# Patient Record
Sex: MALE | Race: ASIAN | NOT HISPANIC OR LATINO | Employment: FULL TIME | URBAN - METROPOLITAN AREA
[De-identification: names, ages, dates, MRNs, and addresses within clinical notes are randomized per-mention and may not be internally consistent; named-entity substitution may affect disease eponyms.]

---

## 2017-07-03 ENCOUNTER — OFFICE VISIT (OUTPATIENT)
Dept: FAMILY MEDICINE | Facility: CLINIC | Age: 57
End: 2017-07-03

## 2017-07-03 VITALS
BODY MASS INDEX: 28.54 KG/M2 | HEIGHT: 64 IN | TEMPERATURE: 98.4 F | OXYGEN SATURATION: 99 % | DIASTOLIC BLOOD PRESSURE: 91 MMHG | HEART RATE: 84 BPM | SYSTOLIC BLOOD PRESSURE: 151 MMHG | WEIGHT: 167.2 LBS

## 2017-07-03 DIAGNOSIS — K92.2 GI BLEED: ICD-10-CM

## 2017-07-03 DIAGNOSIS — R74.8 ELEVATED LIVER ENZYMES: ICD-10-CM

## 2017-07-03 DIAGNOSIS — Z53.9 ERRONEOUS ENCOUNTER--DISREGARD: Primary | ICD-10-CM

## 2017-07-03 DIAGNOSIS — Z09 HOSPITAL DISCHARGE FOLLOW-UP: Primary | ICD-10-CM

## 2017-07-03 DIAGNOSIS — D61.818 PANCYTOPENIA (H): ICD-10-CM

## 2017-07-03 DIAGNOSIS — B19.10 HEPATITIS B VIRUS INFECTION, UNSPECIFIED CHRONICITY: ICD-10-CM

## 2017-07-03 DIAGNOSIS — R03.0 ELEVATED BLOOD PRESSURE READING WITHOUT DIAGNOSIS OF HYPERTENSION: ICD-10-CM

## 2017-07-03 DIAGNOSIS — A04.8 H. PYLORI INFECTION: ICD-10-CM

## 2017-07-03 PROBLEM — K92.1 MELENA: Status: ACTIVE | Noted: 2017-06-23

## 2017-07-03 PROBLEM — S37.009A INJURY OF KIDNEY: Status: ACTIVE | Noted: 2017-06-24

## 2017-07-03 PROBLEM — D64.9 NORMOCYTIC ANEMIA: Status: ACTIVE | Noted: 2017-06-24

## 2017-07-03 LAB
ALBUMIN SERPL-MCNC: 3.2 G/DL (ref 3.2–4.5)
ALP SERPL-CCNC: 56 U/L (ref 40–150)
ALT SERPL-CCNC: 49 U/L (ref 0–45)
AST SERPL-CCNC: 50 U/L (ref 0–55)
BILIRUB SERPL-MCNC: 0.7 MG/DL (ref 0.2–1.3)
BUN SERPL-MCNC: 24 MG/DL (ref 7–30)
CALCIUM SERPL-MCNC: 8.8 MG/DL (ref 8.5–10.4)
CHLORIDE SERPLBLD-SCNC: 105 MMOL/L (ref 94–109)
CO2 SERPL-SCNC: 24 MMOL/L (ref 20–32)
CREAT SERPL-MCNC: 1.9 MG/DL (ref 0.8–1.5)
EGFR CALCULATED (BLACK REFERENCE): 47.2 ML/MIN
EGFR CALCULATED (NON BLACK REFERENCE): 39 ML/MIN
GLUCOSE SERPL-MCNC: 97 MG/DL (ref 60–109)
HCT VFR BLD AUTO: 27.9 % (ref 40–53)
HEMOGLOBIN: 8.6 G/DL (ref 13.3–17.7)
MCH RBC QN AUTO: 30.7 PG (ref 26.5–35)
MCHC RBC AUTO-ENTMCNC: 30.8 G/DL (ref 32–36)
MCV RBC AUTO: 99.6 FL (ref 78–100)
PLATELET # BLD AUTO: 84 K/UL (ref 150–450)
POTASSIUM SERPL-SCNC: 4.1 MMOL/L (ref 3.4–5.3)
PROT SERPL-MCNC: 6.4 G/DL (ref 6.6–8.8)
RBC # BLD AUTO: 2.8 M/UL (ref 4.4–5.9)
SODIUM SERPL-SCNC: 138 MMOL/L (ref 133–144)
WBC # BLD AUTO: 3.7 K/UL (ref 4–11)

## 2017-07-03 RX ORDER — CLARITHROMYCIN 500 MG
500 TABLET ORAL 2 TIMES DAILY
Qty: 28 TABLET | Refills: 0 | Status: SHIPPED | OUTPATIENT
Start: 2017-07-03 | End: 2017-07-17

## 2017-07-03 RX ORDER — AMOXICILLIN 500 MG/1
1000 TABLET, FILM COATED ORAL 2 TIMES DAILY
Qty: 56 TABLET | Refills: 0 | Status: ON HOLD | OUTPATIENT
Start: 2017-07-03 | End: 2019-08-01

## 2017-07-03 NOTE — MR AVS SNAPSHOT
After Visit Summary   7/3/2017    Elle Blanton    MRN: [id number]           Patient Information     Date Of Birth          1960        Visit Information        Provider Department      7/3/2017 2:40 PM Robyn Iraheta APRN CNP [facility]        Today's Diagnoses     Elevated blood pressure reading without diagnosis of hypertension    -  1    GI bleed        Hepatitis B virus infection, unspecified chronicity        Elevated liver enzymes        H. pylori infection          Care Instructions      H. Pylori Infection with Peptic Ulcer    A peptic ulcer is an open sore in the lining of the stomach. It may also form in the lining of the first part of the small intestine (duodenum). Symptoms of a peptic ulcer include stomach pain and upset. Nausea, vomiting, bloating, or bleeding may sometimes occur. In many cases, bacteria called H. pylori are thought to be involved in the development of a peptic ulcer.  Many people have H. pylori in their bodies. Most of the time, it causes no problems. In some people, though, the H. pylori infection causes irritation of the stomach lining. This may make the lining more likely to be damaged by normal stomach acids. H. pylori may also increase the amount of acid in the stomach. It is not clear why this infection leads to problems in some people and not in others.  Tests may be done to check for H. pylori infection. These include a blood test, a breath test, and a stool test. In some cases, a test called endoscopy may be done. During this test, a thin, lighted tube is put into the mouth and down the throat. The healthcare provider can look at the esophagus, stomach, and duodenum through this tube. During this test, a tiny sample of stomach lining (biopsy) may be taken and tested for H. pylori.  Home care    Medicines are used to treat H. pylori infection. Two or more medicines are usually taken together.     Take all prescribed medicines as directed. Take  all of the medicines until they are gone or you are told to stop. This is very important. If you do not finish the medicines, the infection may remain and may be harder to treat.    Ask your healthcare provider what side effects the medicines might cause. These can include stomach cramps, diarrhea, or constipation.    After the medicine is finished, you may have another test to see if H. pylori infection is still present.    Avoid alcohol during treatment.  Follow-up care  Follow up with your healthcare provider as directed. Be sure to return to be retested for H. pylori after treatment.  When to seek medical advice  Call your healthcare provider for any of the following:    Stomach pain that worsens or moves to the right lower part of the abdomen    Chest pain appears or worsens, or spreads to the back, neck, shoulder, or arm    Vomiting    Blood in stool or vomit    Feeling weak or dizzy  Date Last Reviewed: 6/24/2015 2000-2017 [employer]. [address] [place] [address]. All rights reserved. This information is not intended as a substitute for professional medical care. Always follow your healthcare professional's instructions.        Upper GI Bleeding (Stable)  Your upper gastrointestinal (GI) tract includes your esophagus, stomach, and upper small intestine. You have signs of bleeding from your upper GI tract. You may have vomited or coughed up blood or coffee-ground like material. Or you may have black or tarry stools. Very small amounts of GI bleeding may not be visible and can only be found by a test of the stool.  Causes of upper GI bleeding can include:    Tear in the lining of the esophagus    Enlarged veins in the esophagus    An ulcer in the stomach or top of the small intestine    Severe irritation of the stomach    Inflammation of the digestive tract  Note: A bloody nose or mouth or dental problems may cause blood to be swallowed and vomited up again. This is not true GI  bleeding. Iron supplements and medicines for diarrhea and upset stomach can cause black stools. This is not GI bleeding and is not a cause for concern.  Home care  Depending on the cause of your bleeding, care may include the following:    You may be given medicines to help protect your GI tract, treat your problem, and promote healing. Take these as directed.    Avoid NSAIDs, such as aspirin, ibuprofen, or naproxen. They can irritate the stomach and cause further bleeding. If you are taking these medicines for other medical reasons, talk to your healthcare provider before you stop them.      Avoid alcohol, caffeine, and tobacco, which can delay healing and worsen your problem.  Follow-up care  Follow up with your healthcare provider as advised. Further tests may need to be done to find the cause of your bleeding.  When to seek medical advice  Call your healthcare provider for any of the following:    Stomach pain appears or gets worse    Pain spreads to the neck, back, shoulder, or arm    Weakness or dizziness    Swelling of your abdomen    Red blood in your stool    Fever of 100.4F (38C) or higher, or as directed by your healthcare provider  Call 911  Get emergency medical care if any of these occur:    Trouble breathing or swallowing    Severe dizziness    Loss of consciousness    Vomiting blood or large amounts of blood in the stool  Date Last Reviewed: 6/24/2015 2000-2017 [employer]. [address] [place] [address], [place] [address]. All rights reserved. This information is not intended as a substitute for professional medical care. Always follow your healthcare professional's instructions.                Follow-ups after your visit        Who to contact     Please call your clinic at [phone] to:    Ask questions about your health    Make or cancel appointments    Discuss your medicines    Learn about your test results    Speak to your doctor   If you have compliments or concerns about an  "experience at your clinic, or if you wish to file a complaint, please contact [facility] Patient Relations at [phone] or email us at [email or web address]         Additional Information About Your Visit        Care EveryWhere ID     This is your Care EveryWhere ID. This could be used by other organizations to access your [facility] medical records  [id number]        Your Vitals Were     Pulse Temperature Height Pulse Oximetry BMI (Body Mass Index)       84 98.4  F (36.9  C) (Oral) 5' 4.25\" (163.2 cm) 99% 28.48 kg/m2        Blood Pressure from Last 3 Encounters:   07/03/17 (!) 151/91   10/20/16 (!) 160/105    Weight from Last 3 Encounters:   07/03/17 167 lb 3.2 oz (75.8 kg)   10/20/16 180 lb (81.6 kg)              We Performed the Following     CBC with Plt ([facility])     Comprehensive Metabolic Panel ([facility]) - results <1hr Protocol     Hepatitis B Core Ab ([facility])     Hepatitis B Surface Ab ([facility])     Hepatitis B Surface Ag ([facility])          Today's Medication Changes          These changes are accurate as of: 7/3/17  4:12 PM.  If you have any questions, ask your nurse or doctor.               Start taking these medicines.        Dose/Directions    amoxicillin 500 MG tablet   Commonly known as:  AMOXIL   Used for:  H. pylori infection   Started by:  Robyn Iraheta APRN CNP        Dose:  1000 mg   Take 2 tablets (1,000 mg) by mouth 2 times daily   Quantity:  56 tablet   Refills:  0       clarithromycin 500 MG tablet   Commonly known as:  BIAXIN   Used for:  H. pylori infection   Started by:  Robyn Iraheta APRN CNP        Dose:  500 mg   Take 1 tablet (500 mg) by mouth 2 times daily for 14 days   Quantity:  28 tablet   Refills:  0         These medicines have changed or have updated prescriptions.        Dose/Directions    * omeprazole 20 MG CR capsule   Commonly known as:  priLOSEC   This may have changed:  Another medication with the same name was " added. Make sure you understand how and when to take each.   Changed by:  Robyn Iraheta APRN CNP        Dose:  20 mg   Take 20 mg by mouth   Refills:  0       * omeprazole 20 MG CR capsule   Commonly known as:  priLOSEC   This may have changed:  You were already taking a medication with the same name, and this prescription was added. Make sure you understand how and when to take each.   Used for:  H. pylori infection   Changed by:  Robyn Iraheta APRN CNP        Dose:  20 mg   Take 1 capsule (20 mg) by mouth 2 times daily for 14 days   Quantity:  28 capsule   Refills:  0       * Notice:  This list has 2 medication(s) that are the same as other medications prescribed for you. Read the directions carefully, and ask your doctor or other care provider to review them with you.         Where to get your medicines      These medications were sent to [facility]/pharmacy #[id number] - [place], [place] - [address] [facility]  [address], [place] [address]    Hours:  24-hours Phone:  [phone]     amoxicillin 500 MG tablet    clarithromycin 500 MG tablet    omeprazole 20 MG CR capsule                Primary Care Provider Office Phone # Fax #    SUSY Marino [phone]       [facility] [address] [facility] [address]        Equal Access to Services     PRANAV SHEPPARD AH: Hadii ho ku hadasho Soomaali, waaxda luqadaha, qaybta kaalmada adeegyada, waxay marisolin hayjoni bermudez . So [facility] [phone].    ATENCIÓN: Si habla español, tiene a cheatham disposición servicios gratuitos de asistencia lingüística. Llame al [phone].    We comply with applicable federal civil rights laws and [place] laws. We do not discriminate on the basis of race, color, national origin, age, disability sex, sexual orientation or gender identity.            Thank you!     Thank you for choosing [facility]  for your care. Our goal is always to provide you with excellent  care. Hearing back from our patients is one way we can continue to improve our services. Please take a few minutes to complete the written survey that you may receive in the mail after your visit with us. Thank you!             Your Updated Medication List - Protect others around you: Learn how to safely use, store and throw away your medicines at www.disposemymeds.org.          This list is accurate as of: 7/3/17  4:12 PM.  Always use your most recent med list.                   Brand Name Dispense Instructions for use Diagnosis    amoxicillin 500 MG tablet    AMOXIL    56 tablet    Take 2 tablets (1,000 mg) by mouth 2 times daily    H. pylori infection       clarithromycin 500 MG tablet    BIAXIN    28 tablet    Take 1 tablet (500 mg) by mouth 2 times daily for 14 days    H. pylori infection       * omeprazole 20 MG CR capsule    priLOSEC     Take 20 mg by mouth        * omeprazole 20 MG CR capsule    priLOSEC    28 capsule    Take 1 capsule (20 mg) by mouth 2 times daily for 14 days    H. pylori infection       * Notice:  This list has 2 medication(s) that are the same as other medications prescribed for you. Read the directions carefully, and ask your doctor or other care provider to review them with you.

## 2017-07-03 NOTE — PROGRESS NOTES
Hospitalization Follow-up Visit         [facility]       Hospital Follow-up Visit:    Hospital:  [facility]   Date of Admission: 6/23/17  Date of Discharge: 6/24/17  Reason(s) for Admission: Anemia- hgb 9.0 with melana     Reduced to low of 7.3 during 48 hours at [facility].       Problems taking medications regularly:  None       Post Discharge Medication Reconciliation: discharge medications reconciled and changed, per note/orders (see AVS).       Problems adhering to non-medication therapy:  None       Medications reviewed by: by myself. Findings include omeprazole 20 mg po only.    Summary of hospitalization:  [facility] discharge summary reviewed  CareEverywhere information obtained and reviewed  Discharge summary unavailable  Diagnostic Tests/Treatments reviewed.    CT scan of abdomen showed gall bladder wall thickening  Follow up needed: Primary care- test/confirm Hep B + surface antigen   and treat H pylori', consider referral for gastroscopy    Other Healthcare Providers Involved in Patient s Care:         None  Update since discharge: stable, no more black stools since discharge                                     No more upper abdominal/epigastric pain since beginning omeprazole 20mg po daily  Plan of care communicated with patient   Working hard every day fixing up his property that he bought last month the day he went to the hospital    Discharge summary unavailable, however had tested + for Hepatitis B surface antigen in the hospital, and core antibody was negative.Recommend confiirmmtory testing be obtained post DC.         A Voice[employer]   was not used for this visit.             Review of Systems:   CONSTITUTIONAL: Feels well, no unusual fatigue  SKIN: no worrisome rashes, no worrisome moles, no worrisome lesions  EYES: hAS GLASSES AND DOESN'T WEAR THEM EXCEPT WHEN HER NEEDS TO LOOK FOR AN ADDRESS. nO RECENT EYE EXAM.  ENT: no ear problems, no mouth problems, no throat problems  RESP: no  "significant cough, no shortness of breath  CV: no chest pain, no palpitations, no new or worsening peripheral edema  GI: no nausea, no vomiting, no constipation, no diarrhea            Physical Exam:     Vitals:    07/03/17 1443 07/03/17 1456   BP: 155/89 (!) 151/91   Pulse: 84    Temp: 98.4  F (36.9  C)    TempSrc: Oral    SpO2: 99%    Weight: 167 lb 3.2 oz (75.8 kg)    Height: 5' 4.25\" (163.2 cm)      Body mass index is 28.48 kg/(m^2).    GENERAL: alert, fatigued appearing, pale, well hydrated, no distress  RESP: lungs clear to auscultation - no rales, no rhonchi, no wheezes  CV: regular rates and rhythm, normal S1 S2, no S3 or S4 and no murmur  ABDOMEN: soft, no tenderness, no  hepatosplenomegaly, no masses, normal bowel sounds  PSYCH: Alert and oriented times 3; speech- coherent , normal rate and volume; able to articulate logical thoughts, able to abstract reason, no tangential thoughts, no hallucinations or delusions, affect- normal         Results:     Results from last visit   Results for orders placed or performed in visit on 07/03/17   Hepatitis B Surface Ag ([facility])   Result Value Ref Range    Hepatitis B Surface Antigen Positive, Previous Confirm (A) Negative    Narrative    Test performed by:  [facility] [address] [place] [address]   Hepatitis B Surface Ab ([facility])   Result Value Ref Range    Hepatitis B Surface Antibody Negative Negative    Narrative    Test performed by:  [facility] [address] [place] [address]   Hepatitis B Core Ab ([facility])   Result Value Ref Range    Hepatitis B Core Antibody Positive (A) Negative    Narrative    Test performed by:  [facility] [address] [place] [address]   CBC with Plt ([facility])   Result Value Ref Range    WBC 3.7 (L) 4.0 - 11.0 K/uL    RBC 2.80 (L) 4.40 - 5.90 M/uL    Hemoglobin 8.6 (LL) 13.3 - 17.7 g/dL    Hematocrit 27.9 (L) 40.0 - 53.0 %    MCV 99.6 78.0 - 100.0 fL    MCH 30.7 26.5 " "- 35.0 pg    MCHC 30.8 (L) 32.0 - 36.0 g/dL    Platelets 84.0 (L) 150.0 - 450.0 K/uL   Comprehensive Metabolic Panel ([facility]) - results <1hr Protocol   Result Value Ref Range    Glucose 97.0 60.0 - 109.0 mg/dL    Urea Nitrogen 24.0 7.0 - 30.0 mg/dL    Creatinine 1.9 (H) 0.8 - 1.5 mg/dL    Sodium 138.0 133.0 - 144.0 mmol/L    Potassium 4.1 3.4 - 5.3 mmol/L    Chloride 105.0 94.0 - 109.0 mmol/L    Carbon Dioxide 24.0 20.0 - 32.0 mmol/L    Calcium 8.8 8.5 - 10.4 mg/dL    Protein Total 6.4 (L) 6.6 - 8.8 g/dL    Albumin 3.2 3.2 - 4.5 g/dL    Alkaline Phosphatase 56.0 40.0 - 150.0 U/L    ALT 49.0 (H) 0.0 - 45.0 U/L    AST 50.0 0.0 - 55.0 U/L    Bilirubin Total 0.7 0.2 - 1.3 mg/dL    eGFR Calculated (Non Black Reference) 39.0 (L) >60.0 mL/min    eGFR Calculated (Black Reference) 47.2 (L) >60.0 mL/min       Assessment and Plan     (Z09) Hospital discharge follow-up  (primary encounter diagnosis)  Comment: Only dischrge medication was omeprazole 20 mg po daily.   Patient is taking and his upper abdominal/epigastric pain has resolved, and melena has not recurred since he was hospitalized 6/23/17.  Plan: Will cautiously treat for + h pylori gastritis.  Urge referral for FU to consider gastroscopy and any therapy for newly dx Hepatitis B.Elle declined Gastrenterology referral at this time, opting to take the medication to treat h pylori, and then \"see how I'm doing.\"    (R03.0) Elevated blood pressure reading without diagnosis of hypertension  Comment: Does not have chest pain, SOB, headache, dizziness or palpitations   Associated with B/P elevation.  Plan: Discussed low hgb as possible cause of HTN along with recent GI bleeding. Stress on heart to continue to circulate adequate oxygen supplies to the   Organs/tissues. Needs to RTC to have this rechecked later this week.  FU if remains 140/90 or >,or RTC or ED at once if symptoms such as chest pain, SOB, or bleeding in the stool or from another source,occurs.    (K92.2) GI " bleed  Comment: Hgb stable for now. No nausea, vomiting or epigastric or other abdominal pain.No blackish or bloody stools.  Plan: CBC with Plt ([facility]), Comprehensive Metabolic         Panel ([facility]) - results <1hr Protocol,         CANCELED: GASTROENTEROLOGY ADULT REF CONSULT         ONLY        SELF MONITORING: Elle cautioned he must obtain a reassessment promptly with any more blackened or tarry stools or other s/s bleeding or increase in abdominal pain. Prompt evaluation in ED if dizziness, chest pain, sweatiness, SOB or racing heart occur.    (B19.10) Hepatitis B virus infection, unspecified chronicity  Comment: Confirmatory testing for Hep B surface antigen today and testing for core antibody/IGM, which was negative at [facility].  Will need referral for hepatitis consult, and information on the meaning of Hep B diagnosis, if the results are confirmed.  Plan: Hepatitis B Surface Ag ([facility]), Hepatitis         B Surface Ab ([facility]), Hepatitis B Core Ab         ([facility]), Comprehensive Metabolic Panel         ([facility]) - results <1hr Protocol    (R74.8) Elevated liver enzymes  Comment: Noted in hospital and recommended recheck as part of discharge FU in Primary Care. [facility] in [facility] clinic session [facility], will watch for results to notify.  Plan: Comprehensive Metabolic Panel ([facility]) -         results <1hr Protocol            (A04.8) H. pylori infection  Comment: May well be the source of Elle's GI ulcer and bleeding that resulted.  Plan: amoxicillin (AMOXIL) 500 MG tablet, omeprazole         (PRILOSEC) 20 MG CR capsule, clarithromycin         (BIAXIN) 500 MG tablet        Discussed + H pylori w/Dr.William Valiente as Faculty Preceptor and included consideration of renal insufficiency/Kidney injury documented with his hospitalization. As current EGFR=39, will proceed with triple therapy and watch  Patient for tolerance. Recommend he RTC for reassessment in 3 days to  assess  Creatinine/GFR, tolerance and adherence to therapy, blood pressure and any returning GI s/s such as epigastric pain, or melena.    (D61.818) Pancytopenia (H)  Comment: hgb stable/improved at 8.6  when compared to inpatient results of 7.  Plan:       E&M code to be billed if TCM cannot be: 69529    Type of decision making: High complexity (16785)    Options for treatment and follow-up care were reviewed with the patient  Elle Blanton   engaged in the decision making process and verbalized understanding of the options discussed and agreed with the final plan.    45 minutes was spent on this visit, >50% counseling and coordination of care re:h pylori infection and gastritis, GI bleed with pancytopenia, and possible hepatitis.Need for hospital/transfusion evaluation with any further bleeding or   Evaluation at once with any new symptoms. Elle Blanton agreed to the above plan and has no further questions at present..  RTC for reassessment in 3 days     SUSY Duckworth CNP      LATE ENTRY:  Hepatitis B Core Antibody returned +, also Surface antigen for Hep B  confiirmed +.Will refer patient to [facility] for consult re: Hepatitis and consider gastroscopy.   SANTOS Duckworth

## 2017-07-03 NOTE — PATIENT INSTRUCTIONS
H. Pylori Infection with Peptic Ulcer    A peptic ulcer is an open sore in the lining of the stomach. It may also form in the lining of the first part of the small intestine (duodenum). Symptoms of a peptic ulcer include stomach pain and upset. Nausea, vomiting, bloating, or bleeding may sometimes occur. In many cases, bacteria called H. pylori are thought to be involved in the development of a peptic ulcer.  Many people have H. pylori in their bodies. Most of the time, it causes no problems. In some people, though, the H. pylori infection causes irritation of the stomach lining. This may make the lining more likely to be damaged by normal stomach acids. H. pylori may also increase the amount of acid in the stomach. It is not clear why this infection leads to problems in some people and not in others.  Tests may be done to check for H. pylori infection. These include a blood test, a breath test, and a stool test. In some cases, a test called endoscopy may be done. During this test, a thin, lighted tube is put into the mouth and down the throat. The healthcare provider can look at the esophagus, stomach, and duodenum through this tube. During this test, a tiny sample of stomach lining (biopsy) may be taken and tested for H. pylori.  Home care    Medicines are used to treat H. pylori infection. Two or more medicines are usually taken together.     Take all prescribed medicines as directed. Take all of the medicines until they are gone or you are told to stop. This is very important. If you do not finish the medicines, the infection may remain and may be harder to treat.    Ask your healthcare provider what side effects the medicines might cause. These can include stomach cramps, diarrhea, or constipation.    After the medicine is finished, you may have another test to see if H. pylori infection is still present.    Avoid alcohol during treatment.  Follow-up care  Follow up with your healthcare provider as directed.  Be sure to return to be retested for H. pylori after treatment.  When to seek medical advice  Call your healthcare provider for any of the following:    Stomach pain that worsens or moves to the right lower part of the abdomen    Chest pain appears or worsens, or spreads to the back, neck, shoulder, or arm    Vomiting    Blood in stool or vomit    Feeling weak or dizzy  Date Last Reviewed: 6/24/2015 2000-2017 [employer]. [address] [place] [address], [place] [address]. All rights reserved. This information is not intended as a substitute for professional medical care. Always follow your healthcare professional's instructions.        Upper GI Bleeding (Stable)  Your upper gastrointestinal (GI) tract includes your esophagus, stomach, and upper small intestine. You have signs of bleeding from your upper GI tract. You may have vomited or coughed up blood or coffee-ground like material. Or you may have black or tarry stools. Very small amounts of GI bleeding may not be visible and can only be found by a test of the stool.  Causes of upper GI bleeding can include:    Tear in the lining of the esophagus    Enlarged veins in the esophagus    An ulcer in the stomach or top of the small intestine    Severe irritation of the stomach    Inflammation of the digestive tract  Note: A bloody nose or mouth or dental problems may cause blood to be swallowed and vomited up again. This is not true GI bleeding. Iron supplements and medicines for diarrhea and upset stomach can cause black stools. This is not GI bleeding and is not a cause for concern.  Home care  Depending on the cause of your bleeding, care may include the following:    You may be given medicines to help protect your GI tract, treat your problem, and promote healing. Take these as directed.    Avoid NSAIDs, such as aspirin, ibuprofen, or naproxen. They can irritate the stomach and cause further bleeding. If you are taking these medicines for other medical  reasons, talk to your healthcare provider before you stop them.      Avoid alcohol, caffeine, and tobacco, which can delay healing and worsen your problem.  Follow-up care  Follow up with your healthcare provider as advised. Further tests may need to be done to find the cause of your bleeding.  When to seek medical advice  Call your healthcare provider for any of the following:    Stomach pain appears or gets worse    Pain spreads to the neck, back, shoulder, or arm    Weakness or dizziness    Swelling of your abdomen    Red blood in your stool    Fever of 100.4F (38C) or higher, or as directed by your healthcare provider  Call 911  Get emergency medical care if any of these occur:    Trouble breathing or swallowing    Severe dizziness    Loss of consciousness    Vomiting blood or large amounts of blood in the stool  Date Last Reviewed: 6/24/2015 2000-2017 The "[employer]. [address] [place] [address], [place] [address]. All rights reserved. This information is not intended as a substitute for professional medical care. Always follow your healthcare professional's instructions.      Referral for Gastroenterology along with current OV notes and labs faxed to [facility] and pt will be contacted to schedule appointment.  LUI

## 2017-07-04 LAB
HBV SURFACE AB SER-ACNC: NEGATIVE M[IU]/ML
HBV SURFACE AG SERPL QL IA: ABNORMAL

## 2017-07-04 ASSESSMENT — PATIENT HEALTH QUESTIONNAIRE - PHQ9: SUM OF ALL RESPONSES TO PHQ QUESTIONS 1-9: 1

## 2017-07-05 PROBLEM — A04.8 H. PYLORI INFECTION: Status: ACTIVE | Noted: 2017-07-05

## 2017-07-05 LAB — HBV CORE AB SERPL QL IA: POSITIVE

## 2017-11-05 ENCOUNTER — ANESTHESIA - HEALTHEAST (OUTPATIENT)
Dept: SURGERY | Facility: HOSPITAL | Age: 57
End: 2017-11-05

## 2017-11-05 ENCOUNTER — COMMUNICATION - HEALTHEAST (OUTPATIENT)
Dept: SURGERY | Facility: CLINIC | Age: 57
End: 2017-11-05

## 2017-11-05 ENCOUNTER — SURGERY - HEALTHEAST (OUTPATIENT)
Dept: SURGERY | Facility: HOSPITAL | Age: 57
End: 2017-11-05

## 2017-11-17 ENCOUNTER — OFFICE VISIT - HEALTHEAST (OUTPATIENT)
Dept: SURGERY | Facility: CLINIC | Age: 57
End: 2017-11-17

## 2019-07-28 ENCOUNTER — HOSPITAL ENCOUNTER (INPATIENT)
Facility: CLINIC | Age: 59
LOS: 7 days | Discharge: HOME | DRG: 300 | End: 2019-08-04
Attending: SURGERY | Admitting: SURGERY
Payer: COMMERCIAL

## 2019-07-28 ENCOUNTER — TRANSFERRED RECORDS (OUTPATIENT)
Dept: HEALTH INFORMATION MANAGEMENT | Facility: CLINIC | Age: 59
End: 2019-07-28

## 2019-07-28 ENCOUNTER — APPOINTMENT (OUTPATIENT)
Dept: CARDIOLOGY | Facility: CLINIC | Age: 59
DRG: 300 | End: 2019-07-28
Attending: SURGERY
Payer: COMMERCIAL

## 2019-07-28 DIAGNOSIS — D61.818 OTHER PANCYTOPENIA (H): ICD-10-CM

## 2019-07-28 DIAGNOSIS — N17.9 ACUTE KIDNEY INJURY (H): ICD-10-CM

## 2019-07-28 DIAGNOSIS — I71.03 DISSECTION OF THORACOABDOMINAL AORTA (H): Primary | ICD-10-CM

## 2019-07-28 DIAGNOSIS — K74.60 CHRONIC HEPATITIS B WITH CIRRHOSIS (H): ICD-10-CM

## 2019-07-28 DIAGNOSIS — N18.30 CKD (CHRONIC KIDNEY DISEASE) STAGE 3, GFR 30-59 ML/MIN (H): ICD-10-CM

## 2019-07-28 DIAGNOSIS — B18.1 CHRONIC VIRAL HEPATITIS B WITHOUT DELTA AGENT AND WITHOUT COMA (H): ICD-10-CM

## 2019-07-28 DIAGNOSIS — B18.1 CHRONIC HEPATITIS B WITH CIRRHOSIS (H): ICD-10-CM

## 2019-07-28 DIAGNOSIS — K74.60 CIRRHOSIS OF LIVER WITHOUT ASCITES, UNSPECIFIED HEPATIC CIRRHOSIS TYPE (H): ICD-10-CM

## 2019-07-28 PROBLEM — I71.00 AORTIC DISSECTION (H): Status: ACTIVE | Noted: 2019-07-28

## 2019-07-28 LAB
ABO + RH BLD: NORMAL
ABO + RH BLD: NORMAL
ALBUMIN SERPL-MCNC: 2.9 G/DL (ref 3.4–5)
ALBUMIN SERPL-MCNC: 3 G/DL (ref 3.4–5)
ALP SERPL-CCNC: 101 U/L (ref 40–150)
ALP SERPL-CCNC: 108 U/L (ref 40–150)
ALT SERPL W P-5'-P-CCNC: 167 U/L (ref 0–70)
ALT SERPL W P-5'-P-CCNC: 170 U/L (ref 0–70)
ANGLE RATE OF CLOT STRENGTH: 45.8 DEGREES (ref 53–72)
ANION GAP SERPL CALCULATED.3IONS-SCNC: 9 MMOL/L (ref 3–14)
APTT PPP: 36 SEC (ref 22–37)
AST SERPL W P-5'-P-CCNC: 100 U/L (ref 0–45)
AST SERPL W P-5'-P-CCNC: 107 U/L (ref 0–45)
BASE DEFICIT BLDA-SCNC: 8.2 MMOL/L
BASE DEFICIT BLDA-SCNC: 8.7 MMOL/L
BASE DEFICIT BLDV-SCNC: 9.4 MMOL/L
BASOPHILS # BLD AUTO: 0 10E9/L (ref 0–0.2)
BASOPHILS NFR BLD AUTO: 0 %
BILIRUB DIRECT SERPL-MCNC: 0.2 MG/DL (ref 0–0.2)
BILIRUB SERPL-MCNC: 0.6 MG/DL (ref 0.2–1.3)
BILIRUB SERPL-MCNC: 0.6 MG/DL (ref 0.2–1.3)
BLD PROD TYP BPU: NORMAL
BLD PROD TYP BPU: NORMAL
BLD UNIT ID BPU: 0
BLOOD PRODUCT CODE: NORMAL
BPU ID: NORMAL
BUN SERPL-MCNC: 52 MG/DL (ref 7–30)
CA-I BLD-MCNC: 4.6 MG/DL (ref 4.4–5.2)
CALCIUM SERPL-MCNC: 7.6 MG/DL (ref 8.5–10.1)
CHLORIDE SERPL-SCNC: 112 MMOL/L (ref 94–109)
CI HYPOCOAGULATION INDEX: 5.1 RATIO (ref 0–3)
CO2 SERPL-SCNC: 18 MMOL/L (ref 20–32)
CREAT SERPL-MCNC: 3.46 MG/DL (ref 0.66–1.25)
DIFFERENTIAL METHOD BLD: ABNORMAL
EOSINOPHIL # BLD AUTO: 0 10E9/L (ref 0–0.7)
EOSINOPHIL NFR BLD AUTO: 0 %
ERYTHROCYTE [DISTWIDTH] IN BLOOD BY AUTOMATED COUNT: 15.6 % (ref 10–15)
ERYTHROCYTE [DISTWIDTH] IN BLOOD BY AUTOMATED COUNT: 15.7 % (ref 10–15)
ERYTHROCYTE [DISTWIDTH] IN BLOOD BY AUTOMATED COUNT: 15.8 % (ref 10–15)
ERYTHROCYTE [DISTWIDTH] IN BLOOD BY AUTOMATED COUNT: 15.8 % (ref 10–15)
ERYTHROCYTE [DISTWIDTH] IN BLOOD BY AUTOMATED COUNT: NORMAL % (ref 10–15)
FIBRINOGEN PPP-MCNC: 204 MG/DL (ref 200–420)
G ACTUAL CLOT STRENGTH: 3.3 KD/SC (ref 4.5–11)
GFR SERPL CREATININE-BSD FRML MDRD: 18 ML/MIN/{1.73_M2}
GLUCOSE BLDC GLUCOMTR-MCNC: 140 MG/DL (ref 70–99)
GLUCOSE BLDC GLUCOMTR-MCNC: 146 MG/DL (ref 70–99)
GLUCOSE SERPL-MCNC: 161 MG/DL (ref 70–99)
HCO3 BLD-SCNC: 17 MMOL/L (ref 21–28)
HCO3 BLD-SCNC: 18 MMOL/L (ref 21–28)
HCO3 BLDV-SCNC: 17 MMOL/L (ref 21–28)
HCT VFR BLD AUTO: 24.7 % (ref 40–53)
HCT VFR BLD AUTO: 25.8 % (ref 40–53)
HCT VFR BLD AUTO: 26.6 % (ref 40–53)
HCT VFR BLD AUTO: 27.5 % (ref 40–53)
HCT VFR BLD AUTO: NORMAL % (ref 40–53)
HGB BLD-MCNC: 7.9 G/DL (ref 13.3–17.7)
HGB BLD-MCNC: 8.1 G/DL (ref 13.3–17.7)
HGB BLD-MCNC: 8.6 G/DL (ref 13.3–17.7)
HGB BLD-MCNC: 8.8 G/DL (ref 13.3–17.7)
HGB BLD-MCNC: NORMAL G/DL (ref 13.3–17.7)
IMM GRANULOCYTES # BLD: 0 10E9/L (ref 0–0.4)
IMM GRANULOCYTES NFR BLD: 0 %
INR PPP: 1.17 (ref 0.86–1.14)
K TIME TO SPEC CLOT STRENGTH: 4.6 MINUTE (ref 1–3)
LACTATE BLD-SCNC: 0.8 MMOL/L (ref 0.7–2)
LACTATE BLD-SCNC: 1 MMOL/L (ref 0.7–2)
LACTATE BLD-SCNC: 1.3 MMOL/L (ref 0.7–2)
LACTATE BLD-SCNC: 1.8 MMOL/L (ref 0.7–2)
LY30 LYSIS AT 30 MINUTES: 0 % (ref 0–8)
LY60 LYSIS AT 60 MINUTES: 0 % (ref 0–15)
LYMPHOCYTES # BLD AUTO: 0.3 10E9/L (ref 0.8–5.3)
LYMPHOCYTES NFR BLD AUTO: 9 %
MA MAXIMUM CLOT STRENGTH: 40 MM (ref 50–70)
MAGNESIUM SERPL-MCNC: 2.1 MG/DL (ref 1.6–2.3)
MCH RBC QN AUTO: 30.3 PG (ref 26.5–33)
MCH RBC QN AUTO: 30.3 PG (ref 26.5–33)
MCH RBC QN AUTO: 30.7 PG (ref 26.5–33)
MCH RBC QN AUTO: 31.3 PG (ref 26.5–33)
MCH RBC QN AUTO: NORMAL PG (ref 26.5–33)
MCHC RBC AUTO-ENTMCNC: 31.4 G/DL (ref 31.5–36.5)
MCHC RBC AUTO-ENTMCNC: 32 G/DL (ref 31.5–36.5)
MCHC RBC AUTO-ENTMCNC: 32 G/DL (ref 31.5–36.5)
MCHC RBC AUTO-ENTMCNC: 32.3 G/DL (ref 31.5–36.5)
MCHC RBC AUTO-ENTMCNC: NORMAL G/DL (ref 31.5–36.5)
MCV RBC AUTO: 95 FL (ref 78–100)
MCV RBC AUTO: 95 FL (ref 78–100)
MCV RBC AUTO: 97 FL (ref 78–100)
MCV RBC AUTO: 98 FL (ref 78–100)
MCV RBC AUTO: NORMAL FL (ref 78–100)
MONOCYTES # BLD AUTO: 0.2 10E9/L (ref 0–1.3)
MONOCYTES NFR BLD AUTO: 5.4 %
MRSA DNA SPEC QL NAA+PROBE: NEGATIVE
NEUTROPHILS # BLD AUTO: 2.4 10E9/L (ref 1.6–8.3)
NEUTROPHILS NFR BLD AUTO: 85.6 %
NRBC # BLD AUTO: 0 10*3/UL
NRBC BLD AUTO-RTO: 0 /100
NUM BPU REQUESTED: 1
O2/TOTAL GAS SETTING VFR VENT: 21 %
OXYHGB MFR BLD: 97 % (ref 92–100)
OXYHGB MFR BLDV: 95 %
PCO2 BLD: 33 MM HG (ref 35–45)
PCO2 BLD: 40 MM HG (ref 35–45)
PCO2 BLDV: 37 MM HG (ref 40–50)
PH BLD: 7.27 PH (ref 7.35–7.45)
PH BLD: 7.31 PH (ref 7.35–7.45)
PH BLDV: 7.27 PH (ref 7.32–7.43)
PHOSPHATE SERPL-MCNC: 4.2 MG/DL (ref 2.5–4.5)
PLATELET # BLD AUTO: 28 10E9/L (ref 150–450)
PLATELET # BLD AUTO: 28 10E9/L (ref 150–450)
PLATELET # BLD AUTO: 35 10E9/L (ref 150–450)
PLATELET # BLD AUTO: 49 10E9/L (ref 150–450)
PLATELET # BLD AUTO: NORMAL 10E9/L (ref 150–450)
PO2 BLD: 101 MM HG (ref 80–105)
PO2 BLD: 84 MM HG (ref 80–105)
PO2 BLDV: 84 MM HG (ref 25–47)
POTASSIUM SERPL-SCNC: 4.6 MMOL/L (ref 3.4–5.3)
PROT SERPL-MCNC: 7.2 G/DL (ref 6.8–8.8)
PROT SERPL-MCNC: 7.3 G/DL (ref 6.8–8.8)
R TIME UNTIL CLOT FORMS: 6.1 MINUTE (ref 5–10)
RBC # BLD AUTO: 2.61 10E12/L (ref 4.4–5.9)
RBC # BLD AUTO: 2.64 10E12/L (ref 4.4–5.9)
RBC # BLD AUTO: 2.75 10E12/L (ref 4.4–5.9)
RBC # BLD AUTO: 2.9 10E12/L (ref 4.4–5.9)
RBC # BLD AUTO: NORMAL 10E12/L (ref 4.4–5.9)
RETICS # AUTO: 42.9 10E9/L (ref 25–95)
RETICS/RBC NFR AUTO: 1.6 % (ref 0.5–2)
SODIUM SERPL-SCNC: 139 MMOL/L (ref 133–144)
SPECIMEN EXP DATE BLD: NORMAL
SPECIMEN SOURCE: NORMAL
TRANSFUSION STATUS PATIENT QL: NORMAL
TRANSFUSION STATUS PATIENT QL: NORMAL
WBC # BLD AUTO: 2.8 10E9/L (ref 4–11)
WBC # BLD AUTO: 3 10E9/L (ref 4–11)
WBC # BLD AUTO: 3.3 10E9/L (ref 4–11)
WBC # BLD AUTO: 3.7 10E9/L (ref 4–11)
WBC # BLD AUTO: NORMAL 10E9/L (ref 4–11)

## 2019-07-28 PROCEDURE — 83605 ASSAY OF LACTIC ACID: CPT | Performed by: SURGERY

## 2019-07-28 PROCEDURE — 83605 ASSAY OF LACTIC ACID: CPT | Performed by: OBSTETRICS & GYNECOLOGY

## 2019-07-28 PROCEDURE — 93005 ELECTROCARDIOGRAM TRACING: CPT

## 2019-07-28 PROCEDURE — 25000128 H RX IP 250 OP 636: Performed by: STUDENT IN AN ORGANIZED HEALTH CARE EDUCATION/TRAINING PROGRAM

## 2019-07-28 PROCEDURE — 03HY32Z INSERTION OF MONITORING DEVICE INTO UPPER ARTERY, PERCUTANEOUS APPROACH: ICD-10-PCS | Performed by: ANESTHESIOLOGY

## 2019-07-28 PROCEDURE — 85396 CLOTTING ASSAY WHOLE BLOOD: CPT | Performed by: SURGERY

## 2019-07-28 PROCEDURE — C9113 INJ PANTOPRAZOLE SODIUM, VIA: HCPCS | Performed by: STUDENT IN AN ORGANIZED HEALTH CARE EDUCATION/TRAINING PROGRAM

## 2019-07-28 PROCEDURE — 93306 TTE W/DOPPLER COMPLETE: CPT | Mod: 26 | Performed by: INTERNAL MEDICINE

## 2019-07-28 PROCEDURE — 25800030 ZZH RX IP 258 OP 636: Performed by: OBSTETRICS & GYNECOLOGY

## 2019-07-28 PROCEDURE — 93306 TTE W/DOPPLER COMPLETE: CPT

## 2019-07-28 PROCEDURE — 85027 COMPLETE CBC AUTOMATED: CPT | Performed by: PHYSICIAN ASSISTANT

## 2019-07-28 PROCEDURE — 87641 MR-STAPH DNA AMP PROBE: CPT | Performed by: INTERNAL MEDICINE

## 2019-07-28 PROCEDURE — 85610 PROTHROMBIN TIME: CPT | Performed by: SURGERY

## 2019-07-28 PROCEDURE — 82805 BLOOD GASES W/O2 SATURATION: CPT | Performed by: SURGERY

## 2019-07-28 PROCEDURE — 80076 HEPATIC FUNCTION PANEL: CPT | Performed by: SURGERY

## 2019-07-28 PROCEDURE — 36620 INSERTION CATHETER ARTERY: CPT | Mod: GC | Performed by: ANESTHESIOLOGY

## 2019-07-28 PROCEDURE — 25000132 ZZH RX MED GY IP 250 OP 250 PS 637: Performed by: OBSTETRICS & GYNECOLOGY

## 2019-07-28 PROCEDURE — 86900 BLOOD TYPING SEROLOGIC ABO: CPT | Performed by: SURGERY

## 2019-07-28 PROCEDURE — 83605 ASSAY OF LACTIC ACID: CPT | Performed by: PHYSICIAN ASSISTANT

## 2019-07-28 PROCEDURE — 93010 ELECTROCARDIOGRAM REPORT: CPT | Performed by: INTERNAL MEDICINE

## 2019-07-28 PROCEDURE — 85025 COMPLETE CBC W/AUTO DIFF WBC: CPT | Performed by: SURGERY

## 2019-07-28 PROCEDURE — P9037 PLATE PHERES LEUKOREDU IRRAD: HCPCS | Performed by: OBSTETRICS & GYNECOLOGY

## 2019-07-28 PROCEDURE — 40000275 ZZH STATISTIC RCP TIME EA 10 MIN

## 2019-07-28 PROCEDURE — 85045 AUTOMATED RETICULOCYTE COUNT: CPT | Performed by: SURGERY

## 2019-07-28 PROCEDURE — 36415 COLL VENOUS BLD VENIPUNCTURE: CPT | Performed by: OBSTETRICS & GYNECOLOGY

## 2019-07-28 PROCEDURE — 25000132 ZZH RX MED GY IP 250 OP 250 PS 637: Performed by: PHYSICIAN ASSISTANT

## 2019-07-28 PROCEDURE — 40000611 ZZHCL STATISTIC MORPHOLOGY W/INTERP HEMEPATH TC 85060: Performed by: OBSTETRICS & GYNECOLOGY

## 2019-07-28 PROCEDURE — 85027 COMPLETE CBC AUTOMATED: CPT | Performed by: SURGERY

## 2019-07-28 PROCEDURE — 36415 COLL VENOUS BLD VENIPUNCTURE: CPT | Performed by: SURGERY

## 2019-07-28 PROCEDURE — 82803 BLOOD GASES ANY COMBINATION: CPT | Performed by: PHYSICIAN ASSISTANT

## 2019-07-28 PROCEDURE — 25000132 ZZH RX MED GY IP 250 OP 250 PS 637: Performed by: SURGERY

## 2019-07-28 PROCEDURE — 82805 BLOOD GASES W/O2 SATURATION: CPT | Performed by: STUDENT IN AN ORGANIZED HEALTH CARE EDUCATION/TRAINING PROGRAM

## 2019-07-28 PROCEDURE — 87640 STAPH A DNA AMP PROBE: CPT | Performed by: INTERNAL MEDICINE

## 2019-07-28 PROCEDURE — 80053 COMPREHEN METABOLIC PANEL: CPT | Performed by: SURGERY

## 2019-07-28 PROCEDURE — 40000014 ZZH STATISTIC ARTERIAL MONITORING DAILY

## 2019-07-28 PROCEDURE — 85396 CLOTTING ASSAY WHOLE BLOOD: CPT | Performed by: OBSTETRICS & GYNECOLOGY

## 2019-07-28 PROCEDURE — 85730 THROMBOPLASTIN TIME PARTIAL: CPT | Performed by: SURGERY

## 2019-07-28 PROCEDURE — 86901 BLOOD TYPING SEROLOGIC RH(D): CPT | Performed by: SURGERY

## 2019-07-28 PROCEDURE — 84100 ASSAY OF PHOSPHORUS: CPT | Performed by: SURGERY

## 2019-07-28 PROCEDURE — 25000125 ZZHC RX 250: Performed by: STUDENT IN AN ORGANIZED HEALTH CARE EDUCATION/TRAINING PROGRAM

## 2019-07-28 PROCEDURE — 20000004 ZZH R&B ICU UMMC

## 2019-07-28 PROCEDURE — 83735 ASSAY OF MAGNESIUM: CPT | Performed by: SURGERY

## 2019-07-28 PROCEDURE — 99232 SBSQ HOSP IP/OBS MODERATE 35: CPT | Mod: 25 | Performed by: ANESTHESIOLOGY

## 2019-07-28 PROCEDURE — 00000146 ZZHCL STATISTIC GLUCOSE BY METER IP

## 2019-07-28 PROCEDURE — 25800030 ZZH RX IP 258 OP 636: Performed by: STUDENT IN AN ORGANIZED HEALTH CARE EDUCATION/TRAINING PROGRAM

## 2019-07-28 PROCEDURE — 82330 ASSAY OF CALCIUM: CPT | Performed by: SURGERY

## 2019-07-28 PROCEDURE — 85384 FIBRINOGEN ACTIVITY: CPT | Performed by: SURGERY

## 2019-07-28 RX ORDER — ONDANSETRON 4 MG/1
4 TABLET, ORALLY DISINTEGRATING ORAL EVERY 6 HOURS PRN
Status: DISCONTINUED | OUTPATIENT
Start: 2019-07-28 | End: 2019-08-04 | Stop reason: HOSPADM

## 2019-07-28 RX ORDER — METOPROLOL TARTRATE 25 MG/1
25 TABLET, FILM COATED ORAL ONCE
Status: COMPLETED | OUTPATIENT
Start: 2019-07-28 | End: 2019-07-28

## 2019-07-28 RX ORDER — METOPROLOL TARTRATE 25 MG/1
25 TABLET, FILM COATED ORAL 2 TIMES DAILY
Status: DISCONTINUED | OUTPATIENT
Start: 2019-07-28 | End: 2019-07-28

## 2019-07-28 RX ORDER — SODIUM CHLORIDE, SODIUM LACTATE, POTASSIUM CHLORIDE, CALCIUM CHLORIDE 600; 310; 30; 20 MG/100ML; MG/100ML; MG/100ML; MG/100ML
INJECTION, SOLUTION INTRAVENOUS CONTINUOUS
Status: DISCONTINUED | OUTPATIENT
Start: 2019-07-28 | End: 2019-07-29

## 2019-07-28 RX ORDER — LABETALOL 20 MG/4 ML (5 MG/ML) INTRAVENOUS SYRINGE
20 ONCE
Status: COMPLETED | OUTPATIENT
Start: 2019-07-28 | End: 2019-07-28

## 2019-07-28 RX ORDER — OXYCODONE HYDROCHLORIDE 5 MG/1
5-10 TABLET ORAL
Status: DISCONTINUED | OUTPATIENT
Start: 2019-07-28 | End: 2019-07-29

## 2019-07-28 RX ORDER — AMOXICILLIN 250 MG
2 CAPSULE ORAL 2 TIMES DAILY
Status: DISCONTINUED | OUTPATIENT
Start: 2019-07-28 | End: 2019-08-04 | Stop reason: HOSPADM

## 2019-07-28 RX ORDER — ACETAMINOPHEN 325 MG/1
650 TABLET ORAL EVERY 4 HOURS PRN
Status: DISCONTINUED | OUTPATIENT
Start: 2019-07-28 | End: 2019-07-28

## 2019-07-28 RX ORDER — SODIUM CHLORIDE 9 MG/ML
INJECTION, SOLUTION INTRAVENOUS CONTINUOUS
Status: DISCONTINUED | OUTPATIENT
Start: 2019-07-28 | End: 2019-07-28

## 2019-07-28 RX ORDER — PANTOPRAZOLE SODIUM 40 MG/1
40 TABLET, DELAYED RELEASE ORAL
Status: DISCONTINUED | OUTPATIENT
Start: 2019-07-28 | End: 2019-08-04 | Stop reason: HOSPADM

## 2019-07-28 RX ORDER — ESMOLOL HYDROCHLORIDE 20 MG/ML
50-300 INJECTION, SOLUTION INTRAVENOUS CONTINUOUS
Status: DISCONTINUED | OUTPATIENT
Start: 2019-07-28 | End: 2019-08-01

## 2019-07-28 RX ORDER — PROCHLORPERAZINE MALEATE 5 MG
10 TABLET ORAL EVERY 6 HOURS PRN
Status: DISCONTINUED | OUTPATIENT
Start: 2019-07-28 | End: 2019-08-04 | Stop reason: HOSPADM

## 2019-07-28 RX ORDER — ONDANSETRON 2 MG/ML
4 INJECTION INTRAMUSCULAR; INTRAVENOUS EVERY 6 HOURS PRN
Status: DISCONTINUED | OUTPATIENT
Start: 2019-07-28 | End: 2019-08-04 | Stop reason: HOSPADM

## 2019-07-28 RX ORDER — METOPROLOL TARTRATE 25 MG/1
50 TABLET, FILM COATED ORAL EVERY 8 HOURS
Status: DISCONTINUED | OUTPATIENT
Start: 2019-07-28 | End: 2019-07-29

## 2019-07-28 RX ORDER — OXYCODONE AND ACETAMINOPHEN 5; 325 MG/1; MG/1
1-2 TABLET ORAL EVERY 4 HOURS PRN
Status: DISCONTINUED | OUTPATIENT
Start: 2019-07-28 | End: 2019-07-28

## 2019-07-28 RX ORDER — PROCHLORPERAZINE 25 MG
25 SUPPOSITORY, RECTAL RECTAL EVERY 12 HOURS PRN
Status: DISCONTINUED | OUTPATIENT
Start: 2019-07-28 | End: 2019-08-04 | Stop reason: HOSPADM

## 2019-07-28 RX ORDER — ACETAMINOPHEN 500 MG
500 TABLET ORAL EVERY 6 HOURS PRN
Status: DISCONTINUED | OUTPATIENT
Start: 2019-07-28 | End: 2019-08-01

## 2019-07-28 RX ORDER — NALOXONE HYDROCHLORIDE 0.4 MG/ML
.1-.4 INJECTION, SOLUTION INTRAMUSCULAR; INTRAVENOUS; SUBCUTANEOUS
Status: DISCONTINUED | OUTPATIENT
Start: 2019-07-28 | End: 2019-08-04 | Stop reason: HOSPADM

## 2019-07-28 RX ORDER — METOPROLOL TARTRATE 25 MG/1
50 TABLET, FILM COATED ORAL 2 TIMES DAILY
Status: DISCONTINUED | OUTPATIENT
Start: 2019-07-28 | End: 2019-07-28

## 2019-07-28 RX ORDER — AMLODIPINE BESYLATE 2.5 MG/1
5 TABLET ORAL DAILY
Status: DISCONTINUED | OUTPATIENT
Start: 2019-07-28 | End: 2019-07-29

## 2019-07-28 RX ADMIN — ESMOLOL HYDROCHLORIDE IN SODIUM CHLORIDE 200 MCG/KG/MIN: 20 INJECTION INTRAVENOUS at 07:45

## 2019-07-28 RX ADMIN — SODIUM CHLORIDE, POTASSIUM CHLORIDE, SODIUM LACTATE AND CALCIUM CHLORIDE: 600; 310; 30; 20 INJECTION, SOLUTION INTRAVENOUS at 22:48

## 2019-07-28 RX ADMIN — PROCHLORPERAZINE EDISYLATE 10 MG: 5 INJECTION INTRAMUSCULAR; INTRAVENOUS at 05:29

## 2019-07-28 RX ADMIN — SENNOSIDES AND DOCUSATE SODIUM 2 TABLET: 8.6; 5 TABLET ORAL at 19:46

## 2019-07-28 RX ADMIN — ESMOLOL HYDROCHLORIDE IN SODIUM CHLORIDE 250 MCG/KG/MIN: 20 INJECTION INTRAVENOUS at 20:40

## 2019-07-28 RX ADMIN — ESMOLOL HYDROCHLORIDE IN SODIUM CHLORIDE 200 MCG/KG/MIN: 20 INJECTION INTRAVENOUS at 22:48

## 2019-07-28 RX ADMIN — PANTOPRAZOLE SODIUM 40 MG: 40 TABLET, DELAYED RELEASE ORAL at 15:55

## 2019-07-28 RX ADMIN — AMLODIPINE BESYLATE 5 MG: 2.5 TABLET ORAL at 17:06

## 2019-07-28 RX ADMIN — METOPROLOL TARTRATE 25 MG: 25 TABLET ORAL at 09:19

## 2019-07-28 RX ADMIN — ESMOLOL HYDROCHLORIDE IN SODIUM CHLORIDE 150 MCG/KG/MIN: 20 INJECTION INTRAVENOUS at 17:11

## 2019-07-28 RX ADMIN — METOPROLOL TARTRATE 25 MG: 25 TABLET ORAL at 11:47

## 2019-07-28 RX ADMIN — ESMOLOL HYDROCHLORIDE IN SODIUM CHLORIDE 100 MCG/KG/MIN: 20 INJECTION INTRAVENOUS at 13:50

## 2019-07-28 RX ADMIN — LABETALOL 20 MG/4 ML (5 MG/ML) INTRAVENOUS SYRINGE 20 MG: at 05:57

## 2019-07-28 RX ADMIN — PANTOPRAZOLE SODIUM 40 MG: 40 INJECTION, POWDER, LYOPHILIZED, FOR SOLUTION INTRAVENOUS at 07:45

## 2019-07-28 RX ADMIN — LABETALOL 20 MG/4 ML (5 MG/ML) INTRAVENOUS SYRINGE 20 MG: at 05:29

## 2019-07-28 RX ADMIN — ESMOLOL HYDROCHLORIDE IN SODIUM CHLORIDE 300 MCG/KG/MIN: 20 INJECTION INTRAVENOUS at 19:20

## 2019-07-28 RX ADMIN — METOPROLOL TARTRATE 50 MG: 25 TABLET ORAL at 15:55

## 2019-07-28 RX ADMIN — SODIUM CHLORIDE: 9 INJECTION, SOLUTION INTRAVENOUS at 05:57

## 2019-07-28 RX ADMIN — SODIUM CHLORIDE, POTASSIUM CHLORIDE, SODIUM LACTATE AND CALCIUM CHLORIDE: 600; 310; 30; 20 INJECTION, SOLUTION INTRAVENOUS at 09:17

## 2019-07-28 RX ADMIN — ESMOLOL HYDROCHLORIDE IN SODIUM CHLORIDE 50 MCG/KG/MIN: 20 INJECTION INTRAVENOUS at 05:45

## 2019-07-28 RX ADMIN — ESMOLOL HYDROCHLORIDE IN SODIUM CHLORIDE 150 MCG/KG/MIN: 20 INJECTION INTRAVENOUS at 09:57

## 2019-07-28 RX ADMIN — SODIUM CHLORIDE 2.5 MG/HR: 9 INJECTION, SOLUTION INTRAVENOUS at 06:26

## 2019-07-28 ASSESSMENT — ACTIVITIES OF DAILY LIVING (ADL)
ADLS_ACUITY_SCORE: 12

## 2019-07-28 NOTE — PLAN OF CARE
4A - Pt arrived this AM with aortic dissection, plan to HOLD PT evaluation until further POC/interventions have been decided upon. Will re-assess for appropriateness of PT evaluation at that time.

## 2019-07-28 NOTE — PLAN OF CARE
Neuro: A&Ox4. Pupils equal/reactive, follows commands. Denies any pain.   Cardiac: L radial art line placed. Goal SBP <120, HR <60. Adjusted PO BP medications in an attempt to control BP and HR. Currently on esmolol @ 300 mcg/kg/min and cardene @ 5 mg/hr. Pulses palpable in all extremities. Denies CP/SOB. Platelets continue to be low, hematology consulted. 1 unit platelets transfused this evening.  Resp: RA. Lung sounds clear/diminished. Infrequent cough.   GI: Regular diet, tolerating well. Denies any nausea. Unable to have BM on bed pan, passing flatus.   : Beltre in place, adequate UO. Nephrology consulted and following.   Skin: No signs of skin breakdown. Independently repositioning in bed.   Musc: Strict bedrest. Moves all extremities spontaneously.   LADs: PIVx3. L radial art line.     Plan: Strict BP control. Bedrest. Notify team with any changes in pt condition.

## 2019-07-28 NOTE — CONSULTS
Nephrology Initial Consult  July 28, 2019      Elle Blanton MRN:[id number] YOB: 1960  Date of Admission:7/28/2019  Primary care provider: No primary care provider on file.  Requesting physician: Roxana Cosby MD    Today Recommendations:  - No indication for acute hemodialysis today.   - Daily evaluation for kidney function  - Avoid nephrotoxic agents, strict I/O and daily weight.  - To avoid hypotension episodes, may consider to keep SBP in range 110-120 mmHg.     ASSESSMENT AND RECOMMENDATIONS:   Mr. Elle Blanton is a 59 year old male with PMHx of CKD stage III, HTN, remote GI bleeding, GERD and thrombocytopenia who initially presented to [facility] with abdominal pain, nausea, and one episode of NBNB emesis who subsequently was found to have a type B aortic dissection. Nephrology consulted for NAHUM on CKD management.    # Non-Oliguric NAHUM on CKD stage III:  - Crea base line~ 1.6 mg/dl  - Crea peak ~4, Crea today: 3.4  - Good urine output~100 ml/hour  - CKD Etiology: Likely due to hypertension nephrosclerosis and possible renal vascular disease in setting of abdominal aortic aneurysm and possible renal perfusion impairment.   - NAHUM Etiology: Likely due to aortic dissection.   - No indication for acute hemodialysis today.   - Daily evaluation for kidney function  - Avoid nephrotoxic agents, strict I/O and daily weight.  - To avoid hypotension episodes, may consider to keep SBP in range 110-120 mmHg.     # Blood pressure, Volume status:  - Patient now on Esmolol gtt and Nitroprusside to keep SBP less than 120 mmHg per vascular surgery recommendation in setting of aortic dissection.  - To avoid hypotension episodes to prevent further injury to the kidney.  - Patient's volume is up, good urine output.    # Electrolytes, Acid-Base:  - Stable  - BiCarb: 18, low, likely due to acute renal failure.     # Anemia:  - Hgb slightly trending down, due to dissection, continue monitoring H&H q8hrs, transfusion  by surgery team.     # Type B Aortic Dissection:  - Now on medial management,   - Managed by vascular surgery team    Recommendations were communicated to primary team via Note    Seen and discussed with Dr. Louis Barbosa MD   Nephrology Fellow  [phone]      REASON FOR CONSULT: Non-oliguric NAHUM on CKD stage III.     HISTORY OF PRESENT ILLNESS:  Admitting provider and nursing notes reviewed   Mr. Elle Blanton is a 59 year old male with PMHx of CKD stage III, HTN, remote GI bleeding, GERD and thrombocytopenia who initially presented to [facility] with abdominal pain, nausea, and one episode of NBNB emesis who subsequently was found to have a type B aortic dissection. He was subsequently transferred to [facility] for further care. Patient was found to have uncomplicated aortic dissection and being managed medically mainly by control his blood pressure less than 120 mmHg and decrease his heart rate by BB, Esmolol and nitroprusside. Patient developed NAHUM in out hospital with Crea peak~ 4 mg/dl from baseline ~1.6 mg/dl, this morning his Crea~3.4 mg/dl with very good urine output in range of 100 ml/hour.  Patient was seen and examined at bedside, his wife and son were at bedside, patient was somnolence, so I discussed with his son and explained to him the current situation of elevated Crea level and the risk that we may need to do dialysis at some point if kidney function deteriorated.     PAST MEDICAL HISTORY:  Reviewed with his son on 07/28/2019   HTN, GERD, CKD stage III    Past Surgical History:   Procedure Laterality Date     INCISION AND DRAINAGE FINGER, COMBINED Left 10/20/2016    Procedure: COMBINED INCISION AND DRAINAGE FINGER;  Surgeon: Mireya Pizano MD;  Location: [facility] OR        MEDICATIONS:  PTA Meds  Prior to Admission medications    Medication Sig Last Dose Taking? Auth Provider   amoxicillin (AMOXIL) 500 MG tablet Take 2 tablets (1,000 mg) by mouth 2 times daily   Robyn Iraheta  M, APRN CNP   omeprazole (PRILOSEC) 20 MG CR capsule Take 20 mg by mouth   Reported, Patient      Current Meds    metoprolol tartrate  25 mg Oral BID     pantoprazole  40 mg Oral BID AC     senna-docusate  2 tablet Oral BID     Infusion Meds    esmolol 150 mcg/kg/min (07/28/19 0900)     lactated ringers 80 mL/hr at 07/28/19 0917     niCARdipine 40 mg in 200 mL 0.9% NaCl 2.5 mg/hr (07/28/19 0900)       ALLERGIES:    Allergies   Allergen Reactions     Nka [No Known Allergies]        REVIEW OF SYSTEMS:  A comprehensive of systems was negative except as noted above.    SOCIAL HISTORY:   Social History     Socioeconomic History     Marital status:      Spouse name: Not on file     Number of children: Not on file     Years of education: Not on file     Highest education level: Not on file   Occupational History     Not on file   Social Needs     Financial resource strain: Not on file     Food insecurity:     Worry: Not on file     Inability: Not on file     Transportation needs:     Medical: Not on file     Non-medical: Not on file   Tobacco Use     Smoking status: Never Smoker   Substance and Sexual Activity     Alcohol use: No     Drug use: No     Sexual activity: Not on file   Lifestyle     Physical activity:     Days per week: Not on file     Minutes per session: Not on file     Stress: Not on file   Relationships     Social connections:     Talks on phone: Not on file     Gets together: Not on file     Attends Sabianism service: Not on file     Active member of club or organization: Not on file     Attends meetings of clubs or organizations: Not on file     Relationship status: Not on file     Intimate partner violence:     Fear of current or ex partner: Not on file     Emotionally abused: Not on file     Physically abused: Not on file     Forced sexual activity: Not on file   Other Topics Concern     Not on file   Social History Narrative     Not on file     Reviewed with his son  Wife and son accompany Elle  "Blanton in hospital room    FAMILY MEDICAL HISTORY:   Family History   Problem Relation Age of Onset     Diabetes Father      Hypertension No family hx of      Asthma No family hx of      Cancer No family hx of      Coronary Artery Disease No family hx of      Reviewed with his son    PHYSICAL EXAM:   Temp  Av.9  F (35.5  C)  Min: 94.4  F (34.7  C)  Max: 97.6  F (36.4  C)      Pulse  Av.3  Min: 60  Max: 74 Resp  Av  Min: 12  Max: 14  SpO2  Av.6 %  Min: 96 %  Max: 100 %       /71   Pulse 62   Temp 95.7  F (35.4  C) (Tympanic)   Resp 14   Ht 1.651 m (5' 5\")   SpO2 99%   BMI 27.82 kg/m     Date 19 0700 - 19 0659   Shift 6034-7181 0505-7829 1570-8907 24 Hour Total   INTAKE   I.V. 402.75   402.75   Shift Total 402.75   402.75   OUTPUT   Urine 220   220   Shift Total 220   220   Weight (kg)          Admit       GENERAL APPEARANCE: Somnolence.   EYES: no scleral icterus, pupils equal  Endo: no goiter, no moon facies  Lymphatics: no cervical or supraclavicular LAD  Pulmonary: lungs clear to auscultation with equal breath sounds bilaterally, no clubbing  CV: regular rhythm, normal rate, no rub   - JVD not able to evaluate   - Edema +2  GI: soft, nontender, normal bowel sounds  MS: no evidence of inflammation in joints, no muscle tenderness  : masterson catheter in place.   SKIN: no rash, warm, dry, no cyanosis  NEURO: face symmetric,      LABS:     I personally reviewed his labs.     CMP  Recent Labs   Lab 19  0539      POTASSIUM 4.6   CHLORIDE 112*   CO2 18*   ANIONGAP 9   *   BUN 52*   CR 3.46*   GFRESTIMATED 18*   GFRESTBLACK 21*   TANO 7.6*   MAG 2.1   PHOS 4.2   PROTTOTAL 7.3   ALBUMIN 3.0*   BILITOTAL 0.6   ALKPHOS 108   *   *     CBC  Recent Labs   Lab 19  0539   HGB 8.8*   WBC 3.3*   RBC 2.90*   HCT 27.5*   MCV 95   MCH 30.3   MCHC 32.0   RDW 15.6*   PLT 28*     INR  Recent Labs   Lab 19  0539   INR 1.17*   PTT 36     ABG  Recent Labs "   Lab 07/28/19  0600 07/28/19  0541   PH 7.27*  --    PCO2 40  --    PO2 101  --    HCO3 18*  --    O2PER 21.0 21      URINE STUDIES  No lab results found.  No lab results found.  PTH  No lab results found.  IRON STUDIES  No lab results found.    IMAGING:  All imaging studies reviewed by me.     Willy Barbosa MD  Nephrology Fellow  Pager: [phone]

## 2019-07-28 NOTE — PLAN OF CARE
Admitted/transferred from: OSH at 0540 to SICU/ Vascular service. Full code   Reason for admission/transfer: Aortic dissection   Patient status upon admission/transfer: unstable- hypertensive   Interventions: Started nicardipine and esmolol gtt. Administered compazine. 40mg labetalol. Placed masterson  Plan: Control BP to maintain SBP <120 and HR <60  2 RN skin assessment: completed by Laura Hedrick   Result of skin assessment and interventions/actions: scabs, scars and bruises scattered.   Height, weight, drug calc weight: done  Patient belongings: sent with family   MDRO education (if applicable):

## 2019-07-28 NOTE — H&P
VASCULAR SURGERY CLINIC CONSULTATION    VASCULAR SURGEON: Roxana Cosby MD    LOCATION:  [facility]     Elle Blanton   Medical Record #:  [id number]  YOB: 1960  Age:  59 year old     Date of Service: 7/28/2019    PRIMARY CARE PROVIDER: No primary care provider on file.      Reason for visit: Acute type B aortic dissection    IMPRESSION: 59-year-old gentleman with acute type B aortic dissection that appears to be uncomplicated.  Pain and blood pressure much better controlled this morning after institution of both esmolol and nitroprusside drips.  Nitroprusside now weaned off esmolol coming down.  Currently pain-free.  Significant comorbidities of chronic renal failure of unclear etiology as well as thrombocytopenia of unclear etiology.  Remote history of GI bleed.    RECOMMENDATION: Ongoing blood pressure control with institution of oral agents today.  Significant risk of acute renal injury from contrast load on the background of chronic renal dysfunction, so close monitoring for evidence of need for hemodialysis.  In this case continuous hemofiltration would be the preferred approach in dissection.    No markers for complicated type B aortic dissection or for failure of medical management.  In addition with his chronic renal failure use of contrast during stent graft procedure may put him onto dialysis, and is on worked up thrombocytopenia may put him at risk from bleeding from surgery..  We will therefore do what we can to hold off on stent grafting.  Conversely if he becomes dialysis dependent blood pressure management with intermittent hemodialysis in the long run is likely going to be unsuccessful in this context progression and need for stent grafting at some point is a high likelihood outcome.    HPI:  Elle Blanton is a 59 year old male who was accepted in transfer from [facility] where he presented with acute onset severe chest and upper abdomen pain.  Underwent emergent CT  demonstrating a type B aortic dissection.  Started on IV blood pressure meds and transferred to the [place].  Patient has no prior episodes of this type of pain.  He tells us that he has intermittently had problems with blood pressure but has not been on any medications for it.  On evaluation now is pain-free for the last 2 hours on IV blood pressure medications.    Patient's past medical history is significant for melena 9/2015 which resolved and which from what we can tell did not get endoscopy.  He also had a laceration treated with primary repair on his arm.  During these hospitalizations he was recognized to be fairly severely thrombocytopenic but I cannot tell that any work-up was had and the patient denies that he was seen by specialist for this.  Also known to have chronic renal dysfunction and I cannot see evidence of work-up by nephrology.    Patient works for [employer].  No cardiac disease history.    Patient states that he has no family history of aortic pathology.        REVIEW OF SYSTEMS:    A 12 point ROS was reviewed and except for what is listed in the HPI above, all others are negative    PHH:  No past medical history on file.     Past Surgical History:   Procedure Laterality Date     INCISION AND DRAINAGE FINGER, COMBINED Left 10/20/2016    Procedure: COMBINED INCISION AND DRAINAGE FINGER;  Surgeon: Mireya Pizano MD;  Location: [facility] OR       ALLERGIES:    Allergies   Allergen Reactions     Nka [No Known Allergies]        MEDS:    Current Facility-Administered Medications:      esmolol 2000 mg in sodium chloride 0.9% 100 mL infusion,  mcg/kg/min (Dosing Weight), Intravenous, Continuous, Patricia Mendieta MD, Last Rate: 45.7 mL/hr at 07/28/19 0800, 200 mcg/kg/min at 07/28/19 0800     naloxone (NARCAN) injection 0.1-0.4 mg, 0.1-0.4 mg, Intravenous, Q2 Min PRN, Patricia Mendieta MD     niCARdipine 40 mg in 200 mL 0.9% NaCl (CARDENE) infusion, 2.5-15 mg/hr, Intravenous, Continuous,  "Patricia Mendieta MD, Stopped at 07/28/19 0815     ondansetron (ZOFRAN-ODT) ODT tab 4 mg, 4 mg, Oral, Q6H PRN **OR** ondansetron (ZOFRAN) injection 4 mg, 4 mg, Intravenous, Q6H PRN, Patricia Mendieta MD     oxyCODONE-acetaminophen (PERCOCET) 5-325 MG per tablet 1-2 tablet, 1-2 tablet, Oral, Q4H PRN, Patricia Mendieta MD     pantoprazole (PROTONIX) 40 mg IV push injection, 40 mg, Intravenous, BID, Patricia Mendieta MD, 40 mg at 07/28/19 0745     prochlorperazine (COMPAZINE) injection 10 mg, 10 mg, Intravenous, Q6H PRN, 10 mg at 07/28/19 0529 **OR** prochlorperazine (COMPAZINE) tablet 10 mg, 10 mg, Oral, Q6H PRN **OR** prochlorperazine (COMPAZINE) Suppository 25 mg, 25 mg, Rectal, Q12H PRN, Patricia Mendieta MD     sodium chloride 0.9% infusion, , Intravenous, Continuous, Jose Malik MD, Last Rate: 80 mL/hr at 07/28/19 0815    SOCIAL HABITS:   Social History    Substance and Sexual Activity      Alcohol use: No      History   Drug Use No      History   Smoking Status     Never Smoker   Smokeless Tobacco     Not on file        FAMILY HISTORY:    Family History   Problem Relation Age of Onset     Diabetes Father      Hypertension No family hx of      Asthma No family hx of      Cancer No family hx of      Coronary Artery Disease No family hx of        PE:  /70   Pulse 63   Temp 94.4  F (34.7  C) (Oral)   Resp 14   Ht 1.651 m (5' 5\")   SpO2 97%   BMI 27.82 kg/m    Wt Readings from Last 1 Encounters:   07/03/17 75.8 kg (167 lb 3.2 oz)     Body mass index is 27.82 kg/m .    EXAM:  GENERAL: This is a well-developed 59 year old male who appears his stated age  EYES: Grossly normal.  MOUTH: Buccal mucosa normal     GASTROINTESINAL (ABDOMEN): Soft, non-tender  MUSCULOSKELETAL: Grossly normal and both lower extremities are intact.  HEME/LYMPH: No lymphedema  NEUROLOGIC: Focally intact, Alert and oriented x 3.   PSYCH: appropriate affect  INTEGUMENT: No open lesions or ulcers    Pulse Exam:     DP: Left " +2   Right  +2     PT:   Left  0   Right   0          DIAGNOSTIC STUDIES:     Images:  No results found.    I personally reviewed the images and my interpretation is that his CT performed at [facility] clearly demonstrates a type B aortic dissection.  This appears to start immediately adjacent to the left subclavian artery.  There is very mild ectasia of the vessel beyond this.  The dissection extends into the lower abdominal aorta but not into the iliac arteries.  Visceral vessel origins do not appear compromised.  Markers for medical management failure are not present radiographically: The aorta is less than 4 cm maximum diameter and false lumen is not greater than 22 mm in maximum diameter..    LABS:      Sodium   Date Value Ref Range Status   07/28/2019 139 133 - 144 mmol/L Final   07/03/2017 138.0 133.0 - 144.0 mmol/L Final     Urea Nitrogen   Date Value Ref Range Status   07/28/2019 52 (H) 7 - 30 mg/dL Final   07/03/2017 24.0 7.0 - 30.0 mg/dL Final     Hemoglobin   Date Value Ref Range Status   07/28/2019 8.8 (L) 13.3 - 17.7 g/dL Final   07/03/2017 8.6 (LL) 13.3 - 17.7 g/dL Final     Platelet Count   Date Value Ref Range Status   07/28/2019 28 (LL) 150 - 450 10e9/L Final     Comment:     This result has been called to WILL LINARES RN by Khushi Schilling on 07 28 2019 at 0610, and has been read back.        INR   Date Value Ref Range Status   07/28/2019 1.17 (H) 0.86 - 1.14 Final         Roxana Cosby MD  VASCULAR SURGERY

## 2019-07-28 NOTE — H&P
SURGICAL ICU ADMISSION NOTE  07/28/2019      PRIMARY TEAM: Vascular surgery  PRIMARY PHYSICIAN: Dr. Cosby  REASON FOR CRITICAL CARE ADMISSION: Type B aortic dissection   ADMITTING PHYSICIAN: Dr. Davalos  Date of Service (when I saw the patient): 07/28/2019    ASSESSMENT:  Elle Blanton is a 59 year old male who initially presented to [facility] with abdominal pain, nausea, and one episode of nbnb emesis who subsequently was found to have a type B aortic dissection. He was subsequently transferred to [facility] for further care.    PLAN:    Neurological:  - Pain: percocet 5/325 Q4H prn    - Sedation plan: not requiring sedation    Pulmonary:   - O2 sat above 92%, currently on room air  - Supplemental oxygen to keep saturation above 92 %.    Cardiovascular:    #Type B aortic dissection  -Goal SBP less than 120, goal heart rate greater than 60  -Esmolol gtt, nicardipine gtt  -Metoprolol   -TEG  -Lactic acid q6h  -echo  -ekg    Gastroenterology/Nutrition:  - NPO  - Hx of melena - protonix IV BID      Fluids/Electrolytes:   - Total fluid rate of 125ml/hr  - LR    Renal:  # Acute kidney injury on CKD  -nephro consult  -Monitor creatinine    Endocrine:  ANASTASIA    ID:  ANASTASIA    Heme:     -hgb 8.8  -will monitor  -CBC Q6h    Musculoskeletal:  - Physical and occupational therapy consult     General Cares/Prophylaxis:    DVT Prophylaxis: SCDs  GI Prophylaxis: protonix    Lines/ tubes/ drains:  - PIV x 3, masterson    Disposition:  - Surgical ICU    Patient seen, findings and plan discussed with surgical ICU staff      Jose Malik  PGY-1 Anesthesiology  - - - - - - - - - - - - - - - - - - - - - - - - - - - - - - - - - - - - - - - - - - - - - -   HISTORY PRESENTING ILLNESS: Elle Blanton is a 59 year old male who initially presented to [facility] with abdominal pain, nausea, and one episode of nbnb emesis who subsequently was found to have a type B aortic dissection. He was subsequently transferred to [facility] for further  care.    REVIEW OF SYSTEMS: 10 point ROS neg other than the symptoms noted above in the HPI.    PAST MEDICAL HISTORY:   No past medical history on file.    SURGICAL HISTORY:   Past Surgical History:   Procedure Laterality Date     INCISION AND DRAINAGE FINGER, COMBINED Left 10/20/2016    Procedure: COMBINED INCISION AND DRAINAGE FINGER;  Surgeon: Mireya Pizano MD;  Location: [facility] OR       SOCIAL HISTORY:   Social History     Socioeconomic History     Marital status:      Spouse name: Not on file     Number of children: Not on file     Years of education: Not on file     Highest education level: Not on file   Occupational History     Not on file   Social Needs     Financial resource strain: Not on file     Food insecurity:     Worry: Not on file     Inability: Not on file     Transportation needs:     Medical: Not on file     Non-medical: Not on file   Tobacco Use     Smoking status: Never Smoker   Substance and Sexual Activity     Alcohol use: No     Drug use: No     Sexual activity: Not on file   Lifestyle     Physical activity:     Days per week: Not on file     Minutes per session: Not on file     Stress: Not on file   Relationships     Social connections:     Talks on phone: Not on file     Gets together: Not on file     Attends [employer] service: Not on file     Active member of club or organization: Not on file     Attends meetings of clubs or organizations: Not on file     Relationship status: Not on file     Intimate partner violence:     Fear of current or ex partner: Not on file     Emotionally abused: Not on file     Physically abused: Not on file     Forced sexual activity: Not on file   Other Topics Concern     Not on file   Social History Narrative     Not on file     FAMILY HISTORY: noncontributory    ALLERGIES:   Allergies   Allergen Reactions     Nka [No Known Allergies]        MEDICATIONS:    No current facility-administered medications on file prior to encounter.   Current Outpatient  Medications on File Prior to Encounter:  amoxicillin (AMOXIL) 500 MG tablet Take 2 tablets (1,000 mg) by mouth 2 times daily   omeprazole (PRILOSEC) 20 MG CR capsule Take 20 mg by mouth       PHYSICAL EXAMINATION:  Temp:  [97.6  F (36.4  C)] 97.6  F (36.4  C)  Pulse:  [60-74] 60  Heart Rate:  [57-73] 57  Resp:  [12] 12  BP: (161-182)/(100-113) 161/100  SpO2:  [98 %-100 %] 100 %  General: awake, alert, no acute distress  HEENT:atraumatic, normocephalic   Neuro: A&Ox3, NAD  Pulm/Resp: Clear breath sounds bilaterally without rhonchi, crackles or wheeze,  breathing non-labored  CV: RRR  Abdomen: Soft, non-distended, non-tender, no rebound tenderness or guarding, no masses  : masterson catheter in place, urine yellow and clear  MSK/Extremities: no peripheral edema, moving all extremities, peripheral pulses intact, extremities well perfused    LABS: Reviewed.   Arterial Blood Gases   Recent Labs   Lab 07/28/19  0600   PH 7.27*   PCO2 40   PO2 101   HCO3 18*     Complete Blood Count   Recent Labs   Lab 07/28/19  0539   WBC 3.3*   HGB 8.8*   PLT 28*     Basic Metabolic Panel  Recent Labs   Lab 07/28/19  0539      POTASSIUM 4.6   CHLORIDE 112*   CO2 18*   BUN 52*   CR 3.46*   *     Liver Function Tests  Recent Labs   Lab 07/28/19  0539   *   *   ALKPHOS 108   BILITOTAL 0.6   ALBUMIN 3.0*   INR 1.17*     Pancreatic Enzymes  No lab results found in last 7 days.  Coagulation Profile  Recent Labs   Lab 07/28/19  0539   INR 1.17*   PTT 36       IMAGING:  No results found for this or any previous visit (from the past 24 hour(s)).

## 2019-07-28 NOTE — PROCEDURES
"Insert arterial line  Date/Time: 7/28/2019 3:25 PM  Performed by: Mehreen Aguilar MD  Authorized by: Joaquin Lawson MD   Consent: Verbal consent obtained. Written consent obtained.  Risks and benefits: risks, benefits and alternatives were discussed  Consent given by: patient  Patient understanding: patient states understanding of the procedure being performed  Patient consent: the patient's understanding of the procedure matches consent given  Procedure consent: procedure consent matches procedure scheduled  Relevant documents: relevant documents present and verified  Test results: test results available and properly labeled  Site marked: the operative site was marked  Imaging studies: imaging studies available  Required items: required blood products, implants, devices, and special equipment available  Patient identity confirmed: verbally with patient  Time out: Immediately prior to procedure a \"time out\" was called to verify the correct patient, procedure, equipment, support staff and site/side marked as required.  Preparation: Patient was prepped and draped in the usual sterile fashion.  Indications: hemodynamic monitoring  Location: left radial  Anesthesia: local infiltration    Anesthesia:  Local Anesthetic: lidocaine 1% with epinephrine  Anesthetic total: 2 mL    Sedation:  Patient sedated: no    Chapito's test normal: yes  Needle gauge: 20  Seldinger technique: Seldinger technique used  Cutdown: cutdown required  Number of attempts: 1  Post-procedure: line sutured  Post-procedure CMS: unchanged and normal  Patient tolerance: Patient tolerated the procedure well with no immediate complications          "

## 2019-07-29 ENCOUNTER — OFFICE VISIT (OUTPATIENT)
Dept: INTERPRETER SERVICES | Facility: CLINIC | Age: 59
End: 2019-07-29
Payer: COMMERCIAL

## 2019-07-29 LAB
ALBUMIN UR-MCNC: 100 MG/DL
ANION GAP SERPL CALCULATED.3IONS-SCNC: 7 MMOL/L (ref 3–14)
ANION GAP SERPL CALCULATED.3IONS-SCNC: 8 MMOL/L (ref 3–14)
APPEARANCE UR: CLEAR
APTT PPP: 40 SEC (ref 22–37)
BILIRUB UR QL STRIP: NEGATIVE
BUN SERPL-MCNC: 53 MG/DL (ref 7–30)
BUN SERPL-MCNC: 54 MG/DL (ref 7–30)
CALCIUM SERPL-MCNC: 7.3 MG/DL (ref 8.5–10.1)
CALCIUM SERPL-MCNC: 7.3 MG/DL (ref 8.5–10.1)
CHLORIDE SERPL-SCNC: 113 MMOL/L (ref 94–109)
CHLORIDE SERPL-SCNC: 115 MMOL/L (ref 94–109)
CO2 SERPL-SCNC: 17 MMOL/L (ref 20–32)
CO2 SERPL-SCNC: 18 MMOL/L (ref 20–32)
COLOR UR AUTO: YELLOW
COPATH REPORT: NORMAL
CREAT SERPL-MCNC: 3.83 MG/DL (ref 0.66–1.25)
CREAT SERPL-MCNC: 3.92 MG/DL (ref 0.66–1.25)
ERYTHROCYTE [DISTWIDTH] IN BLOOD BY AUTOMATED COUNT: 15.5 % (ref 10–15)
ERYTHROCYTE [DISTWIDTH] IN BLOOD BY AUTOMATED COUNT: 15.9 % (ref 10–15)
ERYTHROCYTE [DISTWIDTH] IN BLOOD BY AUTOMATED COUNT: 15.9 % (ref 10–15)
FERRITIN SERPL-MCNC: 88 NG/ML (ref 26–388)
FIBRINOGEN PPP-MCNC: 161 MG/DL (ref 200–420)
FOLATE SERPL-MCNC: 15.7 NG/ML
GFR SERPL CREATININE-BSD FRML MDRD: 16 ML/MIN/{1.73_M2}
GFR SERPL CREATININE-BSD FRML MDRD: 16 ML/MIN/{1.73_M2}
GLUCOSE SERPL-MCNC: 102 MG/DL (ref 70–99)
GLUCOSE SERPL-MCNC: 134 MG/DL (ref 70–99)
GLUCOSE UR STRIP-MCNC: NEGATIVE MG/DL
HCT VFR BLD AUTO: 24.6 % (ref 40–53)
HCT VFR BLD AUTO: 25.8 % (ref 40–53)
HCT VFR BLD AUTO: 26.3 % (ref 40–53)
HCV AB SERPL QL IA: NONREACTIVE
HGB BLD-MCNC: 7.9 G/DL (ref 13.3–17.7)
HGB BLD-MCNC: 8.1 G/DL (ref 13.3–17.7)
HGB BLD-MCNC: 8.3 G/DL (ref 13.3–17.7)
HGB UR QL STRIP: ABNORMAL
HIV 1+2 AB+HIV1 P24 AG SERPL QL IA: NONREACTIVE
HYALINE CASTS #/AREA URNS LPF: 1 /LPF (ref 0–2)
INR PPP: 1.3 (ref 0.86–1.14)
INTERPRETATION ECG - MUSE: NORMAL
KETONES UR STRIP-MCNC: NEGATIVE MG/DL
LACTATE BLD-SCNC: 0.6 MMOL/L (ref 0.7–2)
LACTATE BLD-SCNC: 0.9 MMOL/L (ref 0.7–2)
LACTATE BLD-SCNC: 1.6 MMOL/L (ref 0.7–2)
LEUKOCYTE ESTERASE UR QL STRIP: ABNORMAL
MAGNESIUM SERPL-MCNC: 2.1 MG/DL (ref 1.6–2.3)
MCH RBC QN AUTO: 30.1 PG (ref 26.5–33)
MCH RBC QN AUTO: 30.5 PG (ref 26.5–33)
MCH RBC QN AUTO: 30.9 PG (ref 26.5–33)
MCHC RBC AUTO-ENTMCNC: 31.4 G/DL (ref 31.5–36.5)
MCHC RBC AUTO-ENTMCNC: 31.6 G/DL (ref 31.5–36.5)
MCHC RBC AUTO-ENTMCNC: 32.1 G/DL (ref 31.5–36.5)
MCV RBC AUTO: 96 FL (ref 78–100)
MCV RBC AUTO: 96 FL (ref 78–100)
MCV RBC AUTO: 97 FL (ref 78–100)
MUCOUS THREADS #/AREA URNS LPF: PRESENT /LPF
NITRATE UR QL: NEGATIVE
PH UR STRIP: 5.5 PH (ref 5–7)
PHOSPHATE SERPL-MCNC: 4.5 MG/DL (ref 2.5–4.5)
PLATELET # BLD AUTO: 48 10E9/L (ref 150–450)
PLATELET # BLD AUTO: 59 10E9/L (ref 150–450)
PLATELET # BLD AUTO: 60 10E9/L (ref 150–450)
POTASSIUM SERPL-SCNC: 5 MMOL/L (ref 3.4–5.3)
POTASSIUM SERPL-SCNC: 5.4 MMOL/L (ref 3.4–5.3)
RBC # BLD AUTO: 2.56 10E12/L (ref 4.4–5.9)
RBC # BLD AUTO: 2.69 10E12/L (ref 4.4–5.9)
RBC # BLD AUTO: 2.72 10E12/L (ref 4.4–5.9)
RBC #/AREA URNS AUTO: 5 /HPF (ref 0–2)
SODIUM SERPL-SCNC: 139 MMOL/L (ref 133–144)
SODIUM SERPL-SCNC: 140 MMOL/L (ref 133–144)
SOURCE: ABNORMAL
SP GR UR STRIP: 1.02 (ref 1–1.03)
UROBILINOGEN UR STRIP-MCNC: NORMAL MG/DL (ref 0–2)
VIT B12 SERPL-MCNC: 945 PG/ML (ref 193–986)
WBC # BLD AUTO: 3.8 10E9/L (ref 4–11)
WBC # BLD AUTO: 4.5 10E9/L (ref 4–11)
WBC # BLD AUTO: 5.2 10E9/L (ref 4–11)
WBC #/AREA URNS AUTO: 17 /HPF (ref 0–5)

## 2019-07-29 PROCEDURE — 85730 THROMBOPLASTIN TIME PARTIAL: CPT | Performed by: STUDENT IN AN ORGANIZED HEALTH CARE EDUCATION/TRAINING PROGRAM

## 2019-07-29 PROCEDURE — 40000047 ZZH STATISTIC CTO2 CONT OXYGEN TECH TIME EA 90 MIN

## 2019-07-29 PROCEDURE — 85027 COMPLETE CBC AUTOMATED: CPT | Performed by: STUDENT IN AN ORGANIZED HEALTH CARE EDUCATION/TRAINING PROGRAM

## 2019-07-29 PROCEDURE — 25000132 ZZH RX MED GY IP 250 OP 250 PS 637: Performed by: PHYSICIAN ASSISTANT

## 2019-07-29 PROCEDURE — 82746 ASSAY OF FOLIC ACID SERUM: CPT | Performed by: STUDENT IN AN ORGANIZED HEALTH CARE EDUCATION/TRAINING PROGRAM

## 2019-07-29 PROCEDURE — 80048 BASIC METABOLIC PNL TOTAL CA: CPT | Performed by: PHYSICIAN ASSISTANT

## 2019-07-29 PROCEDURE — 25000125 ZZHC RX 250: Performed by: STUDENT IN AN ORGANIZED HEALTH CARE EDUCATION/TRAINING PROGRAM

## 2019-07-29 PROCEDURE — 87389 HIV-1 AG W/HIV-1&-2 AB AG IA: CPT | Performed by: STUDENT IN AN ORGANIZED HEALTH CARE EDUCATION/TRAINING PROGRAM

## 2019-07-29 PROCEDURE — 93010 ELECTROCARDIOGRAM REPORT: CPT | Performed by: INTERNAL MEDICINE

## 2019-07-29 PROCEDURE — 86803 HEPATITIS C AB TEST: CPT | Performed by: STUDENT IN AN ORGANIZED HEALTH CARE EDUCATION/TRAINING PROGRAM

## 2019-07-29 PROCEDURE — 25000132 ZZH RX MED GY IP 250 OP 250 PS 637: Performed by: OBSTETRICS & GYNECOLOGY

## 2019-07-29 PROCEDURE — 80048 BASIC METABOLIC PNL TOTAL CA: CPT | Performed by: STUDENT IN AN ORGANIZED HEALTH CARE EDUCATION/TRAINING PROGRAM

## 2019-07-29 PROCEDURE — 93005 ELECTROCARDIOGRAM TRACING: CPT

## 2019-07-29 PROCEDURE — 82607 VITAMIN B-12: CPT | Performed by: STUDENT IN AN ORGANIZED HEALTH CARE EDUCATION/TRAINING PROGRAM

## 2019-07-29 PROCEDURE — 25000132 ZZH RX MED GY IP 250 OP 250 PS 637: Performed by: SURGERY

## 2019-07-29 PROCEDURE — T1013 SIGN LANG/ORAL INTERPRETER: HCPCS | Mod: U3

## 2019-07-29 PROCEDURE — 87517 HEPATITIS B DNA QUANT: CPT | Performed by: STUDENT IN AN ORGANIZED HEALTH CARE EDUCATION/TRAINING PROGRAM

## 2019-07-29 PROCEDURE — 85610 PROTHROMBIN TIME: CPT | Performed by: STUDENT IN AN ORGANIZED HEALTH CARE EDUCATION/TRAINING PROGRAM

## 2019-07-29 PROCEDURE — 84100 ASSAY OF PHOSPHORUS: CPT | Performed by: STUDENT IN AN ORGANIZED HEALTH CARE EDUCATION/TRAINING PROGRAM

## 2019-07-29 PROCEDURE — 25800030 ZZH RX IP 258 OP 636: Performed by: STUDENT IN AN ORGANIZED HEALTH CARE EDUCATION/TRAINING PROGRAM

## 2019-07-29 PROCEDURE — 81001 URINALYSIS AUTO W/SCOPE: CPT | Performed by: STUDENT IN AN ORGANIZED HEALTH CARE EDUCATION/TRAINING PROGRAM

## 2019-07-29 PROCEDURE — 25800030 ZZH RX IP 258 OP 636: Performed by: OBSTETRICS & GYNECOLOGY

## 2019-07-29 PROCEDURE — 82728 ASSAY OF FERRITIN: CPT | Performed by: STUDENT IN AN ORGANIZED HEALTH CARE EDUCATION/TRAINING PROGRAM

## 2019-07-29 PROCEDURE — 83735 ASSAY OF MAGNESIUM: CPT | Performed by: STUDENT IN AN ORGANIZED HEALTH CARE EDUCATION/TRAINING PROGRAM

## 2019-07-29 PROCEDURE — 20000004 ZZH R&B ICU UMMC

## 2019-07-29 PROCEDURE — 25000128 H RX IP 250 OP 636: Performed by: OBSTETRICS & GYNECOLOGY

## 2019-07-29 PROCEDURE — 85027 COMPLETE CBC AUTOMATED: CPT | Performed by: PHYSICIAN ASSISTANT

## 2019-07-29 PROCEDURE — 85384 FIBRINOGEN ACTIVITY: CPT | Performed by: STUDENT IN AN ORGANIZED HEALTH CARE EDUCATION/TRAINING PROGRAM

## 2019-07-29 PROCEDURE — 40000014 ZZH STATISTIC ARTERIAL MONITORING DAILY

## 2019-07-29 PROCEDURE — 99291 CRITICAL CARE FIRST HOUR: CPT | Mod: GC | Performed by: SURGERY

## 2019-07-29 PROCEDURE — 87086 URINE CULTURE/COLONY COUNT: CPT | Performed by: SURGERY

## 2019-07-29 PROCEDURE — 94799 UNLISTED PULMONARY SVC/PX: CPT

## 2019-07-29 PROCEDURE — 83605 ASSAY OF LACTIC ACID: CPT | Performed by: OBSTETRICS & GYNECOLOGY

## 2019-07-29 RX ORDER — AMLODIPINE BESYLATE 2.5 MG/1
5 TABLET ORAL ONCE
Status: COMPLETED | OUTPATIENT
Start: 2019-07-29 | End: 2019-07-29

## 2019-07-29 RX ORDER — AMLODIPINE BESYLATE 10 MG/1
10 TABLET ORAL DAILY
Status: DISCONTINUED | OUTPATIENT
Start: 2019-07-30 | End: 2019-08-02

## 2019-07-29 RX ORDER — METOPROLOL TARTRATE 25 MG/1
50 TABLET, FILM COATED ORAL ONCE
Status: COMPLETED | OUTPATIENT
Start: 2019-07-29 | End: 2019-07-29

## 2019-07-29 RX ORDER — METOPROLOL TARTRATE 100 MG
100 TABLET ORAL EVERY 8 HOURS
Status: DISCONTINUED | OUTPATIENT
Start: 2019-07-29 | End: 2019-07-29

## 2019-07-29 RX ORDER — CARVEDILOL 12.5 MG/1
37.5 TABLET ORAL 2 TIMES DAILY WITH MEALS
Status: DISCONTINUED | OUTPATIENT
Start: 2019-07-29 | End: 2019-07-30

## 2019-07-29 RX ADMIN — METOPROLOL TARTRATE 50 MG: 25 TABLET ORAL at 00:24

## 2019-07-29 RX ADMIN — METOPROLOL TARTRATE 50 MG: 25 TABLET ORAL at 07:49

## 2019-07-29 RX ADMIN — PANTOPRAZOLE SODIUM 40 MG: 40 TABLET, DELAYED RELEASE ORAL at 16:17

## 2019-07-29 RX ADMIN — ESMOLOL HYDROCHLORIDE IN SODIUM CHLORIDE 200 MCG/KG/MIN: 20 INJECTION INTRAVENOUS at 09:41

## 2019-07-29 RX ADMIN — ACETAMINOPHEN 500 MG: 325 TABLET, FILM COATED ORAL at 14:43

## 2019-07-29 RX ADMIN — ESMOLOL HYDROCHLORIDE IN SODIUM CHLORIDE 200 MCG/KG/MIN: 20 INJECTION INTRAVENOUS at 00:43

## 2019-07-29 RX ADMIN — SENNOSIDES AND DOCUSATE SODIUM 2 TABLET: 8.6; 5 TABLET ORAL at 20:02

## 2019-07-29 RX ADMIN — METOPROLOL TARTRATE 50 MG: 25 TABLET ORAL at 09:41

## 2019-07-29 RX ADMIN — AMLODIPINE BESYLATE 5 MG: 2.5 TABLET ORAL at 14:40

## 2019-07-29 RX ADMIN — PANTOPRAZOLE SODIUM 40 MG: 40 TABLET, DELAYED RELEASE ORAL at 07:48

## 2019-07-29 RX ADMIN — METOPROLOL TARTRATE 100 MG: 100 TABLET, FILM COATED ORAL at 16:17

## 2019-07-29 RX ADMIN — ESMOLOL HYDROCHLORIDE IN SODIUM CHLORIDE 200 MCG/KG/MIN: 20 INJECTION INTRAVENOUS at 05:12

## 2019-07-29 RX ADMIN — AMLODIPINE BESYLATE 5 MG: 2.5 TABLET ORAL at 07:48

## 2019-07-29 RX ADMIN — ESMOLOL HYDROCHLORIDE IN SODIUM CHLORIDE 200 MCG/KG/MIN: 20 INJECTION INTRAVENOUS at 11:55

## 2019-07-29 RX ADMIN — ESMOLOL HYDROCHLORIDE IN SODIUM CHLORIDE 200 MCG/KG/MIN: 20 INJECTION INTRAVENOUS at 18:01

## 2019-07-29 RX ADMIN — ESMOLOL HYDROCHLORIDE IN SODIUM CHLORIDE 200 MCG/KG/MIN: 20 INJECTION INTRAVENOUS at 03:00

## 2019-07-29 RX ADMIN — ESMOLOL HYDROCHLORIDE IN SODIUM CHLORIDE 200 MCG/KG/MIN: 20 INJECTION INTRAVENOUS at 06:44

## 2019-07-29 RX ADMIN — SODIUM CHLORIDE 5 MG/HR: 9 INJECTION, SOLUTION INTRAVENOUS at 19:28

## 2019-07-29 RX ADMIN — SENNOSIDES AND DOCUSATE SODIUM 2 TABLET: 8.6; 5 TABLET ORAL at 07:48

## 2019-07-29 RX ADMIN — ACETAMINOPHEN 500 MG: 325 TABLET, FILM COATED ORAL at 20:02

## 2019-07-29 RX ADMIN — CARVEDILOL 37.5 MG: 12.5 TABLET, FILM COATED ORAL at 20:01

## 2019-07-29 RX ADMIN — CALCIUM GLUCONATE 2 G: 98 INJECTION, SOLUTION INTRAVENOUS at 10:28

## 2019-07-29 RX ADMIN — ESMOLOL HYDROCHLORIDE IN SODIUM CHLORIDE 200 MCG/KG/MIN: 20 INJECTION INTRAVENOUS at 13:52

## 2019-07-29 RX ADMIN — SODIUM CHLORIDE 2.5 MG/HR: 9 INJECTION, SOLUTION INTRAVENOUS at 10:53

## 2019-07-29 RX ADMIN — ESMOLOL HYDROCHLORIDE IN SODIUM CHLORIDE 200 MCG/KG/MIN: 20 INJECTION INTRAVENOUS at 15:54

## 2019-07-29 ASSESSMENT — ACTIVITIES OF DAILY LIVING (ADL)
ADLS_ACUITY_SCORE: 12

## 2019-07-29 ASSESSMENT — PAIN DESCRIPTION - DESCRIPTORS: DESCRIPTORS: HEADACHE

## 2019-07-29 NOTE — CONSULTS
"HEMATOLOGY CONSULT -- Preliminary Note    Chart, labs, imaging reviewed.  Full consult to follow.    Reason for consult:  \"thrombocytopenia\"    60 yo male admitted with aortic dissection, currently being medically managed.  Limited PMH, apparently due to limited interaction with the healthcare system.    Initially presented to [facility] where CBC showed a white count of 5.5, hemoglobin 10.2, platelet count 51,000.  On admission here, his white count was 3.3, hemoglobin 8.8, platelet count 28,000.  MCV 95.    Review of previous medical records shows that in June 2017 he was admitted to an outside hospital with melena.  He apparently refused endoscopy.  During that admission his white blood cell count was 3 - 5.7, hemoglobin 7.3 - 9, platelet count 52,000 - 76,000.  He had one follow-up clinic visit in July 2017 where his white count was 3.7, hemoglobin 8.6, MCV 99, platelet count 84,000.  No other CBC data is available.    During his June 2017 admission he had serologic evidence of chronic hepatitis B virus infection.  Notes indicate plans to refer to GI for further evaluation although it is not clear that was ever done.    CT angiogram done at [facility] prior to transfer here showed (in addition to the aortic dissection) evidence of cirrhosis and portal hypertension.    Other lab finding of note include INR 1.17, PTT 36, fibrinogen 204, , .      ASSESSMENT / PLAN:  1.  Pancytopenia  2.  Cirrhosis, likely due to chronic hepatitis B    Based on the currently available information, patient likely has pancytopenia secondary to chronic hepatitis B virus infection with subsequent cirrhosis and portal hypertension.      We will send off additional labs to evaluate for other etiologies as well as to confirm his viral hepatitis status.    Although does not appear to be in DIC, would trend his INR, PTT, and fibrinogen.    We will review peripheral blood smear and follow-up with full consult once " additional labs are back.    In the meantime, transfuse as needed, noting that his platelet count may not increase as expected from transfusion due to the presence of portal hypertension and splenomegaly.    Please call with questions.      Nilson Smith MD  Associate Professor of Medicine  Division of Hematology, Oncology, and Transplantation  Director, [facility]

## 2019-07-29 NOTE — PROGRESS NOTES
Nephrology Progress Note  07/29/2019       Assessment & Recommendations:   Mr. Elle Blanton is a 59 year old male with PMHx of CKD stage III, HTN, remote GI bleeding, GERD and thrombocytopenia who initially presented to [facility] with abdominal pain, nausea, and one episode of emesis, who subsequently was found to have a type B aortic dissection. Nephrology consulted for NAHUM on CKD management.     Non-Oliguric NAHUM on CKD stage III:  His baseline Cr is 1.6-1.8, and has risen to 3.8 today. He is likely experiencing tubular injury from renal hypoperfusion, and will need close monitoring for volume and clearance needs. He is making 1.3L urine daily, and currenly he is abble to maintain his electrolytes in balance. I do not see a need for hemodialsyis today. Vascular surgeyr do not want to pursue CTA or grafting at this time.   - He is on cardene and esmolol gtt to keep BP stable.   - Continue to monitor his daily needs for volume removal.    Blood pressure/Volume status:  Patient on Esmolol gtt and Cardene, goal to keep SBP less than 120 mmHg per vascular surgery recommendation in setting of aortic dissection.  - No need for diuretics, he is breathing well, and has minimal LE edema. Please get daily weights.      Electrolytes, Acid-Base:  Na 139, K 5.0, Cl 113, HCO3 17, lactic acid 1.6. His renal dysfunction is likely resulting in his inability to alkalinize the urine and losing bicarb. No supplementation at this time, but he may merit from sodium bicarb tabs if HCO3 remains <22     Anemia:  Hgb stable (8.1). Transfusion parameters by his primary team.     Type B Aortic Dissection:  No grafting to interventions while inpatient. He will follow in the vascular surgery clinic after he is discharged.     Recommendations were communicated to primary team in person     Seen and discussed with Dr. Kishan Watkins  Nephrology Fellow  [phone]    Interval History :   Nursing and provider notes from last 24 hours  "reviewed.    Elle Blanton was seen and examined this morning. He had no overnight events. His urine output hs improved, and he had mild LE edema. He denies shortness of breath or chest pain.     Review of Systems:   I reviewed the following systems:  GI: no loss of appetite. no nausea or vomiting or diarrhea.   Neuro:  no confusion  Constitutional:  no fever or chills  CV: no dyspnea or edema.  no chest pain.    Physical Exam:   I/O last 3 completed shifts:  In: 3840.74 [P.O.:480; I.V.:3023.24]  Out: 1340 [Urine:1340]   /64   Pulse 77   Temp 98.2  F (36.8  C) (Oral)   Resp 16   Ht 1.651 m (5' 5\")   SpO2 98%   BMI 27.82 kg/m       GENERAL APPEARANCE: not in acute distress  EYES:  no scleral icterus, pupils equal  HENT: mouth without ulcers or lesions  PULM: lungs clear to auscultation bilaterally, equal air movement, no clubbing  CV: regular rhythm, normal rate, no rub     -JVD no     -edema mild +1   GI: soft, non tender, not distended, bowel sounds are normal  INTEGUMENT: no cyanosis, no rash  NEURO:  no asterixis   Access none    Labs:   All labs reviewed by me  Electrolytes/Renal -   Recent Labs   Lab Test 07/29/19  1017 07/29/19  0414 07/28/19  0539    140 139   POTASSIUM 5.0 5.4* 4.6   CHLORIDE 113* 115* 112*   CO2 17* 18* 18*   BUN 54* 53* 52*   CR 3.92* 3.83* 3.46*   * 102* 161*   TANO 7.3* 7.3* 7.6*   MAG  --  2.1 2.1   PHOS  --  4.5 4.2     CBC -   Recent Labs   Lab Test 07/29/19  1017 07/29/19  0414 07/28/19  2158   WBC 4.5 3.8* 3.7*   HGB 8.1* 7.9* 7.9*   PLT 59* 48* 49*     LFTs -   Recent Labs   Lab Test 07/28/19  0950 07/28/19  0539 07/03/17  1546   ALKPHOS 101 108 56.0   BILITOTAL 0.6 0.6 0.7   * 170* 49.0*   * 107* 50.0   PROTTOTAL 7.2 7.3 6.4*   ALBUMIN 2.9* 3.0* 3.2     Iron Panel -   Recent Labs   Lab Test 07/29/19  0414   LD 88     Imaging:  All imaging studies reviewed by me.     Current Medications:    amLODIPine  5 mg Oral Daily     metoprolol tartrate  100 " mg Oral Q8H     pantoprazole  40 mg Oral BID AC     senna-docusate  2 tablet Oral BID       esmolol 200 mcg/kg/min (07/29/19 1155)     niCARdipine 40 mg in 200 mL 0.9% NaCl 5 mg/hr (07/29/19 1155)     Liliana Watkins MD

## 2019-07-29 NOTE — PLAN OF CARE
OT 4AB: Cancel - OT orders received and acknowledged. Pt remains on bedrest due to type B aortic dissection and not medically appropriate to initaite OT evaluation.

## 2019-07-29 NOTE — PLAN OF CARE
4A - Pt remains on bedrest due to type B aortic dissection. Not medically appropriate for PT evaluation at this time.

## 2019-07-29 NOTE — PLAN OF CARE
D: 59 M with type B aortic dissection.     I/A: Alert and orientated. Frustrated about being in the hospital. Denied pain and nausea. SBP goal < 120 and HR goal < 60; achieving SBP goal but not HR goal. Titrating esmolol and nicardipine. Sats >92% on RA; denies SOB. Regular diet; fair appetite. Adequate UOP via masterson. Strict bedrest.     P: Hematology to see him today. Renal continues to follow. Maintain tight BP control. Contact team with concerns.

## 2019-07-29 NOTE — PROGRESS NOTES
VASCULAR SURGERY PROGRESS NOTE       SUBJECTIVE:  Patient found sitting up in bed, denies any abdominal, chest or back pain.  Offers no specific complaints     OBJECTIVE:  Vital signs:  Temp: 98.2  F (36.8  C) Temp src: Oral BP: 128/67 Pulse: 76 Heart Rate: 75 Resp: 15 SpO2: 98 % O2 Device: None (Room air)        Intake/Output Summary (Last 24 hours) at 7/29/2019 0912  Last data filed at 7/29/2019 0700  Gross per 24 hour   Intake 3585.86 ml   Output 1120 ml   Net 2465.86 ml       PHYSICAL EXAM:  NEURO/PSYCH: The patient is alert and oriented.  Appropriate.  Moves all extremities.    SKIN: warm and dry.  PULMONARY: unlabored breathing, not requiring supplemental oxygen     EXTREMITIES: palpable bilateral DP and PT pulses, palpable bilateral radial pulses no lower extremity edema        BMP  Recent Labs   Lab 07/29/19  0414 07/28/19  0539    139   POTASSIUM 5.4* 4.6   CHLORIDE 115* 112*   CO2 18* 18*   BUN 53* 52*   CR 3.83* 3.46*   * 161*   MAG 2.1 2.1   PHOS 4.5 4.2     CBC  Recent Labs   Lab 07/29/19  0414 07/28/19  2158 07/28/19  1549 07/28/19  1001   WBC 3.8* 3.7* 3.0* Canceled, Test credited   HGB 7.9* 7.9* 8.1* Canceled, Test credited   PLT 48* 49* 35* Canceled, Test credited     INR/PTT  Recent Labs   Lab 07/29/19  0414 07/28/19  0539   INR 1.30* 1.17*   PTT 40* 36     ABG  Recent Labs   Lab 07/28/19  1530 07/28/19  0600   PH 7.31* 7.27*   PCO2 33* 40   PO2 84 101   HCO3 17* 18*     LFT  Recent Labs   Lab 07/28/19  0950 07/28/19  0539   * 107*   * 170*   ALKPHOS 101 108   BILITOTAL 0.6 0.6   ALBUMIN 2.9* 3.0*     PancreasNo lab results found in last 7 days.        ASSESSMENT:  Elle Blanton is a 59 year old male who was accepted in transfer from [facility] on 7/28/2019 where he presented with acute onset severe chest and upper abdomen pain.  Underwent emergent CT demonstrating a acute type B aortic dissection.  Uncomplicated type B dissection arises off left subclavian artery to  left common iliac artery.  All visceral vessels arise off true lumen.       PLAN:  - wean esmolol and nitroprusside drips as tolerated  - adjust oral antihypertensive medications to reach SBP goal less than 120 and HR goal of 60  - strict I & O  - no urgent vascular surgery intervention warranted at this time  - patient will require follow up CT scan and office visit in one month- will be arranged at [facility]     VISHAL Christopher  Division of Vascular Surgery  [facility]    Pager [phone]  Office [phone]

## 2019-07-29 NOTE — PROGRESS NOTES
SURGICAL ICU PROGRESS NOTE  July 29, 2019      CHANGES and MAJOR THINGS TODAY:  Increase metoprolol 50 to 100  Increase Amlodipine 5 to 10  Discontinue fluids and prn pain meds  EKG 2/2 increased K+      ASSESSMENT: Elle Blanton is a 59 year old male who presents with type B aortic dissection from [facility] that is being managed non-operatively    PLAN:  Neuro/ pain/ sedation:  - Not requiring sedation and pain meds.    Pulmonary care:   - Keep O2 saturation above 92 %, currently on RA.    Cardiovascular:    # Type B aortic dissection  - Goal SBP less than 120, HR < 70  - Esmolol gtt, nicardipine gtt  - Metoprolol increased to 100mg, amlodipine  - Lactic acid q6h  - Monitor hemodynamic status.   - EKG normal sinus rhythm unchanged from previous EKG    GI care:   - Hx of melena  - Protonix 40mg PO BID      Fluids/ Electrolytes/ Nutrition:   - discontinue IVF   - regular diet      Renal/ Fluid Balance:    - Urine output is adequate so far.  - Will continue to monitor I/O and daily weights      Endocrine:    - No management indication    ID/ Antibiotics:  - No indication for antibiotics      Heme:     - Hemoglobin stable.   - Trend INR, PTT, fibrinogen   - CBC q6h  - Heme following, waiting on labs to confirm hepatitis status, will f/u with full consult once labs are back     Prophylaxis:    - Mechanical prophylaxis for DVT.   - No chemical DVT prophylaxis due to high risk of bleeding.    Lines/ tubes/ drains:  - 3 Peripheral IVs  - Arterial line (L radial)  - Beltre    Disposition:  - Surgical ICU.    Patient seen, findings and plan discussed during SICU rounds.    Juanjo Ritchie MD  Anesthesiology PGY-1  [phone]    ====================================    TODAY'S PROGRESS:   SUBJECTIVE:   Patient lying comfortably in bed and denies abdominal, chest, or back pain. No issues overnight.    OBJECTIVE:   1. VITAL SIGNS:   Temp:  [98  F (36.7  C)-98.7  F (37.1  C)] 98.2  F (36.8  C)  Pulse:  [73-79] 77  Heart Rate:   [69-79] 72  Resp:  [12-18] 16  BP: (115-129)/(64-78) 125/67  MAP:  [67 mmHg-95 mmHg] 75 mmHg  Arterial Line BP: ()/(51-70) 116/57  SpO2:  [94 %-100 %] 97 %  Resp: 16      2. INTAKE/ OUTPUT:   I/O last 3 completed shifts:  In: 3840.74 [P.O.:480; I.V.:3023.24]  Out: 1340 [Urine:1340]    3. PHYSICAL EXAMINATION:   General: Comfortable  Neuro: A&Ox3, NAD  Resp: Breathing non-labored  CV: RRR  Abdomen: Soft, Non-distended, Non-tender  Incisions:   Extremities: warm and well perfused    4. INVESTIGATIONS:   Arterial Blood Gases   Recent Labs   Lab 07/28/19  1530 07/28/19  0600   PH 7.31* 7.27*   PCO2 33* 40   PO2 84 101   HCO3 17* 18*     Complete Blood Count   Recent Labs   Lab 07/29/19  1017 07/29/19  0414 07/28/19  2158 07/28/19  1549   WBC 4.5 3.8* 3.7* 3.0*   HGB 8.1* 7.9* 7.9* 8.1*   PLT 59* 48* 49* 35*     Basic Metabolic Panel  Recent Labs   Lab 07/29/19  1017 07/29/19  0414 07/28/19  0539    140 139   POTASSIUM 5.0 5.4* 4.6   CHLORIDE 113* 115* 112*   CO2 17* 18* 18*   BUN 54* 53* 52*   CR 3.92* 3.83* 3.46*   * 102* 161*     Liver Function Tests  Recent Labs   Lab 07/29/19  0414 07/28/19  0950 07/28/19  0539   AST  --  100* 107*   ALT  --  167* 170*   ALKPHOS  --  101 108   BILITOTAL  --  0.6 0.6   ALBUMIN  --  2.9* 3.0*   INR 1.30*  --  1.17*     Pancreatic Enzymes  No lab results found in last 7 days.  Coagulation Profile  Recent Labs   Lab 07/29/19  0414 07/28/19  0539   INR 1.30* 1.17*   PTT 40* 36         5. RADIOLOGY:   No results found for this or any previous visit (from the past 24 hour(s)).    =========================================      @[facility]@

## 2019-07-30 ENCOUNTER — OFFICE VISIT (OUTPATIENT)
Dept: INTERPRETER SERVICES | Facility: CLINIC | Age: 59
End: 2019-07-30
Payer: COMMERCIAL

## 2019-07-30 LAB
ANION GAP SERPL CALCULATED.3IONS-SCNC: 9 MMOL/L (ref 3–14)
APTT PPP: 43 SEC (ref 22–37)
BACTERIA SPEC CULT: NO GROWTH
BUN SERPL-MCNC: 59 MG/DL (ref 7–30)
CALCIUM SERPL-MCNC: 7.7 MG/DL (ref 8.5–10.1)
CHLORIDE SERPL-SCNC: 112 MMOL/L (ref 94–109)
CO2 SERPL-SCNC: 18 MMOL/L (ref 20–32)
CREAT SERPL-MCNC: 4.1 MG/DL (ref 0.66–1.25)
ERYTHROCYTE [DISTWIDTH] IN BLOOD BY AUTOMATED COUNT: 15.5 % (ref 10–15)
ERYTHROCYTE [DISTWIDTH] IN BLOOD BY AUTOMATED COUNT: 15.5 % (ref 10–15)
FIBRINOGEN PPP-MCNC: 164 MG/DL (ref 200–420)
GFR SERPL CREATININE-BSD FRML MDRD: 15 ML/MIN/{1.73_M2}
GLUCOSE SERPL-MCNC: 102 MG/DL (ref 70–99)
HBV DNA SERPL NAA+PROBE-ACNC: ABNORMAL [IU]/ML
HBV DNA SERPL NAA+PROBE-LOG IU: 6.6 {LOG_IU}/ML
HCT VFR BLD AUTO: 24 % (ref 40–53)
HCT VFR BLD AUTO: 24.9 % (ref 40–53)
HGB BLD-MCNC: 7.8 G/DL (ref 13.3–17.7)
HGB BLD-MCNC: 8.1 G/DL (ref 13.3–17.7)
INR PPP: 1.29 (ref 0.86–1.14)
INTERPRETATION ECG - MUSE: NORMAL
MAGNESIUM SERPL-MCNC: 2.1 MG/DL (ref 1.6–2.3)
MCH RBC QN AUTO: 31.3 PG (ref 26.5–33)
MCH RBC QN AUTO: 31.5 PG (ref 26.5–33)
MCHC RBC AUTO-ENTMCNC: 32.5 G/DL (ref 31.5–36.5)
MCHC RBC AUTO-ENTMCNC: 32.5 G/DL (ref 31.5–36.5)
MCV RBC AUTO: 96 FL (ref 78–100)
MCV RBC AUTO: 97 FL (ref 78–100)
PHOSPHATE SERPL-MCNC: 4.7 MG/DL (ref 2.5–4.5)
PLATELET # BLD AUTO: 42 10E9/L (ref 150–450)
PLATELET # BLD AUTO: 47 10E9/L (ref 150–450)
POTASSIUM SERPL-SCNC: 4.9 MMOL/L (ref 3.4–5.3)
RBC # BLD AUTO: 2.48 10E12/L (ref 4.4–5.9)
RBC # BLD AUTO: 2.59 10E12/L (ref 4.4–5.9)
SODIUM SERPL-SCNC: 139 MMOL/L (ref 133–144)
SPECIMEN SOURCE: NORMAL
WBC # BLD AUTO: 3.1 10E9/L (ref 4–11)
WBC # BLD AUTO: 3.3 10E9/L (ref 4–11)

## 2019-07-30 PROCEDURE — 83735 ASSAY OF MAGNESIUM: CPT | Performed by: STUDENT IN AN ORGANIZED HEALTH CARE EDUCATION/TRAINING PROGRAM

## 2019-07-30 PROCEDURE — 85610 PROTHROMBIN TIME: CPT | Performed by: STUDENT IN AN ORGANIZED HEALTH CARE EDUCATION/TRAINING PROGRAM

## 2019-07-30 PROCEDURE — 85730 THROMBOPLASTIN TIME PARTIAL: CPT | Performed by: STUDENT IN AN ORGANIZED HEALTH CARE EDUCATION/TRAINING PROGRAM

## 2019-07-30 PROCEDURE — 25000132 ZZH RX MED GY IP 250 OP 250 PS 637: Performed by: OBSTETRICS & GYNECOLOGY

## 2019-07-30 PROCEDURE — 99356 ZZC PROLONGED SERV,INPATIENT,1ST HR: CPT | Performed by: INTERNAL MEDICINE

## 2019-07-30 PROCEDURE — 40000893 ZZH STATISTIC PT IP EVAL DEFER

## 2019-07-30 PROCEDURE — 85027 COMPLETE CBC AUTOMATED: CPT | Performed by: PHYSICIAN ASSISTANT

## 2019-07-30 PROCEDURE — 85027 COMPLETE CBC AUTOMATED: CPT | Performed by: STUDENT IN AN ORGANIZED HEALTH CARE EDUCATION/TRAINING PROGRAM

## 2019-07-30 PROCEDURE — 20000004 ZZH R&B ICU UMMC

## 2019-07-30 PROCEDURE — 84100 ASSAY OF PHOSPHORUS: CPT | Performed by: STUDENT IN AN ORGANIZED HEALTH CARE EDUCATION/TRAINING PROGRAM

## 2019-07-30 PROCEDURE — 25000128 H RX IP 250 OP 636: Performed by: NURSE PRACTITIONER

## 2019-07-30 PROCEDURE — 85384 FIBRINOGEN ACTIVITY: CPT | Performed by: STUDENT IN AN ORGANIZED HEALTH CARE EDUCATION/TRAINING PROGRAM

## 2019-07-30 PROCEDURE — 25000125 ZZHC RX 250: Performed by: STUDENT IN AN ORGANIZED HEALTH CARE EDUCATION/TRAINING PROGRAM

## 2019-07-30 PROCEDURE — 99291 CRITICAL CARE FIRST HOUR: CPT | Mod: GC | Performed by: SURGERY

## 2019-07-30 PROCEDURE — 25000132 ZZH RX MED GY IP 250 OP 250 PS 637: Performed by: NURSE PRACTITIONER

## 2019-07-30 PROCEDURE — 25800030 ZZH RX IP 258 OP 636: Performed by: STUDENT IN AN ORGANIZED HEALTH CARE EDUCATION/TRAINING PROGRAM

## 2019-07-30 PROCEDURE — 40000014 ZZH STATISTIC ARTERIAL MONITORING DAILY

## 2019-07-30 PROCEDURE — 80048 BASIC METABOLIC PNL TOTAL CA: CPT | Performed by: STUDENT IN AN ORGANIZED HEALTH CARE EDUCATION/TRAINING PROGRAM

## 2019-07-30 PROCEDURE — 40000275 ZZH STATISTIC RCP TIME EA 10 MIN

## 2019-07-30 PROCEDURE — T1013 SIGN LANG/ORAL INTERPRETER: HCPCS | Mod: U3

## 2019-07-30 PROCEDURE — 25000132 ZZH RX MED GY IP 250 OP 250 PS 637: Performed by: PHYSICIAN ASSISTANT

## 2019-07-30 PROCEDURE — 25000132 ZZH RX MED GY IP 250 OP 250 PS 637: Performed by: SURGERY

## 2019-07-30 PROCEDURE — 99223 1ST HOSP IP/OBS HIGH 75: CPT | Performed by: INTERNAL MEDICINE

## 2019-07-30 PROCEDURE — 40000556 ZZH STATISTIC PERIPHERAL IV START W US GUIDANCE

## 2019-07-30 RX ORDER — CARVEDILOL 12.5 MG/1
12.5 TABLET ORAL ONCE
Status: COMPLETED | OUTPATIENT
Start: 2019-07-30 | End: 2019-07-30

## 2019-07-30 RX ORDER — POLYETHYLENE GLYCOL 3350 17 G/17G
17 POWDER, FOR SOLUTION ORAL DAILY
Status: DISCONTINUED | OUTPATIENT
Start: 2019-07-30 | End: 2019-08-01

## 2019-07-30 RX ORDER — SODIUM CHLORIDE 9 MG/ML
INJECTION, SOLUTION INTRAVENOUS
Status: DISCONTINUED
Start: 2019-07-30 | End: 2019-07-31 | Stop reason: HOSPADM

## 2019-07-30 RX ORDER — LABETALOL 20 MG/4 ML (5 MG/ML) INTRAVENOUS SYRINGE
10-20 EVERY 6 HOURS PRN
Status: DISCONTINUED | OUTPATIENT
Start: 2019-07-30 | End: 2019-08-01

## 2019-07-30 RX ORDER — CLONIDINE HYDROCHLORIDE 0.1 MG/1
0.1 TABLET ORAL EVERY 8 HOURS
Status: DISCONTINUED | OUTPATIENT
Start: 2019-07-30 | End: 2019-07-31

## 2019-07-30 RX ORDER — CARVEDILOL 25 MG/1
50 TABLET ORAL 2 TIMES DAILY WITH MEALS
Status: DISCONTINUED | OUTPATIENT
Start: 2019-07-30 | End: 2019-08-04 | Stop reason: HOSPADM

## 2019-07-30 RX ADMIN — CARVEDILOL 37.5 MG: 12.5 TABLET, FILM COATED ORAL at 07:04

## 2019-07-30 RX ADMIN — SODIUM CHLORIDE 5 MG/HR: 9 INJECTION, SOLUTION INTRAVENOUS at 07:35

## 2019-07-30 RX ADMIN — CARVEDILOL 50 MG: 12.5 TABLET, FILM COATED ORAL at 18:18

## 2019-07-30 RX ADMIN — LABETALOL 20 MG/4 ML (5 MG/ML) INTRAVENOUS SYRINGE 20 MG: at 19:21

## 2019-07-30 RX ADMIN — LABETALOL 20 MG/4 ML (5 MG/ML) INTRAVENOUS SYRINGE 20 MG: at 11:24

## 2019-07-30 RX ADMIN — ESMOLOL HYDROCHLORIDE IN SODIUM CHLORIDE 200 MCG/KG/MIN: 20 INJECTION INTRAVENOUS at 22:02

## 2019-07-30 RX ADMIN — ESMOLOL HYDROCHLORIDE IN SODIUM CHLORIDE 150 MCG/KG/MIN: 20 INJECTION INTRAVENOUS at 12:01

## 2019-07-30 RX ADMIN — PANTOPRAZOLE SODIUM 40 MG: 40 TABLET, DELAYED RELEASE ORAL at 16:05

## 2019-07-30 RX ADMIN — PANTOPRAZOLE SODIUM 40 MG: 40 TABLET, DELAYED RELEASE ORAL at 07:53

## 2019-07-30 RX ADMIN — ACETAMINOPHEN 500 MG: 325 TABLET, FILM COATED ORAL at 17:25

## 2019-07-30 RX ADMIN — ESMOLOL HYDROCHLORIDE IN SODIUM CHLORIDE 50 MCG/KG/MIN: 20 INJECTION INTRAVENOUS at 14:23

## 2019-07-30 RX ADMIN — CLONIDINE HYDROCHLORIDE 0.1 MG: 0.1 TABLET ORAL at 23:41

## 2019-07-30 RX ADMIN — ESMOLOL HYDROCHLORIDE IN SODIUM CHLORIDE 150 MCG/KG/MIN: 20 INJECTION INTRAVENOUS at 17:25

## 2019-07-30 RX ADMIN — CARVEDILOL 12.5 MG: 12.5 TABLET, FILM COATED ORAL at 13:14

## 2019-07-30 RX ADMIN — CLONIDINE HYDROCHLORIDE 0.1 MG: 0.1 TABLET ORAL at 16:05

## 2019-07-30 RX ADMIN — AMLODIPINE BESYLATE 10 MG: 10 TABLET ORAL at 07:53

## 2019-07-30 RX ADMIN — POLYETHYLENE GLYCOL 3350 17 G: 17 POWDER, FOR SOLUTION ORAL at 11:24

## 2019-07-30 RX ADMIN — SENNOSIDES AND DOCUSATE SODIUM 2 TABLET: 8.6; 5 TABLET ORAL at 07:53

## 2019-07-30 RX ADMIN — ESMOLOL HYDROCHLORIDE IN SODIUM CHLORIDE 200 MCG/KG/MIN: 20 INJECTION INTRAVENOUS at 19:58

## 2019-07-30 ASSESSMENT — ACTIVITIES OF DAILY LIVING (ADL)
ADLS_ACUITY_SCORE: 12

## 2019-07-30 ASSESSMENT — MIFFLIN-ST. JEOR: SCORE: 1512.54

## 2019-07-30 NOTE — PROGRESS NOTES
"VASCULAR SURGERY PROGRESS NOTE       SUBJECTIVE:  Patient found laying in bed,  denies any abdominal, chest or back pain.  Offers no specific complaints.  Family at bedside      OBJECTIVE:  Vital signs:  BP (!) 141/67   Pulse 70   Temp 97.9  F (36.6  C) (Axillary)   Resp 22   Ht 1.651 m (5' 5\")   Wt (P) 77.1 kg (169 lb 14.4 oz)   SpO2 97%   BMI (P) 28.27 kg/m        Intake/Output Summary (Last 24 hours) at 7/30/2019 0924  Last data filed at 7/30/2019 0800  Gross per 24 hour   Intake 1564.33 ml   Output 1310 ml   Net 254.33 ml           PHYSICAL EXAM:  NEURO/PSYCH: The patient is alert and oriented.  Appropriate.  Moves all extremities.    SKIN: warm and dry.  PULMONARY: unlabored breathing, not requiring supplemental oxygen     EXTREMITIES: palpable bilateral DP and PT pulses, palpable bilateral radial pulses no lower extremity edema        BMP  Recent Labs   Lab 07/30/19  0411 07/29/19  1017 07/29/19  0414 07/28/19  0539    139 140 139   POTASSIUM 4.9 5.0 5.4* 4.6   CHLORIDE 112* 113* 115* 112*   CO2 18* 17* 18* 18*   BUN 59* 54* 53* 52*   CR 4.10* 3.92* 3.83* 3.46*   * 134* 102* 161*   MAG 2.1  --  2.1 2.1   PHOS 4.7*  --  4.5 4.2     CBC  Recent Labs   Lab 07/30/19  0411 07/30/19  0009 07/29/19  1620 07/29/19  1017   WBC 3.1* 3.3* 5.2 4.5   HGB 8.1* 7.8* 8.3* 8.1*   PLT 47* 42* 60* 59*     INR/PTT  Recent Labs   Lab 07/30/19  0411 07/29/19  0414 07/28/19  0539   INR 1.29* 1.30* 1.17*   PTT 43* 40* 36     ABG  Recent Labs   Lab 07/28/19  1530 07/28/19  0600   PH 7.31* 7.27*   PCO2 33* 40   PO2 84 101   HCO3 17* 18*     LFT  Recent Labs   Lab 07/28/19  0950 07/28/19  0539   * 107*   * 170*   ALKPHOS 101 108   BILITOTAL 0.6 0.6   ALBUMIN 2.9* 3.0*     PancreasNo lab results found in last 7 days.        ASSESSMENT:  Elle Blanton is a 59 year old male who was accepted in transfer from [facility] on 7/28/2019 where he presented with acute onset severe chest and upper abdomen " pain.  Underwent emergent CT demonstrating a acute type B aortic dissection.  Uncomplicated type B dissection arises off left subclavian artery to left common iliac artery.  All visceral vessels arise off true lumen.       PLAN:  - wean nicardipine drip as tolerated  - adjust oral antihypertensive medications to reach SBP goal less than 120 and HR goal of 60.  Currently on Norvasc 10 mg daily and Coreg 37.5 mg BID  - strict I & O  - okay for OOB with assist to chair  - no urgent vascular surgery intervention warranted at this time  - no plans to repeat CTA during this admission due to NAHUM, creatinine 4.10 today  - patient will require follow up CT scan and office visit in one month- will be arranged at [facility]         VISHAL Christopher  Division of Vascular Surgery  [facility]    Pager [phone]  Office [phone]

## 2019-07-30 NOTE — PROGRESS NOTES
SURGICAL ICU PROGRESS NOTE  July 30, 2019            ASSESSMENT: Elle Blanton is a 59 year old male who presents with type B aortic dissection from [facility] that is being managed non-operatively    CHANGES and MAJOR THINGS TODAY:  Increased coreg 50mg BID  Discontinued nicardipine   OOB to chair w/ assist per Vascular  GI consult for viral hepatitis      PLAN:  Neuro/ pain/ sedation:  - Not requiring sedation and pain meds.     Pulmonary care:   - Keep O2 saturation above 92 %, currently on RA.     Cardiovascular:    # Type B aortic dissection  - Goal SBP less than 120, HR < 70  - Esmolol gtt, nicardipine discontinued   - Coreg increased to 50mg BID, amlodipine 10mg   - plan to wean off esmolol, Labetolol prn      GI  #Hx of melena   - Protonix 40mg PO BID    # pancytopenia 2/2 chronic hep b   - Hep B surface ant and core Ab positive  - Hep B virus DNA pending  - GI consulted, will f/u for further management      Fluids/ Electrolytes/ Nutrition:   - regular diet  - no BM on this admission will add Miralax       Renal/ Fluid Balance:    #Non-oliguric NAHUM on CKD stage III  - Urine output is adequate so far.  - Will continue to monitor I/O and daily weights  - No need for dialysis at this time  - Nephrology following        Endocrine:    - No management indication     ID/ Antibiotics:  - No indication for antibiotics        Heme:     - Hemoglobin stable.   - Trend INR, PTT, fibrinogen, CBC daily  - Heme following, waiting on labs to confirm hepatitis status, will f/u with full consult once labs are back      Prophylaxis:    - Mechanical prophylaxis for DVT.   - No chemical DVT prophylaxis due to high risk of bleeding.     Lines/ tubes/ drains:  - 3 Peripheral IVs  - Arterial line (L radial)       Disposition:  - Surgical ICU.     Patient seen, findings and plan discussed during SICU rounds.     Juanjo Ritchie MD  Anesthesiology PGY-1  [phone]  ====================================    TODAY'S PROGRESS:   SUBJECTIVE:    Patient lying comfortably in bed and was able to sit up in chair and get up to go tp bathroom without any difficulties.    OBJECTIVE:   1. VITAL SIGNS:   Temp:  [97.5  F (36.4  C)-98.3  F (36.8  C)] 97.5  F (36.4  C)  Pulse:  [70-75] 70  Heart Rate:  [61-75] 69  Resp:  [14-22] 20  BP: ()/(57-75) 103/57  MAP:  [61 mmHg-255 mmHg] 81 mmHg  Arterial Line BP: ()/() 124/59  SpO2:  [92 %-99 %] 99 %  Resp: 20      2. INTAKE/ OUTPUT:   I/O last 3 completed shifts:  In: 1942.33 [P.O.:460; I.V.:1482.33]  Out: 1310 [Urine:1310]    3. PHYSICAL EXAMINATION:   General: Lying comfortably in bed, pain well managed  Neuro: A&Ox3, NAD  Resp: Breathing non-labored  CV: RRR  Abdomen: Soft, Non-distended, Non-tender  Incisions:   Extremities: warm and well perfused    4. INVESTIGATIONS:   Arterial Blood Gases   Recent Labs   Lab 07/28/19  1530 07/28/19  0600   PH 7.31* 7.27*   PCO2 33* 40   PO2 84 101   HCO3 17* 18*     Complete Blood Count   Recent Labs   Lab 07/30/19  0411 07/30/19  0009 07/29/19  1620 07/29/19  1017   WBC 3.1* 3.3* 5.2 4.5   HGB 8.1* 7.8* 8.3* 8.1*   PLT 47* 42* 60* 59*     Basic Metabolic Panel  Recent Labs   Lab 07/30/19  0411 07/29/19  1017 07/29/19  0414 07/28/19  0539    139 140 139   POTASSIUM 4.9 5.0 5.4* 4.6   CHLORIDE 112* 113* 115* 112*   CO2 18* 17* 18* 18*   BUN 59* 54* 53* 52*   CR 4.10* 3.92* 3.83* 3.46*   * 134* 102* 161*     Liver Function Tests  Recent Labs   Lab 07/30/19  0411 07/29/19  0414 07/28/19  0950 07/28/19  0539   AST  --   --  100* 107*   ALT  --   --  167* 170*   ALKPHOS  --   --  101 108   BILITOTAL  --   --  0.6 0.6   ALBUMIN  --   --  2.9* 3.0*   INR 1.29* 1.30*  --  1.17*     Pancreatic Enzymes  No lab results found in last 7 days.  Coagulation Profile  Recent Labs   Lab 07/30/19 0411 07/29/19 0414 07/28/19  0539   INR 1.29* 1.30* 1.17*   PTT 43* 40* 36         5. RADIOLOGY:   No results found for this or any previous visit (from the past 24  hour(s)).    =========================================      [facility] [place]

## 2019-07-30 NOTE — PLAN OF CARE
4A - Pt with -120s, current limitations for BP below 120, RN continuing to need to titrate at this time. Vascular requesting pt okay to get to chair with assist but remain in room at this time due to need for titration per discussion with RN. Per discussion with RN plan to HOLD PT evaluation and re-assess pending ability to further assess stairs and ambulation as pt has been up moving SBA with RN staff to get to chair. No immediate concerns per RN staff at this time with mobility.     Please update activity orders as appropriate.

## 2019-07-30 NOTE — PROGRESS NOTES
Nephrology Progress Note  07/30/2019       Assessment & Recommendations:   Mr. Elle Blanton is a 59 year old male with PMHx of CKD stage III, HTN, remote GI bleeding, GERD and thrombocytopenia who initially presented to [facility] with abdominal pain, nausea, and one episode of emesis, who subsequently was found to have a type B aortic dissection. Nephrology consulted for NAHUM on CKD management.     Non-Oliguric NAHUM on CKD stage III:  His baseline Cr is 1.6-1.8, and is 4.1mg/dL today. The etiology of renal injury is from renal hypoperfusion, and he does not need hemodialysis at this time. He is non-oliguric and has stable electrolytes.   - Continue to monitor daily    Blood pressure/Volume status:  Patient on Esmolol gtt (titrating down), goal to keep SBP less than 120 mmHg per vascular surgery recommendation in setting of aortic dissection. Coreg increased today.  - No need for diuretics, he is breathing well, and has minimal LE edema. Please conitnue daily weights.      Electrolytes, Acid-Base:  Na 139, K 4.9, Cl 112, HCO3 18, lactic acid 0.6. Please start sodium bicarb 650mg BID to keep HCO3 >22     Anemia:  Hgb stable (8.1). Transfusion parameters by his primary team.     Type B Aortic Dissection:  No grafting to interventions while inpatient. He will follow in the vascular surgery clinic after he is discharged.     Recommendations were communicated to primary team in person     Seen and discussed with Dr. Kishan Watkins  Nephrology Fellow  [phone]    Interval History :   Nursing and provider notes from last 24 hours reviewed.    Elle Blanton was seen and examined this morning. He had no overnight events. He denies chest pain and denies shortness of breath. His urine output is 1.3L daily, and his Beltre was removed today.     Review of Systems:   I reviewed the following systems:  GI: no loss of appetite. no nausea or vomiting or diarrhea.   Neuro:  no confusion  Constitutional:  no fever or  "chills  CV: no dyspnea or edema.  no chest pain.    Physical Exam:   I/O last 3 completed shifts:  In: 1993.27 [P.O.:880; I.V.:1113.27]  Out: 1070 [Urine:1070]   /61   Pulse 70   Temp 97.5  F (36.4  C) (Oral)   Resp 20   Ht 1.651 m (5' 5\")   Wt 77.1 kg (169 lb 14.4 oz)   SpO2 99%   BMI 28.27 kg/m       GENERAL APPEARANCE: not in acute distress  EYES:  no scleral icterus, pupils equal  HENT: mouth without ulcers or lesions  PULM: lungs clear to auscultation bilaterally, equal air movement, no clubbing  CV: regular rhythm, normal rate, no rub     -JVD no     -edema mild +1   GI: soft, non tender, not distended, bowel sounds are normal  INTEGUMENT: no cyanosis, no rash  NEURO:  no asterixis   Access none    Labs:   All labs reviewed by me  Electrolytes/Renal -   Recent Labs   Lab Test 07/30/19  0411 07/29/19  1017 07/29/19  0414 07/28/19  0539    139 140 139   POTASSIUM 4.9 5.0 5.4* 4.6   CHLORIDE 112* 113* 115* 112*   CO2 18* 17* 18* 18*   BUN 59* 54* 53* 52*   CR 4.10* 3.92* 3.83* 3.46*   * 134* 102* 161*   TANO 7.7* 7.3* 7.3* 7.6*   MAG 2.1  --  2.1 2.1   PHOS 4.7*  --  4.5 4.2     CBC -   Recent Labs   Lab Test 07/30/19  0411 07/30/19  0009 07/29/19  1620   WBC 3.1* 3.3* 5.2   HGB 8.1* 7.8* 8.3*   PLT 47* 42* 60*     LFTs -   Recent Labs   Lab Test 07/28/19  0950 07/28/19  0539 07/03/17  1546   ALKPHOS 101 108 56.0   BILITOTAL 0.6 0.6 0.7   * 170* 49.0*   * 107* 50.0   PROTTOTAL 7.2 7.3 6.4*   ALBUMIN 2.9* 3.0* 3.2     Iron Panel -   Recent Labs   Lab Test 07/29/19  0414   LD 88     Imaging:  All imaging studies reviewed by me.     Current Medications:    amLODIPine  10 mg Oral Daily     carvedilol  50 mg Oral BID w/meals     pantoprazole  40 mg Oral BID AC     polyethylene glycol  17 g Oral Daily     senna-docusate  2 tablet Oral BID       esmolol 50 mcg/kg/min (07/30/19 1423)     niCARdipine 40 mg in 200 mL 0.9% NaCl Stopped (07/30/19 1424)     Liliana Watkins MD  "

## 2019-07-30 NOTE — PLAN OF CARE
OT: holding OT at this time, pt on bedrest, pt will initiate early mobility. Will assess for ADL needs as indicated.

## 2019-07-30 NOTE — PLAN OF CARE
D/I:Neurologically intact. HR 60-70s, even with medication management, has not gotten lower then 65. SBP stable at 110s, sometimes will sustain 120s, but no further titration done. Coreg given with better management of SBP, esmolol off. Nicard weaned to at 2.5. On RA. Strict bedrest maintained. Creatine elevated and Platletts this AM, team aware, no intervention placed. UO marginal. No BM this shift. R art line. 2 PIVs    A: Stable, Wife at bedside  P: Continue to monitor pt closely. Notify MD of significant changes.

## 2019-07-31 LAB
ALBUMIN SERPL-MCNC: 2.5 G/DL (ref 3.4–5)
ALP SERPL-CCNC: 88 U/L (ref 40–150)
ALT SERPL W P-5'-P-CCNC: 93 U/L (ref 0–70)
ANGLE RATE OF CLOT STRENGTH: 37.4 DEGREES (ref 53–72)
ANION GAP SERPL CALCULATED.3IONS-SCNC: 8 MMOL/L (ref 3–14)
APTT PPP: 42 SEC (ref 22–37)
AST SERPL W P-5'-P-CCNC: 36 U/L (ref 0–45)
BILIRUB DIRECT SERPL-MCNC: 0.1 MG/DL (ref 0–0.2)
BILIRUB SERPL-MCNC: 0.4 MG/DL (ref 0.2–1.3)
BUN SERPL-MCNC: 59 MG/DL (ref 7–30)
CALCIUM SERPL-MCNC: 7.7 MG/DL (ref 8.5–10.1)
CHLORIDE SERPL-SCNC: 115 MMOL/L (ref 94–109)
CI HYPOCOAGULATION INDEX: 7.2 RATIO (ref 0–3)
CO2 SERPL-SCNC: 16 MMOL/L (ref 20–32)
CREAT SERPL-MCNC: 4.25 MG/DL (ref 0.66–1.25)
ERYTHROCYTE [DISTWIDTH] IN BLOOD BY AUTOMATED COUNT: 15.3 % (ref 10–15)
FIBRINOGEN PPP-MCNC: 128 MG/DL (ref 200–420)
G ACTUAL CLOT STRENGTH: 2.6 KD/SC (ref 4.5–11)
GFR SERPL CREATININE-BSD FRML MDRD: 14 ML/MIN/{1.73_M2}
GLUCOSE SERPL-MCNC: 95 MG/DL (ref 70–99)
HCT VFR BLD AUTO: 26 % (ref 40–53)
HGB BLD-MCNC: 8.2 G/DL (ref 13.3–17.7)
INR PPP: 1.33 (ref 0.86–1.14)
IRON SATN MFR SERPL: 12 % (ref 15–46)
IRON SERPL-MCNC: 30 UG/DL (ref 35–180)
K TIME TO SPEC CLOT STRENGTH: 6.6 MINUTE (ref 1–3)
LY30 LYSIS AT 30 MINUTES: 0 % (ref 0–8)
LY60 LYSIS AT 60 MINUTES: 0.7 % (ref 0–15)
MA MAXIMUM CLOT STRENGTH: 34.4 MM (ref 50–70)
MAGNESIUM SERPL-MCNC: 2 MG/DL (ref 1.6–2.3)
MCH RBC QN AUTO: 30.6 PG (ref 26.5–33)
MCHC RBC AUTO-ENTMCNC: 31.5 G/DL (ref 31.5–36.5)
MCV RBC AUTO: 97 FL (ref 78–100)
PHOSPHATE SERPL-MCNC: 4.5 MG/DL (ref 2.5–4.5)
PLATELET # BLD AUTO: 52 10E9/L (ref 150–450)
POTASSIUM SERPL-SCNC: 4.7 MMOL/L (ref 3.4–5.3)
PROT SERPL-MCNC: 6.4 G/DL (ref 6.8–8.8)
R TIME UNTIL CLOT FORMS: 6.1 MINUTE (ref 5–10)
RBC # BLD AUTO: 2.68 10E12/L (ref 4.4–5.9)
SODIUM SERPL-SCNC: 140 MMOL/L (ref 133–144)
TIBC SERPL-MCNC: 262 UG/DL (ref 240–430)
WBC # BLD AUTO: 2.9 10E9/L (ref 4–11)

## 2019-07-31 PROCEDURE — 25000132 ZZH RX MED GY IP 250 OP 250 PS 637: Performed by: STUDENT IN AN ORGANIZED HEALTH CARE EDUCATION/TRAINING PROGRAM

## 2019-07-31 PROCEDURE — 99291 CRITICAL CARE FIRST HOUR: CPT | Mod: GC | Performed by: SURGERY

## 2019-07-31 PROCEDURE — 85610 PROTHROMBIN TIME: CPT | Performed by: STUDENT IN AN ORGANIZED HEALTH CARE EDUCATION/TRAINING PROGRAM

## 2019-07-31 PROCEDURE — 85384 FIBRINOGEN ACTIVITY: CPT | Performed by: STUDENT IN AN ORGANIZED HEALTH CARE EDUCATION/TRAINING PROGRAM

## 2019-07-31 PROCEDURE — 86707 HEPATITIS BE ANTIBODY: CPT | Performed by: NURSE PRACTITIONER

## 2019-07-31 PROCEDURE — 25000132 ZZH RX MED GY IP 250 OP 250 PS 637: Performed by: NURSE PRACTITIONER

## 2019-07-31 PROCEDURE — 25800030 ZZH RX IP 258 OP 636: Performed by: STUDENT IN AN ORGANIZED HEALTH CARE EDUCATION/TRAINING PROGRAM

## 2019-07-31 PROCEDURE — 85027 COMPLETE CBC AUTOMATED: CPT | Performed by: STUDENT IN AN ORGANIZED HEALTH CARE EDUCATION/TRAINING PROGRAM

## 2019-07-31 PROCEDURE — 25000128 H RX IP 250 OP 636: Performed by: NURSE PRACTITIONER

## 2019-07-31 PROCEDURE — 80048 BASIC METABOLIC PNL TOTAL CA: CPT | Performed by: STUDENT IN AN ORGANIZED HEALTH CARE EDUCATION/TRAINING PROGRAM

## 2019-07-31 PROCEDURE — 84100 ASSAY OF PHOSPHORUS: CPT | Performed by: STUDENT IN AN ORGANIZED HEALTH CARE EDUCATION/TRAINING PROGRAM

## 2019-07-31 PROCEDURE — 87350 HEPATITIS BE AG IA: CPT | Performed by: NURSE PRACTITIONER

## 2019-07-31 PROCEDURE — 25000132 ZZH RX MED GY IP 250 OP 250 PS 637: Performed by: SURGERY

## 2019-07-31 PROCEDURE — 83735 ASSAY OF MAGNESIUM: CPT | Performed by: STUDENT IN AN ORGANIZED HEALTH CARE EDUCATION/TRAINING PROGRAM

## 2019-07-31 PROCEDURE — 20000004 ZZH R&B ICU UMMC

## 2019-07-31 PROCEDURE — 82668 ASSAY OF ERYTHROPOIETIN: CPT | Performed by: STUDENT IN AN ORGANIZED HEALTH CARE EDUCATION/TRAINING PROGRAM

## 2019-07-31 PROCEDURE — 25000132 ZZH RX MED GY IP 250 OP 250 PS 637: Performed by: OBSTETRICS & GYNECOLOGY

## 2019-07-31 PROCEDURE — 84156 ASSAY OF PROTEIN URINE: CPT | Performed by: STUDENT IN AN ORGANIZED HEALTH CARE EDUCATION/TRAINING PROGRAM

## 2019-07-31 PROCEDURE — 81050 URINALYSIS VOLUME MEASURE: CPT | Performed by: STUDENT IN AN ORGANIZED HEALTH CARE EDUCATION/TRAINING PROGRAM

## 2019-07-31 PROCEDURE — 40000141 ZZH STATISTIC PERIPHERAL IV START W/O US GUIDANCE

## 2019-07-31 PROCEDURE — 40000275 ZZH STATISTIC RCP TIME EA 10 MIN

## 2019-07-31 PROCEDURE — 80076 HEPATIC FUNCTION PANEL: CPT | Performed by: STUDENT IN AN ORGANIZED HEALTH CARE EDUCATION/TRAINING PROGRAM

## 2019-07-31 PROCEDURE — 25000125 ZZHC RX 250: Performed by: STUDENT IN AN ORGANIZED HEALTH CARE EDUCATION/TRAINING PROGRAM

## 2019-07-31 PROCEDURE — 83550 IRON BINDING TEST: CPT | Performed by: STUDENT IN AN ORGANIZED HEALTH CARE EDUCATION/TRAINING PROGRAM

## 2019-07-31 PROCEDURE — 85396 CLOTTING ASSAY WHOLE BLOOD: CPT | Performed by: OBSTETRICS & GYNECOLOGY

## 2019-07-31 PROCEDURE — 85730 THROMBOPLASTIN TIME PARTIAL: CPT | Performed by: STUDENT IN AN ORGANIZED HEALTH CARE EDUCATION/TRAINING PROGRAM

## 2019-07-31 PROCEDURE — 25000125 ZZHC RX 250

## 2019-07-31 PROCEDURE — 25000132 ZZH RX MED GY IP 250 OP 250 PS 637

## 2019-07-31 PROCEDURE — 40000014 ZZH STATISTIC ARTERIAL MONITORING DAILY

## 2019-07-31 PROCEDURE — 25000132 ZZH RX MED GY IP 250 OP 250 PS 637: Performed by: PHYSICIAN ASSISTANT

## 2019-07-31 PROCEDURE — 83540 ASSAY OF IRON: CPT | Performed by: STUDENT IN AN ORGANIZED HEALTH CARE EDUCATION/TRAINING PROGRAM

## 2019-07-31 RX ORDER — DILTIAZEM HYDROCHLORIDE 30 MG/1
15 TABLET, FILM COATED ORAL ONCE
Status: COMPLETED | OUTPATIENT
Start: 2019-07-31 | End: 2019-07-31

## 2019-07-31 RX ORDER — CLONIDINE HYDROCHLORIDE 0.2 MG/1
0.2 TABLET ORAL EVERY 8 HOURS
Status: DISCONTINUED | OUTPATIENT
Start: 2019-07-31 | End: 2019-08-02

## 2019-07-31 RX ORDER — SODIUM BICARBONATE 650 MG/1
1300 TABLET ORAL 3 TIMES DAILY
Status: DISCONTINUED | OUTPATIENT
Start: 2019-07-31 | End: 2019-08-01

## 2019-07-31 RX ORDER — DILTIAZEM HYDROCHLORIDE 30 MG/1
30 TABLET, FILM COATED ORAL EVERY 6 HOURS SCHEDULED
Status: DISCONTINUED | OUTPATIENT
Start: 2019-07-31 | End: 2019-07-31

## 2019-07-31 RX ORDER — DILTIAZEM HYDROCHLORIDE 5 MG/ML
10 INJECTION INTRAVENOUS ONCE
Status: COMPLETED | OUTPATIENT
Start: 2019-07-31 | End: 2019-07-31

## 2019-07-31 RX ORDER — DILTIAZEM HYDROCHLORIDE 30 MG/1
30 TABLET, FILM COATED ORAL ONCE
Status: DISCONTINUED | OUTPATIENT
Start: 2019-07-31 | End: 2019-07-31

## 2019-07-31 RX ORDER — MINOXIDIL 10 MG/1
10 TABLET ORAL DAILY
Status: DISCONTINUED | OUTPATIENT
Start: 2019-07-31 | End: 2019-08-01

## 2019-07-31 RX ORDER — DILTIAZEM HYDROCHLORIDE 30 MG/1
30 TABLET, FILM COATED ORAL ONCE
Status: COMPLETED | OUTPATIENT
Start: 2019-07-31 | End: 2019-07-31

## 2019-07-31 RX ORDER — DILTIAZEM HYDROCHLORIDE 5 MG/ML
10 INJECTION INTRAVENOUS ONCE
Status: DISCONTINUED | OUTPATIENT
Start: 2019-07-31 | End: 2019-07-31

## 2019-07-31 RX ORDER — MINOXIDIL 2.5 MG/1
5 TABLET ORAL DAILY
Status: DISCONTINUED | OUTPATIENT
Start: 2019-07-31 | End: 2019-07-31

## 2019-07-31 RX ADMIN — SODIUM BICARBONATE 650 MG TABLET 1300 MG: at 11:43

## 2019-07-31 RX ADMIN — PANTOPRAZOLE SODIUM 40 MG: 40 TABLET, DELAYED RELEASE ORAL at 08:12

## 2019-07-31 RX ADMIN — ESMOLOL HYDROCHLORIDE IN SODIUM CHLORIDE 300 MCG/KG/MIN: 20 INJECTION INTRAVENOUS at 04:22

## 2019-07-31 RX ADMIN — Medication 15 MG: at 15:57

## 2019-07-31 RX ADMIN — MINOXIDIL 10 MG: 10 TABLET ORAL at 12:01

## 2019-07-31 RX ADMIN — CARVEDILOL 50 MG: 12.5 TABLET, FILM COATED ORAL at 08:11

## 2019-07-31 RX ADMIN — SODIUM BICARBONATE 650 MG TABLET 1300 MG: at 14:33

## 2019-07-31 RX ADMIN — DILTIAZEM HYDROCHLORIDE 30 MG: 30 TABLET, FILM COATED ORAL at 16:23

## 2019-07-31 RX ADMIN — SODIUM CHLORIDE 2.5 MG/HR: 9 INJECTION, SOLUTION INTRAVENOUS at 02:17

## 2019-07-31 RX ADMIN — SODIUM BICARBONATE 650 MG TABLET 1300 MG: at 19:46

## 2019-07-31 RX ADMIN — SENNOSIDES AND DOCUSATE SODIUM 2 TABLET: 8.6; 5 TABLET ORAL at 19:46

## 2019-07-31 RX ADMIN — ESMOLOL HYDROCHLORIDE IN SODIUM CHLORIDE 300 MCG/KG/MIN: 20 INJECTION INTRAVENOUS at 03:04

## 2019-07-31 RX ADMIN — PANTOPRAZOLE SODIUM 40 MG: 40 TABLET, DELAYED RELEASE ORAL at 15:57

## 2019-07-31 RX ADMIN — ESMOLOL HYDROCHLORIDE IN SODIUM CHLORIDE 300 MCG/KG/MIN: 20 INJECTION INTRAVENOUS at 01:46

## 2019-07-31 RX ADMIN — POLYETHYLENE GLYCOL 3350 17 G: 17 POWDER, FOR SOLUTION ORAL at 08:12

## 2019-07-31 RX ADMIN — CARVEDILOL 50 MG: 12.5 TABLET, FILM COATED ORAL at 18:14

## 2019-07-31 RX ADMIN — ACETAMINOPHEN 500 MG: 325 TABLET, FILM COATED ORAL at 19:46

## 2019-07-31 RX ADMIN — ESMOLOL HYDROCHLORIDE IN SODIUM CHLORIDE 250 MCG/KG/MIN: 20 INJECTION INTRAVENOUS at 05:53

## 2019-07-31 RX ADMIN — ESMOLOL HYDROCHLORIDE IN SODIUM CHLORIDE 250.22 MCG/KG/MIN: 20 INJECTION INTRAVENOUS at 07:51

## 2019-07-31 RX ADMIN — ESMOLOL HYDROCHLORIDE IN SODIUM CHLORIDE 250 MCG/KG/MIN: 20 INJECTION INTRAVENOUS at 00:01

## 2019-07-31 RX ADMIN — CLONIDINE HYDROCHLORIDE 0.2 MG: 0.1 TABLET ORAL at 08:34

## 2019-07-31 RX ADMIN — LABETALOL 20 MG/4 ML (5 MG/ML) INTRAVENOUS SYRINGE 20 MG: at 00:53

## 2019-07-31 RX ADMIN — Medication 45 MG: at 21:57

## 2019-07-31 RX ADMIN — CLONIDINE HYDROCHLORIDE 0.2 MG: 0.1 TABLET ORAL at 15:57

## 2019-07-31 RX ADMIN — AMLODIPINE BESYLATE 10 MG: 10 TABLET ORAL at 08:12

## 2019-07-31 RX ADMIN — DILTIAZEM HYDROCHLORIDE 10 MG: 5 INJECTION INTRAVENOUS at 14:24

## 2019-07-31 ASSESSMENT — ACTIVITIES OF DAILY LIVING (ADL)
ADLS_ACUITY_SCORE: 12

## 2019-07-31 ASSESSMENT — MIFFLIN-ST. JEOR: SCORE: 1505.88

## 2019-07-31 ASSESSMENT — PAIN DESCRIPTION - DESCRIPTORS: DESCRIPTORS: HEADACHE

## 2019-07-31 NOTE — PROGRESS NOTES
SURGICAL ICU PROGRESS NOTE  July 31, 2019      ASSESSMENT: Elle Blanton is a 59 year old male who presents with type B aortic dissection from [facility] that is being managed non-operatively     CHANGES and MAJOR THINGS TODAY:  Added Minoxidil and Diltiazem   Weaning off esmolol  GI and Nephro consulted  SW consult to assist with employment concerns      PLAN:  Neuro/ pain/ sedation:  - Not requiring sedation and pain meds.     Pulmonary care:   - Keep O2 saturation above 92 %, currently on RA.     Cardiovascular:    # Type B aortic dissection  - Goal SBP less than 120, HR < 70  - Weaning off esmolol gtt   - Minoxidil 10mg PO daily  - Diltiazem 30mg q6hr  - Amlodipine 10mg daily  - Coreg 50mg PO x2 daily  - Clonidine 0.2mg q8hr       GI  #Hx of melena   - Protonix 40mg PO BID     # pancytopenia 2/2 chronic hep b   - Hep B surface ant and core Ab positive  - Hep B virus DNA pending  - see GI note for recommendations     Fluids/ Electrolytes/ Nutrition:   - regular diet  - had a BM on miralax       Renal/ Fluid Balance:    #Non-oliguric NAHUM on CKD stage III  - Urine output is adequate so far.  - Will continue to monitor I/O and daily weights  - No need for dialysis at this time, plan for possible HD in future and see nephro as OP  - Nephrology following        Endocrine:    - No management indication     ID/ Antibiotics:  - No indication for antibiotics        Heme:     - Hemoglobin stable. Fibrinogen decreasing, f/u TEG   - Trend INR, PTT, fibrinogen, CBC   - Heme following, waiting on labs to confirm hepatitis status, will f/u with full consult once labs are back      Prophylaxis:    - Mechanical prophylaxis for DVT.   - No chemical DVT prophylaxis due to high risk of bleeding.     Lines/ tubes/ drains:  - 3 Peripheral IVs  - Arterial line (L radial)        Disposition:  - Surgical ICU.     Patient seen, findings and plan discussed during SICU rounds.     Juanjo Ritchie,  MD  Anesthesiology PGY-1  [phone]====================================    TODAY'S PROGRESS:   SUBJECTIVE:   Patient comfortable, no issues ON.     OBJECTIVE:   1. VITAL SIGNS:   Temp:  [97.7  F (36.5  C)-98.9  F (37.2  C)] 98.9  F (37.2  C)  Heart Rate:  [59-73] 59  Resp:  [7-46] 11  BP: (120-129)/(55-59) 120/55  MAP:  [43 mmHg-94 mmHg] 77 mmHg  Arterial Line BP: ()/(40-71) 123/60  SpO2:  [97 %-100 %] 97 %  Resp: 11      2. INTAKE/ OUTPUT:   I/O last 3 completed shifts:  In: 1510.07 [I.V.:1510.07]  Out: 51 [Urine:50; Other:1]    3. PHYSICAL EXAMINATION:   General: Comfortable, no issues  Neuro: A&Ox3, NAD  Resp: Breathing non-labored  CV: RRR  Abdomen: Soft, Non-distended, Non-tender  Incisions:   Extremities: warm and well perfused    4. INVESTIGATIONS:   Arterial Blood Gases   Recent Labs   Lab 07/28/19  1530 07/28/19  0600   PH 7.31* 7.27*   PCO2 33* 40   PO2 84 101   HCO3 17* 18*     Complete Blood Count   Recent Labs   Lab 07/31/19  0339 07/30/19  0411 07/30/19  0009 07/29/19  1620   WBC 2.9* 3.1* 3.3* 5.2   HGB 8.2* 8.1* 7.8* 8.3*   PLT 52* 47* 42* 60*     Basic Metabolic Panel  Recent Labs   Lab 07/31/19  0339 07/30/19  0411 07/29/19  1017 07/29/19  0414    139 139 140   POTASSIUM 4.7 4.9 5.0 5.4*   CHLORIDE 115* 112* 113* 115*   CO2 16* 18* 17* 18*   BUN 59* 59* 54* 53*   CR 4.25* 4.10* 3.92* 3.83*   GLC 95 102* 134* 102*     Liver Function Tests  Recent Labs   Lab 07/31/19  0339 07/30/19  0411 07/29/19  0414 07/28/19  0950 07/28/19  0539   AST 36  --   --  100* 107*   ALT 93*  --   --  167* 170*   ALKPHOS 88  --   --  101 108   BILITOTAL 0.4  --   --  0.6 0.6   ALBUMIN 2.5*  --   --  2.9* 3.0*   INR 1.33* 1.29* 1.30*  --  1.17*     Pancreatic Enzymes  No lab results found in last 7 days.  Coagulation Profile  Recent Labs   Lab 07/31/19 0339 07/30/19  0411 07/29/19  0414 07/28/19  0539   INR 1.33* 1.29* 1.30* 1.17*   PTT 42* 43* 40* 36         5. RADIOLOGY:   No results found for this or any  previous visit (from the past 24 hour(s)).    =========================================      @[facility] [place]@

## 2019-07-31 NOTE — PLAN OF CARE
Pt unable to wean off IV esmolol today; required doses 50 - 200mcg/kg/min to keep SBP <120. Increased PO coreg, and added PO clonidine; continuing PO Norvasc. Tried labetalol IV push x2 without effect. HR remains low 60s SR. A-line waveforms appropriate. Room air; sats >99%; spot checking 02. BM x3 today. Beltre pulled and voided 50cc following; urine mixed with stool last couple times. Off bedrest per vascular and SICU; up to chair x2. Will continue to assess and notify MD of any changes in exam.     Problem: Hemodynamic Instability (Aortic Aneurysm/Dissection Repair)  Goal: Vital Signs Remain in Desired Range  Outcome: No Change

## 2019-07-31 NOTE — PLAN OF CARE
Neuro: Intact, denies numbness and tingling.   CV: HR between 50's-60's, Goal is HR below 60. SBP goal < 120. Esmolol titrated throughout shift, currently off. Started on new PO antihypertensives.   Pulm: On RA with sats in mid 90's. Lung sounds clear/diminished.   GI/: On regular diet with fair appetite, voiding spontaneously-started on 24 hour urine collection, container in bathroom. Had BM this morning.   Skin: Skin intact ex bruising throughout.   Activity: SBA, mainly line management.   Gtts: L radial art line, PIV x2 on RUE-1 PIV SL, other at TKO  Plan: Continue to monitor BP, possible transfer to floor tomorrow if BP stabilized. Uses call light appropriately.

## 2019-07-31 NOTE — PLAN OF CARE
OT/4AB: Evaluation orders received. Per multidisciplinary discussion with RN and PT, pt mobilizing with SBA, no cognitive deficits, anticipate need for 1 discipline only this admission. OT will complete orders and defer, PT to follow and initiate as appropriate.

## 2019-07-31 NOTE — PROGRESS NOTES
Nephrology Progress Note  07/31/2019       Assessment & Recommendations:   Mr. Elle Blanton is a 59 year old male with PMHx of CKD stage III, HTN, remote GI bleeding, GERD and thrombocytopenia who initially presented to [facility] with abdominal pain, nausea, and one episode of emesis, who subsequently was found to have a type B aortic dissection. Nephrology consulted for NAHUM on CKD management.     Non-Oliguric NAHUM on CKD stage III:  His baseline Cr is 1.6-1.8, and is 4.25mg/dL today. The etiology of renal injury is from renal hypoperfusion, and he does not need hemodialysis at this time. He is non-oliguric and has stable electrolytes. He has a history of hepatitis B ( not treated). This is not acting like MPGN, and proteinuria is not high on UA (although we never quantified it). We will send C3/C4, collect a 24hr protein,and Cryoglobulin.   - We have discussed the need for possible hemodialysis in the future, and he should be set up to see nephrology as an outpatient and have vein mapping done to identify ideal locations for HD access when that time comes.   - Continue to monitor daily    Blood pressure/Volume status:  Patient on Esmolol gtt (titrating down), goal to keep SBP less than 120 mmHg per vascular surgery recommendation in setting of aortic dissection. He had several repeated BP values <105 systolic, and I worry that we may be hypoperfusing his kidneys if he is so used to having BP's 150's. I would refrain from increasing his coreg any further, can think about minoxidil or clonidine patch.   - No need for diuretics, he is breathing well, and has minimal LE edema. Please conitnue daily weights.      Electrolytes, Acid-Base:  Na 140, K 4.7, Cl 115, HCO3 16, lactic acid not measure. Continue sodium bicarb 1300mg BID to keep HCO3 >22     Anemia:  Hgb stable (8.2). Transfusion parameters by his primary team.     Type B Aortic Dissection:  No plans for CTA due to NAHUM, and will likly tip him into NAHUM-D.  "     Recommendations were communicated to primary team in person     Seen and discussed with Dr. Kishan Watkins  Nephrology Fellow  [phone]    Interval History :   Nursing and provider notes from last 24 hours reviewed.    Elle Blanton was seen and examined this morning. He had no overnight events. He has been more mobile during the last 24 hours (less tubes and lines) and denies shortness of breath or chest pain with exertion.     Review of Systems:   I reviewed the following systems:  GI: no loss of appetite. no nausea or vomiting or diarrhea.   Neuro:  no confusion  Constitutional:  no fever or chills  CV: no dyspnea or edema.  no chest pain.    Physical Exam:   I/O last 3 completed shifts:  In: 1975.36 [P.O.:640; I.V.:1335.36]  Out: 286 [Urine:285; Other:1]   /55   Pulse 70   Temp 98.9  F (37.2  C) (Oral)   Resp (!) 7   Ht 1.651 m (5' 5\")   Wt 76.4 kg (168 lb 6.9 oz)   SpO2 97%   BMI 28.03 kg/m       GENERAL APPEARANCE: not in acute distress  EYES:  no scleral icterus, pupils equal  HENT: mouth without ulcers or lesions  PULM: lungs clear to auscultation bilaterally, equal air movement, no clubbing  CV: regular rhythm, normal rate, no rub     -JVD no     -edema mild +1   GI: soft, non tender, not distended, bowel sounds are normal  INTEGUMENT: no cyanosis, no rash  NEURO:  no asterixis   Access none    Labs:   All labs reviewed by me  Electrolytes/Renal -   Recent Labs   Lab Test 07/31/19  0339 07/30/19  0411 07/29/19  1017 07/29/19  0414    139 139 140   POTASSIUM 4.7 4.9 5.0 5.4*   CHLORIDE 115* 112* 113* 115*   CO2 16* 18* 17* 18*   BUN 59* 59* 54* 53*   CR 4.25* 4.10* 3.92* 3.83*   GLC 95 102* 134* 102*   TANO 7.7* 7.7* 7.3* 7.3*   MAG 2.0 2.1  --  2.1   PHOS 4.5 4.7*  --  4.5     CBC -   Recent Labs   Lab Test 07/31/19  0339 07/30/19  0411 07/30/19  0009   WBC 2.9* 3.1* 3.3*   HGB 8.2* 8.1* 7.8*   PLT 52* 47* 42*     LFTs -   Recent Labs   Lab Test 07/31/19  0339 07/28/19  0950 " 07/28/19  0539   ALKPHOS 88 101 108   BILITOTAL 0.4 0.6 0.6   ALT 93* 167* 170*   AST 36 100* 107*   PROTTOTAL 6.4* 7.2 7.3   ALBUMIN 2.5* 2.9* 3.0*     Iron Panel -   Recent Labs   Lab Test 07/31/19  0339 07/29/19  0414   IRON 30*  --    IRONSAT 12*  --    LD  --  88     Imaging:  All imaging studies reviewed by me.     Current Medications:    amLODIPine  10 mg Oral Daily     carvedilol  50 mg Oral BID w/meals     cloNIDine  0.2 mg Oral Q8H     minoxidil  10 mg Oral Daily     pantoprazole  40 mg Oral BID AC     polyethylene glycol  17 g Oral Daily     senna-docusate  2 tablet Oral BID     sodium bicarbonate  1,300 mg Oral or Feeding Tube TID       esmolol Stopped (07/31/19 1428)     niCARdipine 40 mg in 200 mL 0.9% NaCl Stopped (07/31/19 8091)     Liliana Watkins MD

## 2019-07-31 NOTE — PROGRESS NOTES
"VASCULAR SURGERY PROGRESS NOTE    Subjective:  Patient found sitting in chair this morning.  Continues to require esmolol for blood pressure control.  Denies any abdominal, chest, or back pain.  Patient expresses concern that he may lose his job while in hospital and dealing with new medical issues related to his cirrhosis and pancytopenia.     Objective:    Intake/Output Summary (Last 24 hours) at 7/31/2019 0954  Last data filed at 7/31/2019 0900  Gross per 24 hour   Intake 1664.42 ml   Output 101 ml   Net 1563.42 ml     Labs:  ROUTINE IP LABS (Last four results)  BMP  Recent Labs   Lab 07/31/19  0339 07/30/19  0411 07/29/19  1017 07/29/19  0414    139 139 140   POTASSIUM 4.7 4.9 5.0 5.4*   CHLORIDE 115* 112* 113* 115*   TANO 7.7* 7.7* 7.3* 7.3*   CO2 16* 18* 17* 18*   BUN 59* 59* 54* 53*   CR 4.25* 4.10* 3.92* 3.83*   GLC 95 102* 134* 102*     CBC  Recent Labs   Lab 07/31/19  0339 07/30/19 0411 07/30/19  0009 07/29/19  1620   WBC 2.9* 3.1* 3.3* 5.2   RBC 2.68* 2.59* 2.48* 2.72*   HGB 8.2* 8.1* 7.8* 8.3*   HCT 26.0* 24.9* 24.0* 26.3*   MCV 97 96 97 97   MCH 30.6 31.3 31.5 30.5   MCHC 31.5 32.5 32.5 31.6   RDW 15.3* 15.5* 15.5* 15.9*   PLT 52* 47* 42* 60*     INR  Recent Labs   Lab 07/31/19 0339 07/30/19 0411 07/29/19  0414 07/28/19  0539   INR 1.33* 1.29* 1.30* 1.17*     PHYSICAL EXAM:  /55   Pulse 70   Temp 98.9  F (37.2  C) (Oral)   Resp 22   Ht 1.651 m (5' 5\")   Wt 76.4 kg (168 lb 6.9 oz)   SpO2 98%   BMI 28.03 kg/m    General: The patient is alert and oriented. Appropriate. No acute distress  Psych: pleasant affect, answers questions appropriately  Skin: Color appropriate for race, warm, dry.  Respiratory: The patient does require supplemental oxygen. Breathing unlabored  GI:  Abdomen soft, nontender to light palpation.  Extremities: Bilateral DP/PT and radial pulses palpable,  no edema noted.          ASSESSMENT:  Elle Blanton is a 59 year old male who was accepted in transfer from [facility] on 7/28/2019 where he presented with acute onset severe chest and upper abdomen pain.  Underwent emergent CT demonstrating a acute type B aortic dissection.  Uncomplicated type B dissection arises off left subclavian artery to left common iliac artery.  All visceral vessels arise off true lumen.     He was seen by hematology yesterday for his pancytopenia and cirrhosis secondary to hepatitis B and recommendations for hepatology consult were made.  Patient also has worsening kidney function which is concerning for the need for dialysis.  We recommended no IV access be placed in left arm in the event that the patient will need dialysis access      PLAN:  -No lines or drains in LUE, please transition existing lines to RUE- discussed with RN.  -Continue to wean esmolol as able and transition to oral BP control with a SBP goal of 120 and HR of 60bpm.  Currently on Norvasc 10mg and Coreg 37.5mg BID.  -Strict I&O  -Nephrology consulted for NAHUM, awaiting recommendations. Cr 4.25 today.  -OK for OOB with assist to chair.    -Awaiting hepatology consult   -Recommend SW consult to assist with employment concerns.  -Patient can transfer to medicine service and vascular will continue to follow peripherally.  -No plans for CTA follow-up during this admission due to NAHUM.  -Patient will require follow-up CT scan and office visit in 1 month at [facility].      SUSY Morales, CNP  Division of Vascular Surgery   [facility]

## 2019-07-31 NOTE — CONSULTS
HEMATOLOGY -- Follow Up Consult Note    Chart and labs reviewed.  Patient seen and examined.  Discussed with SICU team.    Please see my previous note from 7/28/19 for details of his history and our initial assessment.      Patient acknowledges someone talking to him in the past about hepatitis B.  He seems quite vague on the details.  It appears that he was recommended for follow-up with hepatology, but this did not happen.    Labs remarkable for the following:  CBC remains stable overall with WBC 3.1, hemoglobin 8.1, platelet count 47,000.     Absent reticulocyte response.  INR 1.29, PTT 43, fibrinogen 164 -- these are also stable.  HCV and HIV are negative.  HBV DNA is still pending.  B12 and folate levels are normal.    Serum ferritin is only 88.  This may be elevated in part as an acute phase reactant.  Iron deficiency cannot be excluded.  Review of peripheral blood smear shows no concerning morphologic abnormalities.  Creatinine continues to trend up.  He has acute on chronic renal failure.      ASSESSMENT / PLAN:  1.  Pancytopenia -- likely multifactorial, see below.  2.  Cirrhosis -- most likely secondary to chronic untreated hepatitis B.     The primary cause of his pancytopenia is likely untreated hepatitis B with associated cirrhosis.  Hepatitis B DNA is still pending although I expect this will return positive.  Would strongly recommend inpatient consultation with GI / Hepatology to help increase the likelihood that he establishes appropriate care given that this recommendation was apparently made 2 years ago and follow-up was not done.    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  Would recommend sending an erythropoietin level.  Would also check a full iron panel.  We will order these for you.    At this point, the most important thing is establishing follow-up with Hepatology.  We will follow-up on the iron and  erythropoietin levels, but ongoing follow-up in hematology clinic is unlikely to be necessary.  Please call with questions.      Nilson Smith MD  Associate Professor of Medicine  Division of Hematology, Oncology, and Transplantation  Director, [facility]      Total time 120 minutes, including review of records, labs, imaging, discussion with SICU team, and face to face time with patient in counseling and coordination of care.

## 2019-07-31 NOTE — PLAN OF CARE
4A - Vascular continuing to request pt remains in room. Able to move with RN staff with SBA. Will continue to HOLD PT and re-assess medical status as appropriate when able to progress mobility out of room for further safety, mobility and discharge assessment. Per discussion with RN, no concerns about pt moving in room at this time.

## 2019-07-31 NOTE — PLAN OF CARE
D=Pt awake alert and orientedx4.Heart rate in mid 60s-70s.SBP in 115-120s Esmolol drip at 200mcg/kg/min, gradually increased to 300mcg/kg to keep SBP <120.Labetalol 20mg IV was given.MD was called at 0200 since SBP remained in 130s.Nicardipine drip was restarted at 2.5mg/hr.Heart rate remains in mid 60s.Pt OOB to bathroom w/ standby assist, had bowel movement and voided(Unmeasured)Pt sat in the recliner for 45 minutes then back to bed. Able to wean Esmolol drip to 250 mcg/kg/min,Nicardipine drip at 2.5mg/hr.Pt OOB to chair at 0615  P-Continue to closely monitor pt

## 2019-08-01 ENCOUNTER — OFFICE VISIT (OUTPATIENT)
Dept: INTERPRETER SERVICES | Facility: CLINIC | Age: 59
End: 2019-08-01
Payer: COMMERCIAL

## 2019-08-01 ENCOUNTER — COMMUNICATION - HEALTHEAST (OUTPATIENT)
Dept: VASCULAR SURGERY | Facility: CLINIC | Age: 59
End: 2019-08-01

## 2019-08-01 ENCOUNTER — AMBULATORY - HEALTHEAST (OUTPATIENT)
Dept: VASCULAR SURGERY | Facility: CLINIC | Age: 59
End: 2019-08-01

## 2019-08-01 DIAGNOSIS — I71.03 THORACOABDOMINAL AORTIC DISSECTION (H): ICD-10-CM

## 2019-08-01 PROBLEM — N17.9 ACUTE KIDNEY INJURY (H): Status: ACTIVE | Noted: 2017-06-24

## 2019-08-01 LAB
ANION GAP SERPL CALCULATED.3IONS-SCNC: 10 MMOL/L (ref 3–14)
APTT PPP: 44 SEC (ref 22–37)
BUN SERPL-MCNC: 55 MG/DL (ref 7–30)
C3 SERPL-MCNC: 67 MG/DL (ref 76–169)
C4 SERPL-MCNC: 18 MG/DL (ref 15–50)
CALCIUM SERPL-MCNC: 7.6 MG/DL (ref 8.5–10.1)
CHLORIDE SERPL-SCNC: 113 MMOL/L (ref 94–109)
CO2 SERPL-SCNC: 16 MMOL/L (ref 20–32)
COLLECT DURATION TIME UR: 24 H
CREAT 24H UR-MRATE: 1.21 G/(24.H) (ref 1–2)
CREAT SERPL-MCNC: 4.18 MG/DL (ref 0.66–1.25)
CREAT UR-MCNC: 122 MG/DL
GFR SERPL CREATININE-BSD FRML MDRD: 15 ML/MIN/{1.73_M2}
GLUCOSE SERPL-MCNC: 116 MG/DL (ref 70–99)
HBV E AB SERPL QL IA: POSITIVE
HBV E AG SERPL QL IA: NEGATIVE
INR PPP: 1.44 (ref 0.86–1.14)
LACTATE BLD-SCNC: 0.7 MMOL/L (ref 0.7–2)
MAGNESIUM SERPL-MCNC: 2.1 MG/DL (ref 1.6–2.3)
PHOSPHATE SERPL-MCNC: 5 MG/DL (ref 2.5–4.5)
POTASSIUM SERPL-SCNC: 4.4 MMOL/L (ref 3.4–5.3)
PROT 24H UR-MRATE: 0.72 G/(24.H) (ref 0.04–0.23)
PROT UR-MCNC: 0.72 G/L
PROT/CREAT 24H UR: 0.59 G/G CR (ref 0–0.2)
SODIUM SERPL-SCNC: 139 MMOL/L (ref 133–144)
SPECIMEN VOL UR: 992 ML

## 2019-08-01 PROCEDURE — 83605 ASSAY OF LACTIC ACID: CPT

## 2019-08-01 PROCEDURE — 99233 SBSQ HOSP IP/OBS HIGH 50: CPT | Performed by: INTERNAL MEDICINE

## 2019-08-01 PROCEDURE — 25000132 ZZH RX MED GY IP 250 OP 250 PS 637: Performed by: PHYSICIAN ASSISTANT

## 2019-08-01 PROCEDURE — 86160 COMPLEMENT ANTIGEN: CPT | Performed by: STUDENT IN AN ORGANIZED HEALTH CARE EDUCATION/TRAINING PROGRAM

## 2019-08-01 PROCEDURE — 25000132 ZZH RX MED GY IP 250 OP 250 PS 637: Performed by: SURGERY

## 2019-08-01 PROCEDURE — 12000001 ZZH R&B MED SURG/OB UMMC

## 2019-08-01 PROCEDURE — 25000132 ZZH RX MED GY IP 250 OP 250 PS 637: Performed by: INTERNAL MEDICINE

## 2019-08-01 PROCEDURE — 25000132 ZZH RX MED GY IP 250 OP 250 PS 637: Performed by: STUDENT IN AN ORGANIZED HEALTH CARE EDUCATION/TRAINING PROGRAM

## 2019-08-01 PROCEDURE — 40000893 ZZH STATISTIC PT IP EVAL DEFER

## 2019-08-01 PROCEDURE — 25000132 ZZH RX MED GY IP 250 OP 250 PS 637: Performed by: OBSTETRICS & GYNECOLOGY

## 2019-08-01 PROCEDURE — 84100 ASSAY OF PHOSPHORUS: CPT | Performed by: STUDENT IN AN ORGANIZED HEALTH CARE EDUCATION/TRAINING PROGRAM

## 2019-08-01 PROCEDURE — 85730 THROMBOPLASTIN TIME PARTIAL: CPT | Performed by: STUDENT IN AN ORGANIZED HEALTH CARE EDUCATION/TRAINING PROGRAM

## 2019-08-01 PROCEDURE — 25000132 ZZH RX MED GY IP 250 OP 250 PS 637

## 2019-08-01 PROCEDURE — T1013 SIGN LANG/ORAL INTERPRETER: HCPCS | Mod: U3

## 2019-08-01 PROCEDURE — 85610 PROTHROMBIN TIME: CPT | Performed by: STUDENT IN AN ORGANIZED HEALTH CARE EDUCATION/TRAINING PROGRAM

## 2019-08-01 PROCEDURE — 83735 ASSAY OF MAGNESIUM: CPT | Performed by: STUDENT IN AN ORGANIZED HEALTH CARE EDUCATION/TRAINING PROGRAM

## 2019-08-01 PROCEDURE — 80048 BASIC METABOLIC PNL TOTAL CA: CPT | Performed by: STUDENT IN AN ORGANIZED HEALTH CARE EDUCATION/TRAINING PROGRAM

## 2019-08-01 PROCEDURE — 40000014 ZZH STATISTIC ARTERIAL MONITORING DAILY

## 2019-08-01 PROCEDURE — 40000275 ZZH STATISTIC RCP TIME EA 10 MIN

## 2019-08-01 PROCEDURE — 82595 ASSAY OF CRYOGLOBULIN: CPT | Performed by: STUDENT IN AN ORGANIZED HEALTH CARE EDUCATION/TRAINING PROGRAM

## 2019-08-01 RX ORDER — POLYETHYLENE GLYCOL 3350 17 G/17G
17 POWDER, FOR SOLUTION ORAL 2 TIMES DAILY
Status: DISCONTINUED | OUTPATIENT
Start: 2019-08-01 | End: 2019-08-04 | Stop reason: HOSPADM

## 2019-08-01 RX ORDER — ACETAMINOPHEN 325 MG/1
650 TABLET ORAL EVERY 8 HOURS PRN
Status: DISCONTINUED | OUTPATIENT
Start: 2019-08-01 | End: 2019-08-04 | Stop reason: HOSPADM

## 2019-08-01 RX ORDER — POLYETHYLENE GLYCOL 3350 17 G/17G
17 POWDER, FOR SOLUTION ORAL ONCE
Status: COMPLETED | OUTPATIENT
Start: 2019-08-01 | End: 2019-08-01

## 2019-08-01 RX ORDER — ACETAMINOPHEN 325 MG/1
975 TABLET ORAL EVERY 8 HOURS PRN
Status: DISCONTINUED | OUTPATIENT
Start: 2019-08-01 | End: 2019-08-01

## 2019-08-01 RX ORDER — ACETAMINOPHEN 325 MG/1
325 TABLET ORAL EVERY 6 HOURS PRN
Status: DISCONTINUED | OUTPATIENT
Start: 2019-08-01 | End: 2019-08-01

## 2019-08-01 RX ORDER — DILTIAZEM HYDROCHLORIDE 90 MG/1
90 CAPSULE, EXTENDED RELEASE ORAL 2 TIMES DAILY
Status: DISCONTINUED | OUTPATIENT
Start: 2019-08-02 | End: 2019-08-02

## 2019-08-01 RX ORDER — SODIUM BICARBONATE 650 MG/1
1950 TABLET ORAL 3 TIMES DAILY
Status: DISCONTINUED | OUTPATIENT
Start: 2019-08-01 | End: 2019-08-04 | Stop reason: HOSPADM

## 2019-08-01 RX ORDER — ENTECAVIR 0.5 MG/1
0.5 TABLET, FILM COATED ORAL
Status: DISCONTINUED | OUTPATIENT
Start: 2019-08-01 | End: 2019-08-04 | Stop reason: HOSPADM

## 2019-08-01 RX ADMIN — ACETAMINOPHEN 500 MG: 325 TABLET, FILM COATED ORAL at 12:02

## 2019-08-01 RX ADMIN — CLONIDINE HYDROCHLORIDE 0.2 MG: 0.1 TABLET ORAL at 08:21

## 2019-08-01 RX ADMIN — SODIUM BICARBONATE 650 MG TABLET 1300 MG: at 08:21

## 2019-08-01 RX ADMIN — POLYETHYLENE GLYCOL 3350 17 G: 17 POWDER, FOR SOLUTION ORAL at 03:40

## 2019-08-01 RX ADMIN — CLONIDINE HYDROCHLORIDE 0.2 MG: 0.1 TABLET ORAL at 18:49

## 2019-08-01 RX ADMIN — Medication 45 MG: at 10:05

## 2019-08-01 RX ADMIN — Medication 45 MG: at 23:31

## 2019-08-01 RX ADMIN — SODIUM BICARBONATE 650 MG TABLET 1300 MG: at 13:49

## 2019-08-01 RX ADMIN — PANTOPRAZOLE SODIUM 40 MG: 40 TABLET, DELAYED RELEASE ORAL at 18:04

## 2019-08-01 RX ADMIN — POLYETHYLENE GLYCOL 3350 17 G: 17 POWDER, FOR SOLUTION ORAL at 21:40

## 2019-08-01 RX ADMIN — CLONIDINE HYDROCHLORIDE 0.2 MG: 0.1 TABLET ORAL at 00:27

## 2019-08-01 RX ADMIN — POLYETHYLENE GLYCOL 3350 17 G: 17 POWDER, FOR SOLUTION ORAL at 08:23

## 2019-08-01 RX ADMIN — MINOXIDIL 10 MG: 10 TABLET ORAL at 08:23

## 2019-08-01 RX ADMIN — AMLODIPINE BESYLATE 10 MG: 10 TABLET ORAL at 08:22

## 2019-08-01 RX ADMIN — ACETAMINOPHEN 650 MG: 325 TABLET, FILM COATED ORAL at 21:40

## 2019-08-01 RX ADMIN — SODIUM BICARBONATE 650 MG TABLET 1950 MG: at 21:40

## 2019-08-01 RX ADMIN — ENTECAVIR 0.5 MG: 0.5 TABLET ORAL at 18:05

## 2019-08-01 RX ADMIN — PANTOPRAZOLE SODIUM 40 MG: 40 TABLET, DELAYED RELEASE ORAL at 08:21

## 2019-08-01 RX ADMIN — SENNOSIDES AND DOCUSATE SODIUM 2 TABLET: 8.6; 5 TABLET ORAL at 08:21

## 2019-08-01 RX ADMIN — SENNOSIDES AND DOCUSATE SODIUM 2 TABLET: 8.6; 5 TABLET ORAL at 18:04

## 2019-08-01 RX ADMIN — CARVEDILOL 50 MG: 12.5 TABLET, FILM COATED ORAL at 08:21

## 2019-08-01 RX ADMIN — ACETAMINOPHEN 325 MG: 325 TABLET, FILM COATED ORAL at 18:07

## 2019-08-01 RX ADMIN — Medication 45 MG: at 03:40

## 2019-08-01 ASSESSMENT — ACTIVITIES OF DAILY LIVING (ADL)
ADLS_ACUITY_SCORE: 12

## 2019-08-01 NOTE — PROGRESS NOTES
VASCULAR SURGERY PROGRESS NOTE    SUBJECTIVE:  Patient found sitting up in chair, denies abdominal, chest and back pain.  Anxious for discharge.  Been off IV gtts since yesterday afternoon.      OBJECTIVE:  Vital signs:  Temp: 97.4  F (36.3  C) Temp src: Oral BP: 109/51   Heart Rate: 54 Resp: 16 SpO2: 100 % O2 Device: None (Room air)        Intake/Output Summary (Last 24 hours) at 8/1/2019 1005  Last data filed at 8/1/2019 0900  Gross per 24 hour   Intake 386.26 ml   Output 500 ml   Net -113.74 ml       Vitals:    07/30/19 0400 07/31/19 0600   Weight: 77.1 kg (169 lb 14.4 oz) 76.4 kg (168 lb 6.9 oz)       PHYSICAL EXAM:  NEURO/PSYCH: The patient is alert and oriented.  Appropriate.  Moves all extremities.    SKIN: warm and dry.  PULMONARY: unlabored breathing, not requiring supplemental oxygen   EXTREMITIES: palpable bilateral DP and PT pulses, no lower extremity edema     BMP  Recent Labs   Lab 08/01/19 0329 07/31/19  0339 07/30/19  0411 07/29/19  1017 07/29/19  0414    140 139 139 140   POTASSIUM 4.4 4.7 4.9 5.0 5.4*   CHLORIDE 113* 115* 112* 113* 115*   CO2 16* 16* 18* 17* 18*   BUN 55* 59* 59* 54* 53*   CR 4.18* 4.25* 4.10* 3.92* 3.83*   * 95 102* 134* 102*   MAG 2.1 2.0 2.1  --  2.1   PHOS 5.0* 4.5 4.7*  --  4.5     CBC  Recent Labs   Lab 07/31/19  0339 07/30/19  0411 07/30/19  0009 07/29/19  1620   WBC 2.9* 3.1* 3.3* 5.2   HGB 8.2* 8.1* 7.8* 8.3*   PLT 52* 47* 42* 60*     INR/PTT  Recent Labs   Lab 08/01/19  0329 07/31/19  0339 07/30/19  0411 07/29/19  0414   INR 1.44* 1.33* 1.29* 1.30*   PTT 44* 42* 43* 40*     ABG  Recent Labs   Lab 07/28/19  1530 07/28/19  0600   PH 7.31* 7.27*   PCO2 33* 40   PO2 84 101   HCO3 17* 18*     LFT  Recent Labs   Lab 07/31/19  0339 07/28/19  0950 07/28/19  0539   AST 36 100* 107*   ALT 93* 167* 170*   ALKPHOS 88 101 108   BILITOTAL 0.4 0.6 0.6   ALBUMIN 2.5* 2.9* 3.0*         ASSESSMENT:  Elle Blanton is a 59 year old male who was accepted in transfer from [facility] on 7/28/2019 where he presented with acute onset severe chest and upper abdomen pain.  Underwent emergent CT demonstrating a acute type B aortic dissection.  Uncomplicated type B dissection arises off left subclavian artery to left common iliac artery.  All visceral vessels arise off true lumen.           PLAN:  - okay from vascular surgery standpoint to transfer to the floor (7B preferred)   - remove a-line  -No lines or lab draws in LUE, in anticipation of need for hemodialysis access in the near future.  -Continue  BP control with a SBP goal of 120 and HR of 60bpm.   -Strict I&O  - Appreciate nephrology and hepatology consults  -OK to ambulate in halls   -Recommend  consult to assist with employment concerns.  -Patient can transfer to medicine service and vascular will continue to follow peripherally.  -No plans for CTA follow-up during this admission due to NAHUM.  -Patient will require follow-up CT scan and office visit in 1 month at [facility] with Dr. Cosby,  Follow up instructions put on discharge orders.        Robyn JAIN, CNS  Division of Vascular Surgery  [facility]    Pager [phone]  Office [phone]

## 2019-08-01 NOTE — PROGRESS NOTES
Pt transferred to 5B at 1445. Transferred with belongings. Art line removed at 1200. Working with PT while transferring units.

## 2019-08-01 NOTE — PROGRESS NOTES
SURGICAL ICU PROGRESS NOTE  August 1, 2019      ASSESSMENT: Elle Blanton is a 59 year old male who presents with type B aortic dissection from [facility] that is being managed non-operatively     CHANGES and MAJOR THINGS TODAY:  Off esmolol  Transferred to 5A        PLAN:  Neuro/ pain/ sedation:  - Not requiring sedation and pain meds.     Pulmonary care:   - Keep O2 saturation above 92 %, currently on RA.     Cardiovascular:    # Type B aortic dissection  - Goal SBP less than 120, HR < 70  - Off esmolol gtt   - Minoxidil 10mg PO daily  - Diltiazem 45mg q6hr  - Amlodipine 10mg daily  - Coreg 50mg PO x2 daily  - Clonidine 0.2mg q8hr      GI  #Hx of melena   - Protonix 40mg PO BID     # pancytopenia 2/2 chronic hep b   - Follow up pending serologies  - Entecavir 0.5mg PO q72   - GI following and plan to establish OP care in hepatology clinic with Dr. Hutchins  - OP EGD for variceal surveillance  - U/S Abdomen q6m for HCC surveillance     Fluids/ Electrolytes/ Nutrition:   - NTD     Renal/ Fluid Balance:    #Non-oliguric NAHUM on CKD stage III  - Urine output is adequate so far.  - Will continue to monitor I/O and daily weights  - No need for dialysis at this time, plan for possible HD in future and see nephro as OP to have vein mapping for HD access.       Endocrine:    - No management indication     ID/ Antibiotics:  - No indication for antibiotics        Heme:     - Hemoglobin stable. Fibrinogen decreasing, f/u TEG   - Trend INR, PTT, fibrinogen, CBC        Prophylaxis:    - Mechanical prophylaxis for DVT.   - No chemical DVT prophylaxis due to high risk of bleeding.     Lines/ tubes/ drains:  - 2 Peripheral IVs     Disposition:  - 5A     Patient seen, findings and plan discussed during SICU rounds.     Juanjo Ritchie MD  Anesthesiology PGY-1  [phone]  ====================================    TODAY'S PROGRESS:   SUBJECTIVE:   Patient sitting comfortably in bed. No issues overnight.    OBJECTIVE:   1. VITAL SIGNS:   Temp:   [96.4  F (35.8  C)-98.3  F (36.8  C)] 98.3  F (36.8  C)  Heart Rate:  [51-64] 51  Resp:  [6-26] 14  BP: ()/(43-58) 88/52  MAP:  [54 mmHg-80 mmHg] 54 mmHg  Arterial Line BP: (100-132)/(38-63) 100/38  SpO2:  [96 %-100 %] 100 %  Resp: 14      2. INTAKE/ OUTPUT:   I/O last 3 completed shifts:  In: 151.71 [I.V.:151.71]  Out: 500 [Urine:500]    3. PHYSICAL EXAMINATION:   General: Comfortable sitting up in bed  Neuro: A&Ox3, NAD  Resp: Breathing non-labored  CV: RRR  Abdomen: Soft, Non-distended, Non-tender  Incisions: nil  Extremities: warm and well perfused    4. INVESTIGATIONS:   Arterial Blood Gases   Recent Labs   Lab 07/28/19  1530 07/28/19  0600   PH 7.31* 7.27*   PCO2 33* 40   PO2 84 101   HCO3 17* 18*     Complete Blood Count   Recent Labs   Lab 07/31/19 0339 07/30/19  0411 07/30/19  0009 07/29/19  1620   WBC 2.9* 3.1* 3.3* 5.2   HGB 8.2* 8.1* 7.8* 8.3*   PLT 52* 47* 42* 60*     Basic Metabolic Panel  Recent Labs   Lab 08/01/19 0329 07/31/19  0339 07/30/19  0411 07/29/19  1017    140 139 139   POTASSIUM 4.4 4.7 4.9 5.0   CHLORIDE 113* 115* 112* 113*   CO2 16* 16* 18* 17*   BUN 55* 59* 59* 54*   CR 4.18* 4.25* 4.10* 3.92*   * 95 102* 134*     Liver Function Tests  Recent Labs   Lab 08/01/19  0329 07/31/19  0339 07/30/19  0411 07/29/19  0414 07/28/19  0950 07/28/19  0539   AST  --  36  --   --  100* 107*   ALT  --  93*  --   --  167* 170*   ALKPHOS  --  88  --   --  101 108   BILITOTAL  --  0.4  --   --  0.6 0.6   ALBUMIN  --  2.5*  --   --  2.9* 3.0*   INR 1.44* 1.33* 1.29* 1.30*  --  1.17*     Pancreatic Enzymes  No lab results found in last 7 days.  Coagulation Profile  Recent Labs   Lab 08/01/19  0329 07/31/19  0339 07/30/19  0411 07/29/19  0414   INR 1.44* 1.33* 1.29* 1.30*   PTT 44* 42* 43* 40*         5. RADIOLOGY:   No results found for this or any previous visit (from the past 24 hour(s)).    =========================================      @[facility]@

## 2019-08-01 NOTE — PLAN OF CARE
D/I: Patient on unit 4A Surgical/Neuro ICU     Neuro: Intact   CV: SB-NSR, 50s-60s, no ectopy; SBP<120 without intervention; CMS intact; +1-2 edema in distal extremities; good pulses.  Pulm: RA, sating high 90s; LS clear/dim.   GI: Regular diet, good appetite; c/o constipation, admin one-time dose funmilayo-lax in addition to current bowel regimen.   Endo: N/A   : Voiding spontaneously; Collecting urine for 24 hour protein and Cr clearance.  Skin: Ecchymotic; Devices.    Pain: c/o headache, admin tylenol x 1.    Access: R. PIV x 2, saline locked; L. Radial arterial line.   Drains: N/A  Gtt: SL  Labs/Replacement: Phos 5.0, INR 1.44, PTT 44  Social: Wife at bedside      A: Stable    P: Will continue to monitor Pt closely and notify MD of any significant changes.

## 2019-08-01 NOTE — PLAN OF CARE
Pt transferred from 4A. Hypotensive on RA. 80/40's. MD aware. Asymptomatic. Holding BP meds. Systolic BP needs to be below 120 and HR between 50-60. A&Ox4. SBA. Up to BR. No BM today. Voiding but no void since transferred to 5A. Regular diet, no appetite and pt reports feeling full despite not eating. Phos elevated @ 5.0. Creat: 4.18. SW consult placed. Plan to discharge once medically stable. Continue to monitor and follow the POC.

## 2019-08-01 NOTE — PLAN OF CARE
4A  Discharge Planner PT   Patient plan for discharge: home when able  Current status: Per discussion with RN pt SBA-IND with no concerns for mobility in ICU. Pt up moving around in room IND, carrying items and preparing to transfer to floor. Pt with no recent falls or LOB, does have stairs at home. Has been IND with no issues mobilizing around house. Frustrated by lack of ability to ambulate in hallways recently. During discussion pt ambulated from 4A to 5A with no LOB, unsteadiness or mobility concerns, completed head turns, turns and able to carry conversation without difficulty. BP assessed upon new 5A room with SBP 80s, HR 56, SpO2 >90%. Pt with in parameters SBP <120, HR <60 with ambulation. Appropriate to IND ambulate in hallways. Pt with no skilled inpatient PT needs, Will complete consult and defer evaluation, please reconsult as appropriate if patient has decline in functional mobility requiring further skilled inpatient PT needs.  Barriers to return to prior living situation: medical status  Recommendations for discharge: home   Rationale for recommendations: Pt IND with mobility, no LOB or balance concerns noted. Pt with no mobility concerns regarding discharge. Pt aware of importance of mobility due to family member with significant deconditioning due to recent hospital stay.       Entered by: Demetria Koehler 08/01/2019 3:16 PM     No  present due to not arrived by 2:45pm and transport present with pt requesting to ambulate to new room in english.

## 2019-08-01 NOTE — PROGRESS NOTES
Brief GI Note  08/01/19    Chart reviewed. Discussed recommendations from formal consult with SICU team.     GI will sign-off. We will arrange outpatient follow-up with Dr. Hutchins in hepatology clinic in ~4-6 weeks.    Plan of care discussed with Dr. Leventhal, hepatology attending.     Ruthie Rodriguez MD  Gastroenterology Fellow, PGY-6  [phone]

## 2019-08-01 NOTE — PROGRESS NOTES
Nephrology Progress Note  08/01/2019       Assessment & Recommendations:   Mr. Elle Blanton is a 59 year old male with PMHx of CKD stage III, HTN, remote GI bleeding, GERD and thrombocytopenia who initially presented to [facility] with abdominal pain, nausea, and one episode of emesis, who subsequently was found to have a type B aortic dissection. Nephrology consulted for NAHUM on CKD management.     Non-Oliguric NAHUM on CKD stage III:  His baseline Cr is 1.6-1.8, and is 4.18mg/dL today. The etiology of renal injury is from renal hypoperfusion, and he does not need hemodialysis at this time. He is non-oliguric and has stable electrolytes. He has a history of hepatitis B ( not treated). 24hr protein,and Cryoglobulin are pending. Isolated low C3, will wait for cryo levels and 24hr protein before making further recommendations (might be MPGN).  - We have discussed the need for possible hemodialysis in the future, and he should be set up to see nephrology as an outpatient and have vein mapping done to identify ideal locations for HD access when that time comes.   - Continue to monitor daily  - nephrology will sign off.    Blood pressure/Volume status:  Patient off Esmolol gtt. BP at goal with coreg 50 BID, diltiazem 45mg q6h, minoxidil 10mg daily, clonidine 0.2mg daily     Electrolytes, Acid-Base:  Na 139, K 4.4, Cl 113, HCO3 16, lactic acid not measure. Increase sodium bicarb to 1950mg TID to keep HCO3 >22     Anemia:  Hgb stable (8.2). Transfusion parameters by his primary team.     Type B Aortic Dissection:  No plans for CTA due to NAHUM, and will likly tip him into NAHUM-D.      Recommendations were communicated to primary team in person     Seen and discussed with Dr. Kishan Watkins  Nephrology Fellow  [phone]    Interval History :   Nursing and provider notes from last 24 hours reviewed.    Elle Blanton was seen and examined this morning. He had no overnight issues. BP is controlled, and he denies any  "shortness of breath or chest pain.     Review of Systems:   I reviewed the following systems:  GI: no loss of appetite. no nausea or vomiting or diarrhea.   Neuro:  no confusion  Constitutional:  no fever or chills  CV: no dyspnea or edema.  no chest pain.    Physical Exam:   I/O last 3 completed shifts:  In: 663.06 [I.V.:663.06]  Out: 300 [Urine:300]   /58 (BP Location: Right arm)   Pulse 70   Temp 98.3  F (36.8  C) (Oral)   Resp 14   Ht 1.651 m (5' 5\")   Wt 76.4 kg (168 lb 6.9 oz)   SpO2 100%   BMI 28.03 kg/m       GENERAL APPEARANCE: not in acute distress  EYES:  no scleral icterus, pupils equal  HENT: mouth without ulcers or lesions  PULM: lungs clear to auscultation bilaterally, equal air movement, no clubbing  CV: regular rhythm, normal rate, no rub     -JVD no     -edema mild +1   GI: soft, non tender, not distended, bowel sounds are normal  INTEGUMENT: no cyanosis, no rash  NEURO:  no asterixis   Access none    Labs:   All labs reviewed by me  Electrolytes/Renal -   Recent Labs   Lab Test 08/01/19  0329 07/31/19  0339 07/30/19  0411    140 139   POTASSIUM 4.4 4.7 4.9   CHLORIDE 113* 115* 112*   CO2 16* 16* 18*   BUN 55* 59* 59*   CR 4.18* 4.25* 4.10*   * 95 102*   TANO 7.6* 7.7* 7.7*   MAG 2.1 2.0 2.1   PHOS 5.0* 4.5 4.7*     CBC -   Recent Labs   Lab Test 07/31/19  0339 07/30/19  0411 07/30/19  0009   WBC 2.9* 3.1* 3.3*   HGB 8.2* 8.1* 7.8*   PLT 52* 47* 42*     LFTs -   Recent Labs   Lab Test 07/31/19  0339 07/28/19  0950 07/28/19  0539   ALKPHOS 88 101 108   BILITOTAL 0.4 0.6 0.6   ALT 93* 167* 170*   AST 36 100* 107*   PROTTOTAL 6.4* 7.2 7.3   ALBUMIN 2.5* 2.9* 3.0*     Iron Panel -   Recent Labs   Lab Test 07/31/19  0339 07/29/19  0414   IRON 30*  --    IRONSAT 12*  --    LD  --  88     Imaging:  All imaging studies reviewed by me.     Current Medications:    amLODIPine  10 mg Oral Daily     carvedilol  50 mg Oral BID w/meals     cloNIDine  0.2 mg Oral Q8H     diltiazem  45 mg " Oral Q6H     [START ON 8/2/2019] diltiazem ER  90 mg Oral BID     minoxidil  10 mg Oral Daily     pantoprazole  40 mg Oral BID AC     polyethylene glycol  17 g Oral BID     senna-docusate  2 tablet Oral BID     sodium bicarbonate  1,300 mg Oral or Feeding Tube TID       Liliana Watkins MD

## 2019-08-01 NOTE — PROGRESS NOTES
[facility], [place]    Internal Medicine Transfer Acceptance Note - Gold Service      Assessment & Plan   Elle Blanton is a 59 year old man with a history of HTN, CKD III, GERD, remote GIB, and HBV cirrhosis w/ pancytopenia who [facility] for management of type B aortic dissection. Now transferring to floor for continued monitoring of BP and renal function.     #Type B Aortic Dissection. Presented w/ severe acute onset chest and upper abdomen pain. Outside CTA w/ type B thoracoabdominal aortic dissection extending to the aortic bifurcation and into the proximal left common iliac artery and origin of the SMA. Has been started on multiple BP meds here.   - Vascular surgery following peripherally. Goal SBP < 120 and HR < 60.   - No lines or lab draws in LUE in anticipation of need for hemodialysis access in the near future.   - No plans for CTA f/u this admission d/t NAHUM. Will need CT and office visit in 1 month at [facility] w/ Dr. Cosby.     #HTN. Off esmolol and nicardipine gtts since yesterday at ~1600 and ~0800 respectively. Has been on amlodipine and carvedilol since admit w/ clonidine day #3, diltiazem and minoxidil day #2. BPs at goal. Was not on any BP meds at home.   - Continue amlodipine and carvedilol at max doses along w/ clonidine 0.2 mg TID, minoxidil 10 mg daily, and will switch diltiazem from 45 mg Q6hr to SR formulation 90 mg BID tomorrow if continuing to do well.   - Will need close primary care f/u for BP titration.     #Non Oliguric NAHUM on CKD III. Baseline Cr 1.6-1.8 --> peak of 4.25 on 7/31 --> 4.18 today. Etiology of NAHUM was renal hypoperfusion. Considered MPGN but thought less likely. Some risk that we are continuing to hypoperfuse his kidneys w/ several SBPs < 105 if he is used to having SBPs 150s. Lytes stable. Phos 5.0.   - Nephrology following.   - Strict I&Os.  - Continue PO sodium bicarb (CO2 16).   - C3/C4 and  cryoglobulin are still pending.  - 24 hour urine collection (proteinuria) will be completed at 2 pm today.   - They have discussed the need for possible hemodialysis in the future, and he should be set up to see nephrology as an outpatient and have vein mapping done to identify ideal locations for HD access when that time comes.     #Compensated HBV Cirrhosis. Had + serology in 2017 for hep B but did not have further eval or treatment. Here w/ pancytopenia and cirrhosis w/ splenomegaly noted on CT which prompted hepatology consult. Has e/o synthetic dysfunction on labs. MELD Na 23. HBV DNA quant here has returned +.  - Per hepatology should start entecavir 0.5 mg PO Q72hrs. Will reassess at discharge; need to ensure that he is going to follow up.    - Establish care in outpatient hepatology clinic w/ Dr. Hutchins in 4-6 wks who can arrange OP EGD for variceal surveillance.     #Acute on Chronic Pancytopenia. WBC 2.9, hgb 8.2, plt 52K. Seen by hematology this admit and thought to be multifactorial - primarily untreated HBV w/ associated cirrhosis. 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 Serum ferritin is only 88 which may be elevated in part as an acute phase reactant; iron deficiency cannot be excluded. Review of peripheral blood smear shows no concerning morphologic abnormalities.  - EPO level pending.     #Constipation. Without e/o obstruction.   - Continue Senokot-S and Miralax regimen. Escalate as needed.     Consulting Services: Nephrology, Vascular Surgery, GI, Hematology     Diet: Regular  Fluids: PO  DVT Prophylaxis: mechanical/ambulate  Code Status: Full Code    Disposition Plan   Expected discharge: Tomorrow; recommended to prior living arrangement once renal function improved. Referrals for hepatology, nephrology, vascular surgery placed in discharge orders. Will work with care coordination to arrange  "primary care follow up.     Mackenzie Trivedi PA-C  Hospitalist Service  [facility]  Pager: [phone]    Interval History   Mr. Blanton is feeling well. He is anxious to be discharged but understands rationale for overnight monitoring. Discussed need for close follow up at length regardless of if he is feeling well. Has had 2 BMs since admit but feels full with decreased appetite like he is still constipated. No abdominal pain or N/V. Passing flatus.     A 4 point ROS was performed (including CV, Resp, GI, ) and negative unless otherwise noted in HPI.     Physical Exam   /51   Pulse 70   Temp 97.4  F (36.3  C) (Oral)   Resp 16   Ht 1.651 m (5' 5\")   Wt 76.4 kg (168 lb 6.9 oz)   SpO2 100%   BMI 28.03 kg/m       GENERAL: Alert and oriented x 3. Lying in bed, appears comfortable. Pleasant and conversant.   HEENT: Anicteric sclera. Mucous membranes moist.   CV: RRR. S1, S2. No murmurs appreciated.   RESPIRATORY: Effort normal on room air. Lungs CTAB with no wheezing, rales, rhonchi.   GI: Abdomen mildly distended but soft and non tender.   NEUROLOGICAL: No focal deficits. Moves all extremities.    EXTREMITIES: No peripheral edema. Intact bilateral pedal pulses.   SKIN: No jaundice. No rashes.     Lines/Tubes/Drains  PIV  Art line - order placed to remove     Data reviewed today: I reviewed all medications, new labs and imaging results over the last 24 hours.                      "

## 2019-08-02 ENCOUNTER — OFFICE VISIT (OUTPATIENT)
Dept: INTERPRETER SERVICES | Facility: CLINIC | Age: 59
End: 2019-08-02
Payer: COMMERCIAL

## 2019-08-02 PROBLEM — N18.30 CKD (CHRONIC KIDNEY DISEASE) STAGE 3, GFR 30-59 ML/MIN (H): Status: ACTIVE | Noted: 2019-08-02

## 2019-08-02 LAB
ANION GAP SERPL CALCULATED.3IONS-SCNC: 6 MMOL/L (ref 3–14)
ANION GAP SERPL CALCULATED.3IONS-SCNC: 9 MMOL/L (ref 3–14)
BASOPHILS # BLD AUTO: 0 10E9/L (ref 0–0.2)
BASOPHILS NFR BLD AUTO: 0 %
BUN SERPL-MCNC: 55 MG/DL (ref 7–30)
BUN SERPL-MCNC: 56 MG/DL (ref 7–30)
CALCIUM SERPL-MCNC: 7.7 MG/DL (ref 8.5–10.1)
CALCIUM SERPL-MCNC: 7.7 MG/DL (ref 8.5–10.1)
CHLORIDE SERPL-SCNC: 112 MMOL/L (ref 94–109)
CHLORIDE SERPL-SCNC: 113 MMOL/L (ref 94–109)
CO2 SERPL-SCNC: 18 MMOL/L (ref 20–32)
CO2 SERPL-SCNC: 21 MMOL/L (ref 20–32)
CREAT SERPL-MCNC: 4.45 MG/DL (ref 0.66–1.25)
CREAT SERPL-MCNC: 4.49 MG/DL (ref 0.66–1.25)
DIFFERENTIAL METHOD BLD: ABNORMAL
EOSINOPHIL # BLD AUTO: 0 10E9/L (ref 0–0.7)
EOSINOPHIL NFR BLD AUTO: 1.1 %
EPO SERPL-ACNC: 9 MU/ML (ref 4–27)
ERYTHROCYTE [DISTWIDTH] IN BLOOD BY AUTOMATED COUNT: 15.2 % (ref 10–15)
GFR SERPL CREATININE-BSD FRML MDRD: 13 ML/MIN/{1.73_M2}
GFR SERPL CREATININE-BSD FRML MDRD: 13 ML/MIN/{1.73_M2}
GLUCOSE SERPL-MCNC: 116 MG/DL (ref 70–99)
GLUCOSE SERPL-MCNC: 99 MG/DL (ref 70–99)
HCT VFR BLD AUTO: 24.8 % (ref 40–53)
HGB BLD-MCNC: 7.9 G/DL (ref 13.3–17.7)
IMM GRANULOCYTES # BLD: 0 10E9/L (ref 0–0.4)
IMM GRANULOCYTES NFR BLD: 0.6 %
LACTATE BLD-SCNC: 0.8 MMOL/L (ref 0.7–2)
LYMPHOCYTES # BLD AUTO: 0.4 10E9/L (ref 0.8–5.3)
LYMPHOCYTES NFR BLD AUTO: 21.8 %
MCH RBC QN AUTO: 30 PG (ref 26.5–33)
MCHC RBC AUTO-ENTMCNC: 31.9 G/DL (ref 31.5–36.5)
MCV RBC AUTO: 94 FL (ref 78–100)
MONOCYTES # BLD AUTO: 0.1 10E9/L (ref 0–1.3)
MONOCYTES NFR BLD AUTO: 8 %
NEUTROPHILS # BLD AUTO: 1.2 10E9/L (ref 1.6–8.3)
NEUTROPHILS NFR BLD AUTO: 68.5 %
NRBC # BLD AUTO: 0 10*3/UL
NRBC BLD AUTO-RTO: 0 /100
PLATELET # BLD AUTO: 53 10E9/L (ref 150–450)
POTASSIUM SERPL-SCNC: 4.2 MMOL/L (ref 3.4–5.3)
POTASSIUM SERPL-SCNC: 4.4 MMOL/L (ref 3.4–5.3)
RBC # BLD AUTO: 2.63 10E12/L (ref 4.4–5.9)
SODIUM SERPL-SCNC: 139 MMOL/L (ref 133–144)
SODIUM SERPL-SCNC: 140 MMOL/L (ref 133–144)
WBC # BLD AUTO: 1.7 10E9/L (ref 4–11)

## 2019-08-02 PROCEDURE — 25000132 ZZH RX MED GY IP 250 OP 250 PS 637: Performed by: PHYSICIAN ASSISTANT

## 2019-08-02 PROCEDURE — 25000132 ZZH RX MED GY IP 250 OP 250 PS 637

## 2019-08-02 PROCEDURE — 36415 COLL VENOUS BLD VENIPUNCTURE: CPT | Performed by: INTERNAL MEDICINE

## 2019-08-02 PROCEDURE — 80048 BASIC METABOLIC PNL TOTAL CA: CPT | Performed by: INTERNAL MEDICINE

## 2019-08-02 PROCEDURE — 25000132 ZZH RX MED GY IP 250 OP 250 PS 637: Performed by: OBSTETRICS & GYNECOLOGY

## 2019-08-02 PROCEDURE — 83605 ASSAY OF LACTIC ACID: CPT

## 2019-08-02 PROCEDURE — 12000001 ZZH R&B MED SURG/OB UMMC

## 2019-08-02 PROCEDURE — T1013 SIGN LANG/ORAL INTERPRETER: HCPCS | Mod: U3

## 2019-08-02 PROCEDURE — 80048 BASIC METABOLIC PNL TOTAL CA: CPT | Performed by: PHYSICIAN ASSISTANT

## 2019-08-02 PROCEDURE — 25800030 ZZH RX IP 258 OP 636: Performed by: INTERNAL MEDICINE

## 2019-08-02 PROCEDURE — 99233 SBSQ HOSP IP/OBS HIGH 50: CPT | Performed by: INTERNAL MEDICINE

## 2019-08-02 PROCEDURE — 85025 COMPLETE CBC W/AUTO DIFF WBC: CPT | Performed by: PHYSICIAN ASSISTANT

## 2019-08-02 PROCEDURE — 25000132 ZZH RX MED GY IP 250 OP 250 PS 637: Performed by: STUDENT IN AN ORGANIZED HEALTH CARE EDUCATION/TRAINING PROGRAM

## 2019-08-02 PROCEDURE — 83605 ASSAY OF LACTIC ACID: CPT | Performed by: GENERAL ACUTE CARE HOSPITAL

## 2019-08-02 RX ORDER — AMLODIPINE BESYLATE 5 MG/1
5 TABLET ORAL DAILY
Status: DISCONTINUED | OUTPATIENT
Start: 2019-08-03 | End: 2019-08-04

## 2019-08-02 RX ORDER — METOPROLOL TARTRATE 1 MG/ML
5 INJECTION, SOLUTION INTRAVENOUS EVERY 6 HOURS PRN
Status: DISCONTINUED | OUTPATIENT
Start: 2019-08-02 | End: 2019-08-02

## 2019-08-02 RX ORDER — CLONIDINE HYDROCHLORIDE 0.1 MG/1
0.1 TABLET ORAL 3 TIMES DAILY PRN
Status: DISCONTINUED | OUTPATIENT
Start: 2019-08-02 | End: 2019-08-04 | Stop reason: HOSPADM

## 2019-08-02 RX ORDER — DILTIAZEM HYDROCHLORIDE 60 MG/1
60 CAPSULE, EXTENDED RELEASE ORAL 2 TIMES DAILY
Status: DISCONTINUED | OUTPATIENT
Start: 2019-08-02 | End: 2019-08-02

## 2019-08-02 RX ORDER — HYDRALAZINE HYDROCHLORIDE 25 MG/1
25 TABLET, FILM COATED ORAL 4 TIMES DAILY PRN
Status: DISCONTINUED | OUTPATIENT
Start: 2019-08-02 | End: 2019-08-02

## 2019-08-02 RX ORDER — CLONIDINE HYDROCHLORIDE 0.1 MG/1
0.1 TABLET ORAL 3 TIMES DAILY
Status: DISCONTINUED | OUTPATIENT
Start: 2019-08-02 | End: 2019-08-04 | Stop reason: HOSPADM

## 2019-08-02 RX ORDER — BISACODYL 10 MG
10 SUPPOSITORY, RECTAL RECTAL DAILY PRN
Status: DISCONTINUED | OUTPATIENT
Start: 2019-08-02 | End: 2019-08-04 | Stop reason: HOSPADM

## 2019-08-02 RX ORDER — SODIUM CHLORIDE 9 MG/ML
INJECTION, SOLUTION INTRAVENOUS CONTINUOUS
Status: DISCONTINUED | OUTPATIENT
Start: 2019-08-02 | End: 2019-08-04 | Stop reason: HOSPADM

## 2019-08-02 RX ADMIN — SODIUM BICARBONATE 650 MG TABLET 1950 MG: at 20:06

## 2019-08-02 RX ADMIN — SODIUM BICARBONATE 650 MG TABLET 1950 MG: at 08:22

## 2019-08-02 RX ADMIN — DILTIAZEM HYDROCHLORIDE 90 MG: 90 CAPSULE, EXTENDED RELEASE ORAL at 06:44

## 2019-08-02 RX ADMIN — CLONIDINE HYDROCHLORIDE 0.2 MG: 0.1 TABLET ORAL at 06:41

## 2019-08-02 RX ADMIN — PANTOPRAZOLE SODIUM 40 MG: 40 TABLET, DELAYED RELEASE ORAL at 15:53

## 2019-08-02 RX ADMIN — SODIUM CHLORIDE: 9 INJECTION, SOLUTION INTRAVENOUS at 18:07

## 2019-08-02 RX ADMIN — SENNOSIDES AND DOCUSATE SODIUM 2 TABLET: 8.6; 5 TABLET ORAL at 20:01

## 2019-08-02 RX ADMIN — SODIUM CHLORIDE: 9 INJECTION, SOLUTION INTRAVENOUS at 08:26

## 2019-08-02 RX ADMIN — PANTOPRAZOLE SODIUM 40 MG: 40 TABLET, DELAYED RELEASE ORAL at 08:23

## 2019-08-02 RX ADMIN — POLYETHYLENE GLYCOL 3350 17 G: 17 POWDER, FOR SOLUTION ORAL at 20:01

## 2019-08-02 RX ADMIN — AMLODIPINE BESYLATE 10 MG: 10 TABLET ORAL at 06:40

## 2019-08-02 RX ADMIN — SENNOSIDES AND DOCUSATE SODIUM 2 TABLET: 8.6; 5 TABLET ORAL at 08:24

## 2019-08-02 RX ADMIN — ACETAMINOPHEN 650 MG: 325 TABLET, FILM COATED ORAL at 15:53

## 2019-08-02 RX ADMIN — CARVEDILOL 50 MG: 12.5 TABLET, FILM COATED ORAL at 06:41

## 2019-08-02 RX ADMIN — SODIUM BICARBONATE 650 MG TABLET 1950 MG: at 14:12

## 2019-08-02 RX ADMIN — CLONIDINE HYDROCHLORIDE 0.2 MG: 0.1 TABLET ORAL at 01:09

## 2019-08-02 RX ADMIN — CARVEDILOL 50 MG: 12.5 TABLET, FILM COATED ORAL at 18:10

## 2019-08-02 RX ADMIN — POLYETHYLENE GLYCOL 3350 17 G: 17 POWDER, FOR SOLUTION ORAL at 08:32

## 2019-08-02 ASSESSMENT — ACTIVITIES OF DAILY LIVING (ADL)
ADLS_ACUITY_SCORE: 12

## 2019-08-02 ASSESSMENT — PAIN DESCRIPTION - DESCRIPTORS: DESCRIPTORS: HEADACHE

## 2019-08-02 ASSESSMENT — MIFFLIN-ST. JEOR: SCORE: 1527.96

## 2019-08-02 NOTE — PLAN OF CARE
Pt admitted for aortic dissection. A&Ox4. VSS on RA excpet BP out of goal range of less than 120 this AM, giving antihypertensives early per MD orders. HR maintaining between 50's-60's. Tele showing 1st degree heart block, MD paged and aware. C/o HA pain tylenol given w/ relief. No c/o nausea. No c/o SOB. Some dizzines w/ activity. Tolerating diet well w/ good appetite. PIV's SL. Voiding WDL. No BM this shift, pt very concerned about constipation requesting stronger stool softener. Received Senna and Miralax yesterday. Cr trending up at 4.45 this AM. Able to make needs known. Up SBA in room. Plan to discharge once stable and medication plan in place. Will continue with POC.

## 2019-08-02 NOTE — PLAN OF CARE
"Pt reported having 1 \"normal\" BM this afternoon. No further intervention needed at this time. Pt will continue to work on ambulating around the unit. BP at 1400 was 100/60. Clonidine was held for now. Pt did not order food but reported having \"some rice\" from home. Continue to monitor.  "

## 2019-08-02 NOTE — PROGRESS NOTES
Admission/Transfer from:  2 RN skin assessment completed by: Nathaly Butler RN and Charlie Ferreira RN    Pt heels are dry bilaterally. Bruises noted on legs. Pressure dressing on left arm. No other sign issues.

## 2019-08-02 NOTE — PLAN OF CARE
1530 to 1930:Pt. is alert and oriented x 4 ,c/o mild headache tylenol given which had partial effect ,LS clear,BS+,denies numbness and tingling in his extremities,+2 edema noted,pt. is up independently.VSS.

## 2019-08-02 NOTE — CONSULTS
Social Work Services Progress Note    Hospital Day: 6  Date of Initial Social Work Evaluation:  -   Collaborated with:  Chart review, pt, financial counseling    Data:  Pt is a 59 year old man with acute type B aortic dissection that is uncomplicated. Pt lives at home with his wife. Pt is currently employed.    Intervention:  SW was consulted for financial/insurance questions. SW met with pt to discuss questions. Pt wants to know how much his hospital bill will be. Pt says he had a hospitalization at [facility] earlier this year and he had to pay $15,000. Pt is concerned about another high medial bill. Pt also inquired about a program to help with his medical costs.    Pt says he is not on 'welfare' still works and receives an income. Pt does not believe he would be eligible for MA.    FRANCISCO consulted Massiel with financial counseling. Massiel provided [employer] contact to provide to pt. Massiel stated that the [facility] has already submitted bills to [employer]. Massiel stated that pt could call them and inquire about costs that have accrued along with his coverage. Massiel also discussed the Patient Dinora program.    FRANCISCO spoke with pt to share information gathered. Pt is comfortable calling [employer], number was provided to pt. Pt is also interested in Dinora program.    SW made referral to Sara with financial counseling to discuss the program.    Pt did not have any additional questions at this time.     Assessment:  Pt was kind, cooperative with SW visit. Pt made appropriate eye contact. Pt is able to call his insurance on his own.    Plan:    Anticipated Disposition:  Home, no needs identified    Barriers to d/c plan:  Medical changes    Follow Up:  Pt to call [employer] to inquire about medical bill. Pt to meet with financial counseling for eligibility of the Dinora Program.    Lona Gipson FRANCISCO  [facility]   Unit [address]  Pager: [phone]

## 2019-08-02 NOTE — PLAN OF CARE
Pt has been alert and oriented X4, Hmong speaking via  but pt himself speaks English well. Pt c/o constipation. LBM 07/31. Oral bowl regimen given this am. No result just yet. Pt has been ambulating around unit. Poor appetite, only had half banana for breakfast. Pt has been denying any pain. BP 77/48 just now. Pt denied any symptoms of hypotension. Medicine team was notified of BP values. Last BP was up to 97/57. Awaiting med change from medicine team.

## 2019-08-02 NOTE — PROGRESS NOTES
[facility], [facility] Progress Note - Hospitalist Service, Gold 9       Date of Admission:  7/28/2019  Assessment & Plan   Elle Blanton is a 59 year old man with a history of HTN, CKD III, GERD, remote GIB, and HBV cirrhosis w/ pancytopenia who was transferred from [facility] to SICU here on 7/28 for management of type B aortic dissection and NAHUM on CKD. Now transferred to floor for continued monitoring of BP and renal function.      #Type B Aortic Dissection  Presented w/ severe acute onset chest and upper abdomen pain. Outside CTA w/ type B thoracoabdominal aortic dissection extending to the aortic bifurcation and into the proximal left common iliac artery and origin of the SMA. Has been started on multiple BP meds here.   - Vascular surgery following peripherally. Goal SBP < 120 and HR < 60.   - No lines or lab draws in LUE in anticipation of need for hemodialysis access in the near future.   - No plans for CTA f/u this admission d/t NAHUM. Will need CT and office visit in 1 month at [facility] w/ Dr. Cosby.      #HTN.   - Off IV drip and started on 5 antihypertensives (Norvasc, Coreg, Clonidine, diltiazem, and minoxidil). Episodes of hypotension and aworsening NAHUM with this regimen   - Will discharge minoxidil and dilt, titrate down Norvasc and clonidine  - Will monitor BP (goal -120)  - BPs at goal. Was not on any BP meds at home.   - Will need close primary care f/u for BP titration.      #Non Oliguric NAHUM on CKD III  Baseline Cr 1.6-1.8 --> peak of 4.25 on 7/31, etiology likely 2/2 EDDIE. Now worsening likely 2/2 hypotension in the setting of multiple antihypertensives  - Titrating down antihypertensives, will hydrate gently   - Nephrology following.   - Strict I&Os.  - Continue PO sodium bicarb (CO2 16).   - Further investigation (C3/C4, cryoglobulin, 24 hr protein) per Neph   - They have discussed the need for possible hemodialysis in the future, and he should be set  up to see nephrology as an outpatient and have vein mapping done to identify ideal locations for HD access when that time comes.      #Compensated HBV Cirrhosis  #Acute on Chronic Pancytopenia  Had + serology in 2017 for hep B but did not have further eval or treatment. Here w/ pancytopenia and cirrhosis w/ splenomegaly noted on CT which prompted hepatology consult. Has e/o synthetic dysfunction on labs. MELD Na 23. HBV DNA quant here has returned +.  - Per hepatology: start entecavir 0.5 mg PO Q72hrs  - Establish care in outpatient hepatology clinic w/ Dr. Hutchins in 4-6 wks who can arrange OP EGD for variceal surveillance.      #Constipation. Without e/o obstruction.   - No BM following multiple Miralax and senna   - Will add Golytely and suppository   - If no result will do enema      Diet: Regular Diet Adult    DVT Prophylaxis: Mechanical   Beltre Catheter: not present  Code Status: Full Code      Disposition Plan   Expected discharge: 2 - 3 days, recommended to prior living arrangement once renal function improved.  Entered: Denzel Dickson MD 08/02/2019, 1:38 PM       The patient's care was discussed with the Bedside Nurse, Care Coordinator/ and Patient.    Denzel Dickson MD  Hospitalist Service, [address], [place]  Pager: [phone]  Please see sticky note for cross cover information  ______________________________________________________________________    Interval History   Patient has had intermittent episodes of hypotension, c/o constipation x3 days, denies any chest pain, SOB, fever or chill.     Data reviewed today: I reviewed all medications, new labs and imaging results over the last 24 hours. I personally reviewed no images or EKG's today.    Physical Exam   Vital Signs: Temp: 96.4  F (35.8  C) Temp src: Oral BP: 97/57   Heart Rate: 56 Resp: 16 SpO2: 98 % O2 Device: None (Room air)    Weight: 173 lbs 4.8 oz  Constitutional: Awake, alert, cooperative, no  apparent distress  Respiratory: Clear to auscultation bilaterally, no crackles or wheezing  Cardiovascular: Regular rate and rhythm, normal S1 and S2, and no murmur noted  GI: Obese, Normal bowel sounds, soft, mild TTP  Skin/Integumen: No rashes, no cyanosis, no edema    Data   Recent Labs   Lab 08/02/19  1311 08/02/19  0437 08/01/19  0329 07/31/19  0339 07/30/19  0411  07/28/19  0950   WBC  --  1.7*  --  2.9* 3.1*   < > 2.8*   HGB  --  7.9*  --  8.2* 8.1*   < > 8.6*   MCV  --  94  --  97 96   < > 97   PLT  --  53*  --  52* 47*   < > 28*   INR  --   --  1.44* 1.33* 1.29*   < >  --     139 139 140 139   < >  --    POTASSIUM 4.4 4.2 4.4 4.7 4.9   < >  --    CHLORIDE 113* 112* 113* 115* 112*   < >  --    CO2 21 18* 16* 16* 18*   < >  --    BUN 56* 55* 55* 59* 59*   < >  --    CR 4.49* 4.45* 4.18* 4.25* 4.10*   < >  --    ANIONGAP 6 9 10 8 9   < >  --    TANO 7.7* 7.7* 7.6* 7.7* 7.7*   < >  --    * 99 116* 95 102*   < >  --    ALBUMIN  --   --   --  2.5*  --   --  2.9*   PROTTOTAL  --   --   --  6.4*  --   --  7.2   BILITOTAL  --   --   --  0.4  --   --  0.6   ALKPHOS  --   --   --  88  --   --  101   ALT  --   --   --  93*  --   --  167*   AST  --   --   --  36  --   --  100*    < > = values in this interval not displayed.

## 2019-08-02 NOTE — PROGRESS NOTES
VASCULAR SURGERY PROGRESS NOTE    SUBJECTIVE:  Resting in  Bed comfortably. Stable on floor. BP well controled. Denies abdominal, chest and back pain. C/o constipation, ambulating.     OBJECTIVE:  Vital signs:  Temp: 96.4  F (35.8  C) Temp src: Oral BP: 120/63   Heart Rate: 68 Resp: 16 SpO2: 98 % O2 Device: None (Room air)        Intake/Output Summary (Last 24 hours) at 8/1/2019 1005  Last data filed at 8/1/2019 0900  Gross per 24 hour   Intake 386.26 ml   Output 500 ml   Net -113.74 ml       Vitals:    07/30/19 0400 07/31/19 0600   Weight: 77.1 kg (169 lb 14.4 oz) 76.4 kg (168 lb 6.9 oz)       PHYSICAL EXAM:  NEURO/PSYCH: The patient is alert and oriented.  Appropriate.  Moves all extremities.    SKIN: warm and dry.  PULMONARY: unlabored breathing, not requiring supplemental oxygen   EXTREMITIES: palpable bilateral DP and PT pulses, no lower extremity edema     BMP  Recent Labs   Lab 08/02/19 0437 08/01/19 0329 07/31/19 0339 07/30/19  0411 07/29/19  0414    139 140 139   < > 140   POTASSIUM 4.2 4.4 4.7 4.9   < > 5.4*   CHLORIDE 112* 113* 115* 112*   < > 115*   CO2 18* 16* 16* 18*   < > 18*   BUN 55* 55* 59* 59*   < > 53*   CR 4.45* 4.18* 4.25* 4.10*   < > 3.83*   GLC 99 116* 95 102*   < > 102*   MAG  --  2.1 2.0 2.1  --  2.1   PHOS  --  5.0* 4.5 4.7*  --  4.5    < > = values in this interval not displayed.     CBC  Recent Labs   Lab 08/02/19 0437 07/31/19 0339 07/30/19  0411 07/30/19  0009   WBC 1.7* 2.9* 3.1* 3.3*   HGB 7.9* 8.2* 8.1* 7.8*   PLT 53* 52* 47* 42*     INR/PTT  Recent Labs   Lab 08/01/19 0329 07/31/19 0339 07/30/19  0411 07/29/19  0414   INR 1.44* 1.33* 1.29* 1.30*   PTT 44* 42* 43* 40*     ABG  Recent Labs   Lab 07/28/19  1530 07/28/19  0600   PH 7.31* 7.27*   PCO2 33* 40   PO2 84 101   HCO3 17* 18*     LFT  Recent Labs   Lab 07/31/19  0339 07/28/19  0950 07/28/19  0539   AST 36 100* 107*   ALT 93* 167* 170*   ALKPHOS 88 101 108   BILITOTAL 0.4 0.6 0.6   ALBUMIN 2.5* 2.9* 3.0*          ASSESSMENT:  59 year old male w/ Uncomplicated type B dissection arises off left SCA to left EVIE      PLAN:  -Stable on floor  -No lines or lab draws in LUE, in anticipation of need for hemodialysis access in the near future.  -Continue  BP control with a SBP goal of 120 and HR of 60bpm.   -Appreciate nephrology and hepatology consults  -ambulate  -Bowel regimen  -Appreciate transfer to medicine team  -Patient will require follow-up CT scan and office visit in 1 month at [facility] with Dr. Cosby  -Follow up instructions put on discharge orders  -Will follow peripherally    Anup Contreras MD, PGY6  Vascular Fellow  [facility]

## 2019-08-03 LAB
ANION GAP SERPL CALCULATED.3IONS-SCNC: 8 MMOL/L (ref 3–14)
BUN SERPL-MCNC: 46 MG/DL (ref 7–30)
CALCIUM SERPL-MCNC: 7.6 MG/DL (ref 8.5–10.1)
CHLORIDE SERPL-SCNC: 115 MMOL/L (ref 94–109)
CO2 SERPL-SCNC: 18 MMOL/L (ref 20–32)
CREAT SERPL-MCNC: 4.2 MG/DL (ref 0.66–1.25)
GFR SERPL CREATININE-BSD FRML MDRD: 14 ML/MIN/{1.73_M2}
GLUCOSE SERPL-MCNC: 136 MG/DL (ref 70–99)
POTASSIUM SERPL-SCNC: 3.9 MMOL/L (ref 3.4–5.3)
SODIUM SERPL-SCNC: 141 MMOL/L (ref 133–144)

## 2019-08-03 PROCEDURE — 25000132 ZZH RX MED GY IP 250 OP 250 PS 637: Performed by: STUDENT IN AN ORGANIZED HEALTH CARE EDUCATION/TRAINING PROGRAM

## 2019-08-03 PROCEDURE — 36415 COLL VENOUS BLD VENIPUNCTURE: CPT | Performed by: PHYSICIAN ASSISTANT

## 2019-08-03 PROCEDURE — 80048 BASIC METABOLIC PNL TOTAL CA: CPT | Performed by: PHYSICIAN ASSISTANT

## 2019-08-03 PROCEDURE — 25800030 ZZH RX IP 258 OP 636: Performed by: INTERNAL MEDICINE

## 2019-08-03 PROCEDURE — 99232 SBSQ HOSP IP/OBS MODERATE 35: CPT | Performed by: INTERNAL MEDICINE

## 2019-08-03 PROCEDURE — 25000132 ZZH RX MED GY IP 250 OP 250 PS 637: Performed by: OBSTETRICS & GYNECOLOGY

## 2019-08-03 PROCEDURE — 12000001 ZZH R&B MED SURG/OB UMMC

## 2019-08-03 PROCEDURE — 25000132 ZZH RX MED GY IP 250 OP 250 PS 637: Performed by: INTERNAL MEDICINE

## 2019-08-03 PROCEDURE — 25000132 ZZH RX MED GY IP 250 OP 250 PS 637: Performed by: PHYSICIAN ASSISTANT

## 2019-08-03 RX ADMIN — PANTOPRAZOLE SODIUM 40 MG: 40 TABLET, DELAYED RELEASE ORAL at 09:33

## 2019-08-03 RX ADMIN — CARVEDILOL 50 MG: 12.5 TABLET, FILM COATED ORAL at 17:55

## 2019-08-03 RX ADMIN — CLONIDINE HYDROCHLORIDE 0.1 MG: 0.1 TABLET ORAL at 09:34

## 2019-08-03 RX ADMIN — CLONIDINE HYDROCHLORIDE 0.1 MG: 0.1 TABLET ORAL at 14:10

## 2019-08-03 RX ADMIN — AMLODIPINE BESYLATE 5 MG: 5 TABLET ORAL at 09:33

## 2019-08-03 RX ADMIN — SENNOSIDES AND DOCUSATE SODIUM 2 TABLET: 8.6; 5 TABLET ORAL at 20:27

## 2019-08-03 RX ADMIN — SODIUM BICARBONATE 650 MG TABLET 1950 MG: at 20:27

## 2019-08-03 RX ADMIN — SODIUM BICARBONATE 650 MG TABLET 1950 MG: at 14:10

## 2019-08-03 RX ADMIN — SODIUM CHLORIDE: 9 INJECTION, SOLUTION INTRAVENOUS at 14:22

## 2019-08-03 RX ADMIN — CARVEDILOL 50 MG: 12.5 TABLET, FILM COATED ORAL at 09:32

## 2019-08-03 RX ADMIN — PANTOPRAZOLE SODIUM 40 MG: 40 TABLET, DELAYED RELEASE ORAL at 17:55

## 2019-08-03 RX ADMIN — SODIUM BICARBONATE 650 MG TABLET 1950 MG: at 09:32

## 2019-08-03 RX ADMIN — SENNOSIDES AND DOCUSATE SODIUM 2 TABLET: 8.6; 5 TABLET ORAL at 09:32

## 2019-08-03 RX ADMIN — CLONIDINE HYDROCHLORIDE 0.1 MG: 0.1 TABLET ORAL at 20:26

## 2019-08-03 RX ADMIN — SODIUM CHLORIDE: 9 INJECTION, SOLUTION INTRAVENOUS at 03:56

## 2019-08-03 ASSESSMENT — ACTIVITIES OF DAILY LIVING (ADL)
ADLS_ACUITY_SCORE: 12

## 2019-08-03 NOTE — PLAN OF CARE
1900 - 0730:    VSS ex BP & HR on RA. Tele on - NSR/Sinus Bradycardia. A&Ox4. Hmong speaking - can make needs known in English overnight.  scheduled for AM. PIV x 1 infusing NS @ 100 mL/hour. Pt denies pain overnight. Independent. Pt ambulating in halls with family. Voiding WNL. No BM overnight. Pt likes Miralax with prune juice. Wife/Family at bedside throughout night. Nephrology following. Will continue to monitor.

## 2019-08-03 NOTE — PLAN OF CARE
[id number]: Afebrile. VSS on RA. Up independently ambulating halls. A/Ox4. Denies pain or nausea. Hmong speaking; patient is able to make needs known in english, though  was used for rounds with med team. Regular diet with fair appetite. Creatinine trending down 4.25 (baseline of 1.6-1.8.) Tele NSR. Patient has specific SBP goal of 100-120. Scheduled antihypertensives given per orders. NS running continuously at 100mL/hr through RPIV. Voiding WNL. Reports 2 BMs today. +2 edema present in BLEs. Will continue to monitor and follow POC.    Faith Sharpe, RN on 8/3/2019 at 2:58 PM

## 2019-08-04 ENCOUNTER — PATIENT OUTREACH (OUTPATIENT)
Dept: CARE COORDINATION | Facility: CLINIC | Age: 59
End: 2019-08-04

## 2019-08-04 VITALS
HEART RATE: 76 BPM | RESPIRATION RATE: 15 BRPM | HEIGHT: 65 IN | SYSTOLIC BLOOD PRESSURE: 136 MMHG | OXYGEN SATURATION: 98 % | WEIGHT: 173.3 LBS | TEMPERATURE: 98.4 F | BODY MASS INDEX: 28.87 KG/M2 | DIASTOLIC BLOOD PRESSURE: 79 MMHG

## 2019-08-04 LAB
ANION GAP SERPL CALCULATED.3IONS-SCNC: 7 MMOL/L (ref 3–14)
BUN SERPL-MCNC: 38 MG/DL (ref 7–30)
CALCIUM SERPL-MCNC: 7.3 MG/DL (ref 8.5–10.1)
CHLORIDE SERPL-SCNC: 118 MMOL/L (ref 94–109)
CO2 SERPL-SCNC: 18 MMOL/L (ref 20–32)
CREAT SERPL-MCNC: 3.66 MG/DL (ref 0.66–1.25)
GFR SERPL CREATININE-BSD FRML MDRD: 17 ML/MIN/{1.73_M2}
GLUCOSE SERPL-MCNC: 131 MG/DL (ref 70–99)
POTASSIUM SERPL-SCNC: 4 MMOL/L (ref 3.4–5.3)
SODIUM SERPL-SCNC: 144 MMOL/L (ref 133–144)

## 2019-08-04 PROCEDURE — 99239 HOSP IP/OBS DSCHRG MGMT >30: CPT | Performed by: INTERNAL MEDICINE

## 2019-08-04 PROCEDURE — 80048 BASIC METABOLIC PNL TOTAL CA: CPT | Performed by: PHYSICIAN ASSISTANT

## 2019-08-04 PROCEDURE — 25000132 ZZH RX MED GY IP 250 OP 250 PS 637: Performed by: STUDENT IN AN ORGANIZED HEALTH CARE EDUCATION/TRAINING PROGRAM

## 2019-08-04 PROCEDURE — 36415 COLL VENOUS BLD VENIPUNCTURE: CPT | Performed by: PHYSICIAN ASSISTANT

## 2019-08-04 PROCEDURE — 25000132 ZZH RX MED GY IP 250 OP 250 PS 637: Performed by: PHYSICIAN ASSISTANT

## 2019-08-04 PROCEDURE — 25000132 ZZH RX MED GY IP 250 OP 250 PS 637: Performed by: OBSTETRICS & GYNECOLOGY

## 2019-08-04 PROCEDURE — 25800030 ZZH RX IP 258 OP 636: Performed by: INTERNAL MEDICINE

## 2019-08-04 PROCEDURE — 25000132 ZZH RX MED GY IP 250 OP 250 PS 637: Performed by: INTERNAL MEDICINE

## 2019-08-04 RX ORDER — AMLODIPINE BESYLATE 5 MG/1
5 TABLET ORAL ONCE
Status: COMPLETED | OUTPATIENT
Start: 2019-08-04 | End: 2019-08-04

## 2019-08-04 RX ORDER — ENTECAVIR 0.5 MG/1
0.5 TABLET, FILM COATED ORAL
Qty: 10 TABLET | Refills: 0 | Status: SHIPPED | OUTPATIENT
Start: 2019-08-04 | End: 2019-08-14

## 2019-08-04 RX ORDER — CARVEDILOL 25 MG/1
50 TABLET ORAL 2 TIMES DAILY WITH MEALS
Qty: 60 TABLET | Refills: 11 | Status: SHIPPED | OUTPATIENT
Start: 2019-08-04 | End: 2020-01-29

## 2019-08-04 RX ORDER — AMLODIPINE BESYLATE 10 MG/1
10 TABLET ORAL DAILY
Qty: 30 TABLET | Refills: 11 | Status: SHIPPED | OUTPATIENT
Start: 2019-08-05 | End: 2020-08-06

## 2019-08-04 RX ORDER — AMLODIPINE BESYLATE 10 MG/1
10 TABLET ORAL DAILY
Status: DISCONTINUED | OUTPATIENT
Start: 2019-08-05 | End: 2019-08-04 | Stop reason: HOSPADM

## 2019-08-04 RX ORDER — POLYETHYLENE GLYCOL 3350 17 G/17G
17 POWDER, FOR SOLUTION ORAL DAILY PRN
Qty: 10 PACKET | Refills: 3 | Status: SHIPPED | OUTPATIENT
Start: 2019-08-04 | End: 2020-07-30

## 2019-08-04 RX ADMIN — PANTOPRAZOLE SODIUM 40 MG: 40 TABLET, DELAYED RELEASE ORAL at 07:41

## 2019-08-04 RX ADMIN — CLONIDINE HYDROCHLORIDE 0.1 MG: 0.1 TABLET ORAL at 07:42

## 2019-08-04 RX ADMIN — CARVEDILOL 50 MG: 12.5 TABLET, FILM COATED ORAL at 07:41

## 2019-08-04 RX ADMIN — SODIUM CHLORIDE: 9 INJECTION, SOLUTION INTRAVENOUS at 00:10

## 2019-08-04 RX ADMIN — SODIUM BICARBONATE 650 MG TABLET 1950 MG: at 07:41

## 2019-08-04 RX ADMIN — AMLODIPINE BESYLATE 5 MG: 5 TABLET ORAL at 07:42

## 2019-08-04 RX ADMIN — AMLODIPINE BESYLATE 5 MG: 5 TABLET ORAL at 08:12

## 2019-08-04 ASSESSMENT — ACTIVITIES OF DAILY LIVING (ADL)
ADLS_ACUITY_SCORE: 12

## 2019-08-04 NOTE — PROGRESS NOTES
Patient will be discharging home. Wife will provide him with a ride. Went over discharge paper work with patient and his wife at bedside. Declined  at that time. PIV removed. RN gave patient contact information for  since patient had some financial concerns and did not want to wait for on call . Will  medications at local pharmacy. Adequate for discharge.    Faith Sharpe RN on 8/4/2019 at 2:03 PM

## 2019-08-04 NOTE — PLAN OF CARE
1900 - 0730:     VSS ex BP on RA. BP elevated from goal (136/77) - not within parameters for PRN medication. Tele on - NSR. A&Ox4. Hmong speaking - can make needs known in English overnight.  scheduled for AM. PIV infusing NS @ 100 mL/hour. Pt denies pain overnight. Independent. Pt ambulating in halls with family. Voiding WNL. BM x 7 overnight (2 witnessed by staff, 5 reported by pt). Pt refused Miralax this evening. Pt would like to talk to MD about antidiarrheal medications (per pt has previously been on). Wife at bedside throughout night. Nephrology following. Will continue to monitor.

## 2019-08-05 LAB — CRYOGLOB SER QL: ABNORMAL %

## 2019-08-06 ENCOUNTER — APPOINTMENT (OUTPATIENT)
Dept: ULTRASOUND IMAGING | Facility: CLINIC | Age: 59
DRG: 300 | End: 2019-08-06
Attending: EMERGENCY MEDICINE
Payer: COMMERCIAL

## 2019-08-06 ENCOUNTER — APPOINTMENT (OUTPATIENT)
Dept: CT IMAGING | Facility: CLINIC | Age: 59
DRG: 300 | End: 2019-08-06
Attending: EMERGENCY MEDICINE
Payer: COMMERCIAL

## 2019-08-06 ENCOUNTER — HOSPITAL ENCOUNTER (INPATIENT)
Facility: CLINIC | Age: 59
LOS: 4 days | Discharge: HOME | DRG: 300 | End: 2019-08-10
Attending: EMERGENCY MEDICINE | Admitting: SURGERY
Payer: COMMERCIAL

## 2019-08-06 DIAGNOSIS — I71.012 DESCENDING THORACIC AORTIC DISSECTION (H): Primary | ICD-10-CM

## 2019-08-06 DIAGNOSIS — I10 ESSENTIAL HYPERTENSION: ICD-10-CM

## 2019-08-06 DIAGNOSIS — D64.9 NORMOCYTIC ANEMIA: ICD-10-CM

## 2019-08-06 DIAGNOSIS — N17.9 ACUTE KIDNEY INJURY (H): ICD-10-CM

## 2019-08-06 DIAGNOSIS — I71.02 AORTIC DISSECTION, ABDOMINAL (H): ICD-10-CM

## 2019-08-06 DIAGNOSIS — R10.84 ABDOMINAL PAIN, GENERALIZED: ICD-10-CM

## 2019-08-06 DIAGNOSIS — I15.9 SECONDARY HYPERTENSION: ICD-10-CM

## 2019-08-06 DIAGNOSIS — K74.60 CIRRHOSIS OF LIVER WITHOUT ASCITES, UNSPECIFIED HEPATIC CIRRHOSIS TYPE (H): ICD-10-CM

## 2019-08-06 LAB
ABO + RH BLD: NORMAL
ABO + RH BLD: NORMAL
ALBUMIN SERPL-MCNC: 3.1 G/DL (ref 3.4–5)
ALP SERPL-CCNC: 92 U/L (ref 40–150)
ALT SERPL W P-5'-P-CCNC: 70 U/L (ref 0–70)
ANION GAP SERPL CALCULATED.3IONS-SCNC: 10 MMOL/L (ref 3–14)
ANISOCYTOSIS BLD QL SMEAR: SLIGHT
AST SERPL W P-5'-P-CCNC: 47 U/L (ref 0–45)
BASOPHILS # BLD AUTO: 0 10E9/L (ref 0–0.2)
BASOPHILS NFR BLD AUTO: 0 %
BILIRUB SERPL-MCNC: 1 MG/DL (ref 0.2–1.3)
BLD GP AB SCN SERPL QL: NORMAL
BLD PROD TYP BPU: NORMAL
BLD UNIT ID BPU: 0
BLD UNIT ID BPU: 0
BLOOD BANK CMNT PATIENT-IMP: NORMAL
BLOOD PRODUCT CODE: NORMAL
BLOOD PRODUCT CODE: NORMAL
BPU ID: NORMAL
BPU ID: NORMAL
BUN SERPL-MCNC: 34 MG/DL (ref 7–30)
CA-I BLD-SCNC: 5 MG/DL (ref 4.4–5.2)
CALCIUM SERPL-MCNC: 8.1 MG/DL (ref 8.5–10.1)
CHLORIDE SERPL-SCNC: 114 MMOL/L (ref 94–109)
CO2 BLDCOV-SCNC: 18 MMOL/L (ref 21–28)
CO2 BLDCOV-SCNC: 19 MMOL/L (ref 21–28)
CO2 SERPL-SCNC: 19 MMOL/L (ref 20–32)
CREAT BLD-MCNC: 3.3 MG/DL (ref 0.66–1.25)
CREAT SERPL-MCNC: 3.17 MG/DL (ref 0.66–1.25)
DIFFERENTIAL METHOD BLD: ABNORMAL
EOSINOPHIL # BLD AUTO: 0 10E9/L (ref 0–0.7)
EOSINOPHIL NFR BLD AUTO: 0 %
ERYTHROCYTE [DISTWIDTH] IN BLOOD BY AUTOMATED COUNT: 15.1 % (ref 10–15)
GFR SERPL CREATININE-BSD FRML MDRD: 19 ML/MIN/{1.73_M2}
GFR SERPL CREATININE-BSD FRML MDRD: 20 ML/MIN/{1.73_M2}
GLUCOSE BLD-MCNC: 100 MG/DL (ref 70–99)
GLUCOSE SERPL-MCNC: 98 MG/DL (ref 70–99)
HCT VFR BLD AUTO: 27 % (ref 40–53)
HCT VFR BLD CALC: 25 %PCV (ref 40–53)
HGB BLD CALC-MCNC: 8.5 G/DL (ref 13.3–17.7)
HGB BLD-MCNC: 8.6 G/DL (ref 13.3–17.7)
LACTATE BLD-SCNC: 0.6 MMOL/L (ref 0.7–2.1)
LACTATE BLD-SCNC: 0.7 MMOL/L (ref 0.7–2)
LYMPHOCYTES # BLD AUTO: 0.4 10E9/L (ref 0.8–5.3)
LYMPHOCYTES NFR BLD AUTO: 11.4 %
MCH RBC QN AUTO: 30.8 PG (ref 26.5–33)
MCHC RBC AUTO-ENTMCNC: 31.9 G/DL (ref 31.5–36.5)
MCV RBC AUTO: 97 FL (ref 78–100)
MONOCYTES # BLD AUTO: 0.2 10E9/L (ref 0–1.3)
MONOCYTES NFR BLD AUTO: 5.3 %
NEUTROPHILS # BLD AUTO: 2.8 10E9/L (ref 1.6–8.3)
NEUTROPHILS NFR BLD AUTO: 83.3 %
NUM BPU REQUESTED: 1
NUM BPU REQUESTED: 1
PCO2 BLDV: 33 MM HG (ref 40–50)
PCO2 BLDV: 34 MM HG (ref 40–50)
PH BLDV: 7.34 PH (ref 7.32–7.43)
PH BLDV: 7.36 PH (ref 7.32–7.43)
PLATELET # BLD AUTO: 44 10E9/L (ref 150–450)
PLATELET # BLD EST: ABNORMAL 10*3/UL
PO2 BLDV: 49 MM HG (ref 25–47)
PO2 BLDV: 51 MM HG (ref 25–47)
POTASSIUM BLD-SCNC: 4.2 MMOL/L (ref 3.4–5.3)
POTASSIUM SERPL-SCNC: 4.1 MMOL/L (ref 3.4–5.3)
PROT SERPL-MCNC: 7.2 G/DL (ref 6.8–8.8)
RBC # BLD AUTO: 2.79 10E12/L (ref 4.4–5.9)
SAO2 % BLDV FROM PO2: 83 %
SAO2 % BLDV FROM PO2: 84 %
SODIUM BLD-SCNC: 144 MMOL/L (ref 133–144)
SODIUM SERPL-SCNC: 143 MMOL/L (ref 133–144)
SPECIMEN EXP DATE BLD: NORMAL
TRANSFUSION STATUS PATIENT QL: NORMAL
TROPONIN I BLD-MCNC: 0.02 UG/L (ref 0–0.08)
WBC # BLD AUTO: 3.4 10E9/L (ref 4–11)

## 2019-08-06 PROCEDURE — 99291 CRITICAL CARE FIRST HOUR: CPT | Mod: GC | Performed by: ANESTHESIOLOGY

## 2019-08-06 PROCEDURE — 82330 ASSAY OF CALCIUM: CPT

## 2019-08-06 PROCEDURE — 99285 EMERGENCY DEPT VISIT HI MDM: CPT | Mod: Z6 | Performed by: EMERGENCY MEDICINE

## 2019-08-06 PROCEDURE — 40000826 ZZH STATISTIC TRAUMA CODE W/O ACCESS

## 2019-08-06 PROCEDURE — 83605 ASSAY OF LACTIC ACID: CPT

## 2019-08-06 PROCEDURE — 25000125 ZZHC RX 250: Performed by: EMERGENCY MEDICINE

## 2019-08-06 PROCEDURE — 85025 COMPLETE CBC W/AUTO DIFF WBC: CPT | Performed by: EMERGENCY MEDICINE

## 2019-08-06 PROCEDURE — 83605 ASSAY OF LACTIC ACID: CPT | Performed by: EMERGENCY MEDICINE

## 2019-08-06 PROCEDURE — 40000502 ZZHCL STATISTIC GLUCOSE ED POCT

## 2019-08-06 PROCEDURE — 40000257 ZZH STATISTIC CONSULT NO CHARGE VASC ACCESS

## 2019-08-06 PROCEDURE — 86901 BLOOD TYPING SEROLOGIC RH(D): CPT | Performed by: EMERGENCY MEDICINE

## 2019-08-06 PROCEDURE — 84484 ASSAY OF TROPONIN QUANT: CPT

## 2019-08-06 PROCEDURE — 99291 CRITICAL CARE FIRST HOUR: CPT | Mod: 25 | Performed by: EMERGENCY MEDICINE

## 2019-08-06 PROCEDURE — 85379 FIBRIN DEGRADATION QUANT: CPT | Performed by: EMERGENCY MEDICINE

## 2019-08-06 PROCEDURE — 36430 TRANSFUSION BLD/BLD COMPNT: CPT | Performed by: EMERGENCY MEDICINE

## 2019-08-06 PROCEDURE — 96367 TX/PROPH/DG ADDL SEQ IV INF: CPT | Performed by: EMERGENCY MEDICINE

## 2019-08-06 PROCEDURE — 86923 COMPATIBILITY TEST ELECTRIC: CPT | Performed by: EMERGENCY MEDICINE

## 2019-08-06 PROCEDURE — 96365 THER/PROPH/DIAG IV INF INIT: CPT | Performed by: EMERGENCY MEDICINE

## 2019-08-06 PROCEDURE — 85730 THROMBOPLASTIN TIME PARTIAL: CPT | Performed by: EMERGENCY MEDICINE

## 2019-08-06 PROCEDURE — 82803 BLOOD GASES ANY COMBINATION: CPT

## 2019-08-06 PROCEDURE — 82565 ASSAY OF CREATININE: CPT

## 2019-08-06 PROCEDURE — 40000498 ZZHCL STATISTIC POTASSIUM ED POCT

## 2019-08-06 PROCEDURE — P9037 PLATE PHERES LEUKOREDU IRRAD: HCPCS | Performed by: EMERGENCY MEDICINE

## 2019-08-06 PROCEDURE — 96368 THER/DIAG CONCURRENT INF: CPT | Performed by: EMERGENCY MEDICINE

## 2019-08-06 PROCEDURE — 25000125 ZZHC RX 250: Performed by: SURGERY

## 2019-08-06 PROCEDURE — 86850 RBC ANTIBODY SCREEN: CPT | Performed by: EMERGENCY MEDICINE

## 2019-08-06 PROCEDURE — 76705 ECHO EXAM OF ABDOMEN: CPT | Performed by: EMERGENCY MEDICINE

## 2019-08-06 PROCEDURE — 93931 UPPER EXTREMITY STUDY: CPT | Mod: RT

## 2019-08-06 PROCEDURE — 86900 BLOOD TYPING SEROLOGIC ABO: CPT | Performed by: EMERGENCY MEDICINE

## 2019-08-06 PROCEDURE — 40000497 ZZHCL STATISTIC SODIUM ED POCT

## 2019-08-06 PROCEDURE — 81001 URINALYSIS AUTO W/SCOPE: CPT | Performed by: EMERGENCY MEDICINE

## 2019-08-06 PROCEDURE — 74176 CT ABD & PELVIS W/O CONTRAST: CPT

## 2019-08-06 PROCEDURE — 40000501 ZZHCL STATISTIC HEMATOCRIT ED POCT

## 2019-08-06 PROCEDURE — 85610 PROTHROMBIN TIME: CPT | Performed by: EMERGENCY MEDICINE

## 2019-08-06 PROCEDURE — 80053 COMPREHEN METABOLIC PANEL: CPT | Performed by: EMERGENCY MEDICINE

## 2019-08-06 PROCEDURE — 76705 ECHO EXAM OF ABDOMEN: CPT | Mod: 26 | Performed by: EMERGENCY MEDICINE

## 2019-08-06 PROCEDURE — 20000004 ZZH R&B ICU UMMC

## 2019-08-06 PROCEDURE — 99292 CRITICAL CARE ADDL 30 MIN: CPT | Mod: 25 | Performed by: EMERGENCY MEDICINE

## 2019-08-06 PROCEDURE — 25000128 H RX IP 250 OP 636: Performed by: EMERGENCY MEDICINE

## 2019-08-06 PROCEDURE — 96366 THER/PROPH/DIAG IV INF ADDON: CPT | Performed by: EMERGENCY MEDICINE

## 2019-08-06 RX ORDER — LIDOCAINE HYDROCHLORIDE 10 MG/ML
INJECTION, SOLUTION EPIDURAL; INFILTRATION; INTRACAUDAL; PERINEURAL
Status: DISCONTINUED
Start: 2019-08-06 | End: 2019-08-07 | Stop reason: HOSPADM

## 2019-08-06 RX ORDER — AMLODIPINE BESYLATE 2.5 MG/1
10 TABLET ORAL DAILY
Status: DISCONTINUED | OUTPATIENT
Start: 2019-08-07 | End: 2019-08-07

## 2019-08-06 RX ORDER — FENTANYL CITRATE 50 UG/ML
25 INJECTION, SOLUTION INTRAMUSCULAR; INTRAVENOUS EVERY 30 MIN PRN
Status: DISCONTINUED | OUTPATIENT
Start: 2019-08-06 | End: 2019-08-07

## 2019-08-06 RX ORDER — SODIUM CHLORIDE 9 MG/ML
1000 INJECTION, SOLUTION INTRAVENOUS CONTINUOUS
Status: DISCONTINUED | OUTPATIENT
Start: 2019-08-06 | End: 2019-08-07

## 2019-08-06 RX ORDER — ESMOLOL HYDROCHLORIDE 20 MG/ML
50-300 INJECTION, SOLUTION INTRAVENOUS CONTINUOUS
Status: DISCONTINUED | OUTPATIENT
Start: 2019-08-06 | End: 2019-08-06

## 2019-08-06 RX ORDER — CARVEDILOL 12.5 MG/1
50 TABLET ORAL 2 TIMES DAILY WITH MEALS
Status: DISCONTINUED | OUTPATIENT
Start: 2019-08-07 | End: 2019-08-07

## 2019-08-06 RX ADMIN — SODIUM CHLORIDE 1000 ML: 9 INJECTION, SOLUTION INTRAVENOUS at 21:17

## 2019-08-06 RX ADMIN — LABETALOL HYDROCHLORIDE 1 MG/MIN: 5 INJECTION, SOLUTION INTRAVENOUS at 23:46

## 2019-08-06 RX ADMIN — ESMOLOL HYDROCHLORIDE IN SODIUM CHLORIDE 50 MCG/KG/MIN: 20 INJECTION INTRAVENOUS at 20:54

## 2019-08-06 RX ADMIN — NICARDIPINE HYDROCHLORIDE 2.5 MG/HR: 0.2 INJECTION, SOLUTION INTRAVENOUS at 21:16

## 2019-08-06 ASSESSMENT — ENCOUNTER SYMPTOMS: ABDOMINAL PAIN: 1

## 2019-08-06 ASSESSMENT — MIFFLIN-ST. JEOR: SCORE: 1503.92

## 2019-08-06 NOTE — LETTER
[address] [address]  Phone: [phone]    August 9, 2019        Elel Solorzano [place] CYNDI WATKINS  [facility] [address]          To whom it may concern:    RE: Elle Blanton    Patient may return to work on 819/2019  with no restrictions.     Please contact me for questions or concerns.      Sincerely,      SUSY Kaminski, CNS

## 2019-08-07 ENCOUNTER — OFFICE VISIT (OUTPATIENT)
Dept: INTERPRETER SERVICES | Facility: CLINIC | Age: 59
End: 2019-08-07
Payer: COMMERCIAL

## 2019-08-07 ENCOUNTER — APPOINTMENT (OUTPATIENT)
Dept: GENERAL RADIOLOGY | Facility: CLINIC | Age: 59
DRG: 300 | End: 2019-08-07
Attending: STUDENT IN AN ORGANIZED HEALTH CARE EDUCATION/TRAINING PROGRAM
Payer: COMMERCIAL

## 2019-08-07 ENCOUNTER — ANESTHESIA EVENT (OUTPATIENT)
Dept: INTENSIVE CARE | Facility: CLINIC | Age: 59
End: 2019-08-07

## 2019-08-07 ENCOUNTER — APPOINTMENT (OUTPATIENT)
Dept: GENERAL RADIOLOGY | Facility: CLINIC | Age: 59
DRG: 300 | End: 2019-08-07
Attending: PHYSICIAN ASSISTANT
Payer: COMMERCIAL

## 2019-08-07 ENCOUNTER — ANESTHESIA (OUTPATIENT)
Dept: INTENSIVE CARE | Facility: CLINIC | Age: 59
End: 2019-08-07

## 2019-08-07 PROBLEM — I71.012 DESCENDING THORACIC AORTIC DISSECTION (H): Status: ACTIVE | Noted: 2019-08-07

## 2019-08-07 LAB
ALBUMIN SERPL-MCNC: 2.7 G/DL (ref 3.4–5)
ALBUMIN UR-MCNC: 100 MG/DL
ALP SERPL-CCNC: 79 U/L (ref 40–150)
ALT SERPL W P-5'-P-CCNC: 59 U/L (ref 0–70)
ANION GAP SERPL CALCULATED.3IONS-SCNC: 10 MMOL/L (ref 3–14)
APPEARANCE UR: CLEAR
APTT PPP: 39 SEC (ref 22–37)
AST SERPL W P-5'-P-CCNC: 41 U/L (ref 0–45)
BILIRUB SERPL-MCNC: 0.7 MG/DL (ref 0.2–1.3)
BILIRUB UR QL STRIP: NEGATIVE
BUN SERPL-MCNC: 35 MG/DL (ref 7–30)
CALCIUM SERPL-MCNC: 7.7 MG/DL (ref 8.5–10.1)
CHLORIDE SERPL-SCNC: 116 MMOL/L (ref 94–109)
CO2 SERPL-SCNC: 18 MMOL/L (ref 20–32)
COLOR UR AUTO: ABNORMAL
CREAT SERPL-MCNC: 2.96 MG/DL (ref 0.66–1.25)
D DIMER PPP FEU-MCNC: 18.2 UG/ML FEU (ref 0–0.5)
ERYTHROCYTE [DISTWIDTH] IN BLOOD BY AUTOMATED COUNT: 15 % (ref 10–15)
ERYTHROCYTE [DISTWIDTH] IN BLOOD BY AUTOMATED COUNT: 15.1 % (ref 10–15)
GFR SERPL CREATININE-BSD FRML MDRD: 22 ML/MIN/{1.73_M2}
GLUCOSE BLDC GLUCOMTR-MCNC: 104 MG/DL (ref 70–99)
GLUCOSE BLDC GLUCOMTR-MCNC: 147 MG/DL (ref 70–99)
GLUCOSE BLDC GLUCOMTR-MCNC: 95 MG/DL (ref 70–99)
GLUCOSE SERPL-MCNC: 97 MG/DL (ref 70–99)
GLUCOSE UR STRIP-MCNC: NEGATIVE MG/DL
HCT VFR BLD AUTO: 23.1 % (ref 40–53)
HCT VFR BLD AUTO: 25.4 % (ref 40–53)
HGB BLD-MCNC: 7.6 G/DL (ref 13.3–17.7)
HGB BLD-MCNC: 8.1 G/DL (ref 13.3–17.7)
HGB UR QL STRIP: ABNORMAL
INR PPP: 1.24 (ref 0.86–1.14)
KETONES UR STRIP-MCNC: NEGATIVE MG/DL
LACTATE BLD-SCNC: 0.7 MMOL/L (ref 0.7–2)
LACTATE BLD-SCNC: 0.8 MMOL/L (ref 0.7–2)
LACTATE BLD-SCNC: 1.4 MMOL/L (ref 0.7–2)
LACTATE BLD-SCNC: 2 MMOL/L (ref 0.7–2)
LEUKOCYTE ESTERASE UR QL STRIP: NEGATIVE
MAGNESIUM SERPL-MCNC: 1.8 MG/DL (ref 1.6–2.3)
MCH RBC QN AUTO: 30.8 PG (ref 26.5–33)
MCH RBC QN AUTO: 31.3 PG (ref 26.5–33)
MCHC RBC AUTO-ENTMCNC: 31.9 G/DL (ref 31.5–36.5)
MCHC RBC AUTO-ENTMCNC: 32.9 G/DL (ref 31.5–36.5)
MCV RBC AUTO: 95 FL (ref 78–100)
MCV RBC AUTO: 97 FL (ref 78–100)
MRSA DNA SPEC QL NAA+PROBE: NEGATIVE
MUCOUS THREADS #/AREA URNS LPF: PRESENT /LPF
NITRATE UR QL: NEGATIVE
PH UR STRIP: 6 PH (ref 5–7)
PHOSPHATE SERPL-MCNC: 4.4 MG/DL (ref 2.5–4.5)
PLATELET # BLD AUTO: 48 10E9/L (ref 150–450)
PLATELET # BLD AUTO: 50 10E9/L (ref 150–450)
POTASSIUM SERPL-SCNC: 4 MMOL/L (ref 3.4–5.3)
PROT SERPL-MCNC: 6.2 G/DL (ref 6.8–8.8)
RBC # BLD AUTO: 2.43 10E12/L (ref 4.4–5.9)
RBC # BLD AUTO: 2.63 10E12/L (ref 4.4–5.9)
RBC #/AREA URNS AUTO: 4 /HPF (ref 0–2)
SODIUM SERPL-SCNC: 144 MMOL/L (ref 133–144)
SOURCE: ABNORMAL
SP GR UR STRIP: 1.01 (ref 1–1.03)
SPECIMEN SOURCE: NORMAL
UROBILINOGEN UR STRIP-MCNC: NORMAL MG/DL (ref 0–2)
WBC # BLD AUTO: 3.2 10E9/L (ref 4–11)
WBC # BLD AUTO: 3.3 10E9/L (ref 4–11)
WBC #/AREA URNS AUTO: 1 /HPF (ref 0–5)

## 2019-08-07 PROCEDURE — 87641 MR-STAPH DNA AMP PROBE: CPT | Performed by: STUDENT IN AN ORGANIZED HEALTH CARE EDUCATION/TRAINING PROGRAM

## 2019-08-07 PROCEDURE — 84100 ASSAY OF PHOSPHORUS: CPT | Performed by: STUDENT IN AN ORGANIZED HEALTH CARE EDUCATION/TRAINING PROGRAM

## 2019-08-07 PROCEDURE — 25000132 ZZH RX MED GY IP 250 OP 250 PS 637: Performed by: PHYSICIAN ASSISTANT

## 2019-08-07 PROCEDURE — T1013 SIGN LANG/ORAL INTERPRETER: HCPCS | Mod: U3

## 2019-08-07 PROCEDURE — 80053 COMPREHEN METABOLIC PANEL: CPT | Performed by: STUDENT IN AN ORGANIZED HEALTH CARE EDUCATION/TRAINING PROGRAM

## 2019-08-07 PROCEDURE — 83605 ASSAY OF LACTIC ACID: CPT | Performed by: PHYSICIAN ASSISTANT

## 2019-08-07 PROCEDURE — 25000128 H RX IP 250 OP 636: Performed by: EMERGENCY MEDICINE

## 2019-08-07 PROCEDURE — 99207 ZZC CDG-HISTORY COMP: MEETS EXP. PROBLEM FOCUSED - DOWN CODED LACK OF HPI: CPT | Performed by: PHYSICIAN ASSISTANT

## 2019-08-07 PROCEDURE — 25800030 ZZH RX IP 258 OP 636: Performed by: STUDENT IN AN ORGANIZED HEALTH CARE EDUCATION/TRAINING PROGRAM

## 2019-08-07 PROCEDURE — 20000004 ZZH R&B ICU UMMC

## 2019-08-07 PROCEDURE — 36415 COLL VENOUS BLD VENIPUNCTURE: CPT | Performed by: SURGERY

## 2019-08-07 PROCEDURE — 83735 ASSAY OF MAGNESIUM: CPT | Performed by: STUDENT IN AN ORGANIZED HEALTH CARE EDUCATION/TRAINING PROGRAM

## 2019-08-07 PROCEDURE — 36620 INSERTION CATHETER ARTERY: CPT | Performed by: ANESTHESIOLOGY

## 2019-08-07 PROCEDURE — 25000128 H RX IP 250 OP 636: Performed by: STUDENT IN AN ORGANIZED HEALTH CARE EDUCATION/TRAINING PROGRAM

## 2019-08-07 PROCEDURE — 25000132 ZZH RX MED GY IP 250 OP 250 PS 637: Performed by: SURGERY

## 2019-08-07 PROCEDURE — 73090 X-RAY EXAM OF FOREARM: CPT | Mod: RT

## 2019-08-07 PROCEDURE — 83605 ASSAY OF LACTIC ACID: CPT | Performed by: SURGERY

## 2019-08-07 PROCEDURE — 96375 TX/PRO/DX INJ NEW DRUG ADDON: CPT | Performed by: EMERGENCY MEDICINE

## 2019-08-07 PROCEDURE — 25000132 ZZH RX MED GY IP 250 OP 250 PS 637: Performed by: STUDENT IN AN ORGANIZED HEALTH CARE EDUCATION/TRAINING PROGRAM

## 2019-08-07 PROCEDURE — 25000125 ZZHC RX 250: Performed by: STUDENT IN AN ORGANIZED HEALTH CARE EDUCATION/TRAINING PROGRAM

## 2019-08-07 PROCEDURE — 85027 COMPLETE CBC AUTOMATED: CPT | Performed by: SURGERY

## 2019-08-07 PROCEDURE — 40000893 ZZH STATISTIC PT IP EVAL DEFER

## 2019-08-07 PROCEDURE — 99232 SBSQ HOSP IP/OBS MODERATE 35: CPT | Performed by: PHYSICIAN ASSISTANT

## 2019-08-07 PROCEDURE — 87640 STAPH A DNA AMP PROBE: CPT | Performed by: STUDENT IN AN ORGANIZED HEALTH CARE EDUCATION/TRAINING PROGRAM

## 2019-08-07 PROCEDURE — 73110 X-RAY EXAM OF WRIST: CPT | Mod: RT

## 2019-08-07 PROCEDURE — 85027 COMPLETE CBC AUTOMATED: CPT | Performed by: STUDENT IN AN ORGANIZED HEALTH CARE EDUCATION/TRAINING PROGRAM

## 2019-08-07 PROCEDURE — 99207 ZZC NO CHARGE LOS: CPT | Performed by: PHYSICIAN ASSISTANT

## 2019-08-07 PROCEDURE — 00000146 ZZHCL STATISTIC GLUCOSE BY METER IP

## 2019-08-07 RX ORDER — ONDANSETRON 2 MG/ML
4 INJECTION INTRAMUSCULAR; INTRAVENOUS EVERY 6 HOURS PRN
Status: DISCONTINUED | OUTPATIENT
Start: 2019-08-07 | End: 2019-08-10 | Stop reason: HOSPADM

## 2019-08-07 RX ORDER — AMLODIPINE BESYLATE 2.5 MG/1
10 TABLET ORAL DAILY
Status: DISCONTINUED | OUTPATIENT
Start: 2019-08-07 | End: 2019-08-07

## 2019-08-07 RX ORDER — AMLODIPINE BESYLATE 2.5 MG/1
10 TABLET ORAL ONCE
Status: COMPLETED | OUTPATIENT
Start: 2019-08-07 | End: 2019-08-07

## 2019-08-07 RX ORDER — POLYETHYLENE GLYCOL 3350 17 G/17G
17 POWDER, FOR SOLUTION ORAL ONCE
Status: COMPLETED | OUTPATIENT
Start: 2019-08-07 | End: 2019-08-07

## 2019-08-07 RX ORDER — ONDANSETRON 4 MG/1
4 TABLET, ORALLY DISINTEGRATING ORAL EVERY 6 HOURS PRN
Status: DISCONTINUED | OUTPATIENT
Start: 2019-08-07 | End: 2019-08-10 | Stop reason: HOSPADM

## 2019-08-07 RX ORDER — CARVEDILOL 12.5 MG/1
50 TABLET ORAL 2 TIMES DAILY WITH MEALS
Status: DISCONTINUED | OUTPATIENT
Start: 2019-08-07 | End: 2019-08-07

## 2019-08-07 RX ORDER — SODIUM CHLORIDE, SODIUM LACTATE, POTASSIUM CHLORIDE, CALCIUM CHLORIDE 600; 310; 30; 20 MG/100ML; MG/100ML; MG/100ML; MG/100ML
INJECTION, SOLUTION INTRAVENOUS CONTINUOUS
Status: DISCONTINUED | OUTPATIENT
Start: 2019-08-07 | End: 2019-08-10 | Stop reason: HOSPADM

## 2019-08-07 RX ORDER — DILTIAZEM HYDROCHLORIDE 30 MG/1
30 TABLET, FILM COATED ORAL EVERY 6 HOURS SCHEDULED
Status: DISCONTINUED | OUTPATIENT
Start: 2019-08-07 | End: 2019-08-07

## 2019-08-07 RX ORDER — LABETALOL 20 MG/4 ML (5 MG/ML) INTRAVENOUS SYRINGE
10-20 EVERY 4 HOURS PRN
Status: DISCONTINUED | OUTPATIENT
Start: 2019-08-07 | End: 2019-08-10 | Stop reason: HOSPADM

## 2019-08-07 RX ORDER — NALOXONE HYDROCHLORIDE 0.4 MG/ML
.1-.4 INJECTION, SOLUTION INTRAMUSCULAR; INTRAVENOUS; SUBCUTANEOUS
Status: DISCONTINUED | OUTPATIENT
Start: 2019-08-07 | End: 2019-08-10 | Stop reason: HOSPADM

## 2019-08-07 RX ORDER — OXYCODONE HYDROCHLORIDE 5 MG/1
5-10 TABLET ORAL
Status: DISCONTINUED | OUTPATIENT
Start: 2019-08-07 | End: 2019-08-10 | Stop reason: HOSPADM

## 2019-08-07 RX ORDER — METOPROLOL TARTRATE 1 MG/ML
5 INJECTION, SOLUTION INTRAVENOUS ONCE
Status: COMPLETED | OUTPATIENT
Start: 2019-08-07 | End: 2019-08-07

## 2019-08-07 RX ORDER — CARVEDILOL 12.5 MG/1
50 TABLET ORAL ONCE
Status: COMPLETED | OUTPATIENT
Start: 2019-08-07 | End: 2019-08-07

## 2019-08-07 RX ORDER — AMLODIPINE BESYLATE 10 MG/1
10 TABLET ORAL DAILY
Status: DISCONTINUED | OUTPATIENT
Start: 2019-08-08 | End: 2019-08-10 | Stop reason: HOSPADM

## 2019-08-07 RX ORDER — LABETALOL 20 MG/4 ML (5 MG/ML) INTRAVENOUS SYRINGE
10-20 EVERY 4 HOURS PRN
Status: DISCONTINUED | OUTPATIENT
Start: 2019-08-07 | End: 2019-08-07

## 2019-08-07 RX ORDER — HYDROMORPHONE HYDROCHLORIDE 1 MG/ML
.3-.5 INJECTION, SOLUTION INTRAMUSCULAR; INTRAVENOUS; SUBCUTANEOUS
Status: DISCONTINUED | OUTPATIENT
Start: 2019-08-07 | End: 2019-08-09

## 2019-08-07 RX ORDER — DILTIAZEM HCL 60 MG
60 TABLET ORAL EVERY 6 HOURS SCHEDULED
Status: DISCONTINUED | OUTPATIENT
Start: 2019-08-07 | End: 2019-08-09

## 2019-08-07 RX ORDER — AMLODIPINE BESYLATE 2.5 MG/1
10 TABLET ORAL AT BEDTIME
Status: DISCONTINUED | OUTPATIENT
Start: 2019-08-07 | End: 2019-08-07

## 2019-08-07 RX ORDER — CARVEDILOL 25 MG/1
25 TABLET ORAL 2 TIMES DAILY WITH MEALS
Status: DISCONTINUED | OUTPATIENT
Start: 2019-08-08 | End: 2019-08-10 | Stop reason: HOSPADM

## 2019-08-07 RX ORDER — ESMOLOL HYDROCHLORIDE 20 MG/ML
50-300 INJECTION, SOLUTION INTRAVENOUS CONTINUOUS
Status: DISCONTINUED | OUTPATIENT
Start: 2019-08-07 | End: 2019-08-07

## 2019-08-07 RX ADMIN — DILTIAZEM HYDROCHLORIDE 60 MG: 60 TABLET, FILM COATED ORAL at 23:40

## 2019-08-07 RX ADMIN — ESMOLOL HYDROCHLORIDE IN SODIUM CHLORIDE 300 MCG/KG/MIN: 20 INJECTION INTRAVENOUS at 03:04

## 2019-08-07 RX ADMIN — DILTIAZEM HYDROCHLORIDE 30 MG: 30 TABLET, FILM COATED ORAL at 12:55

## 2019-08-07 RX ADMIN — NICARDIPINE HYDROCHLORIDE 2.5 MG/HR: 0.2 INJECTION, SOLUTION INTRAVENOUS at 02:10

## 2019-08-07 RX ADMIN — ESMOLOL HYDROCHLORIDE IN SODIUM CHLORIDE 300 MCG/KG/MIN: 20 INJECTION INTRAVENOUS at 10:18

## 2019-08-07 RX ADMIN — CARVEDILOL 50 MG: 12.5 TABLET, FILM COATED ORAL at 08:14

## 2019-08-07 RX ADMIN — ESMOLOL HYDROCHLORIDE IN SODIUM CHLORIDE 300 MCG/KG/MIN: 20 INJECTION INTRAVENOUS at 08:54

## 2019-08-07 RX ADMIN — CARVEDILOL 50 MG: 12.5 TABLET, FILM COATED ORAL at 02:38

## 2019-08-07 RX ADMIN — POLYETHYLENE GLYCOL 3350 17 G: 17 POWDER, FOR SOLUTION ORAL at 23:40

## 2019-08-07 RX ADMIN — SODIUM CHLORIDE, POTASSIUM CHLORIDE, SODIUM LACTATE AND CALCIUM CHLORIDE 1000 ML: 600; 310; 30; 20 INJECTION, SOLUTION INTRAVENOUS at 01:27

## 2019-08-07 RX ADMIN — FENTANYL CITRATE 25 MCG: 50 INJECTION INTRAMUSCULAR; INTRAVENOUS at 00:23

## 2019-08-07 RX ADMIN — ESMOLOL HYDROCHLORIDE IN SODIUM CHLORIDE 300 MCG/KG/MIN: 20 INJECTION INTRAVENOUS at 04:22

## 2019-08-07 RX ADMIN — ESMOLOL HYDROCHLORIDE IN SODIUM CHLORIDE 300 MCG/KG/MIN: 20 INJECTION INTRAVENOUS at 11:36

## 2019-08-07 RX ADMIN — CARVEDILOL 50 MG: 12.5 TABLET, FILM COATED ORAL at 16:54

## 2019-08-07 RX ADMIN — ONDANSETRON 4 MG: 2 INJECTION INTRAMUSCULAR; INTRAVENOUS at 00:34

## 2019-08-07 RX ADMIN — ESMOLOL HYDROCHLORIDE IN SODIUM CHLORIDE 300 MCG/KG/MIN: 20 INJECTION INTRAVENOUS at 12:54

## 2019-08-07 RX ADMIN — ESMOLOL HYDROCHLORIDE IN SODIUM CHLORIDE 300 MCG/KG/MIN: 20 INJECTION INTRAVENOUS at 07:27

## 2019-08-07 RX ADMIN — ESMOLOL HYDROCHLORIDE IN SODIUM CHLORIDE 50 MCG/KG/MIN: 20 INJECTION INTRAVENOUS at 01:17

## 2019-08-07 RX ADMIN — METOPROLOL TARTRATE 5 MG: 5 INJECTION INTRAVENOUS at 03:02

## 2019-08-07 RX ADMIN — DILTIAZEM HYDROCHLORIDE 60 MG: 60 TABLET, FILM COATED ORAL at 16:54

## 2019-08-07 RX ADMIN — ESMOLOL HYDROCHLORIDE IN SODIUM CHLORIDE 300 MCG/KG/MIN: 20 INJECTION INTRAVENOUS at 14:29

## 2019-08-07 RX ADMIN — ESMOLOL HYDROCHLORIDE IN SODIUM CHLORIDE 300 MCG/KG/MIN: 20 INJECTION INTRAVENOUS at 05:45

## 2019-08-07 RX ADMIN — AMLODIPINE BESYLATE 10 MG: 2.5 TABLET ORAL at 02:38

## 2019-08-07 ASSESSMENT — ACTIVITIES OF DAILY LIVING (ADL)
ADLS_ACUITY_SCORE: 12

## 2019-08-07 ASSESSMENT — MIFFLIN-ST. JEOR: SCORE: 1503.88

## 2019-08-07 NOTE — ED PROVIDER NOTES
"    [place] EMERGENCY DEPARTMENT ([facility])  8/06/19   History     Chief Complaint   Patient presents with     Abdominal Pain     The history is provided by the patient and medical records.     Elle Blanton is a 59 year old male with a medical history significant for CKD, thrombocytopenia, anemia and hypertension.  Per review of patient's chart, patient was recently hospitalized here from 7/28/2019 to 8/4/2019 for an acute type B aortic dissection.  Stent grafting was held off due to the patient's history of thrombocytopenia and risk for bleeding from surgery; however, it was likely that he would need to have stent grafting at some point.    The patient returns to the Emergency Department today for evaluation of abdominal pain that started approximately 4 hours ago.  The patient has not had any syncopal episodes.    I have reviewed the Medications, Allergies, Past Medical and Surgical History, and Social History in the Epic system.    Review of Systems   Gastrointestinal: Positive for abdominal pain.   Neurological: Negative for syncope.       Physical Exam   BP: (!) 155/68  Pulse: 69  Temp: 98.5  F (36.9  C)  Height: 165.1 cm (5' 5\")  Weight: 76.2 kg (168 lb)  SpO2: 99 %      Physical Exam   Physical Exam   Constitutional: oriented to person, place, and time. appears well-developed and well-nourished.   HENT:   Head: Normocephalic and atraumatic.   Neck: Normal range of motion.   Pulmonary/Chest: Effort normal. No respiratory distress.   Cardiac: No murmurs, rubs, gallops. RRR.  Abdominal: Abdomen soft, mildly distended, pubic abdominal pain. No rebound tenderness.  MSK: Femoral pulses intact bilaterally.  Bilateral pulses in the feet are intact and symmetric.  Sensation intact over the lower extremities.  Neurological: alert and oriented to person, place, and time.   Skin: Skin is warm and dry.   Psychiatric:  normal mood and affect.  behavior is normal. Thought content normal.       ED Course      "   Procedures     Results for orders placed or performed during the hospital encounter of 08/06/19   CBC with platelets differential   Result Value Ref Range    WBC 3.4 (L) 4.0 - 11.0 10e9/L    RBC Count 2.79 (L) 4.4 - 5.9 10e12/L    Hemoglobin 8.6 (L) 13.3 - 17.7 g/dL    Hematocrit 27.0 (L) 40.0 - 53.0 %    MCV 97 78 - 100 fl    MCH 30.8 26.5 - 33.0 pg    MCHC 31.9 31.5 - 36.5 g/dL    RDW 15.1 (H) 10.0 - 15.0 %    Platelet Count 44 (LL) 150 - 450 10e9/L    Diff Method Manual Differential     % Neutrophils 83.3 %    % Lymphocytes 11.4 %    % Monocytes 5.3 %    % Eosinophils 0.0 %    % Basophils 0.0 %    Absolute Neutrophil 2.8 1.6 - 8.3 10e9/L    Absolute Lymphocytes 0.4 (L) 0.8 - 5.3 10e9/L    Absolute Monocytes 0.2 0.0 - 1.3 10e9/L    Absolute Eosinophils 0.0 0.0 - 0.7 10e9/L    Absolute Basophils 0.0 0.0 - 0.2 10e9/L    Anisocytosis Slight     Platelet Estimate Confirming automated cell count    Comprehensive metabolic panel   Result Value Ref Range    Sodium 143 133 - 144 mmol/L    Potassium 4.1 3.4 - 5.3 mmol/L    Chloride 114 (H) 94 - 109 mmol/L    Carbon Dioxide 19 (L) 20 - 32 mmol/L    Anion Gap 10 3 - 14 mmol/L    Glucose 98 70 - 99 mg/dL    Urea Nitrogen 34 (H) 7 - 30 mg/dL    Creatinine 3.17 (H) 0.66 - 1.25 mg/dL    GFR Estimate 20 (L) >60 mL/min/[1.73_m2]    GFR Estimate If Black 24 (L) >60 mL/min/[1.73_m2]    Calcium 8.1 (L) 8.5 - 10.1 mg/dL    Bilirubin Total 1.0 0.2 - 1.3 mg/dL    Albumin 3.1 (L) 3.4 - 5.0 g/dL    Protein Total 7.2 6.8 - 8.8 g/dL    Alkaline Phosphatase 92 40 - 150 U/L    ALT 70 0 - 70 U/L    AST 47 (H) 0 - 45 U/L   Lactic acid   Result Value Ref Range    Lactic Acid 0.7 0.7 - 2.0 mmol/L   Creatinine POCT   Result Value Ref Range    Creatinine 3.3 (H) 0.66 - 1.25 mg/dL    GFR Estimate 19 (L) >60 mL/min/[1.73_m2]    GFR Estimate If Black 23 (L) >60 mL/min/[1.73_m2]   ISTAT gases elec ica gluc becki POCT   Result Value Ref Range    Ph Venous 7.36 7.32 - 7.43 pH    PCO2 Venous 33 (L) 40 -  50 mm Hg    PO2 Venous 49 (H) 25 - 47 mm Hg    Bicarbonate Venous 19 (L) 21 - 28 mmol/L    O2 Sat Venous 83 %    Sodium 144 133 - 144 mmol/L    Potassium 4.2 3.4 - 5.3 mmol/L    Glucose 100 (H) 70 - 99 mg/dL    Calcium Ionized 5.0 4.4 - 5.2 mg/dL    Hemoglobin 8.5 (L) 13.3 - 17.7 g/dL    Hematocrit - POCT 25 (L) 40.0 - 53.0 %PCV   ISTAT gases lactate becki POCT   Result Value Ref Range    Ph Venous 7.34 7.32 - 7.43 pH    PCO2 Venous 34 (L) 40 - 50 mm Hg    PO2 Venous 51 (H) 25 - 47 mm Hg    Bicarbonate Venous 18 (L) 21 - 28 mmol/L    O2 Sat Venous 84 %    Lactic Acid 0.6 (L) 0.7 - 2.1 mmol/L   Troponin POCT   Result Value Ref Range    Troponin I 0.02 0.00 - 0.08 ug/L   Platelets prepare order unit   Result Value Ref Range    Blood Component Type PLT Pheresis     Units Ordered 1          Labs Ordered and Resulted from Time of ED Arrival Up to the Time of Departure from the ED   CREATININE POCT - Abnormal; Notable for the following components:       Result Value    Creatinine 3.3 (*)     GFR Estimate 19 (*)     GFR Estimate If Black 23 (*)     All other components within normal limits   CBC WITH PLATELETS DIFFERENTIAL   COMPREHENSIVE METABOLIC PANEL   LACTIC ACID WHOLE BLOOD   UA MACROSCOPIC WITH REFLEX TO MICRO AND CULTURE   INR   PARTIAL THROMBOPLASTIN TIME   D DIMER QUANTITATIVE   PERIPHERAL IV CATHETER   IV ACCESS   ISTAT CREATININE NURSING POCT   ISTAT TROPONIN NURSING POCT   RED BLOOD CELL PREPARE ORDER UNIT   ABO/RH TYPE AND SCREEN            Assessments & Plan (with Medical Decision Making)   MDM  Patient presenting with abdominal pain, concern for worsening dissection.  Code right aorta was called due to free fluid found on the ultrasound.  Patient was placed into the stabilization room.  A small was started.  Patient reports he has elevated creatinine so the plan is to get a CT scan of the abdomen.  Unsure whether the patient would like to have procedure done or not.  Labs show normal lactic acid and  hemoglobin of 8.3 which appears to be stable.  Blood was typed and crossed.  Arterial line attempted to be placed, initially this is working however the radial line became dysfunctional.  This attempt to be repositioned.  The line broke at the skin.  The patient had intact neurologic function without symptoms of ischemia or signs of ischemia after this event.  Discussed with vascular surgery who will observe the patient overnight for signs of ischemia.  They request a noncontrast CT scan and platelets, these will be transfused.  Patient sent to the CT scanner.  Patient be admitted to the ICU for further care and blood pressure monitoring    I have reviewed the nursing notes.    I have reviewed the findings, diagnosis, plan and need for follow up with the patient.       Medication List      There are no discharge medications for this visit.         Final diagnoses:   Aortic dissection, abdominal (H)   Abdominal pain, generalized   Secondary hypertension     IYosvany, am serving as a trained medical scribe to document services personally performed by August Henley MD, based on the provider's statements to me.   August SMART MD, was physically present and have reviewed and verified the accuracy of this note documented by Yosvany Colin.    8/6/2019   [facility], EMERGENCY DEPARTMENT     August Henley MD  08/06/19 3937

## 2019-08-07 NOTE — PHARMACY-ADMISSION MEDICATION HISTORY
Admission medication history interview status for the 8/6/2019 admission is complete. See Epic admission navigator for allergy information, pharmacy, prior to admission medications and immunization status.     Medication history interview sources: Patient, patient's family, SureScripts, and Care Everywhere    Changes made to PTA medication list (reason)  Added: None  Deleted: None  Changed: None    Additional medication history information (including reliability of information, actions taken by pharmacist): To note, pt has not started the entecavir due to delays with retail pharmacy and getting the script to a specialty pharmacy.      Prior to Admission medications    Medication Sig Last Dose Taking? Auth Provider   amLODIPine (NORVASC) 10 MG tablet Take 1 tablet (10 mg) by mouth daily 8/6/2019 at Unknown time Yes Denzel Dickson MD   carvedilol (COREG) 25 MG tablet Take 2 tablets (50 mg) by mouth 2 times daily (with meals) 8/6/2019 at Unknown time Yes Denzel Dickson MD   entecavir (BARACLUDE) 0.5 MG tablet Take 1 tablet (0.5 mg) by mouth every 72 hours NOT STARTED at Unknown time Yes Denzel Dickson MD   polyethylene glycol (MIRALAX/GLYCOLAX) packet Take 17 g by mouth daily as needed for constipation Past Month at Unknown time Yes Denzel Dickson MD         Medication history completed by: Iveth Kessler, PharmD IV Student

## 2019-08-07 NOTE — CONSULTS
SURGICAL ICU ADMISSION NOTE  08/07/2019      PRIMARY TEAM: Vascular Surgery  PRIMARY PHYSICIAN: Dr. Vela  REASON FOR CRITICAL CARE ADMISSION: Blood pressure control, hemodynamic monitoring   ADMITTING PHYSICIAN: Dr. Lawson  Date of Service (when I saw the patient): 08/07/2019    ASSESSMENT:  59M with CKD, Hepatitis B with ascites and recent admission for type B aortic dissection who presents with abdominal pain. CT scan without concern for uncontrolled dissection. Does have concern for flap extending into celiac axis but has a benign abdominal exam and normal lactate with normal bowel on CT. Also with possible broken piece of arterial catheter into right radial artery from arterial line placement in ED. Admitted to SICU for blood pressure control and hemodynamic monitoring.     PLAN:  Neurological:  # Acute pain   - Monitor neurological status. Delirium preventions and precautions.   - Pain: prn dilaudid     - Sedation plan: none    Pulmonary:   - Supplemental oxygen to keep saturation above 92%.    Cardiovascular:    #Type B aortic dissection, CT without concern for uncontrolled dissection, does have concern for flap extending into celiac axis but benign abd exam, lactate wnl, nl bowel on CT.    - Monitor hemodynamic status.    - Goal SBP <120, HR <60  - Resume PTA amlodipine, carvedilol   - esmolol gtt, prn nicardipine gtt  - Trend CBC, lactate Q4h    # Possible broken piece of arterial catheter into right radial artery from arterial line placement in ED  - will need to go to OR for RUE radial artery foreign body removal if arterial duplex positive for broken-off piece of a-line catheter     Gastroenterology/Nutrition:  #Type B aortic dissection, CT without concern for uncontrolled dissection, does have concern for flap extending into celiac axis but benign abd exam, lactate wnl, nl bowel on CT.    - NPO   - Serial abd exam     # PTA Untreated hepatitis B  - Trend LFT    Fluids/Electrolytes:   - Total fluid  "goal of 75    Renal:  # PTA CKD, Baseline ~3.46 on 7/28/19 (1.9 in 2017)  - Cr 3.17  - Monitor intake and output.    Endocrine:  - No management indication. Goal to keep BG< 180 for optimal wound healing     ID:  - No indications for antibiotics.     Heme:     # Thrombocytopenia, plt 44  - Hemoglobin 8.6  - Transfuse if hgb <7.0 or signs/symptoms of hypoperfusion. Monitor and trend.     Musculoskeletal:  # Weakness and deconditioning of critical illness   - Physical and occupational therapy consult     General Cares/Prophylaxis:    DVT Prophylaxis: Pneumatic Compression Devices  GI Prophylaxis: Not indicated  Restraints: Restraints for medical healing needed: NO    Lines/ tubes/ drains:  - PIVx2     Disposition:  - Surgical ICU.     Patient seen, findings and plan discussed with surgical ICU staff, Dr. Lawson.    Ulises Griffin MD  Surgery Resident  - - - - - - - - - - - - - - - - - - - - - - - - - - - - - - - - - - - - - - - - - - - - - -   HISTORY PRESENTING ILLNESS: Per chart: \"59M with PMH of untreated hepatitis B, HTN, CKD, thrombocytopenia and known Type B aortic dissection who presented from home with abdominal pain of 3 hours of onset. Pian was diffuse, not associated with food. Has a recent history of being admitted for Type B aortic dissection and treated medically. He was discharged home on antihypertensives. On presentation, patient hypertensive to 160s with ultrasound showing aortic flap and free fluid in the belly. Code aorta red called. Started on esmolol drip and went to scanner.  CT scan with diffuse fluid in belly consistent with ascites and with flap in celiac artery without bowel thickening. During code aorta red, arterial line was placed with tip broken off into right radial artery.  Labs significant for normal lactate, chronic thrombocytopenia to 44 today and creatinine of 3.3. Patient did not consent to IV contrast.\" Admitted to SICU for blood pressure control and hemodynamic monitoring. "     REVIEW OF SYSTEMS: 10 point ROS neg other than the symptoms noted above in the HPI.    PAST MEDICAL HISTORY:   Past Medical History:   Diagnosis Date     Hepatitis B        SURGICAL HISTORY:   Past Surgical History:   Procedure Laterality Date     INCISION AND DRAINAGE FINGER, COMBINED Left 10/20/2016    Procedure: COMBINED INCISION AND DRAINAGE FINGER;  Surgeon: Mireya Pizano MD;  Location: [facility] OR       SOCIAL HISTORY:   Social History     Socioeconomic History     Marital status:      Spouse name: Not on file     Number of children: Not on file     Years of education: Not on file     Highest education level: Not on file   Occupational History     Not on file   Social Needs     Financial resource strain: Not on file     Food insecurity:     Worry: Not on file     Inability: Not on file     Transportation needs:     Medical: Not on file     Non-medical: Not on file   Tobacco Use     Smoking status: Never Smoker   Substance and Sexual Activity     Alcohol use: No     Drug use: No     Sexual activity: Not on file   Lifestyle     Physical activity:     Days per week: Not on file     Minutes per session: Not on file     Stress: Not on file   Relationships     Social connections:     Talks on phone: Not on file     Gets together: Not on file     Attends [employer] service: Not on file     Active member of club or organization: Not on file     Attends meetings of clubs or organizations: Not on file     Relationship status: Not on file     Intimate partner violence:     Fear of current or ex partner: Not on file     Emotionally abused: Not on file     Physically abused: Not on file     Forced sexual activity: Not on file   Other Topics Concern     Not on file   Social History Narrative     Not on file       ALLERGIES:   Allergies   Allergen Reactions     Nka [No Known Allergies]        MEDICATIONS:    No current facility-administered medications on file prior to encounter.   Current Outpatient Medications  on File Prior to Encounter:  amLODIPine (NORVASC) 10 MG tablet Take 1 tablet (10 mg) by mouth daily   carvedilol (COREG) 25 MG tablet Take 2 tablets (50 mg) by mouth 2 times daily (with meals)   entecavir (BARACLUDE) 0.5 MG tablet Take 1 tablet (0.5 mg) by mouth every 72 hours   polyethylene glycol (MIRALAX/GLYCOLAX) packet Take 17 g by mouth daily as needed for constipation       PHYSICAL EXAMINATION:  Temp:  [97.6  F (36.4  C)-98.5  F (36.9  C)] 97.6  F (36.4  C)  Pulse:  [69-82] 76  Heart Rate:  [70-89] 79  Resp:  [15-28] 28  BP: (112-178)/() 140/80  SpO2:  [94 %-100 %] 97 %  General: NAD, interactive, appropriate   Pulm/Resp: Clear breath sounds bilaterally, breathing non-labored on RA  CV: RRR  Abdomen: Soft, non-distended, non-tender, no rebound tenderness or guarding, no masses  MSK/Extremities: wwp, no peripheral edema, moving all extremities, calves soft, palpable DP pulses bilaterally.     LABS: Reviewed.   Arterial Blood Gases   No lab results found in last 7 days.  Complete Blood Count   Recent Labs   Lab 08/06/19 2057 08/06/19 2020 08/02/19  0437 07/31/19  0339   WBC  --  3.4* 1.7* 2.9*   HGB 8.5* 8.6* 7.9* 8.2*   PLT  --  44* 53* 52*     Basic Metabolic Panel  Recent Labs   Lab 08/06/19 2057 08/06/19 2020 08/04/19  0715 08/03/19  0810 08/02/19  1311    143 144 141 140   POTASSIUM 4.2 4.1 4.0 3.9 4.4   CHLORIDE  --  114* 118* 115* 113*   CO2  --  19* 18* 18* 21   BUN  --  34* 38* 46* 56*   CR  --  3.17* 3.66* 4.20* 4.49*   * 98 131* 136* 116*     Liver Function Tests  Recent Labs   Lab 08/06/19 2020 08/01/19  0329 07/31/19  0339   AST 47*  --  36   ALT 70  --  93*   ALKPHOS 92  --  88   BILITOTAL 1.0  --  0.4   ALBUMIN 3.1*  --  2.5*   INR 1.24* 1.44* 1.33*     Pancreatic Enzymes  No lab results found in last 7 days.  Coagulation Profile  Recent Labs   Lab 08/06/19 2020 08/01/19 0329 07/31/19 0339   INR 1.24* 1.44* 1.33*   PTT  --  44* 42*       IMAGING:  Recent Results (from  the past 24 hour(s))   POC US ABDOMEN LIMITED    Impression    Limited Bedside Abdominal Ultrasound, performed and interpreted by me.     Indication: abdominal pain. Evaluate for Free fluid.     With the patient in Trendelenburg, the RUQ, LUQ, (including the paracolic gutters) views were examined for free fluid. With the patient in reverse Trendelenburg, the super pubic view was examined for free fluid.     Findings: Free fluid present in splenorenal, laguerre's and suprapubic area.  Aortic flap noted.    IMPRESSION: Free fluid present as noted above.  Flap seen in the abdominal aorta.     CT Chest Abdomen Pelvis w/o Contrast    Narrative    Exam: CT abdomen and pelvis without contrast    Comparison: 7/28/2019    History: Chest pain, AAA as suspected    Technique: CT of the abdomen and pelvis was obtained without  intravenous contrast. Coronal and sagittal reconstructions were  obtained and reviewed.    Findings:    Aorta: Evaluation of the patient's known type B aortic dissection is  limited due to absence of IV contrast. The aorta measures 4.6 cm at  the level of the mid aortic arch, previously 4.5 cm. The celiac axis  and SMA arise from the true lumen. The dissection flap appears to  extend into the origin of the celiac axis, with question of  progression compared to the prior study. The renal arteries appear to  arise from the true lumen. The RADHA arises from the false lumen. The  dissection extends into the proximal left common iliac artery.    Chest: Left greater than right pleural effusion. Dependent atelectasis  in the lung bases. No consolidative opacities. The central  tracheobronchial tree is patent.    Visualized thyroid gland is unremarkable. Heart size is normal. No  pericardial effusion. Hypoattenuation of the blood pool, consistent  with anemia. Normal caliber of the main pulmonary artery. No thoracic  lymphadenopathy. Normal esophagus.    Abdomen/pelvis: Limited evaluation of the abdominal viscera  and aorta  due to absence of IV contrast. The liver demonstrates nodular contour,  suggestive of cirrhosis. Cholelithiasis with hyperdense appearance of  the gallbladder, possibly representing vicarious excretion of contrast  from the 7/28/2019 CT in the setting of diminished renal function. The  spleen is borderline enlarged. Normal pancreas and adrenal glands.  Atrophic kidneys with stable right renal cyst. No obstructing urinary  tract calculi. The bladder is partially distended and unremarkable.  Coarse prostatic calcifications.    There is subtle bowel wall thickening involving the stomach, duodenum,  and jejunum, with mild dilation of the jejunum measuring up to 3.5 cm.  Evaluation of bowel wall enhancement is limited due to absence of IV  contrast. The distal small bowel appears normal in caliber. No  evidence of obstruction. Normal caliber of the colon. Diverticulosis  without acute diverticulitis.    Moderate volume free fluid in the abdomen which demonstrates simple  fluid density characteristics. No evidence of retroperitoneal fluid  collection or hemorrhage to suggest perforation or transection of the  aorta. Hazy mesenteric stranding in the upper abdomen. No free air. No  pneumatosis or portal venous gas. No pathologically enlarged abdominal  or pelvic lymph nodes. Gastrohepatic and splenic varices.     Bones: Degenerative changes of the spine. No acute or suspicious  appearing bony abnormalities. Moderate anasarca.      Impression    Impression:   1. Limited evaluation of the patient's known type B aortic dissection  due to absence of IV contrast. There is no evidence of retroperitoneal  hemorrhage or fluid collection to suggest aortic rupture or  transection. There is question of progressive extension of the  dissection flap into the proximal celiac artery with gastric and  proximal small bowel wall thickening/edema and mesenteric edema,  concerning for vascular compromise and early mesenteric  ischemia.   2. Cirrhosis with borderline splenomegaly and portosystemic varices.  Moderate volume simple free fluid in the abdomen. This is increased  considerably from week earlier, suggesting celiac dissection effects  on the liver via hepatic artery.  3. Cholelithiasis without CT evidence of acute cholecystitis.    [Result: No findings to suggest hemorrhage or transection of the  aorta.]    Finding was identified on 8/6/2019 9:59 PM.     Dr. Orr was contacted by Dr. Cortes on 8/6/2019 10:02 PM and  verbalized understanding of the result.      [Result: Possible extension of aortic dissection flap into the celiac  axis with concern for early mesenteric and ischemia]    Finding was identified on 8/6/2019 10:07 PM.     Dr. Stanton was contacted by Dr. Cortes on 8/6/2019 10:10 PM and  verbalized understanding of the result.    I have personally reviewed the examination and initial interpretation  and I agree with the findings.    FREDY CALDWELL MD   US Upper Extremity Arterial Duplex Right    Narrative    This is a preliminary resident report. Full report will follow.      Impression    Impression: Patent right upper extremity arterial ultrasound without  evidence of hemodynamically significant stenosis. No visualized  intra-arterial foreign body to correlate with the possible dislodge  arterial line catheter tip.

## 2019-08-07 NOTE — PLAN OF CARE
Arrived from [facility] ED at 0045 for increase abdominal pain per MD note.   Neuro: Alert and orientated x4. Moves all extremities. Hmong speaking but does understand english. Hmong  present when patient arrived from ED. Pupils equal and reactive. Follows all commands. Denies pain.   Cardiac: SR. SBP  < 120  and HR < 60 with interventions. Titrating Esmolol and Nicardipine gtt. See flowsheet. MD aware HR < 60.   Respiratory: Room air. Bilateral lungs clear and diminished bases bilaterally.   GI: Hypoactive bowel sounds. - flatus and stool. NPO until cleared by Vasc Surg per SICU.   : Voiding spontanouesly and in adequate amounts. Urine yellow in color.   IV / Lines: R arm x1. L arm x1. L radial art line  Skin: Intact  PLAN: Closely monitoring VS. Notify MD with any acute changes.

## 2019-08-07 NOTE — PROGRESS NOTES
VASCULAR SURGERY PROGRESS NOTE    Subjective:  Admitted overnight with new abdominal pain and found to have hypertension and possible progression of known dissection. This morning states that abdominal pain is centrally located in the mid abdomen / suprapubic area. Denies nausea, vomiting, dysuria.     Had extensive discussion with patient, spouse, and  regarding possible intervention today for questionable foreign body in right forearm at location of radial artery stick. At the time of our visit, the patient reported significant concern about having an operation to explore the right forearm and radial artery for foreign body, and was hesitant to pursue surgery. Additionally had significant concerns regarding payment for this hospitalization. These concerns were discussed and consent was not signed, pending further discussion with patient and staff.     Objective:    Intake/Output Summary (Last 24 hours) at 8/7/2019 1051  Last data filed at 8/7/2019 1039  Gross per 24 hour   Intake 846.93 ml   Output 400 ml   Net 446.93 ml     Labs:  ROUTINE IP LABS (Last four results)  BMP  Recent Labs   Lab 08/07/19 0344 08/06/19 2057 08/06/19 2020 08/04/19  0715 08/03/19  0810    144 143 144 141   POTASSIUM 4.0 4.2 4.1 4.0 3.9   CHLORIDE 116*  --  114* 118* 115*   TANO 7.7*  --  8.1* 7.3* 7.6*   CO2 18*  --  19* 18* 18*   BUN 35*  --  34* 38* 46*   CR 2.96*  --  3.17* 3.66* 4.20*   GLC 97 100* 98 131* 136*     CBC  Recent Labs   Lab 08/07/19 0344 08/07/19 0138 08/06/19 2057 08/06/19 2020 08/02/19  0437   WBC 3.2* 3.3*  --  3.4* 1.7*   RBC 2.43* 2.63*  --  2.79* 2.63*   HGB 7.6* 8.1* 8.5* 8.6* 7.9*   HCT 23.1* 25.4*  --  27.0* 24.8*   MCV 95 97  --  97 94   MCH 31.3 30.8  --  30.8 30.0   MCHC 32.9 31.9  --  31.9 31.9   RDW 15.1* 15.0  --  15.1* 15.2*   PLT 50* 48*  --  44* 53*     INR  Recent Labs   Lab 08/06/19  2020 08/01/19  0329   INR 1.24* 1.44*     PHYSICAL EXAM:  BP (!) 145/86   Pulse 78   Temp 97.6  " F (36.4  C) (Oral)   Resp 20   Ht 1.651 m (5' 5\")   Wt 76.2 kg (167 lb 15.9 oz)   SpO2 97%   BMI 27.96 kg/m    General: The patient is alert and oriented. Appropriate. No acute distress; seen with wife and  at bedside  Psych: pleasant affect, answers questions appropriately  Skin: Color appropriate for race, warm, dry.  Respiratory: The patient does not require supplemental oxygen. Breathing unlabored on room air  GI:  Abdomen soft, non-tender throughout abdomen.   Extremities: right radial pulse palpable, no edema noted.        Imaging:   Three view right wrist films 08/07 reviewed. No foreign body noted on our review. Pending final read.     ASSESSMENT:  58 yo man with CKD, hepatitis B w ascites, recent admission for Type B aortic dissection who is readmitted with abdominal pain, hypertension, and CT findings consistent with possible progression of dissection flap into celiac artery, however limited study due to lack of IV contrast. Additionally, at time of presentation, an arterial line placement was attempted, however the line broke at the skin and there was concern for possible right radial artery or right forearm foreign body. This has yet to be demonstrated on imaging, there is no evidence of neurovascular compromise, and given the risk of anesthesia in this patient who is hesitant to undergo an operation, we will re-image the forearm and unless a foreign body is demonstrated, will hold off on operative intervention.       PLAN:  - Forearm films, two view   - NPO until results of forearm films reviewed by vascular surgery  - Agree with aggressive BP control, goal systolics <120 mmHg, diastolics <60 mmHg      Jose Guajardo MD  Surgery Resident PGY                  "

## 2019-08-07 NOTE — PROCEDURES
Written informed consent obtained, left wrist prepped and draped in sterile manner. Under ultrasound guidance needle was seen entering left radial artery and short quick cath catheter threaded into radial artery easily. The catheter was secured in place with skin sutures and sterile dressing applied. Arterial waveform seen , the catheter draws and flushes easily.   Time spent doing procedure 20 min

## 2019-08-07 NOTE — PLAN OF CARE
Admitted/transferred from: [facility] ED  Reason for admission/transfer: Hemodynamic stability.  Patient status upon admission/transfer: Stable  Interventions: None awaiting for orders.  Plan: Blood pressure control  2 RN skin assessment: completed by Sahra TYLER and Ruthie MORALES;  Result of skin assessment and interventions/actions:-  Height, weight, drug calc weight: done  Patient belongings: With patient and spouse.   MDRO education (if applicable): MRSA Swab sent

## 2019-08-07 NOTE — PLAN OF CARE
OT/4AB: Evaluation orders received. Per discussion with PT, pt does not demonstrate acute OT needs, is at functional baseline independence with ADLs/IADLs. Will complete orders, refer to PT note for details.

## 2019-08-07 NOTE — PROGRESS NOTES
SURGICAL ICU Progress  NOTE  08/07/2019      ASSESSMENT: 59M with CKD 4, Hepatitis B with ascites and recent admission for type B aortic dissection presented to ED with three hours abdominal pain, CT on admission without concern for uncontrolled dissection but with concern for flap extending into celiac axis. Also with possible broken piece of arterial catheter into right radial artery from arterial line placement in ED, but ultrasound did not find foreign body. Admitted to SICU for blood pressure control and hemodynamic monitoring.     PLAN for Today:  - wean nicardipine gtt as able goal <120,successful wean of nicardipine, follow w/ wean esmolol gtt  - continue amlodipine 10 mg + carvedilol 50 mg  - start enteral cardizem for BP control   - x-rays of elbow and wrist to r/o foreign object. US of RUE negative for foreign   - TKO IVF rate     Neurological  # Acute abdominal pain, resolved  - Monitor. PRN dilaudid, PRN Oxy    Pulmonary  - No acute issues. On room air    Cardiovascular  #Type B aortic dissection, CT without concern for uncontrolled dissection  - Monitor hemodynamic status.    - Goal SBP <120, HR <60  - PTA amlodipine 10 mg + carvedilol 50 mg resumed.   - wean esmolol + nicardipine gtt as able. Add Cardizem to regimen.   - Trend CBC, lactate Q4h--lactate normal     #  Concern for possible broken piece of arterial catheter into right radial artery from arterial line placement in ED  - ultrasound: no visualized intra-arterial foreign body to correlate with the possible dislodged arterial line catheter tip  - XRay of elbow and wrist negative for foreign bodies     Gastroenterology/Nutrition  # concern for dissection flap extending into celiac axis   - benign abd exam, lactate wnl, nl bowel on CT  - Serial abd exam   - diet as he tolerates.     #  Chronic hepatitis B  - follow LFT, seen by hepatology last admit, plan to follow up as outpatient with Dr Hutchins     Fluids/Electrolytes:  # no issues   - Total  "fluid goal of 75    Renal:  # CKD 4, GFR 22, Baseline ~3.46 on 7/28/19 (1.9 in 2017)  - Cr 3.17 > 2.96  - Monitor intake and output  - normal UA, good UOP. Monitor for now     Endocrine:  - No management indication. Goal to keep BG <180 for optimal wound healing     ID:  - No indications for antibiotics    Heme:     # chronic thrombocytopenia, plt 50 felt to be due to chronic hep B infection, seen by Ahsan last admission, no intervention at this time.   # anemia of critical illness  - Hemoglobin 8.6 > 8.1 > 7.6  - Transfuse if hgb <7.0 or signs/symptoms of hypoperfusion. Monitor and trend.     Musculoskeletal:  # Weakness and deconditioning of critical illness   - Physical and occupational therapy consult     General Cares/Prophylaxis:    DVT Prophylaxis: Pneumatic Compression Devices  GI Prophylaxis: Not indicated  Restraints: Restraints for medical healing needed: NO    Lines/ tubes/ drains:  - PIVx2, L radial art line    Disposition:  - Surgical ICU pending rate and pressure control on oral medication    Patient seen, findings and plan discussed with surgical ICU fellow.  Christopher Guzman, MS4  [facility] 2020  Pager [phone]    Vinnie GARNICA I, Vinnie Valenzuela, was present with the medical student who participated in the service and in the documentation of the note.  I have verified the history and personally performed the physical exam and medical decision making.  I agree with the assessment and plan of care as documented in the note.  A/P are mine. Physical examination and ROS done     - - - - - - - - - - - - - - - - - - - - - - - - - - - - - - - - - - - - - - - - - - - - - -   INTERVAL HISTORY: Course reviewed. Patient reports abdominal pain resolved. Wishes to go home. Reports \"probably bad if I'm in the ICU,\" when asked how he was doing. Denies chest pain or difficulty breathing.  Report right wrist sore, concerned about retained foreign body in arm, if any present.     REVIEW OF SYSTEMS: 10 point ROS neg other than the " symptoms noted above in the HPI.    MEDICATIONS:    No current facility-administered medications on file prior to encounter.   Current Outpatient Medications on File Prior to Encounter:  amLODIPine (NORVASC) 10 MG tablet Take 1 tablet (10 mg) by mouth daily   carvedilol (COREG) 25 MG tablet Take 2 tablets (50 mg) by mouth 2 times daily (with meals)   entecavir (BARACLUDE) 0.5 MG tablet Take 1 tablet (0.5 mg) by mouth every 72 hours   polyethylene glycol (MIRALAX/GLYCOLAX) packet Take 17 g by mouth daily as needed for constipation     PHYSICAL EXAMINATION:  Temp:  [97.5  F (36.4  C)-98.5  F (36.9  C)] 97.5  F (36.4  C)  Pulse:  [69-82] 78  Heart Rate:  [60-89] 64  Resp:  [15-28] 20  BP: (112-178)/() 145/86  MAP:  [66 mmHg-108 mmHg] 72 mmHg  Arterial Line BP: ()/(45-78) 113/53  SpO2:  [90 %-100 %] 98 %     General: NAD, interactive, appropriate, sitting up in chair   HEENT: pupils 4mm and equal. OP clear, tongue midline  Pulm/Resp: Clear breath sounds bilaterally, breathing non-labored on RA  CV: RRR, S1, S2   Abdomen: Soft, non-distended, non-tender, no rebound tenderness or guarding, no masses  : voiding per masterson, no urine to assess  MSK/Extremities: Right upper arm: soft and compressible, no palpable cord noted. Pulses 2+, distal fingers with good CMS, brisk cap refill, sensation intact subjectively.  NUNEZ. no peripheral edema, moving all extremities, calves soft, palpable DP pulses bilaterally.     LABS: Reviewed.   Arterial Blood Gases   No lab results found in last 7 days.  Complete Blood Count   Recent Labs   Lab 08/07/19  0344 08/07/19  0138 08/06/19 2057 08/06/19 2020 08/02/19  0437   WBC 3.2* 3.3*  --  3.4* 1.7*   HGB 7.6* 8.1* 8.5* 8.6* 7.9*   PLT 50* 48*  --  44* 53*     Basic Metabolic Panel  Recent Labs   Lab 08/07/19  0344 08/06/19 2057 08/06/19 2020 08/04/19  0715 08/03/19  0810    144 143 144 141   POTASSIUM 4.0 4.2 4.1 4.0 3.9   CHLORIDE 116*  --  114* 118* 115*   CO2 18*  --   19* 18* 18*   BUN 35*  --  34* 38* 46*   CR 2.96*  --  3.17* 3.66* 4.20*   GLC 97 100* 98 131* 136*     Liver Function Tests  Recent Labs   Lab 08/07/19 0344 08/06/19 2020 08/01/19 0329   AST 41 47*  --    ALT 59 70  --    ALKPHOS 79 92  --    BILITOTAL 0.7 1.0  --    ALBUMIN 2.7* 3.1*  --    INR  --  1.24* 1.44*     Pancreatic Enzymes  No lab results found in last 7 days.  Coagulation Profile  Recent Labs   Lab 08/06/19 2020 08/01/19 0329   INR 1.24* 1.44*   PTT 39* 44*

## 2019-08-07 NOTE — ED TRIAGE NOTES
Recently hospitalized for aortic dissection, discharged on Sunday. Abdominal pain started abruptly 2 hours ago. Denies urinary symptoms.

## 2019-08-07 NOTE — H&P
History and Physical      Elle Blanton MRN# [id number]   YOB: 1960 Age: 59 year old      Date of Admission:  8/6/2019        Chief Complaint:   CC: abdominal pain         History of Present Illnes:     59M with PMH of untreated hepatitis B, HTN, CKD, thrombocytopenia and known Type B aortic dissection who presented from home with abdominal pain of 3 hours of onset. Pian was diffuse, not associated with food. Has a recent history of being admitted for Type B aortic dissection and treated medically. He was discharged home on antihypertensives. On presentation, patient hypertensive to 160s with ultrasound showing aortic flap and free fluid in the belly. Code aorta red called. Started on esmolol drip and went to scanner.  CT scan with diffuse fluid in belly consistent with ascites and with flap in celiac artery without bowel thickening. During code aorta red, arterial line was placed with tip broken off into right radial artery.  Labs significant for normal lactate, chronic thrombocytopenia to 44 today and creatinine of 3.3. Patient did not consent to IV contrast.      Past Medical History:  Past Medical History:   Diagnosis Date     Hepatitis B        Past Surgical History:  Past Surgical History:   Procedure Laterality Date     INCISION AND DRAINAGE FINGER, COMBINED Left 10/20/2016    Procedure: COMBINED INCISION AND DRAINAGE FINGER;  Surgeon: Mireya Pizano MD;  Location: [facility] OR       Allergies:     Allergies   Allergen Reactions     Nka [No Known Allergies]        Medications:    No current facility-administered medications on file prior to encounter.   Current Outpatient Medications on File Prior to Encounter:  amLODIPine (NORVASC) 10 MG tablet Take 1 tablet (10 mg) by mouth daily   carvedilol (COREG) 25 MG tablet Take 2 tablets (50 mg) by mouth 2 times daily (with meals)   entecavir (BARACLUDE) 0.5 MG tablet Take 1 tablet (0.5 mg) by mouth every 72 hours   polyethylene  "glycol (MIRALAX/GLYCOLAX) packet Take 17 g by mouth daily as needed for constipation       Social History:  Social History     Socioeconomic History     Marital status:      Spouse name: Not on file     Number of children: Not on file     Years of education: Not on file     Highest education level: Not on file   Occupational History     Not on file   Social Needs     Financial resource strain: Not on file     Food insecurity:     Worry: Not on file     Inability: Not on file     Transportation needs:     Medical: Not on file     Non-medical: Not on file   Tobacco Use     Smoking status: Never Smoker   Substance and Sexual Activity     Alcohol use: No     Drug use: No     Sexual activity: Not on file   Lifestyle     Physical activity:     Days per week: Not on file     Minutes per session: Not on file     Stress: Not on file   Relationships     Social connections:     Talks on phone: Not on file     Gets together: Not on file     Attends [employer] service: Not on file     Active member of club or organization: Not on file     Attends meetings of clubs or organizations: Not on file     Relationship status: Not on file     Intimate partner violence:     Fear of current or ex partner: Not on file     Emotionally abused: Not on file     Physically abused: Not on file     Forced sexual activity: Not on file   Other Topics Concern     Not on file   Social History Narrative     Not on file       Family History:  Family History   Problem Relation Age of Onset     Diabetes Father      Hypertension No family hx of      Asthma No family hx of      Cancer No family hx of      Coronary Artery Disease No family hx of      Liver Cancer No family hx of        ROS:  The remainder of the complete ROS was negative unless noted in the HPI.    Exam:  /65   Pulse 71   Temp 98.5  F (36.9  C) (Oral)   Resp 27   Ht 1.651 m (5' 5\")   Wt 76.2 kg (168 lb)   SpO2 98%   BMI 27.96 kg/m    General: Alert, interactive, " NAD  HEENT: AT/NC, sclera anicteric, EOMI   Neck: Supple, no JVD or cervical LAD  Resp: NLB  Cardiac: regular rate and rhythm   Abdomen: Soft, nontender, nondistended, no guarding.  Extremities: RUE palpable ulnar pulse, neuro and motor intact R hand  Skin: Warm and dry, no jaundice or rash  Neuro: Alert & oriented x 3, Cns 2-12 intact, moves all extremities equally    Labs:  Hg 8.5  Plt 44  Cr 3.3  Lactate 0.06   Troponin 0.02    Imaging:   CT CAP without contrast:  Impression:   1. Limited evaluation of the patient's known type B aortic dissection  due to absence of IV contrast. There is no evidence of retroperitoneal  hemorrhage or fluid collection to suggest aortic rupture or  transection. There is question of progressive extension of the  dissection flap into the proximal celiac artery with gastric and  proximal small bowel wall thickening/edema and mesenteric edema,  concerning for vascular compromise and early mesenteric ischemia.   2. Cirrhosis with borderline splenomegaly and portosystemic varices.  Moderate volume simple free fluid in the abdomen. This is increased  considerably from week earlier, suggesting celiac dissection effects  on the liver via hepatic artery.  3. Cholelithiasis without CT evidence of acute cholecystitis.  [Result: No findings to suggest hemorrhage or transection of the  aorta.]  [Result: Possible extension of aortic dissection flap into the celiac  axis with concern for early mesenteric and ischemia]       Assessment/ Plan:  59M with CKD, Hepatitis B with ascites and recent admission for type B aortic dissection who presents with abdominal pain. CT scan without concern for uncontrolled dissection. Does have concern for flap extending into celiac axis but has a benign abdominal exam and normal lactate with normal bowel on CT. Also with possible broken piece of arterial catheter into right radial artery from line placement.    -admit to SICU for aggressive BP control with goal  systolics less than 120 and HR less than 60   -labetolol gtt   -labs every 4 hours ordered (CBC, lactate)  -serial abdominal exams  -NPO  -will need to go to OR for RUE radial artery foreign body removal if arterial duplex positive for broken-off piece of a-line catheter     Discussed with staff, Dr. Cedillo.     Ngoc Stanton MD    General Surgery

## 2019-08-07 NOTE — DISCHARGE SUMMARY
[facility], [place]  Hospitalist Discharge Summary       Date of Admission:  7/28/2019  Date of Discharge:  8/4/2019  3:49 PM  Discharging Provider: Denzel Dickson MD  Discharge Team: Hospitalist Service, [place]     Discharge Diagnoses   # Type B aortic dissection   # Acute kidney injury on CKDIII  # Compensated Hep B cirrhosis   # Chronic Hepatitis B infection   # HTN   # Pancytopenia   # Constipation     Follow-ups Needed After Discharge   Follow-up Appointments     Adult [facility] Follow-up and recommended labs and tests      You will get a call for follow up with:     1. Roxana Cosby MD   Vascular Surgery   NPI: [id number]   [address] [place] [id number]       Phone: [phone]   Fax: [phone]     2. Hepatology (liver doctor): Dr. Hutchins in 4-6 weeks     3. Nephrology (Kidney doctor): In 2-4 weeks     Appointments on [place] and/or [facility] (with [facility] or [facility]   provider or service). Call [phone] if you haven't heard regarding   these appointments within 7 days of discharge.         Follow Up and recommended labs and tests      You have been scheduled for primary care follow up on Wednesday 8/7 at 2   pm at [facility] on [address] [place] [address] in [place]. You last visited here in July 2017. Call [phone] if you need to   reschedule but we would like you to be seen next week for recheck of your   blood pressure and a BMP blood test to recheck your kidney function. You   have also been referred to nephrology (for your kidneys), hepatology (Dr. Hutchins in 4-6 weeks for your hepatitis B cirrhosis), and vascular surgery   (Dr. Cosby in 4 weeks for your aortic dissection) - these appointments   have not yet been scheduled but you should be contacted.         Follow Up and recommended labs and tests      Follow up CT scan and appointment with Dr. Cosby (vascular surgery) on   September 18th, please check in for CT scan at  9:45am- nothing to eat or   drink 3 hours prior to scan.  CT scan located at [facility]   [address].  Appointment with Dr. Cosby after CT scan, [address] (same   building)    Questions please call [facility] Vascular and Wound clinic at   [phone]         Follow Up and recommended labs and tests      Follow up with primary care provider, Benjamin Rosenstein, within 7 days   for hospital follow- up.  The following labs/tests are recommended: BMP   (monitor Cr), measure blood pressure (goal <120).             Unresulted Labs Ordered in the Past 30 Days of this Admission     No orders found from 6/28/2019 to 7/29/2019.      These results will be followed up by N/A    Discharge Disposition   Discharged to home  Condition at discharge: Good    Hospital Course   Elle Blanton is a 59 year old man with a history of HTN, CKD III, GERD, remote GIB, and HBV cirrhosis w/ pancytopenia who was transferred from [facility] to SICU here on 7/28 for management of type B aortic dissection and NAHUM on CKD. Followed by the vascular surgery team who determined no acute indication for surgical or endovascular intervention, managed with antihypertensives and transferred to the Med-Surg floor.      #Type B Aortic Dissection  # NAHUM on CKD  # Essential HTN  Presented w/ severe acute onset chest and upper abdomen pain. Outside CTA w/ type B thoracoabdominal aortic dissection extending to the aortic bifurcation and into the proximal left common iliac artery and origin of the SMA. Followed by VS who recommended no invasive intervention given severe NAHUM and stability of patient. Started on IV antihypertensives then switched to multiple oral regimen. Recommended outpatient follow up in 1 months and transferred to the general floor for NAHUM management. Nephrology followed patient, recommended monitoring. Patient has known CKD and NAHUM was presumed 2/2 EDDIE in the setting of recent CTA. Patient also had episodes of hypotension  "following antihypertensive initiation leading to further NAHUM. His antihypertensives were titrated down to keep BP between 100-120. Patient's Cr began to improve prior to discharge and was discharged to follow up with VS and Nephrology in addition to PCP.      #Compensated HBV Cirrhosis  #Acute on Chronic Pancytopenia  Had + serology in 2017 for hep B but did not have further eval or treatment. Here w/ pancytopenia and cirrhosis w/ splenomegaly noted on CT which prompted hepatology consult. Has e/o synthetic dysfunction on labs. Hepatology recommended initaition of entecavir renally dosed and referred for follow up with Hepatology.      #Constipation. managed and resolved     Consultations This Hospital Stay   PHYSICAL THERAPY ADULT IP CONSULT  HEMATOLOGY ADULT IP CONSULT  NEPHROLOGY ICU IP CONSULT  OCCUPATIONAL THERAPY ADULT IP CONSULT  GI HEPATOLOGY ADULT IP CONSULT  VASCULAR ACCESS CARE ADULT IP CONSULT  NEPHROLOGY ICU IP CONSULT  VASCULAR ACCESS CARE ADULT IP CONSULT  SOCIAL WORK IP CONSULT  SOCIAL WORK IP CONSULT  SOCIAL WORK IP CONSULT    Code Status   Prior    Time Spent on this Encounter   I, Denzel Dickson, personally saw the patient today and spent greater than 30 minutes discharging this patient.       Denzel Dickson MD  [facility], [place]  ______________________________________________________________________    Physical Exam   /79   Pulse 76   Temp 98.4  F (36.9  C) (Oral)   Resp 15   Ht 1.651 m (5' 5\")   Wt 78.6 kg (173 lb 4.8 oz)   SpO2 98%   BMI 28.84 kg/m       Constitutional: Awake, alert, cooperative, no apparent distress  Respiratory: Clear to auscultation bilaterally, no crackles or wheezing  Cardiovascular: Regular rate and rhythm, normal S1 and S2, and no murmur noted  GI: Obese, Normal bowel sounds, soft, mild TTP  Skin/Integumen: No rashes, no cyanosis, no edema       Primary Care Physician   Benjamin Rosenstein    Discharge Orders    "   GASTROENTEROLOGY ADULT REF CONSULT ONLY      Vascular Surgery Referral      Nephrology Adult Referral      Follow Up and recommended labs and tests    You have been scheduled for primary care follow up on Wednesday 8/7 at 2 pm at [facility] on [address] [facility] in [place]. You last visited here in July 2017. Call [phone] if you need to reschedule but we would like you to be seen next week for recheck of your blood pressure and a BMP blood test to recheck your kidney function. You have also been referred to nephrology (for your kidneys), hepatology (Dr. Hutchins in 4-6 weeks for your hepatitis B cirrhosis), and vascular surgery (Dr. Cosby in 4 weeks for your aortic dissection) - these appointments have not yet been scheduled but you should be contacted.     Follow Up and recommended labs and tests    Follow up CT scan and appointment with Dr. Cosby (vascular surgery) on September 18th, please check in for CT scan at 9:45am- nothing to eat or drink 3 hours prior to scan.  CT scan located at [facility] [address].  Appointment with Dr. Cosby after CT scan, [address] (same building)    Questions please call [facility] Vascular and Wound clinic at [phone]     Reason for your hospital stay    Aortic dissection, Acute kidney injury     Follow Up and recommended labs and tests    Follow up with primary care provider, Benjamin Rosenstein, within 7 days for hospital follow- up.  The following labs/tests are recommended: BMP (monitor Cr), measure blood pressure (goal <120).     Activity    Your activity upon discharge: activity as tolerated     Monitor and record    blood pressure daily     When to contact your care team    Call your primary doctor if you have any of the following: Chest pain, SOB, increasing swelling .     Adult [facility] Follow-up and recommended labs and tests    You will get a call for follow up with:     1. Roxana Cosby MD   Vascular Surgery   NPI:  [id number]   [address] [facility] [address]       Phone: [phone]   Fax: [phone]     2. Hepatology (liver doctor): Dr. Hutchins in 4-6 weeks     3. Nephrology (Kidney doctor): In 2-4 weeks     Appointments on [place] and/or [facility] (with [facility] or [facility] provider or service). Call [phone] if you haven't heard regarding these appointments within 7 days of discharge.     Diet    Follow this diet upon discharge: Orders Placed This Encounter      Regular Diet Adult       Significant Results and Procedures   Most Recent 3 CBC's:  Recent Labs   Lab Test 08/06/19 2057 08/06/19 2020 08/02/19  0437 07/31/19  0339   WBC  --  3.4* 1.7* 2.9*   HGB 8.5* 8.6* 7.9* 8.2*   MCV  --  97 94 97   PLT  --  44* 53* 52*     Most Recent 3 BMP's:  Recent Labs   Lab Test 08/06/19 2057 08/06/19 2020 08/04/19  0715 08/03/19  0810    143 144 141   POTASSIUM 4.2 4.1 4.0 3.9   CHLORIDE  --  114* 118* 115*   CO2  --  19* 18* 18*   BUN  --  34* 38* 46*   CR  --  3.17* 3.66* 4.20*   ANIONGAP  --  10 7 8   TANO  --  8.1* 7.3* 7.6*   * 98 131* 136*   , No results found for this or any previous visit.    Discharge Medications   Cannot display discharge medications since this patient is expected but has not yet arrived.    Allergies   Allergies   Allergen Reactions     Nka [No Known Allergies]

## 2019-08-07 NOTE — PROGRESS NOTES
8/7/2019:    Social Work Services Progress Note    Hospital Day: 2  Date of Initial Social Work Evaluation:  Not complete  Collaborated with:  Chart review, patient and wife at bedside    Data:  Elle Blanton is a 59 year old male patient admitted to [facility] on 8/6/19 due to abdominol pain and descending thoracic aortic dissection.     Intervention:    Patient was concerned about the report of the possible malfunction/broken A-line that could be in his arm. He had Xray and ultra sound and felt that the team was not comfortable with the results of the test. Initially they had planned on a surgery to open up his arm to look for the broken piece manually. Patient was concerned that this was a mistake by the team and he should not be responsible for the cost of the surgery and extended hospitalization. He requested this be investigated- the surgery, as of now, will not be happening, but he and his wife would like information regarding what would happen should they change their minds.     Patient Relations consulted. They advised to follow up with the Unit Manager. Unknown if icare had been submitted regarding A-line issue.     Unit Nurse Manager contacted.       Assessment:  Patient improving with likely discharge to home with possible OP PT.    Plan:    Anticipated Disposition:  Home, no needs identified    Barriers to d/c plan:  Medical clearance.    Follow Up:  SW following.      ANN-MARIE Smith, YENNIFERSW  ICU 4A & 4C   Ph: [phone]  Pager: [phone]

## 2019-08-07 NOTE — PLAN OF CARE
D/I: Pt AAO x4. Up ad julia. SBP > 140 now achieved with PO meds. SR. Afebrile. Sats high 90s on RA. LS clear. Tolerating regular diet. Urinal voiding. No BM. No new skin issues.  A/P: To 7B when bed available. BP control.

## 2019-08-07 NOTE — PLAN OF CARE
4A  Discharge Planner PT   Patient plan for discharge: home with family  Current status: Pt observed walking during previous admission with PT ~1 week ago. Able to ambulate without difficulty, no concerns for LOB or unsteadiness. Pt currently reporting no change in mobility, IND with no concerns; family additionally present and with no concerns. RN additionally with no concerns for pt mobility at this time. Pt with no skilled inpatient PT needs, Will complete consult and defer evaluation, please reconsult as appropriate if patient has decline in functional mobility requiring further skilled inpatient PT needs.  Barriers to return to prior living situation: medical status  Recommendations for discharge: home with assist as needed  Rationale for recommendations: Pt was previously IND, reporting still IND. No mobility or balance concerns identified at this time per discussion with patient, patient family and RN.       Entered by: Demetria Koehler 08/07/2019 10:02 AM

## 2019-08-07 NOTE — CONSULTS
[facility] [place]  Consult Note - Hospitalist Service, [facility]      Date of Admission:  8/6/2019  Consult Requested by: Concepción Nava  Reason for Consult: BP control    Assessment & Plan   Elle Blanton is a 59 year old male admitted on 8/6/2019. He has PMH of untreated Hep B, HTN, CKD, thrombocytopenia and Type B aortic dissection who presented from home with abdominal pain w/ Imaging concerning for progression of dissection flap into the celiac artery w/o bowel wall thickening, though with free fluid (ascites) in the abdomen. Code Aorta Red called. Course c/b RUE foreign body (possible broken piece of arterial catheter from arterial line placement. Patient initiated on esmolol gtt and transferred to ICU for aggressive BP control. Patient to be transferred to floor. Medicine consulted for assistance with BP control.     # Type B aortic Dissection w/ concern for progression of flap into the celiac artery  # HTN  Recent admission 7/28-8/4/19 for Type B aortic dissection - medically managed Imaging on admission limited d/t lack of contrast. Non-con CT of abdomen: Type B dissection measuring 4.6 from previous 4.5 cm; dissection flap appears to extend into the origin of the celiac axis with progression compared to previous study. Current plan per chart review: Aggressive BP mgmt w/ goal of SBP less than 120, DBP less than 60. Most recent BP appears to be 100/52, HR 57.  - Would recommend decreasing coreg to 25 mg BID as recommended max dose 25 mg BID   - Would not recommend use of hydralazine w/ Aortic dissection  - Would recommend Nephrology and Cardiology consults with CKD, Type B aortic dissection and bradycardia   - Would avoid further AV lamont blocking with HR in high 50's and /52.  - Please notify Medicine if SBP greater than 120    # CKD III:  Cr 2.96. BL Cr appears 3.5-4.0. Still makes urine. Nephrology consulted on recent admission. Plan for outpatient Nephrology  consult w/ vein mapping for possible future HD needs.   - Recommend Nephrology consult on admission.   -Avoid hypotension, dehydration, nephrotoxic medications  - Renally dose medications     # Pancytopenia: WBC 3.1, Hgb 7.6, Platelets 50. Peripheral smear (7/29/19) w/ marked normochromic, normocytic anemia, moderate leukopenia, lymphocytopenia, marked thrombocytopenia w/ normal platelet morphology. Likely in setting of underlying liver disease.  - Monitor    # Constipation: Patient w/o BM x 3 days and reports feeling abdominal distention. Likely combination of constipation and ascites contributing  - Miralax x1, per patient request. Will defer further management to primary team    # Untreated Hep B: GI consulted on recent admission w/ recommendations for entecavir (not yet started) and f/u on serologies and GI clinic.   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  - Does appear to have ascites on exam and imaging. Recommend f/u with paracentesis. Will defer to day consult team for order   - Follow up with GI as scheduled  - Management of possible celiac dissection as above    The patient's care was discussed with the Attending Physician, Dr. Aguayo.    Rosita Willams PA-C  Internal Medicine Hospitalist Service  [facility]  Pager: [phone]    Please see sticky note for cross cover information  ______________________________________________________________________    Chief Complaint   BP control    History is obtained from the patient and EMR    History of Present Illness   Elle Blanton is a 59 year old male who presented to ED for evaluation of acute onset abdominal pain. Patient recently admitted for medical management of Type B Aortic dissection. 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 Patient initially treated with esmolol gtt, then transition to PO medications with SBP's low 100's. Patient reporting no current abdominal pain, fever, chills, CP, palpitations.   Currently, patient with concern for constipation. He denies any abdominal pain. We discussed POC as outlined above and patient agreeable.     Patient does not endorse: headaches, changes in vision, chest pain, palpitations, upper respiratory symptoms of rhinorrhea or congestion, shortness of breath, cough, sputum production, wheezing, abdominal pain, nausea, emesis, diarrhea, dysuria, edema, rashes, weakness, focal neurologic deficits, recent travel, illness, fever, chills.    Review of Systems   The 10 point Review of Systems is negative other than noted in the HPI or here.     Past Medical History    I have reviewed this patient's medical history and updated it with pertinent information if needed.   Past Medical History:   Diagnosis Date     Hepatitis B        Past Surgical History   I have reviewed this patient's surgical history and updated it with pertinent information if needed.  Past Surgical History:   Procedure Laterality Date     INCISION AND DRAINAGE FINGER, COMBINED Left 10/20/2016    Procedure: COMBINED INCISION AND DRAINAGE FINGER;  Surgeon: Mireya Pizano MD;  Location: [facility] OR       Social History   I have reviewed this patient's social history and updated it with pertinent information if needed.  Social History     Tobacco Use     Smoking status: Never Smoker   Substance Use Topics     Alcohol use: No     Drug use: No       Family History   I have reviewed this patient's family history and updated it with pertinent information if needed.   Family History   Problem Relation Age of Onset     Diabetes Father      Hypertension No family hx of      Asthma No family hx of      Cancer No family hx of      Coronary Artery Disease No family hx of      Liver Cancer No family hx of        Medications    Medications Prior to Admission   Medication Sig Dispense Refill Last Dose     amLODIPine (NORVASC) 10 MG tablet Take 1 tablet (10 mg) by mouth daily 30 tablet 11 8/6/2019 at Unknown time     carvedilol (COREG) 25 MG tablet Take 2 tablets (50 mg) by mouth 2 times daily (with meals) 60 tablet 11 8/6/2019 at Unknown time     entecavir (BARACLUDE) 0.5 MG tablet Take 1 tablet (0.5 mg) by mouth every 72 hours 10 tablet 0 NOT STARTED at Unknown time     polyethylene glycol (MIRALAX/GLYCOLAX) packet Take 17 g by mouth daily as needed for constipation 10 packet 3 Past Month at Unknown time       Allergies   Allergies   Allergen Reactions     Nka [No Known Allergies]        Physical Exam   Vital Signs: Temp: 97.9  F (36.6  C) Temp src: Oral BP: 100/52 Pulse: 78 Heart Rate: 57 Resp: 18 SpO2: 95 % O2 Device: None (Room air)    Weight: 167 lbs 15.85 oz{      Physical Exam   Constitutional:  Pleasant male sitting up in chair. Family at bedside. Well nourished, well developed, resting comfortably   HEENT:   Head: Normocephalic and atraumatic.   Eyes: Conjunctivae are normal. Pupils are equal, round, and reactive to light.  Pharynx has no erythema or exudate, mucous membranes are moist  Neck:   No adenopathy, no bony tenderness  Cardiovascular: RRR. II/VI CORNELL heard best at LUSB. No gallops  Pulmonary/Chest: Clear to auscultation bilaterally, with no wheezes or retractions. No respiratory distress on RA.  GI: Soft with good bowel sounds.  Non-tender. Distended, though soft. + fluid wave, with no guarding, no rebound, no peritoneal signs.   Back:  No bony or CVA tenderness   Musculoskeletal: Trace bilateral edema to knee. No clubbing   Skin: Skin is warm and dry. No rash noted to exposed skin areas.   Neurological: Alert and oriented to person, place, and time. Nonfocal exam  Psychiatric:  Normal mood and affect.                    Data   Results for orders placed or performed during the hospital encounter of 08/06/19 (from the  past 24 hour(s))   CBC with platelets differential   Result Value Ref Range    WBC 3.4 (L) 4.0 - 11.0 10e9/L    RBC Count 2.79 (L) 4.4 - 5.9 10e12/L    Hemoglobin 8.6 (L) 13.3 - 17.7 g/dL    Hematocrit 27.0 (L) 40.0 - 53.0 %    MCV 97 78 - 100 fl    MCH 30.8 26.5 - 33.0 pg    MCHC 31.9 31.5 - 36.5 g/dL    RDW 15.1 (H) 10.0 - 15.0 %    Platelet Count 44 (LL) 150 - 450 10e9/L    Diff Method Manual Differential     % Neutrophils 83.3 %    % Lymphocytes 11.4 %    % Monocytes 5.3 %    % Eosinophils 0.0 %    % Basophils 0.0 %    Absolute Neutrophil 2.8 1.6 - 8.3 10e9/L    Absolute Lymphocytes 0.4 (L) 0.8 - 5.3 10e9/L    Absolute Monocytes 0.2 0.0 - 1.3 10e9/L    Absolute Eosinophils 0.0 0.0 - 0.7 10e9/L    Absolute Basophils 0.0 0.0 - 0.2 10e9/L    Anisocytosis Slight     Platelet Estimate Confirming automated cell count    Comprehensive metabolic panel   Result Value Ref Range    Sodium 143 133 - 144 mmol/L    Potassium 4.1 3.4 - 5.3 mmol/L    Chloride 114 (H) 94 - 109 mmol/L    Carbon Dioxide 19 (L) 20 - 32 mmol/L    Anion Gap 10 3 - 14 mmol/L    Glucose 98 70 - 99 mg/dL    Urea Nitrogen 34 (H) 7 - 30 mg/dL    Creatinine 3.17 (H) 0.66 - 1.25 mg/dL    GFR Estimate 20 (L) >60 mL/min/[1.73_m2]    GFR Estimate If Black 24 (L) >60 mL/min/[1.73_m2]    Calcium 8.1 (L) 8.5 - 10.1 mg/dL    Bilirubin Total 1.0 0.2 - 1.3 mg/dL    Albumin 3.1 (L) 3.4 - 5.0 g/dL    Protein Total 7.2 6.8 - 8.8 g/dL    Alkaline Phosphatase 92 40 - 150 U/L    ALT 70 0 - 70 U/L    AST 47 (H) 0 - 45 U/L   ABO/Rh type and screen   Result Value Ref Range    Units Ordered 1     ABO A     RH(D) Pos     Antibody Screen Neg     Test Valid Only At          [facility]    Specimen Expires 08/09/2019     Crossmatch Red Blood Cells    Blood component   Result Value Ref Range    Unit Number [id number]     Blood Component Type Red Blood Cells Leukocyte Reduced     Division Number 00     Status of Unit Ready for patient  08/06/2019 2314     Blood Product Code [id number]     Unit Status KOREY    D dimer quantitative   Result Value Ref Range    D Dimer 18.2 (H) 0.0 - 0.50 ug/ml FEU   INR   Result Value Ref Range    INR 1.24 (H) 0.86 - 1.14   Partial thromboplastin time   Result Value Ref Range    PTT 39 (H) 22 - 37 sec   POC US ABDOMEN LIMITED    Impression    Limited Bedside Abdominal Ultrasound, performed and interpreted by me.     Indication: abdominal pain. Evaluate for Free fluid.     With the patient in Trendelenburg, the RUQ, LUQ, (including the paracolic gutters) views were examined for free fluid. With the patient in reverse Trendelenburg, the super pubic view was examined for free fluid.     Findings: Free fluid present in splenorenal, laguerre's and suprapubic area.  Aortic flap noted.    IMPRESSION: Free fluid present as noted above.  Flap seen in the abdominal aorta.     Creatinine POCT   Result Value Ref Range    Creatinine 3.3 (H) 0.66 - 1.25 mg/dL    GFR Estimate 19 (L) >60 mL/min/[1.73_m2]    GFR Estimate If Black 23 (L) >60 mL/min/[1.73_m2]   Lactic acid   Result Value Ref Range    Lactic Acid 0.7 0.7 - 2.0 mmol/L   Troponin POCT   Result Value Ref Range    Troponin I 0.02 0.00 - 0.08 ug/L   ISTAT gases elec ica gluc becki POCT   Result Value Ref Range    Ph Venous 7.36 7.32 - 7.43 pH    PCO2 Venous 33 (L) 40 - 50 mm Hg    PO2 Venous 49 (H) 25 - 47 mm Hg    Bicarbonate Venous 19 (L) 21 - 28 mmol/L    O2 Sat Venous 83 %    Sodium 144 133 - 144 mmol/L    Potassium 4.2 3.4 - 5.3 mmol/L    Glucose 100 (H) 70 - 99 mg/dL    Calcium Ionized 5.0 4.4 - 5.2 mg/dL    Hemoglobin 8.5 (L) 13.3 - 17.7 g/dL    Hematocrit - POCT 25 (L) 40.0 - 53.0 %PCV   ISTAT gases lactate becki POCT   Result Value Ref Range    Ph Venous 7.34 7.32 - 7.43 pH    PCO2 Venous 34 (L) 40 - 50 mm Hg    PO2 Venous 51 (H) 25 - 47 mm Hg    Bicarbonate Venous 18 (L) 21 - 28 mmol/L    O2 Sat Venous 84 %    Lactic Acid 0.6 (L) 0.7 - 2.1 mmol/L   Platelets prepare order  unit   Result Value Ref Range    Blood Component Type PLT Pheresis     Units Ordered 1    Blood component   Result Value Ref Range    Unit Number [id number]     Blood Component Type PlateletPheresis LeukoReduced (Part 2)     Division Number 00     Status of Unit No longer available 08/06/2019 2230     Blood Product Code [id number]     Unit Status RET    Blood component   Result Value Ref Range    Unit Number [id number]     Blood Component Type PlateletPheresis,LeukoRed Irrad (Part 2)     Division Number 00     Status of Unit Released to care unit     Blood Product Code [id number]     Unit Status ISS    CT Chest Abdomen Pelvis w/o Contrast    Narrative    Exam: CT abdomen and pelvis without contrast    Comparison: 7/28/2019    History: Chest pain, AAA as suspected    Technique: CT of the abdomen and pelvis was obtained without  intravenous contrast. Coronal and sagittal reconstructions were  obtained and reviewed.    Findings:    Aorta: Evaluation of the patient's known type B aortic dissection is  limited due to absence of IV contrast. The aorta measures 4.6 cm at  the level of the mid aortic arch, previously 4.5 cm. The celiac axis  and SMA arise from the true lumen. The dissection flap appears to  extend into the origin of the celiac axis, with question of  progression compared to the prior study. The renal arteries appear to  arise from the true lumen. The RADHA arises from the false lumen. The  dissection extends into the proximal left common iliac artery.    Chest: Left greater than right pleural effusion. Dependent atelectasis  in the lung bases. No consolidative opacities. The central  tracheobronchial tree is patent.    Visualized thyroid gland is unremarkable. Heart size is normal. No  pericardial effusion. Hypoattenuation of the blood pool, consistent  with anemia. Normal caliber of the main pulmonary artery. No thoracic  lymphadenopathy. Normal esophagus.    Abdomen/pelvis: Limited evaluation of the  abdominal viscera and aorta  due to absence of IV contrast. The liver demonstrates nodular contour,  suggestive of cirrhosis. Cholelithiasis with hyperdense appearance of  the gallbladder, possibly representing vicarious excretion of contrast  from the 7/28/2019 CT in the setting of diminished renal function. The  spleen is borderline enlarged. Normal pancreas and adrenal glands.  Atrophic kidneys with stable right renal cyst. No obstructing urinary  tract calculi. The bladder is partially distended and unremarkable.  Coarse prostatic calcifications.    There is subtle bowel wall thickening involving the stomach, duodenum,  and jejunum, with mild dilation of the jejunum measuring up to 3.5 cm.  Evaluation of bowel wall enhancement is limited due to absence of IV  contrast. The distal small bowel appears normal in caliber. No  evidence of obstruction. Normal caliber of the colon. Diverticulosis  without acute diverticulitis.    Moderate volume free fluid in the abdomen which demonstrates simple  fluid density characteristics. No evidence of retroperitoneal fluid  collection or hemorrhage to suggest perforation or transection of the  aorta. Hazy mesenteric stranding in the upper abdomen. No free air. No  pneumatosis or portal venous gas. No pathologically enlarged abdominal  or pelvic lymph nodes. Gastrohepatic and splenic varices.     Bones: Degenerative changes of the spine. No acute or suspicious  appearing bony abnormalities. Moderate anasarca.      Impression    Impression:   1. Limited evaluation of the patient's known type B aortic dissection  due to absence of IV contrast. There is no evidence of retroperitoneal  hemorrhage or fluid collection to suggest aortic rupture or  transection. There is question of progressive extension of the  dissection flap into the proximal celiac artery with gastric and  proximal small bowel wall thickening/edema and mesenteric edema,  concerning for vascular compromise and early  mesenteric ischemia.   2. Cirrhosis with borderline splenomegaly and portosystemic varices.  Moderate volume simple free fluid in the abdomen. This is increased  considerably from week earlier, suggesting celiac dissection effects  on the liver via hepatic artery.  3. Cholelithiasis without CT evidence of acute cholecystitis.    [Result: No findings to suggest hemorrhage or transection of the  aorta.]    Finding was identified on 8/6/2019 9:59 PM.     Dr. Orr was contacted by Dr. Cortes on 8/6/2019 10:02 PM and  verbalized understanding of the result.      [Result: Possible extension of aortic dissection flap into the celiac  axis with concern for early mesenteric and ischemia]    Finding was identified on 8/6/2019 10:07 PM.     Dr. Stanton was contacted by Dr. Cortes on 8/6/2019 10:10 PM and  verbalized understanding of the result.    I have personally reviewed the examination and initial interpretation  and I agree with the findings.    FREDY CALDWELL MD   US Upper Extremity Arterial Duplex Right    Narrative    This is a preliminary resident report. Full report will follow.      Impression    Impression: Patent right upper extremity arterial ultrasound without  evidence of hemodynamically significant stenosis. No visualized  intra-arterial foreign body to correlate with the possible dislodge  arterial line catheter tip.    UA reflex to Microscopic and Culture   Result Value Ref Range    Color Urine Light Yellow     Appearance Urine Clear     Glucose Urine Negative NEG^Negative mg/dL    Bilirubin Urine Negative NEG^Negative    Ketones Urine Negative NEG^Negative mg/dL    Specific Gravity Urine 1.009 1.003 - 1.035    Blood Urine Moderate (A) NEG^Negative    pH Urine 6.0 5.0 - 7.0 pH    Protein Albumin Urine 100 (A) NEG^Negative mg/dL    Urobilinogen mg/dL Normal 0.0 - 2.0 mg/dL    Nitrite Urine Negative NEG^Negative    Leukocyte Esterase Urine Negative NEG^Negative    Source Clean catch urine     RBC Urine 4  (H) 0 - 2 /HPF    WBC Urine 1 0 - 5 /HPF    Mucous Urine Present (A) NEG^Negative /LPF   Lactic acid whole blood   Result Value Ref Range    Lactic Acid 0.8 0.7 - 2.0 mmol/L   Glucose by meter   Result Value Ref Range    Glucose 104 (H) 70 - 99 mg/dL   Methicillin Resist/Sens S. aureus PCR   Result Value Ref Range    Specimen Description Nares     Methicillin Resist/Sens S. aureus PCR Negative NEG^Negative   Lactic acid whole blood   Result Value Ref Range    Lactic Acid 0.7 0.7 - 2.0 mmol/L   CBC with platelets   Result Value Ref Range    WBC 3.3 (L) 4.0 - 11.0 10e9/L    RBC Count 2.63 (L) 4.4 - 5.9 10e12/L    Hemoglobin 8.1 (L) 13.3 - 17.7 g/dL    Hematocrit 25.4 (L) 40.0 - 53.0 %    MCV 97 78 - 100 fl    MCH 30.8 26.5 - 33.0 pg    MCHC 31.9 31.5 - 36.5 g/dL    RDW 15.0 10.0 - 15.0 %    Platelet Count 48 (LL) 150 - 450 10e9/L   CBC with platelets   Result Value Ref Range    WBC 3.2 (L) 4.0 - 11.0 10e9/L    RBC Count 2.43 (L) 4.4 - 5.9 10e12/L    Hemoglobin 7.6 (L) 13.3 - 17.7 g/dL    Hematocrit 23.1 (L) 40.0 - 53.0 %    MCV 95 78 - 100 fl    MCH 31.3 26.5 - 33.0 pg    MCHC 32.9 31.5 - 36.5 g/dL    RDW 15.1 (H) 10.0 - 15.0 %    Platelet Count 50 (L) 150 - 450 10e9/L   Comprehensive metabolic panel   Result Value Ref Range    Sodium 144 133 - 144 mmol/L    Potassium 4.0 3.4 - 5.3 mmol/L    Chloride 116 (H) 94 - 109 mmol/L    Carbon Dioxide 18 (L) 20 - 32 mmol/L    Anion Gap 10 3 - 14 mmol/L    Glucose 97 70 - 99 mg/dL    Urea Nitrogen 35 (H) 7 - 30 mg/dL    Creatinine 2.96 (H) 0.66 - 1.25 mg/dL    GFR Estimate 22 (L) >60 mL/min/[1.73_m2]    GFR Estimate If Black 26 (L) >60 mL/min/[1.73_m2]    Calcium 7.7 (L) 8.5 - 10.1 mg/dL    Bilirubin Total 0.7 0.2 - 1.3 mg/dL    Albumin 2.7 (L) 3.4 - 5.0 g/dL    Protein Total 6.2 (L) 6.8 - 8.8 g/dL    Alkaline Phosphatase 79 40 - 150 U/L    ALT 59 0 - 70 U/L    AST 41 0 - 45 U/L   Magnesium   Result Value Ref Range    Magnesium 1.8 1.6 - 2.3 mg/dL   Phosphorus   Result Value Ref  Range    Phosphorus 4.4 2.5 - 4.5 mg/dL   Lactic acid whole blood   Result Value Ref Range    Lactic Acid 1.4 0.7 - 2.0 mmol/L   Glucose by meter   Result Value Ref Range    Glucose 95 70 - 99 mg/dL   XR Wrist Port Right G/E 3 Views    Narrative    Exam: 3 views of the right wrist dated 8/7/2019.    COMPARISON: None.    CLINICAL HISTORY: Evaluate for retained foreign body.    FINDINGS: AP, oblique, and lateral views of the right wrist were  obtained. Remote ulnar styloid fracture. The bones are well aligned.  No displaced fractures. No foreign bodies noted.      Impression    IMPRESSION: No foreign bodies in the right wrist.    LAMONT JUNG MD   Glucose by meter   Result Value Ref Range    Glucose 147 (H) 70 - 99 mg/dL   XR Forearm Right 2 Views    Narrative    EXAMINATION: XR FOREARM RT 2 VW  8/7/2019 12:10 PM     CLINICAL HISTORY:  eval foreign body     COMPARISON: None available    FINDINGS: There are 2 views of the right forearm. At the level of the  elbow superficially in the antecubital fossa there is a small  radiodense connector with a blindly ending catheter measuring  approximately 4 cm in length.    There is an osseous fragment at the ulnar styloid chronic appearing. A  small osteophyte is noted at the medial epicondyles of the forearm  distal humerus. Elbow joint spaces are preserved.         Impression    IMPRESSION:   1. Radiodense connector with blindly ending catheter measuring  approximately 4 cm in length located superficially to the elbow joint  in the antecubital fossa.    Referring physician Jose Graves was informed by the  radiologist at 12:30 PM on 8/7/2019.    JUTTA ELLERMANN, MD   Lactic acid whole blood   Result Value Ref Range    Lactic Acid 2.0 0.7 - 2.0 mmol/L

## 2019-08-08 ENCOUNTER — APPOINTMENT (OUTPATIENT)
Dept: ULTRASOUND IMAGING | Facility: CLINIC | Age: 59
DRG: 300 | End: 2019-08-08
Attending: STUDENT IN AN ORGANIZED HEALTH CARE EDUCATION/TRAINING PROGRAM
Payer: COMMERCIAL

## 2019-08-08 ENCOUNTER — OFFICE VISIT (OUTPATIENT)
Dept: INTERPRETER SERVICES | Facility: CLINIC | Age: 59
End: 2019-08-08
Payer: COMMERCIAL

## 2019-08-08 ENCOUNTER — APPOINTMENT (OUTPATIENT)
Dept: PHYSICAL THERAPY | Facility: CLINIC | Age: 59
DRG: 300 | End: 2019-08-08
Attending: PHYSICIAN ASSISTANT
Payer: COMMERCIAL

## 2019-08-08 LAB
ALBUMIN SERPL-MCNC: 2.9 G/DL (ref 3.4–5)
ALP SERPL-CCNC: 80 U/L (ref 40–150)
ALT SERPL W P-5'-P-CCNC: 61 U/L (ref 0–70)
ANION GAP SERPL CALCULATED.3IONS-SCNC: 9 MMOL/L (ref 3–14)
AST SERPL W P-5'-P-CCNC: 42 U/L (ref 0–45)
BILIRUB SERPL-MCNC: 0.4 MG/DL (ref 0.2–1.3)
BLD PROD TYP BPU: NORMAL
BLD UNIT ID BPU: 0
BLOOD PRODUCT CODE: NORMAL
BPU ID: NORMAL
BUN SERPL-MCNC: 44 MG/DL (ref 7–30)
CALCIUM SERPL-MCNC: 7.8 MG/DL (ref 8.5–10.1)
CHLORIDE SERPL-SCNC: 115 MMOL/L (ref 94–109)
CO2 SERPL-SCNC: 17 MMOL/L (ref 20–32)
CREAT SERPL-MCNC: 3.68 MG/DL (ref 0.66–1.25)
ERYTHROCYTE [DISTWIDTH] IN BLOOD BY AUTOMATED COUNT: 15.2 % (ref 10–15)
GFR SERPL CREATININE-BSD FRML MDRD: 17 ML/MIN/{1.73_M2}
GLUCOSE BLDC GLUCOMTR-MCNC: 143 MG/DL (ref 70–99)
GLUCOSE BLDC GLUCOMTR-MCNC: 98 MG/DL (ref 70–99)
GLUCOSE SERPL-MCNC: 139 MG/DL (ref 70–99)
HCT VFR BLD AUTO: 26.3 % (ref 40–53)
HGB BLD-MCNC: 8.5 G/DL (ref 13.3–17.7)
LACTATE BLD-SCNC: 2.7 MMOL/L (ref 0.7–2)
MAGNESIUM SERPL-MCNC: 2.1 MG/DL (ref 1.6–2.3)
MCH RBC QN AUTO: 30.8 PG (ref 26.5–33)
MCHC RBC AUTO-ENTMCNC: 32.3 G/DL (ref 31.5–36.5)
MCV RBC AUTO: 95 FL (ref 78–100)
PHOSPHATE SERPL-MCNC: 3.9 MG/DL (ref 2.5–4.5)
PLATELET # BLD AUTO: 66 10E9/L (ref 150–450)
POTASSIUM SERPL-SCNC: 4.3 MMOL/L (ref 3.4–5.3)
PROT SERPL-MCNC: 7.1 G/DL (ref 6.8–8.8)
RBC # BLD AUTO: 2.76 10E12/L (ref 4.4–5.9)
SODIUM SERPL-SCNC: 141 MMOL/L (ref 133–144)
TRANSFUSION STATUS PATIENT QL: NORMAL
TRANSFUSION STATUS PATIENT QL: NORMAL
WBC # BLD AUTO: 5 10E9/L (ref 4–11)

## 2019-08-08 PROCEDURE — 36415 COLL VENOUS BLD VENIPUNCTURE: CPT | Performed by: STUDENT IN AN ORGANIZED HEALTH CARE EDUCATION/TRAINING PROGRAM

## 2019-08-08 PROCEDURE — T1013 SIGN LANG/ORAL INTERPRETER: HCPCS | Mod: U3

## 2019-08-08 PROCEDURE — 97110 THERAPEUTIC EXERCISES: CPT | Mod: GP

## 2019-08-08 PROCEDURE — 25000132 ZZH RX MED GY IP 250 OP 250 PS 637: Performed by: PHYSICIAN ASSISTANT

## 2019-08-08 PROCEDURE — 25000132 ZZH RX MED GY IP 250 OP 250 PS 637: Performed by: CLINICAL NURSE SPECIALIST

## 2019-08-08 PROCEDURE — 85027 COMPLETE CBC AUTOMATED: CPT | Performed by: STUDENT IN AN ORGANIZED HEALTH CARE EDUCATION/TRAINING PROGRAM

## 2019-08-08 PROCEDURE — 00000146 ZZHCL STATISTIC GLUCOSE BY METER IP

## 2019-08-08 PROCEDURE — 83605 ASSAY OF LACTIC ACID: CPT | Performed by: ANESTHESIOLOGY

## 2019-08-08 PROCEDURE — 80053 COMPREHEN METABOLIC PANEL: CPT | Performed by: STUDENT IN AN ORGANIZED HEALTH CARE EDUCATION/TRAINING PROGRAM

## 2019-08-08 PROCEDURE — 80053 COMPREHEN METABOLIC PANEL: CPT | Performed by: ANESTHESIOLOGY

## 2019-08-08 PROCEDURE — 84100 ASSAY OF PHOSPHORUS: CPT | Performed by: ANESTHESIOLOGY

## 2019-08-08 PROCEDURE — 97140 MANUAL THERAPY 1/> REGIONS: CPT | Mod: GP

## 2019-08-08 PROCEDURE — 12000001 ZZH R&B MED SURG/OB UMMC

## 2019-08-08 PROCEDURE — 83735 ASSAY OF MAGNESIUM: CPT | Performed by: ANESTHESIOLOGY

## 2019-08-08 PROCEDURE — 82570 ASSAY OF URINE CREATININE: CPT | Performed by: PEDIATRICS

## 2019-08-08 PROCEDURE — 97530 THERAPEUTIC ACTIVITIES: CPT | Mod: GP

## 2019-08-08 PROCEDURE — 99233 SBSQ HOSP IP/OBS HIGH 50: CPT | Performed by: PEDIATRICS

## 2019-08-08 PROCEDURE — 84300 ASSAY OF URINE SODIUM: CPT | Performed by: PEDIATRICS

## 2019-08-08 PROCEDURE — 76882 US LMTD JT/FCL EVL NVASC XTR: CPT | Mod: RT

## 2019-08-08 PROCEDURE — 97161 PT EVAL LOW COMPLEX 20 MIN: CPT | Mod: GP

## 2019-08-08 PROCEDURE — 36415 COLL VENOUS BLD VENIPUNCTURE: CPT | Performed by: ANESTHESIOLOGY

## 2019-08-08 RX ORDER — POLYETHYLENE GLYCOL 3350 17 G/17G
17 POWDER, FOR SOLUTION ORAL DAILY
Status: DISCONTINUED | OUTPATIENT
Start: 2019-08-08 | End: 2019-08-10 | Stop reason: HOSPADM

## 2019-08-08 RX ADMIN — AMLODIPINE BESYLATE 10 MG: 10 TABLET ORAL at 08:37

## 2019-08-08 RX ADMIN — DILTIAZEM HYDROCHLORIDE 60 MG: 60 TABLET, FILM COATED ORAL at 18:14

## 2019-08-08 RX ADMIN — CARVEDILOL 25 MG: 12.5 TABLET, FILM COATED ORAL at 18:14

## 2019-08-08 RX ADMIN — DILTIAZEM HYDROCHLORIDE 60 MG: 60 TABLET, FILM COATED ORAL at 12:35

## 2019-08-08 RX ADMIN — DILTIAZEM HYDROCHLORIDE 60 MG: 60 TABLET, FILM COATED ORAL at 23:38

## 2019-08-08 RX ADMIN — CARVEDILOL 25 MG: 12.5 TABLET, FILM COATED ORAL at 08:37

## 2019-08-08 RX ADMIN — POLYETHYLENE GLYCOL 3350 17 G: 17 POWDER, FOR SOLUTION ORAL at 09:06

## 2019-08-08 RX ADMIN — DILTIAZEM HYDROCHLORIDE 60 MG: 60 TABLET, FILM COATED ORAL at 06:02

## 2019-08-08 ASSESSMENT — ACTIVITIES OF DAILY LIVING (ADL)
ADLS_ACUITY_SCORE: 12

## 2019-08-08 NOTE — PROGRESS NOTES
"VASCULAR SURGERY PROGRESS NOTE    Subjective:  No events overnight. Pt feeling weaker this admission than last admission. He denies chest pain. He was up to the chair this morning and has been walking more on the floor when he's out of the ICU.     Objective:  Intake/Output Summary (Last 24 hours) at 8/8/2019 1020  Last data filed at 8/8/2019 0600  Gross per 24 hour   Intake 1297.88 ml   Output 350 ml   Net 947.88 ml     Labs:  ROUTINE IP LABS (Last four results)  BMP  Recent Labs   Lab 08/07/19 0344 08/06/19 2057 08/06/19 2020 08/04/19  0715 08/03/19  0810    144 143 144 141   POTASSIUM 4.0 4.2 4.1 4.0 3.9   CHLORIDE 116*  --  114* 118* 115*   TANO 7.7*  --  8.1* 7.3* 7.6*   CO2 18*  --  19* 18* 18*   BUN 35*  --  34* 38* 46*   CR 2.96*  --  3.17* 3.66* 4.20*   GLC 97 100* 98 131* 136*     CBC  Recent Labs   Lab 08/08/19  0744 08/07/19 0344 08/07/19 0138 08/06/19 2057 08/06/19 2020   WBC 5.0 3.2* 3.3*  --  3.4*   RBC 2.76* 2.43* 2.63*  --  2.79*   HGB 8.5* 7.6* 8.1* 8.5* 8.6*   HCT 26.3* 23.1* 25.4*  --  27.0*   MCV 95 95 97  --  97   MCH 30.8 31.3 30.8  --  30.8   MCHC 32.3 32.9 31.9  --  31.9   RDW 15.2* 15.1* 15.0  --  15.1*   PLT 66* 50* 48*  --  44*     INR  Recent Labs   Lab 08/06/19 2020   INR 1.24*     PHYSICAL EXAM:  /64 (BP Location: Left arm)   Pulse 78   Temp 98  F (36.7  C) (Oral)   Resp 16   Ht 1.651 m (5' 5\")   Wt 76.2 kg (167 lb 15.9 oz)   SpO2 99%   BMI 27.96 kg/m    General: The patient is alert and oriented. Appropriate. No acute distress  Psych: pleasant affect, answers questions appropriately  Skin: Color appropriate for race, warm, dry.  Respiratory: The patient does not require supplemental oxygen. Breathing unlabored  GI:  Abdomen soft, nontender to light palpation.  Extremities: Bilateral DP pulses palpable,  +2 edema noted.        ASSESSMENT:  60 yo man with CKD, hepatitis B w ascites, recent admission for Type B aortic dissection who is readmitted with abdominal " pain, hypertension, and CT findings consistent with possible progression of dissection flap into celiac artery, however limited study due to lack of IV contrast. Additionally, at time of presentation, an arterial line placement was attempted, however the line broke at the skin and there was concern for possible right radial artery or right forearm foreign body. This has yet to be demonstrated on imaging, there is no evidence of neurovascular compromise, and given the risk of anesthesia in this patient who is hesitant to undergo an operation, we will re-image the forearm and unless a foreign body is demonstrated, will hold off on operative intervention.       PLAN:  -Repeat RUE ultrasound today did not locate a foreign body. No RUE exploration planned unless he develops RUE arterial compromise or foreign body can be identified on imaging  -PT consult placed for pt's complaint of generalized weakness  -BP management per internal medicine. Appreciate their assistance. Agree with aggressive BP control, goal systolics <120 mmHg, diastolics <60 mmHg, though defer overall medical picture with hepatic considerations  -Miralax ordered again today for constipation  -Avoid nephrotoxic medications. Renally dose medications   -Plan for likely discharge tomorrow pending good control of BP with oral medications and continued clinical improvement      Concepción Nava PA-C   Division of Vascular Surgery   [facility]   Pager: [phone]

## 2019-08-08 NOTE — PLAN OF CARE
"/63 (BP Location: Left arm)   Pulse 78   Temp 98.4  F (36.9  C) (Oral)   Resp 16   Ht 1.651 m (5' 5\")   Wt 76.2 kg (167 lb 15.9 oz)   SpO2 98%   BMI 27.96 kg/m     AVSS.    Neuro: Alert and oriented x4.    GI/: WDL.    Diet: Regular. Poor po intake as pt c/o abdominal fullness.    Incisions/Drains: None.    IV Access: PIV - right AC.    Activity: Independent. Seen by PT this afternoon for c/o bilateral LE weakness and tightening while walking. Primary team aware.     Pain: No c/o pain.    New changes this shift: None.    Plan: Continue with current cares. Encourage ambulation. Possible discharge home tomorrow.    "

## 2019-08-08 NOTE — PROVIDER NOTIFICATION
Purpose of Notification: BP outside of parameter (per orders)  Notified Person: [phone]; [employer]  Notification Time: 6:26P  Notification Interaction: Paged    Pt's BP/HR checked before 6PM BP meds administered.   Pt's BP: 133/63; team paged.     Orders obtained: re-check BP in 1 hour and notify team. Re-checked at 1900: 121/58; team notified.

## 2019-08-08 NOTE — PLAN OF CARE
"Pt transferred to  from  at 0100  VS: /63 (BP Location: Right arm)   Pulse 78   Temp 97.7  F (36.5  C) (Oral)   Resp 16   Ht 1.651 m (5' 5\")   Wt 76.2 kg (167 lb 15.9 oz)   SpO2 95%   BMI 27.96 kg/m      Neuro: A&Ox4, primarily Hmong speaking (refused , good english)  Cardiac: SBP < 140  Resp: lung sounds clear, no SOB reported, sating well on RA  GI: abdomen rounded and borderline firm, passing gas, bowel sounds active and audible, no BM this shift, Mirilax given in ICU  : voiding spontaneously, not saving urine   Skin: scattered bruising, dusky color in toes but CMS intact, pt refused full skin assessment  Pain/Nausea: denies pain; denies nausea  LDA: L & R PIV SL  Diet: regular  Mobility: ind  Labs: reviewed, WBC 3.2, plt 50, Hgb 7.6  Plan: possible discharge in AM, continue plan of care     "

## 2019-08-08 NOTE — PLAN OF CARE
"PT 7B: Evaluation completed and treatment initiated.   Discharge Planner PT   Patient plan for discharge: Home  Current status: PT re-consulted as pt has been reporting LE weakness with gait to the team.  Upon further interview, pt reports \"tightness\" not weakness in bilateral buttock radiating into posterior thighs.  The pain is not very reproducible on exam, however the symptoms reduce with prone positioning which is indicative of low back etiology.  Instructed pt in a home exercise program to address this.  I attempted to contact the team, but unable to connect, to  Notify them on his presentation as aortic dissections can cause referred back pain, although pt denies focal, bothersome pain at the back and reports more specifically \"tightness\" into the buttocks and posterior thighs.  Barriers to return to prior living situation: Medical status  Recommendations for discharge: Home with exercise program, OP PT referral if symptoms do not resolve in coming weeks  Rationale for recommendations: Pt's ddx of symptoms is a bit complex with recent aortic dissection diagnosis.  Because he responded well to prone postioning, he may indeed have a mechanical back issue in addition to his other acute health issues.  Recommend he seek OP PT if symptoms worsen down the buttock and thighs.  Also recommended he seek medical attention if back pain becomes focal and intense as this may be an indication of worsening of aorta problem.           Entered by: Christy Riggs 08/08/2019 1:51 PM       "

## 2019-08-08 NOTE — PLAN OF CARE
S: Denies any pain. C/o constipation and legs being tired after walking 3-4 minutes. Requesting Miralax. [employer] called. No new orders. Medical team rounded 2200. Pt brought same concerns. Ordered 1x dose miralax and told pt would talk to vasc surgery before more. Voiding adequate amounts. Pulses good, nailbeds toes dusky, cap refill <3sec. VSS within parameters.  B: Aortic dissection.  A: Improving.  R: Monitor for signs decreased perfusion r/t dissection. Notify MD return of pain. Keep BP within parameters. Awaiting bed on 7B.

## 2019-08-08 NOTE — PROGRESS NOTES
08/08/19 1251   Quick Adds   Type of Visit Initial PT Evaluation       Present yes   Language iPad  service   Living Environment   Lives With spouse   Home Accessibility no concerns   Self-Care   Usual Activity Tolerance excellent   Current Activity Tolerance good   Regular Exercise No   Equipment Currently Used at Home none   Activity/Exercise/Self-Care Comment has been up independently walking the halls during this hospital stay.  reports that after last week's discharge he had onset of buttock and leg tightness at home.  this has persisted and now in hospital the symptoms onset with prolonged walking which leads to a sensation of LE weakness and prompts pt to cease walking.   Functional Level Prior   Ambulation 0-->independent   Transferring 0-->independent   Toileting 0-->independent   Bathing 0-->independent   Communication 0-->understands/communicates without difficulty   Swallowing 0-->swallows foods/liquids without difficulty   Cognition 0 - no cognition issues reported   Fall history within last six months no   General Information   Onset of Illness/Injury or Date of Surgery - Date 08/06/19   Referring Physician Concepción Nava PA-C   Patient/Family Goals Statement have less butt/leg tightness when walking   Pertinent History of Current Problem (include personal factors and/or comorbidities that impact the POC) 58 yo man with CKD, hepatitis B w ascites, recent admission for Type B aortic dissection who is readmitted with abdominal pain, hypertension, and CT findings consistent with possible progression of dissection flap into celiac artery, however limited study due to lack of IV contrast. Additionally, at time of presentation, an arterial line placement was attempted, however the line broke at the skin and there was concern for possible right radial artery or right forearm foreign body. This has yet to be demonstrated on imaging, there is no evidence of  "neurovascular compromise, and given the risk of anesthesia in this patient who is hesitant to undergo an operation, we will re-image the forearm and unless a foreign body is demonstrated, will hold off on operative intervention.   Precautions/Limitations fall precautions   General Info Comments PT re-consulted after pt initital order was addressed and completed.  Pt reporting LE weakness to doctors.  Unpon interview today, pt reports tightness down B buttock and posterior thigh that radiates from central low back.  SLR negative.  \"subjective tightness\" centralizes with prone positioning.   Cognitive Status Examination   Orientation orientation to person, place and time   Level of Consciousness alert   Follows Commands and Answers Questions 100% of the time   Personal Safety and Judgment intact   Memory intact   Pain Assessment   Patient Currently in Pain Yes, see Vital Sign flowsheet   Integumentary/Edema   Integumentary/Edema Comments mild edema B lower LE with recent IV fluids   Posture    Posture Forward head position   Range of Motion (ROM)   ROM Quick Adds No deficits were identified   Strength   Manual Muscle Testing Quick Adds No deficits were identified   Bed Mobility   Bed Mobility Comments (I)   Transfer Skills   Transfer Comments (I)   Gait   Gait Comments (I)   Balance   Balance no deficits were identified   Coordination   Coordination no deficits were identified   Muscle Tone   Muscle Tone no deficits were identified   General Therapy Interventions   Planned Therapy Interventions manual therapy;strengthening;stretching;home program guidelines;progressive activity/exercise   Clinical Impression   Criteria for Skilled Therapeutic Intervention yes, treatment indicated   PT Diagnosis sciatica   Influenced by the following impairments radiating LE tightness and pain   Functional limitations due to impairments community gait   Clinical Presentation Stable/Uncomplicated   Clinical Presentation Rationale " "clinical judgment   Clinical Decision Making (Complexity) Low complexity   Therapy Frequency 1x/week   Predicted Duration of Therapy Intervention (days/wks) 1 visit   Anticipated Discharge Disposition Home with Outpatient Therapy   Risk & Benefits of therapy have been explained Yes   Patient, Family & other staff in agreement with plan of care Yes   [facility] AM-PAC  \"6 Clicks\" V.2 Basic Mobility Inpatient Short Form   1. Turning from your back to your side while in a flat bed without using bedrails? 4 - None   2. Moving from lying on your back to sitting on the side of a flat bed without using bedrails? 4 - None   3. Moving to and from a bed to a chair (including a wheelchair)? 4 - None   4. Standing up from a chair using your arms (e.g., wheelchair, or bedside chair)? 4 - None   5. To walk in hospital room? 4 - None   6. Climbing 3-5 steps with a railing? 4 - None   Basic Mobility Raw Score (Score out of 24.Lower scores equate to lower levels of function) 24   Total Evaluation Time   Total Evaluation Time (Minutes) 10        08/08/19 1251   Quick Adds   Type of Visit Initial PT Evaluation       Present yes   Language iPad  service   Living Environment   Lives With spouse   Home Accessibility no concerns   Self-Care   Usual Activity Tolerance excellent   Current Activity Tolerance good   Regular Exercise No   Equipment Currently Used at Home none   Activity/Exercise/Self-Care Comment has been up independently walking the halls during this hospital stay.  reports that after last week's discharge he had onset of buttock and leg tightness at home.  this has persisted and now in hospital the symptoms onset with prolonged walking which leads to a sensation of LE weakness and prompts pt to cease walking.   Functional Level Prior   Ambulation 0-->independent   Transferring 0-->independent   Toileting 0-->independent   Bathing 0-->independent   Communication " "0-->understands/communicates without difficulty   Swallowing 0-->swallows foods/liquids without difficulty   Cognition 0 - no cognition issues reported   Fall history within last six months no   General Information   Onset of Illness/Injury or Date of Surgery - Date 08/06/19   Referring Physician Concepción Nava PA-C   Patient/Family Goals Statement have less butt/leg tightness when walking   Pertinent History of Current Problem (include personal factors and/or comorbidities that impact the POC) 60 yo man with CKD, hepatitis B w ascites, recent admission for Type B aortic dissection who is readmitted with abdominal pain, hypertension, and CT findings consistent with possible progression of dissection flap into celiac artery, however limited study due to lack of IV contrast. Additionally, at time of presentation, an arterial line placement was attempted, however the line broke at the skin and there was concern for possible right radial artery or right forearm foreign body. This has yet to be demonstrated on imaging, there is no evidence of neurovascular compromise, and given the risk of anesthesia in this patient who is hesitant to undergo an operation, we will re-image the forearm and unless a foreign body is demonstrated, will hold off on operative intervention.   Precautions/Limitations fall precautions   General Info Comments PT re-consulted after pt initital order was addressed and completed.  Pt reporting LE weakness to doctors.  Unpon interview today, pt reports tightness down B buttock and posterior thigh that radiates from central low back.  SLR negative.  \"subjective tightness\" centralizes with prone positioning.   Cognitive Status Examination   Orientation orientation to person, place and time   Level of Consciousness alert   Follows Commands and Answers Questions 100% of the time   Personal Safety and Judgment intact   Memory intact   Pain Assessment   Patient Currently in Pain Yes, see Vital Sign " "flowsheet   Integumentary/Edema   Integumentary/Edema Comments mild edema B lower LE with recent IV fluids   Posture    Posture Forward head position   Range of Motion (ROM)   ROM Quick Adds No deficits were identified   Strength   Manual Muscle Testing Quick Adds No deficits were identified   Bed Mobility   Bed Mobility Comments (I)   Transfer Skills   Transfer Comments (I)   Gait   Gait Comments (I)   Balance   Balance no deficits were identified   Coordination   Coordination no deficits were identified   Muscle Tone   Muscle Tone no deficits were identified   General Therapy Interventions   Planned Therapy Interventions manual therapy;strengthening;stretching;home program guidelines;progressive activity/exercise   Clinical Impression   Criteria for Skilled Therapeutic Intervention yes, treatment indicated   PT Diagnosis sciatica   Influenced by the following impairments radiating LE tightness and pain   Functional limitations due to impairments community gait   Clinical Presentation Stable/Uncomplicated   Clinical Presentation Rationale clinical judgment   Clinical Decision Making (Complexity) Low complexity   Therapy Frequency 1x/week   Predicted Duration of Therapy Intervention (days/wks) 1 visit   Anticipated Discharge Disposition Home with Outpatient Therapy   Risk & Benefits of therapy have been explained Yes   Patient, Family & other staff in agreement with plan of care Yes   [facility] AM-PAC  \"6 Clicks\" V.2 Basic Mobility Inpatient Short Form   1. Turning from your back to your side while in a flat bed without using bedrails? 4 - None   2. Moving from lying on your back to sitting on the side of a flat bed without using bedrails? 4 - None   3. Moving to and from a bed to a chair (including a wheelchair)? 4 - None   4. Standing up from a chair using your arms (e.g., wheelchair, or bedside chair)? 4 - None   5. To walk in hospital room? 4 - None   6. Climbing 3-5 steps with a railing? 4 - None "   Basic Mobility Raw Score (Score out of 24.Lower scores equate to lower levels of function) 24   Total Evaluation Time   Total Evaluation Time (Minutes) 10

## 2019-08-09 ENCOUNTER — APPOINTMENT (OUTPATIENT)
Dept: ULTRASOUND IMAGING | Facility: CLINIC | Age: 59
DRG: 300 | End: 2019-08-09
Attending: PEDIATRICS
Payer: COMMERCIAL

## 2019-08-09 LAB
ALBUMIN SERPL-MCNC: 2.7 G/DL (ref 3.4–5)
ALP SERPL-CCNC: 80 U/L (ref 40–150)
ALT SERPL W P-5'-P-CCNC: 63 U/L (ref 0–70)
ANION GAP SERPL CALCULATED.3IONS-SCNC: 8 MMOL/L (ref 3–14)
AST SERPL W P-5'-P-CCNC: 49 U/L (ref 0–45)
BILIRUB SERPL-MCNC: 0.4 MG/DL (ref 0.2–1.3)
BUN SERPL-MCNC: 41 MG/DL (ref 7–30)
CALCIUM SERPL-MCNC: 8 MG/DL (ref 8.5–10.1)
CHLORIDE SERPL-SCNC: 117 MMOL/L (ref 94–109)
CO2 SERPL-SCNC: 19 MMOL/L (ref 20–32)
CREAT SERPL-MCNC: 3.6 MG/DL (ref 0.66–1.25)
CREAT UR-MCNC: 134 MG/DL
ERYTHROCYTE [DISTWIDTH] IN BLOOD BY AUTOMATED COUNT: 14.8 % (ref 10–15)
FRACT EXCRET NA UR+SERPL-RTO: 0.8 %
GFR SERPL CREATININE-BSD FRML MDRD: 17 ML/MIN/{1.73_M2}
GLUCOSE BLDC GLUCOMTR-MCNC: 91 MG/DL (ref 70–99)
GLUCOSE SERPL-MCNC: 93 MG/DL (ref 70–99)
HCT VFR BLD AUTO: 26.3 % (ref 40–53)
HGB BLD-MCNC: 8.3 G/DL (ref 13.3–17.7)
INR PPP: 1.41 (ref 0.86–1.14)
LACTATE BLD-SCNC: 0.6 MMOL/L (ref 0.7–2)
MAGNESIUM SERPL-MCNC: 2 MG/DL (ref 1.6–2.3)
MCH RBC QN AUTO: 30.9 PG (ref 26.5–33)
MCHC RBC AUTO-ENTMCNC: 31.6 G/DL (ref 31.5–36.5)
MCV RBC AUTO: 98 FL (ref 78–100)
PHOSPHATE SERPL-MCNC: 3.3 MG/DL (ref 2.5–4.5)
PLATELET # BLD AUTO: 50 10E9/L (ref 150–450)
POTASSIUM SERPL-SCNC: 4.2 MMOL/L (ref 3.4–5.3)
PROT SERPL-MCNC: 6.7 G/DL (ref 6.8–8.8)
RBC # BLD AUTO: 2.69 10E12/L (ref 4.4–5.9)
SODIUM SERPL-SCNC: 143 MMOL/L (ref 133–144)
SODIUM UR-SCNC: 42 MMOL/L
WBC # BLD AUTO: 2.8 10E9/L (ref 4–11)

## 2019-08-09 PROCEDURE — 85610 PROTHROMBIN TIME: CPT | Performed by: PEDIATRICS

## 2019-08-09 PROCEDURE — 25000132 ZZH RX MED GY IP 250 OP 250 PS 637: Performed by: PEDIATRICS

## 2019-08-09 PROCEDURE — 12000004 ZZH R&B IMCU UMMC

## 2019-08-09 PROCEDURE — 25000132 ZZH RX MED GY IP 250 OP 250 PS 637: Performed by: PHYSICIAN ASSISTANT

## 2019-08-09 PROCEDURE — 83605 ASSAY OF LACTIC ACID: CPT | Performed by: STUDENT IN AN ORGANIZED HEALTH CARE EDUCATION/TRAINING PROGRAM

## 2019-08-09 PROCEDURE — 25000128 H RX IP 250 OP 636: Performed by: PHYSICIAN ASSISTANT

## 2019-08-09 PROCEDURE — 83735 ASSAY OF MAGNESIUM: CPT | Performed by: STUDENT IN AN ORGANIZED HEALTH CARE EDUCATION/TRAINING PROGRAM

## 2019-08-09 PROCEDURE — 80053 COMPREHEN METABOLIC PANEL: CPT | Performed by: STUDENT IN AN ORGANIZED HEALTH CARE EDUCATION/TRAINING PROGRAM

## 2019-08-09 PROCEDURE — 93975 VASCULAR STUDY: CPT | Mod: TC

## 2019-08-09 PROCEDURE — 00000146 ZZHCL STATISTIC GLUCOSE BY METER IP

## 2019-08-09 PROCEDURE — 99232 SBSQ HOSP IP/OBS MODERATE 35: CPT | Performed by: PEDIATRICS

## 2019-08-09 PROCEDURE — 85027 COMPLETE CBC AUTOMATED: CPT | Performed by: STUDENT IN AN ORGANIZED HEALTH CARE EDUCATION/TRAINING PROGRAM

## 2019-08-09 PROCEDURE — 25000125 ZZHC RX 250: Performed by: HOSPITALIST

## 2019-08-09 PROCEDURE — 84100 ASSAY OF PHOSPHORUS: CPT | Performed by: STUDENT IN AN ORGANIZED HEALTH CARE EDUCATION/TRAINING PROGRAM

## 2019-08-09 PROCEDURE — 36415 COLL VENOUS BLD VENIPUNCTURE: CPT | Performed by: STUDENT IN AN ORGANIZED HEALTH CARE EDUCATION/TRAINING PROGRAM

## 2019-08-09 RX ORDER — ACETAMINOPHEN 325 MG/1
650 TABLET ORAL EVERY 6 HOURS PRN
Status: DISCONTINUED | OUTPATIENT
Start: 2019-08-09 | End: 2019-08-10 | Stop reason: HOSPADM

## 2019-08-09 RX ORDER — DILTIAZEM HCL 90 MG
90 TABLET ORAL EVERY 6 HOURS SCHEDULED
Status: DISCONTINUED | OUTPATIENT
Start: 2019-08-09 | End: 2019-08-10 | Stop reason: HOSPADM

## 2019-08-09 RX ORDER — METOPROLOL TARTRATE 1 MG/ML
2.5-5 INJECTION, SOLUTION INTRAVENOUS EVERY 5 MIN PRN
Status: COMPLETED | OUTPATIENT
Start: 2019-08-09 | End: 2019-08-09

## 2019-08-09 RX ORDER — SENNOSIDES 8.6 MG
8.6 TABLET ORAL 2 TIMES DAILY PRN
Status: DISCONTINUED | OUTPATIENT
Start: 2019-08-09 | End: 2019-08-10 | Stop reason: HOSPADM

## 2019-08-09 RX ORDER — BISACODYL 10 MG
10 SUPPOSITORY, RECTAL RECTAL DAILY PRN
Status: DISCONTINUED | OUTPATIENT
Start: 2019-08-09 | End: 2019-08-10 | Stop reason: HOSPADM

## 2019-08-09 RX ADMIN — LABETALOL 20 MG/4 ML (5 MG/ML) INTRAVENOUS SYRINGE 10 MG: at 01:36

## 2019-08-09 RX ADMIN — DILTIAZEM HYDROCHLORIDE 60 MG: 60 TABLET, FILM COATED ORAL at 05:48

## 2019-08-09 RX ADMIN — METOPROLOL TARTRATE 2.5 MG: 5 INJECTION INTRAVENOUS at 02:10

## 2019-08-09 RX ADMIN — DILTIAZEM HYDROCHLORIDE 90 MG: 90 TABLET, FILM COATED ORAL at 23:38

## 2019-08-09 RX ADMIN — LABETALOL 20 MG/4 ML (5 MG/ML) INTRAVENOUS SYRINGE 20 MG: at 08:47

## 2019-08-09 RX ADMIN — METOPROLOL TARTRATE 5 MG: 5 INJECTION INTRAVENOUS at 03:03

## 2019-08-09 RX ADMIN — AMLODIPINE BESYLATE 10 MG: 10 TABLET ORAL at 06:37

## 2019-08-09 RX ADMIN — CARVEDILOL 25 MG: 12.5 TABLET, FILM COATED ORAL at 17:27

## 2019-08-09 RX ADMIN — METOPROLOL TARTRATE 5 MG: 5 INJECTION INTRAVENOUS at 03:22

## 2019-08-09 RX ADMIN — CARVEDILOL 25 MG: 12.5 TABLET, FILM COATED ORAL at 06:36

## 2019-08-09 RX ADMIN — DILTIAZEM HYDROCHLORIDE 90 MG: 90 TABLET, FILM COATED ORAL at 17:27

## 2019-08-09 RX ADMIN — LABETALOL 20 MG/4 ML (5 MG/ML) INTRAVENOUS SYRINGE 20 MG: at 15:30

## 2019-08-09 RX ADMIN — DILTIAZEM HYDROCHLORIDE 90 MG: 90 TABLET, FILM COATED ORAL at 11:29

## 2019-08-09 RX ADMIN — METOPROLOL TARTRATE 2.5 MG: 5 INJECTION INTRAVENOUS at 01:52

## 2019-08-09 ASSESSMENT — ACTIVITIES OF DAILY LIVING (ADL)
ADLS_ACUITY_SCORE: 12

## 2019-08-09 NOTE — PLAN OF CARE
"/66 (BP Location: Left arm)   Pulse 78   Temp 98.3  F (36.8  C) (Oral)   Resp 12   Ht 1.651 m (5' 5\")   Wt 76.2 kg (167 lb 15.9 oz)   SpO2 99%   BMI 27.96 kg/m      Neuro: A&Ox4.   Cardiac: SR 60's. BP's ranging from 120-140/60-70. (Goal SBP <120). MD aware. PRN Labetalol 20mg given x2 q4h  Respiratory: Sating 98% on RA   GI/: Adequate urine output. BM X1   Diet/appetite: Tolerating regular diet. Eating well.  Activity:  Independent. up to chair and in halls.  Pain: At acceptable level on current regimen. Denies   Skin: No new deficits noted.  LDA's: PIV x1 SL     Plan: Continue with POC. Notify primary team with changes.    "

## 2019-08-09 NOTE — PLAN OF CARE
"Neuro: A&Ox4.   Cardiac: SR. VSS. /57   Pulse 78   Temp 98.6  F (37  C) (Oral)   Resp 16   Ht 1.651 m (5' 5\")   Wt 76.2 kg (167 lb 15.9 oz)   SpO2 97%   BMI 27.96 kg/m     Respiratory: Sating  94-99% on RA.  GI/: Adequate urine output. No BM  Diet/appetite: NPO for procedure  Activity:  Independent up to chair and in halls.  Pain: At acceptable level on current regimen.   Skin: No new deficits noted.  LDA's: PIV of right and left saline locked      Patient transferred to the unit at 0220 am.  Primary focus since arriving on the unit has been blood pressure management.  0800 doses of amlodipine and coreg were given at 0635 with approval from [facility] [employer] [facility].  Patient has been NPO since midnight for an ultrasound on  8/9.    Plan: Continue with POC. Notify primary team with changes.   "

## 2019-08-09 NOTE — PROVIDER NOTIFICATION
0300- Voice page to [employer] to notify of blood pressure remaining in the 130/70's.     New order received See Mar.    0432--6B. 36-2. LAURO Watson 8; SBP ranging 130-133; DBP 62-65; and HR 62-71.  Last two doses of Metoprolol given at 0303 and 0322. New orders?     0523--No call back.  Re-paged provider.    0523-6B. 36-2Arina Watson 8. Blood pressures have increased in last 45 min to -147/ DBP 70-79.  Heart rate 60-70 bpm.      0600--No call from provider.  Re-paged through voice system.  Calling to ask provider if 0800 blood pressure meds can be given now.    0632--Provider called back and authorized morning coreg and norvasc to be given early..

## 2019-08-09 NOTE — PROVIDER NOTIFICATION
Paged Dr Diallo at this time re: Pt's blood pressures still not under parameters, current reading 140/77. No PRN's available, just gave noon dose of Diltiazem 90mg. No new orders at this time. Will continue to monitor and follow plan of care.

## 2019-08-09 NOTE — PROGRESS NOTES
Pt to 6B, in need of tele. SBP consistently >120 HR mid 60's after 10mg IV Labetalol + 5mg IV metropolol. Report given to 6B RN.

## 2019-08-09 NOTE — PLAN OF CARE
"/58 (BP Location: Left arm)   Pulse 78   Temp 98.5  F (36.9  C) (Oral)   Resp 16   Ht 1.651 m (5' 5\")   Wt 76.2 kg (167 lb 15.9 oz)   SpO2 97%   BMI 27.96 kg/m      Neuro: A&Ox4; calls appropriately  Respiratory: sats 97% on RA. Denies SOB. Denies chest pain.  Cardiac: SBP's 120s-130s. Team paged   GI/: +BS, +passing flatus, no BM this shift. Voiding adequately, not saving.   Diet: Regular diet, tolerating well. Strict I/Os.   Pain: Denies prn pain meds. C/O bilateral lower weakness and leg tightness. Team is aware.    Incisions/Drains: None  IV Access: Left PIV- saline locked  Labs: B and 98. Creatinine 3.68  Activity: Up independently  Plan: Continue to monitor BPs and notify team of any changes. Urine sample for sodium excretion collected and sent to lab. Pt is scheduled for renal US tomorrow. NPO starting at midnight. Possible discharge tomorrow. Continue plan of care.    "

## 2019-08-09 NOTE — PROGRESS NOTES
[facility], [place]  Consult Note - Hospitalist Service, [facility]      Date of Admission:  8/6/2019  Consult Requested by: Concepción Nava  Reason for Consult: BP control    Assessment & Plan   Elle Blanton is a 59 year old male admitted on 8/6/2019. He has PMH of untreated Hep B, HTN, CKD, thrombocytopenia and Type B aortic dissection who presented from home with abdominal pain w/ Imaging concerning for progression of dissection flap into the celiac artery w/o bowel wall thickening, though with free fluid (ascites) in the abdomen. Code Aorta Red called. Course c/b RUE foreign body (possible broken piece of arterial catheter from arterial line placement. Patient initiated on esmolol gtt and transferred to ICU for aggressive BP control. Patient to be transferred to floor. Medicine consulted for assistance with BP control.    Now with rising creatinine.      # Type B aortic Dissection w/ concern for progression of flap into the celiac artery  # HTN  Recent admission 7/28-8/4/19 for Type B aortic dissection - medically managed Imaging on admission limited d/t lack of contrast. Non-con CT of abdomen: Type B dissection measuring 4.6 from previous 4.5 cm; dissection flap appears to extend into the origin of the celiac axis with progression compared to previous study. Current plan per chart review: Aggressive BP mgmt w/ goal of SBP less than 120, DBP less than 60. Most recent BP appears to be 100/52, HR 57.  We had a long conversation about his disease.  What is blood pressure?  What is the aorta?  How does blood pressure affect the aorta.  He states that he now understands why it is good for most people to have lower blood pressures, but why this is especially important for him.  Discussed symptoms to return to the hospital. Also discussed low blood pressures which he was concerned about.  We talked about getting dizzy or light headed, and that the first thing is to drink some more  water if that happens.  Second is to call to your clinic.      He also asked about what can he do for exercise now - jog on a treadmill? Or is that not allowed?  - I could not answer this  He also asked about if he would get higher doses of these medicines or new medicines. At this time   - Recommend staying on Coreg dose 25 mg BID   - Recommend staying on amlodipine 10 mg daily  - Recommend continuing diltiazem currently 60 mg Q6h if this dose remains, can change to 240 mg extended release daily  - Would not recommend use of hydralazine w/ Aortic dissection  - Would recommend Nephrology and Cardiology consults with CKD, Type B aortic dissection and bradycardia on >than max dose coreg previously  - Would avoid further AV lamont blocking with recent history of HR in high 50's and /52.  - Please notify Medicine if SBP greater than 120 - ok is SBP is about 120 or goes up just a little and then goes down    # CKD III:  Cr 2.96.-3.68 BL Cr appears 3.5-4.0. Still makes urine. Nephrology consulted on recent admission. Plan for outpatient Nephrology consult w/ vein mapping for possible future HD needs. Had an anephric rise in his creatinine from 8/7 -> 8/8, but has had Cr 3-4 range. Follow Creatinine in AM. Most likely this is from changes in blood flow with his bradycardia and adjusting BP meds.   - would not recommend discharge until certain that his kidneys are stable on his blood pressure regiment,.   - ordered FENA  - ordered renal US with dopplers  - strict I's and O's  - Recommend Nephrology consult   -Avoid hypotension, dehydration, nephrotoxic medications  - Renally dose medications     # Pancytopenia: WBC 3.1, Hgb 7.6, Platelets 50. Peripheral smear (7/29/19) w/ marked normochromic, normocytic anemia, moderate leukopenia, lymphocytopenia, marked thrombocytopenia w/ normal platelet morphology. Likely in setting of underlying liver disease.  - Monitor    # Constipation: Patient w/o BM x 3 days and reports  feeling abdominal distention. Likely combination of constipation and ascites contributing. Miralax x1, per patient request. Will defer further management to primary team.    # Untreated Hep B: GI consulted on recent admission w/ recommendations for entecavir (not yet started) and f/u on serologies and GI clinic.   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  - If any signs of worsening recommend paracentesis for SBP,  - Follow up with GI as scheduled  - Management of possible celiac dissection as above    #foreign body sensation left eye  - ordered eye drops  - encouraged patient to flush out eye in shower.     The patient's care was discussed with the patient and recommendations were communicated to the primary service via this note.      Bridget Diallo MD  Internal Medicine Hospitalist Service  [facility]  Pager: [phone]    Please see sticky note for cross cover information  ______________________________________________________________________    Chief Complaint   BP control    History is obtained from the patient and EMR    Over Night  Patient had some lower heart rates and blood pressures, but those have improved. He has continued to urinate.  He states that he is feeling much better but has many questions. He has no abdominal pain at this point in time.      Review of Systems   The 10 point Review of Systems is negative other than noted in the HPI or here.     Past Medical History    I have reviewed this patient's medical history and updated it with pertinent information if needed.   Past Medical History:   Diagnosis Date     Hepatitis B        Past Surgical History   I have reviewed this patient's surgical history and updated it with pertinent information if needed.  Past Surgical History:   Procedure  Laterality Date     INCISION AND DRAINAGE FINGER, COMBINED Left 10/20/2016    Procedure: COMBINED INCISION AND DRAINAGE FINGER;  Surgeon: Mireya Pizano MD;  Location: [facility] OR       Social History   I have reviewed this patient's social history and updated it with pertinent information if needed.  Social History     Tobacco Use     Smoking status: Never Smoker   Substance Use Topics     Alcohol use: No     Drug use: No       Family History   I have reviewed this patient's family history and updated it with pertinent information if needed.   Family History   Problem Relation Age of Onset     Diabetes Father      Hypertension No family hx of      Asthma No family hx of      Cancer No family hx of      Coronary Artery Disease No family hx of      Liver Cancer No family hx of        Medications   Medications Prior to Admission   Medication Sig Dispense Refill Last Dose     amLODIPine (NORVASC) 10 MG tablet Take 1 tablet (10 mg) by mouth daily 30 tablet 11 8/6/2019 at Unknown time     carvedilol (COREG) 25 MG tablet Take 2 tablets (50 mg) by mouth 2 times daily (with meals) 60 tablet 11 8/6/2019 at Unknown time     entecavir (BARACLUDE) 0.5 MG tablet Take 1 tablet (0.5 mg) by mouth every 72 hours 10 tablet 0 NOT STARTED at Unknown time     polyethylene glycol (MIRALAX/GLYCOLAX) packet Take 17 g by mouth daily as needed for constipation 10 packet 3 Past Month at Unknown time       Allergies   Allergies   Allergen Reactions     Nka [No Known Allergies]        Physical Exam   Vital Signs: Temp: 98.5  F (36.9  C) Temp src: Oral BP: 121/58   Heart Rate: 67 Resp: 16 SpO2: 97 % O2 Device: None (Room air)    Weight: 167 lbs 15.85 oz{      Physical Exam   Constitutional:  Pleasant Homng man sitting up in chair. Well nourished, well developed, resting comfortably :   Head: Normocephalic and atraumatic.   Eyes: Conjunctivae are normal. Pupils are equal, round, and reactive to light.   Cardiovascular: RRR. II/VI CORNELL heard best  at LUSB. No gallops  Pulmonary/Chest: CTAB  GI: Soft with good bowel sounds.  Non-tender. Distended, soft, no guarding, no rebound, no peritoneal signs.   Back:  No bony or CVA tenderness   Musculoskeletal: Trace bilateral edema to knee. No clubbing   Skin: Skin is warm and dry. No rash noted to exposed skin areas.   Neurological: Alert and oriented to person, place, and time. Nonfocal exam  Psychiatric:  Normal mood and affect.      Data   Results for orders placed or performed during the hospital encounter of 08/06/19 (from the past 24 hour(s))   CBC with platelets   Result Value Ref Range    WBC 5.0 4.0 - 11.0 10e9/L    RBC Count 2.76 (L) 4.4 - 5.9 10e12/L    Hemoglobin 8.5 (L) 13.3 - 17.7 g/dL    Hematocrit 26.3 (L) 40.0 - 53.0 %    MCV 95 78 - 100 fl    MCH 30.8 26.5 - 33.0 pg    MCHC 32.3 31.5 - 36.5 g/dL    RDW 15.2 (H) 10.0 - 15.0 %    Platelet Count 66 (L) 150 - 450 10e9/L   US Extremity Non Vascular Right    Narrative    Ultrasound of right upper extremity nonvascular 8/8/2019    COMPARISON: Radiograph of the right forearm on 8/7/2019 used for  correlation    INDICATION: Evaluate for foreign body in right upper extremity    TECHNIQUE: Grayscale evaluation of the right upper extremity.    FINDINGS: Sonographic evaluation of the right forearm to evaluate for  foreign body visualized on radiograph dated 8/7/2019.     The area of interest was not well-visualized secondary to an  intravenous line in place and bandage obscuring the antecubital fossa.  Amira body visualized on prior radiograph was not identified. By  sonographic.     Incidentally noted nonocclusive, partially compressible superficial  venous thrombus surrounding the intravenous catheter line within the  right cephalic vein peripheral to the antecubital fossa.      Impression    IMPRESSION:  1. Unable to visualize foreign body identified on yesterday's plain  film; area of interest was obscured by overlying bandage.  2. Incidentally noted  nonocclusive superficial venous thrombus in the  right cephalic vein peripheral to the antecubital fossa.    I have personally reviewed the examination and initial interpretation  and I agree with the findings.    SAV BREEN MD   Comprehensive metabolic panel   Result Value Ref Range    Sodium 141 133 - 144 mmol/L    Potassium 4.3 3.4 - 5.3 mmol/L    Chloride 115 (H) 94 - 109 mmol/L    Carbon Dioxide 17 (L) 20 - 32 mmol/L    Anion Gap 9 3 - 14 mmol/L    Glucose 139 (H) 70 - 99 mg/dL    Urea Nitrogen 44 (H) 7 - 30 mg/dL    Creatinine 3.68 (H) 0.66 - 1.25 mg/dL    GFR Estimate 17 (L) >60 mL/min/[1.73_m2]    GFR Estimate If Black 20 (L) >60 mL/min/[1.73_m2]    Calcium 7.8 (L) 8.5 - 10.1 mg/dL    Bilirubin Total 0.4 0.2 - 1.3 mg/dL    Albumin 2.9 (L) 3.4 - 5.0 g/dL    Protein Total 7.1 6.8 - 8.8 g/dL    Alkaline Phosphatase 80 40 - 150 U/L    ALT 61 0 - 70 U/L    AST 42 0 - 45 U/L   Lactic acid whole blood   Result Value Ref Range    Lactic Acid 2.7 (H) 0.7 - 2.0 mmol/L   Magnesium   Result Value Ref Range    Magnesium 2.1 1.6 - 2.3 mg/dL   Phosphorus   Result Value Ref Range    Phosphorus 3.9 2.5 - 4.5 mg/dL   Glucose by meter   Result Value Ref Range    Glucose 143 (H) 70 - 99 mg/dL   Glucose by meter   Result Value Ref Range    Glucose 98 70 - 99 mg/dL

## 2019-08-09 NOTE — PLAN OF CARE
Transfer  Transferred from: 7B  Via:bed  Reason for transfer:Pt appropriate for 6B- worsened patient condition--need tele monitoring and frequent blood pressure and heart rate monitoring.  Family: Aware of transfer: yes  Belongings: Received with pt: yes  Chart: Received with pt: yes  Medications: Meds received from old unit with pt: yes  2 RN Skin Assessment Completed By: Marielena ESPANA And Nakul ESPANA  Report received from: Donnell  Pt status:  Temp: 98.6 oral;  Blood pressure 133/70;  Heart Rate 66 bpm; MAP: 93; RR 16 on room air.  Patient is alert and oriented times 4; independent; continent of bowel and bladder.  Arrived on unit via bed transported by rafael RN.  Placed on tele on arrival to the unit.

## 2019-08-09 NOTE — PROGRESS NOTES
"VASCULAR SURGERY PROGRESS NOTE    Subjective:  Overnight blood pressures rising to 140s systolic. Patient transferred to  for close hemodynamic monitoring, and received labetalol 10 mg once, metoprolol 5 mg x 2, metoprolol 2.5 x 2.   No new complains per patient, who is seen en route to ultrasound this AM. Siting up in wheelchair with wife at side. Pain improved.   Undergoing workup for NAHUM per medicine. Cr this AM is 3.6 from 3.17 on admission, reached jose alfredo of 2.96 on 08/07.     Objective:  Intake/Output Summary (Last 24 hours) at 8/8/2019 1020  Last data filed at 8/8/2019 0600  Gross per 24 hour   Intake 1297.88 ml   Output 350 ml   Net 947.88 ml     Labs:  ROUTINE IP LABS (Last four results)  BMP  Recent Labs   Lab 08/09/19  0545 08/08/19  1016 08/07/19  0344 08/06/19 2057 08/06/19 2020    141 144 144 143   POTASSIUM 4.2 4.3 4.0 4.2 4.1   CHLORIDE 117* 115* 116*  --  114*   TANO 8.0* 7.8* 7.7*  --  8.1*   CO2 19* 17* 18*  --  19*   BUN 41* 44* 35*  --  34*   CR 3.60* 3.68* 2.96*  --  3.17*   GLC 93 139* 97 100* 98     CBC  Recent Labs   Lab 08/09/19  0545 08/08/19  0744 08/07/19 0344 08/07/19  0138   WBC 2.8* 5.0 3.2* 3.3*   RBC 2.69* 2.76* 2.43* 2.63*   HGB 8.3* 8.5* 7.6* 8.1*   HCT 26.3* 26.3* 23.1* 25.4*   MCV 98 95 95 97   MCH 30.9 30.8 31.3 30.8   MCHC 31.6 32.3 32.9 31.9   RDW 14.8 15.2* 15.1* 15.0   PLT 50* 66* 50* 48*     INR  Recent Labs   Lab 08/09/19  0545 08/06/19 2020   INR 1.41* 1.24*     PHYSICAL EXAM:  /57   Pulse 78   Temp 98.6  F (37  C) (Oral)   Resp 16   Ht 1.651 m (5' 5\")   Wt 76.2 kg (167 lb 15.9 oz)   SpO2 97%   BMI 27.96 kg/m    General: The patient is alert and oriented. Appropriate. No acute distress, seen sitting up in chair with wife present  Psych: pleasant affect, answers questions appropriately  Skin: Color appropriate for race, warm, dry.  Respiratory: The patient does not require supplemental oxygen. Breathing unlabored   GI:  Abdomen soft, nontender to " light palpation.  Extremities: Bilateral DP pulses palpable,  +2 edema noted.        ASSESSMENT:  60 yo man with CKD, hepatitis B w ascites, recent admission for Type B aortic dissection who is readmitted with abdominal pain, hypertension, and CT findings consistent with possible progression of dissection flap into celiac artery, however limited study due to lack of IV contrast. Additionally, at time of presentation, an arterial line placement was attempted, however the line broke at the skin and there was concern for possible right radial artery or right forearm foreign body. This was not demonstrated on any re-imaging and there is no evidence of neurovascular compromise necessitating operative intervention. The patient's blood pressures have been difficult to control, and he does have a new rise in creatinine. Appreciate medicine recommendations for management of hypertension and workup of NAHUM.        PLAN:  - BP management per internal medicine. Appreciate their assistance. Agree with aggressive BP control, goal systolics <120 mmHg, diastolics <60 mmHg, though defer overall medical picture with hepatic considerations  - Daily miralax with prn senna and suppository  - Avoid nephrotoxic medications. Renally dose medications   - Plan for likely discharge pending good control of BP with oral medications, resolving NAHUM, and continued clinical improvement    Jose Guajardo MD  Surgery Resident PGY-2

## 2019-08-10 ENCOUNTER — PATIENT OUTREACH (OUTPATIENT)
Dept: CARE COORDINATION | Facility: CLINIC | Age: 59
End: 2019-08-10

## 2019-08-10 VITALS
BODY MASS INDEX: 27.81 KG/M2 | HEIGHT: 65 IN | WEIGHT: 166.89 LBS | HEART RATE: 68 BPM | RESPIRATION RATE: 18 BRPM | DIASTOLIC BLOOD PRESSURE: 73 MMHG | OXYGEN SATURATION: 98 % | TEMPERATURE: 98 F | SYSTOLIC BLOOD PRESSURE: 132 MMHG

## 2019-08-10 LAB
ALBUMIN SERPL-MCNC: 2.8 G/DL (ref 3.4–5)
ALP SERPL-CCNC: 69 U/L (ref 40–150)
ALT SERPL W P-5'-P-CCNC: 67 U/L (ref 0–70)
ANION GAP SERPL CALCULATED.3IONS-SCNC: 6 MMOL/L (ref 3–14)
AST SERPL W P-5'-P-CCNC: 51 U/L (ref 0–45)
BILIRUB SERPL-MCNC: 0.4 MG/DL (ref 0.2–1.3)
BUN SERPL-MCNC: 39 MG/DL (ref 7–30)
CALCIUM SERPL-MCNC: 7.9 MG/DL (ref 8.5–10.1)
CHLORIDE SERPL-SCNC: 116 MMOL/L (ref 94–109)
CO2 SERPL-SCNC: 21 MMOL/L (ref 20–32)
CREAT SERPL-MCNC: 3.44 MG/DL (ref 0.66–1.25)
ERYTHROCYTE [DISTWIDTH] IN BLOOD BY AUTOMATED COUNT: 14.7 % (ref 10–15)
GFR SERPL CREATININE-BSD FRML MDRD: 18 ML/MIN/{1.73_M2}
GLUCOSE SERPL-MCNC: 89 MG/DL (ref 70–99)
HCT VFR BLD AUTO: 25.6 % (ref 40–53)
HGB BLD-MCNC: 8.1 G/DL (ref 13.3–17.7)
LACTATE BLD-SCNC: 0.6 MMOL/L (ref 0.7–2)
MAGNESIUM SERPL-MCNC: 2.1 MG/DL (ref 1.6–2.3)
MCH RBC QN AUTO: 30.8 PG (ref 26.5–33)
MCHC RBC AUTO-ENTMCNC: 31.6 G/DL (ref 31.5–36.5)
MCV RBC AUTO: 97 FL (ref 78–100)
PHOSPHATE SERPL-MCNC: 3.4 MG/DL (ref 2.5–4.5)
PLATELET # BLD AUTO: 53 10E9/L (ref 150–450)
POTASSIUM SERPL-SCNC: 4.2 MMOL/L (ref 3.4–5.3)
PROT SERPL-MCNC: 6.5 G/DL (ref 6.8–8.8)
RBC # BLD AUTO: 2.63 10E12/L (ref 4.4–5.9)
SODIUM SERPL-SCNC: 142 MMOL/L (ref 133–144)
WBC # BLD AUTO: 2.9 10E9/L (ref 4–11)

## 2019-08-10 PROCEDURE — 99232 SBSQ HOSP IP/OBS MODERATE 35: CPT | Performed by: PEDIATRICS

## 2019-08-10 PROCEDURE — 83605 ASSAY OF LACTIC ACID: CPT | Performed by: HOSPITALIST

## 2019-08-10 PROCEDURE — 25000128 H RX IP 250 OP 636: Performed by: HOSPITALIST

## 2019-08-10 PROCEDURE — 25000132 ZZH RX MED GY IP 250 OP 250 PS 637: Performed by: HOSPITALIST

## 2019-08-10 PROCEDURE — 85027 COMPLETE CBC AUTOMATED: CPT | Performed by: HOSPITALIST

## 2019-08-10 PROCEDURE — 83735 ASSAY OF MAGNESIUM: CPT | Performed by: HOSPITALIST

## 2019-08-10 PROCEDURE — 25000132 ZZH RX MED GY IP 250 OP 250 PS 637: Performed by: PEDIATRICS

## 2019-08-10 PROCEDURE — 80053 COMPREHEN METABOLIC PANEL: CPT | Performed by: HOSPITALIST

## 2019-08-10 PROCEDURE — 84100 ASSAY OF PHOSPHORUS: CPT | Performed by: HOSPITALIST

## 2019-08-10 PROCEDURE — 36415 COLL VENOUS BLD VENIPUNCTURE: CPT | Performed by: HOSPITALIST

## 2019-08-10 RX ORDER — ENTECAVIR 0.5 MG/1
0.5 TABLET, FILM COATED ORAL
Status: DISCONTINUED | OUTPATIENT
Start: 2019-08-10 | End: 2019-08-10

## 2019-08-10 RX ORDER — CLONIDINE HYDROCHLORIDE 0.1 MG/1
0.1 TABLET ORAL 2 TIMES DAILY
Status: DISCONTINUED | OUTPATIENT
Start: 2019-08-10 | End: 2019-08-10 | Stop reason: HOSPADM

## 2019-08-10 RX ORDER — ENTECAVIR 0.5 MG/1
0.5 TABLET, FILM COATED ORAL
Qty: 11 TABLET | Refills: 3 | Status: SHIPPED | OUTPATIENT
Start: 2019-08-11 | End: 2020-09-23

## 2019-08-10 RX ORDER — CLONIDINE HYDROCHLORIDE 0.1 MG/1
0.1 TABLET ORAL 2 TIMES DAILY
Qty: 60 TABLET | Refills: 0 | Status: SHIPPED | OUTPATIENT
Start: 2019-08-10 | End: 2019-09-06

## 2019-08-10 RX ORDER — DILTIAZEM HCL 90 MG
90 TABLET ORAL EVERY 6 HOURS
Qty: 120 TABLET | Refills: 0 | Status: SHIPPED | OUTPATIENT
Start: 2019-08-10 | End: 2019-08-14 | Stop reason: ALTCHOICE

## 2019-08-10 RX ORDER — ENTECAVIR 0.5 MG/1
0.5 TABLET, FILM COATED ORAL
Status: DISCONTINUED | OUTPATIENT
Start: 2019-08-11 | End: 2019-08-10 | Stop reason: HOSPADM

## 2019-08-10 RX ORDER — FERROUS SULFATE 325(65) MG
325 TABLET, DELAYED RELEASE (ENTERIC COATED) ORAL 2 TIMES DAILY
Qty: 60 TABLET | Refills: 0 | Status: SHIPPED | OUTPATIENT
Start: 2019-08-10 | End: 2019-08-14

## 2019-08-10 RX ADMIN — POLYETHYLENE GLYCOL 3350 17 G: 17 POWDER, FOR SOLUTION ORAL at 11:25

## 2019-08-10 RX ADMIN — AMLODIPINE BESYLATE 10 MG: 10 TABLET ORAL at 08:27

## 2019-08-10 RX ADMIN — DILTIAZEM HYDROCHLORIDE 90 MG: 90 TABLET, FILM COATED ORAL at 17:44

## 2019-08-10 RX ADMIN — LABETALOL 20 MG/4 ML (5 MG/ML) INTRAVENOUS SYRINGE 20 MG: at 07:25

## 2019-08-10 RX ADMIN — CLONIDINE HYDROCHLORIDE 0.1 MG: 0.1 TABLET ORAL at 10:18

## 2019-08-10 RX ADMIN — DILTIAZEM HYDROCHLORIDE 90 MG: 90 TABLET, FILM COATED ORAL at 05:10

## 2019-08-10 RX ADMIN — LABETALOL 20 MG/4 ML (5 MG/ML) INTRAVENOUS SYRINGE 10 MG: at 14:54

## 2019-08-10 RX ADMIN — CARVEDILOL 25 MG: 12.5 TABLET, FILM COATED ORAL at 17:44

## 2019-08-10 RX ADMIN — CARVEDILOL 25 MG: 12.5 TABLET, FILM COATED ORAL at 08:27

## 2019-08-10 RX ADMIN — DILTIAZEM HYDROCHLORIDE 90 MG: 90 TABLET, FILM COATED ORAL at 11:31

## 2019-08-10 ASSESSMENT — MIFFLIN-ST. JEOR: SCORE: 1498.88

## 2019-08-10 ASSESSMENT — ACTIVITIES OF DAILY LIVING (ADL)
ADLS_ACUITY_SCORE: 12

## 2019-08-10 NOTE — CONSULTS
Chart Review Note  Consult Question: Hepatitis B Treatment          ASSESSMENT AND RECOMMENDATIONS:   Assessment:  Elle Blanton is a 59 year old male with a history of untreated chronic compensated Hep B cirrhosis, HTN, CKD, and thrombocytopenia who is transferred on 07/28 from [facility] and admitted for type B aortic dissection. He was readmitted after presented from home with abdominal pain w/ Imaging concerning for progression of dissection flap into the celiac artery w/o bowel wall thickening, though with free fluid (ascites) in the abdomen. Code Aorta Red called. Course c/b RUE foreign body (possible broken piece of arterial catheter from arterial line placement). Initial consult by our team on 7/31/19.     #. Chronic Hepatitis B  #. Compensated cirrhosis  In 2017 patient noted to have serology positive for Hepatitis B, but he did not undergo further evaluation or treatment. Pt now has pancytopenia and cirrhosis with splenomegaly noted on CT imaging. No history of encephalopathy, ascites, varices, or jaundice. Thrombocytopenia is most likely related to splenic sequestration and decreased synthetic function (thrombopoetin).   Serologies: Hepatitis B sAg: Positive, Hep B sAb: Negative, Hep B cAb: Positive, Hep Be Ab: Positive and Hep BeAg Negative; HBV DNA: 3,624,222      Recommend treatment for hepatitis B infection in the setting of cirrhosis and establishment of outpatient care with hepatology.     RECOMMENDATIONS:  - Start entecavir 0.5 q72, order placed; pt had not picked up prescription at [facility] because unavailable  - Establish care in outpatient hepatology clinic with Dr. Hutchins, to be coordinated by us.  - Outpatient EGD for variceal surveillance  - U/S Abdomen q6m +/- serum AFP for HCC surveillance  - Consider hepatitis A Ab total to determine immune status, vaccinate if not immune    See consultation note by Dr. Rodriguez (GI Fellow) on 7/31/2019.    Thank you for involving us in this patient's care.  "Please do not hesitate to contact the GI service with any questions or concerns.     Pt care plan discussed with Dr. Stover, GI staff physician.    Mary Tyler MD (Lizzie)  Gastroenterology/Hepatology Fellow  [phone]  -------------------------------------------------------------------------------------------------------------------       Subjective/Objective:   - Denies any acute complaints  - Denies fevers, chills, abdominal pain, n/v or diarrhea.          Physical Exam:   /66   Pulse 68   Temp 97.8  F (36.6  C) (Oral)   Resp 18   Ht 1.651 m (5' 5\")   Wt 75.7 kg (166 lb 14.2 oz)   SpO2 98%   BMI 27.77 kg/m    Wt:   Wt Readings from Last 2 Encounters:   08/10/19 75.7 kg (166 lb 14.2 oz)   08/02/19 78.6 kg (173 lb 4.8 oz)      Constitutional: Well nourished, well developed, resting comfortably    Eyes: Conjunctivae normal. Anicteric sclera.   Cardiovascular: RRR. II/VI CORNELL heard best at LUSB.   Pulmonary/Chest: CTAB  GI: Soft, NABS, bowel sounds.  Non-tender. Distended.   Musculoskeletal: Trace bilateral edema to mid shin bilaterally. N  Skin: Skin is warm and dry. No rash or jaundice.   Neurological: Alert and oriented to person, place, and time.   Psychiatric:  Normal mood and affect.         Data:   Labs and imaging below were independently reviewed and interpreted    BMP  Recent Labs   Lab 08/10/19  0451 08/09/19  0545 08/08/19  1016 08/07/19  0344    143 141 144   POTASSIUM 4.2 4.2 4.3 4.0   CHLORIDE 116* 117* 115* 116*   TANO 7.9* 8.0* 7.8* 7.7*   CO2 21 19* 17* 18*   BUN 39* 41* 44* 35*   CR 3.44* 3.60* 3.68* 2.96*   GLC 89 93 139* 97     CBC  Recent Labs   Lab 08/10/19  0451 08/09/19  0545 08/08/19  0744 08/07/19  0344   WBC 2.9* 2.8* 5.0 3.2*   RBC 2.63* 2.69* 2.76* 2.43*   HGB 8.1* 8.3* 8.5* 7.6*   HCT 25.6* 26.3* 26.3* 23.1*   MCV 97 98 95 95   MCH 30.8 30.9 30.8 31.3   MCHC 31.6 31.6 32.3 32.9   RDW 14.7 14.8 15.2* 15.1*   PLT 53* 50* 66* 50*     INR  Recent Labs   Lab " 08/09/19  0545 08/06/19 2020   INR 1.41* 1.24*     LFTs  Recent Labs   Lab 08/10/19  0451 08/09/19  0545 08/08/19  1016 08/07/19  0344   ALKPHOS 69 80 80 79   AST 51* 49* 42 41   ALT 67 63 61 59   BILITOTAL 0.4 0.4 0.4 0.7   PROTTOTAL 6.5* 6.7* 7.1 6.2*   ALBUMIN 2.8* 2.7* 2.9* 2.7*   ATTENDING NOTE, GASTROENTEROLOGY/HEPATOLOGY    I discussed this patient with the fellow and participated in the decision making. I agree with the fellow's note. Silvia Stover MD

## 2019-08-10 NOTE — PLAN OF CARE
Alert and oriented x4, intermittently over parameters for BP and HR, gave diltiazem as scheduled and one dose of labetalol as BP was 141/76 in the AM. Up independently, voiding per urinal or bathroom, not saving urine. No pain or nausea reported. Continue plan of care.

## 2019-08-10 NOTE — PROGRESS NOTES
[facility], [place]  Consult Note - Hospitalist Service, [facility]      Date of Admission:  8/6/2019  Consult Requested by: Concepción Nava  Reason for Consult: BP control    Assessment & Plan   Elle Blanton is a 59 year old male admitted on 8/6/2019. He has PMH of untreated Hep B, HTN, CKD, thrombocytopenia and Type B aortic dissection who presented from home with abdominal pain w/ Imaging concerning for progression of dissection flap into the celiac artery w/o bowel wall thickening, though with free fluid (ascites) in the abdomen. Code Aorta Red called. Course c/b RUE foreign body (possible broken piece of arterial catheter from arterial line placement. Patient initiated on esmolol gtt and transferred to ICU for aggressive BP control.Medicine consulted for assistance with BP control.    New Recommendations today  - Coreg dose 25 mg BID   - Amlodipine 10 mg daily  - Diltiazem currently 90 mg Q6h - can increase once more if needed before adding a new medicine. If BPs good overnight can go home on 360 mg extended release daily  - Have see PCP Monday and get nurse line for calling when his SBP is 130-160  - Order home BP monitor via DME    # Type B aortic Dissection w/ concern for progression of flap into the celiac artery  # HTN  Recent admission 7/28-8/4/19 for Type B aortic dissection - medically managed Imaging on admission limited d/t lack of contrast. Non-con CT of abdomen: Type B dissection measuring 4.6 from previous 4.5 cm; dissection flap appears to extend into the origin of the celiac axis with progression compared to previous study. Current plan per chart review: Aggressive BP mgmt w/ goal of SBP less than 120, DBP less than 60.   - Coreg dose 25 mg BID   - Amlodipine 10 mg daily  - Diltiazem currently 90 mg Q6h - can increase once more if needed before adding a new medicine. If BPs good overnight can go home on 360 mg extended release daily  - No hydralazine w/ Aortic  dissection  - Home Blood pressure monitor  - Please have close follow up with PCP for BP medication management, vs. With cardiology in the setting of dissection.     - Would recommend Nephrology and Cardiology consults with CKD, Type B aortic dissection  - Would avoid further AV lamont blocking with recent history of HR in high 50's and /52.  - Please notify Medicine if SBP greater than 120 - ok is SBP is about 120 or goes up just a little and then goes down    # CKD III:  Cr 2.96.-3.68 BL Cr appears 3.5-4.0. Still makes urine. Nephrology consulted on recent admission. Plan for outpatient Nephrology consult w/ vein mapping for possible future HD needs. Had an anephric rise in his creatinine from 8/7 -> 8/8, but has had Cr 3-4 range. Most likely this is from changes in blood flow with his bradycardia and adjusting BP meds. Renal US reassuring and Creatinine decreased today.   - would not recommend discharge until certain that his kidneys are stable on his blood pressure regiment,.   - strict I's and O's  - Recommend Nephrology consult   - Avoid hypotension, dehydration, nephrotoxic medications  - Renally dose medications     # Pancytopenia: WBC 3.1, Hgb 7.6, Platelets 50. Peripheral smear (7/29/19) w/ marked normochromic, normocytic anemia, moderate leukopenia, lymphocytopenia, marked thrombocytopenia w/ normal platelet morphology. Likely in setting of underlying liver disease.  - Monitor    # Constipation: Patient w/o BM x 3 days and reports feeling abdominal distention. Likely combination of constipation and ascites contributing. Miralax x1, per patient request. Will defer further management to primary team.    # Untreated Hep B: GI consulted on recent admission w/ recommendations for entecavir (not yet started) and f/u on serologies and GI clinic.   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  - If any signs of worsening recommend paracentesis for SBP,  - Follow up with GI as scheduled  - Management of possible celiac dissection as above    The patient's care was discussed with the patient and recommendations were communicated to the primary service via this note.      Bridget Diallo MD  Internal Medicine Hospitalist Service  [facility]  Pager: [phone]    Please see sticky note for cross cover information  ______________________________________________________________________    Chief Complaint   BP control    History is obtained from the patient and EMR    Over Night  He generally feels good at this time.     Physical Exam   Vital Signs: Temp: 98.4  F (36.9  C) Temp src: Oral BP: 116/67   Heart Rate: 61 Resp: 14 SpO2: 97 % O2 Device: None (Room air)    Weight: 167 lbs 15.85 oz{    Physical Exam   Constitutional:  Pleasant Homng man. Well nourished, well developed, resting comfortably :   Head: Normocephalic and atraumatic.   Eyes: Conjunctivae are normal. Pupils are equal, round, and reactive to light.   Cardiovascular: RRR. II/VI CORNELL heard best at LUSB. No gallops  Pulmonary/Chest: CTAB  GI: Soft with good bowel sounds.  Non-tender. Distended, soft, no guarding, no rebound, no peritoneal signs.   Back:  No bony or CVA tenderness   Musculoskeletal: Trace bilateral edema to knee. No clubbing   Skin: Skin is warm and dry. No rash noted to exposed skin areas.   Neurological: Alert and oriented to person, place, and time. Nonfocal exam  Psychiatric:  Normal mood and affect.    Lab Results   Component Value Date    WBC 2.8 (L) 08/09/2019    HGB 8.3 (L) 08/09/2019    HCT 26.3 (L) 08/09/2019    PLT 50 (L) 08/09/2019     08/09/2019    POTASSIUM 4.2 08/09/2019    CHLORIDE 117 (H) 08/09/2019    CO2 19 (L) 08/09/2019    BUN 41 (H) 08/09/2019    CR 3.60 (H) 08/09/2019    GLC 93 08/09/2019    DD 18.2 (H) 08/06/2019     TROPONIN 0.02 08/06/2019    AST 49 (H) 08/09/2019    ALT 63 08/09/2019    ALKPHOS 80 08/09/2019    BILITOTAL 0.4 08/09/2019    INR 1.41 (H) 08/09/2019

## 2019-08-10 NOTE — PROVIDER NOTIFICATION
"\"L. V. rm 232-2 6B Vascular. Pt HR is 72 and /69. Pt resting comfortably, in no distress. Gave 0600 diltiazem dose slightly early. Thanks Bryant [id number]\"      Paged to vascular access cross cover at [phone]  "

## 2019-08-10 NOTE — PROGRESS NOTES
"VASCULAR SURGERY PROGRESS NOTE    Subjective:  Has not received IV PRNs to maintain blood pressures since yesterday morning. Did have one episode of hypertension to 140s yesterday. Workup for NAHUM non-revealing. Medicine suspects this may be due to changes in blood flow with bradycardia related to adjusting blood pressure medications. Creatinine decreasing, though medicine team recommending waiting to discharge until certain kidneys recovering or stable on current BP regimen.   Patient eager to discharge today. No new complaints.     Objective:  Intake/Output Summary (Last 24 hours) at 8/8/2019 1020  Last data filed at 8/8/2019 0600  Gross per 24 hour   Intake 1297.88 ml   Output 350 ml   Net 947.88 ml     Labs:  ROUTINE IP LABS (Last four results)  BMP  Recent Labs   Lab 08/10/19  0451 08/09/19  0545 08/08/19  1016 08/07/19  0344    143 141 144   POTASSIUM 4.2 4.2 4.3 4.0   CHLORIDE 116* 117* 115* 116*   TANO 7.9* 8.0* 7.8* 7.7*   CO2 21 19* 17* 18*   BUN 39* 41* 44* 35*   CR 3.44* 3.60* 3.68* 2.96*   GLC 89 93 139* 97     CBC  Recent Labs   Lab 08/10/19  0451 08/09/19  0545 08/08/19  0744 08/07/19  0344   WBC 2.9* 2.8* 5.0 3.2*   RBC 2.63* 2.69* 2.76* 2.43*   HGB 8.1* 8.3* 8.5* 7.6*   HCT 25.6* 26.3* 26.3* 23.1*   MCV 97 98 95 95   MCH 30.8 30.9 30.8 31.3   MCHC 31.6 31.6 32.3 32.9   RDW 14.7 14.8 15.2* 15.1*   PLT 53* 50* 66* 50*     INR  Recent Labs   Lab 08/09/19  0545 08/06/19 2020   INR 1.41* 1.24*     PHYSICAL EXAM:  /69   Pulse 63   Temp 98.1  F (36.7  C) (Oral)   Resp 16   Ht 1.651 m (5' 5\")   Wt 75.7 kg (166 lb 14.2 oz)   SpO2 95%   BMI 27.77 kg/m    General: alert, oriented, no acute distress. Resting comfortably in bed.   Psych: pleasant affect, answers questions appropriately  Skin: Color appropriate for race, warm, dry.  Respiratory: The patient does not require supplemental oxygen. Breathing unlabored   GI:  Abdomen soft, nontender to light palpation.      ASSESSMENT:  58 yo man " with CKD, hepatitis B w ascites, recent admission for Type B aortic dissection who is readmitted with abdominal pain, hypertension, and CT findings consistent with possible progression of dissection flap into celiac artery, however limited study due to lack of IV contrast. Additionally, at time of presentation, an arterial line placement was attempted, however the line broke at the skin and there was concern for possible right radial artery or right forearm foreign body. This was not demonstrated on any re-imaging and there is no evidence of neurovascular compromise necessitating operative intervention. The patient's blood pressures have been difficult to control, and he did have a new rise in creatinine, which is currently downtrending. HE is making a steady recovery. Appreciate medicine recommendations. Plan for discharge today or tomorrow.         PLAN:  - BP management per internal medicine. Appreciate their assistance. Agree with aggressive BP control, goal systolics <120 mmHg, diastolics <60 mmHg, though defer overall medical picture with hepatic considerations  - Daily miralax with prn senna and suppository  - Avoid nephrotoxic medications. Renally dose medications   - Plan for likely discharge today or tomorrow 08/11 pending good control of BP with oral medications, resolving NAHUM, and continued clinical improvement    Jose Guajardo MD  Surgery Resident PGY-2

## 2019-08-10 NOTE — PROGRESS NOTES
"[facility], [place]  Consult Note - Hospitalist Service, [facility]      Date of Admission:  8/6/2019  Consult Requested by: Concepción Nava  Reason for Consult: BP control    Assessment & Plan   Elle Blanton is a 59 year old male admitted on 8/6/2019. He has PMH of untreated Hep B, HTN, CKD, thrombocytopenia and Type B aortic dissection who presented from home with abdominal pain w/ Imaging concerning for progression of dissection flap into the celiac artery w/o bowel wall thickening, though with free fluid (ascites) in the abdomen. Code Aorta Red called. Course c/b RUE foreign body (possible broken piece of arterial catheter from arterial line placement. Patient initiated on esmolol gtt and transferred to ICU for aggressive BP control.Medicine consulted for assistance with BP control.    New Recommendations today  - clonidine 0.1 mg bid  - patient requesting that the paperwork for discharge include the \"thing\" that got stuck in his right arm (US negative)  - Patient wants reference for what to do in response to blood pressure at home  If he takes his meds and SBP > 180 x2 go to ER, if SBP >140 x3 call PCP  - please note, patient plans to move to [place] this fall for follow up cares...    # Type B aortic Dissection w/ concern for progression of flap into the celiac artery  # HTN  Was taking amlodipine 10 mg and carvedilol  25 mg. Recent admission 7/28-8/4/19 for Type B aortic dissection - medically managed Imaging on admission limited d/t lack of contrast. Non-con CT of abdomen: Type B dissection measuring 4.6 from previous 4.5 cm; dissection flap appears to extend into the origin of the celiac axis with progression compared to previous study. Current plan per chart review: Aggressive BP mgmt w/ goal of SBP less than 120, DBP less than 60.   - Coreg dose 25 mg BID   - Amlodipine 10 mg daily  - Diltiazem currently 90 mg Q6h   - clonidine 0.1 mg bid  - Home Blood pressure " monitor  - Please have close follow up with PCP for BP medication management, vs. With cardiology in the setting of dissection.   - Would avoid further AV lamont blocking with recent history of HR in high 50's and /52.  - Please notify Medicine if SBP greater than 120 - ok is SBP is about 120 or goes up just a little and then goes down    # CKD III:   BL Cr appears 3.5-4.0. Still makes urine. Nephrology consulted on recent admission. Plan for outpatient Nephrology consult w/ vein mapping for possible future HD needs. Had an anephric rise in his creatinine from 8/7 -> 8/8,  Renal US reassuring, and creatinine now stablized  - strict I's and O's  - Recommend Nephrology consult   - Avoid hypotension, dehydration, nephrotoxic medications  - Renally dose medications     # Pancytopenia: WBC 3.1, Hgb 7.6, Platelets 50. Peripheral smear (7/29/19) w/ marked normochromic, normocytic anemia, moderate leukopenia, lymphocytopenia, marked thrombocytopenia w/ normal platelet morphology. Likely in setting of underlying liver disease.  - Monitor    # Constipation: Patient w/o BM x 3 days and reports feeling abdominal distention. Likely combination of constipation and ascites contributing. Miralax x1, per patient request. Will defer further management to primary team.    # Untreated Hep B: GI consulted on recent admission w/ recommendations for entecavir (not yet started) and f/u on serologies and GI clinic.   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  - If any signs of worsening recommend paracentesis for SBP,  - Agree with treatment per GI    The patient's care was discussed with the patient and recommendations were communicated to the primary service via this note.      Bridget Diallo MD  Internal Medicine Hospitalist  Service  [facility]  Pager: [phone]    Please see sticky note for cross cover information  ______________________________________________________________________    Chief Complaint   BP control    History is obtained from the patient and EMR    Over Night  He generally feels good at this time.     Physical Exam   Vital Signs: Temp: 97.8  F (36.6  C) Temp src: Oral BP: 114/66 Pulse: 68 Heart Rate: 62 Resp: 18 SpO2: 98 % O2 Device: None (Room air)    Weight: 166 lbs 14.21 oz{    Physical Exam   Constitutional:  Pleasant Homng man. Well nourished, well developed, resting comfortably :   Head: Normocephalic and atraumatic.   Eyes: Conjunctivae are normal. Pupils are equal, round, and reactive to light.   Cardiovascular: RRR. II/VI CORNELL heard best at LUSB. No gallops  Pulmonary/Chest: CTAB  GI: Soft with good bowel sounds.  Non-tender. Distended, soft, no guarding, no rebound, no peritoneal signs.   Back:  No bony or CVA tenderness   Musculoskeletal: Trace bilateral edema to knee. No clubbing   Skin: Skin is warm and dry. No rash noted to exposed skin areas.   Neurological: Alert and oriented to person, place, and time. Nonfocal exam  Psychiatric:  Normal mood and affect.    Lab Results   Component Value Date    WBC 2.9 (L) 08/10/2019    HGB 8.1 (L) 08/10/2019    HCT 25.6 (L) 08/10/2019    PLT 53 (L) 08/10/2019     08/10/2019    POTASSIUM 4.2 08/10/2019    CHLORIDE 116 (H) 08/10/2019    CO2 21 08/10/2019    BUN 39 (H) 08/10/2019    CR 3.44 (H) 08/10/2019    GLC 89 08/10/2019    DD 18.2 (H) 08/06/2019    TROPONIN 0.02 08/06/2019    AST 51 (H) 08/10/2019    ALT 67 08/10/2019    ALKPHOS 69 08/10/2019    BILITOTAL 0.4 08/10/2019    INR 1.41 (H) 08/09/2019

## 2019-08-10 NOTE — CONSULTS
Nephrology Initial Consult  August 10, 2019      Elle Blanton MRN:[id number] YOB: 1960  Date of Admission:8/6/2019  Primary care provider: Rosenstein, Benjamin  Requesting physician: No att. providers found    ASSESSMENT AND RECOMMENDATIONS:     Recent NAHUM  In the setting of Hypoperfusion ,which is improving   Unclear baseline , but maybe around 1.6-1.9.   Today creatinine is: 3.44 <--3.60<--3.68. It was 4.49 on 8/2/19 . Hence its gradually improving   UOP : Around 400 - 650 ml . No accurate measurement   No hypotension currently   C3 67, decreased.C4 normal.Cryoglobulin: Trace : Could possibly be from hep B infection   --This test were done on 8/1/2019.  UA on 8/6 : moderate blood, protein albumin 100 ,pH 6.0   Likely etiology of NAHUM : Renal hypoperfusion during earlier admission  Has h/o HTN - records show he has been Hypertensive even in 2016 . He likely has CKD stage 2-3 and most likely due to HTN   Iron deficiency anemia - Exact etiology of iron deficiency unclear but he has remote h/o GI bleed . Also underlying CKD could be contributing.  Blood pressure well controlled and in goal currently .     Recommendations  1. Avoid hypotension   2. Start Ferrous sulfate 325 mg PO bid to address his Iron deficiency anemia. We also  recommend GI work up as outpatient for iron deficiency anemia to ensure their is no GI pathology.  3. Avoid nephrotoxins  4. Please arrange outpatient follow up with nephrology clinic in the next 2-3 weeks . 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 Muscle Spasm - advised him to discuss this  concern further with primary team . I have also notified of this concern of patient to primary team       Nephrology team  To sign off .     Recommendations were communicated to primary team via note and over phone     Patient seen and discussed with Dr Donny Ashley MD  Nephrology Fellow   Pager [phone]  [facility]         REASON FOR CONSULT: RENAL INSUFFICIENCY    HISTORY OF PRESENT ILLNESS:  Admitting provider and nursing notes reviewed  Elle Blanton is a 59 year old male patient with history of untreated chronic compensated hep B cirrhosis, HTN, thrombocytopenia.  He has CKD stage 3 .  He has remote history of GI bleed.  He was earlier admitted for type B aortic dissection.  This time he was readmitted with abdominal pain, hypertension.  CT findings consistent with possible progression of dissection flap into celiac artery, study limited due to lack of IV contrast.  An arterial line placement was attempted however the line broke at the skin and there was concern for possible right radial artery or right forearm foreign body.  CV surgery team is primary.  No surgical intervention planned.  He has had elevated blood pressures.  Blood pressure goals are systolic less than 120 and diastolic less than 60.  Currently blood pressure is 114/66.  Making urine  Cr improving      PAST MEDICAL HISTORY:  Reviewed with patient on 08/10/2019       Past Medical History:   Diagnosis Date     Hepatitis B        Past Surgical History:   Procedure Laterality Date     INCISION AND DRAINAGE FINGER, COMBINED Left 10/20/2016    Procedure: COMBINED INCISION AND DRAINAGE FINGER;  Surgeon: Mireya Pizano MD;  Location: [facility]        MEDICATIONS:  PTA Meds  Prior to Admission medications    Medication Sig Last Dose Taking? Auth Provider   amLODIPine (NORVASC) 10 MG tablet Take 1 tablet (10 mg) by mouth daily 8/6/2019 at Unknown time Yes Denzel Dickson MD   carvedilol (COREG) 25 MG tablet Take 2 tablets (50 mg)  by mouth 2 times daily (with meals) 8/6/2019 at Unknown time Yes Denzel Dickson MD   entecavir (BARACLUDE) 0.5 MG tablet Take 1 tablet (0.5 mg) by mouth every 72 hours  Yes Silvia Stover MD   entecavir (BARACLUDE) 0.5 MG tablet Take 1 tablet (0.5 mg) by mouth every 72 hours NOT STARTED at Unknown time Yes Denzel Dickson MD   order for DME Equipment being ordered: Other: blood pressure monitor  Treatment Diagnosis: hypertension  Yes Jose Guajardo MD   polyethylene glycol (MIRALAX/GLYCOLAX) packet Take 17 g by mouth daily as needed for constipation Past Month at Unknown time Yes Denzel Dickson MD      Current Meds    amLODIPine  10 mg Oral Daily     carvedilol  25 mg Oral BID w/meals     cloNIDine  0.1 mg Oral BID     diltiazem  90 mg Oral Q6H CARL     [START ON 8/11/2019] entecavir  0.5 mg Oral Q72H     polyethylene glycol  17 g Oral Daily     sodium chloride (PF)  10 mL Intravenous Once     Infusion Meds    lactated ringers 0 mL/hr at 08/10/19 0829       ALLERGIES:    Allergies   Allergen Reactions     Nka [No Known Allergies]        REVIEW OF SYSTEMS:  RReview of Systems:   Constitutional : No fever. Chills  HENT: No voice change , no swallowing difficulty.  Eyes: No acute vision changes  Neuro: No confusion, headache, focal weakness  Chest: No SOB . No cough  Cardiovascular : No Chest pain   Extremities:  Positive for  leg swelling  Gastrointestinal : No abdominal pain, nausea,vomiting. No diarrhea, constipation. No black stool .   : No flank pain , hematuria or voiding difficulty   Behavior: No agitation  Skin : No rash   Musculoskeletal : Has some muscle tightening in the back on ambulation     SOCIAL HISTORY:   Social History     Socioeconomic History     Marital status:      Spouse name: Not on file     Number of children: Not on file     Years of education: Not on file     Highest education level: Not on file   Occupational History     Not on file   Social Needs     Financial  "resource strain: Not on file     Food insecurity:     Worry: Not on file     Inability: Not on file     Transportation needs:     Medical: Not on file     Non-medical: Not on file   Tobacco Use     Smoking status: Never Smoker   Substance and Sexual Activity     Alcohol use: No     Drug use: No     Sexual activity: Not on file   Lifestyle     Physical activity:     Days per week: Not on file     Minutes per session: Not on file     Stress: Not on file   Relationships     Social connections:     Talks on phone: Not on file     Gets together: Not on file     Attends [employer] service: Not on file     Active member of club or organization: Not on file     Attends meetings of clubs or organizations: Not on file     Relationship status: Not on file     Intimate partner violence:     Fear of current or ex partner: Not on file     Emotionally abused: Not on file     Physically abused: Not on file     Forced sexual activity: Not on file   Other Topics Concern     Not on file   Social History Narrative     Not on file     Reviewed with patient      FAMILY MEDICAL HISTORY:   Family History   Problem Relation Age of Onset     Diabetes Father      Hypertension No family hx of      Asthma No family hx of      Cancer No family hx of      Coronary Artery Disease No family hx of      Liver Cancer No family hx of      Reviewed with patient     PHYSICAL EXAM:   Temp  Av.1  F (36.7  C)  Min: 96.9  F (36.1  C)  Max: 98.8  F (37.1  C)  Arterial Line BP  Min: 93/56  Max: 165/78  Arterial Line MAP (mmHg)  Av.2 mmHg  Min: 66 mmHg  Max: 108 mmHg      Pulse  Av  Min: 63  Max: 82 Resp  Av.6  Min: 12  Max: 28  SpO2  Av.9 %  Min: 90 %  Max: 100 %       /66   Pulse 68   Temp 97.8  F (36.6  C) (Oral)   Resp 18   Ht 1.651 m (5' 5\")   Wt 75.7 kg (166 lb 14.2 oz)   SpO2 98%   BMI 27.77 kg/m     Date 08/10/19 07 - 19 0659   Shift 2392-4259 8853-7256 6702-0210 24 Hour Total   INTAKE   P.O. 480   480 "   Shift Total(mL/kg) 480(6.34)   480(6.34)   OUTPUT   Shift Total(mL/kg)       Weight (kg) 75.7 75.7 75.7 75.7      Admit Weight: 76.2 kg (168 lb)     Constitutional: No distress  HEENT: Neck supple, oral exam normal  Eyes: PERRLA, EOMI, conjunctive are normal  Neurological: Alert awake oriented x3, moving all extremities well.  Chest: Clear to auscultation  Cardiovascular: S1-S2 heard, RRR, no murmurs.  Extremities:. Distal CMS intact,  Edema : 1+  Abdomen: Soft nontender, no guarding or rigidity, bowel sounds normal. No flank pain  Skin: No rash  Behavior: Mood is stable       LABS:   CMP  Recent Labs   Lab 08/10/19  0451 08/09/19  0545 08/08/19  1016 08/07/19  0344    143 141 144   POTASSIUM 4.2 4.2 4.3 4.0   CHLORIDE 116* 117* 115* 116*   CO2 21 19* 17* 18*   ANIONGAP 6 8 9 10   GLC 89 93 139* 97   BUN 39* 41* 44* 35*   CR 3.44* 3.60* 3.68* 2.96*   GFRESTIMATED 18* 17* 17* 22*   GFRESTBLACK 21* 20* 20* 26*   TANO 7.9* 8.0* 7.8* 7.7*   MAG 2.1 2.0 2.1 1.8   PHOS 3.4 3.3 3.9 4.4   PROTTOTAL 6.5* 6.7* 7.1 6.2*   ALBUMIN 2.8* 2.7* 2.9* 2.7*   BILITOTAL 0.4 0.4 0.4 0.7   ALKPHOS 69 80 80 79   AST 51* 49* 42 41   ALT 67 63 61 59     CBC  Recent Labs   Lab 08/10/19  0451 08/09/19  0545 08/08/19  0744 08/07/19  0344   HGB 8.1* 8.3* 8.5* 7.6*   WBC 2.9* 2.8* 5.0 3.2*   RBC 2.63* 2.69* 2.76* 2.43*   HCT 25.6* 26.3* 26.3* 23.1*   MCV 97 98 95 95   MCH 30.8 30.9 30.8 31.3   MCHC 31.6 31.6 32.3 32.9   RDW 14.7 14.8 15.2* 15.1*   PLT 53* 50* 66* 50*     INR  Recent Labs   Lab 08/09/19  0545 08/06/19 2020   INR 1.41* 1.24*   PTT  --  39*     ABGNo lab results found in last 7 days.   URINE STUDIES  Recent Labs   Lab Test 08/06/19  2349 07/29/19  1018   COLOR Light Yellow Yellow   APPEARANCE Clear Clear   URINEGLC Negative Negative   URINEBILI Negative Negative   URINEKETONE Negative Negative   SG 1.009 1.022   UBLD Moderate* Small*   URINEPH 6.0 5.5   PROTEIN 100* 100*   NITRITE Negative Negative   LEUKEST Negative Large*    RBCU 4* 5*   WBCU 1 17*     Recent Labs   Lab Test 07/31/19  1400   UTPG 0.59*     PTH  No lab results found.  IRON STUDIES  Recent Labs   Lab Test 07/31/19  0339 07/29/19  0414   IRON 30*  --      --    IRONSAT 12*  --    LD  --  88       IMAGING:  Pertinent imaging reviewed      Gabi Amaya MD

## 2019-08-11 NOTE — PROGRESS NOTES
DISCHARGE                         8/10/2019  7:00 PM  ----------------------------------------------------------------------------  Discharged to: Home  Via: private transportation  Accompanied by: Family  Discharge Instructions: diet, activity, medications, follow up appointments, when to call the MD, aftercare instructions.  Prescriptions: To be filled by discharge   pharmacy; medication list reviewed & sent with pt-pt will  tomorrow morning, pharmacy closed.   Follow Up Appointments: arranged; information given  Belongings: All sent with pt  IV: d/c'd  Telemetry: d/c'd  Pt exhibits understanding of above discharge instructions; all questions answered.    Discharge Paperwork: Signed, copied, and sent home with patient.

## 2019-08-11 NOTE — DISCHARGE SUMMARY
"[facility] Discharge Summary    Elle Blanton MRN: [id number]   YOB: 1960 Age: 59 year old     Date of Admission:  8/6/2019  Date of Discharge::  08/10/2019  Admitting Physician:  Joaquin Lawson MD  Discharge Physician:  Roxana Cosby MD  Primary Care Physician:         Rosenstein, Benjamin          Discharge Diagnosis:   Type B aortic dissection  CKD  NAHUM  Hepatitis B         Procedures:   Arterial line placement          Consultations:   PHYSICAL THERAPY ADULT IP CONSULT  OCCUPATIONAL THERAPY ADULT IP CONSULT  MEDICATION HISTORY IP PHARMACY CONSULT  INTERNAL MEDICINE ADULT IP CONSULT FOR [place]  PHYSICAL THERAPY ADULT IP CONSULT  VASCULAR ACCESS CARE ADULT IP CONSULT  NEPHROLOGY GENERAL ADULT IP CONSULT  GI HEPATOLOGY ADULT IP CONSULT          HPI (from H&P):   \"59M with PMH of untreated hepatitis B, HTN, CKD, thrombocytopenia and known Type B aortic dissection who presented from home with abdominal pain of 3 hours of onset. Pian was diffuse, not associated with food. Has a recent history of being admitted for Type B aortic dissection and treated medically. He was discharged home on antihypertensives. On presentation, patient hypertensive to 160s with ultrasound showing aortic flap and free fluid in the belly. Code aorta red called. Started on esmolol drip and went to scanner.  CT scan with diffuse fluid in belly consistent with ascites and with flap in celiac artery without bowel thickening. During code aorta red, arterial line was placed with tip broken off into right radial artery.  Labs significant for normal lactate, chronic thrombocytopenia to 44 today and creatinine of 3.3. Patient did not consent to IV contrast.\"           Hospital Course:   The patient was managed non-operatively, with strict blood pressure control, serial abdominal exams, and serial CBC / lactic acid. He did well with this and was transferred from the SICU to the floor in good condition.     He was found to have " developed an NAHUM in addition to his CKD. Nephrology was consulted, and believed that the NAHUM was likely secondary to renal hypoperfusion during his prior admission. Given his history of hypertension, nephrology assessed his CKD as likely stage 2-3. He will follow up with nephrology as an outpatient.    He was also seen by gastroenterology for chronic hepatitis B with compensated cirrhosis. Their recommendations included starting his prescribed entecavir, following up with Dr. Hutchins, outpatient EGD and abdominal ultrasound, and hepatitis A serologies.     Regarding the question of possible component of arterial line broken at skin, he underwent imaging of the right forearm and wrist to look for possible foreign body on hospital day 1 and 2, with no foreign body identified, and no neurovascular compromise to the right hand. Overall unremarkable hospitalization.    At the time of discharge, he was tolerating PO intake, ambulating, voiding spontaneously without difficulty, and pain was controlled with oral pain medications. The patient was discharged home in stable and improved condition.         Final Pathology Result:   No pathology submitted         Pertinent Imaging Results:   CT C/A/P 08/06:     Impression:   1. Limited evaluation of the patient's known type B aortic dissection  due to absence of IV contrast. There is no evidence of retroperitoneal  hemorrhage or fluid collection to suggest aortic rupture or  transection. There is question of progressive extension of the  dissection flap into the proximal celiac artery with gastric and  proximal small bowel wall thickening/edema and mesenteric edema,  concerning for vascular compromise and early mesenteric ischemia.   2. Cirrhosis with borderline splenomegaly and portosystemic varices.  Moderate volume simple free fluid in the abdomen. This is increased  considerably from week earlier, suggesting celiac dissection effects  on the liver via hepatic artery.  3.  Cholelithiasis without CT evidence of acute cholecystitis.         Medications Prior to Admission:     No medications prior to admission.            Discharge Medications:     Discharge Medication List as of 8/10/2019  5:56 PM      START taking these medications    Details   cloNIDine (CATAPRES) 0.1 MG tablet Take 1 tablet (0.1 mg) by mouth 2 times daily, Disp-60 tablet, R-0, E-Prescribe      diltiazem (CARDIZEM) 90 MG tablet Take 1 tablet (90 mg) by mouth every 6 hours, Disp-120 tablet, R-0, E-Prescribe      !! entecavir (BARACLUDE) 0.5 MG tablet Take 1 tablet (0.5 mg) by mouth every 72 hours, Disp-11 tablet, R-3, E-Prescribe      ferrous sulfate (FE TABS) 325 (65 Fe) MG EC tablet Take 1 tablet (325 mg) by mouth 2 times daily, Disp-60 tablet, R-0, E-Prescribe      order for DME Equipment being ordered: Other: blood pressure monitor  Treatment Diagnosis: hypertensionDisp-1 each, R-0, Local Print       !! - Potential duplicate medications found. Please discuss with provider.      CONTINUE these medications which have NOT CHANGED    Details   amLODIPine (NORVASC) 10 MG tablet Take 1 tablet (10 mg) by mouth daily, Disp-30 tablet, R-11, E-Prescribe      carvedilol (COREG) 25 MG tablet Take 2 tablets (50 mg) by mouth 2 times daily (with meals), Disp-60 tablet, R-11, E-Prescribe      !! entecavir (BARACLUDE) 0.5 MG tablet Take 1 tablet (0.5 mg) by mouth every 72 hours, Disp-10 tablet, R-0, E-Prescribe      polyethylene glycol (MIRALAX/GLYCOLAX) packet Take 17 g by mouth daily as needed for constipation, Disp-10 packet, R-3, E-Prescribe       !! - Potential duplicate medications found. Please discuss with provider.               Day of Discharge Physical Exam:   Temp:  [97.8  F (36.6  C)-98.3  F (36.8  C)] 98  F (36.7  C)  Pulse:  [68-70] 68  Heart Rate:  [60-64] 61  Resp:  [18] 18  BP: (114-141)/(66-79) 132/73  SpO2:  [98 %] 98 %  General: A&O, NAD, lying comfortably in bed  CV: RRR, WWP extremities  Pulm: NLB on RA  Abd:  Soft, ND, NT  Extremities: No edema  Neuro: Moving all extremities spontaneously without apparent deficit         Discharge Instructions and Follow-Up:        Basic metabolic panel     PHYSICAL THERAPY REFERRAL      Nephrology Adult Referral      Follow Up    Follow up CT scan and appointment with Dr. Cosby (vascular surgery) on September 18th, please check in for CT scan at 9:45am- nothing to eat or drink 3 hours prior to scan.  CT scan located at [facility] [address].  Appointment with Dr. Cosby after CT scan, [address] (same building)     Activity    Your activity upon discharge: activity as tolerated     Adult [facility] Follow-up and recommended labs and tests    Follow up with primary care provider, Benjamin Rosenstein, on Monday to evaluate medication change and for hospital follow- up.  No follow up labs or test are needed.      Appointments on [place] and/or [facility] (with [facility] or [facility] provider or service). Call [phone] if you haven't heard regarding these appointments within 7 days of discharge.     Follow Up ([facility])    Follow up with nephrology within 2-3 weeks from discharge, for hospital follow- up. The following labs/tests are recommended: BMP.     Appointments on [place] and/or [facility] (with [facility] or [facility] provider or service). Call [phone] if you haven't heard regarding these appointments within 7 days of discharge.     Reason for your hospital stay    You were hospitalized for high blood pressure and abdominal pain. Your high blood pressure was controlled with medications and your abdominal pain resolved.    At the start of your hospitalization, there was concern that part of an arterial line had been lodged in your right arm. This was not found on any images, and there was no evidence of vascular or neurologic changes in your right arm, therefore no procedures were performed to explore the arm for this.     Diet    Follow this diet upon discharge:        Regular Diet Adult       Condition at discharge: Stable    - - - - - - - - - - - - - - - - - -  Jose Guajardo MD  Surgery Resident PGY-2

## 2019-08-12 NOTE — PROGRESS NOTES
"[facility]: Post-Discharge Note  SITUATION                                                      Admission:    Admission Date: 08/06/19   Reason for Admission: Type B aortic dissection  Discharge:   Discharge Date: 08/10/19  Discharge Diagnosis: Type B aortic dissection  Discharge Service: Hospitlaist/Vascular surgery    BACKGROUND                                                      \"59M with PMH of untreated hepatitis B, HTN, CKD, thrombocytopenia and known Type B aortic dissection who presented from home with abdominal pain of 3 hours of onset. Pian was diffuse, not associated with food. Has a recent history of being admitted for Type B aortic dissection and treated medically. He was discharged home on antihypertensives. On presentation, patient hypertensive to 160s with ultrasound showing aortic flap and free fluid in the belly. Code aorta red called. Started on esmolol drip and went to scanner.  CT scan with diffuse fluid in belly consistent with ascites and with flap in celiac artery without bowel thickening. During code aorta red, arterial line was placed with tip broken off into right radial artery.  Labs significant for normal lactate, chronic thrombocytopenia to 44 today and creatinine of 3.3. Patient did not consent to IV contrast.\"    ASSESSMENT      Discharge Assessment  Patient reports symptoms are: Unchanged(Patient reports he is having the same problem \"When I get up to wakl I feel strong right away, but then a few minutes later my leags feel tired and can't support me\".)  Does the patient have all of their medications?: Yes  Does patient know what their new medications are for?: Yes  Does patient have a follow-up appointment scheduled?: No  Does patient have any other questions or concerns?: No    Post-op  Did the patient have surgery or a procedure: No  Fever: No  Chills: No  Eating & Drinking: eating and drinking without complaints/concerns  PO Intake: regular diet  Bowel " Function: normal  Urinary Status: voiding without complaint/concerns    PLAN                                                      Outpatient Plan:      Follow up with primary care provider, Benjamin Rosenstein, on Monday to evaluate medication change and for hospital follow- up.  No follow up labs or test are needed.      Follow up with nephrology within 2-3 weeks from discharge, for hospital follow- up. The following labs/tests are recommended: BMP.     No future appointments.        Demetria Hinojosa, CMA

## 2019-08-14 ENCOUNTER — OFFICE VISIT (OUTPATIENT)
Dept: PHARMACY | Facility: PHYSICIAN GROUP | Age: 59
End: 2019-08-14
Payer: COMMERCIAL

## 2019-08-14 ENCOUNTER — OFFICE VISIT (OUTPATIENT)
Dept: FAMILY MEDICINE | Facility: CLINIC | Age: 59
End: 2019-08-14
Payer: COMMERCIAL

## 2019-08-14 VITALS
SYSTOLIC BLOOD PRESSURE: 129 MMHG | DIASTOLIC BLOOD PRESSURE: 64 MMHG | WEIGHT: 166 LBS | OXYGEN SATURATION: 100 % | RESPIRATION RATE: 20 BRPM | HEIGHT: 64 IN | HEART RATE: 61 BPM | TEMPERATURE: 98.4 F | BODY MASS INDEX: 28.34 KG/M2

## 2019-08-14 DIAGNOSIS — I10 ESSENTIAL HYPERTENSION: ICD-10-CM

## 2019-08-14 DIAGNOSIS — B18.1 CHRONIC VIRAL HEPATITIS B WITHOUT DELTA AGENT AND WITHOUT COMA (H): ICD-10-CM

## 2019-08-14 DIAGNOSIS — G44.209 TENSION HEADACHE: ICD-10-CM

## 2019-08-14 DIAGNOSIS — D50.9 IRON DEFICIENCY ANEMIA, UNSPECIFIED IRON DEFICIENCY ANEMIA TYPE: ICD-10-CM

## 2019-08-14 DIAGNOSIS — D64.9 NORMOCYTIC ANEMIA: ICD-10-CM

## 2019-08-14 DIAGNOSIS — N18.4 CKD (CHRONIC KIDNEY DISEASE) STAGE 4, GFR 15-29 ML/MIN (H): ICD-10-CM

## 2019-08-14 DIAGNOSIS — I71.03 DISSECTION OF THORACOABDOMINAL AORTA (H): Primary | ICD-10-CM

## 2019-08-14 DIAGNOSIS — I10 ESSENTIAL HYPERTENSION: Primary | ICD-10-CM

## 2019-08-14 PROBLEM — A04.8 H. PYLORI INFECTION: Status: RESOLVED | Noted: 2017-07-05 | Resolved: 2019-08-14

## 2019-08-14 LAB
BUN SERPL-MCNC: 39 MG/DL (ref 7–30)
CALCIUM SERPL-MCNC: 8.5 MG/DL (ref 8.5–10.4)
CHLORIDE SERPLBLD-SCNC: 111 MMOL/L (ref 94–109)
CO2 SERPL-SCNC: 21 MMOL/L (ref 20–32)
CREAT SERPL-MCNC: 3.6 MG/DL (ref 0.8–1.5)
EGFR CALCULATED (BLACK REFERENCE): 22.4 ML/MIN
EGFR CALCULATED (NON BLACK REFERENCE): 18.5 ML/MIN
GLUCOSE SERPL-MCNC: 103 MG/DL (ref 60–109)
POTASSIUM SERPL-SCNC: 4.2 MMOL/L (ref 3.4–5.3)
SODIUM SERPL-SCNC: 138 MMOL/L (ref 133–144)

## 2019-08-14 PROCEDURE — 99607 MTMS BY PHARM ADDL 15 MIN: CPT | Performed by: PHARMACIST

## 2019-08-14 PROCEDURE — 99605 MTMS BY PHARM NP 15 MIN: CPT | Performed by: PHARMACIST

## 2019-08-14 RX ORDER — DILTIAZEM HYDROCHLORIDE 360 MG/1
360 CAPSULE, EXTENDED RELEASE ORAL DAILY
Qty: 30 CAPSULE | Refills: 3 | Status: SHIPPED | OUTPATIENT
Start: 2019-08-14 | End: 2019-12-17

## 2019-08-14 RX ORDER — FERROUS SULFATE 325(65) MG
325 TABLET, DELAYED RELEASE (ENTERIC COATED) ORAL DAILY
COMMUNITY
Start: 2019-08-14 | End: 2021-09-26

## 2019-08-14 ASSESSMENT — MIFFLIN-ST. JEOR: SCORE: 1481.72

## 2019-08-14 NOTE — NURSING NOTE
Due to patient being non-English speaking/uses sign language, an  was used for this visit. Only for face-to-face interpretation by an external agency, date and length of interpretation can be found on the scanned worksheet.     name: Haydee  Agency: [employer]  Language: Hmong   Telephone number: [phone]  Type of interpretation: Face-to-face, spoken

## 2019-08-14 NOTE — LETTER
August 14, 2019      Elle Blanton  [address]  [facility] [address]    To whom it may concern,    The above listed patient was evaluated by me in clinic today and it is my medical opinion that he remain out of work until reevaluated by me or one of my colleagues on 8/23/2019.    Sincerely,    Yosvany Penny MD

## 2019-08-14 NOTE — PROGRESS NOTES
"SUBJECTIVE/OBJECTIVE:                Elle Blanton is a 59 year old male coming in for a transitions of care visit.  He was discharged from [facility] on 8/10/19 for thoracic aortic dissection managed medically with blood pressure control.     HTN: No concerns for missed doses. Manages medicines independently. Declined offered pillbox. Complicated schedule - 8am, 12pm, 4pm, 6pm 8pm 10 pm! Denies concerns for dizziness / lightheadedness.   Headache: Concern for headaches, not every day. Uses PRN Anacin 400 mg Aspirin. Also has Tylenol available.   Hepatitis B: Concern for safety of hepatitis B medicine with  Keeps track of dose in book. [place] many things about health conditions during his most recent hospitalization.   ANNALISE: Wondering if iron formulation that can take once daily.    Today's Vitals: /64   Pulse 61   Temp 98.4  F (36.9  C) (Oral)   Resp 20   Ht 5' 4.17\" (1.63 m)   Wt 166 lb (75.3 kg)   SpO2 100%   BMI 28.34 kg/m      ASSESSMENT:                 Current medications were reviewed today.      Medication Adherence: No objective data available today for review. No pill bottles.   HTN: BP controlled today to goal <140/90 mmHg, however unclear of actual goal.   Headache: Concern for adverse effects of Aspirin/NSAID with impact on BP.   Hepatitis B: No concerns for drug interactions with Entecavir and concurrent medicines.   ANNALSIE: No greater efficacy with BID dosing.     PLAN:                Post Discharge Medication Reconciliation Status: discharge medications reconciled and changed, per note/orders (see AVS).    1. Change Diltiazem to once daily formulation  2. Provided simplified medication regimen per \"medication fridge list\"  3. Change iron to once daily  4. Counseled on preferential use of Acetaminophen over NSAIDs, patient disagreed    I spent 20 minutes with this patient today. All changes were made via collaborative practice agreement with Dr. Penny (precepted with Dr. Arce). " A copy of the visit note was provided to the patient's primary care provider.    Will follow up in 1 month.    The patient was given a summary of these recommendations as an after visit summary.    Tere Watters, PharmD, CDE  [facility]  Phone: [phone]  August 14, 2019 at 4:48 PM

## 2019-08-14 NOTE — PROGRESS NOTES
Hospitalization Follow-up Visit         [facility]       Hospital Follow-up Visit:    Hospital:  [facility]   Date of Admission: 8/6/2019  Date of Discharge: 8/10/2019  Reason(s) for Admission: Type B Aortic Dissection            Problems taking medications regularly:  Some questions regarding medications and timing of taking them.       Post Discharge Medication Reconciliation: discharge medications reconciled and changed, per note/orders (see AVS).       Problems adhering to non-medication therapy:  None       Medications reviewed by: by PharmD    Summary of hospitalization:  [facility] discharge summary reviewed  Diagnostic Tests/Treatments reviewed.  Follow up needed: Vascular Surgery on 9/18, GI, Nephrology  Other Healthcare Providers Involved in Patient s Care:         Specialist appointment - as above  Update since discharge: improved.  Additional issues: leg pain as below   Plan of care communicated with patient   Patient states that he has been having bilateral lower leg pain after ambulating about 50 feet which then gets worse. The pain is in the lower legs, upper legs, and back. This was present prior to his hospitalization. Unknown if leaning forward helps. Stopping and resting makes the pain go away completely in 3-4 minutes.    A [employer]  was used for this visit.        Review of Systems:   A complete 12 point review of systems was performed and negative unless noted in HPI.            Physical Exam:     There were no vitals filed for this visit.  There is no height or weight on file to calculate BMI.    General: alert, no distress  HEENT: atraumatic, eyes normal  CV: RRR, no murmur  Lungs: clear  Abdomen: soft, nontender  Extremities: bilateral lower extremities cooler than upper extremities, no hair present any extremities, unable to palpate dorsal pedis or tibial pulses, calf nontender to squeeze, no redness or swelling  Neuro: grossly intact  Psych: mood/affect  normal         Results:     Results from the last 24 hours   Results for orders placed or performed in visit on 08/14/19 (from the past 24 hour(s))   Basic Metabolic Panel ([facility]) - Results < 1 hr   Result Value Ref Range    Glucose 103.0 60.0 - 109.0 mg/dL    Urea Nitrogen 39.0 (H) 7.0 - 30.0 mg/dL    Creatinine 3.6 (H) 0.8 - 1.5 mg/dL    Sodium 138.0 133.0 - 144.0 mmol/L    Potassium 4.2 3.4 - 5.3 mmol/L    Chloride 111.0 (H) 94.0 - 109.0 mmol/L    Carbon Dioxide 21.0 20.0 - 32.0 mmol/L    Calcium 8.5 8.5 - 10.4 mg/dL    eGFR Calculated (Non Black Reference) 18.5 (L) >60.0 mL/min    eGFR Calculated (Black Reference) 22.4 (L) >60.0 mL/min     Assessment and Plan   1. Dissection of thoracoabdominal aorta (H)  Reviewed outside CT scan which shows that his dissection extends to the aortic bifurcation and into the proximal L common iliac artery and origin of the SMA. Given his symptoms which would otherwise indicate PAD, I am concerned in this non-smoker that his legs are hypoperfused d/t the above. Patient denies any abdominal or chest pain and limbs are not ischemic, but I did caution him that he should return to the [facility] should pain develop in his limbs or abdomen as he will need quick access to vascular surgery. CASEY today 0.43 on the R and 0.47 on the L indicating moderate obstruction.  - left for him vascular surgeon, Dr. Cosby - will discuss with him regarding necessity of moving up his repeat scan and appointment on 9/18  -patient will remain out of work ([employer]) until reevaluated in clinic next week although I doubt he will be able to return to work anytime soon    2. CKD (chronic kidney disease) stage 4, GFR 15-29 ml/min (H)  Kidney function stable today. He states that he refused IV contrast to protect his kidneys - while I told him this is a legitimate concern I did explain that IV contrast allows better visualization of the blood vessels and that it may be necessary in the  future to provide him with the best care and outcomes.  - Basic Metabolic Panel ([facility]) - Results < 1 hr    3. Chronic viral hepatitis B without delta agent and without coma (H)  Patient was unsure he could continue taking his HBV medication with his other medications, reassured this wasn't a problem and updated his medication list and provided him with a copy of this.    4. Essential hypertension  At goal, on multiple drugs.    E&M code to be billed if TCM cannot be: 01886    Type of decision making: Moderate complexity (40838)    Options for treatment and follow-up care were reviewed with the patient  Elle Blanton   engaged in the decision making process and verbalized understanding of the options discussed and agreed with the final plan.    Yosvany Penny MD  [facility], PGY-3

## 2019-08-15 NOTE — RESULT ENCOUNTER NOTE
Please mail a letter to the patient with the following:    Elle,    Below are your lab results from your recent visit. Your kidney function is stable since hospital discharge.    If you have any further questions or concerns, please do not hesitate to reach out to my office.    I look forward to seeing you in the future!    Take care,  Yosvany Penny MD

## 2019-08-16 ENCOUNTER — TELEPHONE (OUTPATIENT)
Dept: FAMILY MEDICINE | Facility: CLINIC | Age: 59
End: 2019-08-16

## 2019-08-16 NOTE — TELEPHONE ENCOUNTER
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 Patient has a  at [facility] who will be following up with him. Vascular surgeon follow up set on 8/18 and neuphrology in 2-3 weeks.   :  Katie will be returning on Monday and to call at [phone] if wants to discuss prior to patients appointments at other clinics.    Return call needed: No    OK to leave a message on voice mail? Yes    Primary language: [facility]      needed? Yes    Call taken on August 16, 2019 at 10:36 AM by Kenny Nair

## 2019-08-20 NOTE — PROGRESS NOTES
Preceptor Attestation:   Patient seen, evaluated and discussed with the resident. I have verified the content of the note, which accurately reflects my assessment of the patient and the plan of care.    Supervising Physician:Vickey Arce MD    [facility]

## 2019-08-21 ENCOUNTER — OFFICE VISIT (OUTPATIENT)
Dept: FAMILY MEDICINE | Facility: CLINIC | Age: 59
End: 2019-08-21
Payer: COMMERCIAL

## 2019-08-21 VITALS
WEIGHT: 165.4 LBS | HEART RATE: 59 BPM | BODY MASS INDEX: 27.56 KG/M2 | DIASTOLIC BLOOD PRESSURE: 61 MMHG | TEMPERATURE: 97.7 F | OXYGEN SATURATION: 99 % | SYSTOLIC BLOOD PRESSURE: 120 MMHG | HEIGHT: 65 IN | RESPIRATION RATE: 16 BRPM

## 2019-08-21 DIAGNOSIS — I10 ESSENTIAL HYPERTENSION: ICD-10-CM

## 2019-08-21 DIAGNOSIS — N18.4 CKD (CHRONIC KIDNEY DISEASE) STAGE 4, GFR 15-29 ML/MIN (H): ICD-10-CM

## 2019-08-21 DIAGNOSIS — I71.03 DISSECTION OF THORACOABDOMINAL AORTA (H): Primary | ICD-10-CM

## 2019-08-21 DIAGNOSIS — D64.9 NORMOCYTIC ANEMIA: ICD-10-CM

## 2019-08-21 LAB — HEMOGLOBIN: 9 G/DL (ref 13.3–17.7)

## 2019-08-21 ASSESSMENT — MIFFLIN-ST. JEOR: SCORE: 1492.13

## 2019-08-21 NOTE — PROGRESS NOTES
Pt is a 59 year old male last seen on 8/14/19 by Dr Penny here today for:     1) Aortic dissection and insufficiency - symptoms stable per pt; still w/ difficulty walking short distances    2) Anemia - on iron bid since hospital discharge; does not notice a difference in his symptoms so is concerned he may need a blood transfusion;  normocytic anemia on recent labs    3) Hep B - on Bariclude     4) HTN - stable/ at goal today     BP Readings from Last 6 Encounters:   08/21/19 120/61   08/14/19 129/64   08/10/19 132/73   08/04/19 136/79   07/03/17 (!) 151/91   10/20/16 (!) 160/105     5) CKD - stable at last check  Creatinine   Date Value Ref Range Status   08/14/2019 3.6 (H) 0.8 - 1.5 mg/dL Final       Per Dr Penny's note:  1. Dissection of thoracoabdominal aorta (H)  Reviewed outside CT scan which shows that his dissection extends to the aortic bifurcation and into the proximal L common iliac artery and origin of the SMA. Given his symptoms which would otherwise indicate PAD, I am concerned in this non-smoker that his legs are hypoperfused d/t the above. Patient denies any abdominal or chest pain and limbs are not ischemic, but I did caution him that he should return to the [facility] should pain develop in his limbs or abdomen as he will need quick access to vascular surgery. CASEY today 0.43 on the R and 0.47 on the L indicating moderate obstruction.  -VM left for him vascular surgeon, Dr. Cosby - will discuss with him regarding necessity of moving up his repeat scan and appointment on 9/18  -patient will remain out of work ([employer]) until reevaluated in clinic next week although I doubt he will be able to return to work anytime soon     2. CKD (chronic kidney disease) stage 4, GFR 15-29 ml/min (H)  Kidney function stable today. He states that he refused IV contrast to protect his kidneys - while I told him this is a legitimate concern I did explain that IV contrast allows better  visualization of the blood vessels and that it may be necessary in the future to provide him with the best care and outcomes.  - Basic Metabolic Panel ([facility]) - Results < 1 hr     3. Chronic viral hepatitis B without delta agent and without coma (H)  Patient was unsure he could continue taking his HBV medication with his other medications, reassured this wasn't a problem and updated his medication list and provided him with a copy of this.     4. Essential hypertension  At goal, on multiple drugs.      Past Medical History:   Diagnosis Date     H. pylori infection 7/5/2017    UGI bleed implied by Hgb 9.0 6/22/17 and melena. Admitted and hgb decreased to 7.6, CT abdomen showed all bladder wall thickening. + Hep B surface antigen noted. Melena resolved and hgb stabalized without transfusion, epigastric pain resolved with PPI. Recommend referral for gastroscopy.     Hepatitis B       Past Surgical History:   Procedure Laterality Date     INCISION AND DRAINAGE FINGER, COMBINED Left 10/20/2016    Procedure: COMBINED INCISION AND DRAINAGE FINGER;  Surgeon: Mireya Pizano MD;  Location: [facility] OR      Current Outpatient Medications   Medication Sig Dispense Refill     amLODIPine (NORVASC) 10 MG tablet Take 1 tablet (10 mg) by mouth daily 30 tablet 11     carvedilol (COREG) 25 MG tablet Take 2 tablets (50 mg) by mouth 2 times daily (with meals) 60 tablet 11     cloNIDine (CATAPRES) 0.1 MG tablet Take 1 tablet (0.1 mg) by mouth 2 times daily 60 tablet 0     diltiazem ER COATED BEADS (CARDIZEM CD) 360 MG 24 hr capsule Take 1 capsule (360 mg) by mouth daily 30 capsule 3     entecavir (BARACLUDE) 0.5 MG tablet Take 1 tablet (0.5 mg) by mouth every 72 hours 11 tablet 3     ferrous sulfate (FE TABS) 325 (65 Fe) MG EC tablet Take 1 tablet (325 mg) by mouth daily       order for DME Equipment being ordered: Other: blood pressure monitor  Treatment Diagnosis: hypertension 1 each 0     polyethylene glycol (MIRALAX/GLYCOLAX)  "packet Take 17 g by mouth daily as needed for constipation 10 packet 3      Allergies   Allergen Reactions     Nka [No Known Allergies]         ROS:   Gen- no weight change, no fevers/chills   Head/ Eyes- no blurred vision, no headaches   Cardiac - no palpitations, no chest pain   Respiratory - no shortness of breath , no wheezing   Neuro - no numbness, no tingling   Remainder of ROS negative.     Exam:   /61   Pulse 59   Temp 97.7  F (36.5  C) (Oral)   Resp 16   Ht 1.651 m (5' 5\")   Wt 75 kg (165 lb 6.4 oz)   SpO2 99%   BMI 27.52 kg/m     Alert and oriented x 3; No acute distress       (I71.03) Dissection of thoracoabdominal aorta (H)  (primary encounter diagnosis)  Comment:   Plan: stable; Dr Penny actively engaged w/ vascular surgery to move his appt up; note for work written to be out until his pending appt w/ vascular on 9/18/19    (N18.4) CKD (chronic kidney disease) stage 4, GFR 15-29 ml/min (H)  Comment: stable Cr  Plan: Hemoglobin (HGB) ([facility])            (I10) Essential hypertension  Comment:   Plan: at goal    (D64.9) Normocytic anemia  Comment: rechecked Hgb today and it was 9.0, improved from last check; no indication for transfusion at this time  Plan: Hemoglobin (HGB) ([facility])            Jaswant Flores MD  August 21, 2019  3:01 PM    "

## 2019-08-21 NOTE — LETTER
WORK NOTE    8/21/2019    Re: Elle Blanton  1960      To Whom It May Concern:     Elle Blanton was seen in clinic today.  He is unable to return to work due to his thoracic aortic dissection and resultant impaired lower extremity peripheral vascular disease.  He has a follow-up with vascular surgery planned for 9/18/19.  He will be out of work til then.     Please contact the office with any questions/ concerns.            Jaswant Flores MD  8/21/2019 2:52 PM

## 2019-08-21 NOTE — Clinical Note
He requested to switch to me as his PCP but I would like you to remain involved closely in his care as you have been coordinating and advocating for him with vascular surgery. I told him he could see both of us for follow-up. - Jaswant

## 2019-08-22 ENCOUNTER — TELEPHONE (OUTPATIENT)
Dept: ORTHOPEDICS | Facility: CLINIC | Age: 59
End: 2019-08-22

## 2019-08-22 ENCOUNTER — COMMUNICATION - HEALTHEAST (OUTPATIENT)
Dept: VASCULAR SURGERY | Facility: CLINIC | Age: 59
End: 2019-08-22

## 2019-08-22 DIAGNOSIS — I73.9 PAD (PERIPHERAL ARTERY DISEASE) (H): ICD-10-CM

## 2019-08-22 DIAGNOSIS — I71.012 DESCENDING THORACIC AORTIC DISSECTION (H): Primary | ICD-10-CM

## 2019-08-22 DIAGNOSIS — I71.03 THORACOABDOMINAL AORTIC DISSECTION (H): ICD-10-CM

## 2019-08-22 NOTE — PROGRESS NOTES
In follow up to our clinic visit 8/14 I discussed the patient with Dr. Cosby and updated him on the patient's symptoms of claudication and abnormal CASEY result. The patient had not been complaining of these symptoms while inpatient and Dr. Cosby agreed that imaging sooner than the planned visit with him on 9/18 was warranted. We did discuss his reduced kidney function and refusal of IV contrast while inpatient so decided on a non-contrast series of CT studies - I later spoke with Abril from Dr. Cosby's office and they will be placing orders and scheduling the patient for these.    I called and discussed the above with the patient who is agreeable and will be expecting a phone call to schedule.    Yosvany Penny MD  [facility], PGY-3

## 2019-08-22 NOTE — TELEPHONE ENCOUNTER
Faxed completed form to [employer] and placed completed form in medical records to be scanned in. Romain MELENDEZ

## 2019-08-22 NOTE — TELEPHONE ENCOUNTER
Elle informed, forms are completed. Will be faxed later this afternoon when forms are available/ brought back to clinic. If fax number not able to be located on form, will call him back.  Ze MELENDEZ

## 2019-08-22 NOTE — TELEPHONE ENCOUNTER
[facility] phone call message- general phone call:    Reason for call: Patient stated that he spoke to his work and they told him the short term disability form needs to be sent back within 5 days. It was faxed over on 8/16 so the form needs to be faxed back on 8/23, Advise the patient our form turnaround time is 7-10 business days. I checked our outgoing folders and Dr. Penny desk but the forms are no where to be found. Patient saw Dr. Penny as a PCP but switched over PCP to Dr. Florse on 8/21 during his visit as he told Dr. Flores he wanted a faculty and Dr. Flores was fine with the switch per patient. Please forward to Dr. Penny to see if he has forms and complete if possible. Patient will have work fax another form in case it was not received    Return call needed: No    OK to leave a message on voice mail? Yes    Primary language: Hmong      needed? Yes    Call taken on August 22, 2019 at 8:29 AM by Kenny Nair

## 2019-08-26 ENCOUNTER — RECORDS - HEALTHEAST (OUTPATIENT)
Dept: ADMINISTRATIVE | Facility: OTHER | Age: 59
End: 2019-08-26

## 2019-08-26 ENCOUNTER — TELEPHONE (OUTPATIENT)
Dept: FAMILY MEDICINE | Facility: CLINIC | Age: 59
End: 2019-08-26

## 2019-08-26 ENCOUNTER — TELEPHONE (OUTPATIENT)
Dept: ORTHOPEDICS | Facility: CLINIC | Age: 59
End: 2019-08-26

## 2019-08-26 NOTE — TELEPHONE ENCOUNTER
[facility] phone call message- general phone call:    Reason for call: Caller states they called patient to schedule ct of lower extremities. Patient declined scheduling because he has an ultrasound for his ankle scheduled. Caller would like to verify if patient needs ct since ultrasound is scheduled. Please call and advise. When calling back, ok to speak with anyone.     Return call needed: Yes    OK to leave a message on voice mail? Yes    Primary language: [facility]      needed? Yes    Call taken on August 26, 2019 at 12:33 PM by Sara Steele

## 2019-09-03 NOTE — PROGRESS NOTES
Pt is a 59 year old male last seen on 8/21/19 here today for:     1) aortic dissection - has a CT scan pending next week  Feels very tired after taking meds  Is wondering if stopping one of his medications would help    Dr Alexander - [phone] -> advertising a machine to help w/ leg bloodflow  Requesting I research this     2) Anemia - Hgb stable at last check  Hemoglobin   Date Value Ref Range Status   08/21/2019 9.0 (L) 13.3 - 17.7 g/dL Final   08/10/2019 8.1 (L) 13.3 - 17.7 g/dL Final     3) HTN - on Norvasc, Coreg, clonidine, and diltiazem; he would like to eliminate 1 if possible  BP Readings from Last 6 Encounters:   09/04/19 106/60   08/21/19 120/61   08/14/19 129/64   08/10/19 132/73   08/04/19 136/79   07/03/17 (!) 151/91       Past Medical History:   Diagnosis Date     H. pylori infection 7/5/2017    UGI bleed implied by Hgb 9.0 6/22/17 and melena. Admitted and hgb decreased to 7.6, CT abdomen showed all bladder wall thickening. + Hep B surface antigen noted. Melena resolved and hgb stabalized without transfusion, epigastric pain resolved with PPI. Recommend referral for gastroscopy.     Hepatitis B       Past Surgical History:   Procedure Laterality Date     INCISION AND DRAINAGE FINGER, COMBINED Left 10/20/2016    Procedure: COMBINED INCISION AND DRAINAGE FINGER;  Surgeon: Mireya Pizano MD;  Location: [facility] OR      Current Outpatient Medications   Medication Sig Dispense Refill     amLODIPine (NORVASC) 10 MG tablet Take 1 tablet (10 mg) by mouth daily 30 tablet 11     carvedilol (COREG) 25 MG tablet Take 2 tablets (50 mg) by mouth 2 times daily (with meals) 60 tablet 11     cloNIDine (CATAPRES) 0.1 MG tablet Take 1 tablet (0.1 mg) by mouth 2 times daily 60 tablet 0     diltiazem ER COATED BEADS (CARDIZEM CD) 360 MG 24 hr capsule Take 1 capsule (360 mg) by mouth daily 30 capsule 3     entecavir (BARACLUDE) 0.5 MG tablet Take 1 tablet (0.5 mg) by mouth every 72 hours 11 tablet 3     ferrous sulfate  "(FE TABS) 325 (65 Fe) MG EC tablet Take 1 tablet (325 mg) by mouth daily       order for DME Equipment being ordered: Other: blood pressure monitor  Treatment Diagnosis: hypertension 1 each 0     polyethylene glycol (MIRALAX/GLYCOLAX) packet Take 17 g by mouth daily as needed for constipation 10 packet 3      Allergies   Allergen Reactions     Nka [No Known Allergies]         ROS:   Gen- no weight change, no fevers/chills   Cardiac - no palpitations, no chest pain   Respiratory - no shortness of breath , no wheezing   GI - no nausea, no vomiting, no diarrhea, no constipation   Urinary - no dysuria, no polyuria   Neuro - no numbness, no tingling   Remainder of ROS negative.     Exam:   /60 (BP Location: Right arm, Patient Position: Sitting, Cuff Size: Adult Large)   Pulse 59   Temp 98.5  F (36.9  C) (Oral)   Resp 16   Ht 1.64 m (5' 4.57\")   Wt 74.5 kg (164 lb 4 oz)   SpO2 100%   BMI 27.70 kg/m     Alert and oriented x 3; No acute distress   Neuro: no focal deficits         (I71.01) Descending thoracic aortic dissection (H)  (primary encounter diagnosis)  Comment:   Plan: appt for U/S and CT on 9/12; appt w/ vascular surgery on 9/18; will follow-up with me after     (I10) Essential hypertension  Comment:   Plan: will review chart w/ our clinical pharmacist and contact pt regarding which medication if any he can discontinue    Jaswant Flores MD  September 4, 2019  9:10 AM    Addendum:    Spoke w/ our clinical pharmacist who recommended we discontinue clonidine. Called pt and informed him of plan.    Jaswant Flores MD  September 6, 2019  10:28 AM      "

## 2019-09-04 ENCOUNTER — OFFICE VISIT (OUTPATIENT)
Dept: FAMILY MEDICINE | Facility: CLINIC | Age: 59
End: 2019-09-04
Payer: COMMERCIAL

## 2019-09-04 VITALS
SYSTOLIC BLOOD PRESSURE: 106 MMHG | DIASTOLIC BLOOD PRESSURE: 60 MMHG | HEIGHT: 65 IN | TEMPERATURE: 98.5 F | OXYGEN SATURATION: 100 % | HEART RATE: 59 BPM | BODY MASS INDEX: 27.36 KG/M2 | RESPIRATION RATE: 16 BRPM | WEIGHT: 164.25 LBS

## 2019-09-04 DIAGNOSIS — I10 ESSENTIAL HYPERTENSION: ICD-10-CM

## 2019-09-04 DIAGNOSIS — I71.012 DESCENDING THORACIC AORTIC DISSECTION (H): Primary | ICD-10-CM

## 2019-09-04 ASSESSMENT — MIFFLIN-ST. JEOR: SCORE: 1480.03

## 2019-09-04 NOTE — PATIENT INSTRUCTIONS
Referral for :     CT   LOCATION/PLACE/Provider :    [facility]   DATE & TIME :    Sept. 12th at 11:45  PHONE :     [phone]  FAX :    [phone]  Appointment made by clinic staff/:    Moraima    Referral for :     US   LOCATION/PLACE/Provider [facility] [place]   DATE & TIME :    Sept. 12th at 12:30  PHONE :     [phone]  FAX :    [phone]  Appointment made by clinic staff/:    Moraima  Has appointment with Vascular on Sept. 18th at 11:10 am to discuss US results

## 2019-09-04 NOTE — Clinical Note
Tere - can you review this patient's BP med regimen and recommend a medication we can hold? Thank you!Jaswant

## 2019-09-04 NOTE — NURSING NOTE
Due to patient being non-English speaking/uses sign language, an  was used for this visit. Only for face-to-face interpretation by an external agency, date and length of interpretation can be found on the scanned worksheet.     name: lucy cottrell  Agency: [employer]  Language: Cass   Telephone number: [phone]  Type of interpretation: Face-to-face, spoken

## 2019-09-04 NOTE — TELEPHONE ENCOUNTER
Caller wants to get clarification from Dr. Flores or PCP on whether or not patient needs this CTA as patient as told them that he does not need it due to the Ultrasound he had done. Please call and advise. It has been over 2 days

## 2019-09-04 NOTE — TELEPHONE ENCOUNTER
Called [facility] scheduling and advised want CTA completed. Individual stated they will let Claire know, provided patient MRN and to continue with CTA. Romain MELENDEZ

## 2019-09-12 ENCOUNTER — RECORDS - HEALTHEAST (OUTPATIENT)
Dept: VASCULAR ULTRASOUND | Facility: CLINIC | Age: 59
End: 2019-09-12

## 2019-09-12 ENCOUNTER — HOSPITAL ENCOUNTER (OUTPATIENT)
Dept: CT IMAGING | Facility: HOSPITAL | Age: 59
Discharge: HOME | End: 2019-09-12
Attending: SURGERY

## 2019-09-12 DIAGNOSIS — I71.03 DISSECTION OF THORACOABDOMINAL AORTA (H): ICD-10-CM

## 2019-09-12 DIAGNOSIS — I73.9 PERIPHERAL VASCULAR DISEASE, UNSPECIFIED (H): ICD-10-CM

## 2019-09-12 DIAGNOSIS — I71.03 THORACOABDOMINAL AORTIC DISSECTION (H): ICD-10-CM

## 2019-09-13 ENCOUNTER — TELEPHONE (OUTPATIENT)
Dept: FAMILY MEDICINE | Facility: CLINIC | Age: 59
End: 2019-09-13

## 2019-09-13 NOTE — TELEPHONE ENCOUNTER
[facility] phone call message- general phone call:    Reason for call: Caller has tried contacting patient 3x. When they spoke to patient they were told from patient that he does not think the order is needed so caller wants to inform PCP that they had refer patient back to clinic. Order was for CTA lower extremity for both left and right.    Return call needed: No    OK to leave a message on voice mail? Yes    Primary language: ong      needed? Yes    Call taken on September 13, 2019 at 10:57 AM by Kenny Nair

## 2019-09-16 ENCOUNTER — TELEPHONE (OUTPATIENT)
Dept: FAMILY MEDICINE | Facility: CLINIC | Age: 59
End: 2019-09-16

## 2019-09-16 NOTE — TELEPHONE ENCOUNTER
[facility] phone call message- general phone call:    Reason for call: Patient state he needs a letter for work to excuse him from missing due to his walking issue he still has. Patient wasn't sure if PCP said he will write the letter or if patient would get the letter from his appt with [facility] on 9/18. Patient would like a call back to discuss how he can possibly get this letter since his work is still waiting on the faxed paperwork from PCP.    Return call needed: Yes    OK to leave a message on voice mail? Yes    Primary language: Hmong      needed? Yes    Call taken on September 16, 2019 at 12:51 PM by Kenny Nair

## 2019-09-16 NOTE — TELEPHONE ENCOUNTER
Patient informed of message from Dr Flores. He will be seeing Vascular 9/18/2019. At this visit they will discuss further recommendation,such as plan of treatment.  Elle wanted to inform Dr Flores to expect short term disability form from [employer] that will need completion for the initial medical leave.  Ze MELENDEZ

## 2019-09-16 NOTE — TELEPHONE ENCOUNTER
Patient is calling back he states he needs another letter for after the surgery to stay another 2 weeks to figure out what is wrong with him as he is still not able to walk yet and to return to work. Please call and advise.

## 2019-09-16 NOTE — TELEPHONE ENCOUNTER
Called patient with use of [employer] language line . No answer, left VM to call clinic back. Noted letter in chart from August advising patient unable to work until upcoming surgery. Want to clarify with patient if that letter will suffice or if he is need of a new letter. Romain MELENDEZ

## 2019-09-16 NOTE — TELEPHONE ENCOUNTER
Call center staff spoke with patient, patient is requesting a new letter for after the f/u appointment with vascular surgery. Will route to PCP for review and advisement. Romain MELENDEZ

## 2019-09-18 ENCOUNTER — TRANSFERRED RECORDS (OUTPATIENT)
Dept: HEALTH INFORMATION MANAGEMENT | Facility: CLINIC | Age: 59
End: 2019-09-18

## 2019-09-18 ENCOUNTER — OFFICE VISIT - HEALTHEAST (OUTPATIENT)
Dept: VASCULAR SURGERY | Facility: CLINIC | Age: 59
End: 2019-09-18

## 2019-09-18 DIAGNOSIS — D69.6 THROMBOCYTOPENIA (H): ICD-10-CM

## 2019-09-18 DIAGNOSIS — I73.9 PAD (PERIPHERAL ARTERY DISEASE) (H): ICD-10-CM

## 2019-09-18 DIAGNOSIS — I71.03 THORACOABDOMINAL AORTIC DISSECTION (H): ICD-10-CM

## 2019-09-19 ENCOUNTER — TELEPHONE (OUTPATIENT)
Dept: ORTHOPEDICS | Facility: CLINIC | Age: 59
End: 2019-09-19

## 2019-09-19 NOTE — TELEPHONE ENCOUNTER
[facility] phone call message- general phone call:    Reason for call: Patient state his work sent over fax for Short term disability on 9/11/19 that patient is hoping PCP received and fill to send back. Patient wants PCP to know that he saw Dr. Cosby at [facility] whom gave patient an okay to stay one more month out of work. Patient is wanting PCP to get the message so he knows how to fill the forms.     Return call needed: No    OK to leave a message on voice mail? Yes    Primary language: Hmong      needed? Yes    Call taken on September 19, 2019 at 10:04 AM by Kenny Nair

## 2019-09-20 NOTE — TELEPHONE ENCOUNTER
Loc - I checked my office and have no short-term disability form.  Once received, I am comfortable filling out for 3 months based on Dr Cosby's recent vascular note.  I am rounding all next week but will be precepting Wed and Thurs PM and can address then. Thanks!    Jaswant Flores MD  September 20, 2019  5:45 PM

## 2019-10-08 ENCOUNTER — COMMUNICATION - HEALTHEAST (OUTPATIENT)
Dept: ADMINISTRATIVE | Facility: HOSPITAL | Age: 59
End: 2019-10-08

## 2019-10-09 ENCOUNTER — COMMUNICATION - HEALTHEAST (OUTPATIENT)
Dept: ADMINISTRATIVE | Facility: HOSPITAL | Age: 59
End: 2019-10-09

## 2019-10-14 ENCOUNTER — TELEPHONE (OUTPATIENT)
Dept: FAMILY MEDICINE | Facility: CLINIC | Age: 59
End: 2019-10-14

## 2019-10-14 DIAGNOSIS — I10 ESSENTIAL HYPERTENSION: Primary | ICD-10-CM

## 2019-10-14 NOTE — TELEPHONE ENCOUNTER
[facility] phone call message- medication clarification/question:    Full Medication Name: diltiazem 360 MG and clonidine 0.1 MG   Dose: 360 MG    Question: Patient wants to split diltiazem medication in half. Patient wants to take 150-180 Mg in the morning and another 150-180MG to take at night as patient state when his BP is high he can not take extra medication at night.   Patient also wants to request to get a refill request for clonidine 0.1 MG as patient has refuse to begin with and it was prescribe from the hospital and now wants it back on medication list.     Pharmacy confirmed as [id number] - [place], [place] - [address]: Yes    OK to leave a message on voice mail? Yes    Primary language: Hmong      needed? Yes    Call taken on October 14, 2019 at 10:47 AM by Kenny Nair

## 2019-10-17 NOTE — TELEPHONE ENCOUNTER
Will order split dose of diltiazem as requested. Recommended against restarting clonidine as last BP's were low normal (106/60). Message sent to team to inform pt.    Jaswant Flores MD  October 17, 2019  10:13 AM

## 2019-12-17 NOTE — PROGRESS NOTES
Pt is a 59 year old male last seen on 9/4/19 here today for:     1) vascular - appt w/ Dr Cosby today  Vascular disease is stable - not better but not worse (dictation of progress note pending)   Per pt, ws told he could apply for social security for permanent disability or 2 month trial of cilastazol  CT chest/abdomen/pelvis ordered - done today  CASEY's ordered - done today    2) HTN - coreg, norvasc, diltiazem 180 bid; stopped clonidine at last visit; did not take any medications this morning     BP Readings from Last 6 Encounters:   12/18/19 (!) 149/74   09/04/19 106/60   08/21/19 120/61   08/14/19 129/64   08/10/19 132/73   08/04/19 136/79       Past Medical History:   Diagnosis Date     H. pylori infection 7/5/2017    UGI bleed implied by Hgb 9.0 6/22/17 and melena. Admitted and hgb decreased to 7.6, CT abdomen showed all bladder wall thickening. + Hep B surface antigen noted. Melena resolved and hgb stabalized without transfusion, epigastric pain resolved with PPI. Recommend referral for gastroscopy.     Hepatitis B       Past Surgical History:   Procedure Laterality Date     INCISION AND DRAINAGE FINGER, COMBINED Left 10/20/2016    Procedure: COMBINED INCISION AND DRAINAGE FINGER;  Surgeon: Mireya Pizano MD;  Location: [facility] OR      Current Outpatient Medications   Medication Sig Dispense Refill     amLODIPine (NORVASC) 10 MG tablet Take 1 tablet (10 mg) by mouth daily 30 tablet 11     carvedilol (COREG) 25 MG tablet Take 2 tablets (50 mg) by mouth 2 times daily (with meals) 60 tablet 11     diltiazem ER COATED BEADS (CARDIAZEM LA) 180 MG 24 hr tablet Take 2 tablets (360 mg) by mouth daily 60 tablet 11     entecavir (BARACLUDE) 0.5 MG tablet Take 1 tablet (0.5 mg) by mouth every 72 hours 11 tablet 3     ferrous sulfate (FE TABS) 325 (65 Fe) MG EC tablet Take 1 tablet (325 mg) by mouth daily       order for DME Equipment being ordered: Other: blood pressure monitor  Treatment Diagnosis: hypertension 1  "each 0     polyethylene glycol (MIRALAX/GLYCOLAX) packet Take 17 g by mouth daily as needed for constipation 10 packet 3      Allergies   Allergen Reactions     Nka [No Known Allergies]       ROS:   Gen- no weight change, no fevers/chills   Head/ Eyes- no blurred vision, no headaches   ENT- no cough, no congestion, no URI symptoms   Cardiac - no palpitations, no chest pain   Respiratory - no shortness of breath , no wheezing   GI - no nausea, no vomiting, no diarrhea, no constipation   Neuro - no numbness, no tingling   Remainder of ROS negative.     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 Will likely need permanent disability. I will be out on paternity leave in Feb and March so I recommended seeing him back in 4 wks    (I10) Essential hypertension  Comment:   Plan: counseled regarding his poor compliance w/ HTN meds and need to be consistent w/ these medications; will see him back in 4 weeks    Jaswant Flores MD  December 18, 2019  2:18 PM        "

## 2019-12-18 ENCOUNTER — HOSPITAL ENCOUNTER (OUTPATIENT)
Dept: CT IMAGING | Facility: HOSPITAL | Age: 59
Discharge: HOME | End: 2019-12-18
Attending: SURGERY

## 2019-12-18 ENCOUNTER — OFFICE VISIT (OUTPATIENT)
Dept: FAMILY MEDICINE | Facility: CLINIC | Age: 59
End: 2019-12-18
Payer: COMMERCIAL

## 2019-12-18 ENCOUNTER — TRANSFERRED RECORDS (OUTPATIENT)
Dept: HEALTH INFORMATION MANAGEMENT | Facility: CLINIC | Age: 59
End: 2019-12-18

## 2019-12-18 ENCOUNTER — RECORDS - HEALTHEAST (OUTPATIENT)
Dept: VASCULAR ULTRASOUND | Facility: CLINIC | Age: 59
End: 2019-12-18

## 2019-12-18 ENCOUNTER — OFFICE VISIT - HEALTHEAST (OUTPATIENT)
Dept: VASCULAR SURGERY | Facility: CLINIC | Age: 59
End: 2019-12-18

## 2019-12-18 VITALS
TEMPERATURE: 97.5 F | HEIGHT: 65 IN | HEART RATE: 58 BPM | BODY MASS INDEX: 27.32 KG/M2 | SYSTOLIC BLOOD PRESSURE: 149 MMHG | OXYGEN SATURATION: 99 % | DIASTOLIC BLOOD PRESSURE: 74 MMHG | WEIGHT: 164 LBS | RESPIRATION RATE: 18 BRPM

## 2019-12-18 DIAGNOSIS — I10 ESSENTIAL HYPERTENSION: ICD-10-CM

## 2019-12-18 DIAGNOSIS — I71.03 THORACOABDOMINAL AORTIC DISSECTION (H): ICD-10-CM

## 2019-12-18 DIAGNOSIS — I71.03 DISSECTION OF THORACOABDOMINAL AORTA (H): ICD-10-CM

## 2019-12-18 DIAGNOSIS — I73.9 PERIPHERAL VASCULAR DISEASE, UNSPECIFIED (H): ICD-10-CM

## 2019-12-18 DIAGNOSIS — I71.03 DISSECTION OF THORACOABDOMINAL AORTA (H): Primary | ICD-10-CM

## 2019-12-18 ASSESSMENT — MIFFLIN-ST. JEOR: SCORE: 1491.39

## 2019-12-18 NOTE — Clinical Note
Dr Cosby - I appreciate your guidance with Mr Blanton.  I suspect he will need permanent disability and will try to document what is needed before going out on paternity leave Feb/March. I will see him back in 4 weeks- Jaswant

## 2019-12-19 ENCOUNTER — COMMUNICATION - HEALTHEAST (OUTPATIENT)
Dept: VASCULAR SURGERY | Facility: CLINIC | Age: 59
End: 2019-12-19

## 2019-12-19 DIAGNOSIS — I73.9 PAD (PERIPHERAL ARTERY DISEASE) (H): ICD-10-CM

## 2019-12-20 ENCOUNTER — TELEPHONE (OUTPATIENT)
Dept: FAMILY MEDICINE | Facility: CLINIC | Age: 59
End: 2019-12-20

## 2019-12-20 DIAGNOSIS — I10 ESSENTIAL HYPERTENSION: Primary | ICD-10-CM

## 2019-12-20 RX ORDER — DILTIAZEM HYDROCHLORIDE 360 MG/1
360 CAPSULE, EXTENDED RELEASE ORAL DAILY
Qty: 30 CAPSULE | Refills: 11 | Status: SHIPPED | OUTPATIENT
Start: 2019-12-20 | End: 2019-12-31

## 2019-12-20 NOTE — TELEPHONE ENCOUNTER
Pharmacy informed that copay for Diltiazem 180 mg tablet > $150.   Request from pharmacy to change to capsule to reduce cost.     Clinically equivalent change. Placed order for alternative as suggested by pharmacy. Routed to PCP as EDWARD Watters, PharmD, CDE  [facility]  Phone: [phone]  December 20, 2019 at 3:47 PM

## 2019-12-30 ENCOUNTER — TELEPHONE (OUTPATIENT)
Dept: FAMILY MEDICINE | Facility: CLINIC | Age: 59
End: 2019-12-30

## 2019-12-30 DIAGNOSIS — I10 ESSENTIAL HYPERTENSION: Primary | ICD-10-CM

## 2019-12-30 NOTE — TELEPHONE ENCOUNTER
[facility] phone call message- medication clarification/question:    Full Medication Name: diltiazem ER (TIAZAC)   Dose: 360 mg    Question: Patient called stating that pharmacy called and mentioned about medication being 360 mg and patient states he can not do the 360 mg and is needing it to be 180 mg. Patient states he and Dr. flores spoke about dosage change due to the 360 mg being a bit to strong for patient. If Dr. Flores can send a new RX refill for medication for the dosage to be 180 mg instead of 360 and in capsules and not tablet due to insurance not covering tablet. Patient would like to  medication today if changes are approved. Please call and advise once approved.      Pharmacy confirmed as [id number] - [place], [place] - [address]: Yes    OK to leave a message on voice mail? Yes    Primary language: Hmong      needed? Yes    Call taken on December 30, 2019 at 2:06 PM by Ana María Live

## 2019-12-31 RX ORDER — DILTIAZEM HYDROCHLORIDE 180 MG/1
180 CAPSULE, EXTENDED RELEASE ORAL 2 TIMES DAILY
Qty: 60 CAPSULE | Refills: 11 | Status: SHIPPED | OUTPATIENT
Start: 2019-12-31 | End: 2020-12-08

## 2019-12-31 NOTE — TELEPHONE ENCOUNTER
I am unsure how this prescription got changed back. I ordered 180mg tabs w/ 11 refills a few weeks ago. New script sent.      Team - please confirm new script with pharmacy and inform pt. Pt should be on 180mg tabs twice daily, not 360 daily. Thanks!    Jaswant Flores MD  December 31, 2019  8:36 AM

## 2019-12-31 NOTE — TELEPHONE ENCOUNTER
"Called and spoke to patient and confirmed new medication instructions.    I also called the [facility] pharmacy and spoke with \"basim,\" in which he confirmed he received the new Rx.      "

## 2020-01-15 ENCOUNTER — OFFICE VISIT (OUTPATIENT)
Dept: FAMILY MEDICINE | Facility: CLINIC | Age: 60
End: 2020-01-15
Payer: COMMERCIAL

## 2020-01-15 VITALS
BODY MASS INDEX: 27.28 KG/M2 | HEART RATE: 67 BPM | RESPIRATION RATE: 20 BRPM | HEIGHT: 64 IN | DIASTOLIC BLOOD PRESSURE: 70 MMHG | SYSTOLIC BLOOD PRESSURE: 146 MMHG | TEMPERATURE: 98 F | WEIGHT: 159.8 LBS | OXYGEN SATURATION: 98 %

## 2020-01-15 DIAGNOSIS — Z23 NEED FOR PROPHYLACTIC VACCINATION AND INOCULATION AGAINST INFLUENZA: ICD-10-CM

## 2020-01-15 DIAGNOSIS — Z13.1 SCREENING FOR DIABETES MELLITUS: ICD-10-CM

## 2020-01-15 DIAGNOSIS — I71.03 DISSECTION OF THORACOABDOMINAL AORTA (H): Primary | ICD-10-CM

## 2020-01-15 DIAGNOSIS — J20.9 ACUTE BRONCHITIS, UNSPECIFIED ORGANISM: ICD-10-CM

## 2020-01-15 LAB — HBA1C MFR BLD: 4.7 % (ref 4.1–5.7)

## 2020-01-15 RX ORDER — CODEINE PHOSPHATE/GUAIFENESIN 10-100MG/5
10 LIQUID (ML) ORAL 3 TIMES DAILY PRN
Qty: 180 ML | Refills: 1 | Status: SHIPPED | OUTPATIENT
Start: 2020-01-15 | End: 2020-07-30

## 2020-01-15 RX ORDER — CILOSTAZOL 50 MG/1
50 TABLET ORAL 2 TIMES DAILY
Qty: 180 TABLET | Refills: 0 | COMMUNITY
Start: 2020-01-15 | End: 2021-09-26

## 2020-01-15 RX ORDER — PREDNISONE 50 MG/1
50 TABLET ORAL DAILY
Qty: 5 TABLET | Refills: 0 | Status: SHIPPED | OUTPATIENT
Start: 2020-01-15 | End: 2020-01-20

## 2020-01-15 ASSESSMENT — MIFFLIN-ST. JEOR: SCORE: 1450.85

## 2020-01-15 NOTE — PROGRESS NOTES
Pt is a 59 year old male last seen on 12/18/19 here today for:     1) cough - x 3 wks  Did not get flu shot this yr  No fevers  No body aches  +wheezing   No sick contacts  Cough worse at night     2) DM screen- feels fatigued   No polyuria  Excessive thirst     3) aortic dissection - needs long-term disability forms filled      Past Medical History:   Diagnosis Date     H. pylori infection 7/5/2017    UGI bleed implied by Hgb 9.0 6/22/17 and melena. Admitted and hgb decreased to 7.6, CT abdomen showed all bladder wall thickening. + Hep B surface antigen noted. Melena resolved and hgb stabalized without transfusion, epigastric pain resolved with PPI. Recommend referral for gastroscopy.     Hepatitis B       Past Surgical History:   Procedure Laterality Date     INCISION AND DRAINAGE FINGER, COMBINED Left 10/20/2016    Procedure: COMBINED INCISION AND DRAINAGE FINGER;  Surgeon: Mireya Pizano MD;  Location: [facility] OR      Current Outpatient Medications   Medication Sig Dispense Refill     cilostazol (PLETAL) 50 MG tablet Take 1 tablet (50 mg) by mouth 2 times daily 180 tablet 0     amLODIPine (NORVASC) 10 MG tablet Take 1 tablet (10 mg) by mouth daily 30 tablet 11     carvedilol (COREG) 25 MG tablet Take 2 tablets (50 mg) by mouth 2 times daily (with meals) 60 tablet 11     diltiazem ER (TIAZAC) 180 MG 24 hr ER beaded capsule Take 1 capsule (180 mg) by mouth 2 times daily 60 capsule 11     entecavir (BARACLUDE) 0.5 MG tablet Take 1 tablet (0.5 mg) by mouth every 72 hours 11 tablet 3     ferrous sulfate (FE TABS) 325 (65 Fe) MG EC tablet Take 1 tablet (325 mg) by mouth daily       order for DME Equipment being ordered: Other: blood pressure monitor  Treatment Diagnosis: hypertension 1 each 0     polyethylene glycol (MIRALAX/GLYCOLAX) packet Take 17 g by mouth daily as needed for constipation 10 packet 3      Allergies   Allergen Reactions     Nka [No Known Allergies]         ROS:   Gen- no weight change, no  "fevers/chills   Head/ Eyes- no blurred vision, no headaches   ENT- see HPI  Cardiac - no palpitations, no chest pain   Respiratory - see HPI  GI - no nausea, no vomiting, no diarrhea, no constipation   Urinary - no dysuria, no polyuria   Neuro - no numbness, no tingling   Remainder of ROS negative.     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) Dissection of thoracoabdominal aorta (H)  (primary encounter diagnosis)  Comment: forms filled; explained that I would be on paternity leave til the end of March so he should contact Dr Cosby's office for any additional paperwork; has appt pending w/ him in 4 wks  Plan: cilostazol (PLETAL) 50 MG tablet            (Z13.1) Screening for diabetes mellitus  Comment:   Plan: Hemoglobin A1c ([facility])            (J20.9) Acute bronchitis, unspecified organism  Comment: will tx w/ pred burst and cough syrup; precautions given  Plan: predniSONE (DELTASONE) 50 MG tablet,         guaiFENesin-codeine (GUAIFENESIN AC) 100-10         MG/5ML syrup            Jaswant Flores MD  January 15, 2020  3:36 PM      "

## 2020-01-15 NOTE — NURSING NOTE
Due to patient being non-English speaking/uses sign language, an  was used for this visit. Only for face-to-face interpretation by an external agency, date and length of interpretation can be found on the scanned worksheet.     name: Shahid Swartz  Agency: [employer]  Language: Michelleong   Telephone number: [phone]  Type of interpretation: Face-to-face, spoken

## 2020-01-29 DIAGNOSIS — B18.1 CHRONIC VIRAL HEPATITIS B WITHOUT DELTA AGENT AND WITHOUT COMA (H): ICD-10-CM

## 2020-01-29 DIAGNOSIS — K74.60 CIRRHOSIS OF LIVER WITHOUT ASCITES, UNSPECIFIED HEPATIC CIRRHOSIS TYPE (H): ICD-10-CM

## 2020-01-29 DIAGNOSIS — D61.818 OTHER PANCYTOPENIA (H): ICD-10-CM

## 2020-01-29 DIAGNOSIS — N17.9 ACUTE KIDNEY INJURY (H): ICD-10-CM

## 2020-01-29 DIAGNOSIS — I71.03 DISSECTION OF THORACOABDOMINAL AORTA (H): ICD-10-CM

## 2020-01-29 RX ORDER — CARVEDILOL 25 MG/1
50 TABLET ORAL 2 TIMES DAILY WITH MEALS
Qty: 60 TABLET | Refills: 11 | Status: SHIPPED | OUTPATIENT
Start: 2020-01-29 | End: 2020-11-17

## 2020-02-05 ENCOUNTER — COMMUNICATION - HEALTHEAST (OUTPATIENT)
Dept: VASCULAR SURGERY | Facility: CLINIC | Age: 60
End: 2020-02-05

## 2020-02-20 ENCOUNTER — COMMUNICATION - HEALTHEAST (OUTPATIENT)
Dept: VASCULAR SURGERY | Facility: CLINIC | Age: 60
End: 2020-02-20

## 2020-02-25 ENCOUNTER — COMMUNICATION - HEALTHEAST (OUTPATIENT)
Dept: VASCULAR SURGERY | Facility: CLINIC | Age: 60
End: 2020-02-25

## 2020-03-06 ENCOUNTER — COMMUNICATION - HEALTHEAST (OUTPATIENT)
Dept: VASCULAR SURGERY | Facility: CLINIC | Age: 60
End: 2020-03-06

## 2020-04-20 ENCOUNTER — VIRTUAL VISIT (OUTPATIENT)
Dept: FAMILY MEDICINE | Facility: CLINIC | Age: 60
End: 2020-04-20
Payer: COMMERCIAL

## 2020-04-20 VITALS — SYSTOLIC BLOOD PRESSURE: 140 MMHG | DIASTOLIC BLOOD PRESSURE: 70 MMHG

## 2020-04-20 DIAGNOSIS — L29.9 PRURITIC DISORDER: Primary | ICD-10-CM

## 2020-04-20 RX ORDER — HYDROXYZINE HYDROCHLORIDE 25 MG/1
25 TABLET, FILM COATED ORAL 3 TIMES DAILY PRN
Qty: 30 TABLET | Refills: 1 | Status: SHIPPED | OUTPATIENT
Start: 2020-04-20 | End: 2020-07-30

## 2020-04-20 NOTE — PROGRESS NOTES
"Family Medicine Telephone Visit Note           Telephone Visit Consent   Patient was verbally read the following and verbal consent was obtained.    \"This telephone visit will be conducted via a call between you and your physician/provider. We have found that certain health care needs can be provided without the need for a physical exam.  This service lets us provide the care you need with a short phone conversation.  If a prescription is necessary we can send it directly to your pharmacy.  If lab work is needed we can place an order for that and you can then stop by our lab to have the test done at a later time.    Telephone visits are billed at different rates depending on your insurance coverage. During this emergency period, for some insurers they may be billed the same as an in-person visit.  Please reach out to your insurance provider with any questions.    If during the course of the call the physician/provider feels a telephone visit is not appropriate, you will not be charged for this service.\"    Name person giving consent:  Patient   Date verbal consent given:  4/20/2020  Time verbal consent given:  11:36 AM    Chief complaint: itching            HPI   Patients name: Elle  Appointment start time:  12:12 PM    3 days ago itching on scalp and arms/ legs - diffuse  Feels better when he scratches   No rash  Tried lotion w/o relief   Has not been outside much due to covid concerns  Better when warm (under blanket)  No shortness of breath or dysphagia  No new detergents, pets, etc      Current Outpatient Medications   Medication Sig Dispense Refill     amLODIPine (NORVASC) 10 MG tablet Take 1 tablet (10 mg) by mouth daily 30 tablet 11     carvedilol (COREG) 25 MG tablet Take 2 tablets (50 mg) by mouth 2 times daily (with meals) 60 tablet 11     cilostazol (PLETAL) 50 MG tablet Take 1 tablet (50 mg) by mouth 2 times daily 180 tablet 0     diltiazem ER (TIAZAC) 180 MG 24 hr ER beaded capsule Take 1 capsule (180 " "mg) by mouth 2 times daily 60 capsule 11     entecavir (BARACLUDE) 0.5 MG tablet Take 1 tablet (0.5 mg) by mouth every 72 hours 11 tablet 3     ferrous sulfate (FE TABS) 325 (65 Fe) MG EC tablet Take 1 tablet (325 mg) by mouth daily       guaiFENesin-codeine (GUAIFENESIN AC) 100-10 MG/5ML syrup Take 10 mLs by mouth 3 times daily as needed for congestion or cough 180 mL 1     order for DME Equipment being ordered: Other: blood pressure monitor  Treatment Diagnosis: hypertension 1 each 0     polyethylene glycol (MIRALAX/GLYCOLAX) packet Take 17 g by mouth daily as needed for constipation 10 packet 3     Allergies   Allergen Reactions     Nka [No Known Allergies]             Review of Systems:     ROS COMP: CONSTITUTIONAL: NEGATIVE for fever, chills, change in weight  INTEGUMENTARY/SKIN: NEGATIVE for worrisome rashes, moles or lesions  EYES: NEGATIVE for vision changes or irritation  ENT/MOUTH: NEGATIVE for ear, mouth and throat problems  RESP: NEGATIVE for significant cough or SOB  CV: NEGATIVE for chest pain, palpitations or peripheral edema         Physical Exam:     There were no vitals taken for this visit.  Estimated body mass index is 27.43 kg/m  as calculated from the following:    Height as of 1/15/20: 1.626 m (5' 4\").    Weight as of 1/15/20: 72.5 kg (159 lb 12.8 oz).    Exam:  Constitutional: healthy, alert and no distress  Psychiatric: mentation appears normal and affect normal/bright          Assessment and Plan   (L29.9) Pruritic disorder  (primary encounter diagnosis)  Comment: trial of antihistamine; precautions given; he will call back in 2-3 days w/ status update. If no better, would recommend office visit  Plan: hydrOXYzine (ATARAX) 25 MG tablet              Refilled medications that would be required in the next 3 months.     After Visit Information:  Patient declined AVS     No follow-ups on file.    Appointment end time: 12:19 PM  This is a telephone visit that took 7 minutes.      Clinician " location:  [facility], MD  April 20, 2020  12:20 PM

## 2020-06-02 ENCOUNTER — COMMUNICATION - HEALTHEAST (OUTPATIENT)
Dept: VASCULAR SURGERY | Facility: CLINIC | Age: 60
End: 2020-06-02

## 2020-06-18 ENCOUNTER — HOSPITAL ENCOUNTER (OUTPATIENT)
Dept: CT IMAGING | Facility: HOSPITAL | Age: 60
Discharge: HOME | End: 2020-06-18
Attending: SURGERY

## 2020-06-18 DIAGNOSIS — I71.03 THORACOABDOMINAL AORTIC DISSECTION (H): ICD-10-CM

## 2020-06-18 LAB
CREAT BLD-MCNC: 3.8 MG/DL (ref 0.7–1.3)
GFR SERPL CREATININE-BSD FRML MDRD: 16 ML/MIN/1.73M2

## 2020-07-30 ENCOUNTER — VIRTUAL VISIT (OUTPATIENT)
Dept: FAMILY MEDICINE | Facility: CLINIC | Age: 60
End: 2020-07-30
Payer: COMMERCIAL

## 2020-07-30 DIAGNOSIS — R60.9 EDEMA, UNSPECIFIED TYPE: Primary | ICD-10-CM

## 2020-07-30 DIAGNOSIS — K74.60 CIRRHOSIS OF LIVER WITHOUT ASCITES, UNSPECIFIED HEPATIC CIRRHOSIS TYPE (H): ICD-10-CM

## 2020-07-30 RX ORDER — POLYETHYLENE GLYCOL 3350 17 G/17G
17 POWDER, FOR SOLUTION ORAL DAILY PRN
Qty: 10 PACKET | Refills: 3 | Status: SHIPPED | OUTPATIENT
Start: 2020-07-30 | End: 2021-09-26

## 2020-07-30 RX ORDER — FUROSEMIDE 20 MG
20 TABLET ORAL DAILY
Qty: 21 TABLET | Refills: 0 | Status: SHIPPED | OUTPATIENT
Start: 2020-07-30 | End: 2020-08-06

## 2020-07-30 NOTE — PROGRESS NOTES
"Family Medicine Telephone Visit Note               Telephone Visit Consent   Patient was verbally read the following and verbal consent was obtained.    \"Telephone visits are billed at different rates depending on your insurance coverage. During this emergency period, for some insurers they may be billed the same as an in-person visit.  Please reach out to your insurance provider with any questions.  If during the course of the call the physician/provider feels a telephone visit is not appropriate, you will not be charged for this service.\"    Name person giving consent:  Patient   Date verbal consent given:  7/30/2020  Time verbal consent given:  8:57 AM       Chief Complaint   Patient presents with     Musculoskeletal Problem     Bilateral leg pain/tighness and swelling down into feet, getting worse and not going away after laying down Started 2 weeks ago     Medication Reconciliation     needs attention              HPI   Patients name: Elle  Appointment start time:  0905    TELEPHONE VISIT       SUBJECTIVE:  A 60-year-old male with known liver disease, high blood pressure and chronic kidney disease, being managed by telephone due to COVID-19 pandemic.  He has developed swelling of both legs over the past 2 weeks.  He has had mild problems with this in the past.  In the past, it has always gone away overnight, and now it is not going away overnight.  He is not having any shortness of breath or chest pressure.  He had a small heart attack in the past.  He has no documented history of congestive heart failure.  He is on amlodipine, carvedilol and diltiazem.  He was on entecavir, but has run out of the medication, and it has not been refilled for some time.  He does have a visit coming up with his liver doctor on 08/09, and we will have him check regarding that medication at that time.      He has no symptoms of COVID-19.  He is not having diarrhea, blood in stool or urine or excessive urination.        He has been " drinking a lot of fluid.  He has a history of thrombocytopenia and anemia.      We reviewed his last set of labs, and he needs to have his liver function and metabolic profile repeated today.  We will also check a CBC and an A1c.      I am going to start him on furosemide 20 mg daily.  He has not been on a diuretic in the past that he can recall.  We will check him in a week, either with me or with Dr. Flores, to follow up on his edema.  I have given him a 3 week supply of the medication.  We will get lab results to him as soon as they are available.  I have asked him to continue his amlodipine, diltiazem and carvedilol.      The patient involved in medical decision making throughout the visit.      Recheck 1 week.         Current Outpatient Medications   Medication Sig Dispense Refill     amLODIPine (NORVASC) 10 MG tablet Take 1 tablet (10 mg) by mouth daily 30 tablet 11     carvedilol (COREG) 25 MG tablet Take 2 tablets (50 mg) by mouth 2 times daily (with meals) 60 tablet 11     diltiazem ER (TIAZAC) 180 MG 24 hr ER beaded capsule Take 1 capsule (180 mg) by mouth 2 times daily 60 capsule 11     ferrous sulfate (FE TABS) 325 (65 Fe) MG EC tablet Take 1 tablet (325 mg) by mouth daily       furosemide (LASIX) 20 MG tablet Take 1 tablet (20 mg) by mouth daily 21 tablet 0     order for DME Equipment being ordered: Other: blood pressure monitor  Treatment Diagnosis: hypertension 1 each 0     polyethylene glycol (MIRALAX) 17 g packet Take 17 g by mouth daily as needed for constipation 10 packet 3     cilostazol (PLETAL) 50 MG tablet Take 1 tablet (50 mg) by mouth 2 times daily 180 tablet 0     entecavir (BARACLUDE) 0.5 MG tablet Take 1 tablet (0.5 mg) by mouth every 72 hours (Patient not taking: Reported on 4/20/2020) 11 tablet 3     Allergies   Allergen Reactions     Nka [No Known Allergies]               Review of Systems:     Constitutional, HEENT, cardiovascular, pulmonary, gi and gu systems are negative, except as  "otherwise noted.         Physical Exam:     There were no vitals taken for this visit.  Estimated body mass index is 27.43 kg/m  as calculated from the following:    Height as of 1/15/20: 1.626 m (5' 4\").    Weight as of 1/15/20: 72.5 kg (159 lb 12.8 oz).    Exam:  Constitutional: healthy, alert and no distress  Psychiatric: mentation appears normal and affect normal/bright            Assessment and Plan       ICD-10-CM    1. Edema, unspecified type  R60.9 Comprehensive Metabolic Panel ([facility]) - results <1hr Protocol     CBC with Plt ([facility])     Hemoglobin A1c ([facility])     furosemide (LASIX) 20 MG tablet   2. Cirrhosis of liver without ascites, unspecified hepatic cirrhosis type (H)  K74.60 polyethylene glycol (MIRALAX) 17 g packet       Refilled medications that would be required in the next 3 months.     After Visit Information:  Will print and mail AVS     Return in about 1 week (around 8/6/2020) for edema.    Appointment end time: 9:28 AM  This is a telephone visit that took 23 minutes.      Clinician location:  [facility]     Redd Valiente MD              "

## 2020-07-30 NOTE — PATIENT INSTRUCTIONS
I am starting you on furosemide for your leg swelling.  The swelling could be caused by low blood levels of hemoglobin, albumin, or protein and we are checking those levels with lab studies.  Will get results to you.  Recheck in one week.  If you have a scale at home, please weigh your self and record the weight daily.  Please check with your liver doctor on the entecavir medication; it has not been prescribed for some time.  WR

## 2020-08-05 NOTE — PROGRESS NOTES
"Family Medicine Telephone Visit Note           Telephone Visit Consent   Patient was verbally read the following and verbal consent was obtained.    \"Telephone visits are billed at different rates depending on your insurance coverage. During this emergency period, for some insurers they may be billed the same as an in-person visit.  Please reach out to your insurance provider with any questions.  If during the course of the call the physician/provider feels a telephone visit is not appropriate, you will not be charged for this service.\"    Name person giving consent:  Patient   Date verbal consent given:  8/6/2020  Time verbal consent given:  1:33 PM           [facility]   Patients name: Elle  Appointment start time:  2:00 PM     Pt is a 60 year old male last seen on 7/30/20 via virtual visit by Dr Valiente here today for:     Follow-up leg swelling - he feels no better w/ lasix   1 month ago, if he was driving and then afterwards if he sat down, pain and swelling was worse  Then he would lie flat and swelling would resolve  Now, swelling stays in legs regardless   Not wearing stockings  No shortness of breath  No chest pains   No lightheaded or dizziness  Feels leg tightness is worse     Blood pressures -> running low (systolics 105-115).        Per Dr Valiente note:  \"SUBJECTIVE:  A 60-year-old male with known liver disease, high blood pressure and chronic kidney disease, being managed by telephone due to COVID-19 pandemic.  He has developed swelling of both legs over the past 2 weeks.  He has had mild problems with this in the past.  In the past, it has always gone away overnight, and now it is not going away overnight.  He is not having any shortness of breath or chest pressure.  He had a small heart attack in the past.  He has no documented history of congestive heart failure.  He is on amlodipine, carvedilol and diltiazem.  He was on entecavir, but has run out of the medication, and it has not been refilled for some " "time.  He does have a visit coming up with his liver doctor on 08/09, and we will have him check regarding that medication at that time.   He has no symptoms of COVID-19.  He is not having diarrhea, blood in stool or urine or excessive urination.      He has been drinking a lot of fluid.  He has a history of thrombocytopenia and anemia.    We reviewed his last set of labs, and he needs to have his liver function and metabolic profile repeated today.  We will also check a CBC and an A1c.    I am going to start him on furosemide 20 mg daily.  He has not been on a diuretic in the past that he can recall.  We will check him in a week, either with me or with Dr. Flores, to follow up on his edema.  I have given him a 3 week supply of the medication.  We will get lab results to him as soon as they are available.  I have asked him to continue his amlodipine, diltiazem and carvedilol.    The patient involved in medical decision making throughout the visit.    Recheck 1 week.\"        Current Outpatient Medications   Medication Sig Dispense Refill     amLODIPine (NORVASC) 10 MG tablet Take 1 tablet (10 mg) by mouth daily 30 tablet 11     carvedilol (COREG) 25 MG tablet Take 2 tablets (50 mg) by mouth 2 times daily (with meals) 60 tablet 11     cilostazol (PLETAL) 50 MG tablet Take 1 tablet (50 mg) by mouth 2 times daily 180 tablet 0     diltiazem ER (TIAZAC) 180 MG 24 hr ER beaded capsule Take 1 capsule (180 mg) by mouth 2 times daily 60 capsule 11     entecavir (BARACLUDE) 0.5 MG tablet Take 1 tablet (0.5 mg) by mouth every 72 hours (Patient not taking: Reported on 4/20/2020) 11 tablet 3     ferrous sulfate (FE TABS) 325 (65 Fe) MG EC tablet Take 1 tablet (325 mg) by mouth daily       furosemide (LASIX) 20 MG tablet Take 1 tablet (20 mg) by mouth daily 21 tablet 0     order for DME Equipment being ordered: Other: blood pressure monitor  Treatment Diagnosis: hypertension 1 each 0     polyethylene glycol (MIRALAX) 17 g packet " "Take 17 g by mouth daily as needed for constipation 10 packet 3     Allergies   Allergen Reactions     Nka [No Known Allergies]               Review of Systems:     CONSTITUTIONAL: NEGATIVE for fever, chills, change in weight  ENT/MOUTH: NEGATIVE for ear, mouth and throat problems  RESP: NEGATIVE for significant cough or SOB  CV: NEGATIVE for chest pain, palpitations or peripheral edema         Physical Exam:     There were no vitals taken for this visit.  Estimated body mass index is 27.43 kg/m  as calculated from the following:    Height as of 1/15/20: 1.626 m (5' 4\").    Weight as of 1/15/20: 72.5 kg (159 lb 12.8 oz).    Exam:  Constitutional: healthy, alert and no distress  Psychiatric: mentation appears normal and affect normal/bright            Assessment and Plan     (R22.43) Localized swelling of both lower legs  (primary encounter diagnosis)  Comment:   Plan: I do not feel comfortable proceeding w/ additional treatments til I am able to examine him and check labs; We decided it would be best to hold on the lasix as he noted no improvements; I also recommended he hold his amlodipine as this could be contributing to his edema. Unsure why he is on both amlodipine and diltiazem; will review records; f/u in 1 wk w/ Dr Camacho and I will be precepting    (I10) Essential hypertension  Comment:   Plan: see above; will hold amlodipine for leg swelling; his pressures have been running low at home    (N18.4) CKD (chronic kidney disease) stage 4, GFR 15-29 ml/min (H)  Comment:   Plan: needs repeat labs       After Visit Information:  Patient declined AVS     No follow-ups on file.    Appointment end time: 2:14 PM  This is a telephone visit that took 14 minutes.      Clinician location:  [facility]     Jaswant Flores MD  August 6, 2020  2:15 PM    "

## 2020-08-06 ENCOUNTER — VIRTUAL VISIT (OUTPATIENT)
Dept: FAMILY MEDICINE | Facility: CLINIC | Age: 60
End: 2020-08-06
Payer: COMMERCIAL

## 2020-08-06 VITALS — DIASTOLIC BLOOD PRESSURE: 88 MMHG | SYSTOLIC BLOOD PRESSURE: 135 MMHG | HEART RATE: 71 BPM

## 2020-08-06 DIAGNOSIS — R22.43 LOCALIZED SWELLING OF BOTH LOWER LEGS: Primary | ICD-10-CM

## 2020-08-06 DIAGNOSIS — I10 ESSENTIAL HYPERTENSION: ICD-10-CM

## 2020-08-06 DIAGNOSIS — N18.4 CKD (CHRONIC KIDNEY DISEASE) STAGE 4, GFR 15-29 ML/MIN (H): ICD-10-CM

## 2020-08-06 NOTE — Clinical Note
Just BERNARDA. You will be seeing him in a week and I will precept. He was supposed to have an office visit with me today but he called and requested it be a phone visit... Make sure we discuss before you see him. - Jaswant

## 2020-08-12 RX ORDER — FUROSEMIDE 20 MG
TABLET ORAL
COMMUNITY
Start: 2020-07-30 | End: 2020-09-02

## 2020-08-13 ENCOUNTER — OFFICE VISIT (OUTPATIENT)
Dept: FAMILY MEDICINE | Facility: CLINIC | Age: 60
End: 2020-08-13
Payer: COMMERCIAL

## 2020-08-13 ENCOUNTER — TRANSFERRED RECORDS (OUTPATIENT)
Dept: HEALTH INFORMATION MANAGEMENT | Facility: CLINIC | Age: 60
End: 2020-08-13

## 2020-08-13 VITALS
WEIGHT: 188.4 LBS | HEIGHT: 64 IN | HEART RATE: 63 BPM | OXYGEN SATURATION: 100 % | SYSTOLIC BLOOD PRESSURE: 165 MMHG | TEMPERATURE: 97.9 F | DIASTOLIC BLOOD PRESSURE: 80 MMHG | BODY MASS INDEX: 32.17 KG/M2

## 2020-08-13 DIAGNOSIS — R60.9 EDEMA, UNSPECIFIED TYPE: Primary | ICD-10-CM

## 2020-08-13 DIAGNOSIS — K74.60 CIRRHOSIS OF LIVER WITHOUT ASCITES, UNSPECIFIED HEPATIC CIRRHOSIS TYPE (H): ICD-10-CM

## 2020-08-13 DIAGNOSIS — N18.4 CKD (CHRONIC KIDNEY DISEASE) STAGE 4, GFR 15-29 ML/MIN (H): ICD-10-CM

## 2020-08-13 DIAGNOSIS — D61.818 OTHER PANCYTOPENIA (H): ICD-10-CM

## 2020-08-13 DIAGNOSIS — B18.1 CHRONIC VIRAL HEPATITIS B WITHOUT DELTA AGENT AND WITHOUT COMA (H): ICD-10-CM

## 2020-08-13 LAB
ALBUMIN SERPL-MCNC: 2.6 G/DL (ref 3.2–4.5)
ALP SERPL-CCNC: 87 U/L (ref 40–150)
ALT SERPL-CCNC: 20 U/L (ref 0–45)
AST SERPL-CCNC: 23 U/L (ref 0–55)
BILIRUB SERPL-MCNC: 0.6 MG/DL (ref 0.2–1.3)
BUN SERPL-MCNC: 61 MG/DL (ref 7–30)
CALCIUM SERPL-MCNC: 8.4 MG/DL (ref 8.5–10.4)
CHLORIDE SERPLBLD-SCNC: 111 MMOL/L (ref 94–109)
CO2 SERPL-SCNC: 18 MMOL/L (ref 20–32)
CREAT SERPL-MCNC: 3.2 MG/DL (ref 0.8–1.5)
EGFR CALCULATED (BLACK REFERENCE): 25.6 ML/MIN
EGFR CALCULATED (NON BLACK REFERENCE): 21.2 ML/MIN
GLUCOSE SERPL-MCNC: 103 MG/DL (ref 60–109)
HBA1C MFR BLD: 5 % (ref 4.1–5.7)
HCT VFR BLD AUTO: 18.1 % (ref 40–53)
HEMOGLOBIN: 5.4 G/DL (ref 13.3–17.7)
MCH RBC QN AUTO: 24.7 PG (ref 26.5–35)
MCHC RBC AUTO-ENTMCNC: 29.8 G/DL (ref 32–36)
MCV RBC AUTO: 82.7 FL (ref 78–100)
PLATELET # BLD AUTO: 70 K/UL (ref 150–450)
POTASSIUM SERPL-SCNC: 4.8 MMOL/L (ref 3.4–5.3)
PROT SERPL-MCNC: 6.6 G/DL (ref 6.6–8.8)
RBC # BLD AUTO: 2.19 M/UL (ref 4.4–5.9)
SODIUM SERPL-SCNC: 143 MMOL/L (ref 133–144)
WBC # BLD AUTO: 2.9 K/UL (ref 4–11)

## 2020-08-13 RX ORDER — FUROSEMIDE 20 MG
20 TABLET ORAL DAILY
OUTPATIENT
Start: 2020-08-13

## 2020-08-13 ASSESSMENT — MIFFLIN-ST. JEOR
SCORE: 1593.71
SCORE: 1575.58

## 2020-08-13 NOTE — RESULT ENCOUNTER NOTE
Reviewed at time of visit. Will direct admit to hospital.    Jaswant Flores MD  8/13/2020  3:20 PM

## 2020-08-13 NOTE — PROGRESS NOTES
CHIEF COMPLAINT                                                      Chief Complaint   Patient presents with     RECHECK     follow up leg swollen     SUBJECTIVE:                                                    Elle Blanton is a 60 year old year old male who presents to clinic today for the following health issues:    Leg Swelling:  -Over 1 month  -From the belly all the way down  -No issues with urination  -Most recent GFR 18  -Skin is more yellow than normal  -Feels like stomach is full, lots of gas, no constipation  -No belly pain  -reports Easy bruising  -Reports shortness of breath  -Feels short of breath with laying down  -Weight is increasing (20 pounds more)  -Lasix has not been helping    Patient is an established patient of this clinic.    ----------------------------------------------------------------------------------------------------------------------  Patient Active Problem List   Diagnosis     Melena     Normocytic anemia     Thrombocytopenia (H)     Other pancytopenia (H)     Chronic hepatitis B without hepatic coma (H)     Cirrhosis of liver without ascites (H)     Essential hypertension     CKD (chronic kidney disease) stage 4, GFR 15-29 ml/min (H)     Descending thoracic aortic dissection (H)     Past Surgical History:   Procedure Laterality Date     INCISION AND DRAINAGE FINGER, COMBINED Left 10/20/2016    Procedure: COMBINED INCISION AND DRAINAGE FINGER;  Surgeon: Mireya Pizano MD;  Location: [facility] OR       Social History     Tobacco Use     Smoking status: Never Smoker     Smokeless tobacco: Never Used   Substance Use Topics     Alcohol use: No     Family History   Problem Relation Age of Onset     Diabetes Father      Hypertension No family hx of      Asthma No family hx of      Cancer No family hx of      Coronary Artery Disease No family hx of      Liver Cancer No family hx of          Problem list and past medical, surgical, social, and family histories reviewed & adjusted, as  "indicated.    Current Outpatient Medications   Medication Sig Dispense Refill     carvedilol (COREG) 25 MG tablet Take 2 tablets (50 mg) by mouth 2 times daily (with meals) 60 tablet 11     cilostazol (PLETAL) 50 MG tablet Take 1 tablet (50 mg) by mouth 2 times daily 180 tablet 0     diltiazem ER (TIAZAC) 180 MG 24 hr ER beaded capsule Take 1 capsule (180 mg) by mouth 2 times daily 60 capsule 11     entecavir (BARACLUDE) 0.5 MG tablet Take 1 tablet (0.5 mg) by mouth every 72 hours (Patient not taking: Reported on 4/20/2020) 11 tablet 3     ferrous sulfate (FE TABS) 325 (65 Fe) MG EC tablet Take 1 tablet (325 mg) by mouth daily       furosemide (LASIX) 20 MG tablet        order for DME Equipment being ordered: Other: blood pressure monitor  Treatment Diagnosis: hypertension 1 each 0     polyethylene glycol (MIRALAX) 17 g packet Take 17 g by mouth daily as needed for constipation 10 packet 3     Medication list reviewed and updated as indicated.    Allergies   Allergen Reactions     Nka [No Known Allergies]      Allergies reviewed and updated as indicated.  ----------------------------------------------------------------------------------------------------------------------  ROS:     ROS: 10 point ROS neg other than the symptoms noted above in the HPI.    OBJECTIVE:     BP (!) 165/80   Pulse 63   Temp 97.9  F (36.6  C) (Oral)   Ht 1.626 m (5' 4\")   Wt 85.5 kg (188 lb 6.4 oz)   SpO2 100%   BMI 32.34 kg/m    Body mass index is 32.34 kg/m .  GENERAL: Appears well and in no acute distress.  CARDIOVASCULAR: Regular rate and rhythm, normal S1 and S2 without murmur. No extra heartsounds or friction rub.   RESPIRATORY:  Lungs clear to auscultation bilaterally. No wheezing or crackles. No prolonged expiration. Symmetrical chest rise.  MSK: No gross extremity deformities.   ABD: Significant abdominal distention. 3+ edema to the hips.   SKIN: Jaundiced.    Diagnostic Test Results:  Results for orders placed or performed in " visit on 08/13/20 (from the past 24 hour(s))   CBC with Plt ([facility])   Result Value Ref Range    WBC 2.9 (L) 4.0 - 11.0 K/uL    RBC 2.19 (L) 4.40 - 5.90 M/uL    Hemoglobin 5.4 (LL) 13.3 - 17.7 g/dL    Hematocrit 18.1 (LL) 40.0 - 53.0 %    MCV 82.7 78.0 - 100.0 fL    MCH 24.7 (L) 26.5 - 35.0 pg    MCHC 29.8 (L) 32.0 - 36.0 g/dL    Platelets 70.0 (LL) 150.0 - 450.0 K/uL    Narrative    Result verified by repeat analysis. Critical Value was reported to and read   back by Dr. Camacho  at 8/13/2020 2:19 PM (insert staff initials)cxx   Hemoglobin A1c ([facility])   Result Value Ref Range    Hemoglobin A1C 5.0 4.1 - 5.7 %   Comprehensive Metabolic Panel ([facility]) - results <1hr Protocol   Result Value Ref Range    Glucose 103.0 60.0 - 109.0 mg/dL    Urea Nitrogen 61.0 (H) 7.0 - 30.0 mg/dL    Creatinine 3.2 (H) 0.8 - 1.5 mg/dL    Sodium 143.0 133.0 - 144.0 mmol/L    Potassium 4.8 3.4 - 5.3 mmol/L    Chloride 111.0 (H) 94.0 - 109.0 mmol/L    Carbon Dioxide 18.0 (L) 20.0 - 32.0 mmol/L    Calcium 8.4 (L) 8.5 - 10.4 mg/dL    Protein Total 6.6 6.6 - 8.8 g/dL    Albumin 2.6 (L) 3.2 - 4.5 g/dL    Alkaline Phosphatase 87.0 40.0 - 150.0 U/L    ALT 20.0 0.0 - 45.0 U/L    AST 23.0 0.0 - 55.0 U/L    Bilirubin Total 0.6 0.2 - 1.3 mg/dL    eGFR Calculated (Non Black Reference) 21.2 (L) >60.0 mL/min    eGFR Calculated (Black Reference) 25.6 (L) >60.0 mL/min       ASSESSMENT/PLAN:     1. Edema, unspecified type  2. Cirrhosis of liver without ascites, unspecified hepatic cirrhosis type (H)  3. Chronic viral hepatitis B without delta agent and without coma (H)  4. Other pancytopenia (H)  Significant edema and ascites on exam. Also jaundiced. Reporting shortness of breath and orthopnea. History of cirrhosis from hepatitis B. CMP showed normal LFTs but low albumin. Suspect edema related to decompensated cirrhosis and hypoalbumenemia. Unresponsive to lasix but likely 2/2 CKD and creatinine of 3.4. Hgb obtained and critical value of 5.4 noted. No melena  or other evidence of GI bleed. History of pancytopenia noted in chart unclear etiology but possibly related to severe liver dysfunction. Given his severe anemia and worsening edema, ascites and concern for decompensated cirrhosis recommended he present to the hospital for direct admission for transfusion and further workup. Patient demonstrated understanding and will drive to hospital from clinic.   - CBC with Plt ([facility])  - Hemoglobin A1c ([facility])  - Comprehensive Metabolic Panel ([facility]) - results <1hr Protocol      5. CKD (chronic kidney disease) stage 4, GFR 15-29 ml/min (H)  Creatinine elevated at 3.2 but appears to be at baseline.         Schedule follow-up appointment after hospitalization.       There are no discontinued medications.     Jarred Camacho MD  [facility] [place] [facility]  Pager# [phone]    Precepted with: Dr. Flores    Options for treatment and follow-up care were reviewed with the patient and/or guardian. Elle Blanton and/or guardian engaged in the decision making process and verbalized understanding of the options discussed and agreed with the final plan

## 2020-08-14 ENCOUNTER — TRANSFERRED RECORDS (OUTPATIENT)
Dept: HEALTH INFORMATION MANAGEMENT | Facility: CLINIC | Age: 60
End: 2020-08-14

## 2020-08-14 ASSESSMENT — MIFFLIN-ST. JEOR: SCORE: 1585.55

## 2020-08-14 NOTE — PROGRESS NOTES
I have personally reviewed the history and examination as documented by Dr. Camacho.  I was present during key portions of the visit and agree with the assessment and plan as documented for 60 yr old male with cirrhosis, CKD here for leg swelling. Labwork w/ Hgb of 5.4 so pt will need admission for transfusion. Significant abd distention which may require a paracentesis. [facility] ED informed. Precautions given. Anticipatory guidance given.     Jaswant Flores MD  August 14, 2020  8:44 AM

## 2020-08-15 ENCOUNTER — COMMUNICATION - HEALTHEAST (OUTPATIENT)
Dept: SCHEDULING | Facility: CLINIC | Age: 60
End: 2020-08-15

## 2020-08-18 ENCOUNTER — SURGERY - HEALTHEAST (OUTPATIENT)
Dept: GASTROENTEROLOGY | Facility: HOSPITAL | Age: 60
End: 2020-08-18

## 2020-08-18 ASSESSMENT — MIFFLIN-ST. JEOR: SCORE: 1443.57

## 2020-08-19 ASSESSMENT — MIFFLIN-ST. JEOR: SCORE: 1381.43

## 2020-08-22 ASSESSMENT — MIFFLIN-ST. JEOR: SCORE: 1370.09

## 2020-08-24 ASSESSMENT — MIFFLIN-ST. JEOR: SCORE: 1355.29

## 2020-08-25 ENCOUNTER — TELEPHONE (OUTPATIENT)
Dept: FAMILY MEDICINE | Facility: CLINIC | Age: 60
End: 2020-08-25

## 2020-08-25 NOTE — TELEPHONE ENCOUNTER
Date of discharge: 08/24/2020  Facility of discharge: [facility]  Patient concerns about condition: No concerns at this time.  Patient concerns about medications: No concerns at this time.  Full med reconciliation will be completed at clinic visit.  Patient concerns about transitioning: No concerns at this time.    Was the patient diagnosed with COVID while in the hospital? No    Clinic office visit appointment date: 08/28/2020  Patient reminded to bring all medications (prescription and over-the-counter) to clinic appointment: Yes

## 2020-08-25 NOTE — PROGRESS NOTES
Hospitalization Follow-up Visit         [facility]       Hospital Follow-up Visit:    Hospital:  [facility]   Date of Admission: 8/13/20  Date of Discharge: 8/24/20  Reason(s) for Admission: Anemia - Hgb of 5.4 in office             Problems taking medications regularly:  None       Post Discharge Medication Reconciliation: discharge medications reconciled, continue medications without change.       Problems adhering to non-medication therapy:  None       Medications reviewed by: by myself. Findings include new Epo, prednisone, oxycodone, omeprazole, lasix 40.    Summary of hospitalization:  [facility] discharge summary reviewed  Diagnostic Tests/Treatments reviewed.  Follow up needed: Hgb  Other Healthcare Providers Involved in Patient s Care:         None  Update since discharge: improved.   Plan of care communicated with patient            Liver dz -> not interested in liver tx  Would like medication   Needs to see liver service    Kidney dz -> not interested in dialysis        Gastro note on day of discharge:  IMPRESSION   61 y/o male with PMH hepatitis B cirrhosis with ascites, MI, CKD admitted directly from PCP office 8/13 for anemia after he was found to have a hemoglobin 5.4. He denies overt GI bleeding. Anemia felt to be multifactorial in the setting of CKD, liver disease and possible GI bleed. He was admitted in 2017 with melena and would not agree to EGD at that time but was empirically treated for ulcer. EGD performed 8/18 and showed gastric antral ulcerations, gastric biopsies negative for H. Pylori. On IV PPI. Ulceration not felt to be cause for persistent drops in hemoglobin requiring transfusion. Colonoscopy has been recommended and discussed daily with patient since admission but he has refused thus far. No melena/hematochezia.     8/24 - Patient continues to refuse colonoscopy. He would like to see how he does with hematology recommendations and PPI therapy. He states: if hematology  workup/treatments do not improve his blood counts, then he would be more willing to proceed with a colonoscopy.     RECOMMENDATION     Anemia   - Continue on PPI two times a day for 1 month then once daily thereafter  - Avoid NSAIDs  - Needs to follow hgb outpatient with PCP's office.   - Follow recommendations per hematology  - If he is not improving, he can call [facility] to schedule an outpatient colonoscopy.       Hepatitis B cirrhosis   - Low sodium diet <2000mg per day  - Paracentesis as needed  - Should follow with Liver clinic    Progress note from Dr Tesfaye on 8/23/20:  Assessment/Plan    Mr. Blanton is a 60-year-old male with a past medical history significant for hepatitis B, who was admitted directly from clinic for anemia (hgb 5.4). Treating for anasarca 2/2 cirrhosis, pancytopenia, and progressed CKD    Bilateral knee pain -proved  Today history of bilateral knee pain with warmth, swelling, and reduced range of motion. Uric acid elevated. Has history of gout. Low probability to have multi-joint septic arthritis. Patient does have mild fever. This does fit with a gout flare. Will initiate pain medicines and acute flare treatment. Some improvement noted on exam and by symptoms as of 8/23.  -Continue prednisone 40 mg (1/5 days)  -We will likely need short-term taper     Pancytopenia -worsened  Elle is now s/p multiple PRBC transfusions. Most likely poor hormone signaling, marrow failure, or a combination.  Patient's hemoglobin was 5.4 initially in clinic.  In hospital hemoglobin was 4.6.  2 unit RBCs given, and was above 7 and then dropped to 6.3 on 8/15 for which he was given another unit of blood. LDH elevated, but haptoglobin and CRYSTAL unremarkable. B9/B12 levels WNL, iron WNL, ferretin WNL, transferrin low, TIBC low. Peripheral smear shows no abnormal platelet morphology and no hypersegmented PMNs. Absolute retic count is inappropriately normal, suggesting production issue. No signs of active bleeding. EGD not  revealing for blood loss. Assessment for retroperitoneal bleeds and pelvic/thigh bleeds was unremarkable. SPEP did not show an peak. Received EPO shot on 8/19. Still requiring repeat blood tranfusions as of 8/20. [place] and nesha chains high, but ratio normal meaning less suggestive of MM. Discussed case with Dr. Angel on 8/21. On 8/22, hemoglobin increased. However, on 8/23, hemoglobin once again and decreased and required transfusion. We will transfuse 2 units.  -Heme/Onc consult, appreciate recs -> EPO 40k per week, follow-up outpatient  -CBC daily  -Transfuse if <6  -trend Hgb     Anasarca -stable  Cirrhosis secondary to chronic hepatitis B.  Reported leg swelling and distention for the past two months. Has history of Hepatitis B, for which he was taking entacavir, until he ran out and never refilled his prescription. He denies alcohol use.  Patient still with significant distention but states that he is back to his baseline. Paracentesis on admission removed 13L and patient given albumin. Studies don't suggest SBP. Patient doing well and much improved. MELD score 23 on admission. On 8/19, further ascites development likely, and US paracentesis pulled 7L. Long term, needs diuretics to manage fluids. Unfortunately, not a good candidate for pharmacological variceal bleeding prophylaxis.  -Low sodium diet  -CMP in a.m.  -GI consult -> establish care once outpatient  -No spironolactone at discharge  - PO furosemide 40 mg daily  -Serial abdominal examinations     Progression of CKD -stable  Likely secondary to hepatorenal syndrome versus chronic process (hypertension?) vs glomerulonephritis.  Creatinine 3.5 ->>> 4.48 -> 4.2 -> 3.84 -> 3.9.  Noted CKD2 from HTN (?) in 2019.  Although, lowest creatinine available is 1.63 from 6/2017. Significant decline in function here. He was supposed to follow with nephrology in 2019, but do not see any specific follow-up notes. Urine protein/Cr unremarkable. Although high serum  "light chains, would suspect urine protein to be high if this was the cause of progression. Patient may be reaching his new baseline creatinine.  -Nephrology consulted -> cryoglobulin pending, recommend HD (patient declined)  -A.m. CMP.  -work up labs pending    Hyperkalemia   resolved. Taken off spironolactone, and kept on Lasix.  -No spironolactone at discharge  - PO Lasix, hold if SBP <100  - am BMP        Chronic conditions  History of hospitalization for MI Continue home carvedilol; hold diltiazem  History of stomach ulcer - IV PPI daily     FEN: regular diet  DVT Prophylaxis: ambulation, Lovenox held in view of anemia  Code: full code   Med rec: completed     Disposition/Advanced Care Planning  Barriers to discharge: Hgb trend, electrolytes, CKD, EPO, transfusion  Anticipated discharge date: 1 days  Disposition:home            Review of Systems:   CONSTITUTIONAL: no F/C  RESP: no significant cough, no shortness of breath  CV: no chest pain, no palpitations, no new or worsening peripheral edema  GI: see HPI  HEME: no easy bruising, no bleeding problems            Physical Exam:     Vitals:    08/26/20 0955 08/26/20 0959   BP: (!) 147/82 137/83   BP Location: Left arm    Patient Position: Sitting    Cuff Size: Adult Regular    Pulse: 54    Resp: 16    Temp: 97.3  F (36.3  C)    TempSrc: Oral    SpO2: 99%    Weight: 64.2 kg (141 lb 9.6 oz)    Height: 1.62 m (5' 3.78\")      Body mass index is 24.47 kg/m .    GENERAL: healthy, alert and no distress  RESP: lungs clear to auscultation - no rales, no rhonchi, no wheezes  CV: regular rates and rhythm, normal S1 S2, no S3 or S4 and no murmur, no click or rub -  ABDOMEN: 3+ abd distention; liver edge palpable  MS: 3+ edema b/l         Results:     Results from the last 24 hours   Results for orders placed or performed in visit on 08/26/20 (from the past 24 hour(s))   CBC with Plt ([facility])   Result Value Ref Range    WBC 6.4 4.0 - 11.0 K/uL    RBC 3.40 (L) 4.40 - 5.90 M/uL "    Hemoglobin 9.4 (L) 13.3 - 17.7 g/dL    Hematocrit 31.9 (L) 40.0 - 53.0 %    MCV 93.7 78.0 - 100.0 fL    MCH 27.6 26.5 - 35.0 pg    MCHC 29.5 (L) 32.0 - 36.0 g/dL    Platelets 89.0 (L) 150.0 - 450.0 K/uL    Narrative    Result verified by repeat analysis. cxx   Comprehensive Metabolic Panel ([facility]) - results <1hr Protocol   Result Value Ref Range    Glucose 120.0 (H) 60.0 - 109.0 mg/dL    Urea Nitrogen 102.0 (HH) 7.0 - 30.0 mg/dL    Creatinine 3.5 (H) 0.8 - 1.5 mg/dL    Sodium 140.0 133.0 - 144.0 mmol/L    Potassium 4.0 3.4 - 5.3 mmol/L    Chloride 101.0 94.0 - 109.0 mmol/L    Carbon Dioxide 26.0 20.0 - 32.0 mmol/L    Calcium 8.9 8.5 - 10.4 mg/dL    Protein Total 6.8 6.6 - 8.8 g/dL    Albumin 2.6 (L) 3.2 - 4.5 g/dL    Alkaline Phosphatase 115.0 40.0 - 150.0 U/L    ALT 47.0 (H) 0.0 - 45.0 U/L    AST 47.0 0.0 - 55.0 U/L    Bilirubin Total 1.1 0.2 - 1.3 mg/dL    eGFR Calculated (Non Black Reference) 19.1 (L) >60.0 mL/min    eGFR Calculated (Black Reference) 23.1 (L) >60.0 mL/min    Narrative    Result verified by repeat analysis. Critical Value was reported to and read   back by Dr. Flores/TRENTON Escoto at 8/26/2020 11:30 AM (insert staff   initials)cxx   INR ([facility])   Result Value Ref Range    INR 1.11 (H) 0.90 - 1.10    Narrative    Test performed by:  [facility]  [address] [place] [address]  INR Therapeutic Ranges:  Mech. Valve 2.5-3.5  Post Surg.  2.0-3.0  DVT/PE      2.0-3.0       Assessment and Plan      Elle was seen today for hospital f/u and medication reconciliation.    Diagnoses and all orders for this visit:    Anemia due to chronic kidney disease, unspecified CKD stage  -     CBC with Plt ([facility])  -     Oncology/Hematology Adult Referral; Future    Cirrhosis of liver with ascites, unspecified hepatic cirrhosis type (H)  -     Comprehensive Metabolic Panel ([facility]) - results <1hr Protocol  -     GASTROENTEROLOGY ADULT REF CONSULT ONLY; Future  -     Cancel: INR ([facility])  -     US  Paracentesis; Future    Chronic viral hepatitis B without delta agent and without coma (H)  -     GASTROENTEROLOGY ADULT REF CONSULT ONLY; Future    CKD (chronic kidney disease) stage 4, GFR 15-29 ml/min (H)  -     NEPHROLOGY ADULT REFERRAL; Future        1. Anemia - Hgb stable -> hematology referral placed   2. GI - liver service appt pending; ordered therapeutic paracentesis  3. CKD - BUN very high; pt not symptomatic and has declined dialysis so we will monitor; precautions given    E&M code to be billed if TCM cannot be: 03961    Type of decision making: High complexity (40699)    Options for treatment and follow-up care were reviewed with the patient  Elle Blanton   engaged in the decision making process and verbalized understanding of the options discussed and agreed with the final plan.      Jaswant Flores MD  August 26, 2020  10:37 AM

## 2020-08-26 ENCOUNTER — AMBULATORY - HEALTHEAST (OUTPATIENT)
Dept: ULTRASOUND IMAGING | Facility: HOSPITAL | Age: 60
End: 2020-08-26

## 2020-08-26 ENCOUNTER — AMBULATORY - HEALTHEAST (OUTPATIENT)
Dept: SCHEDULING | Facility: CLINIC | Age: 60
End: 2020-08-26

## 2020-08-26 ENCOUNTER — RECORDS - HEALTHEAST (OUTPATIENT)
Dept: ADMINISTRATIVE | Facility: OTHER | Age: 60
End: 2020-08-26

## 2020-08-26 ENCOUNTER — OFFICE VISIT (OUTPATIENT)
Dept: FAMILY MEDICINE | Facility: CLINIC | Age: 60
End: 2020-08-26
Payer: COMMERCIAL

## 2020-08-26 VITALS
WEIGHT: 141.6 LBS | DIASTOLIC BLOOD PRESSURE: 83 MMHG | TEMPERATURE: 97.3 F | RESPIRATION RATE: 16 BRPM | BODY MASS INDEX: 24.17 KG/M2 | HEIGHT: 64 IN | SYSTOLIC BLOOD PRESSURE: 137 MMHG | HEART RATE: 54 BPM | OXYGEN SATURATION: 99 %

## 2020-08-26 DIAGNOSIS — N18.9 ANEMIA DUE TO CHRONIC KIDNEY DISEASE, UNSPECIFIED CKD STAGE: Primary | ICD-10-CM

## 2020-08-26 DIAGNOSIS — R18.8 CIRRHOSIS OF LIVER WITH ASCITES, UNSPECIFIED HEPATIC CIRRHOSIS TYPE (H): ICD-10-CM

## 2020-08-26 DIAGNOSIS — N18.4 CKD (CHRONIC KIDNEY DISEASE) STAGE 4, GFR 15-29 ML/MIN (H): ICD-10-CM

## 2020-08-26 DIAGNOSIS — K74.60 CIRRHOSIS OF LIVER WITH ASCITES, UNSPECIFIED HEPATIC CIRRHOSIS TYPE (H): ICD-10-CM

## 2020-08-26 DIAGNOSIS — B18.1 CHRONIC VIRAL HEPATITIS B WITHOUT DELTA AGENT AND WITHOUT COMA (H): ICD-10-CM

## 2020-08-26 DIAGNOSIS — D63.1 ANEMIA DUE TO CHRONIC KIDNEY DISEASE, UNSPECIFIED CKD STAGE: Primary | ICD-10-CM

## 2020-08-26 DIAGNOSIS — Z11.59 ENCOUNTER FOR SCREENING FOR OTHER VIRAL DISEASES: ICD-10-CM

## 2020-08-26 LAB
ALBUMIN SERPL-MCNC: 2.6 G/DL (ref 3.2–4.5)
ALP SERPL-CCNC: 115 U/L (ref 40–150)
ALT SERPL-CCNC: 47 U/L (ref 0–45)
AST SERPL-CCNC: 47 U/L (ref 0–55)
BILIRUB SERPL-MCNC: 1.1 MG/DL (ref 0.2–1.3)
BUN SERPL-MCNC: 102 MG/DL (ref 7–30)
CALCIUM SERPL-MCNC: 8.9 MG/DL (ref 8.5–10.4)
CHLORIDE SERPLBLD-SCNC: 101 MMOL/L (ref 94–109)
CO2 SERPL-SCNC: 26 MMOL/L (ref 20–32)
CREAT SERPL-MCNC: 3.5 MG/DL (ref 0.8–1.5)
EGFR CALCULATED (BLACK REFERENCE): 23.1 ML/MIN
EGFR CALCULATED (NON BLACK REFERENCE): 19.1 ML/MIN
GLUCOSE SERPL-MCNC: 120 MG/DL (ref 60–109)
HCT VFR BLD AUTO: 31.9 % (ref 40–53)
HEMOGLOBIN: 9.4 G/DL (ref 13.3–17.7)
INR PPP: 1.11 (ref 0.9–1.1)
MCH RBC QN AUTO: 27.6 PG (ref 26.5–35)
MCHC RBC AUTO-ENTMCNC: 29.5 G/DL (ref 32–36)
MCV RBC AUTO: 93.7 FL (ref 78–100)
PLATELET # BLD AUTO: 89 K/UL (ref 150–450)
POTASSIUM SERPL-SCNC: 4 MMOL/L (ref 3.4–5.3)
PROT SERPL-MCNC: 6.8 G/DL (ref 6.6–8.8)
RBC # BLD AUTO: 3.4 M/UL (ref 4.4–5.9)
SODIUM SERPL-SCNC: 140 MMOL/L (ref 133–144)
WBC # BLD AUTO: 6.4 K/UL (ref 4–11)

## 2020-08-26 ASSESSMENT — MIFFLIN-ST. JEOR: SCORE: 1359.8

## 2020-08-26 NOTE — PATIENT INSTRUCTIONS
Referral for :     US paracentesis    LOCATION/PLACE/Provider :    [facility]   DATE & TIME :    Sept. 4th at 1pm   PHONE :     [phone]  FAX :    [phone]  Appointment instructions:   Appointment made by clinic staff/:    Moraima    Referral for :      Nephrology   LOCATION/PLACE/Provider :    [facility]   DATE & TIME :    Sept. 11th at 2pm   PHONE :     [phone]  FAX :    [phone]  Appointment made by clinic staff/:    Moraima    Oncology/Hematology, I will fax to the location and they will call patient to schedule.

## 2020-08-28 ENCOUNTER — TELEPHONE (OUTPATIENT)
Dept: FAMILY MEDICINE | Facility: CLINIC | Age: 60
End: 2020-08-28

## 2020-09-01 ENCOUNTER — TELEPHONE (OUTPATIENT)
Dept: FAMILY MEDICINE | Facility: CLINIC | Age: 60
End: 2020-09-01

## 2020-09-01 NOTE — TELEPHONE ENCOUNTER
[facility] phone call message- general phone call:    Reason for call: Patient is calling to speak to Dr. Flores or any Doctor today as he is having his stomach fluid drained on Friday 9/4 at [facility] and they are telling him that he needs a covid testing done before his appt. Did let him know that Dr. Flores is not in today. He states he already took it 2 weeks ago when he was hospitalized and it was negative he doesn't know why he needs to get it test again and would like Dr. Flores's recommendation. He states he heard that it is not good to get mulitple covid testing done, that it is not good for your body. He would like someone to call him today as he is scheduled for covid testing at 1:15 pm at .  Please call and advise.     Return call needed: Yes    OK to leave a message on voice mail?     Primary language: English      needed? No    Call taken on September 1, 2020 at 8:28 AM by Garry Live

## 2020-09-01 NOTE — PROGRESS NOTES
Pt is a 60 year old male last seen on 8/26/20 for hospital follow-up here today for:     1) Anemia -  Hgb stable at last visit   Hemoglobin   Date Value Ref Range Status   08/26/2020 9.4 (L) 13.3 - 17.7 g/dL Final   08/13/2020 5.4 (LL) 13.3 - 17.7 g/dL Final     2) Uremia -   BUN very high at last visit  Nephrology appt pending    3) Cirrhosis/ Ascites -   Repeat paracentesis ordered at last visit  Liver specialist appt pending    4) HTN - up and down in clinic -> at home was 128/56  BP Readings from Last 6 Encounters:   09/02/20 (!) 148/75   08/26/20 137/83   08/13/20 (!) 165/80   08/06/20 135/88   04/20/20 (!) 140/70   01/15/20 (!) 146/70       Past Medical History:   Diagnosis Date     H. pylori infection 7/5/2017    UGI bleed implied by Hgb 9.0 6/22/17 and melena. Admitted and hgb decreased to 7.6, CT abdomen showed all bladder wall thickening. + Hep B surface antigen noted. Melena resolved and hgb stabalized without transfusion, epigastric pain resolved with PPI. Recommend referral for gastroscopy.     Hepatitis B       Past Surgical History:   Procedure Laterality Date     INCISION AND DRAINAGE FINGER, COMBINED Left 10/20/2016    Procedure: COMBINED INCISION AND DRAINAGE FINGER;  Surgeon: Mireya Pizano MD;  Location: [facility] OR      Current Outpatient Medications   Medication Sig Dispense Refill     carvedilol (COREG) 25 MG tablet Take 2 tablets (50 mg) by mouth 2 times daily (with meals) 60 tablet 11     cilostazol (PLETAL) 50 MG tablet Take 1 tablet (50 mg) by mouth 2 times daily 180 tablet 0     diltiazem ER (TIAZAC) 180 MG 24 hr ER beaded capsule Take 1 capsule (180 mg) by mouth 2 times daily 60 capsule 11     entecavir (BARACLUDE) 0.5 MG tablet Take 1 tablet (0.5 mg) by mouth every 72 hours (Patient not taking: Reported on 4/20/2020) 11 tablet 3     ferrous sulfate (FE TABS) 325 (65 Fe) MG EC tablet Take 1 tablet (325 mg) by mouth daily       furosemide (LASIX) 20 MG tablet        order for DME  "Equipment being ordered: Other: blood pressure monitor  Treatment Diagnosis: hypertension 1 each 0     polyethylene glycol (MIRALAX) 17 g packet Take 17 g by mouth daily as needed for constipation 10 packet 3      Allergies   Allergen Reactions     Nka [No Known Allergies]         ROS:   Gen- no weight change, no fevers/chills   Cardiac - no palpitations, no chest pain   Respiratory - no shortness of breath , no wheezing   GI - no nausea, no vomiting, no diarrhea, no constipation   Urinary - no dysuria, no polyuria   Remainder of ROS negative.     Exam:   BP (!) 148/75 (BP Location: Left arm, Patient Position: Sitting, Cuff Size: Child)   Pulse 55   Temp 97.4  F (36.3  C) (Oral)   Resp 18   Ht 1.61 m (5' 3.39\")   Wt 58.8 kg (129 lb 9.6 oz)   SpO2 96%   BMI 22.68 kg/m     Alert and oriented x 3; No acute distress   ABd: soft, nontender; distended but stable; pt feels this is better than last week   Extrem: no clubbing/cyanosis/no edema   Neuro: no focal deficits   Derm: no rashes     (R60.9) Edema, unspecified type  (primary encounter diagnosis)  Comment: no edema today; will refill lasix and cont to monitor Cr  Plan: furosemide (LASIX) 40 MG tablet            (N18.9,  D63.1) Anemia due to chronic kidney disease, unspecified CKD stage  Comment: Hgb today  Plan: CBC with Plt ([facility])            (K74.60,  R18.8) Cirrhosis of liver with ascites, unspecified hepatic cirrhosis type (H)  Comment: pt declined paracentesis at this time; needs appt w/ liver service  Plan: oxyCODONE (ROXICODONE) 5 MG tablet            (N18.4) CKD (chronic kidney disease) stage 4, GFR 15-29 ml/min (H)  Comment: stable at last check but BUN quite high; no clinical signs of uremia; recheck today  Plan: Basic Metabolic Panel ([facility]) - Results < 1 hr            (G47.01) Insomnia due to medical condition  Comment:   Plan: zolpidem (AMBIEN) 5 MG tablet            Jaswant Flores MD  September 2, 2020  2:20 PM      "

## 2020-09-01 NOTE — TELEPHONE ENCOUNTER
Called patient to further discuss. Advised patient is is recommended he get tested again to allow him to have his paracentesis done. Advised patient of low risk of bodily harm, and how he could have been exposed to the virus since his last test. Patient verbalized understanding and then asked about the procedure being done. Explained the procedure to him and reassured patient this will likely help with the discomfort. Patient then asked about his appointment on 09/11. Advised of nephrology consult and what a nephrologist specializes in. Patient verbalized understanding and agrees with plan. Romain MELENDEZ

## 2020-09-02 ENCOUNTER — OFFICE VISIT (OUTPATIENT)
Dept: FAMILY MEDICINE | Facility: CLINIC | Age: 60
End: 2020-09-02
Payer: COMMERCIAL

## 2020-09-02 ENCOUNTER — TELEPHONE (OUTPATIENT)
Dept: FAMILY MEDICINE | Facility: CLINIC | Age: 60
End: 2020-09-02

## 2020-09-02 VITALS
WEIGHT: 129.6 LBS | OXYGEN SATURATION: 96 % | TEMPERATURE: 97.4 F | RESPIRATION RATE: 18 BRPM | HEIGHT: 63 IN | BODY MASS INDEX: 22.96 KG/M2 | SYSTOLIC BLOOD PRESSURE: 148 MMHG | DIASTOLIC BLOOD PRESSURE: 75 MMHG | HEART RATE: 55 BPM

## 2020-09-02 DIAGNOSIS — N18.9 ANEMIA DUE TO CHRONIC KIDNEY DISEASE, UNSPECIFIED CKD STAGE: ICD-10-CM

## 2020-09-02 DIAGNOSIS — N18.4 CKD (CHRONIC KIDNEY DISEASE) STAGE 4, GFR 15-29 ML/MIN (H): ICD-10-CM

## 2020-09-02 DIAGNOSIS — R60.9 EDEMA, UNSPECIFIED TYPE: Primary | ICD-10-CM

## 2020-09-02 DIAGNOSIS — G47.01 INSOMNIA DUE TO MEDICAL CONDITION: ICD-10-CM

## 2020-09-02 DIAGNOSIS — K74.60 CIRRHOSIS OF LIVER WITH ASCITES, UNSPECIFIED HEPATIC CIRRHOSIS TYPE (H): ICD-10-CM

## 2020-09-02 DIAGNOSIS — R18.8 CIRRHOSIS OF LIVER WITH ASCITES, UNSPECIFIED HEPATIC CIRRHOSIS TYPE (H): ICD-10-CM

## 2020-09-02 DIAGNOSIS — D63.1 ANEMIA DUE TO CHRONIC KIDNEY DISEASE, UNSPECIFIED CKD STAGE: ICD-10-CM

## 2020-09-02 LAB
BUN SERPL-MCNC: 139 MG/DL (ref 7–30)
CALCIUM SERPL-MCNC: 8.3 MG/DL (ref 8.5–10.4)
CHLORIDE SERPLBLD-SCNC: 103 MMOL/L (ref 94–109)
CO2 SERPL-SCNC: 23 MMOL/L (ref 20–32)
CREAT SERPL-MCNC: 3.2 MG/DL (ref 0.8–1.5)
EGFR CALCULATED (BLACK REFERENCE): 25.6 ML/MIN
EGFR CALCULATED (NON BLACK REFERENCE): 21.2 ML/MIN
GLUCOSE SERPL-MCNC: 105 MG/DL (ref 60–109)
HCT VFR BLD AUTO: 35.3 % (ref 40–53)
HEMOGLOBIN: 10.4 G/DL (ref 13.3–17.7)
MCH RBC QN AUTO: 28 PG (ref 26.5–35)
MCHC RBC AUTO-ENTMCNC: 29.5 G/DL (ref 32–36)
MCV RBC AUTO: 95 FL (ref 78–100)
PLATELET # BLD AUTO: 76 K/UL (ref 150–450)
POTASSIUM SERPL-SCNC: 3.6 MMOL/L (ref 3.4–5.3)
RBC # BLD AUTO: 3.72 M/UL (ref 4.4–5.9)
SODIUM SERPL-SCNC: 142 MMOL/L (ref 133–144)
WBC # BLD AUTO: 4.8 K/UL (ref 4–11)

## 2020-09-02 RX ORDER — FUROSEMIDE 40 MG
40 TABLET ORAL DAILY
Qty: 30 TABLET | Refills: 11 | Status: SHIPPED | OUTPATIENT
Start: 2020-09-02 | End: 2020-12-30

## 2020-09-02 RX ORDER — OXYCODONE HYDROCHLORIDE 5 MG/1
5 TABLET ORAL DAILY PRN
Qty: 14 TABLET | Refills: 0 | Status: SHIPPED | OUTPATIENT
Start: 2020-09-02 | End: 2020-09-16

## 2020-09-02 RX ORDER — ZOLPIDEM TARTRATE 5 MG/1
5 TABLET ORAL
Qty: 10 TABLET | Refills: 1 | Status: SHIPPED | OUTPATIENT
Start: 2020-09-02 | End: 2020-09-23

## 2020-09-02 ASSESSMENT — MIFFLIN-ST. JEOR: SCORE: 1299.11

## 2020-09-02 NOTE — PATIENT INSTRUCTIONS
Referral for :     Gastroenterology    LOCATION/PLACE/Provider :    [facility]   DATE & TIME :    telephone visit Sept. 10th at 12:45   PHONE :     [phone]  FAX :    [phone]  Appointment made by clinic staff/:    Moraima

## 2020-09-02 NOTE — TELEPHONE ENCOUNTER
TCM APPOINTMENT FOLLOW UP    Language:Other: Hmong     Medication questions: medication refills    Specialist follow up: no    Concerns since last visit: no    Next appointment at [facility]:Yes    Comments: Pt is doing ok.    @[facility] Centerphilippe@ Yes   MARA Vance

## 2020-09-03 ENCOUNTER — TELEPHONE (OUTPATIENT)
Dept: FAMILY MEDICINE | Facility: CLINIC | Age: 60
End: 2020-09-03

## 2020-09-10 ENCOUNTER — TRANSFERRED RECORDS (OUTPATIENT)
Dept: HEALTH INFORMATION MANAGEMENT | Facility: CLINIC | Age: 60
End: 2020-09-10

## 2020-09-11 ENCOUNTER — TRANSFERRED RECORDS (OUTPATIENT)
Dept: HEALTH INFORMATION MANAGEMENT | Facility: CLINIC | Age: 60
End: 2020-09-11

## 2020-09-15 ENCOUNTER — TELEPHONE (OUTPATIENT)
Dept: FAMILY MEDICINE | Facility: CLINIC | Age: 60
End: 2020-09-15

## 2020-09-15 DIAGNOSIS — R18.8 CIRRHOSIS OF LIVER WITH ASCITES, UNSPECIFIED HEPATIC CIRRHOSIS TYPE (H): Primary | ICD-10-CM

## 2020-09-15 DIAGNOSIS — K74.60 CIRRHOSIS OF LIVER WITH ASCITES, UNSPECIFIED HEPATIC CIRRHOSIS TYPE (H): Primary | ICD-10-CM

## 2020-09-15 DIAGNOSIS — A04.8 H. PYLORI INFECTION: ICD-10-CM

## 2020-09-16 ENCOUNTER — TRANSFERRED RECORDS (OUTPATIENT)
Dept: HEALTH INFORMATION MANAGEMENT | Facility: CLINIC | Age: 60
End: 2020-09-16

## 2020-09-22 NOTE — PROGRESS NOTES
"Pt is a 60 year old male last seen on 9/2/20 here today for:     1) Uremia - has appt w/ kidney specialist in an hour to discuss need for dialysis   Will repeat labs today  My concern was elevated BUN  Pt denies any uremia symptoms -> \"I feel better. In the past I was weaker and nobody said I need dialysis and now people are saying I need it\"  Mild swelling in feet  Not taking lasix - using it prn for swelling     2) Anemia - feels normal   Would like recheck  Only taking iron \"once in a while\"    3) Cirrhosis - was seen by [facility] one week ago;  awaiting results this week of recent labs  I will try to get the records as well  Denies abd pain or inc abd distention  Never took entecavir for chronic Hep B      Past Medical History:   Diagnosis Date     H. pylori infection 7/5/2017    UGI bleed implied by Hgb 9.0 6/22/17 and melena. Admitted and hgb decreased to 7.6, CT abdomen showed all bladder wall thickening. + Hep B surface antigen noted. Melena resolved and hgb stabalized without transfusion, epigastric pain resolved with PPI. Recommend referral for gastroscopy.     Hepatitis B       Past Surgical History:   Procedure Laterality Date     INCISION AND DRAINAGE FINGER, COMBINED Left 10/20/2016    Procedure: COMBINED INCISION AND DRAINAGE FINGER;  Surgeon: Mireya Pizano MD;  Location: [facility] OR      Current Outpatient Medications   Medication Sig Dispense Refill     carvedilol (COREG) 25 MG tablet Take 2 tablets (50 mg) by mouth 2 times daily (with meals) 60 tablet 11     cilostazol (PLETAL) 50 MG tablet Take 1 tablet (50 mg) by mouth 2 times daily 180 tablet 0     diltiazem ER (TIAZAC) 180 MG 24 hr ER beaded capsule Take 1 capsule (180 mg) by mouth 2 times daily 60 capsule 11     ferrous sulfate (FE TABS) 325 (65 Fe) MG EC tablet Take 1 tablet (325 mg) by mouth daily       furosemide (LASIX) 40 MG tablet Take 1 tablet (40 mg) by mouth daily 30 tablet 11     omeprazole (PRILOSEC) 20 MG DR capsule Take 1 capsule " "(20 mg) by mouth 2 times daily 60 capsule 3     order for DME Equipment being ordered: Other: blood pressure monitor  Treatment Diagnosis: hypertension 1 each 0     polyethylene glycol (MIRALAX) 17 g packet Take 17 g by mouth daily as needed for constipation 10 packet 3     zolpidem (AMBIEN) 5 MG tablet Take 1 tablet (5 mg) by mouth nightly as needed for sleep 10 tablet 1      Allergies   Allergen Reactions     Nka [No Known Allergies]         ROS:   Gen- no weight change, no fevers/chills   Head/ Eyes- no blurred vision, no headaches   ENT- no cough, no congestion, no URI symptoms   Cardiac - no palpitations, no chest pain   Respiratory - no shortness of breath , no wheezing   GI - no nausea, no vomiting, no diarrhea, no constipation   Urinary - no dysuria, no polyuria   Neuro - no numbness, no tingling   Remainder of ROS negative.     Exam:   /76 (BP Location: Left arm)   Pulse 68   Temp 97.7  F (36.5  C) (Oral)   Resp 16   Ht 1.626 m (5' 4\")   Wt 57.2 kg (126 lb)   SpO2 99%   BMI 21.63 kg/m     Alert and oriented x 3; No acute distress   ABd: soft, + bowel sounds, +abd distention but nontender, no rebound/guarding   Extrem: no clubbing/cyanosis/edema   Neuro: no focal deficits   Derm: + jaundice      (N18.4) CKD (chronic kidney disease) stage 4, GFR 15-29 ml/min (H)  (primary encounter diagnosis)  Comment: stable Cr and greatly improved BUN from last visit. Has a nephrology appt later this morning; I will follow-up after that visit  Plan: Basic Metabolic Panel ([facility]) - Results < 1 hr            (N18.9,  D63.1) Anemia due to chronic kidney disease, unspecified CKD stage  Comment: Hgb stable  Plan: Hemoglobin (HGB) ([facility])            (K74.60,  R18.8) Cirrhosis of liver with ascites, unspecified hepatic cirrhosis type (H)  Comment:   Plan: stable; has been seen by [facility]. Awaiting labs    (G47.01) Insomnia due to medical condition  Comment:   Plan: zolpidem (AMBIEN) 5 MG tablet            Jaswant Flores" MD  September 23, 2020  8:45 AM

## 2020-09-23 ENCOUNTER — OFFICE VISIT (OUTPATIENT)
Dept: FAMILY MEDICINE | Facility: CLINIC | Age: 60
End: 2020-09-23
Payer: COMMERCIAL

## 2020-09-23 VITALS
HEART RATE: 68 BPM | RESPIRATION RATE: 16 BRPM | BODY MASS INDEX: 21.51 KG/M2 | WEIGHT: 126 LBS | SYSTOLIC BLOOD PRESSURE: 129 MMHG | OXYGEN SATURATION: 99 % | HEIGHT: 64 IN | DIASTOLIC BLOOD PRESSURE: 76 MMHG | TEMPERATURE: 97.7 F

## 2020-09-23 DIAGNOSIS — R18.8 CIRRHOSIS OF LIVER WITH ASCITES, UNSPECIFIED HEPATIC CIRRHOSIS TYPE (H): ICD-10-CM

## 2020-09-23 DIAGNOSIS — N18.4 CKD (CHRONIC KIDNEY DISEASE) STAGE 4, GFR 15-29 ML/MIN (H): Primary | ICD-10-CM

## 2020-09-23 DIAGNOSIS — N18.9 ANEMIA DUE TO CHRONIC KIDNEY DISEASE, UNSPECIFIED CKD STAGE: ICD-10-CM

## 2020-09-23 DIAGNOSIS — K74.60 CIRRHOSIS OF LIVER WITH ASCITES, UNSPECIFIED HEPATIC CIRRHOSIS TYPE (H): ICD-10-CM

## 2020-09-23 DIAGNOSIS — Z23 NEED FOR PROPHYLACTIC VACCINATION AND INOCULATION AGAINST INFLUENZA: ICD-10-CM

## 2020-09-23 DIAGNOSIS — D63.1 ANEMIA DUE TO CHRONIC KIDNEY DISEASE, UNSPECIFIED CKD STAGE: ICD-10-CM

## 2020-09-23 DIAGNOSIS — G47.01 INSOMNIA DUE TO MEDICAL CONDITION: ICD-10-CM

## 2020-09-23 LAB
BUN SERPL-MCNC: 40 MG/DL (ref 7–30)
CALCIUM SERPL-MCNC: 8.2 MG/DL (ref 8.5–10.4)
CHLORIDE SERPLBLD-SCNC: 104 MMOL/L (ref 94–109)
CO2 SERPL-SCNC: 25 MMOL/L (ref 20–32)
CREAT SERPL-MCNC: 2.6 MG/DL (ref 0.8–1.5)
EGFR CALCULATED (BLACK REFERENCE): 32.5 ML/MIN
EGFR CALCULATED (NON BLACK REFERENCE): 26.9 ML/MIN
GLUCOSE SERPL-MCNC: 113 MG/DL (ref 60–109)
HEMOGLOBIN: 9.3 G/DL (ref 13.3–17.7)
POTASSIUM SERPL-SCNC: 4.3 MMOL/L (ref 3.4–5.3)
SODIUM SERPL-SCNC: 138 MMOL/L (ref 133–144)

## 2020-09-23 RX ORDER — ZOLPIDEM TARTRATE 5 MG/1
5 TABLET ORAL
Qty: 10 TABLET | Refills: 5 | Status: SHIPPED | OUTPATIENT
Start: 2020-09-23 | End: 2021-09-26

## 2020-09-23 ASSESSMENT — MIFFLIN-ST. JEOR: SCORE: 1292.53

## 2020-10-13 ENCOUNTER — COMMUNICATION - HEALTHEAST (OUTPATIENT)
Dept: SCHEDULING | Facility: CLINIC | Age: 60
End: 2020-10-13

## 2020-10-21 ENCOUNTER — AMBULATORY - HEALTHEAST (OUTPATIENT)
Dept: ULTRASOUND IMAGING | Facility: HOSPITAL | Age: 60
End: 2020-10-21

## 2020-10-21 ENCOUNTER — OFFICE VISIT (OUTPATIENT)
Dept: FAMILY MEDICINE | Facility: CLINIC | Age: 60
End: 2020-10-21
Payer: COMMERCIAL

## 2020-10-21 VITALS
SYSTOLIC BLOOD PRESSURE: 145 MMHG | TEMPERATURE: 97.5 F | DIASTOLIC BLOOD PRESSURE: 82 MMHG | RESPIRATION RATE: 18 BRPM | BODY MASS INDEX: 22.98 KG/M2 | HEIGHT: 64 IN | OXYGEN SATURATION: 99 % | WEIGHT: 134.6 LBS | HEART RATE: 68 BPM

## 2020-10-21 DIAGNOSIS — D63.1 ANEMIA DUE TO CHRONIC KIDNEY DISEASE, UNSPECIFIED CKD STAGE: ICD-10-CM

## 2020-10-21 DIAGNOSIS — Z11.59 ENCOUNTER FOR SCREENING FOR OTHER VIRAL DISEASES: ICD-10-CM

## 2020-10-21 DIAGNOSIS — R18.8 CIRRHOSIS OF LIVER WITH ASCITES, UNSPECIFIED HEPATIC CIRRHOSIS TYPE (H): ICD-10-CM

## 2020-10-21 DIAGNOSIS — K74.60 CIRRHOSIS OF LIVER WITH ASCITES, UNSPECIFIED HEPATIC CIRRHOSIS TYPE (H): ICD-10-CM

## 2020-10-21 DIAGNOSIS — N18.4 CKD (CHRONIC KIDNEY DISEASE) STAGE 4, GFR 15-29 ML/MIN (H): Primary | ICD-10-CM

## 2020-10-21 DIAGNOSIS — N18.9 ANEMIA DUE TO CHRONIC KIDNEY DISEASE, UNSPECIFIED CKD STAGE: ICD-10-CM

## 2020-10-21 LAB
ALBUMIN SERPL BCP-MCNC: 3 G/DL (ref 3.5–5)
ALP SERPL-CCNC: 163 U/L (ref 45–120)
ALT SERPL W/O P-5'-P-CCNC: 56 U/L (ref 0–45)
AST SERPL-CCNC: 29 U/L (ref 0–40)
BILIRUB DIRECT SERPL-MCNC: 0.2 MG/DL (ref 0–0.5)
BILIRUB SERPL-MCNC: 0.6 MG/DL (ref 0–1)
BUN SERPL-MCNC: 118 MG/DL (ref 7–30)
CALCIUM SERPL-MCNC: 8.7 MG/DL (ref 8.5–10.4)
CHLORIDE SERPLBLD-SCNC: 107 MMOL/L (ref 94–109)
CO2 SERPL-SCNC: 22 MMOL/L (ref 20–32)
CREAT SERPL-MCNC: 4.3 MG/DL (ref 0.8–1.5)
EGFR CALCULATED (BLACK REFERENCE): 18.2 ML/MIN
EGFR CALCULATED (NON BLACK REFERENCE): 15 ML/MIN
GLUCOSE SERPL-MCNC: 159 MG/DL (ref 60–109)
HCT VFR BLD AUTO: 29.4 % (ref 40–53)
HEMOGLOBIN: 9.1 G/DL (ref 13.3–17.7)
INR PPP: 1.26 (ref 0.9–1.1)
MCH RBC QN AUTO: 29.9 PG (ref 26.5–35)
MCHC RBC AUTO-ENTMCNC: 31 G/DL (ref 32–36)
MCV RBC AUTO: 96.8 FL (ref 78–100)
PLATELET # BLD AUTO: 69 K/UL (ref 150–450)
POTASSIUM SERPL-SCNC: 3.9 MMOL/L (ref 3.4–5.3)
PROT SERPL-MCNC: 7.4 G/DL (ref 6–8)
RBC # BLD AUTO: 3.04 M/UL (ref 4.4–5.9)
SODIUM SERPL-SCNC: 142 MMOL/L (ref 133–144)
WBC # BLD AUTO: 3.8 K/UL (ref 4–11)

## 2020-10-21 PROCEDURE — 85027 COMPLETE CBC AUTOMATED: CPT | Performed by: FAMILY MEDICINE

## 2020-10-21 PROCEDURE — 36415 COLL VENOUS BLD VENIPUNCTURE: CPT | Performed by: FAMILY MEDICINE

## 2020-10-21 PROCEDURE — 99214 OFFICE O/P EST MOD 30 MIN: CPT | Performed by: FAMILY MEDICINE

## 2020-10-21 PROCEDURE — 80048 BASIC METABOLIC PNL TOTAL CA: CPT | Performed by: FAMILY MEDICINE

## 2020-10-21 ASSESSMENT — MIFFLIN-ST. JEOR: SCORE: 1328.05

## 2020-10-21 NOTE — PROGRESS NOTES
Pt is a 60 year old male last seen on 9/23/20 here today for:     1) CKD/ uremia  - Abdominal distension and leg swelling ongoing for 1 month  - Has been taking Lasix 40 mg daily  - Some GUTIERREZ and intermittent orthopnea  - No CP, abdominal pain, N/V, diarrhea, constipation, muscle cramps, fatigue    2) anemia  - No bloody stools or melena  - No dizziness, syncope.     3) Hep B/cirrhosis - should be on entecevir as viral load is high   See above; inc abd distention  - Started Entecevir and has been taking it   - No side effects   - Has follow up GI appointment 12/10     4) Paperwork request   - Has not been able to work for 1+ year. Is on long term disability.   - Cannot return to work given several health conditions and would like to extend disability     5) HTN - Only takes BP medication when high   Wants to double check if he should be taking it everyday     Past Medical History:   Diagnosis Date     H. pylori infection 7/5/2017    UGI bleed implied by Hgb 9.0 6/22/17 and melena. Admitted and hgb decreased to 7.6, CT abdomen showed all bladder wall thickening. + Hep B surface antigen noted. Melena resolved and hgb stabalized without transfusion, epigastric pain resolved with PPI. Recommend referral for gastroscopy.     Hepatitis B       Past Surgical History:   Procedure Laterality Date     INCISION AND DRAINAGE FINGER, COMBINED Left 10/20/2016    Procedure: COMBINED INCISION AND DRAINAGE FINGER;  Surgeon: Mireya Pizano MD;  Location: [facility]      Current Outpatient Medications   Medication Sig Dispense Refill     carvedilol (COREG) 25 MG tablet Take 2 tablets (50 mg) by mouth 2 times daily (with meals) 60 tablet 11     cilostazol (PLETAL) 50 MG tablet Take 1 tablet (50 mg) by mouth 2 times daily 180 tablet 0     diltiazem ER (TIAZAC) 180 MG 24 hr ER beaded capsule Take 1 capsule (180 mg) by mouth 2 times daily 60 capsule 11     ferrous sulfate (FE TABS) 325 (65 Fe) MG EC tablet Take 1 tablet (325 mg) by mouth  "daily       furosemide (LASIX) 40 MG tablet Take 1 tablet (40 mg) by mouth daily 30 tablet 11     omeprazole (PRILOSEC) 20 MG DR capsule Take 1 capsule (20 mg) by mouth 2 times daily 60 capsule 3     polyethylene glycol (MIRALAX) 17 g packet Take 17 g by mouth daily as needed for constipation 10 packet 3     zolpidem (AMBIEN) 5 MG tablet Take 1 tablet (5 mg) by mouth nightly as needed for sleep 10 tablet 5     order for DME Equipment being ordered: Other: blood pressure monitor  Treatment Diagnosis: hypertension 1 each 0      Allergies   Allergen Reactions     Nka [No Known Allergies]         ROS:   Gen- no weight change, no fevers/chills   Head/ Eyes- no blurred vision, no headaches   ENT- no cough, no congestion, no URI symptoms   Cardiac - no palpitations, no chest pain   Respiratory - no shortness of breath , no wheezing   GI - no nausea, no vomiting, no diarrhea, no constipation; see HPI  Urinary - no dysuria, no polyuria   Neuro - no numbness, no tingling   Remainder of ROS negative.     Exam:   BP (!) 145/82 (BP Location: Left arm, Patient Position: Sitting, Cuff Size: Adult Regular)   Pulse 68   Temp 97.5  F (36.4  C) (Oral)   Resp 18   Ht 1.62 m (5' 3.78\")   Wt 61.1 kg (134 lb 9.6 oz)   SpO2 99%   BMI 23.26 kg/m     Alert and oriented x 3; No acute distress   Resp: clear to auscultation bilaterally, no wheezing/ronchi   CV: rate/rhythm regular, no murmurs/rubs/gallops   ABd: soft, + bowel sounds, nontender/+ distention, no rebound/guarding   Extrem: no clubbing/cyanosis/1+ pitting edema at ankles   Neuro: no focal deficits   Derm: no rashes       (N18.4) CKD (chronic kidney disease) stage 4, GFR 15-29 ml/min (H)  (primary encounter diagnosis)  Comment: Cr and BUN increased today. Visiting with Nephrology tomorrow. If not improving, may need to consider dialysis. Recheck in one week.   Plan: Basic Metabolic Panel ([facility]) - Results < 1 hr           (N18.9,  D63.1) Anemia due to chronic kidney disease, " unspecified CKD stage  Comment: Hgb stable  Plan: CBC with Plt ([facility])            (K74.60,  R18.8) Cirrhosis of liver with ascites, unspecified hepatic cirrhosis type (H)  Comment:has started entecevir for Hep B.  will check INR; plts are stable/low at 69; I am unsure what cutoff is for paracentesis; will coordinate w/ IR; consider adding spironolactone at next visit   Plan: INR ([facility]), [facility] Profile ([facility])            Jaswant Flores MD  October 21, 2020  10:44 AM

## 2020-10-23 ENCOUNTER — AMBULATORY - HEALTHEAST (OUTPATIENT)
Dept: LAB | Facility: CLINIC | Age: 60
End: 2020-10-23

## 2020-10-23 DIAGNOSIS — Z11.59 ENCOUNTER FOR SCREENING FOR OTHER VIRAL DISEASES: ICD-10-CM

## 2020-10-25 ENCOUNTER — COMMUNICATION - HEALTHEAST (OUTPATIENT)
Dept: SCHEDULING | Facility: CLINIC | Age: 60
End: 2020-10-25

## 2020-10-27 ENCOUNTER — HOSPITAL ENCOUNTER (OUTPATIENT)
Dept: ULTRASOUND IMAGING | Facility: HOSPITAL | Age: 60
Discharge: HOME | End: 2020-10-27
Attending: FAMILY MEDICINE

## 2020-10-27 DIAGNOSIS — K74.60 CIRRHOSIS OF LIVER (H): ICD-10-CM

## 2020-11-03 NOTE — PROGRESS NOTES
"Pt is a 60 year old male last seen on 10/21/20 here today for:     1) CKD - Cr elevated to 4.3 at last visit  Needs repeat today  Next nephrology appt is Dec 9   Leg swelling is minimal  Energy level - \"I feel good\"  Eating better     2) Hep B/cirrhosis/ ascites -> notes that overall abd distention is better  On 10/27 was evaluated for paracentesis. Per report -> Examination of all 4 quadrants demonstrates trace ascites along the dome of the liver. Spleen is enlarged. Paracentesis not performed.  On entecevir for Hep B    3) Anemia - will recheck Hgb today  Denies melena or blood in stool      Past Medical History:   Diagnosis Date     H. pylori infection 7/5/2017    UGI bleed implied by Hgb 9.0 6/22/17 and melena. Admitted and hgb decreased to 7.6, CT abdomen showed all bladder wall thickening. + Hep B surface antigen noted. Melena resolved and hgb stabalized without transfusion, epigastric pain resolved with PPI. Recommend referral for gastroscopy.     Hepatitis B       Past Surgical History:   Procedure Laterality Date     INCISION AND DRAINAGE FINGER, COMBINED Left 10/20/2016    Procedure: COMBINED INCISION AND DRAINAGE FINGER;  Surgeon: Mireya Pizano MD;  Location: [facility]      Current Outpatient Medications   Medication Sig Dispense Refill     carvedilol (COREG) 25 MG tablet Take 2 tablets (50 mg) by mouth 2 times daily (with meals) 60 tablet 11     cilostazol (PLETAL) 50 MG tablet Take 1 tablet (50 mg) by mouth 2 times daily 180 tablet 0     diltiazem ER (TIAZAC) 180 MG 24 hr ER beaded capsule Take 1 capsule (180 mg) by mouth 2 times daily 60 capsule 11     ferrous sulfate (FE TABS) 325 (65 Fe) MG EC tablet Take 1 tablet (325 mg) by mouth daily       furosemide (LASIX) 40 MG tablet Take 1 tablet (40 mg) by mouth daily 30 tablet 11     omeprazole (PRILOSEC) 20 MG DR capsule Take 1 capsule (20 mg) by mouth 2 times daily 60 capsule 3     order for DME Equipment being ordered: Other: blood pressure " "monitor  Treatment Diagnosis: hypertension 1 each 0     polyethylene glycol (MIRALAX) 17 g packet Take 17 g by mouth daily as needed for constipation 10 packet 3     zolpidem (AMBIEN) 5 MG tablet Take 1 tablet (5 mg) by mouth nightly as needed for sleep 10 tablet 5      Allergies   Allergen Reactions     Nka [No Known Allergies]         ROS:   Gen- no weight change, no fevers/chills   Head/ Eyes- no blurred vision, no headaches   ENT- no cough, no congestion, no URI symptoms   Cardiac - no palpitations, no chest pain   Respiratory - no shortness of breath , no wheezing   GI - no nausea, no vomiting, no diarrhea, no constipation; see HPI   Urinary - no dysuria, no polyuria   Neuro - no numbness, no tingling   Remainder of ROS negative.     Exam:   /78 (BP Location: Left arm)   Pulse 67   Temp 97.7  F (36.5  C) (Oral)   Resp 16   Ht 1.626 m (5' 4\")   Wt 57.6 kg (127 lb)   SpO2 98%   BMI 21.80 kg/m     Alert and oriented x 3; No acute distress   Resp: clear to auscultation bilaterally, no wheezing/ronchi   CV: rate/rhythm regular, no murmurs/rubs/gallops   ABd: soft, + bowel sounds, nontender/+distended, no rebound/guarding   Extrem: no clubbing/cyanosis/trace edema   Neuro: no focal deficits   Derm: no rashes; + jaundice     (N18.4) CKD (chronic kidney disease) stage 4, GFR 15-29 ml/min (H)  (primary encounter diagnosis)  Comment: stable/improved BUN/Cr today; has nephrology appt pending; I recommended he and his wife have a clear goals of care discussion so we have a plan if/when he needs dialysis  Plan: Basic Metabolic Panel ([facility]) - Results < 1 hr            (K74.60,  R18.8) Cirrhosis of liver with ascites, unspecified hepatic cirrhosis type (H)  Comment:   Plan: no indication for paracentesis; will cont current meds    (N18.9,  D63.1) Anemia due to chronic kidney disease, unspecified CKD stage  Comment: stable but slowly down-trending; check in 2 weeks; precautions given  Plan: Hemoglobin (HGB) ([facility])            Jaswant Flores MD  November 4, 2020  9:29 AM

## 2020-11-04 ENCOUNTER — OFFICE VISIT (OUTPATIENT)
Dept: FAMILY MEDICINE | Facility: CLINIC | Age: 60
End: 2020-11-04
Payer: COMMERCIAL

## 2020-11-04 VITALS
HEIGHT: 64 IN | WEIGHT: 127 LBS | SYSTOLIC BLOOD PRESSURE: 134 MMHG | TEMPERATURE: 97.7 F | BODY MASS INDEX: 21.68 KG/M2 | OXYGEN SATURATION: 98 % | HEART RATE: 67 BPM | DIASTOLIC BLOOD PRESSURE: 78 MMHG | RESPIRATION RATE: 16 BRPM

## 2020-11-04 DIAGNOSIS — N18.4 CKD (CHRONIC KIDNEY DISEASE) STAGE 4, GFR 15-29 ML/MIN (H): Primary | ICD-10-CM

## 2020-11-04 DIAGNOSIS — N18.9 ANEMIA DUE TO CHRONIC KIDNEY DISEASE, UNSPECIFIED CKD STAGE: ICD-10-CM

## 2020-11-04 DIAGNOSIS — D63.1 ANEMIA DUE TO CHRONIC KIDNEY DISEASE, UNSPECIFIED CKD STAGE: ICD-10-CM

## 2020-11-04 DIAGNOSIS — R18.8 CIRRHOSIS OF LIVER WITH ASCITES, UNSPECIFIED HEPATIC CIRRHOSIS TYPE (H): ICD-10-CM

## 2020-11-04 DIAGNOSIS — K74.60 CIRRHOSIS OF LIVER WITH ASCITES, UNSPECIFIED HEPATIC CIRRHOSIS TYPE (H): ICD-10-CM

## 2020-11-04 LAB
BUN SERPL-MCNC: 86 MG/DL (ref 7–30)
CALCIUM SERPL-MCNC: 8.5 MG/DL (ref 8.5–10.4)
CHLORIDE SERPLBLD-SCNC: 107 MMOL/L (ref 94–109)
CO2 SERPL-SCNC: 20 MMOL/L (ref 20–32)
CREAT SERPL-MCNC: 3.7 MG/DL (ref 0.8–1.5)
EGFR CALCULATED (BLACK REFERENCE): 21.7 ML/MIN
EGFR CALCULATED (NON BLACK REFERENCE): 17.9 ML/MIN
GLUCOSE SERPL-MCNC: 186 MG/DL (ref 60–109)
HEMOGLOBIN: 8.9 G/DL (ref 13.3–17.7)
POTASSIUM SERPL-SCNC: 4.6 MMOL/L (ref 3.4–5.3)
SODIUM SERPL-SCNC: 138 MMOL/L (ref 133–144)

## 2020-11-04 PROCEDURE — 99214 OFFICE O/P EST MOD 30 MIN: CPT | Performed by: FAMILY MEDICINE

## 2020-11-04 PROCEDURE — 85018 HEMOGLOBIN: CPT | Performed by: FAMILY MEDICINE

## 2020-11-04 PROCEDURE — 80048 BASIC METABOLIC PNL TOTAL CA: CPT | Performed by: FAMILY MEDICINE

## 2020-11-04 PROCEDURE — 36415 COLL VENOUS BLD VENIPUNCTURE: CPT | Performed by: FAMILY MEDICINE

## 2020-11-04 ASSESSMENT — MIFFLIN-ST. JEOR: SCORE: 1297.07

## 2020-11-04 NOTE — RESULT ENCOUNTER NOTE
Stable Hgb and improved BUN/Cr reviewed at time of visit.    Jaswant Flores MD  11/4/2020  12:28 PM

## 2020-11-17 DIAGNOSIS — N17.9 ACUTE KIDNEY INJURY (H): ICD-10-CM

## 2020-11-17 DIAGNOSIS — K74.60 CIRRHOSIS OF LIVER WITHOUT ASCITES, UNSPECIFIED HEPATIC CIRRHOSIS TYPE (H): ICD-10-CM

## 2020-11-17 DIAGNOSIS — D61.818 OTHER PANCYTOPENIA (H): ICD-10-CM

## 2020-11-17 DIAGNOSIS — B18.1 CHRONIC VIRAL HEPATITIS B WITHOUT DELTA AGENT AND WITHOUT COMA (H): ICD-10-CM

## 2020-11-17 DIAGNOSIS — I71.03 DISSECTION OF THORACOABDOMINAL AORTA (H): ICD-10-CM

## 2020-11-17 RX ORDER — CARVEDILOL 25 MG/1
50 TABLET ORAL 2 TIMES DAILY WITH MEALS
Qty: 60 TABLET | Refills: 11 | Status: SHIPPED | OUTPATIENT
Start: 2020-11-17 | End: 2021-04-21

## 2020-12-03 ENCOUNTER — TRANSFERRED RECORDS (OUTPATIENT)
Dept: HEALTH INFORMATION MANAGEMENT | Facility: CLINIC | Age: 60
End: 2020-12-03

## 2020-12-03 ENCOUNTER — AMBULATORY - HEALTHEAST (OUTPATIENT)
Dept: ADMINISTRATIVE | Facility: CLINIC | Age: 60
End: 2020-12-03

## 2020-12-03 ENCOUNTER — OFFICE VISIT (OUTPATIENT)
Dept: FAMILY MEDICINE | Facility: CLINIC | Age: 60
End: 2020-12-03
Payer: COMMERCIAL

## 2020-12-03 VITALS
BODY MASS INDEX: 22.64 KG/M2 | HEART RATE: 73 BPM | DIASTOLIC BLOOD PRESSURE: 81 MMHG | HEIGHT: 64 IN | TEMPERATURE: 98.1 F | RESPIRATION RATE: 20 BRPM | WEIGHT: 132.6 LBS | OXYGEN SATURATION: 98 % | SYSTOLIC BLOOD PRESSURE: 148 MMHG

## 2020-12-03 DIAGNOSIS — K13.79 BLEEDING FROM MOUTH: ICD-10-CM

## 2020-12-03 DIAGNOSIS — S01.512A: Primary | ICD-10-CM

## 2020-12-03 DIAGNOSIS — S01.512A: ICD-10-CM

## 2020-12-03 DIAGNOSIS — I10 ESSENTIAL HYPERTENSION: ICD-10-CM

## 2020-12-03 DIAGNOSIS — D73.1 THROMBOCYTOPENIA DUE TO HYPERSPLENISM: ICD-10-CM

## 2020-12-03 DIAGNOSIS — D69.59 THROMBOCYTOPENIA DUE TO HYPERSPLENISM: ICD-10-CM

## 2020-12-03 DIAGNOSIS — D63.1 ANEMIA DUE TO STAGE 4 CHRONIC KIDNEY DISEASE (H): ICD-10-CM

## 2020-12-03 DIAGNOSIS — N18.4 ANEMIA DUE TO STAGE 4 CHRONIC KIDNEY DISEASE (H): ICD-10-CM

## 2020-12-03 LAB
% GRANULOCYTES: 86.5 %G (ref 40–75)
BUN SERPL-MCNC: 48 MG/DL (ref 7–30)
CALCIUM SERPL-MCNC: 8.9 MG/DL (ref 8.5–10.4)
CHLORIDE SERPLBLD-SCNC: 109 MMOL/L (ref 94–109)
CO2 SERPL-SCNC: 18 MMOL/L (ref 20–32)
CREAT SERPL-MCNC: 3.7 MG/DL (ref 0.8–1.5)
EGFR CALCULATED (BLACK REFERENCE): 21.7 ML/MIN
EGFR CALCULATED (NON BLACK REFERENCE): 17.9 ML/MIN
GLUCOSE SERPL-MCNC: 97 MG/DL (ref 60–109)
GRANULOCYTES #: 5.7 K/UL (ref 1.6–8.3)
HCT VFR BLD AUTO: 28.3 % (ref 40–53)
HEMOGLOBIN: 8.8 G/DL (ref 13.3–17.7)
LYMPHOCYTES # BLD AUTO: 0.6 K/UL (ref 0.8–5.3)
LYMPHOCYTES NFR BLD AUTO: 9.7 %L (ref 20–48)
MCH RBC QN AUTO: 30.6 PG (ref 26.5–35)
MCHC RBC AUTO-ENTMCNC: 31.1 G/DL (ref 32–36)
MCV RBC AUTO: 98.1 FL (ref 78–100)
MID #: 0.3 K/UL (ref 0–2.2)
MID %: 3.8 %M (ref 0–20)
PLATELET # BLD AUTO: 58 K/UL (ref 150–450)
POTASSIUM SERPL-SCNC: 5 MMOL/L (ref 3.4–5.3)
RBC # BLD AUTO: 2.88 M/UL (ref 4.4–5.9)
SODIUM SERPL-SCNC: 137 MMOL/L (ref 133–144)
WBC # BLD AUTO: 6.6 K/UL (ref 4–11)

## 2020-12-03 PROCEDURE — 36415 COLL VENOUS BLD VENIPUNCTURE: CPT | Performed by: STUDENT IN AN ORGANIZED HEALTH CARE EDUCATION/TRAINING PROGRAM

## 2020-12-03 PROCEDURE — 99214 OFFICE O/P EST MOD 30 MIN: CPT | Mod: 25 | Performed by: STUDENT IN AN ORGANIZED HEALTH CARE EDUCATION/TRAINING PROGRAM

## 2020-12-03 PROCEDURE — 17250 CHEM CAUT OF GRANLTJ TISSUE: CPT | Mod: GC | Performed by: STUDENT IN AN ORGANIZED HEALTH CARE EDUCATION/TRAINING PROGRAM

## 2020-12-03 PROCEDURE — 80048 BASIC METABOLIC PNL TOTAL CA: CPT | Performed by: STUDENT IN AN ORGANIZED HEALTH CARE EDUCATION/TRAINING PROGRAM

## 2020-12-03 PROCEDURE — 85025 COMPLETE CBC W/AUTO DIFF WBC: CPT | Performed by: STUDENT IN AN ORGANIZED HEALTH CARE EDUCATION/TRAINING PROGRAM

## 2020-12-03 ASSESSMENT — MIFFLIN-ST. JEOR: SCORE: 1322.47

## 2020-12-03 NOTE — PROGRESS NOTES
Assessment and Plan     (S01.512A) Cut of tongue  (primary encounter diagnosis)  (D69.59) Thrombocytopenia due to hypersplenism  (N18.4,  D63.1) Anemia due to stage 4 chronic kidney disease (H)  Bleeding from mouth  Comment: Sharp trauma to distal tongue with resulting continuous, slow bleed.  Pertinent history includes renal dysfunction, hepatic dysfunction, anemia, and thrombocytopenia.  VS WNL.  Exam revealed above.  Attempt at prolonged direct pressure and silver nitrate, but bleeding resumed.  Considering patient's bleeding risk factors and existent anemia, there is concern that this may continue to lower his hemoglobin to the point of transfusion need.  Slow bleed and hemoglobin/platlet look close to baseline, so will have patient be seen today or tomorrow by ENT.  If unable to see ENT and keeps bleeding, he may need to go to the ER for ENT to come electrically cauterize.  Plan:   -CBC with Diff Plt, BMP  -OTOLARYNGOLOGY REFERRAL - urgent  -chemical cautery attempted  -If not able to see in 48 hours or get symptoms of anemia, go to ER    Essential HTN  Comment: Elevated today.  Patient anxious due to bleeding.  Goal <140/90 given kidney disease.  Will monitor for now given above issue.  Plan:  -RTC after resolution of above issue      Options for treatment and follow-up care were reviewed with the patient and/or guardian. Elle Blanton and/or guardian engaged in the decision making process and verbalized understanding of the options discussed and agreed with the final plan.      Woody Tesfaye MD    Precepted today with: Nazanin Grant MD           HPI:       Elle Blanton is a 60 year old  male with a significant past medical history of Chronic hep B cirrhosis, thrombocytopenia, CKD4 accompanied with wife who presents for the new concern(s) of    1) tongue cut  -cut tongue two days ago on his bottom front teeth  -continuous oozing from distal tip cut  -it is sore  -saw dentist yesterday -> sanded down  sharp points of teeth  -attempted holding pressure several times, but kept bleeding  -Denies dizziness, headaches, palpitations, chest pain, SOB           PMHX:     Patient Active Problem List   Diagnosis     Melena     Normocytic anemia     Thrombocytopenia (H)     Other pancytopenia (H)     Chronic hepatitis B without hepatic coma (H)     Cirrhosis of liver without ascites (H)     Essential hypertension     CKD (chronic kidney disease) stage 4, GFR 15-29 ml/min (H)     Descending thoracic aortic dissection (H)       Past Surgical History:   Procedure Laterality Date     INCISION AND DRAINAGE FINGER, COMBINED Left 10/20/2016    Procedure: COMBINED INCISION AND DRAINAGE FINGER;  Surgeon: Mireya Pizano MD;  Location: [facility]       Current Outpatient Medications   Medication Sig Dispense Refill     carvedilol (COREG) 25 MG tablet Take 2 tablets (50 mg) by mouth 2 times daily (with meals) 60 tablet 11     cilostazol (PLETAL) 50 MG tablet Take 1 tablet (50 mg) by mouth 2 times daily 180 tablet 0     diltiazem ER (TIAZAC) 180 MG 24 hr ER beaded capsule Take 1 capsule (180 mg) by mouth 2 times daily 60 capsule 11     ferrous sulfate (FE TABS) 325 (65 Fe) MG EC tablet Take 1 tablet (325 mg) by mouth daily       furosemide (LASIX) 40 MG tablet Take 1 tablet (40 mg) by mouth daily 30 tablet 11     omeprazole (PRILOSEC) 20 MG DR capsule Take 1 capsule (20 mg) by mouth 2 times daily 60 capsule 3     order for DME Equipment being ordered: Other: blood pressure monitor  Treatment Diagnosis: hypertension 1 each 0     polyethylene glycol (MIRALAX) 17 g packet Take 17 g by mouth daily as needed for constipation 10 packet 3     zolpidem (AMBIEN) 5 MG tablet Take 1 tablet (5 mg) by mouth nightly as needed for sleep 10 tablet 5          Allergies   Allergen Reactions     Nka [No Known Allergies]        Social History     Socioeconomic History     Marital status:      Spouse name: Not on file     Number of children: Not on  "file     Years of education: Not on file     Highest education level: Not on file   Occupational History     Not on file   Social Needs     Financial resource strain: Not on file     Food insecurity     Worry: Not on file     Inability: Not on file     Transportation needs     Medical: Not on file     Non-medical: Not on file   Tobacco Use     Smoking status: Never Smoker     Smokeless tobacco: Never Used   Substance and Sexual Activity     Alcohol use: No     Drug use: No     Sexual activity: Not on file   Lifestyle     Physical activity     Days per week: Not on file     Minutes per session: Not on file     Stress: Not on file   Relationships     Social connections     Talks on phone: Not on file     Gets together: Not on file     Attends Shinto service: Not on file     Active member of club or organization: Not on file     Attends meetings of clubs or organizations: Not on file     Relationship status: Not on file     Intimate partner violence     Fear of current or ex partner: Not on file     Emotionally abused: Not on file     Physically abused: Not on file     Forced sexual activity: Not on file   Other Topics Concern     Not on file   Social History Narrative     Not on file       I have reviewed this patient's family history and updated it with pertinent information if needed.  Family History   Problem Relation Age of Onset     Diabetes Father      Hypertension No family hx of      Asthma No family hx of      Cancer No family hx of      Coronary Artery Disease No family hx of      Liver Cancer No family hx of               Review of Systems:     7-point ROS reviewed and negative unless otherwise noted in HPI            Physical Exam:     Vitals:    12/03/20 0955 12/03/20 1000   BP: (!) 146/78 (!) 148/81   Pulse: 73    Resp: 20    Temp: 98.1  F (36.7  C)    SpO2: 98%    Weight: 60.1 kg (132 lb 9.6 oz)    Height: 1.626 m (5' 4\")      Body mass index is 22.76 kg/m .    GENERAL: healthy, alert and no " distress  EYES: EOMI, conjunctiva normal, pupils equal  HENT: Distal tip 3 mm well approximated cut that keeps oozing blood despite pressure  NECK: no asymmetry, masses, or scars  RESP: CTABL, able to speak fine  CV: RRR, normal S1/2, no murmur  MS: extremities normal- no gross deformities noted, gait normal  SKIN: no suspicious lesions or rashes  NEURO: mentation intact, speech normal and A&Ox3  PSYCH: anxious    Data: BUN 48, Cr 3.7, hgb 8.8, plt 58

## 2020-12-03 NOTE — PATIENT INSTRUCTIONS
Referral for :     Otolaryngology    [facility]/Provider :    [facility]  DATE & TIME :    Dec. 3 at 3:20   PHONE :     [phone]  FAX :    [phone]  Appointment made by clinic staff/:    Moraima

## 2020-12-07 PROBLEM — D73.1 THROMBOCYTOPENIA DUE TO HYPERSPLENISM: Status: ACTIVE | Noted: 2020-12-07

## 2020-12-07 PROBLEM — D69.59 THROMBOCYTOPENIA DUE TO HYPERSPLENISM: Status: ACTIVE | Noted: 2020-12-07

## 2020-12-07 PROBLEM — D61.89 ANEMIA DUE TO OTHER BONE MARROW FAILURE (H): Status: ACTIVE | Noted: 2020-12-07

## 2020-12-07 RX ORDER — ENTECAVIR 0.5 MG/1
TABLET, FILM COATED ORAL
COMMUNITY
Start: 2020-11-17 | End: 2021-08-18

## 2020-12-08 DIAGNOSIS — I10 ESSENTIAL HYPERTENSION: ICD-10-CM

## 2020-12-08 RX ORDER — DILTIAZEM HYDROCHLORIDE 180 MG/1
180 CAPSULE, EXTENDED RELEASE ORAL 2 TIMES DAILY
Qty: 60 CAPSULE | Refills: 11 | Status: SHIPPED | OUTPATIENT
Start: 2020-12-08 | End: 2021-01-18

## 2020-12-08 NOTE — RESULT ENCOUNTER NOTE
Results discussed directly with patient while patient was present. Any further details documented in the note.   Woody Tesfaye MD

## 2020-12-08 NOTE — PROGRESS NOTES
Preceptor Attestation:   Patient seen, evaluated and discussed with the resident. I was present for and supervised the entire procedure. I have verified the content of the note, which accurately reflects my assessment of the patient and the plan of care.  Supervising Physician:Nazanin Grant MD  [facility]

## 2020-12-16 DIAGNOSIS — K74.60 CIRRHOSIS OF LIVER WITH ASCITES, UNSPECIFIED HEPATIC CIRRHOSIS TYPE (H): ICD-10-CM

## 2020-12-16 DIAGNOSIS — R18.8 CIRRHOSIS OF LIVER WITH ASCITES, UNSPECIFIED HEPATIC CIRRHOSIS TYPE (H): ICD-10-CM

## 2020-12-29 NOTE — PROGRESS NOTES
Pt is a 60 year old male last seen on 12/3/20 by Dr Tesfaye for tongue laceration here today for:     1) CKD - feeling ok; No inc swelling; No SOB; no nausea; no confusion    2) Hep/Cirrhosis - stable abd distention; no symptoms    3) Anemia/ low plts - will recheck CBC today; no bleeding since admission for tongue laceration    4) HTN - BP has been elevated recently but he is unable to adjust meds because pulse is too low    BP Readings from Last 6 Encounters:   12/30/20 (!) 172/92   12/03/20 (!) 148/81   11/04/20 134/78   10/21/20 (!) 145/82   09/23/20 129/76   09/02/20 (!) 148/75       Past Medical History:   Diagnosis Date     H. pylori infection 7/5/2017    UGI bleed implied by Hgb 9.0 6/22/17 and melena. Admitted and hgb decreased to 7.6, CT abdomen showed all bladder wall thickening. + Hep B surface antigen noted. Melena resolved and hgb stabalized without transfusion, epigastric pain resolved with PPI. Recommend referral for gastroscopy.     Hepatitis B       Past Surgical History:   Procedure Laterality Date     INCISION AND DRAINAGE FINGER, COMBINED Left 10/20/2016    Procedure: COMBINED INCISION AND DRAINAGE FINGER;  Surgeon: Mireya Pizano MD;  Location: [facility]      Current Outpatient Medications   Medication Sig Dispense Refill     carvedilol (COREG) 25 MG tablet Take 2 tablets (50 mg) by mouth 2 times daily (with meals) 60 tablet 11     cilostazol (PLETAL) 50 MG tablet Take 1 tablet (50 mg) by mouth 2 times daily 180 tablet 0     diltiazem ER (TIAZAC) 180 MG 24 hr ER beaded capsule Take 1 capsule (180 mg) by mouth 2 times daily 60 capsule 11     entecavir (BARACLUDE) 0.5 MG tablet        epoetin kelton (EPOGEN/PROCRIT) 04282 UNIT/ML injection Inject 40,000 Units Subcutaneous       ferrous sulfate (FE TABS) 325 (65 Fe) MG EC tablet Take 1 tablet (325 mg) by mouth daily       furosemide (LASIX) 40 MG tablet Take 1 tablet (40 mg) by mouth daily 30 tablet 11     omeprazole (PRILOSEC) 20 MG DR capsule Take  "1 capsule (20 mg) by mouth 2 times daily 60 capsule 11     order for DME Equipment being ordered: Other: blood pressure monitor  Treatment Diagnosis: hypertension 1 each 0     polyethylene glycol (MIRALAX) 17 g packet Take 17 g by mouth daily as needed for constipation 10 packet 3     zolpidem (AMBIEN) 5 MG tablet Take 1 tablet (5 mg) by mouth nightly as needed for sleep 10 tablet 5      Allergies   Allergen Reactions     Nka [No Known Allergies]       ROS:   Gen- no weight change, no fevers/chills   Cardiac - no palpitations, no chest pain   Respiratory - no shortness of breath , no wheezing   GI - no nausea, no vomiting, no diarrhea, no constipation   Urinary - no dysuria, no polyuria   Neuro - no numbness, no tingling   Remainder of ROS negative.     Exam:   BP (!) 172/92 (BP Location: Right arm, Patient Position: Sitting, Cuff Size: Child)   Pulse 66   Temp 97.6  F (36.4  C) (Oral)   Resp 20   Ht 1.635 m (5' 4.37\")   Wt 61.4 kg (135 lb 6.4 oz)   SpO2 99%   BMI 22.97 kg/m     Alert and oriented x 3; No acute distress   Resp: clear to auscultation bilaterally, no wheezing/ronchi   CV: rate/rhythm regular, no murmurs/rubs/gallops   ABd: soft, + bowel sounds, nontender/+ mild distention - at baseline, no rebound/guarding   Extrem: no clubbing/cyanosis/edema   Neuro: no focal deficits   Derm: no rashes       (N18.4,  D63.1) Anemia due to stage 4 chronic kidney disease (H)  (primary encounter diagnosis)  Comment: plts very low on labs today (44) -> recommended he reach out to nephrology as he may benefit from epo tx vs plt transfusion   Plan: CBC with Plt ([facility])            (K74.60,  R18.8) Cirrhosis of liver with ascites, unspecified hepatic cirrhosis type (H)  Comment: Cr mildly elevated from last check   Plan: Comprehensive Metabolic Panel ([facility]) - results <1hr Protocol            (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.59) Thrombocytopenia due to hypersplenism  Comment: see above  Plan: CBC with Plt ([facility])            (I10) Essential " hypertension  Comment: will restart clonidine given high pressures; recheck in 4 wks  Plan: cloNIDine (CATAPRES) 0.1 MG tablet            (N18.4) CKD (chronic kidney disease) stage 4, GFR 15-29 ml/min (H)  Comment: see above  Plan: Comprehensive Metabolic Panel ([facility]) - results <1hr Protocol            (R60.9) Edema, unspecified type  Comment: updated med tab to indicate he is taking lasix only prn  Plan: furosemide (LASIX) 40 MG tablet            Jaswant Flores MD  December 30, 2020  8:47 AM    Addendum:  Spoke w/ nephrology office and moved up his appt w/ Dr Clement to 1/11/21 at 10am. Notified pt of new appt. Precautions given regarding his low plts.    Jaswant Flores MD  December 31, 2020  10:12 AM

## 2020-12-30 ENCOUNTER — OFFICE VISIT (OUTPATIENT)
Dept: FAMILY MEDICINE | Facility: CLINIC | Age: 60
End: 2020-12-30
Payer: COMMERCIAL

## 2020-12-30 VITALS
DIASTOLIC BLOOD PRESSURE: 92 MMHG | HEART RATE: 66 BPM | RESPIRATION RATE: 20 BRPM | SYSTOLIC BLOOD PRESSURE: 172 MMHG | TEMPERATURE: 97.6 F | OXYGEN SATURATION: 99 % | WEIGHT: 135.4 LBS | BODY MASS INDEX: 23.12 KG/M2 | HEIGHT: 64 IN

## 2020-12-30 DIAGNOSIS — D73.1 THROMBOCYTOPENIA DUE TO HYPERSPLENISM: ICD-10-CM

## 2020-12-30 DIAGNOSIS — I10 ESSENTIAL HYPERTENSION: ICD-10-CM

## 2020-12-30 DIAGNOSIS — N18.4 ANEMIA DUE TO STAGE 4 CHRONIC KIDNEY DISEASE (H): Primary | ICD-10-CM

## 2020-12-30 DIAGNOSIS — D69.59 THROMBOCYTOPENIA DUE TO HYPERSPLENISM: ICD-10-CM

## 2020-12-30 DIAGNOSIS — R60.9 EDEMA, UNSPECIFIED TYPE: ICD-10-CM

## 2020-12-30 DIAGNOSIS — N18.4 CKD (CHRONIC KIDNEY DISEASE) STAGE 4, GFR 15-29 ML/MIN (H): ICD-10-CM

## 2020-12-30 DIAGNOSIS — K74.60 CIRRHOSIS OF LIVER WITH ASCITES, UNSPECIFIED HEPATIC CIRRHOSIS TYPE (H): ICD-10-CM

## 2020-12-30 DIAGNOSIS — D63.1 ANEMIA DUE TO STAGE 4 CHRONIC KIDNEY DISEASE (H): Primary | ICD-10-CM

## 2020-12-30 DIAGNOSIS — R18.8 CIRRHOSIS OF LIVER WITH ASCITES, UNSPECIFIED HEPATIC CIRRHOSIS TYPE (H): ICD-10-CM

## 2020-12-30 LAB
ALBUMIN SERPL-MCNC: 2.9 G/DL (ref 3.2–4.5)
ALP SERPL-CCNC: 113 U/L (ref 40–150)
ALT SERPL-CCNC: 45 U/L (ref 0–45)
AST SERPL-CCNC: 35 U/L (ref 0–55)
BILIRUB SERPL-MCNC: 0.8 MG/DL (ref 0.2–1.3)
BUN SERPL-MCNC: 75 MG/DL (ref 7–30)
CALCIUM SERPL-MCNC: 9 MG/DL (ref 8.5–10.4)
CHLORIDE SERPLBLD-SCNC: 112 MMOL/L (ref 94–109)
CO2 SERPL-SCNC: 21 MMOL/L (ref 20–32)
CREAT SERPL-MCNC: 4.1 MG/DL (ref 0.8–1.5)
EGFR CALCULATED (BLACK REFERENCE): 19.2 ML/MIN
EGFR CALCULATED (NON BLACK REFERENCE): 15.9 ML/MIN
GLUCOSE SERPL-MCNC: 126 MG/DL (ref 60–109)
HCT VFR BLD AUTO: 26.1 % (ref 40–53)
HEMOGLOBIN: 8 G/DL (ref 13.3–17.7)
MCH RBC QN AUTO: 31 PG (ref 26.5–35)
MCHC RBC AUTO-ENTMCNC: 30.7 G/DL (ref 32–36)
MCV RBC AUTO: 101 FL (ref 78–100)
PLATELET # BLD AUTO: 44 K/UL (ref 150–450)
POTASSIUM SERPL-SCNC: 4.9 MMOL/L (ref 3.4–5.3)
PROT SERPL-MCNC: 6.9 G/DL (ref 6.6–8.8)
RBC # BLD AUTO: 2.58 M/UL (ref 4.4–5.9)
SODIUM SERPL-SCNC: 141 MMOL/L (ref 133–144)
WBC # BLD AUTO: 3.7 K/UL (ref 4–11)

## 2020-12-30 PROCEDURE — 36415 COLL VENOUS BLD VENIPUNCTURE: CPT | Performed by: FAMILY MEDICINE

## 2020-12-30 PROCEDURE — 85027 COMPLETE CBC AUTOMATED: CPT | Performed by: FAMILY MEDICINE

## 2020-12-30 PROCEDURE — 99214 OFFICE O/P EST MOD 30 MIN: CPT | Performed by: FAMILY MEDICINE

## 2020-12-30 PROCEDURE — 80053 COMPREHEN METABOLIC PANEL: CPT | Performed by: FAMILY MEDICINE

## 2020-12-30 RX ORDER — CLONIDINE HYDROCHLORIDE 0.1 MG/1
0.1 TABLET ORAL AT BEDTIME
Qty: 30 TABLET | Refills: 3 | Status: SHIPPED | OUTPATIENT
Start: 2020-12-30 | End: 2021-04-21

## 2020-12-30 RX ORDER — FUROSEMIDE 40 MG
40 TABLET ORAL DAILY PRN
Qty: 30 TABLET | Refills: 11 | COMMUNITY
Start: 2020-12-30 | End: 2021-09-26

## 2020-12-30 ASSESSMENT — MIFFLIN-ST. JEOR: SCORE: 1341.04

## 2021-01-01 ENCOUNTER — APPOINTMENT (OUTPATIENT)
Dept: RADIOLOGY | Facility: HOSPITAL | Age: 61
DRG: 186 | End: 2021-01-01
Payer: COMMERCIAL

## 2021-01-01 ENCOUNTER — APPOINTMENT (OUTPATIENT)
Dept: CT IMAGING | Facility: HOSPITAL | Age: 61
DRG: 186 | End: 2021-01-01
Attending: SURGERY
Payer: COMMERCIAL

## 2021-01-01 ENCOUNTER — TELEPHONE (OUTPATIENT)
Dept: ONCOLOGY | Facility: HOSPITAL | Age: 61
End: 2021-01-01
Payer: COMMERCIAL

## 2021-01-01 ENCOUNTER — INFUSION THERAPY VISIT (OUTPATIENT)
Dept: INFUSION THERAPY | Facility: HOSPITAL | Age: 61
End: 2021-01-01
Attending: INTERNAL MEDICINE
Payer: COMMERCIAL

## 2021-01-01 ENCOUNTER — TRANSFERRED RECORDS (OUTPATIENT)
Dept: HEALTH INFORMATION MANAGEMENT | Facility: CLINIC | Age: 61
End: 2021-01-01

## 2021-01-01 ENCOUNTER — APPOINTMENT (OUTPATIENT)
Dept: ULTRASOUND IMAGING | Facility: HOSPITAL | Age: 61
DRG: 186 | End: 2021-01-01
Attending: INTERNAL MEDICINE
Payer: COMMERCIAL

## 2021-01-01 ENCOUNTER — TELEPHONE (OUTPATIENT)
Dept: FAMILY MEDICINE | Facility: CLINIC | Age: 61
End: 2021-01-01
Payer: COMMERCIAL

## 2021-01-01 ENCOUNTER — PATIENT OUTREACH (OUTPATIENT)
Dept: CARE COORDINATION | Facility: CLINIC | Age: 61
End: 2021-01-01
Payer: COMMERCIAL

## 2021-01-01 ENCOUNTER — OFFICE VISIT (OUTPATIENT)
Dept: FAMILY MEDICINE | Facility: CLINIC | Age: 61
End: 2021-01-01
Payer: COMMERCIAL

## 2021-01-01 ENCOUNTER — TELEPHONE (OUTPATIENT)
Dept: FAMILY MEDICINE | Facility: CLINIC | Age: 61
End: 2021-01-01

## 2021-01-01 VITALS
BODY MASS INDEX: 22.79 KG/M2 | HEART RATE: 60 BPM | DIASTOLIC BLOOD PRESSURE: 82 MMHG | TEMPERATURE: 97.4 F | SYSTOLIC BLOOD PRESSURE: 134 MMHG | RESPIRATION RATE: 18 BRPM | HEIGHT: 65 IN | WEIGHT: 136.8 LBS

## 2021-01-01 VITALS
BODY MASS INDEX: 21.41 KG/M2 | RESPIRATION RATE: 16 BRPM | OXYGEN SATURATION: 96 % | HEIGHT: 65 IN | DIASTOLIC BLOOD PRESSURE: 71 MMHG | HEART RATE: 73 BPM | SYSTOLIC BLOOD PRESSURE: 107 MMHG | TEMPERATURE: 98.6 F | WEIGHT: 128.53 LBS

## 2021-01-01 VITALS
SYSTOLIC BLOOD PRESSURE: 131 MMHG | OXYGEN SATURATION: 98 % | HEART RATE: 71 BPM | RESPIRATION RATE: 16 BRPM | TEMPERATURE: 97.5 F | DIASTOLIC BLOOD PRESSURE: 86 MMHG

## 2021-01-01 VITALS
TEMPERATURE: 98.3 F | RESPIRATION RATE: 18 BRPM | OXYGEN SATURATION: 96 % | DIASTOLIC BLOOD PRESSURE: 76 MMHG | SYSTOLIC BLOOD PRESSURE: 114 MMHG | HEART RATE: 81 BPM

## 2021-01-01 VITALS
HEART RATE: 64 BPM | SYSTOLIC BLOOD PRESSURE: 126 MMHG | OXYGEN SATURATION: 99 % | RESPIRATION RATE: 18 BRPM | TEMPERATURE: 97.7 F | DIASTOLIC BLOOD PRESSURE: 80 MMHG

## 2021-01-01 VITALS
RESPIRATION RATE: 16 BRPM | TEMPERATURE: 97.7 F | OXYGEN SATURATION: 96 % | HEART RATE: 71 BPM | SYSTOLIC BLOOD PRESSURE: 140 MMHG | DIASTOLIC BLOOD PRESSURE: 82 MMHG

## 2021-01-01 VITALS
DIASTOLIC BLOOD PRESSURE: 87 MMHG | BODY MASS INDEX: 22.16 KG/M2 | HEART RATE: 72 BPM | HEIGHT: 65 IN | TEMPERATURE: 97.6 F | WEIGHT: 133 LBS | SYSTOLIC BLOOD PRESSURE: 146 MMHG | RESPIRATION RATE: 18 BRPM | OXYGEN SATURATION: 98 %

## 2021-01-01 VITALS
BODY MASS INDEX: 22.71 KG/M2 | TEMPERATURE: 98 F | DIASTOLIC BLOOD PRESSURE: 77 MMHG | SYSTOLIC BLOOD PRESSURE: 123 MMHG | OXYGEN SATURATION: 99 % | RESPIRATION RATE: 18 BRPM | HEIGHT: 64 IN | HEART RATE: 73 BPM | WEIGHT: 133 LBS

## 2021-01-01 DIAGNOSIS — J94.8 HYDROPNEUMOTHORAX: Primary | ICD-10-CM

## 2021-01-01 DIAGNOSIS — T14.8XXA DRAINAGE FROM WOUND: ICD-10-CM

## 2021-01-01 DIAGNOSIS — L29.9 PRURITIC DISORDER: ICD-10-CM

## 2021-01-01 DIAGNOSIS — J86.9 EMPYEMA LUNG (H): Primary | ICD-10-CM

## 2021-01-01 DIAGNOSIS — R21 RASH: ICD-10-CM

## 2021-01-01 DIAGNOSIS — N18.6 ESRD (END STAGE RENAL DISEASE) ON DIALYSIS (H): ICD-10-CM

## 2021-01-01 DIAGNOSIS — M70.22 OLECRANON BURSITIS OF LEFT ELBOW: Primary | ICD-10-CM

## 2021-01-01 DIAGNOSIS — Z99.2 ESRD (END STAGE RENAL DISEASE) ON DIALYSIS (H): ICD-10-CM

## 2021-01-01 DIAGNOSIS — J86.9 EMPYEMA LUNG (H): ICD-10-CM

## 2021-01-01 DIAGNOSIS — G47.01 INSOMNIA DUE TO MEDICAL CONDITION: ICD-10-CM

## 2021-01-01 DIAGNOSIS — J90 PLEURAL EFFUSION: ICD-10-CM

## 2021-01-01 DIAGNOSIS — Z02.71 DISABILITY EXAMINATION: ICD-10-CM

## 2021-01-01 DIAGNOSIS — Z71.89 OTHER SPECIFIED COUNSELING: ICD-10-CM

## 2021-01-01 DIAGNOSIS — K59.00 CONSTIPATION, UNSPECIFIED CONSTIPATION TYPE: ICD-10-CM

## 2021-01-01 DIAGNOSIS — I10 ESSENTIAL HYPERTENSION: Primary | ICD-10-CM

## 2021-01-01 DIAGNOSIS — M70.22 OLECRANON BURSITIS OF LEFT ELBOW: ICD-10-CM

## 2021-01-01 LAB
ANION GAP SERPL CALCULATED.3IONS-SCNC: 10 MMOL/L (ref 5–18)
ANION GAP SERPL CALCULATED.3IONS-SCNC: 10 MMOL/L (ref 5–18)
ANION GAP SERPL CALCULATED.3IONS-SCNC: 11 MMOL/L (ref 5–18)
ANION GAP SERPL CALCULATED.3IONS-SCNC: 11 MMOL/L (ref 5–18)
ANION GAP SERPL CALCULATED.3IONS-SCNC: 13 MMOL/L (ref 5–18)
BACTERIA ASPIRATE CULT: NORMAL
BACTERIA FLD CULT: NO GROWTH
BUN SERPL-MCNC: 20 MG/DL (ref 8–22)
BUN SERPL-MCNC: 30 MG/DL (ref 8–22)
BUN SERPL-MCNC: 41 MG/DL (ref 8–22)
BUN SERPL-MCNC: 43 MG/DL (ref 8–22)
BUN SERPL-MCNC: 55 MG/DL (ref 8–22)
CALCIUM SERPL-MCNC: 7.5 MG/DL (ref 8.5–10.5)
CALCIUM SERPL-MCNC: 7.5 MG/DL (ref 8.5–10.5)
CALCIUM SERPL-MCNC: 7.6 MG/DL (ref 8.5–10.5)
CALCIUM SERPL-MCNC: 7.6 MG/DL (ref 8.5–10.5)
CALCIUM SERPL-MCNC: 7.7 MG/DL (ref 8.5–10.5)
CHLORIDE BLD-SCNC: 101 MMOL/L (ref 98–107)
CHLORIDE BLD-SCNC: 95 MMOL/L (ref 98–107)
CHLORIDE BLD-SCNC: 98 MMOL/L (ref 98–107)
CHLORIDE BLD-SCNC: 98 MMOL/L (ref 98–107)
CHLORIDE BLD-SCNC: 99 MMOL/L (ref 98–107)
CO2 SERPL-SCNC: 23 MMOL/L (ref 22–31)
CO2 SERPL-SCNC: 24 MMOL/L (ref 22–31)
CO2 SERPL-SCNC: 25 MMOL/L (ref 22–31)
CO2 SERPL-SCNC: 25 MMOL/L (ref 22–31)
CO2 SERPL-SCNC: 26 MMOL/L (ref 22–31)
CREAT SERPL-MCNC: 11.44 MG/DL (ref 0.7–1.3)
CREAT SERPL-MCNC: 5.71 MG/DL (ref 0.7–1.3)
CREAT SERPL-MCNC: 7.82 MG/DL (ref 0.7–1.3)
CREAT SERPL-MCNC: 9.49 MG/DL (ref 0.7–1.3)
CREAT SERPL-MCNC: 9.93 MG/DL (ref 0.7–1.3)
ERYTHROCYTE [DISTWIDTH] IN BLOOD BY AUTOMATED COUNT: 17.2 % (ref 10–15)
ERYTHROCYTE [DISTWIDTH] IN BLOOD BY AUTOMATED COUNT: 17.2 % (ref 10–15)
ERYTHROCYTE [DISTWIDTH] IN BLOOD BY AUTOMATED COUNT: 17.3 % (ref 10–15)
ERYTHROCYTE [DISTWIDTH] IN BLOOD BY AUTOMATED COUNT: 17.6 % (ref 10–15)
ERYTHROCYTE [DISTWIDTH] IN BLOOD BY AUTOMATED COUNT: 17.6 % (ref 10–15)
GFR SERPL CREATININE-BSD FRML MDRD: 10 ML/MIN/1.73M2
GFR SERPL CREATININE-BSD FRML MDRD: 4 ML/MIN/1.73M2
GFR SERPL CREATININE-BSD FRML MDRD: 5 ML/MIN/1.73M2
GFR SERPL CREATININE-BSD FRML MDRD: 5 ML/MIN/1.73M2
GFR SERPL CREATININE-BSD FRML MDRD: 7 ML/MIN/1.73M2
GLUCOSE BLD-MCNC: 120 MG/DL (ref 70–125)
GLUCOSE BLD-MCNC: 83 MG/DL (ref 70–125)
GLUCOSE BLD-MCNC: 85 MG/DL (ref 70–125)
GLUCOSE BLD-MCNC: 92 MG/DL (ref 70–125)
GLUCOSE BLD-MCNC: 95 MG/DL (ref 70–125)
HCT VFR BLD AUTO: 25.5 % (ref 40–53)
HCT VFR BLD AUTO: 26.2 % (ref 40–53)
HCT VFR BLD AUTO: 26.9 % (ref 40–53)
HCT VFR BLD AUTO: 27.9 % (ref 40–53)
HCT VFR BLD AUTO: 30 % (ref 40–53)
HGB BLD-MCNC: 7.9 G/DL (ref 13.3–17.7)
HGB BLD-MCNC: 7.9 G/DL (ref 13.3–17.7)
HGB BLD-MCNC: 8 G/DL (ref 13.3–17.7)
HGB BLD-MCNC: 8.3 G/DL (ref 13.3–17.7)
HGB BLD-MCNC: 9.1 G/DL (ref 13.3–17.7)
MCH RBC QN AUTO: 29.9 PG (ref 26.5–33)
MCH RBC QN AUTO: 30 PG (ref 26.5–33)
MCH RBC QN AUTO: 30.2 PG (ref 26.5–33)
MCH RBC QN AUTO: 30.4 PG (ref 26.5–33)
MCH RBC QN AUTO: 30.8 PG (ref 26.5–33)
MCHC RBC AUTO-ENTMCNC: 29.4 G/DL (ref 31.5–36.5)
MCHC RBC AUTO-ENTMCNC: 29.7 G/DL (ref 31.5–36.5)
MCHC RBC AUTO-ENTMCNC: 30.2 G/DL (ref 31.5–36.5)
MCHC RBC AUTO-ENTMCNC: 30.3 G/DL (ref 31.5–36.5)
MCHC RBC AUTO-ENTMCNC: 31.4 G/DL (ref 31.5–36.5)
MCV RBC AUTO: 101 FL (ref 78–100)
MCV RBC AUTO: 102 FL (ref 78–100)
MCV RBC AUTO: 102 FL (ref 78–100)
MCV RBC AUTO: 98 FL (ref 78–100)
MCV RBC AUTO: 99 FL (ref 78–100)
PLATELET # BLD AUTO: 29 10E3/UL (ref 150–450)
PLATELET # BLD AUTO: 32 10E3/UL (ref 150–450)
PLATELET # BLD AUTO: 37 10E3/UL (ref 150–450)
PLATELET # BLD AUTO: 42 10E3/UL (ref 150–450)
PLATELET # BLD AUTO: 44 10E3/UL (ref 150–450)
POTASSIUM BLD-SCNC: 3.4 MMOL/L (ref 3.5–5)
POTASSIUM BLD-SCNC: 4.1 MMOL/L (ref 3.5–5)
POTASSIUM BLD-SCNC: 4.5 MMOL/L (ref 3.5–5)
POTASSIUM BLD-SCNC: 4.8 MMOL/L (ref 3.5–5)
POTASSIUM BLD-SCNC: 5.1 MMOL/L (ref 3.5–5)
RBC # BLD AUTO: 2.6 10E6/UL (ref 4.4–5.9)
RBC # BLD AUTO: 2.6 10E6/UL (ref 4.4–5.9)
RBC # BLD AUTO: 2.64 10E6/UL (ref 4.4–5.9)
RBC # BLD AUTO: 2.75 10E6/UL (ref 4.4–5.9)
RBC # BLD AUTO: 3.03 10E6/UL (ref 4.4–5.9)
SARS-COV-2 RNA RESP QL NAA+PROBE: NEGATIVE
SODIUM SERPL-SCNC: 129 MMOL/L (ref 136–145)
SODIUM SERPL-SCNC: 134 MMOL/L (ref 136–145)
SODIUM SERPL-SCNC: 135 MMOL/L (ref 136–145)
SODIUM SERPL-SCNC: 135 MMOL/L (ref 136–145)
SODIUM SERPL-SCNC: 136 MMOL/L (ref 136–145)
WBC # BLD AUTO: 4.8 10E3/UL (ref 4–11)
WBC # BLD AUTO: 6.3 10E3/UL (ref 4–11)
WBC # BLD AUTO: 6.5 10E3/UL (ref 4–11)
WBC # BLD AUTO: 8 10E3/UL (ref 4–11)
WBC # BLD AUTO: 9.4 10E3/UL (ref 4–11)

## 2021-01-01 PROCEDURE — 99232 SBSQ HOSP IP/OBS MODERATE 35: CPT | Performed by: PHYSICIAN ASSISTANT

## 2021-01-01 PROCEDURE — 258N000003 HC RX IP 258 OP 636: Performed by: INTERNAL MEDICINE

## 2021-01-01 PROCEDURE — 99233 SBSQ HOSP IP/OBS HIGH 50: CPT | Performed by: INTERNAL MEDICINE

## 2021-01-01 PROCEDURE — 85027 COMPLETE CBC AUTOMATED: CPT

## 2021-01-01 PROCEDURE — 36415 COLL VENOUS BLD VENIPUNCTURE: CPT

## 2021-01-01 PROCEDURE — 99232 SBSQ HOSP IP/OBS MODERATE 35: CPT | Mod: GC

## 2021-01-01 PROCEDURE — 96365 THER/PROPH/DIAG IV INF INIT: CPT

## 2021-01-01 PROCEDURE — 99232 SBSQ HOSP IP/OBS MODERATE 35: CPT | Mod: GC | Performed by: STUDENT IN AN ORGANIZED HEALTH CARE EDUCATION/TRAINING PROGRAM

## 2021-01-01 PROCEDURE — 250N000009 HC RX 250: Performed by: INTERNAL MEDICINE

## 2021-01-01 PROCEDURE — 250N000011 HC RX IP 250 OP 636: Performed by: FAMILY MEDICINE

## 2021-01-01 PROCEDURE — 250N000013 HC RX MED GY IP 250 OP 250 PS 637: Performed by: STUDENT IN AN ORGANIZED HEALTH CARE EDUCATION/TRAINING PROGRAM

## 2021-01-01 PROCEDURE — 80048 BASIC METABOLIC PNL TOTAL CA: CPT

## 2021-01-01 PROCEDURE — 250N000013 HC RX MED GY IP 250 OP 250 PS 637: Performed by: FAMILY MEDICINE

## 2021-01-01 PROCEDURE — 93990 DOPPLER FLOW TESTING: CPT

## 2021-01-01 PROCEDURE — 120N000001 HC R&B MED SURG/OB

## 2021-01-01 PROCEDURE — 250N000013 HC RX MED GY IP 250 OP 250 PS 637: Performed by: MASSAGE THERAPIST

## 2021-01-01 PROCEDURE — 250N000013 HC RX MED GY IP 250 OP 250 PS 637: Performed by: INTERNAL MEDICINE

## 2021-01-01 PROCEDURE — 250N000013 HC RX MED GY IP 250 OP 250 PS 637

## 2021-01-01 PROCEDURE — 99238 HOSP IP/OBS DSCHRG MGMT 30/<: CPT | Mod: GC

## 2021-01-01 PROCEDURE — 250N000011 HC RX IP 250 OP 636: Performed by: INTERNAL MEDICINE

## 2021-01-01 PROCEDURE — P9047 ALBUMIN (HUMAN), 25%, 50ML: HCPCS | Performed by: INTERNAL MEDICINE

## 2021-01-01 PROCEDURE — 99214 OFFICE O/P EST MOD 30 MIN: CPT | Performed by: FAMILY MEDICINE

## 2021-01-01 PROCEDURE — 120N000004 HC R&B MS OVERFLOW

## 2021-01-01 PROCEDURE — 99213 OFFICE O/P EST LOW 20 MIN: CPT | Mod: GC | Performed by: STUDENT IN AN ORGANIZED HEALTH CARE EDUCATION/TRAINING PROGRAM

## 2021-01-01 PROCEDURE — 90937 HEMODIALYSIS REPEATED EVAL: CPT

## 2021-01-01 PROCEDURE — 99233 SBSQ HOSP IP/OBS HIGH 50: CPT | Performed by: PHYSICIAN ASSISTANT

## 2021-01-01 PROCEDURE — 71045 X-RAY EXAM CHEST 1 VIEW: CPT

## 2021-01-01 PROCEDURE — 99232 SBSQ HOSP IP/OBS MODERATE 35: CPT | Performed by: INTERNAL MEDICINE

## 2021-01-01 PROCEDURE — 74150 CT ABDOMEN W/O CONTRAST: CPT

## 2021-01-01 PROCEDURE — 90935 HEMODIALYSIS ONE EVALUATION: CPT

## 2021-01-01 PROCEDURE — 71250 CT THORAX DX C-: CPT

## 2021-01-01 PROCEDURE — 99223 1ST HOSP IP/OBS HIGH 75: CPT | Performed by: INTERNAL MEDICINE

## 2021-01-01 PROCEDURE — 87635 SARS-COV-2 COVID-19 AMP PRB: CPT

## 2021-01-01 PROCEDURE — 99207 PR CONSULT E&M CHANGED TO INITIAL LEVEL: CPT | Performed by: INTERNAL MEDICINE

## 2021-01-01 PROCEDURE — 85014 HEMATOCRIT: CPT

## 2021-01-01 RX ORDER — MEPERIDINE HYDROCHLORIDE 25 MG/ML
25 INJECTION INTRAMUSCULAR; INTRAVENOUS; SUBCUTANEOUS EVERY 30 MIN PRN
Status: CANCELLED | OUTPATIENT
Start: 2021-01-01

## 2021-01-01 RX ORDER — EPINEPHRINE 1 MG/ML
0.3 INJECTION, SOLUTION INTRAMUSCULAR; SUBCUTANEOUS EVERY 5 MIN PRN
Status: CANCELLED | OUTPATIENT
Start: 2021-01-01

## 2021-01-01 RX ORDER — ALBUTEROL SULFATE 0.83 MG/ML
2.5 SOLUTION RESPIRATORY (INHALATION)
Status: CANCELLED | OUTPATIENT
Start: 2021-01-01

## 2021-01-01 RX ORDER — OXYCODONE HYDROCHLORIDE 5 MG/1
5 TABLET ORAL EVERY 6 HOURS PRN
Qty: 3 TABLET | Refills: 0 | Status: SHIPPED | OUTPATIENT
Start: 2021-01-01 | End: 2021-01-01

## 2021-01-01 RX ORDER — CALCIUM CARBONATE 500 MG/1
500 TABLET, CHEWABLE ORAL DAILY PRN
Status: DISCONTINUED | OUTPATIENT
Start: 2021-01-01 | End: 2021-01-01

## 2021-01-01 RX ORDER — HEPARIN SODIUM,PORCINE 10 UNIT/ML
5 VIAL (ML) INTRAVENOUS
Status: CANCELLED | OUTPATIENT
Start: 2021-01-01

## 2021-01-01 RX ORDER — METHYLPREDNISOLONE SODIUM SUCCINATE 125 MG/2ML
125 INJECTION, POWDER, LYOPHILIZED, FOR SOLUTION INTRAMUSCULAR; INTRAVENOUS
Status: DISCONTINUED | OUTPATIENT
Start: 2021-01-01 | End: 2021-01-01 | Stop reason: HOSPADM

## 2021-01-01 RX ORDER — METHYLPREDNISOLONE SODIUM SUCCINATE 125 MG/2ML
125 INJECTION, POWDER, LYOPHILIZED, FOR SOLUTION INTRAMUSCULAR; INTRAVENOUS
Status: CANCELLED
Start: 2021-01-01

## 2021-01-01 RX ORDER — ALBUTEROL SULFATE 90 UG/1
1-2 AEROSOL, METERED RESPIRATORY (INHALATION)
Status: CANCELLED
Start: 2021-01-01

## 2021-01-01 RX ORDER — CALCIUM CARBONATE 500 MG/1
500 TABLET, CHEWABLE ORAL 2 TIMES DAILY PRN
Status: DISCONTINUED | OUTPATIENT
Start: 2021-01-01 | End: 2021-01-01 | Stop reason: HOSPADM

## 2021-01-01 RX ORDER — CEFAZOLIN SODIUM 2 G/100ML
2 INJECTION, SOLUTION INTRAVENOUS WEEKLY
Status: CANCELLED
Start: 2021-01-01

## 2021-01-01 RX ORDER — AMOXICILLIN 250 MG
1 CAPSULE ORAL DAILY PRN
Qty: 30 TABLET | Refills: 3 | Status: SHIPPED | OUTPATIENT
Start: 2021-01-01 | End: 2021-01-01

## 2021-01-01 RX ORDER — EPINEPHRINE 1 MG/ML
0.3 INJECTION, SOLUTION INTRAMUSCULAR; SUBCUTANEOUS EVERY 5 MIN PRN
Status: DISCONTINUED | OUTPATIENT
Start: 2021-01-01 | End: 2021-01-01 | Stop reason: HOSPADM

## 2021-01-01 RX ORDER — HEPARIN SODIUM (PORCINE) LOCK FLUSH IV SOLN 100 UNIT/ML 100 UNIT/ML
5 SOLUTION INTRAVENOUS
Status: CANCELLED | OUTPATIENT
Start: 2021-01-01

## 2021-01-01 RX ORDER — ALBUTEROL SULFATE 90 UG/1
1-2 AEROSOL, METERED RESPIRATORY (INHALATION)
Status: DISCONTINUED | OUTPATIENT
Start: 2021-01-01 | End: 2021-01-01 | Stop reason: HOSPADM

## 2021-01-01 RX ORDER — NALOXONE HYDROCHLORIDE 0.4 MG/ML
0.2 INJECTION, SOLUTION INTRAMUSCULAR; INTRAVENOUS; SUBCUTANEOUS
Status: CANCELLED | OUTPATIENT
Start: 2021-01-01

## 2021-01-01 RX ORDER — MEPERIDINE HYDROCHLORIDE 25 MG/ML
25 INJECTION INTRAMUSCULAR; INTRAVENOUS; SUBCUTANEOUS EVERY 30 MIN PRN
Status: DISCONTINUED | OUTPATIENT
Start: 2021-01-01 | End: 2021-01-01 | Stop reason: HOSPADM

## 2021-01-01 RX ORDER — DIPHENHYDRAMINE HYDROCHLORIDE 50 MG/ML
50 INJECTION INTRAMUSCULAR; INTRAVENOUS
Status: DISCONTINUED | OUTPATIENT
Start: 2021-01-01 | End: 2021-01-01 | Stop reason: HOSPADM

## 2021-01-01 RX ORDER — ZOLPIDEM TARTRATE 5 MG/1
5 TABLET ORAL
Qty: 10 TABLET | Refills: 5 | Status: SHIPPED | OUTPATIENT
Start: 2021-01-01 | End: 2022-01-01

## 2021-01-01 RX ORDER — OXYCODONE HYDROCHLORIDE 5 MG/1
5 TABLET ORAL ONCE
Status: COMPLETED | OUTPATIENT
Start: 2021-01-01 | End: 2021-01-01

## 2021-01-01 RX ORDER — OXYCODONE HYDROCHLORIDE 5 MG/1
5 TABLET ORAL EVERY 6 HOURS PRN
Qty: 3 TABLET | Refills: 0 | Status: ON HOLD | OUTPATIENT
Start: 2021-01-01 | End: 2022-01-01

## 2021-01-01 RX ORDER — HYDROXYZINE HYDROCHLORIDE 25 MG/1
25 TABLET, FILM COATED ORAL 3 TIMES DAILY PRN
Status: DISCONTINUED | OUTPATIENT
Start: 2021-01-01 | End: 2021-01-01 | Stop reason: HOSPADM

## 2021-01-01 RX ORDER — AMOXICILLIN 250 MG
1 CAPSULE ORAL DAILY PRN
Qty: 30 TABLET | Refills: 11 | Status: SHIPPED | OUTPATIENT
Start: 2021-01-01 | End: 2022-01-01

## 2021-01-01 RX ORDER — DIPHENHYDRAMINE HYDROCHLORIDE 50 MG/ML
50 INJECTION INTRAMUSCULAR; INTRAVENOUS
Status: CANCELLED
Start: 2021-01-01

## 2021-01-01 RX ORDER — CEFAZOLIN SODIUM 2 G/100ML
2 INJECTION, SOLUTION INTRAVENOUS ONCE
Status: COMPLETED | OUTPATIENT
Start: 2021-01-01 | End: 2021-01-01

## 2021-01-01 RX ORDER — CEFAZOLIN SODIUM 1 G/3ML
2-3 INJECTION, POWDER, FOR SOLUTION INTRAMUSCULAR; INTRAVENOUS
Qty: 9 EACH | Refills: 4 | Status: ON HOLD
Start: 2021-01-01 | End: 2022-01-01

## 2021-01-01 RX ORDER — AMOXICILLIN 250 MG
1 CAPSULE ORAL 2 TIMES DAILY PRN
Status: DISCONTINUED | OUTPATIENT
Start: 2021-01-01 | End: 2021-01-01 | Stop reason: HOSPADM

## 2021-01-01 RX ORDER — ALBUTEROL SULFATE 0.83 MG/ML
2.5 SOLUTION RESPIRATORY (INHALATION)
Status: DISCONTINUED | OUTPATIENT
Start: 2021-01-01 | End: 2021-01-01 | Stop reason: HOSPADM

## 2021-01-01 RX ORDER — POLYETHYLENE GLYCOL 3350 17 G/17G
17 POWDER, FOR SOLUTION ORAL DAILY PRN
Status: DISCONTINUED | OUTPATIENT
Start: 2021-01-01 | End: 2021-01-01 | Stop reason: HOSPADM

## 2021-01-01 RX ORDER — CEFAZOLIN SODIUM 2 G/100ML
2 INJECTION, SOLUTION INTRAVENOUS WEEKLY
Status: DISCONTINUED | OUTPATIENT
Start: 2021-01-01 | End: 2021-01-01 | Stop reason: HOSPADM

## 2021-01-01 RX ORDER — HYDROXYZINE HYDROCHLORIDE 50 MG/1
50 TABLET, FILM COATED ORAL EVERY 6 HOURS PRN
Status: DISCONTINUED | OUTPATIENT
Start: 2021-01-01 | End: 2021-01-01 | Stop reason: HOSPADM

## 2021-01-01 RX ORDER — NALOXONE HYDROCHLORIDE 0.4 MG/ML
0.2 INJECTION, SOLUTION INTRAMUSCULAR; INTRAVENOUS; SUBCUTANEOUS
Status: DISCONTINUED | OUTPATIENT
Start: 2021-01-01 | End: 2021-01-01 | Stop reason: HOSPADM

## 2021-01-01 RX ORDER — DILTIAZEM HYDROCHLORIDE 180 MG/1
180 CAPSULE, EXTENDED RELEASE ORAL 2 TIMES DAILY
Qty: 60 CAPSULE | Refills: 11 | Status: ON HOLD | OUTPATIENT
Start: 2021-01-01 | End: 2022-01-01

## 2021-01-01 RX ADMIN — CLONIDINE HYDROCHLORIDE 0.1 MG: 0.1 TABLET ORAL at 22:00

## 2021-01-01 RX ADMIN — Medication 3 G: at 21:09

## 2021-01-01 RX ADMIN — SODIUM CHLORIDE 250 ML: 9 INJECTION, SOLUTION INTRAVENOUS at 13:19

## 2021-01-01 RX ADMIN — DILTIAZEM HYDROCHLORIDE 180 MG: 180 CAPSULE, COATED, EXTENDED RELEASE ORAL at 16:45

## 2021-01-01 RX ADMIN — OXYCODONE HYDROCHLORIDE 5 MG: 5 TABLET ORAL at 04:15

## 2021-01-01 RX ADMIN — CEFAZOLIN SODIUM 2 G: 2 INJECTION, SOLUTION INTRAVENOUS at 10:24

## 2021-01-01 RX ADMIN — DILTIAZEM HYDROCHLORIDE 180 MG: 180 CAPSULE, COATED, EXTENDED RELEASE ORAL at 22:00

## 2021-01-01 RX ADMIN — CARVEDILOL 25 MG: 12.5 TABLET, FILM COATED ORAL at 09:29

## 2021-01-01 RX ADMIN — SODIUM CHLORIDE 300 ML: 9 INJECTION, SOLUTION INTRAVENOUS at 11:52

## 2021-01-01 RX ADMIN — DORNASE ALFA 5 MG: 1 SOLUTION RESPIRATORY (INHALATION) at 11:46

## 2021-01-01 RX ADMIN — CALCIUM ACETATE 667 MG: 667 CAPSULE ORAL at 12:40

## 2021-01-01 RX ADMIN — CALCIUM ACETATE 667 MG: 667 CAPSULE ORAL at 09:01

## 2021-01-01 RX ADMIN — ZOLPIDEM TARTRATE 5 MG: 5 TABLET ORAL at 22:59

## 2021-01-01 RX ADMIN — CALCIUM CARBONATE (ANTACID) CHEW TAB 500 MG 500 MG: 500 CHEW TAB at 16:38

## 2021-01-01 RX ADMIN — CALCIUM ACETATE 667 MG: 667 CAPSULE ORAL at 17:02

## 2021-01-01 RX ADMIN — DORNASE ALFA 5 MG: 1 SOLUTION RESPIRATORY (INHALATION) at 23:21

## 2021-01-01 RX ADMIN — ACETAMINOPHEN 650 MG: 325 TABLET ORAL at 22:32

## 2021-01-01 RX ADMIN — ACETAMINOPHEN 650 MG: 325 TABLET ORAL at 09:21

## 2021-01-01 RX ADMIN — DORNASE ALFA 5 MG: 1 SOLUTION RESPIRATORY (INHALATION) at 01:14

## 2021-01-01 RX ADMIN — ACETAMINOPHEN 650 MG: 325 TABLET ORAL at 22:59

## 2021-01-01 RX ADMIN — DILTIAZEM HYDROCHLORIDE 180 MG: 180 CAPSULE, COATED, EXTENDED RELEASE ORAL at 08:47

## 2021-01-01 RX ADMIN — CALCIUM ACETATE 667 MG: 667 CAPSULE ORAL at 18:37

## 2021-01-01 RX ADMIN — CARVEDILOL 25 MG: 12.5 TABLET, FILM COATED ORAL at 20:43

## 2021-01-01 RX ADMIN — ZOLPIDEM TARTRATE 5 MG: 5 TABLET ORAL at 22:32

## 2021-01-01 RX ADMIN — ACETAMINOPHEN 650 MG: 325 TABLET ORAL at 22:58

## 2021-01-01 RX ADMIN — OXYCODONE HYDROCHLORIDE 5 MG: 5 TABLET ORAL at 02:47

## 2021-01-01 RX ADMIN — CARVEDILOL 25 MG: 12.5 TABLET, FILM COATED ORAL at 21:59

## 2021-01-01 RX ADMIN — ZOLPIDEM TARTRATE 5 MG: 5 TABLET ORAL at 22:58

## 2021-01-01 RX ADMIN — SODIUM CHLORIDE 250 ML: 9 INJECTION, SOLUTION INTRAVENOUS at 10:23

## 2021-01-01 RX ADMIN — ACETAMINOPHEN 650 MG: 325 TABLET ORAL at 09:36

## 2021-01-01 RX ADMIN — CALCIUM ACETATE 667 MG: 667 CAPSULE ORAL at 09:25

## 2021-01-01 RX ADMIN — CALCIUM ACETATE 667 MG: 667 CAPSULE ORAL at 19:21

## 2021-01-01 RX ADMIN — DORNASE ALFA 5 MG: 1 SOLUTION RESPIRATORY (INHALATION) at 11:06

## 2021-01-01 RX ADMIN — CEFAZOLIN SODIUM 2 G: 2 INJECTION, SOLUTION INTRAVENOUS at 20:28

## 2021-01-01 RX ADMIN — ZOLPIDEM TARTRATE 5 MG: 5 TABLET ORAL at 23:01

## 2021-01-01 RX ADMIN — CEFAZOLIN SODIUM 2 G: 2 INJECTION, SOLUTION INTRAVENOUS at 13:20

## 2021-01-01 RX ADMIN — CALCIUM ACETATE 667 MG: 667 CAPSULE ORAL at 12:34

## 2021-01-01 RX ADMIN — ACETAMINOPHEN 650 MG: 325 TABLET ORAL at 23:02

## 2021-01-01 RX ADMIN — ACETAMINOPHEN 650 MG: 325 TABLET ORAL at 11:20

## 2021-01-01 RX ADMIN — MELATONIN TAB 3 MG 3 MG: 3 TAB at 00:13

## 2021-01-01 RX ADMIN — OXYCODONE HYDROCHLORIDE 5 MG: 5 TABLET ORAL at 02:03

## 2021-01-01 RX ADMIN — CALCIUM CARBONATE (ANTACID) CHEW TAB 500 MG 500 MG: 500 CHEW TAB at 22:14

## 2021-01-01 RX ADMIN — HYDROXYZINE HYDROCHLORIDE 25 MG: 25 TABLET, FILM COATED ORAL at 00:46

## 2021-01-01 RX ADMIN — CEFAZOLIN SODIUM 2 G: 2 INJECTION, SOLUTION INTRAVENOUS at 10:50

## 2021-01-01 RX ADMIN — CARVEDILOL 25 MG: 12.5 TABLET, FILM COATED ORAL at 08:47

## 2021-01-01 RX ADMIN — CALCIUM ACETATE 667 MG: 667 CAPSULE ORAL at 18:24

## 2021-01-01 RX ADMIN — CALCIUM CARBONATE (ANTACID) CHEW TAB 500 MG 500 MG: 500 CHEW TAB at 09:38

## 2021-01-01 RX ADMIN — CALCIUM CARBONATE (ANTACID) CHEW TAB 500 MG 500 MG: 500 CHEW TAB at 09:45

## 2021-01-01 RX ADMIN — DORNASE ALFA 5 MG: 1 SOLUTION RESPIRATORY (INHALATION) at 12:24

## 2021-01-01 RX ADMIN — MELATONIN TAB 3 MG 3 MG: 3 TAB at 22:59

## 2021-01-01 RX ADMIN — CEFAZOLIN SODIUM 2 G: 2 INJECTION, SOLUTION INTRAVENOUS at 13:03

## 2021-01-01 RX ADMIN — PANTOPRAZOLE SODIUM 40 MG: 20 TABLET, DELAYED RELEASE ORAL at 09:36

## 2021-01-01 RX ADMIN — PANTOPRAZOLE SODIUM 40 MG: 20 TABLET, DELAYED RELEASE ORAL at 09:02

## 2021-01-01 RX ADMIN — PANTOPRAZOLE SODIUM 40 MG: 20 TABLET, DELAYED RELEASE ORAL at 08:47

## 2021-01-01 RX ADMIN — MELATONIN TAB 3 MG 3 MG: 3 TAB at 00:46

## 2021-01-01 RX ADMIN — OXYCODONE HYDROCHLORIDE 5 MG: 5 TABLET ORAL at 22:32

## 2021-01-01 RX ADMIN — DORNASE ALFA 50 ML: 1 SOLUTION RESPIRATORY (INHALATION) at 21:24

## 2021-01-01 RX ADMIN — PANTOPRAZOLE SODIUM 40 MG: 20 TABLET, DELAYED RELEASE ORAL at 09:26

## 2021-01-01 RX ADMIN — HYDROXYZINE HYDROCHLORIDE 25 MG: 25 TABLET, FILM COATED ORAL at 14:55

## 2021-01-01 RX ADMIN — PANTOPRAZOLE SODIUM 40 MG: 20 TABLET, DELAYED RELEASE ORAL at 12:41

## 2021-01-01 RX ADMIN — CALCIUM ACETATE 667 MG: 667 CAPSULE ORAL at 17:00

## 2021-01-01 RX ADMIN — ZOLPIDEM TARTRATE 5 MG: 5 TABLET ORAL at 21:59

## 2021-01-01 RX ADMIN — ENTECAVIR 0.5 MG: 0.5 TABLET ORAL at 22:59

## 2021-01-01 RX ADMIN — OXYCODONE HYDROCHLORIDE 5 MG: 5 TABLET ORAL at 11:21

## 2021-01-01 RX ADMIN — CALCIUM ACETATE 667 MG: 667 CAPSULE ORAL at 08:47

## 2021-01-01 RX ADMIN — SENNOSIDES, DOCUSATE SODIUM 1 TABLET: 8.6; 5 TABLET ORAL at 22:01

## 2021-01-01 RX ADMIN — ALBUMIN HUMAN 50 ML: 0.25 SOLUTION INTRAVENOUS at 11:52

## 2021-01-01 RX ADMIN — CALCIUM ACETATE 667 MG: 667 CAPSULE ORAL at 12:45

## 2021-01-01 RX ADMIN — ACETAMINOPHEN 325 MG: 325 TABLET ORAL at 02:03

## 2021-01-01 RX ADMIN — DICLOFENAC SODIUM 2 G: 10 GEL TOPICAL at 12:35

## 2021-01-01 RX ADMIN — ACETAMINOPHEN 650 MG: 325 TABLET ORAL at 13:49

## 2021-01-01 RX ADMIN — OXYCODONE HYDROCHLORIDE 5 MG: 5 TABLET ORAL at 22:59

## 2021-01-01 RX ADMIN — CALCIUM ACETATE 667 MG: 667 CAPSULE ORAL at 09:37

## 2021-01-01 RX ADMIN — CALCIUM CARBONATE (ANTACID) CHEW TAB 500 MG 500 MG: 500 CHEW TAB at 20:28

## 2021-01-01 ASSESSMENT — ACTIVITIES OF DAILY LIVING (ADL)
ADLS_ACUITY_SCORE: 9
ADLS_ACUITY_SCORE: 5
ADLS_ACUITY_SCORE: 9
ADLS_ACUITY_SCORE: 5
ADLS_ACUITY_SCORE: 9
ADLS_ACUITY_SCORE: 9
ADLS_ACUITY_SCORE: 5
ADLS_ACUITY_SCORE: 5
ADLS_ACUITY_SCORE: 9
ADLS_ACUITY_SCORE: 9
ADLS_ACUITY_SCORE: 5
ADLS_ACUITY_SCORE: 9
ADLS_ACUITY_SCORE: 9
ADLS_ACUITY_SCORE: 5
ADLS_ACUITY_SCORE: 9
ADLS_ACUITY_SCORE: 7
ADLS_ACUITY_SCORE: 9
ADLS_ACUITY_SCORE: 5
ADLS_ACUITY_SCORE: 9
ADLS_ACUITY_SCORE: 16
ADLS_ACUITY_SCORE: 16
ADLS_ACUITY_SCORE: 9
ADLS_ACUITY_SCORE: 16
ADLS_ACUITY_SCORE: 9
ADLS_ACUITY_SCORE: 16
ADLS_ACUITY_SCORE: 16
ADLS_ACUITY_SCORE: 9
ADLS_ACUITY_SCORE: 5
ADLS_ACUITY_SCORE: 5
ADLS_ACUITY_SCORE: 9
ADLS_ACUITY_SCORE: 5
ADLS_ACUITY_SCORE: 7
ADLS_ACUITY_SCORE: 5
ADLS_ACUITY_SCORE: 16
ADLS_ACUITY_SCORE: 9
ADLS_ACUITY_SCORE: 7
ADLS_ACUITY_SCORE: 16
ADLS_ACUITY_SCORE: 9
ADLS_ACUITY_SCORE: 9
ADLS_ACUITY_SCORE: 7
ADLS_ACUITY_SCORE: 9
ADLS_ACUITY_SCORE: 7
ADLS_ACUITY_SCORE: 16
ADLS_ACUITY_SCORE: 9
ADLS_ACUITY_SCORE: 7
ADLS_ACUITY_SCORE: 9
ADLS_ACUITY_SCORE: 16
ADLS_ACUITY_SCORE: 9
ADLS_ACUITY_SCORE: 5
ADLS_ACUITY_SCORE: 9
ADLS_ACUITY_SCORE: 5
ADLS_ACUITY_SCORE: 9
ADLS_ACUITY_SCORE: 16
ADLS_ACUITY_SCORE: 9
ADLS_ACUITY_SCORE: 5
ADLS_ACUITY_SCORE: 16
ADLS_ACUITY_SCORE: 5
ADLS_ACUITY_SCORE: 9
ADLS_ACUITY_SCORE: 16
ADLS_ACUITY_SCORE: 9
ADLS_ACUITY_SCORE: 7
ADLS_ACUITY_SCORE: 9
ADLS_ACUITY_SCORE: 9
ADLS_ACUITY_SCORE: 7
ADLS_ACUITY_SCORE: 16
ADLS_ACUITY_SCORE: 9
ADLS_ACUITY_SCORE: 9
ADLS_ACUITY_SCORE: 7
ADLS_ACUITY_SCORE: 9
ADLS_ACUITY_SCORE: 7
ADLS_ACUITY_SCORE: 5
ADLS_ACUITY_SCORE: 9

## 2021-01-01 ASSESSMENT — PAIN SCALES - GENERAL: PAINLEVEL: NO PAIN (0)

## 2021-01-01 ASSESSMENT — MIFFLIN-ST. JEOR
SCORE: 1324.73
SCORE: 1303.87
SCORE: 1304.77
SCORE: 1345.52
SCORE: 1314.88
SCORE: 1324.88
SCORE: 1280.28
SCORE: 1335.16
SCORE: 1335.16
SCORE: 1322.03
SCORE: 1314.88

## 2021-01-06 ENCOUNTER — TELEPHONE (OUTPATIENT)
Dept: FAMILY MEDICINE | Facility: CLINIC | Age: 61
End: 2021-01-06

## 2021-01-06 DIAGNOSIS — I10 ESSENTIAL HYPERTENSION: ICD-10-CM

## 2021-01-06 NOTE — TELEPHONE ENCOUNTER
Called pharmacy and was advised patient requested a specific brand which pharmacy had to special order. Medication will not arrive until tomorrow. Called patient and advised, patient verbalized understanding. Romain MELENDEZ

## 2021-01-06 NOTE — TELEPHONE ENCOUNTER
[facility] phone call message- medication clarification/question:    Full Medication Name:diltiazem ER (TIAZAC) 180 MG 24 hr ER beaded capsule       Question: Patient state receiving a text message from pharmacy that this medication is denied from PCP for refill. Patient was inform last week that pharmacy would reach out to clinic for refill and wants to know why this medication is being denied when he needs this medication daily. Advise this medication was sent on 12/08 with 11 refill- to last a whole year. Patient would like clinic to reach out to pharmacy and see why they are saying something else.     Pharmacy confirmed as [id number] - [place], [place] - [address]: Yes    OK to leave a message on voice mail? Yes    Primary language: English      needed? No    Call taken on January 6, 2021 at 2:46 PM by Kenny Nair

## 2021-01-11 ENCOUNTER — TRANSFERRED RECORDS (OUTPATIENT)
Dept: HEALTH INFORMATION MANAGEMENT | Facility: CLINIC | Age: 61
End: 2021-01-11

## 2021-01-18 RX ORDER — DILTIAZEM HYDROCHLORIDE 180 MG/1
180 CAPSULE, EXTENDED RELEASE ORAL 2 TIMES DAILY
Qty: 60 CAPSULE | Refills: 11 | Status: ON HOLD | OUTPATIENT
Start: 2021-01-18 | End: 2021-10-26

## 2021-01-18 NOTE — TELEPHONE ENCOUNTER
Triage received a call from patient. Voiced [facility] still has not received response back from Dr Flores. He was informed by [facility] they had faxed to clinic request for new rx for Diltiazem. Review of below action was completed and sent to [facility].  Update: patient made contact with his insurance as 3 months cost of rx at [facility] was $130 and from [facility] was $60.     Requesting clinic call [facility] [phone]. I called, spoke with representative who will refax script request for physician's signature. Patient informed me he has about 7 days left.  He was informed by [facility] he should receive his shipment of medication in 7 days once they receive rx from MD. Will await fax from [facility].  Ze MELENDEZ

## 2021-01-18 NOTE — TELEPHONE ENCOUNTER
Now 15:55pm still no received fax from [facility]. For fulfillment per Dr Flores's approval 12/8/2020, sent via e-prescribed Diltiazem 180 mg (Tiazac) ER prescription to [facility]. Ze MELENDEZ    Patient informed of update and action completed. Ze MELENDEZ

## 2021-02-04 ENCOUNTER — OFFICE VISIT (OUTPATIENT)
Dept: FAMILY MEDICINE | Facility: CLINIC | Age: 61
End: 2021-02-04
Payer: COMMERCIAL

## 2021-02-04 VITALS
WEIGHT: 131 LBS | SYSTOLIC BLOOD PRESSURE: 139 MMHG | HEIGHT: 63 IN | BODY MASS INDEX: 23.21 KG/M2 | TEMPERATURE: 97.7 F | OXYGEN SATURATION: 98 % | RESPIRATION RATE: 18 BRPM | HEART RATE: 65 BPM | DIASTOLIC BLOOD PRESSURE: 78 MMHG

## 2021-02-04 DIAGNOSIS — R18.8 CIRRHOSIS OF LIVER WITH ASCITES, UNSPECIFIED HEPATIC CIRRHOSIS TYPE (H): ICD-10-CM

## 2021-02-04 DIAGNOSIS — I10 ESSENTIAL HYPERTENSION: ICD-10-CM

## 2021-02-04 DIAGNOSIS — D63.1 ANEMIA DUE TO STAGE 4 CHRONIC KIDNEY DISEASE (H): Primary | ICD-10-CM

## 2021-02-04 DIAGNOSIS — D69.59 THROMBOCYTOPENIA DUE TO HYPERSPLENISM: ICD-10-CM

## 2021-02-04 DIAGNOSIS — N18.4 ANEMIA DUE TO STAGE 4 CHRONIC KIDNEY DISEASE (H): Primary | ICD-10-CM

## 2021-02-04 DIAGNOSIS — D73.1 THROMBOCYTOPENIA DUE TO HYPERSPLENISM: ICD-10-CM

## 2021-02-04 DIAGNOSIS — K74.60 CIRRHOSIS OF LIVER WITH ASCITES, UNSPECIFIED HEPATIC CIRRHOSIS TYPE (H): ICD-10-CM

## 2021-02-04 PROCEDURE — 99214 OFFICE O/P EST MOD 30 MIN: CPT | Performed by: FAMILY MEDICINE

## 2021-02-04 ASSESSMENT — MIFFLIN-ST. JEOR: SCORE: 1299.34

## 2021-02-04 NOTE — PROGRESS NOTES
A/P:  (N18.4,  D63.1) Anemia due to stage 4 chronic kidney disease (H)  (primary encounter diagnosis)  Comment: cont IV iron; appreciate nephrology input  Plan: stable; has appt next week w/ nephrology to consider eval for renal transplant; will get repeat cbc, cmp at nephrology office. Order written    (K74.60,  R18.8) Cirrhosis of liver with ascites, unspecified hepatic cirrhosis type (H)  Comment:   Plan: stable on entecavir, diuretics prn    (D69.59) Thrombocytopenia due to hypersplenism  Comment:   Plan: repeat labs at nephrology    (I10) Essential hypertension  Comment:   Plan: improved w/ clonidine; f/u in 4-6 weeks    Jaswant Flores MD  February 4, 2021  1:30 PM      Pt is a 60 year old male last seen on 12/30/20 here today for:     1) CKD - was seen by Dr Clement     2) anemia - getting IV iron and tx for anemia weekly     3) Cirrhosis - stable; denies N/V. Denies inc abd distention or abd pain.     4) HTN - greatly improved w/ clonidine addition at last visit  BP Readings from Last 6 Encounters:   02/04/21 139/78   12/30/20 (!) 172/92   12/03/20 (!) 148/81   11/04/20 134/78   10/21/20 (!) 145/82   09/23/20 129/76         Past Medical History:   Diagnosis Date     H. pylori infection 7/5/2017    UGI bleed implied by Hgb 9.0 6/22/17 and melena. Admitted and hgb decreased to 7.6, CT abdomen showed all bladder wall thickening. + Hep B surface antigen noted. Melena resolved and hgb stabalized without transfusion, epigastric pain resolved with PPI. Recommend referral for gastroscopy.     Hepatitis B       Past Surgical History:   Procedure Laterality Date     INCISION AND DRAINAGE FINGER, COMBINED Left 10/20/2016    Procedure: COMBINED INCISION AND DRAINAGE FINGER;  Surgeon: Mireya Pizano MD;  Location: [facility]      Current Outpatient Medications   Medication Sig Dispense Refill     carvedilol (COREG) 25 MG tablet Take 2 tablets (50 mg) by mouth 2 times daily (with meals) 60 tablet 11     cilostazol (PLETAL) 50  "MG tablet Take 1 tablet (50 mg) by mouth 2 times daily 180 tablet 0     cloNIDine (CATAPRES) 0.1 MG tablet Take 1 tablet (0.1 mg) by mouth At Bedtime 30 tablet 3     diltiazem ER (TIAZAC) 180 MG 24 hr ER beaded capsule Take 1 capsule (180 mg) by mouth 2 times daily 60 capsule 11     entecavir (BARACLUDE) 0.5 MG tablet        epoetin kelton (EPOGEN/PROCRIT) 71934 UNIT/ML injection Inject 40,000 Units Subcutaneous       ferrous sulfate (FE TABS) 325 (65 Fe) MG EC tablet Take 1 tablet (325 mg) by mouth daily       furosemide (LASIX) 40 MG tablet Take 1 tablet (40 mg) by mouth daily as needed (leg swelling) 30 tablet 11     omeprazole (PRILOSEC) 20 MG DR capsule Take 1 capsule (20 mg) by mouth 2 times daily 60 capsule 11     order for DME Equipment being ordered: Other: blood pressure monitor  Treatment Diagnosis: hypertension 1 each 0     polyethylene glycol (MIRALAX) 17 g packet Take 17 g by mouth daily as needed for constipation 10 packet 3     zolpidem (AMBIEN) 5 MG tablet Take 1 tablet (5 mg) by mouth nightly as needed for sleep 10 tablet 5      Allergies   Allergen Reactions     Nka [No Known Allergies]         ROS:   Gen- no weight change, no fevers/chills   Cardiac - no palpitations, no chest pain   Respiratory - no shortness of breath , no wheezing   GI - no nausea, no vomiting, no diarrhea, no constipation   Remainder of ROS negative.     Exam:   /78 (BP Location: Right arm, Patient Position: Sitting, Cuff Size: Child)   Pulse 65   Temp 97.7  F (36.5  C) (Oral)   Resp 18   Ht 1.6 m (5' 3\")   Wt 59.4 kg (131 lb)   SpO2 98%   BMI 23.21 kg/m     Alert and oriented x 3; No acute distress   Resp: clear to auscultation bilaterally, no wheezing/ronchi   CV: rate/rhythm regular, no murmurs/rubs/gallops   ABd: soft, distended, nontender   Extrem: no clubbing/cyanosis/edema   Neuro: no focal deficits   Derm: no rashes       "

## 2021-02-04 NOTE — LETTER
Dear Nephrology clinic staff -    Mr Elle Blanton is requesting to have all lab draws at one facility. I am requesting the following labs be drawn at your facility at his next visit:      1) comprehensive metabolic panel  2) complete blood count  3) Hep B viral load    Below are the diagnosis codes:    (N18.4,  D63.1) Anemia due to stage 4 chronic kidney disease (H)  (primary encounter diagnosis)  Comment:   Plan: CBC    (K74.60,  R18.8) Cirrhosis of liver with ascites, unspecified hepatic cirrhosis type (H)  Comment:   Plan: CMP, Hep B viral load    (D69.59) Thrombocytopenia due to hypersplenism  Comment:   Plan: CBC    (I10) Essential hypertension  Comment:   Plan: CBC, CMP        Sincerely,      Jaswant Flores MD  February 4, 2021  1:29 PM

## 2021-02-15 ENCOUNTER — TRANSFERRED RECORDS (OUTPATIENT)
Dept: HEALTH INFORMATION MANAGEMENT | Facility: CLINIC | Age: 61
End: 2021-02-15

## 2021-02-19 ENCOUNTER — TELEPHONE (OUTPATIENT)
Dept: FAMILY MEDICINE | Facility: CLINIC | Age: 61
End: 2021-02-19

## 2021-02-19 NOTE — TELEPHONE ENCOUNTER
[facility] phone call message- general phone call:    Reason for call: Azra is just calling to let  know that she is patient's nurse advocate and that she will be available for extra resource support if needed. Azra also states that she tried to help patient with case management but patient declined and does have her number if he decides to change his mind.     Return call needed: No    OK to leave a message on voice mail? Yes    Primary language: English      needed? No    Call taken on February 19, 2021 at 4:09 PM by Ana María Live

## 2021-03-24 ENCOUNTER — OFFICE VISIT (OUTPATIENT)
Dept: FAMILY MEDICINE | Facility: CLINIC | Age: 61
End: 2021-03-24
Payer: COMMERCIAL

## 2021-03-24 VITALS
WEIGHT: 132.2 LBS | TEMPERATURE: 97.5 F | HEART RATE: 63 BPM | SYSTOLIC BLOOD PRESSURE: 137 MMHG | RESPIRATION RATE: 18 BRPM | OXYGEN SATURATION: 98 % | BODY MASS INDEX: 21.24 KG/M2 | HEIGHT: 66 IN | DIASTOLIC BLOOD PRESSURE: 79 MMHG

## 2021-03-24 DIAGNOSIS — K74.60 CIRRHOSIS OF LIVER WITH ASCITES, UNSPECIFIED HEPATIC CIRRHOSIS TYPE (H): Primary | ICD-10-CM

## 2021-03-24 DIAGNOSIS — R18.8 CIRRHOSIS OF LIVER WITH ASCITES, UNSPECIFIED HEPATIC CIRRHOSIS TYPE (H): Primary | ICD-10-CM

## 2021-03-24 DIAGNOSIS — N18.4 ANEMIA DUE TO STAGE 4 CHRONIC KIDNEY DISEASE (H): ICD-10-CM

## 2021-03-24 DIAGNOSIS — N18.4 CKD (CHRONIC KIDNEY DISEASE) STAGE 4, GFR 15-29 ML/MIN (H): ICD-10-CM

## 2021-03-24 DIAGNOSIS — R60.9 EDEMA, UNSPECIFIED TYPE: ICD-10-CM

## 2021-03-24 DIAGNOSIS — Z71.89 ADVANCED DIRECTIVES, COUNSELING/DISCUSSION: ICD-10-CM

## 2021-03-24 DIAGNOSIS — D63.1 ANEMIA DUE TO STAGE 4 CHRONIC KIDNEY DISEASE (H): ICD-10-CM

## 2021-03-24 PROCEDURE — 99214 OFFICE O/P EST MOD 30 MIN: CPT | Performed by: FAMILY MEDICINE

## 2021-03-24 ASSESSMENT — MIFFLIN-ST. JEOR: SCORE: 1348.41

## 2021-03-24 NOTE — PROGRESS NOTES
ASSESSMENT/PLAN:    (K74.60,  R18.8) Cirrhosis of liver with ascites, unspecified hepatic cirrhosis type (H)  (primary encounter diagnosis)  Comment: pt concerned about ascites though exam is reassuring today; will check U/S before I would consider referring him for a paracentesis; he has an appointment next week to discuss liver transplant process  Plan: US Abdomen Complete          (N18.4) CKD (chronic kidney disease) stage 4, GFR 15-29 ml/min (H)  Comment:   Plan: stable kidney function; encouraged him to proceed w/ evaluation for dialysis as recommended by his nephrologist. No obligation to start but would be better to have knowledge before he needs this done    (R60.9) Edema, unspecified type  Comment:   Plan: minimal; explained that he should avoid lasix unless absolutely necessary per nephrology notes as he could risk hepatorenal syndrome    (N18.4,  D63.1) Anemia due to stage 4 chronic kidney disease (H)  Comment:   Plan: stable; receiving IV iron and platelets     (Z71.89) Advanced directives, counseling/discussion  Comment:   Plan: document reviewed, signed, and scanned today in the office    Jaswant Flores MD  March 24, 2021  8:51 AM        Pt is a 60 year old male last seen on 2/4/2021 here today for:     1) CKD - last Cr on 2/15/2021 was 4.1/ BUN 68; stable over past 3 months  Still resistant to starting process for dialysis, radha given stable kidney function  +swelling in feet     2) Hep B/ Cirrhosis - has appt on 3/29/21 to discuss eval for liver transplant   Has noted inc abd distention and periumbilical pain   No vomiting/diarrhea  No melena or hemetemesis  No fevers/chills    3) Heme - anemia and thrombocytopenia - stable labs    4) HTN - stable  No CP/SOB/palp  No presyncope    BP Readings from Last 6 Encounters:   03/24/21 137/79   02/04/21 139/78   12/30/20 (!) 172/92   12/03/20 (!) 148/81   11/04/20 134/78   10/21/20 (!) 145/82     5) advanced directive review -> reviewed his documentation with pt  and wife    Past Medical History:   Diagnosis Date     H. pylori infection 7/5/2017    UGI bleed implied by Hgb 9.0 6/22/17 and melena. Admitted and hgb decreased to 7.6, CT abdomen showed all bladder wall thickening. + Hep B surface antigen noted. Melena resolved and hgb stabalized without transfusion, epigastric pain resolved with PPI. Recommend referral for gastroscopy.     Hepatitis B       Past Surgical History:   Procedure Laterality Date     INCISION AND DRAINAGE FINGER, COMBINED Left 10/20/2016    Procedure: COMBINED INCISION AND DRAINAGE FINGER;  Surgeon: Mireya Pizano MD;  Location: [facility]      Current Outpatient Medications   Medication Sig Dispense Refill     carvedilol (COREG) 25 MG tablet Take 2 tablets (50 mg) by mouth 2 times daily (with meals) 60 tablet 11     cilostazol (PLETAL) 50 MG tablet Take 1 tablet (50 mg) by mouth 2 times daily 180 tablet 0     cloNIDine (CATAPRES) 0.1 MG tablet Take 1 tablet (0.1 mg) by mouth At Bedtime 30 tablet 3     diltiazem ER (TIAZAC) 180 MG 24 hr ER beaded capsule Take 1 capsule (180 mg) by mouth 2 times daily 60 capsule 11     entecavir (BARACLUDE) 0.5 MG tablet        epoetin kelton (EPOGEN/PROCRIT) 58425 UNIT/ML injection Inject 40,000 Units Subcutaneous       ferrous sulfate (FE TABS) 325 (65 Fe) MG EC tablet Take 1 tablet (325 mg) by mouth daily       furosemide (LASIX) 40 MG tablet Take 1 tablet (40 mg) by mouth daily as needed (leg swelling) 30 tablet 11     omeprazole (PRILOSEC) 20 MG DR capsule Take 1 capsule (20 mg) by mouth 2 times daily 60 capsule 11     order for DME Equipment being ordered: Other: blood pressure monitor  Treatment Diagnosis: hypertension 1 each 0     polyethylene glycol (MIRALAX) 17 g packet Take 17 g by mouth daily as needed for constipation 10 packet 3     zolpidem (AMBIEN) 5 MG tablet Take 1 tablet (5 mg) by mouth nightly as needed for sleep 10 tablet 5      Allergies   Allergen Reactions     Nka [No Known Allergies]      "    ROS:   Gen- no weight change, no fevers/chills   Head/ Eyes- no blurred vision, no headaches   ENT- no cough, no congestion, no URI symptoms   Cardiac - no palpitations, no chest pain   Respiratory - no shortness of breath , no wheezing   GI - no nausea, no vomiting, no diarrhea, no constipation   Urinary - no dysuria, no polyuria   Neuro - no numbness, no tingling   Remainder of ROS negative.     Exam:   /79 (BP Location: Right arm, Patient Position: Sitting, Cuff Size: Child)   Pulse 63   Temp 97.5  F (36.4  C) (Oral)   Resp 18   Ht 1.67 m (5' 5.75\")   Wt 60 kg (132 lb 3.2 oz)   SpO2 98%   BMI 21.50 kg/m     Alert and oriented x 3; No acute distress   ABd: soft, + bowel sounds, mild periumbilical tenderness/ mild distention, no rebound/guarding   Extrem: mild edema over dorsum of feet -> no edema at ankles or above  Neuro: no focal deficits   Derm: no rashes       "

## 2021-03-25 ENCOUNTER — ANCILLARY PROCEDURE (OUTPATIENT)
Dept: ULTRASOUND IMAGING | Facility: CLINIC | Age: 61
End: 2021-03-25
Attending: FAMILY MEDICINE
Payer: COMMERCIAL

## 2021-03-25 DIAGNOSIS — K74.60 CIRRHOSIS OF LIVER WITH ASCITES, UNSPECIFIED HEPATIC CIRRHOSIS TYPE (H): ICD-10-CM

## 2021-03-25 DIAGNOSIS — R18.8 CIRRHOSIS OF LIVER WITH ASCITES, UNSPECIFIED HEPATIC CIRRHOSIS TYPE (H): ICD-10-CM

## 2021-03-25 PROCEDURE — 76700 US EXAM ABDOM COMPLETE: CPT | Performed by: RADIOLOGY

## 2021-03-29 ENCOUNTER — TRANSFERRED RECORDS (OUTPATIENT)
Dept: HEALTH INFORMATION MANAGEMENT | Facility: CLINIC | Age: 61
End: 2021-03-29

## 2021-04-16 ASSESSMENT — MIFFLIN-ST. JEOR: SCORE: 1321.56

## 2021-04-17 ENCOUNTER — COMMUNICATION - HEALTHEAST (OUTPATIENT)
Dept: SCHEDULING | Facility: CLINIC | Age: 61
End: 2021-04-17

## 2021-04-17 ASSESSMENT — MIFFLIN-ST. JEOR: SCORE: 1338.34

## 2021-04-19 ASSESSMENT — MIFFLIN-ST. JEOR: SCORE: 1315.66

## 2021-04-20 ENCOUNTER — SURGERY - HEALTHEAST (OUTPATIENT)
Dept: SURGERY | Facility: HOSPITAL | Age: 61
End: 2021-04-20

## 2021-04-20 ENCOUNTER — ANESTHESIA - HEALTHEAST (OUTPATIENT)
Dept: SURGERY | Facility: HOSPITAL | Age: 61
End: 2021-04-20

## 2021-04-20 ASSESSMENT — MIFFLIN-ST. JEOR: SCORE: 1365.56

## 2021-04-21 DIAGNOSIS — I71.03 DISSECTION OF THORACOABDOMINAL AORTA (H): ICD-10-CM

## 2021-04-21 DIAGNOSIS — K74.60 CIRRHOSIS OF LIVER WITHOUT ASCITES, UNSPECIFIED HEPATIC CIRRHOSIS TYPE (H): ICD-10-CM

## 2021-04-21 DIAGNOSIS — I10 ESSENTIAL HYPERTENSION: ICD-10-CM

## 2021-04-21 DIAGNOSIS — B18.1 CHRONIC VIRAL HEPATITIS B WITHOUT DELTA AGENT AND WITHOUT COMA (H): ICD-10-CM

## 2021-04-21 DIAGNOSIS — D61.818 OTHER PANCYTOPENIA (H): ICD-10-CM

## 2021-04-21 DIAGNOSIS — N17.9 ACUTE KIDNEY INJURY (H): ICD-10-CM

## 2021-04-21 RX ORDER — CLONIDINE HYDROCHLORIDE 0.1 MG/1
0.1 TABLET ORAL AT BEDTIME
Qty: 30 TABLET | Refills: 11 | Status: ON HOLD | OUTPATIENT
Start: 2021-04-21 | End: 2021-10-26

## 2021-04-21 RX ORDER — CARVEDILOL 25 MG/1
50 TABLET ORAL 2 TIMES DAILY WITH MEALS
Qty: 60 TABLET | Refills: 11 | Status: SHIPPED | OUTPATIENT
Start: 2021-04-21 | End: 2021-07-26

## 2021-04-21 ASSESSMENT — MIFFLIN-ST. JEOR
SCORE: 1321.1
SCORE: 1321.1
SCORE: 1314.3

## 2021-04-22 ASSESSMENT — MIFFLIN-ST. JEOR: SCORE: 1318.83

## 2021-04-23 ASSESSMENT — MIFFLIN-ST. JEOR: SCORE: 1326.55

## 2021-04-25 ENCOUNTER — COMMUNICATION - HEALTHEAST (OUTPATIENT)
Dept: SCHEDULING | Facility: CLINIC | Age: 61
End: 2021-04-25

## 2021-04-26 ASSESSMENT — MIFFLIN-ST. JEOR
SCORE: 1314.3
SCORE: 1304.88

## 2021-04-27 ENCOUNTER — TELEPHONE (OUTPATIENT)
Dept: FAMILY MEDICINE | Facility: CLINIC | Age: 61
End: 2021-04-27

## 2021-04-28 ENCOUNTER — TELEPHONE (OUTPATIENT)
Dept: FAMILY MEDICINE | Facility: CLINIC | Age: 61
End: 2021-04-28

## 2021-04-28 NOTE — TELEPHONE ENCOUNTER
Date of discharge: 04/27/2021  Facility of discharge: [facility]  Patient concerns about condition: No concerns at this time.  Patient concerns about medications: No concerns at this time.  Full med reconciliation will be completed at clinic visit.  Patient concerns about transitioning: No concerns at this time.    Was the patient diagnosed with COVID while in the hospital? No    Clinic office visit appointment date: 04/29/2021  Patient reminded to bring all medications (prescription and over-the-counter) to clinic appointment: Yes

## 2021-04-28 NOTE — TELEPHONE ENCOUNTER
[facility] phone call message- general phone call:    Reason for call: Patient called stated that he was sched to go do dialysis some time today but his arms are still swollen from the hospital and would like Dr. Flores to call and give his second opinion on whether patient should go get dialysis or not. Patient may also have other questions that he would like Dr. Flores to call him.    Return call needed: Yes    OK to leave a message on voice mail? Yes    Primary language: English      needed? No    Call taken on April 28, 2021 at 8:51 AM by Jamie Blanton

## 2021-04-29 ENCOUNTER — OFFICE VISIT (OUTPATIENT)
Dept: FAMILY MEDICINE | Facility: CLINIC | Age: 61
End: 2021-04-29
Payer: COMMERCIAL

## 2021-04-29 VITALS
RESPIRATION RATE: 16 BRPM | WEIGHT: 129 LBS | DIASTOLIC BLOOD PRESSURE: 74 MMHG | BODY MASS INDEX: 20.98 KG/M2 | SYSTOLIC BLOOD PRESSURE: 118 MMHG | OXYGEN SATURATION: 99 % | HEART RATE: 71 BPM

## 2021-04-29 DIAGNOSIS — N18.4 ANEMIA DUE TO STAGE 4 CHRONIC KIDNEY DISEASE (H): ICD-10-CM

## 2021-04-29 DIAGNOSIS — N18.6 ESRD (END STAGE RENAL DISEASE) ON DIALYSIS (H): ICD-10-CM

## 2021-04-29 DIAGNOSIS — D63.1 ANEMIA DUE TO STAGE 4 CHRONIC KIDNEY DISEASE (H): ICD-10-CM

## 2021-04-29 DIAGNOSIS — R53.81 PHYSICAL DECONDITIONING: ICD-10-CM

## 2021-04-29 DIAGNOSIS — Z99.2 ESRD (END STAGE RENAL DISEASE) ON DIALYSIS (H): ICD-10-CM

## 2021-04-29 DIAGNOSIS — M79.89 LEFT ARM SWELLING: ICD-10-CM

## 2021-04-29 DIAGNOSIS — Z95.828 S/P ARTERIOVENOUS (AV) GRAFT PLACEMENT: ICD-10-CM

## 2021-04-29 DIAGNOSIS — E87.5 HYPERKALEMIA: Primary | ICD-10-CM

## 2021-04-29 LAB
ANION GAP SERPL CALCULATED.3IONS-SCNC: 12 MMOL/L (ref 5–18)
BUN SERPL-MCNC: 71 MG/DL (ref 8–22)
CALCIUM SERPL-MCNC: 7.9 MG/DL (ref 8.5–10.5)
CHLORIDE SERPL-SCNC: 99 MMOL/L (ref 98–107)
CO2 SERPL-SCNC: 23 MMOL/L (ref 22–31)
CREAT SERPL-MCNC: 5.13 MG/DL (ref 0.7–1.3)
ERYTHROCYTE [DISTWIDTH] IN BLOOD BY AUTOMATED COUNT: 16.1 % (ref 11–14.5)
GLUCOSE SERPL-MCNC: 111 MG/DL (ref 70–125)
HCT VFR BLD AUTO: 27.8 % (ref 40–54)
HGB BLD-MCNC: 8.9 G/DL (ref 14–18)
MCH RBC QN AUTO: 29.5 PG (ref 27–34)
MCHC RBC AUTO-ENTMCNC: 32 G/DL (ref 32–36)
MCV RBC AUTO: 92 FL (ref 80–100)
PLATELET # BLD AUTO: 80 THOU/UL (ref 140–440)
PMV BLD AUTO: 12.5 FL (ref 8.5–12.5)
POTASSIUM SERPL-SCNC: 4.7 MMOL/L (ref 3.5–5)
RBC # BLD AUTO: 3.02 MILL/UL (ref 4.4–6.2)
SODIUM SERPL-SCNC: 134 MMOL/L (ref 136–145)
WBC # BLD AUTO: 8.5 THOU/UL (ref 4–11)

## 2021-04-29 PROCEDURE — 99496 TRANSJ CARE MGMT HIGH F2F 7D: CPT | Performed by: FAMILY MEDICINE

## 2021-04-29 PROCEDURE — 36415 COLL VENOUS BLD VENIPUNCTURE: CPT | Performed by: FAMILY MEDICINE

## 2021-04-29 RX ORDER — CARVEDILOL 25 MG/1
12.5 TABLET ORAL 2 TIMES DAILY
COMMUNITY
Start: 2021-04-27 | End: 2021-08-18

## 2021-04-29 RX ORDER — ENTECAVIR 0.5 MG/1
0.5 TABLET, FILM COATED ORAL WEEKLY
COMMUNITY
Start: 2021-04-27 | End: 2021-08-18

## 2021-04-29 NOTE — PROGRESS NOTES
v  Hospitalization Follow-up Visit         HPI       Hospital Follow-up Visit:    Hospital:  [facility]   Date of Admission: 4/16/2021  Date of Discharge: 4/27/2021  Reason(s) for Admission: hyperkalemia, ESRD            Problems taking medications regularly:  None       Post Discharge Medication Reconciliation: discharge medications reconciled, continue medications without change.       Problems adhering to non-medication therapy:  None       Medications reviewed by: by myself.     Summary of hospitalization:  [facility] hospital discharge summary reviewed  Diagnostic Tests/Treatments reviewed.  Follow up needed: repeat labwork today  Other Healthcare Providers Involved in Patient s Care:         Homecare and Specialist appointment - needs to be scheduled  Update since discharge: stable. Persistent L arm swelling; did not go to dialysis yesterday  Plan of care communicated with patient and family               SUMMARY OF HOSPITAL COURSE:     Elle Blanton is a 60 y.o. male with PMH significant for ESRD, hep B cirrhosis, pancytopenia, initially admitted from nephrology clinic for hyperkalemia management and initiation of dialysis, found to have right hydropneumothorax s/p chest tube.      ESRD  Hemodialysis  Hyperkalemia (resolved)  Cr over the last year has been from 3.9-4.48. Potassium found to be elevated at nephrology clinic, sent to ED where it was 6.7 and asymptomatic with only mild CT interval lengthening on ECG.  Was actually still urinating several times per day.  Hyperkalemia resolved with shifting and diuretics.  Dialysis graft placed 4/20, started dialysis 4/20. Will continue on an MWF schedule at [facility] dialysis after discharge. This has been arranged by nephrology.  -Nephro, vascular surgery consult.  -Follow-up with PCP within 3 to 5 days. Recommend repeat BMP at follow-up appointment.  -Resume carvedilol, scaled dosing down to 12.5 mg two times a day per pharmacy recommendations and seeing as he had been  off of it for some time with stable BP and HR. Follow-up outpatient to determine whether he requires this to be titrated back up.  -Follow-up with nephrology and vascular surgery as recommended by their teams.  -Home RN/SW.     Left arm swelling  Access issue  Due to his recent procedure in the setting of severe liver disease.  The graft does appear to be working regardless of the swelling per vascular surgery and general surgery, and ultrasound confirmed this.  -Elevate arm to above the level of the heart as much as possible. Lymphedema wraps.  -Follow-up outpatient with vascular surgery as recommended by their team.  -Home RN.     Cirrhosis 2/2 chronic hepatitis B  Cirrhosis secondary to chronic hepatitis B confirmed via labs. Follows with [facility]. Takes entecavir for every 72 hours; last dose morning of 4/16. Denies any history of alcohol.  Recent easy bruising.  Last had ascites drained 3 years ago.  Physical exam on admission positive for jaundice and some scleral icterus, hepatomegaly, bruising on the extremities.  Labs on admission significant for INR elevated to 1.68, platelets very low in 30-40k range.  MELD-Na score of 27 at that time.  -Continue entecavir.  Reduced dosing to weekly seeing as he is starting dialysis.  -No more than 2g tylenol prn daily.     R hydropneumothorax  Right pleural effusion   Right pleural effusion seen on admission CXR.  Thought most likely due to hypervolemia/cirrhosis.  Thoracentesis performed 4/18, felt like his breathing was better afterward.  Echo showed no pericardial effusion.  Follow-up CT showed right-sided hydropneumothorax.  Chest tube placed by IR 4/19 and remained in place until it was able to be removed 4/27. Pleural fluid cultures with no growth, thought that the etiology was most likely his ESRD/cirrhosis as there is no evidence of infection or malignancy.  -Chest tube removed.  -Dressing changes daily.     Diarrhea (resolved)  Had several episodes of foul smelling  loose stools. C diff ordered but diarrhea resolved so this was not collected and precautions were removed as it seems very unlikely that he has C. difficile with no diarrhea.     L-sided chest pain (resolved)  Episode of L-sided chest pain (contralateral to chest tube) this AM, described as poking sensation.  EKG without acute change.  Troponin negative.  No associated symptoms.  Resolved.  Low suspicion for ACS.     Anemia, normocytic, chronic  Thrombocytopenia, chronic  Hgb 7.9, MCV 99; baseline 6.6-7.9. Platelets remained at baseline this admission. Bruising throughout, no recent hx of bleeding. Due 2/2 ESRD and cirrhosis.            Review of Systems:   CONSTITUTIONAL: no fatigue, no unexpected change in weight  SKIN: L arm swelling - see HPI  RESP: no significant cough, no shortness of breath  CV: no chest pain, no palpitations, no new or worsening peripheral edema  GI: no nausea, no vomiting, no constipation, no diarrhea            Physical Exam:     Vitals:    04/29/21 1609   BP: 118/74   Pulse: 71   Resp: 16   SpO2: 99%   Weight: 58.5 kg (129 lb)     Body mass index is 20.98 kg/m .    GENERAL: healthy, alert, well nourished, well hydrated, no distress  RESP: lungs clear to auscultation - no rales, no rhonchi, no wheezes  CV: regular rates and rhythm, normal S1 S2, no S3 or S4 and no murmur, no click or rub -  ABDOMEN: soft, no tenderness, no  hepatosplenomegaly, no masses, normal bowel sounds         Results:     See discharge summary    Assessment and Plan      Elle was seen today for hospital f/u.    Diagnoses and all orders for this visit:    Hyperkalemia  -     Basic Metabolic Panel ([facility]) - Results < 1 hr  K today 4.5 (was 4.0 on 4/27/21); recommended he proceed w/ dialysis tomorrow despite the arm swelling as I would be concerned if he skipped yet another session; He needs to discuss w/ Dr Clement whether he can atttend dialysis on a reduced schedule    Anemia due to stage 4 chronic kidney disease (H)  -      CBC with Plt ([facility])   Stable labs today    Left arm swelling  -     VASCULAR SURGERY REFERRAL; Future   -  Will reach out to vascular clinic to get him evaluated again for his persistent arm swelling which worsens after dialysis. This is the reason he is reluctant to get dialysis    S/P arteriovenous (AV) graft placement  -     VASCULAR SURGERY REFERRAL; Future   see above        E&M code to be billed if TCM cannot be: 46615    Type of decision making: High complexity (29931)      Jaswant Flores MD  April 30, 2021  10:14 AM

## 2021-04-30 ENCOUNTER — TELEPHONE (OUTPATIENT)
Dept: FAMILY MEDICINE | Facility: CLINIC | Age: 61
End: 2021-04-30

## 2021-04-30 ENCOUNTER — COMMUNICATION - HEALTHEAST (OUTPATIENT)
Dept: VASCULAR SURGERY | Facility: CLINIC | Age: 61
End: 2021-04-30

## 2021-04-30 ENCOUNTER — AMBULATORY - HEALTHEAST (OUTPATIENT)
Dept: VASCULAR SURGERY | Facility: CLINIC | Age: 61
End: 2021-04-30

## 2021-04-30 DIAGNOSIS — N18.6 END STAGE RENAL DISEASE (H): ICD-10-CM

## 2021-04-30 NOTE — TELEPHONE ENCOUNTER
Patient is calling to speak to Dr. Flores, he states that he was seen yesterday and was told that Dr. Flores would reach out to his kidney doctor today and call him but he has not got a call from Dr. Flores yet? Please call and advise.

## 2021-04-30 NOTE — CONFIDENTIAL NOTE
Spoke w/ Dr Clement of nephrology regarding Elle's care. Explained that Elle is reluctant to continue dialysis if it is 3x/week and is concerned because of persistent L arm swelling. Dr Clement explained that Elle would be transferred to Dr Hill's care as he will oversee patient's dialysis tx. She also clarified that the plan per Dr Cosby is that the graft in the L arm is temporary and will be removed once the vein matures. I requested she reach out to Elle to clarify the plan as the patient could go on hospice and decline dialysis.     Jaswant Flores MD  April 30, 2021  10:53 AM

## 2021-04-30 NOTE — PATIENT INSTRUCTIONS
Referral for :     Vascular    LOCATION/PLACE/Provider :    [facility]   DATE & TIME :    May 13th at 11:15 am  PHONE :     [phone]  FAX :    [phone]  Appointment made by clinic staff/:    Moraima    Referral for :     Home Care   LOCATION/PLACE/Provider: [facility]     DATE & TIME :    Faxed referral, notes, med/dx list to location, they will call   PHONE :     [phone]  FAX :    [phone]  Appointment made by clinic staff/:    Moraima

## 2021-05-03 NOTE — TELEPHONE ENCOUNTER
I spoke w/ Dr Clement on 4/30 and my hope was that she would reach out to him. I left him a voicemail to describe our discussion and Dr Clement's thoughts regarding his care. I explained that Dr Hill would be taking over for Dr Clement.     I would like him to be seen in Dr Cosby's clinic as well for persistent arm swelling. Will send my message to referrals to see if we can get him seen soon. Thank you!    Jaswant Flores MD  May 3, 2021  11:21 AM

## 2021-05-06 ENCOUNTER — TELEPHONE (OUTPATIENT)
Dept: FAMILY MEDICINE | Facility: CLINIC | Age: 61
End: 2021-05-06

## 2021-05-06 DIAGNOSIS — K74.69 OTHER CIRRHOSIS OF LIVER (H): Primary | ICD-10-CM

## 2021-05-06 RX ORDER — HYDROMORPHONE HYDROCHLORIDE 2 MG/1
2 TABLET ORAL EVERY 6 HOURS PRN
Qty: 12 TABLET | Refills: 0 | Status: SHIPPED | OUTPATIENT
Start: 2021-05-06 | End: 2021-09-26

## 2021-05-06 NOTE — TELEPHONE ENCOUNTER
TCM APPOINTMENT FOLLOW UP    Language:Other: Hmong     Medication questions: yes:  Explain: Pt wanted to know if  can refill his pain medication.    Specialist follow up: no    Concerns since last visit: no    Next appointment at [facility]:No    Comments: Pt is doing fine.     @[facility]@ Yes   MARA Vance

## 2021-05-06 NOTE — TELEPHONE ENCOUNTER
Spoke to patient.    He has been doing dialysis 2x/week; Is hoping to decrease to one. Will continue with current plan of 2x/week. Plan is to see Dr Cosby next week about the arm swelling.  Will contact me after.    Refilled his discharge pain medication which he is using sparingly.    Jaswant Flores MD  May 6, 2021  10:52 AM

## 2021-05-07 ENCOUNTER — DOCUMENTATION ONLY (OUTPATIENT)
Dept: OTHER | Facility: CLINIC | Age: 61
End: 2021-05-07

## 2021-05-07 ENCOUNTER — AMBULATORY - HEALTHEAST (OUTPATIENT)
Dept: OTHER | Facility: CLINIC | Age: 61
End: 2021-05-07

## 2021-05-09 ENCOUNTER — MEDICAL CORRESPONDENCE (OUTPATIENT)
Dept: HEALTH INFORMATION MANAGEMENT | Facility: CLINIC | Age: 61
End: 2021-05-09

## 2021-05-10 ENCOUNTER — TELEPHONE (OUTPATIENT)
Dept: FAMILY MEDICINE | Facility: CLINIC | Age: 61
End: 2021-05-10

## 2021-05-10 NOTE — TELEPHONE ENCOUNTER
[facility] phone call message - order or referral request for patient:     Order or referral being requested: social work eval to assist with home care directives, additional resources, PCAs, home services. OT eval, cognitives, energy conservation, safely with activities and daily living and assistant equipment. Skilled nursing a week times one week, one skilled nursing a week times two weeks, and then 3 PRNs to assist with medication management, Cardiac and kidney assessment, and potential labs.     FYI: Wants the doctor to know that Patient was not taking his BP medication constantly, she states she is going to leave a message to his cardiology as well.       Additional Comments: Calling for verbal order above. Please call and advise.     OK to leave a message on voice mail? Yes    Primary language: English      needed? No    Call taken on May 10, 2021 at 12:19 PM by Garry Live

## 2021-05-10 NOTE — TELEPHONE ENCOUNTER
Called and left detailed VM for Connie providing verbal orders for those requested. Routing to PCP for review. Romain MELENDEZ

## 2021-05-11 NOTE — TELEPHONE ENCOUNTER
Noted. Approve of orders. Will follow-up w/ pt regarding medication compliance.    Jaswant Flores MD  May 11, 2021  12:54 PM

## 2021-05-13 ENCOUNTER — OFFICE VISIT - HEALTHEAST (OUTPATIENT)
Dept: VASCULAR SURGERY | Facility: CLINIC | Age: 61
End: 2021-05-13

## 2021-05-13 ENCOUNTER — RECORDS - HEALTHEAST (OUTPATIENT)
Dept: VASCULAR ULTRASOUND | Facility: CLINIC | Age: 61
End: 2021-05-13

## 2021-05-13 DIAGNOSIS — N18.6 END STAGE RENAL DISEASE (H): ICD-10-CM

## 2021-05-13 DIAGNOSIS — I71.03 THORACOABDOMINAL AORTIC DISSECTION (H): ICD-10-CM

## 2021-05-25 ENCOUNTER — TELEPHONE (OUTPATIENT)
Dept: FAMILY MEDICINE | Facility: CLINIC | Age: 61
End: 2021-05-25

## 2021-05-25 NOTE — TELEPHONE ENCOUNTER
[facility] phone call message - order or referral request for patient:     Order or referral being requested: Orders for social work for community resources.  And verbal orders to ok Khushi to see patient tomorrow sometime around 8 a.m      Additional Comments: Khushi is wanting the verbal ok to see patient as he is getting discharged and asked Khushi to see him in his home tomorrow sometime around 8 am, khushi would like a call back by today or tomorrow morning for verbal ok. Please call and advise     OK to leave a message on voice mail? Yes    Primary language: English      needed? No    Call taken on May 25, 2021 at 2:49 PM by Ana María Live

## 2021-05-25 NOTE — TELEPHONE ENCOUNTER
Thanks [facility]! Agree with plan.    Jaswant Flores MD  May 25, 2021  3:06 PM

## 2021-05-26 VITALS — SYSTOLIC BLOOD PRESSURE: 140 MMHG | HEART RATE: 74 BPM | DIASTOLIC BLOOD PRESSURE: 70 MMHG | RESPIRATION RATE: 17 BRPM

## 2021-05-26 VITALS
SYSTOLIC BLOOD PRESSURE: 124 MMHG | TEMPERATURE: 98.4 F | HEART RATE: 64 BPM | RESPIRATION RATE: 18 BRPM | DIASTOLIC BLOOD PRESSURE: 70 MMHG

## 2021-05-27 VITALS — RESPIRATION RATE: 17 BRPM | DIASTOLIC BLOOD PRESSURE: 80 MMHG | SYSTOLIC BLOOD PRESSURE: 140 MMHG | HEART RATE: 66 BPM

## 2021-05-28 DIAGNOSIS — Z53.9 DIAGNOSIS NOT YET DEFINED: Primary | ICD-10-CM

## 2021-05-31 VITALS — BODY MASS INDEX: 28.95 KG/M2 | WEIGHT: 170 LBS

## 2021-05-31 NOTE — TELEPHONE ENCOUNTER
Robyn called this morning stating this pt needs to be seen with Dr. Cosby in 4-6 weeks, with CT scan. Writer was able to get pt scheduled for a CT scan at [facility] on 9/18/19 at 0945 check in and NPO3. Once the scan is done, pt will go up to the [facility] [place] to see Dr. Cosby to get results. [facility] phone number provided. Message stated for Robyn to inform the pt we will call the pt the day prior to remind him of the appt.

## 2021-05-31 NOTE — TELEPHONE ENCOUNTER
Dr. Cosby discussed with Dr. Goldstein on regards to additional testing needed prior to patient coming to see Dr Cosby next month. After discussion, pt will not be doing a CTA or MRA with runoff due to patient's high creatinine level. Patient is also refusing to use contrast as well. Will order a Noncontrast CT chest/abd/pelvis instead along with US ABIs and US arterial legs bilateral.       Tried to call patient to update on new imaging orders but no answer. Unable to leave  too. Will have care coordinator schedule with patient.

## 2021-06-01 NOTE — PROGRESS NOTES
Elizabeth pt. Type B Aortic Dissection. Studies 9/12. Patient gets pain in bilateral thighs that goes up to lower back after walking 30-40 feet.

## 2021-06-01 NOTE — PROGRESS NOTES
VASCULAR SURGERY OUTPATIENT CONSULT OR VISIT   VASCULAR SURGEON: Roxana Cosby MD    LOCATION:  [facility]    Elle Blanton   Medical Record #:  [id number]  YOB: 1960  Age:  59 y.o.     Date of Service: 9/18/2019    PRIMARY CARE PROVIDER: Jaswant Flores MD      Reason for consultation: Follow-up of patient with known type B aortic dissection    IMPRESSION: Patient with what appears to be a stable subacute type B aortic dissection but was having fairly significant claudication symptoms that are referrable to the narrowness of his aortic lumen.  Very much work and lifestyle limiting.  Also having some difficulty with his blood pressure management as when his blood pressure goes too low he gets dizzy and weak.  Is only taking his amlodipine in the morning if his blood pressure is 135 or higher.  Is taking it most days because of this.  Appears to not fully understand that he has liver disease and ascites or thrombocytopenia.  In addition patient has background of fairly significant chronic renal insufficiency.    RECOMMENDATION: Patient has a lifelong serious condition of aortic dissection that will need lifelong tight blood pressure control and limitation in his physical exertion.  I do not think that he should exert himself at work at least in the near term and may be permanently.  Prior to considering what we can do to improve his blood flow to his legs which might involve aortic stenting, we need to better quantitate the risks of undergoing the procedure back to his thrombocytopenia and liver disease.  While we could do the procedure using intravascular ultrasound and very minimal contrast, there is still a very important risk of going into permanent renal failure and need for hemodialysis.  Alternative options of an axillobifemoral bypass graft may need to be considered if his symptoms are debilitating.  For now will arrange with the primary care physician for him to be seen by hematology  as well as hepatology.  I will see him back in 3 months time to evaluate where things  the meantime I will present his case at vascular conference to see if there is consensus about treating him sooner.    HPI:  Elle Blanton is a 59 y.o. male who was seen today in follow-up for his type B aortic dissection.  This was diagnosed in July of this year when he presented acutely with significant back discomfort.  He was transferred to the [facility] and managed conservatively.  During that hospitalization it was recognized that he had developed fairly significant thrombocytopenia, chronic renal dysfunction, and ascites.  This context he was felt to be very high risk for stent graft treatment.  He is also clearly high risk for successful outcome from conservative management however.  He did not develop signs of complicated type B dissection and was eventually discharged.  He had another presentation with abdominal discomfort and this was found to be related to the severity of his ascites.  Symptoms resolved after being tapped in the ER.    Most recently he was seen by his primary care provider for an overview of his progress.  They noted that he has very short distance claudication.  This was not present prior to his dissection.  The patient works at [employer] and tells me he cannot get from the parking lot at work into the building without having to stop.  He feels that he is essentially disabled from all of this.  His antihypertensive therapy which is essential for protection from aortic complication with this type B dissection is also very challenging for him to take.  If his blood pressure drops too low he feels very dizzy and is unable to move around.  He therefore is only taking his amlodipine if his blood pressure is over 135 mmHg systolic in the morning.  In general this keeps him running around 110-120 if he follows this routine.  Currently his quality of life is fairly poor in this context.   Patient is also still having symptoms of abdominal girth and discomfort.    He does not really seem to know everything that his been diagnosed for him and what his full evaluations and medical plan is.  He thinks that he has previously been diagnosed with hepatitis B.  He also thinks that he is taking a medication every 3 days to help treat him for this.  He thinks that he might of been is being by liver specialist but I cannot see notes pointing to this.  He has unaware of any further work-up for the thrombocytopenia recognized at the [facility].  He is also not had any work-up for his renal dysfunction.    Patient is currently on short-term disability.  Would like to have something done to improve his walking but he is very scared of undergoing a procedure that might be high risk and would rather be managed conservatively.    PHH:    Past Medical History:   Diagnosis Date     NAHUM (acute kidney injury) (H)      Normocytic anemia      PONV (postoperative nausea and vomiting)      Thrombocytopenia (H)         Past Surgical History:   Procedure Laterality Date     ABCESS DRAINAGE      finger     FOREIGN BODY REMOVAL      finger       ALLERGIES:  Patient has no known allergies.    MEDS:    Current Outpatient Medications:      ferrous sulfate 325 (65 FE) MG EC tablet, Take 325 mg by mouth daily., Disp: , Rfl:      polyethylene glycol (MIRALAX) 17 gram packet, Take 17 g by mouth daily as needed., Disp: , Rfl:      amLODIPine (NORVASC) 10 MG tablet, Take 10 mg by mouth daily., Disp: , Rfl: 11     carvedilol (COREG) 25 MG tablet, Take 2 tablets by mouth 2 (two) times a day., Disp: , Rfl: 11     diltiazem (CARDIZEM CD) 360 MG 24 hr capsule, Take 1 capsule by mouth daily., Disp: , Rfl:      entecavir (BARACLUDE) 0.5 MG tablet, Take 1 tablet by mouth every third day., Disp: , Rfl:     SOCIAL HABITS:    Social History     Tobacco Use   Smoking Status Never Smoker   Smokeless Tobacco Never Used       Social History      Substance and Sexual Activity   Alcohol Use No       Social History     Substance and Sexual Activity   Drug Use No       FAMILY HISTORY:    Family History   Problem Relation Age of Onset     Ulcers Father        REVIEW OF SYSTEMS:    A 12 point ROS was reviewed and except for what is listed in the HPI above, all others are negative    PE:  /70   Pulse 64   Temp 98.4  F (36.9  C)   Resp 18   Wt Readings from Last 1 Encounters:   07/28/19 168 lb (76.2 kg)     There is no height or weight on file to calculate BMI.    EXAM:  GENERAL: This is a well-developed 59 y.o. male who appears his stated age  EYES: Grossly normal.  MOUTH: Buccal mucosa normal   MUSCULOSKELETAL: Grossly normal and both lower extremities are intact.  HEME/LYMPH: No lymphedema  NEUROLOGIC: Focally intact, Alert and oriented x 3.   PSYCH: appropriate affect  INTEGUMENT: No open lesions or ulcers        DIAGNOSTIC STUDIES:     Images:  Ct Chest Abdomen Pelvis Without Oral Without Iv Contrast    Result Date: 9/12/2019  EXAM: CT CHEST ABDOMEN PELVIS WO ORAL WO IV CONTRAST LOCATION: [facility] DATE/TIME: 9/12/2019 11:38 AM INDICATION: Thoracoabdominal aortic dissection, follow up COMPARISON: CT angiogram of the chest, abdomen and pelvis dated 7/28/2019 TECHNIQUE: Helical thin-section CT scan of the chest, abdomen, and pelvis was performed without IV contrast. Multiplanar reformats were obtained. Dose reduction techniques were used. CONTRAST: None. FINDINGS: CHEST: Small bilateral pleural effusions. No focal consolidation. Small pericardial effusion. Coronary atherosclerosis. The ascending aorta measures 4.1 cm and the proximal descending thoracic aorta measures approximately 4.6 cm on this unenhanced study (previously 4.1 cm). A dissection flap is identified beginning in the proximal descending thoracic aorta, however, evaluation is limited without intravenous contrast.  ABDOMEN: Normal caliber descending abdominal aorta. Again,  evaluation is limited without intravenous contrast. Cholelithiasis. Moderate abdominopelvic ascites. Nodular hepatic contour. A few upper abdominal varices are identified. PELVIS: Scattered colonic diverticulosis. Prostate calcifications. MUSCULOSKELETAL: Negative.     CONCLUSION: 1.  Evaluation of vasculature is limited without intravenous contrast. Within this limitation, the mid ascending aorta measures 4.1 cm (previously 4.1 cm on 7/28/2019) and the proximal descending aorta measures 4.6 cm (previously 4.1 cm). A calcified dissection flap is identified beginning just distal to the takeoff of the left subclavian artery. 2.  Nodular hepatic contour, upper abdominal varices, moderate abdominal pelvic ascites and small bilateral pleural effusions suggest sequela of cirrhosis/portal hypertension.    Us Arterial Legs Bilateral Complete    Result Date: 9/18/2019  Arterial Duplex Ultrasound Lower Extremity Artery Evaluation Indication: Surveillance of known Aorta dissection. Rule out extension of the dissection. Previous: CT-09/12/2019 History: Hypertension and Claudication Technique: Duplex imaging is performed utilizing gray-scale, two-dimensional images, and color-flow imaging. Doppler waveform analysis and spectral Doppler imaging is also performed. LOWER EXTREMITY ARTERIAL DUPLEX EXAM WITH WAVEFORMS Right Leg:(cm/s) Location: Velocities Waveforms EIA:   68  B CFA:   66  B PFA:   47  B SFA Proximal:   49  B SFA Mid:   52  B SFA Distal:   55  B Popliteal Artery:   32  B PTA:   36   B THERESA:   32  B DPA:   33  B Waveforms: T=Triphasic, M=Monophasic, B=Biphasic Left Leg:(cm/s) Location: Velocities Waveforms EIA:   43  T CFA:   24  B PFA:   28  B SFA Proximal:   27  B SFA Mid:   27  B SFA Distal:   28  B Popliteal Artery:   20  B PTA:   16   B THERESA:   16  B DPA:   22  T Waveforms: T=Triphasic, M=Monophasic, B=Biphasic Comments: Intact lower extremity arteries. No dissections seen.  Impression: Right Lower Extremity:  Unusual bipeak morphology likely represents differential flow rates in patient with an aortic dissection.  No hematemesis significant stenosis identified. Left Lower Extremity: Waveforms, although still biphasic, are very different than in the right leg with more blunting of the second peak.  Again likely due for initial timing of the flow coming down to the groin from the false lumen in the true lumen, but here flow rates are clearly different than on the other side.  Once again no hemodynamically significant stenosis within the leg arteries identified. Reference: Category Normal 1-19% 20-49% 50-99% Occluded PSV <160 cm/sec without spectral broadening <160 cm/sec with spectral broadening Increased Increased Absent Flow Ratio N/A N/A < 2.0 >2.0 N/A Post-Stenotic Turbulence No No No Yes N/A     Us Arterial Pressures Legs Bilateral    Result Date: 9/18/2019  RESTING ANKLE-BRACHIAL INDICES (CASEY'S) AND SEGMENTAL PRESSURES Indication: Surveillance of known Aorta dissection. Rule out extension of the dissection. Previous: CT-09/12/2019 History: Hypertension and Claudication  SEGMENTAL ARTERIAL PRESSURE FINDINGS:         Right:  mmHg  Index    Brachial: 139   High Thigh: 140 1.01 Low Thigh: 126 0.91            Calf: 120 0.86 Ankle-(PT): 130 0.94 Ankle-(DP): 115 0.83          Digit: 53 0.38              Left: mmHg Index    Brachial: 134  High Thigh: 114 0.82 Low Thigh: 106 0.76            Calf: 106 0.76 Ankle (PT): 109 0.78 Ankle (DP): 111 0.80           Digit: 86 0.62 Resting ankle-brachial index of 0.94 on the right. Toe Pressures of 53 mmHg and TBI of 0.38. Resting ankle-brachial index of 0.80 on the left. Toe Pressures of 86 mmHg and TBI of 0.62. WAVEFORMS: The right dorsalis pedis and posterior tibial arteries show biphasic waveforms. The left dorsalis pedis and posterior tibial arteries show biphasic waveforms. Comments: Abnormal doppler waveforms.     1. RIGHT LOWER EXTREMITY: Normal ankle-brachial index of 0.94 with  mildly abnormal toe pressures of 53 mmHg.  Segmental pressures do not clearly identify any hemodynamically significant region of disease.  2. LEFT LOWER EXTREMITY: Abnormal ankle-brachial index of 0.80 with normal toe pressures of 86 mmHg.  Segmental pressures suggest inflow disease.     Us Aorta Ivc Iliac Non Graft Complete    Result Date: 9/18/2019  Aorta Ultrasound Indication: Surveillance of known Aorta dissection. Rule out extension of the dissection. Previous: CT-09/12/2019 History: Hypertension and Claudication Technique: Duplex imaging is performed utilizing gray-scale, two dimensional images, and color-flow imaging. Doppler waveform analysis and spectral Doppler imagine is also performed. Waveforms: Triphasic= T, Biphasic= B, Monophasic=M  Diameter (cm) AP X Width Velocities Waveform Proximal Aorta:   3.64 X 3.20 126  B Mid Aorta:   2.63 X 3.10 239  T Distal Aorta:   3.10 X 3.00 147  B Right EVIE:   1.40 X 1.70 181  B Left EVIE:   1.83 X 1.50 204  M Previous Dimensions:  NONE Location of Aneurysm:  Entire abdominal aorta. Aneurysm dimensions (AP x Width):   3.64 cm   X   3.20 cm Comments: Entire abdominal aorta along with left iliac artery appear to be dissected. The right iliac artery appears to be spared at this point. Incidental finding- abdominal ascites seen. Impressions: Clearly visualized dissection in the distal abdominal aorta and extending into the left common iliac artery. Reference: Category Normal Intermediate Severe Occluded  PSV  cm/s 151-300 cm/s <45 or >300cm/s Absent Flow Ratio <1.5 1.5-3.4 >3.4 N/A       I personally reviewed the images and my interpretation is that his noncontrast CT shows no significant increase in the diameter of his aorta.  The duplex strongly supports that he has inflow limitation from the aortic dissection and that this is the main cause of his claudication symptoms.  Noninvasive studies support this..    LABS:      Sodium   Date Value Ref Range Status    07/28/2019 139 136 - 145 mmol/L Final   06/24/2017 142 136 - 145 mmol/L Final   06/23/2017 141 136 - 145 mmol/L Final     Potassium   Date Value Ref Range Status   07/28/2019 4.4 3.5 - 5.0 mmol/L Final   06/24/2017 3.8 3.5 - 5.0 mmol/L Final   06/23/2017 3.9 3.5 - 5.0 mmol/L Final     Chloride   Date Value Ref Range Status   07/28/2019 112 (H) 98 - 107 mmol/L Final   06/24/2017 117 (H) 98 - 107 mmol/L Final   06/23/2017 116 (H) 98 - 107 mmol/L Final     BUN   Date Value Ref Range Status   07/28/2019 50 (H) 8 - 22 mg/dL Final   06/24/2017 29 (H) 8 - 22 mg/dL Final   06/23/2017 52 (H) 8 - 22 mg/dL Final     Creatinine   Date Value Ref Range Status   07/28/2019 4.03 (H) 0.70 - 1.30 mg/dL Final   06/24/2017 1.63 (H) 0.70 - 1.30 mg/dL Final   06/23/2017 1.76 (H) 0.70 - 1.30 mg/dL Final     Hemoglobin   Date Value Ref Range Status   07/28/2019 10.2 (L) 14.0 - 18.0 g/dL Final   06/24/2017 7.3 (L) 14.0 - 18.0 g/dL Final   06/23/2017 7.5 (L) 14.0 - 18.0 g/dL Final     Platelets   Date Value Ref Range Status   07/28/2019 51 (L) 140 - 440 thou/uL Final     Comment:     Slide reviewed, no clots seen.    06/24/2017 52 (L) 140 - 440 thou/uL Final   06/23/2017 58 (L) 140 - 440 thou/uL Final     INR   Date Value Ref Range Status   07/28/2019 1.11 (H) 0.90 - 1.10 Final   06/22/2017 1.07 0.90 - 1.10 Final       Total time spent 25 minutes face to face with patient with more than 50% time spent in counseling and coordination of care.    Roxana Cosby MD  VASCULAR SURGERY

## 2021-06-04 VITALS — BODY MASS INDEX: 22.9 KG/M2 | HEIGHT: 65 IN | WEIGHT: 137.44 LBS

## 2021-06-04 NOTE — TELEPHONE ENCOUNTER
Pharmacist, Nohemi, left vm that patient received prescription for cilostazol yesterday. After review with his other medications, there is a contraindication for cilostazol and diltiazem in combine use. Pharmacist is wondering if there is another option or dose adjustment that they should do instead.     Writer sent message to Dr. Cosby. Waiting for response.

## 2021-06-04 NOTE — PROGRESS NOTES
VASCULAR SURGERY OUTPATIENT CONSULT OR VISIT   VASCULAR SURGEON: Roxana Cosby MD    LOCATION:  [facility]    Elle Blanton   Medical Record #:  [id number]  YOB: 1960  Age:  59 y.o.     Date of Service: 12/18/2019    PRIMARY CARE PROVIDER: Jaswant Flores MD      Reason for consultation: Follow-up of known type B aortic dissection.    IMPRESSION: Patient with type B aortic dissection and severely narrowed true lumen causing extremely short distance claudication.  Patient would be very high risk for surgery given his baseline thrombocytopenia and renal dysfunction.  Clinically no change in extremely limited in his ability to walk and therefore he ability to work.  At this point seeking long-term disability but very hopeful that something can be done to change the situation.  Fairly poor management of his hypertension: Only taking his meds when his blood pressures greater than 135 systolic in the morning because he has symptoms of dizziness and worsening leg weakness when his pressure drops too low.  Is completely physiologically explainable and understandable.  On CTA there is no increase in the size of his aorta with maximal diameter 5 cm just beyond the left subclavian artery.    RECOMMENDATION: Patient with difficult situation with subacute to chronic type B aortic dissection and significant symptoms of short distance claudication.  That said would be very high surgical risk and we discussed him at vascular conference and all felt he was too high risk to warrant putting through a stent given his thrombocytopenia and renal dysfunction.  Recommended that he try to be more regular with his antihypertensive regimen and we will fully support his claims for chronic disability given that it is unlikely that his situation will change in the long run with respect to his need for long-term aggressive antihypertensive regimen or his inability to undergo improvement in his lower extremity blood flow.  I  have started him on Pletal to see if this will improve his claudication symptoms.  They are also willing to support any documentation his primary care provider might need back to applying him for disability handicap tags.  I will plan on seeing him back with a repeat CT with contrast in 6 months time.    HPI:  Elle Blanton is a 59 y.o. male who was seen today in follow-up for his type aortic dissection.  When I last saw him his major complaint was of extremely short distance claudication which seem very reasonable looking at how small his aortic true lumen was.  His baseline ABIs are nearly normal but dropped off precipitously when he tries walking.  He has not gone back to work understandably and he is very limited in his activity level.  He is also very judicious with his antihypertensive regimen as he finds that he becomes dizzy and his legs get worse when his blood pressure is lower.  He accepts to me that while he often has a blood pressure about 125 he occasionally does see it as high as 150.  We discussed that this was very dangerous from an aortic health standpoint.  Overall the patient is quite frustrated and is looking to long-term disability as well as having handicap tags to be able to park closer to work and locations of interest.  Patient will not I am fully supportive of anything we can do but his best option is to go through his primary care provider wintering can see documentation from us.    Discussed with him that we had discussed his case at vascular conference other specialists all agreed that given his thrombocytopenia and chronic renal dysfunction he would be very high risk to put through additional interventions to try to improve his blood flow to his legs.    No other new health issues.    PHH:    Past Medical History:   Diagnosis Date     NAHUM (acute kidney injury) (H)      Normocytic anemia      PONV (postoperative nausea and vomiting)      Thrombocytopenia (H)         Past Surgical History:    Procedure Laterality Date     ABCESS DRAINAGE      finger     FOREIGN BODY REMOVAL      finger       ALLERGIES:  Patient has no known allergies.    MEDS:    Current Outpatient Medications:      amLODIPine (NORVASC) 10 MG tablet, Take 10 mg by mouth daily., Disp: , Rfl: 11     carvedilol (COREG) 25 MG tablet, Take 2 tablets by mouth 2 (two) times a day., Disp: , Rfl: 11     diltiazem (CARDIZEM CD) 360 MG 24 hr capsule, Take 1 capsule by mouth daily., Disp: , Rfl:      ferrous sulfate 325 (65 FE) MG EC tablet, Take 325 mg by mouth daily., Disp: , Rfl:      polyethylene glycol (MIRALAX) 17 gram packet, Take 17 g by mouth daily as needed., Disp: , Rfl:     SOCIAL HABITS:    Social History     Tobacco Use   Smoking Status Never Smoker   Smokeless Tobacco Never Used       Social History     Substance and Sexual Activity   Alcohol Use No       Social History     Substance and Sexual Activity   Drug Use No       FAMILY HISTORY:    Family History   Problem Relation Age of Onset     Ulcers Father        REVIEW OF SYSTEMS:    A 12 point ROS was reviewed and except for what is listed in the HPI above, all others are negative    PE:  /70   Pulse 74   Resp 17   Wt Readings from Last 1 Encounters:   07/28/19 168 lb (76.2 kg)     There is no height or weight on file to calculate BMI.    EXAM:  GENERAL: This is a well-developed 59 y.o. male who appears his stated age  EYES: Grossly normal.  MOUTH: Buccal mucosa normal   MUSCULOSKELETAL: Grossly normal and both lower extremities are intact.  HEME/LYMPH: No lymphedema  NEUROLOGIC: Focally intact, Alert and oriented x 3.   PSYCH: appropriate affect  INTEGUMENT: No open lesions or ulcers        DIAGNOSTIC STUDIES:     Images:  No results found.    I personally reviewed the images and my interpretation is that his noncontrast CT scan shows no increase in the size of his.  It is 5 cm just beyond the left subclavian artery but that has been the same since he first presented with  aortic dissection.  His noninvasive studies show stable ABIs which are normal at rest but drop off and are associated with severe walking limitation on Nick Bach testing..    LABS:      Sodium   Date Value Ref Range Status   07/28/2019 139 136 - 145 mmol/L Final   06/24/2017 142 136 - 145 mmol/L Final   06/23/2017 141 136 - 145 mmol/L Final     Potassium   Date Value Ref Range Status   07/28/2019 4.4 3.5 - 5.0 mmol/L Final   06/24/2017 3.8 3.5 - 5.0 mmol/L Final   06/23/2017 3.9 3.5 - 5.0 mmol/L Final     Chloride   Date Value Ref Range Status   07/28/2019 112 (H) 98 - 107 mmol/L Final   06/24/2017 117 (H) 98 - 107 mmol/L Final   06/23/2017 116 (H) 98 - 107 mmol/L Final     BUN   Date Value Ref Range Status   07/28/2019 50 (H) 8 - 22 mg/dL Final   06/24/2017 29 (H) 8 - 22 mg/dL Final   06/23/2017 52 (H) 8 - 22 mg/dL Final     Creatinine   Date Value Ref Range Status   07/28/2019 4.03 (H) 0.70 - 1.30 mg/dL Final   06/24/2017 1.63 (H) 0.70 - 1.30 mg/dL Final   06/23/2017 1.76 (H) 0.70 - 1.30 mg/dL Final     Hemoglobin   Date Value Ref Range Status   07/28/2019 10.2 (L) 14.0 - 18.0 g/dL Final   06/24/2017 7.3 (L) 14.0 - 18.0 g/dL Final   06/23/2017 7.5 (L) 14.0 - 18.0 g/dL Final     Platelets   Date Value Ref Range Status   07/28/2019 51 (L) 140 - 440 thou/uL Final     Comment:     Slide reviewed, no clots seen.    06/24/2017 52 (L) 140 - 440 thou/uL Final   06/23/2017 58 (L) 140 - 440 thou/uL Final     INR   Date Value Ref Range Status   07/28/2019 1.11 (H) 0.90 - 1.10 Final   06/22/2017 1.07 0.90 - 1.10 Final       Total time spent 25 minutes face to face with patient with more than 50% time spent in counseling and coordination of care.    Roxana Cosby MD  VASCULAR SURGERY

## 2021-06-04 NOTE — TELEPHONE ENCOUNTER
Spoke to Nohemi. She states that the 2 medications are contraindicated due to diltiazem increasing the effects of cilostazol. Cilostazol can be changed to lower dose if wanted. Discussed with Dr. Echeverria and will have patient be on final dose of cilostazol 50mg two times a day after the normal titration up instead of the full dose of 100mg two times a day. Will send in new prescription.

## 2021-06-04 NOTE — PATIENT INSTRUCTIONS - HE
Patient Education     Computed Tomography Angiography (CTA)     CTA can make 3D images, such as the carotid arteries shown here.     Computed tomography angiography (CTA) is an imaging test. It uses X-rays and computer technology to make detailed pictures of your arteries. Before the test, an X-ray dye (contrast medium) is injected into your vein. The dye makes it easier to see your blood vessels on the X-ray. Pictures are then taken with the CT scanner. A computer turns the images into 2-D and 3-D pictures.  Why CTA is done  CTA may be used to:    Check arteries in your belly, neck, lungs, pelvis, kidneys, or brain.    Look for a ballooning of the blood vessel wall (aneurysm) or a tear (dissection).    Check if a tube (stent) used to keep an artery open is working well.    Find damage to your arteries due to injuries.    Collect details on blood vessels that supply blood to tumors.  Getting ready for your test  Tell your health care provider if you:    Have diabetes    Have kidney disease    Are allergic to X-ray dye or other medicines    Are pregnant or think you may be pregnant    Are taking any medicines, herbs, or supplements, including prescription drugs, street drugs, and over-the-counter medicines such as aspirin or ibuprofen    You may be told not to eat or drink anything for a few hours before the CTA. Follow any other instructions from your health care provider.  During your test    You will be asked to remove any hair clips, jewelry, false teeth, or other metal items that could show up on the X-ray.    You will lie down on the scanning table. An IV line will be put in a vein in your arm or hand.    The scanning table will be properly placed. The part of your body being checked will be inside the doughnut-shaped CT scanner.    One image may be taken first to be sure you are in the proper position for the test.    The IV will be hooked up to an automatic injection machine. This controls how often and how  fast the X-ray dye is injected. The injection may continue during part of the exam.    The dye will be put into your vein through the IV line. You may feel warmth through your body when the dye is injected.    You can t move while the X-rays are being taken. Pillows and foam pads may be used to help you stay still. You will be told to hold your breath for 10 to 25 seconds at a time.    The whole procedure may take 10 to 25 minutes.    After your test    Drink plenty of fluids to help flush the X-ray dye from your body.    You may eat as soon as you want to.    All procedures have some risks. A CTA has some possible minor risks. These include:    Problems due to the X-ray dye, such as an allergic reaction or kidney damage    Skin damage from leaking X-ray dye near where the IV was put in  Date Last Reviewed: 10/1/2017    6756-7040 [employer]. [address] [place] [address]. All rights reserved. This information is not intended as a substitute for professional medical care. Always follow your healthcare professional's instructions.         We would like to start you on a medication called Pletal or Cilostazol.    We start you on this slowly and go up over the course of 4 weeks.    The first 2 weeks we would like you to take 50 mg once a day for 2 weeks.  Then increase to 50mg twice a day for 2 weeks.  Finally increase to 100mg twice a day thereafter.    Please notify us if you have any problems with nausea, diarrhea or increased blood sugar levels. During this dose titration we would like you to check your blood sugars twice a day if you are diabetic.   Cilostazol Oral tablet  What is this medicine?  CILOSTAZOL (thang OH sta zol) is used to treat the symptoms of intermittent claudication. This condition causes pain in the legs during walking, and goes away with rest. By improving blood flow, this medicine helps people with this condition walk longer distances without pain.  This medicine may be  used for other purposes; ask your health care provider or pharmacist if you have questions.  What should I tell my health care provider before I take this medicine?  They need to know if you have any of the following conditions:    bleeding disorder or hemophilia    history of heart failure, heart attack, or other heart disease    an unusual or allergic reaction to cilostazol, other medicines, foods, dyes, or preservatives    pregnant or trying to get pregnant    breast-feeding  How should I use this medicine?  Take this medicine by mouth with a full glass of water. Follow the directions on the prescription label. Take this medicine on an empty stomach, at least 30 minutes before or 2 hours after food. Do not take with food. Take your doses at regular intervals. Do not take your medicine more often than directed.  Talk to your pediatrician regarding the use of this medicine in children. Special care may be needed.  Overdosage: If you think you have taken too much of this medicine contact a poison control center or emergency room at once.  NOTE: This medicine is only for you. Do not share this medicine with others.  What if I miss a dose?  If you miss a dose, take it as soon as you can. If it is almost time for your next dose, take only that dose. Do not take double or extra doses.  What may interact with this medicine?  Do not take this medicine with any of the following medications:    grapefruit juice  This medicine may also interact with the following medications:    agents that prevent or treat blood clots like enoxaparin or warfarin    aspirin    diltiazem    erythromycin or clarithromycin    omeprazole    some medications for treating depression like fluoxetine, fluvoxamine, nefazodone    some medications for treating fungal infections like ketoconazole, fluconazole, itraconazole  This list may not describe all possible interactions. Give your health care provider a list of all the medicines, herbs,  non-prescription drugs, or dietary supplements you use. Also tell them if you smoke, drink alcohol, or use illegal drugs. Some items may interact with your medicine.  What should I watch for while using this medicine?  Visit your doctor or health care professional for regular checks on your progress. It may take 2 to 4 weeks for your condition to start to get better once you begin taking this medicine. In some people, it can take as long as 3 months for the condition to get better.  You may get drowsy or dizzy. Do not drive, use machinery, or do anything that needs mental alertness until you know how this drug affects you. Do not stand or sit up quickly, especially if you are an older patient. This reduces the risk of dizzy or fainting spells. Alcohol can make you more drowsy and dizzy. Avoid alcoholic drinks.  Smoking may have effects on the circulation that may limit the benefits you receive from this medicine. You may wish to discuss how to stop smoking with your doctor or health care professional.  If you are going to have surgery, tell your doctor or health care professional that you are taking this medicine.  What side effects may I notice from receiving this medicine?  Side effects that you should report to your doctor or health care professional as soon as possible:    allergic reactions like skin rash, itching or hives, swelling of the face, lips, or tongue    chest pain    fast, slow, or irregular heartbeat    signs and symptoms of bleeding such as bloody or black, tarry stools; red or dark-brown urine; spitting up blood or brown material that looks like coffee grounds; red spots on the skin; unusual bruising or bleeding from the eye, gums, or nose    swelling in the legs or ankles  Side effects that usually do not require medical attention (report to your doctor or health care professional if they continue or are bothersome):    diarrhea    headache    nausea, or upset stomach  This list may not describe  all possible side effects. Call your doctor for medical advice about side effects. You may report side effects to FDA at 8-380-BGX-3025.  Where should I keep my medicine?  Keep out of the reach of children.  Store at room temperature between 15 and 30 degrees C (59 and 86 degrees F). Throw away any unused medicine after the expiration date.  NOTE:This sheet is a summary. It may not cover all possible information. If you have questions about this medicine, talk to your doctor, pharmacist, or health care provider. Copyright  2015 [employer]      .vcpl

## 2021-06-05 VITALS — HEIGHT: 65 IN | BODY MASS INDEX: 21.05 KG/M2 | WEIGHT: 126.32 LBS

## 2021-06-05 NOTE — TELEPHONE ENCOUNTER
LMTCB- pt was seen in December by Dr. Cosby and he didn't need to return for 6 months. Pt is scheduled on 2/19 with Dr. Cosby, wanted to confirm if he needed to come that soon. Dr. Cosby said OK to come back in June 2020.

## 2021-06-06 NOTE — TELEPHONE ENCOUNTER
Spoke with patient. He is having difficulty getting information to [employer] for long term disability.  I requested claim number [id number] and name helping with disability  Traci number [phone]. I left Traci a message to fax any forms that need to be filled out by Dr Cosby to us. Call with any questions.

## 2021-06-06 NOTE — TELEPHONE ENCOUNTER
Unable to reach patient. Left message that if he has disability paperwork, he can either fax it or drop it off at the clinic for Dr. Cosby to sign.

## 2021-06-06 NOTE — TELEPHONE ENCOUNTER
Faxed Kostas the requested information office notes to file his LTC claim - faxed to [phone] - Yessenia VALDEZ

## 2021-06-06 NOTE — TELEPHONE ENCOUNTER
Spoke to Elle. He states he needs evidence of patient seeing Dr. Cosby and a letter stating he should not work. Will mail him office note from Dec 2019. Notified him that if long term disability requires paperwork to be filled, he can have it dropped off at clinic to be signed. Pt understands.

## 2021-06-06 NOTE — TELEPHONE ENCOUNTER
Patient requested a call back from [facility] [place] to discuss a letter for disability. It is unclear if the patient has paperwork for staff to fill out or what is needed from us; but he said Dr. Cosby and his nurse will know he needs a letter that needs to be sent to the SSI office on his behalf to prove he needs to be on disability.

## 2021-06-07 ENCOUNTER — COMMUNICATION - HEALTHEAST (OUTPATIENT)
Dept: VASCULAR SURGERY | Facility: CLINIC | Age: 61
End: 2021-06-07

## 2021-06-11 ENCOUNTER — HOSPITAL ENCOUNTER (EMERGENCY)
Dept: EMERGENCY MEDICINE | Facility: HOSPITAL | Age: 61
Discharge: HOME | End: 2021-06-11
Attending: EMERGENCY MEDICINE

## 2021-06-11 DIAGNOSIS — R42 VERTIGO: ICD-10-CM

## 2021-06-11 ASSESSMENT — MIFFLIN-ST. JEOR: SCORE: 1321.56

## 2021-06-13 NOTE — ANESTHESIA PREPROCEDURE EVALUATION
Anesthesia Evaluation      Patient summary reviewed   No history of anesthetic complications     Airway   Mallampati: II   Pulmonary - negative ROS and normal exam                          Cardiovascular - negative ROS and normal exam  Rhythm: regular  Rate: normal,         Neuro/Psych - negative ROS     Endo/Other - negative ROS      GI/Hepatic/Renal    (+)   chronic renal disease,           Dental - normal exam                        Anesthesia Plan  Planned anesthetic: peripheral nerve block    ASA 2     Anesthetic plan and risks discussed with: patient    Post-op plan: routine recovery

## 2021-06-13 NOTE — ANESTHESIA POSTPROCEDURE EVALUATION
Patient: Elle Blanton  REPAIR OF LACERATION RIGHT FOREARM  Anesthesia type: regional    Patient location: Phase II Recovery  Last vitals:   Vitals:    11/05/17 1810   BP: (!) 159/96   Pulse: 87   Resp:    Temp:    SpO2: 99%     Post vital signs: stable  Level of consciousness: awake and responds to simple questions  Post-anesthesia pain: pain controlled  Post-anesthesia nausea and vomiting: no  Pulmonary: unassisted, return to baseline  Cardiovascular: stable and blood pressure at baseline  Hydration: adequate  Anesthetic events: no    QCDR Measures:  ASA# 11 - Lenora-op Cardiac Arrest: ASA11B - Patient did NOT experience unanticipated cardiac arrest  ASA# 12 - Lenora-op Mortality Rate: ASA12B - Patient did NOT die  ASA# 13 - PACU Re-Intubation Rate: ASA13B - Patient did NOT require a new airway mgmt  ASA# 10 - Composite Anes Safety: ASA10A - No serious adverse event    Additional Notes: BP improved.

## 2021-06-13 NOTE — ANESTHESIA CARE TRANSFER NOTE
Last vitals:   Vitals:    11/05/17 1640   BP: (!) 182/103   Pulse: 77   Resp: 16   Temp: 37.1  C (98.8  F)   SpO2: 99%     Patient's level of consciousness is drowsy  Spontaneous respirations: yes  Maintains airway independently: yes  Dentition unchanged: yes  Oropharynx: oropharynx clear of all foreign objects    QCDR Measures:  ASA# 20 - Surgical Safety Checklist: WHO surgical safety checklist completed prior to induction  PQRS# 430 - Adult PONV Prevention: 4558F-8P - Pt did NOT receive => 2 anti-emetic agents  ASA# 8 - Peds PONV Prevention: NA - Not pediatric patient, not GA or 2 or more risk factors NOT present  PQRS# 424 - Lenora-op Temp Management: 4559F - At least one body temp DOCUMENTED => 35.5C or 95.9F within required timeframe  PQRS# 426 - PACU Transfer Protocol: - Transfer of care checklist used  ASA# 14 - Acute Post-op Pain: ASA14B - Patient did NOT experience pain >= 7 out of 10

## 2021-06-13 NOTE — ANESTHESIA PROCEDURE NOTES
Peripheral Block    Patient location during procedure: pre-op  Start time: 11/5/2017 3:10 PM  End time: 11/5/2017 3:20 PM  surgical anesthesia  Staffing:  Performing  Anesthesiologist: LARISSA BERG  Preanesthetic Checklist  Completed: patient identified, site marked, risks, benefits, and alternatives discussed, timeout performed, consent obtained, airway assessed, oxygen available, suction available, emergency drugs available and hand hygiene performed  Peripheral Block  Block type: brachial plexus, supraclavicular  Prep: ChloraPrep  Patient position: sitting  Patient monitoring: cardiac monitor, continuous pulse oximetry, heart rate and blood pressure  Laterality: right  Injection technique: ultrasound guided          Needle  Needle type: Stimuplex   Needle gauge: 21 G  Needle length: 4 in    Assessment  Injection assessment: no difficulty with injection, negative aspiration for heme, no paresthesia on injection and incremental injection

## 2021-06-14 NOTE — PROGRESS NOTES
HPI: Pt is here for follow up laceration closure with Dr. Nolan on 11/05/2017.   he is doing well.  Pain is well controlled.  No difficulties with the surgical wound/wounds.  he is eating well and denies fever and chills.         BP (!) 178/109 (Patient Site: Left Arm, Patient Position: Sitting, Cuff Size: Adult Regular)  Pulse 89  SpO2 99%    EXAM:  GENERAL:Appears well  SURGICAL WOUNDS:  Incisions healing well, no enduration or drainage.  Laceration healing well; sutures removed without issue; steri strips applied     Assessment/Plan: Doing well after surgery and should follow up as needed.    Zakia Garcia , [facility]

## 2021-06-16 NOTE — ANESTHESIA POSTPROCEDURE EVALUATION
Patient: Elle Blanton  Procedure(s):  left arm dialysis graft placement (Left)  Anesthesia type: regional    Patient location: Phase II Recovery  Last vitals:   Vitals Value Taken Time   /77 04/20/21 1000   Temp 36.5  C (97.7  F) 04/20/21 1000   Pulse 68 04/20/21 1000   Resp 16 04/20/21 1000   SpO2 97 % 04/20/21 1000     Post vital signs: stable  Level of consciousness: awake and responds to simple questions  Post-anesthesia pain: pain controlled  Post-anesthesia nausea and vomiting: no  Pulmonary: unassisted, return to baseline  Cardiovascular: stable and blood pressure at baseline  Hydration: adequate  Anesthetic events: no    QCDR Measures:  ASA# 11 - Lenora-op Cardiac Arrest: ASA11B - Patient did NOT experience unanticipated cardiac arrest  ASA# 12 - Lenora-op Mortality Rate: ASA12B - Patient did NOT die  ASA# 13 - PACU Re-Intubation Rate: NA - No ETT / LMA used for case  ASA# 10 - Composite Anes Safety: ASA10A - No serious adverse event    Additional Notes:

## 2021-06-16 NOTE — ANESTHESIA CARE TRANSFER NOTE
Patient transferred to [facility].  Taken to PACU on room air.  In PACU monitors on, vital signs stable.  SBAR report given to RN on floor per institutional policy and procedure.  Transport called.  Transfer of care.    Last vitals:   Vitals:    04/20/21 1000   BP: 148/77   Pulse: 68   Resp: 16   Temp: 36.5  C (97.7  F)   SpO2: 97%     Patient's level of consciousness is drowsy  Spontaneous respirations: yes  Maintains airway independently: yes  Dentition unchanged: yes  Oropharynx: oropharynx clear of all foreign objects    QCDR Measures:  ASA# 20 - Surgical Safety Checklist: WHO surgical safety checklist completed prior to induction    PQRS# 430 - Adult PONV Prevention: 4558F - Pt received => 2 anti-emetic agents (different classes) preop & intraop  ASA# 8 - Peds PONV Prevention: NA - Not pediatric patient, not GA or 2 or more risk factors NOT present  PQRS# 424 - Lenora-op Temp Management: 4559F - At least one body temp DOCUMENTED => 35.5C or 95.9F within required timeframe  PQRS# 426 - PACU Transfer Protocol: - Transfer of care checklist used  ASA# 14 - Acute Post-op Pain: ASA14B - Patient did NOT experience pain >= 7 out of 10

## 2021-06-16 NOTE — ANESTHESIA PREPROCEDURE EVALUATION
Anesthesia Evaluation      History of anesthetic complications (PONV)     Airway   Mallampati: III  Neck ROM: full   Pulmonary - negative ROS and normal exam    breath sounds clear to auscultation                         Cardiovascular - normal exam  (+) past MI, ,     Rhythm: regular        Neuro/Psych - negative ROS     Endo/Other       Comments: Thrombocytopenia  Anemia     GI/Hepatic/Renal    (+) GERD,   chronic renal disease ESRD,           Dental    (+) poor dentition                       Anesthesia Plan  Planned anesthetic: peripheral nerve block    ASA 4     Anesthetic plan and risks discussed with: patient and  services used  Anesthesia plan special considerations: antiemetics,   Post-op plan: routine recovery  DNR/DNI status   DNR/DNI status discussed with patient.  Plan is for suspension of DNR

## 2021-06-16 NOTE — ANESTHESIA PROCEDURE NOTES
Peripheral Block    Patient location during procedure: pre-op  Start time: 4/20/2021 6:59 AM  End time: 4/20/2021 7:05 AM  surgical anesthesia  Staffing:  Performing  Anesthesiologist: Chirag Keller MD  Preanesthetic Checklist  Completed: patient identified, site marked, risks, benefits, and alternatives discussed, timeout performed, consent obtained, airway assessed, oxygen available, suction available, emergency drugs available and hand hygiene performed  Peripheral Block  Block type: brachial plexus, axillary  Prep: ChloraPrep  Patient position: supine  Patient monitoring: cardiac monitor, continuous pulse oximetry, heart rate and blood pressure  Laterality: left  Injection technique: ultrasound guided    Ultrasound used to visualize needle placement in proximity to nerve being blocked: yes   US used to visualize anesthetic spread  Visualized anatomic structures normal  No Pathological Findings  Permanent ultrasound image captured for medical record  Sterile gel and probe cover used for ultrasound.  Needle  Needle type: echogenic   Needle gauge: 20G  Needle length: 4 in  no peripheral nerve catheter placed  Assessment  Injection assessment: no difficulty with injection, negative aspiration for heme, no paresthesia on injection and incremental injection

## 2021-06-17 NOTE — PROGRESS NOTES
OV; Jaswant Flores MD referring back for S/P arteriovenous (AV) graft placement; US beforehand; pt last saw Faizer in Dec 2019.  type B aortic dissection.

## 2021-06-17 NOTE — TELEPHONE ENCOUNTER
----- Message from Oc Mota V RN sent at 4/30/2021 11:59 AM CDT -----  Regarding: FW: who should pt see and any scans needed???  This is a [facility] patient. Please schedule ultrasound ordered and have him see Fa[facility] soon.     ----- Message -----  From: Pavel Michelle  Sent: 4/30/2021  11:43 AM CDT  To: [facility] Support Pool  Subject: who should pt see and any scans needed???        We have an OUTSIDE MESSAGES IB for this pt to be seen for M79.89 (ICD-10-CM) - Left arm swelling, Z98.890 (ICD-10-CM) - S/P arteriovenous (AV) graft placement. Who should this pt see and are there any scans that is needed? Please respond back to the scheduling pool.

## 2021-06-17 NOTE — PROGRESS NOTES
Patient well-known to me for aortic dissection for whom I performed an immediate access short segment left forearm dialysis loop graft for progression of renal failure.  Operation performed on April 20.  Initially complicated by significant swelling in the arm which I think in retrospect was related to some degree of central stenosis.  Swelling is essentially resolved now.  Undergoing dialysis twice a week.  Is extremely fatigued after his dialysis run.  Not interested in this context of going on to 3 times a day dialysis.    Vitals:    05/13/21 1201   BP: 140/80   Pulse: 66   Resp: 17       Left forearm graft easily palpable and incision is nicely healed.  Swelling essentially resolved in the arm.    Duplex of the fistula graft shows widely patent flow in the graft with excellent flow volumes.    Impression and plan: Successful dialysis access.  I spent some time discussing with him that I thought more frequent shorter dialysis runs would be better for him with respect to his aortic dissection.  In aortic dissection large volume shifts can be more dangerous.  The patient is somewhat reluctant to go more frequently for dialysis and is trying to negotiate with me about doing shorter runs.  I explained to him that I really had no expertise to decide this and that he needs to discuss it with his nephrologist.    I will plan on seeing the patient back in 3 months time with a repeat CTA chest Abdo pelvis to look at his aortic dissection.  Given that he is now on dialysis I think we can give him some contrast to get a better understanding of his aortic narrowing and claudication symptoms.  I think there might be an opportunity to improve his blood flow now that he is on dialysis but I am not sure that he would want any intervention at all.

## 2021-06-17 NOTE — PATIENT INSTRUCTIONS - HE
Patient Instructions by Oc Mota RN at 9/18/2019 11:20 AM     Author: Oc Mota RN Service: -- Author Type: Registered Nurse    Filed: 9/18/2019 11:46 AM Encounter Date: 9/18/2019 Status: Signed    : Oc Mota RN (Registered Nurse)       Ultrasound    Thank you for choosing [facility] as partners in your care.  Please read the following information before your appointment.  Feel free to ask questions.    An ultrasound is an exam that uses sound waves to create pictures.  The special camera that takes the pictures is called a transducer.  An ultrasound technologist will perform the exam under the direction of a physician.    Preparation  Ultrasound preps vary.  If you are scheduled for an Aorta Ultrasound, please do not eat or drink anything 8 hours before your exam.  You may take daily medications with a small sip of water.  There is no preparation required for any of the other ultrasound exams performed at [facility].    The Examination  You may or may not need to change clothes for your exam.  The technologist will explain the exam to you.  He or she will ask you a few questions and answer any questions you may have.    You will lie on a table and a gel will be applied to your skin.  A small handheld instrument called a transducer will be moved across the area we are looking at while pictures are taken.  Also, your exam may include measuring your blood pressure on your arms, legs and/or toes.    When the Exam Is Completed  Your physician will receive the results of the exam and explain them to you.  You may return to your normal diet and activities unless otherwise directed by your doctor.  Feel free to ask questions if something is not clear to you.  We welcome comments.    At [facility], we are dedicated to providing the best possible care.  Thank you for taking time to read these instructions.  We hope your experience is as pleasant as possible.      You are  scheduled for the following exam(s):    []Carotid Duplex:  Evaluates the carotid arteries in the neck that feed the brain with blood.  Gel is applied to the skin of the neck.  A transducer will be placed on the gel covered areas to obtain images and evaluate and listen to the blood flow in the arteries.  This exam takes approximately 30 minutes.    []Venous Duplex:  Evaluates the veins that carry blood to the heart from the arms and/or legs.  Gel is applied to the skin of the arms and/or legs.  A transducer will be placed on the gel covered areas to obtain images and evaluate flow in the veins.  This exam takes approximately 30 minutes per arm/leg.    []Arterial Duplex:  Evaluates the arteries that feed the arms and legs with blood.  Gel is applied to the skin of the arms and/or legs.  A transducer will be placed on the gel covered areas to obtain images and listen to the blood flow in the arms and/or legs.  This exam takes approximately 30 minutes per arm/leg.    [x]CASEY or Segmental Pressures:  Ultrasound and blood pressure cuffs are used to evaluate the arteries that supply the arms and legs with blood.  Several blood pressure cuffs will be place on your arms and legs.  When inflated, the cuffs will provide blood pressure readings that are used to determine where blockages in the arteries may be.  You may be asked to do a moderate level of exercise during this exam.  This exam takes approximately 30-60 minutes.    []Aorta:  Evaluates the abdominal aorta for blockages and aneurysm.  Gel is applied to the stomach.  A transducer will be placed on the gel covered areas to obtain images of the aorta.  This exam takes approximately 30 minutes.    Prep instructions:  Do not eat or drink anything 8 hours before procedure.  You may take daily medications with a small sip of water.    Patient Education     Computed Tomography Angiography (CTA)     CTA can make 3D images, such as the carotid arteries shown here.     Computed  tomography angiography (CTA) is an imaging test. It uses X-rays and computer technology to make detailed pictures of your arteries. Before the test, an X-ray dye (contrast medium) is injected into your vein. The dye makes it easier to see your blood vessels on the X-ray. Pictures are then taken with the CT scanner. A computer turns the images into 2-D and 3-D pictures.  Why CTA is done  CTA may be used to:    Check arteries in your belly, neck, lungs, pelvis, kidneys, or brain.    Look for a ballooning of the blood vessel wall (aneurysm) or a tear (dissection).    Check if a tube (stent) used to keep an artery open is working well.    Find damage to your arteries due to injuries.    Collect details on blood vessels that supply blood to tumors.  Getting ready for your test  Tell your health care provider if you:    Have diabetes    Have kidney disease    Are allergic to X-ray dye or other medicines    Are pregnant or think you may be pregnant    Are taking any medicines, herbs, or supplements, including prescription drugs, street drugs, and over-the-counter medicines such as aspirin or ibuprofen    You may be told not to eat or drink anything for a few hours before the CTA. Follow any other instructions from your health care provider.  During your test    You will be asked to remove any hair clips, jewelry, false teeth, or other metal items that could show up on the X-ray.    You will lie down on the scanning table. An IV line will be put in a vein in your arm or hand.    The scanning table will be properly placed. The part of your body being checked will be inside the doughnut-shaped CT scanner.    One image may be taken first to be sure you are in the proper position for the test.    The IV will be hooked up to an automatic injection machine. This controls how often and how fast the X-ray dye is injected. The injection may continue during part of the exam.    The dye will be put into your vein through the IV line.  You may feel warmth through your body when the dye is injected.    You cant move while the X-rays are being taken. Pillows and foam pads may be used to help you stay still. You will be told to hold your breath for 10 to 25 seconds at a time.    The whole procedure may take 10 to 25 minutes.    After your test    Drink plenty of fluids to help flush the X-ray dye from your body.    You may eat as soon as you want to.    All procedures have some risks. A CTA has some possible minor risks. These include:    Problems due to the X-ray dye, such as an allergic reaction or kidney damage    Skin damage from leaking X-ray dye near where the IV was put in  Date Last Reviewed: 10/1/2017    5103-2352 [employer]. [address] [place] [address], [place] [address]. All rights reserved. This information is not intended as a substitute for professional medical care. Always follow your healthcare professional's instructions.

## 2021-06-17 NOTE — TELEPHONE ENCOUNTER
Writer spoke to pt tonight with the help of Viviane,  [id number]. Writer explained the reason for the appts and he stated he understood. Pt stated he has dialysis on MWF. Pt has been scheduled for appts on 5/13/21 for an US and f/u with Dr. Cosby.

## 2021-06-19 NOTE — LETTER
Letter by Roger Argueta MD at      Author: Roger Argueta MD Service: -- Author Type: --    Filed:  Encounter Date: 10/9/2019 Status: Signed                   Office Hours: Monday - Friday 8:00 - 4:30PM    Elle Blanton  [address]  [facility] [address]           October 9, 2019      Dear Elle:    Our clinic records indicate we have attempted to contact you three (3) or more times to schedule a follow up appointment. Unfortunately, we have been unable to reach you. To prevent further delays in your care, please contact our office to schedule your appointment.         Sincerely,        [facility]

## 2021-06-19 NOTE — LETTER
Letter by Roxana Cosby MD at      Author: Roxana Cosby MD Service: -- Author Type: --    Filed:  Encounter Date: 9/18/2019 Status: (Other)         September 18, 2019     Patient: Elle Blnaton   YOB: 1960   Date of Visit: 9/18/2019       To Whom It May Concern:    It is my medical opinion that Elle Blanton may return to light duty immediately with the following restrictions: No lifting more than 10lbs. Patient to have limited walking. He should be able to park close to door entry. Length of time for restriction is undetermined.    If you have any questions or concerns, please don't hesitate to call.    Sincerely,        Electronically signed by Roxana Cosby MD

## 2021-06-19 NOTE — LETTER
Letter by Roxana Cosby MD at      Author: Roxana Cosby MD Service: -- Author Type: --    Filed:  Encounter Date: 9/18/2019 Status: (Other)         September 18, 2019     Patient: Elle Blanton   YOB: 1960   Date of Visit: 9/18/2019       To Whom It May Concern:    It is my medical opinion that Elle Blanton should remain out of work until undetermined. Patient has limited walking. He should not lift more than 10lbs as well. .    If you have any questions or concerns, please don't hesitate to call.    Sincerely,        Electronically signed by Roxana Cosby MD

## 2021-06-20 NOTE — LETTER
Letter by Roxana Cosby MD at      Author: Roxana Cosby MD Service: -- Author Type: --    Filed:  Encounter Date: 2/20/2020 Status: (Other)       02/20/20      Elledrew Blanton  [address]  [facility] [address]    Dear Elle,    Below you will find the information for your upcoming appointments at the [facility]:       9AM with a check-in time of 8:45AM for a CTA Abdomen Pelvis. This will take place at [facility] radiology department. Please do not eat or drink for at least 2 hours prior to your appointment.    10:20AM with a check-in time of 10:10AM for a follow up office visit with Dr. Roxana Cosby at: [address]    Please bring your ID as well as your insurance cards for  staff to scan in.   I thank you for being the best part of [facility]. If you have any further questions, please feel free to contact us.  We can be reached at: [phone]      Sincerely,  [facility]

## 2021-06-20 NOTE — LETTER
Letter by Roxana Cosby MD at      Author: Roxana Cosby MD Service: -- Author Type: --    Filed:  Encounter Date: 6/2/2020 Status: (Other)         06/02/20      Elle Coling  [address]  [place] [address]    Dear Elle,    Below you will find the appointment details for your upcoming appointments for [facility]:     -6/18/20 at 9:40AM for a CTA abdomen pelvis at [facility]. Please wear a mask to your appointment. You will need to fast for at least 2 hours prior to your exam (nothing by mouth including liquids, food, gum, and smoking)     -6/25/20 at 9:15AM for a virtual visit with Dr. Roxana Cosby. Please try to be available within 30 minutes of your scheduled time. You do not need to download an application prior to your appointment in order to do your virtual visit.    If you have any further questions, please call our office at: [phone]    Thank you!    [facility]

## 2021-06-25 NOTE — ED TRIAGE NOTES
Pt states that at the end of his dialysis run this evening, the nurse gave him something with a needle in his IV and he immediately became dizzy and nauseous. The dialysis nurse told the patient that she did not give him anything and was only taking blood. Pt states that the dizziness improves when he closes his eyes and sits down. Pt states he has had 10 episodes of emesis.

## 2021-06-25 NOTE — TELEPHONE ENCOUNTER
He is stating his arm is swollen, which is causing the dialysis to take 3 hours, per patient [facility] only allows 2 hours to 2.5 hours, not 3.  Patient is asking for a call back to discuss this further/

## 2021-06-25 NOTE — TELEPHONE ENCOUNTER
services used. [phone] option 3.     Informed Elle that we can do an US and OV. Elle states that he just had one done and doesn't want one at this time.    Elle states that he just wants Dr. Cosby to call his dialysis and inform them it is ok to take 3 hours.     Writer questioned if he has more swelling than normal, Elle states he does have some swelling to his arm unless it is elevated.     Informed Elle an US and OV is still advised.     Elle states he would like to have Dr. Cosby write an order stating 3 hours is ok for dialysis first and to see if this helps.     Elle states he spoke with his nephrologist and that Dr. Cosby would have to approve the longer dialysis time.

## 2021-06-25 NOTE — TELEPHONE ENCOUNTER
Hiram RN, from [facility] called and stated that Elle has had decreased swelling in his arm but that he wants to shorten his dialysis time. Hiram to monitor swelling and inform Elle's NP and nephrologist on Wednesday and depending on results they might want a fistulagram done.

## 2021-06-26 NOTE — ED PROVIDER NOTES
EMERGENCY DEPARTMENT ENCOUNTER      NAME: Elle Blanton  AGE: 61 y.o. male  YOB: 1960  MRN: [id number]  EVALUATION DATE & TIME: 2021  8:19 PM    PCP: Jaswant Flores MD    ED PROVIDER: Vickey Garcia D.O.      Chief Complaint   Patient presents with     Dizziness         FINAL IMPRESSION:  1. Vertigo          ED COURSE & MEDICAL DECISION MAKIN:35 PM I met with the patient to gather history and to perform my initial exam. I discussed the plan for care while in the Emergency Department. PPE (N95 mask, protective eyewear and gloves) was worn during patient encounters while patient wore a mask.   8:44 PM I discussed patient's case with Dr. Ashraf, neurology. Advises against TPA, will get limited MR head.  9:38 PM Patient is feeling improved and request to not have MRI. I discussed that we can not rule out stroke without MRI. Patient is aware of this and is still refusing imaging and requests discharge home.    Pertinent Labs & Imaging studies reviewed. (See chart for details)  61 y.o. male presents to the Emergency Department for evaluation of room spinning dizziness.  Patient had sudden onset while at dialysis today.  He reported that the dizziness was constant mostly closed his eyes.  On neuro exam, there was no acute abnormalities, including normal finger-nose, and heel shin.  I discussed the patient with neurology, we decided an MRI would be the most appropriate approach for this patient, and not to run a stroke code.  Patient was given meclizine while waiting for the MRI.  Symptoms then improved, and essentially resolved.  At which point the patient decided he no longer wanted the MRI wanted to be discharged to home.  I discussed with him that I cannot fully rule out the possibility that he had a stroke or TIA, and he verbalized understanding with this but still wished not to have the MRI performed.  Therefore will discharge home with symptomatic control for his vertigo and have follow-up with his  primary care provider.  Return precautions discussed.    At the conclusion of the encounter I discussed the results of all of the tests and the disposition. The questions were answered. The patient or family acknowledged understanding and was agreeable with the care plan.       MEDICATIONS GIVEN IN THE EMERGENCY:  Medications   meclizine tablet 25 mg (ANTIVERT) (25 mg Oral Given 6/11/21 2100)   acetaminophen tablet 1,000 mg (TYLENOL) (1,000 mg Oral Given 6/11/21 2130)       NEW PRESCRIPTIONS STARTED AT TODAY'S ER VISIT  Current Discharge Medication List      CONTINUE these medications which have NOT CHANGED    Details   carvediloL (COREG) 25 MG tablet Take 0.5 tablets (12.5 mg total) by mouth 2 (two) times a day.  Qty: 30 tablet, Refills: 0    Associated Diagnoses: End stage renal disease (H)      cloNIDine HCL (CATAPRES) 0.1 MG tablet Take 0.1 mg by mouth at bedtime.      diltiazem (TIAZAC) 180 MG 24 hr capsule Take 180 mg by mouth 2 (two) times a day.      entecavir (BARACLUDE) 0.5 MG tablet Take 1 tablet (0.5 mg total) by mouth once a week.  Qty: 4 tablet, Refills: 0    Associated Diagnoses: Chronic viral hepatitis B without delta agent and without coma (H)      epoetin kelton (EPOGEN,PROCRIT) 40,000 unit/mL injection Inject 1 mL (40,000 Units total) under the skin once a week.  Refills: 0    Comments: Get through [facility]  Associated Diagnoses: Normocytic anemia      omeprazole (PRILOSEC) 20 MG capsule Take 1 capsule (20 mg total) by mouth 2 (two) times a day before meals.  Qty: 60 capsule, Refills: 0    Associated Diagnoses: PUD (peptic ulcer disease)                =================================================================    HPI    Patient information was obtained from: Patient    Use of : N/A       Elle Blanton is a 61 y.o. male who with a history of ESRD (on dialysis), HTN, who presents for evaluation of dizziness.    Patient reports he finished dialysis at 1800 when he experienced sudden onset of  "\"spinning\" dizziness and felt as though he was about to have a syncopal episode. Closing his eyes improves dizziness. Patient notes associated nausea and vomiting. He notes dizziness is currently improved.    Of note, patient states prior to dizziness he saw the nurse \"put needle through line\". Per family member, patient does not appear confused or altered.    Patient otherwise denies cough, congestion, sore throat, fever, diarrhea, abdominal pain, or additional medical concerns or complaints at this time.      Patient denies smoking or EtOH consumption. Denies significant surgical history.    REVIEW OF SYSTEMS   Constitutional:  Denies fever, chills, weight loss or weakness  Eyes:  No pain, discharge, redness   HENT:  Denies sore throat, ear pain, congestion   Respiratory: No SOB, wheeze or cough  Cardiovascular:  No CP, palpitations  GI: Positive for nausea and vomiting. Denies abdominal pain, diarrhea  : Denies dysuria, hematuria  Musculoskeletal:  Denies any new muscle/joint pain, swelling or loss of function.   Skin:  Denies rash, pallor   Neurologic: Positive for dizziness. Denies headache, confusion, focal weakness or sensory changes  Lymph: Denies swollen nodes    All other systems negative unless noted in HPI.    PAST MEDICAL HISTORY:  Past Medical History:   Diagnosis Date     NAHUM (acute kidney injury) (H)      GI (gastrointestinal bleed)      Gout      Heart attack (H)      Normocytic anemia      PONV (postoperative nausea and vomiting)      Thrombocytopenia (H)        PAST SURGICAL HISTORY:  Past Surgical History:   Procedure Laterality Date     ABCESS DRAINAGE      finger     AV FISTULA PLACEMENT Left 4/20/2021    Procedure: left arm dialysis graft placement;  Surgeon: Roxana Cosby MD;  [place] [facility]: [place]     FOREIGN BODY REMOVAL      finger     IR PLEURAL DRAINAGE W CATHETER INSERTION  4/19/2021     MIDLINE INSERTION - DOUBLE LUMEN  4/23/2021          DC " ESOPHAGOGASTRODUODENOSCOPY TRANSORAL DIAGNOSTIC N/A 8/18/2020    Procedure: ESOPHAGOGASTRODUODENOSCOPY (EGD) with biopsy;  Surgeon: Nilson Gonzales MD;  Location: [facility];  Service: Gastroenterology      PARACENTESIS  8/14/2020      PARACENTESIS  8/19/2020      THORACENTESIS  4/18/2021         CURRENT MEDICATIONS:    No current facility-administered medications on file prior to encounter.      Current Outpatient Medications on File Prior to Encounter   Medication Sig     carvediloL (COREG) 25 MG tablet Take 0.5 tablets (12.5 mg total) by mouth 2 (two) times a day.     cloNIDine HCL (CATAPRES) 0.1 MG tablet Take 0.1 mg by mouth at bedtime.     diltiazem (TIAZAC) 180 MG 24 hr capsule Take 180 mg by mouth 2 (two) times a day.     entecavir (BARACLUDE) 0.5 MG tablet Take 1 tablet (0.5 mg total) by mouth once a week.     epoetin kelton (EPOGEN,PROCRIT) 40,000 unit/mL injection Inject 1 mL (40,000 Units total) under the skin once a week.     omeprazole (PRILOSEC) 20 MG capsule Take 1 capsule (20 mg total) by mouth 2 (two) times a day before meals.       ALLERGIES:  No Known Allergies    FAMILY HISTORY:  Family History   Problem Relation Age of Onset     Ulcers Father        SOCIAL HISTORY:   Social History     Socioeconomic History     Marital status:      Spouse name: None     Number of children: None     Years of education: None     Highest education level: None   Occupational History     None   Social Needs     Financial resource strain: None     Food insecurity     Worry: None     Inability: None     Transportation needs     Medical: None     Non-medical: None   Tobacco Use     Smoking status: Never Smoker     Smokeless tobacco: Never Used   Substance and Sexual Activity     Alcohol use: No     Drug use: No     Sexual activity: None   Lifestyle     Physical activity     Days per week: None     Minutes per session: None     Stress: None   Relationships     Social connections     Talks on phone: None     " Gets together: None     Attends Confucianism service: None     Active member of club or organization: None     Attends meetings of clubs or organizations: None     Relationship status: None     Intimate partner violence     Fear of current or ex partner: None     Emotionally abused: None     Physically abused: None     Forced sexual activity: None   Other Topics Concern     None   Social History Narrative     None       VITALS:  Patient Vitals for the past 24 hrs:   BP Temp Temp src Pulse Resp SpO2 Height Weight   06/11/21 2130 (!) 167/94 -- -- 67 -- 98 % -- --   06/11/21 2115 163/85 -- -- 65 -- 97 % -- --   06/11/21 2100 169/88 -- -- 69 20 97 % -- --   06/11/21 1959 (!) 188/91 97.6  F (36.4  C) Oral 72 18 97 % 5' 5\" (1.651 m) 130 lb (59 kg)       PHYSICAL EXAM    VITAL SIGNS: BP (!) 167/94   Pulse 67   Temp 97.6  F (36.4  C) (Oral)   Resp 20   Ht 5' 5\" (1.651 m)   Wt 130 lb (59 kg)   SpO2 98%   BMI 21.63 kg/m      General Appearance: Well-appearing, well-nourished, no acute distress   Head:  Normocephalic, without obvious abnormality, atraumatic  Eyes:  PERRL, conjunctiva/corneas clear, EOM's intact,  ENT:  Lips, mucosa, and tongue normal, membranes are moist without pallor  Neck:  Normal ROM, symmetrical, trachea midline    Cardio:  Regular rate and rhythm, no murmur, rub or gallop, 2+ pulses symmetric in all extremities  Pulm:  Clear to auscultation bilaterally, respirations unlabored,   Abdomen:  Soft, non-tender, no rebound or guarding.  Musculoskeletal: Full ROM, no edema, no cyanosis, good ROM of major joints  Integument:  Warm, Dry, No erythema, No rash.    Neurologic:  Patient is alert and oriented ×3.  Face is symmetric.  Speech is normal.  Visual fields are full.  Cranial nerves II through XII are grossly intact. Motor tone is 5/5 and equal in both upper and lower extremities.  Bilateral symmetric sensation grossly intact upper and lower.  Neg finger-nose. Neg heel-shin.  Reflexes are 2/4 throughout. "  Toes are downgoing.   Psychiatric:  Affect normal, Judgment normal, Mood normal.        All pertinent labs reviewed and interpreted.  No results found for this visit on 06/11/21.    RADIOLOGY:  Reviewed all pertinent imaging. Please see official radiology report.  No results found.    I, Massiel Beaver, am serving as a scribe to document services personally performed by Dr. Vickey Garcia based on my observation and the provider's statements to me. I, Vickey Garcia, DO attest that Massiel Beaver is acting in a scribe capacity, has observed my performance of the services and has documented them in accordance with my direction.    Vickey Garcia D.O.  Emergency Medicine  [facility] EMERGENCY DEPARTMENT  [address]  [facility] [address]  Dept: [phone]  Loc: [phone]     Vickey Garcia DO  06/11/21 [id number]

## 2021-07-06 VITALS — BODY MASS INDEX: 21.66 KG/M2 | HEIGHT: 65 IN | WEIGHT: 130 LBS

## 2021-07-26 DIAGNOSIS — I71.03 DISSECTION OF THORACOABDOMINAL AORTA (H): ICD-10-CM

## 2021-07-26 DIAGNOSIS — D61.818 OTHER PANCYTOPENIA (H): ICD-10-CM

## 2021-07-26 DIAGNOSIS — N17.9 ACUTE KIDNEY INJURY (H): ICD-10-CM

## 2021-07-26 DIAGNOSIS — K74.60 CIRRHOSIS OF LIVER WITHOUT ASCITES, UNSPECIFIED HEPATIC CIRRHOSIS TYPE (H): ICD-10-CM

## 2021-07-26 DIAGNOSIS — B18.1 CHRONIC VIRAL HEPATITIS B WITHOUT DELTA AGENT AND WITHOUT COMA (H): ICD-10-CM

## 2021-07-26 RX ORDER — CARVEDILOL 25 MG/1
50 TABLET ORAL 2 TIMES DAILY WITH MEALS
Qty: 60 TABLET | Refills: 11 | Status: ON HOLD | OUTPATIENT
Start: 2021-07-26 | End: 2021-09-29

## 2021-08-09 DIAGNOSIS — L29.9 PRURITIC DISORDER: ICD-10-CM

## 2021-08-09 RX ORDER — HYDROXYZINE HYDROCHLORIDE 25 MG/1
25 TABLET, FILM COATED ORAL 3 TIMES DAILY PRN
Qty: 45 TABLET | Refills: 1 | Status: SHIPPED | OUTPATIENT
Start: 2021-08-09 | End: 2021-08-24

## 2021-08-17 NOTE — PROGRESS NOTES
ASSESSMENT/PLAN:    (N18.4,  D63.1) Anemia due to stage 4 chronic kidney disease (H)  (primary encounter diagnosis)  Comment: stable labs in dialysis; requested he get labwork here today and ask the dialysis center to send labs to our office  Plan: CBC with platelets          (K74.60) Cirrhosis of liver without ascites, unspecified hepatic cirrhosis type (H)  Comment: notes stable abd distention  Plan: Comprehensive metabolic panel          (N18.6,  Z99.2) ESRD (end stage renal disease) on dialysis (H)  Comment: cont dialysis; encouraged 3x/weekly sessions instead of 2x/weekly; he is not really interested at this time  Plan: Comprehensive metabolic panel          (B18.1) Chronic viral hepatitis B without delta agent and without coma (H)  Comment: refilled  Plan: entecavir (BARACLUDE) 0.5 MG tablet          (Z23) High priority for 2019-nCoV vaccine  Comment:   Plan: Moderna #1 today!     (M67.431) Ganglion cyst of wrist, right  Comment:   Plan: consider aspiration at next visit; could like to get imaging of wrist to eval for OA as well    Jaswant Flores MD  2021  2:01 PM    Pt is a 61 year old male last seen on 2021 here today for:      1) ED follow-up- this was 2 months ago; now symptoms have resolved      Per ED note on 2021:  FINAL IMPRESSION:  Vertigo       ED COURSE & MEDICAL DECISION MAKIN:35 PM I met with the patient to gather history and to perform my initial exam. I discussed the plan for care while in the Emergency Department. PPE (N95 mask, protective eyewear and gloves) was worn during patient encounters while patient wore a mask.   8:44 PM I discussed patient's case with Dr. Ashraf, neurology. Advises against TPA, will get limited MR head.  9:38 PM Patient is feeling improved and request to not have MRI. I discussed that we can not rule out stroke without MRI. Patient is aware of this and is still refusing imaging and requests discharge home.     Pertinent Labs & Imaging  studies reviewed. (See chart for details)  61 y.o. male presents to the Emergency Department for evaluation of room spinning dizziness.  Patient had sudden onset while at dialysis today.  He reported that the dizziness was constant mostly closed his eyes.  On neuro exam, there was no acute abnormalities, including normal finger-nose, and heel shin.  I discussed the patient with neurology, we decided an MRI would be the most appropriate approach for this patient, and not to run a stroke code.  Patient was given meclizine while waiting for the MRI.  Symptoms then improved, and essentially resolved.  At which point the patient decided he no longer wanted the MRI wanted to be discharged to home.  I discussed with him that I cannot fully rule out the possibility that he had a stroke or TIA, and he verbalized understanding with this but still wished not to have the MRI performed.  Therefore will discharge home with symptomatic control for his vertigo and have follow-up with his primary care provider.  Return precautions discussed.     At the conclusion of the encounter I discussed the results of all of the tests and the disposition. The questions were answered. The patient or family acknowledged understanding and was agreeable with the care plan.      2) ESRD - has not made follow-up w/ Dr Cosby or arranged for CT scan   Still some swelling at AV fistula site but greatly improved       Per Dr Cosby note on 5/13/21-   Patient well-known to me for aortic dissection for whom I performed an immediate access short segment left forearm dialysis loop graft for progression of renal failure.  Operation performed on April 20.  Initially complicated by significant swelling in the arm which I think in retrospect was related to some degree of central stenosis.  Swelling is essentially resolved now.  Undergoing dialysis twice a week.  Is extremely fatigued after his dialysis run.  Not interested in this context of going on to 3 times  "a day dialysis.  Left forearm graft easily palpable and incision is nicely healed.  Swelling essentially resolved in the arm.     Duplex of the fistula graft shows widely patent flow in the graft with excellent flow volumes.     Impression and plan: Successful dialysis access.  I spent some time discussing with him that I thought more frequent shorter dialysis runs would be better for him with respect to his aortic dissection.  In aortic dissection large volume shifts can be more dangerous.  The patient is somewhat reluctant to go more frequently for dialysis and is trying to negotiate with me about doing shorter runs.  I explained to him that I really had no expertise to decide this and that he needs to discuss it with his nephrologist.     I will plan on seeing the patient back in 3 months time with a repeat CTA chest Abdo pelvis to look at his aortic dissection.  Given that he is now on dialysis I think we can give him some contrast to get a better understanding of his aortic narrowing and claudication symptoms.  I think there might be an opportunity to improve his blood flow now that he is on dialysis but I am not sure that he would want any intervention at all.     3) Anemia - getting labs every two weeks at dialysis; says hemoglobin is above 9    4) HTN - notes that BP at dialysis is \"up and down\"; range is 110-140's systolic  He is concerned about recent weight gain  Wants medicine to make him feel less heavy   Denies abd distention or edema    BP Readings from Last 6 Encounters:   08/18/21 (!) 147/76   05/13/21 (!) 140/80   04/29/21 118/74   03/24/21 137/79   02/04/21 139/78   12/30/20 (!) 172/92     Wt Readings from Last 3 Encounters:   08/18/21 62.8 kg (138 lb 8 oz)   04/29/21 58.5 kg (129 lb)   04/26/21 57.3 kg (126 lb 5.2 oz)     5) R arm numbness/ lump on wrist  -   Feels it is positional so maybe a blood flow problem  Ganglion cyst on R dorsal wrist     6) Entecavir - needs refill      Past Medical " History:   Diagnosis Date     NAHUM (acute kidney injury) (H)      GI (gastrointestinal bleed)      Gout      H. pylori infection 7/5/2017    UGI bleed implied by Hgb 9.0 6/22/17 and melena. Admitted and hgb decreased to 7.6, CT abdomen showed all bladder wall thickening. + Hep B surface antigen noted. Melena resolved and hgb stabalized without transfusion, epigastric pain resolved with PPI. Recommend referral for gastroscopy.     Heart attack (H)      Hepatitis B      Normocytic anemia      PONV (postoperative nausea and vomiting)      Thrombocytopenia (H)       Past Surgical History:   Procedure Laterality Date     ABCESS DRAINAGE      finger     CREATE FISTULA ARTERIOVENOUS UPPER EXTREMITY Left 4/20/2021    Procedure: left arm dialysis graft placement;  Surgeon: Roxana Cosby MD;  Location: [facility];  Service: General     FOREIGN BODY REMOVAL      finger     INCISION AND DRAINAGE FINGER, COMBINED Left 10/20/2016    Procedure: COMBINED INCISION AND DRAINAGE FINGER;  Surgeon: Mireya Pizano MD;  Location: [facility]     IR CHEST TUBE PLACEMENT NON-TUNNELED RIGHT  4/19/2021     IR PLEURAL DRAINAGE WITH CATHETER INSERTION  4/19/2021     MIDLINE INSERTION - DOUBLE LUMEN  4/23/2021          NE ESOPHAGOGASTRODUODENOSCOPY TRANSORAL DIAGNOSTIC N/A 8/18/2020    Procedure: ESOPHAGOGASTRODUODENOSCOPY (EGD) with biopsy;  Surgeon: Nilson Gonzales MD;  Location: [facility];  Service: Gastroenterology      PARACENTESIS  8/14/2020      PARACENTESIS  8/19/2020     US THORACENTESIS  4/18/2021      Current Outpatient Medications   Medication Sig Dispense Refill     entecavir (BARACLUDE) 0.5 MG tablet Take 1 tablet (0.5 mg) by mouth every other day 45 tablet 3     hydrOXYzine (ATARAX) 25 MG tablet Take 1 tablet (25 mg) by mouth 3 times daily as needed for itching 45 tablet 1     carvedilol (COREG) 25 MG tablet Take 2 tablets (50 mg) by mouth 2 times daily (with meals) 60 tablet 11     cilostazol (PLETAL) 50 MG tablet  "Take 1 tablet (50 mg) by mouth 2 times daily 180 tablet 0     cloNIDine (CATAPRES) 0.1 MG tablet Take 1 tablet (0.1 mg) by mouth At Bedtime 30 tablet 11     diltiazem ER (TIAZAC) 180 MG 24 hr ER beaded capsule Take 1 capsule (180 mg) by mouth 2 times daily 60 capsule 11     epoetin kelton (EPOGEN/PROCRIT) 74598 UNIT/ML injection Inject 40,000 Units Subcutaneous       ferrous sulfate (FE TABS) 325 (65 Fe) MG EC tablet Take 1 tablet (325 mg) by mouth daily       furosemide (LASIX) 40 MG tablet Take 1 tablet (40 mg) by mouth daily as needed (leg swelling) 30 tablet 11     HYDROmorphone (DILAUDID) 2 MG tablet Take 1 tablet (2 mg) by mouth every 6 hours as needed for severe pain 12 tablet 0     omeprazole (PRILOSEC) 20 MG DR capsule Take 1 capsule (20 mg) by mouth 2 times daily 60 capsule 11     order for DME Equipment being ordered: Other: blood pressure monitor  Treatment Diagnosis: hypertension 1 each 0     polyethylene glycol (MIRALAX) 17 g packet Take 17 g by mouth daily as needed for constipation 10 packet 3     zolpidem (AMBIEN) 5 MG tablet Take 1 tablet (5 mg) by mouth nightly as needed for sleep 10 tablet 5      Allergies   Allergen Reactions     Nka [No Known Allergies]         ROS:   Gen- + weight change, no fevers/chills   Head/ Eyes- no blurred vision, no headaches   ENT- no cough, no congestion, no URI symptoms   Cardiac - no palpitations, no chest pain   Respiratory - no shortness of breath , no wheezing   GI - no nausea, no vomiting, no diarrhea, no constipation   Neuro - see HPI  Remainder of ROS negative.     Exam:   BP (!) 147/76 (BP Location: Right arm, Patient Position: Sitting, Cuff Size: Adult Regular)   Pulse 75   Temp 97.8  F (36.6  C) (Oral)   Resp 18   Ht 1.622 m (5' 3.86\")   Wt 62.8 kg (138 lb 8 oz)   SpO2 99%   BMI 23.88 kg/m     Alert and oriented x 3; No acute distress   ABd: soft, + bowel sounds, nontender/+ distended, no rebound/guarding   Extrem: no clubbing/cyanosis/edema   Msk: + " firm mobile mass on dorsum of R wrist consistent w/ ganglion cyst   Neuro: no focal deficits   Derm: no rashes

## 2021-08-18 ENCOUNTER — OFFICE VISIT (OUTPATIENT)
Dept: FAMILY MEDICINE | Facility: CLINIC | Age: 61
End: 2021-08-18
Payer: COMMERCIAL

## 2021-08-18 VITALS
HEIGHT: 64 IN | WEIGHT: 138.5 LBS | BODY MASS INDEX: 23.64 KG/M2 | RESPIRATION RATE: 18 BRPM | HEART RATE: 75 BPM | SYSTOLIC BLOOD PRESSURE: 147 MMHG | DIASTOLIC BLOOD PRESSURE: 76 MMHG | OXYGEN SATURATION: 99 % | TEMPERATURE: 97.8 F

## 2021-08-18 DIAGNOSIS — Z99.2 ESRD (END STAGE RENAL DISEASE) ON DIALYSIS (H): ICD-10-CM

## 2021-08-18 DIAGNOSIS — B18.1 CHRONIC VIRAL HEPATITIS B WITHOUT DELTA AGENT AND WITHOUT COMA (H): ICD-10-CM

## 2021-08-18 DIAGNOSIS — D63.1 ANEMIA DUE TO STAGE 4 CHRONIC KIDNEY DISEASE (H): Primary | ICD-10-CM

## 2021-08-18 DIAGNOSIS — K74.60 CIRRHOSIS OF LIVER WITHOUT ASCITES, UNSPECIFIED HEPATIC CIRRHOSIS TYPE (H): ICD-10-CM

## 2021-08-18 DIAGNOSIS — M67.431 GANGLION CYST OF WRIST, RIGHT: ICD-10-CM

## 2021-08-18 DIAGNOSIS — N18.6 ESRD (END STAGE RENAL DISEASE) ON DIALYSIS (H): ICD-10-CM

## 2021-08-18 DIAGNOSIS — Z23 HIGH PRIORITY FOR 2019-NCOV VACCINE: ICD-10-CM

## 2021-08-18 DIAGNOSIS — N18.4 ANEMIA DUE TO STAGE 4 CHRONIC KIDNEY DISEASE (H): Primary | ICD-10-CM

## 2021-08-18 LAB
ALBUMIN SERPL-MCNC: 3.1 G/DL (ref 3.4–5)
ALP SERPL-CCNC: 129 U/L (ref 40–150)
ALT SERPL W P-5'-P-CCNC: 49 U/L
ANION GAP SERPL CALCULATED.3IONS-SCNC: 12 MMOL/L (ref 3–14)
AST SERPL W P-5'-P-CCNC: 31 U/L (ref 0–45)
BILIRUB SERPL-MCNC: 0.7 MG/DL (ref 0.2–1.3)
BUN SERPL-MCNC: 107 MG/DL (ref 7–30)
CALCIUM SERPL-MCNC: 8.9 MG/DL (ref 8.5–10.1)
CHLORIDE BLD-SCNC: 108 MMOL/L
CO2 SERPL-SCNC: 22 MMOL/L (ref 20–32)
CREAT SERPL-MCNC: 6.5 MG/DL
ERYTHROCYTE [DISTWIDTH] IN BLOOD BY AUTOMATED COUNT: 15.7 % (ref 10–15)
GFR SERPL CREATININE-BSD FRML MDRD: 8 ML/MIN/1.73M2
GLUCOSE BLD-MCNC: 107 MG/DL (ref 70–99)
HCT VFR BLD AUTO: 26.4 % (ref 40–53)
HGB BLD-MCNC: 8.5 G/DL (ref 13.3–17.7)
MCH RBC QN AUTO: 31.6 PG (ref 26.5–33)
MCHC RBC AUTO-ENTMCNC: 32.2 G/DL (ref 31.5–36.5)
MCV RBC AUTO: 98 FL (ref 78–100)
PLAT MORPH BLD: NORMAL
PLATELET # BLD AUTO: 48 10E3/UL (ref 150–450)
POTASSIUM BLD-SCNC: 5.6 MMOL/L (ref 3.4–5.3)
PROT SERPL-MCNC: 7.1 G/DL (ref 6.8–8.8)
RBC # BLD AUTO: 2.69 10E6/UL (ref 4.4–5.9)
RBC MORPH BLD: NORMAL
SODIUM SERPL-SCNC: 142 MMOL/L (ref 133–144)
WBC # BLD AUTO: 4.6 10E3/UL (ref 4–11)

## 2021-08-18 PROCEDURE — 0011A COVID-19,PF,MODERNA: CPT | Performed by: FAMILY MEDICINE

## 2021-08-18 PROCEDURE — 91301 COVID-19,PF,MODERNA: CPT | Performed by: FAMILY MEDICINE

## 2021-08-18 PROCEDURE — 85027 COMPLETE CBC AUTOMATED: CPT | Performed by: FAMILY MEDICINE

## 2021-08-18 PROCEDURE — 80053 COMPREHEN METABOLIC PANEL: CPT | Performed by: FAMILY MEDICINE

## 2021-08-18 PROCEDURE — 36415 COLL VENOUS BLD VENIPUNCTURE: CPT | Performed by: FAMILY MEDICINE

## 2021-08-18 PROCEDURE — 99214 OFFICE O/P EST MOD 30 MIN: CPT | Performed by: FAMILY MEDICINE

## 2021-08-18 RX ORDER — ENTECAVIR 0.5 MG/1
0.5 TABLET, FILM COATED ORAL EVERY OTHER DAY
Qty: 45 TABLET | Refills: 3 | Status: ON HOLD | OUTPATIENT
Start: 2021-08-18 | End: 2021-09-29

## 2021-08-18 ASSESSMENT — MIFFLIN-ST. JEOR: SCORE: 1341.98

## 2021-08-24 DIAGNOSIS — L29.9 PRURITIC DISORDER: ICD-10-CM

## 2021-08-24 RX ORDER — HYDROXYZINE HYDROCHLORIDE 25 MG/1
25 TABLET, FILM COATED ORAL 3 TIMES DAILY PRN
Qty: 90 TABLET | Refills: 3 | Status: SHIPPED | OUTPATIENT
Start: 2021-08-24 | End: 2022-01-01

## 2021-08-25 ENCOUNTER — ANCILLARY PROCEDURE (OUTPATIENT)
Dept: GENERAL RADIOLOGY | Facility: CLINIC | Age: 61
End: 2021-08-25
Attending: FAMILY MEDICINE
Payer: COMMERCIAL

## 2021-08-25 ENCOUNTER — OFFICE VISIT (OUTPATIENT)
Dept: FAMILY MEDICINE | Facility: CLINIC | Age: 61
End: 2021-08-25
Payer: COMMERCIAL

## 2021-08-25 ENCOUNTER — HOSPITAL ENCOUNTER (OUTPATIENT)
Dept: OCCUPATIONAL THERAPY | Facility: REHABILITATION | Age: 61
End: 2021-08-25
Attending: FAMILY MEDICINE
Payer: COMMERCIAL

## 2021-08-25 VITALS
SYSTOLIC BLOOD PRESSURE: 138 MMHG | BODY MASS INDEX: 22.88 KG/M2 | DIASTOLIC BLOOD PRESSURE: 73 MMHG | HEIGHT: 64 IN | RESPIRATION RATE: 16 BRPM | HEART RATE: 70 BPM | OXYGEN SATURATION: 99 % | WEIGHT: 134 LBS | TEMPERATURE: 98.1 F

## 2021-08-25 DIAGNOSIS — M25.532 PAIN IN BOTH WRISTS: ICD-10-CM

## 2021-08-25 DIAGNOSIS — M79.641 PAIN OF RIGHT HAND: ICD-10-CM

## 2021-08-25 DIAGNOSIS — M25.531 PAIN IN BOTH WRISTS: ICD-10-CM

## 2021-08-25 DIAGNOSIS — M67.431 GANGLION CYST OF WRIST, RIGHT: Primary | ICD-10-CM

## 2021-08-25 PROCEDURE — 99207 PR NO BILLABLE SERVICE THIS VISIT: CPT | Mod: GO | Performed by: OCCUPATIONAL THERAPIST

## 2021-08-25 PROCEDURE — 73130 X-RAY EXAM OF HAND: CPT | Mod: RT | Performed by: RADIOLOGY

## 2021-08-25 PROCEDURE — 99213 OFFICE O/P EST LOW 20 MIN: CPT | Performed by: FAMILY MEDICINE

## 2021-08-25 PROCEDURE — 73110 X-RAY EXAM OF WRIST: CPT | Mod: FY | Performed by: RADIOLOGY

## 2021-08-25 ASSESSMENT — MIFFLIN-ST. JEOR: SCORE: 1323.82

## 2021-08-25 NOTE — PROGRESS NOTES
ASSESSMENT/PLAN:    (M67.431) Ganglion cyst of wrist, right  (primary encounter diagnosis)  Comment:   Plan: unsuccessful aspiration of ganglion cyst; will monitor and refer to hand surgery if cyst increases in size and bothers him    (M25.531,  M25.532) Pain in both wrists  Comment: exam and imaging consistent w/ OA; referral to hand therapy  Plan: XR WRIST BILATERAL G/E 3 VW, Occupational Therapy Referral          (M79.641) Pain of right hand  Comment: see above  Plan: XR Hand Right G/E 3 Views, Occupational Therapy Referral            Jaswant Flores MD  August 25, 2021  9:17 AM        Pt is a 61 year old male last seen on 8/18/2021 here today for:     1) Ganglion cyst hand - dorsum of R hand -> would like drained  Present x end of 2020    2) Bilateral hand pain - noted small bumps on dorsum of both hands   R hand fingers feel numb intermittently         Per my last note:     (N18.4,  D63.1) Anemia due to stage 4 chronic kidney disease (H)  (primary encounter diagnosis)  Comment: stable labs in dialysis; requested he get labwork here today and ask the dialysis center to send labs to our office  Plan: CBC with platelets          (K74.60) Cirrhosis of liver without ascites, unspecified hepatic cirrhosis type (H)  Comment: notes stable abd distention  Plan: Comprehensive metabolic panel           (N18.6,  Z99.2) ESRD (end stage renal disease) on dialysis (H)  Comment: cont dialysis; encouraged 3x/weekly sessions instead of 2x/weekly; he is not really interested at this time  Plan: Comprehensive metabolic panel          (B18.1) Chronic viral hepatitis B without delta agent and without coma (H)  Comment: refilled  Plan: entecavir (BARACLUDE) 0.5 MG tablet          (Z23) High priority for 2019-nCoV vaccine  Comment:   Plan: Moderna #1 today!      (M67.431) Ganglion cyst of wrist, right  Comment:   Plan: consider aspiration at next visit; could like to get imaging of wrist to eval for OA as well    Past Medical History:    Diagnosis Date     NAHUM (acute kidney injury) (H)      GI (gastrointestinal bleed)      Gout      H. pylori infection 7/5/2017    UGI bleed implied by Hgb 9.0 6/22/17 and melena. Admitted and hgb decreased to 7.6, CT abdomen showed all bladder wall thickening. + Hep B surface antigen noted. Melena resolved and hgb stabalized without transfusion, epigastric pain resolved with PPI. Recommend referral for gastroscopy.     Heart attack (H)      Hepatitis B      Normocytic anemia      PONV (postoperative nausea and vomiting)      Thrombocytopenia (H)       Past Surgical History:   Procedure Laterality Date     ABCESS DRAINAGE      finger     CREATE FISTULA ARTERIOVENOUS UPPER EXTREMITY Left 4/20/2021    Procedure: left arm dialysis graft placement;  Surgeon: Roxana Cosby MD;  Location: [facility];  Service: General     FOREIGN BODY REMOVAL      finger     INCISION AND DRAINAGE FINGER, COMBINED Left 10/20/2016    Procedure: COMBINED INCISION AND DRAINAGE FINGER;  Surgeon: Mireya Pizano MD;  Location: [facility]     IR CHEST TUBE PLACEMENT NON-TUNNELED RIGHT  4/19/2021     IR PLEURAL DRAINAGE WITH CATHETER INSERTION  4/19/2021     MIDLINE INSERTION - DOUBLE LUMEN  4/23/2021          [place] ESOPHAGOGASTRODUODENOSCOPY TRANSORAL DIAGNOSTIC N/A 8/18/2020    Procedure: ESOPHAGOGASTRODUODENOSCOPY (EGD) with biopsy;  Surgeon: Nilson Gonzales MD;  Location: [facility];  Service: Gastroenterology      PARACENTESIS  8/14/2020      PARACENTESIS  8/19/2020     US THORACENTESIS  4/18/2021      Current Outpatient Medications   Medication Sig Dispense Refill     carvedilol (COREG) 25 MG tablet Take 2 tablets (50 mg) by mouth 2 times daily (with meals) 60 tablet 11     cilostazol (PLETAL) 50 MG tablet Take 1 tablet (50 mg) by mouth 2 times daily 180 tablet 0     cloNIDine (CATAPRES) 0.1 MG tablet Take 1 tablet (0.1 mg) by mouth At Bedtime 30 tablet 11     diltiazem ER (TIAZAC) 180 MG 24 hr ER beaded capsule Take 1 capsule  "(180 mg) by mouth 2 times daily 60 capsule 11     entecavir (BARACLUDE) 0.5 MG tablet Take 1 tablet (0.5 mg) by mouth every other day 45 tablet 3     epoetin kelton (EPOGEN/PROCRIT) 90975 UNIT/ML injection Inject 40,000 Units Subcutaneous       ferrous sulfate (FE TABS) 325 (65 Fe) MG EC tablet Take 1 tablet (325 mg) by mouth daily       furosemide (LASIX) 40 MG tablet Take 1 tablet (40 mg) by mouth daily as needed (leg swelling) 30 tablet 11     hydrOXYzine (ATARAX) 25 MG tablet Take 1 tablet (25 mg) by mouth 3 times daily as needed for itching 90 tablet 3     omeprazole (PRILOSEC) 20 MG DR capsule Take 1 capsule (20 mg) by mouth 2 times daily 60 capsule 11     order for DME Equipment being ordered: Other: blood pressure monitor  Treatment Diagnosis: hypertension 1 each 0     polyethylene glycol (MIRALAX) 17 g packet Take 17 g by mouth daily as needed for constipation 10 packet 3     zolpidem (AMBIEN) 5 MG tablet Take 1 tablet (5 mg) by mouth nightly as needed for sleep 10 tablet 5     HYDROmorphone (DILAUDID) 2 MG tablet Take 1 tablet (2 mg) by mouth every 6 hours as needed for severe pain (Patient not taking: Reported on 8/25/2021) 12 tablet 0      Allergies   Allergen Reactions     Nka [No Known Allergies]         ROS:   Gen- no weight change, no fevers/chills   Cardiac - no palpitations, no chest pain   Respiratory - no shortness of breath , no wheezing   Neuro - see HPI  Remainder of ROS negative.     Exam:   /73 (BP Location: Right arm, Patient Position: Sitting, Cuff Size: Adult Regular)   Pulse 70   Temp 98.1  F (36.7  C) (Oral)   Resp 16   Ht 1.626 m (5' 4\")   Wt 60.8 kg (134 lb)   SpO2 99%   BMI 23.00 kg/m       Alert and oriented x 3; No acute distress   MSK:    Bilateral WRIST/HAND:   Inspection: Swelling - mild at PIP/DIPs; + dorsal swelling at ulnar R wrist consistent w/ ganglion cyst; Atrophy - NO   Sensation: intact in median, radial, ulnar distribution   ROM:   Wrist: flexion- full/ " extension- full/ pronation- full/ supination- full;   radial deviation - full; ulnar deviation- full   Hand: Full flexion at PIP/DIP, finger abduction/ adduction except for limited flexion of R pinky finger  Strength: 5/5 in all motions   Bony tenderness:   Wrist: Carpal bones: NO; Snuffbox: NO   Hand: Metacarpals: No; Phalanges: mild TTP at DIPs   Tendons: flexor/extensor mechanism intact

## 2021-09-15 ENCOUNTER — IMMUNIZATION (OUTPATIENT)
Dept: FAMILY MEDICINE | Facility: CLINIC | Age: 61
End: 2021-09-15
Attending: FAMILY MEDICINE
Payer: COMMERCIAL

## 2021-09-15 PROCEDURE — 91301 PR COVID VAC MODERNA 100 MCG/0.5 ML IM: CPT

## 2021-09-15 PROCEDURE — 0012A PR COVID VAC MODERNA 100 MCG/0.5 ML IM: CPT

## 2021-09-26 ENCOUNTER — HOSPITAL ENCOUNTER (INPATIENT)
Facility: HOSPITAL | Age: 61
LOS: 3 days | Discharge: HOME | DRG: 555 | End: 2021-09-29
Attending: EMERGENCY MEDICINE | Admitting: FAMILY MEDICINE
Payer: COMMERCIAL

## 2021-09-26 ENCOUNTER — APPOINTMENT (OUTPATIENT)
Dept: MRI IMAGING | Facility: HOSPITAL | Age: 61
DRG: 555 | End: 2021-09-26
Payer: COMMERCIAL

## 2021-09-26 ENCOUNTER — APPOINTMENT (OUTPATIENT)
Dept: CT IMAGING | Facility: HOSPITAL | Age: 61
DRG: 555 | End: 2021-09-26
Payer: COMMERCIAL

## 2021-09-26 DIAGNOSIS — I71.019 CHRONIC DISSECTION OF THORACIC AORTA (H): ICD-10-CM

## 2021-09-26 DIAGNOSIS — B18.1 CHRONIC VIRAL HEPATITIS B WITHOUT DELTA AGENT AND WITHOUT COMA (H): ICD-10-CM

## 2021-09-26 DIAGNOSIS — Z99.2 ESRD (END STAGE RENAL DISEASE) ON DIALYSIS (H): ICD-10-CM

## 2021-09-26 DIAGNOSIS — D69.6 THROMBOCYTOPENIA (H): ICD-10-CM

## 2021-09-26 DIAGNOSIS — K74.60 CIRRHOSIS OF LIVER WITHOUT ASCITES, UNSPECIFIED HEPATIC CIRRHOSIS TYPE (H): ICD-10-CM

## 2021-09-26 DIAGNOSIS — K74.60 HEPATIC CIRRHOSIS, UNSPECIFIED HEPATIC CIRRHOSIS TYPE, UNSPECIFIED WHETHER ASCITES PRESENT (H): ICD-10-CM

## 2021-09-26 DIAGNOSIS — I71.03 DISSECTION OF THORACOABDOMINAL AORTA (H): ICD-10-CM

## 2021-09-26 DIAGNOSIS — N17.9 ACUTE KIDNEY INJURY (H): ICD-10-CM

## 2021-09-26 DIAGNOSIS — D61.818 OTHER PANCYTOPENIA (H): ICD-10-CM

## 2021-09-26 DIAGNOSIS — S70.12XA ILIOPSOAS MUSCLE HEMATOMA, LEFT, INITIAL ENCOUNTER: Primary | ICD-10-CM

## 2021-09-26 DIAGNOSIS — D62 ANEMIA DUE TO BLOOD LOSS, ACUTE: ICD-10-CM

## 2021-09-26 DIAGNOSIS — N18.6 ESRD (END STAGE RENAL DISEASE) ON DIALYSIS (H): ICD-10-CM

## 2021-09-26 LAB
ABO/RH(D): NORMAL
ANION GAP SERPL CALCULATED.3IONS-SCNC: 12 MMOL/L (ref 5–18)
ANTIBODY SCREEN: NEGATIVE
APTT PPP: 41 SECONDS (ref 22–38)
BASOPHILS # BLD AUTO: 0 10E3/UL (ref 0–0.2)
BASOPHILS NFR BLD AUTO: 0 %
BLD PROD TYP BPU: NORMAL
BLOOD COMPONENT TYPE: NORMAL
BUN SERPL-MCNC: 111 MG/DL (ref 8–22)
C REACTIVE PROTEIN LHE: 0.8 MG/DL (ref 0–0.8)
CALCIUM SERPL-MCNC: 8.2 MG/DL (ref 8.5–10.5)
CHLORIDE BLD-SCNC: 107 MMOL/L (ref 98–107)
CO2 SERPL-SCNC: 18 MMOL/L (ref 22–31)
CODING SYSTEM: NORMAL
CREAT SERPL-MCNC: 10.56 MG/DL (ref 0.7–1.3)
CROSSMATCH: NORMAL
CROSSMATCH: NORMAL
EOSINOPHIL # BLD AUTO: 0.1 10E3/UL (ref 0–0.7)
EOSINOPHIL NFR BLD AUTO: 1 %
ERYTHROCYTE [DISTWIDTH] IN BLOOD BY AUTOMATED COUNT: 16.3 % (ref 10–15)
ERYTHROCYTE [SEDIMENTATION RATE] IN BLOOD BY WESTERGREN METHOD: 4 MM/HR (ref 0–15)
GFR SERPL CREATININE-BSD FRML MDRD: 5 ML/MIN/1.73M2
GLUCOSE BLD-MCNC: 90 MG/DL (ref 70–125)
HCT VFR BLD AUTO: 17.4 % (ref 40–53)
HGB BLD-MCNC: 5.6 G/DL (ref 13.3–17.7)
HGB BLD-MCNC: 7.9 G/DL (ref 13.3–17.7)
IMM GRANULOCYTES # BLD: 0 10E3/UL
IMM GRANULOCYTES NFR BLD: 1 %
INR PPP: 1.67 (ref 0.9–1.15)
ISSUE DATE AND TIME: NORMAL
LYMPHOCYTES # BLD AUTO: 0.6 10E3/UL (ref 0.8–5.3)
LYMPHOCYTES NFR BLD AUTO: 11 %
MCH RBC QN AUTO: 31.5 PG (ref 26.5–33)
MCHC RBC AUTO-ENTMCNC: 32.2 G/DL (ref 31.5–36.5)
MCV RBC AUTO: 98 FL (ref 78–100)
MONOCYTES # BLD AUTO: 0.3 10E3/UL (ref 0–1.3)
MONOCYTES NFR BLD AUTO: 6 %
NEUTROPHILS # BLD AUTO: 4.4 10E3/UL (ref 1.6–8.3)
NEUTROPHILS NFR BLD AUTO: 81 %
NRBC # BLD AUTO: 0 10E3/UL
NRBC BLD AUTO-RTO: 0 /100
PLATELET # BLD AUTO: 49 10E3/UL (ref 150–450)
POTASSIUM BLD-SCNC: 5.2 MMOL/L (ref 3.5–5)
PROCALCITONIN SERPL-MCNC: 0.26 NG/ML (ref 0–0.49)
RBC # BLD AUTO: 1.78 10E6/UL (ref 4.4–5.9)
SARS-COV-2 RNA RESP QL NAA+PROBE: NEGATIVE
SODIUM SERPL-SCNC: 137 MMOL/L (ref 136–145)
SPECIMEN EXPIRATION DATE: NORMAL
UNIT ABO/RH: NORMAL
UNIT NUMBER: NORMAL
UNIT STATUS: NORMAL
UNIT TYPE ISBT: 6200
WBC # BLD AUTO: 5.4 10E3/UL (ref 4–11)

## 2021-09-26 PROCEDURE — 85730 THROMBOPLASTIN TIME PARTIAL: CPT | Performed by: PHYSICIAN ASSISTANT

## 2021-09-26 PROCEDURE — 86900 BLOOD TYPING SEROLOGIC ABO: CPT | Performed by: PHYSICIAN ASSISTANT

## 2021-09-26 PROCEDURE — 120N000001 HC R&B MED SURG/OB

## 2021-09-26 PROCEDURE — 75635 CT ANGIO ABDOMINAL ARTERIES: CPT

## 2021-09-26 PROCEDURE — 86141 C-REACTIVE PROTEIN HS: CPT | Performed by: PHYSICIAN ASSISTANT

## 2021-09-26 PROCEDURE — 80048 BASIC METABOLIC PNL TOTAL CA: CPT | Performed by: PHYSICIAN ASSISTANT

## 2021-09-26 PROCEDURE — 99292 CRITICAL CARE ADDL 30 MIN: CPT

## 2021-09-26 PROCEDURE — 85025 COMPLETE CBC W/AUTO DIFF WBC: CPT | Performed by: PHYSICIAN ASSISTANT

## 2021-09-26 PROCEDURE — 250N000011 HC RX IP 250 OP 636: Performed by: EMERGENCY MEDICINE

## 2021-09-26 PROCEDURE — 96375 TX/PRO/DX INJ NEW DRUG ADDON: CPT

## 2021-09-26 PROCEDURE — 86923 COMPATIBILITY TEST ELECTRIC: CPT | Performed by: PHYSICIAN ASSISTANT

## 2021-09-26 PROCEDURE — 84145 PROCALCITONIN (PCT): CPT | Performed by: PHYSICIAN ASSISTANT

## 2021-09-26 PROCEDURE — 250N000013 HC RX MED GY IP 250 OP 250 PS 637: Performed by: PHYSICIAN ASSISTANT

## 2021-09-26 PROCEDURE — 96374 THER/PROPH/DIAG INJ IV PUSH: CPT | Mod: 59

## 2021-09-26 PROCEDURE — 87635 SARS-COV-2 COVID-19 AMP PRB: CPT | Performed by: PHYSICIAN ASSISTANT

## 2021-09-26 PROCEDURE — 250N000013 HC RX MED GY IP 250 OP 250 PS 637

## 2021-09-26 PROCEDURE — C9803 HOPD COVID-19 SPEC COLLECT: HCPCS

## 2021-09-26 PROCEDURE — 85652 RBC SED RATE AUTOMATED: CPT | Performed by: PHYSICIAN ASSISTANT

## 2021-09-26 PROCEDURE — P9035 PLATELET PHERES LEUKOREDUCED: HCPCS | Performed by: PHYSICIAN ASSISTANT

## 2021-09-26 PROCEDURE — P9016 RBC LEUKOCYTES REDUCED: HCPCS | Performed by: PHYSICIAN ASSISTANT

## 2021-09-26 PROCEDURE — 258N000003 HC RX IP 258 OP 636

## 2021-09-26 PROCEDURE — 36415 COLL VENOUS BLD VENIPUNCTURE: CPT

## 2021-09-26 PROCEDURE — 99291 CRITICAL CARE FIRST HOUR: CPT | Mod: 25

## 2021-09-26 PROCEDURE — 36415 COLL VENOUS BLD VENIPUNCTURE: CPT | Performed by: PHYSICIAN ASSISTANT

## 2021-09-26 PROCEDURE — 72148 MRI LUMBAR SPINE W/O DYE: CPT

## 2021-09-26 PROCEDURE — 85610 PROTHROMBIN TIME: CPT | Performed by: PHYSICIAN ASSISTANT

## 2021-09-26 PROCEDURE — 85018 HEMOGLOBIN: CPT

## 2021-09-26 RX ORDER — NALOXONE HYDROCHLORIDE 0.4 MG/ML
0.4 INJECTION, SOLUTION INTRAMUSCULAR; INTRAVENOUS; SUBCUTANEOUS
Status: DISCONTINUED | OUTPATIENT
Start: 2021-09-26 | End: 2021-09-29 | Stop reason: HOSPADM

## 2021-09-26 RX ORDER — LABETALOL HYDROCHLORIDE 5 MG/ML
20 INJECTION, SOLUTION INTRAVENOUS ONCE
Status: DISCONTINUED | OUTPATIENT
Start: 2021-09-26 | End: 2021-09-29 | Stop reason: HOSPADM

## 2021-09-26 RX ORDER — ONDANSETRON 2 MG/ML
4 INJECTION INTRAMUSCULAR; INTRAVENOUS EVERY 6 HOURS PRN
Status: DISCONTINUED | OUTPATIENT
Start: 2021-09-26 | End: 2021-09-29 | Stop reason: HOSPADM

## 2021-09-26 RX ORDER — SODIUM CHLORIDE 9 MG/ML
INJECTION, SOLUTION INTRAVENOUS CONTINUOUS
Status: DISCONTINUED | OUTPATIENT
Start: 2021-09-26 | End: 2021-09-27 | Stop reason: CLARIF

## 2021-09-26 RX ORDER — IOPAMIDOL 755 MG/ML
100 INJECTION, SOLUTION INTRAVASCULAR ONCE
Status: COMPLETED | OUTPATIENT
Start: 2021-09-26 | End: 2021-09-26

## 2021-09-26 RX ORDER — OXYCODONE HYDROCHLORIDE 5 MG/1
5 TABLET ORAL ONCE
Status: COMPLETED | OUTPATIENT
Start: 2021-09-26 | End: 2021-09-26

## 2021-09-26 RX ORDER — PROCHLORPERAZINE MALEATE 10 MG
10 TABLET ORAL EVERY 6 HOURS PRN
Status: DISCONTINUED | OUTPATIENT
Start: 2021-09-26 | End: 2021-09-29 | Stop reason: HOSPADM

## 2021-09-26 RX ORDER — DILTIAZEM HYDROCHLORIDE 180 MG/1
180 CAPSULE, EXTENDED RELEASE ORAL 2 TIMES DAILY
Status: DISCONTINUED | OUTPATIENT
Start: 2021-09-26 | End: 2021-09-29 | Stop reason: HOSPADM

## 2021-09-26 RX ORDER — PROCHLORPERAZINE 25 MG
25 SUPPOSITORY, RECTAL RECTAL EVERY 12 HOURS PRN
Status: DISCONTINUED | OUTPATIENT
Start: 2021-09-26 | End: 2021-09-29 | Stop reason: HOSPADM

## 2021-09-26 RX ORDER — ONDANSETRON 4 MG/1
4 TABLET, ORALLY DISINTEGRATING ORAL EVERY 6 HOURS PRN
Status: DISCONTINUED | OUTPATIENT
Start: 2021-09-26 | End: 2021-09-29 | Stop reason: HOSPADM

## 2021-09-26 RX ORDER — ACETAMINOPHEN 325 MG/1
650 TABLET ORAL EVERY 8 HOURS PRN
Status: DISCONTINUED | OUTPATIENT
Start: 2021-09-26 | End: 2021-09-29 | Stop reason: HOSPADM

## 2021-09-26 RX ORDER — HYDROXYZINE HYDROCHLORIDE 25 MG/1
25 TABLET, FILM COATED ORAL 3 TIMES DAILY PRN
Status: DISCONTINUED | OUTPATIENT
Start: 2021-09-26 | End: 2021-09-29 | Stop reason: HOSPADM

## 2021-09-26 RX ORDER — NALOXONE HYDROCHLORIDE 0.4 MG/ML
0.2 INJECTION, SOLUTION INTRAMUSCULAR; INTRAVENOUS; SUBCUTANEOUS
Status: DISCONTINUED | OUTPATIENT
Start: 2021-09-26 | End: 2021-09-29 | Stop reason: HOSPADM

## 2021-09-26 RX ORDER — ENTECAVIR 0.5 MG/1
0.5 TABLET, FILM COATED ORAL
Status: DISCONTINUED | OUTPATIENT
Start: 2021-09-26 | End: 2021-09-28

## 2021-09-26 RX ORDER — ACETAMINOPHEN 500 MG
500 TABLET ORAL EVERY 6 HOURS PRN
Status: ON HOLD | COMMUNITY
End: 2022-01-01

## 2021-09-26 RX ORDER — LIDOCAINE 40 MG/G
CREAM TOPICAL
Status: DISCONTINUED | OUTPATIENT
Start: 2021-09-26 | End: 2021-09-29 | Stop reason: HOSPADM

## 2021-09-26 RX ORDER — ONDANSETRON 2 MG/ML
4 INJECTION INTRAMUSCULAR; INTRAVENOUS ONCE
Status: COMPLETED | OUTPATIENT
Start: 2021-09-26 | End: 2021-09-26

## 2021-09-26 RX ORDER — LIDOCAINE 4 G/G
1 PATCH TOPICAL ONCE
Status: COMPLETED | OUTPATIENT
Start: 2021-09-26 | End: 2021-09-26

## 2021-09-26 RX ORDER — PANTOPRAZOLE SODIUM 20 MG/1
40 TABLET, DELAYED RELEASE ORAL 2 TIMES DAILY
Status: DISCONTINUED | OUTPATIENT
Start: 2021-09-26 | End: 2021-09-29 | Stop reason: HOSPADM

## 2021-09-26 RX ORDER — CARVEDILOL 12.5 MG/1
25 TABLET ORAL 2 TIMES DAILY WITH MEALS
Status: DISCONTINUED | OUTPATIENT
Start: 2021-09-26 | End: 2021-09-29 | Stop reason: HOSPADM

## 2021-09-26 RX ORDER — AMOXICILLIN 250 MG
2 CAPSULE ORAL 2 TIMES DAILY PRN
Status: DISCONTINUED | OUTPATIENT
Start: 2021-09-26 | End: 2021-09-29 | Stop reason: HOSPADM

## 2021-09-26 RX ORDER — AMOXICILLIN 250 MG
1 CAPSULE ORAL 2 TIMES DAILY PRN
Status: DISCONTINUED | OUTPATIENT
Start: 2021-09-26 | End: 2021-09-29 | Stop reason: HOSPADM

## 2021-09-26 RX ADMIN — PANTOPRAZOLE SODIUM 40 MG: 20 TABLET, DELAYED RELEASE ORAL at 22:08

## 2021-09-26 RX ADMIN — SODIUM CHLORIDE: 9 INJECTION, SOLUTION INTRAVENOUS at 23:01

## 2021-09-26 RX ADMIN — PROCHLORPERAZINE EDISYLATE 5 MG: 5 INJECTION INTRAMUSCULAR; INTRAVENOUS at 17:00

## 2021-09-26 RX ADMIN — IOPAMIDOL 100 ML: 755 INJECTION, SOLUTION INTRAVENOUS at 13:46

## 2021-09-26 RX ADMIN — Medication 2.5 MG: at 19:37

## 2021-09-26 RX ADMIN — HYDROMORPHONE HYDROCHLORIDE 1 MG: 1 INJECTION, SOLUTION INTRAMUSCULAR; INTRAVENOUS; SUBCUTANEOUS at 13:39

## 2021-09-26 RX ADMIN — Medication 2.5 MG: at 22:16

## 2021-09-26 RX ADMIN — CARVEDILOL 25 MG: 12.5 TABLET, FILM COATED ORAL at 19:37

## 2021-09-26 RX ADMIN — OXYCODONE HYDROCHLORIDE 5 MG: 5 TABLET ORAL at 11:22

## 2021-09-26 RX ADMIN — ONDANSETRON 4 MG: 2 INJECTION INTRAMUSCULAR; INTRAVENOUS at 14:37

## 2021-09-26 RX ADMIN — DILTIAZEM HYDROCHLORIDE 180 MG: 180 CAPSULE, EXTENDED RELEASE ORAL at 22:08

## 2021-09-26 RX ADMIN — LIDOCAINE 1 PATCH: 246 PATCH TOPICAL at 11:23

## 2021-09-26 RX ADMIN — ENTECAVIR 0.5 MG: 0.5 TABLET ORAL at 22:08

## 2021-09-26 RX ADMIN — ACETAMINOPHEN 650 MG: 325 TABLET ORAL at 19:37

## 2021-09-26 ASSESSMENT — ENCOUNTER SYMPTOMS
NUMBNESS: 1
SHORTNESS OF BREATH: 0
BACK PAIN: 0
CHEST TIGHTNESS: 0
SORE THROAT: 0
COUGH: 0
DIARRHEA: 0
NAUSEA: 0
VOMITING: 0
ABDOMINAL PAIN: 0
FEVER: 0
CHILLS: 0

## 2021-09-26 ASSESSMENT — ACTIVITIES OF DAILY LIVING (ADL)
DEPENDENT_IADLS:: INDEPENDENT
WEAR_GLASSES_OR_BLIND: NO
DRESSING/BATHING_DIFFICULTY: NO
DIFFICULTY_EATING/SWALLOWING: NO
DIFFICULTY_COMMUNICATING: NO
FALL_HISTORY_WITHIN_LAST_SIX_MONTHS: NO
DOING_ERRANDS_INDEPENDENTLY_DIFFICULTY: NO
WALKING_OR_CLIMBING_STAIRS_DIFFICULTY: NO
TOILETING_ISSUES: NO
CONCENTRATING,_REMEMBERING_OR_MAKING_DECISIONS_DIFFICULTY: NO

## 2021-09-26 NOTE — ED NOTES
Pt got up to uswe the urinal at bedside and BP elevated greatly. While in bed pt diaphoretic and nauseous.

## 2021-09-26 NOTE — ED TRIAGE NOTES
Pt reports lifting a water hose about one week ago and started to have left upper leg pain that has traveled to left buttocks. States his left upper leg feels numb and is having trouble walking. Has cane with him.

## 2021-09-26 NOTE — CONSULTS
Care Management Initial Consult    General Information  Assessment completed with: Patient,    Type of CM/SW Visit: Initial Assessment    Primary Care Provider verified and updated as needed: Yes   Readmission within the last 30 days:        Reason for Consult: discharge planning  Advance Care Planning:            Communication Assessment  Patient's communication style: spoken language (English or Bilingual)    Hearing Difficulty or Deaf: no   Wear Glasses or Blind: no    Cognitive  Cognitive/Neuro/Behavioral: WDL                      Living Environment:   People in home: spouse     Current living Arrangements:        Able to return to prior arrangements: yes       Family/Social Support:  Care provided by: self  Provides care for:                  Description of Support System:           Current Resources:   Patient receiving home care services: No     Community Resources: OP Dialysis  Equipment currently used at home:    Supplies currently used at home:      Employment/Financial:  Employment Status:          Financial Concerns:             Lifestyle & Psychosocial Needs:  Social Determinants of Health     Tobacco Use: Low Risk      Smoking Tobacco Use: Never Smoker     Smokeless Tobacco Use: Never Used   Alcohol Use:      Frequency of Alcohol Consumption:      Average Number of Drinks:      Frequency of Binge Drinking:    Financial Resource Strain:      Difficulty of Paying Living Expenses:    Food Insecurity:      Worried About Running Out of Food in the Last Year:      Ran Out of Food in the Last Year:    Transportation Needs:      Lack of Transportation (Medical):      Lack of Transportation (Non-Medical):    Physical Activity:      Days of Exercise per Week:      Minutes of Exercise per Session:    Stress:      Feeling of Stress :    Social Connections:      Frequency of Communication with Friends and Family:      Frequency of Social Gatherings with Friends and Family:      Attends Restorationist Services:       Active Member of Clubs or Organizations:      Attends Club or Organization Meetings:      Marital Status:    Intimate Partner Violence:      Fear of Current or Ex-Partner:      Emotionally Abused:      Physically Abused:      Sexually Abused:    Depression: Not at risk     PHQ-2 Score: 0   Housing Stability:      Unable to Pay for Housing in the Last Year:      Number of Places Lived in the Last Year:      Unstable Housing in the Last Year:        Functional Status:  Prior to admission patient needed assistance:   Dependent ADLs:: Independent  Dependent IADLs:: Independent  Assesssment of Functional Status: Not at baseline with ADL Functioning    Mental Health Status:          Chemical Dependency Status:                Values/Beliefs:  Spiritual, Cultural Beliefs, Rastafari Practices, Values that affect care:                 Additional Information:    Pt lives with spouse Samena: [phone] in a private residence. He goes to dialysis [facility]. Pt independent. Today came to ED due to pulling muscle he says. More difficult to walk at this time.     Anticipate pt will DC back home without needs vs HC depending on progression of care.    Family will transport upon DC. Informed that CM will follow progression of care.   Alis Downey RN, CM, [facility]

## 2021-09-26 NOTE — ED PROVIDER NOTES
Emergency Department Encounter   NAME: Elle Blanton ; AGE: 61 year old male ; YOB: 1960 ; MRN: [id number] ; EVALUATION DATE & TIME: 9/26/2021 10:29 AM ; PCP: Jaswant Flores   ED PROVIDER: Mar Guevara PA-C    Chief Complaint   Patient presents with     Leg Pain       Medical Decision Making & Final Diagnosis     1. Iliopsoas muscle hematoma, left, initial encounter    2. ESRD (end stage renal disease) on dialysis (H)    3. Chronic dissection of thoracic aorta (H)    4. Hepatic cirrhosis, unspecified hepatic cirrhosis type, unspecified whether ascites present (H)    5. Thrombocytopenia (H)    6. Anemia due to blood loss, acute    7. Chronic viral hepatitis B without delta agent and without coma (H)    8. Dissection of thoracoabdominal aorta (H)    9. Acute kidney injury (H)    10. Other pancytopenia (H)    11. Cirrhosis of liver without ascites, unspecified hepatic cirrhosis type (H)         ED Course as of Oct 03 1915   Sun Sep 26, 2021   1143 Elle is a 61-year-old M with PMH of chronic hep B, cirrhosis, ESRD (HD on MF - missed Friday), descending thoracic aortic dissection, anemia, and HTN who presents the ED for evaluation of left leg pain that began after lifting a heavy hose.My exam is notable for /87 and otherwise normal vital signs and a nontoxic appearing man.  No midline bony spinal TTP.  Reproducible left-sided lumbar paraspinal muscle TTP wrapping around the leg, over greater trochanter, and anterior lateral thigh.  Reports no sensation to small area of L2-L3 dermatome on anterior lateral thigh.  5 out of 5 strength to plantar flexion, dorsi flexion, knee extension, hip abduction and adduction.  3 out of 5 strength to hip flexion on left side.  PT and DP pulse present and equal bilaterally.  Ecchymosis noted to medial mid thigh without TTP.  No other skin changes.      1146 I considered multiple diagnoses including but not limited to: Spinal cord compression syndrome (cauda equina  syndrome), herniated disc with radicular pain, herpes zoster, cellulitis, muscle spasm or strain, nephrolithiasis, pyelonephritis, malignancy, pathologic fracture, aortic dissection, epidural abscess/discitis, retroperitoneal hematoma, and other          Sun Oct 03, 2021   1913 Radiologist called with findings on MRI for possible psoas muscle hematoma and abdominal aortic aneurysm.  Given these and usual findings we will plan to expand work-up.  Concern for dissection.  CTA ordered, blood pressure controlled with labetalol, and continued pain management initiated.  Labs resulted with hemoglobin at 5.6 and platelets 49.  Creatinine and BUN elevated which is not surprising given patient did not dialyze on Friday.  Potassium is slightly elevated at 5.2.  Consented the patient for blood and platelet transfusion.  CT is pending at the time of my signout to Dr. Diaz.      1914 Suspect this patient will be admitted for further management.             Critical Care:  Performed by: Mar Guevara PA-C  Authorized by: Mar Guevara PA-C    Total critical care time: 30 minutes  Critical care time was exclusive of separately billable procedures and treating other patients.  Critical care was necessary to treat or prevent imminent or life-threatening deterioration of the following conditions: severe anemia requiring transfusion  Critical care was time spent personally by me on the following activities: development of treatment plan with patient or surrogate, discussions with consultants, examination of patient, evaluation of patient's response to treatment, obtaining history from patient or surrogate, ordering and performing treatments and interventions, ordering and review of laboratory studies, ordering and review of radiographic studies and re-evaluation of patient's condition, this excludes any separately billable procedures.    ED Course   10:39AM I met and introduced myself to the patient. I gathered initial history and  performed my physical exam. We discussed plan for initial workup. PPE: Provider wore gloves and surgical mask.   12:47PM Radiologist called with results.  12:50 PM I reassessed the patient and updated him on the results. Patient denies taking blood thinners. No recent back injections or IV drug use. Pain is currently rated 0/10.  12:56PM I staffed the patient with Dr. Diaz.   1:26PM Lab called with abnormal results - hemoglobin is 5.6 and platelets are 49.    MEDICATIONS GIVEN IN THE EMERGENCY:   Medications   lidocaine patch in PLACE ( Transdermal Patch in Place 9/27/21 0309)   oxyCODONE (ROXICODONE) tablet 5 mg (5 mg Oral Given 9/26/21 1122)   Lidocaine (LIDOCARE) 4 % Patch 1 patch (1 patch Transdermal Patch/Med Removed 9/26/21 2225)   HYDROmorphone (DILAUDID) injection 1 mg (1 mg Intravenous Given 9/26/21 1339)   iopamidol (ISOVUE-370) solution 100 mL (100 mLs Intravenous Given 9/26/21 1346)   ondansetron (ZOFRAN) injection 4 mg (4 mg Intravenous Given 9/26/21 1437)   prochlorperazine (COMPAZINE) injection 5 mg (5 mg Intravenous Given 9/26/21 1700)   phytonadione (MEPHYTON/VITAMIN K) 1 MG/ML oral solution 2.5 mg (2.5 mg Oral Given 9/26/21 2216)   0.9% sodium chloride BOLUS (250 mLs Intravenous New Bag 9/27/21 1445)   0.9% sodium chloride BOLUS (300 mLs Hemodialysis Machine New Bag 9/27/21 1445)   acetaminophen (TYLENOL) tablet 650 mg (650 mg Oral Given 9/28/21 0026)   oxyCODONE IR (ROXICODONE) half-tab 7.5 mg (7.5 mg Oral Given 9/29/21 2116)   hydrOXYzine (ATARAX) tablet 25 mg (25 mg Oral Given 9/29/21 2119)      NEW PRESCRIPTIONS STARTED AT TODAY'S ER VISIT:  Discharge Medication List as of 9/29/2021  6:47 PM      START taking these medications    Details   oxyCODONE (ROXICODONE) 5 MG tablet Take 1 tablet (5 mg) by mouth every 6 hours as needed for pain, Disp-16 tablet, R-0, Local Print      polyethylene glycol (MIRALAX) 17 GM/Dose powder Take 17 g by mouth daily, Disp-510 g, R-0, Local Print        "    =================================================================   History   Patient information was obtained from: patient and family   Use of Intrepreter: N/A    Elle Blanton is a 61 year old male with a relevant PMH of chronic hepatitis B, cirrhosis, ESRD, descending thoracic aortic dissection, anemia, and hypertension, who presents to the ED for evaluation of leg pain.     Patient reports that he lifted something ~1 week ago, \"tweaked\" something in left groin region, and it has gradually radiated around to left buttock. Pain is currently rated 10/10 and is provoked with movement or straightening the left hip. No similar pain in past. He took tylenol around 6AM today without relief. Denies any recent back injections or IV drug use. Also reports having a new area of numbness on anterior aspect of proximal left thigh. Denies associated back pain. Otherwise denies fever, chills, sore throat, cough, chest pain, shortness of breath, abdominal pain, nausea, vomiting, diarrhea, urinary symptoms, or any other complaints at this time. No blood thinners. He dialyzes on Monday, Wednesday, Friday, but missed dialysis on Friday (2 days ago).  ______________________________________________________________________  Past Medical History:   Diagnosis Date     NAHUM (acute kidney injury) (H)      GI (gastrointestinal bleed)      Gout      H. pylori infection 7/5/2017     Heart attack (H)      Hepatitis B      Normocytic anemia      PONV (postoperative nausea and vomiting)      Thrombocytopenia (H)         Past Surgical History:   Procedure Laterality Date     ABCESS DRAINAGE      finger     CREATE FISTULA ARTERIOVENOUS UPPER EXTREMITY Left 4/20/2021    Procedure: left arm dialysis graft placement;  Surgeon: Roxana Cosby MD;  Location: [facility] OR;  Service: General     FOREIGN BODY REMOVAL      finger     INCISION AND DRAINAGE FINGER, COMBINED Left 10/20/2016    Procedure: COMBINED INCISION AND DRAINAGE FINGER;  Surgeon: " Mireya Pizano MD;  Location: [facility] OR     IR CHEST TUBE PLACEMENT NON-TUNNELED RIGHT  4/19/2021     IR PLEURAL DRAINAGE WITH CATHETER INSERTION  4/19/2021     MIDLINE INSERTION - DOUBLE LUMEN  4/23/2021          RI ESOPHAGOGASTRODUODENOSCOPY TRANSORAL DIAGNOSTIC N/A 8/18/2020    Procedure: ESOPHAGOGASTRODUODENOSCOPY (EGD) with biopsy;  Surgeon: Nilson Gonzales MD;  Location: [facility];  Service: Gastroenterology      PARACENTESIS  8/14/2020      PARACENTESIS  8/19/2020      THORACENTESIS  4/18/2021       Family History   Problem Relation Age of Onset     Diabetes Father      Hypertension No family hx of      Asthma No family hx of      Cancer No family hx of      Coronary Artery Disease No family hx of      Liver Cancer No family hx of      Ulcers Father        Social History     Tobacco Use     Smoking status: Never Smoker     Smokeless tobacco: Never Used   Substance Use Topics     Alcohol use: No     Drug use: No       REVIEW OF SYSTEMS:    Review of Systems   Constitutional: Negative for chills and fever.   HENT: Negative for sore throat.    Respiratory: Negative for cough, chest tightness and shortness of breath.    Cardiovascular: Negative for chest pain.   Gastrointestinal: Negative for abdominal pain, diarrhea, nausea and vomiting.   Genitourinary: Negative.    Musculoskeletal: Negative for back pain.        Positive for pain in left groin region (radiates to left buttock).   Neurological: Positive for numbness (anterior aspect proximal left thigh).   All other systems reviewed and are negative.      Physical Exam   /82   Pulse 80   Temp 98.2  F (36.8  C) (Oral)   Resp 18   Wt 66 kg (145 lb 8.1 oz)   SpO2 97%   BMI 24.98 kg/m      Physical Exam  Constitutional:       General: He is not in acute distress.     Appearance: Normal appearance.   HENT:      Head: Normocephalic.   Eyes:      Conjunctiva/sclera: Conjunctivae normal.   Cardiovascular:      Rate and Rhythm: Normal rate and  regular rhythm.      Heart sounds: Normal heart sounds.      Comments: PT and DP pulses present and equal bilaterally.  Pulmonary:      Effort: Pulmonary effort is normal. No respiratory distress.      Breath sounds: Normal breath sounds. No wheezing, rhonchi or rales.   Abdominal:      General: There is no distension.      Palpations: Abdomen is soft.      Tenderness: There is no abdominal tenderness. There is no guarding or rebound.   Musculoskeletal:         General: No swelling or deformity. Normal range of motion.      Cervical back: Normal range of motion.      Right lower leg: No edema.      Left lower leg: No edema.      Comments: No midline bony tenderness of spine. Reproducible left-sided lumbar paraspinal muscle tenderness wrapping around the leg, over greater trochanter, and anterolateral thigh.    Skin:     General: Skin is warm.      Comments: Ecchymosis noted to medial mid left thigh without tenderness. No other skin changes.    Neurological:      General: No focal deficit present.      Mental Status: He is alert.      Comments: Reports no sensation to small area of L2-L3 dermatome on left anterolateral thigh. 5/5 strength to plantar flexion, dorsi flexion, knee extension, hip abduction, and hip adduction. 3/5 strength to left hip flexion.    Psychiatric:         Mood and Affect: Mood normal.         Lab Work (Reviewed and Interpreted):   Labs Ordered and Resulted from Time of ED Arrival Up to the Time of Departure from the ED   BASIC METABOLIC PANEL - Abnormal; Notable for the following components:       Result Value    Potassium 5.2 (*)     Carbon Dioxide (CO2) 18 (*)     Urea Nitrogen 111 (*)     Creatinine 10.56 (*)     Calcium 8.2 (*)     GFR Estimate 5 (*)     All other components within normal limits   CRP INFLAMMATION - Abnormal; Notable for the following components:    CRP 0.8 (*)     All other components within normal limits   INR - Abnormal; Notable for the following components:    INR 1.67  (*)     All other components within normal limits   PARTIAL THROMBOPLASTIN TIME - Abnormal; Notable for the following components:    aPTT 41 (*)     All other components within normal limits   CBC WITH PLATELETS AND DIFFERENTIAL - Abnormal; Notable for the following components:    RBC Count 1.78 (*)     Hemoglobin 5.6 (*)     Hematocrit 17.4 (*)     RDW 16.3 (*)     Platelet Count 49 (*)     Absolute Lymphocytes 0.6 (*)     All other components within normal limits   ERYTHROCYTE SEDIMENTATION RATE AUTO - Normal   PROCALCITONIN - Normal   COVID-19 VIRUS (CORONAVIRUS) BY PCR - Normal    Narrative:     Testing was performed using the lizzie  SARS-CoV-2 & Influenza A/B Assay on the lizzie  Rosy  System.  This test should be ordered for the detection of SARS-COV-2 in individuals who meet SARS-CoV-2 clinical and/or epidemiological criteria. Test performance is unknown in asymptomatic patients.  This test is for in vitro diagnostic use under the FDA EUA for laboratories certified under CLIA to perform moderate and/or high complexity testing. This test has not been FDA cleared or approved.  A negative test does not rule out the presence of PCR inhibitors in the specimen or target RNA in concentration below the limit of detection for the assay. The possibility of a false negative should be considered if the patient's recent exposure or clinical presentation suggests COVID-19.  [facility] are certified under the Clinical Laboratory Improvement Amendments of 1988 (CLIA-88) as qualified to perform moderate and/or high complexity laboratory testing.   PERIPHERAL IV CATHETER   CARDIAC CONTINUOUS MONITORING   TYPE AND SCREEN, ADULT   PREPARE RED BLOOD CELLS (UNIT)   PREPARE RED BLOOD CELLS (UNIT)   PREPARE PHERESED PLATELETS (UNIT)   TRANSFUSE RED BLOOD CELLS (UNIT)   CBC WITH PLATELETS & DIFFERENTIAL    Narrative:     The following orders were created for panel order CBC with platelets + differential.  Procedure                                Abnormality         Status                     ---------                               -----------         ------                     CBC with platelets and d...[[id number]]  Abnormal            Final result                 Please view results for these tests on the individual orders.   ABO/RH TYPE AND SCREEN    Narrative:     The following orders were created for panel order ABO/Rh type and screen.  Procedure                               Abnormality         Status                     ---------                               -----------         ------                     Adult Type and Screen[[id number]]                            Final result                 Please view results for these tests on the individual orders.       Imaging (Reviewed and Interpreted):   CT Abdomen Pelvis w/o Contrast   Final Result   IMPRESSION:    1.  No significant interval change since the recent comparison study dated 09/26/2021.   2.  Moderate-sized left iliacus muscle hematoma.   3.  Moderate-sized right pleural effusion small left pleural effusion.   4.  Two indeterminate hepatic lesions, the largest measuring 4.5 cm. These are suspicious for neoplasm. An MR of the liver could be considered for further evaluation.   5.  Nonspecific stranding is present throughout the intra-abdominal fat.       CTA Chest Abdomen Pelvis Runoff w Contrast   Final Result   IMPRESSION:   1.  Left-sided retroperitoneal hemorrhage involving the iliopsoas as described above extending from the diaphragmatic jean inferiorly into the left gluteal musculature. Hematoma is greatest in the iliac region as noted. No arterial extravasation. No    fluid fluid levels.   2.  There is a new 4 cm hypodense mass in segment 8 of the liver. When patient is stable, this needs further evaluation with MRI.   3.  Chronic [place] type B dissection as described above with proximal thoracic aorta aneurysmal at 5 cm. False lumen perfuses the left  kidney as described above.   4.  Cirrhotic liver with splenomegaly and trace ascites.   5.  Small to moderate right effusion and very small left effusion.   6.  Critical findings discussed by the undersigned with Dr. Diaz at 1415.      NOTE: ABNORMAL REPORT      THE DICTATION ABOVE DESCRIBES AN ABNORMALITY FOR WHICH FOLLOW-UP IS NEEDED.          Lumbar spine MRI w/o contrast   Final Result   IMPRESSION:   1.  Heterogeneous lesion that appears to be expanding the left psoas muscle. This could represent an intramuscular hematoma, however, other etiologies are not excluded. In addition, the inferior extent of this is not included on these images. Consider    evaluation with CT abdomen and pelvis.   2.  Abdominal aortic aneurysm measuring up to at least 5 cm in the visualized suprarenal compartment. This would also be better assessed with CT.   3.  Degenerative changes in the lumbar spine as detailed above, most pronounced at the L5-S1 level where there is moderate right and moderate to severe left neural foraminal narrowing.   4.  Increased T2 signal within the intervertebral disc at L5-S1 may be degenerative in etiology, however, early discitis is not excluded.      Results discussed with SHANNON Guevara at 12:49 PM on 09/26/2021.             I, Millicent Joy, am serving as a scribe to document services personally performed by Mar Guevara PA-C, based on my observation and the provider's statements to me. I, Mar Guevara PA-C attest that Millicent Joy is acting in a scribe capacity, has observed my performance of the services and has documented them in accordance with my direction.     Mar Guevara PA-C   Emergency Medicine   [facility] EMERGENCY DEPARTMENT  [facility] [facility] [id number] [phone]  Dept: [phone]      Mar Guevara PA-C  10/03/21 1255

## 2021-09-26 NOTE — ED NOTES
[facility] ED Handoff Report    ED Chief Complaint: back pain  ED Diagnosis:  (S70.12XA) Iliopsoas muscle hematoma, left, initial encounter  Comment: lefted a 100lb hose  Plan: Pain management    (N18.6,  Z99.2) ESRD (end stage renal disease) on dialysis (H)  Comment:   Plan: Dialysis as shceduled    (I71.01) Chronic dissection of thoracic aorta (H)  Comment:  Plan:     74.60) Hepatic cirrhosis, unspecified hepatic cirrhosis type, unspecified whether ascites present (H)  Comment:  Plan:    (D69.6) Thrombocytopenia (H)  Comment:   Plan:     (D62) Anemia due to blood loss, acute  Comment: Hgb 5.6  Plan: 2 Units PRBCs, 1 unit PLTs.        PMH:    Past Medical History:   Diagnosis Date     NAHUM (acute kidney injury) (H)      GI (gastrointestinal bleed)      Gout      H. pylori infection 7/5/2017    UGI bleed implied by Hgb 9.0 6/22/17 and melena. Admitted and hgb decreased to 7.6, CT abdomen showed all bladder wall thickening. + Hep B surface antigen noted. Melena resolved and hgb stabalized without transfusion, epigastric pain resolved with PPI. Recommend referral for gastroscopy.     Heart attack (H)      Hepatitis B      Normocytic anemia      PONV (postoperative nausea and vomiting)      Thrombocytopenia (H)         Code Status:  Prior     Falls Risk: No Band: Not applicable    Current Living Situation/Residence: lives with a significant other     Elimination Status: Continent: Yes     Activity Level: Independent    Patients Preferred Language:  English     Needed: No    Vital Signs:  BP (!) 151/73   Pulse 54   Temp 97.9  F (36.6  C)   Resp 16   Wt 66 kg (145 lb 8.1 oz)   SpO2 100%   BMI 24.98 kg/m       Cardiac Rhythm: NSR/SB    Pain Score: 0/10    Is the Patient Confused:  No    Last Food or Drink: 9/25/21 at ?    Focused Assessment:  Pt came in for back pain after lifting a heavy hose last week. Incidentally found aortic involvement. Large hematoma noted. Pt has hx of anemia. Hgb 5.6, 1u  PRBCs completed, 2nd unit started. Pt has been experiencing diaphoresis. BP elevated greatly when up. ED MD Recommended bedrest. Wife present with pt.     Tests Performed: Done: Labs and Imaging    Treatments Provided:  Transfusion    Family Dynamics/Concerns: No    Family Updated On Visitor Policy: Yes    Plan of Care Communicated to Family: Yes    Who Was Updated about Plan of Care: wife        Covid: asymptomatic , negative    Additional Information: N/A    RN: Kristin Smith   9/26/2021 4:31 PM

## 2021-09-26 NOTE — ED NOTES
"Pt reports L leg pain/ numbness after lifting a hose last week. Missed dialysis on Friday. \"couldn't walk\" he has a lidocaine patch L groin. Provider at bedside. Pt is noted to have a large hematoma L solar plexis.  "

## 2021-09-26 NOTE — PHARMACY-ADMISSION MEDICATION HISTORY
Pharmacy Note - Admission Medication History    Pertinent Provider Information: n/a     ______________________________________________________________________    Prior To Admission (PTA) med list completed and updated in EMR.       PTA Med List   Medication Sig Note Last Dose     acetaminophen (TYLENOL) 500 MG tablet Take 500 mg by mouth every 6 hours as needed for mild pain  9/26/2021 at Unknown time     carvedilol (COREG) 25 MG tablet Take 2 tablets (50 mg) by mouth 2 times daily (with meals) (Patient taking differently: Take 25 mg by mouth 2 times daily (with meals) )  9/25/2021 at x2     cloNIDine (CATAPRES) 0.1 MG tablet Take 1 tablet (0.1 mg) by mouth At Bedtime  Past Week at Unknown time     diltiazem ER (TIAZAC) 180 MG 24 hr ER beaded capsule Take 1 capsule (180 mg) by mouth 2 times daily  9/25/2021 at x2     entecavir (BARACLUDE) 0.5 MG tablet Take 1 tablet (0.5 mg) by mouth every other day (Patient taking differently: Take 0.5 mg by mouth every 3 days )  9/23/2021     hydrOXYzine (ATARAX) 25 MG tablet Take 1 tablet (25 mg) by mouth 3 times daily as needed for itching  Unknown at PRN     omeprazole (PRILOSEC) 20 MG DR capsule Take 1 capsule (20 mg) by mouth 2 times daily 9/26/2021: Patient sometimes only takes once a day 9/25/2021 at Unknown time       Information source(s): Patient, Clinic records and [facility]  Method of interview communication: in-person    Summary of Changes to PTA Med List  New: Tylenol  Discontinued: Cilostazol, Epogen, Ferous Sulfate, Lasix, Dilaudid, Ambien, Miralax  Changed: Coreg (decreased from 50mg bid to 25mg bid)     Patient was asked about OTC/herbal products specifically.  PTA med list reflects this.    In the past week, patient estimated taking medication this percent of the time:  greater than 90%.    Allergies were reviewed, assessed, and updated with the patient.      Medications currently not available for use during hospital stay. Family/Patient  representative states they will bring entecavir to [facility].    The information provided in this note is only as accurate as the sources available at the time of the update(s).    Thank you for the opportunity to participate in the care of this patient.    Rhonda Cao  9/26/2021 2:12 PM

## 2021-09-26 NOTE — ED PROVIDER NOTES
Emergency Department Midlevel Supervisory Note     I had a face to face encounter with this patient who was also seen by the ANA (Mar Guevara PA-C) who is currently serving as a midlevel provider in the Emergency Department. I have seen, examined, and discussed the patient with the midlevel and agree with their assessment and plan of management. Please refer to the midlevel note for further details and ED course.     Brief HPI:     Elle Blanton is a 61 year old male who presents for evaluation of leg pain. Patient reports he was lifting something heavy ~1 week ago and tweaked something in his left groin region which radiated to his left buttock. Pain is currently 10/10 provoked with movement and movement of the left hip. Currently endorses numbness on his anterior proximal left thigh.    I, Bebeto Sidhu, am serving as a scribe to document services personally performed by Laci Diaz MD, based on my observations and the provider's statements to me.   I, Laci Diaz MD, attest that Bebeto Sidhu was acting in a scribe capacity, has observed my performance of the services and has documented them in accordance with my direction.    Brief Review of Systems:  Review of Systems   Constitutional: Negative for chills and fever.   Respiratory: Negative for cough and shortness of breath.    Cardiovascular: Negative for chest pain.   Gastrointestinal: Negative for abdominal pain, diarrhea, nausea and vomiting.   Musculoskeletal:        Positive for left buttock pain.   Neurological: Positive for numbness (anterior proximal thigh).          Brief Physical Exam:  Physical Exam  Vitals and nursing note reviewed.   Constitutional:       General: He is not in acute distress.  HENT:      Head: Normocephalic.      Nose: Nose normal.   Eyes:      General: No scleral icterus.  Cardiovascular:      Rate and Rhythm: Normal rate.   Pulmonary:      Effort: Pulmonary effort is normal. No respiratory distress.    Musculoskeletal:      Cervical back: Neck supple.   Skin:     Findings: No rash.      Comments: He does have scattered areas of ecchymosis throughout all extremities and noted on his left flank   Neurological:      Mental Status: He is alert.      Comments: He does seem to be neurologically intact but he does have weakness with hip flexion on the left   Psychiatric:         Mood and Affect: Mood normal.         ED Course/MDM:  1:00 PM Elle Blanton was staffed with me. I agree with their assessment and plan of management, and I will see the patient.  1:10 PM I met with the patient to introduce myself, gather additional history, perform my initial exam, and discuss the plan. PPE worn including surgical mask, gloves.  1:37 PM I obtained written consent from the patient to do a blood transfusion.  2:12 PM I spoke with the radiologist.      I did see and examined the patient.  I agree that starting with an MRI was a good choice for his leg weakness.  This shows an iliopsoas hematoma but not any specific pathology to explain his weakness from a neurologic standpoint.  We added on a CTA and he has a number of issues that are found including a chronic dissection and evidence for the iliopsoas hematoma without contrast blush.  I did discuss this with the radiologist as well.  There is also a liver lesion that will need follow-up.  The left kidney is not being perfused well.  There are number of issues that require hospitalization and attention, however this does not appear to be an acute aortic emergency.    Plan is for admission here.  I did speak to the admitting physician who accepted the patient.  Patient is stable, updated and in agreement.      ED Course as of Oct 04 2229   Sun Sep 26, 2021   1143 Elle is a 61-year-old M with PMH of chronic hep B, cirrhosis, ESRD (HD on MF - missed Friday), descending thoracic aortic dissection, anemia, and HTN who presents the ED for evaluation of left leg pain that began after lifting  a heavy hose.My exam is notable for /87 and otherwise normal vital signs and a nontoxic appearing man.  No midline bony spinal TTP.  Reproducible left-sided lumbar paraspinal muscle TTP wrapping around the leg, over greater trochanter, and anterior lateral thigh.  Reports no sensation to small area of L2-L3 dermatome on anterior lateral thigh.  5 out of 5 strength to plantar flexion, dorsi flexion, knee extension, hip abduction and adduction.  3 out of 5 strength to hip flexion on left side.  PT and DP pulse present and equal bilaterally.  Ecchymosis noted to medial mid thigh without TTP.  No other skin changes.      1146 I considered multiple diagnoses including but not limited to: Spinal cord compression syndrome (cauda equina syndrome), herniated disc with radicular pain, herpes zoster, cellulitis, muscle spasm or strain, nephrolithiasis, pyelonephritis, malignancy, pathologic fracture, aortic dissection, epidural abscess/discitis, retroperitoneal hematoma, and other          Sun Oct 03, 2021   1913 Radiologist called with findings on MRI for possible psoas muscle hematoma and abdominal aortic aneurysm.  Given these and usual findings we will plan to expand work-up.  Concern for dissection.  CTA ordered, blood pressure controlled with labetalol, and continued pain management initiated.  Labs resulted with hemoglobin at 5.6 and platelets 49.  Creatinine and BUN elevated which is not surprising given patient did not dialyze on Friday.  Potassium is slightly elevated at 5.2.  Consented the patient for blood and platelet transfusion.  CT is pending at the time of my signout to Dr. Diaz.      1914 Suspect this patient will be admitted for further management.          1. Iliopsoas muscle hematoma, left, initial encounter    2. ESRD (end stage renal disease) on dialysis (H)    3. Chronic dissection of thoracic aorta (H)    4. Hepatic cirrhosis, unspecified hepatic cirrhosis type, unspecified whether ascites  present (H)    5. Thrombocytopenia (H)    6. Anemia due to blood loss, acute    7. Chronic viral hepatitis B without delta agent and without coma (H)    8. Dissection of thoracoabdominal aorta (H)    9. Acute kidney injury (H)    10. Other pancytopenia (H)    11. Cirrhosis of liver without ascites, unspecified hepatic cirrhosis type (H)        Labs and Imaging:  Results for orders placed or performed during the hospital encounter of 09/26/21   Lumbar spine MRI w/o contrast    Impression    IMPRESSION:  1.  Heterogeneous lesion that appears to be expanding the left psoas muscle. This could represent an intramuscular hematoma, however, other etiologies are not excluded. In addition, the inferior extent of this is not included on these images. Consider   evaluation with CT abdomen and pelvis.  2.  Abdominal aortic aneurysm measuring up to at least 5 cm in the visualized suprarenal compartment. This would also be better assessed with CT.  3.  Degenerative changes in the lumbar spine as detailed above, most pronounced at the L5-S1 level where there is moderate right and moderate to severe left neural foraminal narrowing.  4.  Increased T2 signal within the intervertebral disc at L5-S1 may be degenerative in etiology, however, early discitis is not excluded.    Results discussed with SHANNON Guevara at 12:49 PM on 09/26/2021.     CTA Chest Abdomen Pelvis Runoff w Contrast    Impression    IMPRESSION:  1.  Left-sided retroperitoneal hemorrhage involving the iliopsoas as described above extending from the diaphragmatic jean inferiorly into the left gluteal musculature. Hematoma is greatest in the iliac region as noted. No arterial extravasation. No   fluid fluid levels.  2.  There is a new 4 cm hypodense mass in segment 8 of the liver. When patient is stable, this needs further evaluation with MRI.  3.  Chronic Panfilo type B dissection as described above with proximal thoracic aorta aneurysmal at 5 cm. False lumen  perfuses the left kidney as described above.  4.  Cirrhotic liver with splenomegaly and trace ascites.  5.  Small to moderate right effusion and very small left effusion.  6.  Critical findings discussed by the undersigned with Dr. Diaz at 1415.    NOTE: ABNORMAL REPORT    THE DICTATION ABOVE DESCRIBES AN ABNORMALITY FOR WHICH FOLLOW-UP IS NEEDED.      CT Abdomen Pelvis w/o Contrast    Impression    IMPRESSION:   1.  No significant interval change since the recent comparison study dated 09/26/2021.  2.  Moderate-sized left iliacus muscle hematoma.  3.  Moderate-sized right pleural effusion small left pleural effusion.  4.  Two indeterminate hepatic lesions, the largest measuring 4.5 cm. These are suspicious for neoplasm. An MR of the liver could be considered for further evaluation.  5.  Nonspecific stranding is present throughout the intra-abdominal fat.    Basic metabolic panel   Result Value Ref Range    Sodium 137 136 - 145 mmol/L    Potassium 5.2 (H) 3.5 - 5.0 mmol/L    Chloride 107 98 - 107 mmol/L    Carbon Dioxide (CO2) 18 (L) 22 - 31 mmol/L    Anion Gap 12 5 - 18 mmol/L    Urea Nitrogen 111 (H) 8 - 22 mg/dL    Creatinine 10.56 (HH) 0.70 - 1.30 mg/dL    Calcium 8.2 (L) 8.5 - 10.5 mg/dL    Glucose 90 70 - 125 mg/dL    GFR Estimate 5 (L) >60 mL/min/1.73m2   Erythrocyte sedimentation rate auto   Result Value Ref Range    Erythrocyte Sedimentation Rate 4 0 - 15 mm/hr   CRP inflammation   Result Value Ref Range    CRP 0.8 (H) 0.0-<0.8 mg/dL   Result Value Ref Range    INR 1.67 (H) 0.90 - 1.15   Partial thromboplastin time   Result Value Ref Range    aPTT 41 (H) 22 - 38 Seconds   Result Value Ref Range    Procalcitonin 0.26 0.00 - 0.49 ng/mL   Asymptomatic COVID-19 Virus (Coronavirus) by PCR Nasopharyngeal    Specimen: Nasopharyngeal; Swab   Result Value Ref Range    SARS CoV2 PCR Negative Negative   CBC with platelets and differential   Result Value Ref Range    WBC Count 5.4 4.0 - 11.0 10e3/uL    RBC Count 1.78  (L) 4.40 - 5.90 10e6/uL    Hemoglobin 5.6 (LL) 13.3 - 17.7 g/dL    Hematocrit 17.4 (L) 40.0 - 53.0 %    MCV 98 78 - 100 fL    MCH 31.5 26.5 - 33.0 pg    MCHC 32.2 31.5 - 36.5 g/dL    RDW 16.3 (H) 10.0 - 15.0 %    Platelet Count 49 (LL) 150 - 450 10e3/uL    % Neutrophils 81 %    % Lymphocytes 11 %    % Monocytes 6 %    % Eosinophils 1 %    % Basophils 0 %    % Immature Granulocytes 1 %    NRBCs per 100 WBC 0 <1 /100    Absolute Neutrophils 4.4 1.6 - 8.3 10e3/uL    Absolute Lymphocytes 0.6 (L) 0.8 - 5.3 10e3/uL    Absolute Monocytes 0.3 0.0 - 1.3 10e3/uL    Absolute Eosinophils 0.1 0.0 - 0.7 10e3/uL    Absolute Basophils 0.0 0.0 - 0.2 10e3/uL    Absolute Immature Granulocytes 0.0 <=0.0 10e3/uL    Absolute NRBCs 0.0 10e3/uL   Result Value Ref Range    Hemoglobin 7.9 (L) 13.3 - 17.7 g/dL   CBC with platelets   Result Value Ref Range    WBC Count 4.5 4.0 - 11.0 10e3/uL    RBC Count 2.29 (L) 4.40 - 5.90 10e6/uL    Hemoglobin 7.0 (L) 13.3 - 17.7 g/dL    Hematocrit 22.0 (L) 40.0 - 53.0 %    MCV 96 78 - 100 fL    MCH 30.6 26.5 - 33.0 pg    MCHC 31.8 31.5 - 36.5 g/dL    RDW 17.2 (H) 10.0 - 15.0 %    Platelet Count 43 (LL) 150 - 450 10e3/uL   Comprehensive metabolic panel   Result Value Ref Range    Sodium 140 136 - 145 mmol/L    Potassium 5.2 (H) 3.5 - 5.0 mmol/L    Chloride 111 (H) 98 - 107 mmol/L    Carbon Dioxide (CO2) 17 (L) 22 - 31 mmol/L    Anion Gap 12 5 - 18 mmol/L    Urea Nitrogen 110 (H) 8 - 22 mg/dL    Creatinine 9.63 (HH) 0.70 - 1.30 mg/dL    Calcium 7.8 (L) 8.5 - 10.5 mg/dL    Glucose 88 70 - 125 mg/dL    Alkaline Phosphatase 71 45 - 120 U/L    AST 18 0 - 40 U/L    ALT 21 0 - 45 U/L    Protein Total 5.9 (L) 6.0 - 8.0 g/dL    Albumin 3.1 (L) 3.5 - 5.0 g/dL    Bilirubin Total 1.2 (H) 0.0 - 1.0 mg/dL    GFR Estimate 5 (L) >60 mL/min/1.73m2   Result Value Ref Range    INR 1.54 (H) 0.90 - 1.15   Extra Red Top Tube   Result Value Ref Range    Hold Specimen JIC    Result Value Ref Range    Hemoglobin 8.5 (L) 13.3 - 17.7  g/dL   CBC with platelets   Result Value Ref Range    WBC Count 12.1 (H) 4.0 - 11.0 10e3/uL    RBC Count 2.66 (L) 4.40 - 5.90 10e6/uL    Hemoglobin 8.2 (L) 13.3 - 17.7 g/dL    Hematocrit 25.0 (L) 40.0 - 53.0 %    MCV 94 78 - 100 fL    MCH 30.8 26.5 - 33.0 pg    MCHC 32.8 31.5 - 36.5 g/dL    RDW 16.8 (H) 10.0 - 15.0 %    Platelet Count 49 (LL) 150 - 450 10e3/uL   Basic metabolic panel   Result Value Ref Range    Sodium 137 136 - 145 mmol/L    Potassium 4.1 3.5 - 5.0 mmol/L    Chloride 102 98 - 107 mmol/L    Carbon Dioxide (CO2) 26 22 - 31 mmol/L    Anion Gap 9 5 - 18 mmol/L    Urea Nitrogen 65 (H) 8 - 22 mg/dL    Creatinine 7.18 (HH) 0.70 - 1.30 mg/dL    Calcium 7.6 (L) 8.5 - 10.5 mg/dL    Glucose 133 (H) 70 - 125 mg/dL    GFR Estimate 7 (L) >60 mL/min/1.73m2   Result Value Ref Range    INR 1.43 (H) 0.90 - 1.15   CBC with platelets   Result Value Ref Range    WBC Count 9.6 4.0 - 11.0 10e3/uL    RBC Count 2.53 (L) 4.40 - 5.90 10e6/uL    Hemoglobin 8.0 (L) 13.3 - 17.7 g/dL    Hematocrit 24.4 (L) 40.0 - 53.0 %    MCV 96 78 - 100 fL    MCH 31.6 26.5 - 33.0 pg    MCHC 32.8 31.5 - 36.5 g/dL    RDW 16.7 (H) 10.0 - 15.0 %    Platelet Count 37 (LL) 150 - 450 10e3/uL   Basic metabolic panel   Result Value Ref Range    Sodium 135 (L) 136 - 145 mmol/L    Potassium 4.5 3.5 - 5.0 mmol/L    Chloride 101 98 - 107 mmol/L    Carbon Dioxide (CO2) 23 22 - 31 mmol/L    Anion Gap 11 5 - 18 mmol/L    Urea Nitrogen 72 (H) 8 - 22 mg/dL    Creatinine 7.90 (HH) 0.70 - 1.30 mg/dL    Calcium 7.9 (L) 8.5 - 10.5 mg/dL    Glucose 94 70 - 125 mg/dL    GFR Estimate 7 (L) >60 mL/min/1.73m2   Result Value Ref Range    INR 1.28 (H) 0.90 - 1.15   Adult Type and Screen   Result Value Ref Range    ABO/RH(D) A POS     Antibody Screen Negative Negative    SPECIMEN EXPIRATION DATE [id number]    Prepare red blood cells (unit)   Result Value Ref Range    CROSSMATCH Compatible     UNIT ABO/RH A Pos     Unit Number [id number]     Unit Status Transfused      Blood Component Type Red Blood Cells     Product Code T6340I04     CODING SYSTEM [id number]     UNIT TYPE ISBT 6200     ISSUE DATE AND TIME [id number]    Prepare red blood cells (unit)   Result Value Ref Range    CROSSMATCH Compatible     UNIT ABO/RH A Pos     Unit Number [id number]     Unit Status Transfused     Blood Component Type Red Blood Cells     Product Code [id number]     CODING SYSTEM [id number]     UNIT TYPE ISBT 6200     ISSUE DATE AND TIME [id number]    Prepare pheresed platelets (unit)   Result Value Ref Range    UNIT ABO/RH A Pos     Unit Number [id number]     Unit Status Transfused     Blood Component Type Platelets     Product Code X4062L45     CODING SYSTEM [id number]     UNIT TYPE ISBT 6200     ISSUE DATE AND TIME [id number]    Prepare red blood cells (unit)   Result Value Ref Range    CROSSMATCH Compatible     UNIT ABO/RH A Pos     Unit Number [id number]     Unit Status Transfused     Blood Component Type Red Blood Cells     Product Code R8660F59     CODING SYSTEM [id number]     UNIT TYPE ISBT 6200     ISSUE DATE AND TIME [id number]    Prepare pheresed platelets (unit)   Result Value Ref Range    UNIT ABO/RH A Pos     Unit Number [id number]     Unit Status Transfused     Blood Component Type Platelets     Product Code B8806H82     CODING SYSTEM [id number]     UNIT TYPE ISBT 6200     ISSUE DATE AND TIME [id number]      I have reviewed the relevant laboratory and radiology studies      Laci Diaz MD  [facility] EMERGENCY DEPARTMENT  [address] [place] [id number]  [phone]       Laci Diaz MD  09/26/21 3181       Laci Diaz MD  10/04/21 4133

## 2021-09-27 LAB
ALBUMIN SERPL-MCNC: 3.1 G/DL (ref 3.5–5)
ALP SERPL-CCNC: 71 U/L (ref 45–120)
ALT SERPL W P-5'-P-CCNC: 21 U/L (ref 0–45)
ANION GAP SERPL CALCULATED.3IONS-SCNC: 12 MMOL/L (ref 5–18)
AST SERPL W P-5'-P-CCNC: 18 U/L (ref 0–40)
BILIRUB SERPL-MCNC: 1.2 MG/DL (ref 0–1)
BLD PROD TYP BPU: NORMAL
BLD PROD TYP BPU: NORMAL
BLOOD COMPONENT TYPE: NORMAL
BLOOD COMPONENT TYPE: NORMAL
BUN SERPL-MCNC: 110 MG/DL (ref 8–22)
CALCIUM SERPL-MCNC: 7.8 MG/DL (ref 8.5–10.5)
CHLORIDE BLD-SCNC: 111 MMOL/L (ref 98–107)
CO2 SERPL-SCNC: 17 MMOL/L (ref 22–31)
CODING SYSTEM: NORMAL
CODING SYSTEM: NORMAL
CREAT SERPL-MCNC: 9.63 MG/DL (ref 0.7–1.3)
CROSSMATCH: NORMAL
ERYTHROCYTE [DISTWIDTH] IN BLOOD BY AUTOMATED COUNT: 17.2 % (ref 10–15)
GFR SERPL CREATININE-BSD FRML MDRD: 5 ML/MIN/1.73M2
GLUCOSE BLD-MCNC: 88 MG/DL (ref 70–125)
HCT VFR BLD AUTO: 22 % (ref 40–53)
HGB BLD-MCNC: 7 G/DL (ref 13.3–17.7)
HGB BLD-MCNC: 8.5 G/DL (ref 13.3–17.7)
HOLD SPECIMEN: NORMAL
INR PPP: 1.54 (ref 0.9–1.15)
ISSUE DATE AND TIME: NORMAL
ISSUE DATE AND TIME: NORMAL
MCH RBC QN AUTO: 30.6 PG (ref 26.5–33)
MCHC RBC AUTO-ENTMCNC: 31.8 G/DL (ref 31.5–36.5)
MCV RBC AUTO: 96 FL (ref 78–100)
PLATELET # BLD AUTO: 43 10E3/UL (ref 150–450)
POTASSIUM BLD-SCNC: 5.2 MMOL/L (ref 3.5–5)
PROT SERPL-MCNC: 5.9 G/DL (ref 6–8)
RBC # BLD AUTO: 2.29 10E6/UL (ref 4.4–5.9)
SODIUM SERPL-SCNC: 140 MMOL/L (ref 136–145)
UNIT ABO/RH: NORMAL
UNIT ABO/RH: NORMAL
UNIT NUMBER: NORMAL
UNIT NUMBER: NORMAL
UNIT STATUS: NORMAL
UNIT STATUS: NORMAL
UNIT TYPE ISBT: 6200
UNIT TYPE ISBT: 6200
WBC # BLD AUTO: 4.5 10E3/UL (ref 4–11)

## 2021-09-27 PROCEDURE — P9016 RBC LEUKOCYTES REDUCED: HCPCS | Performed by: FAMILY MEDICINE

## 2021-09-27 PROCEDURE — 250N000013 HC RX MED GY IP 250 OP 250 PS 637: Performed by: FAMILY MEDICINE

## 2021-09-27 PROCEDURE — 5A1D70Z PERFORMANCE OF URINARY FILTRATION, INTERMITTENT, LESS THAN 6 HOURS PER DAY: ICD-10-PCS | Performed by: FAMILY MEDICINE

## 2021-09-27 PROCEDURE — 36415 COLL VENOUS BLD VENIPUNCTURE: CPT | Performed by: FAMILY MEDICINE

## 2021-09-27 PROCEDURE — 90937 HEMODIALYSIS REPEATED EVAL: CPT

## 2021-09-27 PROCEDURE — 82040 ASSAY OF SERUM ALBUMIN: CPT

## 2021-09-27 PROCEDURE — 258N000003 HC RX IP 258 OP 636: Performed by: INTERNAL MEDICINE

## 2021-09-27 PROCEDURE — 99223 1ST HOSP IP/OBS HIGH 75: CPT | Performed by: INTERNAL MEDICINE

## 2021-09-27 PROCEDURE — 36415 COLL VENOUS BLD VENIPUNCTURE: CPT

## 2021-09-27 PROCEDURE — 250N000013 HC RX MED GY IP 250 OP 250 PS 637

## 2021-09-27 PROCEDURE — 85610 PROTHROMBIN TIME: CPT

## 2021-09-27 PROCEDURE — P9035 PLATELET PHERES LEUKOREDUCED: HCPCS | Performed by: FAMILY MEDICINE

## 2021-09-27 PROCEDURE — 634N000001 HC RX 634: Performed by: INTERNAL MEDICINE

## 2021-09-27 PROCEDURE — 85018 HEMOGLOBIN: CPT | Performed by: FAMILY MEDICINE

## 2021-09-27 PROCEDURE — 86923 COMPATIBILITY TEST ELECTRIC: CPT | Performed by: FAMILY MEDICINE

## 2021-09-27 PROCEDURE — 99223 1ST HOSP IP/OBS HIGH 75: CPT | Mod: AI

## 2021-09-27 PROCEDURE — 85027 COMPLETE CBC AUTOMATED: CPT

## 2021-09-27 PROCEDURE — 120N000001 HC R&B MED SURG/OB

## 2021-09-27 RX ORDER — OXYCODONE HYDROCHLORIDE 5 MG/1
5 TABLET ORAL EVERY 6 HOURS PRN
Status: DISCONTINUED | OUTPATIENT
Start: 2021-09-27 | End: 2021-09-27

## 2021-09-27 RX ORDER — ACETAMINOPHEN 325 MG/1
650 TABLET ORAL ONCE
Status: COMPLETED | OUTPATIENT
Start: 2021-09-28 | End: 2021-09-28

## 2021-09-27 RX ADMIN — EPOETIN ALFA-EPBX 12000 UNITS: 10000 INJECTION, SOLUTION INTRAVENOUS; SUBCUTANEOUS at 18:15

## 2021-09-27 RX ADMIN — ACETAMINOPHEN 650 MG: 325 TABLET ORAL at 13:40

## 2021-09-27 RX ADMIN — Medication 7.5 MG: at 22:29

## 2021-09-27 RX ADMIN — ACETAMINOPHEN 650 MG: 325 TABLET ORAL at 18:14

## 2021-09-27 RX ADMIN — SODIUM CHLORIDE 250 ML: 9 INJECTION, SOLUTION INTRAVENOUS at 14:45

## 2021-09-27 RX ADMIN — CARVEDILOL 25 MG: 12.5 TABLET, FILM COATED ORAL at 18:14

## 2021-09-27 RX ADMIN — OXYCODONE HYDROCHLORIDE 5 MG: 5 TABLET ORAL at 16:25

## 2021-09-27 RX ADMIN — CARVEDILOL 25 MG: 12.5 TABLET, FILM COATED ORAL at 08:02

## 2021-09-27 RX ADMIN — PANTOPRAZOLE SODIUM 40 MG: 20 TABLET, DELAYED RELEASE ORAL at 08:02

## 2021-09-27 RX ADMIN — Medication 2.5 MG: at 08:02

## 2021-09-27 RX ADMIN — DILTIAZEM HYDROCHLORIDE 180 MG: 180 CAPSULE, EXTENDED RELEASE ORAL at 08:02

## 2021-09-27 RX ADMIN — Medication 2.5 MG: at 11:57

## 2021-09-27 RX ADMIN — DILTIAZEM HYDROCHLORIDE 180 MG: 180 CAPSULE, EXTENDED RELEASE ORAL at 20:29

## 2021-09-27 RX ADMIN — SODIUM CHLORIDE 300 ML: 9 INJECTION, SOLUTION INTRAVENOUS at 14:45

## 2021-09-27 RX ADMIN — PANTOPRAZOLE SODIUM 40 MG: 20 TABLET, DELAYED RELEASE ORAL at 20:29

## 2021-09-27 NOTE — UTILIZATION REVIEW
Admission Status; Secondary Review Determination       Under the authority of the Utilization Management Committee, the utilization review process indicated a secondary review on the above patient. The review outcome is based on review of the medical records, discussions with staff, and applying clinical experience noted on the date of the review.     (x) Inpatient Status Appropriate - This patient's medical care is consistent with medical management for inpatient care and reasonable inpatient medical practice.     RATIONALE FOR DETERMINATION     Mr. Blanton is a 62 yo male pt with a PMH of ESRD, hepatitis C and chronic thrombocytopenia who presents to the ED with left thigh pain after heavy lifting.  CT imaging revealed left-sided ilipsoas hematoma.  Was noted to be acutely anemia with a hgb 5.2; currently receiving 3rd unit of PRBCs and 1 plts.  hgb dropped post initial transfusion and there is concern about continued slow bleeding into hematoma site.  INR elevated d/t liver disease and also increases bleeding risks.  He will remain in the hospital for close clinical monitoring and treatment.      At the time of admission with the information available to the attending physician more than 2 nights Hospital complex care was anticipated, based on patient risk of adverse outcome if treated as outpatient and complex care required. Inpatient admission is appropriate based on the Medicare guidelines.     This document was produced using voice recognition software       The information on this document is developed by the utilization review team in order for the business office to ensure compliance. This only denotes the appropriateness of proper admission status and does not reflect the quality of care rendered.   The definitions of Inpatient Status and Observation Status used in making the determination above are those provided in the CMS Coverage Manual, Chapter 1 and Chapter 6, section 70.4.         Sincerely,      Ketty Alvarado, DO  Utilization Review  Physician Advisor  [facility].

## 2021-09-27 NOTE — PROGRESS NOTES
[facility] Progress Note    Assessment/Plan  Active Problems:    Thrombocytopenia (H)    Iliopsoas muscle hematoma, left, initial encounter    Chronic dissection of thoracic aorta (H)    ESRD (end stage renal disease) on dialysis (H)    Anemia due to blood loss, acute    Hepatic cirrhosis, unspecified hepatic cirrhosis type, unspecified whether ascites present (H)    Elle Blanton is a 61 year old male admitted on 9/26/2021. He has a history of normocytic anemia, hepatitis B, ESRD on HD, thrombocytopenia, chronic type B dissection and is admitted for left sided retroperitoneal iliopsoas hematoma, has since required multiple units of pRBC and platelets.     #Left sided iliopsoas hematoma  #Normocytic anemia   Patient has history anemia likely secondary to ESRD with baseline of 8-9. Patient presented with left thigh pain and numbness for a week after lifting a heavy gardening hose into the back of his truck. He was found to have a left sided iliopsoas hematoma without extravasation on CT abdomen/pelvis; not a candidate for coiling. Patient was vitally stable on admission and alert. Found to have Hgb of 5.2 on admission; treated with 2 units of pRBC in the ED which increased hemoglobin to 7.9. No signs of bleeding other than the iliopsoas hematoma. Upon recheck this morning, hemoglobin found to be 7.0. Will transfuse one unit pRBC and one unit of platelets.   - 1 unit pRBC and 1 unit platelets   - Hemoglobin check one hour post infusion  - Pain: Tylenol 625mg Q8H and oxycodone 5 Q6H PRN  - Lidocaine patch in place PTA  - Daily CBC       #ESRD on Hemodialysis  #NAHUM on CKD  Patient has history of ESRD on HD with left arm dialysis graft. Per chart review, it is has been recommended he receive dialysis on MWF however he only wishes to go . Patient states he did not make it to his Friday dialysis appt. On admission Cr: 10.56 with K of 5.2 and bicarb of 18. It appears baseline creatinine is around 5.0.   Patient continues to urinate. Nephrology to see today.  - Nephrology consult placed for HD  - Daily BMP  - Renal diet     #Chronic Hepatitis B  #Liver chirrosis  #Thrombocytopenia  #Elevated INR  Patient has long standing history of cirrhosis secondary to chronic hepatitis B. Per chart review it appears he is supposed to follow with [facility] but per patient he has only seen them one time and did not follow-up afterward. Unclear when he last saw them. He currently takes entecavir 0.5mg every third day (it is prescribed for every other day). Patient was found to have new incidental liver mass on CTA on admission. Scattered ecchymosis present likely secondary to thrombocytopenia. INR was 1.67 with platelets of 49 on admission; received one unit of platelets in the ED. Liver panel shows slightly elevated bilirubin; the rest of his panel is normal. Will consider MRI to further evaluate new liver mass if stable tomorrow.   - Continue home entecavir 0.5mg  - INR daily      #Incidental finding: small bilateral pleural effusion  Patient has history of right pleural effusion in the past. Per image report, now appears slightly increased from previous image. Denied any shortness of breath and is satting 98% on RA. Possible etiologies include missing hemodialysis on Friday vs malignancy (setting of new liver mass). Will hold off on draining for now given patient is hemodynamically stable. Could consider follow-up CXR following HD to see if this improves it.      #Chronic Concerns  > Hypertension and Chronic Schaller type B dissection  - Continue home carvedilol 25mg BID and diltiazem ER 180mg BID  - Dissection appears stable/unchanged from previous CTA. No abdominal pain. This has been known since 2019.  > Gerd  - On omeprazole 20mg BID at home -- change to protonix 40mg BID    FEN: Regular diet  DVT Prophylaxis: PCD  Code: Full     Disposition/Advanced Care Planning  Barriers to discharge: work-up in progress, requiring blood  transfusions  Anticipated discharge date: 1-2 days  Disposition: home     Subjective  Patient feels stronger after receiving blood transfusions yesterday. He does note continued left sided pain this morning, especially when turning and sitting up. He was told that he will require another unit of blood, for which he expressed understanding. Does not have questions at this time.     Objective    Vital signs in last 24 hours   Temp:  [97.6  F (36.4  C)-98.9  F (37.2  C)] 98.9  F (37.2  C)  Pulse:  [54-77] 59  Resp:  [14-22] 16  BP: (108-172)/(55-79) 126/68  SpO2:  [93 %-100 %] 97 %       Wt Readings from Last 3 Encounters:   09/26/21 66 kg (145 lb 8.1 oz)   08/25/21 60.8 kg (134 lb)   08/18/21 62.8 kg (138 lb 8 oz)     Intake/Output last 3 shift I/O last 3 completed shifts:  In: 1164 [P.O.:120]  Out: 400 [Urine:400]     Intake/Output this shift:I/O this shift:  In: 998.33 [I.V.:998.33]  Out: 300 [Urine:300]    Physical Exam  General appearance: alert, appears stated age, cooperative and no distress  Head: Normocephalic, without obvious abnormality, atraumatic  Eyes: conjunctivae/corneas clear. No icterus noted.   Throat: lips, mucosa, and tongue normal; teeth and gums normal  Neck: no JVD and supple, symmetrical, trachea midline  Lungs: clear to auscultation bilaterally  Heart: regular rate and rhythm, S1, S2 normal, no murmur, click, rub or gallop  Abdomen: soft, non-tender; bowel sounds normal  Extremities: extremities normal, atraumatic, no cyanosis or edema.   Skin: Scattered  ecchymosis  Neurologic: No focal neurologic deficits  Psychiatric: mood and affect normal/bright    Pertinent Labs and Pertinent Radiology   Lab Results: personally reviewed.   Recent Results (from the past 24 hour(s))   Prepare red blood cells (unit)    Collection Time: 09/26/21  1:45 PM   Result Value Ref Range    CROSSMATCH Compatible     UNIT ABO/RH A Pos     Unit Number [id number]     Unit Status Issued     Blood Component Type Red  Blood Cells     Product Code R4880N06     CODING SYSTEM RNFQ510     UNIT TYPE ISBT 6200     ISSUE DATE AND TIME [id number]    Prepare red blood cells (unit)    Collection Time: 09/26/21  1:45 PM   Result Value Ref Range    CROSSMATCH Compatible     UNIT ABO/RH A Pos     Unit Number [id number]     Unit Status Transfused     Blood Component Type Red Blood Cells     Product Code B6907D46     CODING SYSTEM [id number]     UNIT TYPE ISBT 6200     ISSUE DATE AND TIME [id number]    Prepare pheresed platelets (unit)    Collection Time: 09/26/21  1:45 PM   Result Value Ref Range    UNIT ABO/RH A Pos     Unit Number [id number]     Unit Status Issued     Blood Component Type Platelets     Product Code N5229C98     CODING SYSTEM [id number]     UNIT TYPE ISBT 6200     ISSUE DATE AND TIME [id number]    Hemoglobin    Collection Time: 09/26/21  8:06 PM   Result Value Ref Range    Hemoglobin 7.9 (L) 13.3 - 17.7 g/dL   CBC with platelets    Collection Time: 09/27/21  8:33 AM   Result Value Ref Range    WBC Count 4.5 4.0 - 11.0 10e3/uL    RBC Count 2.29 (L) 4.40 - 5.90 10e6/uL    Hemoglobin 7.0 (L) 13.3 - 17.7 g/dL    Hematocrit 22.0 (L) 40.0 - 53.0 %    MCV 96 78 - 100 fL    MCH 30.6 26.5 - 33.0 pg    MCHC 31.8 31.5 - 36.5 g/dL    RDW 17.2 (H) 10.0 - 15.0 %    Platelet Count 43 (LL) 150 - 450 10e3/uL   Comprehensive metabolic panel    Collection Time: 09/27/21  8:33 AM   Result Value Ref Range    Sodium 140 136 - 145 mmol/L    Potassium 5.2 (H) 3.5 - 5.0 mmol/L    Chloride 111 (H) 98 - 107 mmol/L    Carbon Dioxide (CO2) 17 (L) 22 - 31 mmol/L    Anion Gap 12 5 - 18 mmol/L    Urea Nitrogen 110 (H) 8 - 22 mg/dL    Creatinine 9.63 (HH) 0.70 - 1.30 mg/dL    Calcium 7.8 (L) 8.5 - 10.5 mg/dL    Glucose 88 70 - 125 mg/dL    Alkaline Phosphatase 71 45 - 120 U/L    AST 18 0 - 40 U/L    ALT 21 0 - 45 U/L    Protein Total 5.9 (L) 6.0 - 8.0 g/dL    Albumin 3.1 (L) 3.5 - 5.0 g/dL    Bilirubin Total 1.2 (H) 0.0 - 1.0 mg/dL    GFR  Estimate 5 (L) >60 mL/min/1.73m2   INR    Collection Time: 09/27/21  8:33 AM   Result Value Ref Range    INR 1.54 (H) 0.90 - 1.15   Extra Red Top Tube    Collection Time: 09/27/21  8:33 AM   Result Value Ref Range    Hold Specimen JIC    Prepare red blood cells (unit)    Collection Time: 09/27/21 12:30 PM   Result Value Ref Range    CROSSMATCH Compatible     UNIT ABO/RH A Pos     Unit Number [id number]     Unit Status Ready     Blood Component Type Red Blood Cells     Product Code T5935Y50     CODING SYSTEM [id number]     UNIT TYPE ISBT 6200      Radiology Results: Personally reviewed image/s and impression/s  > MR Lumbar Spine 9/26/21  IMPRESSION:  1.  Heterogeneous lesion that appears to be expanding the left psoas muscle. This could represent an intramuscular hematoma, however, other etiologies are not excluded. In addition, the inferior extent of this is not included on these images. Consider   evaluation with CT abdomen and pelvis.  2.  Abdominal aortic aneurysm measuring up to at least 5 cm in the visualized suprarenal compartment. This would also be better assessed with CT.  3.  Degenerative changes in the lumbar spine as detailed above, most pronounced at the L5-S1 level where there is moderate right and moderate to severe left neural foraminal narrowing.  4.  Increased T2 signal within the intervertebral disc at L5-S1 may be degenerative in etiology, however, early discitis is not excluded.    Precepted patient with Dr. Gustavo Solomon.    Nevin Gale MD  [facility], PGY-1  Pager # [phone]

## 2021-09-27 NOTE — PROGRESS NOTES
Hemodialysis Treatment Note:            Total UF pulled:  2500     Access:   AV graft L upper arm: Thrill and bruit preesnt. No s/s of infections. Cannulated with 15g needles without complications. Access patent. Prescribed  achieved.      Run Summary:   1 unit PRBC given during treatment. 16ga needlesused .   K2 bath 2.5 hr treatment with goal set for 2.5 kg. Pt was hemodynamically stable during dialysis. Goal increased accordingly.  Pt completed dialysis treatment without issues. Report given to primary nurse,      Access (post dialysis) :     AV graft continued to be patent.  maintained during dialysis. Needles pulled and gauze applied with direct pressure, then secured with tape.  Hemostasis achieved within 5 min.     Interventions:  VS check q15min   Critline used for blood volume monitoring.     Plans:  Per renal team.

## 2021-09-27 NOTE — PLAN OF CARE
Problem: Adult Inpatient Plan of Care  Goal: Plan of Care Review  Outcome: Improving   Pt continues to have pain in his left groin and hip. Oxycodone increased to 5mg every 4 hours as needed.   Receiving dialysis this afternoon, given one unit of packed red blood cells for hgb of 7.0 by dialysis nurse.

## 2021-09-27 NOTE — CONSULTS
NEPHROLOGY CONSULTATION    CC: Left ilopsoas hematoma    REASON FOR CONSULTATION: We are asked to see pt by .    HISTORY OF PRESENT ILLNESS:61 year old male with past medical history significant for end-stage renal disease on hemodialysis via left upper extremity AV graft chronic type B dissection, chronic hepatitis B,  thrombocytopenia, normocytic anemia, secondary hyperparathyroidism minutes on September 26 for evaluation of pain and numbness in his left thigh and left buttock.  Patient stated about a week ago he was lifting a whole garden hose into a cystic rock when he suddenly felt pain and numbness in his left thigh and left buttock.  Patient was diagnosed with left iliopsoas hematoma.  Patient was initiated on dialysis in April 2021.  He gets dialyzed Monday and Friday at [facility] unit under care of Dr. Hill. He runs for 3 hours but often skips treatments and cuts his treatment short.  I reviewed patient's [facility] records. Patient was last dialyzed on September 20th.  Patient states that he missed his Friday appointment due to pain in his left thigh and left buttock.    REVIEW OF SYSTEMS:  ROS was completely reviewed and otherwise negative and non-contributory    Past Medical History:   Diagnosis Date     NAHUM (acute kidney injury) (H)      GI (gastrointestinal bleed)      Gout      H. pylori infection 7/5/2017    UGI bleed implied by Hgb 9.0 6/22/17 and melena. Admitted and hgb decreased to 7.6, CT abdomen showed all bladder wall thickening. + Hep B surface antigen noted. Melena resolved and hgb stabalized without transfusion, epigastric pain resolved with PPI. Recommend referral for gastroscopy.     Heart attack (H)      Hepatitis B      Normocytic anemia      PONV (postoperative nausea and vomiting)      Thrombocytopenia (H)        Social History     Socioeconomic History     Marital status:      Spouse name: Not on file     Number of children: Not on file     Years of  education: Not on file     Highest education level: Not on file   Occupational History     Not on file   Tobacco Use     Smoking status: Never Smoker     Smokeless tobacco: Never Used   Substance and Sexual Activity     Alcohol use: No     Drug use: No     Sexual activity: Yes     Partners: Female   Other Topics Concern     Not on file   Social History Narrative     Not on file     Social Determinants of Health     Financial Resource Strain:      Difficulty of Paying Living Expenses:    Food Insecurity:      Worried About Running Out of Food in the Last Year:      Ran Out of Food in the Last Year:    Transportation Needs:      Lack of Transportation (Medical):      Lack of Transportation (Non-Medical):    Physical Activity:      Days of Exercise per Week:      Minutes of Exercise per Session:    Stress:      Feeling of Stress :    Social Connections:      Frequency of Communication with Friends and Family:      Frequency of Social Gatherings with Friends and Family:      Attends Worship Services:      Active Member of Clubs or Organizations:      Attends Club or Organization Meetings:      Marital Status:    Intimate Partner Violence:      Fear of Current or Ex-Partner:      Emotionally Abused:      Physically Abused:      Sexually Abused:        Family History   Problem Relation Age of Onset     Diabetes Father      Hypertension No family hx of      Asthma No family hx of      Cancer No family hx of      Coronary Artery Disease No family hx of      Liver Cancer No family hx of      Ulcers Father        Allergies   Allergen Reactions     Nka [No Known Allergies]        MEDICATIONS:    sodium chloride 0.9%  250 mL Intravenous Once in dialysis/CRRT     sodium chloride 0.9%  300 mL Hemodialysis Machine Once     carvedilol  25 mg Oral BID w/meals     diltiazem ER  180 mg Oral BID     entecavir  0.5 mg Oral Q3 Days     labetalol  20 mg Intravenous Once     - MEDICATION INSTRUCTIONS -   Does not apply Once      pantoprazole  40 mg Oral BID     sodium chloride (PF)  3 mL Intracatheter Q8H         PHYSICAL EXAM    /68 (BP Location: Right leg)   Pulse 59   Temp 98.9  F (37.2  C) (Oral)   Resp 16   Wt 66 kg (145 lb 8.1 oz)   SpO2 97%   BMI 24.98 kg/m        Intake/Output Summary (Last 24 hours) at 9/27/2021 1240  Last data filed at 9/27/2021 1000  Gross per 24 hour   Intake 2162.33 ml   Output 700 ml   Net 1462.33 ml       Alert/oriented x 3; awake and NAD  HEENT NC/AT; perrla; OP clear without lesions; mmm  Neck supple without LAD, TM  CV; RRR without rub or murmur  Lung: clear and equal; no extra sounds  Ab: soft and NT; not distended; normal bs  Ext: no edema and well perfused  Skin; no rash  Neuro; grossly intact  Access: Left AVG with palpable thrill and audible bruit.    LABORATORIES    Recent Labs   Lab 09/27/21 0833 09/26/21 2006 09/26/21  1312   WBC 4.5  --  5.4   HGB 7.0* 7.9* 5.6*   HCT 22.0*  --  17.4*   PLT 43*  --  49*     Recent Labs   Lab 09/27/21 0833 09/26/21  1312    137   CO2 17* 18*   * 111*   ALKPHOS 71  --    ALT 21  --    AST 18  --      Recent Labs   Lab 09/27/21 0833 09/26/21  1312   INR 1.54* 1.67*   PTT  --  41*     Invalid input(s): FERRITIN  No results for input(s): IRON in the last 168 hours.    Invalid input(s): TIBC    I reviewed all labs    ASSESSMENT/PLAN:  61 year old male with past medical history significant for end-stage renal disease on hemodialysis via left upper extremity AV graft chronic type B dissection, chronic hepatitis B,  thrombocytopenia, normocytic anemia, secondary hyperparathyroidism minutes on September 26 for evaluation of pain and numbness in his left thigh and left buttock, found to have a left iliopsoas hematoma.    ESRD- thought due to renovascular disease; past imaging showed atrophic kidneys. Initiated on hemodialysis in 04/2021 after placement of rapid access AVG.  He gets dialyzed on Monday and Friday, runs for 3 hours at [facility] unit under care of Dr. Hill.  Patient often skips treatments and cuts his treatments short in spite of multiple discussions with primary nephrologist. Patient was last dialyzed on September 20th.  Patient states that he missed his Friday appointment due to pain in his left thigh and left buttock.  I reviewed labs results with the pateint and his son. I express my concerns about azotemia. I discussed in length with the patient and his son the importance of completing a full treatment of dialysis.  Patient states that he will not be able to tolerate dialysis treatment for longer than 2-1/2 hours. Patient insist that he is feeling well and has no problems with missing dialysis. Patient states that he does not want him to have an extra dialysis run until Friday.    Access: LUE AVG. Placed by . uses 16 g needles. No reported issues.    Hyperkalemia-Mild. Serum potassium is 5.2. Run with K2 bath. Renal diet.    Metabolic acidosis-secondary to ESRD and skipped dialysis treatments. Should improve with HD.    Hypertension-BP is acceptable. On Coreg and Diltiazem  -recommend nom changes      Volume status-EDW 61 kgs. Patient's weight on admission was 66 kgs. Still urinates. CT chest showed small bilateral pleural effusions.    -UF with HD as tolerated, keep MAP>60  -daily weight  -low sodium diet  -32 oz fluid restriction    Acute on chronic anemia- Baseline HGb is 8s. Patient presented with Hgb of 5.6g/dl. Likely secondary blood loss from iliopsoas hematoma. S/p 2U PRBC. Hgb is trending up to 7g/dl this am. On Epo 28825C and IV Venofer as outpatient. Con't Epo here.      CKD BMD  Secondary hyperparathyroidism-Most recent PTH was 82 in 07/2021.  -corrected serum calcium is 8.5  -most recent Ph was  7.6  09/10. As per out pateint labs. Not on binders. Renal diet.    Left sided iliopsoas hematoma-management as per primary service      Thrombocytopenia - likely secondary to liver cirrhosis. Chronically  ~30k-80k. Ranging in 40s this admission. S/p 1U platelet.     Cirrhosis/chronic Hepatitis B -  On entecavir.     Chronic Panfilo type B dissection-Dissection appears stable/unchanged from previous CTA.    Thank you for your consultation. We will follow.    Elizabeth Salmeron MD  [facility]  [address] [place] [address], [address]  [place] [address]  Phone# [phone]  Fax# [phone]

## 2021-09-27 NOTE — H&P
[facility]    History and Physical - [place] [facility]        Date of Admission:  9/26/2021    Assessment & Plan      Elle Blanton is a 61 year old male admitted on 9/26/2021. He has a history of normocytic anemia, hepatitis B, ESRD on HD, thrombocytopenia, chronic type B dissection and is admitted for left sided retroperitoneal iliopsoas hematoma.     #Left sided iliopsoas hematoma  Patient presented with left thigh pain and numbness for a week after lifting a heavy gardening hose into the back of his truck. He was found to have a left sided iliopsoas hematoma without extravasation on CT abdomen/pelvis. In the ED, patient was given Dilaudid and oxycodone which he states helped with his pain but Dilaudid made him nauseated. Exam for patient was remarkable for mild tenderness to palpation of left pelvic region with associated numbness. Lidocaine patch in place PTA. Patient was vitally stable on admission and alert. Considering imaging did not show extravasation of hematoma will treat pain and monitor for worsening pain/numbness. Not a candidate for coiling.  -Pain: Tylenol 625mg Q8H and oxycodone 2.5 Q6H  -lidocaine patch in place PTA  -Daily CBC and CMP    #Normocytic anemia   Patient presented with hgb of 5.2. He has history of anemia, likely secondary to ESRD. Patient was transfused with 2U pRBC in the ED which increased hgb to 7.9. As above, patient has hematoma likely causing worsening of his chronic anemia. No other signs of bleeding at this time. Patient is hemodynamically stable. He was feeling slightly light headed in the ED but that has improved following transfusion.  -Morning CBC    #ESRD on Hemodialysis  #NAHUM on CKD  Patient has history of ESRD on HD with left arm dialysis graft. Per chart review, it is has been recommended he receive dialysis on MWF however he only wishes to go . Patient states he did not make it to his Friday dialysis appt. On admission Cr: 10.56 with K  of 5.2 and bicarb of 18. It appears baseline creatinine is around 5.0. Given patient received contrast in ED and significantly elevated creatinine will add mIVF in addition to PO intake. Patient is urinating normally.   -nephrology consult placed for HD  -CMP daily  -mIVF NS  -renal diet    #Chronic Hepatitis B  #Liver chirrosis  #Thrombocytopenia  #Elevated INR  Patient has long standing history of cirrhosis secondary to chronic hepatitis B. Per chart review it appears he is supposed to follow with [facility] but per patient he has only seen them one time and did not follow-up afterward. Unclear when he last saw them. He currently takes entecavir 0.5mg every third day (it is prescribed for every other day). Patient was found to have new incidental liver mass on CTA on admission. Exam today did not show significant signs of jaundice and abdomen was soft and non-tender. Scattered ecchymosis present likely secondary to thrombocytopenia. He has not had changes to bowel movements, new abdominal pain, or unexplained weight loss. Patient did not have LFTs done in ED, I will order that for morning. INR was 1.67 with platelets of 49 on admission. He was given platelets in ED. At this time, no consult was placed for inpatient GI for liver mass biopsy as I believe this could be followed up outpatient. However, considering patient's poor follow up history could consider inpatient workup.   -Continue home entecavir 0.5mg  -one time Vitamin K 2.5mg   -INR recheck in morning  -Daily CMP  -Daily CBC -- recheck platelets in am       #Incidental finding: small bilateral pleural effusion  Patient has history of right pleural effusion in the past. Today, per image report, appears slightly increased from previous image. He denies any shortness of breath and is satting 98% on RA. Possible etiologies include missing hemodialysis on Friday vs malignancy (setting of new liver mass). Will hold off on draining for now given patient is  "hemodynamically stable. Could consider follow-up CXR following HD tomorrow to see if this improves it.   -monitor for shortness of breath     #Chronic Concerns  #Hypertension   #Chronic Panfilo type B dissection  -continue home carvedilol 25mg BID and diltiazem ER 180mg BID  -dissection appears stable/unchanged from previous CTA. No abdominal pain. This has been known since 2019.    #Gerd  -On omeprazole 20mg BID at home -- change to protonix 40mg BID     Diet: Combination Diet Regular Diet Adult    DVT Prophylaxis: Pneumatic Compression Devices  Beltre Catheter: Not present  Fluids: PO, mIVF 100ml/hr  Central Lines: None  Code Status: Full Code      Disposition Plan   Expected discharge: recommended to prior living arrangement once hemoglobin stable and renal function improved.     The patient's care was discussed with the Attending Physician, Dr. Davalos.    Nathaly Fernandez MD  [facility]  _____________________________________________________________________    Chief Complaint    Left thigh pain    History is obtained from the patient    History of Present Illness   Elle Blanton is a 61 year old male who has a history of normocytic anemia, hepatitis B, ESRD on HD, thrombocytopenia, chronic type B dissection and is admitted for left iliopsoas hematoma. He states that about a week ago he was lifting a whole garden hose into his truck when he suddenly felt pain and numbness in his left thigh and left buttock. Since that time the pain has remained the same, not worsened or gotten better. So he came to the ED to have it evaluated. He also notes intermittent dizziness and headache that has been present for \"a long time\". This last week he has not noticed worsening of these symptoms but today notes a small headache that feels similar to his past headache. No blurry vision, double vision, facial droop, or difficulty speaking. Patient notes history of anemia where he has received blood transfusions for " in the past. He does not take oral iron at home.      Patient has a history of ESRD on HD who normally gets dialyzed M/F but he states he missed his Friday appointment. He is urinating normally. No chest pain, or palpitations.     He has has history of chronic type B thoracic dissection which he follows with Dr. Cosby outpatient.     Patient also has history of chronic hepatitis B for which he takes entecavir for. He states he was supposed to follow with GI doctors but only saw them once and them missed the next appointment. He states he has not seen them again.     Review of Systems    The 10 point Review of Systems is negative other than noted in the HPI or here.     Past Medical History    I have reviewed this patient's medical history and updated it with pertinent information if needed.   Past Medical History:   Diagnosis Date     NAHUM (acute kidney injury) (H)      GI (gastrointestinal bleed)      Gout      H. pylori infection 7/5/2017    UGI bleed implied by Hgb 9.0 6/22/17 and melena. Admitted and hgb decreased to 7.6, CT abdomen showed all bladder wall thickening. + Hep B surface antigen noted. Melena resolved and hgb stabalized without transfusion, epigastric pain resolved with PPI. Recommend referral for gastroscopy.     Heart attack (H)      Hepatitis B      Normocytic anemia      PONV (postoperative nausea and vomiting)      Thrombocytopenia (H)         Past Surgical History   I have reviewed this patient's surgical history and updated it with pertinent information if needed.  Past Surgical History:   Procedure Laterality Date     ABCESS DRAINAGE      finger     CREATE FISTULA ARTERIOVENOUS UPPER EXTREMITY Left 4/20/2021    Procedure: left arm dialysis graft placement;  Surgeon: Roxana Cosby MD;  Location: [facility];  Service: General     FOREIGN BODY REMOVAL      finger     INCISION AND DRAINAGE FINGER, COMBINED Left 10/20/2016    Procedure: COMBINED INCISION AND DRAINAGE FINGER;  Surgeon:  Mireya Pizano MD;  Location: [facility] OR     IR CHEST TUBE PLACEMENT NON-TUNNELED RIGHT  4/19/2021     IR PLEURAL DRAINAGE WITH CATHETER INSERTION  4/19/2021     MIDLINE INSERTION - DOUBLE LUMEN  4/23/2021          IN ESOPHAGOGASTRODUODENOSCOPY TRANSORAL DIAGNOSTIC N/A 8/18/2020    Procedure: ESOPHAGOGASTRODUODENOSCOPY (EGD) with biopsy;  Surgeon: Nilson Gonzales MD;  Location: [facility];  Service: Gastroenterology      PARACENTESIS  8/14/2020      PARACENTESIS  8/19/2020      THORACENTESIS  4/18/2021        Social History   I have reviewed this patient's social history and updated it with pertinent information if needed. Elle Blanton  reports that he has never smoked. He has never used smokeless tobacco. He reports that he does not drink alcohol and does not use drugs.    Family History   I have reviewed this patient's family history and updated it with pertinent information if needed.  Family History   Problem Relation Age of Onset     Diabetes Father      Hypertension No family hx of      Asthma No family hx of      Cancer No family hx of      Coronary Artery Disease No family hx of      Liver Cancer No family hx of      Ulcers Father        Prior to Admission Medications   Prior to Admission Medications   Prescriptions Last Dose Informant Patient Reported? Taking?   acetaminophen (TYLENOL) 500 MG tablet 9/26/2021 at Unknown time  Yes Yes   Sig: Take 500 mg by mouth every 6 hours as needed for mild pain   carvedilol (COREG) 25 MG tablet 9/25/2021 at x2  No Yes   Sig: Take 2 tablets (50 mg) by mouth 2 times daily (with meals)   Patient taking differently: Take 25 mg by mouth 2 times daily (with meals)    cloNIDine (CATAPRES) 0.1 MG tablet Past Week at Unknown time  No Yes   Sig: Take 1 tablet (0.1 mg) by mouth At Bedtime   diltiazem ER (TIAZAC) 180 MG 24 hr ER beaded capsule 9/25/2021 at x2  No Yes   Sig: Take 1 capsule (180 mg) by mouth 2 times daily   entecavir (BARACLUDE) 0.5 MG tablet 9/23/2021  No  Yes   Sig: Take 1 tablet (0.5 mg) by mouth every other day   Patient taking differently: Take 0.5 mg by mouth every 3 days    hydrOXYzine (ATARAX) 25 MG tablet Unknown at PRN  No Yes   Sig: Take 1 tablet (25 mg) by mouth 3 times daily as needed for itching   omeprazole (PRILOSEC) 20 MG DR capsule 9/25/2021 at Unknown time  No Yes   Sig: Take 1 capsule (20 mg) by mouth 2 times daily   order for DME   No No   Sig: Equipment being ordered: Other: blood pressure monitor  Treatment Diagnosis: hypertension      Facility-Administered Medications: None     Allergies   Allergies   Allergen Reactions     Nka [No Known Allergies]        Physical Exam   Vital Signs: Temp: 97.7  F (36.5  C) Temp src: Oral BP: 132/76 Pulse: 62   Resp: 16 SpO2: 95 % O2 Device: None (Room air) Oxygen Delivery: 2 LPM  Weight: 145 lbs 8.06 oz    Constitutional: awake, alert, cooperative, no apparent distress, and appears stated age  Eyes: Lids and lashes normal, pupils equal, round and reactive to light, extra ocular muscles intact, sclera clear, conjunctiva normal  ENT: Normocephalic, without obvious abnormality, atraumatic, sinuses nontender on palpation, external ears without lesions, oral pharynx with moist mucous membranes, tonsils without erythema or exudates, gums normal and good dentition.  Hematologic / Lymphatic: no cervical lymphadenopathy  Respiratory: No increased work of breathing, good air exchange, clear to auscultation bilaterally, no crackles or wheezing  Cardiovascular: Normal apical impulse, regular rate and rhythm, normal S1 and S2, no S3 or S4, and no murmur noted  GI: No scars, normal bowel sounds, soft, non-distended, non-tender, no masses palpated, no hepatosplenomegally  Skin: no redness, warmth, or swelling, no rashes, no lesions, no jaundice and ecchymosis scattered  Musculoskeletal: no lower extremity pitting edema present  full range of motion noted  motor strength is 5 out of 5 all extremities bilaterally  tone is  normal  Neurologic: Awake, alert, oriented to name, place and time.  Cranial nerves II-XII are grossly intact.  Motor is 5 out of 5 bilaterally.   Cranial Nerves:  III: Pupils:  equal, round, reactive to light  III,IV,VI: Extra Ocular Movements: intact  V: Facial sensation:  intact  VII: Facial strength: intact  VIII: Hearing:  intact  IX: Palate:  intact  XI: Shoulder shrug:  intact  XII: Tongue movement:  normal  Sensory:  Numbness to touch of lateral aspect of left hip/thigh region    Data   Data reviewed today: I reviewed all medications, new labs and imaging results over the last 24 hours. I personally reviewed the abdominal CT image(s) showing see below.    Most Recent 3 CBC's:Recent Labs   Lab Test 09/26/21 2006 09/26/21 1312 08/18/21 1416 04/29/21 1657 04/27/21 0640 04/27/21  0640 12/30/20  0830   WBC  --  5.4 4.6  --   --  6.2  --    HGB 7.9* 5.6* 8.5* 8.9*   < > 8.3*   < >   MCV  --  98 98 92  --  93   < >   PLT  --  49* 48*  --   --  53*  --     < > = values in this interval not displayed.     Most Recent 3 BMP's:Recent Labs   Lab Test 09/26/21 1312 08/18/21 1416 04/29/21 1657 04/27/21  0640 12/30/20  0830    142 134* 139   < >   POTASSIUM 5.2* 5.6* 4.7 4.0   < >   CHLORIDE 107 108 99 100   < >   CO2 18* 22  --  28  --    * 107* 71* 45*   < >   CR 10.56* 6.50 5.13* 3.80*   < >   ANIONGAP 12 12  --  11  --    TANO 8.2* 8.9 7.9* 7.3*   < >   GLC 90 107* 111 96   < >    < > = values in this interval not displayed.   Platelets: 49    Most Recent 3 INR's:Recent Labs   Lab Test 09/26/21 1312 04/27/21  0640 04/26/21  0648   INR 1.67* 1.22* 1.24*     Most Recent 3 Troponin's:Recent Labs   Lab Test 08/06/19  2055   TROPONIN 0.02     Most Recent ESR & CRP:Recent Labs   Lab Test 09/26/21  1312   SED 4   CRP 0.8*     Recent Results (from the past 24 hour(s))   Lumbar spine MRI w/o contrast    Narrative    EXAM: MR LUMBAR SPINE WITHOUT CONTRAST  LOCATION: [facility]  DATE/TIME: 09/26/2021, 11:44 AM    INDICATION: Low back pain, progressive neurologic deficit.  COMPARISON: None.  TECHNIQUE: Routine Lumbar Spine MRI without IV contrast.    FINDINGS:   Nomenclature is based on five lumbar-type vertebral bodies. Normal distal spinal cord and cauda equina with conus medullaris at L2-L3.    Lumbar spine alignment is grossly within normal limits. No obvious loss of vertebral body height. No destructive bony lesions.    Heterogeneous T2 expansile lesion that appears to be centered in the left psoas muscle. The inferior extent of this lesion is not included on these images.    Abdominal aortic aneurysm measuring up to at least 5 cm in the suprarenal compartment.    T12-L1: Mild loss of disc height and signal. Shallow circumferential disc bulge. Normal facets. No significant spinal canal or neural foraminal narrowing.     L1-L2: Normal disc height and signal. No herniation. Normal facets. No spinal canal or neural foraminal stenosis.    L2-L3: Normal disc height and signal. No herniation. Normal facets. No spinal canal or neural foraminal stenosis.     L3-L4: Mild loss of disc height and signal. Posterior disc bulge. Normal facets. No spinal canal narrowing. No significant neural foraminal narrowing.    L4-L5: Mild loss of disc height and signal. No significant disc herniation. Normal facets. No spinal canal or neural foraminal narrowing.    L5-S1: Marked loss of disc height and signal. There appears to be increased STIR and T2 hyperintense signal within the intervertebral disc. Circumferential disc bulge with endplate osteophytic spurring. Mild left greater than right facet hypertrophy. No   spinal canal narrowing. Moderate right neural foraminal narrowing. Moderate to severe left neural foraminal narrowing.      Impression    IMPRESSION:  1.  Heterogeneous lesion that appears to be expanding the left psoas muscle. This could represent an intramuscular hematoma, however, other  etiologies are not excluded. In addition, the inferior extent of this is not included on these images. Consider   evaluation with CT abdomen and pelvis.  2.  Abdominal aortic aneurysm measuring up to at least 5 cm in the visualized suprarenal compartment. This would also be better assessed with CT.  3.  Degenerative changes in the lumbar spine as detailed above, most pronounced at the L5-S1 level where there is moderate right and moderate to severe left neural foraminal narrowing.  4.  Increased T2 signal within the intervertebral disc at L5-S1 may be degenerative in etiology, however, early discitis is not excluded.    Results discussed with SHANNON Guevara at 12:49 PM on 09/26/2021.     CTA Chest Abdomen Pelvis Runoff w Contrast    Narrative    EXAM: CTA CHEST ABDOMEN PELVIS RUNOFF W CONTRAST  LOCATION: [facility]  DATE/TIME: 9/26/2021 1:34 PM    INDICATION: left leg pain; abnl MRI; psoas intramuscular hematoma; known dissection.  COMPARISON: Lumbar spine MRI 09/26/2021, chest CT 04/22/2021 and older studies, abdomen CT 08/19/2020 and older studies, all without contrast. Aortic ultrasound 09/12/2019, abdominal ultrasound 03/25/2021  TECHNIQUE: CT angiogram chest abdomen pelvis during arterial phase of injection of IV contrast. 2D and 3D MIP reconstructions were performed by the CT technologist. Dose reduction techniques were used.   CONTRAST: ISOVUE 370 100ML    FINDINGS:   CT ANGIOGRAM CHEST, ABDOMEN, AND PELVIS: There is a chronic type B dissection extending into the left common iliac artery. The true lumen narrowest in the infrarenal abdominal aorta where it measures 6 mm in short axis. The SMA, celiac and right renal   artery are perfused to the true lumen. Flow in the false lumen is slow in the abdominal aorta and the left kidney is not yet perfused which is supplied by intact.. The superior descending thoracic aorta is aneurysmal measuring 5 cm in transverse   dimension. Great  vessels are well opacified.    Iliac arteries are patent. Arterial runoff to below the knees demonstrate normal lower extremity arteries. No active extravasation of contrast.    No evidence of pulmonary emboli.    LUNGS AND PLEURA: There is a small right and very small left pleural effusion, increased from before atelectatic lung enhances normally. There is compressive atelectasis in the right lower lobe    MEDIASTINUM/AXILLAE: No adenopathy. Multichamber cardiac enlargement.    CORONARY ARTERY CALCIFICATION: Moderate.    HEPATOBILIARY: There is a new 4 cm hypodense mass in segment 8. Few punctate arteries might be seen within it but it is too early to ascertain if there is any enhancement. Cirrhotic nodular liver. Tiny dependent gallstone.    PANCREAS: Normal.    SPLEEN: Slightly enlarged at 16 cm.    ADRENAL GLANDS: Normal.    KIDNEYS/BLADDER: Left kidney not yet perfused with contrast as it fed via the false lumen. Kidneys demonstrate cortical atrophy. No calculi.    BOWEL: Trace ascites adjacent to the liver. Decreased from before.    LYMPH NODES: Normal.    PELVIC ORGANS: Normal.    MUSCULOSKELETAL: There is a left sided retroperitoneal hemorrhage involving the iliopsoas extending into the left side. This begins superiorly at the level of the left diaphragmatic jean and extends inferiorly. Left iliac + hematoma enlarging a subtle 2   8 x 5 cm. Left posterior gluteal musculature is also thickened and there is stranding of the overlying fat in the left hip and proximal thigh. No suspicious lesions.      Impression    IMPRESSION:  1.  Left-sided retroperitoneal hemorrhage involving the iliopsoas as described above extending from the diaphragmatic jean inferiorly into the left gluteal musculature. Hematoma is greatest in the iliac region as noted. No arterial extravasation. No   fluid fluid levels.  2.  There is a new 4 cm hypodense mass in segment 8 of the liver. When patient is stable, this needs further  evaluation with MRI.  3.  Chronic Panfilo type B dissection as described above with proximal thoracic aorta aneurysmal at 5 cm. False lumen perfuses the left kidney as described above.  4.  Cirrhotic liver with splenomegaly and trace ascites.  5.  Small to moderate right effusion and very small left effusion.  6.  Critical findings discussed by the undersigned with Dr. Diaz at 1415.    NOTE: ABNORMAL REPORT    THE DICTATION ABOVE DESCRIBES AN ABNORMALITY FOR WHICH FOLLOW-UP IS NEEDED.

## 2021-09-27 NOTE — PLAN OF CARE
Problem: Adult Inpatient Plan of Care  Goal: Optimal Comfort and Wellbeing  Outcome: Improving     Problem: Adult Inpatient Plan of Care  Goal: Readiness for Transition of Care  Outcome: Improving  Intervention: Mutually Develop Transition Plan  Recent Flowsheet Documentation  Taken 9/26/2021 2000 by Ellie Agosto RN  Equipment Currently Used at Home: none     Pt had 2 units blood and 1 unit of plasma.  HBG 5.6 in ED 7.9 after blood transfusion.  Blood pressure high in ED pt was placed on bedrest .  Last blood pressure 138/72.

## 2021-09-27 NOTE — PLAN OF CARE
Problem: Adult Inpatient Plan of Care  Goal: Optimal Comfort and Wellbeing  Outcome: No Change  Vss. No pain reported overnight. Patient slept all shift.

## 2021-09-27 NOTE — PROGRESS NOTES
Pt transferred from ED to P2  Room 229.  Pt had blood running that was started in ED.  One unit was completed in ED and platelets were also ordered and transfused after blood completed.  Pt tolerated well.

## 2021-09-28 ENCOUNTER — APPOINTMENT (OUTPATIENT)
Dept: CT IMAGING | Facility: HOSPITAL | Age: 61
DRG: 555 | End: 2021-09-28
Payer: COMMERCIAL

## 2021-09-28 ENCOUNTER — APPOINTMENT (OUTPATIENT)
Dept: OCCUPATIONAL THERAPY | Facility: HOSPITAL | Age: 61
DRG: 555 | End: 2021-09-28
Attending: FAMILY MEDICINE
Payer: COMMERCIAL

## 2021-09-28 ENCOUNTER — APPOINTMENT (OUTPATIENT)
Dept: PHYSICAL THERAPY | Facility: HOSPITAL | Age: 61
DRG: 555 | End: 2021-09-28
Attending: FAMILY MEDICINE
Payer: COMMERCIAL

## 2021-09-28 LAB
ANION GAP SERPL CALCULATED.3IONS-SCNC: 9 MMOL/L (ref 5–18)
BUN SERPL-MCNC: 65 MG/DL (ref 8–22)
CALCIUM SERPL-MCNC: 7.6 MG/DL (ref 8.5–10.5)
CHLORIDE BLD-SCNC: 102 MMOL/L (ref 98–107)
CO2 SERPL-SCNC: 26 MMOL/L (ref 22–31)
CREAT SERPL-MCNC: 7.18 MG/DL (ref 0.7–1.3)
ERYTHROCYTE [DISTWIDTH] IN BLOOD BY AUTOMATED COUNT: 16.8 % (ref 10–15)
GFR SERPL CREATININE-BSD FRML MDRD: 7 ML/MIN/1.73M2
GLUCOSE BLD-MCNC: 133 MG/DL (ref 70–125)
HCT VFR BLD AUTO: 25 % (ref 40–53)
HGB BLD-MCNC: 8.2 G/DL (ref 13.3–17.7)
INR PPP: 1.43 (ref 0.9–1.15)
MCH RBC QN AUTO: 30.8 PG (ref 26.5–33)
MCHC RBC AUTO-ENTMCNC: 32.8 G/DL (ref 31.5–36.5)
MCV RBC AUTO: 94 FL (ref 78–100)
PLATELET # BLD AUTO: 49 10E3/UL (ref 150–450)
POTASSIUM BLD-SCNC: 4.1 MMOL/L (ref 3.5–5)
RBC # BLD AUTO: 2.66 10E6/UL (ref 4.4–5.9)
SODIUM SERPL-SCNC: 137 MMOL/L (ref 136–145)
WBC # BLD AUTO: 12.1 10E3/UL (ref 4–11)

## 2021-09-28 PROCEDURE — 99233 SBSQ HOSP IP/OBS HIGH 50: CPT | Performed by: INTERNAL MEDICINE

## 2021-09-28 PROCEDURE — 250N000013 HC RX MED GY IP 250 OP 250 PS 637: Performed by: STUDENT IN AN ORGANIZED HEALTH CARE EDUCATION/TRAINING PROGRAM

## 2021-09-28 PROCEDURE — 250N000013 HC RX MED GY IP 250 OP 250 PS 637

## 2021-09-28 PROCEDURE — 120N000001 HC R&B MED SURG/OB

## 2021-09-28 PROCEDURE — 97530 THERAPEUTIC ACTIVITIES: CPT | Mod: GP

## 2021-09-28 PROCEDURE — 85610 PROTHROMBIN TIME: CPT | Performed by: FAMILY MEDICINE

## 2021-09-28 PROCEDURE — 97162 PT EVAL MOD COMPLEX 30 MIN: CPT | Mod: GP

## 2021-09-28 PROCEDURE — 99232 SBSQ HOSP IP/OBS MODERATE 35: CPT | Mod: GC

## 2021-09-28 PROCEDURE — 36415 COLL VENOUS BLD VENIPUNCTURE: CPT | Performed by: FAMILY MEDICINE

## 2021-09-28 PROCEDURE — 80048 BASIC METABOLIC PNL TOTAL CA: CPT | Performed by: FAMILY MEDICINE

## 2021-09-28 PROCEDURE — 85027 COMPLETE CBC AUTOMATED: CPT | Performed by: FAMILY MEDICINE

## 2021-09-28 PROCEDURE — 250N000011 HC RX IP 250 OP 636

## 2021-09-28 PROCEDURE — 97165 OT EVAL LOW COMPLEX 30 MIN: CPT | Mod: GO

## 2021-09-28 PROCEDURE — 250N000013 HC RX MED GY IP 250 OP 250 PS 637: Performed by: FAMILY MEDICINE

## 2021-09-28 PROCEDURE — 74176 CT ABD & PELVIS W/O CONTRAST: CPT

## 2021-09-28 RX ORDER — ENTECAVIR 0.5 MG/1
0.5 TABLET, FILM COATED ORAL
Status: DISCONTINUED | OUTPATIENT
Start: 2021-10-03 | End: 2021-09-29 | Stop reason: HOSPADM

## 2021-09-28 RX ADMIN — DILTIAZEM HYDROCHLORIDE 180 MG: 180 CAPSULE, EXTENDED RELEASE ORAL at 09:55

## 2021-09-28 RX ADMIN — CARVEDILOL 25 MG: 12.5 TABLET, FILM COATED ORAL at 09:55

## 2021-09-28 RX ADMIN — Medication 7.5 MG: at 04:17

## 2021-09-28 RX ADMIN — PANTOPRAZOLE SODIUM 40 MG: 20 TABLET, DELAYED RELEASE ORAL at 09:55

## 2021-09-28 RX ADMIN — HYDROXYZINE HYDROCHLORIDE 25 MG: 25 TABLET, FILM COATED ORAL at 09:55

## 2021-09-28 RX ADMIN — Medication 7.5 MG: at 04:16

## 2021-09-28 RX ADMIN — PANTOPRAZOLE SODIUM 40 MG: 20 TABLET, DELAYED RELEASE ORAL at 20:59

## 2021-09-28 RX ADMIN — ACETAMINOPHEN 650 MG: 325 TABLET ORAL at 00:26

## 2021-09-28 RX ADMIN — CARVEDILOL 12.5 MG: 12.5 TABLET, FILM COATED ORAL at 10:08

## 2021-09-28 RX ADMIN — ONDANSETRON 4 MG: 2 INJECTION INTRAMUSCULAR; INTRAVENOUS at 07:31

## 2021-09-28 RX ADMIN — Medication 7.5 MG: at 10:20

## 2021-09-28 RX ADMIN — Medication 7.5 MG: at 20:59

## 2021-09-28 NOTE — PLAN OF CARE
Problem: Pain Acute  Goal: Acceptable Pain Control and Functional Ability  Outcome: Improving  Intervention: Develop Pain Management Plan  Recent Flowsheet Documentation  Taken 9/28/2021 0417 by Mary Rodriguez, RN  Pain Management Interventions: medication (see MAR)  Taken 9/28/2021 0416 by Mary Rodriguez, RN  Pain Management Interventions: medication (see MAR)   Elle rated his left hip/thigh pain a 10+ at midnight & was asking if there was anything he could have for the pain.  He had tylenol for his temp and had oxycodone 7.5mg at 2230.   was notified who ordered a CT of his abdomen/pelvis to see if the hematoma was increasing.  He had another dose of oxycodone at 0420, & this morning at 0645, he said that the pain meds were finally starting to work.

## 2021-09-28 NOTE — PROGRESS NOTES
Pt c/o 9-10/10 pain in legs after dialysis.  Gave pt 5mg oxycodone and tylenol for pain management.  Pt had little relief with pain medications.  Pt asked for dilaudid IV, he has history of nausea and vomiting with medication, was not on MAR.   Contacted MD, did not increase oxycodone dose, may change to every 4 hours instead of every 6 hours.     MD spoke to patient about pain management and renal disease.  Did not order dilaudid.    New order for 7.5 mg every 6 hours for pain management.

## 2021-09-28 NOTE — SIGNIFICANT EVENT
"Significant Event Note    Time of event: 12:40 AM September 28, 2021    Description of event:  Called by RN regarding \"10/10 pain\" over left psoas. Patient has known left psoas hematoma that is suspected to be causing discomfort.  Patient already had oxycodone increased to 7.5mg q6h and is also on tylenol q8h prn. ESRD patient on HD, also with cirrhosis and thus we are fairly limited on any increases on his current pain regimen at this time.    Plan:  -CT abdomen/pelvis given pain seems out of proportion and unresolved with increased pain regimen.    Discussed with: bedside RN    Jose Sanders MD    "

## 2021-09-28 NOTE — PROGRESS NOTES
[facility] Progress Note    Assessment/Plan  Active Problems:    Thrombocytopenia (H)    Iliopsoas muscle hematoma, left, initial encounter    Chronic dissection of thoracic aorta (H)    ESRD (end stage renal disease) on dialysis (H)    Anemia due to blood loss, acute    Hepatic cirrhosis, unspecified hepatic cirrhosis type, unspecified whether ascites present (H)    Elle Blanton is a 61 year old male admitted on 9/26/2021. He has a history of normocytic anemia, hepatitis B, ESRD on HD, thrombocytopenia, chronic type B dissection and is admitted for left sided retroperitoneal iliopsoas hematoma, has since required multiple units of pRBC and platelets.     #Left sided iliopsoas hematoma  #Normocytic anemia   Patient has history of anemia likely secondary to ESRD with baseline of 8-9. Patient presented with left thigh pain and numbness for a week after lifting a heavy gardening hose into the back of his truck. He was found to have a left sided iliopsoas hematoma without extravasation on CT abdomen/pelvis; not a candidate for coiling. Patient was vitally stable on admission and alert. Found to have Hgb of 5.2 on admission; treated with 2 units of pRBC in the ED which increased hemoglobin to 7.9. No signs of bleeding other than the iliopsoas hematoma. Upon recheck yesterday morning, hemoglobin found to be 7.0; transfused another unit pRBC and one unit of platelets. Patient has been stable at 8.2 since yesterday evening. Had persistent pain last night despite increasing oxycodone. CT was performed to evaluate, and did not show any new problems or enlarging hematoma. Will continue to monitor for pain control and hemoglobin.  - Pain: Tylenol 625mg Q8H and oxycodone 7.5 mg Q6H PRN  - PT/OT consulted   - Lidocaine patch in place PTA  - Daily CBC       #ESRD on Hemodialysis  #NAHUM on CKD  Patient has history of ESRD on HD with left arm dialysis graft. Per chart review, it is has been recommended he receive  dialysis on MWF however he only wishes to go . Patient states he did not make it to his Friday dialysis appt. On admission Cr: 10.56 with K of 5.2 and bicarb of 18. It appears baseline creatinine is around 5.0.  Patient continues to urinate. Nephrology consulted, performed HD yesterday.   - Nephrology consulted  - Daily BMP     #Chronic Hepatitis B  #Liver chirrosis  #Thrombocytopenia  #Elevated INR  Patient has long standing history of cirrhosis secondary to chronic hepatitis B. Per chart review it appears he is supposed to follow with [facility] but per patient he has only seen them one time and did not follow-up afterward. Unclear when he last saw them. He currently takes entecavir 0.5mg every third day (it is prescribed for every other day); changed to every 7 days per pharmacist recommendation considering patient has ESRD on hemodialysis. Patient was found to have new incidental liver mass on CTA on admission. Scattered ecchymosis present likely secondary to thrombocytopenia. INR was 1.67 with platelets of 49 on admission; received one unit of platelets in the ED. Liver panel shows slightly elevated bilirubin; the rest of his panel is normal. MRI to further evaluate liver mass recommended for outpatient.   - Continue home entecavir 0.5mg, now recommend every seven days  - INR daily      #Incidental finding: small bilateral pleural effusion  Patient has history of right pleural effusion in the past. Per image report, now appears slightly increased from previous image. Denied any shortness of breath and is satting 98% on RA. Possible etiologies include missing hemodialysis on Friday vs malignancy (setting of new liver mass).      #Chronic Concerns  > Hypertension and Chronic [place] type B dissection  - Continue home carvedilol 25mg BID and diltiazem ER 180mg BID  - Dissection appears stable/unchanged from previous CTA. No abdominal pain. This has been known since 2019.  > Gerd  - On omeprazole 20mg BID at home --  change to protonix 40mg BID    FEN: Regular diet  DVT Prophylaxis: PCD  Code: Full     Disposition/Advanced Care Planning  Barriers to discharge: work-up in progress, requiring blood transfusions  Anticipated discharge date: 1-2 days  Disposition: home     Subjective  Patient had increased pain last night. He said that the pain is not changed from the pain that brought him into the hospital. His pain is improved this morning, which he attributes to the increase in oxycodone he got last night. He notes that the pain is worse with movement, which was explained to make sense due to the hematoma location. Patient was also notified of the masses on his liver, and told that his primary has already been informed, and this will be followed up on as an outpatient.     Objective    Vital signs in last 24 hours   Temp:  [97.9  F (36.6  C)-102.1  F (38.9  C)] 98  F (36.7  C)  Pulse:  [60-70] 70  Resp:  [16-18] 17  BP: (102-149)/(61-88) 138/79  SpO2:  [97 %-98 %] 98 %       Wt Readings from Last 3 Encounters:   09/26/21 66 kg (145 lb 8.1 oz)   08/25/21 60.8 kg (134 lb)   08/18/21 62.8 kg (138 lb 8 oz)     Intake/Output last 3 shift I/O last 3 completed shifts:  In: 2063.33 [P.O.:240; I.V.:998.33]  Out: 2830 [Urine:330; Other:2500]     Intake/Output this shift:I/O this shift:  In: 320 [P.O.:320]  Out: -     Physical Exam  General appearance: alert, appears stated age, cooperative and no distress  Head: Normocephalic, without obvious abnormality, atraumatic  Eyes: conjunctivae/corneas clear. No icterus noted.   Throat: lips, mucosa, and tongue normal; teeth and gums normal  Neck: no JVD and supple, symmetrical, trachea midline  Lungs: clear to auscultation bilaterally  Heart: regular rate and rhythm, S1, S2 normal, no murmur, click, rub or gallop  Abdomen: soft, non-tender; bowel sounds normal  Extremities: extremities normal, atraumatic, no cyanosis or edema. No pain to palpation of left flank or hip.   Skin: Scattered   ecchymosis  Neurologic: No focal neurologic deficits  Psychiatric: mood and affect normal/bright    Pertinent Labs and Pertinent Radiology   Lab Results: personally reviewed.   Recent Results (from the past 24 hour(s))   Hemoglobin    Collection Time: 09/27/21  8:47 PM   Result Value Ref Range    Hemoglobin 8.5 (L) 13.3 - 17.7 g/dL   CBC with platelets    Collection Time: 09/28/21  7:01 AM   Result Value Ref Range    WBC Count 12.1 (H) 4.0 - 11.0 10e3/uL    RBC Count 2.66 (L) 4.40 - 5.90 10e6/uL    Hemoglobin 8.2 (L) 13.3 - 17.7 g/dL    Hematocrit 25.0 (L) 40.0 - 53.0 %    MCV 94 78 - 100 fL    MCH 30.8 26.5 - 33.0 pg    MCHC 32.8 31.5 - 36.5 g/dL    RDW 16.8 (H) 10.0 - 15.0 %    Platelet Count 49 (LL) 150 - 450 10e3/uL   Basic metabolic panel    Collection Time: 09/28/21  7:01 AM   Result Value Ref Range    Sodium 137 136 - 145 mmol/L    Potassium 4.1 3.5 - 5.0 mmol/L    Chloride 102 98 - 107 mmol/L    Carbon Dioxide (CO2) 26 22 - 31 mmol/L    Anion Gap 9 5 - 18 mmol/L    Urea Nitrogen 65 (H) 8 - 22 mg/dL    Creatinine 7.18 (HH) 0.70 - 1.30 mg/dL    Calcium 7.6 (L) 8.5 - 10.5 mg/dL    Glucose 133 (H) 70 - 125 mg/dL    GFR Estimate 7 (L) >60 mL/min/1.73m2   INR    Collection Time: 09/28/21  7:01 AM   Result Value Ref Range    INR 1.43 (H) 0.90 - 1.15     Radiology Results: Personally reviewed image/s and impression/s  > MR Lumbar Spine 9/26/21  IMPRESSION:  1.  Heterogeneous lesion that appears to be expanding the left psoas muscle. This could represent an intramuscular hematoma, however, other etiologies are not excluded. In addition, the inferior extent of this is not included on these images. Consider   evaluation with CT abdomen and pelvis.  2.  Abdominal aortic aneurysm measuring up to at least 5 cm in the visualized suprarenal compartment. This would also be better assessed with CT.  3.  Degenerative changes in the lumbar spine as detailed above, most pronounced at the L5-S1 level where there is moderate right  and moderate to severe left neural foraminal narrowing.  4.  Increased T2 signal within the intervertebral disc at L5-S1 may be degenerative in etiology, however, early discitis is not excluded.    > CT Abdomen Pelvis  IMPRESSION:   1.  No significant interval change since the recent comparison study dated 09/26/2021.  2.  Moderate-sized left iliacus muscle hematoma.  3.  Moderate-sized right pleural effusion small left pleural effusion.  4.  Two indeterminate hepatic lesions, the largest measuring 4.5 cm. These are suspicious for neoplasm. An MR of the liver could be considered for further evaluation.  5.  Nonspecific stranding is present throughout the intra-abdominal fat.     Precepted patient with Dr. Gustavo Solomon.    Nevin Gale MD  [facility] Medicine Residency Program, PGY-1  Pager # [phone]

## 2021-09-28 NOTE — PROGRESS NOTES
RENAL PROGRESS NOTE    CC:  Left ilopsoas hematoma    ASSESSMENT & PLAN:   61 year old male with past medical history significant for end-stage renal disease on hemodialysis via left upper extremity AV graft chronic type B dissection, chronic hepatitis B,  thrombocytopenia, normocytic anemia, secondary hyperparathyroidism minutes on September 26 for evaluation of pain and numbness in his left thigh and left buttock, found to have a left iliopsoas hematoma.     ESRD- thought due to renovascular disease; past imaging showed atrophic kidneys. Initiated on hemodialysis in 04/2021 after placement of rapid access AVG.  He gets dialyzed on Monday and Friday, runs for 3 hours at [facility] under care of Dr. Hill.  Patient often skips treatments and cuts his treatments short in spite of multiple discussions with primary nephrologist. Patient was last dialyzed on September 20th as outpatient.  Patient states that he missed his Friday appointment due to pain in his left thigh and left buttock.  Received HD here on 09/27.  Patient insist that he is feeling well and has no problems with missing dialysis. Patient states that he does not want him to have an extra dialysis run until Friday.     Access: LUE AVG. Placed by . uses 16 g needles. No reported issues.     Hyperkalemia-Mild. Resolved. Serum potassium is 4.1 this am. Run with K2 bath. Renal diet.     Metabolic acidosis-secondary to ESRD and skipped dialysis treatments. Resolved with HD. HCO3 is 26 this am.     Hypertension-BP is acceptable. On Coreg and Diltiazem  -recommend nom changes        Volume status-EDW 61 kgs. Patient's weight on admission was 66 kgs. No weight checked in last 2 days. Still urinates. CT chest showed small bilateral pleural effusions.   -UF with HD as tolerated, keep MAP>60  -daily weight  -low sodium diet  -32 oz fluid restriction     Acute on chronic anemia- Baseline HGb is 8s. Patient presented with Hgb of  5.6g/dl. Likely secondary blood loss from iliopsoas hematoma. S/p 2U PRBC. Hgb is trending up to 8.2g/dl this am. On Epo 30266D and IV Venofer as outpatient. Con't Epo here.        CKD BMD  Secondary hyperparathyroidism-Most recent PTH was 82 in 07/2021.  -corrected serum calcium is 8.5  -most recent Ph was  7.6  09/10. As per out pateint labs. Not on binders. Renal diet.     Left sided iliopsoas hematoma-management as per primary service       Thrombocytopenia - likely secondary to liver cirrhosis. Chronically ~30k-80k. Ranging in 40s this admission. S/p 1U platelet.     Cirrhosis/chronic Hepatitis B -  On entecavir.      Chronic [place] type B dissection-Dissection appears stable/unchanged from previous CTA.     We will follow.       SUBJECTIVE:    I reviewed HD run from yesterday. Tolerated w/o issues, 2.5 kgs removed.  Patient states that he is feeling the same, c/o pain in the left leg.  Patient denies: fever, chills, dizziness, adenopathy, sore throat, rhinorrhea, cough, shortness of breath , chest pain, palpitations, orthopnea, nausea, vomiting, abdominal pain, changes in bowel habits, dysuria, urinary frequency, urgency, hematuria, rash.      OBJECTIVE:  Physical Exam   Temp: 98  F (36.7  C) Temp src: Oral BP: 138/79 Pulse: 70   Resp: 17 SpO2: 98 % O2 Device: None (Room air)    Vitals:    09/26/21 0926   Weight: 66 kg (145 lb 8.1 oz)     Vital Signs with Ranges  Temp:  [97.9  F (36.6  C)-102.1  F (38.9  C)] 98  F (36.7  C)  Pulse:  [60-70] 70  Resp:  [16-18] 17  BP: (102-149)/(61-88) 138/79  SpO2:  [97 %-98 %] 98 %  I/O last 3 completed shifts:  In: 2063.33 [P.O.:240; I.V.:998.33]  Out: 2830 [Urine:330; Other:2500]    @TMAXR(24)@    Patient Vitals for the past 72 hrs:   Weight   09/26/21 0926 66 kg (145 lb 8.1 oz)   [unfilled]    PHYSICAL EXAM:  General - Alert and oriented x3, appears comfortable, NAD  Cardiovascular - Regular rate and rhythm, no rub  Respiratory - Clear to auscultation bilaterally, no  crackles or wheezes  Abd: BS present, no guarding or pain with palpation, no ascites  Extremities - No lower extremity edema bilaterally  Skin:large ecchymosis on the left side of the lower back, no rash, good turgor  Neuro:  Grossly intact, no focal deficits  MSK:  Grossly intact  Psych:  Normal affect  Access: LUE AVG with audible bruit    LABORATORY STUDIES:     Recent Labs   Lab 09/28/21  0701 09/27/21 2047 09/27/21 0833 09/26/21 2006 09/26/21  1312   WBC 12.1*  --  4.5  --  5.4   RBC 2.66*  --  2.29*  --  1.78*   HGB 8.2* 8.5* 7.0* 7.9* 5.6*   HCT 25.0*  --  22.0*  --  17.4*   PLT 49*  --  43*  --  49*       Basic Metabolic Panel:  Recent Labs   Lab 09/28/21 0701 09/27/21 0833 09/26/21  1312    140 137   POTASSIUM 4.1 5.2* 5.2*   CHLORIDE 102 111* 107   CO2 26 17* 18*   BUN 65* 110* 111*   CR 7.18* 9.63* 10.56*   * 88 90   TANO 7.6* 7.8* 8.2*       INR  Recent Labs   Lab 09/28/21  0701 09/27/21  0833 09/26/21  1312   INR 1.43* 1.54* 1.67*        Recent Labs   Lab Test 09/28/21 0701 09/27/21 2047 09/27/21  0833 09/27/21  0833   INR 1.43*  --   --  1.54*   WBC 12.1*  --   --  4.5   HGB 8.2* 8.5*   < > 7.0*   PLT 49*  --   --  43*    < > = values in this interval not displayed.       Personally reviewed current labs     ~ 35 minutes spent in exam, POC, education regarding renal disease and management.  >90% time spent in education and counseling and/or discussion with patient's care team    Elizabeth Salmeron MD  [facility]  [address] [place] [address], [address]  [place] [address]  Phone# [phone]  Fax# [phone]

## 2021-09-28 NOTE — PROGRESS NOTES
09/28/21 1405   Quick Adds   Type of Visit Initial PT Evaluation   Living Environment   People in home spouse   Current Living Arrangements house   Home Accessibility stairs to enter home   Number of Stairs, Main Entrance 3   Stair Railings, Main Entrance railings safe and in good condition   Transportation Anticipated family or friend will provide   Self-Care   Usual Activity Tolerance excellent   Current Activity Tolerance fair   Equipment Currently Used at Home cane, straight  (SEC outside the house on occasion )   Disability/Function   Difficulty Eating/Swallowing no   Walking or Climbing Stairs Difficulty no   Dressing/Bathing Difficulty yes   Dressing/Bathing bathing difficulty, assistance 1 person  (Pt notes his spouse has been helping him dress)   Toileting issues no   Doing Errands Independently Difficulty (such as shopping) no   General Information   Onset of Illness/Injury or Date of Surgery 09/26/21   Referring Physician Nash Solomon MD   Patient/Family Therapy Goals Statement (PT) return home    Pertinent History of Current Problem (include personal factors and/or comorbidities that impact the POC) Left sided iliopsoas hematoma, anemia    Existing Precautions/Restrictions no known precautions/restrictions  (OK per RN to mobilize.)   Strength   Manual Muscle Testing Quick Adds Strength WFL   Bed Mobility   Bed Mobility No deficits identified   Transfers   Transfers sit-stand transfer   Sit-Stand Transfer   Sit-Stand [place] (Transfers) supervision   Sit/Stand Transfer Comments Cues for safety and slowing down. Mild posterior sway upon standing. Pt noted feeling dizzy with mobility.    Gait/Stairs (Locomotion)   [place] Level (Gait) supervision   Assistive Device (Gait)   (none- pt grabbing on to items in room for stability.)   Distance in Feet (Required for LE Total Joints) 15'x2   Pattern (Gait) step-through   Deviations/Abnormal Patterns (Gait) antalgic   Clinical Impression    Criteria for Skilled Therapeutic Intervention yes, treatment indicated   PT Diagnosis (PT) Impaired functional mobility    Influenced by the following impairments Impaired transfers, impaired gait    Functional limitations due to impairments pain, dizziness    Clinical Presentation Stable/Uncomplicated   Clinical Presentation Rationale Presents as diagnosed    Clinical Decision Making (Complexity) moderate complexity   Therapy Frequency (PT) Daily   Predicted Duration of Therapy Intervention (days/wks) 5 days    Planned Therapy Interventions (PT) bed mobility training;gait training;stair training;strengthening;transfer training   Anticipated Equipment Needs at Discharge (PT) cane, straight   Risk & Benefits of therapy have been explained patient   PT Discharge Planning    PT Discharge Recommendation (DC Rec) home with assist;home with home care physical therapy   Total Evaluation Time   Total Evaluation Time (Minutes) 10     Elisa Mandel, PT, DPT  9/28/2021

## 2021-09-28 NOTE — PLAN OF CARE
Pt ate breakfast and supper, sipping on warm water, apple juice had 2 during daytime and 2 this evening.  BP running low, see vital section, bp stable but pt wants to hold off on coreg. Denies nausea this evening, pain is a 5, wants to wait on pain medication for now, tolerable.  Pain went down to 1 this am with combo of oxycodone and vistaril.  Pt's spouse was here in the am and helped him get washed up.  Pt will call if needs to use bathroom.Debbie Godfrey RN

## 2021-09-28 NOTE — PROGRESS NOTES
09/28/21 1500   Quick Adds   Type of Visit Initial Occupational Therapy Evaluation   Living Environment   People in home spouse   Current Living Arrangements house   Living Environment Comments tub/shower with grab bars and shower seat   Self-Care   Activity/Exercise/Self-Care Comment Per patient, independent with ADLs but wife available if he needs help   Disability/Function   Toileting issues no   Range of Motion Comprehensive   General Range of Motion no range of motion deficits identified   Strength Comprehensive (MMT)   General Manual Muscle Testing (MMT) Assessment no strength deficits identified   Bed Mobility   Bed Mobility No deficits identified   Transfers   Transfers sit-stand transfer;toilet transfer   Sit-Stand Transfer   Sit-Stand [place] (Transfers) independent   Toilet Transfer   Type (Toilet Transfer) sit-stand;stand-sit   [place] Level (Toilet Transfer) independent   Activities of Daily Living   BADL Assessment lower body dressing;other (see comments)   Lower Body Dressing Assessment   [place] Level (Lower Body Dressing) independent  (socks)   Position (Lower Body Dressing) edge of bed sitting   Comment (Lower Body Dressing) Patient ambulated in room with SBA. Patient states his pain has improved with medication.   Clinical Impression   Criteria for Skilled Therapeutic Interventions Met (OT) no problems identified which require skilled intervention   Therapy Frequency (OT) 1x eval   OT Discharge Planning    OT Discharge Recommendation (DC Rec) home   OT Rationale for DC Rec Patient independent with transfers and ADLs. Recommend home with spouse at discharge. Per patient, spouse can assist as needed.    Total Evaluation Time (Minutes)   Total Evaluation Time (Minutes) 15

## 2021-09-28 NOTE — PLAN OF CARE
Occupational Therapy Discharge Summary    Reason for therapy discharge:    Patient independent with ADLs and transfers. No further skilled OT indicated.     Progress towards therapy goal(s). See goals on Care Plan in [facility] electronic health record for goal details.  Goals met    Smita MEJIA, OTR/L, CLT 9/28/2021 , 3:28 PM

## 2021-09-29 ENCOUNTER — APPOINTMENT (OUTPATIENT)
Dept: PHYSICAL THERAPY | Facility: HOSPITAL | Age: 61
DRG: 555 | End: 2021-09-29
Payer: COMMERCIAL

## 2021-09-29 VITALS
BODY MASS INDEX: 24.98 KG/M2 | TEMPERATURE: 98.2 F | RESPIRATION RATE: 18 BRPM | OXYGEN SATURATION: 97 % | HEART RATE: 80 BPM | WEIGHT: 145.5 LBS | SYSTOLIC BLOOD PRESSURE: 128 MMHG | DIASTOLIC BLOOD PRESSURE: 82 MMHG

## 2021-09-29 LAB
ANION GAP SERPL CALCULATED.3IONS-SCNC: 11 MMOL/L (ref 5–18)
BUN SERPL-MCNC: 72 MG/DL (ref 8–22)
CALCIUM SERPL-MCNC: 7.9 MG/DL (ref 8.5–10.5)
CHLORIDE BLD-SCNC: 101 MMOL/L (ref 98–107)
CO2 SERPL-SCNC: 23 MMOL/L (ref 22–31)
CREAT SERPL-MCNC: 7.9 MG/DL (ref 0.7–1.3)
ERYTHROCYTE [DISTWIDTH] IN BLOOD BY AUTOMATED COUNT: 16.7 % (ref 10–15)
GFR SERPL CREATININE-BSD FRML MDRD: 7 ML/MIN/1.73M2
GLUCOSE BLD-MCNC: 94 MG/DL (ref 70–125)
HCT VFR BLD AUTO: 24.4 % (ref 40–53)
HGB BLD-MCNC: 8 G/DL (ref 13.3–17.7)
INR PPP: 1.28 (ref 0.9–1.15)
MCH RBC QN AUTO: 31.6 PG (ref 26.5–33)
MCHC RBC AUTO-ENTMCNC: 32.8 G/DL (ref 31.5–36.5)
MCV RBC AUTO: 96 FL (ref 78–100)
PLATELET # BLD AUTO: 37 10E3/UL (ref 150–450)
POTASSIUM BLD-SCNC: 4.5 MMOL/L (ref 3.5–5)
RBC # BLD AUTO: 2.53 10E6/UL (ref 4.4–5.9)
SODIUM SERPL-SCNC: 135 MMOL/L (ref 136–145)
WBC # BLD AUTO: 9.6 10E3/UL (ref 4–11)

## 2021-09-29 PROCEDURE — 80048 BASIC METABOLIC PNL TOTAL CA: CPT | Performed by: FAMILY MEDICINE

## 2021-09-29 PROCEDURE — 250N000013 HC RX MED GY IP 250 OP 250 PS 637: Performed by: STUDENT IN AN ORGANIZED HEALTH CARE EDUCATION/TRAINING PROGRAM

## 2021-09-29 PROCEDURE — 250N000013 HC RX MED GY IP 250 OP 250 PS 637

## 2021-09-29 PROCEDURE — 97530 THERAPEUTIC ACTIVITIES: CPT | Mod: GP

## 2021-09-29 PROCEDURE — 85048 AUTOMATED LEUKOCYTE COUNT: CPT | Performed by: FAMILY MEDICINE

## 2021-09-29 PROCEDURE — 99238 HOSP IP/OBS DSCHRG MGMT 30/<: CPT | Mod: GC

## 2021-09-29 PROCEDURE — 97116 GAIT TRAINING THERAPY: CPT | Mod: GP

## 2021-09-29 PROCEDURE — 36415 COLL VENOUS BLD VENIPUNCTURE: CPT | Performed by: FAMILY MEDICINE

## 2021-09-29 PROCEDURE — 634N000001 HC RX 634: Performed by: INTERNAL MEDICINE

## 2021-09-29 PROCEDURE — 85610 PROTHROMBIN TIME: CPT | Performed by: FAMILY MEDICINE

## 2021-09-29 PROCEDURE — 99233 SBSQ HOSP IP/OBS HIGH 50: CPT | Performed by: INTERNAL MEDICINE

## 2021-09-29 PROCEDURE — 250N000013 HC RX MED GY IP 250 OP 250 PS 637: Performed by: FAMILY MEDICINE

## 2021-09-29 RX ORDER — CARVEDILOL 25 MG/1
25 TABLET ORAL 2 TIMES DAILY WITH MEALS
Qty: 60 TABLET | Refills: 11 | Status: ON HOLD | OUTPATIENT
Start: 2021-09-29 | End: 2021-10-26

## 2021-09-29 RX ORDER — HYDROXYZINE HYDROCHLORIDE 25 MG/1
25 TABLET, FILM COATED ORAL ONCE
Status: COMPLETED | OUTPATIENT
Start: 2021-09-29 | End: 2021-09-29

## 2021-09-29 RX ORDER — OXYCODONE HYDROCHLORIDE 5 MG/1
5 TABLET ORAL EVERY 6 HOURS PRN
Qty: 16 TABLET | Refills: 0 | Status: SHIPPED | OUTPATIENT
Start: 2021-09-29 | End: 2021-10-03

## 2021-09-29 RX ORDER — POLYETHYLENE GLYCOL 3350 17 G/17G
17 POWDER, FOR SOLUTION ORAL DAILY
Qty: 510 G | Refills: 0 | Status: SHIPPED | OUTPATIENT
Start: 2021-09-29 | End: 2021-01-01

## 2021-09-29 RX ORDER — ENTECAVIR 0.5 MG/1
0.5 TABLET, FILM COATED ORAL
Qty: 45 TABLET | Refills: 3 | Status: SHIPPED | OUTPATIENT
Start: 2021-09-29 | End: 2022-01-01

## 2021-09-29 RX ADMIN — DILTIAZEM HYDROCHLORIDE 180 MG: 180 CAPSULE, EXTENDED RELEASE ORAL at 08:42

## 2021-09-29 RX ADMIN — Medication 7.5 MG: at 10:07

## 2021-09-29 RX ADMIN — DILTIAZEM HYDROCHLORIDE 180 MG: 180 CAPSULE, EXTENDED RELEASE ORAL at 21:19

## 2021-09-29 RX ADMIN — ACETAMINOPHEN 650 MG: 325 TABLET ORAL at 08:42

## 2021-09-29 RX ADMIN — HYDROXYZINE HYDROCHLORIDE 25 MG: 25 TABLET, FILM COATED ORAL at 08:42

## 2021-09-29 RX ADMIN — PANTOPRAZOLE SODIUM 40 MG: 20 TABLET, DELAYED RELEASE ORAL at 21:16

## 2021-09-29 RX ADMIN — EPOETIN ALFA-EPBX 12000 UNITS: 10000 INJECTION, SOLUTION INTRAVENOUS; SUBCUTANEOUS at 14:39

## 2021-09-29 RX ADMIN — Medication 7.5 MG: at 04:00

## 2021-09-29 RX ADMIN — PANTOPRAZOLE SODIUM 40 MG: 20 TABLET, DELAYED RELEASE ORAL at 08:42

## 2021-09-29 RX ADMIN — Medication 7.5 MG: at 21:16

## 2021-09-29 RX ADMIN — Medication 7.5 MG: at 16:46

## 2021-09-29 RX ADMIN — HYDROXYZINE HYDROCHLORIDE 25 MG: 25 TABLET, FILM COATED ORAL at 21:19

## 2021-09-29 RX ADMIN — CARVEDILOL 25 MG: 12.5 TABLET, FILM COATED ORAL at 08:41

## 2021-09-29 NOTE — DISCHARGE SUMMARY
[facility]  DISCHARGE SUMMARY     Primary Care Physician: Jaswant Flores  Admission Date: 9/26/2021   Discharge Provider: Nevin Gale MD Discharge Date: 9/29/2021   Diet:  Renal Diet   Code Status: Full Code   Activity: Regular        Condition at Discharge: Good     REASON FOR PRESENTATION(See Admission Note for Details)   Left thigh pain found to have iliopsoas hematoma    PRINCIPAL & ACTIVE DISCHARGE DIAGNOSES     Active Problems:    Thrombocytopenia (H)    Iliopsoas muscle hematoma, left, initial encounter    Chronic dissection of thoracic aorta (H)    ESRD (end stage renal disease) on dialysis (H)    Anemia due to blood loss, acute    Hepatic cirrhosis, unspecified hepatic cirrhosis type, unspecified whether ascites present (H)    SIGNIFICANT FINDINGS (Imaging, labs):     Imaging  > MRI 9/26/21  IMPRESSION:  1.  Heterogeneous lesion that appears to be expanding the left psoas muscle. This could represent an intramuscular hematoma, however, other etiologies are not excluded. In addition, the inferior extent of this is not included on these images. Consider evaluation with CT abdomen and pelvis.  2.  Abdominal aortic aneurysm measuring up to at least 5 cm in the visualized suprarenal compartment. This would also be better assessed with CT.  3.  Degenerative changes in the lumbar spine as detailed above, most pronounced at the L5-S1 level where there is moderate right and moderate to severe left neural foraminal narrowing.  4.  Increased T2 signal within the intervertebral disc at L5-S1 may be degenerative in etiology, however, early discitis is not excluded.    > CT Abdomen Pelvis 9/28/21  IMPRESSION:   1.  No significant interval change since the recent comparison study dated 09/26/2021.  2.  Moderate-sized left iliacus muscle hematoma.  3.  Moderate-sized right pleural effusion small left pleural effusion.  4.  Two indeterminate hepatic lesions, the largest measuring 4.5 cm. These are  suspicious for neoplasm. An MR of the liver could be considered for further evaluation.  5.  Nonspecific stranding is present throughout the intra-abdominal fat.     Labs  - Hemoglobin of 5.6 on admission with platelets of 49. Received two units of blood and one of platelets. Hemoglobin the next morning down to 7.0 with platelets of 43; another unit of blood and platelets were given. Hemoglobin remained stable in the 8.0 range after that.   - INR elevated to 1.67 on admission, patient not on anticoagulation.   - Admitted with potassium elevated to 5.2, BUN of 111, and Cr of 10.56. Down to 4.5, 72, and 7.90 respectively after one run of dialysis.  - Liver panel shows bilirubin elevated to 1.2; Alk phos, ALT and AST within normal limits.     PENDING LABS     None    PROCEDURES ( this hospitalization only)      None    RECOMMENDATIONS TO OUTPATIENT PROVIDER FOR F/U VISIT     - Follow up on incidental hepatic lesions, the largest measuring 4.5 cm, detected on initial spine MRI and confirmed on CT.   - Follow up on abdominal aortic aneurysm measuring up to at least 5 cm in the visualized suprarenal compartment.  - Patient is discharged with a hemoglobin of 8.0 which appears to be his baseline.   - Patient is discharged on 5 mg oxycodone Q6H for four days, and has been very concerned with his pain during the hospital stay. Please ensure that his pain has become tolerable; he was told that the pain will recede with time as the hematoma breaks down.     DISPOSITION     Home    SUMMARY OF HOSPITAL COURSE:      Elle Blanton is a 61 year old male admitted on 9/26/2021. He has a history of normocytic anemia, hepatitis B, ESRD on HD, thrombocytopenia, chronic type B dissection and is admitted for left sided retroperitoneal iliopsoas hematoma.     #Left sided iliopsoas hematoma  #Normocytic anemia   Patient has history of anemia likely secondary to ESRD with baseline of 8-9. Patient presented with left thigh pain and numbness for a  week after lifting a heavy gardening hose into the back of his truck. He was found to have a left sided iliopsoas hematoma without extravasation on CT abdomen/pelvis; not a candidate for coiling. Found to have Hgb of 5.2 on admission; treated with 2 units of pRBC in the ED which increased hemoglobin to 7.9. No signs of bleeding other than the iliopsoas hematoma. Upon recheck the next morning, hemoglobin found to be 7.0; transfused another unit pRBC and one unit of platelets. Patient has been stable at his baseline of ~8 for the rest of his hospital stay. Patient experienced persistent pain despite oxycodone being increased multiple times during his stay. The pain is only present with movement. Patient was educated that due to the hematoma placement, movement will be uncomfortable but it is necessary. PT/OT was consulted; PT recommended home with PT but the patient declined. Given a four day prescription of oxycodone at discharge, patient informed that the pain will improve as the hematoma resolves.      #ESRD on Hemodialysis  #NAHUM on CKD  Patient has history of ESRD on HD with left arm dialysis graft. Per chart review, it is has been recommended he receive dialysis on MWF however he only wishes to go . Patient states he did not make it to his Friday dialysis appt on 9/24. On admission Cr: 10.56 with K of 5.2 and bicarb of 18. It appears baseline creatinine is around 5.0.  Patient continues to urinate. Nephrology consulted, performed HD on 9/27 and 9/29 before discharge.      #Chronic Hepatitis B  #Liver chirrosis  #Thrombocytopenia  #Elevated INR  Patient has long standing history of cirrhosis secondary to chronic hepatitis B. Per chart review it appears he is supposed to follow with [facility] but per patient he has only seen them one time and did not follow-up afterward. Unclear when he last saw them. He currently takes entecavir 0.5mg every third day (it is prescribed for every other day); changed to every 7 days per  pharmacist recommendation considering patient has ESRD on hemodialysis. Patient was found to have new incidental liver mass on CTA on admission. Scattered ecchymosis present likely secondary to thrombocytopenia. INR was 1.67 with platelets of 49 on admission; received one unit of platelets in the ED. Liver panel shows slightly elevated bilirubin; the rest of his panel is normal. MRI to further evaluate liver mass recommended for outpatient.     Discharge Medications with Med changes:        Review of your medicines      START taking      Dose / Directions   oxyCODONE 5 MG tablet  Commonly known as: ROXICODONE      Dose: 5 mg  Take 1 tablet (5 mg) by mouth every 6 hours as needed for pain  Quantity: 16 tablet  Refills: 0     polyethylene glycol 17 GM/Dose powder  Commonly known as: MIRALAX      Dose: 17 g  Take 17 g by mouth daily  Quantity: 510 g  Refills: 0        CONTINUE these medicines which may have CHANGED, or have new prescriptions. If we are uncertain of the size of tablets/capsules you have at home, strength may be listed as something that might have changed.      Dose / Directions   entecavir 0.5 MG tablet  Commonly known as: BARACLUDE  This may have changed: when to take this  Used for: Chronic viral hepatitis B without delta agent and without coma (H)      Dose: 0.5 mg  Take 1 tablet (0.5 mg) by mouth every 7 days  Quantity: 45 tablet  Refills: 3        CONTINUE these medicines which have NOT CHANGED      Dose / Directions   acetaminophen 500 MG tablet  Commonly known as: TYLENOL      Dose: 500 mg  Take 500 mg by mouth every 6 hours as needed for mild pain  Refills: 0     carvedilol 25 MG tablet  Commonly known as: COREG  Used for: Dissection of thoracoabdominal aorta (H), Acute kidney injury (H), Other pancytopenia (H), Chronic viral hepatitis B without delta agent and without coma (H), Cirrhosis of liver without ascites, unspecified hepatic cirrhosis type (H)      Dose: 25 mg  Take 1 tablet (25 mg) by  mouth 2 times daily (with meals)  Quantity: 60 tablet  Refills: 11     cloNIDine 0.1 MG tablet  Commonly known as: CATAPRES  Used for: Essential hypertension      Dose: 0.1 mg  Take 1 tablet (0.1 mg) by mouth At Bedtime  Quantity: 30 tablet  Refills: 11     diltiazem  MG 24 hr ER beaded capsule  Commonly known as: TIAZAC  Used for: Essential hypertension      Dose: 180 mg  Take 1 capsule (180 mg) by mouth 2 times daily  Quantity: 60 capsule  Refills: 11     hydrOXYzine 25 MG tablet  Commonly known as: ATARAX  Used for: Pruritic disorder      Dose: 25 mg  Take 1 tablet (25 mg) by mouth 3 times daily as needed for itching  Quantity: 90 tablet  Refills: 3     omeprazole 20 MG DR capsule  Commonly known as: priLOSEC  Used for: Cirrhosis of liver with ascites, unspecified hepatic cirrhosis type (H)      Dose: 20 mg  Take 1 capsule (20 mg) by mouth 2 times daily  Quantity: 60 capsule  Refills: 11     order for DME  Used for: Descending thoracic aortic dissection (H), Essential hypertension      Equipment being ordered: Other: blood pressure monitor  Treatment Diagnosis: hypertension  Quantity: 1 each  Refills: 0           Where to get your medicines      These medications were sent to [facility]/pharmacy #[id number] - [place], [place] - [address] [facility] [address]  [address], [place] [address]    Phone: [phone]     carvedilol 25 MG tablet    entecavir 0.5 MG tablet     Some of these will need a paper prescription and others can be bought over the counter. Ask your nurse if you have questions.    Bring a paper prescription for each of these medications    oxyCODONE 5 MG tablet    polyethylene glycol 17 GM/Dose powder         Rationale for medication changes:      - Discharged on oxycodone 5 mg (4 day prescription) for pain control along with miralax.  - Entecavir 0.5mg every third day (it is prescribed for every other day) changed to every 7 days per pharmacist recommendation considering patient has ESRD on  hemodialysis.    Consults     - Nephrology consulted to arrange inpatient dialysis     Immunizations given this encounter     Most Recent Immunizations   Administered Date(s) Administered     COVID-19,PF,Moderna 09/15/2021     Influenza Vaccine IM > 6 months Valent IIV4 (Alfuria,Fluzone) 09/23/2020       Anticoagulation Information      Recent INR results:   Recent Labs   Lab 09/29/21  0608 09/28/21  0701 09/27/21  0833 09/26/21  1312   INR 1.28* 1.43* 1.54* 1.67*     Warfarin doses (if applicable) or name of other anticoagulant: None given due to elevated INR and bleeding status.     Discharge Orders     Discharge Procedure Orders   Reason for your hospital stay   Order Comments: You came into the hospital because you developed a hematoma, or blood collection, on your psoas muscle. This is painful and required pain control, which you will leave with on discharge. It also caused you to lose some blood, which we replaced.     Follow-up and recommended labs and tests    Order Comments: Follow up with primary care provider, Jaswant Flores, within 7 days to evaluate medication change, for hospital follow- up, and to follow up on results.  The following labs/tests are recommended: MRI of liver to evaluate new masses.     Activity   Order Comments: Your activity upon discharge: activity as tolerated     Order Specific Question Answer Comments   Is discharge order? Yes      Diet   Order Comments: Follow this diet upon discharge: Regular     Order Specific Question Answer Comments   Is discharge order? Yes        Examination     Vital Signs in last 24 hours:   B/P: 152/103, T: 97.8, P: 80, R: 18    General: Awake, alert and oriented. No acute distress. Winces with movement.  Respiratory: No respiratory distress. Lungs CTA.  Cardiac: RRR, brisk cap refill. Warm and well perfused.  Abdominal: Abdomen is soft and non-tender.  Extremities: Grossly normal.  Skin: Warm and intact. Diffuse ecchymosis.   Neurological: The patient is  fully oriented.    Nevin Gale MD  [facility] Resident  Pager #: [phone]    Precepted patient with Dr. Gustavo Solomon    CC:Jaswant Flores

## 2021-09-29 NOTE — PROGRESS NOTES
Care Management Discharge Note    Discharge Date: 09/29/2021       Discharge Disposition: Home    Discharge Services: None    Discharge DME: None    Discharge Transportation: family or friend will provide    Private pay costs discussed: Not applicable    PAS Confirmation Code:  NA  Patient/family educated on Medicare website which has current facility and service quality ratings:  NA    Education Provided on the Discharge Plan:  Yes  Persons Notified of Discharge Plans: Patient and Wife  Patient/Family in Agreement with the Plan: yes    Handoff Referral Completed: NA    Care Management Follow Up    Length of Stay (days): 3    Expected Discharge Date: 09/29/2021     Concerns to be Addressed:     Medical Progression  Patient plan of care discussed at interdisciplinary rounds: Yes    Anticipated Discharge Disposition:  Home with assist     Anticipated Discharge Services:  None  Anticipated Discharge DME:  None    Patient/family educated on Medicare website which has current facility and service quality ratings:  Not applicable at this time  Education Provided on the Discharge Plan:  Yes  Patient/Family in Agreement with the Plan:  Yes    Referrals Placed by CM/SW:  None at this time  Private pay costs discussed: Not applicable at this time    Additional Information:    FRANCISCO met with Pt and Pt's wife who was at bedside to discuss discharge planning and therapy recommendation.  Pt reports that his wife will be able to assist him at home and other family members will be able to help as well.  FRANCISCOCM provided education on home PT services,  Pt declines home PT services.  Pt requests information regarding PCA services.  [facility] gave Pt information regarding establishing care with PCA through his insurance.  [facility] notified Pt that CM is available if any needs arise.       OLAF Luna

## 2021-09-29 NOTE — PLAN OF CARE
"NURSING NOTE 0700 - 1500    Problem: Pain Acute  Goal: Acceptable Pain Control and Functional Ability  Outcome: Improving  Intervention: Develop Pain Management Plan    D: Endorses left hip pain, radiating to his inner thigh. Rated 10/10 this morning.      Limited ROM with ongoing pain. Denies of numbness/tingling.   A: PRN Tylenol & Hydroxyzine given (see MAR). PT/OT.  R: Pain down to 4 (within 1hr), then 2 (within 2hrs). Oxycodone given per request.      Writer had to reinforce re: indication for pain meds - to manage pain to a       tolerable number, which means may not be a 0/10; and for it to help with       him to be more mobile. He is refusing to walk in the hallway, \"because it      will hurt\" even when pain is very well controlled. Discussed with MD.     Problem: Adult Inpatient Plan of Care  Goal: Plan of Care Review  Outcome: Improving    - Dialysis this afternoon, started after 1400.  - Anticipated discharge to home after dialysis is completed.       "

## 2021-09-29 NOTE — PROVIDER NOTIFICATION
PROVIDER NOTIFICATION    Reason for communication: patient refused dialysis today. Nephrologist aware - was at bedside when dialysis RN visited in the room.  Team member name: Dr. Odom  Team member role: Primary MD ([place])  Method of Communication: text paged  Response: n/a  Response time: n/a

## 2021-09-29 NOTE — PLAN OF CARE
Problem: Pain Acute  Goal: Acceptable Pain Control and Functional Ability  Outcome: Improving  Intervention: Develop Pain Management Plan  Recent Flowsheet Documentation  Taken 9/28/2021 2059 by Suzan Navarrete RN  Pain Management Interventions: medication (see MAR)   Pt c/o pain on left hip requested and had prn oxycodone x2 with some relief, refused bedtime diltiazem, VSS, slept almost all through the night .

## 2021-09-29 NOTE — PROGRESS NOTES
RENAL PROGRESS NOTE    CC:  Left ilopsoas hematoma    ASSESSMENT & PLAN:   61 year old male with past medical history significant for end-stage renal disease on hemodialysis via left upper extremity AV graft chronic type B dissection, chronic hepatitis B,  thrombocytopenia, normocytic anemia, secondary hyperparathyroidism minutes on September 26 for evaluation of pain and numbness in his left thigh and left buttock, found to have a left iliopsoas hematoma.     ESRD- thought due to renovascular disease; past imaging showed atrophic kidneys. Initiated on hemodialysis in 04/2021 after placement of rapid access AVG.  He gets dialyzed on Monday and Friday, runs for 3 hours at [facility] under care of Dr. Hill.  Patient often skips treatments and cuts his treatments short in spite of multiple discussions with primary nephrologist. Patient was last dialyzed on September 20th as outpatient.  Patient states that he missed his Friday appointment due to pain in his left thigh and left buttock.  Patient insist that he is feeling well and has no problems with missing dialysis.  Received last HD here on 09/27.  Patient states that he wants to have HD run today before discharge so he does not need to do dialysis run on Friday.     Access: LUE AVG. Placed by . Uses 16 g needles. No reported issues.    Hyponatremia-Mild. Serum sodium is 135 this am. Should improve with HD.     Hyperkalemia-Mild. Resolved. Serum potassium is 4.5 this am. Run with K2 bath. Renal diet.     Metabolic acidosis-secondary to ESRD and skipped dialysis treatments. Resolved with HD. HCO3 is 23 this am.     Hypertension-BP is acceptable. On Coreg and Diltiazem  -recommend no changes        Volume status-EDW 61 kgs. Patient's weight on admission was 66 kgs. No weight checked in last 3 days. Still urinates. CT chest showed small bilateral pleural effusions.   -UF with HD as tolerated, keep MAP>60  -daily weight  -low sodium  diet  -32 oz fluid restriction     Acute on chronic anemia- Baseline HGb is 8s. Patient presented with Hgb of 5.6g/dl. Likely secondary blood loss from iliopsoas hematoma. S/p 2U PRBC. Hgb is trending down to 8.0g/dl this am. On Epo [id number] and IV Venofer as outpatient.   Con't Epo here.        CKD BMD  Secondary hyperparathyroidism-Most recent PTH was 82 in 07/2021.  -corrected serum calcium is 8.7  -most recent Ph was 7.6  09/10, as per out pateint labs. Not on binders. Renal diet.     Left sided iliopsoas hematoma-management as per primary service       Thrombocytopenia - likely secondary to liver cirrhosis. Chronically ~30k-80k. Ranging in 40s this admission. S/p 1U platelet.     Cirrhosis/chronic Hepatitis B -  On entecavir.      Chronic Panfilo type B dissection-Dissection appears stable/unchanged from previous CTA.     We will follow.       SUBJECTIVE:    No acute issues.  Patient states that he is feeling the same, c/o 10/10 pain in the left leg. The pain is worse with movement. No relief with pain medications.  Patient denies: fever, chills, dizziness, adenopathy, sore throat, rhinorrhea, cough, shortness of breath , chest pain, palpitations, orthopnea, nausea, vomiting, abdominal pain, changes in bowel habits, dysuria, urinary frequency, urgency, hematuria, rash.      OBJECTIVE:  Physical Exam   Temp: 97.8  F (36.6  C) Temp src: Oral BP: (!) 152/103 Pulse: 80   Resp: 18 SpO2: 97 % O2 Device: None (Room air)    Vitals:    09/26/21 0926   Weight: 66 kg (145 lb 8.1 oz)     Vital Signs with Ranges  Temp:  [97.8  F (36.6  C)-98.9  F (37.2  C)] 97.8  F (36.6  C)  Pulse:  [65-80] 80  Resp:  [16-18] 18  BP: ()/() 152/103  SpO2:  [93 %-98 %] 97 %  I/O last 3 completed shifts:  In: 560 [P.O.:560]  Out: 300 [Urine:300]    @TMAXR(24)@    No data found.[unfilled]    PHYSICAL EXAM:  General - Alert and oriented x3, appears comfortable, NAD  Cardiovascular - Regular rate and rhythm, no rub  Respiratory -  Clear to auscultation bilaterally, no crackles or wheezes  Abd: BS present, no guarding or pain with palpation, no ascites  Extremities - No lower extremity edema bilaterally  Skin:large ecchymosis on the left side of the lower back, no rash, good turgor  Neuro:  Grossly intact, no focal deficits  MSK:  Grossly intact  Psych:  Normal affect  Access: LUE AVG with audible bruit    LABORATORY STUDIES:     Recent Labs   Lab 09/29/21  0608 09/28/21  0701 09/27/21 2047 09/27/21 0833 09/26/21 2006 09/26/21  1312   WBC 9.6 12.1*  --  4.5  --  5.4   RBC 2.53* 2.66*  --  2.29*  --  1.78*   HGB 8.0* 8.2* 8.5* 7.0* 7.9* 5.6*   HCT 24.4* 25.0*  --  22.0*  --  17.4*   PLT 37* 49*  --  43*  --  49*       Basic Metabolic Panel:  Recent Labs   Lab 09/29/21  0608 09/28/21  0701 09/27/21 0833 09/26/21  1312   * 137 140 137   POTASSIUM 4.5 4.1 5.2* 5.2*   CHLORIDE 101 102 111* 107   CO2 23 26 17* 18*   BUN 72* 65* 110* 111*   CR 7.90* 7.18* 9.63* 10.56*   GLC 94 133* 88 90   TANO 7.9* 7.6* 7.8* 8.2*       INR  Recent Labs   Lab 09/29/21  0608 09/28/21  0701 09/27/21  0833 09/26/21  1312   INR 1.28* 1.43* 1.54* 1.67*        Recent Labs   Lab Test 09/29/21  0608 09/28/21  0701   INR 1.28* 1.43*   WBC 9.6 12.1*   HGB 8.0* 8.2*   PLT 37* 49*       Personally reviewed current labs     ~ 35 minutes spent in exam, POC, education regarding renal disease and management.  >90% time spent in education and counseling and/or discussion with patient's care team    Elizabeth Salmeron MD  [facility], [address], [address]  [place] [address]  Phone# [phone]  Fax# [phone]

## 2021-09-30 ENCOUNTER — PATIENT OUTREACH (OUTPATIENT)
Dept: CARE COORDINATION | Facility: CLINIC | Age: 61
End: 2021-09-30

## 2021-09-30 DIAGNOSIS — Z71.89 OTHER SPECIFIED COUNSELING: ICD-10-CM

## 2021-09-30 NOTE — PROCEDURES
Hemodialysis Treatment Note    Pre weight:  66 kg                        Post weight: 63.2 kg    Run length: 2.5 hrs    Total fluid removed (ml): 2500 ml net    Blood Volume Processed: 47.6 L      Vascular Access: Left lower arm AVG with present bruit/thrill, accessed with 16 ga needles, 350 blood flow rate maintained, pt rinsed back successfully, needles removed without problems, homeostasis achieved in 20 minutes      Treatment Summary:  Pt was hemodyanmically stable during hemodialysis, critline used for fluid volume monitoring, sharp C profile with goal of 3L, lowered to 2.5 L, critline converted to B profile, stable blood pressures, pt c/o back pain, meds admin by primary RN,  sbp dropped to 90's towards end of run, treatment ended as expected, see HD flow sheet for details. Post dialysis report given to primary RN    Interventions:  Vital signs monitoring every 15 minutes and PRN  Goal adjustment per critline and hemodynamics.    Plan:  Per renal MD Barbara Gaona RN  [facility]

## 2021-09-30 NOTE — PLAN OF CARE
Physical Therapy Discharge Summary    Reason for therapy discharge:    Discharged to home.    Progress towards therapy goal(s). See goals on Care Plan in [facility] electronic health record for goal details.  Goals not met.  Barriers to achieving goals:   discharge from facility.    Therapy recommendation(s):    Pt declined Home PT.

## 2021-09-30 NOTE — PROGRESS NOTES
Clinic Care Coordination Contact  [facility]/Voicemail       Clinical Data: Care Coordinator Outreach  Outreach attempted x 1.  Left message on patient's voicemail with call back information and requested return call.  Plan: Care Coordinator will try to reach patient again in 1-2 business days.    MAEGAN Monterroso  [phone]  [facility]

## 2021-09-30 NOTE — PLAN OF CARE
Patient discharged, writer went over the discharge paperwork with patient and spouse, patient verbalize understanding, piv discontinued, left with all belongings, had hs meds prior to discharge, left the unit via wheelchair accompanied by spouse and HILL.

## 2021-10-01 ENCOUNTER — TELEPHONE (OUTPATIENT)
Dept: FAMILY MEDICINE | Facility: CLINIC | Age: 61
End: 2021-10-01

## 2021-10-01 DIAGNOSIS — K74.60 CIRRHOSIS OF LIVER WITH ASCITES, UNSPECIFIED HEPATIC CIRRHOSIS TYPE (H): ICD-10-CM

## 2021-10-01 DIAGNOSIS — R18.8 CIRRHOSIS OF LIVER WITH ASCITES, UNSPECIFIED HEPATIC CIRRHOSIS TYPE (H): ICD-10-CM

## 2021-10-01 NOTE — TELEPHONE ENCOUNTER
The nurse Azra with [facility] called in with some medication questions. She was assisting the PT with some post discharge counseling and she advised clarification is needed regarding his medications.  The PT was prescribed a medication called entecavir (BARACLUDE) 0.5 MG tablet and even though the nurse tried to inform him that he needs to take it once a week only. PT is adamant saying the instruction says for him to take it once a day for 7. PT seemed confused at the interpretation on the prescribed dosage even after Azra tried to explain he only needs to take it once every 7 days. Modesta asked if someone could call PT back to provide further clarification.    Furthermore, PT is currently taking omeprazole (PRILOSEC) 20 MG DR gomes to help manage his pain but asked if he could be prescribed PROTONICS - PANTOPRAZOLE PROTONIX instead.     Please advise on the following situation and provide next course of action.    Thank you,    Ana SAENZ

## 2021-10-01 NOTE — PROGRESS NOTES
Clinic Care Coordination Contact  [facility]/Voicemail       Clinical Data: Care Coordinator Outreach  Outreach attempted x 2.  Left message on patient's voicemail with call back information and requested return call.  Plan: Care Coordinator will do no further outreaches at this time.    MAEGAN Monterroso  [phone]  [facility] Resource [facility]

## 2021-10-01 NOTE — TELEPHONE ENCOUNTER
Routing to  for review and clarification on medication. Also to see if pantoprazole Protonix can be prescribed in place of omeprazole. Romain MELENDEZ

## 2021-10-04 RX ORDER — PANTOPRAZOLE SODIUM 40 MG/1
40 TABLET, DELAYED RELEASE ORAL DAILY
Qty: 30 TABLET | Refills: 3 | Status: SHIPPED | OUTPATIENT
Start: 2021-10-04 | End: 2021-11-02

## 2021-10-04 NOTE — TELEPHONE ENCOUNTER
Spoke to pt and clarified his entecavir was changed to q7 days. Also sent in Protonix per request to replace his omeprazole.    Jaswant Flores MD  October 4, 2021  1:17 PM

## 2021-10-06 ENCOUNTER — OFFICE VISIT (OUTPATIENT)
Dept: FAMILY MEDICINE | Facility: CLINIC | Age: 61
End: 2021-10-06
Payer: COMMERCIAL

## 2021-10-06 VITALS
RESPIRATION RATE: 24 BRPM | OXYGEN SATURATION: 96 % | SYSTOLIC BLOOD PRESSURE: 123 MMHG | TEMPERATURE: 98.6 F | DIASTOLIC BLOOD PRESSURE: 71 MMHG | HEART RATE: 86 BPM | HEIGHT: 64 IN | WEIGHT: 143 LBS | BODY MASS INDEX: 24.41 KG/M2

## 2021-10-06 DIAGNOSIS — R16.0 LIVER MASS: ICD-10-CM

## 2021-10-06 DIAGNOSIS — S70.12XD HEMATOMA OF LEFT ILIOPSOAS MUSCLE, SUBSEQUENT ENCOUNTER: Primary | ICD-10-CM

## 2021-10-06 DIAGNOSIS — M67.431 GANGLION CYST OF WRIST, RIGHT: ICD-10-CM

## 2021-10-06 PROCEDURE — 99496 TRANSJ CARE MGMT HIGH F2F 7D: CPT | Performed by: FAMILY MEDICINE

## 2021-10-06 RX ORDER — OXYCODONE HYDROCHLORIDE 5 MG/1
5 TABLET ORAL EVERY 6 HOURS PRN
Qty: 42 TABLET | Refills: 0 | Status: ON HOLD | OUTPATIENT
Start: 2021-10-06 | End: 2021-10-26

## 2021-10-06 ASSESSMENT — MIFFLIN-ST. JEOR: SCORE: 1364.64

## 2021-10-06 NOTE — PROGRESS NOTES
Hospitalization Follow-up Visit         hospitals       Hospital Follow-up Visit:    Hospital:  [facility]   Date of Admission: 9/26/2021  Date of Discharge: 9/29/2021  Reason(s) for Admission: L retroperitoneal  iliopsoas hematoma            Problems taking medications regularly:  None       Post Discharge Medication Reconciliation: discharge medications reconciled, continue medications without change.       Problems adhering to non-medication therapy:  None       Medications reviewed by: by myself. Findings include compliance; we clarified his entecavir dosage and switched his PPI per pt preference.    Summary of hospitalization:  [facility] discharge summary reviewed  Diagnostic Tests/Treatments reviewed.  Follow up needed: MRI of liver  Other Healthcare Providers Involved in Patient s Care:         Nephrology   Update since discharge: improved.   Plan of care communicated with patient and family            Recommended Follow-up:  - Follow up on incidental hepatic lesions, the largest measuring 4.5 cm, detected on initial spine MRI and confirmed on CT.   - Follow up on abdominal aortic aneurysm measuring up to at least 5 cm in the visualized suprarenal compartment.  - Patient is discharged with a hemoglobin of 8.0 which appears to be his baseline.   - Patient is discharged on 5 mg oxycodone Q6H for four days, and has been very concerned with his pain during the hospital stay. Please ensure that his pain has become tolerable; he was told that the pain will recede with time as the hematoma breaks down.        Hospital Course:  Elle Blanton is a 61 year old male admitted on 9/26/2021. He has a history of normocytic anemia, hepatitis B, ESRD on HD, thrombocytopenia, chronic type B dissection and is admitted for left sided retroperitoneal iliopsoas hematoma.     #Left sided iliopsoas hematoma  #Normocytic anemia   Patient has history of anemia likely secondary to ESRD with baseline of 8-9. Patient presented  with left thigh pain and numbness for a week after lifting a heavy gardening hose into the back of his truck. He was found to have a left sided iliopsoas hematoma without extravasation on CT abdomen/pelvis; not a candidate for coiling. Found to have Hgb of 5.2 on admission; treated with 2 units of pRBC in the ED which increased hemoglobin to 7.9. No signs of bleeding other than the iliopsoas hematoma. Upon recheck the next morning, hemoglobin found to be 7.0; transfused another unit pRBC and one unit of platelets. Patient has been stable at his baseline of ~8 for the rest of his hospital stay. Patient experienced persistent pain despite oxycodone being increased multiple times during his stay. The pain is only present with movement. Patient was educated that due to the hematoma placement, movement will be uncomfortable but it is necessary. PT/OT was consulted; PT recommended home with PT but the patient declined. Given a four day prescription of oxycodone at discharge, patient informed that the pain will improve as the hematoma resolves.      #ESRD on Hemodialysis  #NAHUM on CKD  Patient has history of ESRD on HD with left arm dialysis graft. Per chart review, it is has been recommended he receive dialysis on MWF however he only wishes to go . Patient states he did not make it to his Friday dialysis appt on 9/24. On admission Cr: 10.56 with K of 5.2 and bicarb of 18. It appears baseline creatinine is around 5.0.  Patient continues to urinate. Nephrology consulted, performed HD on 9/27 and 9/29 before discharge.      #Chronic Hepatitis B  #Liver chirrosis  #Thrombocytopenia  #Elevated INR  Patient has long standing history of cirrhosis secondary to chronic hepatitis B. Per chart review it appears he is supposed to follow with [facility] but per patient he has only seen them one time and did not follow-up afterward. Unclear when he last saw them. He currently takes entecavir 0.5mg every third day (it is prescribed for every  "other day); changed to every 7 days per pharmacist recommendation considering patient has ESRD on hemodialysis. Patient was found to have new incidental liver mass on CTA on admission. Scattered ecchymosis present likely secondary to thrombocytopenia. INR was 1.67 with platelets of 49 on admission; received one unit of platelets in the ED. Liver panel shows slightly elevated bilirubin; the rest of his panel is normal. MRI to further evaluate liver mass recommended for outpatient.            Review of Systems:   SKIN: no worrisome rashes, no worrisome moles, no worrisome lesions  EYES: no acute vision problems or changes  ENT: no ear problems, no mouth problems, no throat problems  RESP: no significant cough, no shortness of breath  CV: no chest pain, no palpitations, no new or worsening peripheral edema  GI: no nausea, no vomiting, no constipation, no diarrhea  MUSCULOSKELETAL:See HPI regarding hand  NEURO: no weakness, no dizziness, no syncope, no headaches  HEME:  no new bleeding problems; see HPI regarding recent hematoma;has chronic thrombocytopenia  PSYCHIATRIC: NEGATIVE for changes in mood or affect            Physical Exam:     Vitals:    10/06/21 1459   BP: 123/71   BP Location: Right arm   Patient Position: Sitting   Cuff Size: Adult Regular   Pulse: 86   Resp: 24   Temp: 98.6  F (37  C)   TempSrc: Oral   SpO2: 96%   Weight: 64.9 kg (143 lb)   Height: 1.626 m (5' 4\")     Body mass index is 24.55 kg/m .    GENERAL: alert,  no distress  RESP: lungs clear to auscultation - no rales, no rhonchi, no wheezes  CV: regular rates and rhythm, normal S1 S2, no S3 or S4 and no murmur, no click or rub -  SKIN: + jaundice; scattered ecchymoses  NEURO: strength and tone- normal, sensory exam- grossly normal, mentation- intact, speech- normal  MSK: R hand w/ large doral/ulnar ganglion cyst; pt unable to actively extend his pinky finger          Results:     Will check Hgb at dialysis    Assessment and Plan      Elle was " seen today for hospital f/u, medication reconciliation, refill request and hand pain.    Diagnoses and all orders for this visit:    Hematoma of left iliopsoas muscle, subsequent encounter  -     oxyCODONE (ROXICODONE) 5 MG tablet; Take 1 tablet (5 mg) by mouth every 6 hours as needed for severe pain  - Pain is stable/ improving; will wean down on oxycodone over next two weeks; f/u then; will check Hgb in dialysis; precautions given    Liver mass  -     MR Liver wo & w Contrast; Future  - reviewed CT findings and need for follow-up liver MRI; pt and his wife expressed that they would not pursue a liver biopsy if there was concern for cancer      Ganglion cyst of wrist, right  -     Orthopedic  Referral; Future  - He wishes to see a hand surgeon to discuss removal of cyst given concern for compression affecting his extensor digiti minimi        E&M code to be billed if TCM cannot be: 88514    Type of decision making: Moderate complexity (52604)    Options for treatment and follow-up care were reviewed with the patient  Elle Blanton   engaged in the decision making process and verbalized understanding of the options discussed and agreed with the final plan.    Jaswant Flores MD  10/8/2021  2:36 PM

## 2021-10-11 ENCOUNTER — APPOINTMENT (OUTPATIENT)
Dept: RADIOLOGY | Facility: HOSPITAL | Age: 61
End: 2021-10-11
Attending: EMERGENCY MEDICINE
Payer: COMMERCIAL

## 2021-10-11 ENCOUNTER — HOSPITAL ENCOUNTER (EMERGENCY)
Facility: HOSPITAL | Age: 61
Discharge: HOME | End: 2021-10-11
Attending: EMERGENCY MEDICINE | Admitting: EMERGENCY MEDICINE
Payer: COMMERCIAL

## 2021-10-11 ENCOUNTER — TELEPHONE (OUTPATIENT)
Dept: FAMILY MEDICINE | Facility: CLINIC | Age: 61
End: 2021-10-11

## 2021-10-11 VITALS
HEART RATE: 67 BPM | DIASTOLIC BLOOD PRESSURE: 74 MMHG | BODY MASS INDEX: 23.32 KG/M2 | SYSTOLIC BLOOD PRESSURE: 126 MMHG | TEMPERATURE: 97.8 F | RESPIRATION RATE: 18 BRPM | WEIGHT: 140 LBS | OXYGEN SATURATION: 93 % | HEIGHT: 65 IN

## 2021-10-11 DIAGNOSIS — M70.22 OLECRANON BURSITIS OF LEFT ELBOW: ICD-10-CM

## 2021-10-11 LAB
ANION GAP SERPL CALCULATED.3IONS-SCNC: 13 MMOL/L (ref 5–18)
BASOPHILS # BLD AUTO: 0 10E3/UL (ref 0–0.2)
BASOPHILS NFR BLD AUTO: 0 %
BUN SERPL-MCNC: 83 MG/DL (ref 8–22)
CALCIUM SERPL-MCNC: 8.2 MG/DL (ref 8.5–10.5)
CHLORIDE BLD-SCNC: 102 MMOL/L (ref 98–107)
CO2 SERPL-SCNC: 23 MMOL/L (ref 22–31)
CREAT SERPL-MCNC: 10.45 MG/DL (ref 0.7–1.3)
EOSINOPHIL # BLD AUTO: 0 10E3/UL (ref 0–0.7)
EOSINOPHIL NFR BLD AUTO: 0 %
ERYTHROCYTE [DISTWIDTH] IN BLOOD BY AUTOMATED COUNT: 16 % (ref 10–15)
GFR SERPL CREATININE-BSD FRML MDRD: 5 ML/MIN/1.73M2
GLUCOSE BLD-MCNC: 118 MG/DL (ref 70–125)
HCT VFR BLD AUTO: 25.5 % (ref 40–53)
HGB BLD-MCNC: 7.7 G/DL (ref 13.3–17.7)
IMM GRANULOCYTES # BLD: 0.1 10E3/UL
IMM GRANULOCYTES NFR BLD: 1 %
LYMPHOCYTES # BLD AUTO: 0.4 10E3/UL (ref 0.8–5.3)
LYMPHOCYTES NFR BLD AUTO: 4 %
MCH RBC QN AUTO: 30 PG (ref 26.5–33)
MCHC RBC AUTO-ENTMCNC: 30.2 G/DL (ref 31.5–36.5)
MCV RBC AUTO: 99 FL (ref 78–100)
MONOCYTES # BLD AUTO: 0.7 10E3/UL (ref 0–1.3)
MONOCYTES NFR BLD AUTO: 6 %
NEUTROPHILS # BLD AUTO: 10.6 10E3/UL (ref 1.6–8.3)
NEUTROPHILS NFR BLD AUTO: 89 %
NRBC # BLD AUTO: 0 10E3/UL
NRBC BLD AUTO-RTO: 0 /100
PLATELET # BLD AUTO: 102 10E3/UL (ref 150–450)
POTASSIUM BLD-SCNC: 4.5 MMOL/L (ref 3.5–5)
RBC # BLD AUTO: 2.57 10E6/UL (ref 4.4–5.9)
SODIUM SERPL-SCNC: 138 MMOL/L (ref 136–145)
URATE SERPL-MCNC: 12 MG/DL (ref 3–8)
WBC # BLD AUTO: 11.7 10E3/UL (ref 4–11)

## 2021-10-11 PROCEDURE — 84550 ASSAY OF BLOOD/URIC ACID: CPT | Performed by: EMERGENCY MEDICINE

## 2021-10-11 PROCEDURE — 36415 COLL VENOUS BLD VENIPUNCTURE: CPT | Performed by: EMERGENCY MEDICINE

## 2021-10-11 PROCEDURE — 80048 BASIC METABOLIC PNL TOTAL CA: CPT | Performed by: EMERGENCY MEDICINE

## 2021-10-11 PROCEDURE — 73070 X-RAY EXAM OF ELBOW: CPT | Mod: LT

## 2021-10-11 PROCEDURE — 85025 COMPLETE CBC W/AUTO DIFF WBC: CPT | Performed by: EMERGENCY MEDICINE

## 2021-10-11 PROCEDURE — 99284 EMERGENCY DEPT VISIT MOD MDM: CPT

## 2021-10-11 RX ORDER — AMOXICILLIN AND CLAVULANATE POTASSIUM 500; 125 MG/1; MG/1
1 TABLET, FILM COATED ORAL DAILY
Qty: 7 TABLET | Refills: 0 | Status: ON HOLD | OUTPATIENT
Start: 2021-10-11 | End: 2021-10-17

## 2021-10-11 ASSESSMENT — ENCOUNTER SYMPTOMS
COLOR CHANGE: 1
JOINT SWELLING: 1
ARTHRALGIAS: 1

## 2021-10-11 ASSESSMENT — MIFFLIN-ST. JEOR: SCORE: 1366.92

## 2021-10-11 NOTE — TELEPHONE ENCOUNTER
Patient discharged from emergency room. Called patient and advised of 1:20pm on 10/13. Patient agreeable to day/time. Added to 's schedule and patient to call with changes. Romain MELENDEZ

## 2021-10-11 NOTE — ED NOTES
Pt presents NAD. SKIN: WNL.   HEENT: Normocephalic, PERRL, alert and oriented x 3.   CHEST: Symmetrical rise, breath sounds are equal and clear bilaterally. No reported CP or SOB.  ABD: NTND. No reported N&V or diarrhea.  EXT: NUNEZ x 4. C/o swelling to left arm x 3-4 days. Hx of gout requiring drainage. No pain with movement.  Rest of exam unremarkable.

## 2021-10-11 NOTE — ED TRIAGE NOTES
"Pt has swelling on his left elbow which has been there for 2 days.  No injury.  Has had other joints \"drained before\" on his wrist. Took some pain medication 0700 this am.   "

## 2021-10-11 NOTE — TELEPHONE ENCOUNTER
Received message from PCP saying okay to double book on Wednesday 10/13 at 1:20pm. Will await to see if patient is admitted or discharged based on current ED visits. Romain MELENDEZ

## 2021-10-11 NOTE — PATIENT INSTRUCTIONS
Referral for :     Othopedic    LOCATION/PLACE/Provider :    [facility]   DATE & TIME :    Faxed to location, they will call   PHONE :     [phone]  FAX :    [phone]  Appointment made by clinic staff/:    Moraima

## 2021-10-11 NOTE — TELEPHONE ENCOUNTER
Patient is calling to talk to  about his elbow that is swelling. He wants Dr. Flores to call him as soon as he can and or squeeze him in for an appointment. We did schedule as appointment for Wednesday morning with a different doctor just incase Dr. Flores does not call him. Please call back.

## 2021-10-11 NOTE — ED PROVIDER NOTES
EMERGENCY DEPARTMENT ENCOUNTER      NAME: Elle Blanton  AGE: 61 year old male  YOB: 1960  MRN: [id number]  EVALUATION DATE & TIME: 10/11/2021 10:58 AM    PCP: Jaswant Flores    ED PROVIDER: Denzel Degroot M.D.      Chief Complaint   Patient presents with     Joint Swelling         FINAL IMPRESSION:  1.  Acute left elbow olecranon bursitis.      ED COURSE & MEDICAL DECISION MAKIN:05 AM  I met with the patient to gather history and to perform my initial exam. We discussed plans for the ED course, including diagnostic testing and treatment. PPE worn: cloth mask.  Patient with left elbow swelling for 2 to 3 days.  It is not painful.  He still has a full range of motion.  On examination, it is not red or hot.  Appearance is a left olecranon bursitis.  12:13 PM Updated patient and wife on results. Discussed plan for discharge and they are agreeable.  X-ray showing soft tissue swelling external to the joint consistent with olecranon bursitis.  No actual joint involvement.  White count mildly elevated 11.7.  Other findings of chronic thrombocytopenia and chronic at dialysis and chronic anemia.  Patient will be discharged home with short-term prescription of Augmentin.  He will follow-up with his doctor or clinic this week.    Pertinent Labs & Imaging studies reviewed. (See chart for details)  61 year old male presents to the Emergency Department for evaluation of left elbow swelling.    At the conclusion of the encounter I discussed the results of all of the tests and the disposition. The questions were answered. The patient or family acknowledged understanding and was agreeable with the care plan.       MEDICATIONS GIVEN IN THE EMERGENCY:  Medications - No data to display    NEW PRESCRIPTIONS STARTED AT TODAY'S ER VISIT  New Prescriptions    No medications on file          =================================================================    HPI    Patient information was obtained from: Patient    Use of  : N/A         Elle Blanton is a 61 year old male with a pertinent history of gout, 5 cm AAA, chronic thoracic aortic dissection, cirrhosis of liver without ascites, CKD on dialysis, who presents to this ED private car for evaluation of left elbow swelling.    Patient presents with 3-4 days of progressively worsening swelling and redness to the left elbow. He denies trauma to the area. Symptoms are not exacerbated by movement of the arm. No associated warmth. He endorses a history of gout with a  previous right wrist tap.    He does not identify any waxing or waning symptoms otherwise, exacerbating or alleviating features,associated symptoms except as mentioned. He denies any pain related complaints.    REVIEW OF SYSTEMS   Review of Systems   Musculoskeletal: Positive for arthralgias and joint swelling.   Skin: Positive for color change (redness).   All other systems reviewed and are negative.       PAST MEDICAL HISTORY:  Past Medical History:   Diagnosis Date     NAHUM (acute kidney injury) (H)      GI (gastrointestinal bleed)      Gout      H. pylori infection 7/5/2017    UGI bleed implied by Hgb 9.0 6/22/17 and melena. Admitted and hgb decreased to 7.6, CT abdomen showed all bladder wall thickening. + Hep B surface antigen noted. Melena resolved and hgb stabalized without transfusion, epigastric pain resolved with PPI. Recommend referral for gastroscopy.     Heart attack (H)      Hepatitis B      Normocytic anemia      PONV (postoperative nausea and vomiting)      Thrombocytopenia (H)        PAST SURGICAL HISTORY:  Past Surgical History:   Procedure Laterality Date     ABCESS DRAINAGE      finger     CREATE FISTULA ARTERIOVENOUS UPPER EXTREMITY Left 4/20/2021    Procedure: left arm dialysis graft placement;  Surgeon: Roxana Cosby MD;  Location: [facility] OR;  Service: General     FOREIGN BODY REMOVAL      finger     INCISION AND DRAINAGE FINGER, COMBINED Left 10/20/2016    Procedure: COMBINED INCISION  AND DRAINAGE FINGER;  Surgeon: Mireya Pizano MD;  Location: [facility] OR     IR CHEST TUBE PLACEMENT NON-TUNNELED RIGHT  4/19/2021     IR PLEURAL DRAINAGE WITH CATHETER INSERTION  4/19/2021     MIDLINE INSERTION - DOUBLE LUMEN  4/23/2021          DC ESOPHAGOGASTRODUODENOSCOPY TRANSORAL DIAGNOSTIC N/A 8/18/2020    Procedure: ESOPHAGOGASTRODUODENOSCOPY (EGD) with biopsy;  Surgeon: Nilson Gonzales MD;  Location: [facility];  Service: Gastroenterology      PARACENTESIS  8/14/2020      PARACENTESIS  8/19/2020      THORACENTESIS  4/18/2021           CURRENT MEDICATIONS:    acetaminophen (TYLENOL) 500 MG tablet  carvedilol (COREG) 25 MG tablet  cloNIDine (CATAPRES) 0.1 MG tablet  diltiazem ER (TIAZAC) 180 MG 24 hr ER beaded capsule  entecavir (BARACLUDE) 0.5 MG tablet  hydrOXYzine (ATARAX) 25 MG tablet  omeprazole (PRILOSEC) 20 MG DR capsule  order for DME  oxyCODONE (ROXICODONE) 5 MG tablet  pantoprazole (PROTONIX) 40 MG EC tablet  polyethylene glycol (MIRALAX) 17 GM/Dose powder        ALLERGIES:  Allergies   Allergen Reactions     Nka [No Known Allergies]        FAMILY HISTORY:  Family History   Problem Relation Age of Onset     Diabetes Father      Hypertension No family hx of      Asthma No family hx of      Cancer No family hx of      Coronary Artery Disease No family hx of      Liver Cancer No family hx of      Ulcers Father        SOCIAL HISTORY:   Social History     Socioeconomic History     Marital status:      Spouse name: Not on file     Number of children: Not on file     Years of education: Not on file     Highest education level: Not on file   Occupational History     Not on file   Tobacco Use     Smoking status: Never Smoker     Smokeless tobacco: Never Used   Substance and Sexual Activity     Alcohol use: No     Drug use: No     Sexual activity: Yes     Partners: Female   Other Topics Concern     Not on file   Social History Narrative     Not on file     Social Determinants of Health  "    Financial Resource Strain:      Difficulty of Paying Living Expenses:    Food Insecurity:      Worried About Running Out of Food in the Last Year:      Ran Out of Food in the Last Year:    Transportation Needs:      Lack of Transportation (Medical):      Lack of Transportation (Non-Medical):    Physical Activity:      Days of Exercise per Week:      Minutes of Exercise per Session:    Stress:      Feeling of Stress :    Social Connections:      Frequency of Communication with Friends and Family:      Frequency of Social Gatherings with Friends and Family:      Attends Protestant Services:      Active Member of Clubs or Organizations:      Attends Club or Organization Meetings:      Marital Status:    Intimate Partner Violence:      Fear of Current or Ex-Partner:      Emotionally Abused:      Physically Abused:      Sexually Abused:    Chart indicates no drugs, alcohol, tobacco.    VITALS:  /66   Pulse 75   Temp 97.8  F (36.6  C) (Oral)   Resp 16   Ht 1.651 m (5' 5\")   Wt 63.5 kg (140 lb)   SpO2 95%   BMI 23.30 kg/m      PHYSICAL EXAM    Vital Signs:  /66   Pulse 75   Temp 97.8  F (36.6  C) (Oral)   Resp 16   Ht 1.651 m (5' 5\")   Wt 63.5 kg (140 lb)   SpO2 95%   BMI 23.30 kg/m    General:  On entering the room he is in no apparent distress.    Neck:  Neck supple with full range of motion and nontender.    Back:  Back and spine are nontender.  No costovertebral angle tenderness.    HEENT:  Oropharynx clear with moist mucous membranes.  HEENT unremarkable.    Pulmonary:  Chest clear to auscultation without rhonchi rales or wheezing.    Cardiovascular:  Cardiac regular rate and rhythm without murmurs rubs or gallops.    Abdomen:  Abdomen soft nontender.  There is no rebound or guarding.    Muskuloskeletal:  he moves all 4 without any difficulty and has normal neurovascular exams.  Extremities without clubbing, cyanosis, or edema.  Legs and calves are nontender.  Full range of motion the left " arm.  Good pulse cap refill.  The elbow joint is nontender.  There is an olecranon bursitis present.  It is not red or hot.  Neuro:  he is alert and oriented ×3 and moves all extremities symmetrically.    Psych:  Normal affect.    Skin:  Unremarkable and warm and dry.       LAB:  All pertinent labs reviewed and interpreted.  Labs Ordered and Resulted from Time of ED Arrival Up to the Time of Departure from the ED   URIC ACID - Abnormal; Notable for the following components:       Result Value    Uric Acid 12.0 (*)     All other components within normal limits   BASIC METABOLIC PANEL - Abnormal; Notable for the following components:    Urea Nitrogen 83 (*)     Creatinine 10.45 (*)     Calcium 8.2 (*)     GFR Estimate 5 (*)     All other components within normal limits   CBC WITH PLATELETS AND DIFFERENTIAL - Abnormal; Notable for the following components:    WBC Count 11.7 (*)     RBC Count 2.57 (*)     Hemoglobin 7.7 (*)     Hematocrit 25.5 (*)     MCHC 30.2 (*)     RDW 16.0 (*)     Platelet Count 102 (*)     Absolute Neutrophils 10.6 (*)     Absolute Lymphocytes 0.4 (*)     Absolute Immature Granulocytes 0.1 (*)     All other components within normal limits   CBC WITH PLATELETS & DIFFERENTIAL    Narrative:     The following orders were created for panel order CBC with platelets + differential.  Procedure                               Abnormality         Status                     ---------                               -----------         ------                     CBC with platelets and d...[[id number]]  Abnormal            Final result                 Please view results for these tests on the individual orders.       RADIOLOGY:  Reviewed all pertinent imaging. Please see official radiology report.  Elbow XR,  2 views, left   Final Result   IMPRESSION: Severe soft tissue swelling over the olecranon consistent with contusion or olecranon bursitis. In addition, there is a tiny punctate focus of metallic density  projected over the olecranon which could represent a foreign body and potential    nidus for infection. Recommend correlation. The elbow joint itself is negative for fracture or dislocation and there is no significant intra-articular effusion.          PROCEDURES:   None    I, Jannet Peres, am serving as a scribe to document services personally performed by Dr. Degroot based on my observation and the provider's statements to me. I, Denzel Degroot MD attest that Jannet Peres is acting in a scribe capacity, has observed my performance of the services and has documented them in accordance with my direction.    Denzel Degroot M.D.  Emergency Medicine  [facility] EMERGENCY DEPARTMENT  [facility] [facility] [address] [phone]  Dept: [phone]       Denzel Degroot MD  10/11/21 2114

## 2021-10-12 ENCOUNTER — PATIENT OUTREACH (OUTPATIENT)
Dept: CARE COORDINATION | Facility: CLINIC | Age: 61
End: 2021-10-12

## 2021-10-12 DIAGNOSIS — Z71.89 OTHER SPECIFIED COUNSELING: ICD-10-CM

## 2021-10-12 NOTE — PROGRESS NOTES
Clinic Care Coordination Contact  [facility]/Voicemail       Clinical Data: Care Coordinator Outreach  Outreach attempted x 1.  Left message on patient's voicemail with call back information and requested return call.   Care Coordinator will try to reach patient again in 1-2 business days.      Silvia Hilario  [phone]  Care        History of hysteroscopy  for uterine polyp  No significant past surgical history

## 2021-10-13 ENCOUNTER — OFFICE VISIT (OUTPATIENT)
Dept: FAMILY MEDICINE | Facility: CLINIC | Age: 61
End: 2021-10-13
Payer: COMMERCIAL

## 2021-10-13 VITALS
WEIGHT: 134.5 LBS | BODY MASS INDEX: 22.38 KG/M2 | SYSTOLIC BLOOD PRESSURE: 111 MMHG | HEART RATE: 76 BPM | RESPIRATION RATE: 20 BRPM | OXYGEN SATURATION: 96 % | TEMPERATURE: 97.8 F | DIASTOLIC BLOOD PRESSURE: 68 MMHG

## 2021-10-13 DIAGNOSIS — M70.22 OLECRANON BURSITIS OF LEFT ELBOW: Primary | ICD-10-CM

## 2021-10-13 PROCEDURE — 99214 OFFICE O/P EST MOD 30 MIN: CPT | Mod: 25 | Performed by: FAMILY MEDICINE

## 2021-10-13 PROCEDURE — 87070 CULTURE OTHR SPECIMN AEROBIC: CPT | Performed by: FAMILY MEDICINE

## 2021-10-13 PROCEDURE — 87077 CULTURE AEROBIC IDENTIFY: CPT | Performed by: FAMILY MEDICINE

## 2021-10-13 PROCEDURE — 87186 SC STD MICRODIL/AGAR DIL: CPT | Performed by: FAMILY MEDICINE

## 2021-10-13 PROCEDURE — 87205 SMEAR GRAM STAIN: CPT | Performed by: FAMILY MEDICINE

## 2021-10-13 RX ORDER — CLINDAMYCIN HCL 300 MG
300 CAPSULE ORAL 4 TIMES DAILY
Qty: 10 CAPSULE | Refills: 0 | Status: ON HOLD | OUTPATIENT
Start: 2021-10-13 | End: 2021-10-17

## 2021-10-13 NOTE — PROGRESS NOTES
Clinic Care Coordination Contact    Background: Care Coordination referral placed from [facility] discharge report for reason of patient meeting criteria for a TCM outreach call by [facility] team.    Assessment: Upon chart review, [facility] Team member will cancel/close the referral for TCM outreach due to reason below:     Patient has a follow up appointment with an appropriate provider today for hospital discharge.       Silvia Hilario MA  [facility], [facility]

## 2021-10-13 NOTE — NURSING NOTE
Clinic had additional vegetables from [employer] [facility] to give out. Patient was agreeable to receiving some veggies. One bag of vegetables has been given at today's visit. Ze RN

## 2021-10-13 NOTE — PROGRESS NOTES
ASSESSMENT/PLAN:    (M70.22) Olecranon bursitis of left elbow  (primary encounter diagnosis)  Comment: aspiration today concerning/ consistent w/ infected olecranon bursitis; will send fluid for culture and change Abx to clinda to cover MRSA. Explained to pt that I will check in tomorrow and if he is not improved he would likely need to go to the ED for ortho eval for debridement/ IV Abx  Plan: Aspirate Aerobic Bacterial Culture Routine with        Gram Stain, Cell count with differential fluid,        clindamycin (CLEOCIN) 300 MG capsule            Jaswant Flores MD  October 13, 2021  1:46 PM      Pt is a 61 year old male last seen on 10/6/2021 here for follow up of:     ED visit for elbow   L Elbow pain :   Location: olecranon   Duration: 1 week   Radiation: No   Numbness/ Tingling? No   Weakness? No   Swelling? YES   Imaging? Xray in ED   Treatment tried: augmentin     No F/C    Per ED notes on 10/11/2021:  Patient with left elbow swelling for 2 to 3 days.  It is not painful.  He still has a full range of motion.  On examination, it is not red or hot.  Appearance is a left olecranon bursitis.  12:13 PM Updated patient and wife on results. Discussed plan for discharge and they are agreeable.  X-ray showing soft tissue swelling external to the joint consistent with olecranon bursitis.  No actual joint involvement.  White count mildly elevated 11.7.  Other findings of chronic thrombocytopenia and chronic at dialysis and chronic anemia.  Patient will be discharged home with short-term prescription of Augmentin.  He will follow-up with his doctor or clinic this week.       Past Medical History:   Diagnosis Date     NAHUM (acute kidney injury) (H)      GI (gastrointestinal bleed)      Gout      H. pylori infection 7/5/2017    UGI bleed implied by Hgb 9.0 6/22/17 and melena. Admitted and hgb decreased to 7.6, CT abdomen showed all bladder wall thickening. + Hep B surface antigen noted. Melena resolved and hgb stabalized  without transfusion, epigastric pain resolved with PPI. Recommend referral for gastroscopy.     Heart attack (H)      Hepatitis B      Normocytic anemia      PONV (postoperative nausea and vomiting)      Thrombocytopenia (H)       Current Outpatient Medications   Medication Sig Dispense Refill     acetaminophen (TYLENOL) 500 MG tablet Take 500 mg by mouth every 6 hours as needed for mild pain       amoxicillin-clavulanate (AUGMENTIN) 500-125 MG tablet Take 1 tablet by mouth daily 7 tablet 0     carvedilol (COREG) 25 MG tablet Take 1 tablet (25 mg) by mouth 2 times daily (with meals) 60 tablet 11     cloNIDine (CATAPRES) 0.1 MG tablet Take 1 tablet (0.1 mg) by mouth At Bedtime 30 tablet 11     diltiazem ER (TIAZAC) 180 MG 24 hr ER beaded capsule Take 1 capsule (180 mg) by mouth 2 times daily 60 capsule 11     entecavir (BARACLUDE) 0.5 MG tablet Take 1 tablet (0.5 mg) by mouth every 7 days 45 tablet 3     hydrOXYzine (ATARAX) 25 MG tablet Take 1 tablet (25 mg) by mouth 3 times daily as needed for itching 90 tablet 3     omeprazole (PRILOSEC) 20 MG DR capsule Take 1 capsule (20 mg) by mouth 2 times daily 60 capsule 11     order for DME Equipment being ordered: Other: blood pressure monitor  Treatment Diagnosis: hypertension 1 each 0     oxyCODONE (ROXICODONE) 5 MG tablet Take 1 tablet (5 mg) by mouth every 6 hours as needed for severe pain 42 tablet 0     pantoprazole (PROTONIX) 40 MG EC tablet Take 1 tablet (40 mg) by mouth daily 30 tablet 3     polyethylene glycol (MIRALAX) 17 GM/Dose powder Take 17 g by mouth daily 510 g 0      Allergies   Allergen Reactions     Nka [No Known Allergies]       ROS:   Gen- no fevers/chills   Derm - no rash/ redness   Neuro - no numbness, no tingling   Remainder of ROS negative.     Exam:   /68 (BP Location: Right arm, Patient Position: Sitting, Cuff Size: Adult Regular)   Pulse 76   Temp 97.8  F (36.6  C) (Oral)   Resp 20   Wt 61 kg (134 lb 8 oz)   SpO2 96%   BMI 22.38  kg/m      L ELBOW:   Swelling: yes - see image   ROM: Flexion - Full/ extension - Full/ pronation - Full / supination- Full.   Strength: 5/5 w/ elbow flexion/ extension   Bony tenderness: No tenderness at medial epicondyle/ lateral epicondyle/ +TTP at olecranon.   Neuro: sensation intact  Maneuvers: deferred          Procedure Note:  Pt verbally consented. Posterior elbow sterily prepped  18 gauge needle inserted and approx 12 ml of purulent and bloody fluid aspirated  Fluid sent for culture. Compression wrap applied  Pt tolerated procedure well

## 2021-10-14 ENCOUNTER — HOSPITAL ENCOUNTER (EMERGENCY)
Facility: HOSPITAL | Age: 61
Discharge: STILL ADMITTED OR ADMINISTRATIVE | DRG: 500 | End: 2021-10-15
Attending: FAMILY MEDICINE | Admitting: FAMILY MEDICINE
Payer: COMMERCIAL

## 2021-10-14 ENCOUNTER — TELEPHONE (OUTPATIENT)
Dept: FAMILY MEDICINE | Facility: CLINIC | Age: 61
End: 2021-10-14

## 2021-10-14 ENCOUNTER — HOSPITAL ENCOUNTER (INPATIENT)
Facility: HOSPITAL | Age: 61
LOS: 3 days | Discharge: HOME | DRG: 500 | End: 2021-10-17
Attending: FAMILY MEDICINE | Admitting: FAMILY MEDICINE
Payer: COMMERCIAL

## 2021-10-14 DIAGNOSIS — M71.10 SEPTIC BURSITIS: Primary | ICD-10-CM

## 2021-10-14 LAB
C REACTIVE PROTEIN LHE: 21.5 MG/DL (ref 0–0.8)
CREAT SERPL-MCNC: 11.22 MG/DL (ref 0.7–1.3)
ERYTHROCYTE [DISTWIDTH] IN BLOOD BY AUTOMATED COUNT: 15.9 % (ref 10–15)
ERYTHROCYTE [SEDIMENTATION RATE] IN BLOOD BY WESTERGREN METHOD: 21 MM/HR (ref 0–15)
GFR SERPL CREATININE-BSD FRML MDRD: 4 ML/MIN/1.73M2
HCT VFR BLD AUTO: 40.2 % (ref 40–53)
HGB BLD-MCNC: 12.8 G/DL (ref 13.3–17.7)
MCH RBC QN AUTO: 29.8 PG (ref 26.5–33)
MCHC RBC AUTO-ENTMCNC: 31.8 G/DL (ref 31.5–36.5)
MCV RBC AUTO: 94 FL (ref 78–100)
PLATELET # BLD AUTO: 86 10E3/UL (ref 150–450)
RBC # BLD AUTO: 4.29 10E6/UL (ref 4.4–5.9)
SARS-COV-2 RNA RESP QL NAA+PROBE: NEGATIVE
WBC # BLD AUTO: 6.6 10E3/UL (ref 4–11)

## 2021-10-14 PROCEDURE — 87040 BLOOD CULTURE FOR BACTERIA: CPT | Performed by: MASSAGE THERAPIST

## 2021-10-14 PROCEDURE — 82565 ASSAY OF CREATININE: CPT | Performed by: MASSAGE THERAPIST

## 2021-10-14 PROCEDURE — 87635 SARS-COV-2 COVID-19 AMP PRB: CPT | Performed by: STUDENT IN AN ORGANIZED HEALTH CARE EDUCATION/TRAINING PROGRAM

## 2021-10-14 PROCEDURE — 85027 COMPLETE CBC AUTOMATED: CPT | Performed by: MASSAGE THERAPIST

## 2021-10-14 PROCEDURE — 120N000001 HC R&B MED SURG/OB

## 2021-10-14 PROCEDURE — 258N000003 HC RX IP 258 OP 636: Performed by: STUDENT IN AN ORGANIZED HEALTH CARE EDUCATION/TRAINING PROGRAM

## 2021-10-14 PROCEDURE — 36415 COLL VENOUS BLD VENIPUNCTURE: CPT | Performed by: MASSAGE THERAPIST

## 2021-10-14 PROCEDURE — 85652 RBC SED RATE AUTOMATED: CPT | Performed by: ORTHOPAEDIC SURGERY

## 2021-10-14 PROCEDURE — 250N000011 HC RX IP 250 OP 636: Performed by: STUDENT IN AN ORGANIZED HEALTH CARE EDUCATION/TRAINING PROGRAM

## 2021-10-14 PROCEDURE — 86141 C-REACTIVE PROTEIN HS: CPT | Performed by: ORTHOPAEDIC SURGERY

## 2021-10-14 RX ORDER — PROCHLORPERAZINE 25 MG
25 SUPPOSITORY, RECTAL RECTAL EVERY 12 HOURS PRN
Status: DISCONTINUED | OUTPATIENT
Start: 2021-10-14 | End: 2021-10-17 | Stop reason: HOSPADM

## 2021-10-14 RX ORDER — ACETAMINOPHEN 325 MG/1
650 TABLET ORAL EVERY 6 HOURS PRN
Status: DISCONTINUED | OUTPATIENT
Start: 2021-10-14 | End: 2021-10-17 | Stop reason: HOSPADM

## 2021-10-14 RX ORDER — ONDANSETRON 4 MG/1
4 TABLET, ORALLY DISINTEGRATING ORAL EVERY 6 HOURS PRN
Status: DISCONTINUED | OUTPATIENT
Start: 2021-10-14 | End: 2021-10-17 | Stop reason: HOSPADM

## 2021-10-14 RX ORDER — HYDROXYZINE HYDROCHLORIDE 25 MG/1
25 TABLET, FILM COATED ORAL 3 TIMES DAILY PRN
Status: DISCONTINUED | OUTPATIENT
Start: 2021-10-14 | End: 2021-10-17 | Stop reason: HOSPADM

## 2021-10-14 RX ORDER — CLONIDINE HYDROCHLORIDE 0.1 MG/1
0.1 TABLET ORAL AT BEDTIME
Status: DISCONTINUED | OUTPATIENT
Start: 2021-10-14 | End: 2021-10-14

## 2021-10-14 RX ORDER — CARVEDILOL 12.5 MG/1
25 TABLET ORAL 2 TIMES DAILY WITH MEALS
Status: DISCONTINUED | OUTPATIENT
Start: 2021-10-15 | End: 2021-10-14

## 2021-10-14 RX ORDER — POLYETHYLENE GLYCOL 3350 17 G/17G
17 POWDER, FOR SOLUTION ORAL DAILY PRN
Status: DISCONTINUED | OUTPATIENT
Start: 2021-10-14 | End: 2021-10-17 | Stop reason: HOSPADM

## 2021-10-14 RX ORDER — LIDOCAINE 40 MG/G
CREAM TOPICAL
Status: DISCONTINUED | OUTPATIENT
Start: 2021-10-14 | End: 2021-10-17 | Stop reason: HOSPADM

## 2021-10-14 RX ORDER — HEPARIN SODIUM 5000 [USP'U]/.5ML
5000 INJECTION, SOLUTION INTRAVENOUS; SUBCUTANEOUS EVERY 12 HOURS
Status: DISCONTINUED | OUTPATIENT
Start: 2021-10-15 | End: 2021-10-17 | Stop reason: HOSPADM

## 2021-10-14 RX ORDER — PROCHLORPERAZINE MALEATE 10 MG
10 TABLET ORAL EVERY 6 HOURS PRN
Status: DISCONTINUED | OUTPATIENT
Start: 2021-10-14 | End: 2021-10-17 | Stop reason: HOSPADM

## 2021-10-14 RX ORDER — AMOXICILLIN 250 MG
2 CAPSULE ORAL 2 TIMES DAILY PRN
Status: DISCONTINUED | OUTPATIENT
Start: 2021-10-14 | End: 2021-10-17 | Stop reason: HOSPADM

## 2021-10-14 RX ORDER — ONDANSETRON 2 MG/ML
4 INJECTION INTRAMUSCULAR; INTRAVENOUS EVERY 6 HOURS PRN
Status: DISCONTINUED | OUTPATIENT
Start: 2021-10-14 | End: 2021-10-17 | Stop reason: HOSPADM

## 2021-10-14 RX ORDER — DILTIAZEM HYDROCHLORIDE 180 MG/1
180 CAPSULE, EXTENDED RELEASE ORAL 2 TIMES DAILY
Status: DISCONTINUED | OUTPATIENT
Start: 2021-10-14 | End: 2021-10-14

## 2021-10-14 RX ORDER — PANTOPRAZOLE SODIUM 20 MG/1
40 TABLET, DELAYED RELEASE ORAL
Status: DISCONTINUED | OUTPATIENT
Start: 2021-10-15 | End: 2021-10-17 | Stop reason: HOSPADM

## 2021-10-14 RX ORDER — ACETAMINOPHEN 650 MG/1
650 SUPPOSITORY RECTAL EVERY 6 HOURS PRN
Status: DISCONTINUED | OUTPATIENT
Start: 2021-10-14 | End: 2021-10-17 | Stop reason: HOSPADM

## 2021-10-14 RX ORDER — AMOXICILLIN 250 MG
1 CAPSULE ORAL 2 TIMES DAILY PRN
Status: DISCONTINUED | OUTPATIENT
Start: 2021-10-14 | End: 2021-10-17 | Stop reason: HOSPADM

## 2021-10-14 RX ORDER — HEPARIN SODIUM 5000 [USP'U]/.5ML
5000 INJECTION, SOLUTION INTRAVENOUS; SUBCUTANEOUS EVERY 8 HOURS
Status: DISCONTINUED | OUTPATIENT
Start: 2021-10-15 | End: 2021-10-14

## 2021-10-14 RX ADMIN — VANCOMYCIN HYDROCHLORIDE 1000 MG: 5 INJECTION, POWDER, LYOPHILIZED, FOR SOLUTION INTRAVENOUS at 22:19

## 2021-10-14 NOTE — TELEPHONE ENCOUNTER
Was informed by  he wants patient to go to emergency room to rule out sepsis.  called patient to advise. No further action from RN needed. Romain MELENDEZ

## 2021-10-14 NOTE — TELEPHONE ENCOUNTER
This RN spoke with  who requested a call to check patient's status. Recommendation to go to ED if not feeling better. This RN called patient and patient stated he is feeling a little bit better. This RN provided red flag symptoms and advised him to go to the ED should he start feeling worse or new onset of symptoms. This RN stressed importance of coming to appointment tomorrow to be seen, day and time verbalized by patient. Romain MELENDEZ

## 2021-10-14 NOTE — TELEPHONE ENCOUNTER
Patient was advised to go to emergency room by . Can be collected in emergency room. Romain MELENDEZ

## 2021-10-14 NOTE — TELEPHONE ENCOUNTER
Spoke w/ pt. He notes mild improvement from yesterday. No F/C. My concern is that his septic bursitis is from hematogenous spread so he needs to go to the ED for blood cultures, ortho eval for washout/ bursectomy. If admitted, will need dialysis tomorrow. Pt was in agreement w/ plan. His wife will not be home til 7p so he will go in after that. I called [facility] ED and informed Dr Solomon he would be coming in.     Jaswant Flores MD  October 14, 2021  4:55 PM

## 2021-10-14 NOTE — TELEPHONE ENCOUNTER
Received a call from ID lab advising left elbow aspirate positive for Staph aureus. Sensitivities to be finalized probably tomorrow per lab staff. Clindamycin 300mg prescribed yesterday. Routing to PCP for review. Romain MELENDEZ

## 2021-10-14 NOTE — TELEPHONE ENCOUNTER
A rep from 's lab called in to advise that the sample we sent them couldn't be tested due to it being too thick. So it was cancelled. Unsure if  wants to have this pt leave another sample to get his cell count retested. Please assist and provide next  Course of action.

## 2021-10-15 ENCOUNTER — ANCILLARY PROCEDURE (OUTPATIENT)
Dept: ULTRASOUND IMAGING | Facility: HOSPITAL | Age: 61
DRG: 500 | End: 2021-10-15
Attending: FAMILY MEDICINE
Payer: COMMERCIAL

## 2021-10-15 ENCOUNTER — APPOINTMENT (OUTPATIENT)
Dept: ULTRASOUND IMAGING | Facility: HOSPITAL | Age: 61
DRG: 500 | End: 2021-10-15
Attending: PHYSICIAN ASSISTANT
Payer: COMMERCIAL

## 2021-10-15 ENCOUNTER — APPOINTMENT (OUTPATIENT)
Dept: RADIOLOGY | Facility: HOSPITAL | Age: 61
DRG: 500 | End: 2021-10-15
Attending: FAMILY MEDICINE
Payer: COMMERCIAL

## 2021-10-15 ENCOUNTER — ANESTHESIA EVENT (OUTPATIENT)
Dept: SURGERY | Facility: HOSPITAL | Age: 61
DRG: 500 | End: 2021-10-15
Payer: COMMERCIAL

## 2021-10-15 ENCOUNTER — ANESTHESIA (OUTPATIENT)
Dept: SURGERY | Facility: HOSPITAL | Age: 61
DRG: 500 | End: 2021-10-15
Payer: COMMERCIAL

## 2021-10-15 LAB
ANION GAP SERPL CALCULATED.3IONS-SCNC: 15 MMOL/L (ref 5–18)
BACTERIA ASPIRATE CULT: ABNORMAL
BUN SERPL-MCNC: 90 MG/DL (ref 8–22)
CALCIUM SERPL-MCNC: 7.6 MG/DL (ref 8.5–10.5)
CHLORIDE BLD-SCNC: 97 MMOL/L (ref 98–107)
CO2 SERPL-SCNC: 21 MMOL/L (ref 22–31)
CREAT SERPL-MCNC: 11.6 MG/DL (ref 0.7–1.3)
ERYTHROCYTE [DISTWIDTH] IN BLOOD BY AUTOMATED COUNT: 15.9 % (ref 10–15)
GFR SERPL CREATININE-BSD FRML MDRD: 4 ML/MIN/1.73M2
GLUCOSE BLD-MCNC: 95 MG/DL (ref 70–125)
GRAM STAIN RESULT: ABNORMAL
GRAM STAIN RESULT: ABNORMAL
HCT VFR BLD AUTO: 24.3 % (ref 40–53)
HGB BLD-MCNC: 7.5 G/DL (ref 13.3–17.7)
HGB BLD-MCNC: 7.8 G/DL (ref 13.3–17.7)
HOLD SPECIMEN: NORMAL
MCH RBC QN AUTO: 31 PG (ref 26.5–33)
MCHC RBC AUTO-ENTMCNC: 32.1 G/DL (ref 31.5–36.5)
MCV RBC AUTO: 96 FL (ref 78–100)
PLATELET # BLD AUTO: 125 10E3/UL (ref 150–450)
POTASSIUM BLD-SCNC: 4.6 MMOL/L (ref 3.5–5)
RBC # BLD AUTO: 2.52 10E6/UL (ref 4.4–5.9)
SODIUM SERPL-SCNC: 133 MMOL/L (ref 136–145)
WBC # BLD AUTO: 12.6 10E3/UL (ref 4–11)

## 2021-10-15 PROCEDURE — 87186 SC STD MICRODIL/AGAR DIL: CPT | Performed by: ORTHOPAEDIC SURGERY

## 2021-10-15 PROCEDURE — 360N000076 HC SURGERY LEVEL 3, PER MIN: Performed by: ORTHOPAEDIC SURGERY

## 2021-10-15 PROCEDURE — 272N000001 HC OR GENERAL SUPPLY STERILE: Performed by: ORTHOPAEDIC SURGERY

## 2021-10-15 PROCEDURE — 87075 CULTR BACTERIA EXCEPT BLOOD: CPT | Performed by: ORTHOPAEDIC SURGERY

## 2021-10-15 PROCEDURE — 250N000011 HC RX IP 250 OP 636: Performed by: NURSE ANESTHETIST, CERTIFIED REGISTERED

## 2021-10-15 PROCEDURE — 5A1D70Z PERFORMANCE OF URINARY FILTRATION, INTERMITTENT, LESS THAN 6 HOURS PER DAY: ICD-10-PCS | Performed by: ORTHOPAEDIC SURGERY

## 2021-10-15 PROCEDURE — 250N000009 HC RX 250: Performed by: NURSE ANESTHETIST, CERTIFIED REGISTERED

## 2021-10-15 PROCEDURE — 250N000013 HC RX MED GY IP 250 OP 250 PS 637: Performed by: PHYSICIAN ASSISTANT

## 2021-10-15 PROCEDURE — 250N000011 HC RX IP 250 OP 636: Performed by: MASSAGE THERAPIST

## 2021-10-15 PROCEDURE — 36415 COLL VENOUS BLD VENIPUNCTURE: CPT | Performed by: STUDENT IN AN ORGANIZED HEALTH CARE EDUCATION/TRAINING PROGRAM

## 2021-10-15 PROCEDURE — 634N000001 HC RX 634: Performed by: INTERNAL MEDICINE

## 2021-10-15 PROCEDURE — 250N000011 HC RX IP 250 OP 636: Performed by: PHYSICIAN ASSISTANT

## 2021-10-15 PROCEDURE — 0MT40ZZ RESECTION OF LEFT ELBOW BURSA AND LIGAMENT, OPEN APPROACH: ICD-10-PCS | Performed by: ORTHOPAEDIC SURGERY

## 2021-10-15 PROCEDURE — 250N000013 HC RX MED GY IP 250 OP 250 PS 637: Performed by: STUDENT IN AN ORGANIZED HEALTH CARE EDUCATION/TRAINING PROGRAM

## 2021-10-15 PROCEDURE — 32555 ASPIRATE PLEURA W/ IMAGING: CPT

## 2021-10-15 PROCEDURE — 250N000013 HC RX MED GY IP 250 OP 250 PS 637: Performed by: MASSAGE THERAPIST

## 2021-10-15 PROCEDURE — 85018 HEMOGLOBIN: CPT | Performed by: STUDENT IN AN ORGANIZED HEALTH CARE EDUCATION/TRAINING PROGRAM

## 2021-10-15 PROCEDURE — 250N000009 HC RX 250: Performed by: ORTHOPAEDIC SURGERY

## 2021-10-15 PROCEDURE — 99223 1ST HOSP IP/OBS HIGH 75: CPT | Mod: AI | Performed by: STUDENT IN AN ORGANIZED HEALTH CARE EDUCATION/TRAINING PROGRAM

## 2021-10-15 PROCEDURE — 87340 HEPATITIS B SURFACE AG IA: CPT | Performed by: INTERNAL MEDICINE

## 2021-10-15 PROCEDURE — 370N000017 HC ANESTHESIA TECHNICAL FEE, PER MIN: Performed by: ORTHOPAEDIC SURGERY

## 2021-10-15 PROCEDURE — 250N000009 HC RX 250: Performed by: ANESTHESIOLOGY

## 2021-10-15 PROCEDURE — 99223 1ST HOSP IP/OBS HIGH 75: CPT | Mod: GC | Performed by: INTERNAL MEDICINE

## 2021-10-15 PROCEDURE — 71046 X-RAY EXAM CHEST 2 VIEWS: CPT

## 2021-10-15 PROCEDURE — 999N000141 HC STATISTIC PRE-PROCEDURE NURSING ASSESSMENT: Performed by: ORTHOPAEDIC SURGERY

## 2021-10-15 PROCEDURE — 258N000003 HC RX IP 258 OP 636: Performed by: NURSE ANESTHETIST, CERTIFIED REGISTERED

## 2021-10-15 PROCEDURE — 0W993ZZ DRAINAGE OF RIGHT PLEURAL CAVITY, PERCUTANEOUS APPROACH: ICD-10-PCS | Performed by: PHYSICIAN ASSISTANT

## 2021-10-15 PROCEDURE — 258N000003 HC RX IP 258 OP 636: Performed by: INTERNAL MEDICINE

## 2021-10-15 PROCEDURE — 85014 HEMATOCRIT: CPT | Performed by: STUDENT IN AN ORGANIZED HEALTH CARE EDUCATION/TRAINING PROGRAM

## 2021-10-15 PROCEDURE — 90937 HEMODIALYSIS REPEATED EVAL: CPT

## 2021-10-15 PROCEDURE — 24105 EXCISION OLECRANON BURSA: CPT | Mod: LT | Performed by: ORTHOPAEDIC SURGERY

## 2021-10-15 PROCEDURE — 80048 BASIC METABOLIC PNL TOTAL CA: CPT | Performed by: STUDENT IN AN ORGANIZED HEALTH CARE EDUCATION/TRAINING PROGRAM

## 2021-10-15 PROCEDURE — 120N000001 HC R&B MED SURG/OB

## 2021-10-15 RX ORDER — OXYCODONE HYDROCHLORIDE 5 MG/1
10 TABLET ORAL EVERY 4 HOURS PRN
Status: DISCONTINUED | OUTPATIENT
Start: 2021-10-15 | End: 2021-10-17 | Stop reason: HOSPADM

## 2021-10-15 RX ORDER — PROCHLORPERAZINE MALEATE 10 MG
10 TABLET ORAL EVERY 6 HOURS PRN
Status: DISCONTINUED | OUTPATIENT
Start: 2021-10-15 | End: 2021-10-17 | Stop reason: HOSPADM

## 2021-10-15 RX ORDER — SODIUM CHLORIDE 9 MG/ML
INJECTION, SOLUTION INTRAVENOUS CONTINUOUS PRN
Status: DISCONTINUED | OUTPATIENT
Start: 2021-10-15 | End: 2021-10-15

## 2021-10-15 RX ORDER — CEFAZOLIN SODIUM 1 G/3ML
1 INJECTION, POWDER, FOR SOLUTION INTRAMUSCULAR; INTRAVENOUS EVERY 24 HOURS
Status: DISCONTINUED | OUTPATIENT
Start: 2021-10-15 | End: 2021-10-17 | Stop reason: HOSPADM

## 2021-10-15 RX ORDER — BISACODYL 10 MG
10 SUPPOSITORY, RECTAL RECTAL DAILY PRN
Status: DISCONTINUED | OUTPATIENT
Start: 2021-10-15 | End: 2021-10-17 | Stop reason: HOSPADM

## 2021-10-15 RX ORDER — ACETAMINOPHEN 325 MG/1
975 TABLET ORAL EVERY 8 HOURS
Status: DISCONTINUED | OUTPATIENT
Start: 2021-10-15 | End: 2021-10-17 | Stop reason: HOSPADM

## 2021-10-15 RX ORDER — HEPARIN SODIUM 1000 [USP'U]/ML
500 INJECTION, SOLUTION INTRAVENOUS; SUBCUTANEOUS CONTINUOUS
Status: DISCONTINUED | OUTPATIENT
Start: 2021-10-15 | End: 2021-10-15

## 2021-10-15 RX ORDER — FENTANYL CITRATE 50 UG/ML
INJECTION, SOLUTION INTRAMUSCULAR; INTRAVENOUS PRN
Status: DISCONTINUED | OUTPATIENT
Start: 2021-10-15 | End: 2021-10-15

## 2021-10-15 RX ORDER — CARVEDILOL 12.5 MG/1
25 TABLET ORAL 2 TIMES DAILY WITH MEALS
Status: DISCONTINUED | OUTPATIENT
Start: 2021-10-15 | End: 2021-10-17 | Stop reason: HOSPADM

## 2021-10-15 RX ORDER — LIDOCAINE 40 MG/G
CREAM TOPICAL
Status: DISCONTINUED | OUTPATIENT
Start: 2021-10-15 | End: 2021-10-17 | Stop reason: HOSPADM

## 2021-10-15 RX ORDER — SODIUM CHLORIDE, SODIUM LACTATE, POTASSIUM CHLORIDE, CALCIUM CHLORIDE 600; 310; 30; 20 MG/100ML; MG/100ML; MG/100ML; MG/100ML
INJECTION, SOLUTION INTRAVENOUS CONTINUOUS
Status: DISCONTINUED | OUTPATIENT
Start: 2021-10-15 | End: 2021-10-15 | Stop reason: CLARIF

## 2021-10-15 RX ORDER — HYDROMORPHONE HCL IN WATER/PF 6 MG/30 ML
0.2 PATIENT CONTROLLED ANALGESIA SYRINGE INTRAVENOUS
Status: DISCONTINUED | OUTPATIENT
Start: 2021-10-15 | End: 2021-10-17 | Stop reason: HOSPADM

## 2021-10-15 RX ORDER — OXYCODONE HYDROCHLORIDE 5 MG/1
5 TABLET ORAL EVERY 4 HOURS PRN
Status: DISCONTINUED | OUTPATIENT
Start: 2021-10-15 | End: 2021-10-17 | Stop reason: HOSPADM

## 2021-10-15 RX ORDER — NALOXONE HYDROCHLORIDE 0.4 MG/ML
0.4 INJECTION, SOLUTION INTRAMUSCULAR; INTRAVENOUS; SUBCUTANEOUS
Status: DISCONTINUED | OUTPATIENT
Start: 2021-10-15 | End: 2021-10-17 | Stop reason: HOSPADM

## 2021-10-15 RX ORDER — OXYCODONE HYDROCHLORIDE 5 MG/1
5 TABLET ORAL EVERY 4 HOURS PRN
Status: DISCONTINUED | OUTPATIENT
Start: 2021-10-15 | End: 2021-10-15 | Stop reason: HOSPADM

## 2021-10-15 RX ORDER — ONDANSETRON 2 MG/ML
4 INJECTION INTRAMUSCULAR; INTRAVENOUS EVERY 30 MIN PRN
Status: CANCELLED | OUTPATIENT
Start: 2021-10-15

## 2021-10-15 RX ORDER — HYDROMORPHONE HCL IN WATER/PF 6 MG/30 ML
0.2 PATIENT CONTROLLED ANALGESIA SYRINGE INTRAVENOUS EVERY 5 MIN PRN
Status: CANCELLED | OUTPATIENT
Start: 2021-10-15

## 2021-10-15 RX ORDER — CEFAZOLIN SODIUM 1 G/3ML
1 INJECTION, POWDER, FOR SOLUTION INTRAMUSCULAR; INTRAVENOUS EVERY 8 HOURS
Status: DISCONTINUED | OUTPATIENT
Start: 2021-10-15 | End: 2021-10-15 | Stop reason: ALTCHOICE

## 2021-10-15 RX ORDER — ALBUMIN (HUMAN) 12.5 G/50ML
50 SOLUTION INTRAVENOUS
Status: DISCONTINUED | OUTPATIENT
Start: 2021-10-15 | End: 2021-10-17 | Stop reason: HOSPADM

## 2021-10-15 RX ORDER — POLYETHYLENE GLYCOL 3350 17 G/17G
17 POWDER, FOR SOLUTION ORAL DAILY
Status: DISCONTINUED | OUTPATIENT
Start: 2021-10-16 | End: 2021-10-17 | Stop reason: HOSPADM

## 2021-10-15 RX ORDER — SODIUM CHLORIDE, SODIUM LACTATE, POTASSIUM CHLORIDE, CALCIUM CHLORIDE 600; 310; 30; 20 MG/100ML; MG/100ML; MG/100ML; MG/100ML
INJECTION, SOLUTION INTRAVENOUS CONTINUOUS
Status: DISCONTINUED | OUTPATIENT
Start: 2021-10-15 | End: 2021-10-15 | Stop reason: HOSPADM

## 2021-10-15 RX ORDER — AMOXICILLIN 250 MG
1 CAPSULE ORAL 2 TIMES DAILY
Status: DISCONTINUED | OUTPATIENT
Start: 2021-10-15 | End: 2021-10-17 | Stop reason: HOSPADM

## 2021-10-15 RX ORDER — MAGNESIUM HYDROXIDE 1200 MG/15ML
LIQUID ORAL PRN
Status: DISCONTINUED | OUTPATIENT
Start: 2021-10-15 | End: 2021-10-15 | Stop reason: HOSPADM

## 2021-10-15 RX ORDER — ACETAMINOPHEN 325 MG/1
650 TABLET ORAL EVERY 4 HOURS PRN
Status: DISCONTINUED | OUTPATIENT
Start: 2021-10-18 | End: 2021-10-17 | Stop reason: HOSPADM

## 2021-10-15 RX ORDER — NALOXONE HYDROCHLORIDE 0.4 MG/ML
0.2 INJECTION, SOLUTION INTRAMUSCULAR; INTRAVENOUS; SUBCUTANEOUS
Status: DISCONTINUED | OUTPATIENT
Start: 2021-10-15 | End: 2021-10-17 | Stop reason: HOSPADM

## 2021-10-15 RX ORDER — ONDANSETRON 4 MG/1
4 TABLET, ORALLY DISINTEGRATING ORAL EVERY 30 MIN PRN
Status: CANCELLED | OUTPATIENT
Start: 2021-10-15

## 2021-10-15 RX ORDER — CEFAZOLIN SODIUM 1 G/3ML
1 INJECTION, POWDER, FOR SOLUTION INTRAMUSCULAR; INTRAVENOUS EVERY 24 HOURS
Status: DISCONTINUED | OUTPATIENT
Start: 2021-10-15 | End: 2021-10-15

## 2021-10-15 RX ORDER — CARVEDILOL 12.5 MG/1
25 TABLET ORAL 2 TIMES DAILY WITH MEALS
Status: DISCONTINUED | OUTPATIENT
Start: 2021-10-15 | End: 2021-10-15

## 2021-10-15 RX ORDER — SODIUM CHLORIDE, SODIUM LACTATE, POTASSIUM CHLORIDE, CALCIUM CHLORIDE 600; 310; 30; 20 MG/100ML; MG/100ML; MG/100ML; MG/100ML
INJECTION, SOLUTION INTRAVENOUS CONTINUOUS
Status: CANCELLED | OUTPATIENT
Start: 2021-10-15

## 2021-10-15 RX ORDER — FENTANYL CITRATE 50 UG/ML
25 INJECTION, SOLUTION INTRAMUSCULAR; INTRAVENOUS EVERY 5 MIN PRN
Status: CANCELLED | OUTPATIENT
Start: 2021-10-15

## 2021-10-15 RX ORDER — PROPOFOL 10 MG/ML
INJECTION, EMULSION INTRAVENOUS CONTINUOUS PRN
Status: DISCONTINUED | OUTPATIENT
Start: 2021-10-15 | End: 2021-10-15

## 2021-10-15 RX ORDER — HYDROMORPHONE HCL IN WATER/PF 6 MG/30 ML
0.4 PATIENT CONTROLLED ANALGESIA SYRINGE INTRAVENOUS
Status: DISCONTINUED | OUTPATIENT
Start: 2021-10-15 | End: 2021-10-17 | Stop reason: HOSPADM

## 2021-10-15 RX ORDER — PROPOFOL 10 MG/ML
INJECTION, EMULSION INTRAVENOUS PRN
Status: DISCONTINUED | OUTPATIENT
Start: 2021-10-15 | End: 2021-10-15

## 2021-10-15 RX ORDER — BUPIVACAINE HYDROCHLORIDE AND EPINEPHRINE 5; 5 MG/ML; UG/ML
INJECTION, SOLUTION PERINEURAL
Status: COMPLETED | OUTPATIENT
Start: 2021-10-15 | End: 2021-10-15

## 2021-10-15 RX ORDER — ONDANSETRON 4 MG/1
4 TABLET, ORALLY DISINTEGRATING ORAL EVERY 6 HOURS PRN
Status: DISCONTINUED | OUTPATIENT
Start: 2021-10-15 | End: 2021-10-17 | Stop reason: HOSPADM

## 2021-10-15 RX ORDER — ONDANSETRON 2 MG/ML
INJECTION INTRAMUSCULAR; INTRAVENOUS PRN
Status: DISCONTINUED | OUTPATIENT
Start: 2021-10-15 | End: 2021-10-15

## 2021-10-15 RX ORDER — CEFAZOLIN SODIUM 2 G/100ML
2 INJECTION, SOLUTION INTRAVENOUS
Status: COMPLETED | OUTPATIENT
Start: 2021-10-15 | End: 2021-10-15

## 2021-10-15 RX ORDER — CEFAZOLIN SODIUM 2 G/100ML
2 INJECTION, SOLUTION INTRAVENOUS SEE ADMIN INSTRUCTIONS
Status: DISCONTINUED | OUTPATIENT
Start: 2021-10-15 | End: 2021-10-15 | Stop reason: HOSPADM

## 2021-10-15 RX ORDER — LIDOCAINE 40 MG/G
CREAM TOPICAL
Status: DISCONTINUED | OUTPATIENT
Start: 2021-10-15 | End: 2021-10-15 | Stop reason: HOSPADM

## 2021-10-15 RX ORDER — HEPARIN SODIUM 1000 [USP'U]/ML
500 INJECTION, SOLUTION INTRAVENOUS; SUBCUTANEOUS
Status: DISCONTINUED | OUTPATIENT
Start: 2021-10-15 | End: 2021-10-15

## 2021-10-15 RX ORDER — ONDANSETRON 2 MG/ML
4 INJECTION INTRAMUSCULAR; INTRAVENOUS EVERY 6 HOURS PRN
Status: DISCONTINUED | OUTPATIENT
Start: 2021-10-15 | End: 2021-10-17 | Stop reason: HOSPADM

## 2021-10-15 RX ORDER — FENTANYL CITRATE 50 UG/ML
50 INJECTION, SOLUTION INTRAMUSCULAR; INTRAVENOUS
Status: DISCONTINUED | OUTPATIENT
Start: 2021-10-15 | End: 2021-10-15 | Stop reason: HOSPADM

## 2021-10-15 RX ADMIN — MIDAZOLAM HYDROCHLORIDE 1 MG: 1 INJECTION, SOLUTION INTRAMUSCULAR; INTRAVENOUS at 16:00

## 2021-10-15 RX ADMIN — ONDANSETRON 4 MG: 2 INJECTION INTRAMUSCULAR; INTRAVENOUS at 15:55

## 2021-10-15 RX ADMIN — SODIUM CHLORIDE 100 ML: 9 INJECTION, SOLUTION INTRAVENOUS at 20:51

## 2021-10-15 RX ADMIN — CEFAZOLIN SODIUM 2 G: 2 INJECTION, SOLUTION INTRAVENOUS at 16:15

## 2021-10-15 RX ADMIN — POLYETHYLENE GLYCOL 3350 17 G: 17 POWDER, FOR SOLUTION ORAL at 22:10

## 2021-10-15 RX ADMIN — MIDAZOLAM HYDROCHLORIDE 1 MG: 1 INJECTION, SOLUTION INTRAMUSCULAR; INTRAVENOUS at 15:55

## 2021-10-15 RX ADMIN — EPOETIN ALFA-EPBX 10000 UNITS: 10000 INJECTION, SOLUTION INTRAVENOUS; SUBCUTANEOUS at 22:01

## 2021-10-15 RX ADMIN — ONDANSETRON 4 MG: 2 INJECTION INTRAMUSCULAR; INTRAVENOUS at 18:48

## 2021-10-15 RX ADMIN — PROPOFOL 100 MCG/KG/MIN: 10 INJECTION, EMULSION INTRAVENOUS at 16:15

## 2021-10-15 RX ADMIN — PHENYLEPHRINE HYDROCHLORIDE 100 MCG: 10 INJECTION INTRAVENOUS at 16:51

## 2021-10-15 RX ADMIN — ACETAMINOPHEN 650 MG: 325 TABLET ORAL at 13:19

## 2021-10-15 RX ADMIN — SODIUM CHLORIDE: 0.9 INJECTION, SOLUTION INTRAVENOUS at 15:50

## 2021-10-15 RX ADMIN — DOCUSATE SODIUM 50 MG AND SENNOSIDES 8.6 MG 1 TABLET: 8.6; 5 TABLET, FILM COATED ORAL at 22:01

## 2021-10-15 RX ADMIN — FENTANYL CITRATE 25 MCG: 50 INJECTION, SOLUTION INTRAMUSCULAR; INTRAVENOUS at 15:56

## 2021-10-15 RX ADMIN — PHENYLEPHRINE HYDROCHLORIDE 100 MCG: 10 INJECTION INTRAVENOUS at 16:48

## 2021-10-15 RX ADMIN — FENTANYL CITRATE 25 MCG: 50 INJECTION, SOLUTION INTRAMUSCULAR; INTRAVENOUS at 16:10

## 2021-10-15 RX ADMIN — PHENYLEPHRINE HYDROCHLORIDE 100 MCG: 10 INJECTION INTRAVENOUS at 16:45

## 2021-10-15 RX ADMIN — FENTANYL CITRATE 25 MCG: 50 INJECTION, SOLUTION INTRAMUSCULAR; INTRAVENOUS at 16:40

## 2021-10-15 RX ADMIN — DOCUSATE SODIUM 50 MG AND SENNOSIDES 8.6 MG 2 TABLET: 8.6; 5 TABLET, FILM COATED ORAL at 08:32

## 2021-10-15 RX ADMIN — PROPOFOL 10 MG: 10 INJECTION, EMULSION INTRAVENOUS at 16:40

## 2021-10-15 RX ADMIN — PANTOPRAZOLE SODIUM 40 MG: 20 TABLET, DELAYED RELEASE ORAL at 06:45

## 2021-10-15 RX ADMIN — SODIUM CHLORIDE 100 ML: 9 INJECTION, SOLUTION INTRAVENOUS at 20:21

## 2021-10-15 RX ADMIN — SODIUM CHLORIDE 250 ML: 9 INJECTION, SOLUTION INTRAVENOUS at 19:22

## 2021-10-15 RX ADMIN — FENTANYL CITRATE 25 MCG: 50 INJECTION, SOLUTION INTRAMUSCULAR; INTRAVENOUS at 15:55

## 2021-10-15 RX ADMIN — HYDROMORPHONE HYDROCHLORIDE 0.2 MG: 0.2 INJECTION, SOLUTION INTRAMUSCULAR; INTRAVENOUS; SUBCUTANEOUS at 19:09

## 2021-10-15 RX ADMIN — BUPIVACAINE HYDROCHLORIDE AND EPINEPHRINE BITARTRATE 25 ML: 5; .005 INJECTION, SOLUTION PERINEURAL at 16:02

## 2021-10-15 RX ADMIN — PHENYLEPHRINE HYDROCHLORIDE 100 MCG: 10 INJECTION INTRAVENOUS at 16:54

## 2021-10-15 NOTE — CONSULTS
Hand Surgery    Impression:  1. Septic olecranon bursitis  2. Gout (elevated uric acid)    Plan:  1. Continue IV antibiotic  2. NPO at 2400 tonight  3. Lab: ESR & CRP  4. Will evaluate in the AM and consider I&D    JANIYA Garcia  [facility]  [phone]

## 2021-10-15 NOTE — ANESTHESIA POSTPROCEDURE EVALUATION
Patient: Elle Blanton    Procedure: Procedure(s):  LEFT OLECRANON BURSECTOMY       Diagnosis:Elbow pain [M25.529]  Diagnosis Additional Information: No value filed.    Anesthesia Type:  Peripheral Nerve Block    Note:  Disposition: Admission; Inpatient   Postop Pain Control: Uneventful            Sign Out: Well controlled pain   PONV: No   Neuro/Psych: Uneventful            Sign Out: Acceptable/Baseline neuro status   Airway/Respiratory: Uneventful            Sign Out: Acceptable/Baseline resp. status   CV/Hemodynamics: Uneventful            Sign Out: Acceptable CV status; No obvious hypovolemia; No obvious fluid overload   Other NRE: NONE   DID A NON-ROUTINE EVENT OCCUR? No           Last vitals:  Vitals Value Taken Time   /64 10/15/21 1748   Temp 36.3  C (97.4  F) 10/15/21 1748   Pulse 76 10/15/21 1748   Resp 14 10/15/21 1748   SpO2 99 % 10/15/21 1749       Electronically Signed By: Steve More MD  October 15, 2021  6:00 PM

## 2021-10-15 NOTE — H&P
"Admission History and Physical   Elle Blanton  1960, MRN [id number]    [facility]  Septic bursitis [M71.10]    PCP: Jaswant Flores, [address] [facility] / [place] [address], [phone]   Code status:  Full Code       Extended Emergency Contact Information  Primary Emergency Contact: Maico Blanton  Address: [address] [place] [address] [place]  Work Phone: [phone]  Mobile Phone: [phone]  Relation: Spouse  Secondary Emergency Contact: Hudson Blanton  Home Phone: [phone]  Mobile Phone: [phone]  Relation: Son       Assessment and Plan   61 year old M with multiple comorbidities including AAA (5.5cm), chronic thoracic aortic dissection, 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 Seen in ED 10/11 - diagnosed with olecranon bursitis. Followed up with PCP in clinic 10/13 after being on 2 days of Augmentin. Joint drainage revealed purulent fluid - now septic bursitis. AFVSS. Admitted for IV antibiotics and Ortho consult for I&D, possible bursectomy.    Active Problems:    Septic bursitis    #Septic bursitis, likely hematogenous spread given proximity of AVF and HD. ?possibly started as olecranon bursitis given repeated elbow \"trauma\" with using hard armrests.  - Originally on Augmentin 10/12, changed to Clindamycin on 10/13. Cultures taken in clinic from joint tap -- grew out S. Aureus.  - IV Vanc until susceptibilities return. Pharmacy to dose renally.  - PRN pain medication  - Ortho consult for consideration of I&D vs bursectomy.  - NPO @ [place]  - CBC/BMP/ESR/CRP  - BCx ordered     #Chronic Medical Conditions  - AAA/Aortic Dissection: Hold PTA carvedilol and clonidine  - GERD: continue PTA pantoprazole  - ESRD on HD: L AVF in place. Baseline Cr 7-8. Dialyzes M/F. Nephrology consulted for IP HD  - Chronic Hep B/Cirrhosis: entecavir q7 days. Continue after med rec.  - Thrombocytopenia: no " active bleeding. CTM    DVT ppx: SQH q12 hr given thrombocytopenia  Diet: Regular, NPO @ [place]  Code: Full  Med rec completed: No    Dispo: inpatient status for possible surgery, IV antibiotics, anticipate discharge to home   Barriers to discharge:  IV antibiotics   Anticipated discharge date:  1-2 days     Chief Complaint: L elbow pain     HPI:    Elle Blanton is a 61 year old old male with multiple comorbidities presenting with ~1 week of L elbow swelling and pain. Was seen in the ED for this on 10/11 - workup revealed acute olecranon bursitis. X-ray showing soft tissue swelling external to the joint consistent with olecranon bursitis.  No actual joint involvement.  White count 11.7. He was discharged on short course of Augmentin. On 10/13, he presented to the clinic for follow up. At that time, he denied any pain. He had full ROM. Swelling was worsening as well as erythema. It was not hot to touch. We drained the joint in clinic and obtained about 10 ml of purulent/serosang fluid. It was sent for culture, cell count, crystals and revealed to grow S. Aureus.     He is doing well - denies any further drainage from the elbow. Still has compression dressing on. Pain is similar to prior - not worsening but not getting better. Still has full ROM. Has been taking his clindamycin since seen in clinic. Has not looked at elbow since clinic.    He dialyzes M/F. Due for HD tomorrow at 3pm. Still makes some urine unless he dialyzes. Appears to have a L AVF with loop graft per chart review  Wife states that he often rests his elbow on the hard dialysis chair arms.    Denies F/C/CP/SOB/N/V         Medical History  Past Medical History:   Diagnosis Date     NAHUM (acute kidney injury) (H)      GI (gastrointestinal bleed)      Gout      H. pylori infection 7/5/2017    UGI bleed implied by Hgb 9.0 6/22/17 and melena. Admitted and hgb decreased to 7.6, CT abdomen showed all bladder wall thickening. + Hep B surface antigen noted. Melena  resolved and hgb stabalized without transfusion, epigastric pain resolved with PPI. Recommend referral for gastroscopy.     Heart attack (H)      Hepatitis B      Normocytic anemia      PONV (postoperative nausea and vomiting)      Thrombocytopenia (H)       Surgical History  He  has a past surgical history that includes Incision and drainage finger, combined (Left, 10/20/2016); IR Chest Tube Place Non Tunneled Right (4/19/2021); Abscess drainage; Foreign Body Removal; Us Paracentesis (8/14/2020); Pr Esophagogastroduodenoscopy Transoral Diagnostic (N/A, 8/18/2020); Us Paracentesis (8/19/2020); Us Thoracentesis (4/18/2021); IR Pleural Drainage With Catheter Insertion (4/19/2021); Create fistula arteriovenous upper extremity (Left, 4/20/2021); and Midline Insertion - Double Lumen (4/23/2021).    Social History  he  reports that he has never smoked. He has never used smokeless tobacco. He reports that he does not drink alcohol and does not use drugs.    Social history reviewed and noncontributory unless noted   Allergies  Allergies   Allergen Reactions     Nka [No Known Allergies]        Allergy history reviewed and noncontributory unless noted Family History  Reviewed, and family history includes Diabetes in his father; Ulcers in his father.    Family history reviewed and noncontributory unless noted          Prior to Admission Medications   Medications Prior to Admission   Medication Sig Dispense Refill Last Dose     acetaminophen (TYLENOL) 500 MG tablet Take 500 mg by mouth every 6 hours as needed for mild pain    at Unknown time     amoxicillin-clavulanate (AUGMENTIN) 500-125 MG tablet Take 1 tablet by mouth daily 7 tablet 0  at Unknown time     carvedilol (COREG) 25 MG tablet Take 1 tablet (25 mg) by mouth 2 times daily (with meals) 60 tablet 11  at Unknown time     clindamycin (CLEOCIN) 300 MG capsule Take 1 capsule (300 mg) by mouth 4 times daily 10 capsule 0  at Unknown time     cloNIDine (CATAPRES) 0.1 MG  tablet Take 1 tablet (0.1 mg) by mouth At Bedtime 30 tablet 11  at Unknown time     diltiazem ER (TIAZAC) 180 MG 24 hr ER beaded capsule Take 1 capsule (180 mg) by mouth 2 times daily 60 capsule 11  at Unknown time     entecavir (BARACLUDE) 0.5 MG tablet Take 1 tablet (0.5 mg) by mouth every 7 days 45 tablet 3  at Unknown time     hydrOXYzine (ATARAX) 25 MG tablet Take 1 tablet (25 mg) by mouth 3 times daily as needed for itching 90 tablet 3  at Unknown time     omeprazole (PRILOSEC) 20 MG DR capsule Take 1 capsule (20 mg) by mouth 2 times daily 60 capsule 11  at Unknown time     order for DME Equipment being ordered: Other: blood pressure monitor  Treatment Diagnosis: hypertension 1 each 0  at Unknown time     oxyCODONE (ROXICODONE) 5 MG tablet Take 1 tablet (5 mg) by mouth every 6 hours as needed for severe pain 42 tablet 0  at Unknown time     pantoprazole (PROTONIX) 40 MG EC tablet Take 1 tablet (40 mg) by mouth daily 30 tablet 3  at Unknown time     polyethylene glycol (MIRALAX) 17 GM/Dose powder Take 17 g by mouth daily 510 g 0  at Unknown time          Review of Systems:  12 point review of systems negative unless stated in HPI     Physical Exam:  Temp:  [98.6  F (37  C)] 98.6  F (37  C)  Pulse:  [88] 88  Resp:  [19] 19  BP: (113)/(66) 113/66  SpO2:  [95 %] 95 %    Constitutional:  Alert, NAD  HENT:  Normocephalic, Atraumatic, Bilateral external ears normal, Oropharynx moist, Nose normal.   Neck:  Normal range of motion, No stridor.   Eyes:  EOMI, Conjunctiva normal, No discharge.   Respiratory:  Normal breath sounds, No respiratory distress, No wheezing, No chest tenderness.   Cardiovascular:  Normal heart rate, Normal rhythm, No murmurs. L AVF with palp thrill.  GI:  Soft, No tenderness, No guarding, No CVA tenderness.   Musculoskeletal:  Neurovascularly intact distally, mild TTP over olecranon, No cyanosis, Erythema improved from yesterday. Swelling present but decreased. Good range of motion in all major  "joints.     Integument:  Warm, dry, No rash.   Lymphatic:  No lymphadenopathy noted.   Neurologic:  Alert & oriented x 3, Normal motor function, Normal sensory function, No focal deficits noted.   Psychiatric:  Affect normal, Judgment normal, Mood normal.          Pertinent Labs/Imaging   No results found for any visits on 10/14/21.   CBC, Cr ordered    Precepted patient with  faculty in morning report.    Henrietta \"Sahra\" MD [facility]  [facility] Medicine Resident, PGY2  P: [phone]       "

## 2021-10-15 NOTE — PLAN OF CARE
Problem: Adult Inpatient Plan of Care  Goal: Plan of Care Review  Outcome: No Change     Problem: Adult Inpatient Plan of Care  Goal: Absence of Hospital-Acquired Illness or Injury  Intervention: Identify and Manage Fall Risk  Recent Flowsheet Documentation  Taken 10/15/2021 0800 by Denzel Frederick, RN  Safety Promotion/Fall Prevention: assistive device/personal items within reach     Problem: Pain Acute  Goal: Acceptable Pain Control and Functional Ability  Outcome: Improving  Intervention: Develop Pain Management Plan  Recent Flowsheet Documentation  Taken 10/15/2021 0800 by Denzel Frederick, RN  Pain Management Interventions: (did not want analgesic) --   Patient pain 3/10, did not want anything for pain. Independent in room, dialysis and thoracentesis scheduled for today. Hgb 7.8

## 2021-10-15 NOTE — PROCEDURES
The patient underwent a right sided thoracentesis with removal of 1.5 L of fluid.  During and immediately following the procedure, he reported 8/10 pain at the catheter site with breathing.  0/10 pain when holding his breath.  Over the first few minutes after the procedure, the pain decreased.      This is most likely post procedural pain which should continue to decrease over the next hour or two.

## 2021-10-15 NOTE — PROGRESS NOTES
Orthopedic Progress Note      Assessment: Day of Surgery  S/P Procedure(s):  LEFT OLECRANON BURSECTOMY     Plan:   - Patient having thoracentesis at 2 pm today, will go directly to [facility] for 3:30 pm surgery for left olecranon I & D with Dr. Myers. Will return to the floor after surgery.  Dialysis will tentatively be done around 5 pm, after returning from surgery.       Subjective:  Pain: moderate    Patient reports elbow is painful, but is not wanting any pain medication at this time. He states the olecranon bursa has increased in size again since being drained in outpatient clinic yesterday, but is not quite as large as prior to drainage. Patient denies numbness tingling in the arm. He is aware of surgery today and is in agreement.     Objective:  /68 (BP Location: Right leg)   Pulse 83   Temp 98.3  F (36.8  C) (Oral)   Resp 18   SpO2 96%   The patient is A&Ox3. Appears comfortable.   Left olecranon bursa moderate-severely swollen, larger than golf ball sized, fluctuant, erythematous and warm to the touch. Tender to palpation and surrounding posterior elbow.   Elbow extension to 0 degrees and flexion to 120 degrees, pain noted with flexion.   Sensation is intact to LUE.   Radialis pulse intact.      Pertinent Labs   Lab Results: personally reviewed.   Lab Results   Component Value Date    INR 1.28 (H) 09/29/2021    INR 1.43 (H) 09/28/2021    INR 1.54 (H) 09/27/2021     Lab Results   Component Value Date    WBC 12.6 (H) 10/15/2021    HGB 7.5 (L) 10/15/2021    HCT 24.3 (L) 10/15/2021    MCV 96 10/15/2021     (L) 10/15/2021     Lab Results   Component Value Date     (L) 10/15/2021    CO2 21 (L) 10/15/2021         Report completed by:  Laodnna Mandel PA-C, NAHUN  Date: 10/15/2021  Time: 1:42 PM

## 2021-10-15 NOTE — PHARMACY-ADMISSION MEDICATION HISTORY
Pharmacy Note - Admission Medication History   ______________________________________________________________________    Prior To Admission (PTA) med list completed and updated in EMR.       PTA Med List   Medication Sig Note Last Dose     acetaminophen (TYLENOL) 500 MG tablet Take 500 mg by mouth every 6 hours as needed for mild pain  10/14/2021 at x1     amoxicillin-clavulanate (AUGMENTIN) 500-125 MG tablet Take 1 tablet by mouth daily  10/14/2021 at am     carvedilol (COREG) 25 MG tablet Take 1 tablet (25 mg) by mouth 2 times daily (with meals) 10/14/2021: Pt states he only takes it PRN Unknown at Unknown time     clindamycin (CLEOCIN) 300 MG capsule Take 1 capsule (300 mg) by mouth 4 times daily  10/14/2021 at x3     cloNIDine (CATAPRES) 0.1 MG tablet Take 1 tablet (0.1 mg) by mouth At Bedtime 10/14/2021: Pt states he only takes when needed. Unknown at Unknown time     diltiazem ER (TIAZAC) 180 MG 24 hr ER beaded capsule Take 1 capsule (180 mg) by mouth 2 times daily 10/14/2021: Pt states he only takes when needed. Unknown at Unknown time     entecavir (BARACLUDE) 0.5 MG tablet Take 1 tablet (0.5 mg) by mouth every 7 days  Past Week at 3 days ago     hydrOXYzine (ATARAX) 25 MG tablet Take 1 tablet (25 mg) by mouth 3 times daily as needed for itching  Unknown at PRN     omeprazole (PRILOSEC) 20 MG DR capsule Take 1 capsule (20 mg) by mouth 2 times daily 10/14/2021: Replaced by pantoprazole. Unknown at Unknown time     oxyCODONE (ROXICODONE) 5 MG tablet Take 1 tablet (5 mg) by mouth every 6 hours as needed for severe pain  Unknown at PRN     pantoprazole (PROTONIX) 40 MG EC tablet Take 1 tablet (40 mg) by mouth daily  10/14/2021 at am     polyethylene glycol (MIRALAX) 17 GM/Dose powder Take 17 g by mouth daily  Unknown at PRN       Information source(s): Patient and CareEverywhere/SureScripts  Method of interview communication: phone    Summary of Changes to PTA Med List  New: none  Discontinued: none  Changed:  none    Patient was asked about OTC/herbal products specifically.  PTA med list reflects this.    In the past week, patient estimated taking medication this percent of the time:  50-90% due to other.    Allergies were reviewed, assessed, and updated with the patient.      Patient does not use any multi-dose medications prior to admission.    The information provided in this note is only as accurate as the sources available at the time of the update(s).    Thank you,  Jessi Keller, [facility]  10/14/2021 9:48 PM

## 2021-10-15 NOTE — ANESTHESIA CARE TRANSFER NOTE
Patient: Elle Blanton    Procedure: Procedure(s):  LEFT OLECRANON BURSECTOMY       Diagnosis: Elbow pain [M25.529]  Diagnosis Additional Information: No value filed.    Anesthesia Type:   Peripheral Nerve Block     Note:    Oropharynx: oropharynx clear of all foreign objects and spontaneously breathing  Level of Consciousness: awake  Oxygen Supplementation: face mask    Independent Airway: airway patency satisfactory and stable  Dentition: dentition unchanged  Vital Signs Stable: post-procedure vital signs reviewed and stable  Report to RN Given: handoff report given  Patient transferred to: Phase II    Handoff Report: Identifed the Patient, Identified the Reponsible Provider, Reviewed the pertinent medical history, Discussed the surgical course, Reviewed Intra-OP anesthesia mangement and issues during anesthesia, Set expectations for post-procedure period and Allowed opportunity for questions and acknowledgement of understanding      Vitals:  Vitals Value Taken Time   /64 10/15/21 1748   Temp 36.3  C (97.4  F) 10/15/21 1748   Pulse 76 10/15/21 1748   Resp 14 10/15/21 1748   SpO2 99 % 10/15/21 1749       Electronically Signed By: Steve More MD  October 15, 2021  6:02 PM

## 2021-10-15 NOTE — PLAN OF CARE
Problem: Adult Inpatient Plan of Care  Goal: Patient-Specific Goal (Individualized)  Outcome: No Change     Problem: Pain Acute  Goal: Acceptable Pain Control and Functional Ability  Outcome: No Change     Problem: Pain Acute  Goal: Acceptable Pain Control and Functional Ability  Outcome: No Change     Minimal pain to left elbow. Declined PRN pain medications. Elbow wrapped with ACE bandage. Nephrology and Orthopedic consults ordered. Dialysis fistula in left forearm. Bruit and thrill present.

## 2021-10-15 NOTE — UTILIZATION REVIEW
Admission Status; Secondary Review Determination       Under the authority of the Utilization Management Committee, the utilization review process indicated a secondary review on the above patient. The review outcome is based on review of the medical records, discussions with staff, and applying clinical experience noted on the date of the review.     (x) Inpatient Status Appropriate - This patient's medical care is consistent with medical management for inpatient care and reasonable inpatient medical practice.     RATIONALE FOR DETERMINATION   At the time of admission with the information available to the attending physician more than 2 nights Hospital complex care was anticipated, based on patient risk of adverse outcome if treated as outpatient and complex care required. Inpatient admission is appropriate based on the Medicare guidelines.     SUMMARY:  61 year old male with multiple comorbidities including AAA (5.5cm), chronic thoracic aortic dissection, 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 Seen in ED 10/11 - diagnosed with olecranon bursitis. Followed up with PCP in clinic 10/13 after being on 2 days of Augmentin. Joint drainage revealed purulent fluid - now septic bursitis, positive for MSSA. Admitted for IV antibiotics and ortho evaluation.  Orthopedics plans surgery for the elbow.  Also noted to have a  recurrent right pleural effusion, having thoracentesis.  Given need for surgery and ongoing IV antibiotics along with pleural effusion issues he needs ongoing hospital care.    The information on this document is developed by the utilization review team in order for the business office to ensure compliance. This only denotes the appropriateness of proper admission status and does not reflect the quality of care rendered.   The definitions of Inpatient Status and Observation Status used in making the determination  above are those provided in the CMS Coverage Manual, Chapter 1 and Chapter 6, section 70.4.     Sincerely,     See Stevens DO, [facility]  Utilization Review  Physician Advisor

## 2021-10-15 NOTE — ANESTHESIA PREPROCEDURE EVALUATION
Anesthesia Pre-Procedure Evaluation    Patient: Elle Blanton   MRN: [id number] : 1960        Preoperative Diagnosis: Elbow pain [M25.529]    Procedure : Procedure(s):  LEFT OLECRANON BURSECTOMY          Past Medical History:   Diagnosis Date     NAHUM (acute kidney injury) (H)      GI (gastrointestinal bleed)      Gout      H. pylori infection 2017    UGI bleed implied by Hgb 9.0 17 and melena. Admitted and hgb decreased to 7.6, CT abdomen showed all bladder wall thickening. + Hep B surface antigen noted. Melena resolved and hgb stabalized without transfusion, epigastric pain resolved with PPI. Recommend referral for gastroscopy.     Heart attack (H)      Hepatitis B      Normocytic anemia      PONV (postoperative nausea and vomiting)      Thrombocytopenia (H)       Past Surgical History:   Procedure Laterality Date     ABCESS DRAINAGE      finger     CREATE FISTULA ARTERIOVENOUS UPPER EXTREMITY Left 2021    Procedure: left arm dialysis graft placement;  Surgeon: Roxana Cosby MD;  Location: [facility] OR;  Service: General     FOREIGN BODY REMOVAL      finger     INCISION AND DRAINAGE FINGER, COMBINED Left 10/20/2016    Procedure: COMBINED INCISION AND DRAINAGE FINGER;  Surgeon: Mireya Pizano MD;  Location: [facility] OR     IR CHEST TUBE PLACEMENT NON-TUNNELED RIGHT  2021     IR PLEURAL DRAINAGE WITH CATHETER INSERTION  2021     MIDLINE INSERTION - DOUBLE LUMEN  2021          KS ESOPHAGOGASTRODUODENOSCOPY TRANSORAL DIAGNOSTIC N/A 2020    Procedure: ESOPHAGOGASTRODUODENOSCOPY (EGD) with biopsy;  Surgeon: Nilson Gonzales MD;  Location: [facility];  Service: Gastroenterology     US PARACENTESIS  2020     US PARACENTESIS  2020     US THORACENTESIS  2021      Allergies   Allergen Reactions     Nka [No Known Allergies]       Social History     Tobacco Use     Smoking status: Never Smoker     Smokeless tobacco: Never Used   Substance Use Topics     Alcohol use:  No      Wt Readings from Last 1 Encounters:   10/13/21 61 kg (134 lb 8 oz)        Anesthesia Evaluation            ROS/MED HX  ENT/Pulmonary:  - neg pulmonary ROS     Neurologic:  - neg neurologic ROS     Cardiovascular:     (+) hypertension----- (-) murmur and wheezes   METS/Exercise Tolerance:     Hematologic:     (+) anemia,     Musculoskeletal:  - neg musculoskeletal ROS     GI/Hepatic:     (+) hepatitis type B, liver disease,     Renal/Genitourinary:     (+) renal disease, type: ESRD, Pt requires dialysis, type: Hemodialysis,     Endo:  - neg endo ROS     Psychiatric/Substance Use:  - neg psychiatric ROS     Infectious Disease:  - neg infectious disease ROS     Malignancy:       Other:  - neg other ROS          Physical Exam    Airway  airway exam normal      Mallampati: II   TM distance: < 3 FB   Neck ROM: full   Mouth opening: > 3 cm    Respiratory Devices and Support         Dental  no notable dental history         Cardiovascular          Rhythm and rate: regular and normal (-) no murmur    Pulmonary           breath sounds clear to auscultation   (-) no wheezes        OUTSIDE LABS:  CBC:   Lab Results   Component Value Date    WBC 12.6 (H) 10/15/2021    WBC 6.6 10/14/2021    HGB 7.5 (L) 10/15/2021    HGB 7.8 (L) 10/15/2021    HCT 24.3 (L) 10/15/2021    HCT 40.2 10/14/2021     (L) 10/15/2021    PLT 86 (L) 10/14/2021     BMP:   Lab Results   Component Value Date     (L) 10/15/2021     10/11/2021    POTASSIUM 4.6 10/15/2021    POTASSIUM 4.5 10/11/2021    CHLORIDE 97 (L) 10/15/2021    CHLORIDE 102 10/11/2021    CO2 21 (L) 10/15/2021    CO2 23 10/11/2021    BUN 90 (H) 10/15/2021    BUN 83 (H) 10/11/2021    CR 11.60 (HH) 10/15/2021    CR 11.22 (HH) 10/14/2021    GLC 95 10/15/2021     10/11/2021     COAGS:   Lab Results   Component Value Date    PTT 41 (H) 09/26/2021    INR 1.28 (H) 09/29/2021    FIBR 128 (L) 07/31/2019     POC:   Lab Results   Component Value Date    BGM 91 08/09/2019      HEPATIC:   Lab Results   Component Value Date    ALBUMIN 3.1 (L) 09/27/2021    PROTTOTAL 5.9 (L) 09/27/2021    ALT 21 09/27/2021    AST 18 09/27/2021    ALKPHOS 71 09/27/2021    BILITOTAL 1.2 (H) 09/27/2021    BILIDIRECT 0.2 10/21/2020     OTHER:   Lab Results   Component Value Date    PH 7.31 (L) 07/28/2019    LACT 1.2 04/25/2021    A1C 5.0 08/13/2020    TANO 7.6 (L) 10/15/2021    PHOS 2.6 04/27/2021    MAG 1.9 04/16/2021    LIPASE 517 (H) 07/28/2019    CRP 21.5 (H) 10/14/2021    SED 21 (H) 10/14/2021       Anesthesia Plan    ASA Status:  3   NPO Status:  NPO Appropriate    Anesthesia Type: Peripheral Nerve Block.   Induction: Intravenous, Propofol.   Maintenance: TIVA.        Consents    Anesthesia Plan(s) and associated risks, benefits, and realistic alternatives discussed. Questions answered and patient/representative(s) expressed understanding.     - Discussed with:  Patient      - Patient is DNR/DNI Status: No         Postoperative Care    Pain management: IV analgesics, Oral pain medications, Multi-modal analgesia, Peripheral nerve block (Single Shot).   PONV prophylaxis: Ondansetron (or other 5HT-3), Dexamethasone or Solumedrol     Comments:    Axillary nerve block discussed with patient including risks and benefits. Pt agreeable to block in pre op for analgesia.            Steve More MD

## 2021-10-15 NOTE — ANESTHESIA PROCEDURE NOTES
Brachial plexus (Axillary) Procedure Note    Pre-Procedure   Staff -        Performed By: anesthesiologist       Location: pre-op       Procedure Start/Stop Times: 10/15/2021 3:55 PM and 10/15/2021 4:04 PM       Pre-Anesthestic Checklist: patient identified, IV checked, site marked, risks and benefits discussed, informed consent, monitors and equipment checked, pre-op evaluation, at physician/surgeon's request and post-op pain management  Timeout:       Correct Patient: Yes        Correct Procedure: Yes        Correct Site: Yes        Correct Position: Yes        Correct Laterality: Yes        Site Marked: Yes  Procedure Documentation  Procedure: Brachial plexus (Axillary)       Laterality: left       Patient Position: supine       Patient Prep/Sterile Barriers: sterile gloves, mask       Skin prep: Chloraprep       Needle Type: short bevel       Needle Gauge: 20.        Needle Length (Inches): 4        Ultrasound guided       1. Ultrasound was used to identify targeted nerve, plexus, vascular marker, or fascial plane and place a needle adjacent to it in real-time.       2. Ultrasound was used to visualize the spread of anesthetic in close proximity to the above referenced structure.       3. A permanent image is entered into the patient's record.       4. The visualized anatomic structures appeared normal.       5. There were no apparent abnormal pathologic findings.    Assessment/Narrative         The placement was negative for: blood aspirated, painful injection and site bleeding       Paresthesias: No.      Bolus given via needle. No blood aspirated via catheter.        Secured via.        Insertion/Infusion Method: Single Shot       Complications: none       Injection made incrementally with aspirations every 5 mL.    Medication(s) Administered   Bupivacaine 0.5% w/ 1:200K Epi (Other), 25 mL  Medication Administration Time: 10/15/2021 4:02 PM

## 2021-10-15 NOTE — PHARMACY-VANCOMYCIN DOSING SERVICE
Pharmacy Vancomycin Initial Note  Date of Service 2021  Patient's  1960  61 year old, male    Indication: Bone and Joint Infection -- Septic Elbow    Current estimated CrCl = Estimated Creatinine Clearance: 6.4 mL/min (A) (based on SCr of 10.45 mg/dL (HH)).    Creatinine for last 3 days  No results found for requested labs within last 72 hours.    Recent Vancomycin Level(s) for last 3 days  No results found for requested labs within last 72 hours.      Vancomycin IV Administrations (past 72 hours)      No vancomycin orders with administrations in past 72 hours.                Nephrotoxins and other renal medications (From now, onward)    Start     Dose/Rate Route Frequency Ordered Stop    10/14/21 2200  vancomycin 1000 mg in 0.9% NaCl 250 mL intermittent infusion 1,000 mg      1,000 mg  over 60 Minutes Intravenous ONCE 10/14/21 2118      10/14/21 2115  vancomycin place rivera - receiving intermittent dosing      1 each Intravenous SEE ADMIN INSTRUCTIONS 10/14/21 2118            Contrast Orders - past 72 hours (72h ago, onward)    None              Plan:  1. Start vancomycin  1000 mg IV x1 followed by intermittent dosing.  2. Vancomycin monitoring method: Renal Replacement Therapy  3. Vancomycin therapeutic monitoring goal: 15-20 mg/L  4. Pharmacy will check vancomycin levels as appropriate in 1-3 Days.    5. Serum creatinine levels will be ordered as indicated.      Khushi Rivas [facility]

## 2021-10-15 NOTE — SIGNIFICANT EVENT
Lab called at 0551 with critical creatinine level of 11.6 and delta hemoglobin of 7.8. Updated Dr. Henrietta Scott with lab results. Pt to have dialysis today in addition to Nephrology consult. New order to have hemoglobin redrawn due to significant change from 12.8 to 7.8

## 2021-10-15 NOTE — ANESTHESIA CARE TRANSFER NOTE
Patient: Elle Blanton    Procedure: Procedure(s):  LEFT OLECRANON BURSECTOMY       Diagnosis: Elbow pain [M25.529]  Diagnosis Additional Information: No value filed.    Anesthesia Type:   Peripheral Nerve Block     Note:  Anesthesia Care Transfer Notewriter  Vitals:  Vitals Value Taken Time   /64 10/15/21 1748   Temp 36.3  C (97.4  F) 10/15/21 1748   Pulse 76 10/15/21 1748   Resp 14 10/15/21 1748   SpO2 99 % 10/15/21 1749       Electronically Signed By: Steve More MD  October 15, 2021  6:03 PM

## 2021-10-15 NOTE — CONSULTS
RENAL CONSULT NOTE    REQUESTING PHYSICIAN: Dr. Scott.    REASON FOR CONSULT: Hemodialysis.    ASSESSMENT/PLAN:    ESRD on dialysis  Patient has ESRD and normally dialyzes Monday and Friday as he refuses it thrice weekly.  He still does make some urine.  Is in need of HD today.  Electrolytes overall look okay except for elevated BUN, consistent with needing dialysis.  His infection does not look like it should affect his dialysis graft in his L arm.  Agree with current management with abx and I&D.  -HD today.    Septic L olecranon bursitis  Management per primary team and orthopedic surgery.  Plan I&D today, on antibiotics with appropriate renal dosing.    Chronic medical conditions:  AAA/dissection  GERD  Chronic hepatitis B cirrhosis  Thrombocytopenia  Management per primary team.    HPI: Patient presents for septic olecranon bursitis and will be seen for I&D by orthopedic surgery.  Also dialyzes Monday/Friday and is in need of dialysis today.    REVIEW OF SYSTEMS:  COMPLETE REVIEW OF SYSTEMS negative except as noted above.    Past Medical History:   Diagnosis Date     NAHUM (acute kidney injury) (H)      GI (gastrointestinal bleed)      Gout      H. pylori infection 7/5/2017    UGI bleed implied by Hgb 9.0 6/22/17 and melena. Admitted and hgb decreased to 7.6, CT abdomen showed all bladder wall thickening. + Hep B surface antigen noted. Melena resolved and hgb stabalized without transfusion, epigastric pain resolved with PPI. Recommend referral for gastroscopy.     Heart attack (H)      Hepatitis B      Normocytic anemia      PONV (postoperative nausea and vomiting)      Thrombocytopenia (H)        No current facility-administered medications on file prior to encounter.  acetaminophen (TYLENOL) 500 MG tablet, Take 500 mg by mouth every 6 hours as needed for mild pain  amoxicillin-clavulanate (AUGMENTIN) 500-125 MG tablet, Take 1 tablet by mouth daily  carvedilol (COREG) 25 MG tablet, Take 1 tablet (25 mg) by  mouth 2 times daily (with meals)  clindamycin (CLEOCIN) 300 MG capsule, Take 1 capsule (300 mg) by mouth 4 times daily  cloNIDine (CATAPRES) 0.1 MG tablet, Take 1 tablet (0.1 mg) by mouth At Bedtime  diltiazem ER (TIAZAC) 180 MG 24 hr ER beaded capsule, Take 1 capsule (180 mg) by mouth 2 times daily  entecavir (BARACLUDE) 0.5 MG tablet, Take 1 tablet (0.5 mg) by mouth every 7 days  hydrOXYzine (ATARAX) 25 MG tablet, Take 1 tablet (25 mg) by mouth 3 times daily as needed for itching  omeprazole (PRILOSEC) 20 MG DR capsule, Take 1 capsule (20 mg) by mouth 2 times daily  oxyCODONE (ROXICODONE) 5 MG tablet, Take 1 tablet (5 mg) by mouth every 6 hours as needed for severe pain  pantoprazole (PROTONIX) 40 MG EC tablet, Take 1 tablet (40 mg) by mouth daily  polyethylene glycol (MIRALAX) 17 GM/Dose powder, Take 17 g by mouth daily  order for DME, Equipment being ordered: Other: blood pressure monitor  Treatment Diagnosis: hypertension        No current outpatient medications on file.      ALLERGIES/SENSITIVITIES:  Allergies   Allergen Reactions     Nka [No Known Allergies]      Social History     Tobacco Use     Smoking status: Never Smoker     Smokeless tobacco: Never Used   Substance Use Topics     Alcohol use: No     Drug use: No     I have reviewed this patient's family history and updated it with pertinent information if needed.  Family History   Problem Relation Age of Onset     Diabetes Father      Hypertension No family hx of      Asthma No family hx of      Cancer No family hx of      Coronary Artery Disease No family hx of      Liver Cancer No family hx of      Ulcers Father          PHYSICAL EXAM:  Physical Exam   Temp: 98.9  F (37.2  C) Temp src: Oral BP: 120/64 Pulse: 79   Resp: 18 SpO2: 95 % O2 Device: None (Room air)    There were no vitals filed for this visit.  Vital Signs with Ranges  Temp:  [97.9  F (36.6  C)-98.9  F (37.2  C)] 98.9  F (37.2  C)  Pulse:  [78-88] 79  Resp:  [18-19] 18  BP:  (113-120)/(61-66) 120/64  SpO2:  [94 %-95 %] 95 %  I/O last 3 completed shifts:  In: 120 [P.O.:120]  Out: -     @TMAXR(24)@    No data found.    General - A & O x 3, NAD  HEENT - scleral icterus  Neck - no JVD  Respiratory - no increased effort  Cardiovascular - good capillary refill  Abdomen - no increased effort  Extremities - well perfused, no significant edema  Integumentary - intact, good turgor, no rash/lesions  Neurologic - grossly intact  Psych:  Judgment intact, affect WNL    Laboratory:     Recent Labs   Lab 10/15/21  0708 10/15/21  0507 10/14/21  2112 10/11/21  1121   WBC  --  12.6* 6.6 11.7*   RBC  --  2.52* 4.29* 2.57*   HGB 7.5* 7.8* 12.8* 7.7*   HCT  --  24.3* 40.2 25.5*   PLT  --  125* 86* 102*       Basic Metabolic Panel:  Recent Labs   Lab 10/15/21  0507 10/14/21  2112 10/11/21  1121   *  --  138   POTASSIUM 4.6  --  4.5   CHLORIDE 97*  --  102   CO2 21*  --  23   BUN 90*  --  83*   CR 11.60* 11.22* 10.45*   GLC 95  --  118   TANO 7.6*  --  8.2*       INRNo lab results found in last 7 days.    Recent Labs   Lab Test 10/15/21  0507 10/11/21  1121   POTASSIUM 4.6 4.5   CHLORIDE 97* 102   BUN 90* 83*      Recent Labs   Lab Test 09/27/21  0833 08/18/21  1416 12/30/20  0830 10/21/20  1032 08/20/20  0728 08/19/20  1701 08/07/19  0344 08/06/19  2349   ALBUMIN 3.1* 3.1*   < > 3.0*   < >  --    < >  --    BILITOTAL 1.2* 0.7   < > 0.6   < >  --    < >  --    BILIDIRECT  --   --   --  0.2  --   --   --   --    ALT 21 49   < >  --    < >  --    < >  --    AST 18 31   < >  --    < >  --    < >  --    PROTEIN  --   --   --   --   --  Negative  --  100*    < > = values in this interval not displayed.       Personally reviewed today's laboratory studies      Thank you for involving us in the care of this patient. We will continue to follow along with you.    Jorje Glez MD  [facility], PGY-3    Precepted patient with Dr. Juliana Olivas.

## 2021-10-15 NOTE — PROGRESS NOTES
"[facility] Progress Note    Assessment/Plan  Active Problems:    Septic bursitis    Pleural effusion    61 year old M with multiple comorbidities including AAA (5.5cm), chronic thoracic aortic dissection, 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 Seen in ED 10/11 - diagnosed with olecranon bursitis. Followed up with PCP in clinic 10/13 after being on 2 days of Augmentin. Joint drainage revealed purulent fluid - now septic bursitis, positive for MSSA. Admitted for IV antibiotics and Ortho consult for I&D, possible bursectomy. Now with recurrent right pleural effusion, will send for thoracentesis. Will also undergo HD today.       #Septic bursitis,   Likely hematogenous spread given proximity of AVF and HD. ?possibly started as olecranon bursitis given repeated elbow \"trauma\" with using hard armrests. Fluid cultures growing out MSSA. Will switch to Ancef today and discontinue Vancomycin.   - Ancef renally dose, will start after HD.   - PRN pain medication  - Ortho consult: ok with proceeding with thoracentesis, will be able to see patient later today for formal recs.  - NPO  - CBC/BMP in am  - BCx ordered: NGTD    #Recurrent Large R Pleural Effusion  Previous pleural effusion tapped and tolerated well. Likely due to volume overload due to ESRD. Will also get HD today. Patient is short of breath but clinically stable and not requiring oxygen. Afebrile, normotensive, nontoxic, not concerned for infection at this time.  - IR thoracentesis    #ESRD on HD:   L AVF in place. Baseline Cr 7-8. Dialyzes M/F. States he has swelling in his left arm with HD and so now does HD 2x.  -Nephrology consulted for IP HD     #Chronic Medical Conditions  - AAA/Aortic Dissection: Restart PTA carvedilol and continue to hold clonidine  - GERD: continue PTA pantoprazole  - Chronic Hep B/Cirrhosis: entecavir q7 days. Continue after med " rec.  - Thrombocytopenia: no active bleeding. CTM     DVT ppx: Mechanical due to procedure  Diet: NPO  Code: Full  Med rec completed: No     Dispo: inpatient status for thoracentesis, possible surgery, IV antibiotics, anticipate discharge to home   Barriers to discharge:  IV antibiotics   Anticipated discharge date:  1-2 days    Subjective  Patient states that he is feeling short of breath when he is laying down.  He states that when his happened before he required fluid removal from his lung.  He would like some imaging to explore this.  He also states his elbow pain is controlled.  All questions were answered.    [employer]uster Teleinterpreter was used.    Objective    Vital signs in last 24 hours Temp:  [97.9  F (36.6  C)-98.9  F (37.2  C)] 98.9  F (37.2  C)  Pulse:  [78-88] 79  Resp:  [18-19] 18  BP: (113-120)/(61-66) 120/64  SpO2:  [94 %-95 %] 95 %   0 lbs 0 oz    Intake/Output last 3 shift I/O last 3 completed shifts:  In: 120 [P.O.:120]  Out: -     Intake/Output this shift:No intake/output data recorded.    Physical Exam  General appearance: alert, appears stated age, cooperative and no distress, laying in bed.  Head: Normocephalic, without obvious abnormality, atraumatic.  Lungs: CTAB on left, diminished lung sounds on right but air movement heard. Dull to percussion on right per Dr. Arce  Heart: regular rate and rhythm, no murmurs. Radial pulses 2+ bilaterally.  Ext: No swelling in lower extremities bilaterally.  Skin: Skin color, texture, turgor normal. No rashes or lesions.  Neurologic: Alert and oriented x3, normal strength and tone.  Psychiatric: mood and affect appropriate.    Pertinent Labs and Pertinent Radiology   Lab Results: personally reviewed.     Radiology Results: Personally reviewed image/s and impression/s    Precepted patient with Dr. Vickey Arce III.    Connie Stanton MD  [facility], PGY-3  Pager # [phone]

## 2021-10-16 ENCOUNTER — APPOINTMENT (OUTPATIENT)
Dept: ULTRASOUND IMAGING | Facility: HOSPITAL | Age: 61
DRG: 500 | End: 2021-10-16
Attending: FAMILY MEDICINE
Payer: COMMERCIAL

## 2021-10-16 LAB
AMYLASE FLD-CCNC: 37.5 U/L
ANION GAP SERPL CALCULATED.3IONS-SCNC: 12 MMOL/L (ref 5–18)
APPEARANCE FLD: ABNORMAL
BUN SERPL-MCNC: 61 MG/DL (ref 8–22)
CALCIUM SERPL-MCNC: 7.5 MG/DL (ref 8.5–10.5)
CHLORIDE BLD-SCNC: 97 MMOL/L (ref 98–107)
CHOLEST FLD-MCNC: 83 MG/DL
CO2 SERPL-SCNC: 24 MMOL/L (ref 22–31)
COLOR FLD: ABNORMAL
CREAT SERPL-MCNC: 8.61 MG/DL (ref 0.7–1.3)
ERYTHROCYTE [DISTWIDTH] IN BLOOD BY AUTOMATED COUNT: 16 % (ref 10–15)
GFR SERPL CREATININE-BSD FRML MDRD: 6 ML/MIN/1.73M2
GLUCOSE BLD-MCNC: 106 MG/DL (ref 70–125)
GLUCOSE BLDC GLUCOMTR-MCNC: 104 MG/DL (ref 70–99)
GLUCOSE BLDC GLUCOMTR-MCNC: 99 MG/DL (ref 70–99)
GLUCOSE FLD-MCNC: <5 MG/DL
HCT VFR BLD AUTO: 25 % (ref 40–53)
HGB BLD-MCNC: 7.6 G/DL (ref 13.3–17.7)
HGB BLD-MCNC: 7.8 G/DL (ref 13.3–17.7)
LDH FLD L TO P-CCNC: 1922 U/L
LYMPHOCYTES NFR FLD MANUAL: 3 %
MCH RBC QN AUTO: 29.3 PG (ref 26.5–33)
MCHC RBC AUTO-ENTMCNC: 30.4 G/DL (ref 31.5–36.5)
MCV RBC AUTO: 97 FL (ref 78–100)
MONOS+MACROS NFR FLD MANUAL: 8 %
NEUTS BAND NFR FLD MANUAL: 89 %
PH FLD: 8 PH
PLATELET # BLD AUTO: 117 10E3/UL (ref 150–450)
POTASSIUM BLD-SCNC: 4 MMOL/L (ref 3.5–5)
PROT FLD-MCNC: 4.4 G/DL
PROT SERPL-MCNC: 7 G/DL (ref 6–8)
RBC # BLD AUTO: 2.59 10E6/UL (ref 4.4–5.9)
RBC # FLD: ABNORMAL /UL
SODIUM SERPL-SCNC: 133 MMOL/L (ref 136–145)
TRIGL FLD-MCNC: 56 MG/DL
WBC # BLD AUTO: 10 10E3/UL (ref 4–11)
WBC # FLD AUTO: 5488 /UL

## 2021-10-16 PROCEDURE — 85027 COMPLETE CBC AUTOMATED: CPT | Performed by: PHYSICIAN ASSISTANT

## 2021-10-16 PROCEDURE — 250N000013 HC RX MED GY IP 250 OP 250 PS 637: Performed by: MASSAGE THERAPIST

## 2021-10-16 PROCEDURE — 90682 RIV4 VACC RECOMBINANT DNA IM: CPT | Performed by: INTERNAL MEDICINE

## 2021-10-16 PROCEDURE — 83615 LACTATE (LD) (LDH) ENZYME: CPT | Performed by: STUDENT IN AN ORGANIZED HEALTH CARE EDUCATION/TRAINING PROGRAM

## 2021-10-16 PROCEDURE — 250N000011 HC RX IP 250 OP 636: Performed by: STUDENT IN AN ORGANIZED HEALTH CARE EDUCATION/TRAINING PROGRAM

## 2021-10-16 PROCEDURE — 83986 ASSAY PH BODY FLUID NOS: CPT | Performed by: STUDENT IN AN ORGANIZED HEALTH CARE EDUCATION/TRAINING PROGRAM

## 2021-10-16 PROCEDURE — 84155 ASSAY OF PROTEIN SERUM: CPT | Performed by: STUDENT IN AN ORGANIZED HEALTH CARE EDUCATION/TRAINING PROGRAM

## 2021-10-16 PROCEDURE — 82465 ASSAY BLD/SERUM CHOLESTEROL: CPT | Performed by: STUDENT IN AN ORGANIZED HEALTH CARE EDUCATION/TRAINING PROGRAM

## 2021-10-16 PROCEDURE — 82945 GLUCOSE OTHER FLUID: CPT | Performed by: STUDENT IN AN ORGANIZED HEALTH CARE EDUCATION/TRAINING PROGRAM

## 2021-10-16 PROCEDURE — 36415 COLL VENOUS BLD VENIPUNCTURE: CPT | Performed by: STUDENT IN AN ORGANIZED HEALTH CARE EDUCATION/TRAINING PROGRAM

## 2021-10-16 PROCEDURE — 80048 BASIC METABOLIC PNL TOTAL CA: CPT | Performed by: PHYSICIAN ASSISTANT

## 2021-10-16 PROCEDURE — 120N000001 HC R&B MED SURG/OB

## 2021-10-16 PROCEDURE — 250N000013 HC RX MED GY IP 250 OP 250 PS 637: Performed by: STUDENT IN AN ORGANIZED HEALTH CARE EDUCATION/TRAINING PROGRAM

## 2021-10-16 PROCEDURE — 88305 TISSUE EXAM BY PATHOLOGIST: CPT | Mod: TC | Performed by: STUDENT IN AN ORGANIZED HEALTH CARE EDUCATION/TRAINING PROGRAM

## 2021-10-16 PROCEDURE — 82150 ASSAY OF AMYLASE: CPT | Performed by: STUDENT IN AN ORGANIZED HEALTH CARE EDUCATION/TRAINING PROGRAM

## 2021-10-16 PROCEDURE — 250N000011 HC RX IP 250 OP 636: Performed by: INTERNAL MEDICINE

## 2021-10-16 PROCEDURE — 99232 SBSQ HOSP IP/OBS MODERATE 35: CPT | Performed by: INTERNAL MEDICINE

## 2021-10-16 PROCEDURE — 250N000013 HC RX MED GY IP 250 OP 250 PS 637: Performed by: PHYSICIAN ASSISTANT

## 2021-10-16 PROCEDURE — 272N000706 US THORACENTESIS

## 2021-10-16 PROCEDURE — 84157 ASSAY OF PROTEIN OTHER: CPT | Performed by: STUDENT IN AN ORGANIZED HEALTH CARE EDUCATION/TRAINING PROGRAM

## 2021-10-16 PROCEDURE — 89050 BODY FLUID CELL COUNT: CPT | Performed by: STUDENT IN AN ORGANIZED HEALTH CARE EDUCATION/TRAINING PROGRAM

## 2021-10-16 PROCEDURE — 87186 SC STD MICRODIL/AGAR DIL: CPT | Performed by: FAMILY MEDICINE

## 2021-10-16 PROCEDURE — 87075 CULTR BACTERIA EXCEPT BLOOD: CPT | Performed by: FAMILY MEDICINE

## 2021-10-16 PROCEDURE — 89051 BODY FLUID CELL COUNT: CPT | Performed by: STUDENT IN AN ORGANIZED HEALTH CARE EDUCATION/TRAINING PROGRAM

## 2021-10-16 PROCEDURE — 36415 COLL VENOUS BLD VENIPUNCTURE: CPT | Performed by: PHYSICIAN ASSISTANT

## 2021-10-16 PROCEDURE — 84478 ASSAY OF TRIGLYCERIDES: CPT | Performed by: STUDENT IN AN ORGANIZED HEALTH CARE EDUCATION/TRAINING PROGRAM

## 2021-10-16 PROCEDURE — G0008 ADMIN INFLUENZA VIRUS VAC: HCPCS | Performed by: INTERNAL MEDICINE

## 2021-10-16 PROCEDURE — 0W993ZZ DRAINAGE OF RIGHT PLEURAL CAVITY, PERCUTANEOUS APPROACH: ICD-10-PCS | Performed by: RADIOLOGY

## 2021-10-16 PROCEDURE — 99232 SBSQ HOSP IP/OBS MODERATE 35: CPT | Mod: GC | Performed by: STUDENT IN AN ORGANIZED HEALTH CARE EDUCATION/TRAINING PROGRAM

## 2021-10-16 PROCEDURE — 87205 SMEAR GRAM STAIN: CPT | Performed by: FAMILY MEDICINE

## 2021-10-16 PROCEDURE — 85018 HEMOGLOBIN: CPT | Performed by: FAMILY MEDICINE

## 2021-10-16 PROCEDURE — 82570 ASSAY OF URINE CREATININE: CPT | Performed by: STUDENT IN AN ORGANIZED HEALTH CARE EDUCATION/TRAINING PROGRAM

## 2021-10-16 RX ADMIN — PANTOPRAZOLE SODIUM 40 MG: 20 TABLET, DELAYED RELEASE ORAL at 06:35

## 2021-10-16 RX ADMIN — ACETAMINOPHEN 650 MG: 325 TABLET ORAL at 20:39

## 2021-10-16 RX ADMIN — ACETAMINOPHEN 650 MG: 325 TABLET ORAL at 01:31

## 2021-10-16 RX ADMIN — INFLUENZA A VIRUS A/WISCONSIN/588/2019 (H1N1) RECOMBINANT HEMAGGLUTININ ANTIGEN, INFLUENZA A VIRUS A/TASMANIA/503/2020 (H3N2) RECOMBINANT HEMAGGLUTININ ANTIGEN, INFLUENZA B VIRUS B/WASHINGTON/02/2019 RECOMBINANT HEMAGGLUTININ ANTIGEN, AND INFLUENZA B VIRUS B/PHUKET/3073/2013 RECOMBINANT HEMAGGLUTININ ANTIGEN 0.5 ML: 45; 45; 45; 45 INJECTION INTRAMUSCULAR at 10:22

## 2021-10-16 RX ADMIN — CEFAZOLIN 1 G: 1 INJECTION, POWDER, FOR SOLUTION INTRAMUSCULAR; INTRAVENOUS at 14:53

## 2021-10-16 RX ADMIN — DOCUSATE SODIUM 50 MG AND SENNOSIDES 8.6 MG 1 TABLET: 8.6; 5 TABLET, FILM COATED ORAL at 08:35

## 2021-10-16 NOTE — SIGNIFICANT EVENT
Significant Event Note    Time of event: 4:33 PM October 16, 2021    Description of event:  Called by radiologist stating that fluid is much more loculated than what was noted on prior thoras, would recommend considering getting labs on the pleural fluid.    Plan:  -Ordered pleural fluids labs      Jose Sanders MD

## 2021-10-16 NOTE — PLAN OF CARE
Problem: Adult Inpatient Plan of Care  Goal: Plan of Care Review  Outcome: Improving   Patient denies pain, denies numbness to LUE.  Dressing dry and intact, no drainage.  Sling for comfort.  Hgb recheck 7.8.      Patient had repeat right thoracentesis this evening.  Bandaid dry and intact.

## 2021-10-16 NOTE — PROCEDURES
HEMODIALYSIS TREATMENT NOTE    Date: 10/15/2021  Time: 10:23 PM    Data:  Pre Wt: 61 kg (134 lb 7.7 oz)   Desired Wt: 60 kg   Post Wt: 60 kg (132 lb 4.4 oz)  Weight change: 1 kg  Ultrafiltration - Post Run Net Total Removed (mL): 1204 mL  Vascular Access Status: patent; AVF with bruit/thrill; no s/s of infection. Accessed with 16 g needles. Both ports aspirate/flush easily. 350 BFR  Dialyzer Rinse: Moderate  Total Blood Volume Processed: 36.8 L Liters  Total Dialysis (Treatment) Time: 2 hours Hours    Interventions:  Monitor VS every 15 min. Critline and hemodynamics used for fluid removal.     Assessment:  2K; 3 hr treatment; 1204 mL removed; 350 BFR. Pt hemodynamically stable during treatment. Denies chest pain or other discomfort. Denies SOB on room air. Pt only willing to have a 2 hour treatment; requesting to have treatment in the AM. Primary RN and MD notified. See flowsheet for all data. Report given to primary RN at 8210.     Plan:    Per renal    Annette Wilhelm RN on 10/15/2021 at 10:29 PM

## 2021-10-16 NOTE — PROGRESS NOTES
[facility]  [facility]   Follow up    Elle Blanton   MRN: [id number]  : 1960   DOA: 10/14/2021   CC: ESRD      Assessment and Plan:  61 year old male  1. ESRD: pt has been recommended to run Q MWF for volume and electrolyte control and he refuses; has been running Q M and F; one issues is prolonged bleeding from access and he feels he needs more time to heal and the other is he feels better overall with 2x/week HD; will continue to address as OP  2. Anemia; on chronic epo  3. Secondary hyperpara; no binders on med list; on renal diet; start some tums as binder  4. Septic L olecranon bursitis; s/p I and D and on abx  5. AAA with h/o dissection  6. GERD  7. Chronic hepatitis B cirrhosis  8. Thrombocytopenia         Subjective  Had HD last evening; only completed 2/3 of run; feels well today; pain in arm is controlled; access is able to be used and still patent  Objective    Vital signs in last 24 hours  Temp:  [97.3  F (36.3  C)-98.3  F (36.8  C)] 98.2  F (36.8  C)  Pulse:  [76-97] 86  Resp:  [14-18] 18  BP: ()/(59-78) 107/66  FiO2 (%):  [2 %] 2 %  SpO2:  [76 %-100 %] 96 %      Intake/Output last 3 shifts  I/O last 3 completed shifts:  In: 490 [P.O.:240; I.V.:250]  Out: 1219 [Other:1204; Blood:15]  Intake/Output this shift:  No intake/output data recorded.    Physical Exam  Alert/oriented x 3, awake, NAD  CV: RRR without murmur or rub  Lung: clear and equal; no extra sounds  Ab: soft and NT; not distended; normal bs  Ext: no edema and well perfused  Skin; no rash    Pertinent Labs     Last Renal Panel:  Sodium   Date Value Ref Range Status   10/16/2021 133 (L) 136 - 145 mmol/L Final   2021 134 (L) 136 - 145 mmol/L Final     Potassium   Date Value Ref Range Status   10/16/2021 4.0 3.5 - 5.0 mmol/L Final   2021 4.7 3.5 - 5.0 mmol/L Final     Chloride   Date Value Ref Range Status   10/16/2021 97 (L) 98 - 107 mmol/L Final   2021 99  98 - 107 mmol/L Final     Carbon Dioxide   Date Value Ref Range Status   12/30/2020 21.0 20.0 - 32.0 mmol/L Final     Carbon Dioxide (CO2)   Date Value Ref Range Status   10/16/2021 24 22 - 31 mmol/L Final     Anion Gap   Date Value Ref Range Status   10/16/2021 12 5 - 18 mmol/L Final   08/10/2019 6 3 - 14 mmol/L Final     Glucose   Date Value Ref Range Status   10/16/2021 106 70 - 125 mg/dL Final   04/29/2021 111 70 - 125 mg/dL Final     GLUCOSE BY METER POCT   Date Value Ref Range Status   10/16/2021 104 (H) 70 - 99 mg/dL Final     Urea Nitrogen   Date Value Ref Range Status   10/16/2021 61 (H) 8 - 22 mg/dL Final   04/29/2021 71 (H) 8 - 22 mg/dL Final     Creatinine   Date Value Ref Range Status   10/16/2021 8.61 (HH) 0.70 - 1.30 mg/dL Final   04/29/2021 5.13 (H) 0.70 - 1.30 mg/dL Final     GFR Estimate   Date Value Ref Range Status   10/16/2021 6 (L) >60 mL/min/1.73m2 Final     Comment:     As of July 11, 2021, eGFR is calculated by the CKD-EPI creatinine equation, without race adjustment. eGFR can be influenced by muscle mass, exercise, and diet. The reported eGFR is an estimation only and is only applicable if the renal function is stable.   04/29/2021 12 (L) >60 mL/min/1.73m2 Final     Calcium   Date Value Ref Range Status   10/16/2021 7.5 (L) 8.5 - 10.5 mg/dL Final   04/29/2021 7.9 (L) 8.5 - 10.5 mg/dL Final     Phosphorus   Date Value Ref Range Status   04/27/2021 2.6 2.5 - 4.5 mg/dL Final   08/10/2019 3.4 2.5 - 4.5 mg/dL Final     Albumin   Date Value Ref Range Status   09/27/2021 3.1 (L) 3.5 - 5.0 g/dL Final   12/30/2020 2.9 (L) 3.2 - 4.5 g/dL Final     CBC RESULTS:   Recent Labs   Lab Test 10/16/21  0557   WBC 10.0   RBC 2.59*   HGB 7.6*   HCT 25.0*   MCV 97   MCH 29.3   MCHC 30.4*   RDW 16.0*   *        UA RESULTS:  Recent Labs   Lab Test 08/19/20  1701   COLOR Yellow   APPEARANCE Clear   URINEGLC Negative   URINEBILI Negative   URINEKETONE Negative   SG 1.007   UBLD Trace*   URINEPH 6.0   PROTEIN  Negative   UROBILINOGEN <2.0 E.U./dL   NITRITE Negative   LEUKEST Negative   RBCU 0-2   WBCU 0-5        Results for orders placed or performed during the hospital encounter of 10/14/21   XR Chest 2 Views    Impression    IMPRESSION:     Near complete opacification of the right hemithorax with a small amount of aerated right upper lobe above the right tracheobronchial angle. The pattern is most consistent with a large right pleural effusion and related compressive atelectasis of the   majority of the right lung. There is slight shift of the heart and mediastinal structures towards the left.     Ectatic aortic arch and descending thoracic aorta. Right atrial heart border is obscured. Left ventricular heart border is within normal limits and unchanged.   US Thoracentesis    Impression    IMPRESSION:  Status post right ultrasound-guided thoracentesis.    Reference CPT Code: 42094          I reviewed all lab results  Juliana Olivas MD

## 2021-10-16 NOTE — PROGRESS NOTES
"Primary Medicine Progress Note    Assessment/Plan  Principal Problem:    Septic bursitis  Active Problems:    Cirrhosis of liver without ascites (H)    Descending thoracic aortic dissection (H)    ESRD (end stage renal disease) on dialysis (H)    Pleural effusion      61 year old M with multiple comorbidities including AAA (5.5cm), chronic thoracic aortic dissection, 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 Seen in ED 10/11 - diagnosed with olecranon bursitis. Followed up with PCP in clinic 10/13 after being on 2 days of Augmentin. Joint drainage revealed purulent fluid - now septic bursitis, positive for MSSA. Admitted for IV antibiotics and Ortho consult for I&D, possible bursectomy. Also s/p R thoracentesis with 1.5L drained for pleural effusion suspected 2/2 fluid overload seemingly related to fluid status. Underwent HD yesterday.        Septic bursitis of L olecranon bursa, POD1 bursectomy  Likely hematogenous spread given proximity of AVF and HD. Possibly started as olecranon bursitis given repeated elbow \"trauma\" with using hard armrests. Bursa fluid cultures growing out MSSA. Swtiched to Ancef 10/15 and discontinued vancomycin.   - Ortho consult, performed bursectomy 10/15. Want to see in clinic in 7-10 days  - Keep splint and dressings in place  - Ancef renally dosed  - PRN pain medication  - CBC, BMP in am  - F/u blood cultures: NGTD     Recurrent R Pleural Effusion  Previous pleural effusion tapped and tolerated well. Likely due to volume overload due to ESRD. S/p R thoracentesis w/ drainage of 1.5L fluid and got HD yesterday. Is clinically stable and not requiring oxygen. Afebrile, normotensive, nontoxic. Very low concern for infection at this time. Does still complain of feeling of \"heaviness over right chest\" and given that not all was drained yesterday (due to patient tolerance of the discomfort) wants it done " today again.  - Repeat R thoracentesis today  - Supplemental O2 prn to maintain O2 > 90%     ESRD on HD   L AVF in place, doesn't appear to be affected by septic bursitis. Dialyzes M/F as he refuses to do thrice weekly HD.  -Nephrology consulted for HD     Chronic Medical Conditions  - AAA/Aortic Dissection: Restart PTA carvedilol and continue to hold clonidine  - GERD: continue PTA pantoprazole  - Chronic Hep B/Cirrhosis: entecavir q7 days. Continue after med rec.  - Thrombocytopenia: no active bleeding. CTM     DVT ppx: Mechanical  Diet: ADAT  Code: Full     Dispo: inpatient status for thoracentesis, possible surgery, IV antibiotics, anticipate discharge to home   Barriers to discharge:  IV antibiotics   Anticipated discharge date:  1 days    Subjective:   Doing well this morning denies any fevers or chills.  Does complain of some right chest heaviness but denies any shortness of breath or dyspnea.  Says his arm feels fine and denies any pain with minimal movement.  Continues to have a Ace wraps on it.    Objective    Vital signs in last 24 hours Temp:  [97.3  F (36.3  C)-98.3  F (36.8  C)] 98.2  F (36.8  C)  Pulse:  [76-97] 86  Resp:  [14-18] 18  BP: ()/(59-78) 107/66  FiO2 (%):  [2 %] 2 %  SpO2:  [76 %-100 %] 96 %       Weight:   Vitals:    10/15/21 1922   Weight: 61 kg (134 lb 7.7 oz)       Intake/Output last 3 shift I/O last 3 completed shifts:  In: 490 [P.O.:240; I.V.:250]  Out: 1219 [Other:1204; Blood:15]    Intake/Output this shift:I/O this shift:  In: 360 [P.O.:360]  Out: -     PHYSICAL EXAM  GENERAL APPEARANCE: Cooperative; appears stated age  LUNGS: Diminished lung sounds in right base  HEART: RRR, no murmurs. Radial pulses 2+. Brisk cap refill. Ulnar and radial pulses present in left arm.  ABDOMEN: Non-distended, soft, no tenderness  EXTREMITIES/SKIN: Ace wrap dressing over left arm without surrounding erythema present.  Able to move both arms well.   NEURO: Sensation fully intact in bilateral  distal arms    Pertinent Labs and Pertinent Radiology   Lab Results: personally reviewed.     Recent Labs   Lab 10/16/21  0557   WBC 10.0   HGB 7.6*   HCT 25.0*   *       Recent Labs   Lab 10/16/21  0557   *   CO2 24   BUN 61*       Radiology Results:   Results for orders placed or performed during the hospital encounter of 10/14/21   XR Chest 2 Views    Impression    IMPRESSION:     Near complete opacification of the right hemithorax with a small amount of aerated right upper lobe above the right tracheobronchial angle. The pattern is most consistent with a large right pleural effusion and related compressive atelectasis of the   majority of the right lung. There is slight shift of the heart and mediastinal structures towards the left.     Ectatic aortic arch and descending thoracic aorta. Right atrial heart border is obscured. Left ventricular heart border is within normal limits and unchanged.   US Thoracentesis    Impression    IMPRESSION:  Status post right ultrasound-guided thoracentesis.    Reference CPT Code: 96691       Precepted patient with Dr. Nazanin Sanders MD  [facility]   Pager #: [phone]    Parts of this note were created with the assistance of voice recognition software.  The note was reviewed for accuracy.  However, errors in spelling, etc may have resulted.

## 2021-10-16 NOTE — PROVIDER NOTIFICATION
Patient refused coreg dose.  Informed house officer and asked about possible BP parameter.  Patient states he does not take at home if SBP <120.

## 2021-10-16 NOTE — OP NOTE
Procedure Date: 10/15/2021    PREOPERATIVE DIAGNOSIS:  Left elbow olecranon bursitis/gout.    POSTOPERATIVE DIAGNOSIS:  Left elbow olecranon bursitis/gout.    PROCEDURE:  Left elbow irrigation and debridement, olecranon bursectomy.    SURGEON:  Tico Myers MD    ASSISTANT:  Ilan Abad PA-C    ANESTHESIA:  Regional block, left upper extremity.    INDICATIONS FOR PROCEDURE:  Elle is a 61-year-old gentleman with left elbow olecranon bursitis consistent with an infection.  He presents today to the OR for irrigation, debridement and olecranon bursectomy.  We discussed the risks and benefits of surgery and those include but are not limited to ongoing infection, scarring, stiffness, wound dehiscence, injury to blood vessels, tendons, and nerves.  All questions were answered, and operative consents were signed preoperatively.    DESCRIPTION OF PROCEDURE:  The patient was given a regional block to the left upper extremity.  He was taken to the OR and placed supine on the OR table.  Left upper extremity was prepped and draped in standard sterile fashion after a tourniquet was placed on the proximal brachium.  Limb was elevated, exsanguinated, and tourniquet elevated to 250 mmHg.  A curvilinear longitudinal posterior incision was made over the elbow.  Full thickness flaps were carried out down to the large olecranon bursa, which is approximately 6 x 5 cm.  It was filled with gouty tophus as well as purulence.  Cultures were taken for aerobic and anaerobic identification and sensitivities.  The complete olecranon bursa was excised.  This was done with a 15 blade as well as a monopolar cautery.  Once the bursa was excised, the wound was irrigated copiously.  Small areas of hemorrhage were controlled with monopolar.  Tourniquet was let down at this point and pressure was held over the wound.  Small remaining bleeders were controlled with monopolar cautery.  The wound was again irrigated copiously.  The subcutaneous tissue  was closed with 3-0 Vicryl and cutaneous was closed with 3-0 nylon.  He was placed in a well-padded posterior mold splint.  There were no complications. Blood loss was approximately 15 mL. He was taken to recovery room in stable condition.    Tico Myers MD        D: 10/15/2021   T: 10/15/2021   MT: consuelo    Name:     MARÍA ELENA HORNER  MRN:      [id number]        Account:        [id number]   :      1960           Procedure Date: 10/15/2021     Document: [id number]

## 2021-10-16 NOTE — PLAN OF CARE
Problem: Adult Inpatient Plan of Care  Goal: Plan of Care Review  Outcome: Improving   Patient denies pain, numbness and tingling present from the block.  Ace wrap dressing dry and intact. Sling to LUE.       Received PRN zofran for nausea with relief.  Taking PO well.    Patient had dialysis run this evening.

## 2021-10-16 NOTE — PLAN OF CARE
Problem: Adult Inpatient Plan of Care  Goal: Plan of Care Review  Outcome: Improving  Goal: Patient-Specific Goal (Individualized)  Outcome: Improving  Goal: Absence of Hospital-Acquired Illness or Injury  Outcome: Improving  Intervention: Identify and Manage Fall Risk  Recent Flowsheet Documentation  Taken 10/16/2021 0425 by Agnieszka Ty RN  Safety Promotion/Fall Prevention: bed alarm on  Taken 10/16/2021 0025 by Agnieszka Ty RN  Safety Promotion/Fall Prevention: bed alarm on  Intervention: Prevent Skin Injury  Recent Flowsheet Documentation  Taken 10/16/2021 0425 by Agnieszka Ty RN  Body Position:   position changed independently   right  Taken 10/16/2021 0140 by Agnieszka Ty RN  Body Position:   position changed independently   right  Taken 10/16/2021 0025 by Agnieszka yT RN  Body Position:   position changed independently   right  Goal: Optimal Comfort and Wellbeing  Outcome: Improving  Goal: Readiness for Transition of Care  Outcome: Improving     Problem: Pain Acute  Goal: Acceptable Pain Control and Functional Ability  Outcome: Improving     Problem: Infection  Goal: Absence of Infection Signs and Symptoms  Outcome: Improving   Pt a/o , frustrated that he is not sleeping well d/t cares. Block in place on LUE, no movement or feeling. Pt noted to drop as ow as  76% while sleeping, but pt recovers within the same minute. Pt put on 2L nc for night.

## 2021-10-16 NOTE — PROGRESS NOTES
[facility] Post-Op Note         Assessment and Plan:    Assessment:   Postop day 1 left elbow olecranon bursectomy       Plan:   I would keep his splint and dressings in place for the time being.  Elbow is looking good at this point.  Relatively no pain.  I would anticipate him to be able to discharge to home with his left elbow the way that it is.  We would plan to see him back in the clinic in 7 to 10 days to remove the dressing and remove the sutures.           Interval History:   Doing well.  Continues to improve.  Pain is well-controlled.             Significant Problems:     Patient Active Problem List   Diagnosis     Melena     Normocytic anemia     Thrombocytopenia (H)     Other pancytopenia (H)     Chronic hepatitis B without hepatic coma (H)     Cirrhosis of liver without ascites (H)     Essential hypertension     CKD (chronic kidney disease) stage 4, GFR 15-29 ml/min (H)     Descending thoracic aortic dissection (H)     Anemia due to other bone marrow failure (H)     Thrombocytopenia due to hypersplenism     Iliopsoas muscle hematoma, left, initial encounter     Chronic dissection of thoracic aorta (H)     ESRD (end stage renal disease) on dialysis (H)     Anemia due to blood loss, acute     Hepatic cirrhosis, unspecified hepatic cirrhosis type, unspecified whether ascites present (H)     Septic bursitis     Pleural effusion             Review of Systems:   CONSTITUTIONAL: NEGATIVE for fever, chills, change in weight  ENT/MOUTH: NEGATIVE for ear, mouth and throat problems  RESP: NEGATIVE for significant cough or SOB  CV: NEGATIVE for chest pain, palpitations or peripheral edema          Medications:     All medications related to the patient's surgery have been reviewed  Current Facility-Administered Medications   Medication     0.9% sodium chloride BOLUS     acetaminophen (TYLENOL) tablet 650 mg    Or     acetaminophen (TYLENOL) Suppository 650 mg     [START ON 10/18/2021]  acetaminophen (TYLENOL) tablet 650 mg     acetaminophen (TYLENOL) tablet 975 mg     albumin human 25 % injection 50 mL     benzocaine-menthol (CEPACOL) 15-3.6 MG lozenge 1 lozenge     bisacodyl (DULCOLAX) Suppository 10 mg     carvedilol (COREG) tablet 25 mg     ceFAZolin (ANCEF) 1 g vial to attach to  ml bag for ADULT or 50 ml bag for PEDS     epoetin kelton-epbx (RETACRIT) injection 10,000 Units     [Held by provider] heparin ANTICOAGULANT injection 5,000 Units     HYDROmorphone (DILAUDID) injection 0.2 mg    Or     HYDROmorphone (DILAUDID) injection 0.4 mg     hydrOXYzine (ATARAX) tablet 25 mg     influenza recomb quadrivalent PF (FLUBLOK) injection 0.5 mL     lidocaine (LMX4) cream     lidocaine (LMX4) cream     lidocaine 1 % 0.1-1 mL     lidocaine 1 % 0.1-1 mL     lidocaine 1 % 0.5 mL     lidocaine 1 % 0.5 mL     magnesium hydroxide (MILK OF MAGNESIA) suspension 30 mL     melatonin tablet 1 mg     naloxone (NARCAN) injection 0.2 mg    Or     naloxone (NARCAN) injection 0.4 mg    Or     naloxone (NARCAN) injection 0.2 mg    Or     naloxone (NARCAN) injection 0.4 mg     ondansetron (ZOFRAN-ODT) ODT tab 4 mg    Or     ondansetron (ZOFRAN) injection 4 mg     ondansetron (ZOFRAN-ODT) ODT tab 4 mg    Or     ondansetron (ZOFRAN) injection 4 mg     oxyCODONE (ROXICODONE) tablet 5 mg    Or     oxyCODONE (ROXICODONE) tablet 10 mg     pantoprazole (PROTONIX) EC tablet 40 mg     polyethylene glycol (MIRALAX) Packet 17 g     polyethylene glycol (MIRALAX) Packet 17 g     prochlorperazine (COMPAZINE) injection 10 mg    Or     prochlorperazine (COMPAZINE) tablet 10 mg     prochlorperazine (COMPAZINE) injection 10 mg    Or     prochlorperazine (COMPAZINE) tablet 10 mg    Or     prochlorperazine (COMPAZINE) suppository 25 mg     senna-docusate (SENOKOT-S/PERICOLACE) 8.6-50 MG per tablet 1 tablet     senna-docusate (SENOKOT-S/PERICOLACE) 8.6-50 MG per tablet 1 tablet    Or     senna-docusate (SENOKOT-S/PERICOLACE) 8.6-50 MG  per tablet 2 tablet     sodium chloride (PF) 0.9% PF flush 3 mL     sodium chloride (PF) 0.9% PF flush 3 mL     sodium chloride (PF) 0.9% PF flush 3 mL     sodium chloride (PF) 0.9% PF flush 3 mL     Stop Heparin 60 minutes before end of treatment             Physical Exam:   All vitals stable  Temp: 98.2  F (36.8  C) Temp src: Oral BP: 107/66 Pulse: 86   Resp: 18 SpO2: 96 % O2 Device: None (Room air) Oxygen Delivery: 2 LPM  I did partially remove the splint and dressing from his left arm.  The incision is clean, dry and intact.  Sutures are in place.  I did replace some new sterile gauze around the posterior aspect of his elbow and then replaced the splint.  He has full sensation throughout his left hand, there is good distal pulses with brisk capillary refill.  He can make a composite fist and extend his fingers.  There is no pain to palpation noted throughout his left upper extremity.           Data:     All laboratory data related to this surgery reviewed  Hemoglobin   Date Value Ref Range Status   10/16/2021 7.6 (L) 13.3 - 17.7 g/dL Final   10/15/2021 7.5 (L) 13.3 - 17.7 g/dL Final   10/15/2021 7.8 (L) 13.3 - 17.7 g/dL Final   10/14/2021 12.8 (L) 13.3 - 17.7 g/dL Final   10/11/2021 7.7 (L) 13.3 - 17.7 g/dL Final   04/29/2021 8.9 (L) 14.0 - 18.0 g/dL Final   12/30/2020 8.0 (L) 13.3 - 17.7 g/dL Final   12/03/2020 8.8 (L) 13.3 - 17.7 g/dL Final   11/04/2020 8.9 (L) 13.3 - 17.7 g/dL Final   10/21/2020 9.1 (L) 13.3 - 17.7 g/dL Final          GABE VANCE PA-C

## 2021-10-17 VITALS
RESPIRATION RATE: 18 BRPM | WEIGHT: 134.48 LBS | DIASTOLIC BLOOD PRESSURE: 70 MMHG | HEART RATE: 93 BPM | OXYGEN SATURATION: 95 % | TEMPERATURE: 98.3 F | BODY MASS INDEX: 22.38 KG/M2 | SYSTOLIC BLOOD PRESSURE: 110 MMHG

## 2021-10-17 LAB
ANION GAP SERPL CALCULATED.3IONS-SCNC: 15 MMOL/L (ref 5–18)
BUN SERPL-MCNC: 74 MG/DL (ref 8–22)
CALCIUM SERPL-MCNC: 7.3 MG/DL (ref 8.5–10.5)
CHLORIDE BLD-SCNC: 96 MMOL/L (ref 98–107)
CO2 SERPL-SCNC: 22 MMOL/L (ref 22–31)
CREAT SERPL-MCNC: 10.86 MG/DL (ref 0.7–1.3)
ERYTHROCYTE [DISTWIDTH] IN BLOOD BY AUTOMATED COUNT: 15.9 % (ref 10–15)
GFR SERPL CREATININE-BSD FRML MDRD: 5 ML/MIN/1.73M2
GLUCOSE BLD-MCNC: 90 MG/DL (ref 70–125)
GRAM STAIN RESULT: NORMAL
GRAM STAIN RESULT: NORMAL
HCT VFR BLD AUTO: 25.7 % (ref 40–53)
HGB BLD-MCNC: 7.8 G/DL (ref 13.3–17.7)
MCH RBC QN AUTO: 29.3 PG (ref 26.5–33)
MCHC RBC AUTO-ENTMCNC: 30.4 G/DL (ref 31.5–36.5)
MCV RBC AUTO: 97 FL (ref 78–100)
PLATELET # BLD AUTO: 104 10E3/UL (ref 150–450)
POTASSIUM BLD-SCNC: 4.5 MMOL/L (ref 3.5–5)
RBC # BLD AUTO: 2.66 10E6/UL (ref 4.4–5.9)
SODIUM SERPL-SCNC: 133 MMOL/L (ref 136–145)
WBC # BLD AUTO: 8.5 10E3/UL (ref 4–11)

## 2021-10-17 PROCEDURE — 250N000013 HC RX MED GY IP 250 OP 250 PS 637: Performed by: STUDENT IN AN ORGANIZED HEALTH CARE EDUCATION/TRAINING PROGRAM

## 2021-10-17 PROCEDURE — 250N000013 HC RX MED GY IP 250 OP 250 PS 637: Performed by: INTERNAL MEDICINE

## 2021-10-17 PROCEDURE — 36415 COLL VENOUS BLD VENIPUNCTURE: CPT | Performed by: PHYSICIAN ASSISTANT

## 2021-10-17 PROCEDURE — 85027 COMPLETE CBC AUTOMATED: CPT | Performed by: PHYSICIAN ASSISTANT

## 2021-10-17 PROCEDURE — 99232 SBSQ HOSP IP/OBS MODERATE 35: CPT | Performed by: INTERNAL MEDICINE

## 2021-10-17 PROCEDURE — 250N000013 HC RX MED GY IP 250 OP 250 PS 637: Performed by: PHYSICIAN ASSISTANT

## 2021-10-17 PROCEDURE — 80048 BASIC METABOLIC PNL TOTAL CA: CPT | Performed by: PHYSICIAN ASSISTANT

## 2021-10-17 PROCEDURE — 99238 HOSP IP/OBS DSCHRG MGMT 30/<: CPT | Mod: GC | Performed by: STUDENT IN AN ORGANIZED HEALTH CARE EDUCATION/TRAINING PROGRAM

## 2021-10-17 RX ORDER — CEPHALEXIN 500 MG/1
500 CAPSULE ORAL 2 TIMES DAILY
Qty: 14 CAPSULE | Refills: 0 | Status: ON HOLD | OUTPATIENT
Start: 2021-10-17 | End: 2021-10-26

## 2021-10-17 RX ORDER — CALCIUM CARBONATE 500 MG/1
500 TABLET, CHEWABLE ORAL
Status: DISCONTINUED | OUTPATIENT
Start: 2021-10-17 | End: 2021-10-17 | Stop reason: HOSPADM

## 2021-10-17 RX ADMIN — CALCIUM CARBONATE (ANTACID) CHEW TAB 500 MG 500 MG: 500 CHEW TAB at 13:40

## 2021-10-17 RX ADMIN — PANTOPRAZOLE SODIUM 40 MG: 20 TABLET, DELAYED RELEASE ORAL at 06:38

## 2021-10-17 RX ADMIN — ACETAMINOPHEN 650 MG: 325 TABLET ORAL at 10:44

## 2021-10-17 NOTE — PROGRESS NOTES
[facility]  [facility]   Follow up    Elle Blanton   MRN: [id number]  : 1960   DOA: 10/14/2021   CC: ESRD      Assessment and Plan:  61 year old male  1. ESRD: pt has been recommended to run Q MWF for volume and electrolyte control and he refuses; has been running Q M and F; one issues is prolonged bleeding from access and he feels he needs more time to heal and the other is he feels better overall with 2x/week HD; will continue to address as OP; next HD tomorrow  2. Anemia; on chronic epo  3. Secondary hyperpara; no binders on med list; on renal diet; started some tums as binder  4. Septic L olecranon bursitis; s/p I and D and on abx  5. AAA with h/o dissection  6. GERD  7. Chronic hepatitis B cirrhosis  8. Thrombocytopenia  9. Dispo; no objection to discharge today; can go to usual OP arrangement for dialysis tomorrow         Subjective  Pain in elbow is controlled; no CP or SOB; no N/V  Objective    Vital signs in last 24 hours  Temp:  [97.4  F (36.3  C)-98.9  F (37.2  C)] 98.3  F (36.8  C)  Pulse:  [80-93] 93  Resp:  [16-20] 18  BP: ()/(63-78) 110/70  SpO2:  [95 %-98 %] 95 %      Intake/Output last 3 shifts  I/O last 3 completed shifts:  In: 410 [P.O.:410]  Out: -   Intake/Output this shift:  No intake/output data recorded.    Physical Exam  Alert/oriented x 3, awake, NAD  CV: RRR without murmur or rub  Lung: clear and equal; no extra sounds  Ab: soft and NT; not distended; normal bs  Ext: no edema and well perfused  Skin; no rash    Pertinent Labs     Last Renal Panel:  Sodium   Date Value Ref Range Status   10/17/2021 133 (L) 136 - 145 mmol/L Final   2021 134 (L) 136 - 145 mmol/L Final     Potassium   Date Value Ref Range Status   10/17/2021 4.5 3.5 - 5.0 mmol/L Final   2021 4.7 3.5 - 5.0 mmol/L Final     Chloride   Date Value Ref Range Status   10/17/2021 96 (L) 98 - 107 mmol/L Final   2021 99 98 - 107 mmol/L Final      Carbon Dioxide   Date Value Ref Range Status   12/30/2020 21.0 20.0 - 32.0 mmol/L Final     Carbon Dioxide (CO2)   Date Value Ref Range Status   10/17/2021 22 22 - 31 mmol/L Final     Anion Gap   Date Value Ref Range Status   10/17/2021 15 5 - 18 mmol/L Final   08/10/2019 6 3 - 14 mmol/L Final     Glucose   Date Value Ref Range Status   10/17/2021 90 70 - 125 mg/dL Final   04/29/2021 111 70 - 125 mg/dL Final     Urea Nitrogen   Date Value Ref Range Status   10/17/2021 74 (H) 8 - 22 mg/dL Final   04/29/2021 71 (H) 8 - 22 mg/dL Final     Creatinine   Date Value Ref Range Status   10/17/2021 10.86 (HH) 0.70 - 1.30 mg/dL Final   04/29/2021 5.13 (H) 0.70 - 1.30 mg/dL Final     GFR Estimate   Date Value Ref Range Status   10/17/2021 5 (L) >60 mL/min/1.73m2 Final     Comment:     As of July 11, 2021, eGFR is calculated by the CKD-EPI creatinine equation, without race adjustment. eGFR can be influenced by muscle mass, exercise, and diet. The reported eGFR is an estimation only and is only applicable if the renal function is stable.   04/29/2021 12 (L) >60 mL/min/1.73m2 Final     Calcium   Date Value Ref Range Status   10/17/2021 7.3 (L) 8.5 - 10.5 mg/dL Final   04/29/2021 7.9 (L) 8.5 - 10.5 mg/dL Final     Phosphorus   Date Value Ref Range Status   04/27/2021 2.6 2.5 - 4.5 mg/dL Final   08/10/2019 3.4 2.5 - 4.5 mg/dL Final     Albumin   Date Value Ref Range Status   09/27/2021 3.1 (L) 3.5 - 5.0 g/dL Final   12/30/2020 2.9 (L) 3.2 - 4.5 g/dL Final     CBC RESULTS:   Recent Labs   Lab Test 10/16/21  0557   WBC 10.0   RBC 2.59*   HGB 7.6*   HCT 25.0*   MCV 97   MCH 29.3   MCHC 30.4*   RDW 16.0*   *        UA RESULTS:  Recent Labs   Lab Test 08/19/20  1701   COLOR Yellow   APPEARANCE Clear   URINEGLC Negative   URINEBILI Negative   URINEKETONE Negative   SG 1.007   UBLD Trace*   URINEPH 6.0   PROTEIN Negative   UROBILINOGEN <2.0 E.U./dL   NITRITE Negative   LEUKEST Negative   RBCU 0-2   WBCU 0-5        Results for  orders placed or performed during the hospital encounter of 10/14/21   XR Chest 2 Views    Impression    IMPRESSION:     Near complete opacification of the right hemithorax with a small amount of aerated right upper lobe above the right tracheobronchial angle. The pattern is most consistent with a large right pleural effusion and related compressive atelectasis of the   majority of the right lung. There is slight shift of the heart and mediastinal structures towards the left.     Ectatic aortic arch and descending thoracic aorta. Right atrial heart border is obscured. Left ventricular heart border is within normal limits and unchanged.   US Thoracentesis    Impression    IMPRESSION:  Status post right ultrasound-guided thoracentesis.    Reference CPT Code: 60700          I reviewed all lab results  Juliana Olivas MD

## 2021-10-17 NOTE — PLAN OF CARE
Problem: Adult Inpatient Plan of Care  Goal: Plan of Care Review  Outcome: Improving  Goal: Patient-Specific Goal (Individualized)  Outcome: Improving  Goal: Absence of Hospital-Acquired Illness or Injury  Outcome: Improving  Intervention: Prevent Skin Injury  Recent Flowsheet Documentation  Taken 10/17/2021 0131 by Agnieszka Ty RN  Body Position:   position changed independently   upper extremity elevated  Goal: Optimal Comfort and Wellbeing  Outcome: Improving  Goal: Readiness for Transition of Care  Outcome: Improving     Problem: Pain Acute  Goal: Acceptable Pain Control and Functional Ability  Outcome: Improving     Problem: Infection  Goal: Absence of Infection Signs and Symptoms  Outcome: Improving   Pt a/o, VSS, and no numbness or tingling. Pt refusing scheduled tylenol. Sleeping better tonight. Will continue to monitor.

## 2021-10-17 NOTE — PROGRESS NOTES
Pt received his discharge paperwork and acknowledged that.  No complain.  Pt will be  by spouse at around 1600.

## 2021-10-17 NOTE — PLAN OF CARE
Problem: Adult Inpatient Plan of Care  Goal: Plan of Care Review  Outcome: Improving  Flowsheets (Taken 10/15/2021 1538 by Rossi Fields, RN)  Plan of Care Reviewed With: patient  Goal: Patient-Specific Goal (Individualized)  Outcome: Improving  Goal: Absence of Hospital-Acquired Illness or Injury  Outcome: Improving  Intervention: Identify and Manage Fall Risk  Recent Flowsheet Documentation  Taken 10/17/2021 0800 by Gutierrez Rey, RN  Safety Promotion/Fall Prevention: lighting adjusted     Problem: Pain Acute  Goal: Acceptable Pain Control and Functional Ability  Outcome: Improving     Problem: Infection  Goal: Absence of Infection Signs and Symptoms  Outcome: Improving   Pt is alert and oriented x 4. Pt is med complaint. Pt received schedule pain med for left arm pain.  Pt's v/s stable. Continue to monitor pt.

## 2021-10-17 NOTE — PROGRESS NOTES
Elle Blanton is a 61 year old male patient. S/p I and D left elbow, olecranon bursa.    Cultures neg so far.  On ancef.    Past Medical History:   Diagnosis Date     NAHUM (acute kidney injury) (H)      GI (gastrointestinal bleed)      Gout      H. pylori infection 7/5/2017    UGI bleed implied by Hgb 9.0 6/22/17 and melena. Admitted and hgb decreased to 7.6, CT abdomen showed all bladder wall thickening. + Hep B surface antigen noted. Melena resolved and hgb stabalized without transfusion, epigastric pain resolved with PPI. Recommend referral for gastroscopy.     Heart attack (H)      Hepatitis B      Normocytic anemia      PONV (postoperative nausea and vomiting)      Thrombocytopenia (H)      No current outpatient medications on file.     Allergies   Allergen Reactions     Nka [No Known Allergies]      Principal Problem:    Septic bursitis  Active Problems:    Cirrhosis of liver without ascites (H)    Descending thoracic aortic dissection (H)    ESRD (end stage renal disease) on dialysis (H)    Pleural effusion    Blood pressure 110/70, pulse 93, temperature 98.3  F (36.8  C), temperature source Oral, resp. rate 18, weight 61 kg (134 lb 7.7 oz), SpO2 95 %.    Subjective left arm splinted, no pain.  Objective  NVI,  no pain with rom digits  Assessment & Plan   S/p I and D left olecranon bursa   Continue IV abx, ok to switch to oral abx later today.  Leave dressing and splint in place.  OK to discontinue home from ortho standpoint.  F/u with me this week in clinic if dc'd home. My office can arrange that followup. Call with questions.      Tico Myers MD  Cell [phone]  10/17/2021

## 2021-10-17 NOTE — PLAN OF CARE
Patient escorted out via wheelchair with nursing assistant. Belongings sent with patient.  Wife to transport.

## 2021-10-17 NOTE — DISCHARGE SUMMARY
[facility]  Discharge Summary - Medicine & Pediatrics       Date of Admission:  10/14/2021  Date of Discharge:  10/17/2021  Discharging Provider: Ivana  Discharge Service: [facility]    Discharge Diagnoses   (M71.10) Septic bursitis  (primary encounter diagnosis)  Pleural effusion  ESRD on dialysis  Cirrhosis of liver without ascites  Descending thoracic aortic dissection     Follow-ups Needed After Discharge   Follow up with primary care provider, Jaswant Flores, within 7 days to evaluate medication change, to evaluate treatment change, and for hospital follow- up.  The following labs/tests are recommended: CXR.    Follow up with orhopedic specialist in 7-10 days to follow up olecranon bursitis.    Unresulted Labs Ordered in the Past 30 Days of this Admission     Date and Time Order Name Status Description    10/16/2021  5:30 PM Anaerobic bacterial culture In process     10/16/2021  5:30 PM Body fluid, unsp Aerobic Bacterial Culture Routine Preliminary     10/16/2021  4:33 PM Creatinine fluid In process     10/15/2021  4:33 PM Wound Aerobic Bacterial Culture Routine Preliminary     10/15/2021  4:33 PM Anaerobic Bacterial Culture Routine Preliminary     10/15/2021  3:57 PM Hepatitis B surface antigen In process     10/14/2021  8:34 PM Blood Culture Arm, Right Preliminary     10/14/2021  8:34 PM Blood Culture Arm, Right Preliminary     9/26/2021  1:45 PM Prepare red blood cells (unit) Preliminary       These results will be followed up by ordering staff    Discharge Disposition   Discharged to home  Condition at discharge: Stable    Hospital Course   Elle Blanton, a61 year old M with multiple comorbidities including AAA (5.5cm), chronic thoracic aortic dissection, cirrhosis 2/2 chronic Hep B, ESRD on HD (M/F via L AVF), was admitted on 10/14/2021 with 1 week history of progressive L elbow swelling and pain. Seen in ED 10/11 - diagnosed with olecranon bursitis. Followed up with PCP in clinic  "10/13 after being on 2 days of Augmentin. Joint drainage revealed purulent fluid - now septic bursitis, positive for MSSA. Admitted for IV antibiotics and Ortho consult for I&D, possible bursectomy. Also s/p R thoracentesis with 1.5L drained for pleural effusion suspected 2/2 fluid overload seemingly related to fluid status. Underwent HD 10/15.  The following problems were addressed during his hospitalization:     Septic bursitis of L olecranon bursa, POD2 bursectomy  Likely hematogenous spread given proximity of AVF and HD. Possibly started as olecranon bursitis given repeated elbow \"trauma\" with using hard armrests. Had a culture drawn from clinic on 10/13 which grew out pan sensitive MSSA.  Patient had been started on Augmentin and clindamycin prior to presenting to the ED for further evaluation (was sensitive to both on culture).  Bursa fluid cultures from 10/15 growing out MSSA with sensitivities pending. Swtiched to Ancef 10/15 and discontinued vancomycin. Patient doing well on first generation cephalosporin.  Was transitioned to oral keflex 500 mg BID at the time of discharge.  Did reach out to on call nephrologist to discuss antibiotic dose given ESRD bi-weekly dialysis schedule who recommended that regimen.  [facility] follows with [facility] and it was recommended he follow up in the next 2-3 days to ensure continued improvement.  Plan to follow up with orthopedic clinic 7-10 days after discharge.      - Cleared from ortho standpoint to discharge to home on oral medictiaons  - Keep splint and dressings in place  - Follow up with [facility] 2-3 days and ortho clinic 7-10 days.       Recurrent R Pleural Effusion  Previous pleural effusion tapped and tolerated well. Likely due to volume overload due to ESRD. S/p R thoracentesis w/ drainage of 1.5L fluid and got HD 10/15. Is clinically stable and not requiring oxygen. Afebrile, normotensive, nontoxic. Very low concern for infection at this time. Does still " "complain of feeling of \"heaviness over right chest\" and given that not all was drained (due to patient tolerance of the discomfort) was repeated on 10/16.  Remains hemodynamically stable.  Labs consistent with exudative effusion at this time, but has had this in the past with negative cultures.  Patient requesting to discharge with the understanding that cultures may dictate he needs to return.  Also thoroughly discussed reasons to return for further evaluation.  Patient understood and will follow up in outpatient setting.    - Obtained pleural fluid labs given loculated appearance on exam, results returned suggestive of exudative cause of infection.  Does not necessarily consistent with clinical picture, cultures currently pending.  Had similar findings in 4/2021 without treatment.  Believe likely secondary to volume status in the setting of less than ideal dialysis.       ESRD on HD   L AVF in place, doesn't appear to be affected by septic bursitis. Dialyzes M/F as he refuses to do thrice weekly HD.  -Will go to scheduled dialysis tomorrow (10/18)     Chronic Medical Conditions  - AAA/Aortic Dissection: Restart PTA carvedilol and continue to hold clonidine  - GERD: continue PTA pantoprazole  - Chronic Hep B/Cirrhosis: entecavir q7 days. Continue after med rec.  - Thrombocytopenia: no active bleeding. CTM    Consultations This Hospital Stay   ORTHOPEDIC SURGERY IP CONSULT  PHARMACY TO DOSE VANCO  NEPHROLOGY IP CONSULT  NEPHROLOGY IP CONSULT  SOCIAL WORK IP CONSULT    Code Status   Prior       The patient was discussed with Dr. Juanita Healy MD  [facility] [address] [place] [address]  Phone: [phone]  Fax: [phone]  ______________________________________________________________________    Physical Exam   Vital Signs: Temp: 98.3  F (36.8  C) Temp src: Oral BP: 110/70 Pulse: 93   Resp: 18 SpO2: 95 % O2 Device: None (Room air)  "   Weight: 134 lbs 7.69 oz  Constitutional: awake, alert, cooperative, no apparent distress, and appears stated age.  Right arm in wrap up to just proximal of the elbow.    Hematologic / Lymphatic: no cervical lymphadenopathy  Respiratory: No increased work of breathing, good air exchange, clear to auscultation bilaterally, no crackles or wheezing  Cardiovascular: Normal apical impulse, regular rate and rhythm, normal S1 and S2, no S3 or S4, and no murmur noted  GI: No scars, normal bowel sounds, soft, non-distended, non-tender, no masses palpated, no hepatosplenomegally  Skin: no bruising or bleeding and normal skin color, texture, turgor  Musculoskeletal: Ace wrap dressing over left arm without surrounding erythema present.  Able to move both arms well. No appreciable asymmetry.    Neurologic: Awake, alert, oriented to name, place and time.  Cranial nerves II-XII are grossly intact.  Motor is 5 out of 5 bilaterally.  Cerebellar finger to nose, heel to shin intact.  Sensory is intact.  Babinski down going, Romberg negative, and gait is normal.  Neuropsychiatric: General: normal, calm and normal eye contact      Primary Care Physician   Jaswant Flores    Discharge Orders      Reason for your hospital stay    Olecranon bursitis requiring drainage by orthopedic surgery.  Also noted to have possible exudative pleural effusion, pending cultures at this time     Follow-up and recommended labs and tests     Follow up with primary care provider, Jaswant Flores, within 7 days to evaluate medication change, to evaluate treatment change, and for hospital follow- up.  The following labs/tests are recommended: CXR.    Follow up with orhopedic specialist in 7-10 days to follow up olecranon bursitis.     Activity    Your activity upon discharge: activity as tolerated and no driving for today     Diet    Follow this diet upon discharge: Orders Placed This Encounter      Advance Diet as Tolerated: Regular Diet Adult       Significant  Results and Procedures   Most Recent 3 CBC's:Recent Labs   Lab Test 10/17/21  0542 10/16/21  1701 10/16/21  0557 10/15/21  0708 10/15/21  0507   WBC 8.5  --  10.0  --  12.6*   HGB 7.8* 7.8* 7.6*   < > 7.8*   MCV 97  --  97  --  96   *  --  117*  --  125*    < > = values in this interval not displayed.     Most Recent 3 BMP's:Recent Labs   Lab Test 10/17/21  0542 10/16/21  1202 10/16/21  0803 10/16/21  0557 10/15/21  0507 10/15/21  0507 04/27/21  0016   *  --   --  133*  --  133*  --    POTASSIUM 4.5  --   --  4.0  --  4.6  --    CHLORIDE 96*  --   --  97*  --  97*  --    CO2 22  --   --  24  --  21*  --    BUN 74*  --   --  61*  --  90*  --    CR 10.86*  --   --  8.61*  --  11.60*  --    ANIONGAP 15  --   --  12  --  15  --    TANO 7.3*  --   --  7.5*  --  7.6*  --    GLC 90 99 104* 106   < > 95   < >    < > = values in this interval not displayed.     Most Recent 2 LFT's:Recent Labs   Lab Test 09/27/21  0833 08/18/21  1416   AST 18 31   ALT 21 49   ALKPHOS 71 129   BILITOTAL 1.2* 0.7   ,   Results for orders placed or performed during the hospital encounter of 10/14/21   XR Chest 2 Views    Narrative    EXAM: XR CHEST 2 VW  LOCATION: [facility]  DATE/TIME: 10/15/2021 10:32 AM    INDICATION: Dullness to percussion and decreased breath sounds in right chest, history of pleural effusions, some shortness of breath  COMPARISON: CT of the chest 09/26/2021      Impression    IMPRESSION:     Near complete opacification of the right hemithorax with a small amount of aerated right upper lobe above the right tracheobronchial angle. The pattern is most consistent with a large right pleural effusion and related compressive atelectasis of the   majority of the right lung. There is slight shift of the heart and mediastinal structures towards the left.     Ectatic aortic arch and descending thoracic aorta. Right atrial heart border is obscured. Left ventricular heart border is within normal  limits and unchanged.   US Thoracentesis    Narrative    EXAM:   1. RIGHT THORACENTESIS  2. ULTRASOUND GUIDANCE  LOCATION: [facility]  DATE/TIME: 10/15/2021 2:30 PM    INDICATION: Pleural effusion.    PROCEDURE: Informed consent obtained. Time out performed. The chest was prepped and draped in sterile fashion. 10 mL of 1 % lidocaine was infused into the local soft tissues. Under direct ultrasound guidance, a 5 Brazilian catheter system was placed into   the pleural effusion.     1.5 liters of clear yellow fluid were removed and sent to lab, if requested.    The patient reported pain at the catheter site during and after the procedure. He otherwise tolerated the procedure well.    Ultrasound imaging was obtained and placed in the patient's permanent medical record.      Impression    IMPRESSION:  Status post right ultrasound-guided thoracentesis.    Reference CPT Code: 73218   US Thoracentesis    Narrative    EXAM:   1. RIGHT THORACENTESIS  2. ULTRASOUND GUIDANCE  LOCATION: [facility]  DATE/TIME: 10/16/2021 3:56 PM    INDICATION: Pleural effusion.    PROCEDURE: Informed consent obtained. Time out performed. The chest was prepped and draped in sterile fashion. 10 mL of 1 % lidocaine was infused into the local soft tissues. Under direct ultrasound guidance, a 5 Brazilian catheter system was placed into   the pleural effusion.    The right pleural effusion is echogenic with numerous septations.    0.35 liters of cloudy yellow fluid were removed and sent to lab, if requested.    Patient tolerated procedure well.    Ultrasound imaging was obtained and placed in the patient's permanent medical record.      Impression    IMPRESSION:  1.  Status post right ultrasound-guided thoracentesis.  2.  Echogenic septated right pleural effusion.    Reference CPT Code: 68184       Discharge Medications   Discharge Medication List as of 10/17/2021  2:45 PM      START taking these medications     Details   cephALEXin (KEFLEX) 500 MG capsule Take 1 capsule (500 mg) by mouth 2 times daily, Disp-14 capsule, R-0, E-Prescribe         CONTINUE these medications which have NOT CHANGED    Details   acetaminophen (TYLENOL) 500 MG tablet Take 500 mg by mouth every 6 hours as needed for mild pain, Historical      carvedilol (COREG) 25 MG tablet Take 1 tablet (25 mg) by mouth 2 times daily (with meals), Disp-60 tablet, R-11, E-Prescribe      cloNIDine (CATAPRES) 0.1 MG tablet Take 1 tablet (0.1 mg) by mouth At Bedtime, Disp-30 tablet, R-11, E-Prescribe      diltiazem ER (TIAZAC) 180 MG 24 hr ER beaded capsule Take 1 capsule (180 mg) by mouth 2 times daily, Disp-60 capsule, R-11, E-Prescribe      entecavir (BARACLUDE) 0.5 MG tablet Take 1 tablet (0.5 mg) by mouth every 7 days, Disp-45 tablet, R-3, E-Prescribe      hydrOXYzine (ATARAX) 25 MG tablet Take 1 tablet (25 mg) by mouth 3 times daily as needed for itching, Disp-90 tablet, R-3, E-Prescribe      omeprazole (PRILOSEC) 20 MG DR capsule Take 1 capsule (20 mg) by mouth 2 times daily, Disp-60 capsule, R-11, E-Prescribe      oxyCODONE (ROXICODONE) 5 MG tablet Take 1 tablet (5 mg) by mouth every 6 hours as needed for severe pain, Disp-42 tablet, R-0, E-Prescribe      pantoprazole (PROTONIX) 40 MG EC tablet Take 1 tablet (40 mg) by mouth daily, Disp-30 tablet, R-3, E-PrescribeReplacing omeprazole      polyethylene glycol (MIRALAX) 17 GM/Dose powder Take 17 g by mouth daily, Disp-510 g, R-0, Local Print      order for DME Equipment being ordered: Other: blood pressure monitor  Treatment Diagnosis: hypertensionDisp-1 each, R-0, Local Print         STOP taking these medications       amoxicillin-clavulanate (AUGMENTIN) 500-125 MG tablet Comments:   Reason for Stopping:         clindamycin (CLEOCIN) 300 MG capsule Comments:   Reason for Stopping:             Allergies   Allergies   Allergen Reactions     Nka [No Known Allergies]

## 2021-10-17 NOTE — PROGRESS NOTES
Care Management Discharge Note    Discharge Date: 10/17/2021       Discharge Disposition: Home, Outpatient Dialysis    Discharge Services: None    Discharge DME: None    Discharge Transportation: family or friend will provide    Private pay costs discussed: Not applicable    PAS Confirmation Code:  n/a  Patient/family educated on Medicare website which has current facility and service quality ratings: no    Education Provided on the Discharge Plan:  yes  Persons Notified of Discharge Plans: patient  Patient/Family in Agreement with the Plan: yes    Handoff Referral Completed: No    Additional Information:  The writer met with the patient and his family member in the room to discuss the discharge plan. At baseline, the patient gets help with many ADLs/IADLs from his wife: showering, getting dressed, laundry, cleaning, and transportation. The patient denied the need for home services. He will be returning home without services, and his wife will provide transportation.     Jose Dodd RN

## 2021-10-18 ENCOUNTER — TELEPHONE (OUTPATIENT)
Dept: FAMILY MEDICINE | Facility: CLINIC | Age: 61
End: 2021-10-18

## 2021-10-18 ENCOUNTER — DOCUMENTATION ONLY (OUTPATIENT)
Dept: FAMILY MEDICINE | Facility: CLINIC | Age: 61
End: 2021-10-18

## 2021-10-18 LAB
BACTERIA WND CULT: ABNORMAL
CREAT FLD-MCNC: 8.8 MG/DL
HBV SURFACE AG SERPL QL IA: REACTIVE

## 2021-10-18 NOTE — TELEPHONE ENCOUNTER
Triage received abnormal result call from Infectious Disease lab. Calling to report result on specimen collected on 10/16/2021 1+ Staphylococcus aureus from right pleural cavity. Patient discharged from hospital 10/17/2021 per hospital note. Will page Urgent Task physician to review and further advise. Ze MELENDEZ

## 2021-10-19 ENCOUNTER — APPOINTMENT (OUTPATIENT)
Dept: INTERVENTIONAL RADIOLOGY/VASCULAR | Facility: HOSPITAL | Age: 61
DRG: 177 | End: 2021-10-19
Attending: SURGERY
Payer: COMMERCIAL

## 2021-10-19 ENCOUNTER — HOSPITAL ENCOUNTER (INPATIENT)
Facility: HOSPITAL | Age: 61
LOS: 8 days | Discharge: HOME | DRG: 177 | End: 2021-10-27
Attending: EMERGENCY MEDICINE | Admitting: FAMILY MEDICINE
Payer: COMMERCIAL

## 2021-10-19 ENCOUNTER — APPOINTMENT (OUTPATIENT)
Dept: CT IMAGING | Facility: HOSPITAL | Age: 61
DRG: 177 | End: 2021-10-19
Attending: SURGERY
Payer: COMMERCIAL

## 2021-10-19 DIAGNOSIS — J86.9 EMPYEMA LUNG (H): Primary | ICD-10-CM

## 2021-10-19 DIAGNOSIS — J90 PLEURAL EFFUSION: ICD-10-CM

## 2021-10-19 LAB
ALBUMIN SERPL-MCNC: 2.3 G/DL (ref 3.5–5)
ALP SERPL-CCNC: 181 U/L (ref 45–120)
ALT SERPL W P-5'-P-CCNC: <9 U/L (ref 0–45)
ANION GAP SERPL CALCULATED.3IONS-SCNC: 13 MMOL/L (ref 5–18)
AST SERPL W P-5'-P-CCNC: 29 U/L (ref 0–40)
BACTERIA BLD CULT: NO GROWTH
BACTERIA BLD CULT: NO GROWTH
BILIRUB SERPL-MCNC: 1 MG/DL (ref 0–1)
BUN SERPL-MCNC: 66 MG/DL (ref 8–22)
CALCIUM SERPL-MCNC: 7.4 MG/DL (ref 8.5–10.5)
CHLORIDE BLD-SCNC: 99 MMOL/L (ref 98–107)
CO2 SERPL-SCNC: 26 MMOL/L (ref 22–31)
CREAT SERPL-MCNC: 9.52 MG/DL (ref 0.7–1.3)
ERYTHROCYTE [DISTWIDTH] IN BLOOD BY AUTOMATED COUNT: 15.9 % (ref 10–15)
GFR SERPL CREATININE-BSD FRML MDRD: 5 ML/MIN/1.73M2
GLUCOSE BLD-MCNC: 93 MG/DL (ref 70–125)
HCT VFR BLD AUTO: 23.7 % (ref 40–53)
HGB BLD-MCNC: 7.4 G/DL (ref 13.3–17.7)
HOLD SPECIMEN: NORMAL
INR PPP: 1.38 (ref 0.85–1.15)
MCH RBC QN AUTO: 30.5 PG (ref 26.5–33)
MCHC RBC AUTO-ENTMCNC: 31.2 G/DL (ref 31.5–36.5)
MCV RBC AUTO: 98 FL (ref 78–100)
PATH REPORT.COMMENTS IMP SPEC: NORMAL
PATH REPORT.FINAL DX SPEC: NORMAL
PATH REPORT.GROSS SPEC: NORMAL
PATH REPORT.MICROSCOPIC SPEC OTHER STN: NORMAL
PATH REPORT.RELEVANT HX SPEC: NORMAL
PLATELET # BLD AUTO: 109 10E3/UL (ref 150–450)
POTASSIUM BLD-SCNC: 4.2 MMOL/L (ref 3.5–5)
PROT SERPL-MCNC: 6.9 G/DL (ref 6–8)
RBC # BLD AUTO: 2.43 10E6/UL (ref 4.4–5.9)
SARS-COV-2 RNA RESP QL NAA+PROBE: NEGATIVE
SODIUM SERPL-SCNC: 138 MMOL/L (ref 136–145)
WBC # BLD AUTO: 8.3 10E3/UL (ref 4–11)

## 2021-10-19 PROCEDURE — 99152 MOD SED SAME PHYS/QHP 5/>YRS: CPT

## 2021-10-19 PROCEDURE — 250N000013 HC RX MED GY IP 250 OP 250 PS 637: Performed by: SURGERY

## 2021-10-19 PROCEDURE — 87070 CULTURE OTHR SPECIMN AEROBIC: CPT | Performed by: EMERGENCY MEDICINE

## 2021-10-19 PROCEDURE — 88112 CYTOPATH CELL ENHANCE TECH: CPT | Mod: 26 | Performed by: PATHOLOGY

## 2021-10-19 PROCEDURE — 250N000009 HC RX 250: Performed by: PHYSICIAN ASSISTANT

## 2021-10-19 PROCEDURE — 85610 PROTHROMBIN TIME: CPT | Performed by: STUDENT IN AN ORGANIZED HEALTH CARE EDUCATION/TRAINING PROGRAM

## 2021-10-19 PROCEDURE — 99223 1ST HOSP IP/OBS HIGH 75: CPT | Mod: AI | Performed by: STUDENT IN AN ORGANIZED HEALTH CARE EDUCATION/TRAINING PROGRAM

## 2021-10-19 PROCEDURE — 85027 COMPLETE CBC AUTOMATED: CPT | Performed by: STUDENT IN AN ORGANIZED HEALTH CARE EDUCATION/TRAINING PROGRAM

## 2021-10-19 PROCEDURE — 250N000011 HC RX IP 250 OP 636: Performed by: STUDENT IN AN ORGANIZED HEALTH CARE EDUCATION/TRAINING PROGRAM

## 2021-10-19 PROCEDURE — 99285 EMERGENCY DEPT VISIT HI MDM: CPT | Mod: 25

## 2021-10-19 PROCEDURE — 120N000001 HC R&B MED SURG/OB

## 2021-10-19 PROCEDURE — C1729 CATH, DRAINAGE: HCPCS

## 2021-10-19 PROCEDURE — 87075 CULTR BACTERIA EXCEPT BLOOD: CPT | Performed by: RADIOLOGY

## 2021-10-19 PROCEDURE — 71250 CT THORAX DX C-: CPT

## 2021-10-19 PROCEDURE — 87040 BLOOD CULTURE FOR BACTERIA: CPT | Performed by: STUDENT IN AN ORGANIZED HEALTH CARE EDUCATION/TRAINING PROGRAM

## 2021-10-19 PROCEDURE — 36415 COLL VENOUS BLD VENIPUNCTURE: CPT | Performed by: STUDENT IN AN ORGANIZED HEALTH CARE EDUCATION/TRAINING PROGRAM

## 2021-10-19 PROCEDURE — 0W9930Z DRAINAGE OF RIGHT PLEURAL CAVITY WITH DRAINAGE DEVICE, PERCUTANEOUS APPROACH: ICD-10-PCS | Performed by: RADIOLOGY

## 2021-10-19 PROCEDURE — 82040 ASSAY OF SERUM ALBUMIN: CPT | Performed by: STUDENT IN AN ORGANIZED HEALTH CARE EDUCATION/TRAINING PROGRAM

## 2021-10-19 PROCEDURE — 82374 ASSAY BLOOD CARBON DIOXIDE: CPT | Performed by: STUDENT IN AN ORGANIZED HEALTH CARE EDUCATION/TRAINING PROGRAM

## 2021-10-19 PROCEDURE — 250N000011 HC RX IP 250 OP 636: Performed by: PHYSICIAN ASSISTANT

## 2021-10-19 PROCEDURE — C1769 GUIDE WIRE: HCPCS

## 2021-10-19 PROCEDURE — 250N000013 HC RX MED GY IP 250 OP 250 PS 637: Performed by: MASSAGE THERAPIST

## 2021-10-19 PROCEDURE — 32557 INSERT CATH PLEURA W/ IMAGE: CPT

## 2021-10-19 PROCEDURE — 87635 SARS-COV-2 COVID-19 AMP PRB: CPT | Performed by: STUDENT IN AN ORGANIZED HEALTH CARE EDUCATION/TRAINING PROGRAM

## 2021-10-19 PROCEDURE — C9803 HOPD COVID-19 SPEC COLLECT: HCPCS

## 2021-10-19 PROCEDURE — 272N000500 HC NEEDLE CR2

## 2021-10-19 PROCEDURE — 99223 1ST HOSP IP/OBS HIGH 75: CPT | Performed by: STUDENT IN AN ORGANIZED HEALTH CARE EDUCATION/TRAINING PROGRAM

## 2021-10-19 PROCEDURE — 88305 TISSUE EXAM BY PATHOLOGIST: CPT | Mod: 26 | Performed by: PATHOLOGY

## 2021-10-19 RX ORDER — IOPAMIDOL 755 MG/ML
100 INJECTION, SOLUTION INTRAVASCULAR ONCE
Status: DISCONTINUED | OUTPATIENT
Start: 2021-10-19 | End: 2021-10-19

## 2021-10-19 RX ORDER — FENTANYL CITRATE 50 UG/ML
25-50 INJECTION, SOLUTION INTRAMUSCULAR; INTRAVENOUS EVERY 5 MIN PRN
Status: DISCONTINUED | OUTPATIENT
Start: 2021-10-19 | End: 2021-10-20

## 2021-10-19 RX ORDER — NALOXONE HYDROCHLORIDE 0.4 MG/ML
0.2 INJECTION, SOLUTION INTRAMUSCULAR; INTRAVENOUS; SUBCUTANEOUS
Status: DISCONTINUED | OUTPATIENT
Start: 2021-10-19 | End: 2021-10-27 | Stop reason: HOSPADM

## 2021-10-19 RX ORDER — ACETAMINOPHEN 325 MG/1
650 TABLET ORAL EVERY 4 HOURS PRN
Status: DISCONTINUED | OUTPATIENT
Start: 2021-10-19 | End: 2021-10-20

## 2021-10-19 RX ORDER — NALOXONE HYDROCHLORIDE 0.4 MG/ML
0.4 INJECTION, SOLUTION INTRAMUSCULAR; INTRAVENOUS; SUBCUTANEOUS
Status: DISCONTINUED | OUTPATIENT
Start: 2021-10-19 | End: 2021-10-27 | Stop reason: HOSPADM

## 2021-10-19 RX ORDER — CEFAZOLIN SODIUM 1 G/3ML
1 INJECTION, POWDER, FOR SOLUTION INTRAMUSCULAR; INTRAVENOUS EVERY 24 HOURS
Status: DISCONTINUED | OUTPATIENT
Start: 2021-10-19 | End: 2021-10-27 | Stop reason: HOSPADM

## 2021-10-19 RX ORDER — OXYCODONE HYDROCHLORIDE 5 MG/1
5 TABLET ORAL EVERY 4 HOURS PRN
Status: DISCONTINUED | OUTPATIENT
Start: 2021-10-19 | End: 2021-10-27 | Stop reason: HOSPADM

## 2021-10-19 RX ORDER — FLUMAZENIL 0.1 MG/ML
0.2 INJECTION, SOLUTION INTRAVENOUS
Status: DISCONTINUED | OUTPATIENT
Start: 2021-10-19 | End: 2021-10-27 | Stop reason: HOSPADM

## 2021-10-19 RX ADMIN — MIDAZOLAM HYDROCHLORIDE 1 MG: 1 INJECTION, SOLUTION INTRAMUSCULAR; INTRAVENOUS at 13:57

## 2021-10-19 RX ADMIN — ACETAMINOPHEN 650 MG: 325 TABLET ORAL at 17:41

## 2021-10-19 RX ADMIN — FENTANYL CITRATE 50 MCG: 50 INJECTION INTRAMUSCULAR; INTRAVENOUS at 13:59

## 2021-10-19 RX ADMIN — CEFAZOLIN 1 G: 1 INJECTION, POWDER, FOR SOLUTION INTRAMUSCULAR; INTRAVENOUS at 16:17

## 2021-10-19 RX ADMIN — OXYCODONE HYDROCHLORIDE 5 MG: 5 TABLET ORAL at 19:41

## 2021-10-19 RX ADMIN — FENTANYL CITRATE 25 MCG: 50 INJECTION INTRAMUSCULAR; INTRAVENOUS at 14:12

## 2021-10-19 RX ADMIN — MIDAZOLAM HYDROCHLORIDE 0.5 MG: 1 INJECTION, SOLUTION INTRAMUSCULAR; INTRAVENOUS at 14:05

## 2021-10-19 RX ADMIN — ACETAMINOPHEN 650 MG: 325 TABLET ORAL at 21:55

## 2021-10-19 RX ADMIN — LIDOCAINE HYDROCHLORIDE 30 ML: 10 INJECTION, SOLUTION INFILTRATION; PERINEURAL at 14:06

## 2021-10-19 ASSESSMENT — ACTIVITIES OF DAILY LIVING (ADL)
ADLS_ACUITY_SCORE: 12
DEPENDENT_IADLS:: INDEPENDENT
ADLS_ACUITY_SCORE: 12

## 2021-10-19 ASSESSMENT — MIFFLIN-ST. JEOR
SCORE: 1362.38
SCORE: 1362.38

## 2021-10-19 NOTE — CONSULTS
Consultation - INFECTIOUS DISEASE CONSULTATION  Elle Blanton,  1960, MRN [id number]      Pleural effusion [J90]    PCP: Jaswant Flores, [phone]   Code status:  Prior               Chief Complaint: R sided empyema     Assessment:  Elle Blanton is a 61 year old old male with   1. ESRD on HD via left AV fistula.  2. Chronic hepatitis B with cirrhosis.  On entecavir q. 7 days.  3. Recent admission for left-sided septic olecranon bursitis.  Status post I&D.  Cultures with MSSA.  Treated with IV antibiotic inpatient and discharged on p.o. Keflex. Improving.   4. Large right-sided empyema.  Status post thoracentesis on 10/16/2021.  Cultures with staph aureus.  Susceptibility in process.  Recent cultures with staph aureus. On IV Ancef. Will continue the same for now. S/P Chest tube placement on 10/19/21  5. Chronic bicytopenia with thrombocytopenia and anemia.  Most likely secondary to cirrhosis.      Recommendations:   Continue IV Ancef, renally dosed.   Follow susceptibility of the Staph Aureus from 10/16//2021.   Follow cultures from 10/19.   Chest tube care per IR   Monitor oxygen need.   Elbow will be examined tomorrow.     Discussed with the patient, wife and nursing staff.     Thank you for letting us be part of the patient care team. We will follow     Nicolle Yan MD,MD  [facility].   [facility]  Office Telephone [phone].  Fax [phone]  [facility] paging    HPI:    Elle Blanton is a 61 year old old male. History is provided by the patient, wife, and nursing staff.  ID is asked to see this patient for right sided empyema.  History of ESRD on HD via a LUE AVF and chronic right sided pleural effusion who was recently admitted for septic olecranon bursitis on the left. He had surgery. Cultures grew MSSA. Was treated with IV antibiotics inpatient and discharged on PO Keflex. Reports resolution of pain. Also reports dressing not having been changed  since surgery, due for tomorrow.   During that same hospitalization, the patient also had right sided thoracentesis. Cultures now positive with Staph Aureus. The patient was then called to come back. Had a repeat CT with now very large effusion. Now is s/p chest tube placement.. Cultures are obtained. On IV Ancef.     ===========================================    Medical History  Past Medical History:   Diagnosis Date     NAHUM (acute kidney injury) (H)      GI (gastrointestinal bleed)      Gout      H. pylori infection 7/5/2017    UGI bleed implied by Hgb 9.0 6/22/17 and melena. Admitted and hgb decreased to 7.6, CT abdomen showed all bladder wall thickening. + Hep B surface antigen noted. Melena resolved and hgb stabalized without transfusion, epigastric pain resolved with PPI. Recommend referral for gastroscopy.     Heart attack (H)      Hepatitis B      Normocytic anemia      PONV (postoperative nausea and vomiting)      Thrombocytopenia (H)         Surgical History  Past Surgical History:   Procedure Laterality Date     ABCESS DRAINAGE      finger     BURSECTOMY ELBOW Left 10/15/2021    Procedure: LEFT OLECRANON BURSECTOMY;  Surgeon: Tico Myers MD;  Location: [facility]     CREATE FISTULA ARTERIOVENOUS UPPER EXTREMITY Left 4/20/2021    Procedure: left arm dialysis graft placement;  Surgeon: Roxana Cosby MD;  Location: [facility];  Service: General     FOREIGN BODY REMOVAL      finger     INCISION AND DRAINAGE FINGER, COMBINED Left 10/20/2016    Procedure: COMBINED INCISION AND DRAINAGE FINGER;  Surgeon: Mireya Pizano MD;  Location: [facility]     IR CHEST TUBE PLACEMENT NON-TUNNELED RIGHT  4/19/2021     IR PLEURAL DRAINAGE WITH CATHETER INSERTION  4/19/2021     MIDLINE INSERTION - DOUBLE LUMEN  4/23/2021          AL ESOPHAGOGASTRODUODENOSCOPY TRANSORAL DIAGNOSTIC N/A 8/18/2020    Procedure: ESOPHAGOGASTRODUODENOSCOPY (EGD) with biopsy;  Surgeon: Nilson Gonzales MD;  Location: [facility];  Service: Gastroenterology      PARACENTESIS  8/14/2020      PARACENTESIS  8/19/2020      THORACENTESIS  4/18/2021            Social History  Reviewed, and he  reports that he has never smoked. He has never used smokeless tobacco. He reports that he does not drink alcohol and does not use drugs.        Family History  Family History   Problem Relation Age of Onset     Diabetes Father      Hypertension No family hx of      Asthma No family hx of      Cancer No family hx of      Coronary Artery Disease No family hx of      Liver Cancer No family hx of      Ulcers Father       Psychosocial Needs  Social History     Social History Narrative     Not on file     Additional psychosocial needs reviewed per nursing assessment.       Allergies   Allergen Reactions     Nka [No Known Allergies]         (Not in a hospital admission)       Review of Systems:  Negative except for findings in the HPI Physical Exam:  Temp:  [98.2  F (36.8  C)] 98.2  F (36.8  C)  Pulse:  [77-89] 83  Resp:  [16] 16  BP: (101-137)/(64-75) 137/75  SpO2:  [96 %-99 %] 96 %    Gen: Pleasant in no acute distress.  HEENT: NCAT. EOMI. PERRL.  Neck: No bruit, JVD or thyromegaly.  Chest: R sided chest tube in place.   Lungs: Clear to ascultation bilat with no crackles or wheezes.  Card: RRR. NSR. No RMG. Peripheral pulses present and symmetric. No edema.  Abd: Soft NT ND. No mass. Normal bowel sounds.  Skin: No rash.  Extr: LUE surgical dressing in place.   Neuro: Alert and oriented to place time and person. Cranial nerves II to XII intact. Motor and sensory intact.            ============================    Pertinent Labs  personally reviewed.   Recent Labs   Lab 10/19/21  1006 10/17/21  0542 10/16/21  1701 10/16/21  0557 10/16/21  0557   WBC 8.3 8.5  --   --  10.0   HGB 7.4* 7.8* 7.8*   < > 7.6*   HCT 23.7* 25.7*  --   --  25.0*   * 104*  --   --  117*    < > = values in this interval not displayed.       Recent Labs   Lab  10/19/21  0940 10/17/21  0542 10/16/21  0557    133* 133*   CO2 26 22 24   BUN 66* 74* 61*   ALBUMIN 2.3*  --   --    ALKPHOS 181*  --   --    ALT <9  --   --    AST 29  --   --        MICROBIOLOGY DATA:  Personally reviewed  Wound Aerobic Bacterial Culture Routine  Order: [id number]  Collected:  10/15/2021  4:32 PM Status:  Final result   Visible to patient:  No (inaccessible in MyChart)  Specimen Information: Elbow, Left; Wound         0 Result Notes  Culture 2+ Staphylococcus aureusAbnormal             Resulting Agency: [facility]       Susceptibility     Staphylococcus aureus     REGLA     Clindamycin <=0.25 ug/mL Susceptible     Erythromycin <=0.25 ug/mL Susceptible     Gentamicin <=0.5 ug/mL Susceptible     Oxacillin <=0.25 ug/mL Susceptible     Tetracycline <=1.0 ug/mL Susceptible     Trimethoprim/Sulfamethoxazole <=0.5/9.5 u... Susceptible     Vancomycin 1.0 ug/mL Susceptible                 Specimen Collected: 10/15/21  4:32 PM Last Resulted: 10/18/21  4:16 AM                Contains abnormal data Body fluid, unsp Aerobic Bacterial Culture Routine  Order: [id number]  Collected:  10/16/2021  4:35 PM Status:  Preliminary result   Visible to patient:  No (not released)  Specimen Information: Pleural Cavity, Right; Body fluid, unsp         0 Result Notes    Culture Culture in progress       1+ Staphylococcus aureusAbnormal             Resulting Agency: [facility]           Specimen Collected: 10/16/21  4:35 PM Last Resulted: 10/19/21  7:52 AM                  Pertinent Radiology  personally reviewed.      Study Result    Narrative & Impression   EXAM: CT CHEST W/O CONTRAST  LOCATION: [facility]  DATE/TIME: 10/19/2021 11:14 AM     INDICATION: Empyema  COMPARISON: CT 09/26/2021. Several recent right thoracentesis most recent 10/16/2021 extensively septated pleural fluid at this point. CT abdomen 09/28/2021.  TECHNIQUE: CT chest without IV contrast. Multiplanar reformats were obtained. Dose  reduction techniques were used.  CONTRAST: None.     FINDINGS:   LUNGS AND PLEURA: Increasing size of the right pleural effusion. On recent ultrasound 10/16/2021 this was shown to be multi septated. Tiny gas within the fusion may be due to recent thoracenteses. Moderate atelectasis of the right lung involving the   upper lobe middle lobe and more significantly the lower lobe.     Tiny left pleural effusion.     MEDIASTINUM/AXILLAE: No lymphadenopathy. Known type B aortic dissection has not significantly changed.     CORONARY ARTERY CALCIFICATION: Moderate.     UPPER ABDOMEN: Gallstones. A couple liver lesions in segment 8 less well characterized without contrast but have not significantly changed. Cirrhosis. Mild splenomegaly unchanged.     MUSCULOSKELETAL: Unremarkable.                                                                      IMPRESSION:   1.  Further increase in size with now a large right pleural effusion and significant atelectasis of the right lung. Ultrasound 10/16/2021 showed that this was multi septated.     2.  Tiny left pleural effusion.     3.  Stable known liver lesion segment 8.     4.  Gallstones.     5.  Cirrhosis and mild splenomegaly.

## 2021-10-19 NOTE — PRE-PROCEDURE
GENERAL PRE-PROCEDURE:   Procedure:  R chest tube placement   Date/Time:  10/19/2021 1:19 PM    Written consent obtained?: Yes    Risks and benefits: Risks, benefits and alternatives were discussed    Consent given by:  Patient  Patient states understanding of procedure being performed: Yes    Patient's understanding of procedure matches consent: Yes    Procedure consent matches procedure scheduled: Yes    Expected level of sedation:  Moderate  Appropriately NPO:  Yes  ASA Class:  3  Mallampati  :  Grade 2- soft palate, base of uvula, tonsillar pillars, and portion of posterior pharyngeal wall visible  Lungs:  Other (comment)  Lung exam comment:  Diminished right side  Heart:  Normal heart sounds and rate  History & Physical reviewed:  History and physical reviewed and no updates needed  Statement of review:  I have reviewed the lab findings, diagnostic data, medications, and the plan for sedation

## 2021-10-19 NOTE — PROGRESS NOTES
Documentation Only  10/18/21  11:03 PM    Was made aware that patient was to be a direct admit to [facility] for empyema and CT surgery consult. Patient was supposed to arrive around 1900 after dialysis. Called patient around 8pm to confirm room and arrival. He did ask at that time whether he could come in tomorrow instead. I said that it is likely his bed hold would  and that he would have to either go through ED or get a direct admit bed set up again and that given his condition/comorbidities, it would be best for him to come in tonight. He agreed. Around 2230, I called [facility] and nursing supervisor who stated that patient was not going to come in tonight, knowing the possibility that the bed may not be available tomorrow.     Discussed that patient should stay NPO after midnight for possible procedure and come to [facility] tomorrow AM.    Henrietta Scott MD

## 2021-10-19 NOTE — H&P
Admission History and Physical   FABY Wellington 1960, MRN [id number]     [facility]  empyema     PCP: Jaswant Flores, [address] [place] [address], [phone]    Code status:  Prior          Extended Emergency Contact Information  Primary Emergency Contact: Maico Blanton  Address: [address] [place] [address] [place]  Work Phone: [phone]  Mobile Phone: [phone]  Relation: Spouse  Secondary Emergency Contact: Hudson Blanton  Home Phone: [phone]  Mobile Phone: [phone]  Relation: Son         Assessment and Plan   61 year old M with multiple comorbidities including AAA (5.5cm), chronic thoracic aortic dissection, cirrhosis 2/2 chronic Hep B, ESRD on HD (M/F via L AVF), who was recently admitted for septic bursitis of the L elbow (10/14-10/17). Underwent I&D and bursectomy with Ortho on 10/15. Also s/p R thoracentesis with 1.5L drained for pleural effusion suspected 2/2 fluid overload. Pleural fluid cultures + S. Aureus. Admitted for empyema likely 2/2 bacteremia from septic bursitis.      Active Problems:  (J90) Pleural effusion  ESRD on dialysis   GERD  Thrombocytopenia     #Empyema +S. Aureus, likely 2/2 bacteriemia from septic bursitis  - CV Surgery consult for consideration of decort/VATS vs chest tube  - ID consulted for infection of both pleural fluid and septic bursitis   - CT Chest non-con? Await CV surgery recommendations  - Repeat blood cultures on admission   - Abx: restarted on IV ancef while admitted 1 g per day in the setting of ESRD on dialysis given   - PRN pain medication  - NPO, if unable to undergo procedure today will resume diet and make NPO at midnight  - CBC/BMP in AM     #Chronic Medical Conditions  - AAA/Aortic Dissection: Hold PTA carvedilol and clonidine as patient takes these PRN  - GERD: continue PTA pantoprazole  - ESRD on HD: L AVF in place. Baseline Cr 7-8. Dialyzes M/F.   - Chronic Hep B/Cirrhosis: entecavir q7 days. Continue  after med rec.  - Thrombocytopenia: no active bleeding. CTM     DVT ppx: SQH (tomorrow PM start, BID dosing given ESRD)  Diet: Regular. NPO at [place]  Code: Full  Med rec completed: No     Dispo: inpatient status for abx, possible surgery, anticipate discharge to home   Barriers to discharge:  work up in progress   Anticipated discharge date:  unknown at this time      Chief Complaint: Infected pleural effusion      HPI:    61 year old M with multiple comorbidities including AAA (5.5cm), chronic thoracic aortic dissection, cirrhosis 2/2 chronic Hep B, ESRD on HD (M/F via L AVF), who was recently admitted for septic bursitis of the L elbow (10/14-10/17). Underwent I&D and bursectomy with Ortho on 10/15. Also s/p R thoracentesis with 1.5L drained for pleural effusion suspected 2/2 fluid overload. Pleural fluid cultures + S. Aureus. Admitted for empyema likely 2/2 bacteremia from septic bursitis. On admission patient denies any acute questions or complaints.        Medical History  Past Medical History        Past Medical History:   Diagnosis Date     NAHUM (acute kidney injury) (H)       GI (gastrointestinal bleed)       Gout       H. pylori infection 7/5/2017     UGI bleed implied by Hgb 9.0 6/22/17 and melena. Admitted and hgb decreased to 7.6, CT abdomen showed all bladder wall thickening. + Hep B surface antigen noted. Melena resolved and hgb stabalized without transfusion, epigastric pain resolved with PPI. Recommend referral for gastroscopy.     Heart attack (H)       Hepatitis B       Normocytic anemia       PONV (postoperative nausea and vomiting)       Thrombocytopenia (H)              Medical history reviewed and noncontributory unless noted    Surgical History  He  has a past surgical history that includes Incision and drainage finger, combined (Left, 10/20/2016); IR Chest Tube Place Non Tunneled Right (4/19/2021); Abscess drainage; Foreign Body Removal; Us Paracentesis (8/14/2020); Pr Esophagogastroduodenoscopy  Transoral Diagnostic (N/A, 8/18/2020); Us Paracentesis (8/19/2020); Us Thoracentesis (4/18/2021); IR Pleural Drainage With Catheter Insertion (4/19/2021); Create fistula arteriovenous upper extremity (Left, 4/20/2021); Midline Insertion - Double Lumen (4/23/2021); and Bursectomy elbow (Left, 10/15/2021).     Surgical history reviewed and noncontributory unless noted    Social History  He  reports that he has never smoked. He has never used smokeless tobacco. He reports that he does not drink alcohol and does not use drugs.     Social history reviewed and noncontributory unless noted   Allergies       Allergies   Allergen Reactions     Nka [No Known Allergies]           Allergy history reviewed and noncontributory unless noted Family History  Reviewed, and family history includes Diabetes in his father; Ulcers in his father.     Family history reviewed and noncontributory unless noted              Prior to Admission Medications   Prescriptions Prior to Admission   Pending medication reconcilliation.                Review of Systems:  12 point review of systems negative unless stated in HPI       Physical Exam:     Constitutional:  Well developed, Well nourished, mildly pale appearing  HENT:  Normocephalic, Atraumatic, Bilateral external ears normal, Oropharynx moist, Nose normal.   Neck:  Normal range of motion, No meningismus, No stridor.   Eyes:  EOMI, Conjunctiva normal, No discharge.   Respiratory:  Normal breath sounds, No respiratory distress, No wheezing, No chest tenderness.   Cardiovascular:  Normal heart rate, Normal rhythm, No murmurs  GI:  Soft, No tenderness, No guarding, No CVA tenderness.   Musculoskeletal:  Left arm wrapped from the wrist to just distal to the shoulder.  Bilateral upper and lower extremities neurovascularly intact.  No significant edema.   Integument:  Warm, Dry, No erythema, No rash.   Neurologic:  Alert & oriented x 3, Normal motor function, Normal sensory function, No focal  deficits noted.    Psychiatric:  Affect normal, Judgment normal, Mood normal.       Pertinent Labs/Imaging          Lab Results   Component Value Date     10/19/2021     04/29/2021    Lab Results   Component Value Date    CHLORIDE 99 10/19/2021    CHLORIDE 99 04/29/2021    Lab Results   Component Value Date    BUN 66 10/19/2021    BUN 71 04/29/2021      Lab Results   Component Value Date    POTASSIUM 4.2 10/19/2021    POTASSIUM 4.7 04/29/2021    Lab Results   Component Value Date    CO2 26 10/19/2021    CO2 21.0 12/30/2020    Lab Results   Component Value Date    CR 9.52 10/19/2021    CR 5.13 04/29/2021        Lab Results   Component Value Date    WBC 8.3 10/19/2021    HGB 7.4 (L) 10/19/2021    HCT 23.7 (L) 10/19/2021    MCV 98 10/19/2021     (L) 10/19/2021     Lab Results   Component Value Date    CR 9.52 (HH) 10/19/2021       EKG Results: personally reviewed.      Precepted patient with  Dr. Gustavo Healy MD  [facility] Resident

## 2021-10-19 NOTE — CONSULTS
Care Management Initial Consult    General Information  Assessment completed with: Patient,    Type of CM/SW Visit: Initial Assessment    Primary Care Provider verified and updated as needed:     Readmission within the last 30 days:        Reason for Consult: discharge planning  Advance Care Planning:            Communication Assessment  Patient's communication style: spoken language (English or Bilingual)             Cognitive  Cognitive/Neuro/Behavioral: WDL                      Living Environment:   People in home: spouse     Current living Arrangements:        Able to return to prior arrangements: yes       Family/Social Support:  Care provided by:    Provides care for:                  Description of Support System:           Current Resources:   Patient receiving home care services: No     Community Resources: OP Dialysis  Equipment currently used at home: none  Supplies currently used at home:      Employment/Financial:  Employment Status:          Financial Concerns:             Lifestyle & Psychosocial Needs:  Social Determinants of Health     Tobacco Use: Low Risk      Smoking Tobacco Use: Never Smoker     Smokeless Tobacco Use: Never Used   Alcohol Use:      Frequency of Alcohol Consumption:      Average Number of Drinks:      Frequency of Binge Drinking:    Financial Resource Strain:      Difficulty of Paying Living Expenses:    Food Insecurity:      Worried About Running Out of Food in the Last Year:      Ran Out of Food in the Last Year:    Transportation Needs:      Lack of Transportation (Medical):      Lack of Transportation (Non-Medical):    Physical Activity:      Days of Exercise per Week:      Minutes of Exercise per Session:    Stress:      Feeling of Stress :    Social Connections:      Frequency of Communication with Friends and Family:      Frequency of Social Gatherings with Friends and Family:      Attends Faith Services:      Active Member of Clubs or Organizations:      Attends Club  or Organization Meetings:      Marital Status:    Intimate Partner Violence:      Fear of Current or Ex-Partner:      Emotionally Abused:      Physically Abused:      Sexually Abused:    Depression: Not at risk     PHQ-2 Score: 0   Housing Stability:      Unable to Pay for Housing in the Last Year:      Number of Places Lived in the Last Year:      Unstable Housing in the Last Year:        Functional Status:  Prior to admission patient needed assistance:   Dependent ADLs:: Independent  Dependent IADLs:: Independent  Assesssment of Functional Status: At functional baseline    Mental Health Status:          Chemical Dependency Status:                Values/Beliefs:  Spiritual, Cultural Beliefs, Yarsani Practices, Values that affect care:                 Additional Information:    Pt lives with spouse Samena: [phone] in a private residence. He goes to dialysis M, W, F. Pt independent.      Anticipate pt will DC back home without needs depending on progression of care.       Family will transport upon DC. Informed that CM will follow progression of care.   Alis Downey RN, CM, [facility]

## 2021-10-19 NOTE — PROGRESS NOTES
Interventional Radiology - Pre-Procedure Note:  10/19/2021    Procedure Requested: R chest tube placement.  Requested by: Dawson Reynaga MD      Brief HPI: Elle Blanton is a 61 year old male with multiple comorbidities including AAA (5.5cm), chronic thoracic aortic dissection, cirrhosis 2/2 chronic Hep B, ESRD on HD (M/F via L AVF), who was recently admitted for septic bursitis of the L elbow (10/14-10/17). Underwent I&D and bursectomy with Ortho on 10/15. Also s/p R thoracentesis 10/15/21 with 1.5L drained for recurrent pleural effusion suspected 2/2 fluid overload. Pleural fluid cultures + S. Aureus.     Now admitted for RIGHT pleural effusion (concerning for empyema) likely 2/2 bacteremia.     IMAGING:  CT chest wo 10/19/2021:  FINDINGS:   LUNGS AND PLEURA: Increasing size of the right pleural effusion. On recent ultrasound 10/16/2021 this was shown to be multi septated. Tiny gas within the fusion may be due to recent thoracenteses. Moderate atelectasis of the right lung involving the   upper lobe middle lobe and more significantly the lower lobe.     Tiny left pleural effusion.     MEDIASTINUM/AXILLAE: No lymphadenopathy. Known type B aortic dissection has not significantly changed.     CORONARY ARTERY CALCIFICATION: Moderate.     UPPER ABDOMEN: Gallstones. A couple liver lesions in segment 8 less well characterized without contrast but have not significantly changed. Cirrhosis. Mild splenomegaly unchanged.     MUSCULOSKELETAL: Unremarkable.                                                                      IMPRESSION:   1.  Further increase in size with now a large right pleural effusion and significant atelectasis of the right lung. Ultrasound 10/16/2021 showed that this was multi septated.     2.  Tiny left pleural effusion.     3.  Stable known liver lesion segment 8.     4.  Gallstones.     5.  Cirrhosis and mild splenomegaly.    NPO: midnight.  ANTICOAGULANTS: none  ANTIBIOTICS:  "keflex.    ALLERGIES  Allergies   Allergen Reactions     Nka [No Known Allergies]          LABS:  INR   Date Value Ref Range Status   10/19/2021 1.38 (H) 0.85 - 1.15 Final   10/21/2020 1.26 (H) 0.90 - 1.10 Final      Hemoglobin   Date Value Ref Range Status   10/19/2021 7.4 (L) 13.3 - 17.7 g/dL Final   04/29/2021 8.9 (L) 14.0 - 18.0 g/dL Final   ]  Platelet Count   Date Value Ref Range Status   10/19/2021 109 (L) 150 - 450 10e3/uL Final   08/10/2019 53 (L) 150 - 450 10e9/L Final     Creatinine   Date Value Ref Range Status   10/19/2021 9.52 (HH) 0.70 - 1.30 mg/dL Final   04/29/2021 5.13 (H) 0.70 - 1.30 mg/dL Final     Potassium   Date Value Ref Range Status   10/19/2021 4.2 3.5 - 5.0 mmol/L Final   04/29/2021 4.7 3.5 - 5.0 mmol/L Final         EXAM:  /75   Pulse 83   Temp 98.2  F (36.8  C)   Resp 16   Ht 1.651 m (5' 5\")   Wt 63 kg (139 lb)   SpO2 96%   BMI 23.13 kg/m    General:  Stable.  In no acute distress.    Neuro:  A&O x 3. Moves all extremities equally.  Resp: diminished throughout right side.  Cardio:  S1S2 and reg, without murmur, clicks or rubs      Pre-Sedation Assessment:  Mallampati Airway Classification:  II - Faucial pillars and soft palate may be seen, but uvula is masked by the base of the tongue  Previous reaction to anesthesia/sedation:  No  Sedation plan based on assessment: Moderate (conscious) sedation AS needed  ASA Classification: Class 3 - SEVERE SYSTEMIC DISEASE, DEFINITE FUNCTIONAL LIMITATIONS.   Code Status: Full Code      ASSESSMENT/PLAN:   RIGHT pleural effusion (concerning for empyema) likely 2/2 bacteremia.    Image guided right chest tube placement with sedation as needed    Procedure, risks/benefits, details, alternatives, and sedation reviewed with patient/family and patient verbalized understanding. All questions answered. OK to proceed with above radiology procedure.     Barb Skinner PA-C  Interventional Radiology    "

## 2021-10-19 NOTE — ED NOTES
"[facility] ED Handoff Report    ED Chief Complaint:     ED Diagnosis:  (J90) Pleural effusion  Comment: Recurrent empyema.  Plan:        PMH:    Past Medical History:   Diagnosis Date     NAHUM (acute kidney injury) (H)      GI (gastrointestinal bleed)      Gout      H. pylori infection 7/5/2017    UGI bleed implied by Hgb 9.0 6/22/17 and melena. Admitted and hgb decreased to 7.6, CT abdomen showed all bladder wall thickening. + Hep B surface antigen noted. Melena resolved and hgb stabalized without transfusion, epigastric pain resolved with PPI. Recommend referral for gastroscopy.     Heart attack (H)      Hepatitis B      Normocytic anemia      PONV (postoperative nausea and vomiting)      Thrombocytopenia (H)         Code Status:  Prior     Falls Risk: No Band: Not applicable    Current Living Situation/Residence: lives with a significant other     Elimination Status: Continent: Yes and on HD    Activity Level: SBA with cane  Patients Preferred Language:  English.      Needed: No    Vital Signs:  /70   Pulse 89   Temp 98.2  F (36.8  C)   Resp 16   Ht 1.651 m (5' 5\")   Wt 63 kg (139 lb)   SpO2 98%   BMI 23.13 kg/m       Cardiac Rhythm:     Pain Score: 0/10    Is the Patient Confused:  No    Last Food or Drink: 10/18/21     Focused Assessment:  A/o x4. Denies sob and chest pain. On HD x2 a week. Lung sounds Dim x4. On room air. Surgery consulted for infected pleural effusion. Pt returned from chest tube placement. Right chest tube in place with pink tinged drainage, noted intermittent air leak. Surgery team aware of air leak. Pt denies sob.     Tests Performed: Done: Labs    Treatments Provided:      Family Dynamics/Concerns: No    Family Updated On Visitor Policy: Yes    Plan of Care Communicated to Family: Yes    Who Was Updated about Plan of Care: Spouse    Belongings Checklist Done and Signed by Patient: No    Covid: asymptomatic , pending    Additional Information:     RN: Wily " Ruthann   10/19/2021 10:21 AM

## 2021-10-19 NOTE — ED PROVIDER NOTES
EMERGENCY DEPARTMENT ENCOUNTER      NAME: Elle Blnaton  AGE: 61 year old male  YOB: 1960  MRN: [id number]  EVALUATION DATE & TIME: 10/19/2021  8:38 AM    PCP: Jaswant Flores    ED PROVIDER: Denzel Degroot M.D.      Chief Complaint   Patient presents with     pleural effusion       FINAL IMPRESSION:  1.  Acute recurrent pleural effusion (empyema).    ED COURSE & MEDICAL DECISION MAKIN:51 AM I met with the patient to gather history and to perform my initial exam. We discussed plans for the ED course, including diagnostic testing and treatment. PPE worn: cloth mask.  Patient supposed to be direct admit yesterday to [facility].  8:56 AM Spoke with Dr. Healy, [facility].  They accepted the patient to [facility].  They will see the patient and begin laboratory and radiological evaluation.  Patient with recurrent empyema and previous chest tube.  They believe he requires another 1.    Pertinent Labs & Imaging studies reviewed. (See chart for details)  61 year old male presents to the Emergency Department for evaluation of recurrent pleural effusion.    At the conclusion of the encounter I discussed the results of all of the tests and the disposition. The questions were answered. The patient or family acknowledged understanding and was agreeable with the care plan.       MEDICATIONS GIVEN IN THE EMERGENCY:  Medications - No data to display    NEW PRESCRIPTIONS STARTED AT TODAY'S ER VISIT  New Prescriptions    No medications on file       =================================================================    HPI    Patient information was obtained from: Patient    Use of : N/A        Elle Blanton is a 61 year old male with a pertinent history of MI, hypertension, descending thoracic aortic dissection, and pleural effusion, who presents to this ED walk in for evaluation of lung infection.    Patient presents after getting a call about coming in yesterday. Patient was  going to be a direct admit to [facility] for recurrent empyema. Patient is unsure why he was called to come to the ED. He denies pain or shortness of breath.    Does not identify any waxing or waning symptoms otherwise, exacerbating or alleviating features,associated symptoms except as mentioned. Denies any pain related complaints.    REVIEW OF SYSTEMS   Review of Systems  No complaints at this time in any of 12 organ systems.    PAST MEDICAL HISTORY:  Past Medical History:   Diagnosis Date     NAHUM (acute kidney injury) (H)      GI (gastrointestinal bleed)      Gout      H. pylori infection 7/5/2017    UGI bleed implied by Hgb 9.0 6/22/17 and melena. Admitted and hgb decreased to 7.6, CT abdomen showed all bladder wall thickening. + Hep B surface antigen noted. Melena resolved and hgb stabalized without transfusion, epigastric pain resolved with PPI. Recommend referral for gastroscopy.     Heart attack (H)      Hepatitis B      Normocytic anemia      PONV (postoperative nausea and vomiting)      Thrombocytopenia (H)        PAST SURGICAL HISTORY:  Past Surgical History:   Procedure Laterality Date     ABCESS DRAINAGE      finger     BURSECTOMY ELBOW Left 10/15/2021    Procedure: LEFT OLECRANON BURSECTOMY;  Surgeon: Tico Myers MD;  [facility]: [facility]     CREATE FISTULA ARTERIOVENOUS UPPER EXTREMITY Left 4/20/2021    Procedure: left arm dialysis graft placement;  Surgeon: Roxana Cosby MD;  [place] [facility] [place]  Service: General     FOREIGN BODY REMOVAL      finger     INCISION AND DRAINAGE FINGER, COMBINED Left 10/20/2016    Procedure: COMBINED INCISION AND DRAINAGE FINGER;  Surgeon: Mireya Pizano MD;  Location: [facility]     IR CHEST TUBE PLACEMENT NON-TUNNELED RIGHT  4/19/2021     IR PLEURAL DRAINAGE WITH CATHETER INSERTION  4/19/2021     MIDLINE INSERTION - DOUBLE LUMEN  4/23/2021          MD ESOPHAGOGASTRODUODENOSCOPY TRANSORAL DIAGNOSTIC N/A 8/18/2020    Procedure:  ESOPHAGOGASTRODUODENOSCOPY (EGD) with biopsy;  Surgeon: Nilson Gonzales MD;  Location: [facility];  Service: Gastroenterology      PARACENTESIS  8/14/2020      PARACENTESIS  8/19/2020     US THORACENTESIS  4/18/2021       CURRENT MEDICATIONS:    acetaminophen (TYLENOL) 500 MG tablet  carvedilol (COREG) 25 MG tablet  cephALEXin (KEFLEX) 500 MG capsule  cloNIDine (CATAPRES) 0.1 MG tablet  diltiazem ER (TIAZAC) 180 MG 24 hr ER beaded capsule  entecavir (BARACLUDE) 0.5 MG tablet  hydrOXYzine (ATARAX) 25 MG tablet  order for DME  oxyCODONE (ROXICODONE) 5 MG tablet  pantoprazole (PROTONIX) 40 MG EC tablet  polyethylene glycol (MIRALAX) 17 GM/Dose powder      ALLERGIES:  Allergies   Allergen Reactions     Nka [No Known Allergies]        FAMILY HISTORY:  Family History   Problem Relation Age of Onset     Diabetes Father      Hypertension No family hx of      Asthma No family hx of      Cancer No family hx of      Coronary Artery Disease No family hx of      Liver Cancer No family hx of      Ulcers Father        SOCIAL HISTORY:   Social History     Socioeconomic History     Marital status:      Spouse name: None     Number of children: None     Years of education: None     Highest education level: None   Occupational History     None   Tobacco Use     Smoking status: Never Smoker     Smokeless tobacco: Never Used   Substance and Sexual Activity     Alcohol use: No     Drug use: No     Sexual activity: Yes     Partners: Female   Other Topics Concern     None   Social History Narrative     None     Social Determinants of Health     Financial Resource Strain:      Difficulty of Paying Living Expenses:    Food Insecurity:      Worried About Running Out of Food in the Last Year:      Ran Out of Food in the Last Year:    Transportation Needs:      Lack of Transportation (Medical):      Lack of Transportation (Non-Medical):    Physical Activity:      Days of Exercise per Week:      Minutes of Exercise per Session:   "  Stress:      Feeling of Stress :    Social Connections:      Frequency of Communication with Friends and Family:      Frequency of Social Gatherings with Friends and Family:      Attends Hoahaoism Services:      Active Member of Clubs or Organizations:      Attends Club or Organization Meetings:      Marital Status:    Intimate Partner Violence:      Fear of Current or Ex-Partner:      Emotionally Abused:      Physically Abused:      Sexually Abused:        VITALS:  /70   Pulse 89   Temp 98.2  F (36.8  C)   Resp 16   Ht 1.651 m (5' 5\")   Wt 63 kg (139 lb)   SpO2 98%   BMI 23.13 kg/m      PHYSICAL EXAM    Vital Signs:  /70   Pulse 89   Temp 98.2  F (36.8  C)   Resp 16   Ht 1.651 m (5' 5\")   Wt 63 kg (139 lb)   SpO2 98%   BMI 23.13 kg/m    General:  On entering the room is in no apparent distress.    Neck:  Neck supple with full range of motion and nontender.    Back:  Back and spine are nontender.  No costovertebral angle tenderness.    HEENT:  Oropharynx clear with moist mucous membranes.  HEENT unremarkable.    Pulmonary:  Chest clear to auscultation, except decreased breath sounds in the right posterior base.  Without rhonchi rales or wheezing.    Cardiovascular:  Cardiac regular rate and rhythm without murmurs rubs or gallops.    Abdomen:  Abdomen soft nontender.  There is no rebound or guarding.    Muskuloskeletal:  he moves all 4 without any difficulty and has normal neurovascular exams.  Extremities without clubbing, cyanosis, or edema.  Legs and calves are nontender.    Neuro:  he is alert and oriented ×3 and moves all extremities symmetrically.    Psych:  Normal affect.    Skin:  Unremarkable and warm and dry.       LAB:  All pertinent labs reviewed and interpreted.  Labs Ordered and Resulted from Time of ED Arrival Up to the Time of Departure from the ED - No data to display    RADIOLOGY:  Reviewed all pertinent imaging. Please see official radiology report.  No orders to display "       EKG:          PROCEDURES:   None.      I, Xavi Barnes, am serving as a scribe to document services personally performed by Dr. Degroot based on my observation and the provider's statements to me. I, Denzel Degroot MD attest that Xavi Barnes is acting in a scribe capacity, has observed my performance of the services and has documented them in accordance with my direction.    Denzel Degroot M.D.  Emergency Medicine  [facility] EMERGENCY DEPARTMENT  [address] [place] [id number]  [phone]  Dept: [phone]     Denzel Degroot MD  10/19/21 0902

## 2021-10-19 NOTE — ED TRIAGE NOTES
Pt here for admission to hospital for pleural effusion and possible ct placement.  Pt was to be direct admit yesterday but did not come in.  Pt denies pain and or sob.

## 2021-10-19 NOTE — CONSULTS
GENERAL SURGERY CONSULTATION    Elle Blanton  [facility]  Medical Record #:  [id number]  YOB: 1960  Age:  61 year old     Date of Consultation: 10/19/2021    Reason for Consultation: Infected right pleural space    Elle Blanton is a 61 year old male who presents with a history of recent admission to the hospital with right thoracentesis and now with a positive pleural fluid culture on the right side for staph.  Patient currently seen in the emergency room having been brought back in for appropriate and additional evaluation and care.  At the current time the patient is without significant symptoms.  He had dialysis yesterday.  It is of note that the patient also had surgical intervention with debridement of left elbow infection during recent hospitalization.  He has a splint in place in the left elbow location.  He is not had any fevers at home and he has very limited discomfort in the right chest primarily where he had a needle aspiration in the last week.    PHH:    Past Medical History:   Diagnosis Date     NAHUM (acute kidney injury) (H)      GI (gastrointestinal bleed)      Gout      H. pylori infection 7/5/2017    UGI bleed implied by Hgb 9.0 6/22/17 and melena. Admitted and hgb decreased to 7.6, CT abdomen showed all bladder wall thickening. + Hep B surface antigen noted. Melena resolved and hgb stabalized without transfusion, epigastric pain resolved with PPI. Recommend referral for gastroscopy.     Heart attack (H)      Hepatitis B      Normocytic anemia      PONV (postoperative nausea and vomiting)      Thrombocytopenia (H)         Past Surgical History:   Procedure Laterality Date     ABCESS DRAINAGE      finger     BURSECTOMY ELBOW Left 10/15/2021    Procedure: LEFT OLECRANON BURSECTOMY;  Surgeon: Tico Myers MD;  [place] [facility] [place]     CREATE FISTULA ARTERIOVENOUS UPPER EXTREMITY Left 4/20/2021    Procedure: left arm dialysis graft placement;  Surgeon: Roxana Cosby MD;   Location: [facility];  Service: General     FOREIGN BODY REMOVAL      finger     INCISION AND DRAINAGE FINGER, COMBINED Left 10/20/2016    Procedure: COMBINED INCISION AND DRAINAGE FINGER;  Surgeon: Mireya Pizano MD;  Location: [facility]     IR CHEST TUBE PLACEMENT NON-TUNNELED RIGHT  4/19/2021     IR PLEURAL DRAINAGE WITH CATHETER INSERTION  4/19/2021     MIDLINE INSERTION - DOUBLE LUMEN  4/23/2021          IA ESOPHAGOGASTRODUODENOSCOPY TRANSORAL DIAGNOSTIC N/A 8/18/2020    Procedure: ESOPHAGOGASTRODUODENOSCOPY (EGD) with biopsy;  Surgeon: Nilson Gonzales MD;  Location: [facility] GI;  Service: Gastroenterology      PARACENTESIS  8/14/2020      PARACENTESIS  8/19/2020     US THORACENTESIS  4/18/2021       ALLERGIES:  Nka [no known allergies]    MEDS:  No current facility-administered medications for this encounter.    Current Outpatient Medications:      acetaminophen (TYLENOL) 500 MG tablet, Take 500 mg by mouth every 6 hours as needed for mild pain, Disp: , Rfl:      carvedilol (COREG) 25 MG tablet, Take 1 tablet (25 mg) by mouth 2 times daily (with meals), Disp: 60 tablet, Rfl: 11     cephALEXin (KEFLEX) 500 MG capsule, Take 1 capsule (500 mg) by mouth 2 times daily, Disp: 14 capsule, Rfl: 0     cloNIDine (CATAPRES) 0.1 MG tablet, Take 1 tablet (0.1 mg) by mouth At Bedtime, Disp: 30 tablet, Rfl: 11     diltiazem ER (TIAZAC) 180 MG 24 hr ER beaded capsule, Take 1 capsule (180 mg) by mouth 2 times daily, Disp: 60 capsule, Rfl: 11     entecavir (BARACLUDE) 0.5 MG tablet, Take 1 tablet (0.5 mg) by mouth every 7 days, Disp: 45 tablet, Rfl: 3     hydrOXYzine (ATARAX) 25 MG tablet, Take 1 tablet (25 mg) by mouth 3 times daily as needed for itching, Disp: 90 tablet, Rfl: 3     oxyCODONE (ROXICODONE) 5 MG tablet, Take 1 tablet (5 mg) by mouth every 6 hours as needed for severe pain, Disp: 42 tablet, Rfl: 0     pantoprazole (PROTONIX) 40 MG EC tablet, Take 1 tablet (40 mg) by mouth daily, Disp: 30 tablet,  "Rfl: 3     polyethylene glycol (MIRALAX) 17 GM/Dose powder, Take 17 g by mouth daily (Patient taking differently: Take 17 g by mouth daily as needed ), Disp: 510 g, Rfl: 0     order for DME, Equipment being ordered: Other: blood pressure monitor Treatment Diagnosis: hypertension, Disp: 1 each, Rfl: 0    SOCIAL HABITS:    History   Smoking Status     Never Smoker   Smokeless Tobacco     Never Used     Social History    Substance and Sexual Activity      Alcohol use: No      History   Drug Use No       FAMILY HISTORY:    Family History   Problem Relation Age of Onset     Diabetes Father      Hypertension No family hx of      Asthma No family hx of      Cancer No family hx of      Coronary Artery Disease No family hx of      Liver Cancer No family hx of      Ulcers Father        REVIEW OF SYSTEMS: Noted and reviewed.  Chronic renal failure dialysis left upper arm AV fistula    PE:    /70   Pulse 89   Temp 98.2  F (36.8  C)   Resp 16   Ht 1.651 m (5' 5\")   Wt 63 kg (139 lb)   SpO2 98%   BMI 23.13 kg/m      HEENT: No swelling normal  Chest: Bandage in the right posterior chest with mild tenderness.  Lungs: Diffuse decreased breath sounds right side.  Heart: Heart rate approximately 80/min  Abd: Benign  Ext: Normal  Vascular: Normal carotids and femorals    LABS:    Recent Labs   Lab 10/19/21  0940      CO2 26   BUN 66*      Recent Labs   Lab 10/17/21  0542   WBC 8.5   HGB 7.8*   HCT 25.7*   *      Recent Labs   Lab 10/19/21  0940   ALKPHOS 181*   ALT <9   AST 29     No results for input(s): AMYLASE in the last 168 hours.    Recent Labs   Lab 10/19/21  0940   INR 1.38*       XRAYS: Reviewed    ASSESSMENT: Positive culture right thoracic fluid    PLAN: We will obtain repeat CT scan at this time for evaluation of the right thorax.  Reviewed with patient.  He is quite adamant that he would not want to proceed with any major large incision intervention.  Potential of additional drainage tubes limited " surgery reviewed.    Dawson joshua md  [facility]

## 2021-10-19 NOTE — PROGRESS NOTES
Progress Note    Assessment/Plan  CT of the chest reviewed.  Progression of pleural effusion right side.  Will have tube thoracostomy per radiology.    Active Problems:    Pleural effusion      Subjective  No change  Objective    Vital signs in last 24 hours  Temp:  [98.2  F (36.8  C)] 98.2  F (36.8  C)  Pulse:  [77-89] 83  Resp:  [16] 16  BP: (101-137)/(64-75) 137/75  SpO2:  [96 %-99 %] 96 %  Weight:   [unfilled]    Intake/Output last 3 shifts  No intake/output data recorded.  Intake/Output this shift:  No intake/output data recorded.      Physical Exam  No change    Pertinent Labs   Lab Results   Component Value Date    WBC 8.3 10/19/2021    HGB 7.4 (L) 10/19/2021    HCT 23.7 (L) 10/19/2021    MCV 98 10/19/2021     (L) 10/19/2021             Pertinent Radiology     [unfilled]        Dawson Reynaga MD

## 2021-10-19 NOTE — PHARMACY-ADMISSION MEDICATION HISTORY
Pharmacy Note - Admission Medication History    Pertinent Provider Information:      ______________________________________________________________________    Prior To Admission (PTA) med list completed and updated in EMR.       PTA Med List   Medication Sig Last Dose     acetaminophen (TYLENOL) 500 MG tablet Take 500 mg by mouth every 6 hours as needed for mild pain 10/19/2021 at Unknown time     carvedilol (COREG) 25 MG tablet Take 1 tablet (25 mg) by mouth 2 times daily (with meals) prn     cephALEXin (KEFLEX) 500 MG capsule Take 1 capsule (500 mg) by mouth 2 times daily 10/19/2021 at Unknown time     cloNIDine (CATAPRES) 0.1 MG tablet Take 1 tablet (0.1 mg) by mouth At Bedtime 10/18/2021 at Unknown time     diltiazem ER (TIAZAC) 180 MG 24 hr ER beaded capsule Take 1 capsule (180 mg) by mouth 2 times daily Past Week at Unknown time     entecavir (BARACLUDE) 0.5 MG tablet Take 1 tablet (0.5 mg) by mouth every 7 days Past Week at Unknown time     hydrOXYzine (ATARAX) 25 MG tablet Take 1 tablet (25 mg) by mouth 3 times daily as needed for itching prn     oxyCODONE (ROXICODONE) 5 MG tablet Take 1 tablet (5 mg) by mouth every 6 hours as needed for severe pain prn     pantoprazole (PROTONIX) 40 MG EC tablet Take 1 tablet (40 mg) by mouth daily 10/19/2021 at Unknown time     polyethylene glycol (MIRALAX) 17 GM/Dose powder Take 17 g by mouth daily (Patient taking differently: Take 17 g by mouth daily as needed ) prn       Information source(s): Patient, Family member and CareEverywhere/SureScripts  Method of interview communication: in-person    Summary of Changes to PTA Med List  New: n/a  Discontinued: n/a  Changed: Miralax    Patient was asked about OTC/herbal products specifically.  PTA med list reflects this.    Allergies were reviewed, assessed, and updated with the patient.      Patient does not use any multi-dose medications prior to admission.    The information provided in this note is only as accurate as the  sources available at the time of the update(s).    Thank you for the opportunity to participate in the care of this patient.    Danisha uDnbar RP  10/19/2021 10:00 AM

## 2021-10-20 PROBLEM — J86.9 EMPYEMA LUNG (H): Status: ACTIVE | Noted: 2021-10-20

## 2021-10-20 LAB
ANION GAP SERPL CALCULATED.3IONS-SCNC: 15 MMOL/L (ref 5–18)
BUN SERPL-MCNC: 76 MG/DL (ref 8–22)
CALCIUM SERPL-MCNC: 7.3 MG/DL (ref 8.5–10.5)
CHLORIDE BLD-SCNC: 97 MMOL/L (ref 98–107)
CO2 SERPL-SCNC: 21 MMOL/L (ref 22–31)
CREAT SERPL-MCNC: 11.25 MG/DL (ref 0.7–1.3)
ERYTHROCYTE [DISTWIDTH] IN BLOOD BY AUTOMATED COUNT: 16.1 % (ref 10–15)
GFR SERPL CREATININE-BSD FRML MDRD: 4 ML/MIN/1.73M2
GLUCOSE BLD-MCNC: 120 MG/DL (ref 70–125)
HCT VFR BLD AUTO: 26.2 % (ref 40–53)
HGB BLD-MCNC: 7.9 G/DL (ref 13.3–17.7)
MCH RBC QN AUTO: 29.8 PG (ref 26.5–33)
MCHC RBC AUTO-ENTMCNC: 30.2 G/DL (ref 31.5–36.5)
MCV RBC AUTO: 99 FL (ref 78–100)
PLATELET # BLD AUTO: 117 10E3/UL (ref 150–450)
POTASSIUM BLD-SCNC: 4.6 MMOL/L (ref 3.5–5)
RBC # BLD AUTO: 2.65 10E6/UL (ref 4.4–5.9)
SODIUM SERPL-SCNC: 133 MMOL/L (ref 136–145)
WBC # BLD AUTO: 9.5 10E3/UL (ref 4–11)

## 2021-10-20 PROCEDURE — 99223 1ST HOSP IP/OBS HIGH 75: CPT | Performed by: INTERNAL MEDICINE

## 2021-10-20 PROCEDURE — 36415 COLL VENOUS BLD VENIPUNCTURE: CPT | Performed by: STUDENT IN AN ORGANIZED HEALTH CARE EDUCATION/TRAINING PROGRAM

## 2021-10-20 PROCEDURE — 99233 SBSQ HOSP IP/OBS HIGH 50: CPT | Performed by: STUDENT IN AN ORGANIZED HEALTH CARE EDUCATION/TRAINING PROGRAM

## 2021-10-20 PROCEDURE — 250N000013 HC RX MED GY IP 250 OP 250 PS 637: Performed by: MASSAGE THERAPIST

## 2021-10-20 PROCEDURE — 99232 SBSQ HOSP IP/OBS MODERATE 35: CPT | Mod: GC | Performed by: STUDENT IN AN ORGANIZED HEALTH CARE EDUCATION/TRAINING PROGRAM

## 2021-10-20 PROCEDURE — 634N000001 HC RX 634: Performed by: INTERNAL MEDICINE

## 2021-10-20 PROCEDURE — 80048 BASIC METABOLIC PNL TOTAL CA: CPT | Performed by: STUDENT IN AN ORGANIZED HEALTH CARE EDUCATION/TRAINING PROGRAM

## 2021-10-20 PROCEDURE — 250N000013 HC RX MED GY IP 250 OP 250 PS 637: Performed by: STUDENT IN AN ORGANIZED HEALTH CARE EDUCATION/TRAINING PROGRAM

## 2021-10-20 PROCEDURE — 250N000011 HC RX IP 250 OP 636: Performed by: STUDENT IN AN ORGANIZED HEALTH CARE EDUCATION/TRAINING PROGRAM

## 2021-10-20 PROCEDURE — 120N000001 HC R&B MED SURG/OB

## 2021-10-20 PROCEDURE — 250N000013 HC RX MED GY IP 250 OP 250 PS 637

## 2021-10-20 PROCEDURE — 85027 COMPLETE CBC AUTOMATED: CPT | Performed by: STUDENT IN AN ORGANIZED HEALTH CARE EDUCATION/TRAINING PROGRAM

## 2021-10-20 RX ORDER — SODIUM CHLORIDE, SODIUM LACTATE, POTASSIUM CHLORIDE, CALCIUM CHLORIDE 600; 310; 30; 20 MG/100ML; MG/100ML; MG/100ML; MG/100ML
INJECTION, SOLUTION INTRAVENOUS CONTINUOUS
Status: DISCONTINUED | OUTPATIENT
Start: 2021-10-20 | End: 2021-10-20

## 2021-10-20 RX ORDER — CLONIDINE HYDROCHLORIDE 0.1 MG/1
0.1 TABLET ORAL AT BEDTIME
Status: DISCONTINUED | OUTPATIENT
Start: 2021-10-20 | End: 2021-10-20

## 2021-10-20 RX ORDER — ACETAMINOPHEN 325 MG/1
650 TABLET ORAL EVERY 6 HOURS PRN
Status: DISCONTINUED | OUTPATIENT
Start: 2021-10-20 | End: 2021-10-27 | Stop reason: HOSPADM

## 2021-10-20 RX ORDER — HYDROMORPHONE HCL IN WATER/PF 6 MG/30 ML
0.2 PATIENT CONTROLLED ANALGESIA SYRINGE INTRAVENOUS EVERY 5 MIN PRN
Status: DISCONTINUED | OUTPATIENT
Start: 2021-10-20 | End: 2021-10-20

## 2021-10-20 RX ORDER — ENTECAVIR 0.5 MG/1
0.5 TABLET, FILM COATED ORAL
Status: DISCONTINUED | OUTPATIENT
Start: 2021-10-20 | End: 2021-10-27 | Stop reason: HOSPADM

## 2021-10-20 RX ORDER — ONDANSETRON 4 MG/1
4 TABLET, ORALLY DISINTEGRATING ORAL EVERY 30 MIN PRN
Status: DISCONTINUED | OUTPATIENT
Start: 2021-10-20 | End: 2021-10-20

## 2021-10-20 RX ORDER — PANTOPRAZOLE SODIUM 20 MG/1
40 TABLET, DELAYED RELEASE ORAL DAILY
Status: DISCONTINUED | OUTPATIENT
Start: 2021-10-20 | End: 2021-10-27 | Stop reason: HOSPADM

## 2021-10-20 RX ORDER — HYDROXYZINE HYDROCHLORIDE 25 MG/1
25 TABLET, FILM COATED ORAL 3 TIMES DAILY PRN
Status: DISCONTINUED | OUTPATIENT
Start: 2021-10-20 | End: 2021-10-27 | Stop reason: HOSPADM

## 2021-10-20 RX ORDER — ACETAMINOPHEN 650 MG/1
650 SUPPOSITORY RECTAL EVERY 6 HOURS PRN
Status: DISCONTINUED | OUTPATIENT
Start: 2021-10-20 | End: 2021-10-27 | Stop reason: HOSPADM

## 2021-10-20 RX ORDER — ALBUMIN (HUMAN) 12.5 G/50ML
50 SOLUTION INTRAVENOUS
Status: DISCONTINUED | OUTPATIENT
Start: 2021-10-20 | End: 2021-10-27 | Stop reason: HOSPADM

## 2021-10-20 RX ORDER — LIDOCAINE 40 MG/G
CREAM TOPICAL
Status: DISCONTINUED | OUTPATIENT
Start: 2021-10-20 | End: 2021-10-27 | Stop reason: HOSPADM

## 2021-10-20 RX ORDER — FENTANYL CITRATE 50 UG/ML
25 INJECTION, SOLUTION INTRAMUSCULAR; INTRAVENOUS EVERY 5 MIN PRN
Status: DISCONTINUED | OUTPATIENT
Start: 2021-10-20 | End: 2021-10-20

## 2021-10-20 RX ORDER — CARVEDILOL 12.5 MG/1
25 TABLET ORAL 2 TIMES DAILY WITH MEALS
Status: DISCONTINUED | OUTPATIENT
Start: 2021-10-20 | End: 2021-10-20

## 2021-10-20 RX ORDER — SENNOSIDES 8.6 MG
8.6 TABLET ORAL 2 TIMES DAILY PRN
Status: DISCONTINUED | OUTPATIENT
Start: 2021-10-20 | End: 2021-10-21

## 2021-10-20 RX ORDER — HEPARIN SODIUM 5000 [USP'U]/.5ML
5000 INJECTION, SOLUTION INTRAVENOUS; SUBCUTANEOUS EVERY 12 HOURS
Status: DISCONTINUED | OUTPATIENT
Start: 2021-10-20 | End: 2021-10-27 | Stop reason: HOSPADM

## 2021-10-20 RX ORDER — ONDANSETRON 2 MG/ML
4 INJECTION INTRAMUSCULAR; INTRAVENOUS EVERY 30 MIN PRN
Status: DISCONTINUED | OUTPATIENT
Start: 2021-10-20 | End: 2021-10-20

## 2021-10-20 RX ADMIN — PANTOPRAZOLE SODIUM 40 MG: 20 TABLET, DELAYED RELEASE ORAL at 09:56

## 2021-10-20 RX ADMIN — STANDARDIZED SENNA CONCENTRATE 8.6 MG: 8.6 TABLET ORAL at 18:45

## 2021-10-20 RX ADMIN — HEPARIN SODIUM 5000 UNITS: 10000 INJECTION, SOLUTION INTRAVENOUS; SUBCUTANEOUS at 18:45

## 2021-10-20 RX ADMIN — CEFAZOLIN 1 G: 1 INJECTION, POWDER, FOR SOLUTION INTRAMUSCULAR; INTRAVENOUS at 14:32

## 2021-10-20 RX ADMIN — OXYCODONE HYDROCHLORIDE 5 MG: 5 TABLET ORAL at 04:19

## 2021-10-20 RX ADMIN — ACETAMINOPHEN 325 MG: 325 TABLET ORAL at 11:38

## 2021-10-20 RX ADMIN — OXYCODONE HYDROCHLORIDE 5 MG: 5 TABLET ORAL at 11:38

## 2021-10-20 RX ADMIN — EPOETIN ALFA-EPBX 10000 UNITS: 10000 INJECTION, SOLUTION INTRAVENOUS; SUBCUTANEOUS at 17:51

## 2021-10-20 RX ADMIN — OXYCODONE HYDROCHLORIDE 5 MG: 5 TABLET ORAL at 00:16

## 2021-10-20 RX ADMIN — ACETAMINOPHEN 650 MG: 325 TABLET ORAL at 18:45

## 2021-10-20 RX ADMIN — ENTECAVIR 0.5 MG: 0.5 TABLET ORAL at 14:33

## 2021-10-20 ASSESSMENT — ACTIVITIES OF DAILY LIVING (ADL)
ADLS_ACUITY_SCORE: 7

## 2021-10-20 NOTE — PROGRESS NOTES
[facility] ID Inpatient follow up       Patient:  Elle Blanton  Date of birth 1960, Medical record number [id number]  Date of Visit:  10/20/2021  Attending Physician: Nash Solomon MD         Assessment and Recommendations:   Assessment:  Elle Blanton is a 61 year old male with   1. ESRD on HD via left AV fistula.  2. Chronic hepatitis B with cirrhosis.  On entecavir q. 7 days.  3. Recent admission for left-sided septic olecranon bursitis.  Status post I&D.  Cultures with MSSA.  Treated with IV antibiotic inpatient and discharged on p.o. Keflex.  Elbow examined on 10/20/2021.  No active infection.  4. Large right-sided empyema.  Status post thoracentesis on 10/16/2021.  Cultures with MSSA.  On IV Ancef. S/P Chest tube placement on 10/19/21.  Cultures in process.  5. Chronic bicytopenia with thrombocytopenia and anemia.  Most likely secondary to cirrhosis.        Recommendations:   Continue IV Ancef, renally dosed.   Follow cultures from 10/19.   Chest tube care per IR   Monitor oxygen need.     Discussed with the patient, nursing staff.    ID will follow.    35 minutes of total care with greater than 50% coordinating care.    Nicolle Yan MD.  [facility].   [facility]  Office Telephone [phone].  Fax [phone]  [facility] paging            Interval History:     HPI:  The interval history was reviewed.   Doing well.  Chest tube in place.  Having some pain locally.  Left elbow examined.    Pertinent cultures include:  Culture Micro   Date Value Ref Range Status   07/29/2019 No growth  Final       Recent Inflammatory Biomarkers:   Recent Labs   Lab Test 10/19/21  1006 10/17/21  0542 10/16/21  0557 10/15/21  0507 10/14/21  2112 10/11/21  1121 09/27/21  0833 09/26/21  1312   CRP  --   --   --   --  21.5*  --   --  0.8*   PCAL  --   --   --   --   --   --   --  0.26   WBC 8.3 8.5 10.0 12.6* 6.6 11.7*   < > 5.4    < > = values in  "this interval not displayed.            Review of Systems:   CONSTITUTIONAL:    Temp Max: Temp (24hrs), Av.1  F (36.7  C), Min:98  F (36.7  C), Max:98.4  F (36.9  C)   .  Negative except for findings in the HPI.           Current Medications (antimicrobials listed in bold):       sodium chloride 0.9%  250 mL Intravenous Once in dialysis/CRRT     sodium chloride 0.9%  300 mL Hemodialysis Machine Once     ceFAZolin  1 g Intravenous Q24H     entecavir  0.5 mg Oral Q7 Days     - MEDICATION INSTRUCTIONS -   Does not apply Once     pantoprazole  40 mg Oral Daily     sodium chloride (PF)  3 mL Intracatheter Q8H              Allergies:     Allergies   Allergen Reactions     Nka [No Known Allergies]             Physical Exam:   Vitals were reviewed  Patient Vitals for the past 24 hrs:   BP Temp Temp src Pulse Resp SpO2 Height Weight   10/20/21 0738 98/61 98  F (36.7  C) Oral 90 20 95 % -- --   10/20/21 0427 95/59 98  F (36.7  C) Oral 95 16 96 % -- --   10/19/21 2351 103/75 98.1  F (36.7  C) Oral 93 19 97 % -- --   10/19/21 2051 94/64 98.2  F (36.8  C) Oral 82 17 97 % -- --   10/19/21 1549 130/74 98.4  F (36.9  C) Oral 89 20 97 % 1.651 m (5' 5\") 63 kg (139 lb)   10/19/21 1440 132/83 -- -- 85 -- 98 % -- --   10/19/21 1415 123/77 -- -- 85 19 100 % -- --   10/19/21 1410 125/82 -- -- 84 22 100 % -- --   10/19/21 1408 -- -- -- -- 20 -- -- --   10/19/21 1407 -- -- -- 83 21 100 % -- --   10/19/21 1405 -- -- -- -- 18 -- -- --   10/19/21 1405 120/77 -- -- 83 18 100 % -- --   10/19/21 1404 -- -- -- 84 19 93 % -- --   10/19/21 1401 -- -- -- -- 22 -- -- --   10/19/21 1400 -- -- -- -- 22 -- -- --   10/19/21 1400 132/78 -- -- 84 23 96 % -- --   10/19/21 1359 -- -- -- 84 22 97 % -- --   10/19/21 1355 (!) 145/80 -- -- 81 21 97 % -- --   10/19/21 1354 -- -- -- 82 20 97 % -- --   10/19/21 1353 135/78 -- -- 81 -- 97 % -- --   10/19/21 1100 137/75 -- -- 83 -- 96 % -- --   10/19/21 1000 107/68 -- -- 77 -- 99 % -- --       Physical " Examination:  Gen: Pleasant in no acute distress.  HEENT: NCAT. EOMI. PERRL.  Neck: No bruit, JVD or thyromegaly.  Chest: Right chest tube in place.  Lungs: Decreased breath sounds on the right side.  Card: RRR. NSR. No RMG. Peripheral pulses present and symmetric. No edema.  Abd: Soft NT ND. No mass. Normal bowel sounds.  Skin: No rash.  Extr: Left elbow examined.  Incision intact.  Neuro: Alert and oriented to place time and person. Cranial nerves II to XII intact. Motor and sensory intact.            Laboratory Data:   ID Labs:  Microbiology labs:    Body fluid, unsp Aerobic Bacterial Culture Routine  Order: [id number]  Collected:  10/16/2021  4:35 PM Status:  Preliminary result   Visible to patient:  No (not released)  Specimen Information: Pleural Cavity, Right; Body fluid, unsp         0 Result Notes  Culture Culture in progress       1+ Staphylococcus aureusAbnormal             Resulting Agency: [facility]       Susceptibility     Staphylococcus aureus     REGLA     Clindamycin <=0.25 ug/mL Susceptible     Erythromycin <=0.25 ug/mL Susceptible     Gentamicin <=0.5 ug/mL Susceptible     Oxacillin <=0.25 ug/mL Susceptible     Tetracycline <=1.0 ug/mL Susceptible     Trimethoprim/Sulfamethoxazole <=0.5/9.5 u... Susceptible     Vancomycin 1.0 ug/mL Susceptible                 Specimen Collected: 10/16/21  4:35 PM Last Resulted: 10/19/21 11:34 PM               Recent Labs   Lab Test 10/14/21  2112 09/26/21  1312   CRP 21.5* 0.8*     Recent Labs   Lab Test 10/19/21  1006 10/17/21  0542 10/16/21  0557 10/15/21  0507 10/14/21  2112 10/11/21  1121   WBC 8.3 8.5 10.0 12.6* 6.6 11.7*     Recent Labs   Lab Test 10/19/21  0940 10/17/21  0542 10/16/21  0557 10/15/21  0507   CR 9.52* 10.86* 8.61* 11.60*   GFRESTIMATED 5* 5* 6* 4*       Hematology Studies  Recent Labs   Lab Test 10/19/21  1006 10/17/21  0542 10/16/21  0557 10/15/21  0507 10/14/21  2112 10/14/21  2112 10/11/21  1121 10/11/21  1121 08/07/19  0138 08/06/19 2020  08/02/19  0437 08/02/19  0437 07/28/19  1001 07/28/19  0950   WBC 8.3 8.5 10.0 12.6*  --  6.6  --  11.7*   < > 3.4*   < > 1.7*   < > 2.8*   ANEU  --   --   --   --   --   --   --   --   --  2.8  --  1.2*  --  2.4   AEOS  --   --   --   --   --   --   --   --   --  0.0  --  0.0  --  0.0   HCT 23.7* 25.7* 25.0* 24.3*   < > 40.2   < > 25.5*   < > 27.0*   < > 24.8*   < > 26.6*   * 104* 117* 125*   < > 86*   < > 102*   < > 44*   < > 53*   < > 28*    < > = values in this interval not displayed.       Metabolic  Recent Labs   Lab Test 10/19/21  0940 10/17/21  0542 10/16/21  0557    133* 133*   BUN 66* 74* 61*   CO2 26 22 24   CR 9.52* 10.86* 8.61*   GFRESTIMATED 5* 5* 6*       Hepatic Studies  Recent Labs   Lab Test 10/19/21  0940 09/27/21  0833 08/18/21  1416   BILITOTAL 1.0 1.2* 0.7   ALKPHOS 181* 71 129   ALBUMIN 2.3* 3.1* 3.1*   AST 29 18 31   ALT <9 21 49       Immunologlobulins  Recent Labs   Lab Test 10/14/21  2112 09/26/21  1312   SED 21* 4            Imaging Data:   Reviewed

## 2021-10-20 NOTE — PROGRESS NOTES
Progress Note    Assessment/Plan  Cont ct-cxr in AM-significant output so far.    Principal Problem:    Pleural effusion  Active Problems:    ESRD (end stage renal disease) on dialysis (H)    Hepatic cirrhosis, unspecified hepatic cirrhosis type, unspecified whether ascites present (H)    Empyema lung (H)      Subjective  comfortable  Objective    Vital signs in last 24 hours  Temp:  [98  F (36.7  C)-99  F (37.2  C)] 98.6  F (37  C)  Pulse:  [82-95] 94  Resp:  [16-20] 20  BP: ()/(59-75) 113/71  SpO2:  [95 %-97 %] 95 %  Weight:   [unfilled]    Intake/Output last 3 shifts  I/O last 3 completed shifts:  In: 880 [P.O.:680; I.V.:200]  Out: 1830 [Urine:150; Chest Tube:1680]  Intake/Output this shift:  No intake/output data recorded.      Physical Exam  Ct no air leak-serossang output    Pertinent Labs   Lab Results   Component Value Date    WBC 9.5 10/20/2021    HGB 7.9 (L) 10/20/2021    HCT 26.2 (L) 10/20/2021    MCV 99 10/20/2021     (L) 10/20/2021             Pertinent Radiology     [unfilled]        Dawson Reynaga MD

## 2021-10-20 NOTE — PLAN OF CARE
Patient receiving tylenol and PO Oxycodone for pain in right lateral chest.  Able to have Renal diet. Family brought in some food from home.  IV Sl'd. Had dose of IV Cefazolin.  Recurrent empyema. Chest tube to suction placed today. 1250 out since placement.  On telemetry-NSR.  Moves well. Dangled aand stood at bedside tonight.  On dialysis so does not void much. Urinal within reach.  Should get Dialysis tomorrow.

## 2021-10-20 NOTE — PROGRESS NOTES
Care Management Follow Up    Length of Stay (days): 1    Expected Discharge Date: 10/23/2021     Concerns to be Addressed:     Medical progression. Chest Tube in place. ID following. Pain control. Cardiothoracic surgery and Nephrology Consulted.   Patient plan of care discussed at interdisciplinary rounds: Yes    Anticipated Discharge Disposition:  Goal Home     Anticipated Discharge Services:  To be determined pending progression and recommendations.   Anticipated Discharge DME:  To be determined    Patient/family educated on Medicare website which has current facility and service quality ratings:  Not at this time  Education Provided on the Discharge Plan:  Yes, per team  Patient/Family in Agreement with the Plan:  Yes    Referrals Placed by CM/SW:  None at this time  Private pay costs discussed: Not applicable    Additional Information:  Chart Reviewed. From home with spouse Maico, [phone], in a private residence; independent at baseline. Has dialysis Monday/Wednesday/Friday schedule. Goal to return home at discharge, no needs anticipated. Family to transport. Care manager to follow.       Khushi Pink RN

## 2021-10-20 NOTE — PROGRESS NOTES
"  Interventional Radiology - Progress Note  Inpatient - [facility]: Interventional Radiology   [phone]  10/20/2021    S:  Lying in bed. Breathing improved. Has mild pain at chest tube site.     O:  BP 98/61 (BP Location: Right arm)   Pulse 88   Temp 98  F (36.7  C) (Oral)   Resp 20   Ht 1.651 m (5' 5\")   Wt 63 kg (139 lb)   SpO2 95%   BMI 23.13 kg/m    General:  Stable.  In no acute distress.    Neuro:  A&O x 3.  Resp:  Unlabored breathing. Right chest tube to suction.     IMAGING:  [place] [facility]  LOCATION: [facility]  DATE: 10/19/2021     PROCEDURE: IMAGING GUIDED RIGHT PLEURAL DRAINAGE CATHETER PLACEMENT     INTERVENTIONAL RADIOLOGIST: August Torres MD.     INDICATION: 61-year-old male with a large loculated right pleural effusion.     CONSENT: The risks, benefits and alternatives of the stated procedure were discussed with the patient  in detail. All questions were answered. Informed consent was given to proceed with the procedure.     MODERATE SEDATION: Versed 1.5 mg IV; Fentanyl 75 mcg IV.  Under physician supervision, Versed and fentanyl were administered for moderate sedation. Pulse oximetry, heart rate and blood pressure were continuously monitored by an independent trained   observer. The physician spent 15 minutes of face-to-face sedation time with the patient.     CONTRAST: None.  ANTIBIOTICS: None.  ADDITIONAL MEDICATIONS: None.     FLUOROSCOPIC TIME: 0.3 minutes.  RADIATION DOSE: Air Kerma: 6 mGy.     COMPLICATIONS: No immediate complications.     STERILE BARRIER TECHNIQUE: Maximum sterile barrier technique was used. Cutaneous antisepsis was performed at the operative site with application of 2% chlorhexidine and large sterile drape. Prior to the procedure, the  and assistant performed   hand hygiene and wore hat, mask, sterile gown, and sterile gloves during the entire procedure.     PROCEDURE:    Using real-time ultrasound guidance, a 7 " Azeri Yueh catheter was inserted into the right pleural space from a lateral low intercostal approach. An Amplatz wire was advanced under fluoroscopic guidance and utilized to disrupt effusions. Over wire   exchange was then made for a 14 Azeri locking loop drainage catheter. The loop was formed within the right pleural space and attached to Pleur-evac suction.     FINDINGS:  Ultrasound demonstrates a loculated large right pleural effusion. A permanent image was stored.     The completion image shows the right pleural drainage catheter within the pleural effusion.                                                                      IMPRESSION:    1.  Successful placement of a 14 Azeri locking right pleural drainage catheter into the loculated pleural effusion as detailed above.  2.  A specimen of fluid was sent for microbiology analysis.       LABS:  CBC RESULTS: Recent Labs   Lab Test 10/20/21  0943   WBC 9.5   RBC 2.65*   HGB 7.9*   HCT 26.2*   MCV 99   MCH 29.8   MCHC 30.2*   RDW 16.1*   *     Drain Outputs (in mL):      A:  61 year old male recurrent right pleural effusion concerning for empyema, + S. Aures s/p right chest tube placement 10/19/21.    P:    Continue current chest tube cares per primary team.  IR will sign off for now. Please call with any further questions/concerns.     Discussed above with Dr. Torres    Total time spent on the date of the encounter is 15 minutes, including time spent counseling the patient, performing a medically appropriate evaluation, reviewing prior medical history, ordering medications and tests, documenting clinical information in the medical record, and communication of results.    Moraima Fritz, CNP  Interventional Radiology  [phone]    E/M codes for reference only:  76328

## 2021-10-20 NOTE — PROGRESS NOTES
[facility] Progress Note    Assessment/Plan  Active Problems:    Pleural effusion    Empyema lung (H)    61 year old M with multiple comorbidities including AAA (5.5cm), chronic thoracic aortic dissection, cirrhosis 2/2 chronic Hep B, ESRD on HD (M/F via L AVF), who was recently admitted for septic bursitis of the L elbow (10/14-10/17). Underwent I&D and bursectomy with Ortho on 10/15. Also s/p R thoracentesis with 1.5L drained for pleural effusion suspected 2/2 fluid overload. Pleural fluid cultures + S. Aureus. Admitted for empyema likely 2/2 bacteremia from septic bursitis now s/p chest tube placement.      #Empyema +S. Aureus, likely 2/2 bacteriemia from septic bursitis  CV Surgery consult for consideration of decort/VATS vs chest tube, patient elected to do chest tube and did not want any large surgeries. IR placed chest tube 10/19. Pleural cultures showing S. Aureus pansensitive. Patient doing well with chest tube, non toxic appearing.  - CV Surgery consulted: management of chest tube.  - ID consulted: agree with IV Ancef, will exam elbow today  - Blood cultures 10/19:NGTD  - Abx: restarted on IV ancef while admitted 1 g per day in the setting of ESRD on dialysis given   - PRN pain medication  - CBC/BMP in AM    #ESRD on HD   L AVF in place. Baseline Cr 7-8. Dialyzes M/F.   -Nephrology consulted for management of HD.     #Chronic Medical Conditions  - AAA/Aortic Dissection: Hold PTA carvedilol and clonidine as patient takes these PRN and with soft bp's today  - GERD: continue PTA pantoprazole  - Chronic Hep B/Cirrhosis: entecavir q7 days.  - Thrombocytopenia: no active bleeding. CTM     DVT ppx: SQH (tomorrow PM start, BID dosing given ESRD)  Diet: Regular  Code: Full  Med rec completed: Yes     Dispo: inpatient status for abx, chest tube, anticipate discharge to home   Barriers to discharge:  work up in progress   Anticipated discharge date:  unknown at this time    Subjective  Patient  states that he is doing well this morning.  He denies any pain from his chest tube site.  He denies any chest pain or shortness of breath.  He denies any pain from his elbow.  All questions were answered.    Objective    Vital signs in last 24 hours Temp:  [98  F (36.7  C)-98.4  F (36.9  C)] 98  F (36.7  C)  Pulse:  [81-95] 88  Resp:  [16-23] 20  BP: ()/(59-83) 98/61  SpO2:  [93 %-100 %] 95 %   139 lbs 0 oz    Intake/Output last 3 shift I/O last 3 completed shifts:  In: 460 [P.O.:360; I.V.:100]  Out: 1530 [Chest Tube:1530]    Intake/Output this shift:I/O this shift:  In: 200 [P.O.:200]  Out: -     Physical Exam  General appearance: alert, appears stated age, cooperative and no distress, lying in bed watching YouTube.  Head: Normocephalic, without obvious abnormality, atraumatic.  Lungs: clear to auscultation on left, decreased sound on the right, no increased respiratory effort.  Heart: regular rate and rhythm, no murmurs.  Extremities: Left elbow wrapped in splint, some ecchymosis seen over medial epicondylar region.  Skin: Skin color, texture, turgor normal. No rashes or lesions.  Neurologic: Alert and oriented x3, normal strength and tone.  Psychiatric: mood and affect appropriate.    Pertinent Labs and Pertinent Radiology   Lab Results: personally reviewed.     Radiology Results: Personally reviewed image/s and impression/s    Precepted patient with Dr. Vickey Arce III.    Connie Stanton MD  [facility], PGY-3  Pager # [phone]

## 2021-10-20 NOTE — PLAN OF CARE
Problem: Pain Acute  Goal: Acceptable Pain Control and Functional Ability  Outcome: Improving   He is taking oxycodone about every 4 hours for adequate pain control from the chest tube.  Problem: Airway Clearance Ineffective (Pulmonary Impairment)  Goal: Effective Airway Clearance  Outcome: Improving   He is satting well on room air; his chest tube put out 180cc of serosanguinous fluid.

## 2021-10-20 NOTE — CONSULTS
NEPHROLOGY CONSULTATION      ASSESSMENT/PLAN:  61 year old male with history of ESRD on dialysis at [facility] under care of Dr. Mao, history of abdominal aortic aneurysm thoracic aortic dissection cirrhosis with chronic hepatitis B on entecavir admitted last week with septic bursitis of left elbow underwent I&D with Ortho also s/p thoracocentesis with 1.5 L pleural effusion questionable empyema versus fluid overload drained cultures growing staph aureus s/p chest tube placement this admission 10/19/2020.  Nephrology consulted for ESRD management    ESRD  On hemodialysis [facility]  Treatment time 3 hours Monday and Friday patient refuses to dialyze on Wednesday's Access left upper extremity aV has had chronic swelling  --We will continue dialysis as outpatient on Monday and Friday discussed with patient again regarding Wednesday dialysis will follow labs on Wednesday to see if he needs dialysis again consider as needed  --Daily standing weights  --Renal labs    Hypertension Hx  BP low this admission ?? Infection related  Not clinically septic  Hold PTA BP meds    Mild hypervolemia  Mostly upper extremity edema in the left upper extremity  EDW ~60kg  -- usual UF 1-2 KG , will UF as tolerated    No electrolyte abnormalities    Anemia  Inflamm/ACD  On chronic EPO  Will keep on 10K M/F and follow on response    Empyema with staph aureus secondary to possible bacteremia from septic bursitis last admission 10/14- 10/17/2021  S/p chest tube placement ID following   on IV Ancef  Blood cultures negative to date  --Plans per primary medicine    History of chronic hepatitis B with cirrhosis  With thrombocytopenia  Continue entecavir    GERD on pantoprazole    History of AAA with thoracic aortic dissection  Does not take regular blood pressure medication  Blood pressure has been on the lower side today has been on carvedilol and clonidine takes as needed      CC:ESRD< empyema    REASON FOR CONSULTATION:  We are asked to see pt by Dr. Tang    HISTORY OF PRESENT ILLNESS    61 year old male with history of ESRD on dialysis at [facility] under care of Dr. Mao, history of abdominal aortic aneurysm thoracic aortic dissection cirrhosis with chronic hepatitis B on entecavir admitted last week with septic bursitis of left elbow underwent I&D with Ortho also s/p thoracocentesis with 1.5 L pleural effusion questionable empyema versus fluid overload drained cultures growing staph aureus s/p chest tube placement this admission 10/19/2020.  Nephrology consulted for ESRD management  The patient is admitted for empyema with pleural fluid cultures growing staph aureus  asked about symptoms patient reports that his symptoms of shortness of breath have been worse since his last admission other than he feels okay.  No fevers chills  He reports he still plans on dialyzing only Monday and Friday although recent starting dialysis he does feel his energy levels have been a little better but when he dialysis 3 days a week he does not feel well arm still swollen although much improved from before.  Was admitted last week from 10/14/2021 to 10/17/2021 with olecranon bursitis with staph aureus infection s/p I&D on 10/15/2021.  REVIEW OF SYSTEMS:  ROS was completely reviewed and otherwise negative and non-contributory    Past Medical History:   Diagnosis Date     NAHUM (acute kidney injury) (H)      GI (gastrointestinal bleed)      Gout      H. pylori infection 7/5/2017    UGI bleed implied by Hgb 9.0 6/22/17 and melena. Admitted and hgb decreased to 7.6, CT abdomen showed all bladder wall thickening. + Hep B surface antigen noted. Melena resolved and hgb stabalized without transfusion, epigastric pain resolved with PPI. Recommend referral for gastroscopy.     Heart attack (H)      Hepatitis B      Normocytic anemia      PONV (postoperative nausea and vomiting)      Thrombocytopenia (H)        Social History     Socioeconomic  History     Marital status:      Spouse name: Not on file     Number of children: Not on file     Years of education: Not on file     Highest education level: Not on file   Occupational History     Not on file   Tobacco Use     Smoking status: Never Smoker     Smokeless tobacco: Never Used   Substance and Sexual Activity     Alcohol use: No     Drug use: No     Sexual activity: Yes     Partners: Female   Other Topics Concern     Not on file   Social History Narrative     Not on file     Social Determinants of Health     Financial Resource Strain:      Difficulty of Paying Living Expenses:    Food Insecurity:      Worried About Running Out of Food in the Last Year:      Ran Out of Food in the Last Year:    Transportation Needs:      Lack of Transportation (Medical):      Lack of Transportation (Non-Medical):    Physical Activity:      Days of Exercise per Week:      Minutes of Exercise per Session:    Stress:      Feeling of Stress :    Social Connections:      Frequency of Communication with Friends and Family:      Frequency of Social Gatherings with Friends and Family:      Attends [employer] Services:      Active Member of Clubs or Organizations:      Attends Club or Organization Meetings:      Marital Status:    Intimate Partner Violence:      Fear of Current or Ex-Partner:      Emotionally Abused:      Physically Abused:      Sexually Abused:        Family History   Problem Relation Age of Onset     Diabetes Father      Hypertension No family hx of      Asthma No family hx of      Cancer No family hx of      Coronary Artery Disease No family hx of      Liver Cancer No family hx of      Ulcers Father        Allergies   Allergen Reactions     Nka [No Known Allergies]        MEDICATIONS:    sodium chloride 0.9%  250 mL Intravenous Once in dialysis/CRRT     sodium chloride 0.9%  300 mL Hemodialysis Machine Once     ceFAZolin  1 g Intravenous Q24H     entecavir  0.5 mg Oral Q7 Days     - MEDICATION  "INSTRUCTIONS -   Does not apply Once     pantoprazole  40 mg Oral Daily     sodium chloride (PF)  3 mL Intracatheter Q8H         PHYSICAL EXAM    BP 98/61 (BP Location: Right arm)   Pulse 90   Temp 98  F (36.7  C) (Oral)   Resp 20   Ht 1.651 m (5' 5\")   Wt 63 kg (139 lb)   SpO2 95%   BMI 23.13 kg/m        Intake/Output Summary (Last 24 hours) at 10/20/2021 0927  Last data filed at 10/20/2021 0600  Gross per 24 hour   Intake 460 ml   Output 1530 ml   Net -1070 ml       Alert/oriented x 3; awake and NAD  HEENT NC/AT; perrla; OP clear without lesions; mmm  Neck supple without LAD, TM  CV; RRR without rub or murmur  Lung: clear and equal; no extra sounds  Ab: soft and NT; not distended; normal bs  Ext: LUE edema and well perfused  Skin; no rash  Neuro; grossly intact    LABORATORIES    Recent Labs   Lab 10/19/21  1006 10/17/21  0542 10/16/21  1701 10/16/21  0557 10/16/21  0557   WBC 8.3 8.5  --   --  10.0   HGB 7.4* 7.8* 7.8*   < > 7.6*   HCT 23.7* 25.7*  --   --  25.0*   * 104*  --   --  117*    < > = values in this interval not displayed.     Recent Labs   Lab 10/19/21  0940 10/17/21  0542 10/16/21  0557    133* 133*   CO2 26 22 24   BUN 66* 74* 61*   ALKPHOS 181*  --   --    ALT <9  --   --    AST 29  --   --      Recent Labs   Lab 10/19/21  0940   INR 1.38*     Invalid input(s): FERRITIN  No results for input(s): IRON in the last 168 hours.    Invalid input(s): TIBC    I reviewed all labs and imaging    Thank you for the consultation we will follow    Jayme Clement MD  [facility]  [phone]      "

## 2021-10-21 ENCOUNTER — APPOINTMENT (OUTPATIENT)
Dept: CT IMAGING | Facility: HOSPITAL | Age: 61
DRG: 177 | End: 2021-10-21
Attending: SURGERY
Payer: COMMERCIAL

## 2021-10-21 ENCOUNTER — APPOINTMENT (OUTPATIENT)
Dept: RADIOLOGY | Facility: HOSPITAL | Age: 61
DRG: 177 | End: 2021-10-21
Attending: SURGERY
Payer: COMMERCIAL

## 2021-10-21 LAB
ANION GAP SERPL CALCULATED.3IONS-SCNC: 15 MMOL/L (ref 5–18)
BACTERIA FLD CULT: ABNORMAL
BUN SERPL-MCNC: 91 MG/DL (ref 8–22)
CALCIUM SERPL-MCNC: 7.2 MG/DL (ref 8.5–10.5)
CHLORIDE BLD-SCNC: 99 MMOL/L (ref 98–107)
CO2 SERPL-SCNC: 20 MMOL/L (ref 22–31)
CREAT SERPL-MCNC: 12.45 MG/DL (ref 0.7–1.3)
ERYTHROCYTE [DISTWIDTH] IN BLOOD BY AUTOMATED COUNT: 16.1 % (ref 10–15)
GFR SERPL CREATININE-BSD FRML MDRD: 4 ML/MIN/1.73M2
GLUCOSE BLD-MCNC: 93 MG/DL (ref 70–125)
HCT VFR BLD AUTO: 25.2 % (ref 40–53)
HGB BLD-MCNC: 7.6 G/DL (ref 13.3–17.7)
MCH RBC QN AUTO: 29.8 PG (ref 26.5–33)
MCHC RBC AUTO-ENTMCNC: 30.2 G/DL (ref 31.5–36.5)
MCV RBC AUTO: 99 FL (ref 78–100)
PLATELET # BLD AUTO: 114 10E3/UL (ref 150–450)
POTASSIUM BLD-SCNC: 5.5 MMOL/L (ref 3.5–5)
RBC # BLD AUTO: 2.55 10E6/UL (ref 4.4–5.9)
SODIUM SERPL-SCNC: 134 MMOL/L (ref 136–145)
WBC # BLD AUTO: 10.2 10E3/UL (ref 4–11)

## 2021-10-21 PROCEDURE — 250N000011 HC RX IP 250 OP 636: Performed by: STUDENT IN AN ORGANIZED HEALTH CARE EDUCATION/TRAINING PROGRAM

## 2021-10-21 PROCEDURE — 250N000009 HC RX 250: Performed by: INTERNAL MEDICINE

## 2021-10-21 PROCEDURE — 80048 BASIC METABOLIC PNL TOTAL CA: CPT | Performed by: STUDENT IN AN ORGANIZED HEALTH CARE EDUCATION/TRAINING PROGRAM

## 2021-10-21 PROCEDURE — 250N000013 HC RX MED GY IP 250 OP 250 PS 637

## 2021-10-21 PROCEDURE — 250N000013 HC RX MED GY IP 250 OP 250 PS 637: Performed by: STUDENT IN AN ORGANIZED HEALTH CARE EDUCATION/TRAINING PROGRAM

## 2021-10-21 PROCEDURE — 120N000001 HC R&B MED SURG/OB

## 2021-10-21 PROCEDURE — 250N000013 HC RX MED GY IP 250 OP 250 PS 637: Performed by: MASSAGE THERAPIST

## 2021-10-21 PROCEDURE — 250N000011 HC RX IP 250 OP 636: Performed by: INTERNAL MEDICINE

## 2021-10-21 PROCEDURE — 99232 SBSQ HOSP IP/OBS MODERATE 35: CPT | Performed by: STUDENT IN AN ORGANIZED HEALTH CARE EDUCATION/TRAINING PROGRAM

## 2021-10-21 PROCEDURE — 85027 COMPLETE CBC AUTOMATED: CPT | Performed by: STUDENT IN AN ORGANIZED HEALTH CARE EDUCATION/TRAINING PROGRAM

## 2021-10-21 PROCEDURE — 36415 COLL VENOUS BLD VENIPUNCTURE: CPT | Performed by: INTERNAL MEDICINE

## 2021-10-21 PROCEDURE — 99233 SBSQ HOSP IP/OBS HIGH 50: CPT | Mod: GC | Performed by: STUDENT IN AN ORGANIZED HEALTH CARE EDUCATION/TRAINING PROGRAM

## 2021-10-21 PROCEDURE — 36415 COLL VENOUS BLD VENIPUNCTURE: CPT | Performed by: STUDENT IN AN ORGANIZED HEALTH CARE EDUCATION/TRAINING PROGRAM

## 2021-10-21 PROCEDURE — 99233 SBSQ HOSP IP/OBS HIGH 50: CPT | Performed by: INTERNAL MEDICINE

## 2021-10-21 PROCEDURE — 71046 X-RAY EXAM CHEST 2 VIEWS: CPT

## 2021-10-21 PROCEDURE — 71250 CT THORAX DX C-: CPT

## 2021-10-21 RX ORDER — FUROSEMIDE 10 MG/ML
60 INJECTION INTRAMUSCULAR; INTRAVENOUS ONCE
Status: COMPLETED | OUTPATIENT
Start: 2021-10-21 | End: 2021-10-21

## 2021-10-21 RX ORDER — SENNOSIDES 8.6 MG
2 TABLET ORAL 2 TIMES DAILY PRN
Status: DISCONTINUED | OUTPATIENT
Start: 2021-10-21 | End: 2021-10-27 | Stop reason: HOSPADM

## 2021-10-21 RX ADMIN — CEFAZOLIN 1 G: 1 INJECTION, POWDER, FOR SOLUTION INTRAMUSCULAR; INTRAVENOUS at 14:06

## 2021-10-21 RX ADMIN — FUROSEMIDE 60 MG: 10 INJECTION, SOLUTION INTRAMUSCULAR; INTRAVENOUS at 13:49

## 2021-10-21 RX ADMIN — OXYCODONE HYDROCHLORIDE 5 MG: 5 TABLET ORAL at 02:13

## 2021-10-21 RX ADMIN — STANDARDIZED SENNA CONCENTRATE 8.6 MG: 8.6 TABLET ORAL at 16:35

## 2021-10-21 RX ADMIN — ACETAMINOPHEN 650 MG: 325 TABLET ORAL at 11:31

## 2021-10-21 RX ADMIN — PANTOPRAZOLE SODIUM 40 MG: 20 TABLET, DELAYED RELEASE ORAL at 09:03

## 2021-10-21 RX ADMIN — HEPARIN SODIUM 5000 UNITS: 10000 INJECTION, SOLUTION INTRAVENOUS; SUBCUTANEOUS at 06:56

## 2021-10-21 RX ADMIN — SODIUM BICARBONATE 100 MEQ: 84 INJECTION, SOLUTION INTRAVENOUS at 13:54

## 2021-10-21 RX ADMIN — HEPARIN SODIUM 5000 UNITS: 10000 INJECTION, SOLUTION INTRAVENOUS; SUBCUTANEOUS at 18:10

## 2021-10-21 RX ADMIN — OXYCODONE HYDROCHLORIDE 5 MG: 5 TABLET ORAL at 11:31

## 2021-10-21 ASSESSMENT — ACTIVITIES OF DAILY LIVING (ADL)
ADLS_ACUITY_SCORE: 7

## 2021-10-21 NOTE — PLAN OF CARE
Problem: Adult Inpatient Plan of Care  Goal: Plan of Care Review  Outcome: Improving      Alert and oriented x 4. Received PRN oxycodone for pain around his right chest and patient was sleeping when staff followed up. Chest tube intact and patent. Continue to monitor.

## 2021-10-21 NOTE — PROGRESS NOTES
[facility] Progress Note    Assessment/Plan  Principal Problem:    Pleural effusion  Active Problems:    ESRD (end stage renal disease) on dialysis (H)    Hepatic cirrhosis, unspecified hepatic cirrhosis type, unspecified whether ascites present (H)    Empyema lung (H)    61 year old M with multiple comorbidities including AAA (5.5cm), chronic thoracic aortic dissection, cirrhosis 2/2 chronic Hep B, ESRD on HD (M/F via L AVF), who was recently admitted for septic bursitis of the L elbow (10/14-10/17). Underwent I&D and bursectomy with Ortho on 10/15. Also s/p R thoracentesis with 1.5L drained for pleural effusion suspected 2/2 fluid overload. Pleural fluid cultures + S. Aureus. Admitted for empyema likely 2/2 bacteremia from septic bursitis now s/p chest tube placement, clinically stable     #Empyema +S. Aureus, likely 2/2 bacteriemia from septic bursitis  CV Surgery consult for consideration of decort/VATS vs chest tube, patient elected to do chest tube and did not want any large surgeries. IR placed chest tube 10/19. Pleural cultures showing S. Aureus pansensitive. Patient doing well with chest tube, non toxic appearing. Repeat CXR showing decreased size in empyema.   - CV Surgery consulted: management of chest tube.  - ID consulted: agree with IV Ancef, will exam elbow today  - Blood cultures 10/19: NGTD  - Abx: restarted on IV ancef while admitted 1 g per day in the setting of ESRD on dialysis given   - PRN pain medication  - CBC/BMP in AM    #Left Olecranon bursitis s/p I&D  Patient with planned follow up with ortho this week but is readmitted for empyema. Incisions do not look infected and healing well.   - Ortho consulted     #ESRD on HD   L AVF in place. Baseline Cr 7-8. Dialyzes M/F. Cr today 12.45, K 5.5. Will likely dialyze tomorrow. Patient refuses Wednesday HD.   -Nephrology consulted for management of HD.     #Chronic Medical Conditions  - AAA/Aortic Dissection: Continue to hold PTA  carvedilol and clonidine as patient takes these PRN and with soft bp's today  - GERD: continue PTA pantoprazole  - Chronic Hep B/Cirrhosis c/b thrombocytopenia: entecavir q7 days. No active bleeding. CTM     DVT ppx: SQH  Diet: Regular  Code: Full  Med rec completed: Yes     Dispo: inpatient status for abx, chest tube, anticipate discharge to home   Barriers to discharge:  work up in progress   Anticipated discharge date:  unknown at this time    Subjective  Patient states that he is having some pain at his chest tube site but is controlled. He denies any symptoms including chest pain, sob. His left elbow is asymptomatic. All questions were answered.       Objective    Vital signs in last 24 hours Temp:  [98.4  F (36.9  C)-99.7  F (37.6  C)] 99  F (37.2  C)  Pulse:  [] 100  Resp:  [16-20] 16  BP: (101-132)/(66-72) 125/72  SpO2:  [94 %-97 %] 95 %   139 lbs 0 oz    Intake/Output last 3 shift I/O last 3 completed shifts:  In: 780 [P.O.:680; I.V.:100]  Out: 570 [Urine:300; Chest Tube:270]    Intake/Output this shift:I/O this shift:  In: -   Out: 150 [Urine:150]    Physical Exam  General appearance: alert, appears stated age, cooperative and no distress.  Head: Normocephalic, without obvious abnormality, atraumatic.  Eyes: conjunctivae/corneas clear.  Lungs: Diminished lung sounds on right, clear to auscultation on left, no increased respiratory effort.  Heart: regular rate and rhythm, no murmurs.  Extremities: Left elbow with sutures in place, no erythema, no purulence, ROM to 160 degrees, full flexion. No tenderness to palpation.  Skin: Skin color, texture, turgor normal. No rashes or lesions.  Neurologic: Alert and oriented x3, normal strength and tone.  Psychiatric: mood and affect appropriate.    Pertinent Labs and Pertinent Radiology   Lab Results: personally reviewed.     Radiology Results: Personally reviewed image/s and impression/s    Precepted patient with Dr. Gustavo Solomon.    Connie Stanton MD  [facility], PGY-3  Pager # [phone]

## 2021-10-21 NOTE — PROGRESS NOTES
[facility] ID Inpatient follow up       Patient:  Elle Blanton  Date of birth 1960, Medical record number [id number]  Date of Visit:  10/21/2021  Attending Physician: Nash Solomon MD         Assessment and Recommendations:   Assessment:  Elle Blanton is a 61 year old male with   1. ESRD on HD via left AV fistula.  2. Chronic hepatitis B with cirrhosis.  On entecavir q. 7 days.  3. Recent admission for left-sided septic olecranon bursitis.  Status post I&D.  Cultures with MSSA.  Treated with IV antibiotic inpatient and discharged on p.o. Keflex.  Elbow examined on 10/20/2021.  No active infection.  4. Large right-sided empyema.  Status post thoracentesis on 10/16/2021.  Cultures with MSSA.  On IV Ancef. S/P Chest tube placement on 10/19/21.  Cultures in process.  So far no growth.  Chest x-ray reviewed.  Significant improvement of the effusion.  CVTS following.  5. Chronic bicytopenia with thrombocytopenia and anemia.  Most likely secondary to cirrhosis.  6.  Right elbow tophaceous gout.  Uric acid at 12.  Does not appear to be on treatment for gout.        Recommendations:   Continue IV Ancef, renally dosed.   Follow cultures from 10/19.   Chest tube care per IR   Monitor oxygen need.   Hyperuricemic gout treatment per primary team.    Discussed with the patient, nursing staff.    ID will follow.      Nicolle Yan MD.  [facility].   [facility]  Office Telephone [phone].  Fax [phone]  [facility] paging            Interval History:     HPI:  The interval history was reviewed.   Doing better.  Less pain on the right chest.  Chest tube in place.  Noted on chart review to have very high uric acid at 12.  On exam noted to have tophaceous gout on the right elbow.    Pertinent cultures include:  Culture Micro   Date Value Ref Range Status   07/29/2019 No growth  Final       Recent Inflammatory Biomarkers:   Recent Labs   Lab  Test 10/21/21  0628 10/20/21  0943 10/19/21  1006 10/17/21  0542 10/16/21  0557 10/15/21  0507 10/14/21  2112 10/14/21  2112 09/27/21  0833 21  1312   CRP  --   --   --   --   --   --   --  21.5*  --  0.8*   PCAL  --   --   --   --   --   --   --   --   --  0.26   WBC 10.2 9.5 8.3 8.5 10.0 12.6*   < > 6.6   < > 5.4    < > = values in this interval not displayed.            Review of Systems:   CONSTITUTIONAL:    Temp Max: Temp (24hrs), Av.1  F (36.7  C), Min:98  F (36.7  C), Max:98.4  F (36.9  C)   .  Negative except for findings in the HPI.           Current Medications (antimicrobials listed in bold):       sodium chloride 0.9%  250 mL Intravenous Once in dialysis/CRRT     sodium chloride 0.9%  300 mL Hemodialysis Machine Once     ceFAZolin  1 g Intravenous Q24H     entecavir  0.5 mg Oral Q7 Days     epoetin kelton-epbx (RETACRIT) inj ESRD  10,000 Units Subcutaneous Q96H     heparin ANTICOAGULANT  5,000 Units Subcutaneous Q12H     - MEDICATION INSTRUCTIONS -   Does not apply Once     pantoprazole  40 mg Oral Daily     sodium chloride (PF)  3 mL Intracatheter Q8H              Allergies:     Allergies   Allergen Reactions     Nka [No Known Allergies]             Physical Exam:   Vitals were reviewed  Patient Vitals for the past 24 hrs:   BP Temp Temp src Pulse Resp SpO2   10/21/21 0735 102/66 99.7  F (37.6  C) Oral 90 16 95 %   10/21/21 0005 132/66 99  F (37.2  C) Oral 94 17 97 %   10/20/21 1922 101/66 98.4  F (36.9  C) Oral 98 20 94 %   10/20/21 1521 113/71 98.6  F (37  C) Oral 94 20 95 %   10/20/21 1124 96/61 99  F (37.2  C) Oral 89 20 96 %       Physical Examination:  Gen: Pleasant in no acute distress.  HEENT: NCAT. EOMI. PERRL.  Neck: No bruit, JVD or thyromegaly.  Chest: Right chest tube in place.  Lungs: Decreased breath sounds on the right side.  Card: RRR. NSR. No RMG. Peripheral pulses present and symmetric. No edema.  Abd: Soft NT ND. No mass. Normal bowel sounds.  Skin: No rash.  Extr: Left elbow  examined.  Incision intact.  Right elbow with tophaceous gout  Neuro: Alert and oriented to place time and person. Cranial nerves II to XII intact. Motor and sensory intact.            Laboratory Data:   ID Labs:  Microbiology labs:    Body fluid, unsp Aerobic Bacterial Culture Routine  Order: [id number]  Collected:  10/16/2021  4:35 PM Status:  Preliminary result   Visible to patient:  No (not released)  Specimen Information: Pleural Cavity, Right; Body fluid, unsp         0 Result Notes  Culture Culture in progress       1+ Staphylococcus aureusAbnormal             Resulting Agency: [facility]       Susceptibility     Staphylococcus aureus     REGLA     Clindamycin <=0.25 ug/mL Susceptible     Erythromycin <=0.25 ug/mL Susceptible     Gentamicin <=0.5 ug/mL Susceptible     Oxacillin <=0.25 ug/mL Susceptible     Tetracycline <=1.0 ug/mL Susceptible     Trimethoprim/Sulfamethoxazole <=0.5/9.5 u... Susceptible     Vancomycin 1.0 ug/mL Susceptible                 Specimen Collected: 10/16/21  4:35 PM Last Resulted: 10/19/21 11:34 PM               Recent Labs   Lab Test 10/14/21  2112 09/26/21  1312   CRP 21.5* 0.8*     Recent Labs   Lab Test 10/21/21  0628 10/20/21  0943 10/19/21  1006 10/17/21  0542 10/16/21  0557 10/15/21  0507   WBC 10.2 9.5 8.3 8.5 10.0 12.6*     Recent Labs   Lab Test 10/21/21  0628 10/20/21  0943 10/19/21  0940 10/17/21  0542   CR 12.45* 11.25* 9.52* 10.86*   GFRESTIMATED 4* 4* 5* 5*       Hematology Studies  Recent Labs   Lab Test 10/21/21  0628 10/20/21  0943 10/19/21  1006 10/17/21  0542 10/16/21  0557 10/16/21  0557 10/15/21  0507 10/15/21  0507 08/07/19  0138 08/06/19  2020 08/02/19  0437 08/02/19  0437 07/28/19  1001 07/28/19  0950   WBC 10.2 9.5 8.3 8.5  --  10.0  --  12.6*   < > 3.4*   < > 1.7*   < > 2.8*   ANEU  --   --   --   --   --   --   --   --   --  2.8  --  1.2*  --  2.4   AEOS  --   --   --   --   --   --   --   --   --  0.0  --  0.0  --  0.0   HCT 25.2* 26.2* 23.7* 25.7*   < > 25.0*   <  > 24.3*   < > 27.0*   < > 24.8*   < > 26.6*   * 117* 109* 104*   < > 117*   < > 125*   < > 44*   < > 53*   < > 28*    < > = values in this interval not displayed.       Metabolic  Recent Labs   Lab Test 10/21/21  0628 10/20/21  0943 10/19/21  0940   * 133* 138   BUN 91* 76* 66*   CO2 20* 21* 26   CR 12.45* 11.25* 9.52*   GFRESTIMATED 4* 4* 5*       Hepatic Studies  Recent Labs   Lab Test 10/19/21  0940 09/27/21  0833 08/18/21  1416   BILITOTAL 1.0 1.2* 0.7   ALKPHOS 181* 71 129   ALBUMIN 2.3* 3.1* 3.1*   AST 29 18 31   ALT <9 21 49       Immunologlobulins  Recent Labs   Lab Test 10/14/21  2112 09/26/21  1312   SED 21* 4            Imaging Data:   Reviewed

## 2021-10-21 NOTE — PLAN OF CARE
Chest tube to -20 except when off the floor for tests. Drainage pink tinged, thin.  Wife brought breakfast and will bring supper after work tonight (does not like hospital food) up with SBA to manage chest tube. Voiding small amounts. Hopes potassium level improved with interventions as he does not want to have dialysis today. Having intermittent pain at chest tube site- very painful with cough.  States breathing is good. Left elbow dressing changed- wrapped with kerlix (compression removed) Falls prevention plan in place. Mary Davis RN    Problem: Pain Acute  Goal: Acceptable Pain Control and Functional Ability  Outcome: Improving     Problem: Gas Exchange Impaired (Pulmonary Impairment)  Goal: Optimal Gas Exchange  Outcome: Improving     Problem: Airway Clearance Ineffective (Pulmonary Impairment)  Goal: Effective Airway Clearance  Outcome: Improving

## 2021-10-21 NOTE — PROGRESS NOTES
Progress Note    Assessment/Plan  Will get ct for clarification effusion-residual pneumonia    Principal Problem:    Pleural effusion  Active Problems:    ESRD (end stage renal disease) on dialysis (H)    Hepatic cirrhosis, unspecified hepatic cirrhosis type, unspecified whether ascites present (H)    Empyema lung (H)      Subjective  comfortable  Objective    Vital signs in last 24 hours  Temp:  [98.4  F (36.9  C)-99.7  F (37.6  C)] 99  F (37.2  C)  Pulse:  [] 100  Resp:  [16-20] 16  BP: (101-132)/(66-72) 125/72  SpO2:  [94 %-97 %] 95 %  Weight:   [unfilled]    Intake/Output last 3 shifts  I/O last 3 completed shifts:  In: 780 [P.O.:680; I.V.:100]  Out: 570 [Urine:300; Chest Tube:270]  Intake/Output this shift:  I/O this shift:  In: 240 [P.O.:240]  Out: 195 [Urine:150; Chest Tube:45]      Physical Exam  Good resp effort-no air leak    Pertinent Labs   Lab Results   Component Value Date    WBC 10.2 10/21/2021    HGB 7.6 (L) 10/21/2021    HCT 25.2 (L) 10/21/2021    MCV 99 10/21/2021     (L) 10/21/2021             Pertinent Radiology     [unfilled]        [facility] Reynaga MD

## 2021-10-21 NOTE — CONSULTS
ORTHOPEDIC CONSULTATION    Consultation  Elle Blanton,  1960, MRN [id number]    @[facility] @  Pleural effusion [J90]    PCP: Jaswant Flores, [phone]   Code status:  Full Code       Extended Emergency Contact Information  Primary Emergency Contact: Maico Blanton  Address: [address] [place] [address] [place]  Work Phone: [phone]  Mobile Phone: [phone]  Relation: Spouse  Secondary Emergency Contact: Hudson Blanton  Home Phone: [phone]  Mobile Phone: [phone]  Relation: Son         IMPRESSION:  6 days status post Left Olecranon I&D and bursectomy. DOS: 10/15/21. Stable and improving.     PLAN:  This patient was discussed with Dr. Ferrer, on-call surgeon for [facility] and they are in agreement with the following plan. Patient's incision site does not show signs of infection at this time. No drainage. The would is well approximated. Patient reporting minimal pain. No indication for surgical management at this time. Patients incision was redressed, splinted with new orthoglass splint and wrapped with ACE for compression. His Stitches should be left in until 2 weeks postop. Continue antibiotics per Infectious disease.     Thank you for including [facility] in the care of Elle Blanton. It has been a pleasure participating in Mr. Almeida's care.        CHIEF COMPLAINT: Pleural effusion, Possible Septic Bursitis    HISTORY OF PRESENT ILLNESS:  The patient is seen in orthopedic consultation at the request of Dr. Stanton.  The patient is a 61 year old male who is 6 days status post left olecranon I&D and bursectomy for septic olecranon bursitis by Dr. Myers on 10/15/21. He has been on IV ancef. Today he is resting comfortably in bed. He reports minimal pain in the elbow. He is wearing posterior long splint to the left upper extremity. No swelling, drainage, warmth around the incision. The incision is well approximated.        PAST MEDICAL  HISTORY:  [unfilled]  [facility]      ALLERGIES:   Review of patient's allergies indicates   Allergies   Allergen Reactions     Nka [No Known Allergies]          MEDICATIONS UPON ADMISSION:  Medications were reviewed.  They include:   Medications Prior to Admission   Medication Sig Dispense Refill Last Dose     acetaminophen (TYLENOL) 500 MG tablet Take 500 mg by mouth every 6 hours as needed for mild pain   10/19/2021 at Unknown time     carvedilol (COREG) 25 MG tablet Take 1 tablet (25 mg) by mouth 2 times daily (with meals) 60 tablet 11 prn     cephALEXin (KEFLEX) 500 MG capsule Take 1 capsule (500 mg) by mouth 2 times daily 14 capsule 0 10/19/2021 at Unknown time     cloNIDine (CATAPRES) 0.1 MG tablet Take 1 tablet (0.1 mg) by mouth At Bedtime 30 tablet 11 10/18/2021 at Unknown time     diltiazem ER (TIAZAC) 180 MG 24 hr ER beaded capsule Take 1 capsule (180 mg) by mouth 2 times daily 60 capsule 11 Past Week at Unknown time     entecavir (BARACLUDE) 0.5 MG tablet Take 1 tablet (0.5 mg) by mouth every 7 days 45 tablet 3 Past Week at Unknown time     hydrOXYzine (ATARAX) 25 MG tablet Take 1 tablet (25 mg) by mouth 3 times daily as needed for itching 90 tablet 3 prn     [] oxyCODONE (ROXICODONE) 5 MG tablet Take 1 tablet (5 mg) by mouth every 6 hours as needed for severe pain 42 tablet 0 prn     pantoprazole (PROTONIX) 40 MG EC tablet Take 1 tablet (40 mg) by mouth daily 30 tablet 3 10/19/2021 at Unknown time     polyethylene glycol (MIRALAX) 17 GM/Dose powder Take 17 g by mouth daily (Patient taking differently: Take 17 g by mouth daily as needed ) 510 g 0 prn     order for DME Equipment being ordered: Other: blood pressure monitor  Treatment Diagnosis: hypertension 1 each 0          SOCIAL HISTORY:   he  reports that he has never smoked. He has never used smokeless tobacco. He reports that he does not drink alcohol and does not use drugs.      FAMILY HISTORY:  family history includes Diabetes in his  father; Ulcers in his father.      REVIEW OF SYSTEMS:   Reviewed with patient. See HPI, otherwise negative       PHYSICAL EXAMINATION:  Vitals: Temp:  [98.4  F (36.9  C)-99.7  F (37.6  C)] 98.4  F (36.9  C)  Pulse:  [] 98  Resp:  [16-20] 16  BP: ()/(58-72) 96/58  SpO2:  [94 %-97 %] 96 %  General: On examination, the patient is awake, resting comfortablly and oriented to person, place, time and general situation.  SKIN: There is no evidence of cellulitis around the incision at this time. There is a healing linear incision over posterior left olecranon closed with nylon sutures.   Pulses:   pulse is intact and equal bilaterally  Sensation: intact and equal bilaterally to the distal  extremities.  Tenderness: minimal tenderness over the incision site  ROM: gentle range of motion of the left elbow without pain   Motor: strength not tested due to sutures intact  Contralateral side= Full range of motion, Negative joint instability findings, 5/5 motor groups about the joint, Non-tender.       PERTINENT LABS:  Lab Results: personally reviewed.   Lab Results   Component Value Date     10/21/2021     04/29/2021    CO2 20 10/21/2021    CO2 21.0 12/30/2020    BUN 91 10/21/2021    BUN 71 04/29/2021         Eli Langston PA-C, NAHUN  Date: 10/21/2021  Time: 5:12 PM    CC1:   Nash Solomon MD    CC2:   Jaswant Flores

## 2021-10-21 NOTE — PROGRESS NOTES
Renal progress note  CC:ESRD  Assessment and Plan:    61 year old male with history of ESRD on dialysis at [facility] under care of Dr. Mao, history of abdominal aortic aneurysm thoracic aortic dissection cirrhosis with chronic hepatitis B on entecavir admitted last week with septic bursitis of left elbow underwent I&D with Ortho also s/p thoracocentesis with 1.5 L pleural effusion questionable empyema versus fluid overload drained cultures growing staph aureus s/p chest tube placement this admission 10/19/2020.  Nephrology consulted for ESRD management     ESRD  On hemodialysis [facility]  Treatment time 3 hours Monday and Friday patient refuses to dialyze on Wednesday's Access left upper extremity aV has had chronic swelling  --We will continue dialysis as outpatient on Monday and Friday discussed with patient again regarding Wednesday dialysis will follow labs on Wednesday to see if he needs dialysis again consider as needed  --Daily standing weights  --Renal labs    K is up , discussed HD but he is wanting to wait , will give bicarb and one time diuretics and follow on labs t 4pm and if atill high will run today instead of tomorrow     Hypertension Hx  BP low this admission ?? Infection related  Not clinically septic  Hold PTA BP meds     Mild hypervolemia  Mostly upper extremity edema in the left upper extremity  EDW ~60kg  -- usual UF 1-2 KG , will UF as tolerated     No electrolyte abnormalities     Anemia  Inflamm/ACD  On chronic EPO  Will keep on 10K M/F and follow on response     Empyema with staph aureus secondary to possible bacteremia from septic bursitis last admission 10/14- 10/17/2021  S/p chest tube placement ID following   on IV Ancef  Blood cultures negative to date  --Plans per primary medicine     History of chronic hepatitis B with cirrhosis  With thrombocytopenia  Continue entecavir     GERD on pantoprazole     History of AAA with thoracic aortic dissection  Does not  take regular blood pressure medication  Blood pressure has been on the lower side today has been on carvedilol and clonidine takes as needed       Thank you for the consultation we will follow  Jayme Clement MD  [facility]  [phone]      Subjective  Discussed K from this morning and he still doesnot want t run today on HD  Discussed shifting and treating if still high toady instead of tomorrow and that he will agree to  No other issues  No compliants , feels ok    Objective    Vital signs in last 24 hours  Temp:  [98.4  F (36.9  C)-99.7  F (37.6  C)] 99  F (37.2  C)  Pulse:  [] 100  Resp:  [16-20] 16  BP: (101-132)/(66-72) 125/72  SpO2:  [94 %-97 %] 95 %  Weight:   [unfilled]    Intake/Output last 3 shifts  I/O last 3 completed shifts:  In: 780 [P.O.:680; I.V.:100]  Out: 570 [Urine:300; Chest Tube:270]  Intake/Output this shift:  I/O this shift:  In: -   Out: 150 [Urine:150]    Physical Exam  Alert/oriented x 3, awake, NAD  CV: RRR without murmur or rub  Lung: clear and equal; no extra sounds  Ab: soft and NT; not distended; normal bs  Ext: no edema and well perfused  Skin; no rash    Pertinent Labs   Lab Results   Component Value Date    WBC 10.2 10/21/2021    HGB 7.6 (L) 10/21/2021    HCT 25.2 (L) 10/21/2021    MCV 99 10/21/2021     (L) 10/21/2021     Lab Results   Component Value Date    BUN 91 (H) 10/21/2021     (L) 10/21/2021    CO2 20 (L) 10/21/2021       Lab Results   Component Value Date    ALBUMIN 2.3 (L) 10/19/2021     Lab Results   Component Value Date    PHOS 2.6 04/27/2021     I reviewed all lab results  Jayme Clement MD

## 2021-10-21 NOTE — PROGRESS NOTES
Care Management Follow Up    Length of Stay (days): 2    Expected Discharge Date: 10/23/2021     Concerns to be Addressed:   Medical Progression. Chest tube in place. Cardiothoracic surgery consulted. ID following-IV antibiotics. Pain control.   Patient plan of care discussed at interdisciplinary rounds: Yes    Anticipated Discharge Disposition:  Goal Home     Anticipated Discharge Services:  To be determined pending progression and recommendations  Anticipated Discharge DME:  To be determined    Patient/family educated on Medicare website which has current facility and service quality ratings:  Not needed at this time  Education Provided on the Discharge Plan:  Yes, per team  Patient/Family in Agreement with the Plan:  Yes    Referrals Placed by CM/SW:  None at this time  Private pay costs discussed: Not applicable    Additional Information:  Chart Reviewed. From home with spouse Maico, [phone], in a private residence; independent at baseline. Has dialysis Monday/Wednesday/Friday schedule. Goal to return home at discharge, no needs anticipated. Family to transport. Care manager to follow.      Khushi Pink RN

## 2021-10-22 LAB
ABO/RH(D): NORMAL
ANION GAP SERPL CALCULATED.3IONS-SCNC: 15 MMOL/L (ref 5–18)
ANTIBODY SCREEN: NEGATIVE
BACTERIA WND CULT: NORMAL
BLD PROD TYP BPU: NORMAL
BLOOD COMPONENT TYPE: NORMAL
BUN SERPL-MCNC: 95 MG/DL (ref 8–22)
CALCIUM SERPL-MCNC: 7.3 MG/DL (ref 8.5–10.5)
CHLORIDE BLD-SCNC: 97 MMOL/L (ref 98–107)
CO2 SERPL-SCNC: 22 MMOL/L (ref 22–31)
CODING SYSTEM: NORMAL
CREAT SERPL-MCNC: 13.81 MG/DL (ref 0.7–1.3)
CROSSMATCH: NORMAL
ERYTHROCYTE [DISTWIDTH] IN BLOOD BY AUTOMATED COUNT: 16 % (ref 10–15)
GFR SERPL CREATININE-BSD FRML MDRD: 3 ML/MIN/1.73M2
GLUCOSE BLD-MCNC: 84 MG/DL (ref 70–125)
HCT VFR BLD AUTO: 22.4 % (ref 40–53)
HGB BLD-MCNC: 6.8 G/DL (ref 13.3–17.7)
ISSUE DATE AND TIME: NORMAL
MCH RBC QN AUTO: 29.6 PG (ref 26.5–33)
MCHC RBC AUTO-ENTMCNC: 30.4 G/DL (ref 31.5–36.5)
MCV RBC AUTO: 97 FL (ref 78–100)
PLATELET # BLD AUTO: 108 10E3/UL (ref 150–450)
POTASSIUM BLD-SCNC: 4.9 MMOL/L (ref 3.5–5)
RBC # BLD AUTO: 2.3 10E6/UL (ref 4.4–5.9)
SODIUM SERPL-SCNC: 134 MMOL/L (ref 136–145)
SPECIMEN EXPIRATION DATE: NORMAL
UNIT ABO/RH: NORMAL
UNIT NUMBER: NORMAL
UNIT STATUS: NORMAL
UNIT TYPE ISBT: 6200
WBC # BLD AUTO: 11.2 10E3/UL (ref 4–11)

## 2021-10-22 PROCEDURE — 250N000011 HC RX IP 250 OP 636: Performed by: STUDENT IN AN ORGANIZED HEALTH CARE EDUCATION/TRAINING PROGRAM

## 2021-10-22 PROCEDURE — 90945 DIALYSIS ONE EVALUATION: CPT | Performed by: INTERNAL MEDICINE

## 2021-10-22 PROCEDURE — 85041 AUTOMATED RBC COUNT: CPT | Performed by: STUDENT IN AN ORGANIZED HEALTH CARE EDUCATION/TRAINING PROGRAM

## 2021-10-22 PROCEDURE — 250N000013 HC RX MED GY IP 250 OP 250 PS 637: Performed by: MASSAGE THERAPIST

## 2021-10-22 PROCEDURE — 250N000013 HC RX MED GY IP 250 OP 250 PS 637: Performed by: STUDENT IN AN ORGANIZED HEALTH CARE EDUCATION/TRAINING PROGRAM

## 2021-10-22 PROCEDURE — P9016 RBC LEUKOCYTES REDUCED: HCPCS | Performed by: STUDENT IN AN ORGANIZED HEALTH CARE EDUCATION/TRAINING PROGRAM

## 2021-10-22 PROCEDURE — 86900 BLOOD TYPING SEROLOGIC ABO: CPT | Performed by: STUDENT IN AN ORGANIZED HEALTH CARE EDUCATION/TRAINING PROGRAM

## 2021-10-22 PROCEDURE — 80048 BASIC METABOLIC PNL TOTAL CA: CPT | Performed by: STUDENT IN AN ORGANIZED HEALTH CARE EDUCATION/TRAINING PROGRAM

## 2021-10-22 PROCEDURE — 99232 SBSQ HOSP IP/OBS MODERATE 35: CPT | Mod: GC | Performed by: STUDENT IN AN ORGANIZED HEALTH CARE EDUCATION/TRAINING PROGRAM

## 2021-10-22 PROCEDURE — 120N000001 HC R&B MED SURG/OB

## 2021-10-22 PROCEDURE — 36415 COLL VENOUS BLD VENIPUNCTURE: CPT | Performed by: STUDENT IN AN ORGANIZED HEALTH CARE EDUCATION/TRAINING PROGRAM

## 2021-10-22 PROCEDURE — 99232 SBSQ HOSP IP/OBS MODERATE 35: CPT | Performed by: STUDENT IN AN ORGANIZED HEALTH CARE EDUCATION/TRAINING PROGRAM

## 2021-10-22 PROCEDURE — 86923 COMPATIBILITY TEST ELECTRIC: CPT | Performed by: STUDENT IN AN ORGANIZED HEALTH CARE EDUCATION/TRAINING PROGRAM

## 2021-10-22 RX ADMIN — OXYCODONE HYDROCHLORIDE 5 MG: 5 TABLET ORAL at 01:23

## 2021-10-22 RX ADMIN — CEFAZOLIN 1 G: 1 INJECTION, POWDER, FOR SOLUTION INTRAMUSCULAR; INTRAVENOUS at 14:39

## 2021-10-22 RX ADMIN — HYDROXYZINE HYDROCHLORIDE 25 MG: 25 TABLET, FILM COATED ORAL at 05:10

## 2021-10-22 RX ADMIN — ACETAMINOPHEN 650 MG: 325 TABLET ORAL at 08:03

## 2021-10-22 RX ADMIN — ACETAMINOPHEN 650 MG: 325 TABLET ORAL at 14:07

## 2021-10-22 RX ADMIN — HEPARIN SODIUM 5000 UNITS: 10000 INJECTION, SOLUTION INTRAVENOUS; SUBCUTANEOUS at 05:10

## 2021-10-22 RX ADMIN — ACETAMINOPHEN 650 MG: 325 TABLET ORAL at 22:36

## 2021-10-22 RX ADMIN — PANTOPRAZOLE SODIUM 40 MG: 20 TABLET, DELAYED RELEASE ORAL at 08:03

## 2021-10-22 RX ADMIN — HEPARIN SODIUM 5000 UNITS: 10000 INJECTION, SOLUTION INTRAVENOUS; SUBCUTANEOUS at 18:23

## 2021-10-22 RX ADMIN — OXYCODONE HYDROCHLORIDE 5 MG: 5 TABLET ORAL at 22:37

## 2021-10-22 RX ADMIN — OXYCODONE HYDROCHLORIDE 5 MG: 5 TABLET ORAL at 14:07

## 2021-10-22 RX ADMIN — ACETAMINOPHEN 650 MG: 325 TABLET ORAL at 01:23

## 2021-10-22 RX ADMIN — OXYCODONE HYDROCHLORIDE 5 MG: 5 TABLET ORAL at 08:03

## 2021-10-22 ASSESSMENT — ACTIVITIES OF DAILY LIVING (ADL)
ADLS_ACUITY_SCORE: 7
TOILETING_ISSUES: OTHER (SEE COMMENTS)
DRESSING/BATHING_DIFFICULTY: NO
ADLS_ACUITY_SCORE: 7
WALKING_OR_CLIMBING_STAIRS_DIFFICULTY: NO
ADLS_ACUITY_SCORE: 7
WEAR_GLASSES_OR_BLIND: NO
DIFFICULTY_EATING/SWALLOWING: NO
ADLS_ACUITY_SCORE: 7
DIFFICULTY_COMMUNICATING: NO
HEARING_DIFFICULTY_OR_DEAF: NO
ADLS_ACUITY_SCORE: 7
ADLS_ACUITY_SCORE: 7
CONCENTRATING,_REMEMBERING_OR_MAKING_DECISIONS_DIFFICULTY: NO
ADLS_ACUITY_SCORE: 7
DOING_ERRANDS_INDEPENDENTLY_DIFFICULTY: YES
ADLS_ACUITY_SCORE: 7
PATIENT_/_FAMILY_COMMUNICATION_STYLE: SPOKEN LANGUAGE (ENGLISH OR BILINGUAL)
ADLS_ACUITY_SCORE: 7
FALL_HISTORY_WITHIN_LAST_SIX_MONTHS: YES

## 2021-10-22 NOTE — PROVIDER NOTIFICATION
Notified Dr. Sanders - pt vital signs triggering sepsis protocol. Per report, pt refusing blood draws. requesting for lactic to be checked with am draws before dialysis. No new orders.

## 2021-10-22 NOTE — PROGRESS NOTES
[facility] ID Inpatient follow up       Patient:  Elle Blanton  Date of birth 1960, Medical record number [id number]  Date of Visit:  10/22/2021  Attending Physician: Nash Solomon MD         Assessment and Recommendations:   Assessment:  Elle Blanton is a 61 year old male with   1. ESRD on HD via left AV fistula.  2. Chronic hepatitis B with cirrhosis.  On entecavir q. 7 days.  3. Recent admission for left-sided septic olecranon bursitis.  Status post I&D.  Cultures with MSSA.  Treated with IV antibiotic inpatient and discharged on p.o. Keflex.  Elbow examined on 10/20/2021.  No active infection.  4. Large right-sided empyema.  Status post thoracentesis on 10/16/2021.  Cultures with MSSA.  On IV Ancef. S/P Chest tube placement on 10/19/21.  Cultures in process.  So far no growth.  Chest x-ray reviewed.  Significant improvement of the effusion.  CVTS following. Repeat CT/chest with persistent loculated fluid. CVTS and I discussed benefit of surgery. Patient does not want surgery at this time but open to the idea in the future.   5. Chronic bicytopenia with thrombocytopenia and anemia.  Most likely secondary to cirrhosis.  6.  Right elbow tophaceous gout.  Uric acid at 12.  Does not appear to be on treatment for gout.        Recommendations:   Continue IV Ancef, renally dosed.   I would prefer IV Ancef (2-2-3) outpatient if nephrology can arrange this outpatient  Follow cultures from 10/19.   Chest tube care per IR   Monitor oxygen need.   Hyperuricemic gout treatment per primary team.    Discussed with the patient, nursing staff.    ID will follow.      Nicolle Yan MD.  [facility].   [facility]  Office Telephone [phone].  Fax [phone]  [facility] paging            Interval History:     HPI:  The interval history was reviewed.   Having some pain in the right chest, tube site. I had a long discussion with the pt today  about the benefits surgery over continued chest tube in light of the CT from yesterday. The patient does not want surgery at the current moment.       Pertinent cultures include:  Culture Micro   Date Value Ref Range Status   2019 No growth  Final       Recent Inflammatory Biomarkers:   Recent Labs   Lab Test 10/22/21  0624 10/21/21  0628 10/20/21  0943 10/19/21  1006 10/17/21  0542 10/16/21  0557 10/15/21  0507 10/14/21  2112 21  0833 21  1312   CRP  --   --   --   --   --   --   --  21.5*  --  0.8*   PCAL  --   --   --   --   --   --   --   --   --  0.26   WBC 11.2* 10.2 9.5 8.3 8.5 10.0   < > 6.6   < > 5.4    < > = values in this interval not displayed.            Review of Systems:   CONSTITUTIONAL:    Temp Max: Temp (24hrs), Av.1  F (36.7  C), Min:98  F (36.7  C), Max:98.4  F (36.9  C)   .  Negative except for findings in the HPI.           Current Medications (antimicrobials listed in bold):       sodium chloride 0.9%  250 mL Intravenous Once in dialysis/CRRT     sodium chloride 0.9%  300 mL Hemodialysis Machine Once     ceFAZolin  1 g Intravenous Q24H     entecavir  0.5 mg Oral Q7 Days     [START ON 10/23/2021] epoetin kelton-epbx (RETACRIT) inj ESRD  40,000 Units Subcutaneous Q7 Days     heparin ANTICOAGULANT  5,000 Units Subcutaneous Q12H     - MEDICATION INSTRUCTIONS -   Does not apply Once     pantoprazole  40 mg Oral Daily     sodium chloride (PF)  3 mL Intracatheter Q8H              Allergies:     Allergies   Allergen Reactions     Nka [No Known Allergies]             Physical Exam:   Vitals were reviewed  Patient Vitals for the past 24 hrs:   BP Temp Temp src Pulse Resp SpO2   10/22/21 0758 110/62 98.2  F (36.8  C) Oral 95 20 96 %   10/22/21 0500 95/58 98.2  F (36.8  C) Oral 96 22 94 %   10/22/21 0024 114/70 99.2  F (37.3  C) Oral 106 22 93 %   10/21/21 2345 121/78 99.6  F (37.6  C) Oral 106 24 96 %   10/21/21 1924 102/57 98.6  F (37  C) Oral 95 16 95 %   10/21/21 1525 96/58 98.4  F  (36.9  C) Oral 98 16 96 %   10/21/21 1115 125/72 99  F (37.2  C) Oral 100 16 95 %       Physical Examination:  Gen: Pleasant in no acute distress.  HEENT: NCAT. EOMI. PERRL.  Neck: No bruit, JVD or thyromegaly.  Chest: Right chest tube in place.  Lungs: Decreased breath sounds on the right side.  Card: RRR. NSR. No RMG. Peripheral pulses present and symmetric. No edema.  Abd: Soft NT ND. No mass. Normal bowel sounds.  Skin: No rash.  Extr: Left elbow examined.  Incision intact.  Right elbow with tophaceous gout  Neuro: Alert and oriented to place time and person. Cranial nerves II to XII intact. Motor and sensory intact.            Laboratory Data:   ID Labs:  Microbiology labs:    Body fluid, unsp Aerobic Bacterial Culture Routine  Order: [id number]  Collected:  10/16/2021  4:35 PM Status:  Preliminary result   Visible to patient:  No (not released)  Specimen Information: Pleural Cavity, Right; Body fluid, unsp         0 Result Notes  Culture Culture in progress       1+ Staphylococcus aureusAbnormal             Resulting Agency: [facility]       Susceptibility     Staphylococcus aureus     REGLA     Clindamycin <=0.25 ug/mL Susceptible     Erythromycin <=0.25 ug/mL Susceptible     Gentamicin <=0.5 ug/mL Susceptible     Oxacillin <=0.25 ug/mL Susceptible     Tetracycline <=1.0 ug/mL Susceptible     Trimethoprim/Sulfamethoxazole <=0.5/9.5 u... Susceptible     Vancomycin 1.0 ug/mL Susceptible                 Specimen Collected: 10/16/21  4:35 PM Last Resulted: 10/19/21 11:34 PM               Recent Labs   Lab Test 10/14/21  2112 09/26/21  1312   CRP 21.5* 0.8*     Recent Labs   Lab Test 10/22/21  0624 10/21/21  0628 10/20/21  0943 10/19/21  1006 10/17/21  0542 10/16/21  0557   WBC 11.2* 10.2 9.5 8.3 8.5 10.0     Recent Labs   Lab Test 10/22/21  0625 10/21/21  0628 10/20/21  0943 10/19/21  0940   CR 13.81* 12.45* 11.25* 9.52*   GFRESTIMATED 3* 4* 4* 5*       Hematology Studies  Recent Labs   Lab Test 10/22/21  7326  10/21/21  0628 10/20/21  0943 10/19/21  1006 10/17/21  0542 10/17/21  0542 10/16/21  0557 10/16/21  0557 08/07/19  0138 08/06/19 2020 08/02/19 0437 08/02/19 0437 07/28/19  1001 07/28/19  0950   WBC 11.2* 10.2 9.5 8.3  --  8.5  --  10.0   < > 3.4*   < > 1.7*   < > 2.8*   ANEU  --   --   --   --   --   --   --   --   --  2.8  --  1.2*  --  2.4   AEOS  --   --   --   --   --   --   --   --   --  0.0  --  0.0  --  0.0   HCT 22.4* 25.2* 26.2* 23.7*   < > 25.7*   < > 25.0*   < > 27.0*   < > 24.8*   < > 26.6*   * 114* 117* 109*   < > 104*   < > 117*   < > 44*   < > 53*   < > 28*    < > = values in this interval not displayed.       Metabolic  Recent Labs   Lab Test 10/22/21  0625 10/21/21  0628 10/20/21  0943   * 134* 133*   BUN 95* 91* 76*   CO2 22 20* 21*   CR 13.81* 12.45* 11.25*   GFRESTIMATED 3* 4* 4*       Hepatic Studies  Recent Labs   Lab Test 10/19/21  0940 09/27/21  0833 08/18/21  1416   BILITOTAL 1.0 1.2* 0.7   ALKPHOS 181* 71 129   ALBUMIN 2.3* 3.1* 3.1*   AST 29 18 31   ALT <9 21 49       Immunologlobulins  Recent Labs   Lab Test 10/14/21  2112 09/26/21  1312   SED 21* 4            Imaging Data:   Reviewed   EXAM: CT CHEST W/O CONTRAST  LOCATION: [facility]  DATE/TIME: 10/21/2021 2:36 PM     INDICATION: Pneumonia, effusion or abscess suspected, xray done  COMPARISON: CT of the chest without contrast 10/19/2021  TECHNIQUE: CT chest without IV contrast. Multiplanar reformats were obtained. Dose reduction techniques were used.  CONTRAST: None.     FINDINGS:   LUNGS AND PLEURA: Right pigtail pleural drainage catheter is well-positioned in complex, loculated air and fluid in the right pleural space. Unchanged visceral and parietal pleural thickening. Bandlike subpleural opacities lateral right upper lobe, right   middle lobe are consistent with cicatrization atelectasis. Larger, more focal and rounded opacity in the right lower lobe is consistent with rounded  atelectasis.     Minimal left pleural fluid layers dependently. No left lung opacities.      MEDIASTINUM: Chronic dissection of the descending thoracic aorta with high attenuation along the intimal flap. Maximal diameter of the proximal descending thoracic aorta is 5.8 x 5.9 cm based on double oblique post processed reconstructions. No new   periaortic inflammation. The ascending aorta is normal caliber. Cardiac chambers are normal in size and there is no pericardial effusion.     No new enlarged mediastinal or hilar lymph nodes. Esophagus is decompressed.     CORONARY ARTERY CALCIFICATION: At least moderate.     UPPER ABDOMEN: Unchanged ~4 cm hypoattenuating lesion in the superior right hepatic lobe just to the right of the IVC and smaller 18 mm more circumscribed lesion in the more lateral right hepatic dome likely a benign cyst. Calcified gallstones layer   dependently in the gallbladder. High attenuation in the gallbladder is likely vicarious excretion of contrast. Unchanged enlargement of the spleen.     MUSCULOSKELETAL: Diffuse small degenerative thoracic spine osteophytes. No aggressive or destructive bone lesions.                                                                      IMPRESSION:      1.  Complex, loculated fluid and air in the right pleural space with diffuse right pleural thickening. The pleural thickening is new from 9/26/2021 and likely relates to infection/empyema.  2.  Subpleural opacities in the right lung including a more focal, rounded opacity in the right lower lobe consistent with atelectasis/rounded atelectasis.  3.  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  4.  Minimal left pleural effusion. Cholelithiasis.  5.  Splenomegaly.

## 2021-10-22 NOTE — PROGRESS NOTES
Progress Note    Assessment/Plan  Pain at ct site-reviewed ct with pt-he refuses surgery now.removal ct ongoing abx only alternative,    Principal Problem:    Pleural effusion  Active Problems:    ESRD (end stage renal disease) on dialysis (H)    Hepatic cirrhosis, unspecified hepatic cirrhosis type, unspecified whether ascites present (H)    Empyema lung (H)      Subjective  Pain only at ct site  Objective    Vital signs in last 24 hours  Temp:  [98.2  F (36.8  C)-99.7  F (37.6  C)] 98.2  F (36.8  C)  Pulse:  [] 96  Resp:  [16-24] 22  BP: ()/(57-78) 95/58  SpO2:  [93 %-96 %] 94 %  Weight:   [unfilled]    Intake/Output last 3 shifts  I/O last 3 completed shifts:  In: 600 [P.O.:600]  Out: 800 [Urine:500; Chest Tube:300]  Intake/Output this shift:  No intake/output data recorded.      Physical Exam  Minimal serosang output-no air leak    Pertinent Labs   Lab Results   Component Value Date    WBC 11.2 (H) 10/22/2021    HGB 6.8 (LL) 10/22/2021    HCT 22.4 (L) 10/22/2021    MCV 97 10/22/2021     (L) 10/22/2021             Pertinent Radiology     [unfilled]        Dawson Reynaga MD       Progress Note    Assessment/Plan  See above    Principal Problem:    Pleural effusion  Active Problems:    ESRD (end stage renal disease) on dialysis (H)    Hepatic cirrhosis, unspecified hepatic cirrhosis type, unspecified whether ascites present (H)    Empyema lung (H)      Subjective  As above  Objective    Vital signs in last 24 hours  Temp:  [98.2  F (36.8  C)-99.7  F (37.6  C)] 98.2  F (36.8  C)  Pulse:  [] 96  Resp:  [16-24] 22  BP: ()/(57-78) 95/58  SpO2:  [93 %-96 %] 94 %  Weight:   [unfilled]    Intake/Output last 3 shifts  I/O last 3 completed shifts:  In: 600 [P.O.:600]  Out: 800 [Urine:500; Chest Tube:300]  Intake/Output this shift:  No intake/output data recorded.      Physical Exam  As above    Pertinent Labs   Lab Results   Component Value Date    WBC 11.2 (H) 10/22/2021    HGB 6.8  (LL) 10/22/2021    HCT 22.4 (L) 10/22/2021    MCV 97 10/22/2021     (L) 10/22/2021             Pertinent Radiology     [unfilled]        Dawson Reynaga MD

## 2021-10-22 NOTE — PROGRESS NOTES
Renal progress note  CC:ESRD  Assessment and Plan:    61 year old male with history of ESRD on dialysis at [facility] under care of Dr. Mao, history of abdominal aortic aneurysm thoracic aortic dissection cirrhosis with chronic hepatitis B on entecavir admitted last week with septic bursitis of left elbow underwent I&D with Ortho also s/p thoracocentesis with 1.5 L pleural effusion questionable empyema versus fluid overload drained cultures growing staph aureus s/p chest tube placement this admission 10/19/2020.  Nephrology consulted for ESRD management     ESRD  On hemodialysis [facility]  Treatment time 3 hours Monday and Friday patient refuses to dialyze on Wednesday's Access left upper extremity aV has had chronic swelling  --We will continue dialysis as outpatient on Monday and Friday discussed with patient again regarding Wednesday dialysis will follow labs on Wednesday to see if he needs dialysis again consider as needed  --Daily standing weights  --Renal labs     Hypertension Hx  BP low this admission ?? Infection related  Not clinically septic  Hold PTA BP meds     Mild hypervolemia  Mostly upper extremity edema in the left upper extremity  EDW ~60kg  -- usual UF 1-2 KG , will UF as tolerated     No electrolyte abnormalities     Anemia  Inflamm/ACD  On chronic EPO  Will keep on 10K M/F and follow on response     Empyema with staph aureus secondary to possible bacteremia from septic bursitis last admission 10/14- 10/17/2021  S/p chest tube placement ID following   on IV Ancef  Blood cultures negative to date  --Plans per primary medicine     History of chronic hepatitis B with cirrhosis  With thrombocytopenia  Continue entecavir     GERD on pantoprazole     History of AAA with thoracic aortic dissection  Does not take regular blood pressure medication  Blood pressure has been on the lower side today has been on carvedilol and clonidine takes as needed       Thank you for the  "consultation we will follow  Jayme Clement MD  [facility]  [phone]      Subjective  Seen on HD, trying to shorten Tx , counseled on Tx time again that he had high K and needs fluid removed it will be better to finish whole Tx, he replied\" why are you ruining my life here\" but then agreed to finish Tx  No compliants , feels ok    Objective    Vital signs in last 24 hours  Temp:  [98.2  F (36.8  C)-99.6  F (37.6  C)] 98.2  F (36.8  C)  Pulse:  [] 96  Resp:  [16-24] 22  BP: ()/(57-78) 95/58  SpO2:  [93 %-96 %] 94 %  Weight:   [unfilled]    Intake/Output last 3 shifts  I/O last 3 completed shifts:  In: 600 [P.O.:600]  Out: 800 [Urine:500; Chest Tube:300]  Intake/Output this shift:  No intake/output data recorded.    Physical Exam  Alert/oriented x 3, awake, NAD  CV: RRR without murmur or rub  Lung: clear and equal; no extra sounds  Ab: soft and NT; not distended; normal bs  Ext: +edema and well perfused  Skin; no rash    Pertinent Labs   Lab Results   Component Value Date    WBC 11.2 (H) 10/22/2021    HGB 6.8 (LL) 10/22/2021    HCT 22.4 (L) 10/22/2021    MCV 97 10/22/2021     (L) 10/22/2021     Lab Results   Component Value Date    BUN 95 (H) 10/22/2021     (L) 10/22/2021    CO2 22 10/22/2021       Lab Results   Component Value Date    ALBUMIN 2.3 (L) 10/19/2021     Lab Results   Component Value Date    PHOS 2.6 04/27/2021     I reviewed all lab results  Jayme Clement MD    "

## 2021-10-22 NOTE — SIGNIFICANT EVENT
Significant Event Note    Time of event: 7:46 AM October 22, 2021    Description of event:  Hgb 6.8, down from 7.6. No active bleeding.    Plan:  -Transfuse 1 unit pRBC    Discussed with: bedside nurse    Jose Sanders MD

## 2021-10-22 NOTE — PROGRESS NOTES
HEMODIALYSIS TREATMENT NOTE    Date: 10/22/2021  Time: 2:45 PM    Data:  Pre Wt: 63.0kg    Desired Wt: 60 kg   Post Wt: 61.4kg (Estimated)   Weight change:1.6   kg  Ultrafiltration - Post Run Net Total Removed (mL): 1600 mL  Vascular Access Status: Graft  patent  Dialyzer Rinse: Streaked, Light  Total Blood Volume Processed: 58.7 L   Total Dialysis (Treatment) Time: 3.0   Dialysate Bath: K 2, Ca 2.5  Heparin: None    Lab:   No    Assessment / Interventions: 3.0 hours Hd via LAVG thrill & bruit present . 2 16g needle cannulated successful.  with good flow. HGB6.8 this morning, 1 unit of blood transfused during treatment.  No reaction during and after transfusion. Sbp soft but stable . SBP > 90 throughout the treatment. Seen by Nephrologist during treatment, no change. UF according to pt hemodynamic & Crit-line. 1.6kg UF removed, no issues. Pt completed his treatment time, blood rinsed back Graft hemostasis achieved in less than 10 minutes & handoff report given.             Plan:    Per Renal Team

## 2021-10-22 NOTE — PLAN OF CARE
Problem: Adult Inpatient Plan of Care  Goal: Plan of Care Review  Outcome: No Change   K+ level was not drawn, attempted x3 by lab staff and PICC staff to no avail, pt then refused further lab draw. MD updated, dr. Gonzalez came up and talked to him, pt still refused. MD ordered K+ draw tomorrow AM, prior dialysis.

## 2021-10-22 NOTE — PROGRESS NOTES
"[facility] Progress Note    Assessment/Plan  Principal Problem:    Pleural effusion  Active Problems:    ESRD (end stage renal disease) on dialysis (H)    Hepatic cirrhosis, unspecified hepatic cirrhosis type, unspecified whether ascites present (H)    Empyema lung (H)    61 year old M with multiple comorbidities including AAA (5.5cm), chronic thoracic aortic dissection, cirrhosis 2/2 chronic Hep B, ESRD on HD (M/F via L AVF), who was recently admitted for septic bursitis of the L elbow (10/14-10/17). Underwent I&D and bursectomy with Ortho on 10/15. Also s/p R thoracentesis with 1.5L drained for pleural effusion suspected 2/2 fluid overload. Pleural fluid cultures + S. Aureus. Admitted for empyema likely 2/2 bacteremia from septic bursitis now s/p chest tube placement, clinically stable.     #Empyema +S. Aureus, likely 2/2 bacteriemia from septic bursitis  CV Surgery consult for consideration of decort/VATS vs chest tube, patient elected to do chest tube and did not want any large surgeries, continues to desire this. IR placed chest tube 10/19. Pleural cultures showing S. Aureus pansensitive. Patient doing well with chest tube, non toxic appearing.  - CV Surgery consulted: management of chest tube.  - ID consulted: agree with IV Ancef, \"I would prefer IV Ancef (2-2-3) outpatient if nephrology can arrange this outpatient\"  - Blood cultures 10/19: NGTD  - Abx: restarted on IV ancef while admitted 1 g per day in the setting of ESRD on dialysis given   - PRN pain medication: oxycodone 5 mg q4 hours  - CBC/BMP in AM    #Acute blood loss anemia  #Chronic anemia secondary to ESRD  Patinet on EPO. Hgb drop likely due to chest tube output. Transfused 1 unit PRBC on 10/22/21.  -CBC in am     #Left Olecranon bursitis s/p I&D DOS: 10/15/21  Patient with planned follow up with ortho this week but is readmitted for empyema. Incisions do not look infected and healing well.   - Ortho consulted: stitches to stay " in for 2 weeks post op, 10/29/21     #ESRD on HD  #Hyperkalemia  L AVF in place. Baseline Cr 7-8. Dialyzes M/F. Cr uptrending, K wnl at 4.9. Patient refuses Wednesday HD.   -Nephrology consulted for management of HD: Will undergo HD today.    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 Uric acid level 12 on 10/11/21. Does not appear to be in gout flare. Will not start steroid for prevention of flare for allopurinol in setting of recent septic bursitis and empyema. Patient is on HD and so limited with NSAIDs use.  - Monitor     #Chronic Medical Conditions  - AAA/Aortic Dissection: Continue to hold PTA carvedilol and clonidine as patient takes these PRN and with soft bp's. CT chest 10/19 showing fusiform aneurysm of the descending thoracic aorta with findings of chronic [place] type B aortic dissection. Stable from last CT 9/26/21.  - GERD: continue PTA pantoprazole  - Chronic Hep B/Cirrhosis c/b thrombocytopenia: entecavir q7 days. No active bleeding. CTM    Incidental findings:  Cholelithiasis: patient asymptomatic, will monitor  Splenomegaly: Patient with history of cirrhosis. Follow up outpatient.     DVT ppx: SQH  Diet: Regular  Code: Full  Med rec completed: Yes     Dispo: inpatient status for abx, chest tube, anticipate discharge to home   Barriers to discharge:  work up in progress   Anticipated discharge date:  unknown at this time    Subjective  Patient states that he feels fine this morning.  He does not feel short of breath.  He had a long discussion about surgery for his empyema and patient states that he does not want to be in any more pain than he has now.  He states that the surgeon explained that he would have 3 holes instead of the one that he already has and is just very apprehensive about pain or dying.  He states that the chest tube with the 1 hole he has now is all he wants currently.  He does not wish to discuss any  more possible surgery more at this time, he would potentially reconsider in the future if his empyema were to recur.      Hmong telephone  was used    Objective    Vital signs in last 24 hours Temp:  [98.2  F (36.8  C)-99.6  F (37.6  C)] 98.2  F (36.8  C)  Pulse:  [] 95  Resp:  [16-24] 20  BP: ()/(57-78) 110/62  SpO2:  [93 %-96 %] 96 %   139 lbs 0 oz    Intake/Output last 3 shift I/O last 3 completed shifts:  In: 600 [P.O.:600]  Out: 800 [Urine:500; Chest Tube:300]    Intake/Output this shift:I/O this shift:  In: 200 [P.O.:200]  Out: -     Physical Exam  General appearance: alert, appears stated age, cooperative and no distress.  Head: Normocephalic, without obvious abnormality, atraumatic.  Eyes: conjunctivae/corneas clear.  Lungs: Diminished sounds on the right, clear to auscultation on the left, no increased respiratory effort. Chest tube in place draining serosanguinous fluid.  Heart: regular rate and rhythm, no murmurs.  Extremities: Tophaceous gout at right elbow and cuboid region of left foot.  Skin: Skin color, texture, turgor normal. No rashes or lesions.  Neurologic: Alert and oriented x3, normal strength and tone.  Psychiatric: mood and affect appropriate.    Pertinent Labs and Pertinent Radiology   Lab Results: personally reviewed.     Radiology Results: Personally reviewed image/s and impression/s    Precepted patient with Dr. Rhonda Davalos.    Connie Stanton MD  [facility], PGY-3  Pager # [phone]

## 2021-10-22 NOTE — PLAN OF CARE
Problem: Pain Acute  Goal: Acceptable Pain Control and Functional Ability  Intervention: Develop Pain Management Plan  Recent Flowsheet Documentation  Taken 10/22/2021 0123 by Destiny Velasco RN  Pain Management Interventions: medication (see MAR)   Pain on R chest at chest tube insertion site. Managed with prn oxycodone and tylenol. Pt tachycardic with HR  106-107, and had a low grade fever and RR 22-24. Triggered sepsis protocol. Did vital signs per sepsis BPA. Lactic not ordered STAT since patient refusing blood draws after several unsuccessful attempts yesterday.     Chest tube to continuous suction with 160 ml serosanguinous output.

## 2021-10-23 LAB
ANION GAP SERPL CALCULATED.3IONS-SCNC: 8 MMOL/L (ref 5–18)
BACTERIA FLD CULT: NORMAL
BUN SERPL-MCNC: 46 MG/DL (ref 8–22)
CALCIUM SERPL-MCNC: 7.8 MG/DL (ref 8.5–10.5)
CHLORIDE BLD-SCNC: 99 MMOL/L (ref 98–107)
CO2 SERPL-SCNC: 30 MMOL/L (ref 22–31)
CREAT SERPL-MCNC: 8.82 MG/DL (ref 0.7–1.3)
ERYTHROCYTE [DISTWIDTH] IN BLOOD BY AUTOMATED COUNT: 16.5 % (ref 10–15)
GFR SERPL CREATININE-BSD FRML MDRD: 6 ML/MIN/1.73M2
GLUCOSE BLD-MCNC: 94 MG/DL (ref 70–125)
HCT VFR BLD AUTO: 25.7 % (ref 40–53)
HGB BLD-MCNC: 7.9 G/DL (ref 13.3–17.7)
HGB BLD-MCNC: 8.1 G/DL (ref 13.3–17.7)
MCH RBC QN AUTO: 29.7 PG (ref 26.5–33)
MCHC RBC AUTO-ENTMCNC: 30.7 G/DL (ref 31.5–36.5)
MCV RBC AUTO: 97 FL (ref 78–100)
PLATELET # BLD AUTO: 110 10E3/UL (ref 150–450)
POTASSIUM BLD-SCNC: 4.4 MMOL/L (ref 3.5–5)
RBC # BLD AUTO: 2.66 10E6/UL (ref 4.4–5.9)
SODIUM SERPL-SCNC: 137 MMOL/L (ref 136–145)
WBC # BLD AUTO: 9.2 10E3/UL (ref 4–11)

## 2021-10-23 PROCEDURE — 250N000011 HC RX IP 250 OP 636: Performed by: STUDENT IN AN ORGANIZED HEALTH CARE EDUCATION/TRAINING PROGRAM

## 2021-10-23 PROCEDURE — 99232 SBSQ HOSP IP/OBS MODERATE 35: CPT | Mod: GC | Performed by: STUDENT IN AN ORGANIZED HEALTH CARE EDUCATION/TRAINING PROGRAM

## 2021-10-23 PROCEDURE — 99207 PR NO CHARGE LOS: CPT | Performed by: INTERNAL MEDICINE

## 2021-10-23 PROCEDURE — 36415 COLL VENOUS BLD VENIPUNCTURE: CPT | Performed by: STUDENT IN AN ORGANIZED HEALTH CARE EDUCATION/TRAINING PROGRAM

## 2021-10-23 PROCEDURE — 85027 COMPLETE CBC AUTOMATED: CPT | Performed by: STUDENT IN AN ORGANIZED HEALTH CARE EDUCATION/TRAINING PROGRAM

## 2021-10-23 PROCEDURE — 634N000001 HC RX 634: Performed by: INTERNAL MEDICINE

## 2021-10-23 PROCEDURE — 85018 HEMOGLOBIN: CPT | Performed by: STUDENT IN AN ORGANIZED HEALTH CARE EDUCATION/TRAINING PROGRAM

## 2021-10-23 PROCEDURE — 250N000013 HC RX MED GY IP 250 OP 250 PS 637: Performed by: MASSAGE THERAPIST

## 2021-10-23 PROCEDURE — 99232 SBSQ HOSP IP/OBS MODERATE 35: CPT | Performed by: INTERNAL MEDICINE

## 2021-10-23 PROCEDURE — 250N000013 HC RX MED GY IP 250 OP 250 PS 637: Performed by: STUDENT IN AN ORGANIZED HEALTH CARE EDUCATION/TRAINING PROGRAM

## 2021-10-23 PROCEDURE — 80048 BASIC METABOLIC PNL TOTAL CA: CPT | Performed by: STUDENT IN AN ORGANIZED HEALTH CARE EDUCATION/TRAINING PROGRAM

## 2021-10-23 PROCEDURE — 120N000001 HC R&B MED SURG/OB

## 2021-10-23 RX ADMIN — ACETAMINOPHEN 650 MG: 325 TABLET ORAL at 22:11

## 2021-10-23 RX ADMIN — HYDROXYZINE HYDROCHLORIDE 25 MG: 25 TABLET, FILM COATED ORAL at 05:53

## 2021-10-23 RX ADMIN — OXYCODONE HYDROCHLORIDE 5 MG: 5 TABLET ORAL at 22:12

## 2021-10-23 RX ADMIN — HEPARIN SODIUM 5000 UNITS: 10000 INJECTION, SOLUTION INTRAVENOUS; SUBCUTANEOUS at 05:43

## 2021-10-23 RX ADMIN — EPOETIN ALFA-EPBX 40000 UNITS: 10000 INJECTION, SOLUTION INTRAVENOUS; SUBCUTANEOUS at 18:36

## 2021-10-23 RX ADMIN — CEFAZOLIN 1 G: 1 INJECTION, POWDER, FOR SOLUTION INTRAMUSCULAR; INTRAVENOUS at 14:12

## 2021-10-23 RX ADMIN — HEPARIN SODIUM 5000 UNITS: 10000 INJECTION, SOLUTION INTRAVENOUS; SUBCUTANEOUS at 18:37

## 2021-10-23 RX ADMIN — ACETAMINOPHEN 650 MG: 325 TABLET ORAL at 15:46

## 2021-10-23 RX ADMIN — HYDROXYZINE HYDROCHLORIDE 25 MG: 25 TABLET, FILM COATED ORAL at 19:36

## 2021-10-23 RX ADMIN — OXYCODONE HYDROCHLORIDE 5 MG: 5 TABLET ORAL at 15:46

## 2021-10-23 RX ADMIN — PANTOPRAZOLE SODIUM 40 MG: 20 TABLET, DELAYED RELEASE ORAL at 09:25

## 2021-10-23 ASSESSMENT — ACTIVITIES OF DAILY LIVING (ADL)
ADLS_ACUITY_SCORE: 7

## 2021-10-23 NOTE — PLAN OF CARE
Problem: Adult Inpatient Plan of Care  Goal: Plan of Care Review  Outcome: Improving     Problem: Pain Acute  Goal: Acceptable Pain Control and Functional Ability  Outcome: Improving     Alert and oriented. Denies pain or shortness of breath.  Right chest tube in place. No signs of kinks or leaks noted. To wall suction.   Blood pressure at the start of the shift is 89/50, recheck 95/50. HO notified and updated.  [id number].

## 2021-10-23 NOTE — PROGRESS NOTES
ID chart check    Plan as seen in note 10/22, cefazolin at dialysis if this can be arranged.     Tico Farah MD

## 2021-10-23 NOTE — PROGRESS NOTES
Renal progress note  CC:ESRD  Assessment and Plan:    61 year old male with history of ESRD on dialysis at [facility] under care of Dr. Mao, history of abdominal aortic aneurysm thoracic aortic dissection cirrhosis with chronic hepatitis B on entecavir admitted last week with septic bursitis of left elbow underwent I&D with Ortho also s/p thoracocentesis with 1.5 L pleural effusion questionable empyema versus fluid overload drained cultures growing staph aureus s/p chest tube placement this admission 10/19/2020.  Nephrology consulted for ESRD management     ESRD  On hemodialysis [facility]  Treatment time 3 hours Monday and Friday patient refuses to dialyze on Wednesday's Access left upper extremity aV has had chronic swelling  --We will continue dialysis as outpatient on Monday and Friday discussed with patient again regarding Wednesday dialysis will follow labs on Wednesday to see if he needs dialysis again consider as needed  --Remains with very poor understanding of his ESRD and debating on decreasing time on dialysis constantly discussed issues with need for antibiotics at least 3 times a week, while he goes only x2/wk. Also advised to reconsider Sx option, or coming 3 times a week which will be appropriate based on his current kidney status.  He replied he thinks that infection is okay but he is getting fevers because he dialyzes too much; the patient was reoriented and advised that he does need 3 times a week antibiotics and he has an infection under his lungs that is causing the fevers.  Although unlikely but will confirm with dialysis unit if he can come in on Wednesdays for antibiotics.  Dialysis here and will be able to give him 2 g on Monday and 3 g on Friday of Ancef that has been confirmed.  --Daily standing weights  --Renal labs     Hypertension Hx  BP low this admission ?? Infection related  Not clinically septic  Hold PTA BP meds     Mild hypervolemia  Mostly upper extremity  edema in the left upper extremity  EDW ~60kg  -- usual UF 1-2 KG , will UF as tolerated     No electrolyte abnormalities     Anemia  Inflamm/ACD  On chronic EPO  Will keep on 10K M/F and follow on response     Empyema with staph aureus secondary to possible bacteremia from septic bursitis last admission 10/14- 10/17/2021  S/p chest tube placement ID following   on IV Ancef.  ID recommending 2 g Monday 2 g Wednesday and 3 g Friday with dialysis.  We will try to arrange although patient does not dialyze on Wednesday which will be an issue.  Blood cultures negative to date  --Plans per primary medicine     History of chronic hepatitis B with cirrhosis  With thrombocytopenia  Continue entecavir     GERD on pantoprazole     History of AAA with thoracic aortic dissection  Does not take regular blood pressure medication  Blood pressure has been on the lower side today has been on carvedilol and clonidine takes as needed       Thank you for the consultation we will follow  Jayme Clement MD  [facility]  [phone]      Subjective  Seen at bedside  Reports that he does not understand why does he have fevers and he feels so shaky today  He feels it is all related to his dialysis session from yesterday  Continues to feel that his fevers are secondary to his dialysis treatments and he needs to cut the dialysis treatments down to 2.5 hours or once a week  Patient was very oriented and educated on his low renal functions and likely needing 3 times a week dialysis rather than reducing it  Also discussed thoracic surgery option for his empyema and the need for 3 times a week at least antibiotics with dialysis.  He reported he will come Wednesday for antibiotics but will still avoid dialysis on Wednesdays  No compliants , feels ok    Objective    Vital signs in last 24 hours  Temp:  [98  F (36.7  C)-99.1  F (37.3  C)] 98.9  F (37.2  C)  Pulse:  [] 97  Resp:  [16-22] 18  BP: ()/(50-85) 101/61  SpO2:   [93 %-99 %] 93 %  Weight:   [unfilled]    Intake/Output last 3 shifts  I/O last 3 completed shifts:  In: 550 [P.O.:200]  Out: 2090 [Urine:200; Other:1600; Chest Tube:290]  Intake/Output this shift:  No intake/output data recorded.    Physical Exam  Alert/oriented x 3, awake, NAD  CV: RRR without murmur or rub  Lung: clear and equal; no extra sounds  Ab: soft and NT; not distended; normal bs  Ext: +edema and well perfused  Skin; no rash    Pertinent Labs   Lab Results   Component Value Date    WBC 9.2 10/23/2021    HGB 7.9 (L) 10/23/2021    HCT 25.7 (L) 10/23/2021    MCV 97 10/23/2021     (L) 10/23/2021     Lab Results   Component Value Date    BUN 46 (H) 10/23/2021     10/23/2021    CO2 30 10/23/2021       Lab Results   Component Value Date    ALBUMIN 2.3 (L) 10/19/2021     Lab Results   Component Value Date    PHOS 2.6 04/27/2021     I reviewed all lab results  Jayme Clement MD

## 2021-10-23 NOTE — PLAN OF CARE
Problem: Adult Inpatient Plan of Care  Goal: Optimal Comfort and Wellbeing  Outcome: Improving     Problem: Pain Acute  Goal: Acceptable Pain Control and Functional Ability  Outcome: Improving  Intervention: Prevent or Manage Pain  Recent Flowsheet Documentation  Taken 10/22/2021 1617 by Livia Marroquin RN  Medication Review/Management: medications reviewed     Pt has mild pain on right chest by chest tube insertion site and headaches.  Gave prn Tylenol and Oxycodone per request.  On telemetry monitoring.  Tachy at times.  Pt requested to have left arm wrapped.  Wrapped left arm with loose kerlix then applied hard cast and ace wrap to secure per pt's request.

## 2021-10-23 NOTE — PROGRESS NOTES
"[facility] Progress Note    Assessment/Plan  Principal Problem:    Pleural effusion  Active Problems:    ESRD (end stage renal disease) on dialysis (H)    Hepatic cirrhosis, unspecified hepatic cirrhosis type, unspecified whether ascites present (H)    Empyema lung (H)  61 year old M with multiple comorbidities including AAA (5.5cm), chronic thoracic aortic dissection, cirrhosis 2/2 chronic Hep B, ESRD on HD (M/F via L AVF), who was recently admitted for septic bursitis of the L elbow (10/14-10/17). Underwent I&D and bursectomy with Ortho on 10/15. Also s/p R thoracentesis with 1.5L drained for pleural effusion suspected 2/2 fluid overload. Pleural fluid cultures + S. Aureus. Admitted for empyema likely 2/2 bacteremia from septic bursitis now s/p chest tube placement, clinically stable awaiting chest tube output to decrease.     #Empyema +S. Aureus, likely 2/2 bacteriemia from septic bursitis  CV Surgery consult for consideration of decort/VATS vs chest tube, patient elected to do chest tube and did not want any large surgeries, continues to desire this. IR placed chest tube 10/19. Pleural cultures showing S. Aureus pansensitive. Patient doing well with chest tube, non toxic appearing.  - CV Surgery consulted: management of chest tube.  - ID consulted: agree with IV Ancef, \"I would prefer IV Ancef (2-2-3) outpatient if nephrology can arrange this outpatient\"  - Blood cultures 10/19: NGTD  - Aspirate culture 10/19: NGTD x2 bottles  - Abx: restarted on IV ancef while admitted 1 g per day in the setting of ESRD on dialysis given   - PRN pain medication: oxycodone 5 mg q4 hours  - CBC/BMP in AM     #Acute blood loss anemia  #Chronic anemia secondary to ESRD  Patinet on EPO. Hgb drop likely due to chest tube output. Transfused 1 unit PRBC on 10/22/21. Hgb today stable at 7.9. No signs of bleeding other than serosanguinous drainage from chest tube.  -Hgb q12hrs     #Left Olecranon bursitis s/p I&D DOS: " 10/15/21  Patient with planned follow up with ortho this week but is readmitted for empyema. Incisions do not look infected and healing well.  - Stable, continue to monitor  - Ortho consulted: stitches to stay in for 2 weeks post op, 10/29/21     #ESRD on HD  #Hyperkalemia  L AVF in place. Baseline Cr 7-8. Dialyzes M/F. Cr downtrending from yesterday after HD, K wnl at 4.4. Patient refuses Wednesday HD.   -Nephrology consulted for management of HD: Dialyzed 10/22     #Gout  Patient not on PTA medications for this but with tophaceous gout  right elbow and left cuboid area of left foot. Uric acid level 12 on 10/11/21. Does not appear to be in gout flare. Will not start steroid for prevention of flare for allopurinol in setting of recent septic bursitis and empyema. Patient is on HD and so limited with NSAIDs use. Will defer starting allopurinol.  - Monitor     #Chronic Medical Conditions  - AAA/Aortic Dissection: Continue to hold PTA carvedilol and clonidine as patient takes these PRN and continues to have soft bp's. CT chest 10/19 showing fusiform aneurysm of the descending thoracic aorta with findings of chronic [place] type B aortic dissection. Stable from last CT 9/26/21.  - GERD: continue PTA pantoprazole  - Chronic Hep B/Cirrhosis c/b thrombocytopenia: entecavir q7 days. No active bleeding. CTM     Incidental findings:  Cholelithiasis: patient asymptomatic, will monitor  Splenomegaly: Patient with history of cirrhosis. Follow up outpatient.     DVT ppx: SQH  Diet: Regular  Code: Full  Med rec completed: Yes     Dispo: inpatient status for abx, chest tube, anticipate discharge to home   Barriers to discharge:  work up in progress   Anticipated discharge date:  1-2 days    Subjective  Patient denies chest pain, shortness of breath. He declines PT/OT and does not want to get up and walk. He states his wife helps him to the bathroom when she is here.     Objective    Vital signs in last 24 hours Temp:  [98  F (36.7   C)-99.1  F (37.3  C)] 98.9  F (37.2  C)  Pulse:  [] 97  Resp:  [16-22] 18  BP: ()/(50-85) 101/61  SpO2:  [93 %-99 %] 93 %  139 lbs 0 oz    Intake/Output last 3 shift I/O last 3 completed shifts:  In: 550 [P.O.:200]  Out: 2090 [Urine:200; Other:1600; Chest Tube:290]    Intake/Output this shift:No intake/output data recorded.    Physical Exam  General appearance: alert, appears stated age, cooperative and no distress, lying in bed watching Youtube.  Head: Normocephalic, without obvious abnormality, atraumatic.  Eyes: conjunctivae/corneas clear.  Lungs: Diminished sounds on the right, clear to auscultation on the left, no increased respiratory effort. Chest tube in place on right axillary region, draining serosanguinous fluid.  Heart: regular rate and rhythm, no murmurs.  Extremities: Left elbow in splint with ace wrap.  Abd: Soft, non-tender, non-distended, no RUQ tenderness.  Skin: Skin color, texture, turgor normal. No rashes or lesions.  Neurologic: Alert and oriented x3, normal strength and tone.  Psychiatric: mood and affect appropriate.    Pertinent Labs and Pertinent Radiology   Lab Results: personally reviewed.     Radiology Results: Personally reviewed image/s and impression/s    Precepted patient with Dr. Rhonda Davalos.    Connie Stanton MD  [facility] [place] [facility], PGY-3  Pager # [phone]

## 2021-10-23 NOTE — PLAN OF CARE
Problem: Adult Inpatient Plan of Care  Goal: Plan of Care Review  Outcome: Improving    Chest tube in place, incision CDI, draining appropriately. Chest tube output 185 this shift. Patient is comfortable and able to make needs known. Patient is afebrile and vitals are stable.      Problem: Airway Clearance Ineffective (Pulmonary Impairment)  Goal: Effective Airway Clearance  Outcome: Improving     Lungs sounds contained inspiratory and expiratory wheezes in all lung fields, but is breathing and satting appropriately. IS performed for 1500.

## 2021-10-24 ENCOUNTER — APPOINTMENT (OUTPATIENT)
Dept: OCCUPATIONAL THERAPY | Facility: HOSPITAL | Age: 61
DRG: 177 | End: 2021-10-24
Payer: COMMERCIAL

## 2021-10-24 LAB
ANION GAP SERPL CALCULATED.3IONS-SCNC: 15 MMOL/L (ref 5–18)
BACTERIA BLD CULT: NO GROWTH
BACTERIA PLR CULT: NO GROWTH
BUN SERPL-MCNC: 64 MG/DL (ref 8–22)
CALCIUM SERPL-MCNC: 8.1 MG/DL (ref 8.5–10.5)
CHLORIDE BLD-SCNC: 99 MMOL/L (ref 98–107)
CO2 SERPL-SCNC: 23 MMOL/L (ref 22–31)
CREAT SERPL-MCNC: 12 MG/DL (ref 0.7–1.3)
ERYTHROCYTE [DISTWIDTH] IN BLOOD BY AUTOMATED COUNT: 16.2 % (ref 10–15)
GFR SERPL CREATININE-BSD FRML MDRD: 4 ML/MIN/1.73M2
GLUCOSE BLD-MCNC: 92 MG/DL (ref 70–125)
HCT VFR BLD AUTO: 27.6 % (ref 40–53)
HGB BLD-MCNC: 7.9 G/DL (ref 13.3–17.7)
HGB BLD-MCNC: 7.9 G/DL (ref 13.3–17.7)
HGB BLD-MCNC: 8.3 G/DL (ref 13.3–17.7)
MCH RBC QN AUTO: 29 PG (ref 26.5–33)
MCHC RBC AUTO-ENTMCNC: 28.6 G/DL (ref 31.5–36.5)
MCV RBC AUTO: 102 FL (ref 78–100)
PLATELET # BLD AUTO: 125 10E3/UL (ref 150–450)
POTASSIUM BLD-SCNC: 5 MMOL/L (ref 3.5–5)
RBC # BLD AUTO: 2.72 10E6/UL (ref 4.4–5.9)
SODIUM SERPL-SCNC: 137 MMOL/L (ref 136–145)
WBC # BLD AUTO: 8.6 10E3/UL (ref 4–11)

## 2021-10-24 PROCEDURE — 97166 OT EVAL MOD COMPLEX 45 MIN: CPT | Mod: GO

## 2021-10-24 PROCEDURE — 250N000011 HC RX IP 250 OP 636: Performed by: STUDENT IN AN ORGANIZED HEALTH CARE EDUCATION/TRAINING PROGRAM

## 2021-10-24 PROCEDURE — 85018 HEMOGLOBIN: CPT | Performed by: STUDENT IN AN ORGANIZED HEALTH CARE EDUCATION/TRAINING PROGRAM

## 2021-10-24 PROCEDURE — 99232 SBSQ HOSP IP/OBS MODERATE 35: CPT | Performed by: INTERNAL MEDICINE

## 2021-10-24 PROCEDURE — 250N000013 HC RX MED GY IP 250 OP 250 PS 637: Performed by: STUDENT IN AN ORGANIZED HEALTH CARE EDUCATION/TRAINING PROGRAM

## 2021-10-24 PROCEDURE — 80048 BASIC METABOLIC PNL TOTAL CA: CPT | Performed by: STUDENT IN AN ORGANIZED HEALTH CARE EDUCATION/TRAINING PROGRAM

## 2021-10-24 PROCEDURE — 36415 COLL VENOUS BLD VENIPUNCTURE: CPT | Performed by: STUDENT IN AN ORGANIZED HEALTH CARE EDUCATION/TRAINING PROGRAM

## 2021-10-24 PROCEDURE — 120N000001 HC R&B MED SURG/OB

## 2021-10-24 PROCEDURE — 99232 SBSQ HOSP IP/OBS MODERATE 35: CPT | Mod: GC | Performed by: STUDENT IN AN ORGANIZED HEALTH CARE EDUCATION/TRAINING PROGRAM

## 2021-10-24 PROCEDURE — 85027 COMPLETE CBC AUTOMATED: CPT | Performed by: STUDENT IN AN ORGANIZED HEALTH CARE EDUCATION/TRAINING PROGRAM

## 2021-10-24 RX ORDER — COLCHICINE 0.6 MG
0.3 TABLET ORAL
Status: DISCONTINUED | OUTPATIENT
Start: 2021-10-24 | End: 2021-10-24

## 2021-10-24 RX ORDER — COLCHICINE 0.6 MG
0.3 TABLET ORAL
Status: COMPLETED | OUTPATIENT
Start: 2021-10-24 | End: 2021-10-27

## 2021-10-24 RX ADMIN — HEPARIN SODIUM 5000 UNITS: 10000 INJECTION, SOLUTION INTRAVENOUS; SUBCUTANEOUS at 17:15

## 2021-10-24 RX ADMIN — PANTOPRAZOLE SODIUM 40 MG: 20 TABLET, DELAYED RELEASE ORAL at 08:45

## 2021-10-24 RX ADMIN — COLCHICINE 0.3 MG: 0.6 TABLET, FILM COATED ORAL at 15:06

## 2021-10-24 RX ADMIN — CEFAZOLIN 1 G: 1 INJECTION, POWDER, FOR SOLUTION INTRAMUSCULAR; INTRAVENOUS at 15:06

## 2021-10-24 RX ADMIN — HEPARIN SODIUM 5000 UNITS: 10000 INJECTION, SOLUTION INTRAVENOUS; SUBCUTANEOUS at 06:19

## 2021-10-24 ASSESSMENT — ACTIVITIES OF DAILY LIVING (ADL)
ADLS_ACUITY_SCORE: 7

## 2021-10-24 NOTE — PROGRESS NOTES
10/24/21 1535   Quick Adds   Type of Visit Initial Occupational Therapy Evaluation   Living Environment   People in home spouse   Current Living Arrangements house  (4 level split, 2 steps to enter home with railings)   Living Environment Comments walk in shower, shower chair, grab bars,    Self-Care   Activity/Exercise/Self-Care Comment wife assists with all cares both ADL and IADL, pt stated he can do about 50% of his own cares   Instrumental Activities of Daily Living (IADL)   IADL Comments Wife completes all IADLs, he occasionally drives.    Disability/Function   Hearing Difficulty or Deaf no   Wear Glasses or Blind no   Change in Functional Status Since Onset of Current Illness/Injury yes   General Information   Onset of Illness/Injury or Date of Surgery 10/19/21   Referring Physician Connie Stanton   Existing Precautions/Restrictions fall  (Chest Tube and Left elbow bursitis precautions)   General Observations and Info Pt is mod A with ADLs and max A for IADLs based on pt reporting as he wasn't felling well enough to get up.    Cognitive Status Examination   Orientation Status orientation to person, place and time   Cognitive Status Comments Able to answer questions and follow directions appropriately   Visual Perception   Visual Impairment/Limitations WFL   Range of Motion Comprehensive   Comment, General Range of Motion WFL   Strength Comprehensive (MMT)   Comment, General Manual Muscle Testing (MMT) Assessment Decreased strength in L hand   Coordination   Coordination Comments WFL, pt reported   Bed Mobility   Comment (Bed Mobility) supine to EOB mod I with bed rail.   Transfers   Transfer Comments NT, pt states he uses a cane and has a walker he occassionally uses   Sit-Stand Transfer   Sit/Stand Transfer Comments NT not feeling well   Toilet Transfer   Toilet Transfer Comments NT due to chest tube, RN stated pt can stand at EOB with a urinal   Clinical Impression   Criteria for Skilled Therapeutic  Interventions Met (OT) yes   OT Diagnosis Deconditioning   OT Problem List-Impairments impacting ADL problems related to;strength;balance;activity tolerance impaired;mobility   Assessment of Occupational Performance 3-5 Performance Deficits   Planned Therapy Interventions (OT) ADL retraining;balance training;ROM;strengthening;transfer training   Clinical Decision Making Complexity (OT) moderate complexity   Therapy Frequency (OT) Daily   Risk & Benefits of therapy have been explained evaluation/treatment results reviewed;spouse/significant other;patient   Comment-Clinical Impression Pt was willing to particpate in OT during hospital stay but refused all other cares outside of hospital regarding therapy.    OT Discharge Planning    OT Discharge Recommendation (DC Rec) Transitional Care Facility;home with home care occupational therapy;Home with assist   OT Rationale for DC Rec Pt would benefit from TCU to regain ADL skills as he states his wife does 50% of the effort with self care and all IADLs (driving, cooking, cleaning). If pt goes home, he will need 24 hour cares to assist with toielting, bathing, dressing, and more..   Total Evaluation Time (Minutes)   Total Evaluation Time (Minutes) 14

## 2021-10-24 NOTE — PLAN OF CARE
Problem: Adult Inpatient Plan of Care  Goal: Plan of Care Review  10/24/2021 1544 by Lucien Lagos, RN  Outcome: Improving  10/24/2021 1527 by Lucien Lagos, RN  Outcome: Improving     Patient has been resting comfortably all day. Patient is stable, able to make needs known. Chest tube is in place and draining. On IV antibiotics.

## 2021-10-24 NOTE — PROGRESS NOTES
"[facility] Progress Note    Assessment/Plan  Principal Problem:    Pleural effusion  Active Problems:    ESRD (end stage renal disease) on dialysis (H)    Hepatic cirrhosis, unspecified hepatic cirrhosis type, unspecified whether ascites present (H)    Empyema lung (H)    61 year old M with multiple comorbidities including AAA (5.5cm), chronic thoracic aortic dissection, cirrhosis 2/2 chronic Hep B, ESRD on HD (M/F via L AVF), who was recently admitted for septic bursitis of the L elbow (10/14-10/17). Underwent I&D and bursectomy with Ortho on 10/15. Also s/p R thoracentesis with 1.5L drained for pleural effusion suspected 2/2 fluid overload. Pleural fluid cultures + S. Aureus. Admitted for empyema likely 2/2 bacteremia from septic bursitis now s/p chest tube placement, clinically stable awaiting chest tube output to decrease. Now with concern for acute gouty flare and will start Colchicine.     #Empyema +S. Aureus, likely 2/2 bacteriemia from septic bursitis  CV Surgery consult for consideration of decort/VATS vs chest tube, patient elected to do chest tube and did not want any large surgeries, continues to desire this. IR placed chest tube 10/19. Pleural cultures showing S. Aureus pansensitive. Patient doing well with chest tube, non toxic appearing.  - CV Surgery consulted: management of chest tub. Today they informed RN that they are not managing chest tube.   -Consult placed to IR.  - ID consulted: agree with IV Ancef renally dosed, \"I would prefer IV Ancef (2-2-3) outpatient if nephrology can arrange this outpatient\"  - Blood cultures 10/19: NGTD  - Aspirate culture 10/19: NGTD x2 bottles  - PRN pain medication: oxycodone 5 mg q4 hours  - CBC/BMP in AM     #Acute blood loss anemia  #Chronic anemia secondary to ESRD  Patinet on EPO. Hgb drop likely due to chest tube output. Transfused 1 unit PRBC on 10/22/21. Hgb today stable at 7.9, last Hgb check were 7.9, 8.1. No signs of bleeding other " than serosanguinous drainage from chest tube.  -Hgb q12hrs     #Left Olecranon bursitis s/p I&D DOS: 10/15/21  Patient with planned follow up with ortho this week but is readmitted for empyema. Incisions do not look infected and healing well when checked early in admission, he is largely asymptomatic aside from expected post-op pain.  - Stable, continue to monitor  - Ortho consulted: stitches to stay in for 2 weeks post op, 10/29/21     #ESRD on HD  #Hyperkalemia  L AVF in place. Baseline Cr 7-8. Dialyzes M/F. K at 5.0 today. Patient refuses Wednesday HD.   -Nephrology consulted for management of HD: Dialyzed 10/22, Next HD is Monday 10/25.     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 Uric acid level 12 on 10/11/21. Does not appear to be in gout flare previously this admission but now is experiencing some right index finger pain and swelling, concerning for developing an acute flare.  Would still not favor starting steroids due to recent septic bursitis and ongoing empyema.  Nephrology is following for ESRD and so discussed starting Colchicine. Will not start Allopurinol during acute flare.   - Colchicine 0.3 mg every 3 days dose adjusted for HD.     #Chronic Medical Conditions  - AAA/Aortic Dissection: Continue to hold PTA carvedilol and clonidine as patient takes these PRN and continues to have soft bp's, 106/67 today. CT chest 10/19 showing fusiform aneurysm of the descending thoracic aorta with findings of chronic Panfilo type B aortic dissection. Stable from last CT 9/26/21.  - GERD: continue PTA pantoprazole  - Chronic Hep B/Cirrhosis c/b thrombocytopenia: entecavir q7 days. No active bleeding. CTM     Incidental findings:  Cholelithiasis: patient continues to be asymptomatic, will monitor  Splenomegaly: Patient with history of cirrhosis. Follow up outpatient.     DVT ppx: SQH  Diet: Regular  Code: Full  Med rec  completed: Yes     Dispo: inpatient status for abx, chest tube, anticipate discharge to home   Barriers to discharge:  work up in progress   Anticipated discharge date:  1-2 days    Subjective  Patient states that he is feeling fine, he denies any shortness of breath, chest pain, fevers.  He states that his left elbow still with some pain.  Patient states that his right elbow tophi is unchanged in sensation or appearance.  Patient did ask me to come back to the room to look at his right index finger which he states is now having sharp pain and he feels like it is swollen.  We discussed PT/OT and patient now agreeable start to ambulate more and okay with ordering therapy.    Objective    Vital signs in last 24 hours Temp:  [98.7  F (37.1  C)-98.9  F (37.2  C)] 98.8  F (37.1  C)  Pulse:  [] 97  Resp:  [18] 18  BP: ()/(53-83) 106/67  SpO2:  [94 %-96 %] 94 %   139 lbs 0 oz    Intake/Output last 3 shift I/O last 3 completed shifts:  In: 340 [P.O.:240; I.V.:100]  Out: 470 [Chest Tube:470]    Intake/Output this shift:No intake/output data recorded.    Physical Exam  General appearance: alert, appears stated age, cooperative and no distress, lying in bed, wife here.  Head: Normocephalic, without obvious abnormality, atraumatic.  Eyes: conjunctivae/corneas clear.  Throat: MMM.  Lungs: diminished on the right with chest tube in place drainage serosanguinous fluid, clear to auscultation on left, no increased respiratory effort.  Heart: regular rate and rhythm, no murmurs.  Abdomen: soft, non-tender, non-distended.  Extremities: Pain in index finger with some swelling/slight erythema at PIP of right hand.  Skin: Skin color, texture, turgor normal. No rashes or lesions.  Neurologic: Alert and oriented x3, normal strength and tone.  Psychiatric: mood and affect appropriate.    Pertinent Labs and Pertinent Radiology   Lab Results: personally reviewed.     Radiology Results: Personally reviewed image/s and  impression/s    Precepted patient with Dr. Rhonda Davalos.    Connie Stanton MD  [facility], PGY-3  Pager # [phone]

## 2021-10-24 NOTE — PROVIDER NOTIFICATION
"PROVIDER NOTIFICATION    Reason for communication: re: disconnection of chest tube tubing connection (3-way stop-cock site); unknown duration of disconnection.  Team member name: Dr. Reynaga  Team member role: Surgery - consulting MD  Method of Communication: paged  Response: discussed with provider - to call IR re: management. Chest tube not indicated, manage with IV abx since patient is refusing surgery. Primary doctor ([facility]/residency team) notified, who will be in touch with IR.  Response time: 1448    ADDENDUM 1430: A doctor (faculty) at bedside has notified the writer that the chest tube has been \"disconnected.\" Chest tube site intact; the connection via 3-way stop cock into the chamber was on the floor. Screw/connection area swabbed with alcohol, re-connected. Patient alert, not on distress/SOB. RR 18 l  l SPO2 94% room air. Lung sounds diminished.  "

## 2021-10-24 NOTE — PROGRESS NOTES
Renal progress note  CC:ESRD  Assessment and Plan:    61 year old male with history of ESRD on dialysis at [facility] under care of Dr. Mao, history of abdominal aortic aneurysm thoracic aortic dissection cirrhosis with chronic hepatitis B on entecavir admitted last week with septic bursitis of left elbow underwent I&D with Ortho also s/p thoracocentesis with 1.5 L pleural effusion questionable empyema versus fluid overload drained cultures growing staph aureus s/p chest tube placement this admission 10/19/2020.  Nephrology consulted for ESRD management     ESRD  On hemodialysis [facility]  Treatment time 3 hours Monday and Friday patient refuses to dialyze on Wednesday's Access left upper extremity aV has had chronic swelling  --We will continue dialysis as outpatient on Monday and Friday discussed with patient again regarding Wednesday dialysis will follow labs on Wednesday to see if he needs dialysis again consider as needed  --Remains with very poor understanding of his ESRD and debating on decreasing time on dialysis constantly discussed issues with need for antibiotics at least 3 times a week, while he goes only x2/wk. Also advised to reconsider Sx option, or coming 3 times a week which will be appropriate based on his current kidney status.  He replied he thinks that infection is okay but he is getting fevers because he dialyzes too much; the patient was reoriented and advised that he does need 3 times a week antibiotics and he has an infection under his lungs that is causing the fevers.  Although unlikely, but will confirm with dialysis unit if he can come in on Wednesdays for antibiotics. Will be able to give him 2 g on Monday and 3 g on Friday of Ancef that has been confirmed.  HD unit to confirm on Monday with the final answer regarding possibility of antibiotics on Wednesday  --Daily standing weights  --Renal labs     Hypertension Hx  BP low this admission ?? Infection  related  Not clinically septic  Hold PTA BP meds     Mild hypervolemia  Mostly upper extremity edema in the left upper extremity  EDW ~60kg  -- usual UF 1-2 KG , will UF as tolerated     No electrolyte abnormalities     Anemia  Inflamm/ACD  On chronic EPO  Will keep on 10K M/F and follow on response     Empyema with staph aureus secondary to possible bacteremia from septic bursitis last admission 10/14- 10/17/2021  S/p chest tube placement ID following   on IV Ancef.  ID recommending 2 g Monday 2 g Wednesday and 3 g Friday with dialysis.  We will try to arrange although patient does not dialyze on Wednesday which will be an issue.  Blood cultures negative to date  --Plans per primary medicine     History of chronic hepatitis B with cirrhosis  With thrombocytopenia  Continue entecavir     GERD on pantoprazole     History of AAA with thoracic aortic dissection  Does not take regular blood pressure medication  Blood pressure has been on the lower side today has been on carvedilol and clonidine takes as needed    Gout flareup  Unable to give prednisone with ongoing infection  Okay to give colchicine 0.3 mg today and then another dose of 0.3 mg on Thursday no further doses after that, monitor for any toxicity symptoms  Patient's poor clearance and poor dialysis compliance has a lot to do with this prior discussions have not been successful     Thank you for the consultation we will follow  Jayme Clement MD  [facility]  [phone]      Subjective  Seen at bedside  Reports he is feeling okay has some pain in his finger other than that nothing new  Was called by primary team regarding options for treatment of acute gout and avoiding prednisone due to his current infection okay to give colchicine 2 doses  Patient was counseled again that he would need to dialyze 3 times a week in order to be better at his clearance goals  He remains with poor understanding of the ESRD diagnoses and his treatments  Will  need to call dialysis and for confirming Wednesday antibiotic dosing tomorrow    Objective    Vital signs in last 24 hours  Temp:  [98.7  F (37.1  C)-98.9  F (37.2  C)] 98.8  F (37.1  C)  Pulse:  [] 97  Resp:  [18] 18  BP: ()/(53-83) 106/67  SpO2:  [94 %-96 %] 94 %  Weight:   [unfilled]    Intake/Output last 3 shifts  I/O last 3 completed shifts:  In: 340 [P.O.:240; I.V.:100]  Out: 470 [Chest Tube:470]  Intake/Output this shift:  No intake/output data recorded.    Physical Exam  Alert/oriented x 3, awake, NAD  CV: RRR without murmur or rub  Lung: clear and equal; no extra sounds  Ab: soft and NT; not distended; normal bs  Ext: +edema and well perfused  Skin; no rash    Pertinent Labs   Lab Results   Component Value Date    WBC 8.6 10/24/2021    HGB 7.9 (L) 10/24/2021    HGB 7.9 (L) 10/24/2021    HCT 27.6 (L) 10/24/2021     (H) 10/24/2021     (L) 10/24/2021     Lab Results   Component Value Date    BUN 64 (H) 10/24/2021     10/24/2021    CO2 23 10/24/2021       Lab Results   Component Value Date    ALBUMIN 2.3 (L) 10/19/2021     Lab Results   Component Value Date    PHOS 2.6 04/27/2021     I reviewed all lab results  Jayme Clement MD

## 2021-10-24 NOTE — PLAN OF CARE
Problem: Adult Inpatient Plan of Care  Goal: Optimal Comfort and Wellbeing  Outcome: Improving    Vss. Slept overnight. Chest tube patent, draining, no air leak. Reports pain 10/10 in left hand, improved overnight.

## 2021-10-24 NOTE — PLAN OF CARE
Problem: Adult Inpatient Plan of Care  Goal: Plan of Care Review  Outcome: Improving     Patient has been resting comfo

## 2021-10-24 NOTE — PLAN OF CARE
Problem: Adult Inpatient Plan of Care  Goal: Optimal Comfort and Wellbeing  Outcome: Improving     Problem: Pain Acute  Goal: Acceptable Pain Control and Functional Ability  Outcome: Improving  Intervention: Develop Pain Management Plan  Recent Flowsheet Documentation  Taken 10/23/2021 1615 by Livia Marroquin, RN  Pain Management Interventions: medication (see MAR)  Intervention: Prevent or Manage Pain  Recent Flowsheet Documentation  Taken 10/23/2021 1700 by Livia Marroquin, RN  Medication Review/Management: medications reviewed    Pt c/o right chest tube insertion site pain.  Requested for prn Tylenol and Oxycodone x 2 on evening shift.  Pt also c/o itching.  Gave Hydroxyzine 25 mg.  Chest tube to wall suction.  Urinal at bedside.  No void this shift.  Dialysis pt.

## 2021-10-25 ENCOUNTER — APPOINTMENT (OUTPATIENT)
Dept: OCCUPATIONAL THERAPY | Facility: HOSPITAL | Age: 61
DRG: 177 | End: 2021-10-25
Payer: COMMERCIAL

## 2021-10-25 ENCOUNTER — APPOINTMENT (OUTPATIENT)
Dept: CT IMAGING | Facility: HOSPITAL | Age: 61
DRG: 177 | End: 2021-10-25
Payer: COMMERCIAL

## 2021-10-25 LAB
ANION GAP SERPL CALCULATED.3IONS-SCNC: 15 MMOL/L (ref 5–18)
BUN SERPL-MCNC: 80 MG/DL (ref 8–22)
CALCIUM SERPL-MCNC: 8.4 MG/DL (ref 8.5–10.5)
CHLORIDE BLD-SCNC: 100 MMOL/L (ref 98–107)
CO2 SERPL-SCNC: 22 MMOL/L (ref 22–31)
CREAT SERPL-MCNC: 14.14 MG/DL (ref 0.7–1.3)
ERYTHROCYTE [DISTWIDTH] IN BLOOD BY AUTOMATED COUNT: 15.9 % (ref 10–15)
GFR SERPL CREATININE-BSD FRML MDRD: 3 ML/MIN/1.73M2
GLUCOSE BLD-MCNC: 96 MG/DL (ref 70–125)
HCT VFR BLD AUTO: 27.4 % (ref 40–53)
HGB BLD-MCNC: 8.2 G/DL (ref 13.3–17.7)
MCH RBC QN AUTO: 29.1 PG (ref 26.5–33)
MCHC RBC AUTO-ENTMCNC: 29.9 G/DL (ref 31.5–36.5)
MCV RBC AUTO: 97 FL (ref 78–100)
PLATELET # BLD AUTO: 99 10E3/UL (ref 150–450)
POTASSIUM BLD-SCNC: 5.4 MMOL/L (ref 3.5–5)
RBC # BLD AUTO: 2.82 10E6/UL (ref 4.4–5.9)
SODIUM SERPL-SCNC: 137 MMOL/L (ref 136–145)
WBC # BLD AUTO: 8.2 10E3/UL (ref 4–11)

## 2021-10-25 PROCEDURE — 36415 COLL VENOUS BLD VENIPUNCTURE: CPT | Performed by: STUDENT IN AN ORGANIZED HEALTH CARE EDUCATION/TRAINING PROGRAM

## 2021-10-25 PROCEDURE — 97535 SELF CARE MNGMENT TRAINING: CPT | Mod: GO

## 2021-10-25 PROCEDURE — 99233 SBSQ HOSP IP/OBS HIGH 50: CPT | Performed by: INTERNAL MEDICINE

## 2021-10-25 PROCEDURE — 250N000013 HC RX MED GY IP 250 OP 250 PS 637: Performed by: MASSAGE THERAPIST

## 2021-10-25 PROCEDURE — 80048 BASIC METABOLIC PNL TOTAL CA: CPT | Performed by: STUDENT IN AN ORGANIZED HEALTH CARE EDUCATION/TRAINING PROGRAM

## 2021-10-25 PROCEDURE — 250N000011 HC RX IP 250 OP 636: Performed by: STUDENT IN AN ORGANIZED HEALTH CARE EDUCATION/TRAINING PROGRAM

## 2021-10-25 PROCEDURE — 71250 CT THORAX DX C-: CPT

## 2021-10-25 PROCEDURE — 120N000001 HC R&B MED SURG/OB

## 2021-10-25 PROCEDURE — 99233 SBSQ HOSP IP/OBS HIGH 50: CPT | Mod: GC | Performed by: STUDENT IN AN ORGANIZED HEALTH CARE EDUCATION/TRAINING PROGRAM

## 2021-10-25 PROCEDURE — 99233 SBSQ HOSP IP/OBS HIGH 50: CPT | Mod: GC | Performed by: INTERNAL MEDICINE

## 2021-10-25 PROCEDURE — 90937 HEMODIALYSIS REPEATED EVAL: CPT

## 2021-10-25 PROCEDURE — 250N000013 HC RX MED GY IP 250 OP 250 PS 637: Performed by: STUDENT IN AN ORGANIZED HEALTH CARE EDUCATION/TRAINING PROGRAM

## 2021-10-25 PROCEDURE — 85027 COMPLETE CBC AUTOMATED: CPT | Performed by: STUDENT IN AN ORGANIZED HEALTH CARE EDUCATION/TRAINING PROGRAM

## 2021-10-25 RX ADMIN — OXYCODONE HYDROCHLORIDE 5 MG: 5 TABLET ORAL at 10:07

## 2021-10-25 RX ADMIN — ACETAMINOPHEN 650 MG: 325 TABLET ORAL at 18:08

## 2021-10-25 RX ADMIN — HYDROXYZINE HYDROCHLORIDE 25 MG: 25 TABLET, FILM COATED ORAL at 02:39

## 2021-10-25 RX ADMIN — HEPARIN SODIUM 5000 UNITS: 10000 INJECTION, SOLUTION INTRAVENOUS; SUBCUTANEOUS at 18:08

## 2021-10-25 RX ADMIN — HYDROXYZINE HYDROCHLORIDE 25 MG: 25 TABLET, FILM COATED ORAL at 20:45

## 2021-10-25 RX ADMIN — ACETAMINOPHEN 650 MG: 325 TABLET ORAL at 00:13

## 2021-10-25 RX ADMIN — HEPARIN SODIUM 5000 UNITS: 10000 INJECTION, SOLUTION INTRAVENOUS; SUBCUTANEOUS at 07:51

## 2021-10-25 RX ADMIN — OXYCODONE HYDROCHLORIDE 5 MG: 5 TABLET ORAL at 00:13

## 2021-10-25 RX ADMIN — PANTOPRAZOLE SODIUM 40 MG: 20 TABLET, DELAYED RELEASE ORAL at 09:09

## 2021-10-25 RX ADMIN — ACETAMINOPHEN 650 MG: 325 TABLET ORAL at 10:08

## 2021-10-25 RX ADMIN — OXYCODONE HYDROCHLORIDE 5 MG: 5 TABLET ORAL at 18:08

## 2021-10-25 RX ADMIN — CEFAZOLIN 1 G: 1 INJECTION, POWDER, FOR SOLUTION INTRAMUSCULAR; INTRAVENOUS at 20:39

## 2021-10-25 ASSESSMENT — ACTIVITIES OF DAILY LIVING (ADL)
ADLS_ACUITY_SCORE: 7

## 2021-10-25 ASSESSMENT — MIFFLIN-ST. JEOR
SCORE: 1312.88
SCORE: 1312.94

## 2021-10-25 NOTE — PLAN OF CARE
Problem: Adult Inpatient Plan of Care  Goal: Optimal Comfort and Wellbeing  Outcome: Improving     Problem: Pain Acute  Goal: Acceptable Pain Control and Functional Ability  Outcome: Improving  Intervention: Develop Pain Management Plan  Recent Flowsheet Documentation  Taken 10/24/2021 1549 by Livia Marroquin, RN  Pain Management Interventions: medication (see MAR)  Intervention: Prevent or Manage Pain  Recent Flowsheet Documentation  Taken 10/24/2021 1600 by Livia Marroquin, RN  Medication Review/Management: medications reviewed    Pt c/o mild pain at right chest tube insertion site.  Didn't want any prn at this time.  Pt ate food from home.  Chest tube is to wall suction, put out 125 ml.

## 2021-10-25 NOTE — PROGRESS NOTES
10/25/21 1130   Appointment Canceled   Appointment Canceled Patient declined   Cancel Comments pt declined all PT while hospitalized at this time. Will d/c PT orders now, should pt status change, please re-order as appropriate.

## 2021-10-25 NOTE — PROGRESS NOTES
"Orthopedic Progress Note      Assessment:    10 days s/p  Left olecranon I&D and bursectomy on 10/15/21. Improving     Plan:   -Incision on left elbow is healing well, stitches will be removed Friday if patient is still here or should make a follow up appointment at [facility] for Friday for suture removal with Dr. Myers's team.   -Continue to dress left elbow with gauze, splint, and compressive ace wrap.    Subjective:  Patient reports left elbow is doing well, notes minimal pain. His dressing has been changed daily with dialysis as his port if in left arm. Denies numbness, tingling in hand. No complaints or questions.     Objective:  /72 (BP Location: Right arm)   Pulse 93   Temp 97.6  F (36.4  C) (Oral)   Resp 18   Ht 1.651 m (5' 5\")   Wt 58.1 kg (128 lb 1.6 oz)   SpO2 97%   BMI 21.32 kg/m    The patient is A&Ox3. Appears comfortable.   Sensation is intact.  Dorsiflexion and plantar flexion is intact.  Dorsalis pedis pulse intact.  Calves are soft and non-tender. Negative Meghan's.  The incision edges are well approximated, sutures held in place. No signs of wound dehiscence, drainage, erythema. Minimal edema distal to incision. No warmth to the elbow.   Left elbow ROM is appropriate, full flexion not tested due to sutures.   Full anatomical ROM of left wrist and hand.         Pertinent Labs   Lab Results: personally reviewed.   Lab Results   Component Value Date    INR 1.38 (H) 10/19/2021    INR 1.28 (H) 09/29/2021    INR 1.43 (H) 09/28/2021     Lab Results   Component Value Date    WBC 8.2 10/25/2021    HGB 8.2 (L) 10/25/2021    HCT 27.4 (L) 10/25/2021    MCV 97 10/25/2021    PLT 99 (L) 10/25/2021     Lab Results   Component Value Date     10/25/2021    CO2 22 10/25/2021         Report completed by:  Ladonna Mandel PA-C, NAHUN  Date: 10/25/2021  Time: 2:18 PM    "

## 2021-10-25 NOTE — PLAN OF CARE
Problem: Adult Inpatient Plan of Care  Goal: Optimal Comfort and Wellbeing  Outcome: Improving    Vss. Patient received atarax for itching overnight. 5mg oxycodone and Tylenol for chest tube site pain. Slept otherwise. Dialysis monday

## 2021-10-25 NOTE — PROGRESS NOTES
"[facility] Progress Note    Assessment/Plan  Principal Problem:    Pleural effusion  Active Problems:    ESRD (end stage renal disease) on dialysis (H)    Hepatic cirrhosis, unspecified hepatic cirrhosis type, unspecified whether ascites present (H)    Empyema lung (H)       61 year old M with multiple comorbidities including AAA (5.5cm), chronic thoracic aortic dissection, cirrhosis 2/2 chronic Hep B, ESRD on HD (M/F via L AVF), who was recently admitted for septic bursitis of the L elbow (10/14-10/17). Underwent I&D and bursectomy with Ortho on 10/15. Also s/p R thoracentesis with 1.5L drained for pleural effusion suspected 2/2 fluid overload. Pleural fluid cultures + S. Aureus. Admitted for empyema likely 2/2 bacteremia from septic bursitis now s/p chest tube placement, clinically stable awaiting chest tube output to decrease. Continue to recommend surgery.      #Empyema +S. Aureus, likely 2/2 bacteriemia from septic bursitis  CV Surgery consult for consideration of decort/VATS vs chest tube, patient elected to do chest tube and did not want any large surgeries, continues to desire this. IR placed chest tube 10/19. Pleural cultures showing S. Aureus pansensitive. Patient doing well with chest tube, non toxic appearing. CV surgery and primary team continue to recommend surgery for his empyema. Empyema will likely recur even after chest tube and patient requires definitive surgery. IR consulted 10/24/2021 for management of chest tube as CV surgery has signed off. IR recommends continuing the chest tube due to large output. Chest tube output over the last 4 days for 410--> 370--> 315--> 50 so far today, max output 1250 early in admission. Patient is nontoxic-appearing, no fevers, no leukocytosis.  - IR consulted: management of chest tube, repeat CT chest today.  - ID consulted: agree with IV Ancef renally dosed, \"I would prefer IV Ancef (2-2-3) outpatient if nephrology can arrange this " "outpatient\"  - Blood cultures 10/19: NGTD  - Aspirate culture 10/19: NGTD x2 bottles  - PRN pain medication: oxycodone 5 mg q4 hours     #Acute blood loss anemia  #Chronic anemia secondary to ESRD  Patinet on EPO. Hgb drop likely due to chest tube output. Transfused 1 unit PRBC on 10/22/21. Hgb today stable at 8.2, last Hgb check were 7.9, 8.1. No signs of bleeding other than serosanguinous drainage from chest tube.  -Hgb in am     #Left Olecranon bursitis s/p I&D DOS: 10/15/21  Patient with planned follow up with ortho this week but is readmitted for empyema. Incisions do not look infected and healing well when checked early in admission. Changes today  - Stable, continue to monitor  - Ortho consulted: stitches to stay in for 2 weeks post op, 10/29/21     #ESRD on HD  #Hyperkalemia  L AVF in place. Baseline Cr 7-8. Dialyzes M/F. K at 5.0 today. Patient refuses Wednesday HD. New to discuss the need to have HD 3 times a week.  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 Uric acid level 12 on 10/11/21. Does not appear to be in gout flare previously this admission but now is experiencing some right index finger pain and swelling, concerning for developing an acute flare.  Would still not favor starting steroids due to recent septic bursitis and ongoing empyema.  Nephrology is following for ESRD and so discussed starting Colchicine. Will not start Allopurinol during acute flare. Patient with changing story about his swelling.  - Colchicine 0.3 mg every 3 days dose adjusted for HD for 2 doses.     #Chronic Medical Conditions  - AAA/Aortic Dissection: Continue to hold PTA carvedilol and clonidine as patient takes these PRN and continues to have soft bp's, 106/67 today. 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 Stable from last CT 9/26/21.  - GERD: continue PTA pantoprazole  - Chronic Hep B/Cirrhosis c/b thrombocytopenia: entecavir q7 days. No active bleeding. CTM     Incidental findings:  Cholelithiasis: patient continues to be asymptomatic, will monitor  Splenomegaly: Patient with history of cirrhosis. Follow up outpatient.     DVT ppx: SQH  Diet: Regular  Code: Full  Med rec completed: Yes     Dispo: inpatient status for abx, chest tube, anticipate discharge to home   Barriers to discharge:  work up in progress   Anticipated discharge date:  TBD    Subjective  Patient doing well this am, notes some pain at his chest tube site but controlled. He notes he is having some pain diffusely in his left hand that he told Dr. Davalos about yesterday and he was concerned for a gout flare. Of note, he told me yesterday that his right index finger was swollen, he did not remember this this morning.    Discussed with patient multiple times the need for definitive surgery for his right empyema. Have discussed multiple times that the chest tube will not resolve this issue and his infection will likely return. Patient denies surgery, we discussed this previously and he said he was worried about the pain because he was told that he would need 3 holes instead of the 1 hole for his chest tube. He also continues to endorse that he could die during the surgery and he wants to avoid that. Patient and her last conversation about this declined to talk about it further and states that his decision has been made.     Patient also continues to decline 3 times a week hemodialysis. Nephrology has been discussing this with him and trying to relay the need for antibiotics 3 times a week. Patient continues to deny 3 times a week hemodialysis and will state he only will do hemodialysis Monday and Friday.    Patient declines  for most visits with him while inpatient unless we need to discuss something technical. When being  technical/medical discussions,  has always been used.    Objective    Vital signs in last 24 hours Temp:  [97.4  F (36.3  C)-98.4  F (36.9  C)] 97.6  F (36.4  C)  Pulse:  [] 93  Resp:  [18] 18  BP: (112-124)/(72-80) 112/72  SpO2:  [94 %-97 %] 97 %   128 lbs 1.6 oz    Intake/Output last 3 shift I/O last 3 completed shifts:  In: 360 [P.O.:360]  Out: 425 [Urine:200; Chest Tube:225]    Intake/Output this shift:I/O this shift:  In: 3 [I.V.:3]  Out: -     Physical Exam  General appearance: alert, appears stated age, cooperative and no distress, sitting in bed, wife here this morning.  Head: Normocephalic, without obvious abnormality, atraumatic.  Throat: MMM.  Lungs: diminished on the right with chest tube in place drainage serosanguinous fluid, clear to auscultation on left, no increased respiratory effort.  Heart: regular rate and rhythm, no murmurs.  Skin: Skin color, texture, turgor normal. No rashes or lesions.  Neurologic: Alert and oriented x3, normal strength and tone.  Psychiatric: Flat affect    Pertinent Labs and Pertinent Radiology   Lab Results: personally reviewed.     Radiology Results: Personally reviewed image/s and impression/s    Precepted patient with Dr. Gustavo Solomon.    Connie Stanton MD  [facility], PGY-3  Pager # [phone]

## 2021-10-25 NOTE — PROCEDURES
Hemodialysis Treatment Note    Pre weight: 58.1 kg                            Post weight: 56.2 kg    Run length: 3.0 hrs    Total fluid removed (ml): 2400 ml goal, 2000 ml net UF removed    Blood Volume Processed: 58.6 L      Vascular Access: Left Lower arm AVG with present bruit/thrill, accessed with 16 ga needles, 350 blood flow rate achieved and maintained. Needles removed easily post dialysis, pressure dressing applied, homeostasis achieved in 10 minutes.      Treatment Summary:  K+ 2, Na +138, Ca ++ 2.25, Bicarb 35, , , no heparin.   Pt was hemodynamically stable throughout, stable blood pressures, critline used for fluid volume monitoring/management, profile B mostly, seen by Dr Salmeron just before HD, uneventful treatment, ended as expected, see HD flow sheet for details.    Interventions:  Vital signs monitoring every 15 minutes and prn  Goal adjustment per critline and hemodynamics.    Plan:  Per renal team    Barbara Gaona RN  [facility] Department

## 2021-10-25 NOTE — PROGRESS NOTES
"  Interventional Radiology - Progress Note  Inpatient - [facility]: Interventional Radiology   [phone]  10/25/2021    S:  Feeling well. Sitting up, eating at time of visit. Denies SOB. Denies leaking at chest tube exit site.     O:  /80 (BP Location: Right arm)   Pulse 93   Temp 97.4  F (36.3  C) (Oral)   Resp 18   Ht 1.651 m (5' 5\")   Wt 63 kg (139 lb)   SpO2 96%   BMI 23.13 kg/m    General:  Stable.  In no acute distress.    Neuro:  A&O x 3.  Resp:  Unlabored breathing. Right chest tube to suction with serosanginous OP. Chest tube exit site with dressing CDI.    IMAGING:  CT chest 10/21/21:  FINDINGS:   LUNGS AND PLEURA: Right pigtail pleural drainage catheter is well-positioned in complex, loculated air and fluid in the right pleural space. Unchanged visceral and parietal pleural thickening. Bandlike subpleural opacities lateral right upper lobe, right   middle lobe are consistent with cicatrization atelectasis. Larger, more focal and rounded opacity in the right lower lobe is consistent with rounded atelectasis.     Minimal left pleural fluid layers dependently. No left lung opacities.      MEDIASTINUM: Chronic dissection of the descending thoracic aorta with high attenuation along the intimal flap. Maximal diameter of the proximal descending thoracic aorta is 5.8 x 5.9 cm based on double oblique post processed reconstructions. No new   periaortic inflammation. The ascending aorta is normal caliber. Cardiac chambers are normal in size and there is no pericardial effusion.     No new enlarged mediastinal or hilar lymph nodes. Esophagus is decompressed.     CORONARY ARTERY CALCIFICATION: At least moderate.     UPPER ABDOMEN: Unchanged ~4 cm hypoattenuating lesion in the superior right hepatic lobe just to the right of the IVC and smaller 18 mm more circumscribed lesion in the more lateral right hepatic dome likely a benign cyst. Calcified gallstones layer   dependently in the " gallbladder. High attenuation in the gallbladder is likely vicarious excretion of contrast. Unchanged enlargement of the spleen.     MUSCULOSKELETAL: Diffuse small degenerative thoracic spine osteophytes. No aggressive or destructive bone lesions.                                                                      IMPRESSION:      1.  Complex, loculated fluid and air in the right pleural space with diffuse right pleural thickening. The pleural thickening is new from 9/26/2021 and likely relates to infection/empyema.  2.  Subpleural opacities in the right lung including a more focal, rounded opacity in the right lower lobe consistent with atelectasis/rounded atelectasis.  3.  Fusiform aneurysm of the descending thoracic aorta with findings of chronic Ovid type B aortic dissection.  4.  Minimal left pleural effusion. Cholelithiasis.  5.  Splenomegaly.    LABS:  CBC RESULTS: Recent Labs   Lab Test 10/25/21  0645   WBC 8.2   RBC 2.82*   HGB 8.2*   HCT 27.4*   MCV 97   MCH 29.1   MCHC 29.9*   RDW 15.9*   PLT 99*       Drain Outputs (in mL):        A:  61 year old male with multiple comorbidities including AAA (5.5cm), chronic thoracic aortic dissection, cirrhosis 2/2 chronic Hep B, ESRD on HD (M/F via L AVF), who was recently admitted for septic bursitis of the L elbow (10/14-10/17). Underwent I&D and bursectomy with Ortho on 10/15. Also s/p R thoracentesis with 1.5L drained for pleural effusion suspected 2/2 fluid overload. Pleural fluid cultures + S. Aureus. Admitted for empyema likely 2/2 bacteremia from septic bursitis now s/p IR 14F chest tube placement 10/19/21.      P:    - Pt declining surgical intervention.  - CV surgery and Dr. Reynaga have signed off.   - CT chest pending.  - Continues to have copious OP via chest tube. Current chest tube appears to be functioning well with OP like this. No current indication/role for tPA or enlarging chest tube, though could consider this if follow up imaging continues  to demonstrate large pleural effusion without improvement.  - Would recommend continuing chest tube to suction at present time.   - Dicussed case with Dr. Olvera.  - Discussed the above with Dr. Stanton.    Total time spent on the date of the encounter is 30 minutes, including time spent counseling the patient, performing a medically appropriate evaluation, reviewing prior medical history, ordering medications and tests, documenting clinical information in the medical record, and communication of results.    Barb Skinner PA-C  Interventional Radiology      E/M codes for reference only:  63856

## 2021-10-25 NOTE — PLAN OF CARE
Pt had CT of chest, no big changes. Right chest tube still in place.   Pt continues to decline having surgery per MD recommendations.  Left elbow incision site is C/D/I, sutures are intact and will be removed on Friday per surgery team.   Pt's wife was at bedside for a couple of hours.    Problem: Adult Inpatient Plan of Care  Goal: Absence of Hospital-Acquired Illness or Injury  Outcome: Improving  Intervention: Identify and Manage Fall Risk  Recent Flowsheet Documentation  Taken 10/25/2021 0915 by Chavez Osborne RN  Safety Promotion/Fall Prevention:   activity supervised   fall prevention program maintained   clutter free environment maintained   nonskid shoes/slippers when out of bed   safety round/check completed     Problem: Pain Acute  Goal: Acceptable Pain Control and Functional Ability  Outcome: Improving  Intervention: Develop Pain Management Plan  Recent Flowsheet Documentation  Taken 10/25/2021 1007 by Chavez Osborne RN  Pain Management Interventions: medication (see MAR)  Taken 10/25/2021 0830 by Chavez Osborne RN  Pain Management Interventions: declines  Intervention: Prevent or Manage Pain  Recent Flowsheet Documentation  Taken 10/25/2021 0915 by Chavez Osborne RN  Medication Review/Management: medications reviewed     Problem: Activity Intolerance (Pulmonary Impairment)  Goal: Improved Activity Tolerance  Outcome: Improving     Problem: Airway Clearance Ineffective (Pulmonary Impairment)  Goal: Effective Airway Clearance  Outcome: Improving

## 2021-10-25 NOTE — PLAN OF CARE
Occupational Therapy Discharge Summary    Reason for therapy discharge:    All goals and outcomes met, no further needs identified.    Progress towards therapy goal(s). See goals on Care Plan in [facility] electronic health record for goal details.  Goals met    Therapy recommendation(s):    No further therapy is recommended.        Rupinder Soler, OTS

## 2021-10-25 NOTE — PROGRESS NOTES
Renal progress note  CC:ESRD  Assessment and Plan:    61 year old male with history of ESRD on dialysis at [facility] under care of Dr. Mao, history of abdominal aortic aneurysm thoracic aortic dissection cirrhosis with chronic hepatitis B on entecavir admitted last week with septic bursitis of left elbow underwent I&D with Ortho also s/p thoracocentesis with 1.5 L pleural effusion questionable empyema versus fluid overload drained cultures growing staph aureus s/p chest tube placement this admission 10/19/2020.  Nephrology consulted for ESRD management     ESRD-On hemodialysis at [facility].  Treatment time 3 hours Monday and Friday patient refuses to dialyze on Wednesday's Access left upper extremity aV has had chronic swelling  --We will continue dialysis as outpatient on Monday and Friday discussed with patient again regarding Wednesday dialysis will follow labs on Wednesday to see if he needs dialysis again consider as needed  --Remains with very poor understanding of his ESRD and debating on decreasing time on dialysis constantly discussed issues with need for antibiotics at least 3 times a week, while he goes only x2/wk. Also advised to reconsider Sx option, or coming 3 times a week which will be appropriate based on his current kidney status.  He replied he thinks that infection is okay but he is getting fevers because he dialyzes too much; the patient was reoriented and advised that he does need 3 times a week antibiotics and he has an infection under his lungs that is causing the fevers.  Although unlikely, but will confirm with dialysis unit if he can come in on Wednesdays for antibiotics. Will be able to give him 2 g on Monday and 3 g on Friday of Ancef that has been confirmed.    --Daily standing weights  --Renal labs    Hyperkalemia-Mild. Serum potassium is 5.4 this am. Run with K 2 bath. Renal diet.     Hypertension  BP low this admission ?? Infection related  Not  clinically septic  BP is in normotensive range  Hold PTA BP meds     Mild hypervolemia  Mostly upper extremity edema in the left upper extremity  EDW ~60kg  -- usual UF 1-2 KG , will UF as tolerated        Anemia  Inflamm/ACD  On chronic EPO  Will keep on 10K M/F and follow on response     Empyema with staph aureus secondary to possible bacteremia from septic bursitis last admission 10/14- 10/17/2021  S/p chest tube placement ID following   on IV Ancef.  ID recommending 2 g Monday 2 g Wednesday and 3 g Friday with dialysis.  We will try to arrange although patient does not dialyze on Wednesday which will be an issue.  Blood cultures negative to date  --Plans per primary medicine     History of chronic hepatitis B with cirrhosis  With thrombocytopenia  Continue entecavir     GERD- on pantoprazole     History of AAA with thoracic aortic dissection  Does not take regular blood pressure medication  Blood pressure has been on the lower side today has been on carvedilol and clonidine takes as needed    Gout flareup  Unable to give prednisone with ongoing infection  Received  colchicine 0.3 mg 10/24. Can get another dose of 0.3 mg on Thursday no further doses after that, monitor for any toxicity symptoms  Patient's poor clearance and poor dialysis compliance has a lot to do with this prior discussions have not been successful     We will follow    Elizabeth Salmeron MD  [facility]  [address] [place] [address], [address]  [place] [address]  Phone# [phone]  Fax# [phone]        Subjective  No acute issues.  Patient was seen and examined in the beginning of dialysis treatment.  Patient states that he is feeling better, no new complaints.  Patient denies: fever, chills,dizziness,  cough, shortness of breath , chest pain, palpitations, orthopnea, nausea, vomiting, abdominal pain, changes in bowel habits, dysuria, urinary frequency, urgency, hematuria, rash      Objective    Vital signs in last 24  hours  Temp:  [97.4  F (36.3  C)-98.4  F (36.9  C)] 97.6  F (36.4  C)  Pulse:  [] 93  Resp:  [18] 18  BP: (112-124)/(72-80) 112/72  SpO2:  [94 %-97 %] 97 %  Weight:   [unfilled]    Intake/Output last 3 shifts  I/O last 3 completed shifts:  In: 360 [P.O.:360]  Out: 425 [Urine:200; Chest Tube:225]  Intake/Output this shift:  I/O this shift:  In: 3 [I.V.:3]  Out: -     Physical Exam  Gen: Alert/oriented x 3, awake, NAD  CV: RRR without murmur or rub  Lung: clear and equal; no extra sounds, CT in place  Ab: soft and NT; not distended; normal bs  Ext: no edema and well perfused  Skin; no rash  Access: LUE AVG is cannulated    Pertinent Labs   Lab Results   Component Value Date    WBC 8.2 10/25/2021    HGB 8.2 (L) 10/25/2021    HCT 27.4 (L) 10/25/2021    MCV 97 10/25/2021    PLT 99 (L) 10/25/2021     Lab Results   Component Value Date    BUN 80 (H) 10/25/2021     10/25/2021    CO2 22 10/25/2021       Lab Results   Component Value Date    ALBUMIN 2.3 (L) 10/19/2021     Lab Results   Component Value Date    PHOS 2.6 04/27/2021     I reviewed all lab results  Elizabeth Salmeron MD  [facility]  [address], [address]  [place] [address]  Phone# [phone]  Fax# [phone]

## 2021-10-25 NOTE — PLAN OF CARE
"Care Plan Progress Note      Name: Elle Blanton  : 1960  MRN: [id number]    VS: /78   Pulse 99   Temp 98  F (36.7  C)   Resp 18   Ht 1.651 m (5' 5\")   Wt 58.1 kg (128 lb 1.6 oz)   SpO2 97%   BMI 21.32 kg/m        Problem: Pain Acute  Goal: Acceptable Pain Control and Functional Ability  Outcome: Improving  Intervention: Develop Pain Management Plan  Recent Flowsheet Documentation  Taken 10/25/2021 1816 by Nina Rod, RN  Pain Management Interventions: medication (see MAR)     Problem: Airway Clearance Ineffective (Pulmonary Impairment)  Goal: Effective Airway Clearance  Outcome: Improving   VSS, dialysis done, chest tube site intact no air leaks.  Splint in place. AV fistula with bruit and thrill noted.  Tylenol and oxy for pain. 40ml out of serosanguinous output chest tube.    Nina Rod RN  10/25/2021  10:23 PM      "

## 2021-10-25 NOTE — PROGRESS NOTES
Paged picc nurse to start peripheral IV.  Swat nurse attempted two times and was unable to obtain access.   Nina Rod RN

## 2021-10-25 NOTE — PROGRESS NOTES
[facility] ID Inpatient follow up       Patient:  Elle Blanton  Date of birth 1960, Medical record number [id number]  Date of Visit:  10/25/2021  Attending Physician: Nash Solomon MD         Assessment and Recommendations:   Assessment:  Elle Blanton is a 61 year old male with   1. ESRD on HD via left AV fistula.  2. Chronic hepatitis B with cirrhosis.  On entecavir q. 7 days.  3. Recent admission for left-sided septic olecranon bursitis.  Status post I&D.  Cultures with MSSA.  Treated with IV antibiotic inpatient and discharged on p.o. Keflex.  Elbow examined on 10/20/2021.  No active infection.  4. Large right-sided empyema.  Status post thoracentesis on 10/16/2021.  Cultures with MSSA.  On IV Ancef. S/P Chest tube placement on 10/19/21-cultures negative Chest x-ray reviewed.  Significant improvement of the effusion.  CVTS following, now signed off. Repeat CT/chest with persistent loculated fluid. CVTS and Dr Yan discussed benefit of surgery. Patient does not want surgery at this time but open to the idea in the future per discussions with Dr Yan. Active issue.   5. Chronic bicytopenia with thrombocytopenia and anemia.  Most likely secondary to cirrhosis.  6.  Right elbow tophaceous gout.  Uric acid at 12.  Does not appear to be on treatment for gout.        Recommendations:   Continue IV Ancef, renally dosed.   IV antibiotics-preferrably IV Ancef (2-2-3) outpatient if nephrology can arrange this outpatient. He does not routinely come to dialysis 3x week which would be an issue with outpatient IV antibiotics  Repeat CT planned today; if no improvement in fluid collection then likely needs surgery for source control  Chest tube care per IR   Monitor oxygen need.   Hyperuricemic gout treatment per primary team.    Discussed with the patient, nursing staff, hospitalist  Patient new to me, 10/25. Please see prior notes by Dr Yan.   ID will follow, thanks      Marilee Washington  MD.  [facility].   [facility]  Office Telephone [phone].  Fax [phone]  [facility] paging            Interval History:     HPI:  He has some questions about surgery. Tolerating the antibiotics.       Pertinent cultures include:  Culture Micro   Date Value Ref Range Status   2019 No growth  Final       Recent Inflammatory Biomarkers:   Recent Labs   Lab Test 10/25/21  0645 10/24/21  0657 10/23/21  0612 10/22/21  0624 10/21/21  0628 10/20/21  0943 10/15/21  0507 10/14/21  2112 21  0833 21  1312   CRP  --   --   --   --   --   --   --  21.5*  --  0.8*   PCAL  --   --   --   --   --   --   --   --   --  0.26   WBC 8.2 8.6 9.2 11.2* 10.2 9.5   < > 6.6   < > 5.4    < > = values in this interval not displayed.            Review of Systems:   CONSTITUTIONAL:    Temp Max: Temp (24hrs), Av.1  F (36.7  C), Min:98  F (36.7  C), Max:98.4  F (36.9  C)   .  Negative except for findings in the HPI.           Current Medications (antimicrobials listed in bold):       sodium chloride 0.9%  250 mL Intravenous Once in dialysis/CRRT     sodium chloride 0.9%  300 mL Hemodialysis Machine Once     ceFAZolin  1 g Intravenous Q24H     colchicine  0.3 mg Oral Q3 Days     entecavir  0.5 mg Oral Q7 Days     epoetin kelton-epbx (RETACRIT) inj ESRD  40,000 Units Subcutaneous Q7 Days     heparin ANTICOAGULANT  5,000 Units Subcutaneous Q12H     - MEDICATION INSTRUCTIONS -   Does not apply Once     pantoprazole  40 mg Oral Daily     sodium chloride (PF)  3 mL Intracatheter Q8H              Allergies:     Allergies   Allergen Reactions     Nka [No Known Allergies]             Physical Exam:   Vitals were reviewed  Patient Vitals for the past 24 hrs:   BP Temp Temp src Pulse Resp SpO2 Weight   10/25/21 1036 -- -- -- -- -- -- 58.1 kg (128 lb 1.6 oz)   10/25/21 0830 112/72 97.6  F (36.4  C) Oral 93 18 97 % --   10/25/21 0027 124/80 97.4  F (36.3  C) Oral 93 18 96 % --   10/24/21  1549 121/80 98.4  F (36.9  C) Oral 102 18 95 % --   10/24/21 1430 -- -- -- 100 18 94 % --       Physical Examination:  Gen: Pleasant in no acute distress.  HEENT: NCAT. EOMI. PERRL.  Neck: No bruit, JVD or thyromegaly.  Chest: Right chest tube in place.  Lungs: Decreased breath sounds on the right side.  Card: RRR. NSR. No RMG. Peripheral pulses present and symmetric. No edema.  Abd: Soft NT ND. No mass. Normal bowel sounds.  Skin: No rash.  Extr: Left elbow examined.  Incision intact.  Right elbow with tophaceous gout  Neuro: Alert and oriented to place time and person. Cranial nerves II to XII intact. Motor and sensory intact.            Laboratory Data:   ID Labs:  Microbiology labs:    Body fluid, unsp Aerobic Bacterial Culture Routine  Order: [id number]  Collected:  10/16/2021  4:35 PM Status:  Preliminary result   Visible to patient:  No (not released)  Specimen Information: Pleural Cavity, Right; Body fluid, unsp         0 Result Notes  Culture Culture in progress       1+ Staphylococcus aureusAbnormal             Resulting Agency: [facility]       Susceptibility     Staphylococcus aureus     REGLA     Clindamycin <=0.25 ug/mL Susceptible     Erythromycin <=0.25 ug/mL Susceptible     Gentamicin <=0.5 ug/mL Susceptible     Oxacillin <=0.25 ug/mL Susceptible     Tetracycline <=1.0 ug/mL Susceptible     Trimethoprim/Sulfamethoxazole <=0.5/9.5 u... Susceptible     Vancomycin 1.0 ug/mL Susceptible                 Specimen Collected: 10/16/21  4:35 PM Last Resulted: 10/19/21 11:34 PM               Recent Labs   Lab Test 10/14/21  2112 09/26/21  1312   CRP 21.5* 0.8*     Recent Labs   Lab Test 10/25/21  0645 10/24/21  0657 10/23/21  0612 10/22/21  0624 10/21/21  0628 10/20/21  0943   WBC 8.2 8.6 9.2 11.2* 10.2 9.5     Recent Labs   Lab Test 10/25/21  0645 10/24/21  0657 10/23/21  0612 10/22/21  0625   CR 14.14* 12.00* 8.82* 13.81*   GFRESTIMATED 3* 4* 6* 3*       Hematology Studies  Recent Labs   Lab Test 10/25/21  0695  10/24/21  0657 10/23/21  0612 10/22/21  0624 10/21/21  0628 10/21/21  0628 10/20/21  0943 10/20/21  0943 08/07/19  0138 08/06/19 2020 08/02/19 0437 08/02/19 0437 07/28/19  1001 07/28/19  0950   WBC 8.2 8.6 9.2 11.2*  --  10.2  --  9.5   < > 3.4*   < > 1.7*   < > 2.8*   ANEU  --   --   --   --   --   --   --   --   --  2.8  --  1.2*  --  2.4   AEOS  --   --   --   --   --   --   --   --   --  0.0  --  0.0  --  0.0   HCT 27.4* 27.6* 25.7* 22.4*   < > 25.2*   < > 26.2*   < > 27.0*   < > 24.8*   < > 26.6*   PLT 99* 125* 110* 108*   < > 114*   < > 117*   < > 44*   < > 53*   < > 28*    < > = values in this interval not displayed.       Metabolic  Recent Labs   Lab Test 10/25/21  0645 10/24/21  0657 10/23/21  0612    137 137   BUN 80* 64* 46*   CO2 22 23 30   CR 14.14* 12.00* 8.82*   GFRESTIMATED 3* 4* 6*       Hepatic Studies  Recent Labs   Lab Test 10/19/21  0940 09/27/21  0833 08/18/21  1416   BILITOTAL 1.0 1.2* 0.7   ALKPHOS 181* 71 129   ALBUMIN 2.3* 3.1* 3.1*   AST 29 18 31   ALT <9 21 49       Immunologlobulins  Recent Labs   Lab Test 10/14/21  2112 09/26/21  1312   SED 21* 4            Imaging Data:   Reviewed   EXAM: CT CHEST W/O CONTRAST  LOCATION: [facility]  DATE/TIME: 10/21/2021 2:36 PM     INDICATION: Pneumonia, effusion or abscess suspected, xray done  COMPARISON: CT of the chest without contrast 10/19/2021  TECHNIQUE: CT chest without IV contrast. Multiplanar reformats were obtained. Dose reduction techniques were used.  CONTRAST: None.     FINDINGS:   LUNGS AND PLEURA: Right pigtail pleural drainage catheter is well-positioned in complex, loculated air and fluid in the right pleural space. Unchanged visceral and parietal pleural thickening. Bandlike subpleural opacities lateral right upper lobe, right   middle lobe are consistent with cicatrization atelectasis. Larger, more focal and rounded opacity in the right lower lobe is consistent with rounded  atelectasis.     Minimal left pleural fluid layers dependently. No left lung opacities.      MEDIASTINUM: Chronic dissection of the descending thoracic aorta with high attenuation along the intimal flap. Maximal diameter of the proximal descending thoracic aorta is 5.8 x 5.9 cm based on double oblique post processed reconstructions. No new   periaortic inflammation. The ascending aorta is normal caliber. Cardiac chambers are normal in size and there is no pericardial effusion.     No new enlarged mediastinal or hilar lymph nodes. Esophagus is decompressed.     CORONARY ARTERY CALCIFICATION: At least moderate.     UPPER ABDOMEN: Unchanged ~4 cm hypoattenuating lesion in the superior right hepatic lobe just to the right of the IVC and smaller 18 mm more circumscribed lesion in the more lateral right hepatic dome likely a benign cyst. Calcified gallstones layer   dependently in the gallbladder. High attenuation in the gallbladder is likely vicarious excretion of contrast. Unchanged enlargement of the spleen.     MUSCULOSKELETAL: Diffuse small degenerative thoracic spine osteophytes. No aggressive or destructive bone lesions.                                                                      IMPRESSION:      1.  Complex, loculated fluid and air in the right pleural space with diffuse right pleural thickening. The pleural thickening is new from 9/26/2021 and likely relates to infection/empyema.  2.  Subpleural opacities in the right lung including a more focal, rounded opacity in the right lower lobe consistent with atelectasis/rounded atelectasis.  3.  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  4.  Minimal left pleural effusion. Cholelithiasis.  5.  Splenomegaly.

## 2021-10-26 DIAGNOSIS — J86.9 EMPYEMA LUNG (H): Primary | ICD-10-CM

## 2021-10-26 LAB
ANION GAP SERPL CALCULATED.3IONS-SCNC: 11 MMOL/L (ref 5–18)
BACTERIA ASPIRATE CULT: NORMAL
BUN SERPL-MCNC: 45 MG/DL (ref 8–22)
CALCIUM SERPL-MCNC: 8.3 MG/DL (ref 8.5–10.5)
CHLORIDE BLD-SCNC: 101 MMOL/L (ref 98–107)
CO2 SERPL-SCNC: 27 MMOL/L (ref 22–31)
CREAT SERPL-MCNC: 9.23 MG/DL (ref 0.7–1.3)
GFR SERPL CREATININE-BSD FRML MDRD: 6 ML/MIN/1.73M2
GLUCOSE BLD-MCNC: 88 MG/DL (ref 70–125)
HGB BLD-MCNC: 8.7 G/DL (ref 13.3–17.7)
PLATELET # BLD AUTO: 103 10E3/UL (ref 150–450)
POTASSIUM BLD-SCNC: 4.5 MMOL/L (ref 3.5–5)
SARS-COV-2 RNA RESP QL NAA+PROBE: NEGATIVE
SODIUM SERPL-SCNC: 139 MMOL/L (ref 136–145)

## 2021-10-26 PROCEDURE — 99233 SBSQ HOSP IP/OBS HIGH 50: CPT | Mod: GC | Performed by: INTERNAL MEDICINE

## 2021-10-26 PROCEDURE — 120N000001 HC R&B MED SURG/OB

## 2021-10-26 PROCEDURE — 250N000011 HC RX IP 250 OP 636: Performed by: STUDENT IN AN ORGANIZED HEALTH CARE EDUCATION/TRAINING PROGRAM

## 2021-10-26 PROCEDURE — 99232 SBSQ HOSP IP/OBS MODERATE 35: CPT | Mod: GC | Performed by: STUDENT IN AN ORGANIZED HEALTH CARE EDUCATION/TRAINING PROGRAM

## 2021-10-26 PROCEDURE — 250N000013 HC RX MED GY IP 250 OP 250 PS 637: Performed by: STUDENT IN AN ORGANIZED HEALTH CARE EDUCATION/TRAINING PROGRAM

## 2021-10-26 PROCEDURE — 5A1D70Z PERFORMANCE OF URINARY FILTRATION, INTERMITTENT, LESS THAN 6 HOURS PER DAY: ICD-10-PCS | Performed by: INTERNAL MEDICINE

## 2021-10-26 PROCEDURE — 87635 SARS-COV-2 COVID-19 AMP PRB: CPT | Performed by: SURGERY

## 2021-10-26 PROCEDURE — 36415 COLL VENOUS BLD VENIPUNCTURE: CPT | Performed by: STUDENT IN AN ORGANIZED HEALTH CARE EDUCATION/TRAINING PROGRAM

## 2021-10-26 PROCEDURE — 85049 AUTOMATED PLATELET COUNT: CPT | Performed by: STUDENT IN AN ORGANIZED HEALTH CARE EDUCATION/TRAINING PROGRAM

## 2021-10-26 PROCEDURE — 80048 BASIC METABOLIC PNL TOTAL CA: CPT | Performed by: STUDENT IN AN ORGANIZED HEALTH CARE EDUCATION/TRAINING PROGRAM

## 2021-10-26 PROCEDURE — 85018 HEMOGLOBIN: CPT | Performed by: STUDENT IN AN ORGANIZED HEALTH CARE EDUCATION/TRAINING PROGRAM

## 2021-10-26 PROCEDURE — 99233 SBSQ HOSP IP/OBS HIGH 50: CPT | Performed by: INTERNAL MEDICINE

## 2021-10-26 RX ORDER — NALOXONE HYDROCHLORIDE 0.4 MG/ML
0.2 INJECTION, SOLUTION INTRAMUSCULAR; INTRAVENOUS; SUBCUTANEOUS
Status: CANCELLED | OUTPATIENT
Start: 2021-11-03

## 2021-10-26 RX ORDER — DIPHENHYDRAMINE HYDROCHLORIDE 50 MG/ML
50 INJECTION INTRAMUSCULAR; INTRAVENOUS
Status: CANCELLED
Start: 2021-11-03

## 2021-10-26 RX ORDER — HEPARIN SODIUM (PORCINE) LOCK FLUSH IV SOLN 100 UNIT/ML 100 UNIT/ML
5 SOLUTION INTRAVENOUS
Status: CANCELLED | OUTPATIENT
Start: 2021-11-03

## 2021-10-26 RX ORDER — MEPERIDINE HYDROCHLORIDE 25 MG/ML
25 INJECTION INTRAMUSCULAR; INTRAVENOUS; SUBCUTANEOUS EVERY 30 MIN PRN
Status: CANCELLED | OUTPATIENT
Start: 2021-11-03

## 2021-10-26 RX ORDER — EPINEPHRINE 1 MG/ML
0.3 INJECTION, SOLUTION, CONCENTRATE INTRAVENOUS EVERY 5 MIN PRN
Status: CANCELLED | OUTPATIENT
Start: 2021-11-03

## 2021-10-26 RX ORDER — HEPARIN SODIUM,PORCINE 10 UNIT/ML
5 VIAL (ML) INTRAVENOUS
Status: CANCELLED | OUTPATIENT
Start: 2021-11-03

## 2021-10-26 RX ORDER — ALBUTEROL SULFATE 0.83 MG/ML
2.5 SOLUTION RESPIRATORY (INHALATION)
Status: CANCELLED | OUTPATIENT
Start: 2021-11-03

## 2021-10-26 RX ORDER — CALCIUM CARBONATE 500 MG/1
500 TABLET, CHEWABLE ORAL DAILY PRN
Status: DISCONTINUED | OUTPATIENT
Start: 2021-10-26 | End: 2021-10-26

## 2021-10-26 RX ORDER — ALBUTEROL SULFATE 90 UG/1
1-2 AEROSOL, METERED RESPIRATORY (INHALATION)
Status: CANCELLED
Start: 2021-11-03

## 2021-10-26 RX ORDER — CALCIUM CARBONATE 500 MG/1
500 TABLET, CHEWABLE ORAL 3 TIMES DAILY PRN
Status: DISCONTINUED | OUTPATIENT
Start: 2021-10-26 | End: 2021-10-27 | Stop reason: HOSPADM

## 2021-10-26 RX ORDER — ONDANSETRON 2 MG/ML
4 INJECTION INTRAMUSCULAR; INTRAVENOUS EVERY 6 HOURS PRN
Status: DISCONTINUED | OUTPATIENT
Start: 2021-10-26 | End: 2021-10-27 | Stop reason: HOSPADM

## 2021-10-26 RX ORDER — METHYLPREDNISOLONE SODIUM SUCCINATE 125 MG/2ML
125 INJECTION, POWDER, LYOPHILIZED, FOR SOLUTION INTRAMUSCULAR; INTRAVENOUS
Status: CANCELLED
Start: 2021-11-03

## 2021-10-26 RX ADMIN — HEPARIN SODIUM 5000 UNITS: 10000 INJECTION, SOLUTION INTRAVENOUS; SUBCUTANEOUS at 20:22

## 2021-10-26 RX ADMIN — ONDANSETRON 4 MG: 2 INJECTION INTRAMUSCULAR; INTRAVENOUS at 16:40

## 2021-10-26 RX ADMIN — ACETAMINOPHEN 650 MG: 325 TABLET ORAL at 20:27

## 2021-10-26 RX ADMIN — ONDANSETRON 4 MG: 2 INJECTION INTRAMUSCULAR; INTRAVENOUS at 08:44

## 2021-10-26 RX ADMIN — PANTOPRAZOLE SODIUM 40 MG: 20 TABLET, DELAYED RELEASE ORAL at 08:45

## 2021-10-26 RX ADMIN — CALCIUM CARBONATE (ANTACID) CHEW TAB 500 MG 500 MG: 500 CHEW TAB at 01:00

## 2021-10-26 RX ADMIN — HEPARIN SODIUM 5000 UNITS: 10000 INJECTION, SOLUTION INTRAVENOUS; SUBCUTANEOUS at 06:35

## 2021-10-26 RX ADMIN — CEFAZOLIN 1 G: 1 INJECTION, POWDER, FOR SOLUTION INTRAMUSCULAR; INTRAVENOUS at 14:17

## 2021-10-26 RX ADMIN — CALCIUM CARBONATE (ANTACID) CHEW TAB 500 MG 500 MG: 500 CHEW TAB at 08:45

## 2021-10-26 ASSESSMENT — ACTIVITIES OF DAILY LIVING (ADL)
ADLS_ACUITY_SCORE: 7

## 2021-10-26 ASSESSMENT — MIFFLIN-ST. JEOR: SCORE: 1290.26

## 2021-10-26 NOTE — PROGRESS NOTES
"  Interventional Radiology - Progress Note  Inpatient - [facility]: Interventional Radiology   [phone]  10/26/2021    S:  Feeling well. Denies SOB. Denies leaking at chest tube exit site. Yesterday's OP from chest tube decreased in comparison to previous recent days.    O:  /76 (BP Location: Right arm)   Pulse 93   Temp 98.7  F (37.1  C) (Oral)   Resp 18   Ht 1.651 m (5' 5\")   Wt 58.1 kg (128 lb 1.4 oz)   SpO2 95%   BMI 21.31 kg/m    General:  Stable.  In no acute distress.    Neuro:  A&O x 3.  Resp:  Unlabored breathing. Right chest tube to suction with serosanginous OP. Chest tube exit site with dressing CDI.    IMAGING:  CT chest 10/25/21:  FINDINGS:   LUNGS AND PLEURA: Fluid and air locules in the right pleural space have not significantly changed; peripheral thickening has also not changed. A right lateral approach pigtail catheter has not changed in position. Opacification in the right lower lobe   has not changed and could represent round atelectasis.  A small left pleural effusion has decreased in size.     MEDIASTINUM/AXILLAE: There is aortic tortuosity. Enlargement of the descending thoracic aorta has not changed. Apparent dissection has not changed on this noncontrast study.      CORONARY ARTERY CALCIFICATION: Moderate to severe.      UPPER ABDOMEN: Enlargement of the lateral left hepatic lobe with a nodular liver contour. Hypoattenuating hepatic lesions are again seen.  The spleen is enlarged.     MUSCULOSKELETAL: Unremarkable.                                                                      IMPRESSION:   1.  Complex loculated fluid and air in the right pleural space with diffuse pleural thickening has not significantly changed from the comparison study. A pigtail catheter is appropriately positioned.  2.  A small left pleural effusion has decreased.  3.  Otherwise no change.   4.  Hepatic masses should be assessed with MRI when possible.    CT chest " 10/21/21:  FINDINGS:   LUNGS AND PLEURA: Right pigtail pleural drainage catheter is well-positioned in complex, loculated air and fluid in the right pleural space. Unchanged visceral and parietal pleural thickening. Bandlike subpleural opacities lateral right upper lobe, right   middle lobe are consistent with cicatrization atelectasis. Larger, more focal and rounded opacity in the right lower lobe is consistent with rounded atelectasis.     Minimal left pleural fluid layers dependently. No left lung opacities.      MEDIASTINUM: Chronic dissection of the descending thoracic aorta with high attenuation along the intimal flap. Maximal diameter of the proximal descending thoracic aorta is 5.8 x 5.9 cm based on double oblique post processed reconstructions. No new   periaortic inflammation. The ascending aorta is normal caliber. Cardiac chambers are normal in size and there is no pericardial effusion.     No new enlarged mediastinal or hilar lymph nodes. Esophagus is decompressed.     CORONARY ARTERY CALCIFICATION: At least moderate.     UPPER ABDOMEN: Unchanged ~4 cm hypoattenuating lesion in the superior right hepatic lobe just to the right of the IVC and smaller 18 mm more circumscribed lesion in the more lateral right hepatic dome likely a benign cyst. Calcified gallstones layer   dependently in the gallbladder. High attenuation in the gallbladder is likely vicarious excretion of contrast. Unchanged enlargement of the spleen.     MUSCULOSKELETAL: Diffuse small degenerative thoracic spine osteophytes. No aggressive or destructive bone lesions.                                                                      IMPRESSION:      1.  Complex, loculated fluid and air in the right pleural space with diffuse right pleural thickening. The pleural thickening is new from 9/26/2021 and likely relates to infection/empyema.  2.  Subpleural opacities in the right lung including a more focal, rounded opacity in the right lower  lobe consistent with atelectasis/rounded atelectasis.  3.  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  4.  Minimal left pleural effusion. Cholelithiasis.  5.  Splenomegaly.    LABS:  CBC RESULTS: Recent Labs   Lab Test 10/26/21  0626 10/25/21  0645 10/25/21  0645   WBC  --   --  8.2   RBC  --   --  2.82*   HGB 8.7*   < > 8.2*   HCT  --   --  27.4*   MCV  --   --  97   MCH  --   --  29.1   MCHC  --   --  29.9*   RDW  --   --  15.9*   *   < > 99*    < > = values in this interval not displayed.       Drain Outputs (in mL):        A:  61 year old male with multiple comorbidities including AAA (5.5cm), chronic thoracic aortic dissection, cirrhosis 2/2 chronic Hep B, ESRD on HD (M/F via L AVF), who was recently admitted for septic bursitis of the L elbow (10/14-10/17). Underwent I&D and bursectomy with Ortho on 10/15. Also s/p R thoracentesis with 1.5L drained for pleural effusion suspected 2/2 fluid overload. Pleural fluid cultures + S. Aureus. Admitted for empyema likely 2/2 bacteremia from septic bursitis now s/p IR 14F chest tube placement 10/19/21.      P:    - Follow up CT scan from yesterday without improvement in right pleural effusion.   - Continues to have copious OP via chest tube, concerned that patient will quickly develop worsening symptoms if chest tube removed with such large volume OP.   - Would recommend continuing chest tube to suction at present time.   - Recommend further discussion with patient in regards to long term prognosis and goals of care. May need to re-visit option for surgery.  - Discussed the above with Dr. Stanton.    Total time spent on the date of the encounter is 15 minutes, including time spent counseling the patient, performing a medically appropriate evaluation, reviewing prior medical history, ordering medications and tests, documenting clinical information in the medical record, and communication of  results.    Barb Skinner PA-C  Interventional Radiology      E/M codes for reference only:  37659      ADDENDUM  10/26/2021 at 1400    Per chart review - Dr. Reynaga re consulted and after discussion with patient, removed patient's chest tube.    IR will sign off. If symptoms redevelop and chest tube indicated again, would need to re-discuss with patient goals of care as would anticipate resolution of recurrent pleural effusion would not resolve with chest tube placement alone.    Barb Skinner PA-C  Interventional Radiology

## 2021-10-26 NOTE — PROGRESS NOTES
[facility] ID Inpatient follow up       Patient:  Elle Blanton  Date of birth 1960, Medical record number [id number]  Date of Visit:  10/26/2021  Attending Physician: Nash Solomon MD         Assessment and Recommendations:   Assessment:  Elle Blanton is a 61 year old male with   1. ESRD on HD via left AV fistula.  2. Chronic hepatitis B with cirrhosis.  On entecavir q. 7 days.  3. Recent admission for left-sided septic olecranon bursitis.  Status post I&D.  Cultures with MSSA.  Treated with IV antibiotic inpatient and discharged on p.o. Keflex.  Elbow examined on 10/20/2021.  No active infection.  4. Large right-sided empyema.  Status post thoracentesis on 10/16/2021.  Cultures with MSSA.  On IV Ancef. S/P Chest tube placement on 10/19/21-cultures negative Chest x-ray reviewed.  Significant improvement of the effusion.  CVTS following, now signed off. Repeat CT/chest with persistent loculated fluid. CVTS and Dr Yan discussed benefit of surgery. Patient does not want surgery at this time but open to the idea in the future per discussions with Dr Yan. Repeat CT 10/25 with no significant change in complex loculated fluid collection. Active issue   5. Chronic bicytopenia with thrombocytopenia and anemia.  Most likely secondary to cirrhosis.  6.  Right elbow tophaceous gout.  Uric acid at 12.  Does not appear to be on treatment for gout.        Recommendations:   Continue IV Ancef, renally dosed.   IV antibiotics-preferrably IV Ancef (2-2-3) outpatient if nephrology can arrange this outpatient. He does not routinely come to dialysis 3x week which would be an issue with outpatient IV antibiotics  Repeat CT without improvement; concern that antibiotics and chest tube alone will not be able to eradicate this infection and encourage to reconsider surgery  Chest tube care per IR   Monitor oxygen need.   Hyperuricemic gout treatment per primary team.    Discussed with the patient, nursing  staff, hospitalist  Patient new to me, 10/25. Please see prior notes by Dr Yan.   ID will follow, thanks      Marilee Washington MD.  [facility].   Formerly [facility]  Office Telephone [phone].  Fax [phone]  [facility] paging            Interval History:     HPI:  Persistent nausea since last night. Reviewed imaging. No rashes.       Pertinent cultures include:  Culture Micro   Date Value Ref Range Status   2019 No growth  Final       Recent Inflammatory Biomarkers:   Recent Labs   Lab Test 10/25/21  0645 10/24/21  0657 10/23/21  0612 10/22/21  0624 10/21/21  0628 10/20/21  0943 10/15/21  0507 10/14/21  2112 21  0833 21  1312   CRP  --   --   --   --   --   --   --  21.5*  --  0.8*   PCAL  --   --   --   --   --   --   --   --   --  0.26   WBC 8.2 8.6 9.2 11.2* 10.2 9.5   < > 6.6   < > 5.4    < > = values in this interval not displayed.            Review of Systems:   CONSTITUTIONAL:    Temp Max: Temp (24hrs), Av.1  F (36.7  C), Min:98  F (36.7  C), Max:98.4  F (36.9  C)   .  Negative except for findings in the HPI.           Current Medications (antimicrobials listed in bold):       sodium chloride 0.9%  250 mL Intravenous Once in dialysis/CRRT     sodium chloride 0.9%  300 mL Hemodialysis Machine Once     ceFAZolin  1 g Intravenous Q24H     colchicine  0.3 mg Oral Q3 Days     entecavir  0.5 mg Oral Q7 Days     epoetin kelton-epbx (RETACRIT) inj ESRD  40,000 Units Subcutaneous Q7 Days     heparin ANTICOAGULANT  5,000 Units Subcutaneous Q12H     - MEDICATION INSTRUCTIONS -   Does not apply Once     pantoprazole  40 mg Oral Daily     sodium chloride (PF)  3 mL Intracatheter Q8H              Allergies:     Allergies   Allergen Reactions     Nka [No Known Allergies]             Physical Exam:   Vitals were reviewed  Patient Vitals for the past 24 hrs:   BP Temp Temp src Pulse Resp SpO2 Weight   10/26/21 0857 -- -- -- -- -- -- 55.8 kg (123 lb 1.6 oz)    10/26/21 0722 107/76 98.7  F (37.1  C) Oral 93 18 95 % --   10/25/21 2321 124/88 97.9  F (36.6  C) Oral 95 18 94 % --   10/25/21 1953 112/75 98.2  F (36.8  C) Oral 97 16 94 % --   10/25/21 1743 112/78 98  F (36.7  C) -- 99 18 -- --   10/25/21 1715 108/73 -- -- 106 18 -- --   10/25/21 1700 103/69 -- -- 105 18 -- --   10/25/21 1645 97/63 -- -- 105 18 -- --   10/25/21 1630 105/73 -- -- 103 18 -- --   10/25/21 1615 99/54 -- -- 104 18 -- --   10/25/21 1600 118/73 -- -- 100 18 -- --   10/25/21 1545 118/82 -- -- 99 18 -- --   10/25/21 1530 116/81 -- -- 95 18 -- --   10/25/21 1515 119/77 -- -- 95 18 -- --   10/25/21 1500 122/80 -- -- 92 18 -- --   10/25/21 1445 131/82 -- -- 90 18 -- --   10/25/21 1430 128/85 -- -- 85 18 -- --   10/25/21 1415 129/79 -- -- 82 18 -- 58.1 kg (128 lb 1.4 oz)   10/25/21 1414 130/79 -- -- 85 18 -- --   10/25/21 1409 130/87 98  F (36.7  C) Oral 85 18 -- --   10/25/21 1036 -- -- -- -- -- -- 58.1 kg (128 lb 1.6 oz)       Physical Examination:  Gen: Pleasant in no acute distress. Laying in bed.   HEENT: NCAT. EOMI. PERRL.  Neck: No bruit, JVD or thyromegaly.  Chest: Right chest tube in place.  Lungs: Decreased breath sounds on the right side.  Card: RRR. NSR. No RMG. Peripheral pulses present and symmetric. No edema.  Abd: Soft NT ND. No mass. Normal bowel sounds.  Skin: No rash.  Extr: Left elbow examined.  Incision intact.  Right elbow with tophaceous gout  Neuro: Alert and oriented to place time and person. Cranial nerves II to XII intact. Motor and sensory intact.            Laboratory Data:   ID Labs:  Microbiology labs:    Body fluid, unsp Aerobic Bacterial Culture Routine  Order: [id number]  Collected:  10/16/2021  4:35 PM Status:  Preliminary result   Visible to patient:  No (not released)  Specimen Information: Pleural Cavity, Right; Body fluid, unsp         0 Result Notes  Culture Culture in progress       1+ Staphylococcus aureusAbnormal             Resulting Agency: [facility]        Susceptibility     Staphylococcus aureus     REGLA     Clindamycin <=0.25 ug/mL Susceptible     Erythromycin <=0.25 ug/mL Susceptible     Gentamicin <=0.5 ug/mL Susceptible     Oxacillin <=0.25 ug/mL Susceptible     Tetracycline <=1.0 ug/mL Susceptible     Trimethoprim/Sulfamethoxazole <=0.5/9.5 u... Susceptible     Vancomycin 1.0 ug/mL Susceptible                 Specimen Collected: 10/16/21  4:35 PM Last Resulted: 10/19/21 11:34 PM               Recent Labs   Lab Test 10/14/21  2112 09/26/21  1312   CRP 21.5* 0.8*     Recent Labs   Lab Test 10/25/21  0645 10/24/21  0657 10/23/21  0612 10/22/21  0624 10/21/21  0628 10/20/21  0943   WBC 8.2 8.6 9.2 11.2* 10.2 9.5     Recent Labs   Lab Test 10/26/21  0626 10/25/21  0645 10/24/21  0657 10/23/21  0612   CR 9.23* 14.14* 12.00* 8.82*   GFRESTIMATED 6* 3* 4* 6*       Hematology Studies  Recent Labs   Lab Test 10/26/21  0626 10/25/21  0645 10/24/21  0657 10/23/21  0612 10/22/21  0624 10/22/21  0624 10/21/21  0628 10/21/21  0628 10/20/21  0943 10/20/21  0943 08/07/19  0138 08/06/19  2020 08/02/19  0437 08/02/19  0437 07/28/19  1001 07/28/19  0950   WBC  --  8.2 8.6 9.2  --  11.2*  --  10.2  --  9.5   < > 3.4*   < > 1.7*   < > 2.8*   ANEU  --   --   --   --   --   --   --   --   --   --   --  2.8  --  1.2*  --  2.4   AEOS  --   --   --   --   --   --   --   --   --   --   --  0.0  --  0.0  --  0.0   HCT  --  27.4* 27.6* 25.7*  --  22.4*   < > 25.2*   < > 26.2*   < > 27.0*   < > 24.8*   < > 26.6*   * 99* 125* 110*   < > 108*   < > 114*   < > 117*   < > 44*   < > 53*   < > 28*    < > = values in this interval not displayed.       Metabolic  Recent Labs   Lab Test 10/26/21  0626 10/25/21  0645 10/24/21  0657    137 137   BUN 45* 80* 64*   CO2 27 22 23   CR 9.23* 14.14* 12.00*   GFRESTIMATED 6* 3* 4*       Hepatic Studies  Recent Labs   Lab Test 10/19/21  0940 09/27/21  0833 08/18/21  1416   BILITOTAL 1.0 1.2* 0.7   ALKPHOS 181* 71 129   ALBUMIN 2.3* 3.1* 3.1*    AST 29 18 31   ALT <9 21 49       Immunologlobulins  Recent Labs   Lab Test 10/14/21  2112 09/26/21  1312   SED 21* 4            Imaging Data:   Reviewed   EXAM: CT CHEST W/O CONTRAST  LOCATION: [facility]  DATE/TIME: 10/21/2021 2:36 PM     INDICATION: Pneumonia, effusion or abscess suspected, xray done  COMPARISON: CT of the chest without contrast 10/19/2021  TECHNIQUE: CT chest without IV contrast. Multiplanar reformats were obtained. Dose reduction techniques were used.  CONTRAST: None.     FINDINGS:   LUNGS AND PLEURA: Right pigtail pleural drainage catheter is well-positioned in complex, loculated air and fluid in the right pleural space. Unchanged visceral and parietal pleural thickening. Bandlike subpleural opacities lateral right upper lobe, right   middle lobe are consistent with cicatrization atelectasis. Larger, more focal and rounded opacity in the right lower lobe is consistent with rounded atelectasis.     Minimal left pleural fluid layers dependently. No left lung opacities.      MEDIASTINUM: Chronic dissection of the descending thoracic aorta with high attenuation along the intimal flap. Maximal diameter of the proximal descending thoracic aorta is 5.8 x 5.9 cm based on double oblique post processed reconstructions. No new   periaortic inflammation. The ascending aorta is normal caliber. Cardiac chambers are normal in size and there is no pericardial effusion.     No new enlarged mediastinal or hilar lymph nodes. Esophagus is decompressed.     CORONARY ARTERY CALCIFICATION: At least moderate.     UPPER ABDOMEN: Unchanged ~4 cm hypoattenuating lesion in the superior right hepatic lobe just to the right of the IVC and smaller 18 mm more circumscribed lesion in the more lateral right hepatic dome likely a benign cyst. Calcified gallstones layer   dependently in the gallbladder. High attenuation in the gallbladder is likely vicarious excretion of contrast. Unchanged enlargement  of the spleen.     MUSCULOSKELETAL: Diffuse small degenerative thoracic spine osteophytes. No aggressive or destructive bone lesions.                                                                      IMPRESSION:      1.  Complex, loculated fluid and air in the right pleural space with diffuse right pleural thickening. The pleural thickening is new from 9/26/2021 and likely relates to infection/empyema.  2.  Subpleural opacities in the right lung including a more focal, rounded opacity in the right lower lobe consistent with atelectasis/rounded atelectasis.  3.  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  4.  Minimal left pleural effusion. Cholelithiasis.  5.  Splenomegaly.

## 2021-10-26 NOTE — PROGRESS NOTES
Care Management Follow Up    Length of Stay (days): 7    Expected Discharge Date: 10/27/2021     Concerns to be Addressed:    IV abx, discharge planning   Patient plan of care discussed at interdisciplinary rounds: Yes    Anticipated Discharge Disposition:  Home with family, Outpatient Hemodialysis Monday & Fridays, Outpatient Infusion on Wednesdays     Anticipated Discharge Services:  IV antibiotics  Anticipated Discharge DME:      Patient/family educated on Medicare website which has current facility and service quality ratings:    Education Provided on the Discharge Plan:  Yes, to patient  Patient/Family in Agreement with the Plan:  yes    Referrals Placed by CM/SW:  Outpatient Infusion  Private pay costs discussed: Not applicable    Additional Information:  Chart reviewed and updates with care team done.    RNCM informed that Nephrology recommends patient to have dialysis MWF.  Patient refuses Wednesday dialysis.  Patient needs IV antibiotics at discharge which will be given at dialysis on Mondays and Fridays.  [facility]  Patient has the following appointments scheduled:     Nov 03, 2021 12:30 PM   (Arrive by 12:15 PM)   Infusion Visit with [facility] CHAIR 11, [facility] INFUSION NURSE   [facility] ([facility]) [address] [place] [id number] [phone]      Nov 10, 2021  1:00 PM   (Arrive by 12:45 PM)   Infusion Visit with [facility] INFUSION CHAIR 3, E[facility] INFUSION NURSE   [facility] ([facility]) [address] [place] [id number] [phone]     Patient verbalizes understanding and is agreeable with plan.    Radha Mercedes RN

## 2021-10-26 NOTE — PROGRESS NOTES
"CLINICAL NUTRITION SERVICES - ASSESSMENT NOTE     Nutrition Prescription    RECOMMENDATIONS FOR MDs/PROVIDERS TO ORDER:  None    Malnutrition Status:    Moderate    Recommendations already ordered by Registered Dietitian (RD):  Reddy ensure clear with lunch meals daily    Future/Additional Recommendations:  Follow po intake/ need for diet education     REASON FOR ASSESSMENT  Elle Blanton is a/an 61 year old male assessed by the dietitian for LOS    NUTRITION HISTORY  Pt states he follows a special diet at home but not sure what it's called. When asked if it was a low potassium diet pt stated yes. He states he was eating fine PTA but not eating as well here because the food is not good here. He is open to trying an ensure clear (Reddy) daily with lunch meal    CURRENT NUTRITION ORDERS  Diet: Dialysis  Intake/Tolerance: Ate 75% dinner meal on 10/23.  Not much data on % meal intakes. (ate 50% dinner on 10/21)    LABS  Labs reviewed: Urea nitrogen 45, Cr 9.23, Glu 88    MEDICATIONS  Medications reviewed: ancef, retacrit, pantoprazole    ANTHROPOMETRICS  Height: 165.1 cm (5' 5\")  Most Recent Weight: 55.8 kg (123 lb 1.6 oz)    IBW: 61.8 kg (136 lb)  BMI: Normal BMI  Weight History:   Wt Readings from Last 10 Encounters:   10/26/21 55.8 kg (123 lb 1.6 oz)   10/15/21 61 kg (134 lb 7.7 oz)   10/13/21 61 kg (134 lb 8 oz)   10/11/21 63.5 kg (140 lb)   10/06/21 64.9 kg (143 lb)   09/29/21 66 kg (145 lb 8.1 oz)   08/25/21 60.8 kg (134 lb)   08/18/21 62.8 kg (138 lb 8 oz)   04/29/21 58.5 kg (129 lb)   04/26/21 57.3 kg (126 lb 5.2 oz)   Weight down 11 lb in the past week (a little over a week) 8.2% weight loss which is clinically significant and severe    Dosing Weight: 55.8 kg    ASSESSED NUTRITION NEEDS  Estimated Energy Needs: 6181-8345 kcals/day (30 - 35 kcals/kg )  Justification: Repletion  Estimated Protein Needs: 67-84 grams protein/day (1.2 - 1.5 grams of pro/kg)  Justification: Dialysis  Estimated Fluid Needs: 1674 mL/day 30 " ml/Kg  Justification: Maintenance and Per provider pending fluid status    PHYSICAL FINDINGS  See malnutrition section below.    MALNUTRITION  % Intake: < 75% for > 7 days (moderate)  % Weight Loss: > 2% in 1 week (severe)  Subcutaneous Fat Loss: None observed  Muscle Loss: Upper arm (bicep, tricep):  severe  Fluid Accumulation/Edema: Does not meet criteria  Malnutrition Diagnosis: Moderate malnutrition in the context of acute illness    NUTRITION DIAGNOSIS  Malnutrition related to acute illness as evidenced by inadequate oral intake, 8.2% weight loss in the past week and severe muscle loss in arms      INTERVENTIONS  Implementation  Nutrition Education: Will be provided if education needs arise ( pt fell asleep when discussing dialysis diet)  Medical food supplement therapy -will add ensure clear daily with lunch meal and follow for intake/acceptence and need for additional supplementation     Goals  Patient to consume % of nutritionally adequate meals three times per day, or the equivalent with supplements/snacks.  Prevent further weight loss     Monitoring/Evaluation  Progress toward goals will be monitored and evaluated per protocol.

## 2021-10-26 NOTE — PLAN OF CARE
Problem: Pain Acute  Goal: Acceptable Pain Control and Functional Ability  Outcome: Improving     Problem: Activity Intolerance (Pulmonary Impairment)  Goal: Improved Activity Tolerance  Outcome: Improving     Problem: Airway Clearance Ineffective (Pulmonary Impairment)  Goal: Effective Airway Clearance  Outcome: Improving   No complaints of pain.  Paged house officer for tums order per pt request for complaints of upset stomach/nausea. Seemed effective. No further complaints.   Vital signs stable.   R chest tube to continous suction with serosanguinous output.

## 2021-10-26 NOTE — PROGRESS NOTES
Renal progress note  CC:ESRD  Assessment and Plan:    61 year old male with history of ESRD on dialysis at [facility] under care of Dr. Mao, history of abdominal aortic aneurysm thoracic aortic dissection cirrhosis with chronic hepatitis B on entecavir admitted last week with septic bursitis of left elbow underwent I&D with Ortho also s/p thoracocentesis with 1.5 L pleural effusion questionable empyema versus fluid overload drained cultures growing staph aureus s/p chest tube placement this admission 10/19/2020.  Nephrology consulted for ESRD management     ESRD-On hemodialysis at [facility].  Treatment time 3 hours Monday and Friday patient refuses to dialyze on Wednesday's Access left upper extremity aV has had chronic swelling.  Hemodialysis on October 25. No indication for hemodialysis today.  --We will continue dialysis as outpatient on Monday and Friday discussed with patient again regarding Wednesday dialysis will follow labs on Wednesday to see if he needs dialysis again consider as needed  --Remains with very poor understanding of his ESRD and debating on decreasing time on dialysis constantly discussed issues with need for antibiotics at least 3 times a week, while he goes only x2/wk. Also advised to reconsider Sx option, or coming 3 times a week which will be appropriate based on his current kidney status.  He replied he thinks that infection is okay but he is getting fevers because he dialyzes too much; the patient was reoriented and advised that he does need 3 times a week antibiotics and he has an infection under his lungs that is causing the fevers.  Although unlikely, but will confirm with dialysis unit if he can come in on Wednesdays for antibiotics. Will be able to give him 2 g on Monday and 3 g on Friday of Ancef that has been confirmed.    --Daily standing weights  --Renal labs    Hyperkalemia-Mild. Resolved. Serum potassium is 4.5 this am. Run with K 2 bath. Renal  diet.     Hypertension  BP low this admission ?? Infection related  Not clinically septic  BP is in normotensive range  Hold PTA BP meds     Mild hypervolemia  Mostly upper extremity edema in the left upper extremity  EDW ~60kg  -- usual UF 1-2 KG , will UF as tolerated        Anemia  Inflamm/ACD  On chronic EPO  Will keep on 10K M/F and follow on response     Empyema with staph aureus secondary to possible bacteremia from septic bursitis last admission 10/14- 10/17/2021  Surgery input appreciated. Chest tube removed today. Patient might need additional intervention if his symptoms gets worse.  On IV Ancef.  ID recommending 2 g Monday 2 g Wednesday and 3 g Friday with dialysis.  We will try to arrange although patient does not dialyze on Wednesday which will be an issue.  Blood cultures negative to date  --Plans per primary medicine     History of chronic hepatitis B with cirrhosis  With thrombocytopenia  Continue entecavir     GERD- on pantoprazole     History of AAA with thoracic aortic dissection  Does not take regular blood pressure medication  Blood pressure has been on the lower side today has been on carvedilol and clonidine takes as needed    Gout flareup  Unable to give prednisone with ongoing infection  Received  colchicine 0.3 mg 10/24. Can get another dose of 0.3 mg on Thursday no further doses after that, monitor for any toxicity symptoms  Patient's poor clearance and poor dialysis compliance has a lot to do with this prior discussions have not been successful     We will follow    Elizabeth Salmeron MD  [facility]  [address] [place] [address], [address]  [place] [address]  Phone# [phone]  Fax# [phone]        Subjective  No acute issues.  Patient complaining of pain in the left elbow. Pain is worse with movement.  Patient denies: fever, chills,dizziness,  cough, shortness of breath , chest pain, palpitations, orthopnea, nausea, vomiting, abdominal pain, changes in bowel  habits, dysuria, urinary frequency, urgency, hematuria, rash      Objective    Vital signs in last 24 hours  Temp:  [97.9  F (36.6  C)-98.7  F (37.1  C)] 98.3  F (36.8  C)  Pulse:  [] 96  Resp:  [16-18] 17  BP: (106-124)/(66-88) 116/76  SpO2:  [94 %-96 %] 95 %  Weight:   [unfilled]    Intake/Output last 3 shifts  I/O last 3 completed shifts:  In: -1600   Out: 2110 [Other:2000; Chest Tube:110]  Intake/Output this shift:  No intake/output data recorded.    Physical Exam  Gen: Alert/oriented x 3, awake, NAD  CV: RRR without murmur or rub  Lung: clear and equal; no extra sounds, CT in place  Ab: soft and NT; not distended; normal bs  Ext: no edema and well perfused  Skin; no rash  Access: LUE AVG is cooperative.    Pertinent Labs   Lab Results   Component Value Date    WBC 8.2 10/25/2021    HGB 8.7 (L) 10/26/2021    HCT 27.4 (L) 10/25/2021    MCV 97 10/25/2021     (L) 10/26/2021     Lab Results   Component Value Date    BUN 45 (H) 10/26/2021     10/26/2021    CO2 27 10/26/2021       Lab Results   Component Value Date    ALBUMIN 2.3 (L) 10/19/2021     Lab Results   Component Value Date    PHOS 2.6 04/27/2021     I reviewed all lab results  Elizabeth Salmeron MD  [facility]  [address], [address]  [place] [address]  Phone# [phone]  Fax# [phone]

## 2021-10-26 NOTE — PLAN OF CARE
Problem: Adult Inpatient Plan of Care  Goal: Plan of Care Review  Outcome: Improving     Patient oriented 4x, able to make needs known. Dialysis scheduled Mondays and Fridays. Lung sounds clear today. Nausea and heartburn managed with PRN Zophran and Tums. Pt educated to ambulate and continue with IS.     Chest tube removed today by Dr. Farris.

## 2021-10-26 NOTE — PROGRESS NOTES
Progress Note    Assessment/Plan  Reviewed again the risk benefits and complications of surgical intervention pathophysiology of the current pleural effusion.  As has been the case with all previous discussions the patient does not want any major interventions at this time and as a result I did remove his chest tube.  Recommend discharge on antibiotics per infectious disease repeat CAT scan in approximately 3 weeks.  If the patient has progressive symptoms with shortness of breath or evidence of progression of infection he then may reconsider surgical intervention and this is reviewed at length again today.  Will review with other care givers to clarify.  Communication had been made for IR to remove their chest tube but apparently they had elected not to do that in the last 48 hours.  Will be available if patient has progression of symptoms and needs additional intervention from a surgical standpoint.  Principal of thoracoscopy and/or thoracotomy have been reviewed at length with patient on multiple occasions.  He does understand very well but he is just fearful of having any major surgical intervention.  I respect his decision and this is been communicated to him.    Principal Problem:    Pleural effusion  Active Problems:    ESRD (end stage renal disease) on dialysis (H)    Hepatic cirrhosis, unspecified hepatic cirrhosis type, unspecified whether ascites present (H)    Empyema lung (H)      Subjective  Reasonably comfortable except chest tube site  Objective    Vital signs in last 24 hours  Temp:  [97.9  F (36.6  C)-98.7  F (37.1  C)] 98.7  F (37.1  C)  Pulse:  [] 93  Resp:  [16-18] 18  BP: ()/(54-88) 107/76  SpO2:  [94 %-95 %] 95 %  Weight:   [unfilled]    Intake/Output last 3 shifts  I/O last 3 completed shifts:  In: -1597 [I.V.:3]  Out: 2140 [Other:2000; Chest Tube:140]  Intake/Output this shift:  No intake/output data recorded.      Physical Exam  No air leak and minimal chest tube  output    Pertinent Labs   Lab Results   Component Value Date    WBC 8.2 10/25/2021    HGB 8.7 (L) 10/26/2021    HCT 27.4 (L) 10/25/2021    MCV 97 10/25/2021     (L) 10/26/2021             Pertinent Radiology     [unfilled]        Dawson Reynaga MD

## 2021-10-26 NOTE — PROGRESS NOTES
[facility] Progress Note    Assessment/Plan  Principal Problem:    Pleural effusion  Active Problems:    ESRD (end stage renal disease) on dialysis (H)    Hepatic cirrhosis, unspecified hepatic cirrhosis type, unspecified whether ascites present (H)    Empyema lung (H)    61 year old M with multiple comorbidities including AAA (5.5cm), chronic thoracic aortic dissection, cirrhosis 2/2 chronic Hep B, ESRD on HD (M/F via L AVF), who was recently admitted for septic bursitis of the L elbow (10/14-10/17). Underwent I&D and bursectomy with Ortho on 10/15. Also s/p R thoracentesis with 1.5L drained for pleural effusion suspected 2/2 fluid overload. Pleural fluid cultures + S. Aureus. Admitted for empyema likely 2/2 bacteremia from septic bursitis now s/p chest tube removal, clinically stable. Will pursue outpatient IV abx.    #Empyema +S. Aureus, likely 2/2 bacteriemia from septic bursitis  CV Surgery consult for consideration of decort/VATS vs chest tube, patient elected to do chest tube and did not want any large surgeries, continues to desire this. IR placed chest tube 10/19. Pleural cultures showing S. Aureus pansensitive. Repeat CT showing no improvement despite high chest tube output. CV surgery pulled chest tube today 10/26. Will not undergo surgery this admission. Discussed with patient at length the need for surgery. Spoke with CV Surgery. Risks and benefits given to patient about surgery including losing his left lung and death, patient elected not to do surgery.   Patient states he does not want to die and his goal is to avoid this, discussed the fact that his decisions may not be in line with this and that we are trying to give him all the information to make an informed decision.Patient doing well, non toxic appearing. CV surgery and primary team continue to recommend surgery for his empyema. Counseled the paitent that the empyema will likely recur even after chest tube and patient  requires definitive surgery. Patient is nontoxic-appearing, no fevers, no leukocytosis.  - CT tube out today  - ID consulted: Patient will receive Ancef IV 2-2-3.  - Blood cultures 10/19: NGTD  - Aspirate culture 10/19: NGTD x2 bottles  - PRN pain medication: oxycodone 5 mg q4 hours     #Acute blood loss anemia  #Chronic anemia secondary to ESRD  Patinet on EPO. Hgb drop likely due to chest tube output. Transfused 1 unit PRBC on 10/22/21. Hgb today stable at 8.2, last Hgb check were 7.9, 8.1. No signs of bleeding other than serosanguinous drainage from chest tube.  -Hgb in am     #Left Olecranon bursitis s/p I&D DOS: 10/15/21  Patient with planned follow up with ortho this week but is readmitted for empyema. Incisions do not look infected and healing well when checked early in admission. Changes today  - Stable, continue to monitor  - Ortho consulted: stitches to stay in for 2 weeks post op, 10/29/21     #ESRD on HD  #Hyperkalemia  L AVF in place. Baseline Cr 7-8. Dialyzes M/F. K at 5.0 today. Patient refuses Wednesday HD.  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 Uric acid level 12 on 10/11/21. No changes today.  - Colchicine 0.3 mg every 3 days dose adjusted for HD for 2 doses.     #Chronic Medical Conditions  - AAA/Aortic Dissection: Continue to hold PTA carvedilol and clonidine as patient takes these PRN and continues to have soft bp's, 106/67 today. CT chest 10/19 showing fusiform aneurysm of the descending thoracic aorta with findings of chronic Panfilo type B aortic dissection. Stable from last CT 9/26/21.  - GERD: continue PTA pantoprazole  - Chronic Hep B/Cirrhosis c/b thrombocytopenia: entecavir q7 days. No active bleeding. CTM     Incidental findings:  Cholelithiasis: patient continues to be asymptomatic, will monitor  Splenomegaly: Patient with history of cirrhosis.  Follow up outpatient.     DVT ppx: SQH  Diet: Regular  Code: Full  Med rec completed: Yes     Dispo: inpatient status for IV abx, anticipate discharge to home   Barriers to discharge:  IV abx and dispo  Anticipated discharge date:  tomorrow    Subjective  Patient still not wanting surgery. Had long discussion with patient about treatment options and that the chest tube did not work in treating his empyema. Patient still denying surgery unless surgeon can answer questions about all potential outcomes.    Objective    Vital signs in last 24 hours Temp:  [97.9  F (36.6  C)-98.7  F (37.1  C)] 97.9  F (36.6  C)  Pulse:  [] 100  Resp:  [16-18] 18  BP: ()/(54-88) 106/66  SpO2:  [94 %-96 %] 96 %   123 lbs 1.6 oz    Intake/Output last 3 shift I/O last 3 completed shifts:  In: -1597 [I.V.:3]  Out: 2140 [Other:2000; Chest Tube:140]    Intake/Output this shift:No intake/output data recorded.    Physical Exam  General appearance: alert, appears stated age, cooperative and no distress, lying in bed.  Head: Normocephalic, without obvious abnormality, atraumatic.  Throat: MMM.  Lungs: diminished on the right, clear to auscultation on left, no increased respiratory effort.  Heart: regular rate and rhythm, no murmurs.  Skin: Skin color, texture, turgor normal. No rashes or lesions.  Neurologic: Alert and oriented x3, normal strength and tone.  Psychiatric: Flat affect    Pertinent Labs and Pertinent Radiology   Lab Results: personally reviewed.     Radiology Results: Personally reviewed image/s and impression/s    Precepted patient with Dr. Gustavo Solomon.    Connie Stanton MD  [facility], PGY-3  Pager # [phone]

## 2021-10-27 VITALS
BODY MASS INDEX: 20.51 KG/M2 | TEMPERATURE: 98 F | HEIGHT: 65 IN | SYSTOLIC BLOOD PRESSURE: 121 MMHG | RESPIRATION RATE: 18 BRPM | OXYGEN SATURATION: 96 % | DIASTOLIC BLOOD PRESSURE: 79 MMHG | HEART RATE: 90 BPM | WEIGHT: 123.1 LBS

## 2021-10-27 LAB
ANION GAP SERPL CALCULATED.3IONS-SCNC: 14 MMOL/L (ref 5–18)
BUN SERPL-MCNC: 50 MG/DL (ref 8–22)
CALCIUM SERPL-MCNC: 8.3 MG/DL (ref 8.5–10.5)
CHLORIDE BLD-SCNC: 100 MMOL/L (ref 98–107)
CO2 SERPL-SCNC: 25 MMOL/L (ref 22–31)
CREAT SERPL-MCNC: 11.49 MG/DL (ref 0.7–1.3)
GFR SERPL CREATININE-BSD FRML MDRD: 4 ML/MIN/1.73M2
GLUCOSE BLD-MCNC: 85 MG/DL (ref 70–125)
HGB BLD-MCNC: 8.8 G/DL (ref 13.3–17.7)
HOLD SPECIMEN: NORMAL
POTASSIUM BLD-SCNC: 4.9 MMOL/L (ref 3.5–5)
SODIUM SERPL-SCNC: 139 MMOL/L (ref 136–145)

## 2021-10-27 PROCEDURE — 250N000011 HC RX IP 250 OP 636: Performed by: STUDENT IN AN ORGANIZED HEALTH CARE EDUCATION/TRAINING PROGRAM

## 2021-10-27 PROCEDURE — 36415 COLL VENOUS BLD VENIPUNCTURE: CPT | Performed by: STUDENT IN AN ORGANIZED HEALTH CARE EDUCATION/TRAINING PROGRAM

## 2021-10-27 PROCEDURE — 250N000013 HC RX MED GY IP 250 OP 250 PS 637: Performed by: STUDENT IN AN ORGANIZED HEALTH CARE EDUCATION/TRAINING PROGRAM

## 2021-10-27 PROCEDURE — 250N000013 HC RX MED GY IP 250 OP 250 PS 637

## 2021-10-27 PROCEDURE — 80048 BASIC METABOLIC PNL TOTAL CA: CPT | Performed by: STUDENT IN AN ORGANIZED HEALTH CARE EDUCATION/TRAINING PROGRAM

## 2021-10-27 PROCEDURE — 99232 SBSQ HOSP IP/OBS MODERATE 35: CPT | Mod: GC | Performed by: INTERNAL MEDICINE

## 2021-10-27 PROCEDURE — 99238 HOSP IP/OBS DSCHRG MGMT 30/<: CPT | Mod: GC | Performed by: STUDENT IN AN ORGANIZED HEALTH CARE EDUCATION/TRAINING PROGRAM

## 2021-10-27 PROCEDURE — 99233 SBSQ HOSP IP/OBS HIGH 50: CPT | Performed by: INTERNAL MEDICINE

## 2021-10-27 PROCEDURE — 85018 HEMOGLOBIN: CPT | Performed by: STUDENT IN AN ORGANIZED HEALTH CARE EDUCATION/TRAINING PROGRAM

## 2021-10-27 RX ORDER — PROCHLORPERAZINE MALEATE 10 MG
10 TABLET ORAL 2 TIMES DAILY PRN
Status: DISCONTINUED | OUTPATIENT
Start: 2021-10-27 | End: 2021-10-27 | Stop reason: HOSPADM

## 2021-10-27 RX ORDER — CEFAZOLIN SODIUM 1 G/3ML
2 INJECTION, POWDER, FOR SOLUTION INTRAMUSCULAR; INTRAVENOUS WEEKLY
Qty: 1 EACH | Refills: 8 | Status: ON HOLD
Start: 2021-11-03 | End: 2021-01-01

## 2021-10-27 RX ADMIN — CEFAZOLIN 1 G: 1 INJECTION, POWDER, FOR SOLUTION INTRAMUSCULAR; INTRAVENOUS at 13:52

## 2021-10-27 RX ADMIN — PROCHLORPERAZINE MALEATE 10 MG: 10 TABLET ORAL at 01:25

## 2021-10-27 RX ADMIN — HEPARIN SODIUM 5000 UNITS: 10000 INJECTION, SOLUTION INTRAVENOUS; SUBCUTANEOUS at 06:27

## 2021-10-27 RX ADMIN — ENTECAVIR 0.5 MG: 0.5 TABLET ORAL at 09:23

## 2021-10-27 RX ADMIN — COLCHICINE 0.3 MG: 0.6 TABLET, FILM COATED ORAL at 13:53

## 2021-10-27 RX ADMIN — PANTOPRAZOLE SODIUM 40 MG: 20 TABLET, DELAYED RELEASE ORAL at 09:23

## 2021-10-27 RX ADMIN — SENNOSIDES 2 TABLET: 8.6 TABLET, FILM COATED ORAL at 01:28

## 2021-10-27 ASSESSMENT — ACTIVITIES OF DAILY LIVING (ADL)
ADLS_ACUITY_SCORE: 7

## 2021-10-27 NOTE — DISCHARGE INSTRUCTIONS
Nephrology recommends that you have dialysis attends dialysis on a [facility] schedule. We are aware that you are refusing the Wednesday dialysis. You will need IV antibiotics at discharge which will be given at dialysis on Mondays and Fridays.  The care manager has arranged for Wednesday IV antibiotic dose to be give at [facility] at [facility].  You have the following appointments scheduled:      Nov 03, 2021 12:30 PM   (Arrive by 12:15 PM)   Infusion Visit with [facility] 11, [facility] NURSE   [facility] ([facility]) [address] [place] [id number] [phone]       Nov 10, 2021  1:00 PM   (Arrive by 12:45 PM)   Infusion Visit with [facility]AIR 3, [facility] NURSE   [facility] ([facility]) [address] [place] [phone]

## 2021-10-27 NOTE — PROGRESS NOTES
Renal progress note  CC:ESRD  Assessment and Plan:  This is a 61 year old male with history of ESRD on dialysis at [facility] under care of Dr. Mao, history of abdominal aortic aneurysm thoracic aortic dissection cirrhosis with chronic hepatitis B on entecavir admitted last week with septic bursitis of left elbow underwent I&D with Ortho also s/p thoracocentesis with 1.5 L pleural effusion questionable empyema versus fluid overload drained cultures growing staph aureus s/p chest tube placement this admission 10/19/2020.  Nephrology consulted for ESRD management     ESRD-On hemodialysis at [facility].  Treatment time 3 hours Monday and Friday patient refuses to dialyze on Wednesday's Access left upper extremity aV has had chronic swelling.  Last Hemodialysis on October 25.   Multiple discussions held with patient  regarding Wednesday dialysis . However  The patient remains with very poor understanding of his ESRD and debating on decreasing time on dialysis constantly discussed issues with need for antibiotics at least 3 times a week, while he goes only x2/wk. Also advised to reconsider Sx option, or coming 3 times a week which will be appropriate based on his current kidney status.   No indications for urgent HD today. We will continue dialysis as outpatient on Monday and Friday .  Will be able to give him 2 g on Monday and 3 g on Friday of Ancef that has been confirmed with dialysis nurse at [facility] today.  --Daily standing weights  --Renal labs    Hyperkalemia-Mild. Resolved. Serum potassium is 4.9 this am. Run with K 2 bath. Renal diet.     Hypertension  BP low this admission ?? Infection related  Not clinically septic  BP is in normotensive range  Hold PTA BP meds     Hypervolemia-mild  Mostly upper extremity edema in the left upper extremity  EDW ~60kg  -- usual UF 1-2 KG , will UF as tolerated        Anemia  Inflamm/ACD  Stable Hgb in 8s.  On chronic EPO  Will keep on 10K  M/F and follow on response     Large right sided empyema with staph aureus secondary to possible bacteremia from septic bursitis last admission 10/14- 10/17/2021.  S/P Chest tube placement on 10/19/21, then removal 10/26.  Blood cultures negative to date  On IV Ancef.  ID recommending 2 g Monday 2 g Wednesday and 3 g Friday with dialysis.   Plan 4-6 weeks of IV antibiotics with repeat CT imaging towards the end of therapy.  Patient refuses to go to dialysis on Wednesday. Patient will receive IV Ancef on Monday and Friday during HD. Care management arranged for ancef dose at infusion center on wednesdays.  Plan on weekly cbc diff, LFTs while on the ancef    History of chronic hepatitis B with cirrhosis  With thrombocytopenia  Continue entecavir     GERD- on pantoprazole     History of AAA with thoracic aortic dissection  Does not take regular blood pressure medication  Blood pressure has been on the lower side today has been on carvedilol and clonidine takes as needed    Gout flareup  Unable to give prednisone with ongoing infection  Received  colchicine 0.3 mg 10/24. Can get another dose of 0.3 mg on Thursday no further doses after that, monitor for any toxicity symptoms  Patient's poor clearance and poor dialysis compliance has a lot to do with this prior discussions have not been successful     We will follow    Elizabeth Salmeron MD  [facility]  [address] [place] [address], [address]  [place] [address]  Phone# [phone]  Fax# [phone]        Subjective  No acute issues.  Patient will be discharge home today.  Patient states that he feels better, reports improved pain in the left elbow.   Patient denies: fever, chills,dizziness,  cough, shortness of breath , chest pain, palpitations, orthopnea, nausea, vomiting, abdominal pain, changes in bowel habits, dysuria, urinary frequency, urgency, hematuria, rash      Objective    Vital signs in last 24 hours  Temp:  [97.6  F (36.4  C)-98.3  F  (36.8  C)] 98  F (36.7  C)  Pulse:  [90-96] 90  Resp:  [16-18] 18  BP: (114-121)/(71-79) 121/79  SpO2:  [95 %-96 %] 96 %  Weight:   [unfilled]    Intake/Output last 3 shifts  I/O last 3 completed shifts:  In: 240 [P.O.:240]  Out: 50 [Urine:50]  Intake/Output this shift:  I/O this shift:  In: 300 [P.O.:300]  Out: -     Physical Exam  Gen: Alert/oriented x 3, awake, NAD  CV: RRR without murmur or rub  Lung: clear and equal; no extra sounds, CT in place  Ab: soft and NT; not distended; normal bs  Ext: no edema , LUE wrapped, well perfused  Skin; no rash  Access: LUE AVG is cooperative.    Pertinent Labs   Lab Results   Component Value Date    WBC 8.2 10/25/2021    HGB 8.8 (L) 10/27/2021    HCT 27.4 (L) 10/25/2021    MCV 97 10/25/2021     (L) 10/26/2021     Lab Results   Component Value Date    BUN 50 (H) 10/27/2021     10/27/2021    CO2 25 10/27/2021       Lab Results   Component Value Date    ALBUMIN 2.3 (L) 10/19/2021     Lab Results   Component Value Date    PHOS 2.6 04/27/2021     I reviewed all lab results  Elizabeth Salmeron MD  [facility]  [address] [place] [address], [address]  [place] [address]  Phone# [phone]  Fax# [phone]

## 2021-10-27 NOTE — PLAN OF CARE
Problem: Adult Inpatient Plan of Care  Goal: Optimal Comfort and Wellbeing  Outcome: Improving   Patient c/o headache and nausea, had tylenol and zofran prn, prn meds were helpful, vitals stable, alert and oriented, was med compliant, has been independent ln room.

## 2021-10-27 NOTE — PLAN OF CARE
Problem: Pain Acute  Goal: Acceptable Pain Control and Functional Ability  Intervention: Prevent or Manage Pain  Recent Flowsheet Documentation  Taken 10/27/2021 0049 by Destiny Velasco RN  Bowel Elimination Promotion: adequate fluid intake promoted     Problem: Gas Exchange Impaired (Pulmonary Impairment)  Goal: Optimal Gas Exchange  Outcome: Improving     Problem: Nausea and Vomiting  Goal: Fluid and Electrolyte Balance  Outcome: Improving   Pt denied pain during the night.   Vital signs stable. No shortness of breath.   Prn compazine given for nausea. Pt also requested for stool softener, feeling some abdominal fullness. Stool softener given and prune juice.   Pt to discharge home with family today.

## 2021-10-27 NOTE — PROGRESS NOTES
Care Management Discharge Note    Discharge Date: 10/27/2021       Discharge Disposition:  Home.     Discharge Services:  Outpatient infusion    Discharge DME:  Per therapy (if indicated)    Discharge Transportation:  Family    Private pay costs discussed: Not applicable    PAS Confirmation Code:  NA  Patient/family educated on Medicare website which has current facility and service quality ratings:  NA    Education Provided on the Discharge Plan:  Per team  Persons Notified of Discharge Plans: Nursing  Patient/Family in Agreement with the Plan:  Yes    Handoff Referral Completed: Yes    Additional Information:  Patient lives with spouse and is independent, attends HD Monday and Friday (refuses to dialyze on Wednesdays). OP Infusion will give IV abx on Wednesdays and patient is scheduled for 11/3 and 11/10.  Family will transport.     Viviane Noel RN

## 2021-10-27 NOTE — PROGRESS NOTES
[facility] ID Inpatient follow up       Patient:  Elle Blanton  Date of birth 1960, Medical record number [id number]  Date of Visit:  10/27/2021  Attending Physician: Nash Solomon MD         Assessment and Recommendations:   Assessment:  Elle Blanton is a 61 year old male with   1. ESRD on HD via left AV fistula.  2. Chronic hepatitis B with cirrhosis.  On entecavir q. 7 days.  3. Recent admission for left-sided septic olecranon bursitis.  Status post I&D.  Cultures with MSSA.  Treated with IV antibiotic inpatient and discharged on p.o. Keflex.  Elbow examined on 10/20/2021.  No active infection.  4. Large right-sided empyema.  Status post thoracentesis on 10/16/2021.  Cultures with MSSA.  On IV Ancef. S/P Chest tube placement on 10/19/21-cultures negative Chest x-ray reviewed.  Significant improvement of the effusion.  CVTS following, now signed off. Repeat CT/chest with persistent loculated fluid. CVTS and Dr Yan discussed benefit of surgery. Patient does not want surgery at this time but open to the idea in the future per discussions with Dr Yan. Repeat CT 10/25 with no significant change in complex loculated fluid collection. Active issue.   5. Chronic bicytopenia with thrombocytopenia and anemia.  Most likely secondary to cirrhosis.  6.  Right elbow tophaceous gout.  Uric acid at 12.  Does not appear to be on treatment for gout.        Recommendations:   Continue IV Ancef, renally dosed.   IV antibiotics-preferrably IV Ancef (2-2-3) outpatient-he only goes to dialysis Monday and Friday so will get his doses at dialysis those days. Care management has kindly arranged for ancef dose at infusion center on wednesdays, orders have been placed  Repeat CT without improvement; concern that antibiotics and chest tube alone will not be able to eradicate this infection-if fails outpatient IV antibiotics then needs surgery. High chance for worsening infection including bacteremia/death was  discussed with patient and he would like to avoid surgery.   Hyperuricemic gout treatment per primary team.  Plan on weekly cbc diff, LFTs while on the ancef  Plan 4-6 weeks of IV antibiotics with repeat CT imaging towards the end of therapy  Can f/u with me in ID clinic in 4 weeks-message sent to schedulers  - ID will sign off. Please call with additional questions or change in clinical status.     Marilee Washington MD.  [facility].   [facility]  Office Telephone [phone].  Fax [phone]  [facility] paging            Interval History:     HPI:  Discussed with surgery yesterday and doesn't want currently. Doesn't want to go to dialysis three days a week so infusion center has been arranged. Tolerating antibiotics.     Pertinent cultures include:  Culture Micro   Date Value Ref Range Status   2019 No growth  Final       Recent Inflammatory Biomarkers:   Recent Labs   Lab Test 10/25/21  0645 10/24/21  0657 10/23/21  0612 10/22/21  0624 10/21/21  0628 10/20/21  0943 10/15/21  0507 10/14/21  2112 21  0833 21  1312   CRP  --   --   --   --   --   --   --  21.5*  --  0.8*   PCAL  --   --   --   --   --   --   --   --   --  0.26   WBC 8.2 8.6 9.2 11.2* 10.2 9.5   < > 6.6   < > 5.4    < > = values in this interval not displayed.            Review of Systems:   CONSTITUTIONAL:    Temp Max: Temp (24hrs), Av.1  F (36.7  C), Min:98  F (36.7  C), Max:98.4  F (36.9  C)   .  Negative except for findings in the HPI.           Current Medications (antimicrobials listed in bold):       sodium chloride 0.9%  250 mL Intravenous Once in dialysis/CRRT     sodium chloride 0.9%  300 mL Hemodialysis Machine Once     ceFAZolin  1 g Intravenous Q24H     colchicine  0.3 mg Oral Q3 Days     entecavir  0.5 mg Oral Q7 Days     epoetin kelton-epbx (RETACRIT) inj ESRD  40,000 Units Subcutaneous Q7 Days     heparin ANTICOAGULANT  5,000 Units Subcutaneous Q12H     - MEDICATION  INSTRUCTIONS -   Does not apply Once     pantoprazole  40 mg Oral Daily     sodium chloride (PF)  3 mL Intracatheter Q8H              Allergies:     Allergies   Allergen Reactions     Nka [No Known Allergies]             Physical Exam:   Vitals were reviewed  Patient Vitals for the past 24 hrs:   BP Temp Temp src Pulse Resp SpO2   10/27/21 0753 121/79 98  F (36.7  C) Oral 90 18 96 %   10/27/21 0035 114/71 97.6  F (36.4  C) Oral 91 16 95 %   10/26/21 1553 116/76 98.3  F (36.8  C) Oral 96 17 95 %   10/26/21 1120 106/66 97.9  F (36.6  C) Temporal 100 18 96 %       Physical Examination:  Gen: Pleasant in no acute distress. Laying in bed.   HEENT: NCAT. EOMI. PERRL.  Neck: No bruit, JVD or thyromegaly.  Chest: Right chest tube in place.  Lungs: Decreased breath sounds on the right side.  Card: RRR. NSR. No RMG. Peripheral pulses present and symmetric. No edema.  Abd: Soft NT ND. No mass. Normal bowel sounds.  Skin: No rash.  Extr: Left elbow examined.  Incision intact.  Right elbow with tophaceous gout  Neuro: Alert and oriented to place time and person. Cranial nerves II to XII intact. Motor and sensory intact.            Laboratory Data:   ID Labs:  Microbiology labs:    Body fluid, unsp Aerobic Bacterial Culture Routine  Order: [id number]  Collected:  10/16/2021  4:35 PM Status:  Preliminary result   Visible to patient:  No (not released)  Specimen Information: Pleural Cavity, Right; Body fluid, unsp         0 Result Notes  Culture Culture in progress       1+ Staphylococcus aureusAbnormal             Resulting Agency: [facility]       Susceptibility     Staphylococcus aureus     REGLA     Clindamycin <=0.25 ug/mL Susceptible     Erythromycin <=0.25 ug/mL Susceptible     Gentamicin <=0.5 ug/mL Susceptible     Oxacillin <=0.25 ug/mL Susceptible     Tetracycline <=1.0 ug/mL Susceptible     Trimethoprim/Sulfamethoxazole <=0.5/9.5 u... Susceptible     Vancomycin 1.0 ug/mL Susceptible                 Specimen Collected: 10/16/21   4:35 PM Last Resulted: 10/19/21 11:34 PM               Recent Labs   Lab Test 10/14/21  2112 09/26/21  1312   CRP 21.5* 0.8*     Recent Labs   Lab Test 10/25/21  0645 10/24/21  0657 10/23/21  0612 10/22/21  0624 10/21/21  0628 10/20/21  0943   WBC 8.2 8.6 9.2 11.2* 10.2 9.5     Recent Labs   Lab Test 10/27/21  0706 10/26/21  0626 10/25/21  0645 10/24/21  0657   CR 11.49* 9.23* 14.14* 12.00*   GFRESTIMATED 4* 6* 3* 4*       Hematology Studies  Recent Labs   Lab Test 10/26/21  0626 10/25/21  0645 10/24/21  0657 10/23/21  0612 10/22/21  0624 10/22/21  0624 10/21/21  0628 10/21/21  0628 10/20/21  0943 10/20/21  0943 08/07/19  0138 08/06/19  2020 08/02/19  0437 08/02/19  0437 07/28/19  1001 07/28/19  0950   WBC  --  8.2 8.6 9.2  --  11.2*  --  10.2  --  9.5   < > 3.4*   < > 1.7*   < > 2.8*   ANEU  --   --   --   --   --   --   --   --   --   --   --  2.8  --  1.2*  --  2.4   AEOS  --   --   --   --   --   --   --   --   --   --   --  0.0  --  0.0  --  0.0   HCT  --  27.4* 27.6* 25.7*  --  22.4*   < > 25.2*   < > 26.2*   < > 27.0*   < > 24.8*   < > 26.6*   * 99* 125* 110*   < > 108*   < > 114*   < > 117*   < > 44*   < > 53*   < > 28*    < > = values in this interval not displayed.       Metabolic  Recent Labs   Lab Test 10/27/21  0706 10/26/21  0626 10/25/21  0645    139 137   BUN 50* 45* 80*   CO2 25 27 22   CR 11.49* 9.23* 14.14*   GFRESTIMATED 4* 6* 3*       Hepatic Studies  Recent Labs   Lab Test 10/19/21  0940 09/27/21  0833 08/18/21  1416   BILITOTAL 1.0 1.2* 0.7   ALKPHOS 181* 71 129   ALBUMIN 2.3* 3.1* 3.1*   AST 29 18 31   ALT <9 21 49       Immunologlobulins  Recent Labs   Lab Test 10/14/21  2112 09/26/21  1312   SED 21* 4            Imaging Data:   Reviewed   EXAM: CT CHEST W/O CONTRAST  LOCATION: [facility]  DATE/TIME: 10/21/2021 2:36 PM     INDICATION: Pneumonia, effusion or abscess suspected, xray done  COMPARISON: CT of the chest without contrast 10/19/2021  TECHNIQUE:  CT chest without IV contrast. Multiplanar reformats were obtained. Dose reduction techniques were used.  CONTRAST: None.     FINDINGS:   LUNGS AND PLEURA: Right pigtail pleural drainage catheter is well-positioned in complex, loculated air and fluid in the right pleural space. Unchanged visceral and parietal pleural thickening. Bandlike subpleural opacities lateral right upper lobe, right   middle lobe are consistent with cicatrization atelectasis. Larger, more focal and rounded opacity in the right lower lobe is consistent with rounded atelectasis.     Minimal left pleural fluid layers dependently. No left lung opacities.      MEDIASTINUM: Chronic dissection of the descending thoracic aorta with high attenuation along the intimal flap. Maximal diameter of the proximal descending thoracic aorta is 5.8 x 5.9 cm based on double oblique post processed reconstructions. No new   periaortic inflammation. The ascending aorta is normal caliber. Cardiac chambers are normal in size and there is no pericardial effusion.     No new enlarged mediastinal or hilar lymph nodes. Esophagus is decompressed.     CORONARY ARTERY CALCIFICATION: At least moderate.     UPPER ABDOMEN: Unchanged ~4 cm hypoattenuating lesion in the superior right hepatic lobe just to the right of the IVC and smaller 18 mm more circumscribed lesion in the more lateral right hepatic dome likely a benign cyst. Calcified gallstones layer   dependently in the gallbladder. High attenuation in the gallbladder is likely vicarious excretion of contrast. Unchanged enlargement of the spleen.     MUSCULOSKELETAL: Diffuse small degenerative thoracic spine osteophytes. No aggressive or destructive bone lesions.                                                                      IMPRESSION:      1.  Complex, loculated fluid and air in the right pleural space with diffuse right pleural thickening. The pleural thickening is new from 9/26/2021 and likely relates to  infection/empyema.  2.  Subpleural opacities in the right lung including a more focal, rounded opacity in the right lower lobe consistent with atelectasis/rounded atelectasis.  3.  Fusiform aneurysm of the descending thoracic aorta with findings of chronic [place] type B aortic dissection.  4.  Minimal left pleural effusion. Cholelithiasis.  5.  Splenomegaly.

## 2021-10-27 NOTE — PLAN OF CARE
Discharge orders written and reviewed with patient.   Stated he understood and signed papers.  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

## 2021-10-27 NOTE — PROVIDER NOTIFICATION
Notified Dr. Hooker. Pt still nauseous. Took zofran twice yesterday and does not feel it helped. Requesting to try something else. Compazine prn ordered.

## 2021-10-27 NOTE — DISCHARGE SUMMARY
[facility]  Hospitalist Discharge Summary      Date of Admission:  10/19/2021  Date of Discharge:  10/27/2021  Discharging Provider: Connie Stanton MD      Discharge Diagnoses   Right empyema    Follow-ups Needed After Discharge   Follow-up Appointments     Follow Up Care      Please follow-up with Dr. Myers/Ilan Abad PA-C on Friday 10/29 at   [facility] for suture removal from left elbow. Call our scheduling   line at [phone] to make an appointment if you do not already have one   scheduled.         Follow-up and recommended labs and tests       Follow up with primary care provider, Jaswant Flores, within 7 days for   hospital follow- up.  The following labs/tests are recommended: BMP, CBC.      Follow up with Nephrology for HD M/F and Ancef IV three times a week.               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 Underwent I&D and bursectomy with Ortho on 10/15. Also s/p R thoracentesis with 1.5L drained for pleural effusion suspected 2/2 fluid overload. Pleural fluid cultures + S. Aureus. Admitted for empyema likely 2/2 bacteremia from septic bursitis now s/p chest tube removal, clinically stable. Will pursue outpatient IV abx as patient declined surgical intervention.     #Empyema +S. Aureus, likely 2/2 bacteriemia from septic bursitis  CV Surgery consult for consideration of decort/VATS vs chest tube, patient elected to do chest tube and did not want any large surgeries, continues to desire this. IR placed chest tube 10/19. Pleural cultures showing S. Aureus pansensitive. Repeat CT showing no improvement despite high chest tube output. CV surgery pulled chest tube today 10/26. Will not  undergo surgery this admission. Discussed with patient at length the need for surgery. Spoke with CV Surgery. Risks and benefits given to patient about surgery including losing his left lung and death, patient elected not to do surgery.   Patient states he does not want to die and his goal is to avoid this, discussed the fact that his decisions may not be in line with this and that we are trying to give him all the information to make an informed decision.Patient doing well, non toxic appearing. CV surgery and primary team continue to recommend surgery for his empyema. Counseled the paitent that the empyema will likely recur even after chest tube and patient requires definitive surgery. Patient is nontoxic-appearing, no fevers, no leukocytosis. Counseled him on when to return to the ED including fevers, shortness of breath, chest pain, patient states understanding.  - ID consulted: Patient will receive three times a week Ancef IV 2-2-3, patient declines Wednesday HD, so will come to [facility] for Wednesday abx infusion. Received Ancef IV dose 10/27 prior to discharge.  - Blood cultures 10/19: NGTD  - Aspirate culture 10/19: NGTD x2 bottles     #Acute blood loss anemia  #Chronic anemia secondary to ESRD  Patinet on EPO. Hgb drop likely due to chest tube output. Transfused 1 unit PRBC on 10/22/21. Hgb today stable at 8.2, last Hgb check were 7.9, 8.1. No signs of bleeding other than serosanguinous drainage from chest tube.  - Stable throughout admission     #Left Olecranon bursitis s/p I&D DOS: 10/15/21  Patient with planned follow up with ortho this week but is readmitted for empyema. Incisions do not look infected and healing well when checked early in admission. No changes today.  - Ortho consulted: stitches to stay in for 2 weeks post op, 10/29/21     #ESRD on HD  #Hyperkalemia  L AVF in place. Baseline Cr 7-8. Dialyzes M/F. K at 5.0 today. Patient refuses Wednesday HD.  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 Uric acid level 12 on 10/11/21. No changes today.  - Colchicine 0.3 mg every 3 days dose adjusted for HD for 2 doses, last dose today before he leaves.     #Chronic Medical Conditions  - AAA/Aortic Dissection: Continue to hold PTA carvedilol and clonidine as patient takes these PRN and continues to have soft bp's, 106/67 today. CT chest 10/19 showing fusiform aneurysm of the descending thoracic aorta with findings of chronic [place] type B aortic dissection. Stable from last CT 9/26/21.  - GERD: continue PTA pantoprazole  - Chronic Hep B/Cirrhosis c/b thrombocytopenia: entecavir q7 days. No active bleeding. CTM     Incidental findings:  Cholelithiasis: patient continues to be asymptomatic, will monitor  Splenomegaly: Patient with history of cirrhosis. Follow up outpatient.  MRI with hepatic masses, Hepatic masses should be assessed with MRI when possible      Consultations This Hospital Stay   CARDIOTHORACIC SURGERY IP CONSULT  INFECTIOUS DISEASES IP CONSULT  SOCIAL WORK IP CONSULT  NEPHROLOGY IP CONSULT  ORTHOPEDIC SURGERY IP CONSULT  PHYSICAL THERAPY ADULT IP CONSULT  OCCUPATIONAL THERAPY ADULT IP CONSULT    Code Status   Full Code    Precepted with Dr. Nash Stanton MD  [place] [phone]  Phone: [phone]  Fax: [phone]  ______________________________________________________________________    Physical Exam   Vital Signs: Temp: 98  F (36.7  C) Temp src: Oral BP: 121/79 Pulse: 90   Resp: 18 SpO2: 96 % O2 Device: None (Room air)    Weight: 123 lbs 1.6 oz  General appearance: alert, appears stated age, cooperative and no distress, lying in bed.  Head: Normocephalic, without obvious abnormality, atraumatic.  Throat: MMM.  Lungs: diminished on the right, clear to auscultation on left, no increased  respiratory effort.  Heart: regular rate and rhythm, no murmurs.  Skin: Skin color, texture, turgor normal. No rashes or lesions.  Neurologic: Alert and oriented x3, normal strength and tone.  Psychiatric: Flat affect       Primary Care Physician   Jaswant Flores    Discharge Orders      Follow Up Care    Please follow-up with Dr. Myers/Ilan Abad PA-C on Friday 10/29 at [facility] for suture removal from left elbow. Call our scheduling line at [phone] to make an appointment if you do not already have one scheduled.     Follow-up and recommended labs and tests     Follow up with primary care provider, Jaswant Flores, within 7 days for hospital follow- up.  The following labs/tests are recommended: BMP, CBC.    Follow up with Nephrology for HD M/F and Ancef IV three times a week.     Activity    Your activity upon discharge: activity as tolerated     Reason for your hospital stay    Empyema, right     Diet    Follow this diet upon discharge: Orders Placed This Encounter      Snacks/Supplements Adult: Ensure Clear; With Meals      Combination Diet Dialysis Diet     Infusion Appointment Request       Significant Results and Procedures   Results for orders placed or performed during the hospital encounter of 10/19/21   CT Chest w/o Contrast    Narrative    EXAM: CT CHEST W/O CONTRAST  LOCATION: [facility]  DATE/TIME: 10/19/2021 11:14 AM    INDICATION: Empyema  COMPARISON: CT 09/26/2021. Several recent right thoracentesis most recent 10/16/2021 extensively septated pleural fluid at this point. CT abdomen 09/28/2021.  TECHNIQUE: CT chest without IV contrast. Multiplanar reformats were obtained. Dose reduction techniques were used.  CONTRAST: None.    FINDINGS:   LUNGS AND PLEURA: Increasing size of the right pleural effusion. On recent ultrasound 10/16/2021 this was shown to be multi septated. Tiny gas within the fusion may be due to recent thoracenteses. Moderate atelectasis of the  right lung involving the   upper lobe middle lobe and more significantly the lower lobe.    Tiny left pleural effusion.    MEDIASTINUM/AXILLAE: No lymphadenopathy. Known type B aortic dissection has not significantly changed.    CORONARY ARTERY CALCIFICATION: Moderate.    UPPER ABDOMEN: Gallstones. A couple liver lesions in segment 8 less well characterized without contrast but have not significantly changed. Cirrhosis. Mild splenomegaly unchanged.    MUSCULOSKELETAL: Unremarkable.      Impression    IMPRESSION:   1.  Further increase in size with now a large right pleural effusion and significant atelectasis of the right lung. Ultrasound 10/16/2021 showed that this was multi septated.    2.  Tiny left pleural effusion.    3.  Stable known liver lesion segment 8.    4.  Gallstones.    5.  Cirrhosis and mild splenomegaly.     IR Chest Tube Place Non Tunneled Right    Narrative    [facility]  LOCATION: [facility]  DATE: 10/19/2021    PROCEDURE: IMAGING GUIDED RIGHT PLEURAL DRAINAGE CATHETER PLACEMENT    INTERVENTIONAL RADIOLOGIST: August Torres MD.    INDICATION: 61-year-old male with a large loculated right pleural effusion.    CONSENT: The risks, benefits and alternatives of the stated procedure were discussed with the patient  in detail. All questions were answered. Informed consent was given to proceed with the procedure.    MODERATE SEDATION: Versed 1.5 mg IV; Fentanyl 75 mcg IV.  Under physician supervision, Versed and fentanyl were administered for moderate sedation. Pulse oximetry, heart rate and blood pressure were continuously monitored by an independent trained   observer. The physician spent 15 minutes of face-to-face sedation time with the patient.    CONTRAST: None.  ANTIBIOTICS: None.  ADDITIONAL MEDICATIONS: None.    FLUOROSCOPIC TIME: 0.3 minutes.  RADIATION DOSE: Air Kerma: 6 mGy.    COMPLICATIONS: No immediate complications.    STERILE BARRIER TECHNIQUE: Maximum  sterile barrier technique was used. Cutaneous antisepsis was performed at the operative site with application of 2% chlorhexidine and large sterile drape. Prior to the procedure, the  and assistant performed   hand hygiene and wore hat, mask, sterile gown, and sterile gloves during the entire procedure.    PROCEDURE:    Using real-time ultrasound guidance, a 7 Montenegrin Yueh catheter was inserted into the right pleural space from a lateral low intercostal approach. An Amplatz wire was advanced under fluoroscopic guidance and utilized to disrupt effusions. Over wire   exchange was then made for a 14 Montenegrin locking loop drainage catheter. The loop was formed within the right pleural space and attached to Pleur-evac suction.    FINDINGS:  Ultrasound demonstrates a loculated large right pleural effusion. A permanent image was stored.    The completion image shows the right pleural drainage catheter within the pleural effusion.      Impression    IMPRESSION:    1.  Successful placement of a 14 Montenegrin locking right pleural drainage catheter into the loculated pleural effusion as detailed above.  2.  A specimen of fluid was sent for microbiology analysis.     XR Chest 2 Views    Narrative    EXAM: XR CHEST 2 VW  LOCATION: [facility]  DATE/TIME: 10/21/2021 8:32 AM    INDICATION: effusion. Chest tube placement 10/19/2021  COMPARISON: 10/15/2021.      Impression    IMPRESSION: Posteroinferior right chest tube. Decreased right pleural effusion with partial aeration right lung. Heart size appears normal. Left lung appears clear.   CT Chest w/o Contrast    Narrative    EXAM: CT CHEST W/O CONTRAST  LOCATION: [facility]  DATE/TIME: 10/21/2021 2:36 PM    INDICATION: Pneumonia, effusion or abscess suspected, xray done  COMPARISON: CT of the chest without contrast 10/19/2021  TECHNIQUE: CT chest without IV contrast. Multiplanar reformats were obtained. Dose reduction techniques  were used.  CONTRAST: None.    FINDINGS:   LUNGS AND PLEURA: Right pigtail pleural drainage catheter is well-positioned in complex, loculated air and fluid in the right pleural space. Unchanged visceral and parietal pleural thickening. Bandlike subpleural opacities lateral right upper lobe, right   middle lobe are consistent with cicatrization atelectasis. Larger, more focal and rounded opacity in the right lower lobe is consistent with rounded atelectasis.    Minimal left pleural fluid layers dependently. No left lung opacities.     MEDIASTINUM: Chronic dissection of the descending thoracic aorta with high attenuation along the intimal flap. Maximal diameter of the proximal descending thoracic aorta is 5.8 x 5.9 cm based on double oblique post processed reconstructions. No new   periaortic inflammation. The ascending aorta is normal caliber. Cardiac chambers are normal in size and there is no pericardial effusion.    No new enlarged mediastinal or hilar lymph nodes. Esophagus is decompressed.    CORONARY ARTERY CALCIFICATION: At least moderate.    UPPER ABDOMEN: Unchanged ~4 cm hypoattenuating lesion in the superior right hepatic lobe just to the right of the IVC and smaller 18 mm more circumscribed lesion in the more lateral right hepatic dome likely a benign cyst. Calcified gallstones layer   dependently in the gallbladder. High attenuation in the gallbladder is likely vicarious excretion of contrast. Unchanged enlargement of the spleen.    MUSCULOSKELETAL: Diffuse small degenerative thoracic spine osteophytes. No aggressive or destructive bone lesions.      Impression    IMPRESSION:     1.  Complex, loculated fluid and air in the right pleural space with diffuse right pleural thickening. The pleural thickening is new from 9/26/2021 and likely relates to infection/empyema.  2.  Subpleural opacities in the right lung including a more focal, rounded opacity in the right lower lobe consistent with  atelectasis/rounded atelectasis.  3.  Fusiform aneurysm of the descending thoracic aorta with findings of chronic [place] type B aortic dissection.  4.  Minimal left pleural effusion. Cholelithiasis.  5.  Splenomegaly.     CT Chest w/o Contrast    Narrative    EXAM: CT CHEST W/O CONTRAST  LOCATION: [facility]  DATE/TIME: 10/25/2021 10:52 AM    INDICATION: Pneumonia, effusion or abscess suspected, xray done  COMPARISON: 10/21/2021   TECHNIQUE: CT chest without IV contrast. Multiplanar reformats were obtained. Dose reduction techniques were used.  CONTRAST: None.    FINDINGS:   LUNGS AND PLEURA: Fluid and air locules in the right pleural space have not significantly changed; peripheral thickening has also not changed. A right lateral approach pigtail catheter has not changed in position. Opacification in the right lower lobe   has not changed and could represent round atelectasis.  A small left pleural effusion has decreased in size.    MEDIASTINUM/AXILLAE: There is aortic tortuosity. Enlargement of the descending thoracic aorta has not changed. Apparent dissection has not changed on this noncontrast study.     CORONARY ARTERY CALCIFICATION: Moderate to severe.     UPPER ABDOMEN: Enlargement of the lateral left hepatic lobe with a nodular liver contour. Hypoattenuating hepatic lesions are again seen.  The spleen is enlarged.    MUSCULOSKELETAL: Unremarkable.      Impression    IMPRESSION:   1.  Complex loculated fluid and air in the right pleural space with diffuse pleural thickening has not significantly changed from the comparison study. A pigtail catheter is appropriately positioned.  2.  A small left pleural effusion has decreased.  3.  Otherwise no change.   4.  Hepatic masses should be assessed with MRI when possible.           Discharge Medications   Current Discharge Medication List      START taking these medications    Details   ceFAZolin (ANCEF) 1 GM vial Inject 2 g into the vein  once a week  Qty: 1 each, Refills: 8    Associated Diagnoses: Empyema lung (H)         CONTINUE these medications which have NOT CHANGED    Details   acetaminophen (TYLENOL) 500 MG tablet Take 500 mg by mouth every 6 hours as needed for mild pain      entecavir (BARACLUDE) 0.5 MG tablet Take 1 tablet (0.5 mg) by mouth every 7 days  Qty: 45 tablet, Refills: 3    Associated Diagnoses: Chronic viral hepatitis B without delta agent and without coma (H)      hydrOXYzine (ATARAX) 25 MG tablet Take 1 tablet (25 mg) by mouth 3 times daily as needed for itching  Qty: 90 tablet, Refills: 3    Associated Diagnoses: Pruritic disorder      pantoprazole (PROTONIX) 40 MG EC tablet Take 1 tablet (40 mg) by mouth daily  Qty: 30 tablet, Refills: 3    Comments: Replacing omeprazole  Associated Diagnoses: Cirrhosis of liver with ascites, unspecified hepatic cirrhosis type (H)      polyethylene glycol (MIRALAX) 17 GM/Dose powder Take 17 g by mouth daily  Qty: 510 g, Refills: 0    Associated Diagnoses: Iliopsoas muscle hematoma, left, initial encounter      order for DME Equipment being ordered: Other: blood pressure monitor  Treatment Diagnosis: hypertension  Qty: 1 each, Refills: 0    Associated Diagnoses: Descending thoracic aortic dissection (H); Essential hypertension         STOP taking these medications       carvedilol (COREG) 25 MG tablet Comments:   Reason for Stopping:         cephALEXin (KEFLEX) 500 MG capsule Comments:   Reason for Stopping:         cloNIDine (CATAPRES) 0.1 MG tablet Comments:   Reason for Stopping:         diltiazem ER (TIAZAC) 180 MG 24 hr ER beaded capsule Comments:   Reason for Stopping:         oxyCODONE (ROXICODONE) 5 MG tablet Comments:   Reason for Stopping:             Allergies   Allergies   Allergen Reactions     Nka [No Known Allergies]

## 2021-10-28 ENCOUNTER — PATIENT OUTREACH (OUTPATIENT)
Dept: CARE COORDINATION | Facility: CLINIC | Age: 61
End: 2021-10-28

## 2021-10-28 ENCOUNTER — TELEPHONE (OUTPATIENT)
Dept: INFECTIOUS DISEASES | Facility: CLINIC | Age: 61
End: 2021-10-28

## 2021-10-28 DIAGNOSIS — Z71.89 OTHER SPECIFIED COUNSELING: ICD-10-CM

## 2021-10-28 NOTE — PROGRESS NOTES
Clinic Care Coordination Contact  [facility]/Voicemail       Clinical Data: Care Coordinator Outreach  Reason for referral: TCM outreach  Outreach attempted x 1.  Left message on patient's voicemail with call back information and requested return call.  Plan:  Care Coordinator will try to reach patient again in 1-2 business days.       Elisa Butler  Community Health Worker  [facility]  Ph: [phone]

## 2021-10-28 NOTE — TELEPHONE ENCOUNTER
10/28 update - patient discharged home    ----- Message -----  From: Marilee Washington MD  Sent: 10/27/2021  10:37 AM CDT  To: [facility] Infectious Disease Support Pool    Can we please schedule follow-up with me in 3-4 weeks?   He's doing cefazolin, renally dosed at dialysis 2x weekly then infusion center on wednesdays.  Thanks!

## 2021-10-28 NOTE — PROGRESS NOTES
Clinic Care Coordination Contact  [facility]: Post-Discharge Note  SITUATION                                                      Admission:    Admission Date: 10/19/21   Reason for Admission: pleural effusion  Discharge:   Discharge Date: 10/27/21  Discharge Diagnosis: pleural effusion    BACKGROUND                                                      Elle Blanton is a 61 year old M with multiple comorbidities including AAA (5.5cm), chronic thoracic aortic dissection, cirrhosis 2/2 chronic Hep B, ESRD on HD (M/F via L AVF), who was recently admitted for septic bursitis of the L elbow (10/14-10/17). Underwent I&D and bursectomy with Ortho on 10/15. Also s/p R thoracentesis with 1.5L drained for pleural effusion suspected 2/2 fluid overload. Pleural fluid cultures + S. Aureus. Admitted for empyema likely 2/2 bacteremia from septic bursitis. On admission patient denies any acute questions or complaints.      ASSESSMENT      Enrollment  Primary Care Care Coordination Status: Not a Candidate    Discharge Assessment  How are you doing now that you are home?: I'm feeling okay  How are your symptoms? (Red Flag symptoms escalate to triage hotline per guidelines): Improved  Do you feel your condition is stable enough to be safe at home until your provider visit?: Yes  Does the patient have their discharge instructions? : Yes  Does the patient have questions regarding their discharge instructions? : No  Were you started on any new medications or were there changes to any of your previous medications? : Yes  Does the patient have all of their medications?: Yes  Do you have questions regarding any of your medications? : No  Do you have all of your needed medical supplies or equipment (DME)?  (i.e. oxygen tank, CPAP, cane, etc.): Yes  Discharge follow-up appointment scheduled within 14 calendar days? : No  Is patient agreeable to assistance with scheduling? : No (Patient declined assistance scheduling hospital follow  up.)    Post-op ([facility] CTA Only)  If the patient had a surgery or procedure, do they have any questions for a nurse?: No      PLAN                                                      Outpatient Plan:      Please follow-up with Dr. Myers/Ilan Abad PA-C on Friday 10/29 at [facility] for suture removal from left elbow. Call our scheduling line at [phone] to make an appointment if you do not already have one scheduled.    Follow up with primary care provider, Jaswant Flores, within 7 days for hospital follow- up. The following labs/tests are recommended: BMP, CBC.    Follow up with Nephrology for HD M/F and Ancef IV three times a week.    Future Appointments   Date Time Provider Department Center   11/3/2021 12:30 PM [facility] INFUSION CHAIR 11 [facility]   11/10/2021  1:00 PM [facility] INFUSION CHAIR 3 [facility]         For any urgent concerns, please contact our 24 hour nurse triage line: [phone] ([phone])       Elisa Butler  Community Health Worker  [facility]cted [facility]  Ph: [phone]

## 2021-10-29 ENCOUNTER — TELEPHONE (OUTPATIENT)
Dept: FAMILY MEDICINE | Facility: CLINIC | Age: 61
End: 2021-10-29

## 2021-10-29 NOTE — TELEPHONE ENCOUNTER
The  at [facility] called in to give an fyi that she completed his post discharge counseling and patient understood what he needs to do on his end about his post care at home.

## 2021-11-01 NOTE — TELEPHONE ENCOUNTER
Date: 11/16/2021 Status: Scheduled   Time: 1:00 PM Length: 20   Visit Type: RETURN [657] Copay: $0.00   Provider: Marilee Washington MD Department: [facility] INFECTIOUS DISEASE   Bill Area: Infectious Disease [facility]

## 2021-11-02 DIAGNOSIS — R18.8 CIRRHOSIS OF LIVER WITH ASCITES, UNSPECIFIED HEPATIC CIRRHOSIS TYPE (H): ICD-10-CM

## 2021-11-02 DIAGNOSIS — K74.60 CIRRHOSIS OF LIVER WITH ASCITES, UNSPECIFIED HEPATIC CIRRHOSIS TYPE (H): ICD-10-CM

## 2021-11-02 RX ORDER — PANTOPRAZOLE SODIUM 40 MG/1
40 TABLET, DELAYED RELEASE ORAL DAILY
Qty: 30 TABLET | Refills: 11 | Status: SHIPPED | OUTPATIENT
Start: 2021-11-02 | End: 2022-01-01

## 2021-11-03 ENCOUNTER — INFUSION THERAPY VISIT (OUTPATIENT)
Dept: INFUSION THERAPY | Facility: HOSPITAL | Age: 61
End: 2021-11-03
Attending: INTERNAL MEDICINE
Payer: COMMERCIAL

## 2021-11-03 VITALS
OXYGEN SATURATION: 98 % | SYSTOLIC BLOOD PRESSURE: 119 MMHG | DIASTOLIC BLOOD PRESSURE: 70 MMHG | TEMPERATURE: 97.8 F | HEART RATE: 75 BPM

## 2021-11-03 DIAGNOSIS — J86.9 EMPYEMA LUNG (H): Primary | ICD-10-CM

## 2021-11-03 PROCEDURE — 250N000011 HC RX IP 250 OP 636: Performed by: INTERNAL MEDICINE

## 2021-11-03 PROCEDURE — 96365 THER/PROPH/DIAG IV INF INIT: CPT

## 2021-11-03 RX ORDER — HEPARIN SODIUM,PORCINE 10 UNIT/ML
5 VIAL (ML) INTRAVENOUS
Status: DISCONTINUED | OUTPATIENT
Start: 2021-11-03 | End: 2021-11-03 | Stop reason: HOSPADM

## 2021-11-03 RX ORDER — MEPERIDINE HYDROCHLORIDE 25 MG/ML
25 INJECTION INTRAMUSCULAR; INTRAVENOUS; SUBCUTANEOUS EVERY 30 MIN PRN
Status: DISCONTINUED | OUTPATIENT
Start: 2021-11-03 | End: 2021-11-03 | Stop reason: HOSPADM

## 2021-11-03 RX ORDER — METHYLPREDNISOLONE SODIUM SUCCINATE 125 MG/2ML
125 INJECTION, POWDER, LYOPHILIZED, FOR SOLUTION INTRAMUSCULAR; INTRAVENOUS
Status: DISCONTINUED | OUTPATIENT
Start: 2021-11-03 | End: 2021-11-03 | Stop reason: HOSPADM

## 2021-11-03 RX ORDER — NALOXONE HYDROCHLORIDE 0.4 MG/ML
0.2 INJECTION, SOLUTION INTRAMUSCULAR; INTRAVENOUS; SUBCUTANEOUS
Status: DISCONTINUED | OUTPATIENT
Start: 2021-11-03 | End: 2021-11-03 | Stop reason: HOSPADM

## 2021-11-03 RX ORDER — EPINEPHRINE 1 MG/ML
0.3 INJECTION, SOLUTION INTRAMUSCULAR; SUBCUTANEOUS EVERY 5 MIN PRN
Status: CANCELLED | OUTPATIENT
Start: 2021-11-10

## 2021-11-03 RX ORDER — DIPHENHYDRAMINE HYDROCHLORIDE 50 MG/ML
50 INJECTION INTRAMUSCULAR; INTRAVENOUS
Status: CANCELLED
Start: 2021-11-10

## 2021-11-03 RX ORDER — METHYLPREDNISOLONE SODIUM SUCCINATE 125 MG/2ML
125 INJECTION, POWDER, LYOPHILIZED, FOR SOLUTION INTRAMUSCULAR; INTRAVENOUS
Status: CANCELLED
Start: 2021-11-10

## 2021-11-03 RX ORDER — NALOXONE HYDROCHLORIDE 0.4 MG/ML
0.2 INJECTION, SOLUTION INTRAMUSCULAR; INTRAVENOUS; SUBCUTANEOUS
Status: CANCELLED | OUTPATIENT
Start: 2021-11-10

## 2021-11-03 RX ORDER — HEPARIN SODIUM (PORCINE) LOCK FLUSH IV SOLN 100 UNIT/ML 100 UNIT/ML
5 SOLUTION INTRAVENOUS
Status: CANCELLED | OUTPATIENT
Start: 2021-11-10

## 2021-11-03 RX ORDER — CEFAZOLIN SODIUM 2 G/100ML
2 INJECTION, SOLUTION INTRAVENOUS WEEKLY
Status: CANCELLED
Start: 2021-11-10

## 2021-11-03 RX ORDER — ALBUTEROL SULFATE 0.83 MG/ML
2.5 SOLUTION RESPIRATORY (INHALATION)
Status: CANCELLED | OUTPATIENT
Start: 2021-11-10

## 2021-11-03 RX ORDER — HEPARIN SODIUM (PORCINE) LOCK FLUSH IV SOLN 100 UNIT/ML 100 UNIT/ML
5 SOLUTION INTRAVENOUS
Status: DISCONTINUED | OUTPATIENT
Start: 2021-11-03 | End: 2021-11-03 | Stop reason: HOSPADM

## 2021-11-03 RX ORDER — ALBUTEROL SULFATE 0.83 MG/ML
2.5 SOLUTION RESPIRATORY (INHALATION)
Status: DISCONTINUED | OUTPATIENT
Start: 2021-11-03 | End: 2021-11-03 | Stop reason: HOSPADM

## 2021-11-03 RX ORDER — CEFAZOLIN SODIUM 2 G/100ML
2 INJECTION, SOLUTION INTRAVENOUS WEEKLY
Status: DISCONTINUED | OUTPATIENT
Start: 2021-11-03 | End: 2021-11-03 | Stop reason: HOSPADM

## 2021-11-03 RX ORDER — DIPHENHYDRAMINE HYDROCHLORIDE 50 MG/ML
50 INJECTION INTRAMUSCULAR; INTRAVENOUS
Status: DISCONTINUED | OUTPATIENT
Start: 2021-11-03 | End: 2021-11-03 | Stop reason: HOSPADM

## 2021-11-03 RX ORDER — HEPARIN SODIUM,PORCINE 10 UNIT/ML
5 VIAL (ML) INTRAVENOUS
Status: CANCELLED | OUTPATIENT
Start: 2021-11-10

## 2021-11-03 RX ORDER — MEPERIDINE HYDROCHLORIDE 25 MG/ML
25 INJECTION INTRAMUSCULAR; INTRAVENOUS; SUBCUTANEOUS EVERY 30 MIN PRN
Status: CANCELLED | OUTPATIENT
Start: 2021-11-10

## 2021-11-03 RX ORDER — ALBUTEROL SULFATE 90 UG/1
1-2 AEROSOL, METERED RESPIRATORY (INHALATION)
Status: DISCONTINUED | OUTPATIENT
Start: 2021-11-03 | End: 2021-11-03 | Stop reason: HOSPADM

## 2021-11-03 RX ORDER — ALBUTEROL SULFATE 90 UG/1
1-2 AEROSOL, METERED RESPIRATORY (INHALATION)
Status: CANCELLED
Start: 2021-11-10

## 2021-11-03 RX ORDER — EPINEPHRINE 1 MG/ML
0.3 INJECTION, SOLUTION INTRAMUSCULAR; SUBCUTANEOUS EVERY 5 MIN PRN
Status: DISCONTINUED | OUTPATIENT
Start: 2021-11-03 | End: 2021-11-03 | Stop reason: HOSPADM

## 2021-11-03 RX ADMIN — CEFAZOLIN SODIUM 2 G: 2 INJECTION, SOLUTION INTRAVENOUS at 12:52

## 2021-11-03 NOTE — PROGRESS NOTES
Infusion Nursing Note:  Elle Blanton presents today for weekly cefazolin infusion.    Patient seen by provider today: No   present during visit today: Patient refused  services and a waiver form has been signed.     Note: pt arrived via w/c, dropped off by family. Pt confirms he is here for antibiotic infusion; he seems confused about the reason for and duration of his treatment; pt thought he was only having infusions today and on 11/10; pt needs weekly O/P infusions until 12/8. He will also receive cefazolin while at dialysis on mondays and fridays, pt declines to go to dialysis on wednesdays.       Intravenous Access:  Peripheral IV placed w ease R AC; dialysis fistula to L arm.      Treatment Conditions:  Not Applicable.      Post Infusion Assessment:  Patient tolerated infusion without incident.  NS20mL flush; pt has fluid restrictions so smallest amount necessary used to flush line/PIV.  Site patent and intact, free from redness, edema or discomfort.  Access discontinued per protocol.       Discharge Plan:   Discharge instructions reviewed with: Patient and Family.  Copy of AVS reviewed with patient and/or family.  Patient will return wed Nov 10 for next appointment.  Patient discharged in stable condition accompanied by: son.  Departure Mode: Wheelchair.      Khushi Meza RN

## 2021-11-08 ENCOUNTER — APPOINTMENT (OUTPATIENT)
Dept: RADIOLOGY | Facility: HOSPITAL | Age: 61
DRG: 186 | End: 2021-11-08
Attending: STUDENT IN AN ORGANIZED HEALTH CARE EDUCATION/TRAINING PROGRAM
Payer: COMMERCIAL

## 2021-11-08 ENCOUNTER — HOSPITAL ENCOUNTER (INPATIENT)
Facility: HOSPITAL | Age: 61
LOS: 8 days | Discharge: HOME | DRG: 186 | End: 2021-11-16
Attending: EMERGENCY MEDICINE | Admitting: FAMILY MEDICINE
Payer: COMMERCIAL

## 2021-11-08 DIAGNOSIS — N18.6 ESRD (END STAGE RENAL DISEASE) ON DIALYSIS (H): ICD-10-CM

## 2021-11-08 DIAGNOSIS — S70.12XA ILIOPSOAS MUSCLE HEMATOMA, LEFT, INITIAL ENCOUNTER: ICD-10-CM

## 2021-11-08 DIAGNOSIS — M79.642 PAIN IN BOTH HANDS: Primary | ICD-10-CM

## 2021-11-08 DIAGNOSIS — E87.5 HYPERKALEMIA: ICD-10-CM

## 2021-11-08 DIAGNOSIS — J94.8 HYDROPNEUMOTHORAX: ICD-10-CM

## 2021-11-08 DIAGNOSIS — K59.00 CONSTIPATION, UNSPECIFIED CONSTIPATION TYPE: ICD-10-CM

## 2021-11-08 DIAGNOSIS — Z99.2 ESRD (END STAGE RENAL DISEASE) ON DIALYSIS (H): ICD-10-CM

## 2021-11-08 DIAGNOSIS — M79.641 PAIN IN BOTH HANDS: Primary | ICD-10-CM

## 2021-11-08 DIAGNOSIS — J86.9 EMPYEMA LUNG (H): ICD-10-CM

## 2021-11-08 DIAGNOSIS — J90 PLEURAL EFFUSION: ICD-10-CM

## 2021-11-08 PROBLEM — I89.0 LYMPHEDEMA OF LEFT UPPER EXTREMITY: Status: ACTIVE | Noted: 2021-04-26

## 2021-11-08 PROBLEM — M10.9 GOUT: Status: ACTIVE | Noted: 2020-09-11

## 2021-11-08 LAB
ALBUMIN SERPL-MCNC: 2.3 G/DL (ref 3.5–5)
ALP SERPL-CCNC: 160 U/L (ref 45–120)
ALT SERPL W P-5'-P-CCNC: <9 U/L (ref 0–45)
ANION GAP SERPL CALCULATED.3IONS-SCNC: 10 MMOL/L (ref 5–18)
AST SERPL W P-5'-P-CCNC: 43 U/L (ref 0–40)
ATRIAL RATE - MUSE: 82 BPM
BASOPHILS # BLD AUTO: 0 10E3/UL (ref 0–0.2)
BASOPHILS NFR BLD AUTO: 0 %
BILIRUB SERPL-MCNC: 0.8 MG/DL (ref 0–1)
BNP SERPL-MCNC: 877 PG/ML (ref 0–53)
BUN SERPL-MCNC: 77 MG/DL (ref 8–22)
CALCIUM SERPL-MCNC: 8 MG/DL (ref 8.5–10.5)
CHLORIDE BLD-SCNC: 106 MMOL/L (ref 98–107)
CO2 SERPL-SCNC: 23 MMOL/L (ref 22–31)
CREAT SERPL-MCNC: 12.6 MG/DL (ref 0.7–1.3)
DIASTOLIC BLOOD PRESSURE - MUSE: 100 MMHG
EOSINOPHIL # BLD AUTO: 0.1 10E3/UL (ref 0–0.7)
EOSINOPHIL NFR BLD AUTO: 1 %
ERYTHROCYTE [DISTWIDTH] IN BLOOD BY AUTOMATED COUNT: 17.8 % (ref 10–15)
GFR SERPL CREATININE-BSD FRML MDRD: 4 ML/MIN/1.73M2
GLUCOSE BLD-MCNC: 87 MG/DL (ref 70–125)
HCT VFR BLD AUTO: 31.4 % (ref 40–53)
HGB BLD-MCNC: 9.2 G/DL (ref 13.3–17.7)
IMM GRANULOCYTES # BLD: 0.1 10E3/UL
IMM GRANULOCYTES NFR BLD: 1 %
INTERPRETATION ECG - MUSE: NORMAL
LACTATE SERPL-SCNC: 1.6 MMOL/L (ref 0.7–2)
LYMPHOCYTES # BLD AUTO: 0.9 10E3/UL (ref 0.8–5.3)
LYMPHOCYTES NFR BLD AUTO: 13 %
MCH RBC QN AUTO: 30.2 PG (ref 26.5–33)
MCHC RBC AUTO-ENTMCNC: 29.3 G/DL (ref 31.5–36.5)
MCV RBC AUTO: 103 FL (ref 78–100)
MONOCYTES # BLD AUTO: 0.6 10E3/UL (ref 0–1.3)
MONOCYTES NFR BLD AUTO: 8 %
NEUTROPHILS # BLD AUTO: 5.5 10E3/UL (ref 1.6–8.3)
NEUTROPHILS NFR BLD AUTO: 77 %
NRBC # BLD AUTO: 0 10E3/UL
NRBC BLD AUTO-RTO: 0 /100
P AXIS - MUSE: 46 DEGREES
PLATELET # BLD AUTO: 71 10E3/UL (ref 150–450)
POTASSIUM BLD-SCNC: 5.9 MMOL/L (ref 3.5–5)
PR INTERVAL - MUSE: 186 MS
PROT SERPL-MCNC: 8.2 G/DL (ref 6–8)
QRS DURATION - MUSE: 88 MS
QT - MUSE: 362 MS
QTC - MUSE: 422 MS
R AXIS - MUSE: 58 DEGREES
RBC # BLD AUTO: 3.05 10E6/UL (ref 4.4–5.9)
SARS-COV-2 RNA RESP QL NAA+PROBE: NEGATIVE
SODIUM SERPL-SCNC: 139 MMOL/L (ref 136–145)
SYSTOLIC BLOOD PRESSURE - MUSE: 174 MMHG
T AXIS - MUSE: 68 DEGREES
TROPONIN I SERPL-MCNC: 0.02 NG/ML (ref 0–0.29)
VENTRICULAR RATE- MUSE: 82 BPM
WBC # BLD AUTO: 7.1 10E3/UL (ref 4–11)

## 2021-11-08 PROCEDURE — 120N000001 HC R&B MED SURG/OB

## 2021-11-08 PROCEDURE — 83605 ASSAY OF LACTIC ACID: CPT | Performed by: STUDENT IN AN ORGANIZED HEALTH CARE EDUCATION/TRAINING PROGRAM

## 2021-11-08 PROCEDURE — 96365 THER/PROPH/DIAG IV INF INIT: CPT

## 2021-11-08 PROCEDURE — 99222 1ST HOSP IP/OBS MODERATE 55: CPT | Mod: AI

## 2021-11-08 PROCEDURE — 96375 TX/PRO/DX INJ NEW DRUG ADDON: CPT

## 2021-11-08 PROCEDURE — 85025 COMPLETE CBC W/AUTO DIFF WBC: CPT | Performed by: STUDENT IN AN ORGANIZED HEALTH CARE EDUCATION/TRAINING PROGRAM

## 2021-11-08 PROCEDURE — C9803 HOPD COVID-19 SPEC COLLECT: HCPCS

## 2021-11-08 PROCEDURE — 82040 ASSAY OF SERUM ALBUMIN: CPT | Performed by: STUDENT IN AN ORGANIZED HEALTH CARE EDUCATION/TRAINING PROGRAM

## 2021-11-08 PROCEDURE — 93005 ELECTROCARDIOGRAM TRACING: CPT | Performed by: EMERGENCY MEDICINE

## 2021-11-08 PROCEDURE — 36415 COLL VENOUS BLD VENIPUNCTURE: CPT | Performed by: STUDENT IN AN ORGANIZED HEALTH CARE EDUCATION/TRAINING PROGRAM

## 2021-11-08 PROCEDURE — 71046 X-RAY EXAM CHEST 2 VIEWS: CPT

## 2021-11-08 PROCEDURE — 250N000013 HC RX MED GY IP 250 OP 250 PS 637: Performed by: FAMILY MEDICINE

## 2021-11-08 PROCEDURE — 84484 ASSAY OF TROPONIN QUANT: CPT | Performed by: STUDENT IN AN ORGANIZED HEALTH CARE EDUCATION/TRAINING PROGRAM

## 2021-11-08 PROCEDURE — 99285 EMERGENCY DEPT VISIT HI MDM: CPT | Mod: 25

## 2021-11-08 PROCEDURE — 250N000011 HC RX IP 250 OP 636: Performed by: EMERGENCY MEDICINE

## 2021-11-08 PROCEDURE — 87635 SARS-COV-2 COVID-19 AMP PRB: CPT | Performed by: EMERGENCY MEDICINE

## 2021-11-08 PROCEDURE — 250N000013 HC RX MED GY IP 250 OP 250 PS 637

## 2021-11-08 PROCEDURE — 83880 ASSAY OF NATRIURETIC PEPTIDE: CPT | Performed by: STUDENT IN AN ORGANIZED HEALTH CARE EDUCATION/TRAINING PROGRAM

## 2021-11-08 RX ORDER — DILTIAZEM HYDROCHLORIDE 180 MG/1
180 CAPSULE, EXTENDED RELEASE ORAL 2 TIMES DAILY
Status: DISCONTINUED | OUTPATIENT
Start: 2021-11-08 | End: 2021-11-08

## 2021-11-08 RX ORDER — DILTIAZEM HYDROCHLORIDE 180 MG/1
180 CAPSULE, EXTENDED RELEASE ORAL 2 TIMES DAILY
COMMUNITY
End: 2021-01-01

## 2021-11-08 RX ORDER — CEFAZOLIN SODIUM 1 G/3ML
2-3 INJECTION, POWDER, FOR SOLUTION INTRAMUSCULAR; INTRAVENOUS
Status: DISCONTINUED | OUTPATIENT
Start: 2021-11-10 | End: 2021-11-08

## 2021-11-08 RX ORDER — PANTOPRAZOLE SODIUM 40 MG/1
40 TABLET, DELAYED RELEASE ORAL DAILY
Status: DISCONTINUED | OUTPATIENT
Start: 2021-11-08 | End: 2021-01-01 | Stop reason: HOSPADM

## 2021-11-08 RX ORDER — DILTIAZEM HYDROCHLORIDE 180 MG/1
180 CAPSULE, COATED, EXTENDED RELEASE ORAL 2 TIMES DAILY
Status: DISCONTINUED | OUTPATIENT
Start: 2021-11-08 | End: 2021-01-01 | Stop reason: HOSPADM

## 2021-11-08 RX ORDER — CLONIDINE HYDROCHLORIDE 0.1 MG/1
0.1 TABLET ORAL AT BEDTIME
Status: ON HOLD | COMMUNITY
End: 2022-01-01

## 2021-11-08 RX ORDER — ACETAMINOPHEN 500 MG
500 TABLET ORAL EVERY 6 HOURS PRN
Status: DISCONTINUED | OUTPATIENT
Start: 2021-11-08 | End: 2021-11-08

## 2021-11-08 RX ORDER — CARVEDILOL 25 MG/1
25-50 TABLET ORAL 2 TIMES DAILY PRN
COMMUNITY
End: 2022-01-01

## 2021-11-08 RX ORDER — CEFAZOLIN SODIUM IN 0.9 % NACL 3 G/100 ML
3 INTRAVENOUS SOLUTION, PIGGYBACK (ML) INTRAVENOUS WEEKLY
Status: DISCONTINUED | OUTPATIENT
Start: 2021-01-01 | End: 2021-01-01 | Stop reason: HOSPADM

## 2021-11-08 RX ORDER — CEFAZOLIN SODIUM 2 G/100ML
2 INJECTION, SOLUTION INTRAVENOUS ONCE
Status: COMPLETED | OUTPATIENT
Start: 2021-11-08 | End: 2021-11-08

## 2021-11-08 RX ORDER — CARVEDILOL 12.5 MG/1
25 TABLET ORAL 2 TIMES DAILY
Status: DISCONTINUED | OUTPATIENT
Start: 2021-11-08 | End: 2021-01-01 | Stop reason: HOSPADM

## 2021-11-08 RX ORDER — ENTECAVIR 0.5 MG/1
0.5 TABLET, FILM COATED ORAL
Status: DISCONTINUED | OUTPATIENT
Start: 2021-01-01 | End: 2021-01-01 | Stop reason: HOSPADM

## 2021-11-08 RX ORDER — CALCIUM ACETATE 667 MG/1
1 CAPSULE ORAL 2 TIMES DAILY WITH MEALS
Status: ON HOLD | COMMUNITY
Start: 2021-10-20 | End: 2022-01-01

## 2021-11-08 RX ORDER — CALCIUM ACETATE 667 MG/1
667 CAPSULE ORAL
Status: DISCONTINUED | OUTPATIENT
Start: 2021-11-08 | End: 2021-01-01 | Stop reason: HOSPADM

## 2021-11-08 RX ORDER — CEFAZOLIN SODIUM 2 G/100ML
2 INJECTION, SOLUTION INTRAVENOUS
Status: DISCONTINUED | OUTPATIENT
Start: 2021-11-10 | End: 2021-01-01 | Stop reason: HOSPADM

## 2021-11-08 RX ORDER — FUROSEMIDE 10 MG/ML
20 INJECTION INTRAMUSCULAR; INTRAVENOUS ONCE
Status: COMPLETED | OUTPATIENT
Start: 2021-11-08 | End: 2021-11-08

## 2021-11-08 RX ORDER — ZOLPIDEM TARTRATE 5 MG/1
5 TABLET ORAL
Status: DISCONTINUED | OUTPATIENT
Start: 2021-11-08 | End: 2021-01-01 | Stop reason: HOSPADM

## 2021-11-08 RX ORDER — LIDOCAINE 40 MG/G
CREAM TOPICAL
Status: DISCONTINUED | OUTPATIENT
Start: 2021-11-08 | End: 2021-01-01 | Stop reason: HOSPADM

## 2021-11-08 RX ORDER — CLONIDINE HYDROCHLORIDE 0.1 MG/1
0.1 TABLET ORAL AT BEDTIME
Status: DISCONTINUED | OUTPATIENT
Start: 2021-11-08 | End: 2021-01-01 | Stop reason: HOSPADM

## 2021-11-08 RX ORDER — LANOLIN ALCOHOL/MO/W.PET/CERES
3 CREAM (GRAM) TOPICAL
Status: DISCONTINUED | OUTPATIENT
Start: 2021-11-08 | End: 2021-01-01 | Stop reason: HOSPADM

## 2021-11-08 RX ORDER — ACETAMINOPHEN 325 MG/1
650 TABLET ORAL EVERY 6 HOURS PRN
Status: DISCONTINUED | OUTPATIENT
Start: 2021-11-08 | End: 2021-01-01 | Stop reason: HOSPADM

## 2021-11-08 RX ORDER — ACETAMINOPHEN 650 MG/1
650 SUPPOSITORY RECTAL EVERY 6 HOURS PRN
Status: DISCONTINUED | OUTPATIENT
Start: 2021-11-08 | End: 2021-01-01 | Stop reason: HOSPADM

## 2021-11-08 RX ADMIN — PANTOPRAZOLE SODIUM 40 MG: 20 TABLET, DELAYED RELEASE ORAL at 17:54

## 2021-11-08 RX ADMIN — FUROSEMIDE 20 MG: 10 INJECTION, SOLUTION INTRAMUSCULAR; INTRAVENOUS at 15:14

## 2021-11-08 RX ADMIN — CARVEDILOL 25 MG: 12.5 TABLET, FILM COATED ORAL at 20:33

## 2021-11-08 RX ADMIN — DILTIAZEM HYDROCHLORIDE 180 MG: 180 CAPSULE, COATED, EXTENDED RELEASE ORAL at 20:32

## 2021-11-08 RX ADMIN — CLONIDINE HYDROCHLORIDE 0.1 MG: 0.1 TABLET ORAL at 22:38

## 2021-11-08 RX ADMIN — CALCIUM ACETATE 667 MG: 667 CAPSULE ORAL at 17:56

## 2021-11-08 RX ADMIN — CEFAZOLIN SODIUM 2 G: 2 INJECTION, SOLUTION INTRAVENOUS at 16:18

## 2021-11-08 RX ADMIN — ACETAMINOPHEN 650 MG: 325 TABLET ORAL at 17:52

## 2021-11-08 ASSESSMENT — ACTIVITIES OF DAILY LIVING (ADL)
ADLS_ACUITY_SCORE: 12
DEPENDENT_IADLS:: INDEPENDENT

## 2021-11-08 ASSESSMENT — ENCOUNTER SYMPTOMS
HEADACHES: 0
ABDOMINAL PAIN: 0
COUGH: 0
CHILLS: 0
LIGHT-HEADEDNESS: 0
DIFFICULTY URINATING: 0
FEVER: 0
DIAPHORESIS: 0
SHORTNESS OF BREATH: 1

## 2021-11-08 ASSESSMENT — MIFFLIN-ST. JEOR
SCORE: 1361.88
SCORE: 1421.35

## 2021-11-08 NOTE — H&P
[facility]    History and Physical - [facility] Medicine Service        Date of Admission:  11/8/2021    Assessment & Plan      Elle Blanton is a 61 year old male with multiple comorbidities including AAA (5.5 cm), chronic thoracic aortic dissection, cirrhosis secondary to chronic hepatitis B, ESRD on hemodialysis (M/F via L AVF), recently admitted for septic bursitis of left elbow 10/14-10/17 and again with MSSA empyema 10/19-10/27 thought to be sequelae of bacteremia from septic bursitis, discharged on outpatient IV cefazolin MWF as he had denied surgical intervention.     He is again admitted to our service 11/8 with worsened breathing secondary to this, and is now amenable to cardiothoracic surgical intervention.    Staph Aureus Empyema, likely due to bacteremia from septic bursitis  Third or fourth admission for this empyema which has grown pansensitive MSSA. Has been receiving IV cephazolin as an outpatient MWF. Has adamantly refused surgery on past admissions, now states he is ready for surgery. Patient is short of breath but not in any respiratory distress and saturating high 90s on room air.   - CV surgery consulted for consideration of decort/VATS, appreciate recommendations  - Continue IV cephazolin 2g Mon, 2g Wed, 3g Fri for now    Left Olecranon Bursitis s/p I&D on 10/15/21  Supposedly stitches were to be removed 10/29, still in place upon admission 11/8. Elbow without signs of cellulitis or bursitis, skin is well-healed without erythema.  - Consult ortho to ensure ok to remove stitches    ESRD on Hemodialysis M/F  Hyperkalemia  Chronic Anemia, secondary to ESRD  Missed dialysis day of admission, will arrange. Baseline creatinine 7-8. Dialyzes M/F (refuses to go Wed). Potassium 5.9 with no EKG changes suggestive of hyperkalemia. Patient is on EPO as an outpatient. Hemoglobin 9.2 upon admission, stable to improved from last 8.8 on 10/27.   - Nephrology consulted  "for dialysis, appreciate care    Chronic Medical Conditions  AAA/Aortic Dissection: continue PTA carvedilol, diltiazem, and clonidine  GERD: continue PTA pantoprazole  Chronic Hepatitis B/Cirrhosis with Thrombocytopenia: entecavir q7 days. No active bleeding, however CV surgery may want platelets above certain threshold for surgery (71 thousand upon admission)  Insomnia: continue PTA ambien     COVID Status  Fully vaccinated (Moderna 8/18 and 9/15/21). Not yet eligible for boosters. Has also had flu shot this year.     Diet: NPO per Anesthesia Guidelines for Procedure/Surgery Except for: Meds  DVT Prophylaxis: Pneumatic Compression Devices  Beltre Catheter: Not present  Fluids: None  Central Lines: None  Code Status: Full Code    Disposition Plan   Expected discharge: unknown, pending CT surgery    Christy Virk MD  [facility], PGY-1  Pager #: [phone]    Patient seen and staffed with Dr. Solomon.  ______________________________________________________________________    Chief Complaint   Difficulty breathing    History is obtained from the patient    History of Present Illness   Elle Blanton is a 61 year old male who has a history of AAA, chronic thoracic aortic dissection, cirrhosis due to chronic hepatitis B, ESRD on dialysis MF, several recent admissions for left olecranon bursitis and bacteremia causing empyema, and is admitted again for worsening shortness of breath over the past week. His shortness of breath gets worse when he lays on his right side. He says he is now ready for the surgery \"whatever you have to do\" to improve his breathing. Has been getting IV antibiotics three times per week. Also having drainage from his left elbow, not very painful. No chest pain, fevers, chills.    Review of Systems    The 10 point Review of Systems is negative other than noted in the HPI or here.    Past Medical History    I have reviewed this patient's medical history and updated " it with pertinent information if needed.   Past Medical History:   Diagnosis Date     NAHUM (acute kidney injury) (H)      GI (gastrointestinal bleed)      Gout      H. pylori infection 7/5/2017    UGI bleed implied by Hgb 9.0 6/22/17 and melena. Admitted and hgb decreased to 7.6, CT abdomen showed all bladder wall thickening. + Hep B surface antigen noted. Melena resolved and hgb stabalized without transfusion, epigastric pain resolved with PPI. Recommend referral for gastroscopy.     Heart attack (H)      Hepatitis B      Normocytic anemia      PONV (postoperative nausea and vomiting)      Thrombocytopenia (H)       Past Surgical History   I have reviewed this patient's surgical history and updated it with pertinent information if needed.  Past Surgical History:   Procedure Laterality Date     ABCESS DRAINAGE      finger     BURSECTOMY ELBOW Left 10/15/2021    Procedure: LEFT OLECRANON BURSECTOMY;  Surgeon: Tico Myers MD;  Location: [facility] [place]     CREATE FISTULA ARTERIOVENOUS UPPER EXTREMITY Left 4/20/2021    Procedure: left arm dialysis graft placement;  Surgeon: Roxana Cosby MD;  Location: [facility] OR;  Service: General     FOREIGN BODY REMOVAL      finger     INCISION AND DRAINAGE FINGER, COMBINED Left 10/20/2016    Procedure: COMBINED INCISION AND DRAINAGE FINGER;  Surgeon: Mireya Pizano MD;  Location: [facility] OR     IR CHEST TUBE PLACEMENT NON-TUNNELED RIGHT  4/19/2021     IR CHEST TUBE PLACEMENT NON-TUNNELED RIGHT  10/19/2021     IR PLEURAL DRAINAGE WITH CATHETER INSERTION  4/19/2021     MIDLINE INSERTION - DOUBLE LUMEN  4/23/2021          [place] ESOPHAGOGASTRODUODENOSCOPY TRANSORAL DIAGNOSTIC N/A 8/18/2020    Procedure: ESOPHAGOGASTRODUODENOSCOPY (EGD) with biopsy;  Surgeon: Nilson Gonzales MD;  Location: [facility] GI;  Service: Gastroenterology     US PARACENTESIS  8/14/2020     US PARACENTESIS  8/19/2020     US THORACENTESIS  4/18/2021     Social History   I have reviewed this  patient's social history and updated it with pertinent information if needed. Elle Blanton  reports that he has never smoked. He has never used smokeless tobacco. He reports that he does not drink alcohol and does not use drugs.    Family History   I have reviewed this patient's family history and updated it with pertinent information if needed.  Family History   Problem Relation Age of Onset     Diabetes Father      Hypertension No family hx of      Asthma No family hx of      Cancer No family hx of      Coronary Artery Disease No family hx of      Liver Cancer No family hx of      Ulcers Father        Prior to Admission Medications   Prior to Admission Medications   Prescriptions Last Dose Informant Patient Reported? Taking?   acetaminophen (TYLENOL) 500 MG tablet   Yes Yes   Sig: Take 500 mg by mouth every 6 hours as needed for mild pain   calcium acetate (PHOSLO) 667 MG CAPS capsule 11/7/2021  Yes Yes   Sig: Take 1 capsule by mouth 3 times daily (with meals)   carvedilol (COREG) 25 MG tablet 11/7/2021  Yes Yes   Sig: Take 25 mg by mouth 2 times daily   ceFAZolin (ANCEF) 1 GM vial Unknown  No Yes   Sig: Inject 2 g into the vein once a week   Patient taking differently: Inject 2-3 g into the vein three times a week 2 g Monday, 2 g Wednesday, 3 g Friday per ID note   cloNIDine (CATAPRES) 0.1 MG tablet 11/7/2021  Yes Yes   Sig: Take 0.1 mg by mouth At Bedtime    diltiazem ER (DILT-XR) 180 MG 24 hr capsule 11/7/2021  Yes Yes   Sig: Take 180 mg by mouth 2 times daily    entecavir (BARACLUDE) 0.5 MG tablet 11/7/2021  No Yes   Sig: Take 1 tablet (0.5 mg) by mouth every 7 days   Patient taking differently: Take 0.5 mg by mouth every 7 days Sundays   hydrOXYzine (ATARAX) 25 MG tablet   No No   Sig: Take 1 tablet (25 mg) by mouth 3 times daily as needed for itching   order for DME   No No   Sig: Equipment being ordered: Other: blood pressure monitor  Treatment Diagnosis: hypertension   pantoprazole (PROTONIX) 40 MG EC tablet  11/7/2021  No Yes   Sig: Take 1 tablet (40 mg) by mouth daily   polyethylene glycol (MIRALAX) 17 GM/Dose powder   No Yes   Sig: Take 17 g by mouth daily   Patient taking differently: Take 17 g by mouth daily as needed    zolpidem (AMBIEN) 5 MG tablet 11/7/2021  No Yes   Sig: Take 1 tablet (5 mg) by mouth nightly as needed for sleep      Facility-Administered Medications: None     Allergies   Allergies   Allergen Reactions     Nka [No Known Allergies]        Physical Exam   Vital Signs: Temp: 97.6  F (36.4  C)   BP: (!) 149/87 Pulse: 81   Resp: 20 SpO2: 97 %      Weight: 138 lbs 14.24 oz  GEN: Patient laying comfortably in no acute distress. No respiratory distress.  HEEN: Head is atraumatic, normocephalic, eyes anicteric, mucous membranes moist.  CV: Regular rate and rhythm without obvious murmurs.  PULM: No lung sounds right lung except at the apex. Clear to auscultation on the left.  ABD: Soft, nontender, bowel sounds present.  EXTREMITIES: Left elbow with several interrupted sutures over healed surgical scar over the olecranon. No erythema or purulence. Nontender to palpation.  NEURO: Alert and oriented x3.  No focal motor abnormalities.  Face symmetric.  PSYCH: Appropriate affect.  SKIN: No rashes, bruising, or other lesions.    Data   Recent Labs   Lab 11/08/21  1326   WBC 7.1   HGB 9.2*   *   PLT 71*      POTASSIUM 5.9*   CHLORIDE 106   CO2 23   BUN 77*   CR 12.60*   ANIONGAP 10   TANO 8.0*   GLC 87   ALBUMIN 2.3*   PROTTOTAL 8.2*   BILITOTAL 0.8   ALKPHOS 160*   ALT <9   AST 43*     Recent Results (from the past 24 hour(s))   Chest XR,  PA & LAT    Narrative    EXAM: XR CHEST 2 VW  LOCATION: [facility]  DATE/TIME: 11/8/2021 1:53 PM    INDICATION: SOB, history of effusion  COMPARISON: CT 10/25/2021       Impression    IMPRESSION: Right hydropneumothorax is again seen. Fluid has increased from the prior study. Enlargement of the aorta is again seen.

## 2021-11-08 NOTE — ED PROVIDER NOTES
"  Emergency Department Encounter     Evaluation Date & Time:   2021  1:38 PM    CHIEF COMPLAINT:  Shortness of Breath      Triage Note:Pt with c/o sob and left elbow drainage.  Pt states had surgery beginning of last week and at that time was told had fluid in lungs and was advised to be admitted pt states he was in \"denial\" and went home.  Now having drainage from wound even though stitches are intact.        Impression and Plan     ED COURSE & MEDICAL DECISION MAKIN:57 PM I met with the patient, obtained history, performed an initial exam, and discussed options and plan for diagnostics and treatment here in the ED. PPE worn including N95 mask, surgical gloves, eye protection.  2:36 PM I spoke with the [place] resident Dr. Hodges.    Patient presents with progressively worsening shortness of breath.  He has a known hydropneumothorax that they have recommended surgical intervention previously, which he declined.  He has continued to become more short of breath over the last week and is now agreeing for likely surgical intervention.  Patient with a potassium of 5.9, no EKG changes.  He is supposed to receive dialysis today.  I spoke with the admitting service about needing dialysis today, and they will arrange for such.  Patient will also require surgical consult.  Patient did get his dose of antibiotics that he was due for today in the emergency department.    Patient does make urine still despite being on dialysis.  He states the quantity of urine depends on his fluid intake which he tries to restrict because of his renal function.    At the conclusion of the encounter I discussed the results of all the tests and the disposition. The questions were answered. The patient or family acknowledged understanding and was agreeable with the care plan.    FINAL IMPRESSION:    ICD-10-CM    1. Hydropneumothorax  J94.8    2. Hyperkalemia  E87.5    3. ESRD (end stage renal disease) on dialysis (H)  N18.6  "    Z99.2          MEDICATIONS GIVEN IN THE EMERGENCY DEPARTMENT:  Medications   ceFAZolin (ANCEF) intermittent infusion 2 g in 100 mL dextrose PRE-MIX (has no administration in time range)   furosemide (LASIX) injection 20 mg (has no administration in time range)       NEW PRESCRIPTIONS STARTED AT TODAY'S ED VISIT:  New Prescriptions    No medications on file       HPI     History obtained from: the patient      Elle Blanton is a 61 year old male with a pertinent history of chronic hepetitis B, cirrhosis of liver without ascites, hypertension, ESRD on dialysis (Mon, Wed, Fri), empyema lung, s/p bursectomy elbow (left, 10/15/2021) who presents to this ED by walk in for evaluation of shortness of breath. Patient reports developing progressively worsening shortness of breath for the past week. 2 weeks ago, he reports developing drainage from his left elbow and had surgery to drain his elbow 1 week ago. After his surgery, he reports being told he had fluid in his lungs, but declined any further interventions to correct this at the time. Since then, however, his shortness of breath had worsened and he can no longer lay on his right side as this causes him too much difficulty breathing. Patient currently endorses drainage from his left elbow. Of note, patient missed his dialysis appointment today to report to the ED.    Denies fever, chills, sweats.    Per chart review, patient was admitted to this ED on 10/14/2021 - 10/17/2021 for septic bursitis. Patient diagnosed with olecranon bursitis in the ED on 10/11 and prescribed Augmentin which he had taken for 2 days. Follow up with his PCP in clinic on 10/13 and had joint drainage which revealed purulent fluid. Culture of the fluid grew pan sensitive MSSA on 10/13. Patient was initially started on Augmentin and clindamycin, later switched to Ancef on 10/15 and discontinued vancomycin. Transitioned to oral Keflex at time of discharge. XR chest on 10/15 resulted Near complete  opacification of the right hemithorax with a small amount of aerated right upper lobe above the right tracheobronchial angle. The pattern is most consistent with a large right pleural effusion and related compressive atelectasis of the majority of the right lung. There is slight shift of the heart and mediastinal structures towards the left. Ectatic aortic arch and descending thoracic aorta. Right atrial heart border is obscured. Left ventricular heart border is within normal limits and unchanged. Patient had US thoracentesis on 10/15 which drained 1.5 liters of clear yellow fluid. US thoracentesis on 10/16 yielded 0.35 liters of cloudy yellow fluid.      REVIEW OF SYSTEMS:  Review of Systems   Constitutional: Negative for chills, diaphoresis and fever.   HENT: Negative for congestion.    Respiratory: Positive for shortness of breath. Negative for cough.    Gastrointestinal: Negative for abdominal pain.   Genitourinary: Negative for difficulty urinating.   Musculoskeletal:        Positive for drainage (left elbow).   Neurological: Negative for light-headedness and headaches.   All other systems reviewed and are negative.          Medical History     Past Medical History:   Diagnosis Date     NAHUM (acute kidney injury) (H)      GI (gastrointestinal bleed)      Gout      H. pylori infection 7/5/2017     Heart attack (H)      Hepatitis B      Normocytic anemia      PONV (postoperative nausea and vomiting)      Thrombocytopenia (H)        Past Surgical History:   Procedure Laterality Date     ABCESS DRAINAGE      finger     BURSECTOMY ELBOW Left 10/15/2021    Procedure: LEFT OLECRANON BURSECTOMY;  Surgeon: Tico Myers MD;  Location: [facility]     CREATE FISTULA ARTERIOVENOUS UPPER EXTREMITY Left 4/20/2021    Procedure: left arm dialysis graft placement;  Surgeon: Roxana Cosby MD;  Location: [facility] [place];  Service: General     FOREIGN BODY REMOVAL      finger     INCISION AND DRAINAGE FINGER,  "COMBINED Left 10/20/2016    Procedure: COMBINED INCISION AND DRAINAGE FINGER;  Surgeon: Mireya Pizano MD;  Location: [facility] OR     IR CHEST TUBE PLACEMENT NON-TUNNELED RIGHT  4/19/2021     IR CHEST TUBE PLACEMENT NON-TUNNELED RIGHT  10/19/2021     IR PLEURAL DRAINAGE WITH CATHETER INSERTION  4/19/2021     MIDLINE INSERTION - DOUBLE LUMEN  4/23/2021          [place] ESOPHAGOGASTRODUODENOSCOPY TRANSORAL DIAGNOSTIC N/A 8/18/2020    Procedure: ESOPHAGOGASTRODUODENOSCOPY (EGD) with biopsy;  Surgeon: Nilson Gonzales MD;  Location: [facility];  Service: Gastroenterology      PARACENTESIS  8/14/2020      PARACENTESIS  8/19/2020     US THORACENTESIS  4/18/2021       Family History   Problem Relation Age of Onset     Diabetes Father      Hypertension No family hx of      Asthma No family hx of      Cancer No family hx of      Coronary Artery Disease No family hx of      Liver Cancer No family hx of      Ulcers Father        Social History     Tobacco Use     Smoking status: Never Smoker     Smokeless tobacco: Never Used   Substance Use Topics     Alcohol use: No     Drug use: No       acetaminophen (TYLENOL) 500 MG tablet  ceFAZolin (ANCEF) 1 GM vial  entecavir (BARACLUDE) 0.5 MG tablet  hydrOXYzine (ATARAX) 25 MG tablet  order for DME  pantoprazole (PROTONIX) 40 MG EC tablet  polyethylene glycol (MIRALAX) 17 GM/Dose powder  zolpidem (AMBIEN) 5 MG tablet        Physical Exam     First Vitals:  Patient Vitals for the past 24 hrs:   BP Temp Pulse Resp SpO2 Height Weight   11/08/21 1311 (!) 166/93 -- 79 19 98 % -- --   11/08/21 1046 (!) 160/101 97.6  F (36.4  C) 77 18 99 % 1.651 m (5' 5\") 63 kg (138 lb 14.2 oz)       PHYSICAL EXAM:   Physical Exam  Constitutional:       Appearance: Normal appearance.   HENT:      Head: Normocephalic and atraumatic.      Nose: Nose normal.      Mouth/Throat:      Mouth: Mucous membranes are moist.   Eyes:      Pupils: Pupils are equal, round, and reactive to light.   Cardiovascular:      " Rate and Rhythm: Normal rate and regular rhythm.      Pulses: Normal pulses.      Heart sounds: Normal heart sounds.   Pulmonary:      Effort: Pulmonary effort is normal.      Breath sounds: Normal breath sounds.   Abdominal:      General: Abdomen is flat.      Palpations: Abdomen is soft.   Musculoskeletal:         General: Normal range of motion.      Comments: Left elbow incision intact with sutures intact. There is no drainage that I can appreciate. No significant erythema or warmth. There is edema of the mid forearm and proximal. Palpable radial pulses.   Skin:     General: Skin is warm and dry.   Neurological:      General: No focal deficit present.      Mental Status: He is alert and oriented to person, place, and time. Mental status is at baseline.           Results     LAB:  All pertinent labs reviewed and interpreted  Labs Ordered and Resulted from Time of ED Arrival to Time of ED Departure   COMPREHENSIVE METABOLIC PANEL - Abnormal       Result Value    Sodium 139      Potassium 5.9 (*)     Chloride 106      Carbon Dioxide (CO2) 23      Anion Gap 10      Urea Nitrogen 77 (*)     Creatinine 12.60 (*)     Calcium 8.0 (*)     Glucose 87      Alkaline Phosphatase 160 (*)     AST 43 (*)     ALT <9      Protein Total 8.2 (*)     Albumin 2.3 (*)     Bilirubin Total 0.8      GFR Estimate 4 (*)    B-TYPE NATRIURETIC PEPTIDE ([facility] ONLY) - Abnormal     (*)    CBC WITH PLATELETS AND DIFFERENTIAL - Abnormal    WBC Count 7.1      RBC Count 3.05 (*)     Hemoglobin 9.2 (*)     Hematocrit 31.4 (*)      (*)     MCH 30.2      MCHC 29.3 (*)     RDW 17.8 (*)     Platelet Count 71 (*)     % Neutrophils 77      % Lymphocytes 13      % Monocytes 8      % Eosinophils 1      % Basophils 0      % Immature Granulocytes 1      NRBCs per 100 WBC 0      Absolute Neutrophils 5.5      Absolute Lymphocytes 0.9      Absolute Monocytes 0.6      Absolute Eosinophils 0.1      Absolute Basophils 0.0      Absolute Immature  Granulocytes 0.1 (*)     Absolute NRBCs 0.0     TROPONIN I - Normal    Troponin I 0.02     LACTIC ACID WHOLE BLOOD - Normal    Lactic Acid 1.6     COVID-19 VIRUS (CORONAVIRUS) BY PCR       RADIOLOGY:  Chest XR,  PA & LAT   Final Result   IMPRESSION: Right hydropneumothorax is again seen. Fluid has increased from the prior study. Enlargement of the aorta is again seen.                  ECG:    Performed at: 08-NOV-2021 14:44    Impression: Sinus rhythm. Normal ECG.    Rate: 82 BPM  Rhythm: Sinus rhythm.  Axis: 46 58 68  HI Interval: 186 ms  QRS Interval: 88 ms  QTc Interval: 422 ms  ST Changes: No ST changes  Comparison: When compared with ECG of 24-APR-2021, Fusion complexes are no longer present. Right bundle branch block is no longer present.    I have independently reviewed and interpreted the EKS(s) documented above    I, Bebeto Sidhu, am serving as a scribe to document services personally performed by Amarilis Lyons MD, based on my observations and the provider's statements to me.  I, Amarilis Lyons MD, attest that Bebeto Sidhu is acting in a scribe capacity, has observed my performance of the services and has documented them in accordance with my direction.     Amarilis Lyons MD  Emergency Medicine  [facility] EMERGENCY DEPARTMENT       Amarilis Lyons MD  11/08/21 [id number]

## 2021-11-08 NOTE — PHARMACY-ADMISSION MEDICATION HISTORY
Pharmacy Note - Admission Medication History    Pertinent Provider Information:   -Patient did not know if he was taking Phoslo, but did state he takes something tid with meals.   -unsure of his ancef regimen. Per chart review and ID notes, appears to be on MWF, 2-2-3 gram schedule. Monday and Friday given with dialysis. Wednesday given at home.   ______________________________________________________________________    Prior To Admission (PTA) med list completed and updated in EMR.       PTA Med List   Medication Sig Note Last Dose     acetaminophen (TYLENOL) 500 MG tablet Take 500 mg by mouth every 6 hours as needed for mild pain       calcium acetate (PHOSLO) 667 MG CAPS capsule Take 1 capsule by mouth 3 times daily (with meals)  11/7/2021     carvedilol (COREG) 25 MG tablet Take 25 mg by mouth 2 times daily  11/7/2021     ceFAZolin (ANCEF) 1 GM vial Inject 2 g into the vein once a week (Patient taking differently: Inject 2-3 g into the vein three times a week 2 g Monday, 2 g Wednesday, 3 g Friday per ID note) 11/8/2021: Last dose likely Friday  Unknown     cloNIDine (CATAPRES) 0.1 MG tablet Take 0.1 mg by mouth At Bedtime   11/7/2021     diltiazem ER (DILT-XR) 180 MG 24 hr capsule Take 180 mg by mouth 2 times daily   11/7/2021     entecavir (BARACLUDE) 0.5 MG tablet Take 1 tablet (0.5 mg) by mouth every 7 days (Patient taking differently: Take 0.5 mg by mouth every 7 days Sundays)  11/7/2021     pantoprazole (PROTONIX) 40 MG EC tablet Take 1 tablet (40 mg) by mouth daily  11/7/2021     polyethylene glycol (MIRALAX) 17 GM/Dose powder Take 17 g by mouth daily (Patient taking differently: Take 17 g by mouth daily as needed )       zolpidem (AMBIEN) 5 MG tablet Take 1 tablet (5 mg) by mouth nightly as needed for sleep  11/7/2021       Information source(s): Patient, Clinic records, Hospital records and CareEverywhere/[facility]  Method of interview communication: in-person    Summary of Changes to PTA Med  List  Added: coreg, dilt, clonidine  Discontinued: -  Changed: cefazolin    Patient was asked about OTC/herbal products specifically.  PTA med list reflects this.    Allergies were reviewed, assessed, and updated with the patient.      Patient does not use any multi-dose medications prior to admission.    The information provided in this note is only as accurate as the sources available at the time of the update(s).    Thank you for the opportunity to participate in the care of this patient.    Italia Hunter, PharmD, BCPS 11/08/21 3:42 PM

## 2021-11-08 NOTE — CONSULTS
"Care Management Initial Consult    General Information  Assessment completed with: VM-chart review,    Type of CM/SW Visit: Initial Assessment    Primary Care Provider verified and updated as needed: Yes   Readmission within the last 30 days: previous discharge plan unsuccessful (10/19/21 - 10/27/21)   Return Category: Exacerbation of disease  Reason for Consult: discharge planning  Advance Care Planning: Advance Care Planning Reviewed: other (comment) (\"don't have one\")          Communication Assessment  Patient's communication style: spoken language (English or Bilingual)             Cognitive  Cognitive/Neuro/Behavioral: WDL                      Living Environment:   People in home: spouse  [place]  Current living Arrangements: house      Able to return to prior arrangements: yes       Family/Social Support:  Care provided by: self  Provides care for: no one  Marital Status:   Wife  [place]       Description of Support System: Supportive,Involved    Support Assessment: Adequate family and caregiver support,Adequate social supports,Patient communicates needs well met    Current Resources:   Patient receiving home care services: No     Community Resources: OP Dialysis,OP Infusion (dialysis M, W, F; OP infusion until 12/8/21.)  Equipment currently used at home: none  Supplies currently used at home:      Employment/Financial:  Employment Status:          Financial Concerns:     Referral to Financial Counselor: No       Lifestyle & Psychosocial Needs:  Social Determinants of Health     Tobacco Use: Low Risk      Smoking Tobacco Use: Never Smoker     Smokeless Tobacco Use: Never Used   Alcohol Use: Not on file   Financial Resource Strain: Not on file   Food Insecurity: Not on file   Transportation Needs: Not on file   Physical Activity: Not on file   Stress: Not on file   Social Connections: Not on file   Intimate Partner Violence: Not on file   Depression: Not at risk     PHQ-2 Score: 0   Housing Stability: Not " on file       Functional Status:  Prior to admission patient needed assistance:   Dependent ADLs:: Independent  Dependent IADLs:: Independent       Mental Health Status:          Chemical Dependency Status:                Values/Beliefs:  Spiritual, Cultural Beliefs, Evangelical Practices, Values that affect care:                 Additional Information:  Elle lives in a house with his wife. He is independent with ADLs at baseline.    He goes to dialysis on MWF. He is also going to Outpatient Infusion Therapy and that is ordered until 12/8/21.    Family to transport at discharge.      Dayanna Stover RN

## 2021-11-08 NOTE — ED TRIAGE NOTES
"Pt with c/o sob and left elbow drainage.  Pt states had surgery beginning of last week and at that time was told had fluid in lungs and was advised to be admitted pt states he was in \"denial\" and went home.  Now having drainage from wound even though stitches are intact.  "

## 2021-11-09 ENCOUNTER — APPOINTMENT (OUTPATIENT)
Dept: MRI IMAGING | Facility: HOSPITAL | Age: 61
DRG: 186 | End: 2021-11-09
Attending: SURGERY
Payer: COMMERCIAL

## 2021-11-09 ENCOUNTER — APPOINTMENT (OUTPATIENT)
Dept: CT IMAGING | Facility: HOSPITAL | Age: 61
DRG: 186 | End: 2021-11-09
Attending: SURGERY
Payer: COMMERCIAL

## 2021-11-09 LAB
ANION GAP SERPL CALCULATED.3IONS-SCNC: 8 MMOL/L (ref 5–18)
BUN SERPL-MCNC: 80 MG/DL (ref 8–22)
CALCIUM SERPL-MCNC: 7.7 MG/DL (ref 8.5–10.5)
CHLORIDE BLD-SCNC: 107 MMOL/L (ref 98–107)
CO2 SERPL-SCNC: 24 MMOL/L (ref 22–31)
CREAT SERPL-MCNC: 13.15 MG/DL (ref 0.7–1.3)
ERYTHROCYTE [DISTWIDTH] IN BLOOD BY AUTOMATED COUNT: 17.6 % (ref 10–15)
GFR SERPL CREATININE-BSD FRML MDRD: 4 ML/MIN/1.73M2
GLUCOSE BLD-MCNC: 74 MG/DL (ref 70–125)
GLUCOSE BLDC GLUCOMTR-MCNC: 102 MG/DL (ref 70–99)
GLUCOSE BLDC GLUCOMTR-MCNC: 150 MG/DL (ref 70–99)
GLUCOSE BLDC GLUCOMTR-MCNC: 159 MG/DL (ref 70–99)
GLUCOSE BLDC GLUCOMTR-MCNC: 172 MG/DL (ref 70–99)
GLUCOSE BLDC GLUCOMTR-MCNC: 182 MG/DL (ref 70–99)
GLUCOSE BLDC GLUCOMTR-MCNC: 186 MG/DL (ref 70–99)
GLUCOSE BLDC GLUCOMTR-MCNC: 65 MG/DL (ref 70–99)
GLUCOSE BLDC GLUCOMTR-MCNC: 88 MG/DL (ref 70–99)
GLUCOSE BLDC GLUCOMTR-MCNC: 92 MG/DL (ref 70–99)
HCT VFR BLD AUTO: 26.5 % (ref 40–53)
HGB BLD-MCNC: 7.8 G/DL (ref 13.3–17.7)
MCH RBC QN AUTO: 30 PG (ref 26.5–33)
MCHC RBC AUTO-ENTMCNC: 29.4 G/DL (ref 31.5–36.5)
MCV RBC AUTO: 102 FL (ref 78–100)
PLATELET # BLD AUTO: 53 10E3/UL (ref 150–450)
POTASSIUM BLD-SCNC: 5.5 MMOL/L (ref 3.5–5)
POTASSIUM BLD-SCNC: 6.5 MMOL/L (ref 3.5–5)
RBC # BLD AUTO: 2.6 10E6/UL (ref 4.4–5.9)
SODIUM SERPL-SCNC: 139 MMOL/L (ref 136–145)
WBC # BLD AUTO: 6.8 10E3/UL (ref 4–11)

## 2021-11-09 PROCEDURE — 85027 COMPLETE CBC AUTOMATED: CPT

## 2021-11-09 PROCEDURE — 258N000003 HC RX IP 258 OP 636: Performed by: STUDENT IN AN ORGANIZED HEALTH CARE EDUCATION/TRAINING PROGRAM

## 2021-11-09 PROCEDURE — 71250 CT THORAX DX C-: CPT

## 2021-11-09 PROCEDURE — 250N000013 HC RX MED GY IP 250 OP 250 PS 637: Performed by: FAMILY MEDICINE

## 2021-11-09 PROCEDURE — 99223 1ST HOSP IP/OBS HIGH 75: CPT | Performed by: INTERNAL MEDICINE

## 2021-11-09 PROCEDURE — 94640 AIRWAY INHALATION TREATMENT: CPT

## 2021-11-09 PROCEDURE — 120N000001 HC R&B MED SURG/OB

## 2021-11-09 PROCEDURE — 250N000011 HC RX IP 250 OP 636: Performed by: INTERNAL MEDICINE

## 2021-11-09 PROCEDURE — 80048 BASIC METABOLIC PNL TOTAL CA: CPT

## 2021-11-09 PROCEDURE — 250N000009 HC RX 250: Performed by: STUDENT IN AN ORGANIZED HEALTH CARE EDUCATION/TRAINING PROGRAM

## 2021-11-09 PROCEDURE — P9047 ALBUMIN (HUMAN), 25%, 50ML: HCPCS | Performed by: INTERNAL MEDICINE

## 2021-11-09 PROCEDURE — 99232 SBSQ HOSP IP/OBS MODERATE 35: CPT | Mod: GC

## 2021-11-09 PROCEDURE — 5A1D70Z PERFORMANCE OF URINARY FILTRATION, INTERMITTENT, LESS THAN 6 HOURS PER DAY: ICD-10-PCS | Performed by: INTERNAL MEDICINE

## 2021-11-09 PROCEDURE — 250N000012 HC RX MED GY IP 250 OP 636 PS 637: Performed by: STUDENT IN AN ORGANIZED HEALTH CARE EDUCATION/TRAINING PROGRAM

## 2021-11-09 PROCEDURE — 36415 COLL VENOUS BLD VENIPUNCTURE: CPT

## 2021-11-09 PROCEDURE — 74181 MRI ABDOMEN W/O CONTRAST: CPT

## 2021-11-09 PROCEDURE — 258N000003 HC RX IP 258 OP 636: Performed by: INTERNAL MEDICINE

## 2021-11-09 PROCEDURE — 84132 ASSAY OF SERUM POTASSIUM: CPT

## 2021-11-09 PROCEDURE — 250N000013 HC RX MED GY IP 250 OP 250 PS 637

## 2021-11-09 PROCEDURE — 634N000001 HC RX 634: Performed by: PHYSICIAN ASSISTANT

## 2021-11-09 PROCEDURE — 258N000001 HC RX 258: Performed by: STUDENT IN AN ORGANIZED HEALTH CARE EDUCATION/TRAINING PROGRAM

## 2021-11-09 RX ORDER — DEXTROSE MONOHYDRATE 25 G/50ML
25-50 INJECTION, SOLUTION INTRAVENOUS
Status: DISCONTINUED | OUTPATIENT
Start: 2021-11-09 | End: 2021-01-01 | Stop reason: HOSPADM

## 2021-11-09 RX ORDER — LORAZEPAM 1 MG/1
1 TABLET ORAL ONCE
Status: DISCONTINUED | OUTPATIENT
Start: 2021-11-09 | End: 2021-01-01 | Stop reason: HOSPADM

## 2021-11-09 RX ORDER — DEXTROSE MONOHYDRATE 25 G/50ML
25 INJECTION, SOLUTION INTRAVENOUS ONCE
Status: COMPLETED | OUTPATIENT
Start: 2021-11-09 | End: 2021-11-09

## 2021-11-09 RX ORDER — DEXTROSE MONOHYDRATE 100 MG/ML
INJECTION, SOLUTION INTRAVENOUS CONTINUOUS
Status: DISCONTINUED | OUTPATIENT
Start: 2021-11-09 | End: 2021-11-10

## 2021-11-09 RX ORDER — NICOTINE POLACRILEX 4 MG
15-30 LOZENGE BUCCAL
Status: DISCONTINUED | OUTPATIENT
Start: 2021-11-09 | End: 2021-01-01 | Stop reason: HOSPADM

## 2021-11-09 RX ORDER — ALBUTEROL SULFATE 5 MG/ML
10 SOLUTION RESPIRATORY (INHALATION) ONCE
Status: DISCONTINUED | OUTPATIENT
Start: 2021-11-09 | End: 2021-11-09

## 2021-11-09 RX ORDER — ALBUMIN (HUMAN) 12.5 G/50ML
50 SOLUTION INTRAVENOUS
Status: DISCONTINUED | OUTPATIENT
Start: 2021-11-09 | End: 2021-11-10

## 2021-11-09 RX ADMIN — ACETAMINOPHEN 650 MG: 325 TABLET ORAL at 16:16

## 2021-11-09 RX ADMIN — ALBUTEROL SULFATE 10 MG: 2.5 SOLUTION RESPIRATORY (INHALATION) at 08:24

## 2021-11-09 RX ADMIN — DEXTROSE MONOHYDRATE 25 G: 500 INJECTION PARENTERAL at 10:53

## 2021-11-09 RX ADMIN — CALCIUM ACETATE 667 MG: 667 CAPSULE ORAL at 08:36

## 2021-11-09 RX ADMIN — EPOETIN ALFA-EPBX 12000 UNITS: 10000 INJECTION, SOLUTION INTRAVENOUS; SUBCUTANEOUS at 14:27

## 2021-11-09 RX ADMIN — ZOLPIDEM TARTRATE 5 MG: 5 TABLET ORAL at 00:34

## 2021-11-09 RX ADMIN — CARVEDILOL 25 MG: 12.5 TABLET, FILM COATED ORAL at 08:36

## 2021-11-09 RX ADMIN — ACETAMINOPHEN 650 MG: 325 TABLET ORAL at 08:35

## 2021-11-09 RX ADMIN — DILTIAZEM HYDROCHLORIDE 180 MG: 180 CAPSULE, COATED, EXTENDED RELEASE ORAL at 08:36

## 2021-11-09 RX ADMIN — ACETAMINOPHEN 325 MG: 325 TABLET ORAL at 23:09

## 2021-11-09 RX ADMIN — SODIUM CHLORIDE 300 ML: 9 INJECTION, SOLUTION INTRAVENOUS at 15:51

## 2021-11-09 RX ADMIN — CARVEDILOL 25 MG: 12.5 TABLET, FILM COATED ORAL at 20:15

## 2021-11-09 RX ADMIN — CALCIUM ACETATE 667 MG: 667 CAPSULE ORAL at 18:37

## 2021-11-09 RX ADMIN — ALBUMIN HUMAN 50 ML: 0.25 SOLUTION INTRAVENOUS at 15:51

## 2021-11-09 RX ADMIN — DILTIAZEM HYDROCHLORIDE 180 MG: 180 CAPSULE, COATED, EXTENDED RELEASE ORAL at 20:15

## 2021-11-09 RX ADMIN — SODIUM CHLORIDE 250 ML: 9 INJECTION, SOLUTION INTRAVENOUS at 15:52

## 2021-11-09 RX ADMIN — SODIUM CHLORIDE 6.9 UNITS: 9 INJECTION, SOLUTION INTRAVENOUS at 10:53

## 2021-11-09 RX ADMIN — PANTOPRAZOLE SODIUM 40 MG: 20 TABLET, DELAYED RELEASE ORAL at 08:36

## 2021-11-09 RX ADMIN — DEXTROSE MONOHYDRATE: 100 INJECTION, SOLUTION INTRAVENOUS at 10:54

## 2021-11-09 RX ADMIN — ZOLPIDEM TARTRATE 5 MG: 5 TABLET ORAL at 23:09

## 2021-11-09 ASSESSMENT — ACTIVITIES OF DAILY LIVING (ADL)
ADLS_ACUITY_SCORE: 14
ADLS_ACUITY_SCORE: 12
ADLS_ACUITY_SCORE: 14
ADLS_ACUITY_SCORE: 18
ADLS_ACUITY_SCORE: 14

## 2021-11-09 NOTE — PROGRESS NOTES
Primary Medicine Progress Note    Assessment/Plan  Active Problems:    ESRD (end stage renal disease) on dialysis (H)    Hydropneumothorax    Hyperkalemia    Elle Blanton is a 61 year old male with multiple comorbidities including AAA (5.5 cm), chronic thoracic aortic dissection, cirrhosis secondary to chronic hepatitis B, ESRD on hemodialysis (M/F via L AVF), recently admitted for septic bursitis of left elbow 10/14-10/17 and again with MSSA empyema 10/19-10/27 thought to be sequelae of bacteremia from septic bursitis, discharged on outpatient IV cefazolin MWF as he had denied surgical intervention.      He is again admitted to our service 11/8 with worsened breathing secondary to this, and is now amenable to cardiothoracic surgical intervention.    Major plans for today: imaging and consultation with Dr. Reynaga    Staph Aureus Empyema, likely due to bacteremia from septic bursitis  Third or fourth admission for this empyema which has grown pansensitive MSSA. Has been receiving IV cephazolin as an outpatient MWF. Has adamantly refused surgery on past admissions, now states he is ready for surgery. Patient is short of breath but not in any respiratory distress and saturating high 90s on room air. CXR completed in ED last evening, will obtain CT chest now for better characterization of right lung empyema.  - CV surgery consulted for consideration of decort/VATS, appreciate recommendations  - Confusion about whether cardiothoracic surgery could do VATS at this hospital so initially call was placed for transfer to [facility], however then Dr. Reynaga was called and now he will coordinate management  - Continue IV cephazolin 2g Mon, 2g Wed, 3g Fri for now     Left Olecranon Bursitis s/p I&D on 10/15/21  Supposedly stitches were to be removed 10/29, still in place upon admission 11/8. Elbow without signs of cellulitis or bursitis, skin is well-healed without erythema.  - Consult ortho to ensure ok to remove stitches     ESRD on  Hemodialysis M/F  Hyperkalemia  Chronic Anemia, secondary to ESRD  Will dialyze today. Baseline creatinine 7-8. Dialyzes M/F (refuses to go Wed). Potassium up to 6.5 this morning. Patient is on EPO as an outpatient. Hemoglobin 9.2 upon admission, stable to improved from last 8.8 on 10/27.   - Nephrology consulted for dialysis, appreciate care     Chronic Medical Conditions  AAA/Aortic Dissection: continue PTA carvedilol, diltiazem, and clonidine  GERD: continue PTA pantoprazole  Chronic Hepatitis B/Cirrhosis with Thrombocytopenia: entecavir q7 days. No active bleeding.  Insomnia: continue PTA ambien      COVID Status  Fully vaccinated (Moderna 8/18 and 9/15/21). Not yet eligible for boosters. Has also had flu shot this year.      Diet: Will allow regular diet today as he will not get surgery today  DVT Prophylaxis: Pneumatic Compression Devices  Beltre Catheter: Not present  Fluids: None  Central Lines: None  Code Status: Full Code    Disposition/Advanced Care Planning  At least several days pending possible CT surgical intervention    Subjective:   Nursing notes reviewed. No acute events overnight.  Elle states he is hungry this morning, wondering when he is getting dialysis. I told him about what I heard from cardiothoracic surgery in that he would need to be transferred to the  and he and his wife were frustrated about hearing this, stating that the surgeon they saw last time said he could have the surgery here. I told them I would follow up and figure out exactly who will be managing and will let them know.    Objective    Vital signs in last 24 hours Temp:  [97.8  F (36.6  C)-98.6  F (37  C)] 98.2  F (36.8  C)  Pulse:  [74-87] 78  Resp:  [16-55] 19  BP: (123-174)/() 151/85  SpO2:  [95 %-98 %] 96 %       PHYSICAL EXAM  GEN: Patient laying comfortably in no acute distress. No respiratory distress.  HEEN: Head is atraumatic, normocephalic, eyes anicteric, mucous membranes moist.  CV: Regular rate and rhythm  without obvious murmurs.  PULM: No lung sounds right lung except at the apex. Clear to auscultation on the left.  EXTREMITIES: Left elbow with several interrupted sutures over healed surgical scar over the olecranon. No erythema or purulence. Nontender to palpation.  NEURO: Alert and oriented x3.  No focal motor abnormalities.  Face symmetric.  PSYCH: Appropriate affect.  SKIN: No rashes, bruising, or other lesions.    Pertinent Labs and Pertinent Radiology   Recent Labs   Lab 11/09/21  0646   WBC 6.8   HGB 7.8*   HCT 26.5*   PLT 53*     Recent Labs   Lab 11/09/21  0646 11/08/21  1326    139   CO2 24 23   BUN 80* 77*   ALBUMIN  --  2.3*   ALKPHOS  --  160*   ALT  --  <9   AST  --  43*     Radiology Results:   Results for orders placed or performed during the hospital encounter of 11/08/21   Chest XR,  PA & LAT    Impression    IMPRESSION: Right hydropneumothorax is again seen. Fluid has increased from the prior study. Enlargement of the aorta is again seen.      Precepted patient with Dr. Gustavo Virk MD  [facility], PGY-1  Pager #: [phone]

## 2021-11-09 NOTE — PLAN OF CARE
Problem: Adult Inpatient Plan of Care  Goal: Plan of Care Review  Outcome: Improving  Flowsheets (Taken 11/9/2021 1202)  Plan of Care Reviewed With: patient  Progress: no change   A/O x4    VSS    K+ 6.5, received IV insulin, dextrose, monitoring blood sugars, recheck K+ at 1300.  Creat 13.15, pt missed dialysis yesterday, will have dialysis this afternoon.    Dr. Reynaga consulted. Plan for Chest CT this afternoon. Pt will potentially need surgery.    Up with SBA and cane.

## 2021-11-09 NOTE — PLAN OF CARE
Problem: Gas Exchange Impaired  Goal: Optimal Gas Exchange  Intervention: Optimize Oxygenation and Ventilation  Recent Flowsheet Documentation  Taken 11/8/2021 2128 by Elizabet Bell, RN  Head of Bed (HOB) Positioning: HOB at 30 degrees  Lungs faint crackles, diminished. SOB with exertion.      Problem: Adult Inpatient Plan of Care  Goal: Absence of Hospital-Acquired Illness or Injury  Intervention: Identify and Manage Fall Risk  Recent Flowsheet Documentation  Taken 11/8/2021 2158 by Elizabet Bell, RN  Safety Promotion/Fall Prevention:    nonskid shoes/slippers when out of bed    safety round/check completed     Steady on feet when standing. SBA. Calls appropriately.     Sutures present on left elbow. Serous drainage moderate amount. Dressing changed.

## 2021-11-09 NOTE — PROVIDER NOTIFICATION
Cardiovascular surgeon called and said patient needs to see a Neurothoracic surgeon for empyema. Resident paged.

## 2021-11-09 NOTE — PROGRESS NOTES
HEMODIALYSIS TREATMENT NOTE    Date: 11/9/2021  Time: 6.15 PM    Data:  Pre Wt:  68.9   Desired Wt: 64.9 kg   Post Wt: 65.7kg (Estimated)   Weight change: 3.2  kg  Ultrafiltration - Post Run Net Total Removed (mL):  3200 mL  Vascular Access Status: Graft  patent  Dialyzer Rinse: Streaked . Light   Total Blood Volume Processed:61.7 L    Total Dialysis (Treatment) Time:   3.0 hours  Dialysate Bath: K 2, Ca 2.5  Heparin: None    Lab:   No    Assessment / Interventions: 3.0 hours HD via LAVG thrill & bruit present. 2 16g needle cannulated successful.  with good flow. 25% Albumin 50 mls was given to support blood pressure. UF according to pt hemodynamic status 3.2kg UF removed without complication. Pt completed his treatment time, blood rinsed back, Graft hemostasis achieved in less than 20 minutes & hand off report given.         Plan:    Per Renal Team.

## 2021-11-09 NOTE — CONSULTS
RENAL CONSULT NOTE    REQUESTING PHYSICIAN: Dr Virk     REASON FOR CONSULT: ESRD on HD     ASSESSMENT/PLAN:  ESRD on HD: [facility] under the care of Dr. Hill.  Treatment time 3 hours.  Left AVG.  Runs Monday and Friday, frequently noncompliant with dialysis schedule. Missed dialysis 11/8/2021. Last hemodialysis treatment 11/5/2021. Posttreatment weight 63.8 kg.  Current EDW 61.0 kg.  Plan for dialysis today given hyperkalemia and hypervolemia.  Will determine need for dialysis tomorrow in the morning.    HTN: Resume PTA antihypertensives.  UF with HD.    Volume: Current outpatient EDW 61.0 kg.  Most recent weight here 68.3 kg.  UF as tolerated with HD.  Typically does not tolerate high UF.    CKD MBD: Calcium acetate with meals for binders.    Lytes/acid/base: Hyperkalemia likely secondary to ESRD and missed hemodialysis.  No EKG changes.  Was shifted in the ED.  Plan for hemodialysis today K2 bath.  Electrolyte profile tomorrow.  Plan for dialysis again tomorrow and continue normal schedule thereafter.    Anemia: Hemoglobin 7.8 today. Anemia chronic disease/inflammatory.  On chronic Epogen 12,000 units with dialysis typically Mondays, and Fridays. Dose EPO here.    GERD: Taking pantoprazole    Empyema: Likely due to bacteremia from septic bursitis.  Multiple admissions for this.  Most recently discharged on IV cefazolin with outpatient hemodialysis.  Previously refused surgery, but now would consider.  Dr. Reynaga following and discussing possible surgical intervention.    Left olecranon bursitis s/p I&D on 10/15/2021: Ortho will follow    AAA with thoracic aortic dissection: PTA Coreg, diltiazem, clonidine        HPI:   Elle Blanton is a 61 year old male with multiple comorbidities including AAA (5.5 cm), chronic thoracic aortic dissection, cirrhosis secondary to chronic hepatitis B, ESRD on hemodialysis (M/F via L AVF), recently admitted for septic bursitis of left elbow 10/14-10/17 and again  with MSSA empyema 10/19-10/27 thought to be sequelae of bacteremia from septic bursitis, discharged on outpatient IV cefazolin MWF at HD as he had denied surgical intervention.  Patient admitted again 11/8/2021 with worsening SOB secondary to the above, now amendable to CT surgical intervention.    Feeling quite short of breath still  Worse with exertion  No nausea vomiting  Appetite a bit decreased  Makes a scant amount of urine  No dysuria or gross hematuria  Agrees to dialysis today  We discussed that we may need to run him again tomorrow pending labs, and volume status  He hopes that he is not after on tomorrow, but I told him we will discuss this in the morning.        REVIEW OF SYSTEMS:  Complete 12 point review of systems was negative other than those noted in the HPI      Past Medical History:   Diagnosis Date     NAHUM (acute kidney injury) (H)      GI (gastrointestinal bleed)      Gout      H. pylori infection 7/5/2017    UGI bleed implied by Hgb 9.0 6/22/17 and melena. Admitted and hgb decreased to 7.6, CT abdomen showed all bladder wall thickening. + Hep B surface antigen noted. Melena resolved and hgb stabalized without transfusion, epigastric pain resolved with PPI. Recommend referral for gastroscopy.     Heart attack (H)      Hepatitis B      Normocytic anemia      PONV (postoperative nausea and vomiting)      Thrombocytopenia (H)        No current facility-administered medications on file prior to encounter.  acetaminophen (TYLENOL) 500 MG tablet, Take 500 mg by mouth every 6 hours as needed for mild pain  calcium acetate (PHOSLO) 667 MG CAPS capsule, Take 1 capsule by mouth 3 times daily (with meals)  carvedilol (COREG) 25 MG tablet, Take 25 mg by mouth 2 times daily  ceFAZolin (ANCEF) 1 GM vial, Inject 2 g into the vein once a week (Patient taking differently: Inject 2-3 g into the vein three times a week 2 g Monday, 2 g Wednesday, 3 g Friday per ID note)  cloNIDine (CATAPRES) 0.1 MG tablet, Take 0.1  mg by mouth At Bedtime   diltiazem ER (DILT-XR) 180 MG 24 hr capsule, Take 180 mg by mouth 2 times daily   entecavir (BARACLUDE) 0.5 MG tablet, Take 1 tablet (0.5 mg) by mouth every 7 days (Patient taking differently: Take 0.5 mg by mouth every 7 days Sundays)  pantoprazole (PROTONIX) 40 MG EC tablet, Take 1 tablet (40 mg) by mouth daily  polyethylene glycol (MIRALAX) 17 GM/Dose powder, Take 17 g by mouth daily (Patient taking differently: Take 17 g by mouth daily as needed )  zolpidem (AMBIEN) 5 MG tablet, Take 1 tablet (5 mg) by mouth nightly as needed for sleep  hydrOXYzine (ATARAX) 25 MG tablet, Take 1 tablet (25 mg) by mouth 3 times daily as needed for itching  order for DME, Equipment being ordered: Other: blood pressure monitor  Treatment Diagnosis: hypertension        No current outpatient medications on file.      ALLERGIES/SENSITIVITIES:  Allergies   Allergen Reactions     Nka [No Known Allergies]      Social History     Tobacco Use     Smoking status: Never Smoker     Smokeless tobacco: Never Used   Substance Use Topics     Alcohol use: No     Drug use: No     I have reviewed this patient's family history and updated it with pertinent information if needed.  Family History   Problem Relation Age of Onset     Diabetes Father      Hypertension No family hx of      Asthma No family hx of      Cancer No family hx of      Coronary Artery Disease No family hx of      Liver Cancer No family hx of      Ulcers Father          PHYSICAL EXAM:  Physical Exam   Temp: 98.2  F (36.8  C) Temp src: Oral BP: (!) 151/85 Pulse: 78   Resp: 19 SpO2: 96 % O2 Device: None (Room air)    Vitals:    11/08/21 1046 11/08/21 2128   Weight: 63 kg (138 lb 14.2 oz) 68.9 kg (152 lb)     Vital Signs with Ranges  Temp:  [97.8  F (36.6  C)-98.6  F (37  C)] 98.2  F (36.8  C)  Pulse:  [74-87] 78  Resp:  [16-55] 19  BP: (123-174)/() 151/85  SpO2:  [95 %-98 %] 96 %  No intake/output data recorded.    Patient Vitals for the past 72 hrs:    Weight   11/08/21 2128 68.9 kg (152 lb)   11/08/21 1046 63 kg (138 lb 14.2 oz)       General: Alert, NAD  HENT: Supple, Non-tender, no obvious JVD  Eyes: No scleral icterus  Cardiovascular: RRR, no rub, gallop, or murmur.  Left upper extremity edema.  Respiratory: Slightly diminished, otherwise good air exchange.  Normal effort.  Gastrointestinal: Soft, non-tender, non-distended  Musculoskeletal: Left elbow and upper extremity swollen, several simple interrupted sutures from past surgery on left elbow, no surrounding erythema or exudates.  Integumentary: Warm, dry, no rash  Neurologic: AO, sensation intact  Psychiatric: Cooperative, appropriate mood and affect  Access: Left lower AVG with palpable thrill and audible bruit    Laboratory:     Recent Labs   Lab 11/09/21  0646 11/08/21  1326   WBC 6.8 7.1   RBC 2.60* 3.05*   HGB 7.8* 9.2*   HCT 26.5* 31.4*   PLT 53* 71*       Basic Metabolic Panel:  Recent Labs   Lab 11/09/21  1051 11/09/21  0646 11/08/21  1326   NA  --  139 139   POTASSIUM  --  6.5* 5.9*   CHLORIDE  --  107 106   CO2  --  24 23   BUN  --  80* 77*   CR  --  13.15* 12.60*   * 74 87   TANO  --  7.7* 8.0*       INRNo lab results found in last 7 days.    Recent Labs   Lab Test 11/09/21  0646 11/08/21  1326   POTASSIUM 6.5* 5.9*   CHLORIDE 107 106   BUN 80* 77*      Recent Labs   Lab Test 11/08/21  1326 10/19/21  0940 12/30/20  0830 10/21/20  1032 08/20/20  0728 08/19/20  1701 08/07/19  0344 08/06/19  2349   ALBUMIN 2.3* 2.3*   < > 3.0*   < >  --    < >  --    BILITOTAL 0.8 1.0   < > 0.6   < >  --    < >  --    BILIDIRECT  --   --   --  0.2  --   --   --   --    ALT <9 <9   < >  --    < >  --    < >  --    AST 43* 29   < >  --    < >  --    < >  --    PROTEIN  --   --   --   --   --  Negative  --  100*    < > = values in this interval not displayed.       Personally reviewed today's laboratory studies      Thank you for involving us in the care of this patient. We will continue to follow along with  you.      Tesfaye Wynne  [facility]  [phone]

## 2021-11-09 NOTE — CONSULTS
[facility] Orthopedic Consultation    Elle Blanton MRN# [id number]   Age: 61 year old YOB: 1960     Date of Admission:  11/8/2021    Reason for consult:  Left elbow surgery follow-up.       Requesting physician: Dr. Virk       Level of consult: One-time consult to assist in determining a diagnosis and to recommend an appropriate treatment plan           Assessment and Plan:   Assessment:   Healed left elbow surgery, status post debridement of olecranon bursa on 10/15/2021.      Plan:   The patient's left elbow surgery is healing well.  There is no swelling or drainage.  There is no further evidence of infection of the elbow.  The sutures may be removed at any time.  The patient may follow-up on a as needed basis.            Chief Complaint:   Left elbow excision of the olecranon bursa on 10/15/2021 by Dr. Myers.       History is obtained from the patient         History of Present Illness:   This patient is a 61 year old male who presents with the following condition requiring a hospital admission:      The patient underwent debridement of the left elbow with excision of the olecranon bursa by Dr. Myres on 10/15/2021.  The elbow surgery has been healing.  He denies any significant pain in the elbow.  His main complaint is shortness of breath, due to his empyema.             Past Medical History:   I have reviewed this patient's past medical history          Past Surgical History:   I have reviewed this patient's past surgical history          Social History:   I have reviewed this patient's social history          Family History:   I have reviewed this patient's family history          Immunizations:   Immunization status is unknown          Allergies:     Allergies   Allergen Reactions     Nka [No Known Allergies]              Medications:     Current Facility-Administered Medications   Medication     0.9% sodium chloride BOLUS     0.9% sodium chloride BOLUS     0.9%  sodium chloride BOLUS     acetaminophen (TYLENOL) tablet 650 mg    Or     acetaminophen (TYLENOL) Suppository 650 mg     albumin human 25 % injection 50 mL     calcium acetate (PHOSLO) capsule 667 mg     carvedilol (COREG) tablet 25 mg     [START ON 11/10/2021] ceFAZolin (ANCEF) intermittent infusion 2 g in 100 mL dextrose PRE-MIX     [START ON 11/12/2021] ceFAZolin (ANCEF) intermittent infusion 3 g (pre-mix)     cloNIDine (CATAPRES) tablet 0.1 mg     dextrose 10% infusion     glucose gel 15-30 g    Or     dextrose 50 % injection 25-50 mL    Or     glucagon injection 1 mg     diltiazem ER COATED BEADS (CARDIZEM CD/CARTIA XT) 24 hr capsule 180 mg     [START ON 11/14/2021] entecavir (BARACLUDE) tablet 0.5 mg     epoetin kelton-epbx (RETACRIT) injection 12,000 Units     lidocaine (LMX4) cream     lidocaine 1 % 0.1-1 mL     lidocaine 1 % 0.5 mL     lidocaine 1 % 0.5 mL     melatonin tablet 3 mg     No heparin via hemodialysis machine     pantoprazole (PROTONIX) EC tablet 40 mg     sodium chloride (PF) 0.9% PF flush 3 mL     sodium chloride (PF) 0.9% PF flush 3 mL     Stop Heparin 60 minutes before end of treatment     zolpidem (AMBIEN) tablet 5 mg             Review of Systems:   CONSTITUTIONAL: NEGATIVE for fever, chills, change in weight  ENT/MOUTH: NEGATIVE for ear, mouth and throat problems  RESP: Positive for significant SOB  CV: NEGATIVE for chest pain, palpitations or peripheral edema          Physical Exam:   Vitals were reviewed  Temp: 97.9  F (36.6  C) Temp src: Oral BP: 124/70 Pulse: 76   Resp: 19 SpO2: 96 % O2 Device: None (Room air)    All vitals have been reviewed  The patient's left elbow incision is healing well.  There is no erythema or evidence of infection.  There are still monofilament sutures in place.  There is no tenderness over the elbow.  There is no swelling or fluctuance.  There is no drainage.          Data:   All laboratory data reviewed  All imaging studies reviewed by me.     Attestation:  I  have reviewed today's vital signs, notes, medications, labs and imaging.    Dom Coon MD

## 2021-11-09 NOTE — PROVIDER NOTIFICATION
SHANNON Joy from cardiothoracic surgery returned page for the consult.  She stated that [facility] typically does not do the VATs procedure and the [facility] does.  She provided the number to the [facility] transfer center/call line to follow up.  Number is: [phone]

## 2021-11-09 NOTE — PLAN OF CARE
Problem: Adult Inpatient Plan of Care  Goal: Optimal Comfort and Wellbeing  Outcome: Improving     Problem: Gas Exchange Impaired  Goal: Optimal Gas Exchange  Outcome: Improving  Intervention: Optimize Oxygenation and Ventilation     Pt AOx4, calls appropriately for assistance.  O2 sat 97% room air, pt reports shortness of breath with exertion.  Coban intact to left elbow.  Tele NSR.  Denies pain. NPO for potential procedure.

## 2021-11-09 NOTE — ED NOTES
"[facility] ED Handoff Report    ED Chief Complaint: shortness of breath and left elbow drainage    ED Diagnosis:  (J94.8) Hydropneumothorax  Comment: Not on oxygen  Plan: Admission, surgery consult    (E87.5) Hyperkalemia  Comment: Will need dialysis  Plan: Dialysis per order    (N18.6,  Z99.2) ESRD (end stage renal disease) on dialysis (H)  Comment: Need dialysis  Plan: Dialyze per order       PMH:    Past Medical History:   Diagnosis Date     NAHUM (acute kidney injury) (H)      GI (gastrointestinal bleed)      Gout      H. pylori infection 7/5/2017    UGI bleed implied by Hgb 9.0 6/22/17 and melena. Admitted and hgb decreased to 7.6, CT abdomen showed all bladder wall thickening. + Hep B surface antigen noted. Melena resolved and hgb stabalized without transfusion, epigastric pain resolved with PPI. Recommend referral for gastroscopy.     Heart attack (H)      Hepatitis B      Normocytic anemia      PONV (postoperative nausea and vomiting)      Thrombocytopenia (H)         Code Status:  Full Code     Falls Risk: Yes Band: Applied    Current Living Situation/Residence: lives with a significant other     Elimination Status: Continent: Yes     Activity Level: SBA , use cane at home.    Patients Preferred Language:  Other: Hmong & English     Needed: No    Vital Signs:  BP (!) 154/96   Pulse 85   Temp 97.6  F (36.4  C)   Resp 16   Ht 1.651 m (5' 5\")   Wt 63 kg (138 lb 14.2 oz)   SpO2 96%   BMI 23.11 kg/m       Cardiac Rhythm: Sinus rhythm    Pain Score: 0/10    Is the Patient Confused:  No    Last Food or Drink: 11/08/21    Focused Assessment:  Left elbow incision redressed. C/D/I.    Tests Performed: Done: Labs    Treatments Provided:  HS medication given    Family Dynamics/Concerns: No    Family Updated On Visitor Policy: Yes, spouse at bedside.    Plan of Care Communicated to Family: Yes    Who Was Updated about Plan of Care: Spouse at bedside    Belongings Checklist Done and Signed by " Patient: Yes    Covid: asymptomatic , negative    Additional Information: None    RN: Agatha Blanton 11/8/2021 8:50 PM

## 2021-11-09 NOTE — PROGRESS NOTES
Saw patient, removed sutures from his left elbow without difficulty. There was mild serous drainage from posterior elbow. Steri-strips were applied to the area of drainage and then wrapped with Kerlix and Coban. May remove dressing at any time and rewrap if drainage continues. Will return tomorrow to reevaluate drainage.    Wendie Zheng PA-C on 11/9/2021 at 2:41 PM

## 2021-11-09 NOTE — PROGRESS NOTES
"RENAL  Seen at beginning of dialysis.  He notes how swollen his arm is.  He has traditionally associated this with \"too much\" dialysis although I've tried to make the point that when he is 7-8kg overloaded he clearly has more edema.  Agreeable to dialysis today.  Will assess for needs tomorrow, he is highly reluctant to dialyze two days in a row.    Nate Hill  [facility]  [phone]    "

## 2021-11-09 NOTE — CONSULTS
GENERAL SURGERY CONSULTATION    Elle Blanton  [facility]  Medical Record #:  [id number]  YOB: 1960  Age:  61 year old     Date of Consultation: 11/9/2021    Reason for Consultation: Empyema    Elle Blanton is a 61 year old male who presents with a history of pneumonia and empyema.  The patient is well-known to me as I took care of him during his last hospitalization when on multiple occasions over multiple days he refused adamantly any major surgical intervention.  My understanding is he has returned with shortness of breath and his admission notes are reviewed.  Only checks x-ray has been completed and CAT scan will be completed at this time.  There are intervention will be determined by the status of his CAT scan and his clinical status.    PHH:    Past Medical History:   Diagnosis Date     NAHUM (acute kidney injury) (H)      GI (gastrointestinal bleed)      Gout      H. pylori infection 7/5/2017    UGI bleed implied by Hgb 9.0 6/22/17 and melena. Admitted and hgb decreased to 7.6, CT abdomen showed all bladder wall thickening. + Hep B surface antigen noted. Melena resolved and hgb stabalized without transfusion, epigastric pain resolved with PPI. Recommend referral for gastroscopy.     Heart attack (H)      Hepatitis B      Normocytic anemia      PONV (postoperative nausea and vomiting)      Thrombocytopenia (H)         Past Surgical History:   Procedure Laterality Date     ABCESS DRAINAGE      finger     BURSECTOMY ELBOW Left 10/15/2021    Procedure: LEFT OLECRANON BURSECTOMY;  Surgeon: Tico Myers MD;  Location: [facility]     CREATE FISTULA ARTERIOVENOUS UPPER EXTREMITY Left 4/20/2021    Procedure: left arm dialysis graft placement;  Surgeon: Roxana Cosby MD;  Location: [facility];  Service: General     FOREIGN BODY REMOVAL      finger     INCISION AND DRAINAGE FINGER, COMBINED Left 10/20/2016    Procedure: COMBINED INCISION AND DRAINAGE FINGER;  Surgeon: Mireya Pizano  MD Katelyn;  Location: [place] OR     IR CHEST TUBE PLACEMENT NON-TUNNELED RIGHT  4/19/2021     IR CHEST TUBE PLACEMENT NON-TUNNELED RIGHT  10/19/2021     IR PLEURAL DRAINAGE WITH CATHETER INSERTION  4/19/2021     MIDLINE INSERTION - DOUBLE LUMEN  4/23/2021          WI ESOPHAGOGASTRODUODENOSCOPY TRANSORAL DIAGNOSTIC N/A 8/18/2020    Procedure: ESOPHAGOGASTRODUODENOSCOPY (EGD) with biopsy;  Surgeon: Nilson Gonzales MD;  Location: [facility];  Service: Gastroenterology      PARACENTESIS  8/14/2020      PARACENTESIS  8/19/2020     US THORACENTESIS  4/18/2021       ALLERGIES:  Nka [no known allergies]    MEDS:    Current Facility-Administered Medications:      0.9% sodium chloride BOLUS, 250 mL, Intravenous, Once in dialysis/CRRT, Nate Hill MD     0.9% sodium chloride BOLUS, 300 mL, Hemodialysis Machine, Once, Nate Hill MD     0.9% sodium chloride BOLUS, 100-150 mL, Intravenous, Q15 Min PRN, Nate Hill MD     acetaminophen (TYLENOL) tablet 650 mg, 650 mg, Oral, Q6H PRN, 650 mg at 11/09/21 0835 **OR** acetaminophen (TYLENOL) Suppository 650 mg, 650 mg, Rectal, Q6H PRN, Christy Virk MD     albumin human 25 % injection 50 mL, 50 mL, Intravenous, Q1H PRN, Nate Hill MD     calcium acetate (PHOSLO) capsule 667 mg, 667 mg, Oral, TID w/meals, Christy Virk MD, 667 mg at 11/09/21 0836     carvedilol (COREG) tablet 25 mg, 25 mg, Oral, BID, Christy Virk MD, 25 mg at 11/09/21 0836     [START ON 11/10/2021] ceFAZolin (ANCEF) intermittent infusion 2 g in 100 mL dextrose PRE-MIX, 2 g, Intravenous, Once per day on Mon Wed, Nash Solomon MD     [START ON 11/12/2021] ceFAZolin (ANCEF) intermittent infusion 3 g (pre-mix), 3 g, Intravenous, Weekly, Nash Solomon MD     cloNIDine (CATAPRES) tablet 0.1 mg, 0.1 mg, Oral, At Bedtime, Christy Virk MD, 0.1 mg at 11/08/21 2235     dextrose 10% infusion, , Intravenous, Continuous, Anuj Caraballo MD, Last Rate: 75 mL/hr at  11/09/21 1054, New Bag at 11/09/21 1054     glucose gel 15-30 g, 15-30 g, Oral, Q15 Min PRN **OR** dextrose 50 % injection 25-50 mL, 25-50 mL, Intravenous, Q15 Min PRN **OR** glucagon injection 1 mg, 1 mg, Subcutaneous, Q15 Min PRN, Anuj Caraballo MD     diltiazem ER COATED BEADS (CARDIZEM CD/CARTIA XT) 24 hr capsule 180 mg, 180 mg, Oral, BID, Nash Solomon MD, 180 mg at 11/09/21 0836     [START ON 11/14/2021] entecavir (BARACLUDE) tablet 0.5 mg, 0.5 mg, Oral, Q7 Days, Christy Virk MD     lidocaine (LMX4) cream, , Topical, Q1H PRN, Christy Virk MD     lidocaine 1 % 0.1-1 mL, 0.1-1 mL, Other, Q1H PRN, Christy Virk MD     lidocaine 1 % 0.5 mL, 0.5 mL, Intradermal, Once PRN, Nate Hill MD     lidocaine 1 % 0.5 mL, 0.5 mL, Intradermal, Once PRN, Nate Hill MD     melatonin tablet 3 mg, 3 mg, Oral, At Bedtime PRN, Christy Virk MD     No heparin via hemodialysis machine, , Does not apply, Once, Nate Hill MD     pantoprazole (PROTONIX) EC tablet 40 mg, 40 mg, Oral, Daily, Christy Virk MD, 40 mg at 11/09/21 0836     sodium chloride (PF) 0.9% PF flush 3 mL, 3 mL, Intracatheter, Q8H, Christy Virk MD, 3 mL at 11/09/21 1054     sodium chloride (PF) 0.9% PF flush 3 mL, 3 mL, Intracatheter, q1 min prn, Christy Virk MD     Stop Heparin 60 minutes before end of treatment, , Does not apply, Continuous PRN, Nate Hill MD     zolpidem (AMBIEN) tablet 5 mg, 5 mg, Oral, At Bedtime PRN, Christy Virk MD, 5 mg at 11/09/21 0034    SOCIAL HABITS:    History   Smoking Status     Never Smoker   Smokeless Tobacco     Never Used     Social History    Substance and Sexual Activity      Alcohol use: No      History   Drug Use No       FAMILY HISTORY:    Family History   Problem Relation Age of Onset     Diabetes Father      Hypertension No family hx of      Asthma No family hx of      Cancer No family hx of      Coronary Artery Disease No family hx of       "Liver Cancer No family hx of      Ulcers Father        REVIEW OF SYSTEMS: Noted and reviewed    PE:    BP (!) 151/85 (BP Location: Left arm)   Pulse 78   Temp 98.2  F (36.8  C) (Oral)   Resp 19   Ht 1.651 m (5' 5\")   Wt 68.9 kg (152 lb)   SpO2 96%   BMI 25.29 kg/m      HEENT: Noted  Chest: Benign  Lungs: Marked decreased breath sounds right side mild discomfort deep inspiration  Heart: Heart rate and rhythm noted  Abd: Benign  Ext: Left elbow dressing noted.  Vascular:     LABS:    Recent Labs   Lab 11/09/21  0646      CO2 24   BUN 80*      Recent Labs   Lab 11/09/21  0646   WBC 6.8   HGB 7.8*   HCT 26.5*   PLT 53*      Recent Labs   Lab 11/08/21  1326   ALKPHOS 160*   ALT <9   AST 43*     No results for input(s): AMYLASE in the last 168 hours.  No results for input(s): INR in the last 168 hours.    XRAYS: Chest x-ray noted.  Will obtain CT scan since it was not completed yet after admission    ASSESSMENT: Empyema potential need for surgical intervention.  Await CT report.    PLAN: As above    Dawson joshua md  [facility]  "

## 2021-11-10 ENCOUNTER — ANESTHESIA (OUTPATIENT)
Dept: MEDSURG UNIT | Facility: HOSPITAL | Age: 61
End: 2021-11-10

## 2021-11-10 ENCOUNTER — APPOINTMENT (OUTPATIENT)
Dept: INTERVENTIONAL RADIOLOGY/VASCULAR | Facility: HOSPITAL | Age: 61
DRG: 186 | End: 2021-11-10
Attending: NURSE PRACTITIONER
Payer: COMMERCIAL

## 2021-11-10 ENCOUNTER — ANESTHESIA EVENT (OUTPATIENT)
Dept: MEDSURG UNIT | Facility: HOSPITAL | Age: 61
End: 2021-11-10

## 2021-11-10 LAB
ANION GAP SERPL CALCULATED.3IONS-SCNC: 10 MMOL/L (ref 5–18)
BUN SERPL-MCNC: 39 MG/DL (ref 8–22)
CALCIUM SERPL-MCNC: 7.6 MG/DL (ref 8.5–10.5)
CHLORIDE BLD-SCNC: 101 MMOL/L (ref 98–107)
CO2 SERPL-SCNC: 26 MMOL/L (ref 22–31)
CREAT SERPL-MCNC: 8.16 MG/DL (ref 0.7–1.3)
ERYTHROCYTE [DISTWIDTH] IN BLOOD BY AUTOMATED COUNT: 17.5 % (ref 10–15)
GFR SERPL CREATININE-BSD FRML MDRD: 6 ML/MIN/1.73M2
GLUCOSE BLD-MCNC: 87 MG/DL (ref 70–125)
GLUCOSE BLDC GLUCOMTR-MCNC: 85 MG/DL (ref 70–99)
HCT VFR BLD AUTO: 25.3 % (ref 40–53)
HGB BLD-MCNC: 7.6 G/DL (ref 13.3–17.7)
MCH RBC QN AUTO: 30 PG (ref 26.5–33)
MCHC RBC AUTO-ENTMCNC: 30 G/DL (ref 31.5–36.5)
MCV RBC AUTO: 100 FL (ref 78–100)
PLATELET # BLD AUTO: 43 10E3/UL (ref 150–450)
POTASSIUM BLD-SCNC: 4.3 MMOL/L (ref 3.5–5)
RBC # BLD AUTO: 2.53 10E6/UL (ref 4.4–5.9)
SODIUM SERPL-SCNC: 137 MMOL/L (ref 136–145)
WBC # BLD AUTO: 6 10E3/UL (ref 4–11)

## 2021-11-10 PROCEDURE — 250N000013 HC RX MED GY IP 250 OP 250 PS 637

## 2021-11-10 PROCEDURE — 250N000011 HC RX IP 250 OP 636: Performed by: FAMILY MEDICINE

## 2021-11-10 PROCEDURE — C1769 GUIDE WIRE: HCPCS

## 2021-11-10 PROCEDURE — 99233 SBSQ HOSP IP/OBS HIGH 50: CPT | Performed by: INTERNAL MEDICINE

## 2021-11-10 PROCEDURE — 272N000569 HC SHEATH CR6

## 2021-11-10 PROCEDURE — 250N000013 HC RX MED GY IP 250 OP 250 PS 637: Performed by: STUDENT IN AN ORGANIZED HEALTH CARE EDUCATION/TRAINING PROGRAM

## 2021-11-10 PROCEDURE — 272N000500 HC NEEDLE CR2

## 2021-11-10 PROCEDURE — 90935 HEMODIALYSIS ONE EVALUATION: CPT

## 2021-11-10 PROCEDURE — 250N000011 HC RX IP 250 OP 636: Performed by: NURSE PRACTITIONER

## 2021-11-10 PROCEDURE — 99152 MOD SED SAME PHYS/QHP 5/>YRS: CPT

## 2021-11-10 PROCEDURE — 87075 CULTR BACTERIA EXCEPT BLOOD: CPT | Performed by: RADIOLOGY

## 2021-11-10 PROCEDURE — 258N000003 HC RX IP 258 OP 636: Performed by: INTERNAL MEDICINE

## 2021-11-10 PROCEDURE — 99232 SBSQ HOSP IP/OBS MODERATE 35: CPT | Mod: GC

## 2021-11-10 PROCEDURE — 36415 COLL VENOUS BLD VENIPUNCTURE: CPT

## 2021-11-10 PROCEDURE — 250N000009 HC RX 250: Performed by: NURSE PRACTITIONER

## 2021-11-10 PROCEDURE — 85027 COMPLETE CBC AUTOMATED: CPT

## 2021-11-10 PROCEDURE — 87070 CULTURE OTHR SPECIMN AEROBIC: CPT | Performed by: RADIOLOGY

## 2021-11-10 PROCEDURE — C1729 CATH, DRAINAGE: HCPCS

## 2021-11-10 PROCEDURE — 250N000013 HC RX MED GY IP 250 OP 250 PS 637: Performed by: FAMILY MEDICINE

## 2021-11-10 PROCEDURE — 120N000001 HC R&B MED SURG/OB

## 2021-11-10 PROCEDURE — 0W9930Z DRAINAGE OF RIGHT PLEURAL CAVITY WITH DRAINAGE DEVICE, PERCUTANEOUS APPROACH: ICD-10-PCS | Performed by: RADIOLOGY

## 2021-11-10 PROCEDURE — 80048 BASIC METABOLIC PNL TOTAL CA: CPT

## 2021-11-10 PROCEDURE — 32557 INSERT CATH PLEURA W/ IMAGE: CPT

## 2021-11-10 RX ORDER — NALOXONE HYDROCHLORIDE 0.4 MG/ML
0.2 INJECTION, SOLUTION INTRAMUSCULAR; INTRAVENOUS; SUBCUTANEOUS
Status: DISCONTINUED | OUTPATIENT
Start: 2021-11-10 | End: 2021-01-01 | Stop reason: HOSPADM

## 2021-11-10 RX ORDER — HYDROMORPHONE HCL IN WATER/PF 6 MG/30 ML
0.2 PATIENT CONTROLLED ANALGESIA SYRINGE INTRAVENOUS EVERY 5 MIN PRN
Status: CANCELLED | OUTPATIENT
Start: 2021-11-10

## 2021-11-10 RX ORDER — NALOXONE HYDROCHLORIDE 0.4 MG/ML
0.4 INJECTION, SOLUTION INTRAMUSCULAR; INTRAVENOUS; SUBCUTANEOUS
Status: DISCONTINUED | OUTPATIENT
Start: 2021-11-10 | End: 2021-01-01 | Stop reason: HOSPADM

## 2021-11-10 RX ORDER — OXYCODONE HYDROCHLORIDE 5 MG/1
5 TABLET ORAL EVERY 4 HOURS PRN
Status: CANCELLED | OUTPATIENT
Start: 2021-11-10

## 2021-11-10 RX ORDER — ALBUMIN (HUMAN) 12.5 G/50ML
50 SOLUTION INTRAVENOUS
Status: DISCONTINUED | OUTPATIENT
Start: 2021-11-10 | End: 2021-01-01 | Stop reason: HOSPADM

## 2021-11-10 RX ORDER — ONDANSETRON 4 MG/1
4 TABLET, ORALLY DISINTEGRATING ORAL EVERY 30 MIN PRN
Status: CANCELLED | OUTPATIENT
Start: 2021-11-10

## 2021-11-10 RX ORDER — LIDOCAINE 40 MG/G
CREAM TOPICAL
Status: CANCELLED | OUTPATIENT
Start: 2021-11-10

## 2021-11-10 RX ORDER — SODIUM CHLORIDE, SODIUM LACTATE, POTASSIUM CHLORIDE, CALCIUM CHLORIDE 600; 310; 30; 20 MG/100ML; MG/100ML; MG/100ML; MG/100ML
INJECTION, SOLUTION INTRAVENOUS CONTINUOUS
Status: CANCELLED | OUTPATIENT
Start: 2021-11-10

## 2021-11-10 RX ORDER — ONDANSETRON 2 MG/ML
4 INJECTION INTRAMUSCULAR; INTRAVENOUS EVERY 6 HOURS PRN
Status: DISCONTINUED | OUTPATIENT
Start: 2021-11-10 | End: 2021-01-01 | Stop reason: HOSPADM

## 2021-11-10 RX ORDER — FENTANYL CITRATE 50 UG/ML
25 INJECTION, SOLUTION INTRAMUSCULAR; INTRAVENOUS EVERY 5 MIN PRN
Status: CANCELLED | OUTPATIENT
Start: 2021-11-10

## 2021-11-10 RX ORDER — FENTANYL CITRATE 50 UG/ML
25-50 INJECTION, SOLUTION INTRAMUSCULAR; INTRAVENOUS EVERY 5 MIN PRN
Status: DISCONTINUED | OUTPATIENT
Start: 2021-11-10 | End: 2021-01-01

## 2021-11-10 RX ORDER — ONDANSETRON 2 MG/ML
4 INJECTION INTRAMUSCULAR; INTRAVENOUS EVERY 30 MIN PRN
Status: CANCELLED | OUTPATIENT
Start: 2021-11-10

## 2021-11-10 RX ORDER — FLUMAZENIL 0.1 MG/ML
0.2 INJECTION, SOLUTION INTRAVENOUS
Status: DISCONTINUED | OUTPATIENT
Start: 2021-11-10 | End: 2021-01-01 | Stop reason: HOSPADM

## 2021-11-10 RX ORDER — OXYCODONE HYDROCHLORIDE 5 MG/1
5 TABLET ORAL EVERY 6 HOURS PRN
Status: DISCONTINUED | OUTPATIENT
Start: 2021-11-10 | End: 2021-01-01 | Stop reason: HOSPADM

## 2021-11-10 RX ADMIN — CARVEDILOL 25 MG: 12.5 TABLET, FILM COATED ORAL at 22:40

## 2021-11-10 RX ADMIN — FENTANYL CITRATE 50 MCG: 50 INJECTION, SOLUTION INTRAMUSCULAR; INTRAVENOUS at 15:15

## 2021-11-10 RX ADMIN — CLONIDINE HYDROCHLORIDE 0.1 MG: 0.1 TABLET ORAL at 22:41

## 2021-11-10 RX ADMIN — FENTANYL CITRATE 25 MCG: 50 INJECTION, SOLUTION INTRAMUSCULAR; INTRAVENOUS at 15:19

## 2021-11-10 RX ADMIN — ACETAMINOPHEN 650 MG: 325 TABLET ORAL at 11:47

## 2021-11-10 RX ADMIN — PANTOPRAZOLE SODIUM 40 MG: 20 TABLET, DELAYED RELEASE ORAL at 09:16

## 2021-11-10 RX ADMIN — CEFAZOLIN SODIUM 2 G: 2 INJECTION, SOLUTION INTRAVENOUS at 21:44

## 2021-11-10 RX ADMIN — CALCIUM ACETATE 667 MG: 667 CAPSULE ORAL at 21:43

## 2021-11-10 RX ADMIN — ONDANSETRON 4 MG: 2 INJECTION INTRAMUSCULAR; INTRAVENOUS at 14:55

## 2021-11-10 RX ADMIN — MELATONIN TAB 3 MG 3 MG: 3 TAB at 22:39

## 2021-11-10 RX ADMIN — SODIUM CHLORIDE 250 ML: 9 INJECTION, SOLUTION INTRAVENOUS at 19:58

## 2021-11-10 RX ADMIN — LIDOCAINE HYDROCHLORIDE 10 ML: 10 INJECTION, SOLUTION EPIDURAL; INFILTRATION; INTRACAUDAL; PERINEURAL at 15:23

## 2021-11-10 RX ADMIN — ACETAMINOPHEN 325 MG: 325 TABLET ORAL at 22:38

## 2021-11-10 RX ADMIN — ZOLPIDEM TARTRATE 5 MG: 5 TABLET ORAL at 22:39

## 2021-11-10 RX ADMIN — OXYCODONE HYDROCHLORIDE 5 MG: 5 TABLET ORAL at 22:38

## 2021-11-10 RX ADMIN — MIDAZOLAM HYDROCHLORIDE 1 MG: 1 INJECTION, SOLUTION INTRAMUSCULAR; INTRAVENOUS at 15:12

## 2021-11-10 ASSESSMENT — ACTIVITIES OF DAILY LIVING (ADL)
ADLS_ACUITY_SCORE: 18
ADLS_ACUITY_SCORE: 16
ADLS_ACUITY_SCORE: 18
ADLS_ACUITY_SCORE: 16
ADLS_ACUITY_SCORE: 18
ADLS_ACUITY_SCORE: 18
ADLS_ACUITY_SCORE: 16
ADLS_ACUITY_SCORE: 18
ADLS_ACUITY_SCORE: 16
ADLS_ACUITY_SCORE: 18
ADLS_ACUITY_SCORE: 18
ADLS_ACUITY_SCORE: 16
ADLS_ACUITY_SCORE: 18
ADLS_ACUITY_SCORE: 16
ADLS_ACUITY_SCORE: 18
ADLS_ACUITY_SCORE: 16
ADLS_ACUITY_SCORE: 18
ADLS_ACUITY_SCORE: 16
ADLS_ACUITY_SCORE: 18

## 2021-11-10 ASSESSMENT — MIFFLIN-ST. JEOR: SCORE: 1371.45

## 2021-11-10 NOTE — ANESTHESIA PREPROCEDURE EVALUATION
Anesthesia Pre-Procedure Evaluation    Patient: Elle Blanton   MRN: [id number] : 1960        Preoperative Diagnosis: Empyema (H) [J86.9]    Procedure : Procedure(s):  THORACOSCOPY WITH DECORTICATION          Past Medical History:   Diagnosis Date     NAHUM (acute kidney injury) (H)      GI (gastrointestinal bleed)      Gout      H. pylori infection 2017    UGI bleed implied by Hgb 9.0 17 and melena. Admitted and hgb decreased to 7.6, CT abdomen showed all bladder wall thickening. + Hep B surface antigen noted. Melena resolved and hgb stabalized without transfusion, epigastric pain resolved with PPI. Recommend referral for gastroscopy.     Heart attack (H)      Hepatitis B      Normocytic anemia      PONV (postoperative nausea and vomiting)      Thrombocytopenia (H)       Past Surgical History:   Procedure Laterality Date     ABCESS DRAINAGE      finger     BURSECTOMY ELBOW Left 10/15/2021    Procedure: LEFT OLECRANON BURSECTOMY;  Surgeon: Tico Myers MD;  Location: [facility]     CREATE FISTULA ARTERIOVENOUS UPPER EXTREMITY Left 2021    Procedure: left arm dialysis graft placement;  Surgeon: Roxana Cosby MD;  Location: [facility] OR;  Service: General     FOREIGN BODY REMOVAL      finger     INCISION AND DRAINAGE FINGER, COMBINED Left 10/20/2016    Procedure: COMBINED INCISION AND DRAINAGE FINGER;  Surgeon: Mireya Pizano MD;  Location: [facility]     IR CHEST TUBE PLACEMENT NON-TUNNELED RIGHT  2021     IR CHEST TUBE PLACEMENT NON-TUNNELED RIGHT  10/19/2021     IR PLEURAL DRAINAGE WITH CATHETER INSERTION  2021     MIDLINE INSERTION - DOUBLE LUMEN  2021          WI ESOPHAGOGASTRODUODENOSCOPY TRANSORAL DIAGNOSTIC N/A 2020    Procedure: ESOPHAGOGASTRODUODENOSCOPY (EGD) with biopsy;  Surgeon: Nilson Gonzales MD;  Location: [facility];  Service: Gastroenterology     US PARACENTESIS  2020     US PARACENTESIS  2020     US THORACENTESIS  2021       Allergies   Allergen Reactions     Nka [No Known Allergies]       Social History     Tobacco Use     Smoking status: Never Smoker     Smokeless tobacco: Never Used   Substance Use Topics     Alcohol use: No      Wt Readings from Last 1 Encounters:   11/08/21 68.9 kg (152 lb)        Anesthesia Evaluation   Pt has had prior anesthetic.     History of anesthetic complications  - PONV.      ROS/MED HX  ENT/Pulmonary: Comment: Active lung empyema      Neurologic:       Cardiovascular: Comment: 4/18/21    Left ventricle ejection fraction is normal. The calculated left ventricular ejection fraction is 72% without wall motion abnormality.    Normal right ventricular size and systolic function.    Aortic valve sclerosis with mild aortic insufficiency    Mild tricuspid insufficiency with normal estimation of pulmonary artery pressures.    No previous study for comparison.    (+) hypertension--CAD -past MI --    METS/Exercise Tolerance:     Hematologic:     (+) anemia,     Musculoskeletal:       GI/Hepatic:     (+) liver disease,     Renal/Genitourinary:     (+) renal disease, type: ESRD, Pt requires dialysis, type: Hemodialysis,     Endo:       Psychiatric/Substance Use:       Infectious Disease:       Malignancy:       Other:               OUTSIDE LABS:  CBC:   Lab Results   Component Value Date    WBC 6.0 11/10/2021    WBC 6.8 11/09/2021    HGB 7.6 (L) 11/10/2021    HGB 7.8 (L) 11/09/2021    HCT 25.3 (L) 11/10/2021    HCT 26.5 (L) 11/09/2021    PLT 43 (LL) 11/10/2021    PLT 53 (L) 11/09/2021     BMP:   Lab Results   Component Value Date     11/10/2021     11/09/2021    POTASSIUM 4.3 11/10/2021    POTASSIUM 5.5 (H) 11/09/2021    CHLORIDE 101 11/10/2021    CHLORIDE 107 11/09/2021    CO2 26 11/10/2021    CO2 24 11/09/2021    BUN 39 (H) 11/10/2021    BUN 80 (H) 11/09/2021    CR 8.16 (HH) 11/10/2021    CR 13.15 (HH) 11/09/2021    GLC 87 11/10/2021    GLC 88 11/09/2021     COAGS:   Lab Results   Component Value Date     PTT 41 (H) 09/26/2021    INR 1.38 (H) 10/19/2021    FIBR 128 (L) 07/31/2019     POC:   Lab Results   Component Value Date    BGM 91 08/09/2019     HEPATIC:   Lab Results   Component Value Date    ALBUMIN 2.3 (L) 11/08/2021    PROTTOTAL 8.2 (H) 11/08/2021    ALT <9 11/08/2021    AST 43 (H) 11/08/2021    ALKPHOS 160 (H) 11/08/2021    BILITOTAL 0.8 11/08/2021    BILIDIRECT 0.2 10/21/2020     OTHER:   Lab Results   Component Value Date    PH 7.31 (L) 07/28/2019    LACT 1.6 11/08/2021    A1C 5.0 08/13/2020    TANO 7.6 (L) 11/10/2021    PHOS 2.6 04/27/2021    MAG 1.9 04/16/2021    LIPASE 517 (H) 07/28/2019    CRP 21.5 (H) 10/14/2021    SED 21 (H) 10/14/2021               Shelley Zhong MD

## 2021-11-10 NOTE — PROGRESS NOTES
"Orthopedic Progress Note      Assessment:    S/p left elbow olecranon bursa I&D on 10/15    Plan:   - dressing was changed, elbow incision healing well no signs of infection. Mild drainage continuing, change dressing as needed if saturating through.       Subjective:  Patient denies left elbow pain. Reports left elbow continuing to leak some watery fluid through bandage. Reports left hand swelling which started 3-4 days ago.     Objective:  /68   Pulse 77   Temp 99  F (37.2  C) (Oral)   Resp 17   Ht 1.651 m (5' 5\")   Wt 64 kg (141 lb)   SpO2 96%   BMI 23.46 kg/m    The patient is A&Ox3. Appears comfortable.   Left elbow incision healing well, no erythema, no signs of infection. Mild serous drainage from small incision posterior elbow, no signs of infection.   Normal elbow ROM.   Edema left dorsal hand.   Dialysis port in left arm.     Pertinent Labs   Lab Results: personally reviewed.   Lab Results   Component Value Date    INR 1.38 (H) 10/19/2021    INR 1.28 (H) 09/29/2021    INR 1.43 (H) 09/28/2021     Lab Results   Component Value Date    WBC 6.0 11/10/2021    HGB 7.6 (L) 11/10/2021    HCT 25.3 (L) 11/10/2021     11/10/2021    PLT 43 (LL) 11/10/2021     Lab Results   Component Value Date     11/10/2021    CO2 26 11/10/2021         Report completed by:  Ladonna Mandel PA-C, NAHUN  Date: 11/10/2021  Time: 12:09 PM    "

## 2021-11-10 NOTE — PROGRESS NOTES
Progress Note    Assessment/Plan  Reviewed CT of yesterday with the radiologist.  Will place as large of right thoracostomy tube is possible.  Patient has varices in the periesophageal area significant.  Liver lesion noted.  Patient refused contrast for the MRI last evening.  Will left chest tube placed today.  Potential lytics.    Active Problems:    ESRD (end stage renal disease) on dialysis (H)    Hydropneumothorax    Hyperkalemia      Subjective  Stable  Objective    Vital signs in last 24 hours  Temp:  [97.9  F (36.6  C)-99  F (37.2  C)] 99  F (37.2  C)  Pulse:  [66-77] 77  Resp:  [16-18] 17  BP: (102-135)/(62-83) 135/68  SpO2:  [94 %-100 %] 96 %  Weight:   [unfilled]    Intake/Output last 3 shifts  I/O last 3 completed shifts:  In: 460 [P.O.:460]  Out: 3575 [Urine:375; Other:3200]  Intake/Output this shift:  I/O this shift:  In: -   Out: 100 [Urine:100]      Physical Exam  Change none    Pertinent Labs   Lab Results   Component Value Date    WBC 6.0 11/10/2021    HGB 7.6 (L) 11/10/2021    HCT 25.3 (L) 11/10/2021     11/10/2021    PLT 43 (LL) 11/10/2021             Pertinent Radiology     [unfilled]        Dawson Reynaga MD

## 2021-11-10 NOTE — PRE-PROCEDURE
GENERAL PRE-PROCEDURE:   Procedure:  RIGHT sided chest tube placement  Date/Time:  11/10/2021 2:29 PM    Written consent obtained?: Yes    Risks and benefits: Risks, benefits and alternatives were discussed    Consent given by:  Patient  Patient states understanding of procedure being performed: Yes    Patient's understanding of procedure matches consent: Yes    Procedure consent matches procedure scheduled: Yes    Expected level of sedation:  Moderate (as needed)  Appropriately NPO:  Yes  ASA Class:  3  Mallampati  :  Grade 1- soft palate, uvula, tonsillar pillars, and posterior pharyngeal wall visible  Lungs:  Other (comment)  Lung exam comment:  RA, diminished  Heart:  Normal heart sounds and rate  History & Physical reviewed:  History and physical reviewed and no updates needed  Statement of review:  I have reviewed the lab findings, diagnostic data, medications, and the plan for sedation

## 2021-11-10 NOTE — PROGRESS NOTES
Care Management Follow Up    Length of Stay (days): 2    Expected Discharge Date: 11/12/2021     Concerns to be Addressed:     Medical mgmt  Patient plan of care discussed at interdisciplinary rounds: Yes    Anticipated Discharge Disposition:  Home anticipated     Anticipated Discharge Services:  OP Dialysis  Anticipated Discharge DME:      Patient/family educated on Medicare website which has current facility and service quality ratings:  n/a  Education Provided on the Discharge Plan:  ongoing  Patient/Family in Agreement with the Plan:  Ongoing, yes    Referrals Placed by CM/SW:  Not at this time  Private pay costs discussed: Not applicable    Additional Information:    Assessment hx: Elle lives in a house with his wife. He is independent with ADLs at baseline. He goes to dialysis on MWF. He is also going to Outpatient Infusion Therapy and that is ordered until 12/8/21. Family to transport home at discharge.      TIFFANIE Hernandez

## 2021-11-10 NOTE — PROVIDER NOTIFICATION
Dialysis RN paged with intent to notify patient returned to unit.  Attempted Vocera but unable to reach dialysis RN.

## 2021-11-10 NOTE — PROGRESS NOTES
RENAL PROGRESS NOTE    CC:  ESKD, possible empyema    ASSESSMENT & PLAN:   ESKD: [facility] under my care.  Treatment time 3 hours.  Left AVG.  Runs Monday and Friday, frequently noncompliant with dialysis schedule.  Has been advised to run more frequently many times.    Recs:  - have advised 2hr UF run today and he grudgingly accepts    HTN: BP controlled on PTA antihypertensives.  UF with HD.    Volume: Current outpatient EDW 61.0 kg.  Volume actually looked good on 11/1 when I saw him, we were able to get his weight down to 60.6kg, his arm was much less swollen.  Then gained 5.4kg over the week and there were only able to get to 63.8kg on Friday. Remains quite fluid up.  Advised extra UF run    CKD MBD: Calcium acetate with meals for binders.    Hyperkalemia - resolved    Anemia: chronically low (missed dialysis frequently) and decreased responsiveness to EPO with infection.       GERD: Taking pantoprazole     Empyema: ?due to bacteremia from septic bursitis.  Multiple admissions for this.  Most recently discharged on IV cefazolin with outpatient hemodialysis.  Previously refused surgery, but now would consider.  Dr. Reynaga following and discussing possible surgical intervention.      Liver Lesion - unclear etiology, surgery delayed until better characterized.  I would recommend CT with contrast     Left olecranon bursitis s/p I&D on 10/15/2021: Ortho removed stiches     AAA with thoracic aortic dissection: PTA Coreg, diltiazem, clonidine          SUBJECTIVE:  Seen in room.  Wife arrived as well.  Surgery cancelled with liver findings.  Patient had refused gadolinium overnight because he didn't want to dialyze more.  Unclear evidence if we should use gadolinium in HD patients but prefer to avoid if possible and not emergent.  I much prefer CT with contrast as he has minimal residual function.  I indicated as much to the patient.      I strongly urged him to take a 2hr UF only run today to  improve his fluid status and he reluctantly agrees.    I discussed with the resident team, advised CT abdomen with contrast to evaluate the liver lesion.      OBJECTIVE:  Physical Exam   Temp: 99  F (37.2  C) Temp src: Oral BP: 134/78 Pulse: 75   Resp: 17 SpO2: 96 % O2 Device: None (Room air)    Vitals:    11/08/21 1046 11/08/21 2128   Weight: 63 kg (138 lb 14.2 oz) 68.9 kg (152 lb)     Vital Signs with Ranges  Temp:  [97.9  F (36.6  C)-99  F (37.2  C)] 99  F (37.2  C)  Pulse:  [66-77] 75  Resp:  [16-18] 17  BP: (102-134)/(62-83) 134/78  SpO2:  [94 %-100 %] 96 %  I/O last 3 completed shifts:  In: 460 [P.O.:460]  Out: 3575 [Urine:375; Other:3200]    @TMAXR(24)@    Patient Vitals for the past 72 hrs:   Weight   11/08/21 2128 68.9 kg (152 lb)   11/08/21 1046 63 kg (138 lb 14.2 oz)       Intake/Output Summary (Last 24 hours) at 11/10/2021 0912  Last data filed at 11/10/2021 0531  Gross per 24 hour   Intake 460 ml   Output 3575 ml   Net -3115 ml       PHYSICAL EXAM:  General: Alert, NAD  HENT: facial swelling  Cardiovascular: RRR, no rub, gallop, or murmur.  Left upper extremity edema.  LOWER EXTREMITY edema.  Respiratory: Slightly diminished, otherwise good air exchange.  Normal effort.  Gastrointestinal: Soft, non-tender, non-distended  Musculoskeletal: Left elbow and upper extremity swollen, sutures removed  Integumentary: Warm, dry, no rash  Neurologic: AO, sensation intact  Psychiatric:  appropriate mood and affect  Access: Left lower AVG with palpable thrill and audible bruit    LABORATORY STUDIES:     Recent Labs   Lab 11/10/21  0719 11/09/21  0646 11/08/21  1326   WBC 6.0 6.8 7.1   RBC 2.53* 2.60* 3.05*   HGB 7.6* 7.8* 9.2*   HCT 25.3* 26.5* 31.4*   PLT 43* 53* 71*       Basic Metabolic Panel:  Recent Labs   Lab 11/10/21  0719 11/09/21  1731 11/09/21  1638 11/09/21  1535 11/09/21  1429 11/09/21  1317 11/09/21  1252 11/09/21  1051 11/09/21  0646 11/08/21  1326     --   --   --   --   --   --   --  139 139    POTASSIUM 4.3  --   --   --   --   --  5.5*  --  6.5* 5.9*   CHLORIDE 101  --   --   --   --   --   --   --  107 106   CO2 26  --   --   --   --   --   --   --  24 23   BUN 39*  --   --   --   --   --   --   --  80* 77*   CR 8.16*  --   --   --   --   --   --   --  13.15* 12.60*   GLC 87 88 65* 92 159* 186*  --    < > 74 87   TANO 7.6*  --   --   --   --   --   --   --  7.7* 8.0*    < > = values in this interval not displayed.       INRNo lab results found in last 7 days.     Recent Labs   Lab Test 11/10/21  0719 11/09/21  0646 10/19/21  1006 10/19/21  0940 10/11/21  1121 09/29/21  0608   INR  --   --   --  1.38*  --  1.28*   WBC 6.0 6.8   < >  --    < > 9.6   HGB 7.6* 7.8*   < >  --    < > 8.0*   PLT 43* 53*   < >  --    < > 37*    < > = values in this interval not displayed.       Personally reviewed current labs      Nate Hill  [facility]  [phone]

## 2021-11-10 NOTE — PROVIDER NOTIFICATION
House officer paged in regards to pt plts 43. Dr Healy returned page and stated he would let the team know

## 2021-11-10 NOTE — PLAN OF CARE
Problem: Gas Exchange Impaired  Goal: Optimal Gas Exchange  Intervention: Optimize Oxygenation and Ventilation  Recent Flowsheet Documentation  Taken 11/10/2021 0027 by Destiny Velasco RN  Head of Bed (HOB) Positioning: HOB at 20 degrees  Pt endorses sob with activity. Oxygen saturation >90% on RA. Denied pain. Vital signs stable. L arm swollen, dressing with moderate serous drainage. Dressing changed. NPO for possible surgery today.

## 2021-11-10 NOTE — PLAN OF CARE
Problem: Adult Inpatient Plan of Care  Goal: Readiness for Transition of Care  Outcome: No Change     Problem: Gas Exchange Impaired  Goal: Optimal Gas Exchange  Outcome: No Change     Problem: Electrolyte Imbalance  Goal: Electrolyte Balance  Outcome: No Change     Problem: Hemodynamic Instability (Hemodialysis)  Goal: Vital Signs Remain in Desired Range  Outcome: No Change   Plan for the evening and possible surgery tomorrow reviewed with pt and spouse.  Pt had dialysis tonight, BP remained stable afterward.  Pt had a CT this afternoon, called to Dr. Reynaga, he ordered a liver MRI with contrast.  Pt was reluctant to do the MRI, with contrast he'd have to do dialysis again tomorrow.  He asked me to call the MD, he said he wouldn't do surgery tomorrow without seeing the MRI first.  Pt finally agreed to the MRI but not to contrast.  Pt is on tele, NSR.  Left elbow dressing was changed after dialysis, I didn't see any drainage, still swollen, steri strips intact.  Pt able to call with needs, up with standby assist and cane.

## 2021-11-10 NOTE — CONSULTS
Interventional Radiology - History and Physical  11/10/2021    Procedure Requested: Chest tube placement- RIGHT sided  Requesting Provider: Dawson Reynaga MD    HPI: Elle Blanton is a 61 year old male with PMH including AAA, cirrhosis hepatitis B, HD dependent ESRD.  Dx with staph aureus empyema with bacteremia and septic arthritis.      Requesting RIGHT sided chest tube placement 2/2 empyema.      Imaging:   EXAM: CT CHEST W/O CONTRAST  LOCATION: [facility]  DATE/TIME: 11/9/2021 1:30 PM     INDICATION: Pneumonia, effusion or abscess suspected, xray done  COMPARISON: 10/25/21.  TECHNIQUE: CT chest without IV contrast. Multiplanar reformats were obtained. Dose reduction techniques were used.  CONTRAST: None.     FINDINGS:   LUNGS AND PLEURA: The right pleural drain is no longer present. The right hydropneumothorax has increased in size and there is peripheral pleural thickening. There is significant atelectasis of the right middle and lower lobes. A small left pleural   effusion has increased in size.      MEDIASTINUM/AXILLAE: The proximal descending thoracic aorta is enlarged at 5.7 cm (unchanged). Chronic dissection is noted.      CORONARY ARTERY CALCIFICATION: Severe.     UPPER ABDOMEN: High attenuation in the gallbladder with some layering stones. Nodular liver contour with enlargement of the lateral left hepatic lobe. Hypoattenuating hepatic lesions are again seen. The spleen is enlarged . Varices are present.     MUSCULOSKELETAL: Unremarkable.                                                                      IMPRESSION:   1.  Right hydropneumothorax, with possible empyema. A pleural drain is no longer present.  2.  A small left pleural effusion has increased in size.  3.  Hepatic parenchymal disease with splenomegaly and varices. Hepatic lesions are again seen and should be assessed with MRI.  4.  Cholelithiasis with likely sludge in the gallbladder.        NPO Status: MN    Anticoagulation/Antiplatelets/Bleeding tendencies: None  Antibiotics: Ancef    Review of Systems: A comprehensive 10-point review of systems was performed. All systems were reviewed and negative with exception to those reported in the HPI.    PMH:  Past Medical History:   Diagnosis Date     NAHUM (acute kidney injury) (H)      GI (gastrointestinal bleed)      Gout      H. pylori infection 7/5/2017    UGI bleed implied by Hgb 9.0 6/22/17 and melena. Admitted and hgb decreased to 7.6, CT abdomen showed all bladder wall thickening. + Hep B surface antigen noted. Melena resolved and hgb stabalized without transfusion, epigastric pain resolved with PPI. Recommend referral for gastroscopy.     Heart attack (H)      Hepatitis B      Normocytic anemia      PONV (postoperative nausea and vomiting)      Thrombocytopenia (H)        PSH:  Past Surgical History:   Procedure Laterality Date     ABCESS DRAINAGE      finger     BURSECTOMY ELBOW Left 10/15/2021    Procedure: LEFT OLECRANON BURSECTOMY;  Surgeon: Tico Myers MD;  Location: [facility]     CREATE FISTULA ARTERIOVENOUS UPPER EXTREMITY Left 4/20/2021    Procedure: left arm dialysis graft placement;  Surgeon: Roxana Cosby MD;  Location: [facility] OR;  Service: General     FOREIGN BODY REMOVAL      finger     INCISION AND DRAINAGE FINGER, COMBINED Left 10/20/2016    Procedure: COMBINED INCISION AND DRAINAGE FINGER;  Surgeon: Mireya Pizano MD;  Location: [facility] OR     IR CHEST TUBE PLACEMENT NON-TUNNELED RIGHT  4/19/2021     IR CHEST TUBE PLACEMENT NON-TUNNELED RIGHT  10/19/2021     IR PLEURAL DRAINAGE WITH CATHETER INSERTION  4/19/2021     MIDLINE INSERTION - DOUBLE LUMEN  4/23/2021          WA ESOPHAGOGASTRODUODENOSCOPY TRANSORAL DIAGNOSTIC N/A 8/18/2020    Procedure: ESOPHAGOGASTRODUODENOSCOPY (EGD) with biopsy;  Surgeon: Nilson Gonzales MD;  Location: [facility];  Service: Gastroenterology     US PARACENTESIS  8/14/2020     US PARACENTESIS   8/19/2020      THORACENTESIS  4/18/2021       ALLERGIES:  Allergies   Allergen Reactions     Nka [No Known Allergies]        MEDICATIONS:  Current Facility-Administered Medications   Medication     0.9% sodium chloride BOLUS     0.9% sodium chloride BOLUS     0.9% sodium chloride BOLUS     acetaminophen (TYLENOL) tablet 650 mg    Or     acetaminophen (TYLENOL) Suppository 650 mg     albumin human 25 % injection 50 mL     calcium acetate (PHOSLO) capsule 667 mg     carvedilol (COREG) tablet 25 mg     ceFAZolin (ANCEF) intermittent infusion 2 g in 100 mL dextrose PRE-MIX     [START ON 11/12/2021] ceFAZolin (ANCEF) intermittent infusion 3 g (pre-mix)     cloNIDine (CATAPRES) tablet 0.1 mg     glucose gel 15-30 g    Or     dextrose 50 % injection 25-50 mL    Or     glucagon injection 1 mg     diltiazem ER COATED BEADS (CARDIZEM CD/CARTIA XT) 24 hr capsule 180 mg     [START ON 11/14/2021] entecavir (BARACLUDE) tablet 0.5 mg     [START ON 11/19/2021] epoetin kelton-epbx (RETACRIT) injection 40,000 Units     lidocaine (LMX4) cream     lidocaine 1 % 0.1-1 mL     lidocaine 1 % 0.5 mL     lidocaine 1 % 0.5 mL     LORazepam (ATIVAN) tablet 1 mg     melatonin tablet 3 mg     No heparin via hemodialysis machine     No heparin via hemodialysis machine     pantoprazole (PROTONIX) EC tablet 40 mg     sodium chloride (PF) 0.9% PF flush 3 mL     sodium chloride (PF) 0.9% PF flush 3 mL     sterile talc (STERITALC) powder for sclerosing 4 g     sterile talc (STERITALC) powder for sclerosing 4 g     Stop Heparin 60 minutes before end of treatment     Stop Heparin 60 minutes before end of treatment     zolpidem (AMBIEN) tablet 5 mg         LABS:  INR   Date Value Ref Range Status   10/19/2021 1.38 (H) 0.85 - 1.15 Final   10/21/2020 1.26 (H) 0.90 - 1.10 Final      Hemoglobin   Date Value Ref Range Status   11/10/2021 7.6 (L) 13.3 - 17.7 g/dL Final   04/29/2021 8.9 (L) 14.0 - 18.0 g/dL Final   ]  Platelet Count   Date Value Ref Range  "Status   11/10/2021 43 (LL) 150 - 450 10e3/uL Final   08/10/2019 53 (L) 150 - 450 10e9/L Final     Creatinine   Date Value Ref Range Status   11/10/2021 8.16 (HH) 0.70 - 1.30 mg/dL Final   04/29/2021 5.13 (H) 0.70 - 1.30 mg/dL Final     Potassium   Date Value Ref Range Status   11/10/2021 4.3 3.5 - 5.0 mmol/L Final   04/29/2021 4.7 3.5 - 5.0 mmol/L Final         EXAM:  /68   Pulse 77   Temp 99  F (37.2  C) (Oral)   Resp 17   Ht 1.651 m (5' 5\")   Wt 64 kg (141 lb)   SpO2 96%   BMI 23.46 kg/m    General:  Stable.  In no acute distress.    Neuro:  A&O x 3. Moves all extremities equally.  Resp:  Lungs clear.  Diminished, particularly on right.  RA.  Cardio:  S1S2 and reg, without murmur, clicks or rubs  Skin:  Without excoriations, ecchymosis, erythema, lesions or open sores on chest      Pre-Sedation Assessment:  Mallampati Airway Classification:  II - Faucial pillars and soft palate may be seen, but uvula is masked by the base of the tongue  Previous reaction to anesthesia/sedation:  Hx n/v with anesthesia  Sedation plan based on assessment: Moderate (conscious) sedation as needed  ASA Classification: Class 3 - SEVERE SYSTEMIC DISEASE, DEFINITE FUNCTIONAL LIMITATIONS.   Code Status: FULL CODE    ASSESSMENT:  RIGHT sided empyema     - Requesting RIGHT sided chest tube placement  - Hx n/v with anesthesia     PLAN:    Proceed with RIGHT sided chest tube placement, sedation as needed.  - PRN Zofran per MAR        Procedure, risks/benefits, details, alternatives, and sedation reviewed with patient and he verbalized understanding. All questions answered. OK to proceed with above radiology procedure.       RAUL ROD NP  Interventional Radiology  [phone]    "

## 2021-11-10 NOTE — IR NOTE
Pt transferred back to 105 via cart. Report given to Saira--P1 Resource RN. Pt luis felipe procedure well.

## 2021-11-10 NOTE — PROGRESS NOTES
Primary Medicine Progress Note    Assessment/Plan  Active Problems:    ESRD (end stage renal disease) on dialysis (H)    Hydropneumothorax    Hyperkalemia    Elle Blanton is a 61 year old male with multiple comorbidities including AAA (5.5 cm), chronic thoracic aortic dissection, cirrhosis secondary to chronic hepatitis B, ESRD on hemodialysis (M/F via L AVF), recently admitted for septic bursitis of left elbow 10/14-10/17 and again with MSSA empyema 10/19-10/27 thought to be sequelae of bacteremia from septic bursitis, discharged on outpatient IV cefazolin MWF as he had denied surgical intervention several times.     He is again admitted to our service 11/8 with worsened breathing secondary to this, and is now amenable to cardiothoracic surgical intervention.    Patient remains hospitalized for: surgical management of empyema with Dr. Reynaga  Major plans for today: sounds like chest tube placement and not VATS per Dr. Reynaga's note, will dialyze today as well    Staph Aureus Empyema, likely due to bacteremia from septic bursitis  Liver Lesions - Potential Hematoma  Third or fourth admission for this empyema which has grown pansensitive MSSA. Has been receiving IV cephazolin as an outpatient MWF. Has adamantly refused surgery on past admissions, now states he is ready for surgery. Patient is short of breath but not in any respiratory distress and saturating high 90s on room air. Liver lesions not adequately imaged on non-contrast MRI last evening. Nephrology recommending CT abdomen with contrast for better characterization whenever clinically appropriate and can dialyze iodinated contrast off afterward. Will try to avoid MRI contrast.  - Dr. Reynaga primarily managing, sounds like plan is for chest tube today  - Continue IV cephazolin 2g Mon, 2g Wed, 3g Fri for now      ESRD on Hemodialysis M/F  Hyperkalemia  Chronic Anemia, secondary to ESRD  Chronic Thrombocytopenia  Dialyzed 11/10, will go again 11/11. Dialyzes  M/F as outpatient (refuses to go Wed). Patient is on EPO as an outpatient. Hemoglobin stable, however platelets down to 43 this AM (11/10). Will leave transfusion of platelets up to Dr. Reynaga in the setting of potential surgery.  - Nephrology consulted for dialysis, appreciate care    Left Olecranon Bursitis s/p I&D on 10/15/21  Stitches removed by ortho yesterday. Stable.     Chronic Medical Conditions  AAA/Aortic Dissection: continue PTA carvedilol, diltiazem, and clonidine  GERD: continue PTA pantoprazole  Chronic Hepatitis B/Cirrhosis with Thrombocytopenia: entecavir q7 days. No active bleeding.  Insomnia: continue PTA ambien      COVID Status  Fully vaccinated (Moderna 8/18 and 9/15/21). Not yet eligible for boosters. Has also had flu shot this year.      Diet: NPO for potential surgery today  DVT Prophylaxis: Pneumatic Compression Devices  Beltre Catheter: Not present  Fluids: None  Central Lines: None  Code Status: Full Code     Disposition/Advanced Care Planning  At least several days pending possible CT surgical intervention    Subjective:   Nursing notes reviewed. No acute events overnight. Elle is seen laying in bed with his wife in the chair next to the bed. He states he did not want the contrast last night because he didn't want to harm his kidneys. He otherwise feels well. If he's not having surgery he would like to eat.    Objective    Vital signs in last 24 hours Temp:  [97.9  F (36.6  C)-99  F (37.2  C)] 99  F (37.2  C)  Pulse:  [66-77] 77  Resp:  [16-18] 17  BP: (102-135)/(62-83) 135/68  SpO2:  [94 %-100 %] 96 %       PHYSICAL EXAM  GEN: Patient laying comfortably in bed in no acute distress. No respiratory distress.  HEEN: Head is atraumatic, normocephalic, eyes anicteric, mucous membranes moist.  PULM: Breathing comfortably on room air.  EXT: Left elbow with dressing in place.   NEURO: Alert and oriented x3.  No focal motor abnormalities.  Face symmetric.  PSYCH: Appropriate affect.  SKIN: No  rashes, bruising, or other lesions.    Pertinent Labs and Pertinent Radiology   Recent Labs   Lab 11/10/21  0719   WBC 6.0   HGB 7.6*   HCT 25.3*   PLT 43*       Recent Labs   Lab 11/10/21  0719 11/09/21  0646 11/08/21  1326      < > 139   CO2 26   < > 23   BUN 39*   < > 77*   ALBUMIN  --   --  2.3*   ALKPHOS  --   --  160*   ALT  --   --  <9   AST  --   --  43*    < > = values in this interval not displayed.     Radiology Results:   Results for orders placed or performed during the hospital encounter of 11/08/21   Chest XR,  PA & LAT    Impression    IMPRESSION: Right hydropneumothorax is again seen. Fluid has increased from the prior study. Enlargement of the aorta is again seen.    CT Chest w/o Contrast    Impression    IMPRESSION:   1.  Right hydropneumothorax, with possible empyema. A pleural drain is no longer present.  2.  A small left pleural effusion has increased in size.  3.  Hepatic parenchymal disease with splenomegaly and varices. Hepatic lesions are again seen and should be assessed with MRI.  4.  Cholelithiasis with likely sludge in the gallbladder.      MR Abdomen w/o Contrast    Impression    IMPRESSION:  1.  Dominant 4 cm mass is indeterminate on this noncontrast exam. Lesion would be best evaluated on a contrast-enhanced study. There is a question of this could be an evolving hematoma.     Precepted patient with Dr. Gustavo Virk MD  [facility], PGY-1  Pager #: [phone]

## 2021-11-11 NOTE — PLAN OF CARE
Problem: Gas Exchange Impaired  Goal: Optimal Gas Exchange  Intervention: Optimize Oxygenation and Ventilation  Recent Flowsheet Documentation  Taken 11/11/2021 0002 by Destiny Velasco RN  Head of Bed (HOB) Positioning: HOB at 20-30 degrees     Problem: Hemodynamic Instability (Hemodialysis)  Goal: Vital Signs Remain in Desired Range  Outcome: Improving  Intervention: Optimize Blood Flow  Recent Flowsheet Documentation  Taken 11/11/2021 0002 by Destiny Velasco RN  Medication Review/Management: medications reviewed     Problem: Respiratory Compromise  Goal: Optimal Oxygenation and Ventilation  Outcome: Improving   Pt denied pain.  NSR on tele monitor. R chest tube to continuous suction with serosanguinous output. Dressing intact. On RA, oxygen saturation >90.   Pt was tired and requested to not be bothered unless he calls. Cares clustered to promote sleep/rest.

## 2021-11-11 NOTE — PROGRESS NOTES
"Orthopedic Progress Note      Assessment:    S/P left elbow olecranon bursa I&D on 10/15    Plan:   - Ortho following due to draining wound on left elbow. Per patient, last dressing change was last night. Changed the dressing at 1100 with mild serous drainage noted, no signs of infection. May be secondary to swelling in LUE from dialysis. May change dressing as needed if saturating through.      Subjective:  Patient denies concern with left elbow. He is able to move the LUE as needed. He believes the drainage is less compared to yesterday. Has not noted any blood or pus. Left hand swelling also reportedly much improved today.    Objective:  BP (!) 149/87 (BP Location: Right arm)   Pulse 80   Temp 98.3  F (36.8  C) (Oral)   Resp 16   Ht 1.651 m (5' 5\")   Wt 64 kg (141 lb)   SpO2 94%   BMI 23.46 kg/m    The patient is A&Ox3. Appears comfortable.   Left elbow incision healing well without erythema, edema, tenderness. Mild serous drainage from small incision on posterior elbow. No blood or purulence.  Full ROM of left elbow.  Distal sensation and pulses in LUE intact.    Pertinent Labs   Lab Results: personally reviewed.   Lab Results   Component Value Date    INR 1.38 (H) 10/19/2021    INR 1.28 (H) 09/29/2021    INR 1.43 (H) 09/28/2021     Lab Results   Component Value Date    WBC 4.8 11/11/2021    HGB 7.9 (L) 11/11/2021    HCT 26.9 (L) 11/11/2021     (H) 11/11/2021    PLT 32 (LL) 11/11/2021     Lab Results   Component Value Date     11/11/2021    CO2 25 11/11/2021         Report completed by:  Wendie Zheng PA-C, NAHUN  Date: 11/11/2021  Time: 11:19 AM    "

## 2021-11-11 NOTE — PLAN OF CARE
Problem: Adult Inpatient Plan of Care  Goal: Patient-Specific Goal (Individualized)  Outcome: Improving  Chest tube in right side to suction with serossanguinous returns. Doctors would like to do a CT. With contrast but patient is refusing. Has been hospitalized before for same issue.   Problem: Adult Inpatient Plan of Care  Goal: Optimal Comfort and Wellbeing  Outcome: Improving   Denies any pain.  Problem: Gas Exchange Impaired  Goal: Optimal Gas Exchange  Outcome: Improving   Room air with s.o.b. on exertion.   Problem: Infection  Goal: Absence of Infection Signs and Symptoms  Outcome: Improving   Right elbow is warm and pink.

## 2021-11-11 NOTE — PROGRESS NOTES
RENAL PROGRESS NOTE    CC:  ESKD, possible empyema    ASSESSMENT & PLAN:   ESKD: [facility] under the care of Dr. Hill.  Treatment time 3 hours.  Left AVG.  Runs Monday and Friday, frequently noncompliant with dialysis schedule.  Has been advised to run more frequently many times.    Recs:  -UF only treatment yesterday with almost 3 L removed  -Plan her usual scheduled regular HD run 11/12/2021    HTN: BP controlled on PTA antihypertensives.  UF with HD.    Volume: Current outpatient EDW 61.0 kg.  Tolerated this dry weight in the recent past.  UF treatment yesterday with nearly 3 L removed.  Plan for normal scheduled regular dialysis run 11/12/2021 UF as tolerated.  Volume status improving, left upper extremity edema improving.  Challenges with larger fluid gains and noncompliance with dialysis making it difficult to get to dry weight at times.    CKD MBD: Calcium acetate with meals for binders.    Hyperkalemia - resolved.  HD per potassium bath protocol.    Anemia: chronically low (missed dialysis frequently) and decreased responsiveness to EPO with infection.       GERD: Taking pantoprazole     Empyema: ?due to bacteremia from septic bursitis.  Multiple admissions for this.  Most recently discharged on IV cefazolin with outpatient hemodialysis.  Surgery place chest tubes.  Discussing repeat CT scan in 1 to 2 days.  Potential lytic therapy.  Chest tube management per surgery.  IV cefazolin    Liver Lesion -CT scan with contrast for better characterization     Left olecranon bursitis s/p I&D on 10/15/2021: Ortho removed stiches     AAA with thoracic aortic dissection: PTA Coreg, diltiazem, clonidine.  Follows with vascular surgery.          SUBJECTIVE:    Seen bedside  Tells me his breathing is quite better after procedure  Appetite decreased, but his wife brought him some food that he ate for lunch  Still producing some urine but only small amounts  Agrees to dialysis per usual schedule  tomorrow  UF only treatment yesterday uneventful almost 3 L removed  Swelling in his left upper extremity improving    OBJECTIVE:  Physical Exam   Temp: 98.3  F (36.8  C) Temp src: Oral BP: (!) 149/87 Pulse: 80   Resp: 16 SpO2: 94 % O2 Device: Nasal cannula Oxygen Delivery: 2 LPM  Vitals:    11/08/21 1046 11/08/21 2128 11/10/21 1010   Weight: 63 kg (138 lb 14.2 oz) 68.9 kg (152 lb) 64 kg (141 lb)     Vital Signs with Ranges  Temp:  [97.6  F (36.4  C)-98.6  F (37  C)] 98.3  F (36.8  C)  Pulse:  [69-86] 80  Resp:  [16-27] 16  BP: (101-154)/(67-90) 149/87  SpO2:  [87 %-100 %] 94 %  I/O last 3 completed shifts:  In: 123 [P.O.:120; I.V.:3]  Out: 4150 [Urine:225; Other:2800; Chest Tube:1125]    Patient Vitals for the past 72 hrs:   Weight   11/10/21 1010 64 kg (141 lb)   11/08/21 2128 68.9 kg (152 lb)       Intake/Output Summary (Last 24 hours) at 11/10/2021 0912  Last data filed at 11/10/2021 0531  Gross per 24 hour   Intake 460 ml   Output 3575 ml   Net -3115 ml       PHYSICAL EXAM:  General: Alert, NAD  HENT: facial swelling  Cardiovascular: RRR, no rub, gallop, or murmur.  Left upper extremity edema improving.  Trace lower extremity edema.  Respiratory: Slightly diminished, otherwise good air exchange.  Normal effort.  Gastrointestinal: Soft, non-tender, non-distended  Musculoskeletal: Left elbow and upper extremity swollen, sutures removed  Integumentary: Warm, dry, no rash  Neurologic: AO, sensation intact  Psychiatric:  appropriate mood and affect  Access: Left lower AVG with palpable thrill and audible bruit    LABORATORY STUDIES:     Recent Labs   Lab 11/11/21  0703 11/10/21  0719 11/09/21  0646 11/08/21  1326   WBC 4.8 6.0 6.8 7.1   RBC 2.64* 2.53* 2.60* 3.05*   HGB 7.9* 7.6* 7.8* 9.2*   HCT 26.9* 25.3* 26.5* 31.4*   PLT 32* 43* 53* 71*       Basic Metabolic Panel:  Recent Labs   Lab 11/11/21  0703 11/10/21  0913 11/10/21  0719 11/09/21  1731 11/09/21  1638 11/09/21  1535 11/09/21  1317 11/09/21  1252  11/09/21  1051 11/09/21  0646 11/08/21  1326     --  137  --   --   --   --   --   --  139 139   POTASSIUM 4.8  --  4.3  --   --   --   --  5.5*  --  6.5* 5.9*   CHLORIDE 101  --  101  --   --   --   --   --   --  107 106   CO2 25  --  26  --   --   --   --   --   --  24 23   BUN 43*  --  39*  --   --   --   --   --   --  80* 77*   CR 9.49*  --  8.16*  --   --   --   --   --   --  13.15* 12.60*   GLC 92 85 87 88 65* 92   < >  --    < > 74 87   TANO 7.6*  --  7.6*  --   --   --   --   --   --  7.7* 8.0*    < > = values in this interval not displayed.       INRNo lab results found in last 7 days.     Recent Labs   Lab Test 11/11/21  0703 11/10/21  0719 10/19/21  1006 10/19/21  0940 10/11/21  1121 09/29/21  0608   INR  --   --   --  1.38*  --  1.28*   WBC 4.8 6.0   < >  --    < > 9.6   HGB 7.9* 7.6*   < >  --    < > 8.0*   PLT 32* 43*   < >  --    < > 37*    < > = values in this interval not displayed.       Personally reviewed current labs      Tesfaye Wynne  [facility]  [phone]

## 2021-11-11 NOTE — PROVIDER NOTIFICATION
Patient requesting oxycodone for pain.  Reports at home he takes 1 Tylenol pill and 1 oxycodone pill.  Does not recall dose.    Text page sent to house officer.

## 2021-11-11 NOTE — SIGNIFICANT EVENT
Significant Event Note    Time of event: 4:43 PM November 11, 2021    Description of event:  Patient with vague chest-pain, rating 7/10. States it doesn't feel related to the chest tube. Also having heartburn but stating that feels different. He states when he gets this type of pain it is because his blood pressure is high, and he will take his diltiazem and it will feel better. He is on Diltiazem  mg BID, with next dose due at 8:00 PM.     Plan:  Discussed plan of care with RN over the phone. Ok to give evening dose of diltiazem now. Asked RN to page house officer if his pain is not better in 1-2 hours, or if the pain is worsening before that time. Would then draw troponin, obtain EKG to evaluate for ACS. Patient also has history of chronic aortic dissection. Would have low threshold to repeat CT imaging of his chest tonight if he is having further pain. He will have dialysis tomorrow so iodinated contrast would be okay if this is urgently needed overnight.    Christy Virk MD  [facility] Medicine Residency Program, PGY-1  Pager #: [phone]

## 2021-11-11 NOTE — PROGRESS NOTES
Progress Note    Assessment/Plan  Continue chest tubes at this time serosanguineous drainage noted.  We will probably repeat CT scan in 1 to 2 days.  Potential lytic therapy.    Active Problems:    ESRD (end stage renal disease) on dialysis (H)    Hydropneumothorax    Hyperkalemia      Subjective  Patient quite comfortable  Objective    Vital signs in last 24 hours  Temp:  [97.6  F (36.4  C)-98.6  F (37  C)] 97.7  F (36.5  C)  Pulse:  [69-86] 80  Resp:  [16-27] 16  BP: (101-154)/(67-90) 124/77  SpO2:  [87 %-100 %] 94 %  Weight:   [unfilled]    Intake/Output last 3 shifts  I/O last 3 completed shifts:  In: 123 [P.O.:120; I.V.:3]  Out: 4150 [Urine:225; Other:2800; Chest Tube:1125]  Intake/Output this shift:  No intake/output data recorded.      Physical Exam  No obvious air leak chest tube output noted    Pertinent Labs   Lab Results   Component Value Date    WBC 4.8 11/11/2021    HGB 7.9 (L) 11/11/2021    HCT 26.9 (L) 11/11/2021     (H) 11/11/2021    PLT 32 (LL) 11/11/2021             Pertinent Radiology     [unfilled]        Dawson Reynaga MD

## 2021-11-11 NOTE — PROCEDURES
Hemodialysis for 2 hours, UF ONLY.   Access: LLAG, cannulated with 16 gauge needles without difficulty, arm swollen, 2+ edema. 4 x 4 gauze on left elbow,due to serous drainage.  Fluid removed 3200 ml, net off 2800 ml. Tolerated well  Consent signed and in chart dated: 4/19/21  Hepatitis B antigen POS, machine bleached and sequestered

## 2021-11-11 NOTE — PROGRESS NOTES
Primary Medicine Progress Note    Assessment/Plan  Active Problems:    ESRD (end stage renal disease) on dialysis (H)    Hydropneumothorax    Hyperkalemia    Elle Blanton is a 61 year old male with multiple comorbidities including AAA (5.5 cm), chronic thoracic aortic dissection, cirrhosis secondary to chronic hepatitis B, ESRD on hemodialysis (M/F via L AVF), recently admitted for septic bursitis of left elbow 10/14-10/17 and again with MSSA empyema 10/19-10/27 thought to be sequelae of bacteremia from septic bursitis, discharged on outpatient IV cefazolin MWF as he had denied surgical intervention several times. He is again admitted to our service 11/8 with worsened breathing secondary to this, s/p thoracostomy tube 11/10.    Patient remains hospitalized for: management of chest tube  Major plans for today: continued monitoring of chest tube drainage    Staph Aureus Empyema, likely due to bacteremia from septic bursitis  Liver Lesions - Potential Hematoma  Third or fourth admission for this empyema which has grown pansensitive MSSA. Has been receiving IV cephazolin as an outpatient MWF. Has adamantly refused surgery on past admissions, now states he is ready for surgery. Patient is short of breath but not in any respiratory distress and saturating high 90s on room air. Liver lesions not adequately imaged on non-contrast MRI last evening. Nephrology recommending CT abdomen with contrast for better characterization whenever clinically appropriate and can dialyze iodinated contrast off afterward. Will try to avoid MRI contrast.  - Continue to monitor chest tube output with primary management of this by Dr. Reynaga  - Continue IV cephazolin 2g Mon, 2g Wed, 3g Fri for now      ESRD on Hemodialysis M/F  Hyperkalemia  Chronic Anemia, secondary to ESRD, stable  Chronic Thrombocytopenia  Dialyzed 11/9, and 11/10, next due tomorrow 11/12. Dialyzes M/F as outpatient (refuses to go Wed). Patient is on EPO as an outpatient.  Hemoglobin stable. Platelets down to 32 today from 43 yesterday.  - Nephrology consulted for dialysis, appreciate care     Left Olecranon Bursitis s/p I&D on 10/15/21  Stitches removed by ortho. Stable.     Chronic Medical Conditions  AAA/Aortic Dissection: continue PTA carvedilol, diltiazem, and clonidine  GERD: continue PTA pantoprazole  Chronic Hepatitis B/Cirrhosis with Thrombocytopenia: entecavir q7 days. No active bleeding.  Insomnia: continue PTA ambien      COVID Status  Fully vaccinated (Moderna 8/18 and 9/15/21). Not yet eligible for boosters. Has also had flu shot this year.      Diet: NPO for potential surgery today  DVT Prophylaxis: Pneumatic Compression Devices  Beltre Catheter: Not present  Fluids: None  Central Lines: None  Code Status: Full Code     Disposition/Advanced Care Planning  At least several days     Subjective:   No acute events overnight. Nursing notes reviewed. Elle feels comfortable this morning, states his breathing is much better since the chest tube was placed.    Objective    Vital signs in last 24 hours Temp:  [97.6  F (36.4  C)-98.6  F (37  C)] 98.3  F (36.8  C)  Pulse:  [69-86] 80  Resp:  [16-27] 16  BP: (101-154)/(67-90) 149/87  SpO2:  [87 %-100 %] 94 %       Intake/Output last 3 shift  I/O last 3 completed shifts:  In: 123 [P.O.:120; I.V.:3]  Out: 4150 [Urine:225; Other:2800; Chest Tube:1125]        Intake/Output this shift:No intake/output data recorded.    PHYSICAL EXAM  GEN: Patient laying comfortably in bed in no acute distress. No respiratory distress.  HEEN: Head is atraumatic, normocephalic, eyes anicteric, mucous membranes moist.  PULM: Breathing comfortably on room air. Chest tube in place right chest with serosanguinous output.  EXT: Left elbow with dressing in place. Left arm edema, propped up on pillow.  NEURO: Alert and oriented x3.  No focal motor abnormalities.  Face symmetric.  PSYCH: Appropriate affect.  SKIN: No rashes, bruising, or other lesions.    Pertinent  Labs and Pertinent Radiology   Recent Labs   Lab 11/11/21  0703   WBC 4.8   HGB 7.9*   HCT 26.9*   PLT 32*       Recent Labs   Lab 11/11/21  0703 11/09/21  0646 11/08/21  1326      < > 139   CO2 25   < > 23   BUN 43*   < > 77*   ALBUMIN  --   --  2.3*   ALKPHOS  --   --  160*   ALT  --   --  <9   AST  --   --  43*    < > = values in this interval not displayed.     Precepted patient with Dr. Gustavo Virk MD  [facility] Residency Program, PGY-1  Pager #: [phone]

## 2021-11-11 NOTE — PLAN OF CARE
Problem: Electrolyte Imbalance  Goal: Electrolyte Balance  Outcome: Improving     Problem: Hemodynamic Instability (Hemodialysis)  Goal: Vital Signs Remain in Desired Range  Outcome: Improving  Intervention: Optimize Blood Flow  Recent Flowsheet Documentation  Taken 11/10/2021 1643 by Debbie Costello, RN  Medication Review/Management: medications reviewed  Taken 11/10/2021 0916 by Debbie Costello, RN  Medication Review/Management: medications reviewed     Problem: Infection  Goal: Absence of Infection Signs and Symptoms  Outcome: Improving     Problem: Respiratory Compromise  Goal: Optimal Oxygenation and Ventilation  Outcome: Improving   Pt has been pleasant and cooperative with all cares and treatments this shift.    -Hgb 7.6, K+-4.3 and Cr-8.16( no call order for ESRD on BUN and creat).   -tele-NSR  -left arm fistula- pos bruit an thrill  -Left swollen arm with dry gauze wrapped around incision site from surgery and had some serous drainage and gauze was changed.    Pt agreed with nephrology to do an extra 2 hour run of dialysis today. And that is currently going at this time    Pt had an MRI yesterday and apparently refused to have IV contrast and the MRI showed that the pt had  4 cm mass that was indeterminate on this non contrast exam. It was recommended that the lesion would be best evaluated on a contrast-enhanced study, and per the radiologist, there is a question of this could be an evolving hematoma.  Dr. Reynaga was read this report of the MRI from yesterday this am and he stated that he was cancelling the surgery until further scans could be done. Called several times after speaking with the Nephrologist this am to leave a message that he was to call Dr. Hill on his cell phone. He came up and visited the pt and stated that he tried to call Dr. Hill and that he did not answer his call. Dr. Hill left a detailed note in the chart as to his concerns with contrast for this  pt for MRI.  Per Juhi Álvarez he contacted the radiologist and discussed plan going forward and he said that he reviewed CT of yesterday with the radiologist.  and he will place a  large right thoracostomy tube if possible, down in IR.    Also paged the resident that saw the pt this am to come and explain the procedure he was having today as pt was waiting for one of the MD's to discuss what he was going to have done and what the plan was and no call back was received.    Pt was kept NPO and went down to have chest tube placed and returned to the unit around 1555. He has denied pain and the drain is putting out serosang drainage at present there is a total of 600 ml in the canister. Pt is on -20 cm H2O.    Spoke with wife who is concerned that her  was here not to long ago then went home for a week and ended up back here at the hospital, is only having another chest tube and no the surgery by Dr. Reynaga. She stated that her  would comply with whatever he recommended as he understands now that he doesn't want to have to come back to the hospital. She was just very concerned about her  having IV contrast as they were told to avoid this since the pt is in renal failure.    Will cont to monitor this pt and alert the MD of any status changes or complications from the chest tube that was placed earlier today.

## 2021-11-12 NOTE — CONSULTS
LUNG NODULE & INTERVENTIONAL PULMONARY CLINIC  [facility], [facility]     Elle Blanton MRN# [id number]   Age: 61 year old YOB: 1960       Requesting Physician: Mark       Assessment and Plan:    1. 61-year-old man with complex medical history including end-stage renal disease on dialysis, hepatitis B related cirrhosis, possible liver mass, known pleural effusion with previous empyema here with complex pleural effusion. Per primary team, surgery requested discussion with us for lytics.    Daily chest x-rays are ordered and they should be done    Start lytics today. Orders placed. Monitor patient for chest pain and change in chest tube output. We will follow along. Discussed with RN and stopcock placed.    Given the liver mass, primary team was concerned about the possibility of malignant effusion. Cytology was not sent in the setting of chest tube placement. Collecting fluid from the chest tube atrium is not helpful for this test. We can attempt aspiration of fluid tomorrow.    It is possible that the inciting factor for this effusion was is combination of liver and kidney disease. It is possible that with optimal management of volume with dialysis we may improve his pleural effusion somewhat.             History:     Elle Blanton is a 61 year old male with sig h/o for end-stage liver disease, end-stage renal disease, empyema who is here for evaluation/followup of shortness of breath. On admission he was found to have a large complex hydropneumothorax similar location as previous. He had a chest tube placed and had some serosanguineous drainage. He is feeling a little better with chest tube placement. Drainage has gone down a little bit and surgery has requested consideration for lytics.    There is been discussion for decortication the past but the patient has been hesitant to undergo surgery.      - My interpretation of the images relevant for this  visit includes: Large and complex right-sided hydropneumothorax.             Past Medical History:      Past Medical History:   Diagnosis Date     NAHUM (acute kidney injury) (H)      GI (gastrointestinal bleed)      Gout      H. pylori infection 7/5/2017    UGI bleed implied by Hgb 9.0 6/22/17 and melena. Admitted and hgb decreased to 7.6, CT abdomen showed all bladder wall thickening. + Hep B surface antigen noted. Melena resolved and hgb stabalized without transfusion, epigastric pain resolved with PPI. Recommend referral for gastroscopy.     Heart attack (H)      Hepatitis B      Normocytic anemia      PONV (postoperative nausea and vomiting)      Thrombocytopenia (H)            Past Surgical History:      Past Surgical History:   Procedure Laterality Date     ABCESS DRAINAGE      finger     BURSECTOMY ELBOW Left 10/15/2021    Procedure: LEFT OLECRANON BURSECTOMY;  Surgeon: Tico Myers MD;  Location: [facility]     CREATE FISTULA ARTERIOVENOUS UPPER EXTREMITY Left 4/20/2021    Procedure: left arm dialysis graft placement;  Surgeon: Roxana Cosby MD;  Location: [facility] OR;  Service: General     FOREIGN BODY REMOVAL      finger     INCISION AND DRAINAGE FINGER, COMBINED Left 10/20/2016    Procedure: COMBINED INCISION AND DRAINAGE FINGER;  Surgeon: Mireya Pizano MD;  Location: [facility] OR     IR CHEST TUBE PLACEMENT NON-TUNNELED RIGHT  4/19/2021     IR CHEST TUBE PLACEMENT NON-TUNNELED RIGHT  10/19/2021     IR CHEST TUBE PLACEMENT NON-TUNNELED RIGHT  11/10/2021     IR PLEURAL DRAINAGE WITH CATHETER INSERTION  4/19/2021     MIDLINE INSERTION - DOUBLE LUMEN  4/23/2021          [facility] ESOPHAGOGASTRODUODENOSCOPY TRANSORAL DIAGNOSTIC N/A 8/18/2020    Procedure: ESOPHAGOGASTRODUODENOSCOPY (EGD) with biopsy;  Surgeon: Nilson Gonzales MD;  Location: [facility];  Service: Gastroenterology     US PARACENTESIS  8/14/2020     US PARACENTESIS  8/19/2020     US THORACENTESIS  4/18/2021          Social  History:     Social History     Tobacco Use     Smoking status: Never Smoker     Smokeless tobacco: Never Used   Substance Use Topics     Alcohol use: No          Family History:     Family History   Problem Relation Age of Onset     Diabetes Father      Hypertension No family hx of      Asthma No family hx of      Cancer No family hx of      Coronary Artery Disease No family hx of      Liver Cancer No family hx of      Ulcers Father            Allergies:      Allergies   Allergen Reactions     Nka [No Known Allergies]           Medications:     Current Facility-Administered Medications   Medication     0.9% sodium chloride BOLUS     0.9% sodium chloride BOLUS     acetaminophen (TYLENOL) tablet 650 mg    Or     acetaminophen (TYLENOL) Suppository 650 mg     albumin human 25 % injection 50 mL     alteplase (ACTIVASE) 10 mg, dornase alpha (PULMOZYME) 5 mg in sodium chloride 0.9 % 50 mL for chest tube instillation in syringe     calcium acetate (PHOSLO) capsule 667 mg     calcium carbonate (TUMS) chewable tablet 500 mg     carvedilol (COREG) tablet 25 mg     ceFAZolin (ANCEF) intermittent infusion 2 g in 100 mL dextrose PRE-MIX     ceFAZolin (ANCEF) intermittent infusion 3 g (pre-mix)     cloNIDine (CATAPRES) tablet 0.1 mg     glucose gel 15-30 g    Or     dextrose 50 % injection 25-50 mL    Or     glucagon injection 1 mg     diltiazem ER COATED BEADS (CARDIZEM CD/CARTIA XT) 24 hr capsule 180 mg     [START ON 11/14/2021] entecavir (BARACLUDE) tablet 0.5 mg     [START ON 11/19/2021] epoetin kelton-epbx (RETACRIT) injection 40,000 Units     [Held by provider] fentaNYL (PF) (SUBLIMAZE) injection 25-50 mcg     flumazenil (ROMAZICON) injection 0.2 mg     lidocaine (LMX4) cream     lidocaine 1 % 0.1-1 mL     lidocaine 1 % 0.5 mL     LORazepam (ATIVAN) tablet 1 mg     melatonin tablet 3 mg     midazolam (VERSED) injection 0.5-2 mg     naloxone (NARCAN) injection 0.2 mg    Or     naloxone (NARCAN) injection 0.4 mg    Or      "naloxone (NARCAN) injection 0.2 mg    Or     naloxone (NARCAN) injection 0.4 mg     ondansetron (ZOFRAN) injection 4 mg     oxyCODONE (ROXICODONE) tablet 5 mg     pantoprazole (PROTONIX) EC tablet 40 mg     sodium chloride (PF) 0.9% PF flush 3 mL     sodium chloride (PF) 0.9% PF flush 3 mL     Stop Heparin 60 minutes before end of treatment     zolpidem (AMBIEN) tablet 5 mg          Review of Systems:     Comprehensive review of systems negative except as above.         Physical Exam:   /79   Pulse 87   Temp 97.6  F (36.4  C) (Oral)   Resp 18   Ht 1.651 m (5' 5\")   Wt 54.8 kg (120 lb 14.4 oz)   SpO2 97%   BMI 20.12 kg/m      Constitutional -fatigue, calm, no distress  Neck: Supple  Eyes - no redness or discharge  Respiratory -breathing appears comfortable. No respiratory variation and chest tube. No air leak.  Cardiac -- Normal rate, rhythm.   Skin - No appreciable discoloration or lesions (very limited exam)  Neurological - No apparent tremors. Speech fluent and articlate  Psychiatric - no signs of delirium or anxiety  Remedies: No clubbing          Current Laboratory Data:   All laboratory and imaging data reviewed.    Results for orders placed or performed during the hospital encounter of 11/08/21 (from the past 24 hour(s))   CBC with platelets   Result Value Ref Range    WBC Count 6.5 4.0 - 11.0 10e3/uL    RBC Count 3.03 (L) 4.40 - 5.90 10e6/uL    Hemoglobin 9.1 (L) 13.3 - 17.7 g/dL    Hematocrit 30.0 (L) 40.0 - 53.0 %    MCV 99 78 - 100 fL    MCH 30.0 26.5 - 33.0 pg    MCHC 30.3 (L) 31.5 - 36.5 g/dL    RDW 17.3 (H) 10.0 - 15.0 %    Platelet Count 29 (LL) 150 - 450 10e3/uL   Basic metabolic panel   Result Value Ref Range    Sodium 135 (L) 136 - 145 mmol/L    Potassium 3.4 (L) 3.5 - 5.0 mmol/L    Chloride 98 98 - 107 mmol/L    Carbon Dioxide (CO2) 26 22 - 31 mmol/L    Anion Gap 11 5 - 18 mmol/L    Urea Nitrogen 20 8 - 22 mg/dL    Creatinine 5.71 (H) 0.70 - 1.30 mg/dL    Calcium 7.5 (L) 8.5 - 10.5 " mg/dL    Glucose 120 70 - 125 mg/dL    GFR Estimate 10 (L) >60 mL/min/1.73m2

## 2021-11-12 NOTE — PROGRESS NOTES
Primary Medicine Progress Note    Assessment/Plan  Active Problems:    ESRD (end stage renal disease) on dialysis (H)    Hydropneumothorax    Hyperkalemia    Elle Blanton is a 61 year old male with multiple comorbidities including AAA (5.5 cm), chronic thoracic aortic dissection, cirrhosis secondary to chronic hepatitis B, ESRD on hemodialysis (M/F via L AVF), recently admitted for septic bursitis of left elbow 10/14-10/17 and again with MSSA empyema 10/19-10/27 thought to be sequelae of bacteremia from septic bursitis, discharged on outpatient IV cefazolin MWF as he had denied surgical intervention several times. He is again admitted to our service 11/8 with worsened breathing secondary to this, s/p thoracostomy tube 11/10.    Patient remains hospitalized for: management of chest tube  Major plans for today: continued monitoring of chest tube drainage, dialysis run     Hemopneumothorax s/p thoracostomy tube 11/10  History of Staph Aureus Empyema, likely due to bacteremia from septic bursitis  Liver Lesions - Potential Hematoma versus Malignancy  Third or fourth admission for this hydropneumothorax which has previously grown pansensitive MSSA. Has been receiving IV cephazolin as an outpatient MWF. Has refused surgery on past admissions, but is understandably worried about why the fluid keeps re-accumulating and is wanting definitive therapy. Chest tube was placed on 11/10 and is draining serosanguinous fluid. Elle has a history of Hepatitis B and cirrhosis and knows that he probably has liver cancer, though he does not want work-up for this as he would not want treatment. Therefore this recurrent effusion is likely secondary to malignancy versus cirrhosis. Case discussed with Dr. Reynaga - avoiding VATS procedure primarily due to logistical difficulties as Elle has refused surgery many times in the past.   - Continue to monitor chest tube output with primary management of this by Dr. Reynaga, daily CXR  - Potential  repeat chest CT in the next few days to monitor improvement  - Continue IV cephazolin 2g Mon, 2g Wed, 3g Fri for now  - Pulmonology consulted today for potential lytics through chest tube      ESRD on Hemodialysis M/F  Hyperkalemia  Chronic Anemia, secondary to ESRD, stable  Chronic Thrombocytopenia  Will dialyze 11/12, next due Monday 11/15. Dialyzes M/F as outpatient (refuses to go Wed). Patient is on EPO as an outpatient. Hemoglobin stable.   - Nephrology consulted for dialysis, appreciate care     Left Olecranon Bursitis s/p I&D on 10/15/21  Stitches removed by ortho. Stable.     Chronic Medical Conditions  AAA/Aortic Dissection: continue PTA carvedilol, diltiazem, and clonidine  GERD: continue PTA pantoprazole  Chronic Hepatitis B/Cirrhosis with Thrombocytopenia: entecavir q7 days. No active bleeding.  Insomnia: continue PTA ambien      COVID Status  Fully vaccinated (Moderna 8/18 and 9/15/21). Not yet eligible for boosters. Has also had flu shot this year.      Diet: NPO for potential surgery today  DVT Prophylaxis: Pneumatic Compression Devices  Beltre Catheter: Not present  Fluids: None  Central Lines: None  Code Status: Full Code     Disposition/Advanced Care Planning  At least several days     Subjective:   Had chest pain yesterday evening that resolved with getting his evening dose of diltiazem earlier than normal (5pm instead of 8pm). No further chest pain. Getting dialysis this morning. Feeling frustrated and tired. Concerned about why the fluid keeps coming back into his lung. Breathing comfortably.    Objective    Vital signs in last 24 hours Temp:  [97.7  F (36.5  C)-98.6  F (37  C)] 97.8  F (36.6  C)  Pulse:  [76-88] 80  Resp:  [16-20] (P) 18  BP: (122-149)/(72-92) (P) 130/85  SpO2:  [94 %-97 %] 97 %       PHYSICAL EXAM  GEN: Patient laying comfortably in bed in no acute distress. No respiratory distress.  HEEN: Head is atraumatic, normocephalic, eyes anicteric, mucous membranes  moist.  PULM: Breathing comfortably on room air. Chest tube in place right chest with serosanguinous output.  EXT: Left elbow with dressing in place. Left arm edema, propped up on pillow. Dialysis running.  NEURO: Alert and oriented x3.  No focal motor abnormalities.  Face symmetric.  PSYCH: Appropriate affect.  SKIN: No rashes, bruising, or other lesions.    Pertinent Labs and Pertinent Radiology   None new today - not yet drawn.    Precepted patient with Dr. Jaswant Flores.    Christy Virk MD  [facility] Medicine Residency Program, PGY-1  Pager #: [phone]

## 2021-11-12 NOTE — PLAN OF CARE
Problem: Gas Exchange Impaired  Goal: Optimal Gas Exchange  Outcome: Improving     Problem: Respiratory Compromise  Goal: Optimal Oxygenation and Ventilation  Outcome: Improving   Pt denied any pain or SOB. Lung sounds diminished. Chest tube to right side with 150ml of serosanguinous output. Dressing to left elbow changed this morning. Tele- NSR.

## 2021-11-12 NOTE — PLAN OF CARE
Problem: Hemodynamic Instability (Hemodialysis)  Goal: Vital Signs Remain in Desired Range  Outcome: Improving     Problem: Infection  Goal: Absence of Infection Signs and Symptoms  Outcome: Improving     Problem: Adult Inpatient Plan of Care  Goal: Absence of Hospital-Acquired Illness or Injury  Intervention: Identify and Manage Fall Risk  Recent Flowsheet Documentation  Taken 11/12/2021 0815 by Jena Garvin RN  Safety Promotion/Fall Prevention: activity supervised     Patient is alert and oriented. Had dialysis run this morning. PRN Tylenol administered for headache that patient reports to be effective. Refused carvedilol and diltiazem after dialysis run. Chest tube to wall suction. Cleaned around site and reinforced pressure dressing for a small amount of bleeding from site. Pulm here to attach 3 way stopcock for lytics patient will be getting starting this evening. CXR completed. Reported critical platelet to following resident, no new orders. Call light within reach and able to make needs known.

## 2021-11-12 NOTE — PLAN OF CARE
Pt reported heartburn and chest pain. Pt stated that the chest pain is due high b/p; he was requesting Cardizem for it. Dr. Virk notified. Tums and Cardizem given as per order.

## 2021-11-12 NOTE — PROGRESS NOTES
Progress Note    Assessment/Plan  Patient quite comfortable.  Chest tube output noted.  No air leak.  Daily chest x-ray ordered.    Active Problems:    ESRD (end stage renal disease) on dialysis (H)    Hydropneumothorax    Hyperkalemia      Subjective  As above  Objective    Vital signs in last 24 hours  Temp:  [97.7  F (36.5  C)-98.6  F (37  C)] 97.8  F (36.6  C)  Pulse:  [76-88] 77  Resp:  [16-20] 18  BP: (120-149)/(72-92) 120/77  SpO2:  [94 %-97 %] 97 %  Weight:   [unfilled]    Intake/Output last 3 shifts  I/O last 3 completed shifts:  In: 230 [P.O.:230]  Out: 310 [Chest Tube:310]  Intake/Output this shift:  No intake/output data recorded.      Physical Exam  Good respiratory effort.  No obvious air leak.  Serosanguineous chest tube output noted.    Pertinent Labs   Lab Results   Component Value Date    WBC 4.8 11/11/2021    HGB 7.9 (L) 11/11/2021    HCT 26.9 (L) 11/11/2021     (H) 11/11/2021    PLT 32 (LL) 11/11/2021             Pertinent Radiology     [unfilled]        Dawson Reynaga MD

## 2021-11-12 NOTE — PLAN OF CARE
Problem: Respiratory Compromise  Goal: Optimal Oxygenation and Ventilation  Outcome: Improving   Chest tube is place and connected to wall suction. No air leak noted. Oxygen at RA. Lung sounds diminished.

## 2021-11-12 NOTE — PROGRESS NOTES
"Orthopedic Progress Note      Assessment:    S/P left elbow olecranon bursa I&D on 10/15    Plan:   -Ortho following for wound on left elbow. Last dressing change was this morning prior to dialysis. When seen at 1230 this afternoon, the bandage had a 4x4 cm area of serous drainage on it. No signs of pus, erythema, edema surrounding the area. Continue to change dressing if saturating through.      Subjective:  Patient notices drainage from the elbow 1-2 times per day. He does not have any concerns with the elbow and states he appreciates the checkups. He denies numbness or tingling. No hand swelling today.    Objective:  /79   Pulse 87   Temp 97.6  F (36.4  C) (Oral)   Resp 18   Ht 1.651 m (5' 5\")   Wt 54.8 kg (120 lb 14.4 oz)   SpO2 97%   BMI 20.12 kg/m    The patient is A&Ox3. Appears comfortable, sitting up.  Left elbow incision is healing well, there continues to be a 0.5cm small incision that has mild serous drainage output. There is no erythema, tenderness, edema.  Full ROM of left elbow.  Distal sensation and pulses in the LUE intact    Pertinent Labs   Lab Results: personally reviewed.   Lab Results   Component Value Date    INR 1.38 (H) 10/19/2021    INR 1.28 (H) 09/29/2021    INR 1.43 (H) 09/28/2021     Lab Results   Component Value Date    WBC 6.5 11/12/2021    HGB 9.1 (L) 11/12/2021    HCT 30.0 (L) 11/12/2021    MCV 99 11/12/2021    PLT 29 (LL) 11/12/2021     Lab Results   Component Value Date     (L) 11/12/2021    CO2 26 11/12/2021         Report completed by:  Wendie Zheng PA-C, NAHUN  Date: 11/12/2021  Time: 1:59 PM    "

## 2021-11-12 NOTE — PROCEDURES
"HEMODIALYSIS NOTE:     ASSESSMENT: A/O x 3, speaks English, denies chest pain, on room air/ comfortable.     TREATMENT TIME: 3 HR    UF TOTAL(net) : 2300 ml-100 NSB, net total out 1700 ml     WEIGHT PRE: 59.3 kg floor reported weight.    WEIGHT POST: 54.8 kg     ACCESS PRE: Left access arm swollen, edema noted at +2 (elbow surgery). Graft accessed with 16 ga needles.    ml/min.      HEPATITIS STATUS:  Chronic Unit: [facility]  Hbsag: POSITIVE. Sequestered machine used T-29.       RUN SUMMARY: 3 hr run on a k2 bath per lab protocol. 700 DFR, Pinky side arterial.   Initial UF goal set at 3500 ml, steep drop in crit-line noted with prolong profile C reading.   UF goal decreased and adjustment per critline and hemodynamics. Pt. was hemodynamically stable during dialysis.  Pt did asked for Tylenol \"headache\" r/t being cold during HD tx. See HD flow sheet for all data. Uneventful HD TX.     Post report given to Irma Garvin RN     BVP: 59.1 L    ACCESS POST: Needles d/c'd. Dressing applied and secured once hemostasis obtained- 10 Minutes each.     INTERVENTIONS: Monitor VS Q 15 minutes and PRN. Critline used for fluid monitoring/management.    PLAN: Per Renal Team    Daniel Burroughs RN  [facility]  "

## 2021-11-12 NOTE — PLAN OF CARE
Bleeding noted from chest tune insertion site. Writer noted that the tape and pin which was anchoring the tube to the gown has been removed. Pressure dressing applied. New tape applied and tube pinned to gown. Pt advised not to pull on the chest tube or detach it from the gown.

## 2021-11-13 NOTE — PROVIDER NOTIFICATION
11/13/21 0743   Vital Signs   Temp 98.5  F (36.9  C)   Temp src Oral   Resp 18   Pulse 85   Pulse Rate Source Monitor   /75   BP Location Right arm   Patient Position Semi-Cervantes's       Pt refused his scheduled Diltiazem and Carvedilol.  Pt states he takes his BP at home and only takes these medications if his SBP is over 140 or 150.  Pt then clarified that he takes BP meds if SBP is over 150.  Pt refused medications this AM.    Pt states if he takes with a BP meds with a low BP, he gets short of breath and dizzy.   Pt white board was updated regarding this also.

## 2021-11-13 NOTE — PLAN OF CARE
On arrival from P1 chest tube insertion site had a small amount of drainage and dried drainage on abdomen. In under an hour chest tube had put out 484mls. House officer Dr. Osborne notified, he did come and assess site. Reinforced dressing with gauze and pressure tape. Continue to monitor output and VS, if chest tube output >1L in 1 hour notify MD. Output did slow down this morning only 196ml out. Elbow dressing changed x2 and reinforced with gauze.   PRN oxycodone given x1 for 7/10 pain at chest tube site with some relief.

## 2021-11-13 NOTE — PROGRESS NOTES
RENAL PROGRESS NOTE    CHIEF COMPLAINT:   ESKD, empyema, fluid overload    ASSESSMENT & PLAN:   ESKD - Dialyzes as outpatient at [facility] (TT 3 hours, left AVG). As outpatient dialyzes Monday and Friday and is frequently non-compliant with dialysis schedule. S/p UF only treatment this admission and resuming usual schedule. Recommended to him that he consider attending dialysis three times weekly going forward    Empyema - ?due to bacteremia from septic bursitis.  Multiple admissions for this.  Most recently discharged on IV cefazolin with outpatient hemodialysis.  Surgery place chest tubes.  Discussing repeat CT scan in 1 to 2 days.  Potential lytic therapy.  Chest tube management per surgery.  IV cefazolin    Hypertension - Controlled on antihypertensive regimen. UF with HD    Volume - Presented with fluid overload; s/p UF only run as outpatient. Challenges with larger fluid gains and noncompliance with dialysis making it difficult to get to dry weight at times.    ESKD-MBD - On calcium acetate with meals for binders    Hyperkalemia - Resolved with dialysis    Anemia - Chronically low with missed epo due to missed dialysis. Will also have decreased responsiveness to CAROLYNN with infection    GERD - on PPI    Liver Lesion - CT scan with contrast for better characterization     Left olecranon bursitis - s/p I&D on 10/15/2021     AAA with thoracic aortic dissection - Prior to admission carvedilol, diltiazem, clonidine. Follows with vascular surgery          SUBJECTIVE:  Feels he's doing okay. Denies increased shortness of breath. No pain complaints. No lightheadedness or dizziness. Appetite not great, no nausea or vomiting. Doesn't like the food here    OBJECTIVE:  Physical Exam   Temp: 98.7  F (37.1  C) Temp src: Oral BP: (!) 141/86 Pulse: 91   Resp: 19 SpO2: 95 % O2 Device: None (Room air)    Vitals:    11/12/21 0830 11/12/21 1239 11/13/21 0310   Weight: 59.3 kg (130 lb 11.7 oz) 54.8 kg (120 lb  14.4 oz) 57.2 kg (126 lb 1.6 oz)     Vital Signs with Ranges  Temp:  [97.6  F (36.4  C)-99.7  F (37.6  C)] 98.7  F (37.1  C)  Pulse:  [80-91] 91  Resp:  [18-20] 19  BP: (108-141)/(64-92) 141/86  SpO2:  [94 %-98 %] 95 %  I/O last 3 completed shifts:  In: 903 [P.O.:900; I.V.:3]  Out: 2730 [Other:1700; Chest Tube:1030]      Patient Vitals for the past 72 hrs:   Weight   11/13/21 0310 57.2 kg (126 lb 1.6 oz)   11/12/21 1239 54.8 kg (120 lb 14.4 oz)   11/12/21 0830 59.3 kg (130 lb 11.7 oz)   11/12/21 0738 59.3 kg (130 lb 11.2 oz)       Intake/Output Summary (Last 24 hours) at 11/12/2021 1425  Last data filed at 11/12/2021 1230  Gross per 24 hour   Intake 470 ml   Output 1900 ml   Net -1430 ml       PHYSICAL EXAM:  General - Alert and oriented x3, appears comfortable. No apparent distress  Cardiovascular - Normal S1S2, no rub  Respiratory - Diminished at bases bilaterally. No rachid crackles or wheezes. Chest tube in right chest  Abdomen - Soft, non-tender, bowel sounds present.    Extremities - No lower extremity edema bilaterally. Left lower AVG with +thrill/bruit. Left elbow with dressing in place. Left arm with 1+ edema  Integumentary - Warm, dry, no rash  Neurologic - Grossly intact, no focal deficits      LABORATORY STUDIES:     Recent Labs   Lab 11/13/21  0527 11/12/21  1301 11/11/21  0703 11/10/21  0719 11/09/21  0646 11/08/21  1326   WBC 6.3 6.5 4.8 6.0 6.8 7.1   RBC 2.75* 3.03* 2.64* 2.53* 2.60* 3.05*   HGB 8.3* 9.1* 7.9* 7.6* 7.8* 9.2*   HCT 27.9* 30.0* 26.9* 25.3* 26.5* 31.4*   PLT 37* 29* 32* 43* 53* 71*       Basic Metabolic Panel:  Recent Labs   Lab 11/13/21  0527 11/12/21  1301 11/11/21  0703 11/10/21  0913 11/10/21  0719 11/09/21  1731 11/09/21  1317 11/09/21  1252 11/09/21  1051 11/09/21  0646 11/08/21  1326   * 135* 136  --  137  --   --   --   --  139 139   POTASSIUM 4.1 3.4* 4.8  --  4.3  --   --  5.5*  --  6.5* 5.9*   CHLORIDE 99 98 101  --  101  --   --   --   --  107 106   CO2 25 26 25  --  26   --   --   --   --  24 23   BUN 30* 20 43*  --  39*  --   --   --   --  80* 77*   CR 7.82* 5.71* 9.49*  --  8.16*  --   --   --   --  13.15* 12.60*   GLC 85 120 92 85 87 88   < >  --    < > 74 87   TANO 7.5* 7.5* 7.6*  --  7.6*  --   --   --   --  7.7* 8.0*    < > = values in this interval not displayed.       INRNo lab results found in last 7 days.     Recent Labs   Lab Test 11/13/21  0527 11/12/21  1301 10/19/21  1006 10/19/21  0940 10/11/21  1121 09/29/21  0608   INR  --   --   --  1.38*  --  1.28*   WBC 6.3 6.5   < >  --    < > 9.6   HGB 8.3* 9.1*   < >  --    < > 8.0*   PLT 37* 29*   < >  --    < > 37*    < > = values in this interval not displayed.         Personally reviewed current labs      Khushi Miller PA-C  [facility]  [phone]

## 2021-11-13 NOTE — PROGRESS NOTES
[facility] Progress Note    Assessment/Plan  Active Problems:    ESRD (end stage renal disease) on dialysis (H)    Hydropneumothorax    Hyperkalemia      Elle Blanton is a 61 year old male with multiple comorbidities including AAA (5.5 cm), chronic thoracic aortic dissection, cirrhosis secondary to chronic hepatitis B, ESRD on hemodialysis (M/F via L AVF), recently admitted for septic bursitis of left elbow 10/14-10/17 and again with MSSA empyema 10/19-10/27 thought to be sequelae of bacteremia from septic bursitis, discharged on outpatient IV cefazolin MWF as he had denied surgical intervention several times. He is again admitted to our service 11/8 with worsened breathing secondary to this, s/p thoracostomy tube 11/10.     Patient remains hospitalized for: management of chest tube  Major plans for today: continued monitoring of chest tube drainage     Hemopneumothorax s/p thoracostomy tube 11/10  History of Staph Aureus Empyema, likely due to bacteremia from septic bursitis  Liver Lesions - Potential Hematoma versus Malignancy  Third or fourth admission for this hydropneumothorax which has previously grown pansensitive MSSA. Has been receiving IV cephazolin as an outpatient MWF. Has refused surgery on past admissions, but is understandably worried about why the fluid keeps re-accumulating and is wanting definitive therapy. Chest tube was placed on 11/10 and is draining serosanguinous fluid. Elle has a history of Hepatitis B and cirrhosis and knows that he probably has liver cancer, though he does not want work-up for this as he would not want treatment. Recurrent effusion is likely secondary to malignancy vs cirrhosis. Case discussed with Dr. Reynaga - please see his note from today.  CXR from today showed increased air with decrease in fluid.  Obtaining a CT chest and abdomen to evaluate thorax and possible portal hypertension.  Intervention pending results and clinical course.     - Continue to  monitor chest tube output with primary management of this by Dr. Reynaga, daily CXR  - Repeating CT chest and abdomen today   - Continue IV cephazolin 2g Mon, 2g Wed, 3g Fri for now  - Pulmonology consulted today for potential lytics through chest tube  - Pulm planning to attempt aspiration of fluid to rule out malignant effusion as cytology was not performed when chest tube was placed      ESRD on Hemodialysis M/F  Hyperkalemia  Last dialyzed 11/12, next due Monday 11/15. Dialyzes M/F as outpatient (refuses to go Wed).   - Nephrology consulted for dialysis, appreciate care     Chronic Thrombocytopenia  Chronic Anemia  Patient has low platelets and has been struggling with this for several days.  Likely secondary to his chronic liver disease.  Also noted to be anemic, likely secondary to his chronic kidney disease.  Has been relatively stable at this point in time not requiring acute intervention.  On EPO  -Daily CBC    AAA/Aortic Dissection  HTN:  Patient is on scheduled PTA carvedilol, diltiazem, and clonidine.  Has declined carvedilol and diltiazem until his BP is >150 as it makes him dizzy and lightheaded.  Discussed the complications of not taking his medications and he verbalized understanding.  Will monitor pressure, patient is open to taking them if >150 (understanding that it is better to take his medications before he gets to that point).    -Administer PTA carvedilol, diltiazem, and clonidine as patient tolerates    Left Olecranon Bursitis s/p I&D on 10/15/21  Stitches removed by ortho. Stable.  -Continuing to follow as there continues to be some discharge/drainage from the site      Chronic Medical Conditions  GERD: continue PTA pantoprazole  Chronic Hepatitis B/Cirrhosis with Thrombocytopenia: entecavir q7 days. No active bleeding.  Insomnia: continue PTA ambien     COVID Status  Fully vaccinated (Moderna 8/18 and 9/15/21). Not yet eligible for boosters. Has also had flu shot this  year.      Diet: Dialysis diet  DVT Prophylaxis: Pneumatic Compression Devices  Beltre Catheter: Not present  Fluids: None  Central Lines: None  Code Status: Full Code     Disposition/Advanced Care Planning  At least several days     Subjective  Patient is feeling stable at this time.  When discussing his hospitalization appears to be shocked when talking about things that have been going on for several days and he clearly has been informed of many times.  Answered his questions regarding his current work up.  Of note, patient is declining his BP medication today as it makes him dizzy lightheaded.  Explained why it would be a good idea to take and he continued to decline until his BP is >150.    Objective    Vital signs in last 24 hours Temp:  [97.6  F (36.4  C)-99.7  F (37.6  C)] 98.5  F (36.9  C)  Pulse:  [76-87] 85  Resp:  [18-20] 18  BP: (104-141)/(64-92) 134/75  SpO2:  [94 %-98 %] 95 %       Intake/Output last 3 shift I/O last 3 completed shifts:  In: 903 [P.O.:900; I.V.:3]  Out: 2730 [Other:1700; Chest Tube:1030]    Intake/Output this shift:No intake/output data recorded.    Physical Exam  GEN: Sitting up in bed watching tv show on his cell phone, chest tube draining in place. No respiratory distress.  HEEN: Head is atraumatic, normocephalic, eyes anicteric, mucous membranes moist.  PULM: Breathing comfortably on room air. Chest tube in place right chest with serosanguinous output.  EXT: Left elbow with dressing in place. Left arm edema, using arm to scroll on phone  NEURO: Alert and oriented x3.  No focal motor abnormalities.  Face symmetric.  PSYCH: Appropriate affect.  SKIN: No rashes, bruising, or other lesions.    Pertinent Labs and Pertinent Radiology   Lab Results: personally reviewed.     Radiology Results: Personally reviewed image/s and impression/s    Precepted patient with Dr. Naila Camejo.    Redd Healy MD  [facility], PGY-3  Pager # [phone]

## 2021-11-13 NOTE — PROGRESS NOTES
"Orthopedic Progress Note      Assessment:    S/P left elbow olecranon bursa I&D on 10/15    Plan:   - Continues to be followed due to continued drainage with small wound to left elbow following I&D, drainage continuing however improving slowly  - Last dressing change during overnight shift. This was removed today for evaluation and there appears to be very small amount of blood and serous fluid on bandage. No pus-like drainage, erythema, or fluctuance.   - Continue to change dressing daily, and PRN if saturated      Subjective:  Pain: minimal  Nausea, Vomiting:  No  Lightheadedness, Dizziness:  No  Neuro:  Patient denies new onset numbness or paresthesias    Patient is lying comfortably in bed during visit. Patient has been transferred to floor 3. Noticed small amount of bloody drainage last night. Last dressing change last night. This was removed today during visit to examine elbow and no pus, erythema, or fluctuance to the elbow. States he has relatively no pain to the elbow.     Objective:  BP (S) 134/75 (BP Location: Right arm, Patient Position: Semi-Cervantes's)   Pulse 85   Temp 98.5  F (36.9  C) (Oral)   Resp 18   Ht 1.651 m (5' 5\")   Wt 57.2 kg (126 lb 1.6 oz)   SpO2 95%   BMI 20.98 kg/m    Dressing applied to the left elbow, removed during visit today and reapplied  Very small 0.5cm incision with mild bloody/serous drainage output seen on bandage  Left elbow incision otherwise appears to be healing well and reinforced with steri-strips.   No fluctuance, erythema, or pus-like drainage  Mildly tender to palpation over the olecranon  Sensation intact to light touch  Full range of motion of shoulder, elbow, and wrist    Pertinent Labs   Lab Results: personally reviewed.   Lab Results   Component Value Date    INR 1.38 (H) 10/19/2021    INR 1.28 (H) 09/29/2021    INR 1.43 (H) 09/28/2021     Lab Results   Component Value Date    WBC 6.3 11/13/2021    HGB 8.3 (L) 11/13/2021    HCT 27.9 (L) 11/13/2021    MCV " 102 (H) 11/13/2021    PLT 37 (LL) 11/13/2021     Lab Results   Component Value Date     (L) 11/13/2021    CO2 25 11/13/2021         Report completed by:  GUALBERTO CLINE PA-C  Date: 11/13/2021  Time: 10:02 AM

## 2021-11-13 NOTE — PROGRESS NOTES
LUNG NODULE & INTERVENTIONAL PULMONARY CLINIC  CLINICS & SURGERY CENTER, [facility]     Elle Blanton MRN# [id number]   Age: 61 year old YOB: 1960       Requesting Physician: Mark       Assessment and Plan:    1. 61-year-old man with complex medical history including end-stage renal disease on dialysis, hepatitis B related cirrhosis, possible liver mass, known pleural effusion with previous empyema here with complex pleural effusion.    Increase in output with lytics.  Because of low platelets and bloody output I have changed medication installation to dornase only.  Continue chest tube drainage and keeping chest tube patent.    I agree based on chest x-ray that he is ready for repeat imaging.  He is clinically doing pretty well and given his comorbidities ideally we would manage this conservatively.    Prevention of this issue recurring again will be difficult.  An organism has not been identified but characteristics of the fluid were concerning for infection.  Most likely will need to consider involving infectious disease in a prolonged course of antibiotics.    Plan of care discussed with primary team MD.    He can be downgraded to [facility] from my standpoint.    Greater than 35 minutes spent in the care of this patient, greater than 50% counseling and coordination of care.         History:     Elle Blanton is a 61 year old male with sig h/o for end-stage liver disease, end-stage renal disease, empyema who is here for evaluation/followup of complicated pleural effusion.  He is overall feeling well.  No significant pain.  After relation of lytics into his chest tube he had increased output.  Because of concern for this increased output he was moved up to P3.  No other issues since then.    - My interpretation of the images relevant for this visit includes: Chest x-ray with persistent trapped lung but significant reduction in fluid.             Past Medical History:       Past Medical History:   Diagnosis Date     NAHUM (acute kidney injury) (H)      GI (gastrointestinal bleed)      Gout      H. pylori infection 7/5/2017    UGI bleed implied by Hgb 9.0 6/22/17 and melena. Admitted and hgb decreased to 7.6, CT abdomen showed all bladder wall thickening. + Hep B surface antigen noted. Melena resolved and hgb stabalized without transfusion, epigastric pain resolved with PPI. Recommend referral for gastroscopy.     Heart attack (H)      Hepatitis B      Normocytic anemia      PONV (postoperative nausea and vomiting)      Thrombocytopenia (H)            Past Surgical History:      Past Surgical History:   Procedure Laterality Date     ABCESS DRAINAGE      finger     BURSECTOMY ELBOW Left 10/15/2021    Procedure: LEFT OLECRANON BURSECTOMY;  Surgeon: Tico Myers MD;  Location: [facility] [place]     CREATE FISTULA ARTERIOVENOUS UPPER EXTREMITY Left 4/20/2021    Procedure: left arm dialysis graft placement;  Surgeon: Roxana Cosby MD;  Location: [facility] [place];  Service: General     FOREIGN BODY REMOVAL      finger     INCISION AND DRAINAGE FINGER, COMBINED Left 10/20/2016    Procedure: COMBINED INCISION AND DRAINAGE FINGER;  Surgeon: Mireya Pizano MD;  Location: [place] OR     IR CHEST TUBE PLACEMENT NON-TUNNELED RIGHT  4/19/2021     IR CHEST TUBE PLACEMENT NON-TUNNELED RIGHT  10/19/2021     IR CHEST TUBE PLACEMENT NON-TUNNELED RIGHT  11/10/2021     IR PLEURAL DRAINAGE WITH CATHETER INSERTION  4/19/2021     MIDLINE INSERTION - DOUBLE LUMEN  4/23/2021          [place] ESOPHAGOGASTRODUODENOSCOPY TRANSORAL DIAGNOSTIC N/A 8/18/2020    Procedure: ESOPHAGOGASTRODUODENOSCOPY (EGD) with biopsy;  Surgeon: Nilson Gonzales MD;  Location: [facility] [place];  Service: Gastroenterology     US PARACENTESIS  8/14/2020     US PARACENTESIS  8/19/2020     US THORACENTESIS  4/18/2021          Social History:     Social History     Tobacco Use     Smoking status: Never Smoker     Smokeless tobacco:  Never Used   Substance Use Topics     Alcohol use: No          Family History:     Family History   Problem Relation Age of Onset     Diabetes Father      Hypertension No family hx of      Asthma No family hx of      Cancer No family hx of      Coronary Artery Disease No family hx of      Liver Cancer No family hx of      Ulcers Father            Allergies:      Allergies   Allergen Reactions     Nka [No Known Allergies]           Medications:     Current Facility-Administered Medications   Medication     0.9% sodium chloride BOLUS     0.9% sodium chloride BOLUS     acetaminophen (TYLENOL) tablet 650 mg    Or     acetaminophen (TYLENOL) Suppository 650 mg     albumin human 25 % injection 50 mL     calcium acetate (PHOSLO) capsule 667 mg     calcium carbonate (TUMS) chewable tablet 500 mg     carvedilol (COREG) tablet 25 mg     ceFAZolin (ANCEF) intermittent infusion 2 g in 100 mL dextrose PRE-MIX     ceFAZolin (ANCEF) intermittent infusion 3 g (pre-mix)     cloNIDine (CATAPRES) tablet 0.1 mg     glucose gel 15-30 g    Or     dextrose 50 % injection 25-50 mL    Or     glucagon injection 1 mg     diltiazem ER COATED BEADS (CARDIZEM CD/CARTIA XT) 24 hr capsule 180 mg     dornase alpha (PULMOZYME) 5 mg in sterile water (preservative free) 25 mL intrapleural     [START ON 11/14/2021] entecavir (BARACLUDE) tablet 0.5 mg     [START ON 11/19/2021] epoetin kelton-epbx (RETACRIT) injection 40,000 Units     flumazenil (ROMAZICON) injection 0.2 mg     lidocaine (LMX4) cream     lidocaine 1 % 0.1-1 mL     lidocaine 1 % 0.5 mL     LORazepam (ATIVAN) tablet 1 mg     melatonin tablet 3 mg     naloxone (NARCAN) injection 0.2 mg    Or     naloxone (NARCAN) injection 0.4 mg    Or     naloxone (NARCAN) injection 0.2 mg    Or     naloxone (NARCAN) injection 0.4 mg     ondansetron (ZOFRAN) injection 4 mg     oxyCODONE (ROXICODONE) tablet 5 mg     pantoprazole (PROTONIX) EC tablet 40 mg     sodium chloride (PF) 0.9% PF flush 3 mL     sodium  "chloride (PF) 0.9% PF flush 3 mL     Stop Heparin 60 minutes before end of treatment     zolpidem (AMBIEN) tablet 5 mg          Review of Systems:     Comprehensive review of systems negative except as above.         Physical Exam:   BP (!) 141/86 (BP Location: Right arm, Patient Position: Semi-Cervantes's)   Pulse 91   Temp 98.7  F (37.1  C) (Oral)   Resp 19   Ht 1.651 m (5' 5\")   Wt 57.2 kg (126 lb 1.6 oz)   SpO2 95%   BMI 20.98 kg/m      Constitutional -fatigue, calm, no distress  Neck: Supple  Eyes - no redness or discharge  Respiratory -breathing appears comfortable.  Respiratory variation evident chest tube.  I did not see an air leak during my visit but in review of notes from Dr. Farris saw 1 later in the day.  Cardiac -- Normal rate, rhythm.   Skin - No appreciable discoloration or lesions (very limited exam)  Neurological - No apparent tremors. Speech fluent and articlate  Psychiatric - no signs of delirium or anxiety  Remedies: No clubbing          Current Laboratory Data:   All laboratory and imaging data reviewed.    Results for orders placed or performed during the hospital encounter of 11/08/21 (from the past 24 hour(s))   XR Chest Port 1 View    Narrative    EXAM: XR CHEST PORT 1 VIEW  LOCATION: [facility]  DATE/TIME: 11/12/2021 5:48 PM    INDICATION: effusion  COMPARISON: 11/8/2021      Impression    IMPRESSION: Interval placement of pigtail drain into right pleural space with partial evacuation of the complex right pleural effusion. There remains some fluid and air present within right pleural space. Mild improved aeration at right lung but there is   thickening of the visceral pleural of lower right lung consistent with trapped lung.  Left lung remains clear. Heart size normal. Dilated tortuous thoracic aorta unchanged.   CBC with platelets   Result Value Ref Range    WBC Count 6.3 4.0 - 11.0 10e3/uL    RBC Count 2.75 (L) 4.40 - 5.90 10e6/uL    Hemoglobin 8.3 (L) 13.3 " "- 17.7 g/dL    Hematocrit 27.9 (L) 40.0 - 53.0 %     (H) 78 - 100 fL    MCH 30.2 26.5 - 33.0 pg    MCHC 29.7 (L) 31.5 - 36.5 g/dL    RDW 17.6 (H) 10.0 - 15.0 %    Platelet Count 37 (LL) 150 - 450 10e3/uL   Basic metabolic panel   Result Value Ref Range    Sodium 135 (L) 136 - 145 mmol/L    Potassium 4.1 3.5 - 5.0 mmol/L    Chloride 99 98 - 107 mmol/L    Carbon Dioxide (CO2) 25 22 - 31 mmol/L    Anion Gap 11 5 - 18 mmol/L    Urea Nitrogen 30 (H) 8 - 22 mg/dL    Creatinine 7.82 (HH) 0.70 - 1.30 mg/dL    Calcium 7.5 (L) 8.5 - 10.5 mg/dL    Glucose 85 70 - 125 mg/dL    GFR Estimate 7 (L) >60 mL/min/1.73m2   XR Chest Port 1 View    Narrative    EXAM: XR CHEST PORT 1 VIEW  LOCATION: [facility]  DATE/TIME: 11/13/2021 9:03 AM    INDICATION: effusion  COMPARISON: 11/12/2021      Impression    IMPRESSION: Persistent loculated right hydropneumothorax with right chest tube in place. The amount of pleural fluid on the right has decreased and the amount of pleural air has increased. Overall volume of the aerated lung has not appreciably changed.   This likely represents a \"trapped lung\". The left lung remains clear.                  "

## 2021-11-13 NOTE — PROGRESS NOTES
Instilled alteplase in chest tube at 2125, read through instructions with primary RN for her to follow up on next steps.    11/12/2021  9:33 PM  Dakotah Trotter RN

## 2021-11-13 NOTE — SIGNIFICANT EVENT
"Significant Event Note    Time of event: 1:09 AM November 13, 2021    Description of event:  Elle Blanton 61-year-old male admitted for MSSA empyema status post thoracostomy tube placement 11/10/2021 with lytic therapy started on 11/12/2021.    Notified of increased chest tube output. Approximately 400 mL of serosanguineous fluid had drained over the past hour.  Patient denied any chest pain, shortness of breath.  Hemodynamically stable.  Discussed case with intensivist.      Also noted to be some dried serosanguineous fluid under the chest tube dressing.  Unclear where this fluid originated from.  Again patient is asymptomatic.  Plan to reinforce chest tube as able.    BP (!) (P) 141/92 (BP Location: Right arm)   Pulse 84   Temp (P) 98.2  F (36.8  C) (Oral)   Resp (P) 18   Ht 1.651 m (5' 5\")   Wt 54.8 kg (120 lb 14.4 oz)   SpO2 97%   BMI 20.12 kg/m      Plan:  - Continue to monitor chest tube output.  -We will allow for up to 1 L of output/hour  -Monitor vitals      Jorje Osborne MD PGY-1  [facility]   Pager# [phone]      "

## 2021-11-13 NOTE — PROGRESS NOTES
Chest tube returned to wall suction, -20 mmHg, at 2325. Pt educated on to turn q15 min, verbalized and demonstrated understanding during medication dwell time.

## 2021-11-13 NOTE — PROGRESS NOTES
Progress Note    Assessment/Plan  Chest tube output noted.  Very small intermittent air leak.  Chest x-ray noted with increased air decrease fluid.  Will obtain a CT scan of the chest and abdomen for evaluation of the right thorax and portal hypertension.  Very difficult situation because the patient has.  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  Now with trapped lung.  Intervention will depend upon CT results and clinical course.    Active Problems:    ESRD (end stage renal disease) on dialysis (H)    Hydropneumothorax    Hyperkalemia      Subjective  Patient quite comfortable  Objective    Vital signs in last 24 hours  Temp:  [97.6  F (36.4  C)-99.7  F (37.6  C)] 98.7  F (37.1  C)  Pulse:  [80-91] 91  Resp:  [18-20] 19  BP: (108-141)/(64-92) 141/86  SpO2:  [94 %-98 %] 95 %  Weight:   [unfilled]    Intake/Output last 3 shifts  I/O last 3 completed shifts:  In: 903 [P.O.:900; I.V.:3]  Out: 2730 [Other:1700; Chest Tube:1030]  Intake/Output this shift:  No intake/output data recorded.      Physical Exam  Respiratory effort is good.  Pleur-evac as above.    Pertinent Labs   Lab Results   Component Value Date    WBC 6.3 11/13/2021    HGB 8.3 (L) 11/13/2021    HCT 27.9 (L) 11/13/2021     (H) 11/13/2021    PLT 37 (LL) 11/13/2021             Pertinent Radiology     [unfilled]        Dawson Reynaga MD

## 2021-11-14 NOTE — PROGRESS NOTES
RENAL PROGRESS NOTE    CHIEF COMPLAINT:   ESKD, empyema, fluid overload    ASSESSMENT & PLAN:   ESKD - Dialyzes as outpatient at [facility] (TT 3 hours, left AVG). As outpatient dialyzes Monday and Friday and is frequently non-compliant with dialysis schedule. S/p UF only treatment this admission and resuming usual schedule. Recommended to him that he consider attending dialysis three times weekly going forward    Access - access arm is more edematous despite better volume control.  ultrasound of access to see if he has a stenosis.    Empyema/trapped lung - ?due to bacteremia from septic bursitis.  Multiple admissions for this.  Most recently discharged on IV cefazolin with outpatient hemodialysis.  Surgery place chest tubes.  CT chest today is pending a read and review by Pulm and Surgery.  They would suggest surgical management at the [facility] if he needs it.  He has been getting cefazolin at dialysis Mon and Friday and had arranged Wed antibiotics at an infusion center as he does not want to dialyze 3x a week    Hypertension - Controlled on antihypertensive regimen. UF with HD    Volume - Presented with fluid overload; s/p UF only run on Wednesday. Challenges with larger fluid gains and noncompliance with dialysis making it difficult to get to dry weight at baseline.  Have recommended more frequent dialysis many times.    ESKD-MBD - On calcium acetate with meals for binders    Anemia - Chronically low with missed epo due to missed dialysis. Will also have decreased responsiveness to CAROLYNN with infection    GERD - on PPI    Liver Lesion - CT scan today suggests it may be enlarging.  Defer to primary team but okay to use iodinated contrast to image if needed    Left olecranon bursitis - s/p I&D on a month ago, still draining    AAA with thoracic aortic dissection - Prior to admission carvedilol, diltiazem, clonidine. Follows with vascular surgery          SUBJECTIVE:  Seen in room with wife.   "Multiple questions about chest tube and long term treatment of his lung which I have to defer to Pulmonary and Surgery.  I discussed that I've been told that decortication would be hazardous because of mediastinal varices.  He has a trapped lung and fluid will reaccumulate there although this can be minimized by regular attendance at dialysis and keeping \"dry.\"      He asks about contrast with imaging which I have discussed multiple times now.  I have no concerns with him receiving contrast dye with CTs.  He has minimal residual renal function and he does not need to dialyze \"right after\" or \"extra\" for this.  In regards to MRI contrast, I recommend he only get this if urgently needed.  There is a paucity of evidence in the safety of modern gadolinium compounds in dialysis patients but the general recommendation is to dialyze twice in a row after gadolinium although this is more expert opinion than evidence based.      Finally he asks about persistent drainage and swelling of his access arm.  On antibiotics.  Consider stenosis in his graft causing swelling and will get an ultrasound regarding this.  Orthopedics has not felt his surgical site is infected.  He also asks whether the arterial or venous needle should be on the left or right side of his graft.  He's heard multiple answers from different dialysis nurses.  Typically arterial should be on his radial side but it is occasionally reversed depending on anatomy.  On review of his operative access creation note, I'm not certain but he does not make note of a reversed anatomic arrangement.      OBJECTIVE:  Physical Exam   Temp: 98.9  F (37.2  C) Temp src: Oral BP: 94/55 Pulse: 70   Resp: 18 SpO2: 92 % O2 Device: None (Room air)    Vitals:    11/12/21 1239 11/13/21 0310 11/14/21 0638   Weight: 54.8 kg (120 lb 14.4 oz) 57.2 kg (126 lb 1.6 oz) 57.3 kg (126 lb 4.8 oz)     Vital Signs with Ranges  Temp:  [98.5  F (36.9  C)-100.4  F (38  C)] 98.9  F (37.2  C)  Pulse:  " [70-95] 70  Resp:  [18-24] 18  BP: ()/() 94/55  SpO2:  [92 %-97 %] 92 %  I/O last 3 completed shifts:  In: 240 [P.O.:240]  Out: 365 [Urine:50; Chest Tube:315]      Patient Vitals for the past 72 hrs:   Weight   11/14/21 0638 57.3 kg (126 lb 4.8 oz)   11/13/21 0310 57.2 kg (126 lb 1.6 oz)   11/12/21 1239 54.8 kg (120 lb 14.4 oz)   11/12/21 0830 59.3 kg (130 lb 11.7 oz)   11/12/21 0738 59.3 kg (130 lb 11.2 oz)       Intake/Output Summary (Last 24 hours) at 11/12/2021 1425  Last data filed at 11/12/2021 1230  Gross per 24 hour   Intake 470 ml   Output 1900 ml   Net -1430 ml       PHYSICAL EXAM:  General - Alert and oriented x3, appears comfortable. No apparent distress  Cardiovascular - Normal S1S2, no rub  Respiratory - Diminished at bases bilaterally. No rachid crackles or wheezes. Chest tube in right chest  Abdomen - Soft, non-tender, bowel sounds present.    Extremities - No lower extremity edema bilaterally. Left lower AVG with +thrill/bruit. Left elbow with dressing in place. Left arm with 1+ edema  Integumentary - Warm, dry, no rash  Neurologic - Grossly intact, no focal deficits      LABORATORY STUDIES:     Recent Labs   Lab 11/14/21  0441 11/13/21  0527 11/12/21  1301 11/11/21  0703 11/10/21  0719 11/09/21  0646   WBC 9.4 6.3 6.5 4.8 6.0 6.8   RBC 2.60* 2.75* 3.03* 2.64* 2.53* 2.60*   HGB 7.9* 8.3* 9.1* 7.9* 7.6* 7.8*   HCT 26.2* 27.9* 30.0* 26.9* 25.3* 26.5*   PLT 42* 37* 29* 32* 43* 53*       Basic Metabolic Panel:  Recent Labs   Lab 11/14/21  0441 11/13/21  0527 11/12/21  1301 11/11/21  0703 11/10/21  0913 11/10/21  0719 11/09/21  1317 11/09/21  1252 11/09/21  1051 11/09/21  0646   * 135* 135* 136  --  137  --   --   --  139   POTASSIUM 4.5 4.1 3.4* 4.8  --  4.3  --  5.5*  --  6.5*   CHLORIDE 98 99 98 101  --  101  --   --   --  107   CO2 23 25 26 25  --  26  --   --   --  24   BUN 41* 30* 20 43*  --  39*  --   --   --  80*   CR 9.93* 7.82* 5.71* 9.49*  --  8.16*  --   --   --  13.15*   GLC  95 85 120 92 85 87   < >  --    < > 74   TANO 7.6* 7.5* 7.5* 7.6*  --  7.6*  --   --   --  7.7*    < > = values in this interval not displayed.       INRNo lab results found in last 7 days.     Recent Labs   Lab Test 11/14/21  0441 11/13/21  0527 10/19/21  1006 10/19/21  0940 10/11/21  1121 09/29/21  0608   INR  --   --   --  1.38*  --  1.28*   WBC 9.4 6.3   < >  --    < > 9.6   HGB 7.9* 8.3*   < >  --    < > 8.0*   PLT 42* 37*   < >  --    < > 37*    < > = values in this interval not displayed.         Personally reviewed current labs      [facility] Liz  [facility]  [phone]

## 2021-11-14 NOTE — PLAN OF CARE
Problem: Gas Exchange Impaired  Goal: Optimal Gas Exchange  Outcome: Improving     Problem: Respiratory Compromise  Goal: Optimal Oxygenation and Ventilation  Outcome: Improving       Low grade temp 100.1, endorses acute on chronic right hand pain 10/10, PRN's and heat not helpful, MD paged, one time order placed for oxycodone 5mg.  Sleeping, appears comfortable on reassessment.  Chest tube had 60ml serosanguinous fluid out overnight, denies SOB, maintains sats mid 90's on RA.

## 2021-11-14 NOTE — PROGRESS NOTES
Progress Note    Assessment/Plan  CT reviewed.  Fluid essentially gone.  Obvious ongoing airspace with trapped lung right side.  We will plan on chest tube removal tomorrow.  Reviewed with the pulmonary and nephrology regarding plan for care.  The patient's right lung is minimally functional at this point.  Dr. Mao from nephrology is going to make an effort to control the patient's fluid input.  Most likely however the patient will get recurrence of fluid.  If he needs additional intervention and potential surgical intervention would refer him to the [facility].  Patient has portal hypertension with mediastinal varices.  Decortication would be extremely difficult in this circumstance.  Patient has been very reluctant to accept recommendations for therapy previously from a surgical standpoint.  Probable removal of chest tube tomorrow.    Active Problems:    ESRD (end stage renal disease) on dialysis (H)    Hydropneumothorax    Hyperkalemia      Subjective  Generally comfortable  Objective    Vital signs in last 24 hours  Temp:  [98.5  F (36.9  C)-100.4  F (38  C)] 98.9  F (37.2  C)  Pulse:  [73-95] 77  Resp:  [18-24] 18  BP: ()/() 121/79  SpO2:  [93 %-97 %] 95 %  Weight:   [unfilled]    Intake/Output last 3 shifts  I/O last 3 completed shifts:  In: 240 [P.O.:240]  Out: 365 [Urine:50; Chest Tube:315]  Intake/Output this shift:  I/O this shift:  In: -   Out: 100 [Chest Tube:100]      Physical Exam  Chest tube output minimal no obvious air leak    Pertinent Labs   Lab Results   Component Value Date    WBC 9.4 11/14/2021    HGB 7.9 (L) 11/14/2021    HCT 26.2 (L) 11/14/2021     (H) 11/14/2021    PLT 42 (LL) 11/14/2021             Pertinent Radiology     [unfilled]        Dawson Reynaga MD

## 2021-11-14 NOTE — PLAN OF CARE
Problem: Adult Inpatient Plan of Care  Goal: Plan of Care Review  Outcome: No Change  Flowsheets (Taken 11/14/2021 1426)  Plan of Care Reviewed With: patient     Problem: Adult Inpatient Plan of Care  Goal: Patient-Specific Goal (Individualized)  Outcome: Declining  Flowsheets (Taken 11/14/2021 1426)  Individualized Care Needs: Improving scheduled medications and PO intake.  Patient-Specific Goals (Include Timeframe): Pt refuses to take BP medicaitons - pt requests his range of SBP to take them. Snacks offered to increase PO intake.     Problem: Adult Inpatient Plan of Care  Goal: Absence of Hospital-Acquired Illness or Injury  Intervention: Identify and Manage Fall Risk  Recent Flowsheet Documentation  Taken 11/14/2021 0799 by Laura Quach RN  Safety Promotion/Fall Prevention:   clutter free environment maintained   fall prevention program maintained   lighting adjusted   nonskid shoes/slippers when out of bed   patient and family education   room organization consistent   Pt has had a uneventful shift, VSS.  Pt tolerated administration and dwelling 1 hr of CT medication Dornase. Sridhar Lemah administered medication. 12:30pm- 1:30pm. No new output was noted following administration. Pt tolerated activity to CT this AM and is presently at US in WC.  Pt is expected to transfer to P2 219 from P3 304.  Dressing on left elbow was changed due to moderate serosanguinous fluid.  Pt wife is aware of upcoming transfer. Pt belongings have been brought to 219 & pt will go to 219 following US. Writer awaits to give report to Aracely MELENDEZ when she is available.

## 2021-11-14 NOTE — PROGRESS NOTES
LUNG NODULE & INTERVENTIONAL PULMONARY CLINIC  CL[facility]ICS [facility] SURGERY [facility], [facility]     Elle Blanton MRN# [id number]   Age: 61 year old YOB: 1960       Requesting Physician: Mark       Assessment and Plan:    1. 61-year-old man with complex medical history including end-stage renal disease on dialysis, hepatitis B related cirrhosis, possible liver mass, known pleural effusion with previous empyema here with complex pleural effusion.    His culture data was never positive but he had relatively complex appearing pleural fluid chemistries.  He is completely drained and output is not very remarkable.  Because his lung is trapped he will have some degree of recurrence and management of further pleural fluid will involve regular attendance at dialysis.    Because it is unclear whether this was recurrence of his empyema I think sending him home on a month of antibiotics makes sense.  Primary team can coordinate with nephrology.    Plan of care discussed with primary team MD, nephrology MD.    He can be downgraded to [facility] from my standpoint.    Greater than 35 minutes spent in the care of this patient, greater than 50% counseling and coordination of care.  This includes face-to-face time with the patient and his family, face-to-face time with primary team, consulting MD, bedside nurse, documentation and imaging reviewed interpretation.         History:     Elle Blanton is a 61 year old male with sig h/o for end-stage liver disease, end-stage renal disease, empyema who is here for evaluation/followup of complicated pleural effusion.  . Had some hand discomfort but no other pain today.  A little bit sore at the chest tube site.  No other provocative, palliative or localizing factors.  No fever.    - My interpretation of the images relevant for this visit includes: CT chest with trapped lung but near complete resolution of pleural fluid.             Past Medical History:       Past Medical History:   Diagnosis Date     NAHUM (acute kidney injury) (H)      GI (gastrointestinal bleed)      Gout      H. pylori infection 7/5/2017    UGI bleed implied by Hgb 9.0 6/22/17 and melena. Admitted and hgb decreased to 7.6, CT abdomen showed all bladder wall thickening. + Hep B surface antigen noted. Melena resolved and hgb stabalized without transfusion, epigastric pain resolved with PPI. Recommend referral for gastroscopy.     Heart attack (H)      Hepatitis B      Normocytic anemia      PONV (postoperative nausea and vomiting)      Thrombocytopenia (H)            Past Surgical History:      Past Surgical History:   Procedure Laterality Date     ABCESS DRAINAGE      finger     BURSECTOMY ELBOW Left 10/15/2021    Procedure: LEFT OLECRANON BURSECTOMY;  Surgeon: Tico Myers MD;  Location: [facility]     CREATE FISTULA ARTERIOVENOUS UPPER EXTREMITY Left 4/20/2021    Procedure: left arm dialysis graft placement;  Surgeon: Roxana Cosby MD;  Location: [facility];  Service: General     FOREIGN BODY REMOVAL      finger     INCISION AND DRAINAGE FINGER, COMBINED Left 10/20/2016    Procedure: COMBINED INCISION AND DRAINAGE FINGER;  Surgeon: Mireya Pizano MD;  Location: [facility]     IR CHEST TUBE PLACEMENT NON-TUNNELED RIGHT  4/19/2021     IR CHEST TUBE PLACEMENT NON-TUNNELED RIGHT  10/19/2021     IR CHEST TUBE PLACEMENT NON-TUNNELED RIGHT  11/10/2021     IR PLEURAL DRAINAGE WITH CATHETER INSERTION  4/19/2021     MIDLINE INSERTION - DOUBLE LUMEN  4/23/2021          AK ESOPHAGOGASTRODUODENOSCOPY TRANSORAL DIAGNOSTIC N/A 8/18/2020    Procedure: ESOPHAGOGASTRODUODENOSCOPY (EGD) with biopsy;  Surgeon: Nilson Gonzales MD;  Location: [facility];  Service: Gastroenterology     US PARACENTESIS  8/14/2020      PARACENTESIS  8/19/2020     US THORACENTESIS  4/18/2021          Social History:     Social History     Tobacco Use     Smoking status: Never Smoker     Smokeless tobacco: Never  Used   Substance Use Topics     Alcohol use: No          Family History:     Family History   Problem Relation Age of Onset     Diabetes Father      Hypertension No family hx of      Asthma No family hx of      Cancer No family hx of      Coronary Artery Disease No family hx of      Liver Cancer No family hx of      Ulcers Father            Allergies:      Allergies   Allergen Reactions     Nka [No Known Allergies]           Medications:     Current Facility-Administered Medications   Medication     0.9% sodium chloride BOLUS     0.9% sodium chloride BOLUS     acetaminophen (TYLENOL) tablet 650 mg    Or     acetaminophen (TYLENOL) Suppository 650 mg     albumin human 25 % injection 50 mL     calcium acetate (PHOSLO) capsule 667 mg     calcium carbonate (TUMS) chewable tablet 500 mg     carvedilol (COREG) tablet 25 mg     ceFAZolin (ANCEF) intermittent infusion 2 g in 100 mL dextrose PRE-MIX     ceFAZolin (ANCEF) intermittent infusion 3 g (pre-mix)     cloNIDine (CATAPRES) tablet 0.1 mg     glucose gel 15-30 g    Or     dextrose 50 % injection 25-50 mL    Or     glucagon injection 1 mg     diltiazem ER COATED BEADS (CARDIZEM CD/CARTIA XT) 24 hr capsule 180 mg     dornase alpha (PULMOZYME) 5 mg in sterile water (preservative free) 25 mL intrapleural     entecavir (BARACLUDE) tablet 0.5 mg     [START ON 11/19/2021] epoetin kelton-epbx (RETACRIT) injection 40,000 Units     flumazenil (ROMAZICON) injection 0.2 mg     lidocaine (LMX4) cream     lidocaine 1 % 0.1-1 mL     lidocaine 1 % 0.5 mL     LORazepam (ATIVAN) tablet 1 mg     melatonin tablet 3 mg     naloxone (NARCAN) injection 0.2 mg    Or     naloxone (NARCAN) injection 0.4 mg    Or     naloxone (NARCAN) injection 0.2 mg    Or     naloxone (NARCAN) injection 0.4 mg     ondansetron (ZOFRAN) injection 4 mg     oxyCODONE (ROXICODONE) tablet 5 mg     pantoprazole (PROTONIX) EC tablet 40 mg     polyethylene glycol (MIRALAX) Packet 17 g     senna-docusate  "(SENOKOT-S/PERICOLACE) 8.6-50 MG per tablet 1 tablet     sodium chloride (PF) 0.9% PF flush 3 mL     sodium chloride (PF) 0.9% PF flush 3 mL     Stop Heparin 60 minutes before end of treatment     zolpidem (AMBIEN) tablet 5 mg          Review of Systems:     Comprehensive review of systems negative except as above.         Physical Exam:   /57 (BP Location: Right arm, Patient Position: Semi-Cervantes's)   Pulse 73   Temp 98.5  F (36.9  C) (Oral)   Resp 18   Ht 1.651 m (5' 5\")   Wt 57.3 kg (126 lb 4.8 oz)   SpO2 96%   BMI 21.02 kg/m      Constitutional -fatigue, calm, no distress  Neck: Supple  Eyes - no redness or discharge  Respiratory -breathing appears comfortable.  Respiratory variation evident chest tube.  I did not see an air leak during my visit but in review of notes from Dr. Farris saw 1 later in the day.  Cardiac -- Normal rate, rhythm.   Skin - No appreciable discoloration or lesions (very limited exam)  Neurological - No apparent tremors. Speech fluent and articlate  Psychiatric - no signs of delirium or anxiety  Remedies: No clubbing          Current Laboratory Data:   All laboratory and imaging data reviewed.    Results for orders placed or performed during the hospital encounter of 11/08/21 (from the past 24 hour(s))   CBC with platelets   Result Value Ref Range    WBC Count 9.4 4.0 - 11.0 10e3/uL    RBC Count 2.60 (L) 4.40 - 5.90 10e6/uL    Hemoglobin 7.9 (L) 13.3 - 17.7 g/dL    Hematocrit 26.2 (L) 40.0 - 53.0 %     (H) 78 - 100 fL    MCH 30.4 26.5 - 33.0 pg    MCHC 30.2 (L) 31.5 - 36.5 g/dL    RDW 17.6 (H) 10.0 - 15.0 %    Platelet Count 42 (LL) 150 - 450 10e3/uL   Basic metabolic panel   Result Value Ref Range    Sodium 134 (L) 136 - 145 mmol/L    Potassium 4.5 3.5 - 5.0 mmol/L    Chloride 98 98 - 107 mmol/L    Carbon Dioxide (CO2) 23 22 - 31 mmol/L    Anion Gap 13 5 - 18 mmol/L    Urea Nitrogen 41 (H) 8 - 22 mg/dL    Creatinine 9.93 (HH) 0.70 - 1.30 mg/dL    Calcium 7.6 (L) 8.5 - " 10.5 mg/dL    Glucose 95 70 - 125 mg/dL    GFR Estimate 5 (L) >60 mL/min/1.73m2

## 2021-11-14 NOTE — PLAN OF CARE
Problem: Adult Inpatient Plan of Care  Goal: Absence of Hospital-Acquired Illness or Injury  Outcome: Improving  Intervention: Identify and Manage Fall Risk  Recent Flowsheet Documentation  Taken 11/13/2021 2144 by Julita Brand RN  Safety Promotion/Fall Prevention:   nonskid shoes/slippers when out of bed   patient and family education   safety round/check completed   supervised activity   toileting scheduled  Taken 11/13/2021 1751 by Julita Brand RN  Safety Promotion/Fall Prevention:   nonskid shoes/slippers when out of bed   patient and family education   safety round/check completed   supervised activity   toileting scheduled  Intervention: Prevent Skin Injury  Recent Flowsheet Documentation  Taken 11/13/2021 2144 by Julita Brand RN  Body Position: position changed independently  Taken 11/13/2021 1751 by Julita Brand RN  Body Position: position changed independently  Goal: Optimal Comfort and Wellbeing  Outcome: Improving     Problem: Gas Exchange Impaired  Goal: Optimal Gas Exchange  Outcome: Improving  Intervention: Optimize Oxygenation and Ventilation  Recent Flowsheet Documentation  Taken 11/13/2021 2144 by Julita Brand RN  Head of Bed (HOB) Positioning: HOB at 20-30 degrees  Taken 11/13/2021 1751 by Julita Brand RN  Head of Bed (HOB) Positioning: HOB at 20-30 degrees     Problem: Respiratory Compromise  Goal: Optimal Oxygenation and Ventilation  Outcome: Improving      Alert. Oriented.    Complained of headache this afternoon that resolved on its own after patient ate dinner.     He has x1 chest tube on his right side, with 200 ml serosanguinous output. Dressing clean dry and intact.  Denied sob. O2 sat in high 90s room air.    Refused to sit up in chair during dinner time. Refused to walk in the hallway. Encouragement provided. He sat up at the edge of the bed later tonight.     Prn Tums for heartburn given tonight.    Standby assist with activity. Falls education. Interventions in placed.

## 2021-11-14 NOTE — PROGRESS NOTES
Primary Medicine Progress Note    Assessment/Plan  Active Problems:    ESRD (end stage renal disease) on dialysis (H)    Hydropneumothorax    Hyperkalemia      Elle Blanton is a 61 year old male with multiple comorbidities including AAA (5.5 cm), chronic thoracic aortic dissection, cirrhosis secondary to chronic hepatitis B, ESRD on hemodialysis (M/F via L AVF), recently admitted for septic bursitis of left elbow and again with MSSA empyema thought to be sequelae of bacteremia from septic bursitis, discharged on outpatient IV cefazolin M,W,F as he had denied surgical intervention several times. He is again admitted to our service 11/8 with worsened breathing secondary to this, s/p thoracostomy tube 11/10. Remains hospitalized for management of chest tube.     Hemopneumothorax s/p thoracostomy tube 11/10  History of Staph Aureus Empyema, likely due to bacteremia from septic bursitis  Liver Lesions - Potential Hematoma versus Malignancy  Third or fourth admission for this hydropneumothorax which has previously grown pansensitive MSSA. Has been receiving IV cephazolin as an outpatient MWF. Has refused surgery on past admissions, but is understandably worried about why the fluid keeps re-accumulating and is wanting definitive therapy. Chest tube was placed on 11/10 and is draining serosanguinous fluid. Elle has a history of Hepatitis B and cirrhosis and knows that he probably has liver cancer, though he does not want work-up for this as he would not want treatment. Recurrent effusion is likely secondary to malignancy vs cirrhosis. Case discussed with Dr. Renyaga - please see his note from today.  CXR from today showed increased air with decrease in fluid.  Obtaining a CT chest and abdomen to evaluate thorax and possible portal hypertension.  Intervention pending results and clinical course.     - Pulmonology consulted  - Continue to monitor chest tube output with primary management of this by Dr. Reynaga, daily CXR  -  Repeat CT chest and abdomen today  - Continue IV cephazolin 2g Mon, 2g Wed, 3g Fri for now  - Pulm planning to attempt aspiration of fluid to rule out malignant effusion as cytology was not performed when chest tube was placed      ESRD on Hemodialysis M/F  Last dialyzed 11/12, next due Monday 11/15. Dialyzes M/F as outpatient (refuses to go Wed). Fluid status 2/2 lack of 3x weekly dialysis likely contributing to recurrent pleural fluid.  - Nephrology consulted for dialysis     Chronic Thrombocytopenia  Chronic Anemia  Patient has low platelets and has been struggling with this for several days.  Likely secondary to his chronic liver disease.  Also noted to be anemic, likely secondary to his chronic kidney disease.  Has been relatively stable at this point in time not requiring acute intervention.  On EPO.  -Daily CBC     AAA/Aortic Dissection  HTN  Patient is on scheduled PTA carvedilol, diltiazem, and clonidine.  Has declined carvedilol and diltiazem until his BP is >150 as it makes him dizzy and lightheaded.  Discussed the complications of not taking his medications and he verbalized understanding.  Will monitor pressure, patient is open to taking them if >150 (understanding that it is better to take his medications before he gets to that point).    -Administer PTA carvedilol, diltiazem, and clonidine as patient tolerates     Left Olecranon Bursitis s/p I&D on 10/15/21  Stitches removed by ortho. Stable.  -Continuing to follow as there continues to be some discharge/drainage from the site      Chronic Medical Conditions  GERD: continue PTA pantoprazole  Chronic Hepatitis B/Cirrhosis with Thrombocytopenia: entecavir q7 days. No active bleeding.  Insomnia: continue PTA ambien      COVID status: Fully vaccinated (Moderna 8/18 and 9/15/21). Not yet eligible for boosters. Has also had flu shot this year.      Diet: Dialysis diet  DVT Prophylaxis: Pneumatic Compression Devices  Beltre Catheter: Not  present  Fluids: None  Central Lines: None  Code Status: Full Code     Disposition/Advanced Care Planning  At least several days     Subjective:   Doing well and denies SOB or chest pain or cough. Waiting for CT imaging. Eating and drinking well. Patient states he's waiting to talk to nephro/pulm docs today.      Objective    Vital signs in last 24 hours Temp:  [98.5  F (36.9  C)-100.4  F (38  C)] 98.5  F (36.9  C)  Pulse:  [73-95] 73  Resp:  [18-24] 18  BP: ()/() 101/57  SpO2:  [93 %-97 %] 96 %       Weight:   Vitals:    11/12/21 1239 11/13/21 0310 11/14/21 0638   Weight: 54.8 kg (120 lb 14.4 oz) 57.2 kg (126 lb 1.6 oz) 57.3 kg (126 lb 4.8 oz)       Intake/Output last 3 shift I/O last 3 completed shifts:  In: 240 [P.O.:240]  Out: 365 [Urine:50; Chest Tube:315]    Intake/Output this shift:No intake/output data recorded.    PHYSICAL EXAM  GENERAL APPEARANCE: Cooperative; appears stated age  LUNGS: Lungs CTA  HEART: RRR, no murmurs. Radial pulses 2+. Brisk cap refill  ABDOMEN: Non-distended, soft, no tenderness  EXTREMITIES: Grossly normal without deformity, no edema  SKIN: no rashes, skin warm  NEURO: Oriented to time    Pertinent Labs and Pertinent Radiology   Lab Results: personally reviewed.     Recent Labs   Lab 11/14/21  0441   WBC 9.4   HGB 7.9*   HCT 26.2*   PLT 42*       Recent Labs   Lab 11/14/21  0441 11/09/21  0646 11/08/21  1326   *   < > 139   CO2 23   < > 23   BUN 41*   < > 77*   ALBUMIN  --   --  2.3*   ALKPHOS  --   --  160*   ALT  --   --  <9   AST  --   --  43*    < > = values in this interval not displayed.       Radiology Results:   Results for orders placed or performed during the hospital encounter of 11/08/21   Chest XR,  PA & LAT    Impression    IMPRESSION: Right hydropneumothorax is again seen. Fluid has increased from the prior study. Enlargement of the aorta is again seen.    CT Chest w/o Contrast    Impression    IMPRESSION:   1.  Right hydropneumothorax, with possible  "empyema. A pleural drain is no longer present.  2.  A small left pleural effusion has increased in size.  3.  Hepatic parenchymal disease with splenomegaly and varices. Hepatic lesions are again seen and should be assessed with MRI.  4.  Cholelithiasis with likely sludge in the gallbladder.      MR Abdomen w/o Contrast    Impression    IMPRESSION:  1.  Dominant 4 cm mass is indeterminate on this noncontrast exam. Lesion would be best evaluated on a contrast-enhanced study. There is a question of this could be an evolving hematoma.   IR Chest Tube Place Non Tunneled Right    Impression    IMPRESSION:    Successful placement of a 14 Albanian right basilar pleural drainage catheter, as detailed above.         XR Chest Port 1 View    Impression    IMPRESSION: Interval placement of pigtail drain into right pleural space with partial evacuation of the complex right pleural effusion. There remains some fluid and air present within right pleural space. Mild improved aeration at right lung but there is   thickening of the visceral pleural of lower right lung consistent with trapped lung.  Left lung remains clear. Heart size normal. Dilated tortuous thoracic aorta unchanged.   XR Chest Port 1 View    Impression    IMPRESSION: Persistent loculated right hydropneumothorax with right chest tube in place. The amount of pleural fluid on the right has decreased and the amount of pleural air has increased. Overall volume of the aerated lung has not appreciably changed.   This likely represents a \"trapped lung\". The left lung remains clear.         Precepted patient with Dr. Sara Sanders MD  [facility] Resident   Pager #: [phone]    Parts of this note were created with the assistance of voice recognition software.  The note was reviewed for accuracy.  However, errors in spelling, etc may have resulted.     "

## 2021-11-15 NOTE — PROGRESS NOTES
"Orthopedic Progress Note      Assessment:    S/P left elbow olecranon bursa I&D on 10/15    Plan:   - Continues to be followed due to continued drainage with small wound to left elbow following I&D, drainage continuing however improving slowly  - Last dressing change during overnight shift. This was removed today for evaluation and there appears to be very small amount of blood and serous fluid on bandage. No pus-like drainage, erythema, or fluctuance.   - Continue to change dressing daily, and PRN if saturated  - WBAT to BLUE    Subjective:  Pain: mild  Nausea, Vomiting:  No  Lightheadedness, Dizziness:  No  Neuro:  Patient denies new onset numbness or paresthesias    Patient is lying comfortably in bed during visit. Recent dressing change with small amount of bloody discharge. Dressing was removed today during visit to examine elbow and no pus, erythema, or fluctuance to the elbow. States he has relatively no pain to the elbow.     Objective:  /68 (BP Location: Right arm)   Pulse 68   Temp 98.2  F (36.8  C) (Oral)   Resp 18   Ht 1.651 m (5' 5\")   Wt 60.3 kg (133 lb)   SpO2 95%   BMI 22.13 kg/m    Dressing applied to the left elbow, removed during visit today and reapplied  Very small 0.5cm incision with mild bloody/serous drainage output seen on bandage  Left elbow incision otherwise appears to be healing well and reinforced with steri-strips.   No fluctuance, erythema, or pus-like drainage  Mildly tender to palpation over the olecranon  Sensation intact to light touch  Full range of motion of shoulder, elbow, and wrist    Pertinent Labs   Lab Results: personally reviewed.   Lab Results   Component Value Date    INR 1.38 (H) 10/19/2021    INR 1.28 (H) 09/29/2021    INR 1.43 (H) 09/28/2021     Lab Results   Component Value Date    WBC 9.4 11/14/2021    HGB 7.9 (L) 11/14/2021    HCT 26.2 (L) 11/14/2021     (H) 11/14/2021    PLT 42 (LL) 11/14/2021     Lab Results   Component Value Date     " (L) 11/14/2021    CO2 23 11/14/2021         Report completed by:  GUALBERTO CLINE PA-C  Date: 11/14/2021  Time: 7:25 PM

## 2021-11-15 NOTE — PROGRESS NOTES
Administered Dornase through chest tube, reviewed detailed instructions with primary RN, she will follow up with next steps.    Dakotah Trotter RN on 11/15/2021 at 1:23 AM

## 2021-11-15 NOTE — PLAN OF CARE
Problem: Adult Inpatient Plan of Care  Goal: Optimal Comfort and Wellbeing  Outcome: No Change   Patient denies pain or shortness of breath. Had dialysis today, tolerated well. Chest tube patent and draining, output 90ml this shift. Gave dornase at 1115, requested patient turn position every 15 minutes, at 1315 turned the chest tube back to open. No output this afternoon, just minimal amount in tubing. Patient is standby assist x1, had 400ml fluid intake this shift and ate food from home.

## 2021-11-15 NOTE — PROGRESS NOTES
Pulmonary Note:    Patient seen by Dr. Reynaga this morning, and plan is to remove chest tube today.   Our service will sign off at this time.      Lucretia Calderon MD  Pulmonary and Critical Care Medicine  [facility]  Office: [phone]

## 2021-11-15 NOTE — PLAN OF CARE
Pt rates pain from lft elbow #6. Previously medicated prior shift.  Swelling to lft arm with dressing intact. Pressure tape over rt chest tube.  Monitoring out put post administration of Dornase. RT lung diminished.  Pt reports inability to achieve uninterrupted sleep. 1500cc fluid restriction. Care Plan reviewed  Problem: Adult Inpatient Plan of Care  Goal: Plan of Care Review  Outcome: Improving  Goal: Patient-Specific Goal (Individualized)  Outcome: Improving  Goal: Absence of Hospital-Acquired Illness or Injury  Outcome: Improving  Intervention: Identify and Manage Fall Risk  Recent Flowsheet Documentation  Taken 11/15/2021 0000 by Laura Guzman RN  Safety Promotion/Fall Prevention: assistive device/personal items within reach  Intervention: Prevent Skin Injury  Recent Flowsheet Documentation  Taken 11/15/2021 0000 by Laura Guzman RN  Body Position: position changed independently  Goal: Optimal Comfort and Wellbeing  Outcome: Improving  Goal: Readiness for Transition of Care  Outcome: Improving     Problem: Gas Exchange Impaired  Goal: Optimal Gas Exchange  Outcome: Improving     Problem: Electrolyte Imbalance  Goal: Electrolyte Balance  Outcome: Improving     Problem: Hemodynamic Instability (Hemodialysis)  Goal: Vital Signs Remain in Desired Range  Outcome: Improving     Problem: Infection  Goal: Absence of Infection Signs and Symptoms  Outcome: Improving     Problem: Respiratory Compromise  Goal: Optimal Oxygenation and Ventilation  Outcome: Improving   .

## 2021-11-15 NOTE — PROGRESS NOTES
[facility]  [facility]   Follow up    Elle Blanton   MRN: [id number]  : 1960   DOA: 2021   CC: ESRD      Assessment and Plan:  62 yo male  1. ESRD: pt has been recommended to run Q MWF for volume and electrolyte control and he refuses; has been running Q M and F--discussed with pt again today and he is resolute that he will only run twice a week  2. Access - access arm is more edematous despite better volume control;  Had U/S of access which shows arterial limb is pinky side  3. Anemia; on chronic epo  4. Secondary hyperpara; on binders  5. Septic L olecranon bursitis; s/p I and D  6. AAA with h/o dissection  7. GERD  8. Chronic hepatitis B cirrhosis  9. Thrombocytopenia  10. Empyema/trapped lung; s/p surgery and has CT in place; on abx  11. HTN; on chronic meds      Subjective  Pt seen on dialysis; chart reviewed;   Objective    Vital signs in last 24 hours  Temp:  [97.6  F (36.4  C)-98.9  F (37.2  C)] 98.1  F (36.7  C)  Pulse:  [65-81] 81  Resp:  [14-18] 14  BP: ()/(20-90) 116/71  SpO2:  [92 %-98 %] 96 %      Intake/Output last 3 shifts  I/O last 3 completed shifts:  In: 240 [P.O.:240]  Out: 300 [Chest Tube:300]  Intake/Output this shift:  No intake/output data recorded.    Physical Exam  Alert/oriented x 3, awake, NAD  CV: RRR without murmur or rub  Lung: clear and equal; no extra sounds  CT in place  Ab: soft and NT; not distended; normal bs  Ext: some edema of access arm; legs ok and well perfused  Skin; no rash    Pertinent Labs     Last Renal Panel:  Sodium   Date Value Ref Range Status   11/15/2021 129 (L) 136 - 145 mmol/L Final   2021 134 (L) 136 - 145 mmol/L Final     Potassium   Date Value Ref Range Status   11/15/2021 5.1 (H) 3.5 - 5.0 mmol/L Final   2021 4.7 3.5 - 5.0 mmol/L Final     Chloride   Date Value Ref Range Status   11/15/2021 95 (L) 98 - 107 mmol/L Final   2021 99 98 - 107 mmol/L Final     Carbon  Dioxide   Date Value Ref Range Status   12/30/2020 21.0 20.0 - 32.0 mmol/L Final     Carbon Dioxide (CO2)   Date Value Ref Range Status   11/15/2021 24 22 - 31 mmol/L Final     Anion Gap   Date Value Ref Range Status   11/15/2021 10 5 - 18 mmol/L Final   08/10/2019 6 3 - 14 mmol/L Final     Glucose   Date Value Ref Range Status   11/15/2021 83 70 - 125 mg/dL Final   04/29/2021 111 70 - 125 mg/dL Final     Urea Nitrogen   Date Value Ref Range Status   11/15/2021 55 (H) 8 - 22 mg/dL Final   04/29/2021 71 (H) 8 - 22 mg/dL Final     Creatinine   Date Value Ref Range Status   11/15/2021 11.44 (HH) 0.70 - 1.30 mg/dL Final   04/29/2021 5.13 (H) 0.70 - 1.30 mg/dL Final     GFR Estimate   Date Value Ref Range Status   11/15/2021 4 (L) >60 mL/min/1.73m2 Final     Comment:     As of July 11, 2021, eGFR is calculated by the CKD-EPI creatinine equation, without race adjustment. eGFR can be influenced by muscle mass, exercise, and diet. The reported eGFR is an estimation only and is only applicable if the renal function is stable.   04/29/2021 12 (L) >60 mL/min/1.73m2 Final     Calcium   Date Value Ref Range Status   11/15/2021 7.7 (L) 8.5 - 10.5 mg/dL Final   04/29/2021 7.9 (L) 8.5 - 10.5 mg/dL Final     Phosphorus   Date Value Ref Range Status   04/27/2021 2.6 2.5 - 4.5 mg/dL Final   08/10/2019 3.4 2.5 - 4.5 mg/dL Final     Albumin   Date Value Ref Range Status   11/08/2021 2.3 (L) 3.5 - 5.0 g/dL Final   12/30/2020 2.9 (L) 3.2 - 4.5 g/dL Final     CBC RESULTS:   Recent Labs   Lab Test 11/14/21  0441   WBC 9.4   RBC 2.60*   HGB 7.9*   HCT 26.2*   *   MCH 30.4   MCHC 30.2*   RDW 17.6*   PLT 42*        UA RESULTS:  Recent Labs   Lab Test 08/19/20  1701   COLOR Yellow   APPEARANCE Clear   URINEGLC Negative   URINEBILI Negative   URINEKETONE Negative   SG 1.007   UBLD Trace*   URINEPH 6.0   PROTEIN Negative   UROBILINOGEN <2.0 E.U./dL   NITRITE Negative   LEUKEST Negative   RBCU 0-2   WBCU 0-5        I reviewed all lab  results  Juliana Olivas MD

## 2021-11-15 NOTE — PROGRESS NOTES
"Orthopedic Progress Note      Assessment:    S/P left elbow olecranon bursa I&D on 10/15, improving     Plan:   - Continues to be followed due to continued drainage with small wound to left elbow following I&D, drainage continuing however improving slowly  -Today elbow looks improved from last week, minimal drainage on nonstick pad over elbow. Elbow was wrapped with kerlix gauze and tape to hold in place. No signs of infection.   - Continue to change dressing daily, and PRN if saturated.   - WBAT to BLUE         Subjective:  Pain: none  Patient reports he is doing fine, denies pain in elbow. Reports ongoing serous, some bloody drainage from left elbow but seems to be slowing.     Objective:  BP 99/65   Pulse 83   Temp 98.1  F (36.7  C) (Oral)   Resp 14   Ht 1.651 m (5' 5\")   Wt 58.3 kg (128 lb 8.5 oz)   SpO2 96%   BMI 21.39 kg/m    The patient is A&Ox3. Appears comfortable undergoing dialysis.   Left posterior elbow no active drainage, no erythema, or other signs of infection.   Two steri strips in place over olecranon. Full ROM left elbow.   Sensate to light touch throughout.       Pertinent Labs   Lab Results: personally reviewed.   Lab Results   Component Value Date    INR 1.38 (H) 10/19/2021    INR 1.28 (H) 09/29/2021    INR 1.43 (H) 09/28/2021     Lab Results   Component Value Date    WBC 9.4 11/14/2021    HGB 7.9 (L) 11/14/2021    HCT 26.2 (L) 11/14/2021     (H) 11/14/2021    PLT 42 (LL) 11/14/2021     Lab Results   Component Value Date     (L) 11/15/2021    CO2 24 11/15/2021         Report completed by:  Ladonna Mandel PA-C, NAHUN  Date: 11/15/2021  Time: 12:46 PM    "

## 2021-11-15 NOTE — PLAN OF CARE
Problem: Hemodynamic Instability (Hemodialysis)  Goal: Vital Signs Remain in Desired Range  Outcome: No Change     Problem: Respiratory Compromise  Goal: Optimal Oxygenation and Ventilation  Outcome: No Change     Problem: Gas Exchange Impaired  Goal: Optimal Gas Exchange  Outcome: No Change      Pt was transferred to floor around 1430, prior to writers shift.  Received report from transferring nurse around 1530.  Placed pt on suction in room. Pt VS were within normal range.      Pt evening /63, pt decline BP medications due to low BP close to parameters listed in MAR.      Wife brought in meal from home.

## 2021-11-15 NOTE — PROGRESS NOTES
Progress Note    Assessment/Plan  Patient's right pleural effusion is resolved.  He has trapped lung on the right side.  Other CT findings noted.  As reviewed with infectious disease and nephrology yesterday patient's chest tube can be removed by radiology.  I reviewed with the patient the fact that if he does get additional progression of symptoms that we have decided with a referral to the emergency [facility] in light of the patient's underlying comorbidities would be appropriate.  Referral to the cardiothoracic service there as an outpatient after discharge would be appropriate as they would then be familiar with Mr. Blanton when he progresses his a pleural effusion.    Active Problems:    ESRD (end stage renal disease) on dialysis (H)    Hydropneumothorax    Hyperkalemia      Subjective  Quite comfortable  Objective    Vital signs in last 24 hours  Temp:  [97.6  F (36.4  C)-98.9  F (37.2  C)] 97.7  F (36.5  C)  Pulse:  [65-77] 72  Resp:  [18] 18  BP: ()/(55-82) 139/82  SpO2:  [92 %-98 %] 98 %  Weight:   [unfilled]    Intake/Output last 3 shifts  I/O last 3 completed shifts:  In: 240 [P.O.:240]  Out: 300 [Chest Tube:300]  Intake/Output this shift:  No intake/output data recorded.      Physical Exam  Good respiratory effort no air leak chest tube output minimal    Pertinent Labs   Lab Results   Component Value Date    WBC 9.4 11/14/2021    HGB 7.9 (L) 11/14/2021    HCT 26.2 (L) 11/14/2021     (H) 11/14/2021    PLT 42 (LL) 11/14/2021             Pertinent Radiology     [unfilled]        Dawson Reynaga MD

## 2021-11-15 NOTE — PROCEDURES
Hemodialysis Treatment Note    Pre weight: 58.3 kg           Run length: 3 hours    Total fluid removed (ml): 2000 ml net fluid removal.    Blood Volume Processed: 59      Vascular Access: left lower graft good bruit and thrill, edematous 3+ pitting edema.  Arm elevated on 3 pillows to help with swelling.  16 gauge 1 inch needles cannulated without difficulty.  350 ml per minute obtained for entire duration of treatment.  No access issues noted.        Treatment Summary: Patient dialyzed for 3 hours on a K bath of 2.  Net fluid removal of 2 kg obtained without hypotension or cramping.        Interventions: Crit line monitoring used to guide fluid removal.  Albumin 12.5 grams IV given to support blood pressure. Vital signs taken every 15 minutes and as needed.       Plan: Next scheduled treatment to be determined by the renal team.      Report given to:  Jennifer Shaikh RN  [facility]

## 2021-11-15 NOTE — PROGRESS NOTES
Primary Medicine Progress Note    Assessment/Plan  Active Problems:    ESRD (end stage renal disease) on dialysis (H)    Hydropneumothorax    Hyperkalemia    Elle Blanton is a 61 year old male with multiple comorbidities including AAA (5.5 cm), chronic thoracic aortic dissection, cirrhosis secondary to chronic hepatitis B, ESRD on hemodialysis (M/F via L AVF), recently admitted for septic bursitis of left elbow and again with MSSA empyema thought to be sequelae of bacteremia from septic bursitis, discharged on outpatient IV cefazolin M,W,F as he had denied surgical intervention several times. He is again admitted to our service 11/8 with worsened breathing secondary to this complex pleural effusion, s/p thoracostomy tube 11/10.     Hemopneumothorax s/p thoracostomy tube 11/10  History of Staph Aureus Empyema, likely due to bacteremia from septic bursitis  Liver Lesions - Potential Hematoma versus Malignancy  Third or fourth admission for this hydropneumothorax which has previously grown pansensitive MSSA. Has been receiving IV cephazolin as an outpatient MWF. Has refused surgery on past admissions, but is understandably worried about why the fluid keeps re-accumulating and is wanting definitive therapy. Chest tube was placed on 11/10 and is draining serosanguinous fluid. Elle has a history of Hepatitis B and cirrhosis and knows that he probably has liver cancer, though he does not want work-up for this as he would not want treatment. Recurrent effusion is likely secondary to malignancy vs cirrhosis. Per Dr. Reynaga, Elle should follow up as outpatient with [facility] cardiothoracic surgery as his case is complex and would be best handled at the [facility].  - Continue to monitor chest tube output with primary management of this by Dr. Reynaga, daily CXR - may be pulled today  - Continue IV cephazolin 2g Mon, 2g Wed, 3g Fri for now  - Pulmonology consulted, planning to attempt aspiration of fluid to rule out malignant effusion  as cytology was not performed when chest tube was placed      ESRD on Hemodialysis M/F  Dialyzing today (11/15). Dialyzes M/F as outpatient (refuses to go Wed). Fluid status 2/2 lack of 3x weekly dialysis likely contributing to recurrent pleural fluid.  - Nephrology consulted for dialysis     Chronic Thrombocytopenia  Chronic Anemia  Patient has low platelets and has been struggling with this for several days.  Likely secondary to his chronic liver disease.  Also noted to be anemic, likely secondary to his chronic kidney disease.  Has been relatively stable at this point in time not requiring acute intervention.  On EPO.  - Daily CBC     AAA/Aortic Dissection  Hypertension  Patient is on scheduled PTA carvedilol, diltiazem, and clonidine. Has declined carvedilol and diltiazem until his BP is >150 as it makes him dizzy and lightheaded.  Discussed the complications of not taking his medications and he verbalized understanding.  Will monitor pressure, patient is open to taking them if >150 (understanding that it is better to take his medications before he gets to that point).    - Administer PTA carvedilol, diltiazem, and clonidine as patient tolerates     Left Olecranon Bursitis s/p I&D on 10/15/21  Stitches removed by ortho. Stable.  - Continuing to follow as there continues to be some discharge/drainage from the site      Chronic Medical Conditions  GERD: continue PTA pantoprazole  Chronic Hepatitis B/Cirrhosis with Thrombocytopenia: entecavir q7 days. No active bleeding.  Insomnia: continue PTA ambien      COVID status: Fully vaccinated (Moderna 8/18 and 9/15/21). Not yet eligible for boosters. Has also had flu shot this year.      Diet: Dialysis diet  DVT Prophylaxis: Pneumatic Compression Devices  Beltre Catheter: Not present  Fluids: None  Central Lines: None  Code Status: Full Code     Disposition/Advanced Care Planning  Potential discharge tomorrow AM to ensure stability after chest tube is  removed    Subjective:   No acute events overnight. Nursing notes reviewed. Elle and his wife continue to be frustrated about not being able to get the surgery done this admission. Plan communicated to them to follow up with [facility] cardiothoracic surgery as outpatient. He was being set up for dialysis this morning and there was confusion about which side of his fistula was arterial and which was venous; he said he has been told different things.    Objective    Vital signs in last 24 hours Temp:  [97.6  F (36.4  C)-98.2  F (36.8  C)] 98.1  F (36.7  C)  Pulse:  [65-83] 83  Resp:  [14-18] 14  BP: ()/(20-90) 99/65  SpO2:  [94 %-98 %] 96 %       PHYSICAL EXAM  GEN: Patient laying comfortably in bed in no acute distress. No respiratory distress.  HEEN: Head is atraumatic, normocephalic, eyes anicteric, mucous membranes moist.  PULM: Breathing comfortably on room air. Chest tube in place right chest with serosanguinous output.  EXT: Left elbow with dressing in place. Left arm edema appears improved from Friday 11/12.  NEURO: Alert and oriented x3.  No focal motor abnormalities.  Face symmetric.  PSYCH: Appropriate affect.  SKIN: No rashes, bruising, or other lesions.    Pertinent Labs and Pertinent Radiology   Recent Labs   Lab 11/14/21  0441   WBC 9.4   HGB 7.9*   HCT 26.2*   PLT 42*       Recent Labs   Lab 11/15/21  0913 11/09/21  0646 11/08/21  1326   *   < > 139   CO2 24   < > 23   BUN 55*   < > 77*   ALBUMIN  --   --  2.3*   ALKPHOS  --   --  160*   ALT  --   --  <9   AST  --   --  43*    < > = values in this interval not displayed.       XR Chest Port 1 View 11/14/2021    IMPRESSION:     Mild global volume loss of the right hemithorax. Right basilar pigtail pleural drain is present. Small amount of fluid/thickening is present along the diaphragmatic and right lateral pleura. Unchanged right lung volume loss with visceral pleural thickening of the right middle and lower lobes in a pattern suggesting pleural  entrapment.     Precepted patient with Dr. Gustavo Virk MD  [facility], PGY-1  Pager #: [phone]

## 2021-11-16 NOTE — PROGRESS NOTES
"Orthopedic Progress Note      Assessment:    S/P left elbow olecranon bursa I&D on 10/15, improving     Plan:   - Continues to be followed due to continued drainage with small wound to left elbow following I&D, drainage continuing however improving slowly  - minimal drainage on gauze wrap today. New steri strips were applied over small incision posterior elbow and elbow was wrapped with kerlix gauze and tape to hold in place. No signs of infection.   - Continue to change dressing daily, and PRN if saturated.       Subjective:  Patient reports dressing was changed this morning and currently has minimal serosanguinous drainage. He denies pain and has full ROM.     Objective:  /78 (BP Location: Right arm)   Pulse 83   Temp 98  F (36.7  C) (Oral)   Resp 17   Ht 1.651 m (5' 5\")   Wt 58.3 kg (128 lb 8.5 oz)   SpO2 98%   BMI 21.39 kg/m    The patient is A&Ox3. Appears comfortable.    Left posterior elbow no active drainage, no erythema, or other signs of infection. No swelling.   Very small ~1 cm incisional hole posterior elbow, new steri strips placed. Full ROM left elbow.   Sensate to light touch throughout.    Elbow wrapped with gauze and tape.        Pertinent Labs   Lab Results: personally reviewed.   Lab Results   Component Value Date    INR 1.38 (H) 10/19/2021    INR 1.28 (H) 09/29/2021    INR 1.43 (H) 09/28/2021     Lab Results   Component Value Date    WBC 8.0 11/15/2021    HGB 8.0 (L) 11/15/2021    HCT 25.5 (L) 11/15/2021    MCV 98 11/15/2021    PLT 44 (LL) 11/15/2021     Lab Results   Component Value Date     (L) 11/15/2021    CO2 24 11/15/2021         Report completed by:  Ladonna Mandel PA-C, NAHUN  Date: 11/16/2021  Time: 11:30 AM    "

## 2021-11-16 NOTE — PROGRESS NOTES
[facility]  [facility]   Follow up    Elle Blanton   MRN: [id number]  : 1960   DOA: 2021   CC: ESRD      Assessment and Plan:  62 yo male  1. ESRD: pt has been recommended to run Q MWF for volume and electrolyte control and he refuses; has been running Q M and F; next HD on Friday planned  2. Access - access arm is more edematous despite better volume control;  Had U/S of access which shows arterial limb is pinky side; complains of pain in access hand and also some pain in R hand; d/w pt options--will try topical voltaren  3. Anemia; on chronic epo  4. Secondary hyperpara; on binders  5. Septic L olecranon bursitis; s/p I and D  6. AAA with h/o dissection  7. GERD  8. Chronic hepatitis B cirrhosis  9. Thrombocytopenia  10. Empyema/trapped lung; s/p surgery and has CT in place; on abx  11. HTN; on chronic meds      Subjective  Pt with hand pain on R side; says he can get L hand pain with aggressive UF on dialysis like a cramp; the current pain is not new; it sounds more neuropathic  Objective    Vital signs in last 24 hours  Temp:  [97.4  F (36.3  C)-98  F (36.7  C)] 98  F (36.7  C)  Pulse:  [67-85] 83  Resp:  [14-17] 17  BP: ()/(20-82) 122/78  SpO2:  [94 %-98 %] 98 %      Intake/Output last 3 shifts  I/O last 3 completed shifts:  In: 590 [P.O.:590]  Out: 2190 [Urine:100; Other:2000; Chest Tube:90]  Intake/Output this shift:  I/O this shift:  In: -   Out: 90 [Chest Tube:90]    Physical Exam  Alert/oriented x 3, awake, NAD  CV: RRR without murmur or rub  Lung: clear and equal; no extra sounds  CT in place  Ab: soft and NT; not distended; normal bs  Ext: some edema of access arm; legs ok and well perfused  Skin; no rash    Pertinent Labs     Last Renal Panel:  Sodium   Date Value Ref Range Status   11/15/2021 129 (L) 136 - 145 mmol/L Final   2021 134 (L) 136 - 145 mmol/L Final     Potassium   Date Value Ref Range Status   11/15/2021 5.1  (H) 3.5 - 5.0 mmol/L Final   04/29/2021 4.7 3.5 - 5.0 mmol/L Final     Chloride   Date Value Ref Range Status   11/15/2021 95 (L) 98 - 107 mmol/L Final   04/29/2021 99 98 - 107 mmol/L Final     Carbon Dioxide   Date Value Ref Range Status   12/30/2020 21.0 20.0 - 32.0 mmol/L Final     Carbon Dioxide (CO2)   Date Value Ref Range Status   11/15/2021 24 22 - 31 mmol/L Final     Anion Gap   Date Value Ref Range Status   11/15/2021 10 5 - 18 mmol/L Final   08/10/2019 6 3 - 14 mmol/L Final     Glucose   Date Value Ref Range Status   11/15/2021 83 70 - 125 mg/dL Final   04/29/2021 111 70 - 125 mg/dL Final     Urea Nitrogen   Date Value Ref Range Status   11/15/2021 55 (H) 8 - 22 mg/dL Final   04/29/2021 71 (H) 8 - 22 mg/dL Final     Creatinine   Date Value Ref Range Status   11/15/2021 11.44 (HH) 0.70 - 1.30 mg/dL Final   04/29/2021 5.13 (H) 0.70 - 1.30 mg/dL Final     GFR Estimate   Date Value Ref Range Status   11/15/2021 4 (L) >60 mL/min/1.73m2 Final     Comment:     As of July 11, 2021, eGFR is calculated by the CKD-EPI creatinine equation, without race adjustment. eGFR can be influenced by muscle mass, exercise, and diet. The reported eGFR is an estimation only and is only applicable if the renal function is stable.   04/29/2021 12 (L) >60 mL/min/1.73m2 Final     Calcium   Date Value Ref Range Status   11/15/2021 7.7 (L) 8.5 - 10.5 mg/dL Final   04/29/2021 7.9 (L) 8.5 - 10.5 mg/dL Final     Phosphorus   Date Value Ref Range Status   04/27/2021 2.6 2.5 - 4.5 mg/dL Final   08/10/2019 3.4 2.5 - 4.5 mg/dL Final     Albumin   Date Value Ref Range Status   11/08/2021 2.3 (L) 3.5 - 5.0 g/dL Final   12/30/2020 2.9 (L) 3.2 - 4.5 g/dL Final     CBC RESULTS:   Recent Labs   Lab Test 11/14/21  0441   WBC 9.4   RBC 2.60*   HGB 7.9*   HCT 26.2*   *   MCH 30.4   MCHC 30.2*   RDW 17.6*   PLT 42*        UA RESULTS:  Recent Labs   Lab Test 08/19/20  1701   COLOR Yellow   APPEARANCE Clear   URINEGLC Negative   URINEBILI Negative    URINEKETONE Negative   SG 1.007   UBLD Trace*   URINEPH 6.0   PROTEIN Negative   UROBILINOGEN <2.0 E.U./dL   NITRITE Negative   LEUKEST Negative   RBCU 0-2   WBCU 0-5        I reviewed all lab results  Juliana Olivas MD

## 2021-11-16 NOTE — DISCHARGE SUMMARY
[facility]  DISCHARGE SUMMARY     Primary Care Physician: Jaswant Flores  Admission Date: 11/8/2021   Discharge Provider: Christy Virk MD Discharge Date: 11/16/2021   Diet: Orders Placed This Encounter      Diet  Dialysis diet Code Status: Prior   Activity: Activity as tolerated        Condition at Discharge: Good     REASON FOR PRESENTATION(See Admission Note for Details)   Recurrent right hydropneumothorax    PRINCIPAL & ACTIVE DISCHARGE DIAGNOSES     Active Problems:    Thrombocytopenia (H)    Descending thoracic aortic dissection (H)    ESRD (end stage renal disease) on dialysis (H)    Hydropneumothorax    Hyperkalemia    Lymphedema of left upper extremity    SIGNIFICANT FINDINGS (Imaging, labs):   Complicated hemopneumothorax on the right with trapped lung    PENDING LABS   None    PROCEDURES ( this hospitalization only)    Procedure(s):  THORACOSCOPY WITH DECORTICATION    RECOMMENDATIONS TO OUTPATIENT PROVIDER FOR F/U VISIT   Mark - follow up on breathing  Cardiothoracic surgery - establish care for management of recurrent hydropneumothorax going forward    DISPOSITION     Home    SUMMARY OF HOSPITAL COURSE:      Elle Blanton is a 61 year old male with multiple comorbidities including AAA (5.5 cm), chronic thoracic aortic dissection, cirrhosis secondary to chronic hepatitis B, ESRD on hemodialysis (M/F via L AVF), recently admitted for septic bursitis of left elbow and again with MSSA empyema thought to be sequelae of bacteremia from septic bursitis, discharged on 10/27 with outpatient IV cefazolin M,W,F as he had denied surgical intervention several times.     He was admitted to our service again on 11/8 with worsened breathing secondary to this recurrent complex pleural effusion, s/p thoracostomy tube 11/10. His pleural effusion improved with chest tube though repeat imaging showed evidence of trapped lung that was not improved after lytics through his chest tube. Dr. Reynaga felt that  definitive surgical intervention would be best handled at the [facility] (likely due to large esophageal varices) and a referral was placed to their group for outpatient appointment upon discharge. Pulmonology recommended continuing the IV antibiotics three times per week until he sees cardiothoracic surgery.    Elle received his normal hemodialysis while in the hospital (Monday and Friday) with one extra dialysis run due to volume overload. His chronic thrombocytopenia (44) and anemia (8.0) were stable and did not require transfusion. Chronic aortic dissection was also stable and while he had a few instances of chest pain this admission, this pain resolved with his scheduled diltiazem. Orthopedic surgery followed him while in the hospital as he is status post incision and drainage of left olecranon bursitis on 10/15/21 and there was ongoing serosanguinous discharge/drainage from the left elbow. This was stable and improving.     He also has undiagnosed liver lesions - hematoma versus abscess versus malignancy. These were seen on CT and MRI imaging of the abdomen, though without gadolinium contrast due to his ESRD. With a history of chronic hepatitis B and cirrhosis, Elle is aware that he may have liver cancer, and has decided to forgo further work-up of this as he would not want treatment for it.     Discharge Medications with Med changes:        Review of your medicines      START taking      Dose / Directions   diclofenac 1 % topical gel  Commonly known as: VOLTAREN  Used for: Pain in both hands      Dose: 2 g  Apply 2 g topically 2 times daily To hands  Quantity: 50 g  Refills: 1     oxyCODONE 5 MG tablet  Commonly known as: ROXICODONE  Used for: Pleural effusion      Dose: 5 mg  Take 1 tablet (5 mg) by mouth every 6 hours as needed for moderate to severe pain or severe pain  Quantity: 3 tablet  Refills: 0     senna-docusate 8.6-50 MG tablet  Commonly known as: SENOKOT-S/PERICOLACE  Used for:  Constipation, unspecified constipation type      Dose: 1 tablet  Take 1 tablet by mouth daily as needed for constipation  Quantity: 30 tablet  Refills: 3        CONTINUE these medicines which may have CHANGED, or have new prescriptions. If we are uncertain of the size of tablets/capsules you have at home, strength may be listed as something that might have changed.      Dose / Directions   polyethylene glycol 17 GM/Dose powder  Commonly known as: MIRALAX  This may have changed:   when to take this  reasons to take this  Used for: Iliopsoas muscle hematoma, left, initial encounter      Dose: 17 g  Take 17 g by mouth daily  Quantity: 510 g  Refills: 0        CONTINUE these medicines which have NOT CHANGED      Dose / Directions   acetaminophen 500 MG tablet  Commonly known as: TYLENOL      Dose: 500 mg  Take 500 mg by mouth every 6 hours as needed for mild pain  Refills: 0     calcium acetate 667 MG Caps capsule  Commonly known as: PHOSLO      Dose: 1 capsule  Take 1 capsule by mouth 3 times daily (with meals)  Refills: 0     carvedilol 25 MG tablet  Commonly known as: COREG      Dose: 25 mg  Take 25 mg by mouth 2 times daily  Refills: 0     ceFAZolin 1 GM vial  Commonly known as: ANCEF  Indication: Bacteria in the Blood, Infection of the Skin and/or Soft Tissue  Used for: Empyema lung (H)      Dose: 2-3 g  Inject 2-3 g into the vein three times a week 2 g Monday, 2 g Wednesday, 3 g Friday per ID note  Quantity: 9 each  Refills: 4     cloNIDine 0.1 MG tablet  Commonly known as: CATAPRES      Dose: 0.1 mg  Take 0.1 mg by mouth At Bedtime  Refills: 0     diltiazem  MG 24 hr capsule  Commonly known as: DILT-XR      Dose: 180 mg  Take 180 mg by mouth 2 times daily  Refills: 0     entecavir 0.5 MG tablet  Commonly known as: BARACLUDE  Used for: Chronic viral hepatitis B without delta agent and without coma (H)      Dose: 0.5 mg  Take 1 tablet (0.5 mg) by mouth every 7 days  Quantity: 45 tablet  Refills: 3      hydrOXYzine 25 MG tablet  Commonly known as: ATARAX  Used for: Pruritic disorder      Dose: 25 mg  Take 1 tablet (25 mg) by mouth 3 times daily as needed for itching  Quantity: 90 tablet  Refills: 3     order for DME  Used for: Descending thoracic aortic dissection (H), Essential hypertension      Equipment being ordered: Other: blood pressure monitor  Treatment Diagnosis: hypertension  Quantity: 1 each  Refills: 0     pantoprazole 40 MG EC tablet  Commonly known as: PROTONIX  Used for: Cirrhosis of liver with ascites, unspecified hepatic cirrhosis type (H)      Dose: 40 mg  Take 1 tablet (40 mg) by mouth daily  Quantity: 30 tablet  Refills: 11     zolpidem 5 MG tablet  Commonly known as: AMBIEN  Used for: Insomnia due to medical condition      Dose: 5 mg  Take 1 tablet (5 mg) by mouth nightly as needed for sleep  Quantity: 10 tablet  Refills: 5           Where to get your medicines      These medications were sent to [facility]/pharm[facility] #[id number] - [place], [place] - [address]  [address], [place] [address]    Phone: [phone]   diclofenac 1 % topical gel  senna-docusate 8.6-50 MG tablet     Some of these will need a paper prescription and others can be bought over the counter. Ask your nurse if you have questions.    Bring a paper prescription for each of these medications  oxyCODONE 5 MG tablet       Rationale for medication changes:    N/A    Consults   General Surgery - Dr. Daswon Reynaga  Pulmonology  Nephrology    Immunizations given this encounter   None - Moderna 8/18 and 9/15/21, not yet eligible for COVID booster. Received flu shot this year.     Anticoagulation Information    N/A    Discharge Orders     Discharge Procedure Orders   Reason for your hospital stay   Order Comments: You were hospitalized for recurrent fluid build-up in your chest. The chest tube drained the fluid adequately. You have been referred to the [facility] for ongoing management of this condition.  "    Follow-up and recommended labs and tests    Order Comments: Follow up with primary care provider, Jaswant Flores, within 7 days for hospital follow- up.  No follow up labs or test are needed.    Follow up with cardiothoracic surgery at [facility] to establish care with one of their surgeons for consideration of potential surgery to stop this fluid from coming back in your chest.     Activity   Order Comments: Your activity upon discharge: activity as tolerated     Order Specific Question Answer Comments   Is discharge order? Yes      Diet   Order Comments: Follow this diet upon discharge:       Fluid restriction 1500 ML FLUID      Dialysis Diet     Order Specific Question Answer Comments   Is discharge order? Yes      Examination     Vital Signs in last 24 hours:   /71 (BP Location: Right arm)   Pulse 73   Temp 98.6  F (37  C) (Oral)   Resp 16   Ht 1.651 m (5' 5\")   Wt 58.3 kg (128 lb 8.5 oz)   SpO2 96%   BMI 21.39 kg/m      GEN: Patient laying comfortably in bed in no acute distress. No respiratory distress.  HEEN: Head is atraumatic, normocephalic, eyes anicteric, mucous membranes moist.  PULM: Lungs clear to auscultation bilaterally. Breathing comfortably on room air. Chest tube in place right chest with serosanguinous output.  CV: Regular rate and rhythm.  EXT: Left elbow with dressing in place. Left arm edema appears improved from Friday 11/12.  NEURO: Alert and oriented x3.  No focal motor abnormalities.  Face symmetric.  PSYCH: Appropriate affect.  SKIN: No rashes, bruising, or other lesions.    Christy Virk MD  [facility] Residency Program, PGY-1  Pager #: [phone]    Precepted patient with Dr. Gustavo Solomon    CC:Jaswant Flores    "

## 2021-11-16 NOTE — PLAN OF CARE
Problem: Adult Inpatient Plan of Care  Goal: Optimal Comfort and Wellbeing  Outcome: No Change     Problem: Gas Exchange Impaired  Goal: Optimal Gas Exchange  Outcome: No Change     Problem: Infection  Goal: Absence of Infection Signs and Symptoms  Outcome: No Change  Patient alert, oriented x 3. Incision site left elbow intact with steri strips, draining serosanguinous. Noted itching and a rash on his back, administered Hydroxyzine prn, reported some relief and was noted sleeping. Chest tube patent, draining serosanguinous, output

## 2021-11-16 NOTE — DISCHARGE INSTRUCTIONS
Chest Tube Removal Care Instructions:  A chest tube is a flexible tube that is inserted through the chest wall and into the lung space. It is used to remove air, fluid, blood, chyle, or pus from the intrathoracic space. Please follow the below instructions following the removal of your chest tube:    Care Instructions:  - Leave the dressing over your previous chest tube exit site in place for 2 days following the chest tube removal.  - Keep your bandage clean and dry.  - You may shower the day following the removal of your chest tube. Cover dressing with plastic wrap while you shower.  - Do not submerge site/wound under water (bath, pool, Jacuzzi, ect ) for 3 days following chest tube removal.    Contact your primary healthcare provider if:  - You have a fever (greater than 101 F (38.3C)).  - You have severe pain and swelling around wound area.  - Your wound/previous chest tube site is red or draining pus (yellow/green/foul smelling drainage).  - Your bandage comes off prior to 24 hours.    Please seek medical care immediately or call 911 if:  - Blood or fluid soaks through bandage.  - You suddenly feel lightheaded or have shortness of breath.  - You have chest pain.

## 2021-11-16 NOTE — PROGRESS NOTES
[facility] - IR    Right chest tube removed at bedside per Dr. Reynaga request. No immediate complications.     Moraima Fritz CNP on 11/16/2021 at 2:11 PM

## 2021-11-16 NOTE — PLAN OF CARE
Care Management Discharge Note    Discharge Date: 11/16/2021       Discharge Disposition: Dialysis Services    Discharge Services: None    Discharge DME: None    Discharge Transportation: family or friend will provide    Private pay costs discussed: Not applicable    PAS Confirmation Code:    Patient/family educated on Medicare website which has current facility and service quality ratings:      Education Provided on the Discharge Plan:    Persons Notified of Discharge Plans: pt.  Patient/Family in Agreement with the Plan: yes    Handoff Referral Completed: No    Additional Information:  Home with no home care needs.     Khushi Coronel RN

## 2021-11-16 NOTE — PROGRESS NOTES
Brief Medicine Update    IR will pull chest tube today.  Called [facility] ([phone]) to make appointment for Elle with one of their surgeons in clinic.  will call patient directly or may call me back to follow up if they have questions.  Plan to discharge patient to home today.    Christy Virk MD  [facility] Residency Program, PGY-1  Pager #: [phone]

## 2021-11-16 NOTE — PLAN OF CARE
Problem: Adult Inpatient Plan of Care  Goal: Optimal Comfort and Wellbeing  Outcome: Improving    Pt c/o right hand pain.  Requested for Oxycodone and Tylenol.  Also applied Voltaren 1% gel to hand.  Right chest tube was removed by IR.  Pt is ready for discharge.  Wife will be picking up pt after work, around 17:30-18:00.

## 2021-11-16 NOTE — PROGRESS NOTES
Progress Note    Assessment/Plan  Chest tube 90 cc over the last 8 hours.  Have it reviewed with the patient again the fact that arrangements will be made for him to be seen in consultation at [facility] by the cardiothoracic service regarding right sided ongoing hemopneumothorax.  I reviewed this reasoning with him again today.  I think it would be very important that the patient has an appointment to be seen at that service in the those caregivers before he leaves the hospital today otherwise I believe arrangements would be extremely difficult.    Active Problems:    ESRD (end stage renal disease) on dialysis (H)    Hydropneumothorax    Hyperkalemia      Subjective  Quite comfortable.  Objective    Vital signs in last 24 hours  Temp:  [97.4  F (36.3  C)-98  F (36.7  C)] 98  F (36.7  C)  Pulse:  [68-85] 83  Resp:  [14-17] 17  BP: ()/(55-82) 122/78  SpO2:  [94 %-98 %] 98 %  Weight:   [unfilled]    Intake/Output last 3 shifts  I/O last 3 completed shifts:  In: 590 [P.O.:590]  Out: 2190 [Urine:100; Other:2000; Chest Tube:90]  Intake/Output this shift:  I/O this shift:  In: -   Out: 90 [Chest Tube:90]      Physical Exam  Good respiratory effort no air leak serous chest tube output noted    Pertinent Labs   Lab Results   Component Value Date    WBC 8.0 11/15/2021    HGB 8.0 (L) 11/15/2021    HCT 25.5 (L) 11/15/2021    MCV 98 11/15/2021    PLT 44 (LL) 11/15/2021             Pertinent Radiology     [unfilled]        Dawson Reynaga MD

## 2021-11-16 NOTE — PLAN OF CARE
Problem: Adult Inpatient Plan of Care  Goal: Absence of Hospital-Acquired Illness or Injury  Outcome: Improving  Intervention: Identify and Manage Fall Risk  Recent Flowsheet Documentation  Taken 11/15/2021 1615 by Ellie Agosto RN  Safety Promotion/Fall Prevention:   activity supervised   assistive device/personal items within reach   clutter free environment maintained     Problem: Gas Exchange Impaired  Goal: Optimal Gas Exchange  Outcome: Improving    Pt denies shortness of breath.  Chest tubes in place, no signs of air leak.    Pt had COVID swab completed tonight.  Result was negative.

## 2021-11-17 NOTE — PROGRESS NOTES
Patient discharged via wheelchair into personal vehicle with wife.  Discharge teaching and paperwork provided as well as oxycodone script.

## 2021-11-17 NOTE — TELEPHONE ENCOUNTER
Scheduled patient on 11/30/21 for a HFU with Dr. Valiente and on 12/8/21 with Dr. Flores for a f/up.

## 2021-11-17 NOTE — PROGRESS NOTES
Clinic Care Coordination Contact    Background: Care Coordination referral placed from [facility] discharge report for reason of patient meeting criteria for a TCM outreach call by [facility] team.    Assessment: Upon chart review, [facility] Team member will cancel/close the referral for TCM outreach due to reason below:    Patient has a follow up appointment with an appropriate provider today for hospital discharge    Plan: Care Coordination referral for TCM outreach canceled.    Silvia Hilario MA  [facility]

## 2021-11-17 NOTE — PROGRESS NOTES
Infusion Nursing Note:  Elle Blanton presents today for [facility].    Patient seen by provider today: No   present during visit today: Not Applicable.    Note: N/A.    Intravenous Access:  Peripheral IV placed per AL Puri.    Treatment Conditions:  Not Applicable.    Post Infusion Assessment:  Patient tolerated infusion without incident.  Blood return noted pre and post infusion.  Site patent and intact, free from redness, edema or discomfort.  Access discontinued per protocol.     Discharge Plan:   Patient discharged in stable condition accompanied by: wife.  Departure Mode: Wheelchair.  Elle will return on Wed, November 24 th at 1045 am as scheduled.    Celena Walls RN

## 2021-11-17 NOTE — TELEPHONE ENCOUNTER
Spoke to pt post-hospitalization. He notes he feels fine from a breathing standpoint. I reviewed the treatment plan as documented in the discharge summary by Dr Virk. Pt needs to be seen for hospital follow-up. My soonest appt is 12/2/2021 so I will plan to see him then. He should have an appointment sooner (some time next week) to ensure he is stable. I explained to him that he should go to the [facility] ED and NOT [facility] if his shortness of breath worsens as the thoracic surgeon who would operate on him is at the [place]. He was in agreement. He is hopeful to avoid surgery. I explained that we can discuss further at his follow-up but if he gets symptoms before the follow-up, he will absolutely need a procedure and should go to the . Again, he was in agreement.    Team - please schedule with me on 12/2 and arrange for a hospital follow-up with Dr Virk next week if possible. Thank you!    Jaswant Flores MD  November 17, 2021  1:15 PM

## 2021-11-18 NOTE — TELEPHONE ENCOUNTER
[facility] phone call message- general phone call:    Reason for call: Azra from [facility] called just wanting to inform  that she spoke with patient to complete his discharge from the hospital and patient has an understanding to the discharge plan and aware to schedule a follow up with pcp. Azra states if there is anything that  or team is needing help with please feel free to contact her.     Return call needed: Yes    OK to leave a message on voice mail? Yes    Primary language: English      needed? No    Call taken on November 18, 2021 at 2:22 PM by Ana María Live

## 2021-11-23 NOTE — TELEPHONE ENCOUNTER
[facility] phone call message- medication clarification/question:    Full Medication Name: zolpidem (AMBIEN   Dose: 5 MG tablet    Question: Per patient has been having trouble sleeping would like refills to help with sleep. Call and advise if needed.      Pharmacy confirmed as    [id number] - [place], [place] - [address]  : Yes    OK to leave a message on voice mail? Yes    Primary language: English      needed? No    Call taken on November 23, 2021 at 9:56 AM by Ana María Live

## 2021-11-23 NOTE — TELEPHONE ENCOUNTER
Called informed pt that MD gave 5 additional refills on 11/3/21. He should have refills at the pharmacy. Told pt to call pharmacy to fill Rx.

## 2021-11-24 NOTE — PROGRESS NOTES
Pt arrived ambulatory to clinic for weekly IV Cefazolin infusion.  IV was started using aseptic technique without difficulties on 2nd attempt with excellent blood return.  Administered IV Cefazolin per MD order.  Pt tolerated infusion well, no s/s of infusion reaction.  IV was flushed with with NS then D/C'd using 2x2 and Coban.  Pt verbalized understanding of plan of care and return to clinic.

## 2021-11-30 NOTE — PATIENT INSTRUCTIONS
Continue your current medications.  Refilled several.  I took Miralax of your list as you are not taking it.  Apply Vaseline petroleum jelly to your rash 2 times a day.  Keep the pad over your wound to catch the leaking.  Cardiothoracic surgery at [facility] called you today regarding appoint at the [place] campus.

## 2021-11-30 NOTE — PROGRESS NOTES
Patient presents with:  Hospital F/U: Pt. was seen at [facility]. reported he is feeling ok  Medication Reconciliation: Needs attention       Assessment & Plan   Problem List Items Addressed This Visit        Active Problems    Hydropneumothorax - Primary    Pleural effusion    Relevant Medications    oxyCODONE (ROXICODONE) 5 MG tablet      Other Visit Diagnoses     Olecranon bursitis of left elbow        Relevant Medications    oxyCODONE (ROXICODONE) 5 MG tablet    Pruritic disorder        Insomnia due to medical condition        Relevant Medications    zolpidem (AMBIEN) 5 MG tablet    Constipation, unspecified constipation type        Relevant Medications    senna-docusate (SENOKOT-S/PERICOLACE) 8.6-50 MG tablet           Review of external notes as documented elsewhere in note  35 minutes spent on the date of the encounter doing chart review, history and exam, documentation and further activities per the note     MEDICATIONS:   Orders Placed This Encounter   Medications     zolpidem (AMBIEN) 5 MG tablet     Sig: Take 1 tablet (5 mg) by mouth nightly as needed for sleep     Dispense:  10 tablet     Refill:  5     oxyCODONE (ROXICODONE) 5 MG tablet     Sig: Take 1 tablet (5 mg) by mouth every 6 hours as needed for moderate to severe pain or severe pain     Dispense:  3 tablet     Refill:  0     senna-docusate (SENOKOT-S/PERICOLACE) 8.6-50 MG tablet     Sig: Take 1 tablet by mouth daily as needed for constipation     Dispense:  30 tablet     Refill:  11     Medications Discontinued During This Encounter   Medication Reason     zolpidem (AMBIEN) 5 MG tablet Reorder     oxyCODONE (ROXICODONE) 5 MG tablet Reorder     polyethylene glycol (MIRALAX) 17 GM/Dose powder Medication Reconciliation Clean Up     senna-docusate (SENOKOT-S/PERICOLACE) 8.6-50 MG tablet Reorder          - Continue other medications without change  Regular exercise  Patient Instructions   Continue your current medications.  Refilled several.  I  took Miralax of your list as you are not taking it.  Apply Vaseline petroleum jelly to your rash 2 times a day.  Keep the pad over your wound to catch the leaking.  Cardiothoracic surgery at [facility] called you today regarding appoint at the [facility].      Return in about 1 week (around 12/7/2021) for wound check and hospital follow up, Lab Work.    Redd Valiente MD  [facility] [place] Subjective Leng is a 61 year old who presents for the following health issues     HPI     61-year-old male in for evaluation after hospitalization at [facility] for recurrent right hydropneumothorax.  He is scheduled to be followed up with the [place] cardiothoracic surgeons for this problem.  He also had an infection in his left olecranon bursa, which was incised and drained.  It is still draining, does not appear infected.  He is feeling better than he did when he went into the hospital and is taking his medications regularly.  We reviewed his medication list and I have refilled the ones that he needs at this point in time, including oxycodone for pain.  He has been having difficulty with sleeping and needs Ambien for that on a regular basis.  He may need a larger prescription than the 10 tablets I gave him.    He and his wife are doing okay.  He is getting in for his dialysis.  No other medication questions or concerns.  He is not scheduled to have any labs checked this visit.  I put in the name for the orthopedic surgeon that took care of him in the hospital, Dr. Coon, and he may consider an appointment with him in the future if the wound does not stop draining.    We will plan to have him see Dr. Flores in the next week or two and follow up here earlier if additional problems.  The patient and wife involved in medical decision making throughout the visit.    ADDENDUM TO HIS PREVIOUS DICTATION:  He also has a skin disorder with severe itching, and he is using medication for that.  He does have some  "motion he has been using, but it is still giving him trouble.  It is red and scaly, appears to be a neurodermatitis and dry skin.  Put Vaseline on it twice daily to see if that will help.  We will recheck at his next visit.        Hospital Follow-up Visit:    Hospital/Nursing Home/IP Rehab Facility: [facility]  Date of Admission: 11-8-21  Date of Discharge: 11-16-21  Reason(s) for Admission: Recurrent right hydropneumothorax      Was your hospitalization related to COVID-19? No   Problems taking medications regularly:  None  Medication changes since discharge: None  Problems adhering to non-medication therapy:  None    Summary of hospitalization:  [facility] discharge summary reviewed  Diagnostic Tests/Treatments reviewed.  Follow up needed: Wound check with Dr Dom Coon - Orthopedics  Other Healthcare Providers Involved in Patient s Care:         Specialist appointment - for chest evaluation and possible surgery and Surgical follow-up appointment - for wound check after I&D of left olecranon Bursa  Update since discharge: improved.   Post Discharge Medication Reconciliation: discharge medications reconciled, continue medications without change.  Plan of care communicated with patient and family            Review of Systems   Constitutional, HEENT, cardiovascular, pulmonary, gi and gu systems are negative, except as otherwise noted.      Objective    /77 (BP Location: Right arm, Patient Position: Sitting, Cuff Size: Adult Regular)   Pulse 73   Temp 98  F (36.7  C) (Oral)   Resp 18   Ht 1.63 m (5' 4.17\")   Wt 60.3 kg (133 lb)   SpO2 99%   BMI 22.71 kg/m    Body mass index is 22.71 kg/m .  Physical Exam   Lungs fluid sounds on left.  CV RR without murmur  Skin red scaly rash on left flank form infrascapular line to waist.  Ext - mild ankle edema (add back compression socks)    No results displayed because visit has over 200 results.                  "

## 2021-12-01 NOTE — PROGRESS NOTES
Pt arrived ambulatory to clinic for weekly IV Cefazolin infusion.  IV was started using aseptic technique without difficulties with excellent blood return.  Administered IV Cefazolin per MD order.  Pt tolerated infusion well, no s/s of infusion reaction.  IV was flushed with with NS then D/C'd using 2x2 and Coban.  Pt verbalized understanding of plan of care and return to clinic.

## 2021-12-02 NOTE — TELEPHONE ENCOUNTER
Called patient, no answer. Left VM to call back. Patient needs appointment today or tomorrow to assess. Romain MELENDEZ

## 2021-12-02 NOTE — TELEPHONE ENCOUNTER
[facility] phone call message- general phone call:    Reason for call:     Dr. Flores,    Patient has concerns regarding his elbows. He had initially scheduled an appt with you to be seen on 12/09, but cancelled because he rescheduled it to 12/15 instead thinking it could wait till then. However, he says his elbows are leaking? He didn't provide much details on that other than to tell you he would like to speak to you directly if possible to explain the situation more in details. Looks like you had no more openings so we werent able to schedule him to see you today either. Please advise on the following request if possible. Thank you.      Return call needed: Yes    OK to leave a message on voice mail? Yes    Primary language: English      needed? No    Call taken on December 2, 2021 at 11:40 AM by Ana Blanton

## 2021-12-02 NOTE — PROGRESS NOTES
Assessment & Plan     (M70.22) Olecranon bursitis of left elbow, s/p busectomy 10/15   (L24.A9) Drainage from wound  Comment: Having ongoing drainage from surgical site. Does not appear overtly infected. No indication for urgent/emergent referral. Concerned for ongoing issues possibly in joint / bursa that external dressings will not assist with. Will send to [place] orthopedists who did procedure / were involved with his care for evaluation and consideration of any necessary repeat procedure.   Plan:   - Appointment made for [place] on Tuesday 12/7 - he cannot do earlier given dialysis   - If red flag issues before then, instructed him to present to their urgent care   - Follow up with Dr. Flores soon as scheduled     (Z02.71) Disability examination  Comment: Significant issues with ambulating / ability to move with chronic disease burden. Appropriate for disability/handicap parking. Filled out paperwork for 7-12 months. Can reassess at that time. Form scanned into today.   Plan:   - Review and update as needed     (R21) Rash  Comment: Continues, though improved with vaseline. Ddx dry skin related to CKD vs eczema vs dermatitis vs other.   Plan:   - Use vaseline as needed   - F/u assessment at next appointment     (N18.6,  Z99.2) ESRD (end stage renal disease) on dialysis (H)    Comment: Due for dialysis today. AVF site intact.   Plan:   - Encouraged him to continue M, F dialysis as scheduled   - Follow with neprho as needed     (J86.9) Empyema lung (H)  Comment: Shortness of breath / sx stable today.   Plan:   - Follow up as needed   - If symptoms severe, encouraged him to present to [facility] rather than [facility]    Dona Hooker DO  [facility] [place]    Precepted with Dr. Flores     Subjective      Elle Blanton is a 61 year old male hx ESRD on hemodialysis (M/F via L AVF), Staph aureus empyema 2/2 to bacteremia, AAA (5.5 cm), chronic thoracic aortic dissection, cirrhosis with varices secondary  "to chronic hepatitis B, liver mass (unknown etiology). Presents for:     HPI     \"Elbow leaking\"   Hospitalized multiple times as below. Relevant to today: initially admitted 10/14-17 10/19-10/27 for septic bursitis of left elbow (s/p bursectomy 10/15, done by Dr. Myers/ ([facility]?) of orthopedics).   Had ongoing serosanguineous drainage from the elbow through each admission.    Does have hx of thrombocytopenia (PLT ~44) and anemia (Hgb ~8) related to ESRD.   Today:   Bandages will soak through every 3-4 hours.   Looks like \"water with a little blood\".    It has been doing that since being in the hospital.   No recurrent/worsening effusion.   No fever or chills.     Rash  Itching over left abdomen and back.   For weeks to months.   Looks dry.   Has been using vaseline - improving with that.   Again, no fever or chills.     Recent history:   Initially admitted 10/14-10/17 for septic bursitis of left elbow (s/p bursectomy 10/15)   Hospitalized 10/19-10/27 with MSSA empyema thought to be sequelae of bacteremia from septic bursitis s/p R thoracentesis with 1.5L drained for pleural effusion suspected 2/2 fluid overload. Pleural fluid cultures + S. Aureus. s/p chest tube placement and removal.   Hospitalized 11/8-11/16/21 for ongoing dyspnea related to recurrent complex pleural effusion. S/p repeat chest tube with persistent trapped lung. Needing definitive surgery at [facility]. On IV cefazolin 3x weekly per ID recommendation.     Review of Systems   Constitutional, HEENT, cardiovascular, pulmonary, gi and gu systems are negative, except as otherwise noted.      Objective    /82 (BP Location: Right arm, Patient Position: Sitting, Cuff Size: Adult Regular)   Pulse 60   Temp 97.4  F (36.3  C) (Oral)   Resp 18   Ht 1.64 m (5' 4.57\")   Wt 62.1 kg (136 lb 12.8 oz)   BMI 23.07 kg/m    Body mass index is 23.07 kg/m .  Physical Exam   GENERAL:alert and no distress. Chronically ill appearing.   NECK: no JVD.   RESP: no increased " WOB.   CV: appears reasonably well perfused.   ABDOMEN: not overtly distended.   MS: no edema. Left elbow covered with dressing - not saturated currently. He does not wish to take it off in clinic.   SKIN: as below. AV graft intact without rash or abnormality.   NEURO: Normal strength and tone, mentation intact and speech normal  PSYCH: mentation appears normal, affect normal/bright.

## 2021-12-02 NOTE — TELEPHONE ENCOUNTER
Patient called into office of FRANCISCO, Lynn Rai, and this writer answered the phone. Pt asked for Lynn, and then stated that he really needs to get in touch with his surgeon who worked on his elbow about a month ago. He couldn't remember the name of the doctor. He states that his incision/wound is leaking. He does not feel this is an issue for his PCP Dr. Flores.      Writer took his phone number and assured him I would connect with someone to call him back. Failing to find number for a clinic for Dr. Myers, the hand surgeon, writer called his PCP and spoke with Ana María. She took message requesting that someone call patient and determine if he needs to be seen and by whom. She states that she will relay this to Dr. Flores and/or the nurse.     Writer connected with  and she is already aware of the wound issue, and states that an infusion nurse changed the dressing yesterday and had also connected with Dr. Flores's office.

## 2021-12-02 NOTE — TELEPHONE ENCOUNTER
Called patient again, patient answered the phone. He endorses worsening leaking from the elbow, patient agreeable to be seen tomorrow in clinic for assessment. Scheduled patient in AM with resident for  to precept withArina Hoyos RN

## 2021-12-03 NOTE — PROGRESS NOTES
I have personally reviewed the history and examination as documented by Dr. Hooker.  I was present during key portions of the visit and agree with the assessment and plan as documented for 61 yr old male with ESRD, cirrhosis, pleural effusions, and hx of septic olecranon bursitis here for persistent drainage from elbow surgical site. No signs of persistent infection. Appointment arranged with elbow surgeon. Dressing change supplies provided. Precautions given. Anticipatory guidance given.     Jaswant Flores MD  December 3, 2021  10:07 AM

## 2021-12-03 NOTE — CONFIDENTIAL NOTE
Pt being seen in office this am by myself and Dr Hooker.    Jaswant Flores MD  December 3, 2021  8:36 AM

## 2021-12-08 NOTE — PROGRESS NOTES
Infusion Nursing Note:  Elle Blanton presents today for IV ancef.    Patient seen by provider today: No   present during visit today: Not Applicable.    Note: N/A.    Intravenous Access:  Peripheral IV placed.    Treatment Conditions:  Not Applicable.    Post Infusion Assessment:  Patient tolerated infusion without incident.  Blood return noted pre and post infusion.  Site patent and intact, free from redness, edema or discomfort.  Access discontinued per protocol.     Discharge Plan:   Patient discharged in stable condition accompanied by: wife.  Departure Mode: Ambulatory.      Celena Walls RN

## 2021-12-14 NOTE — PROGRESS NOTES
ASSESSMENT/PLAN:    (M70.22) Olecranon bursitis of left elbow  (primary encounter diagnosis)  Comment:   Plan: continues to have drainage at surgical site -> has appt on 12/20/21 w/ Dr Myers    (L24.A9) Drainage from wound  Comment:   Plan: see above    (J86.9) Empyema lung (H)  Comment:   Plan: appt pending w/ Dr Zurita on 1/6/22 -> pt knows to contact me if he has shortness of breath or go to [facility] ER    (N18.6,  Z99.2) ESRD (end stage renal disease) on dialysis (H)  Comment:   Plan: cont dialysis M/F    (R21) Rash  Comment:   Plan: resolved     Jaswant Flores MD  December 15, 2021  8:42 AM        Pt is a 61 year old male last seen on 12/3/21 by Dr Hooker here today for:     1) L elbow - was seen by ortho at [place]; was recommended to see Dr Myers on 12/20 at 10:45am in [place]; 1/17/22 - at [place] - Dr Yung - EMG appt; 1/19/22 at 9am at [place] w/ Dr Dom Coon   2) Rash - resolved  3) ESRD - dialysis - still stable w/ M/F dialysis  4) empyema - appt w/ thoracic surgery at [facility] (Dr Zurita) on 1/6/22; breathing is normal right now       Per Dr Hooker's note:     (M70.22) Olecranon bursitis of left elbow, s/p busectomy 10/15   (L24.A9) Drainage from wound  Comment: Having ongoing drainage from surgical site. Does not appear overtly infected. No indication for urgent/emergent referral. Concerned for ongoing issues possibly in joint / bursa that external dressings will not assist with. Will send to [place] orthopedists who did procedure / were involved with his care for evaluation and consideration of any necessary repeat procedure.   Plan:   - Appointment made for [place] on Tuesday 12/7 - he cannot do earlier given dialysis   - If red flag issues before then, instructed him to present to their urgent care   - Follow up with Dr. Flores soon as scheduled      (Z02.71) Disability examination  Comment: Significant issues with ambulating / ability to move with chronic disease burden.  Appropriate for disability/handicap parking. Filled out paperwork for 7-12 months. Can reassess at that time. Form scanned into today.   Plan:   - Review and update as needed      (R21) Rash  Comment: Continues, though improved with vaseline. Ddx dry skin related to CKD vs eczema vs dermatitis vs other.   Plan:   - Use vaseline as needed   - F/u assessment at next appointment      (N18.6,  Z99.2) ESRD (end stage renal disease) on dialysis (H)    Comment: Due for dialysis today. AVF site intact.   Plan:   - Encouraged him to continue M, F dialysis as scheduled   - Follow with neprho as needed      (J86.9) Empyema lung (H)  Comment: Shortness of breath / sx stable today.   Plan:   - Follow up as needed   - If symptoms severe, encouraged him to present to [facility] rather than [facility]    Past Medical History:   Diagnosis Date     NAHUM (acute kidney injury) (H)      GI (gastrointestinal bleed)      Gout      H. pylori infection 7/5/2017    UGI bleed implied by Hgb 9.0 6/22/17 and melena. Admitted and hgb decreased to 7.6, CT abdomen showed all bladder wall thickening. + Hep B surface antigen noted. Melena resolved and hgb stabalized without transfusion, epigastric pain resolved with PPI. Recommend referral for gastroscopy.     Heart attack (H)      Hepatitis B      Normocytic anemia      PONV (postoperative nausea and vomiting)      Thrombocytopenia (H)       Past Surgical History:   Procedure Laterality Date     ABCESS DRAINAGE      finger     BURSECTOMY ELBOW Left 10/15/2021    Procedure: LEFT OLECRANON BURSECTOMY;  Surgeon: Tico Myers MD;  Location: [facility]     CREATE FISTULA ARTERIOVENOUS UPPER EXTREMITY Left 4/20/2021    Procedure: left arm dialysis graft placement;  Surgeon: Roxana Cosby MD;  Location: [facility];  Service: General     FOREIGN BODY REMOVAL      finger     INCISION AND DRAINAGE FINGER, COMBINED Left 10/20/2016    Procedure: COMBINED INCISION AND DRAINAGE FINGER;  Surgeon:  Mireya Pizano MD;  Location: [place] OR     IR CHEST TUBE PLACEMENT NON-TUNNELED RIGHT  4/19/2021     IR CHEST TUBE PLACEMENT NON-TUNNELED RIGHT  10/19/2021     IR CHEST TUBE PLACEMENT NON-TUNNELED RIGHT  11/10/2021     IR PLEURAL DRAINAGE WITH CATHETER INSERTION  4/19/2021     MIDLINE INSERTION - DOUBLE LUMEN  4/23/2021          WI ESOPHAGOGASTRODUODENOSCOPY TRANSORAL DIAGNOSTIC N/A 8/18/2020    Procedure: ESOPHAGOGASTRODUODENOSCOPY (EGD) with biopsy;  Surgeon: Nilson Gonzales MD;  Location: [facility];  Service: Gastroenterology      PARACENTESIS  8/14/2020      PARACENTESIS  8/19/2020     US THORACENTESIS  4/18/2021      Current Outpatient Medications   Medication Sig Dispense Refill     acetaminophen (TYLENOL) 500 MG tablet Take 500 mg by mouth every 6 hours as needed for mild pain       calcium acetate (PHOSLO) 667 MG CAPS capsule Take 1 capsule by mouth 3 times daily (with meals)       carvedilol (COREG) 25 MG tablet Take 25 mg by mouth 2 times daily       ceFAZolin (ANCEF) 1 GM vial Inject 2-3 g into the vein three times a week 2 g Monday, 2 g Wednesday, 3 g Friday per ID note 9 each 4     cloNIDine (CATAPRES) 0.1 MG tablet Take 0.1 mg by mouth At Bedtime        diclofenac (VOLTAREN) 1 % topical gel Apply 2 g topically 2 times daily To hands 50 g 1     diltiazem ER (DILT-XR) 180 MG 24 hr capsule Take 180 mg by mouth 2 times daily        entecavir (BARACLUDE) 0.5 MG tablet Take 1 tablet (0.5 mg) by mouth every 7 days 45 tablet 3     hydrOXYzine (ATARAX) 25 MG tablet Take 1 tablet (25 mg) by mouth 3 times daily as needed for itching 90 tablet 3     order for DME Equipment being ordered: Other: blood pressure monitor  Treatment Diagnosis: hypertension 1 each 0     oxyCODONE (ROXICODONE) 5 MG tablet Take 1 tablet (5 mg) by mouth every 6 hours as needed for moderate to severe pain or severe pain 3 tablet 0     pantoprazole (PROTONIX) 40 MG EC tablet Take 1 tablet (40 mg) by mouth daily 30 tablet 11      "senna-docusate (SENOKOT-S/PERICOLACE) 8.6-50 MG tablet Take 1 tablet by mouth daily as needed for constipation 30 tablet 11     zolpidem (AMBIEN) 5 MG tablet Take 1 tablet (5 mg) by mouth nightly as needed for sleep 10 tablet 5      Allergies   Allergen Reactions     Nka [No Known Allergies]         ROS:   Gen- no weight change, no fevers/chills   Cardiac - no palpitations, no chest pain   Respiratory - no shortness of breath , no wheezing   GI - no nausea, no vomiting, no diarrhea, no constipation   Remainder of ROS negative.     Exam:   BP (!) 146/87 (BP Location: Right arm, Patient Position: Sitting, Cuff Size: Adult Regular)   Pulse 72   Temp 97.6  F (36.4  C) (Oral)   Resp 18   Ht 1.651 m (5' 5\")   Wt 60.3 kg (133 lb)   SpO2 98%   BMI 22.13 kg/m     Alert and oriented x 3; No acute distress   Neuro: no focal deficits   Derm: no rashes       "

## 2022-01-01 ENCOUNTER — HOSPITAL ENCOUNTER (OUTPATIENT)
Dept: ULTRASOUND IMAGING | Facility: HOSPITAL | Age: 62
Discharge: HOME | End: 2022-03-23
Admitting: STUDENT IN AN ORGANIZED HEALTH CARE EDUCATION/TRAINING PROGRAM
Payer: COMMERCIAL

## 2022-01-01 ENCOUNTER — TELEPHONE (OUTPATIENT)
Dept: FAMILY MEDICINE | Facility: CLINIC | Age: 62
End: 2022-01-01

## 2022-01-01 ENCOUNTER — PREP FOR PROCEDURE (OUTPATIENT)
Dept: SURGERY | Facility: CLINIC | Age: 62
End: 2022-01-01

## 2022-01-01 ENCOUNTER — OFFICE VISIT (OUTPATIENT)
Dept: FAMILY MEDICINE | Facility: CLINIC | Age: 62
End: 2022-01-01
Payer: COMMERCIAL

## 2022-01-01 ENCOUNTER — LAB (OUTPATIENT)
Dept: FAMILY MEDICINE | Facility: CLINIC | Age: 62
End: 2022-01-01
Payer: COMMERCIAL

## 2022-01-01 ENCOUNTER — PATIENT OUTREACH (OUTPATIENT)
Dept: CARE COORDINATION | Facility: CLINIC | Age: 62
End: 2022-01-01

## 2022-01-01 ENCOUNTER — HOSPITAL ENCOUNTER (OUTPATIENT)
Dept: RADIOLOGY | Facility: HOSPITAL | Age: 62
End: 2022-01-06
Attending: THORACIC SURGERY (CARDIOTHORACIC VASCULAR SURGERY)
Payer: COMMERCIAL

## 2022-01-01 ENCOUNTER — HOSPITAL ENCOUNTER (OUTPATIENT)
Facility: AMBULATORY SURGERY CENTER | Age: 62
End: 2022-01-01
Attending: RADIOLOGY
Payer: COMMERCIAL

## 2022-01-01 ENCOUNTER — APPOINTMENT (OUTPATIENT)
Dept: INTERVENTIONAL RADIOLOGY/VASCULAR | Facility: HOSPITAL | Age: 62
DRG: 423 | End: 2022-01-01
Attending: RADIOLOGY
Payer: COMMERCIAL

## 2022-01-01 ENCOUNTER — HOSPITAL ENCOUNTER (OUTPATIENT)
Facility: CLINIC | Age: 62
Discharge: HOME | End: 2022-01-18
Attending: ORTHOPAEDIC SURGERY | Admitting: ORTHOPAEDIC SURGERY
Payer: COMMERCIAL

## 2022-01-01 ENCOUNTER — APPOINTMENT (OUTPATIENT)
Dept: RADIOLOGY | Facility: HOSPITAL | Age: 62
DRG: 981 | End: 2022-01-01
Attending: STUDENT IN AN ORGANIZED HEALTH CARE EDUCATION/TRAINING PROGRAM
Payer: COMMERCIAL

## 2022-01-01 ENCOUNTER — TRANSFERRED RECORDS (OUTPATIENT)
Dept: HEALTH INFORMATION MANAGEMENT | Facility: CLINIC | Age: 62
End: 2022-01-01

## 2022-01-01 ENCOUNTER — HOSPITAL ENCOUNTER (INPATIENT)
Facility: HOSPITAL | Age: 62
LOS: 1 days | End: 2022-11-11
Attending: FAMILY MEDICINE | Admitting: FAMILY MEDICINE

## 2022-01-01 ENCOUNTER — APPOINTMENT (OUTPATIENT)
Dept: RADIOLOGY | Facility: HOSPITAL | Age: 62
DRG: 981 | End: 2022-01-01
Attending: EMERGENCY MEDICINE
Payer: COMMERCIAL

## 2022-01-01 ENCOUNTER — APPOINTMENT (OUTPATIENT)
Dept: ULTRASOUND IMAGING | Facility: HOSPITAL | Age: 62
DRG: 981 | End: 2022-01-01
Payer: COMMERCIAL

## 2022-01-01 ENCOUNTER — TELEPHONE (OUTPATIENT)
Dept: FAMILY MEDICINE | Facility: CLINIC | Age: 62
End: 2022-01-01
Payer: COMMERCIAL

## 2022-01-01 ENCOUNTER — DOCUMENTATION ONLY (OUTPATIENT)
Dept: RADIATION THERAPY | Facility: OUTPATIENT CENTER | Age: 62
End: 2022-01-01

## 2022-01-01 ENCOUNTER — PATIENT OUTREACH (OUTPATIENT)
Dept: FAMILY MEDICINE | Facility: CLINIC | Age: 62
End: 2022-01-01
Payer: COMMERCIAL

## 2022-01-01 ENCOUNTER — LAB (OUTPATIENT)
Dept: LAB | Facility: CLINIC | Age: 62
End: 2022-01-01

## 2022-01-01 ENCOUNTER — DOCUMENTATION ONLY (OUTPATIENT)
Dept: FAMILY MEDICINE | Facility: CLINIC | Age: 62
End: 2022-01-01

## 2022-01-01 ENCOUNTER — OFFICE VISIT (OUTPATIENT)
Dept: SURGERY | Facility: CLINIC | Age: 62
End: 2022-01-01
Payer: COMMERCIAL

## 2022-01-01 ENCOUNTER — HOSPITAL ENCOUNTER (OUTPATIENT)
Facility: AMBULATORY SURGERY CENTER | Age: 62
Discharge: HOME | End: 2022-08-25
Attending: RADIOLOGY | Admitting: RADIOLOGY
Payer: COMMERCIAL

## 2022-01-01 ENCOUNTER — HOSPITAL ENCOUNTER (OUTPATIENT)
Dept: ULTRASOUND IMAGING | Facility: HOSPITAL | Age: 62
Discharge: HOME | End: 2022-03-02
Attending: FAMILY MEDICINE | Admitting: FAMILY MEDICINE
Payer: COMMERCIAL

## 2022-01-01 ENCOUNTER — APPOINTMENT (OUTPATIENT)
Dept: INTERVENTIONAL RADIOLOGY/VASCULAR | Facility: HOSPITAL | Age: 62
DRG: 981 | End: 2022-01-01
Attending: RADIOLOGY
Payer: COMMERCIAL

## 2022-01-01 ENCOUNTER — PATIENT OUTREACH (OUTPATIENT)
Dept: ONCOLOGY | Facility: CLINIC | Age: 62
End: 2022-01-01

## 2022-01-01 ENCOUNTER — APPOINTMENT (OUTPATIENT)
Dept: RADIOLOGY | Facility: HOSPITAL | Age: 62
DRG: 193 | End: 2022-01-01
Attending: EMERGENCY MEDICINE
Payer: COMMERCIAL

## 2022-01-01 ENCOUNTER — APPOINTMENT (OUTPATIENT)
Dept: RADIOLOGY | Facility: HOSPITAL | Age: 62
DRG: 193 | End: 2022-01-01
Payer: COMMERCIAL

## 2022-01-01 ENCOUNTER — PATIENT OUTREACH (OUTPATIENT)
Dept: SURGERY | Facility: CLINIC | Age: 62
End: 2022-01-01

## 2022-01-01 ENCOUNTER — APPOINTMENT (OUTPATIENT)
Dept: RADIOLOGY | Facility: HOSPITAL | Age: 62
End: 2022-01-01
Attending: EMERGENCY MEDICINE
Payer: COMMERCIAL

## 2022-01-01 ENCOUNTER — ANESTHESIA (OUTPATIENT)
Dept: SURGERY | Facility: CLINIC | Age: 62
End: 2022-01-01
Payer: COMMERCIAL

## 2022-01-01 ENCOUNTER — OFFICE VISIT (OUTPATIENT)
Dept: PULMONOLOGY | Facility: OTHER | Age: 62
End: 2022-01-01
Payer: COMMERCIAL

## 2022-01-01 ENCOUNTER — APPOINTMENT (OUTPATIENT)
Dept: CT IMAGING | Facility: HOSPITAL | Age: 62
DRG: 356 | End: 2022-01-01
Attending: EMERGENCY MEDICINE
Payer: COMMERCIAL

## 2022-01-01 ENCOUNTER — HOSPITAL ENCOUNTER (OUTPATIENT)
Facility: HOSPITAL | Age: 62
Setting detail: OBSERVATION
Discharge: HOME | End: 2022-01-09
Attending: EMERGENCY MEDICINE | Admitting: EMERGENCY MEDICINE
Payer: COMMERCIAL

## 2022-01-01 ENCOUNTER — PATIENT OUTREACH (OUTPATIENT)
Dept: CARE COORDINATION | Facility: CLINIC | Age: 62
End: 2022-01-01
Payer: COMMERCIAL

## 2022-01-01 ENCOUNTER — ONCOLOGY VISIT (OUTPATIENT)
Dept: ONCOLOGY | Facility: CLINIC | Age: 62
End: 2022-01-01
Attending: FAMILY MEDICINE
Payer: COMMERCIAL

## 2022-01-01 ENCOUNTER — LAB (OUTPATIENT)
Dept: LAB | Facility: CLINIC | Age: 62
End: 2022-01-01
Payer: COMMERCIAL

## 2022-01-01 ENCOUNTER — HOSPITAL ENCOUNTER (EMERGENCY)
Facility: HOSPITAL | Age: 62
Discharge: HOME | End: 2022-02-28
Attending: EMERGENCY MEDICINE | Admitting: EMERGENCY MEDICINE
Payer: COMMERCIAL

## 2022-01-01 ENCOUNTER — ANCILLARY PROCEDURE (OUTPATIENT)
Dept: GENERAL RADIOLOGY | Facility: CLINIC | Age: 62
End: 2022-01-01
Attending: FAMILY MEDICINE
Payer: COMMERCIAL

## 2022-01-01 ENCOUNTER — MEDICAL CORRESPONDENCE (OUTPATIENT)
Dept: HEALTH INFORMATION MANAGEMENT | Facility: CLINIC | Age: 62
End: 2022-01-01

## 2022-01-01 ENCOUNTER — HOSPITAL ENCOUNTER (EMERGENCY)
Facility: HOSPITAL | Age: 62
Discharge: HOME | End: 2022-10-26
Attending: EMERGENCY MEDICINE | Admitting: EMERGENCY MEDICINE
Payer: COMMERCIAL

## 2022-01-01 ENCOUNTER — APPOINTMENT (OUTPATIENT)
Dept: INTERVENTIONAL RADIOLOGY/VASCULAR | Facility: HOSPITAL | Age: 62
DRG: 981 | End: 2022-01-01
Attending: PHYSICIAN ASSISTANT
Payer: COMMERCIAL

## 2022-01-01 ENCOUNTER — APPOINTMENT (OUTPATIENT)
Dept: CT IMAGING | Facility: HOSPITAL | Age: 62
DRG: 981 | End: 2022-01-01
Attending: EMERGENCY MEDICINE
Payer: COMMERCIAL

## 2022-01-01 ENCOUNTER — HOSPITAL ENCOUNTER (INPATIENT)
Facility: HOSPITAL | Age: 62
LOS: 5 days | Discharge: HOME HEALTH | DRG: 423 | End: 2022-09-21
Attending: EMERGENCY MEDICINE | Admitting: STUDENT IN AN ORGANIZED HEALTH CARE EDUCATION/TRAINING PROGRAM
Payer: COMMERCIAL

## 2022-01-01 ENCOUNTER — OFFICE VISIT (OUTPATIENT)
Dept: PHARMACY | Facility: CLINIC | Age: 62
End: 2022-01-01
Payer: COMMERCIAL

## 2022-01-01 ENCOUNTER — APPOINTMENT (OUTPATIENT)
Dept: CT IMAGING | Facility: HOSPITAL | Age: 62
End: 2022-01-01
Attending: EMERGENCY MEDICINE
Payer: COMMERCIAL

## 2022-01-01 ENCOUNTER — HOSPITAL ENCOUNTER (EMERGENCY)
Facility: HOSPITAL | Age: 62
Discharge: HOME | End: 2022-03-16
Attending: EMERGENCY MEDICINE | Admitting: EMERGENCY MEDICINE
Payer: COMMERCIAL

## 2022-01-01 ENCOUNTER — ANESTHESIA EVENT (OUTPATIENT)
Dept: SURGERY | Facility: CLINIC | Age: 62
End: 2022-01-01

## 2022-01-01 ENCOUNTER — HOSPITAL ENCOUNTER (EMERGENCY)
Facility: HOSPITAL | Age: 62
Discharge: HOME | End: 2022-10-12
Attending: EMERGENCY MEDICINE | Admitting: EMERGENCY MEDICINE
Payer: COMMERCIAL

## 2022-01-01 ENCOUNTER — ANESTHESIA EVENT (OUTPATIENT)
Dept: SURGERY | Facility: CLINIC | Age: 62
End: 2022-01-01
Payer: COMMERCIAL

## 2022-01-01 ENCOUNTER — HOSPITAL ENCOUNTER (INPATIENT)
Facility: HOSPITAL | Age: 62
LOS: 12 days | Discharge: HOME | DRG: 981 | End: 2022-04-12
Attending: EMERGENCY MEDICINE | Admitting: INTERNAL MEDICINE
Payer: COMMERCIAL

## 2022-01-01 ENCOUNTER — DOCUMENTATION ONLY (OUTPATIENT)
Dept: INTERVENTIONAL RADIOLOGY/VASCULAR | Facility: CLINIC | Age: 62
End: 2022-01-01

## 2022-01-01 ENCOUNTER — TELEPHONE (OUTPATIENT)
Dept: SURGERY | Facility: CLINIC | Age: 62
End: 2022-01-01

## 2022-01-01 ENCOUNTER — APPOINTMENT (OUTPATIENT)
Dept: INTERVENTIONAL RADIOLOGY/VASCULAR | Facility: HOSPITAL | Age: 62
DRG: 356 | End: 2022-01-01
Attending: RADIOLOGY
Payer: COMMERCIAL

## 2022-01-01 ENCOUNTER — APPOINTMENT (OUTPATIENT)
Dept: CT IMAGING | Facility: HOSPITAL | Age: 62
DRG: 981 | End: 2022-01-01
Attending: INTERNAL MEDICINE
Payer: COMMERCIAL

## 2022-01-01 ENCOUNTER — HOSPITAL ENCOUNTER (OUTPATIENT)
Dept: CT IMAGING | Facility: CLINIC | Age: 62
Discharge: HOME | End: 2022-05-03
Admitting: STUDENT IN AN ORGANIZED HEALTH CARE EDUCATION/TRAINING PROGRAM
Payer: COMMERCIAL

## 2022-01-01 ENCOUNTER — APPOINTMENT (OUTPATIENT)
Dept: ULTRASOUND IMAGING | Facility: HOSPITAL | Age: 62
DRG: 981 | End: 2022-01-01
Attending: RADIOLOGY
Payer: COMMERCIAL

## 2022-01-01 ENCOUNTER — INFUSION THERAPY VISIT (OUTPATIENT)
Dept: INFUSION THERAPY | Facility: HOSPITAL | Age: 62
End: 2022-01-01
Attending: INTERNAL MEDICINE
Payer: COMMERCIAL

## 2022-01-01 ENCOUNTER — DOCUMENTATION ONLY (OUTPATIENT)
Dept: OTHER | Facility: CLINIC | Age: 62
End: 2022-01-01

## 2022-01-01 ENCOUNTER — APPOINTMENT (OUTPATIENT)
Dept: INTERVENTIONAL RADIOLOGY/VASCULAR | Facility: HOSPITAL | Age: 62
DRG: 981 | End: 2022-01-01
Attending: INTERNAL MEDICINE
Payer: COMMERCIAL

## 2022-01-01 ENCOUNTER — HOSPITAL ENCOUNTER (INPATIENT)
Facility: CLINIC | Age: 62
Setting detail: SURGERY ADMIT
End: 2022-01-01
Attending: THORACIC SURGERY (CARDIOTHORACIC VASCULAR SURGERY) | Admitting: THORACIC SURGERY (CARDIOTHORACIC VASCULAR SURGERY)
Payer: COMMERCIAL

## 2022-01-01 ENCOUNTER — HOSPITAL ENCOUNTER (OUTPATIENT)
Dept: ULTRASOUND IMAGING | Facility: HOSPITAL | Age: 62
Discharge: HOME | End: 2022-01-14
Attending: FAMILY MEDICINE | Admitting: FAMILY MEDICINE
Payer: COMMERCIAL

## 2022-01-01 ENCOUNTER — HOSPITAL ENCOUNTER (OUTPATIENT)
Dept: ULTRASOUND IMAGING | Facility: CLINIC | Age: 62
Discharge: HOME | End: 2022-10-21
Attending: FAMILY MEDICINE | Admitting: FAMILY MEDICINE
Payer: COMMERCIAL

## 2022-01-01 ENCOUNTER — APPOINTMENT (OUTPATIENT)
Dept: OCCUPATIONAL THERAPY | Facility: HOSPITAL | Age: 62
DRG: 356 | End: 2022-01-01
Attending: INTERNAL MEDICINE
Payer: COMMERCIAL

## 2022-01-01 ENCOUNTER — HOSPITAL ENCOUNTER (OUTPATIENT)
Dept: CT IMAGING | Facility: HOSPITAL | Age: 62
Discharge: HOME | End: 2022-07-07
Attending: THORACIC SURGERY (CARDIOTHORACIC VASCULAR SURGERY) | Admitting: THORACIC SURGERY (CARDIOTHORACIC VASCULAR SURGERY)
Payer: COMMERCIAL

## 2022-01-01 ENCOUNTER — HOSPITAL ENCOUNTER (OUTPATIENT)
Facility: HOSPITAL | Age: 62
Setting detail: OBSERVATION
Discharge: HOME | End: 2022-10-01
Attending: EMERGENCY MEDICINE | Admitting: EMERGENCY MEDICINE
Payer: COMMERCIAL

## 2022-01-01 ENCOUNTER — APPOINTMENT (OUTPATIENT)
Dept: ULTRASOUND IMAGING | Facility: HOSPITAL | Age: 62
DRG: 193 | End: 2022-01-01
Attending: EMERGENCY MEDICINE
Payer: COMMERCIAL

## 2022-01-01 ENCOUNTER — HOSPITAL ENCOUNTER (OUTPATIENT)
Dept: ULTRASOUND IMAGING | Facility: HOSPITAL | Age: 62
Discharge: HOME | End: 2022-04-29
Attending: FAMILY MEDICINE | Admitting: RADIOLOGY
Payer: COMMERCIAL

## 2022-01-01 ENCOUNTER — APPOINTMENT (OUTPATIENT)
Dept: PHYSICAL THERAPY | Facility: HOSPITAL | Age: 62
DRG: 356 | End: 2022-01-01
Attending: INTERNAL MEDICINE
Payer: COMMERCIAL

## 2022-01-01 ENCOUNTER — TRANSFERRED RECORDS (OUTPATIENT)
Dept: HEALTH INFORMATION MANAGEMENT | Facility: CLINIC | Age: 62
End: 2022-01-01
Payer: COMMERCIAL

## 2022-01-01 ENCOUNTER — APPOINTMENT (OUTPATIENT)
Dept: ULTRASOUND IMAGING | Facility: HOSPITAL | Age: 62
DRG: 423 | End: 2022-01-01
Payer: COMMERCIAL

## 2022-01-01 ENCOUNTER — HOSPITAL ENCOUNTER (INPATIENT)
Facility: HOSPITAL | Age: 62
LOS: 2 days | Discharge: HOSPICE | DRG: 193 | End: 2022-11-10
Attending: EMERGENCY MEDICINE | Admitting: FAMILY MEDICINE
Payer: COMMERCIAL

## 2022-01-01 ENCOUNTER — HOSPITAL ENCOUNTER (EMERGENCY)
Facility: HOSPITAL | Age: 62
Discharge: HOME | End: 2022-01-13
Attending: EMERGENCY MEDICINE | Admitting: EMERGENCY MEDICINE
Payer: COMMERCIAL

## 2022-01-01 ENCOUNTER — PRE VISIT (OUTPATIENT)
Dept: SURGERY | Facility: CLINIC | Age: 62
End: 2022-01-01

## 2022-01-01 ENCOUNTER — HOSPITAL ENCOUNTER (INPATIENT)
Facility: HOSPITAL | Age: 62
LOS: 6 days | Discharge: HOME | DRG: 356 | End: 2022-09-01
Attending: EMERGENCY MEDICINE | Admitting: FAMILY MEDICINE
Payer: COMMERCIAL

## 2022-01-01 ENCOUNTER — APPOINTMENT (OUTPATIENT)
Dept: ULTRASOUND IMAGING | Facility: HOSPITAL | Age: 62
End: 2022-01-01
Payer: COMMERCIAL

## 2022-01-01 ENCOUNTER — APPOINTMENT (OUTPATIENT)
Dept: ULTRASOUND IMAGING | Facility: HOSPITAL | Age: 62
DRG: 356 | End: 2022-01-01
Payer: COMMERCIAL

## 2022-01-01 ENCOUNTER — APPOINTMENT (OUTPATIENT)
Dept: CT IMAGING | Facility: HOSPITAL | Age: 62
DRG: 423 | End: 2022-01-01
Attending: EMERGENCY MEDICINE
Payer: COMMERCIAL

## 2022-01-01 ENCOUNTER — PRE VISIT (OUTPATIENT)
Dept: GASTROENTEROLOGY | Facility: CLINIC | Age: 62
End: 2022-01-01

## 2022-01-01 VITALS
RESPIRATION RATE: 20 BRPM | WEIGHT: 135 LBS | HEART RATE: 79 BPM | OXYGEN SATURATION: 99 % | SYSTOLIC BLOOD PRESSURE: 108 MMHG | DIASTOLIC BLOOD PRESSURE: 65 MMHG | TEMPERATURE: 98.1 F | HEIGHT: 65 IN | BODY MASS INDEX: 22.49 KG/M2

## 2022-01-01 VITALS
HEART RATE: 86 BPM | TEMPERATURE: 98.2 F | DIASTOLIC BLOOD PRESSURE: 102 MMHG | OXYGEN SATURATION: 96 % | SYSTOLIC BLOOD PRESSURE: 183 MMHG | HEIGHT: 65 IN | WEIGHT: 126 LBS | BODY MASS INDEX: 20.99 KG/M2 | RESPIRATION RATE: 16 BRPM

## 2022-01-01 VITALS
TEMPERATURE: 98.2 F | DIASTOLIC BLOOD PRESSURE: 99 MMHG | WEIGHT: 120 LBS | OXYGEN SATURATION: 96 % | HEART RATE: 86 BPM | BODY MASS INDEX: 19.99 KG/M2 | RESPIRATION RATE: 16 BRPM | SYSTOLIC BLOOD PRESSURE: 150 MMHG | HEIGHT: 65 IN

## 2022-01-01 VITALS
SYSTOLIC BLOOD PRESSURE: 131 MMHG | RESPIRATION RATE: 18 BRPM | OXYGEN SATURATION: 98 % | WEIGHT: 122 LBS | DIASTOLIC BLOOD PRESSURE: 89 MMHG | BODY MASS INDEX: 20.3 KG/M2 | TEMPERATURE: 97.6 F | HEART RATE: 95 BPM

## 2022-01-01 VITALS
DIASTOLIC BLOOD PRESSURE: 88 MMHG | TEMPERATURE: 96.8 F | WEIGHT: 121 LBS | HEART RATE: 96 BPM | RESPIRATION RATE: 16 BRPM | BODY MASS INDEX: 20.16 KG/M2 | HEIGHT: 65 IN | SYSTOLIC BLOOD PRESSURE: 132 MMHG | OXYGEN SATURATION: 98 %

## 2022-01-01 VITALS
HEART RATE: 66 BPM | SYSTOLIC BLOOD PRESSURE: 138 MMHG | HEIGHT: 65 IN | WEIGHT: 139 LBS | DIASTOLIC BLOOD PRESSURE: 76 MMHG | BODY MASS INDEX: 23.16 KG/M2 | OXYGEN SATURATION: 98 %

## 2022-01-01 VITALS
RESPIRATION RATE: 12 BRPM | SYSTOLIC BLOOD PRESSURE: 155 MMHG | WEIGHT: 128 LBS | TEMPERATURE: 98.9 F | HEART RATE: 100 BPM | BODY MASS INDEX: 21.3 KG/M2 | OXYGEN SATURATION: 95 % | DIASTOLIC BLOOD PRESSURE: 91 MMHG

## 2022-01-01 VITALS
OXYGEN SATURATION: 98 % | HEART RATE: 90 BPM | RESPIRATION RATE: 18 BRPM | HEIGHT: 65 IN | BODY MASS INDEX: 20.66 KG/M2 | DIASTOLIC BLOOD PRESSURE: 85 MMHG | WEIGHT: 124 LBS | SYSTOLIC BLOOD PRESSURE: 177 MMHG | TEMPERATURE: 97.5 F

## 2022-01-01 VITALS
HEART RATE: 100 BPM | OXYGEN SATURATION: 99 % | WEIGHT: 124 LBS | BODY MASS INDEX: 20.63 KG/M2 | SYSTOLIC BLOOD PRESSURE: 143 MMHG | RESPIRATION RATE: 24 BRPM | TEMPERATURE: 98.4 F | DIASTOLIC BLOOD PRESSURE: 97 MMHG

## 2022-01-01 VITALS
TEMPERATURE: 98.4 F | OXYGEN SATURATION: 95 % | BODY MASS INDEX: 20.16 KG/M2 | HEART RATE: 102 BPM | HEIGHT: 65 IN | SYSTOLIC BLOOD PRESSURE: 125 MMHG | RESPIRATION RATE: 22 BRPM | DIASTOLIC BLOOD PRESSURE: 76 MMHG | WEIGHT: 121 LBS

## 2022-01-01 VITALS
HEART RATE: 85 BPM | SYSTOLIC BLOOD PRESSURE: 132 MMHG | DIASTOLIC BLOOD PRESSURE: 78 MMHG | WEIGHT: 134.8 LBS | RESPIRATION RATE: 16 BRPM | BODY MASS INDEX: 22.61 KG/M2 | OXYGEN SATURATION: 99 %

## 2022-01-01 VITALS
TEMPERATURE: 97.9 F | DIASTOLIC BLOOD PRESSURE: 70 MMHG | WEIGHT: 130.12 LBS | SYSTOLIC BLOOD PRESSURE: 118 MMHG | OXYGEN SATURATION: 97 % | HEART RATE: 71 BPM | BODY MASS INDEX: 21.68 KG/M2 | HEIGHT: 65 IN | RESPIRATION RATE: 12 BRPM

## 2022-01-01 VITALS
BODY MASS INDEX: 22.33 KG/M2 | TEMPERATURE: 99 F | SYSTOLIC BLOOD PRESSURE: 118 MMHG | HEIGHT: 65 IN | DIASTOLIC BLOOD PRESSURE: 71 MMHG | OXYGEN SATURATION: 99 % | WEIGHT: 134 LBS | RESPIRATION RATE: 20 BRPM | HEART RATE: 75 BPM

## 2022-01-01 VITALS
SYSTOLIC BLOOD PRESSURE: 119 MMHG | DIASTOLIC BLOOD PRESSURE: 85 MMHG | OXYGEN SATURATION: 99 % | HEART RATE: 103 BPM | BODY MASS INDEX: 21.3 KG/M2 | WEIGHT: 128 LBS | RESPIRATION RATE: 18 BRPM

## 2022-01-01 VITALS
HEIGHT: 65 IN | BODY MASS INDEX: 22.03 KG/M2 | OXYGEN SATURATION: 97 % | WEIGHT: 132.25 LBS | DIASTOLIC BLOOD PRESSURE: 87 MMHG | HEART RATE: 86 BPM | SYSTOLIC BLOOD PRESSURE: 165 MMHG

## 2022-01-01 VITALS
RESPIRATION RATE: 18 BRPM | DIASTOLIC BLOOD PRESSURE: 87 MMHG | SYSTOLIC BLOOD PRESSURE: 141 MMHG | TEMPERATURE: 98 F | WEIGHT: 130 LBS | HEIGHT: 65 IN | BODY MASS INDEX: 21.66 KG/M2 | HEART RATE: 68 BPM | OXYGEN SATURATION: 99 %

## 2022-01-01 VITALS
RESPIRATION RATE: 18 BRPM | OXYGEN SATURATION: 96 % | HEART RATE: 89 BPM | SYSTOLIC BLOOD PRESSURE: 111 MMHG | DIASTOLIC BLOOD PRESSURE: 73 MMHG | BODY MASS INDEX: 20.16 KG/M2 | WEIGHT: 121 LBS | HEIGHT: 65 IN

## 2022-01-01 VITALS
SYSTOLIC BLOOD PRESSURE: 139 MMHG | WEIGHT: 118.12 LBS | OXYGEN SATURATION: 98 % | HEART RATE: 93 BPM | DIASTOLIC BLOOD PRESSURE: 43 MMHG | BODY MASS INDEX: 19.66 KG/M2

## 2022-01-01 VITALS
WEIGHT: 124.5 LBS | SYSTOLIC BLOOD PRESSURE: 107 MMHG | HEIGHT: 65 IN | BODY MASS INDEX: 20.74 KG/M2 | HEART RATE: 107 BPM | TEMPERATURE: 98 F | OXYGEN SATURATION: 97 % | RESPIRATION RATE: 16 BRPM | DIASTOLIC BLOOD PRESSURE: 72 MMHG

## 2022-01-01 VITALS
HEART RATE: 107 BPM | HEIGHT: 65 IN | OXYGEN SATURATION: 89 % | RESPIRATION RATE: 24 BRPM | WEIGHT: 116.7 LBS | DIASTOLIC BLOOD PRESSURE: 63 MMHG | SYSTOLIC BLOOD PRESSURE: 91 MMHG | TEMPERATURE: 98.4 F | BODY MASS INDEX: 19.44 KG/M2

## 2022-01-01 VITALS
HEART RATE: 66 BPM | OXYGEN SATURATION: 99 % | DIASTOLIC BLOOD PRESSURE: 92 MMHG | WEIGHT: 136 LBS | RESPIRATION RATE: 15 BRPM | BODY MASS INDEX: 22.66 KG/M2 | TEMPERATURE: 98.4 F | HEIGHT: 65 IN | SYSTOLIC BLOOD PRESSURE: 148 MMHG

## 2022-01-01 VITALS
SYSTOLIC BLOOD PRESSURE: 117 MMHG | TEMPERATURE: 98.2 F | OXYGEN SATURATION: 99 % | BODY MASS INDEX: 22.47 KG/M2 | RESPIRATION RATE: 20 BRPM | WEIGHT: 135 LBS | DIASTOLIC BLOOD PRESSURE: 78 MMHG | HEART RATE: 77 BPM

## 2022-01-01 VITALS
OXYGEN SATURATION: 94 % | WEIGHT: 123.6 LBS | HEART RATE: 78 BPM | BODY MASS INDEX: 20.57 KG/M2 | DIASTOLIC BLOOD PRESSURE: 66 MMHG | SYSTOLIC BLOOD PRESSURE: 113 MMHG

## 2022-01-01 VITALS
OXYGEN SATURATION: 97 % | WEIGHT: 118.1 LBS | TEMPERATURE: 97.9 F | BODY MASS INDEX: 19.68 KG/M2 | HEART RATE: 91 BPM | HEIGHT: 65 IN | DIASTOLIC BLOOD PRESSURE: 99 MMHG | RESPIRATION RATE: 18 BRPM | SYSTOLIC BLOOD PRESSURE: 165 MMHG

## 2022-01-01 VITALS
OXYGEN SATURATION: 95 % | HEART RATE: 89 BPM | BODY MASS INDEX: 21.12 KG/M2 | WEIGHT: 126.9 LBS | DIASTOLIC BLOOD PRESSURE: 82 MMHG | SYSTOLIC BLOOD PRESSURE: 130 MMHG

## 2022-01-01 VITALS
OXYGEN SATURATION: 97 % | HEIGHT: 65 IN | HEART RATE: 90 BPM | SYSTOLIC BLOOD PRESSURE: 131 MMHG | RESPIRATION RATE: 18 BRPM | DIASTOLIC BLOOD PRESSURE: 87 MMHG | WEIGHT: 122 LBS | TEMPERATURE: 97.8 F | BODY MASS INDEX: 20.33 KG/M2

## 2022-01-01 VITALS
TEMPERATURE: 97.4 F | DIASTOLIC BLOOD PRESSURE: 62 MMHG | SYSTOLIC BLOOD PRESSURE: 96 MMHG | HEART RATE: 74 BPM | HEIGHT: 65 IN | RESPIRATION RATE: 20 BRPM | OXYGEN SATURATION: 97 % | BODY MASS INDEX: 19.99 KG/M2 | WEIGHT: 120 LBS

## 2022-01-01 VITALS
SYSTOLIC BLOOD PRESSURE: 148 MMHG | DIASTOLIC BLOOD PRESSURE: 85 MMHG | OXYGEN SATURATION: 96 % | WEIGHT: 137 LBS | HEART RATE: 72 BPM | BODY MASS INDEX: 22.8 KG/M2 | RESPIRATION RATE: 18 BRPM | TEMPERATURE: 97.9 F

## 2022-01-01 VITALS
SYSTOLIC BLOOD PRESSURE: 108 MMHG | RESPIRATION RATE: 20 BRPM | HEART RATE: 81 BPM | BODY MASS INDEX: 22.16 KG/M2 | OXYGEN SATURATION: 96 % | TEMPERATURE: 98 F | DIASTOLIC BLOOD PRESSURE: 65 MMHG | WEIGHT: 133 LBS | HEIGHT: 65 IN

## 2022-01-01 VITALS
OXYGEN SATURATION: 93 % | HEART RATE: 90 BPM | SYSTOLIC BLOOD PRESSURE: 126 MMHG | DIASTOLIC BLOOD PRESSURE: 73 MMHG | TEMPERATURE: 98.8 F | SYSTOLIC BLOOD PRESSURE: 127 MMHG | RESPIRATION RATE: 20 BRPM | DIASTOLIC BLOOD PRESSURE: 80 MMHG | OXYGEN SATURATION: 95 % | BODY MASS INDEX: 20.32 KG/M2 | WEIGHT: 122.08 LBS | RESPIRATION RATE: 12 BRPM | HEART RATE: 81 BPM | TEMPERATURE: 98.7 F

## 2022-01-01 VITALS
BODY MASS INDEX: 22.44 KG/M2 | SYSTOLIC BLOOD PRESSURE: 161 MMHG | SYSTOLIC BLOOD PRESSURE: 101 MMHG | OXYGEN SATURATION: 94 % | DIASTOLIC BLOOD PRESSURE: 65 MMHG | TEMPERATURE: 98 F | HEIGHT: 64 IN | DIASTOLIC BLOOD PRESSURE: 93 MMHG | HEART RATE: 88 BPM | WEIGHT: 133.8 LBS | RESPIRATION RATE: 16 BRPM | OXYGEN SATURATION: 100 % | RESPIRATION RATE: 16 BRPM | WEIGHT: 141.8 LBS | HEART RATE: 68 BPM | BODY MASS INDEX: 24.21 KG/M2 | TEMPERATURE: 97.5 F

## 2022-01-01 VITALS
RESPIRATION RATE: 15 BRPM | TEMPERATURE: 97.4 F | DIASTOLIC BLOOD PRESSURE: 110 MMHG | OXYGEN SATURATION: 98 % | SYSTOLIC BLOOD PRESSURE: 163 MMHG | HEART RATE: 89 BPM

## 2022-01-01 VITALS
DIASTOLIC BLOOD PRESSURE: 99 MMHG | OXYGEN SATURATION: 100 % | WEIGHT: 136 LBS | TEMPERATURE: 98.3 F | RESPIRATION RATE: 21 BRPM | BODY MASS INDEX: 23.22 KG/M2 | SYSTOLIC BLOOD PRESSURE: 172 MMHG | HEART RATE: 74 BPM

## 2022-01-01 VITALS
WEIGHT: 134 LBS | DIASTOLIC BLOOD PRESSURE: 98 MMHG | BODY MASS INDEX: 22.33 KG/M2 | SYSTOLIC BLOOD PRESSURE: 151 MMHG | HEIGHT: 65 IN | OXYGEN SATURATION: 98 % | HEART RATE: 79 BPM | RESPIRATION RATE: 16 BRPM

## 2022-01-01 VITALS
WEIGHT: 137.57 LBS | TEMPERATURE: 98 F | OXYGEN SATURATION: 99 % | RESPIRATION RATE: 21 BRPM | DIASTOLIC BLOOD PRESSURE: 96 MMHG | BODY MASS INDEX: 22.89 KG/M2 | HEART RATE: 69 BPM | SYSTOLIC BLOOD PRESSURE: 146 MMHG

## 2022-01-01 VITALS
WEIGHT: 131 LBS | DIASTOLIC BLOOD PRESSURE: 73 MMHG | RESPIRATION RATE: 12 BRPM | OXYGEN SATURATION: 98 % | BODY MASS INDEX: 21.8 KG/M2 | HEART RATE: 76 BPM | SYSTOLIC BLOOD PRESSURE: 122 MMHG | TEMPERATURE: 97.9 F

## 2022-01-01 VITALS
RESPIRATION RATE: 16 BRPM | SYSTOLIC BLOOD PRESSURE: 113 MMHG | OXYGEN SATURATION: 98 % | TEMPERATURE: 98.2 F | HEART RATE: 74 BPM | DIASTOLIC BLOOD PRESSURE: 64 MMHG

## 2022-01-01 VITALS — OXYGEN SATURATION: 99 % | DIASTOLIC BLOOD PRESSURE: 70 MMHG | HEART RATE: 64 BPM | SYSTOLIC BLOOD PRESSURE: 102 MMHG

## 2022-01-01 DIAGNOSIS — S02.2XXD CLOSED FRACTURE OF NASAL BONE WITH ROUTINE HEALING, SUBSEQUENT ENCOUNTER: ICD-10-CM

## 2022-01-01 DIAGNOSIS — N18.4 ANEMIA DUE TO STAGE 4 CHRONIC KIDNEY DISEASE (H): ICD-10-CM

## 2022-01-01 DIAGNOSIS — N18.6 ANEMIA IN END-STAGE RENAL DISEASE (H): ICD-10-CM

## 2022-01-01 DIAGNOSIS — Z99.2 DIALYSIS PATIENT (H): ICD-10-CM

## 2022-01-01 DIAGNOSIS — J86.9 EMPYEMA LUNG (H): ICD-10-CM

## 2022-01-01 DIAGNOSIS — M10.9 ARTHRITIS OF RIGHT WRIST DUE TO GOUT: ICD-10-CM

## 2022-01-01 DIAGNOSIS — R16.0 LIVER MASS: ICD-10-CM

## 2022-01-01 DIAGNOSIS — R13.19 ESOPHAGEAL DYSPHAGIA: ICD-10-CM

## 2022-01-01 DIAGNOSIS — Z20.822 ENCOUNTER FOR LABORATORY TESTING FOR COVID-19 VIRUS: ICD-10-CM

## 2022-01-01 DIAGNOSIS — N18.6 ESRD (END STAGE RENAL DISEASE) ON DIALYSIS (H): ICD-10-CM

## 2022-01-01 DIAGNOSIS — D62 ANEMIA DUE TO BLOOD LOSS, ACUTE: ICD-10-CM

## 2022-01-01 DIAGNOSIS — R40.0 SOMNOLENCE: ICD-10-CM

## 2022-01-01 DIAGNOSIS — J98.19 TRAPPED LUNG: Primary | ICD-10-CM

## 2022-01-01 DIAGNOSIS — K74.60 CIRRHOSIS OF LIVER WITH ASCITES, UNSPECIFIED HEPATIC CIRRHOSIS TYPE (H): ICD-10-CM

## 2022-01-01 DIAGNOSIS — Z99.2 ESRD (END STAGE RENAL DISEASE) ON DIALYSIS (H): Primary | ICD-10-CM

## 2022-01-01 DIAGNOSIS — K74.60 HEPATIC CIRRHOSIS, UNSPECIFIED HEPATIC CIRRHOSIS TYPE, UNSPECIFIED WHETHER ASCITES PRESENT (H): ICD-10-CM

## 2022-01-01 DIAGNOSIS — K74.60 HEPATIC CIRRHOSIS, UNSPECIFIED HEPATIC CIRRHOSIS TYPE, UNSPECIFIED WHETHER ASCITES PRESENT (H): Primary | ICD-10-CM

## 2022-01-01 DIAGNOSIS — D63.1 ANEMIA IN END-STAGE RENAL DISEASE (H): ICD-10-CM

## 2022-01-01 DIAGNOSIS — J90 RECURRENT PLEURAL EFFUSION: ICD-10-CM

## 2022-01-01 DIAGNOSIS — J86.9 EMPYEMA LUNG (H): Primary | ICD-10-CM

## 2022-01-01 DIAGNOSIS — Z71.89 OTHER SPECIFIED COUNSELING: ICD-10-CM

## 2022-01-01 DIAGNOSIS — R18.8 OTHER ASCITES: ICD-10-CM

## 2022-01-01 DIAGNOSIS — Z09 HOSPITAL DISCHARGE FOLLOW-UP: Primary | ICD-10-CM

## 2022-01-01 DIAGNOSIS — J86.9 EMPYEMA (H): ICD-10-CM

## 2022-01-01 DIAGNOSIS — J98.19 TRAPPED LUNG: ICD-10-CM

## 2022-01-01 DIAGNOSIS — D64.9 ANEMIA: ICD-10-CM

## 2022-01-01 DIAGNOSIS — Z01.818 PRE-OP EVALUATION: ICD-10-CM

## 2022-01-01 DIAGNOSIS — D64.9 ANEMIA, UNSPECIFIED TYPE: ICD-10-CM

## 2022-01-01 DIAGNOSIS — D62 ANEMIA DUE TO BLOOD LOSS, ACUTE: Primary | ICD-10-CM

## 2022-01-01 DIAGNOSIS — L29.9 PRURITIC DISORDER: ICD-10-CM

## 2022-01-01 DIAGNOSIS — N18.6 ANEMIA IN END-STAGE RENAL DISEASE (H): Primary | ICD-10-CM

## 2022-01-01 DIAGNOSIS — K59.00 CONSTIPATION, UNSPECIFIED CONSTIPATION TYPE: ICD-10-CM

## 2022-01-01 DIAGNOSIS — E43 SEVERE PROTEIN-CALORIE MALNUTRITION (H): ICD-10-CM

## 2022-01-01 DIAGNOSIS — R04.2 HEMOPTYSIS: ICD-10-CM

## 2022-01-01 DIAGNOSIS — I87.8 POOR VENOUS ACCESS: ICD-10-CM

## 2022-01-01 DIAGNOSIS — R04.0 EPISTAXIS: ICD-10-CM

## 2022-01-01 DIAGNOSIS — Z99.2 ESRD ON HEMODIALYSIS (H): ICD-10-CM

## 2022-01-01 DIAGNOSIS — I71.019 THORACIC AORTIC DISSECTION (H): ICD-10-CM

## 2022-01-01 DIAGNOSIS — D69.6 THROMBOCYTOPENIA (H): ICD-10-CM

## 2022-01-01 DIAGNOSIS — R22.2 CHEST WALL MASS: ICD-10-CM

## 2022-01-01 DIAGNOSIS — D63.1 ANEMIA DUE TO CHRONIC KIDNEY DISEASE, ON CHRONIC DIALYSIS (H): ICD-10-CM

## 2022-01-01 DIAGNOSIS — R05.9 COUGH: Primary | ICD-10-CM

## 2022-01-01 DIAGNOSIS — Z99.2 ESRD (END STAGE RENAL DISEASE) ON DIALYSIS (H): ICD-10-CM

## 2022-01-01 DIAGNOSIS — R05.9 COUGH: ICD-10-CM

## 2022-01-01 DIAGNOSIS — R22.2 CHEST WALL MASS: Primary | ICD-10-CM

## 2022-01-01 DIAGNOSIS — G47.01 INSOMNIA DUE TO MEDICAL CONDITION: ICD-10-CM

## 2022-01-01 DIAGNOSIS — D63.1 ANEMIA DUE TO STAGE 4 CHRONIC KIDNEY DISEASE (H): ICD-10-CM

## 2022-01-01 DIAGNOSIS — L98.8 DRAINING CUTANEOUS SINUS TRACT: ICD-10-CM

## 2022-01-01 DIAGNOSIS — I77.79: ICD-10-CM

## 2022-01-01 DIAGNOSIS — D61.818 OTHER PANCYTOPENIA (H): ICD-10-CM

## 2022-01-01 DIAGNOSIS — N18.4 ANEMIA DUE TO STAGE 4 CHRONIC KIDNEY DISEASE (H): Primary | ICD-10-CM

## 2022-01-01 DIAGNOSIS — D59.9 ACQUIRED HEMOLYTIC ANEMIA (H): ICD-10-CM

## 2022-01-01 DIAGNOSIS — I10 ESSENTIAL HYPERTENSION: ICD-10-CM

## 2022-01-01 DIAGNOSIS — J90 PLEURAL EFFUSION: ICD-10-CM

## 2022-01-01 DIAGNOSIS — K66.1 HEMOPERITONEUM: ICD-10-CM

## 2022-01-01 DIAGNOSIS — D69.6 THROMBOCYTOPENIA (H): Primary | ICD-10-CM

## 2022-01-01 DIAGNOSIS — K92.0 HEMATEMESIS, PRESENCE OF NAUSEA NOT SPECIFIED: ICD-10-CM

## 2022-01-01 DIAGNOSIS — J90 PLEURAL EFFUSION: Primary | ICD-10-CM

## 2022-01-01 DIAGNOSIS — L02.213 CHEST WALL ABSCESS: Primary | ICD-10-CM

## 2022-01-01 DIAGNOSIS — M70.22 OLECRANON BURSITIS OF LEFT ELBOW: Primary | ICD-10-CM

## 2022-01-01 DIAGNOSIS — N18.6 ESRD (END STAGE RENAL DISEASE) ON DIALYSIS (H): Primary | ICD-10-CM

## 2022-01-01 DIAGNOSIS — R57.1 HYPOVOLEMIC SHOCK (H): ICD-10-CM

## 2022-01-01 DIAGNOSIS — R18.8 CIRRHOSIS OF LIVER WITH ASCITES, UNSPECIFIED HEPATIC CIRRHOSIS TYPE (H): ICD-10-CM

## 2022-01-01 DIAGNOSIS — J94.8 HYDROPNEUMOTHORAX: ICD-10-CM
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
## 2022-01-01 DIAGNOSIS — R04.2 HEMOPTYSIS: Primary | ICD-10-CM

## 2022-01-01 DIAGNOSIS — R04.0 EPISTAXIS: Primary | ICD-10-CM

## 2022-01-01 DIAGNOSIS — Z11.59 ENCOUNTER FOR SCREENING FOR OTHER VIRAL DISEASES: Primary | ICD-10-CM

## 2022-01-01 DIAGNOSIS — Z23 HIGH PRIORITY FOR 2019-NCOV VACCINE: ICD-10-CM

## 2022-01-01 DIAGNOSIS — D63.1 ANEMIA IN END-STAGE RENAL DISEASE (H): Primary | ICD-10-CM

## 2022-01-01 DIAGNOSIS — B18.1 CHRONIC VIRAL HEPATITIS B WITHOUT DELTA AGENT AND WITHOUT COMA (H): ICD-10-CM

## 2022-01-01 DIAGNOSIS — K74.60 CIRRHOSIS OF LIVER WITH ASCITES, UNSPECIFIED HEPATIC CIRRHOSIS TYPE (H): Primary | ICD-10-CM

## 2022-01-01 DIAGNOSIS — K74.60 CIRRHOSIS OF LIVER WITHOUT ASCITES, UNSPECIFIED HEPATIC CIRRHOSIS TYPE (H): Primary | ICD-10-CM

## 2022-01-01 DIAGNOSIS — Z45.2 PICC (PERIPHERALLY INSERTED CENTRAL CATHETER) IN PLACE: Primary | ICD-10-CM

## 2022-01-01 DIAGNOSIS — Z01.818 PRE-OP EVALUATION: Primary | ICD-10-CM

## 2022-01-01 DIAGNOSIS — L98.8 DRAINING CUTANEOUS SINUS TRACT: Primary | ICD-10-CM

## 2022-01-01 DIAGNOSIS — N18.6 ANEMIA DUE TO CHRONIC KIDNEY DISEASE, ON CHRONIC DIALYSIS (H): ICD-10-CM

## 2022-01-01 DIAGNOSIS — R16.0 LIVER MASS, RIGHT LOBE: ICD-10-CM

## 2022-01-01 DIAGNOSIS — J94.2 HEMOTHORAX ON RIGHT: ICD-10-CM

## 2022-01-01 DIAGNOSIS — N18.6 ESRD ON HEMODIALYSIS (H): ICD-10-CM

## 2022-01-01 DIAGNOSIS — M70.22 OLECRANON BURSITIS OF LEFT ELBOW: ICD-10-CM

## 2022-01-01 DIAGNOSIS — Z01.818 PRE-OP EXAMINATION: ICD-10-CM

## 2022-01-01 DIAGNOSIS — D64.9 NORMOCYTIC ANEMIA: Primary | ICD-10-CM

## 2022-01-01 DIAGNOSIS — S02.2XXA CLOSED FRACTURE OF NASAL BONE, INITIAL ENCOUNTER: ICD-10-CM

## 2022-01-01 DIAGNOSIS — D63.1 ANEMIA DUE TO STAGE 4 CHRONIC KIDNEY DISEASE (H): Primary | ICD-10-CM

## 2022-01-01 DIAGNOSIS — I10 ESSENTIAL HYPERTENSION: Primary | ICD-10-CM

## 2022-01-01 DIAGNOSIS — I71.03 THORACOABDOMINAL AORTIC DISSECTION (H): ICD-10-CM

## 2022-01-01 DIAGNOSIS — E87.70 HYPERVOLEMIA, UNSPECIFIED HYPERVOLEMIA TYPE: ICD-10-CM

## 2022-01-01 DIAGNOSIS — Z99.2 ANEMIA DUE TO CHRONIC KIDNEY DISEASE, ON CHRONIC DIALYSIS (H): ICD-10-CM

## 2022-01-01 DIAGNOSIS — R18.8 CIRRHOSIS OF LIVER WITH ASCITES, UNSPECIFIED HEPATIC CIRRHOSIS TYPE (H): Primary | ICD-10-CM

## 2022-01-01 DIAGNOSIS — Z79.891 CHRONIC USE OF OPIATE FOR THERAPEUTIC PURPOSE: Primary | ICD-10-CM

## 2022-01-01 DIAGNOSIS — D64.9 SYMPTOMATIC ANEMIA: ICD-10-CM

## 2022-01-01 DIAGNOSIS — B18.1 VIRAL HEPATITIS B CHRONIC (H): Primary | ICD-10-CM

## 2022-01-01 LAB
ABO/RH(D): NORMAL
ACID FAST STAIN (ARUP): NORMAL
AFP SERPL-MCNC: 9702 NG/ML
ALBUMIN BODY FLUID SOURCE: NORMAL
ALBUMIN FLD-MCNC: 1 G/DL
ALBUMIN FLD-MCNC: 1.2 G/DL
ALBUMIN FLD-MCNC: 1.7 G/DL
ALBUMIN SERPL BCG-MCNC: 2.4 G/DL (ref 3.5–5.2)
ALBUMIN SERPL BCG-MCNC: 2.8 G/DL (ref 3.5–5.2)
ALBUMIN SERPL-MCNC: 1.7 G/DL (ref 3.5–5)
ALBUMIN SERPL-MCNC: 1.7 G/DL (ref 3.5–5)
ALBUMIN SERPL-MCNC: 2.2 G/DL (ref 3.5–5)
ALBUMIN SERPL-MCNC: 2.3 G/DL (ref 3.5–5)
ALBUMIN SERPL-MCNC: 2.4 G/DL (ref 3.5–5)
ALBUMIN SERPL-MCNC: 2.7 G/DL (ref 3.5–5)
ALBUMIN SERPL-MCNC: 3.1 G/DL (ref 3.5–5)
ALP SERPL-CCNC: 103 U/L (ref 45–120)
ALP SERPL-CCNC: 114 U/L (ref 45–120)
ALP SERPL-CCNC: 162 U/L (ref 45–120)
ALP SERPL-CCNC: 188 U/L (ref 40–129)
ALP SERPL-CCNC: 193 U/L (ref 45–120)
ALP SERPL-CCNC: 237 U/L (ref 45–120)
ALP SERPL-CCNC: 247 U/L (ref 40–129)
ALP SERPL-CCNC: 94 U/L (ref 45–120)
ALT SERPL W P-5'-P-CCNC: 16 U/L (ref 10–50)
ALT SERPL W P-5'-P-CCNC: 19 U/L (ref 0–45)
ALT SERPL W P-5'-P-CCNC: 22 U/L (ref 10–50)
ALT SERPL W P-5'-P-CCNC: 26 U/L (ref 0–45)
ALT SERPL W P-5'-P-CCNC: 31 U/L (ref 0–45)
ALT SERPL W P-5'-P-CCNC: <9 U/L (ref 0–45)
AMMONIA PLAS-SCNC: 48 UMOL/L (ref 11–35)
AMMONIA PLAS-SCNC: 48 UMOL/L (ref 16–60)
ANION GAP SERPL CALCULATED.3IONS-SCNC: 10 MMOL/L (ref 5–18)
ANION GAP SERPL CALCULATED.3IONS-SCNC: 10 MMOL/L (ref 7–15)
ANION GAP SERPL CALCULATED.3IONS-SCNC: 11 MMOL/L (ref 5–18)
ANION GAP SERPL CALCULATED.3IONS-SCNC: 12 MMOL/L (ref 5–18)
ANION GAP SERPL CALCULATED.3IONS-SCNC: 12 MMOL/L (ref 5–18)
ANION GAP SERPL CALCULATED.3IONS-SCNC: 14 MMOL/L (ref 5–18)
ANION GAP SERPL CALCULATED.3IONS-SCNC: 14 MMOL/L (ref 5–18)
ANION GAP SERPL CALCULATED.3IONS-SCNC: 14 MMOL/L (ref 7–15)
ANION GAP SERPL CALCULATED.3IONS-SCNC: 14 MMOL/L (ref 7–15)
ANION GAP SERPL CALCULATED.3IONS-SCNC: 15 MMOL/L (ref 5–18)
ANION GAP SERPL CALCULATED.3IONS-SCNC: 15 MMOL/L (ref 7–15)
ANION GAP SERPL CALCULATED.3IONS-SCNC: 16 MMOL/L (ref 5–18)
ANION GAP SERPL CALCULATED.3IONS-SCNC: 17 MMOL/L (ref 5–18)
ANION GAP SERPL CALCULATED.3IONS-SCNC: 17 MMOL/L (ref 5–18)
ANION GAP SERPL CALCULATED.3IONS-SCNC: 17 MMOL/L (ref 7–15)
ANION GAP SERPL CALCULATED.3IONS-SCNC: 18 MMOL/L (ref 7–15)
ANION GAP SERPL CALCULATED.3IONS-SCNC: 23 MMOL/L (ref 5–18)
ANION GAP SERPL CALCULATED.3IONS-SCNC: 8 MMOL/L (ref 5–18)
ANION GAP SERPL CALCULATED.3IONS-SCNC: 8 MMOL/L (ref 5–18)
ANION GAP SERPL CALCULATED.3IONS-SCNC: 8 MMOL/L (ref 7–15)
ANION GAP SERPL CALCULATED.3IONS-SCNC: 9 MMOL/L (ref 5–18)
ANTIBODY SCREEN: NEGATIVE
APPEARANCE FLD: ABNORMAL
APTT PPP: 37 SECONDS (ref 22–38)
APTT PPP: 37 SECONDS (ref 22–38)
APTT PPP: 38 SECONDS (ref 22–38)
APTT PPP: 38 SECONDS (ref 22–38)
AST SERPL W P-5'-P-CCNC: 15 U/L (ref 0–40)
AST SERPL W P-5'-P-CCNC: 19 U/L (ref 0–40)
AST SERPL W P-5'-P-CCNC: 24 U/L (ref 10–50)
AST SERPL W P-5'-P-CCNC: 25 U/L (ref 10–50)
AST SERPL W P-5'-P-CCNC: 29 U/L (ref 0–40)
AST SERPL W P-5'-P-CCNC: 36 U/L (ref 0–40)
AST SERPL W P-5'-P-CCNC: 40 U/L (ref 0–40)
AST SERPL W P-5'-P-CCNC: 46 U/L (ref 0–40)
ATRIAL RATE - MUSE: 120 BPM
ATRIAL RATE - MUSE: 67 BPM
ATRIAL RATE - MUSE: 74 BPM
ATRIAL RATE - MUSE: 77 BPM
ATRIAL RATE - MUSE: 80 BPM
ATRIAL RATE - MUSE: 91 BPM
BACTERIA ABSC ANAEROBE+AEROBE CULT: ABNORMAL
BACTERIA ABSC ANAEROBE+AEROBE CULT: NO GROWTH
BACTERIA ABSC ANAEROBE+AEROBE CULT: NORMAL
BACTERIA ABSC ANAEROBE+AEROBE CULT: NORMAL
BACTERIA ASPIRATE CULT: NO GROWTH
BACTERIA ASPIRATE CULT: NORMAL
BACTERIA BLD CULT: NO GROWTH
BACTERIA FLD CULT: NO GROWTH
BACTERIA FLD CULT: NORMAL
BACTERIA PLR CULT: ABNORMAL
BACTERIA PLR CULT: NO GROWTH
BACTERIA SPEC CULT: NO GROWTH
BACTERIA SPEC CULT: NORMAL
BACTERIA WND CULT: ABNORMAL
BASE EXCESS BLDA CALC-SCNC: -0.8 MMOL/L
BASE EXCESS BLDV CALC-SCNC: -1.6 MMOL/L
BASOPHILS # BLD AUTO: 0 10E3/UL (ref 0–0.2)
BASOPHILS # BLD AUTO: 0.1 10E3/UL (ref 0–0.2)
BASOPHILS # BLD AUTO: 0.1 10E3/UL (ref 0–0.2)
BASOPHILS # BLD MANUAL: 0 10E3/UL (ref 0–0.2)
BASOPHILS NFR BLD AUTO: 0 %
BASOPHILS NFR BLD AUTO: 1 %
BASOPHILS NFR BLD MANUAL: 0 %
BILIRUB DIRECT SERPL-MCNC: 0.2 MG/DL
BILIRUB DIRECT SERPL-MCNC: 0.3 MG/DL
BILIRUB SERPL-MCNC: 0.4 MG/DL
BILIRUB SERPL-MCNC: 0.4 MG/DL
BILIRUB SERPL-MCNC: 0.5 MG/DL (ref 0–1)
BILIRUB SERPL-MCNC: 0.6 MG/DL (ref 0–1)
BILIRUB SERPL-MCNC: 0.6 MG/DL (ref 0–1)
BILIRUB SERPL-MCNC: 0.7 MG/DL (ref 0–1)
BILIRUB SERPL-MCNC: 0.9 MG/DL (ref 0–1)
BILIRUB SERPL-MCNC: 1 MG/DL (ref 0–1)
BLD PROD TYP BPU: NORMAL
BLOOD COMPONENT TYPE: NORMAL
BUN SERPL-MCNC: 103 MG/DL (ref 8–22)
BUN SERPL-MCNC: 103 MG/DL (ref 8–22)
BUN SERPL-MCNC: 105 MG/DL (ref 8–22)
BUN SERPL-MCNC: 120 MG/DL (ref 8–22)
BUN SERPL-MCNC: 24 MG/DL (ref 8–22)
BUN SERPL-MCNC: 31 MG/DL (ref 8–22)
BUN SERPL-MCNC: 32.4 MG/DL (ref 8–23)
BUN SERPL-MCNC: 33 MG/DL (ref 8–22)
BUN SERPL-MCNC: 34.7 MG/DL (ref 8–23)
BUN SERPL-MCNC: 35 MG/DL (ref 8–22)
BUN SERPL-MCNC: 40 MG/DL (ref 8–22)
BUN SERPL-MCNC: 40.7 MG/DL (ref 8–23)
BUN SERPL-MCNC: 42 MG/DL (ref 8–22)
BUN SERPL-MCNC: 43 MG/DL (ref 8–22)
BUN SERPL-MCNC: 46 MG/DL (ref 8–22)
BUN SERPL-MCNC: 47 MG/DL (ref 8–22)
BUN SERPL-MCNC: 48 MG/DL (ref 8–22)
BUN SERPL-MCNC: 49 MG/DL (ref 8–22)
BUN SERPL-MCNC: 50 MG/DL (ref 8–22)
BUN SERPL-MCNC: 54 MG/DL (ref 8–22)
BUN SERPL-MCNC: 56.2 MG/DL (ref 8–23)
BUN SERPL-MCNC: 58 MG/DL (ref 8–22)
BUN SERPL-MCNC: 63 MG/DL (ref 8–22)
BUN SERPL-MCNC: 64.7 MG/DL (ref 8–23)
BUN SERPL-MCNC: 66 MG/DL (ref 8–22)
BUN SERPL-MCNC: 67 MG/DL (ref 8–22)
BUN SERPL-MCNC: 68 MG/DL (ref 8–22)
BUN SERPL-MCNC: 70.2 MG/DL (ref 8–23)
BUN SERPL-MCNC: 71.7 MG/DL (ref 8–23)
BUN SERPL-MCNC: 74 MG/DL (ref 8–22)
BUN SERPL-MCNC: 92 MG/DL (ref 8–22)
BUN SERPL-MCNC: 95 MG/DL (ref 8–22)
C REACTIVE PROTEIN LHE: 10.3 MG/DL (ref 0–0.8)
CALCIUM SERPL-MCNC: 6.6 MG/DL (ref 8.5–10.5)
CALCIUM SERPL-MCNC: 6.9 MG/DL (ref 8.5–10.5)
CALCIUM SERPL-MCNC: 7 MG/DL (ref 8.5–10.5)
CALCIUM SERPL-MCNC: 7.1 MG/DL (ref 8.5–10.5)
CALCIUM SERPL-MCNC: 7.2 MG/DL (ref 8.5–10.5)
CALCIUM SERPL-MCNC: 7.2 MG/DL (ref 8.8–10.2)
CALCIUM SERPL-MCNC: 7.3 MG/DL (ref 8.5–10.5)
CALCIUM SERPL-MCNC: 7.4 MG/DL (ref 8.5–10.5)
CALCIUM SERPL-MCNC: 7.4 MG/DL (ref 8.8–10.2)
CALCIUM SERPL-MCNC: 7.4 MG/DL (ref 8.8–10.2)
CALCIUM SERPL-MCNC: 7.5 MG/DL (ref 8.5–10.5)
CALCIUM SERPL-MCNC: 7.5 MG/DL (ref 8.8–10.2)
CALCIUM SERPL-MCNC: 7.6 MG/DL (ref 8.5–10.5)
CALCIUM SERPL-MCNC: 7.7 MG/DL (ref 8.5–10.5)
CALCIUM SERPL-MCNC: 7.7 MG/DL (ref 8.8–10.2)
CALCIUM SERPL-MCNC: 7.7 MG/DL (ref 8.8–10.2)
CALCIUM SERPL-MCNC: 7.8 MG/DL (ref 8.5–10.5)
CALCIUM SERPL-MCNC: 7.8 MG/DL (ref 8.8–10.2)
CALCIUM SERPL-MCNC: 8 MG/DL (ref 8.5–10.5)
CELL COUNT BODY FLUID SOURCE: ABNORMAL
CHLORIDE BLD-SCNC: 100 MMOL/L (ref 98–107)
CHLORIDE BLD-SCNC: 101 MMOL/L (ref 98–107)
CHLORIDE BLD-SCNC: 102 MMOL/L (ref 98–107)
CHLORIDE BLD-SCNC: 102 MMOL/L (ref 98–107)
CHLORIDE BLD-SCNC: 103 MMOL/L (ref 98–107)
CHLORIDE BLD-SCNC: 103 MMOL/L (ref 98–107)
CHLORIDE BLD-SCNC: 104 MMOL/L (ref 98–107)
CHLORIDE BLD-SCNC: 104 MMOL/L (ref 98–107)
CHLORIDE BLD-SCNC: 105 MMOL/L (ref 98–107)
CHLORIDE BLD-SCNC: 98 MMOL/L (ref 98–107)
CHLORIDE BLD-SCNC: 98 MMOL/L (ref 98–107)
CHLORIDE BLD-SCNC: 99 MMOL/L (ref 98–107)
CHLORIDE SERPL-SCNC: 104 MMOL/L (ref 98–107)
CHLORIDE SERPL-SCNC: 104 MMOL/L (ref 98–107)
CHLORIDE SERPL-SCNC: 97 MMOL/L (ref 98–107)
CHLORIDE SERPL-SCNC: 98 MMOL/L (ref 98–107)
CHLORIDE SERPL-SCNC: 99 MMOL/L (ref 98–107)
CO2 SERPL-SCNC: 14 MMOL/L (ref 22–31)
CO2 SERPL-SCNC: 18 MMOL/L (ref 22–31)
CO2 SERPL-SCNC: 18 MMOL/L (ref 22–31)
CO2 SERPL-SCNC: 19 MMOL/L (ref 22–31)
CO2 SERPL-SCNC: 20 MMOL/L (ref 22–31)
CO2 SERPL-SCNC: 20 MMOL/L (ref 22–31)
CO2 SERPL-SCNC: 21 MMOL/L (ref 22–31)
CO2 SERPL-SCNC: 22 MMOL/L (ref 22–31)
CO2 SERPL-SCNC: 23 MMOL/L (ref 22–31)
CO2 SERPL-SCNC: 23 MMOL/L (ref 22–31)
CO2 SERPL-SCNC: 24 MMOL/L (ref 22–31)
CO2 SERPL-SCNC: 25 MMOL/L (ref 22–31)
CO2 SERPL-SCNC: 26 MMOL/L (ref 22–31)
CO2 SERPL-SCNC: 27 MMOL/L (ref 22–31)
CO2 SERPL-SCNC: 28 MMOL/L (ref 22–31)
CODING SYSTEM: NORMAL
COHGB MFR BLD: 91.2 % (ref 96–97)
COLOR FLD: ABNORMAL
CREAT SERPL-MCNC: 10.49 MG/DL (ref 0.67–1.17)
CREAT SERPL-MCNC: 10.82 MG/DL (ref 0.7–1.3)
CREAT SERPL-MCNC: 11.1 MG/DL (ref 0.67–1.17)
CREAT SERPL-MCNC: 4.13 MG/DL (ref 0.7–1.3)
CREAT SERPL-MCNC: 5.13 MG/DL (ref 0.7–1.3)
CREAT SERPL-MCNC: 5.3 MG/DL (ref 0.7–1.3)
CREAT SERPL-MCNC: 5.52 MG/DL (ref 0.67–1.17)
CREAT SERPL-MCNC: 5.59 MG/DL (ref 0.7–1.3)
CREAT SERPL-MCNC: 5.68 MG/DL (ref 0.7–1.3)
CREAT SERPL-MCNC: 5.97 MG/DL (ref 0.67–1.17)
CREAT SERPL-MCNC: 6.29 MG/DL (ref 0.7–1.3)
CREAT SERPL-MCNC: 6.39 MG/DL (ref 0.67–1.17)
CREAT SERPL-MCNC: 6.75 MG/DL (ref 0.7–1.3)
CREAT SERPL-MCNC: 6.89 MG/DL (ref 0.7–1.3)
CREAT SERPL-MCNC: 6.96 MG/DL (ref 0.7–1.3)
CREAT SERPL-MCNC: 7.1 MG/DL (ref 0.7–1.3)
CREAT SERPL-MCNC: 7.78 MG/DL (ref 0.7–1.3)
CREAT SERPL-MCNC: 7.9 MG/DL (ref 0.7–1.3)
CREAT SERPL-MCNC: 7.96 MG/DL (ref 0.7–1.3)
CREAT SERPL-MCNC: 8.03 MG/DL (ref 0.67–1.17)
CREAT SERPL-MCNC: 8.03 MG/DL (ref 0.7–1.3)
CREAT SERPL-MCNC: 8.04 MG/DL (ref 0.7–1.3)
CREAT SERPL-MCNC: 8.09 MG/DL (ref 0.7–1.3)
CREAT SERPL-MCNC: 8.15 MG/DL (ref 0.7–1.3)
CREAT SERPL-MCNC: 8.64 MG/DL (ref 0.7–1.3)
CREAT SERPL-MCNC: 8.78 MG/DL (ref 0.7–1.3)
CREAT SERPL-MCNC: 8.99 MG/DL (ref 0.67–1.17)
CREAT SERPL-MCNC: 9.19 MG/DL (ref 0.7–1.3)
CREAT SERPL-MCNC: 9.38 MG/DL (ref 0.7–1.3)
CREAT SERPL-MCNC: 9.44 MG/DL (ref 0.7–1.3)
CREAT SERPL-MCNC: 9.46 MG/DL (ref 0.7–1.3)
CREAT SERPL-MCNC: 9.77 MG/DL (ref 0.7–1.3)
CROSSMATCH: NORMAL
DEPRECATED HCO3 PLAS-SCNC: 17 MMOL/L (ref 22–29)
DEPRECATED HCO3 PLAS-SCNC: 20 MMOL/L (ref 22–29)
DEPRECATED HCO3 PLAS-SCNC: 23 MMOL/L (ref 22–29)
DEPRECATED HCO3 PLAS-SCNC: 24 MMOL/L (ref 22–29)
DEPRECATED HCO3 PLAS-SCNC: 29 MMOL/L (ref 22–29)
DEPRECATED HCO3 PLAS-SCNC: 29 MMOL/L (ref 22–29)
DEPRECATED HCO3 PLAS-SCNC: 31 MMOL/L (ref 22–29)
DIASTOLIC BLOOD PRESSURE - MUSE: 103 MMHG
DIASTOLIC BLOOD PRESSURE - MUSE: 52 MMHG
DIASTOLIC BLOOD PRESSURE - MUSE: NORMAL MMHG
EOSINOPHIL # BLD AUTO: 0 10E3/UL (ref 0–0.7)
EOSINOPHIL # BLD AUTO: 0.1 10E3/UL (ref 0–0.7)
EOSINOPHIL # BLD AUTO: 0.2 10E3/UL (ref 0–0.7)
EOSINOPHIL # BLD MANUAL: 0 10E3/UL (ref 0–0.7)
EOSINOPHIL NFR BLD AUTO: 0 %
EOSINOPHIL NFR BLD AUTO: 1 %
EOSINOPHIL NFR BLD AUTO: 2 %
EOSINOPHIL NFR BLD AUTO: 4 %
EOSINOPHIL NFR BLD MANUAL: 0 %
EOSINOPHIL NFR FLD MANUAL: 3 %
ERYTHROCYTE [DISTWIDTH] IN BLOOD BY AUTOMATED COUNT: 16.2 % (ref 10–15)
ERYTHROCYTE [DISTWIDTH] IN BLOOD BY AUTOMATED COUNT: 16.4 % (ref 10–15)
ERYTHROCYTE [DISTWIDTH] IN BLOOD BY AUTOMATED COUNT: 16.6 % (ref 10–15)
ERYTHROCYTE [DISTWIDTH] IN BLOOD BY AUTOMATED COUNT: 16.7 % (ref 10–15)
ERYTHROCYTE [DISTWIDTH] IN BLOOD BY AUTOMATED COUNT: 16.9 % (ref 10–15)
ERYTHROCYTE [DISTWIDTH] IN BLOOD BY AUTOMATED COUNT: 17 % (ref 10–15)
ERYTHROCYTE [DISTWIDTH] IN BLOOD BY AUTOMATED COUNT: 17 % (ref 10–15)
ERYTHROCYTE [DISTWIDTH] IN BLOOD BY AUTOMATED COUNT: 17.2 % (ref 10–15)
ERYTHROCYTE [DISTWIDTH] IN BLOOD BY AUTOMATED COUNT: 17.3 % (ref 10–15)
ERYTHROCYTE [DISTWIDTH] IN BLOOD BY AUTOMATED COUNT: 17.3 % (ref 10–15)
ERYTHROCYTE [DISTWIDTH] IN BLOOD BY AUTOMATED COUNT: 17.4 % (ref 10–15)
ERYTHROCYTE [DISTWIDTH] IN BLOOD BY AUTOMATED COUNT: 17.4 % (ref 10–15)
ERYTHROCYTE [DISTWIDTH] IN BLOOD BY AUTOMATED COUNT: 17.5 % (ref 10–15)
ERYTHROCYTE [DISTWIDTH] IN BLOOD BY AUTOMATED COUNT: 17.6 % (ref 10–15)
ERYTHROCYTE [DISTWIDTH] IN BLOOD BY AUTOMATED COUNT: 17.8 % (ref 10–15)
ERYTHROCYTE [DISTWIDTH] IN BLOOD BY AUTOMATED COUNT: 18.1 % (ref 10–15)
ERYTHROCYTE [DISTWIDTH] IN BLOOD BY AUTOMATED COUNT: 18.2 % (ref 10–15)
ERYTHROCYTE [DISTWIDTH] IN BLOOD BY AUTOMATED COUNT: 18.3 % (ref 10–15)
ERYTHROCYTE [DISTWIDTH] IN BLOOD BY AUTOMATED COUNT: 18.3 % (ref 10–15)
ERYTHROCYTE [DISTWIDTH] IN BLOOD BY AUTOMATED COUNT: 18.5 % (ref 10–15)
ERYTHROCYTE [DISTWIDTH] IN BLOOD BY AUTOMATED COUNT: 18.8 % (ref 10–15)
ERYTHROCYTE [DISTWIDTH] IN BLOOD BY AUTOMATED COUNT: 18.8 % (ref 10–15)
ERYTHROCYTE [DISTWIDTH] IN BLOOD BY AUTOMATED COUNT: 18.9 % (ref 10–15)
ERYTHROCYTE [DISTWIDTH] IN BLOOD BY AUTOMATED COUNT: 18.9 % (ref 10–15)
ERYTHROCYTE [DISTWIDTH] IN BLOOD BY AUTOMATED COUNT: 19.1 % (ref 10–15)
ERYTHROCYTE [DISTWIDTH] IN BLOOD BY AUTOMATED COUNT: 19.2 % (ref 10–15)
ERYTHROCYTE [DISTWIDTH] IN BLOOD BY AUTOMATED COUNT: 19.4 % (ref 10–15)
ERYTHROCYTE [DISTWIDTH] IN BLOOD BY AUTOMATED COUNT: 19.8 % (ref 10–15)
ERYTHROCYTE [DISTWIDTH] IN BLOOD BY AUTOMATED COUNT: 19.8 % (ref 10–15)
ERYTHROCYTE [DISTWIDTH] IN BLOOD BY AUTOMATED COUNT: 20 % (ref 10–15)
ERYTHROCYTE [DISTWIDTH] IN BLOOD BY AUTOMATED COUNT: 20.1 % (ref 10–15)
ERYTHROCYTE [DISTWIDTH] IN BLOOD BY AUTOMATED COUNT: 20.2 % (ref 10–15)
ERYTHROCYTE [DISTWIDTH] IN BLOOD BY AUTOMATED COUNT: 20.3 % (ref 10–15)
ERYTHROCYTE [DISTWIDTH] IN BLOOD BY AUTOMATED COUNT: 20.4 % (ref 10–15)
ERYTHROCYTE [DISTWIDTH] IN BLOOD BY AUTOMATED COUNT: 20.4 % (ref 10–15)
ERYTHROCYTE [DISTWIDTH] IN BLOOD BY AUTOMATED COUNT: 20.5 % (ref 10–15)
ERYTHROCYTE [DISTWIDTH] IN BLOOD BY AUTOMATED COUNT: 20.6 % (ref 10–15)
ERYTHROCYTE [DISTWIDTH] IN BLOOD BY AUTOMATED COUNT: 20.7 % (ref 10–15)
ETHANOL SERPL-MCNC: <0.01 G/DL
FERRITIN SERPL-MCNC: 1747 NG/ML (ref 31–409)
GFR SERPL CREATININE-BSD FRML MDRD: 10 ML/MIN/1.73M2
GFR SERPL CREATININE-BSD FRML MDRD: 11 ML/MIN/1.73M2
GFR SERPL CREATININE-BSD FRML MDRD: 12 ML/MIN/1.73M2
GFR SERPL CREATININE-BSD FRML MDRD: 12 ML/MIN/1.73M2
GFR SERPL CREATININE-BSD FRML MDRD: 16 ML/MIN/1.73M2
GFR SERPL CREATININE-BSD FRML MDRD: 5 ML/MIN/1.73M2
GFR SERPL CREATININE-BSD FRML MDRD: 6 ML/MIN/1.73M2
GFR SERPL CREATININE-BSD FRML MDRD: 7 ML/MIN/1.73M2
GFR SERPL CREATININE-BSD FRML MDRD: 8 ML/MIN/1.73M2
GFR SERPL CREATININE-BSD FRML MDRD: 9 ML/MIN/1.73M2
GLUCOSE BLD-MCNC: 100 MG/DL (ref 70–125)
GLUCOSE BLD-MCNC: 102 MG/DL (ref 70–125)
GLUCOSE BLD-MCNC: 105 MG/DL (ref 70–125)
GLUCOSE BLD-MCNC: 112 MG/DL (ref 70–125)
GLUCOSE BLD-MCNC: 137 MG/DL (ref 70–125)
GLUCOSE BLD-MCNC: 180 MG/DL (ref 70–125)
GLUCOSE BLD-MCNC: 78 MG/DL (ref 70–125)
GLUCOSE BLD-MCNC: 79 MG/DL (ref 70–125)
GLUCOSE BLD-MCNC: 80 MG/DL (ref 70–125)
GLUCOSE BLD-MCNC: 82 MG/DL (ref 70–125)
GLUCOSE BLD-MCNC: 83 MG/DL (ref 70–125)
GLUCOSE BLD-MCNC: 85 MG/DL (ref 70–125)
GLUCOSE BLD-MCNC: 86 MG/DL (ref 70–125)
GLUCOSE BLD-MCNC: 87 MG/DL (ref 70–125)
GLUCOSE BLD-MCNC: 89 MG/DL (ref 70–125)
GLUCOSE BLD-MCNC: 90 MG/DL (ref 70–125)
GLUCOSE BLD-MCNC: 92 MG/DL (ref 70–125)
GLUCOSE BLD-MCNC: 92 MG/DL (ref 70–125)
GLUCOSE BLD-MCNC: 94 MG/DL (ref 70–125)
GLUCOSE BLD-MCNC: 95 MG/DL (ref 70–125)
GLUCOSE BLD-MCNC: 95 MG/DL (ref 70–125)
GLUCOSE BLD-MCNC: 96 MG/DL (ref 70–125)
GLUCOSE BLD-MCNC: 97 MG/DL (ref 70–125)
GLUCOSE BLDC GLUCOMTR-MCNC: 102 MG/DL (ref 70–99)
GLUCOSE BLDC GLUCOMTR-MCNC: 104 MG/DL (ref 70–99)
GLUCOSE BLDC GLUCOMTR-MCNC: 84 MG/DL (ref 70–99)
GLUCOSE BLDC GLUCOMTR-MCNC: 87 MG/DL (ref 70–99)
GLUCOSE BLDC GLUCOMTR-MCNC: 94 MG/DL (ref 70–99)
GLUCOSE BLDC GLUCOMTR-MCNC: 94 MG/DL (ref 70–99)
GLUCOSE BODY FLUID SOURCE: NORMAL
GLUCOSE FLD-MCNC: <5 MG/DL
GLUCOSE SERPL-MCNC: 138 MG/DL (ref 70–99)
GLUCOSE SERPL-MCNC: 65 MG/DL (ref 70–99)
GLUCOSE SERPL-MCNC: 71 MG/DL (ref 70–99)
GLUCOSE SERPL-MCNC: 74 MG/DL (ref 70–99)
GLUCOSE SERPL-MCNC: 80 MG/DL (ref 70–99)
GLUCOSE SERPL-MCNC: 82 MG/DL (ref 70–99)
GLUCOSE SERPL-MCNC: 93 MG/DL (ref 70–99)
GRAM STAIN RESULT: ABNORMAL
GRAM STAIN RESULT: NORMAL
HCO3 BLD-SCNC: 26 MMOL/L (ref 23–29)
HCO3 BLDV-SCNC: 25 MMOL/L (ref 24–30)
HCT VFR BLD AUTO: 18.9 % (ref 40–53)
HCT VFR BLD AUTO: 19.9 % (ref 40–53)
HCT VFR BLD AUTO: 20.3 % (ref 40–53)
HCT VFR BLD AUTO: 20.6 % (ref 40–53)
HCT VFR BLD AUTO: 21 % (ref 40–53)
HCT VFR BLD AUTO: 21.3 % (ref 40–53)
HCT VFR BLD AUTO: 21.3 % (ref 40–53)
HCT VFR BLD AUTO: 22 % (ref 40–53)
HCT VFR BLD AUTO: 22.2 % (ref 40–53)
HCT VFR BLD AUTO: 22.2 % (ref 40–53)
HCT VFR BLD AUTO: 22.4 % (ref 40–53)
HCT VFR BLD AUTO: 22.6 % (ref 40–53)
HCT VFR BLD AUTO: 22.6 % (ref 40–53)
HCT VFR BLD AUTO: 22.7 % (ref 40–53)
HCT VFR BLD AUTO: 23 % (ref 40–53)
HCT VFR BLD AUTO: 23 % (ref 40–53)
HCT VFR BLD AUTO: 23.1 % (ref 40–53)
HCT VFR BLD AUTO: 23.3 % (ref 40–53)
HCT VFR BLD AUTO: 23.4 % (ref 40–53)
HCT VFR BLD AUTO: 23.5 % (ref 40–53)
HCT VFR BLD AUTO: 23.6 % (ref 40–53)
HCT VFR BLD AUTO: 23.9 % (ref 40–53)
HCT VFR BLD AUTO: 24.2 % (ref 40–53)
HCT VFR BLD AUTO: 24.7 % (ref 40–53)
HCT VFR BLD AUTO: 24.7 % (ref 40–53)
HCT VFR BLD AUTO: 25 % (ref 40–53)
HCT VFR BLD AUTO: 26.1 % (ref 40–53)
HCT VFR BLD AUTO: 26.2 % (ref 40–53)
HCT VFR BLD AUTO: 26.5 % (ref 40–53)
HCT VFR BLD AUTO: 26.9 % (ref 40–53)
HCT VFR BLD AUTO: 27 % (ref 40–53)
HCT VFR BLD AUTO: 27.4 % (ref 40–53)
HCT VFR BLD AUTO: 27.6 % (ref 40–53)
HCT VFR BLD AUTO: 27.9 % (ref 40–53)
HCT VFR BLD AUTO: 27.9 % (ref 40–53)
HCT VFR BLD AUTO: 29 % (ref 40–53)
HCT VFR BLD AUTO: 29.2 % (ref 40–53)
HCT VFR BLD AUTO: 29.3 % (ref 40–53)
HCT VFR BLD AUTO: 36.4 % (ref 40–53)
HCT VFR BLD AUTO: 37.9 % (ref 40–53)
HGB BLD-MCNC: 11 G/DL (ref 13.3–17.7)
HGB BLD-MCNC: 11.9 G/DL (ref 13.3–17.7)
HGB BLD-MCNC: 16.8 G/DL (ref 13.3–17.7)
HGB BLD-MCNC: 5.8 G/DL (ref 13.3–17.7)
HGB BLD-MCNC: 6 G/DL (ref 13.3–17.7)
HGB BLD-MCNC: 6.1 G/DL (ref 13.3–17.7)
HGB BLD-MCNC: 6.1 G/DL (ref 13.3–17.7)
HGB BLD-MCNC: 6.2 G/DL (ref 13.3–17.7)
HGB BLD-MCNC: 6.3 G/DL (ref 13.3–17.7)
HGB BLD-MCNC: 6.5 G/DL (ref 13.3–17.7)
HGB BLD-MCNC: 6.6 G/DL (ref 13.3–17.7)
HGB BLD-MCNC: 6.7 G/DL (ref 13.3–17.7)
HGB BLD-MCNC: 6.8 G/DL (ref 13.3–17.7)
HGB BLD-MCNC: 6.9 G/DL (ref 13.3–17.7)
HGB BLD-MCNC: 7 G/DL (ref 13.3–17.7)
HGB BLD-MCNC: 7.1 G/DL (ref 13.3–17.7)
HGB BLD-MCNC: 7.2 G/DL (ref 13.3–17.7)
HGB BLD-MCNC: 7.3 G/DL (ref 13.3–17.7)
HGB BLD-MCNC: 7.4 G/DL (ref 13.3–17.7)
HGB BLD-MCNC: 7.5 G/DL (ref 13.3–17.7)
HGB BLD-MCNC: 7.6 G/DL (ref 13.3–17.7)
HGB BLD-MCNC: 7.7 G/DL (ref 13.3–17.7)
HGB BLD-MCNC: 7.7 G/DL (ref 13.3–17.7)
HGB BLD-MCNC: 7.8 G/DL (ref 13.3–17.7)
HGB BLD-MCNC: 8 G/DL (ref 13.3–17.7)
HGB BLD-MCNC: 8.1 G/DL (ref 13.3–17.7)
HGB BLD-MCNC: 8.2 G/DL (ref 13.3–17.7)
HGB BLD-MCNC: 8.2 G/DL (ref 13.3–17.7)
HGB BLD-MCNC: 8.3 G/DL (ref 13.3–17.7)
HGB BLD-MCNC: 8.4 G/DL (ref 13.3–17.7)
HGB BLD-MCNC: 8.5 G/DL (ref 13.3–17.7)
HGB BLD-MCNC: 8.5 G/DL (ref 13.3–17.7)
HGB BLD-MCNC: 8.9 G/DL (ref 13.3–17.7)
HGB BLD-MCNC: 8.9 G/DL (ref 13.3–17.7)
HGB BLD-MCNC: 9 G/DL (ref 13.3–17.7)
HGB BLD-MCNC: 9 G/DL (ref 13.3–17.7)
HGB BLD-MCNC: 9.1 G/DL (ref 13.3–17.7)
HGB BLD-MCNC: 9.2 G/DL (ref 13.3–17.7)
HOLD SPECIMEN: NORMAL
IMM GRANULOCYTES # BLD: 0 10E3/UL
IMM GRANULOCYTES # BLD: 0.1 10E3/UL
IMM GRANULOCYTES # BLD: 0.1 10E3/UL
IMM GRANULOCYTES NFR BLD: 0 %
IMM GRANULOCYTES NFR BLD: 1 %
INR PPP: 1.18 (ref 0.85–1.15)
INR PPP: 1.25 (ref 0.9–1.15)
INR PPP: 1.26 (ref 0.85–1.15)
INR PPP: 1.29 (ref 0.85–1.15)
INR PPP: 1.3 (ref 0.85–1.15)
INR PPP: 1.31 (ref 0.85–1.15)
INR PPP: 1.34 (ref 0.85–1.15)
INR PPP: 1.93 (ref 0.85–1.15)
INTERPRETATION ECG - MUSE: NORMAL
IRON BINDING CAPACITY (ROCHE): 115 UG/DL (ref 240–430)
IRON SATN MFR SERPL: 50 % (ref 15–46)
IRON SERPL-MCNC: 57 UG/DL (ref 61–157)
ISSUE DATE AND TIME: NORMAL
KOH PREPARATION: NORMAL
KOH PREPARATION: NORMAL
LACTATE SERPL-SCNC: 1 MMOL/L (ref 0.7–2)
LACTATE SERPL-SCNC: 1 MMOL/L (ref 0.7–2)
LACTATE SERPL-SCNC: 1.3 MMOL/L (ref 0.7–2)
LACTATE SERPL-SCNC: 1.6 MMOL/L (ref 0.7–2)
LACTATE SERPL-SCNC: 1.8 MMOL/L (ref 0.7–2)
LACTATE SERPL-SCNC: 2.3 MMOL/L (ref 0.7–2)
LACTATE SERPL-SCNC: 2.5 MMOL/L (ref 0.7–2)
LACTATE SERPL-SCNC: 3.9 MMOL/L (ref 0.7–2)
LACTATE SERPL-SCNC: 4.7 MMOL/L (ref 0.7–2)
LACTATE SERPL-SCNC: 5.4 MMOL/L (ref 0.7–2)
LACTATE SERPL-SCNC: 5.6 MMOL/L (ref 0.7–2)
LACTATE SERPL-SCNC: 5.9 MMOL/L (ref 0.7–2)
LACTATE SERPL-SCNC: 7 MMOL/L (ref 0.7–2)
LD BODY BODY FLUID SOURCE: NORMAL
LDH FLD L TO P-CCNC: 1089 U/L
LIPASE SERPL-CCNC: 13 U/L (ref 13–60)
LYMPHOCYTES # BLD AUTO: 0.6 10E3/UL (ref 0.8–5.3)
LYMPHOCYTES # BLD AUTO: 0.7 10E3/UL (ref 0.8–5.3)
LYMPHOCYTES # BLD AUTO: 0.8 10E3/UL (ref 0.8–5.3)
LYMPHOCYTES # BLD AUTO: 1 10E3/UL (ref 0.8–5.3)
LYMPHOCYTES # BLD AUTO: 1.2 10E3/UL (ref 0.8–5.3)
LYMPHOCYTES # BLD AUTO: 2.1 10E3/UL (ref 0.8–5.3)
LYMPHOCYTES # BLD MANUAL: 0.5 10E3/UL (ref 0.8–5.3)
LYMPHOCYTES NFR BLD AUTO: 11 %
LYMPHOCYTES NFR BLD AUTO: 11 %
LYMPHOCYTES NFR BLD AUTO: 12 %
LYMPHOCYTES NFR BLD AUTO: 13 %
LYMPHOCYTES NFR BLD AUTO: 14 %
LYMPHOCYTES NFR BLD AUTO: 14 %
LYMPHOCYTES NFR BLD AUTO: 15 %
LYMPHOCYTES NFR BLD AUTO: 17 %
LYMPHOCYTES NFR BLD AUTO: 18 %
LYMPHOCYTES NFR BLD AUTO: 18 %
LYMPHOCYTES NFR BLD MANUAL: 11 %
LYMPHOCYTES NFR FLD MANUAL: 13 %
LYMPHOCYTES NFR FLD MANUAL: 47 %
LYMPHOCYTES NFR FLD MANUAL: 5 %
LYMPHOCYTES NFR FLD MANUAL: NORMAL %
MAGNESIUM SERPL-MCNC: 1.6 MG/DL (ref 1.8–2.6)
MAGNESIUM SERPL-MCNC: 1.7 MG/DL (ref 1.7–2.3)
MAGNESIUM SERPL-MCNC: 1.8 MG/DL (ref 1.7–2.3)
MAGNESIUM SERPL-MCNC: 2 MG/DL (ref 1.7–2.3)
MCH RBC QN AUTO: 29.3 PG (ref 26.5–33)
MCH RBC QN AUTO: 29.4 PG (ref 26.5–33)
MCH RBC QN AUTO: 29.5 PG (ref 26.5–33)
MCH RBC QN AUTO: 29.6 PG (ref 26.5–33)
MCH RBC QN AUTO: 29.7 PG (ref 26.5–33)
MCH RBC QN AUTO: 29.7 PG (ref 26.5–33)
MCH RBC QN AUTO: 29.9 PG (ref 26.5–33)
MCH RBC QN AUTO: 30 PG (ref 26.5–33)
MCH RBC QN AUTO: 30 PG (ref 26.5–33)
MCH RBC QN AUTO: 30.1 PG (ref 26.5–33)
MCH RBC QN AUTO: 30.1 PG (ref 26.5–33)
MCH RBC QN AUTO: 30.2 PG (ref 26.5–33)
MCH RBC QN AUTO: 30.3 PG (ref 26.5–33)
MCH RBC QN AUTO: 30.4 PG (ref 26.5–33)
MCH RBC QN AUTO: 30.5 PG (ref 26.5–33)
MCH RBC QN AUTO: 30.6 PG (ref 26.5–33)
MCH RBC QN AUTO: 30.6 PG (ref 26.5–33)
MCH RBC QN AUTO: 30.7 PG (ref 26.5–33)
MCH RBC QN AUTO: 30.7 PG (ref 26.5–33)
MCH RBC QN AUTO: 30.8 PG (ref 26.5–33)
MCH RBC QN AUTO: 31 PG (ref 26.5–33)
MCH RBC QN AUTO: 31.1 PG (ref 26.5–33)
MCH RBC QN AUTO: 31.1 PG (ref 26.5–33)
MCHC RBC AUTO-ENTMCNC: 29 G/DL (ref 31.5–36.5)
MCHC RBC AUTO-ENTMCNC: 29.5 G/DL (ref 31.5–36.5)
MCHC RBC AUTO-ENTMCNC: 29.8 G/DL (ref 31.5–36.5)
MCHC RBC AUTO-ENTMCNC: 29.9 G/DL (ref 31.5–36.5)
MCHC RBC AUTO-ENTMCNC: 30 G/DL (ref 31.5–36.5)
MCHC RBC AUTO-ENTMCNC: 30.1 G/DL (ref 31.5–36.5)
MCHC RBC AUTO-ENTMCNC: 30.1 G/DL (ref 31.5–36.5)
MCHC RBC AUTO-ENTMCNC: 30.2 G/DL (ref 31.5–36.5)
MCHC RBC AUTO-ENTMCNC: 30.5 G/DL (ref 31.5–36.5)
MCHC RBC AUTO-ENTMCNC: 30.7 G/DL (ref 31.5–36.5)
MCHC RBC AUTO-ENTMCNC: 30.8 G/DL (ref 31.5–36.5)
MCHC RBC AUTO-ENTMCNC: 31 G/DL (ref 31.5–36.5)
MCHC RBC AUTO-ENTMCNC: 31.1 G/DL (ref 31.5–36.5)
MCHC RBC AUTO-ENTMCNC: 31.3 G/DL (ref 31.5–36.5)
MCHC RBC AUTO-ENTMCNC: 31.4 G/DL (ref 31.5–36.5)
MCHC RBC AUTO-ENTMCNC: 31.4 G/DL (ref 31.5–36.5)
MCHC RBC AUTO-ENTMCNC: 31.9 G/DL (ref 31.5–36.5)
MCHC RBC AUTO-ENTMCNC: 32.1 G/DL (ref 31.5–36.5)
MCHC RBC AUTO-ENTMCNC: 32.2 G/DL (ref 31.5–36.5)
MCHC RBC AUTO-ENTMCNC: 32.3 G/DL (ref 31.5–36.5)
MCHC RBC AUTO-ENTMCNC: 32.3 G/DL (ref 31.5–36.5)
MCHC RBC AUTO-ENTMCNC: 32.4 G/DL (ref 31.5–36.5)
MCHC RBC AUTO-ENTMCNC: 32.4 G/DL (ref 31.5–36.5)
MCHC RBC AUTO-ENTMCNC: 32.6 G/DL (ref 31.5–36.5)
MCHC RBC AUTO-ENTMCNC: 32.8 G/DL (ref 31.5–36.5)
MCHC RBC AUTO-ENTMCNC: 32.9 G/DL (ref 31.5–36.5)
MCHC RBC AUTO-ENTMCNC: 33 G/DL (ref 31.5–36.5)
MCV RBC AUTO: 100 FL (ref 78–100)
MCV RBC AUTO: 101 FL (ref 78–100)
MCV RBC AUTO: 102 FL (ref 78–100)
MCV RBC AUTO: 102 FL (ref 78–100)
MCV RBC AUTO: 103 FL (ref 78–100)
MCV RBC AUTO: 103 FL (ref 78–100)
MCV RBC AUTO: 105 FL (ref 78–100)
MCV RBC AUTO: 105 FL (ref 78–100)
MCV RBC AUTO: 89 FL (ref 78–100)
MCV RBC AUTO: 90 FL (ref 78–100)
MCV RBC AUTO: 91 FL (ref 78–100)
MCV RBC AUTO: 92 FL (ref 78–100)
MCV RBC AUTO: 93 FL (ref 78–100)
MCV RBC AUTO: 94 FL (ref 78–100)
MCV RBC AUTO: 95 FL (ref 78–100)
MCV RBC AUTO: 96 FL (ref 78–100)
MCV RBC AUTO: 97 FL (ref 78–100)
MCV RBC AUTO: 97 FL (ref 78–100)
MCV RBC AUTO: 98 FL (ref 78–100)
MCV RBC AUTO: 99 FL (ref 78–100)
MONOCYTES # BLD AUTO: 0.3 10E3/UL (ref 0–1.3)
MONOCYTES # BLD AUTO: 0.3 10E3/UL (ref 0–1.3)
MONOCYTES # BLD AUTO: 0.4 10E3/UL (ref 0–1.3)
MONOCYTES # BLD AUTO: 0.5 10E3/UL (ref 0–1.3)
MONOCYTES # BLD AUTO: 0.8 10E3/UL (ref 0–1.3)
MONOCYTES # BLD AUTO: 1.3 10E3/UL (ref 0–1.3)
MONOCYTES # BLD MANUAL: 0.3 10E3/UL (ref 0–1.3)
MONOCYTES NFR BLD AUTO: 11 %
MONOCYTES NFR BLD AUTO: 6 %
MONOCYTES NFR BLD AUTO: 7 %
MONOCYTES NFR BLD AUTO: 8 %
MONOCYTES NFR BLD AUTO: 9 %
MONOCYTES NFR BLD MANUAL: 6 %
MONOS+MACROS NFR FLD MANUAL: 20 %
MONOS+MACROS NFR FLD MANUAL: 39 %
MONOS+MACROS NFR FLD MANUAL: 7 %
MONOS+MACROS NFR FLD MANUAL: NORMAL %
NEUTROPHILS # BLD AUTO: 3.3 10E3/UL (ref 1.6–8.3)
NEUTROPHILS # BLD AUTO: 3.4 10E3/UL (ref 1.6–8.3)
NEUTROPHILS # BLD AUTO: 3.5 10E3/UL (ref 1.6–8.3)
NEUTROPHILS # BLD AUTO: 3.6 10E3/UL (ref 1.6–8.3)
NEUTROPHILS # BLD AUTO: 3.8 10E3/UL (ref 1.6–8.3)
NEUTROPHILS # BLD AUTO: 4.3 10E3/UL (ref 1.6–8.3)
NEUTROPHILS # BLD AUTO: 4.4 10E3/UL (ref 1.6–8.3)
NEUTROPHILS # BLD AUTO: 4.5 10E3/UL (ref 1.6–8.3)
NEUTROPHILS # BLD AUTO: 4.9 10E3/UL (ref 1.6–8.3)
NEUTROPHILS # BLD AUTO: 5.9 10E3/UL (ref 1.6–8.3)
NEUTROPHILS # BLD AUTO: 6.9 10E3/UL (ref 1.6–8.3)
NEUTROPHILS # BLD AUTO: 8.5 10E3/UL (ref 1.6–8.3)
NEUTROPHILS # BLD MANUAL: 3.8 10E3/UL (ref 1.6–8.3)
NEUTROPHILS NFR BLD AUTO: 69 %
NEUTROPHILS NFR BLD AUTO: 69 %
NEUTROPHILS NFR BLD AUTO: 75 %
NEUTROPHILS NFR BLD AUTO: 76 %
NEUTROPHILS NFR BLD AUTO: 77 %
NEUTROPHILS NFR BLD AUTO: 78 %
NEUTROPHILS NFR BLD AUTO: 79 %
NEUTROPHILS NFR BLD AUTO: 81 %
NEUTROPHILS NFR BLD AUTO: 81 %
NEUTROPHILS NFR BLD MANUAL: 83 %
NEUTS BAND NFR FLD MANUAL: 14 %
NEUTS BAND NFR FLD MANUAL: 72 %
NEUTS BAND NFR FLD MANUAL: 80 %
NEUTS BAND NFR FLD MANUAL: NORMAL %
NRBC # BLD AUTO: 0 10E3/UL
NRBC BLD AUTO-RTO: 0 /100
O2/TOTAL GAS SETTING VFR VENT: 100 %
OXYHGB MFR BLD: 89.3 % (ref 96–97)
OXYHGB MFR BLDV: 46.3 % (ref 70–75)
P AXIS - MUSE: -21 DEGREES
P AXIS - MUSE: 13 DEGREES
P AXIS - MUSE: 15 DEGREES
P AXIS - MUSE: 32 DEGREES
P AXIS - MUSE: 37 DEGREES
P AXIS - MUSE: 38 DEGREES
PATH REPORT.COMMENTS IMP SPEC: NORMAL
PATH REPORT.FINAL DX SPEC: NORMAL
PATH REPORT.GROSS SPEC: NORMAL
PATH REPORT.MICROSCOPIC SPEC OTHER STN: NORMAL
PATH REPORT.MICROSCOPIC SPEC OTHER STN: NORMAL
PATH REPORT.RELEVANT HX SPEC: NORMAL
PCO2 BLD: 59 MM HG (ref 35–45)
PCO2 BLDV: 54 MM HG (ref 35–50)
PEEP: 6 CM H2O
PH BLD: 7.26 [PH] (ref 7.37–7.44)
PH BLDV: 7.27 [PH] (ref 7.35–7.45)
PHOSPHATE SERPL-MCNC: 3.2 MG/DL (ref 2.5–4.5)
PLAT MORPH BLD: ABNORMAL
PLATELET # BLD AUTO: 100 10E3/UL (ref 150–450)
PLATELET # BLD AUTO: 101 10E3/UL (ref 150–450)
PLATELET # BLD AUTO: 125 10E3/UL (ref 150–450)
PLATELET # BLD AUTO: 129 10E3/UL (ref 150–450)
PLATELET # BLD AUTO: 37 10E3/UL (ref 150–450)
PLATELET # BLD AUTO: 40 10E3/UL (ref 150–450)
PLATELET # BLD AUTO: 41 10E3/UL (ref 150–450)
PLATELET # BLD AUTO: 42 10E3/UL (ref 150–450)
PLATELET # BLD AUTO: 43 10E3/UL (ref 150–450)
PLATELET # BLD AUTO: 45 10E3/UL (ref 150–450)
PLATELET # BLD AUTO: 48 10E3/UL (ref 150–450)
PLATELET # BLD AUTO: 49 10E3/UL (ref 150–450)
PLATELET # BLD AUTO: 50 10E3/UL (ref 150–450)
PLATELET # BLD AUTO: 52 10E3/UL (ref 150–450)
PLATELET # BLD AUTO: 53 10E3/UL (ref 150–450)
PLATELET # BLD AUTO: 53 10E3/UL (ref 150–450)
PLATELET # BLD AUTO: 54 10E3/UL (ref 150–450)
PLATELET # BLD AUTO: 55 10E3/UL (ref 150–450)
PLATELET # BLD AUTO: 57 10E3/UL (ref 150–450)
PLATELET # BLD AUTO: 58 10E3/UL (ref 150–450)
PLATELET # BLD AUTO: 59 10E3/UL (ref 150–450)
PLATELET # BLD AUTO: 60 10E3/UL (ref 150–450)
PLATELET # BLD AUTO: 60 10E3/UL (ref 150–450)
PLATELET # BLD AUTO: 61 10E3/UL (ref 150–450)
PLATELET # BLD AUTO: 62 10E3/UL (ref 150–450)
PLATELET # BLD AUTO: 62 10E3/UL (ref 150–450)
PLATELET # BLD AUTO: 64 10E3/UL (ref 150–450)
PLATELET # BLD AUTO: 65 10E3/UL (ref 150–450)
PLATELET # BLD AUTO: 66 10E3/UL (ref 150–450)
PLATELET # BLD AUTO: 71 10E3/UL (ref 150–450)
PLATELET # BLD AUTO: 73 10E3/UL (ref 150–450)
PLATELET # BLD AUTO: 73 10E3/UL (ref 150–450)
PLATELET # BLD AUTO: 75 10E3/UL (ref 150–450)
PLATELET # BLD AUTO: 81 10E3/UL (ref 150–450)
PLATELET # BLD AUTO: 89 10E3/UL (ref 150–450)
PO2 BLD: 75 MM HG (ref 80–90)
PO2 BLDV: 32 MM HG (ref 25–47)
POTASSIUM BLD-SCNC: 3.4 MMOL/L (ref 3.5–5)
POTASSIUM BLD-SCNC: 3.7 MMOL/L (ref 3.5–5)
POTASSIUM BLD-SCNC: 3.9 MMOL/L (ref 3.5–5)
POTASSIUM BLD-SCNC: 4 MMOL/L (ref 3.5–5)
POTASSIUM BLD-SCNC: 4.2 MMOL/L (ref 3.5–5)
POTASSIUM BLD-SCNC: 4.3 MMOL/L (ref 3.5–5)
POTASSIUM BLD-SCNC: 4.4 MMOL/L (ref 3.5–5)
POTASSIUM BLD-SCNC: 4.5 MMOL/L (ref 3.5–5)
POTASSIUM BLD-SCNC: 4.6 MMOL/L (ref 3.5–5)
POTASSIUM BLD-SCNC: 4.7 MMOL/L (ref 3.5–5)
POTASSIUM BLD-SCNC: 4.7 MMOL/L (ref 3.5–5)
POTASSIUM BLD-SCNC: 4.8 MMOL/L (ref 3.5–5)
POTASSIUM BLD-SCNC: 5 MMOL/L (ref 3.5–5)
POTASSIUM BLD-SCNC: 5.2 MMOL/L (ref 3.5–5)
POTASSIUM BLD-SCNC: 5.4 MMOL/L (ref 3.5–5)
POTASSIUM BLD-SCNC: 5.7 MMOL/L (ref 3.5–5)
POTASSIUM BLD-SCNC: 6.2 MMOL/L (ref 3.5–5)
POTASSIUM SERPL-SCNC: 4 MMOL/L (ref 3.4–5.3)
POTASSIUM SERPL-SCNC: 4.3 MMOL/L (ref 3.4–5.3)
POTASSIUM SERPL-SCNC: 4.5 MMOL/L (ref 3.4–5.3)
POTASSIUM SERPL-SCNC: 4.8 MMOL/L (ref 3.4–5.3)
POTASSIUM SERPL-SCNC: 5.3 MMOL/L (ref 3.4–5.3)
POTASSIUM SERPL-SCNC: 5.6 MMOL/L (ref 3.4–5.3)
POTASSIUM SERPL-SCNC: 6 MMOL/L (ref 3.4–5.3)
PR INTERVAL - MUSE: 126 MS
PR INTERVAL - MUSE: 176 MS
PR INTERVAL - MUSE: 178 MS
PR INTERVAL - MUSE: 184 MS
PR INTERVAL - MUSE: 198 MS
PR INTERVAL - MUSE: 234 MS
PROCALCITONIN SERPL-MCNC: 0.96 NG/ML (ref 0–0.49)
PROT FLD-MCNC: 3.1 G/DL
PROT FLD-MCNC: 3.6 G/DL
PROT FLD-MCNC: 5 G/DL
PROT FLD-MCNC: 5.5 G/DL
PROT SERPL-MCNC: 5.5 G/DL (ref 6–8)
PROT SERPL-MCNC: 6.1 G/DL (ref 6–8)
PROT SERPL-MCNC: 6.2 G/DL (ref 6–8)
PROT SERPL-MCNC: 8.1 G/DL (ref 6–8)
PROT SERPL-MCNC: 8.1 G/DL (ref 6–8)
PROT SERPL-MCNC: 8.3 G/DL (ref 6.4–8.3)
PROT SERPL-MCNC: 8.5 G/DL (ref 6–8)
PROT SERPL-MCNC: 8.8 G/DL (ref 6.4–8.3)
PROTEIN BODY FLUID SOURCE: NORMAL
QRS DURATION - MUSE: 104 MS
QRS DURATION - MUSE: 108 MS
QRS DURATION - MUSE: 84 MS
QRS DURATION - MUSE: 88 MS
QRS DURATION - MUSE: 96 MS
QRS DURATION - MUSE: 96 MS
QT - MUSE: 328 MS
QT - MUSE: 392 MS
QT - MUSE: 404 MS
QT - MUSE: 416 MS
QT - MUSE: 422 MS
QT - MUSE: 478 MS
QTC - MUSE: 457 MS
QTC - MUSE: 463 MS
QTC - MUSE: 468 MS
QTC - MUSE: 479 MS
QTC - MUSE: 482 MS
QTC - MUSE: 505 MS
R AXIS - MUSE: -3 DEGREES
R AXIS - MUSE: 1 DEGREES
R AXIS - MUSE: 10 DEGREES
R AXIS - MUSE: 23 DEGREES
R AXIS - MUSE: 3 DEGREES
R AXIS - MUSE: 42 DEGREES
RADIOLOGIST FLAGS: ABNORMAL
RADIOLOGIST FLAGS: ABNORMAL
RADIOLOGIST FLAGS: NORMAL
RATE: 36 RR/MIN
RBC # BLD AUTO: 2 10E6/UL (ref 4.4–5.9)
RBC # BLD AUTO: 2.01 10E6/UL (ref 4.4–5.9)
RBC # BLD AUTO: 2.02 10E6/UL (ref 4.4–5.9)
RBC # BLD AUTO: 2.06 10E6/UL (ref 4.4–5.9)
RBC # BLD AUTO: 2.07 10E6/UL (ref 4.4–5.9)
RBC # BLD AUTO: 2.09 10E6/UL (ref 4.4–5.9)
RBC # BLD AUTO: 2.1 10E6/UL (ref 4.4–5.9)
RBC # BLD AUTO: 2.16 10E6/UL (ref 4.4–5.9)
RBC # BLD AUTO: 2.22 10E6/UL (ref 4.4–5.9)
RBC # BLD AUTO: 2.24 10E6/UL (ref 4.4–5.9)
RBC # BLD AUTO: 2.27 10E6/UL (ref 4.4–5.9)
RBC # BLD AUTO: 2.28 10E6/UL (ref 4.4–5.9)
RBC # BLD AUTO: 2.31 10E6/UL (ref 4.4–5.9)
RBC # BLD AUTO: 2.32 10E6/UL (ref 4.4–5.9)
RBC # BLD AUTO: 2.32 10E6/UL (ref 4.4–5.9)
RBC # BLD AUTO: 2.35 10E6/UL (ref 4.4–5.9)
RBC # BLD AUTO: 2.37 10E6/UL (ref 4.4–5.9)
RBC # BLD AUTO: 2.43 10E6/UL (ref 4.4–5.9)
RBC # BLD AUTO: 2.46 10E6/UL (ref 4.4–5.9)
RBC # BLD AUTO: 2.46 10E6/UL (ref 4.4–5.9)
RBC # BLD AUTO: 2.5 10E6/UL (ref 4.4–5.9)
RBC # BLD AUTO: 2.51 10E6/UL (ref 4.4–5.9)
RBC # BLD AUTO: 2.53 10E6/UL (ref 4.4–5.9)
RBC # BLD AUTO: 2.56 10E6/UL (ref 4.4–5.9)
RBC # BLD AUTO: 2.57 10E6/UL (ref 4.4–5.9)
RBC # BLD AUTO: 2.57 10E6/UL (ref 4.4–5.9)
RBC # BLD AUTO: 2.61 10E6/UL (ref 4.4–5.9)
RBC # BLD AUTO: 2.63 10E6/UL (ref 4.4–5.9)
RBC # BLD AUTO: 2.64 10E6/UL (ref 4.4–5.9)
RBC # BLD AUTO: 2.69 10E6/UL (ref 4.4–5.9)
RBC # BLD AUTO: 2.72 10E6/UL (ref 4.4–5.9)
RBC # BLD AUTO: 2.75 10E6/UL (ref 4.4–5.9)
RBC # BLD AUTO: 2.75 10E6/UL (ref 4.4–5.9)
RBC # BLD AUTO: 2.78 10E6/UL (ref 4.4–5.9)
RBC # BLD AUTO: 2.85 10E6/UL (ref 4.4–5.9)
RBC # BLD AUTO: 2.88 10E6/UL (ref 4.4–5.9)
RBC # BLD AUTO: 2.94 10E6/UL (ref 4.4–5.9)
RBC # BLD AUTO: 2.94 10E6/UL (ref 4.4–5.9)
RBC # BLD AUTO: 2.95 10E6/UL (ref 4.4–5.9)
RBC # BLD AUTO: 2.95 10E6/UL (ref 4.4–5.9)
RBC # BLD AUTO: 3.64 10E6/UL (ref 4.4–5.9)
RBC # BLD AUTO: 3.92 10E6/UL (ref 4.4–5.9)
RBC # FLD: ABNORMAL /UL
RBC MORPH BLD: ABNORMAL
SAO2 % BLDV: 47.3 % (ref 70–75)
SARS-COV-2 RNA RESP QL NAA+PROBE: NEGATIVE
SODIUM SERPL-SCNC: 132 MMOL/L (ref 136–145)
SODIUM SERPL-SCNC: 132 MMOL/L (ref 136–145)
SODIUM SERPL-SCNC: 133 MMOL/L (ref 136–145)
SODIUM SERPL-SCNC: 134 MMOL/L (ref 136–145)
SODIUM SERPL-SCNC: 134 MMOL/L (ref 136–145)
SODIUM SERPL-SCNC: 135 MMOL/L (ref 136–145)
SODIUM SERPL-SCNC: 136 MMOL/L (ref 136–145)
SODIUM SERPL-SCNC: 137 MMOL/L (ref 136–145)
SODIUM SERPL-SCNC: 138 MMOL/L (ref 136–145)
SODIUM SERPL-SCNC: 139 MMOL/L (ref 136–145)
SODIUM SERPL-SCNC: 141 MMOL/L (ref 136–145)
SODIUM SERPL-SCNC: 141 MMOL/L (ref 136–145)
SPECIMEN EXPIRATION DATE: NORMAL
SYSTOLIC BLOOD PRESSURE - MUSE: 160 MMHG
SYSTOLIC BLOOD PRESSURE - MUSE: 90 MMHG
SYSTOLIC BLOOD PRESSURE - MUSE: NORMAL MMHG
T AXIS - MUSE: 31 DEGREES
T AXIS - MUSE: 38 DEGREES
T AXIS - MUSE: 62 DEGREES
T AXIS - MUSE: 65 DEGREES
T AXIS - MUSE: 72 DEGREES
T AXIS - MUSE: 76 DEGREES
TEMPERATURE: 37 DEGREES C
TRANSFERRIN SERPL-MCNC: 104 MG/DL (ref 200–360)
TROPONIN I SERPL-MCNC: 0.01 NG/ML (ref 0–0.29)
TROPONIN I SERPL-MCNC: <0.01 NG/ML (ref 0–0.29)
TROPONIN T SERPL HS-MCNC: 116 NG/L
TROPONIN T SERPL HS-MCNC: 120 NG/L
UNIT ABO/RH: NORMAL
UNIT NUMBER: NORMAL
UNIT STATUS: NORMAL
UNIT TYPE ISBT: 6200
UNIT TYPE ISBT: 9500
URATE SERPL-MCNC: 9.1 MG/DL (ref 3–8)
VANCOMYCIN SERPL-MCNC: 11.6 MG/L
VANCOMYCIN SERPL-MCNC: 15.9 MG/L
VANCOMYCIN SERPL-MCNC: 16 MG/L
VENTILATION MODE: ABNORMAL
VENTILATOR TIDAL VOLUME: 361 ML
VENTRICULAR RATE- MUSE: 120 BPM
VENTRICULAR RATE- MUSE: 67 BPM
VENTRICULAR RATE- MUSE: 74 BPM
VENTRICULAR RATE- MUSE: 77 BPM
VENTRICULAR RATE- MUSE: 80 BPM
VENTRICULAR RATE- MUSE: 91 BPM
WBC # BLD AUTO: 12.1 10E3/UL (ref 4–11)
WBC # BLD AUTO: 16.7 10E3/UL (ref 4–11)
WBC # BLD AUTO: 3.9 10E3/UL (ref 4–11)
WBC # BLD AUTO: 3.9 10E3/UL (ref 4–11)
WBC # BLD AUTO: 4 10E3/UL (ref 4–11)
WBC # BLD AUTO: 4.2 10E3/UL (ref 4–11)
WBC # BLD AUTO: 4.3 10E3/UL (ref 4–11)
WBC # BLD AUTO: 4.3 10E3/UL (ref 4–11)
WBC # BLD AUTO: 4.4 10E3/UL (ref 4–11)
WBC # BLD AUTO: 4.5 10E3/UL (ref 4–11)
WBC # BLD AUTO: 4.6 10E3/UL (ref 4–11)
WBC # BLD AUTO: 4.6 10E3/UL (ref 4–11)
WBC # BLD AUTO: 4.8 10E3/UL (ref 4–11)
WBC # BLD AUTO: 4.8 10E3/UL (ref 4–11)
WBC # BLD AUTO: 4.9 10E3/UL (ref 4–11)
WBC # BLD AUTO: 5 10E3/UL (ref 4–11)
WBC # BLD AUTO: 5 10E3/UL (ref 4–11)
WBC # BLD AUTO: 5.1 10E3/UL (ref 4–11)
WBC # BLD AUTO: 5.2 10E3/UL (ref 4–11)
WBC # BLD AUTO: 5.2 10E3/UL (ref 4–11)
WBC # BLD AUTO: 5.3 10E3/UL (ref 4–11)
WBC # BLD AUTO: 5.5 10E3/UL (ref 4–11)
WBC # BLD AUTO: 5.6 10E3/UL (ref 4–11)
WBC # BLD AUTO: 5.7 10E3/UL (ref 4–11)
WBC # BLD AUTO: 5.8 10E3/UL (ref 4–11)
WBC # BLD AUTO: 6.2 10E3/UL (ref 4–11)
WBC # BLD AUTO: 6.3 10E3/UL (ref 4–11)
WBC # BLD AUTO: 6.4 10E3/UL (ref 4–11)
WBC # BLD AUTO: 6.4 10E3/UL (ref 4–11)
WBC # BLD AUTO: 6.7 10E3/UL (ref 4–11)
WBC # BLD AUTO: 7 10E3/UL (ref 4–11)
WBC # BLD AUTO: 7.3 10E3/UL (ref 4–11)
WBC # BLD AUTO: 8.8 10E3/UL (ref 4–11)
WBC # BLD AUTO: 9.1 10E3/UL (ref 4–11)
WBC # FLD AUTO: 1281 /UL
WBC # FLD AUTO: 3966 /UL
WBC # FLD AUTO: 4116 /UL

## 2022-01-01 PROCEDURE — 85018 HEMOGLOBIN: CPT

## 2022-01-01 PROCEDURE — 82945 GLUCOSE OTHER FLUID: CPT | Performed by: INTERNAL MEDICINE

## 2022-01-01 PROCEDURE — 5A1D70Z PERFORMANCE OF URINARY FILTRATION, INTERMITTENT, LESS THAN 6 HOURS PER DAY: ICD-10-PCS | Performed by: INTERNAL MEDICINE

## 2022-01-01 PROCEDURE — 250N000013 HC RX MED GY IP 250 OP 250 PS 637: Performed by: INTERNAL MEDICINE

## 2022-01-01 PROCEDURE — C9113 INJ PANTOPRAZOLE SODIUM, VIA: HCPCS | Performed by: INTERNAL MEDICINE

## 2022-01-01 PROCEDURE — 272N000631 HC COIL/EMBOLIC DEVICE CR12

## 2022-01-01 PROCEDURE — 70450 CT HEAD/BRAIN W/O DYE: CPT

## 2022-01-01 PROCEDURE — 90935 HEMODIALYSIS ONE EVALUATION: CPT

## 2022-01-01 PROCEDURE — 87070 CULTURE OTHR SPECIMN AEROBIC: CPT | Performed by: PHYSICIAN ASSISTANT

## 2022-01-01 PROCEDURE — 99285 EMERGENCY DEPT VISIT HI MDM: CPT | Mod: 25

## 2022-01-01 PROCEDURE — 83605 ASSAY OF LACTIC ACID: CPT | Performed by: INTERNAL MEDICINE

## 2022-01-01 PROCEDURE — 250N000011 HC RX IP 250 OP 636: Performed by: INTERNAL MEDICINE

## 2022-01-01 PROCEDURE — 87070 CULTURE OTHR SPECIMN AEROBIC: CPT | Performed by: INTERNAL MEDICINE

## 2022-01-01 PROCEDURE — 83615 LACTATE (LD) (LDH) ENZYME: CPT | Performed by: INTERNAL MEDICINE

## 2022-01-01 PROCEDURE — 250N000011 HC RX IP 250 OP 636: Performed by: PHYSICIAN ASSISTANT

## 2022-01-01 PROCEDURE — C1769 GUIDE WIRE: HCPCS

## 2022-01-01 PROCEDURE — 250N000011 HC RX IP 250 OP 636: Performed by: ANESTHESIOLOGY

## 2022-01-01 PROCEDURE — 71045 X-RAY EXAM CHEST 1 VIEW: CPT

## 2022-01-01 PROCEDURE — 85025 COMPLETE CBC W/AUTO DIFF WBC: CPT

## 2022-01-01 PROCEDURE — 99204 OFFICE O/P NEW MOD 45 MIN: CPT | Performed by: THORACIC SURGERY (CARDIOTHORACIC VASCULAR SURGERY)

## 2022-01-01 PROCEDURE — 99233 SBSQ HOSP IP/OBS HIGH 50: CPT | Mod: GC | Performed by: STUDENT IN AN ORGANIZED HEALTH CARE EDUCATION/TRAINING PROGRAM

## 2022-01-01 PROCEDURE — 86901 BLOOD TYPING SEROLOGIC RH(D): CPT | Performed by: EMERGENCY MEDICINE

## 2022-01-01 PROCEDURE — 99233 SBSQ HOSP IP/OBS HIGH 50: CPT | Mod: GC | Performed by: INTERNAL MEDICINE

## 2022-01-01 PROCEDURE — 71250 CT THORAX DX C-: CPT

## 2022-01-01 PROCEDURE — 88305 TISSUE EXAM BY PATHOLOGIST: CPT | Mod: TC

## 2022-01-01 PROCEDURE — 86923 COMPATIBILITY TEST ELECTRIC: CPT | Performed by: EMERGENCY MEDICINE

## 2022-01-01 PROCEDURE — 85018 HEMOGLOBIN: CPT | Performed by: STUDENT IN AN ORGANIZED HEALTH CARE EDUCATION/TRAINING PROGRAM

## 2022-01-01 PROCEDURE — 36415 COLL VENOUS BLD VENIPUNCTURE: CPT | Performed by: EMERGENCY MEDICINE

## 2022-01-01 PROCEDURE — 85025 COMPLETE CBC W/AUTO DIFF WBC: CPT | Performed by: FAMILY MEDICINE

## 2022-01-01 PROCEDURE — 999N000157 HC STATISTIC RCP TIME EA 10 MIN

## 2022-01-01 PROCEDURE — 250N000013 HC RX MED GY IP 250 OP 250 PS 637

## 2022-01-01 PROCEDURE — 272N000117 HC CATH CR2

## 2022-01-01 PROCEDURE — 94660 CPAP INITIATION&MGMT: CPT

## 2022-01-01 PROCEDURE — C1751 CATH, INF, PER/CENT/MIDLINE: HCPCS

## 2022-01-01 PROCEDURE — 89050 BODY FLUID CELL COUNT: CPT | Performed by: INTERNAL MEDICINE

## 2022-01-01 PROCEDURE — 99232 SBSQ HOSP IP/OBS MODERATE 35: CPT | Performed by: NURSE PRACTITIONER

## 2022-01-01 PROCEDURE — 99222 1ST HOSP IP/OBS MODERATE 55: CPT | Performed by: CLINICAL NURSE SPECIALIST

## 2022-01-01 PROCEDURE — 36415 COLL VENOUS BLD VENIPUNCTURE: CPT

## 2022-01-01 PROCEDURE — 210N000001 HC R&B IMCU HEART CARE

## 2022-01-01 PROCEDURE — 250N000013 HC RX MED GY IP 250 OP 250 PS 637: Performed by: STUDENT IN AN ORGANIZED HEALTH CARE EDUCATION/TRAINING PROGRAM

## 2022-01-01 PROCEDURE — 85004 AUTOMATED DIFF WBC COUNT: CPT | Performed by: EMERGENCY MEDICINE

## 2022-01-01 PROCEDURE — 99223 1ST HOSP IP/OBS HIGH 75: CPT | Performed by: INTERNAL MEDICINE

## 2022-01-01 PROCEDURE — 80048 BASIC METABOLIC PNL TOTAL CA: CPT | Performed by: STUDENT IN AN ORGANIZED HEALTH CARE EDUCATION/TRAINING PROGRAM

## 2022-01-01 PROCEDURE — 250N000011 HC RX IP 250 OP 636

## 2022-01-01 PROCEDURE — 05743DZ DILATION OF LEFT INNOMINATE VEIN WITH INTRALUMINAL DEVICE, PERCUTANEOUS APPROACH: ICD-10-PCS | Performed by: RADIOLOGY

## 2022-01-01 PROCEDURE — 84157 ASSAY OF PROTEIN OTHER: CPT

## 2022-01-01 PROCEDURE — 272N000004 HC RX 272: Performed by: FAMILY MEDICINE

## 2022-01-01 PROCEDURE — 99222 1ST HOSP IP/OBS MODERATE 55: CPT | Performed by: INTERNAL MEDICINE

## 2022-01-01 PROCEDURE — 99233 SBSQ HOSP IP/OBS HIGH 50: CPT | Mod: GC

## 2022-01-01 PROCEDURE — 999N000127 HC STATISTIC PERIPHERAL IV START W US GUIDANCE

## 2022-01-01 PROCEDURE — 85027 COMPLETE CBC AUTOMATED: CPT | Performed by: STUDENT IN AN ORGANIZED HEALTH CARE EDUCATION/TRAINING PROGRAM

## 2022-01-01 PROCEDURE — 36415 COLL VENOUS BLD VENIPUNCTURE: CPT | Performed by: FAMILY MEDICINE

## 2022-01-01 PROCEDURE — 71275 CT ANGIOGRAPHY CHEST: CPT

## 2022-01-01 PROCEDURE — 120N000001 HC R&B MED SURG/OB

## 2022-01-01 PROCEDURE — 99152 MOD SED SAME PHYS/QHP 5/>YRS: CPT

## 2022-01-01 PROCEDURE — 80048 BASIC METABOLIC PNL TOTAL CA: CPT | Performed by: INTERNAL MEDICINE

## 2022-01-01 PROCEDURE — 04L33DZ OCCLUSION OF HEPATIC ARTERY WITH INTRALUMINAL DEVICE, PERCUTANEOUS APPROACH: ICD-10-PCS | Performed by: RADIOLOGY

## 2022-01-01 PROCEDURE — 99215 OFFICE O/P EST HI 40 MIN: CPT | Mod: GC

## 2022-01-01 PROCEDURE — 85027 COMPLETE CBC AUTOMATED: CPT | Performed by: EMERGENCY MEDICINE

## 2022-01-01 PROCEDURE — 250N000013 HC RX MED GY IP 250 OP 250 PS 637: Performed by: MASSAGE THERAPIST

## 2022-01-01 PROCEDURE — 272N000500 HC NEEDLE CR2

## 2022-01-01 PROCEDURE — 99239 HOSP IP/OBS DSCHRG MGMT >30: CPT | Mod: GC

## 2022-01-01 PROCEDURE — 80053 COMPREHEN METABOLIC PANEL: CPT | Performed by: EMERGENCY MEDICINE

## 2022-01-01 PROCEDURE — 99232 SBSQ HOSP IP/OBS MODERATE 35: CPT | Mod: GC | Performed by: INTERNAL MEDICINE

## 2022-01-01 PROCEDURE — P9016 RBC LEUKOCYTES REDUCED: HCPCS

## 2022-01-01 PROCEDURE — 84484 ASSAY OF TROPONIN QUANT: CPT | Performed by: EMERGENCY MEDICINE

## 2022-01-01 PROCEDURE — 36415 COLL VENOUS BLD VENIPUNCTURE: CPT | Performed by: INTERNAL MEDICINE

## 2022-01-01 PROCEDURE — 250N000011 HC RX IP 250 OP 636: Performed by: FAMILY MEDICINE

## 2022-01-01 PROCEDURE — U0003 INFECTIOUS AGENT DETECTION BY NUCLEIC ACID (DNA OR RNA); SEVERE ACUTE RESPIRATORY SYNDROME CORONAVIRUS 2 (SARS-COV-2) (CORONAVIRUS DISEASE [COVID-19]), AMPLIFIED PROBE TECHNIQUE, MAKING USE OF HIGH THROUGHPUT TECHNOLOGIES AS DESCRIBED BY CMS-2020-01-R: HCPCS | Performed by: EMERGENCY MEDICINE

## 2022-01-01 PROCEDURE — 88112 CYTOPATH CELL ENHANCE TECH: CPT | Mod: 26 | Performed by: PATHOLOGY

## 2022-01-01 PROCEDURE — 36415 COLL VENOUS BLD VENIPUNCTURE: CPT | Performed by: STUDENT IN AN ORGANIZED HEALTH CARE EDUCATION/TRAINING PROGRAM

## 2022-01-01 PROCEDURE — 93005 ELECTROCARDIOGRAM TRACING: CPT | Performed by: EMERGENCY MEDICINE

## 2022-01-01 PROCEDURE — 80202 ASSAY OF VANCOMYCIN: CPT | Performed by: FAMILY MEDICINE

## 2022-01-01 PROCEDURE — G0463 HOSPITAL OUTPT CLINIC VISIT: HCPCS

## 2022-01-01 PROCEDURE — 250N000009 HC RX 250: Performed by: INTERNAL MEDICINE

## 2022-01-01 PROCEDURE — 96361 HYDRATE IV INFUSION ADD-ON: CPT

## 2022-01-01 PROCEDURE — 90935 HEMODIALYSIS ONE EVALUATION: CPT | Performed by: PHYSICIAN ASSISTANT

## 2022-01-01 PROCEDURE — 71046 X-RAY EXAM CHEST 2 VIEWS: CPT | Mod: TC | Performed by: RADIOLOGY

## 2022-01-01 PROCEDURE — 86923 COMPATIBILITY TEST ELECTRIC: CPT

## 2022-01-01 PROCEDURE — 37244 VASC EMBOLIZE/OCCLUDE BLEED: CPT

## 2022-01-01 PROCEDURE — 99605 MTMS BY PHARM NP 15 MIN: CPT | Performed by: PHARMACIST

## 2022-01-01 PROCEDURE — 87040 BLOOD CULTURE FOR BACTERIA: CPT | Performed by: EMERGENCY MEDICINE

## 2022-01-01 PROCEDURE — 82042 OTHER SOURCE ALBUMIN QUAN EA: CPT | Performed by: INTERNAL MEDICINE

## 2022-01-01 PROCEDURE — 258N000003 HC RX IP 258 OP 636: Performed by: EMERGENCY MEDICINE

## 2022-01-01 PROCEDURE — 272N000710 US PARACENTESIS WITHOUT ALBUMIN

## 2022-01-01 PROCEDURE — 82040 ASSAY OF SERUM ALBUMIN: CPT | Performed by: INTERNAL MEDICINE

## 2022-01-01 PROCEDURE — 85014 HEMATOCRIT: CPT | Performed by: INTERNAL MEDICINE

## 2022-01-01 PROCEDURE — 272N000566 HC SHEATH CR3

## 2022-01-01 PROCEDURE — P9016 RBC LEUKOCYTES REDUCED: HCPCS | Performed by: STUDENT IN AN ORGANIZED HEALTH CARE EDUCATION/TRAINING PROGRAM

## 2022-01-01 PROCEDURE — 99223 1ST HOSP IP/OBS HIGH 75: CPT | Mod: AI | Performed by: INTERNAL MEDICINE

## 2022-01-01 PROCEDURE — B4121ZZ FLUOROSCOPY OF HEPATIC ARTERY USING LOW OSMOLAR CONTRAST: ICD-10-PCS | Performed by: INTERNAL MEDICINE

## 2022-01-01 PROCEDURE — 36620 INSERTION CATHETER ARTERY: CPT | Mod: 59 | Performed by: INTERNAL MEDICINE

## 2022-01-01 PROCEDURE — U0005 INFEC AGEN DETEC AMPLI PROBE: HCPCS | Performed by: FAMILY MEDICINE

## 2022-01-01 PROCEDURE — 86901 BLOOD TYPING SEROLOGIC RH(D): CPT | Mod: 90 | Performed by: PATHOLOGY

## 2022-01-01 PROCEDURE — 82947 ASSAY GLUCOSE BLOOD QUANT: CPT

## 2022-01-01 PROCEDURE — 86901 BLOOD TYPING SEROLOGIC RH(D): CPT | Performed by: STUDENT IN AN ORGANIZED HEALTH CARE EDUCATION/TRAINING PROGRAM

## 2022-01-01 PROCEDURE — C1887 CATHETER, GUIDING: HCPCS

## 2022-01-01 PROCEDURE — 99214 OFFICE O/P EST MOD 30 MIN: CPT | Mod: GC | Performed by: STUDENT IN AN ORGANIZED HEALTH CARE EDUCATION/TRAINING PROGRAM

## 2022-01-01 PROCEDURE — 87081 CULTURE SCREEN ONLY: CPT | Performed by: INTERNAL MEDICINE

## 2022-01-01 PROCEDURE — 272N000567 HC SHEATH CR4

## 2022-01-01 PROCEDURE — 80048 BASIC METABOLIC PNL TOTAL CA: CPT

## 2022-01-01 PROCEDURE — 99215 OFFICE O/P EST HI 40 MIN: CPT | Performed by: THORACIC SURGERY (CARDIOTHORACIC VASCULAR SURGERY)

## 2022-01-01 PROCEDURE — C1876 STENT, NON-COA/NON-COV W/DEL: HCPCS

## 2022-01-01 PROCEDURE — 86850 RBC ANTIBODY SCREEN: CPT | Performed by: EMERGENCY MEDICINE

## 2022-01-01 PROCEDURE — 80048 BASIC METABOLIC PNL TOTAL CA: CPT | Performed by: EMERGENCY MEDICINE

## 2022-01-01 PROCEDURE — 87205 SMEAR GRAM STAIN: CPT | Performed by: INTERNAL MEDICINE

## 2022-01-01 PROCEDURE — 83735 ASSAY OF MAGNESIUM: CPT | Performed by: EMERGENCY MEDICINE

## 2022-01-01 PROCEDURE — 87635 SARS-COV-2 COVID-19 AMP PRB: CPT | Performed by: EMERGENCY MEDICINE

## 2022-01-01 PROCEDURE — 87205 SMEAR GRAM STAIN: CPT | Performed by: FAMILY MEDICINE

## 2022-01-01 PROCEDURE — 71046 X-RAY EXAM CHEST 2 VIEWS: CPT

## 2022-01-01 PROCEDURE — 99220 PR INITIAL OBSERVATION CARE,LEVEL III: CPT | Mod: GC

## 2022-01-01 PROCEDURE — 36430 TRANSFUSION BLD/BLD COMPNT: CPT

## 2022-01-01 PROCEDURE — 83605 ASSAY OF LACTIC ACID: CPT | Performed by: FAMILY MEDICINE

## 2022-01-01 PROCEDURE — 272N000001 HC OR GENERAL SUPPLY STERILE: Performed by: ORTHOPAEDIC SURGERY

## 2022-01-01 PROCEDURE — 99214 OFFICE O/P EST MOD 30 MIN: CPT | Mod: 25 | Performed by: FAMILY MEDICINE

## 2022-01-01 PROCEDURE — 250N000013 HC RX MED GY IP 250 OP 250 PS 637: Performed by: FAMILY MEDICINE

## 2022-01-01 PROCEDURE — 99232 SBSQ HOSP IP/OBS MODERATE 35: CPT | Performed by: INTERNAL MEDICINE

## 2022-01-01 PROCEDURE — 88112 CYTOPATH CELL ENHANCE TECH: CPT | Mod: TC | Performed by: INTERNAL MEDICINE

## 2022-01-01 PROCEDURE — 85018 HEMOGLOBIN: CPT | Performed by: INTERNAL MEDICINE

## 2022-01-01 PROCEDURE — 87205 SMEAR GRAM STAIN: CPT | Performed by: RADIOLOGY

## 2022-01-01 PROCEDURE — 36573 INSJ PICC RS&I 5 YR+: CPT

## 2022-01-01 PROCEDURE — 99495 TRANSJ CARE MGMT MOD F2F 14D: CPT | Performed by: FAMILY MEDICINE

## 2022-01-01 PROCEDURE — 0W9G3ZZ DRAINAGE OF PERITONEAL CAVITY, PERCUTANEOUS APPROACH: ICD-10-PCS | Performed by: RADIOLOGY

## 2022-01-01 PROCEDURE — 96366 THER/PROPH/DIAG IV INF ADDON: CPT

## 2022-01-01 PROCEDURE — 82728 ASSAY OF FERRITIN: CPT | Performed by: INTERNAL MEDICINE

## 2022-01-01 PROCEDURE — 32555 ASPIRATE PLEURA W/ IMAGING: CPT

## 2022-01-01 PROCEDURE — 85025 COMPLETE CBC W/AUTO DIFF WBC: CPT | Performed by: EMERGENCY MEDICINE

## 2022-01-01 PROCEDURE — 02HV33Z INSERTION OF INFUSION DEVICE INTO SUPERIOR VENA CAVA, PERCUTANEOUS APPROACH: ICD-10-PCS | Performed by: RADIOLOGY

## 2022-01-01 PROCEDURE — 82140 ASSAY OF AMMONIA: CPT | Performed by: EMERGENCY MEDICINE

## 2022-01-01 PROCEDURE — 110N000005 HC R&B HOSPICE, ACCENT

## 2022-01-01 PROCEDURE — 255N000002 HC RX 255 OP 636: Performed by: STUDENT IN AN ORGANIZED HEALTH CARE EDUCATION/TRAINING PROGRAM

## 2022-01-01 PROCEDURE — 90935 HEMODIALYSIS ONE EVALUATION: CPT | Performed by: INTERNAL MEDICINE

## 2022-01-01 PROCEDURE — 82805 BLOOD GASES W/O2 SATURATION: CPT

## 2022-01-01 PROCEDURE — 83605 ASSAY OF LACTIC ACID: CPT | Performed by: EMERGENCY MEDICINE

## 2022-01-01 PROCEDURE — 85018 HEMOGLOBIN: CPT | Performed by: EMERGENCY MEDICINE

## 2022-01-01 PROCEDURE — 99215 OFFICE O/P EST HI 40 MIN: CPT | Performed by: FAMILY MEDICINE

## 2022-01-01 PROCEDURE — 250N000009 HC RX 250: Performed by: ANESTHESIOLOGY

## 2022-01-01 PROCEDURE — 49083 ABD PARACENTESIS W/IMAGING: CPT | Performed by: INTERNAL MEDICINE

## 2022-01-01 PROCEDURE — 83605 ASSAY OF LACTIC ACID: CPT | Performed by: STUDENT IN AN ORGANIZED HEALTH CARE EDUCATION/TRAINING PROGRAM

## 2022-01-01 PROCEDURE — 85610 PROTHROMBIN TIME: CPT | Performed by: EMERGENCY MEDICINE

## 2022-01-01 PROCEDURE — 36248 INS CATH ABD/L-EXT ART ADDL: CPT

## 2022-01-01 PROCEDURE — 272N000119 HC CATH CR4

## 2022-01-01 PROCEDURE — 74174 CTA ABD&PLVS W/CONTRAST: CPT

## 2022-01-01 PROCEDURE — 272N000706 US PARACENTESIS

## 2022-01-01 PROCEDURE — 250N000011 HC RX IP 250 OP 636: Performed by: EMERGENCY MEDICINE

## 2022-01-01 PROCEDURE — 99238 HOSP IP/OBS DSCHRG MGMT 30/<: CPT | Mod: GC | Performed by: STUDENT IN AN ORGANIZED HEALTH CARE EDUCATION/TRAINING PROGRAM

## 2022-01-01 PROCEDURE — 88305 TISSUE EXAM BY PATHOLOGIST: CPT | Mod: 26 | Performed by: PATHOLOGY

## 2022-01-01 PROCEDURE — 272N000302 HC DEVICE INFLATION CR5

## 2022-01-01 PROCEDURE — 634N000001 HC RX 634: Performed by: INTERNAL MEDICINE

## 2022-01-01 PROCEDURE — 85027 COMPLETE CBC AUTOMATED: CPT | Performed by: FAMILY MEDICINE

## 2022-01-01 PROCEDURE — 99233 SBSQ HOSP IP/OBS HIGH 50: CPT | Performed by: INTERNAL MEDICINE

## 2022-01-01 PROCEDURE — 99213 OFFICE O/P EST LOW 20 MIN: CPT | Mod: GC | Performed by: STUDENT IN AN ORGANIZED HEALTH CARE EDUCATION/TRAINING PROGRAM

## 2022-01-01 PROCEDURE — 89051 BODY FLUID CELL COUNT: CPT

## 2022-01-01 PROCEDURE — 99417 PROLNG OP E/M EACH 15 MIN: CPT | Performed by: PHYSICIAN ASSISTANT

## 2022-01-01 PROCEDURE — 85610 PROTHROMBIN TIME: CPT | Performed by: INTERNAL MEDICINE

## 2022-01-01 PROCEDURE — 88341 IMHCHEM/IMCYTCHM EA ADD ANTB: CPT | Mod: 26 | Performed by: PATHOLOGY

## 2022-01-01 PROCEDURE — 82310 ASSAY OF CALCIUM: CPT | Performed by: STUDENT IN AN ORGANIZED HEALTH CARE EDUCATION/TRAINING PROGRAM

## 2022-01-01 PROCEDURE — C1725 CATH, TRANSLUMIN NON-LASER: HCPCS

## 2022-01-01 PROCEDURE — 250N000009 HC RX 250

## 2022-01-01 PROCEDURE — 87205 SMEAR GRAM STAIN: CPT | Performed by: ORTHOPAEDIC SURGERY

## 2022-01-01 PROCEDURE — 258N000003 HC RX IP 258 OP 636: Performed by: ANESTHESIOLOGY

## 2022-01-01 PROCEDURE — 99223 1ST HOSP IP/OBS HIGH 75: CPT | Performed by: FAMILY MEDICINE

## 2022-01-01 PROCEDURE — 86901 BLOOD TYPING SEROLOGIC RH(D): CPT

## 2022-01-01 PROCEDURE — 80053 COMPREHEN METABOLIC PANEL: CPT | Performed by: STUDENT IN AN ORGANIZED HEALTH CARE EDUCATION/TRAINING PROGRAM

## 2022-01-01 PROCEDURE — 86900 BLOOD TYPING SEROLOGIC ABO: CPT | Mod: 90 | Performed by: PATHOLOGY

## 2022-01-01 PROCEDURE — 90937 HEMODIALYSIS REPEATED EVAL: CPT

## 2022-01-01 PROCEDURE — 250N000009 HC RX 250: Performed by: EMERGENCY MEDICINE

## 2022-01-01 PROCEDURE — C9803 HOPD COVID-19 SPEC COLLECT: HCPCS

## 2022-01-01 PROCEDURE — 85014 HEMATOCRIT: CPT | Performed by: STUDENT IN AN ORGANIZED HEALTH CARE EDUCATION/TRAINING PROGRAM

## 2022-01-01 PROCEDURE — U0005 INFEC AGEN DETEC AMPLI PROBE: HCPCS | Performed by: EMERGENCY MEDICINE

## 2022-01-01 PROCEDURE — 85730 THROMBOPLASTIN TIME PARTIAL: CPT | Performed by: INTERNAL MEDICINE

## 2022-01-01 PROCEDURE — 258N000003 HC RX IP 258 OP 636: Performed by: INTERNAL MEDICINE

## 2022-01-01 PROCEDURE — 84100 ASSAY OF PHOSPHORUS: CPT | Performed by: INTERNAL MEDICINE

## 2022-01-01 PROCEDURE — 0W9930Z DRAINAGE OF RIGHT PLEURAL CAVITY WITH DRAINAGE DEVICE, PERCUTANEOUS APPROACH: ICD-10-PCS | Performed by: RADIOLOGY

## 2022-01-01 PROCEDURE — 250N000013 HC RX MED GY IP 250 OP 250 PS 637: Performed by: EMERGENCY MEDICINE

## 2022-01-01 PROCEDURE — 87635 SARS-COV-2 COVID-19 AMP PRB: CPT | Performed by: STUDENT IN AN ORGANIZED HEALTH CARE EDUCATION/TRAINING PROGRAM

## 2022-01-01 PROCEDURE — 272N000706 US THORACENTESIS

## 2022-01-01 PROCEDURE — 99495 TRANSJ CARE MGMT MOD F2F 14D: CPT | Mod: GC | Performed by: STUDENT IN AN ORGANIZED HEALTH CARE EDUCATION/TRAINING PROGRAM

## 2022-01-01 PROCEDURE — 99232 SBSQ HOSP IP/OBS MODERATE 35: CPT | Performed by: STUDENT IN AN ORGANIZED HEALTH CARE EDUCATION/TRAINING PROGRAM

## 2022-01-01 PROCEDURE — 84484 ASSAY OF TROPONIN QUANT: CPT

## 2022-01-01 PROCEDURE — 87070 CULTURE OTHR SPECIMN AEROBIC: CPT | Performed by: ORTHOPAEDIC SURGERY

## 2022-01-01 PROCEDURE — U0005 INFEC AGEN DETEC AMPLI PROBE: HCPCS

## 2022-01-01 PROCEDURE — 85027 COMPLETE CBC AUTOMATED: CPT | Performed by: ANESTHESIOLOGY

## 2022-01-01 PROCEDURE — 30903 CONTROL OF NOSEBLEED: CPT | Mod: 59

## 2022-01-01 PROCEDURE — 82040 ASSAY OF SERUM ALBUMIN: CPT

## 2022-01-01 PROCEDURE — 36573 INSJ PICC RS&I 5 YR+: CPT | Mod: RT,GC | Performed by: RADIOLOGY

## 2022-01-01 PROCEDURE — 75726 ARTERY X-RAYS ABDOMEN: CPT

## 2022-01-01 PROCEDURE — 99231 SBSQ HOSP IP/OBS SF/LOW 25: CPT | Performed by: FAMILY MEDICINE

## 2022-01-01 PROCEDURE — 255N000002 HC RX 255 OP 636: Performed by: RADIOLOGY

## 2022-01-01 PROCEDURE — 99223 1ST HOSP IP/OBS HIGH 75: CPT | Mod: 25 | Performed by: INTERNAL MEDICINE

## 2022-01-01 PROCEDURE — 250N000011 HC RX IP 250 OP 636: Performed by: NURSE ANESTHETIST, CERTIFIED REGISTERED

## 2022-01-01 PROCEDURE — 5A1D70Z PERFORMANCE OF URINARY FILTRATION, INTERMITTENT, LESS THAN 6 HOURS PER DAY: ICD-10-PCS | Performed by: RADIOLOGY

## 2022-01-01 PROCEDURE — 99291 CRITICAL CARE FIRST HOUR: CPT | Mod: 25 | Performed by: INTERNAL MEDICINE

## 2022-01-01 PROCEDURE — P9045 ALBUMIN (HUMAN), 5%, 250 ML: HCPCS | Performed by: INTERNAL MEDICINE

## 2022-01-01 PROCEDURE — 96375 TX/PRO/DX INJ NEW DRUG ADDON: CPT

## 2022-01-01 PROCEDURE — 93005 ELECTROCARDIOGRAM TRACING: CPT | Performed by: STUDENT IN AN ORGANIZED HEALTH CARE EDUCATION/TRAINING PROGRAM

## 2022-01-01 PROCEDURE — 85025 COMPLETE CBC W/AUTO DIFF WBC: CPT | Performed by: STUDENT IN AN ORGANIZED HEALTH CARE EDUCATION/TRAINING PROGRAM

## 2022-01-01 PROCEDURE — 360N000076 HC SURGERY LEVEL 3, PER MIN: Performed by: ORTHOPAEDIC SURGERY

## 2022-01-01 PROCEDURE — 91306 COVID-19,PF,MODERNA (18+ YRS BOOSTER .25ML): CPT | Performed by: FAMILY MEDICINE

## 2022-01-01 PROCEDURE — 250N000011 HC RX IP 250 OP 636: Performed by: RADIOLOGY

## 2022-01-01 PROCEDURE — 87102 FUNGUS ISOLATION CULTURE: CPT | Performed by: INTERNAL MEDICINE

## 2022-01-01 PROCEDURE — 86900 BLOOD TYPING SEROLOGIC ABO: CPT

## 2022-01-01 PROCEDURE — 93971 EXTREMITY STUDY: CPT | Mod: LT

## 2022-01-01 PROCEDURE — 255N000002 HC RX 255 OP 636: Performed by: EMERGENCY MEDICINE

## 2022-01-01 PROCEDURE — 86923 COMPATIBILITY TEST ELECTRIC: CPT | Performed by: STUDENT IN AN ORGANIZED HEALTH CARE EDUCATION/TRAINING PROGRAM

## 2022-01-01 PROCEDURE — P9016 RBC LEUKOCYTES REDUCED: HCPCS | Performed by: EMERGENCY MEDICINE

## 2022-01-01 PROCEDURE — 76937 US GUIDE VASCULAR ACCESS: CPT

## 2022-01-01 PROCEDURE — 250N000009 HC RX 250: Performed by: RADIOLOGY

## 2022-01-01 PROCEDURE — 82042 OTHER SOURCE ALBUMIN QUAN EA: CPT

## 2022-01-01 PROCEDURE — 83735 ASSAY OF MAGNESIUM: CPT | Performed by: FAMILY MEDICINE

## 2022-01-01 PROCEDURE — 89051 BODY FLUID CELL COUNT: CPT | Performed by: INTERNAL MEDICINE

## 2022-01-01 PROCEDURE — 85027 COMPLETE CBC AUTOMATED: CPT

## 2022-01-01 PROCEDURE — 3E043XZ INTRODUCTION OF VASOPRESSOR INTO CENTRAL VEIN, PERCUTANEOUS APPROACH: ICD-10-PCS | Performed by: STUDENT IN AN ORGANIZED HEALTH CARE EDUCATION/TRAINING PROGRAM

## 2022-01-01 PROCEDURE — 99607 MTMS BY PHARM ADDL 15 MIN: CPT | Performed by: PHARMACIST

## 2022-01-01 PROCEDURE — 87077 CULTURE AEROBIC IDENTIFY: CPT | Performed by: FAMILY MEDICINE

## 2022-01-01 PROCEDURE — 99205 OFFICE O/P NEW HI 60 MIN: CPT | Performed by: INTERNAL MEDICINE

## 2022-01-01 PROCEDURE — 36415 COLL VENOUS BLD VENIPUNCTURE: CPT | Performed by: ANESTHESIOLOGY

## 2022-01-01 PROCEDURE — 36416 COLLJ CAPILLARY BLOOD SPEC: CPT | Performed by: STUDENT IN AN ORGANIZED HEALTH CARE EDUCATION/TRAINING PROGRAM

## 2022-01-01 PROCEDURE — 99234 HOSP IP/OBS SM DT SF/LOW 45: CPT | Mod: GC

## 2022-01-01 PROCEDURE — 272N000706 US PARACENTESIS WITHOUT ALBUMIN

## 2022-01-01 PROCEDURE — 87075 CULTR BACTERIA EXCEPT BLOOD: CPT | Performed by: PHYSICIAN ASSISTANT

## 2022-01-01 PROCEDURE — 90682 RIV4 VACC RECOMBINANT DNA IM: CPT | Performed by: FAMILY MEDICINE

## 2022-01-01 PROCEDURE — 82310 ASSAY OF CALCIUM: CPT

## 2022-01-01 PROCEDURE — 75774 ARTERY X-RAY EACH VESSEL: CPT

## 2022-01-01 PROCEDURE — 710N000012 HC RECOVERY PHASE 2, PER MINUTE: Performed by: ORTHOPAEDIC SURGERY

## 2022-01-01 PROCEDURE — 82105 ALPHA-FETOPROTEIN SERUM: CPT | Performed by: INTERNAL MEDICINE

## 2022-01-01 PROCEDURE — 99233 SBSQ HOSP IP/OBS HIGH 50: CPT | Mod: GC | Performed by: MASSAGE THERAPIST

## 2022-01-01 PROCEDURE — 258N000003 HC RX IP 258 OP 636: Performed by: STUDENT IN AN ORGANIZED HEALTH CARE EDUCATION/TRAINING PROGRAM

## 2022-01-01 PROCEDURE — 0064A COVID-19,PF,MODERNA (18+ YRS BOOSTER .25ML): CPT | Performed by: FAMILY MEDICINE

## 2022-01-01 PROCEDURE — 99207 PR NO CHARGE LOS: CPT | Performed by: STUDENT IN AN ORGANIZED HEALTH CARE EDUCATION/TRAINING PROGRAM

## 2022-01-01 PROCEDURE — 99223 1ST HOSP IP/OBS HIGH 75: CPT | Mod: AI

## 2022-01-01 PROCEDURE — C1729 CATH, DRAINAGE: HCPCS

## 2022-01-01 PROCEDURE — 87210 SMEAR WET MOUNT SALINE/INK: CPT | Performed by: INTERNAL MEDICINE

## 2022-01-01 PROCEDURE — 87186 SC STD MICRODIL/AGAR DIL: CPT | Performed by: FAMILY MEDICINE

## 2022-01-01 PROCEDURE — G0378 HOSPITAL OBSERVATION PER HR: HCPCS

## 2022-01-01 PROCEDURE — 87075 CULTR BACTERIA EXCEPT BLOOD: CPT | Performed by: INTERNAL MEDICINE

## 2022-01-01 PROCEDURE — U0003 INFECTIOUS AGENT DETECTION BY NUCLEIC ACID (DNA OR RNA); SEVERE ACUTE RESPIRATORY SYNDROME CORONAVIRUS 2 (SARS-COV-2) (CORONAVIRUS DISEASE [COVID-19]), AMPLIFIED PROBE TECHNIQUE, MAKING USE OF HIGH THROUGHPUT TECHNOLOGIES AS DESCRIBED BY CMS-2020-01-R: HCPCS

## 2022-01-01 PROCEDURE — P9016 RBC LEUKOCYTES REDUCED: HCPCS | Performed by: INTERNAL MEDICINE

## 2022-01-01 PROCEDURE — 93010 ELECTROCARDIOGRAM REPORT: CPT | Performed by: INTERNAL MEDICINE

## 2022-01-01 PROCEDURE — 93990 DOPPLER FLOW TESTING: CPT

## 2022-01-01 PROCEDURE — 86850 RBC ANTIBODY SCREEN: CPT

## 2022-01-01 PROCEDURE — 99214 OFFICE O/P EST MOD 30 MIN: CPT | Performed by: FAMILY MEDICINE

## 2022-01-01 PROCEDURE — 86850 RBC ANTIBODY SCREEN: CPT | Performed by: INTERNAL MEDICINE

## 2022-01-01 PROCEDURE — 85730 THROMBOPLASTIN TIME PARTIAL: CPT | Performed by: EMERGENCY MEDICINE

## 2022-01-01 PROCEDURE — 85025 COMPLETE CBC W/AUTO DIFF WBC: CPT | Performed by: INTERNAL MEDICINE

## 2022-01-01 PROCEDURE — 87206 SMEAR FLUORESCENT/ACID STAI: CPT | Performed by: INTERNAL MEDICINE

## 2022-01-01 PROCEDURE — 82248 BILIRUBIN DIRECT: CPT | Performed by: EMERGENCY MEDICINE

## 2022-01-01 PROCEDURE — 83690 ASSAY OF LIPASE: CPT | Performed by: EMERGENCY MEDICINE

## 2022-01-01 PROCEDURE — 87070 CULTURE OTHR SPECIMN AEROBIC: CPT | Performed by: FAMILY MEDICINE

## 2022-01-01 PROCEDURE — 80202 ASSAY OF VANCOMYCIN: CPT | Performed by: INTERNAL MEDICINE

## 2022-01-01 PROCEDURE — 85007 BL SMEAR W/DIFF WBC COUNT: CPT | Performed by: EMERGENCY MEDICINE

## 2022-01-01 PROCEDURE — 36247 INS CATH ABD/L-EXT ART 3RD: CPT

## 2022-01-01 PROCEDURE — 370N000017 HC ANESTHESIA TECHNICAL FEE, PER MIN: Performed by: ORTHOPAEDIC SURGERY

## 2022-01-01 PROCEDURE — 82310 ASSAY OF CALCIUM: CPT | Performed by: EMERGENCY MEDICINE

## 2022-01-01 PROCEDURE — P9040 RBC LEUKOREDUCED IRRADIATED: HCPCS | Performed by: INTERNAL MEDICINE

## 2022-01-01 PROCEDURE — C1874 STENT, COATED/COV W/DEL SYS: HCPCS

## 2022-01-01 PROCEDURE — 99238 HOSP IP/OBS DSCHRG MGMT 30/<: CPT | Mod: GC

## 2022-01-01 PROCEDURE — 97165 OT EVAL LOW COMPLEX 30 MIN: CPT | Mod: GO

## 2022-01-01 PROCEDURE — 24105 EXCISION OLECRANON BURSA: CPT | Mod: LT | Performed by: ORTHOPAEDIC SURGERY

## 2022-01-01 PROCEDURE — 85018 HEMOGLOBIN: CPT | Mod: 59 | Performed by: STUDENT IN AN ORGANIZED HEALTH CARE EDUCATION/TRAINING PROGRAM

## 2022-01-01 PROCEDURE — 72125 CT NECK SPINE W/O DYE: CPT

## 2022-01-01 PROCEDURE — 70486 CT MAXILLOFACIAL W/O DYE: CPT

## 2022-01-01 PROCEDURE — 85014 HEMATOCRIT: CPT

## 2022-01-01 PROCEDURE — 86923 COMPATIBILITY TEST ELECTRIC: CPT | Performed by: INTERNAL MEDICINE

## 2022-01-01 PROCEDURE — 258N000003 HC RX IP 258 OP 636: Performed by: FAMILY MEDICINE

## 2022-01-01 PROCEDURE — 5A09357 ASSISTANCE WITH RESPIRATORY VENTILATION, LESS THAN 24 CONSECUTIVE HOURS, CONTINUOUS POSITIVE AIRWAY PRESSURE: ICD-10-PCS | Performed by: FAMILY MEDICINE

## 2022-01-01 PROCEDURE — 85018 HEMOGLOBIN: CPT | Performed by: FAMILY MEDICINE

## 2022-01-01 PROCEDURE — 84550 ASSAY OF BLOOD/URIC ACID: CPT | Performed by: FAMILY MEDICINE

## 2022-01-01 PROCEDURE — 85610 PROTHROMBIN TIME: CPT | Performed by: STUDENT IN AN ORGANIZED HEALTH CARE EDUCATION/TRAINING PROGRAM

## 2022-01-01 PROCEDURE — 87040 BLOOD CULTURE FOR BACTERIA: CPT

## 2022-01-01 PROCEDURE — 85004 AUTOMATED DIFF WBC COUNT: CPT

## 2022-01-01 PROCEDURE — 84145 PROCALCITONIN (PCT): CPT | Performed by: STUDENT IN AN ORGANIZED HEALTH CARE EDUCATION/TRAINING PROGRAM

## 2022-01-01 PROCEDURE — 86140 C-REACTIVE PROTEIN: CPT | Performed by: STUDENT IN AN ORGANIZED HEALTH CARE EDUCATION/TRAINING PROGRAM

## 2022-01-01 PROCEDURE — P9035 PLATELET PHERES LEUKOREDUCED: HCPCS | Performed by: EMERGENCY MEDICINE

## 2022-01-01 PROCEDURE — 84466 ASSAY OF TRANSFERRIN: CPT | Performed by: INTERNAL MEDICINE

## 2022-01-01 PROCEDURE — 97116 GAIT TRAINING THERAPY: CPT | Mod: GP

## 2022-01-01 PROCEDURE — U0003 INFECTIOUS AGENT DETECTION BY NUCLEIC ACID (DNA OR RNA); SEVERE ACUTE RESPIRATORY SYNDROME CORONAVIRUS 2 (SARS-COV-2) (CORONAVIRUS DISEASE [COVID-19]), AMPLIFIED PROBE TECHNIQUE, MAKING USE OF HIGH THROUGHPUT TECHNOLOGIES AS DESCRIBED BY CMS-2020-01-R: HCPCS | Mod: 90 | Performed by: PATHOLOGY

## 2022-01-01 PROCEDURE — 82140 ASSAY OF AMMONIA: CPT | Performed by: INTERNAL MEDICINE

## 2022-01-01 PROCEDURE — 99223 1ST HOSP IP/OBS HIGH 75: CPT | Mod: AI | Performed by: STUDENT IN AN ORGANIZED HEALTH CARE EDUCATION/TRAINING PROGRAM

## 2022-01-01 PROCEDURE — 96360 HYDRATION IV INFUSION INIT: CPT

## 2022-01-01 PROCEDURE — 85027 COMPLETE CBC AUTOMATED: CPT | Performed by: INTERNAL MEDICINE

## 2022-01-01 PROCEDURE — 99223 1ST HOSP IP/OBS HIGH 75: CPT | Performed by: STUDENT IN AN ORGANIZED HEALTH CARE EDUCATION/TRAINING PROGRAM

## 2022-01-01 PROCEDURE — 83550 IRON BINDING TEST: CPT | Performed by: INTERNAL MEDICINE

## 2022-01-01 PROCEDURE — 97161 PT EVAL LOW COMPLEX 20 MIN: CPT | Mod: GP

## 2022-01-01 PROCEDURE — 87075 CULTR BACTERIA EXCEPT BLOOD: CPT | Performed by: ORTHOPAEDIC SURGERY

## 2022-01-01 PROCEDURE — 82077 ASSAY SPEC XCP UR&BREATH IA: CPT | Performed by: EMERGENCY MEDICINE

## 2022-01-01 PROCEDURE — 200N000001 HC R&B ICU

## 2022-01-01 PROCEDURE — 99232 SBSQ HOSP IP/OBS MODERATE 35: CPT | Performed by: FAMILY MEDICINE

## 2022-01-01 PROCEDURE — 80053 COMPREHEN METABOLIC PANEL: CPT | Performed by: INTERNAL MEDICINE

## 2022-01-01 PROCEDURE — 250N000011 HC RX IP 250 OP 636: Performed by: NURSE PRACTITIONER

## 2022-01-01 PROCEDURE — 999N000141 HC STATISTIC PRE-PROCEDURE NURSING ASSESSMENT: Performed by: ORTHOPAEDIC SURGERY

## 2022-01-01 PROCEDURE — 88342 IMHCHEM/IMCYTCHM 1ST ANTB: CPT | Mod: 26 | Performed by: PATHOLOGY

## 2022-01-01 PROCEDURE — 96365 THER/PROPH/DIAG IV INF INIT: CPT | Mod: 59

## 2022-01-01 PROCEDURE — 87077 CULTURE AEROBIC IDENTIFY: CPT | Performed by: INTERNAL MEDICINE

## 2022-01-01 PROCEDURE — 999N000046

## 2022-01-01 PROCEDURE — 0W9G3ZZ DRAINAGE OF PERITONEAL CAVITY, PERCUTANEOUS APPROACH: ICD-10-PCS | Performed by: INTERNAL MEDICINE

## 2022-01-01 PROCEDURE — U0003 INFECTIOUS AGENT DETECTION BY NUCLEIC ACID (DNA OR RNA); SEVERE ACUTE RESPIRATORY SYNDROME CORONAVIRUS 2 (SARS-COV-2) (CORONAVIRUS DISEASE [COVID-19]), AMPLIFIED PROBE TECHNIQUE, MAKING USE OF HIGH THROUGHPUT TECHNOLOGIES AS DESCRIBED BY CMS-2020-01-R: HCPCS | Performed by: FAMILY MEDICINE

## 2022-01-01 PROCEDURE — 97535 SELF CARE MNGMENT TRAINING: CPT | Mod: GO

## 2022-01-01 PROCEDURE — 99000 SPECIMEN HANDLING OFFICE-LAB: CPT | Performed by: PATHOLOGY

## 2022-01-01 PROCEDURE — 99213 OFFICE O/P EST LOW 20 MIN: CPT | Mod: CS | Performed by: FAMILY MEDICINE

## 2022-01-01 PROCEDURE — 255N000002 HC RX 255 OP 636: Performed by: FAMILY MEDICINE

## 2022-01-01 PROCEDURE — 84157 ASSAY OF PROTEIN OTHER: CPT | Performed by: INTERNAL MEDICINE

## 2022-01-01 PROCEDURE — 250N000011 HC RX IP 250 OP 636: Performed by: STUDENT IN AN ORGANIZED HEALTH CARE EDUCATION/TRAINING PROGRAM

## 2022-01-01 PROCEDURE — 03HY32Z INSERTION OF MONITORING DEVICE INTO UPPER ARTERY, PERCUTANEOUS APPROACH: ICD-10-PCS | Performed by: INTERNAL MEDICINE

## 2022-01-01 PROCEDURE — 82040 ASSAY OF SERUM ALBUMIN: CPT | Performed by: STUDENT IN AN ORGANIZED HEALTH CARE EDUCATION/TRAINING PROGRAM

## 2022-01-01 PROCEDURE — 83605 ASSAY OF LACTIC ACID: CPT

## 2022-01-01 PROCEDURE — 82248 BILIRUBIN DIRECT: CPT

## 2022-01-01 PROCEDURE — G0008 ADMIN INFLUENZA VIRUS VAC: HCPCS | Performed by: FAMILY MEDICINE

## 2022-01-01 PROCEDURE — 99221 1ST HOSP IP/OBS SF/LOW 40: CPT

## 2022-01-01 PROCEDURE — 88305 TISSUE EXAM BY PATHOLOGIST: CPT | Mod: TC | Performed by: INTERNAL MEDICINE

## 2022-01-01 PROCEDURE — 86850 RBC ANTIBODY SCREEN: CPT | Mod: 90 | Performed by: PATHOLOGY

## 2022-01-01 PROCEDURE — 71260 CT THORAX DX C+: CPT

## 2022-01-01 PROCEDURE — 272N000634 HC COIL/EMBOLIC DEVICE CR17

## 2022-01-01 PROCEDURE — 73110 X-RAY EXAM OF WRIST: CPT | Mod: LT

## 2022-01-01 PROCEDURE — 250N000009 HC RX 250: Performed by: NURSE PRACTITIONER

## 2022-01-01 PROCEDURE — 272N000121 HC CATH CR6

## 2022-01-01 PROCEDURE — 99215 OFFICE O/P EST HI 40 MIN: CPT | Performed by: PHYSICIAN ASSISTANT

## 2022-01-01 PROCEDURE — U0005 INFEC AGEN DETEC AMPLI PROBE: HCPCS | Mod: 90 | Performed by: PATHOLOGY

## 2022-01-01 PROCEDURE — 96368 THER/DIAG CONCURRENT INF: CPT

## 2022-01-01 PROCEDURE — 0W9G3ZZ DRAINAGE OF PERITONEAL CAVITY, PERCUTANEOUS APPROACH: ICD-10-PCS | Performed by: FAMILY MEDICINE

## 2022-01-01 DEVICE — CATH VA POWER PICC 5FR 70CM SL 3175155: Type: IMPLANTABLE DEVICE | Site: ARM | Status: FUNCTIONAL

## 2022-01-01 RX ORDER — OXYCODONE HYDROCHLORIDE 5 MG/1
5 TABLET ORAL DAILY PRN
Qty: 30 TABLET | Refills: 0 | Status: SHIPPED | OUTPATIENT
Start: 2022-01-01 | End: 2022-01-01

## 2022-01-01 RX ORDER — CODEINE PHOSPHATE/GUAIFENESIN 10-100MG/5
5 LIQUID (ML) ORAL EVERY 4 HOURS PRN
Qty: 236 ML | Refills: 1 | Status: ON HOLD | OUTPATIENT
Start: 2022-01-01 | End: 2022-01-01

## 2022-01-01 RX ORDER — SODIUM CHLORIDE, SODIUM LACTATE, POTASSIUM CHLORIDE, CALCIUM CHLORIDE 600; 310; 30; 20 MG/100ML; MG/100ML; MG/100ML; MG/100ML
INJECTION, SOLUTION INTRAVENOUS CONTINUOUS
Status: DISCONTINUED | OUTPATIENT
Start: 2022-01-01 | End: 2022-01-01 | Stop reason: HOSPADM

## 2022-01-01 RX ORDER — CEFAZOLIN SODIUM 2 G/50ML
2 SOLUTION INTRAVENOUS
Status: DISCONTINUED | OUTPATIENT
Start: 2022-01-01 | End: 2022-01-01 | Stop reason: HOSPADM

## 2022-01-01 RX ORDER — CEFAZOLIN SODIUM 2 G/100ML
2 INJECTION, SOLUTION INTRAVENOUS
Status: COMPLETED | OUTPATIENT
Start: 2022-01-01 | End: 2022-01-01

## 2022-01-01 RX ORDER — CARVEDILOL 12.5 MG/1
25 TABLET ORAL 2 TIMES DAILY
Status: DISCONTINUED | OUTPATIENT
Start: 2022-01-01 | End: 2022-01-01 | Stop reason: HOSPADM

## 2022-01-01 RX ORDER — PROCHLORPERAZINE 25 MG
25 SUPPOSITORY, RECTAL RECTAL EVERY 12 HOURS PRN
Status: DISCONTINUED | OUTPATIENT
Start: 2022-01-01 | End: 2022-01-01 | Stop reason: HOSPADM

## 2022-01-01 RX ORDER — ZOLPIDEM TARTRATE 5 MG/1
5 TABLET ORAL
Status: DISCONTINUED | OUTPATIENT
Start: 2022-01-01 | End: 2022-01-01 | Stop reason: HOSPADM

## 2022-01-01 RX ORDER — FENTANYL CITRATE 50 UG/ML
25-50 INJECTION, SOLUTION INTRAMUSCULAR; INTRAVENOUS EVERY 5 MIN PRN
Status: DISCONTINUED | OUTPATIENT
Start: 2022-01-01 | End: 2022-01-01

## 2022-01-01 RX ORDER — LIDOCAINE 40 MG/G
CREAM TOPICAL
Status: DISCONTINUED | OUTPATIENT
Start: 2022-01-01 | End: 2022-01-01 | Stop reason: HOSPADM

## 2022-01-01 RX ORDER — NALOXONE HYDROCHLORIDE 0.4 MG/ML
0.2 INJECTION, SOLUTION INTRAMUSCULAR; INTRAVENOUS; SUBCUTANEOUS
Status: DISCONTINUED | OUTPATIENT
Start: 2022-01-01 | End: 2022-01-01 | Stop reason: HOSPADM

## 2022-01-01 RX ORDER — CALCIUM ACETATE 667 MG/1
667 CAPSULE ORAL 2 TIMES DAILY WITH MEALS
Status: DISCONTINUED | OUTPATIENT
Start: 2022-01-01 | End: 2022-01-01

## 2022-01-01 RX ORDER — AMOXICILLIN 250 MG
1 CAPSULE ORAL 2 TIMES DAILY PRN
Status: DISCONTINUED | OUTPATIENT
Start: 2022-01-01 | End: 2022-01-01 | Stop reason: HOSPADM

## 2022-01-01 RX ORDER — CARVEDILOL 12.5 MG/1
12.5 TABLET ORAL 2 TIMES DAILY WITH MEALS
Qty: 180 TABLET | Refills: 3 | Status: CANCELLED | OUTPATIENT
Start: 2022-01-01

## 2022-01-01 RX ORDER — CARVEDILOL 12.5 MG/1
12.5 TABLET ORAL 2 TIMES DAILY WITH MEALS
Qty: 180 TABLET | Refills: 3 | Status: ON HOLD | OUTPATIENT
Start: 2022-01-01 | End: 2022-01-01

## 2022-01-01 RX ORDER — CALCIUM ACETATE 667 MG/1
667 CAPSULE ORAL
Status: DISCONTINUED | OUTPATIENT
Start: 2022-01-01 | End: 2022-01-01 | Stop reason: DRUGHIGH

## 2022-01-01 RX ORDER — CEFAZOLIN SODIUM 1 G/50ML
20 SOLUTION INTRAVENOUS ONCE
Status: COMPLETED | OUTPATIENT
Start: 2022-01-01 | End: 2022-01-01

## 2022-01-01 RX ORDER — ONDANSETRON 2 MG/ML
4 INJECTION INTRAMUSCULAR; INTRAVENOUS EVERY 6 HOURS PRN
Status: DISCONTINUED | OUTPATIENT
Start: 2022-01-01 | End: 2022-01-01 | Stop reason: HOSPADM

## 2022-01-01 RX ORDER — PIPERACILLIN SODIUM, TAZOBACTAM SODIUM 3; .375 G/15ML; G/15ML
3.38 INJECTION, POWDER, LYOPHILIZED, FOR SOLUTION INTRAVENOUS ONCE
Status: COMPLETED | OUTPATIENT
Start: 2022-01-01 | End: 2022-01-01

## 2022-01-01 RX ORDER — NALOXONE HYDROCHLORIDE 0.4 MG/ML
0.4 INJECTION, SOLUTION INTRAMUSCULAR; INTRAVENOUS; SUBCUTANEOUS
Status: DISCONTINUED | OUTPATIENT
Start: 2022-01-01 | End: 2022-01-01 | Stop reason: HOSPADM

## 2022-01-01 RX ORDER — DIAZEPAM 10 MG/2ML
5 INJECTION, SOLUTION INTRAMUSCULAR; INTRAVENOUS EVERY 6 HOURS PRN
Status: DISCONTINUED | OUTPATIENT
Start: 2022-01-01 | End: 2022-01-01 | Stop reason: HOSPADM

## 2022-01-01 RX ORDER — IOPAMIDOL 755 MG/ML
75 INJECTION, SOLUTION INTRAVASCULAR ONCE
Status: COMPLETED | OUTPATIENT
Start: 2022-01-01 | End: 2022-01-01

## 2022-01-01 RX ORDER — PANTOPRAZOLE SODIUM 40 MG/1
40 TABLET, DELAYED RELEASE ORAL DAILY
Status: DISCONTINUED | OUTPATIENT
Start: 2022-01-01 | End: 2022-01-01 | Stop reason: HOSPADM

## 2022-01-01 RX ORDER — PROCHLORPERAZINE MALEATE 10 MG
10 TABLET ORAL EVERY 6 HOURS PRN
Status: DISCONTINUED | OUTPATIENT
Start: 2022-01-01 | End: 2022-01-01 | Stop reason: HOSPADM

## 2022-01-01 RX ORDER — HEPARIN SODIUM (PORCINE) LOCK FLUSH IV SOLN 100 UNIT/ML 100 UNIT/ML
5 SOLUTION INTRAVENOUS
Status: CANCELLED | OUTPATIENT
Start: 2022-01-01

## 2022-01-01 RX ORDER — LIDOCAINE 4 G/G
2 PATCH TOPICAL
Status: DISCONTINUED | OUTPATIENT
Start: 2022-01-01 | End: 2022-01-01

## 2022-01-01 RX ORDER — HYDROMORPHONE HYDROCHLORIDE 1 MG/ML
0.5 INJECTION, SOLUTION INTRAMUSCULAR; INTRAVENOUS; SUBCUTANEOUS
Status: DISCONTINUED | OUTPATIENT
Start: 2022-01-01 | End: 2022-01-01 | Stop reason: HOSPADM

## 2022-01-01 RX ORDER — NALOXONE HYDROCHLORIDE 0.4 MG/ML
0.1 INJECTION, SOLUTION INTRAMUSCULAR; INTRAVENOUS; SUBCUTANEOUS
Status: DISCONTINUED | OUTPATIENT
Start: 2022-01-01 | End: 2022-01-01 | Stop reason: HOSPADM

## 2022-01-01 RX ORDER — SODIUM CHLORIDE 9 MG/ML
INJECTION, SOLUTION INTRAVENOUS CONTINUOUS
Status: DISCONTINUED | OUTPATIENT
Start: 2022-01-01 | End: 2022-01-01

## 2022-01-01 RX ORDER — LEVOFLOXACIN 5 MG/ML
500 INJECTION, SOLUTION INTRAVENOUS
Status: DISCONTINUED | OUTPATIENT
Start: 2022-11-12 | End: 2022-01-01

## 2022-01-01 RX ORDER — MEPERIDINE HYDROCHLORIDE 25 MG/ML
12.5 INJECTION INTRAMUSCULAR; INTRAVENOUS; SUBCUTANEOUS
Status: DISCONTINUED | OUTPATIENT
Start: 2022-01-01 | End: 2022-01-01 | Stop reason: HOSPADM

## 2022-01-01 RX ORDER — ONDANSETRON 2 MG/ML
4 INJECTION INTRAMUSCULAR; INTRAVENOUS EVERY 30 MIN PRN
Status: DISCONTINUED | OUTPATIENT
Start: 2022-01-01 | End: 2022-01-01 | Stop reason: HOSPADM

## 2022-01-01 RX ORDER — ONDANSETRON 4 MG/1
4 TABLET, ORALLY DISINTEGRATING ORAL EVERY 6 HOURS PRN
Status: DISCONTINUED | OUTPATIENT
Start: 2022-01-01 | End: 2022-01-01 | Stop reason: HOSPADM

## 2022-01-01 RX ORDER — PANTOPRAZOLE SODIUM 40 MG/1
40 TABLET, DELAYED RELEASE ORAL DAILY
Qty: 30 TABLET | Refills: 11 | Status: ON HOLD | OUTPATIENT
Start: 2022-01-01 | End: 2022-01-01

## 2022-01-01 RX ORDER — SODIUM CHLORIDE 9 MG/ML
INJECTION, SOLUTION INTRAVENOUS CONTINUOUS
Status: CANCELLED | OUTPATIENT
Start: 2022-01-01

## 2022-01-01 RX ORDER — BISACODYL 5 MG
5 TABLET, DELAYED RELEASE (ENTERIC COATED) ORAL DAILY PRN
Status: DISCONTINUED | OUTPATIENT
Start: 2022-01-01 | End: 2022-01-01 | Stop reason: HOSPADM

## 2022-01-01 RX ORDER — LORAZEPAM 1 MG/1
1 TABLET ORAL
Status: DISCONTINUED | OUTPATIENT
Start: 2022-01-01 | End: 2022-01-01 | Stop reason: HOSPADM

## 2022-01-01 RX ORDER — CALCIUM ACETATE 667 MG/1
667 CAPSULE ORAL 2 TIMES DAILY WITH MEALS
Status: DISCONTINUED | OUTPATIENT
Start: 2022-01-01 | End: 2022-01-01 | Stop reason: HOSPADM

## 2022-01-01 RX ORDER — OXYCODONE HYDROCHLORIDE 5 MG/1
5 TABLET ORAL DAILY PRN
Status: DISCONTINUED | OUTPATIENT
Start: 2022-01-01 | End: 2022-01-01

## 2022-01-01 RX ORDER — HYDROXYZINE HYDROCHLORIDE 25 MG/1
25 TABLET, FILM COATED ORAL 3 TIMES DAILY PRN
Status: DISCONTINUED | OUTPATIENT
Start: 2022-01-01 | End: 2022-01-01 | Stop reason: HOSPADM

## 2022-01-01 RX ORDER — ALBUMIN (HUMAN) 12.5 G/50ML
50 SOLUTION INTRAVENOUS
Status: DISCONTINUED | OUTPATIENT
Start: 2022-01-01 | End: 2022-01-01 | Stop reason: HOSPADM

## 2022-01-01 RX ORDER — CODEINE PHOSPHATE AND GUAIFENESIN 10; 100 MG/5ML; MG/5ML
5 SOLUTION ORAL EVERY 4 HOURS PRN
Status: DISCONTINUED | OUTPATIENT
Start: 2022-01-01 | End: 2022-01-01 | Stop reason: HOSPADM

## 2022-01-01 RX ORDER — CEFAZOLIN SODIUM 2 G/50ML
2 SOLUTION INTRAVENOUS
Status: CANCELLED | OUTPATIENT
Start: 2022-01-01

## 2022-01-01 RX ORDER — LACTULOSE 10 G/15ML
20 SOLUTION ORAL 3 TIMES DAILY
Qty: 946 ML | Refills: 11 | Status: CANCELLED | OUTPATIENT
Start: 2022-01-01

## 2022-01-01 RX ORDER — CARVEDILOL 12.5 MG/1
12.5 TABLET ORAL 2 TIMES DAILY WITH MEALS
Status: DISCONTINUED | OUTPATIENT
Start: 2022-01-01 | End: 2022-01-01

## 2022-01-01 RX ORDER — FENTANYL CITRATE 50 UG/ML
25 INJECTION, SOLUTION INTRAMUSCULAR; INTRAVENOUS
Status: DISCONTINUED | OUTPATIENT
Start: 2022-01-01 | End: 2022-01-01 | Stop reason: HOSPADM

## 2022-01-01 RX ORDER — VIT B COMP NO.3/FOLIC/C/BIOTIN 1 MG-60 MG
1 TABLET ORAL DAILY
Status: DISCONTINUED | OUTPATIENT
Start: 2022-01-01 | End: 2022-01-01 | Stop reason: HOSPADM

## 2022-01-01 RX ORDER — HYDROMORPHONE HYDROCHLORIDE 1 MG/ML
1-2 SOLUTION ORAL
Status: DISCONTINUED | OUTPATIENT
Start: 2022-01-01 | End: 2022-01-01 | Stop reason: HOSPADM

## 2022-01-01 RX ORDER — LACTULOSE 10 G/15ML
20 SOLUTION ORAL 3 TIMES DAILY
Status: DISCONTINUED | OUTPATIENT
Start: 2022-01-01 | End: 2022-01-01 | Stop reason: HOSPADM

## 2022-01-01 RX ORDER — CLONIDINE HYDROCHLORIDE 0.1 MG/1
0.1 TABLET ORAL 3 TIMES DAILY PRN
Status: DISCONTINUED | OUTPATIENT
Start: 2022-01-01 | End: 2022-01-01 | Stop reason: HOSPADM

## 2022-01-01 RX ORDER — OXYCODONE HYDROCHLORIDE 5 MG/1
5 TABLET ORAL EVERY 4 HOURS PRN
Status: DISCONTINUED | OUTPATIENT
Start: 2022-01-01 | End: 2022-01-01 | Stop reason: HOSPADM

## 2022-01-01 RX ORDER — ENTECAVIR 0.5 MG/1
0.5 TABLET, FILM COATED ORAL
Qty: 52 TABLET | Refills: 1 | Status: ON HOLD | OUTPATIENT
Start: 2022-01-01 | End: 2022-01-01

## 2022-01-01 RX ORDER — ATROPINE SULFATE 10 MG/ML
2 SOLUTION/ DROPS OPHTHALMIC EVERY 4 HOURS PRN
Status: DISCONTINUED | OUTPATIENT
Start: 2022-01-01 | End: 2022-01-01 | Stop reason: HOSPADM

## 2022-01-01 RX ORDER — NALOXONE HYDROCHLORIDE 0.4 MG/ML
0.2 INJECTION, SOLUTION INTRAMUSCULAR; INTRAVENOUS; SUBCUTANEOUS
Status: DISCONTINUED | OUTPATIENT
Start: 2022-01-01 | End: 2022-01-01

## 2022-01-01 RX ORDER — FLUMAZENIL 0.1 MG/ML
0.2 INJECTION, SOLUTION INTRAVENOUS
Status: DISCONTINUED | OUTPATIENT
Start: 2022-01-01 | End: 2022-01-01 | Stop reason: HOSPADM

## 2022-01-01 RX ORDER — HYDROMORPHONE HYDROCHLORIDE 1 MG/ML
1 SOLUTION ORAL EVERY 4 HOURS PRN
Status: DISCONTINUED | OUTPATIENT
Start: 2022-01-01 | End: 2022-01-01 | Stop reason: HOSPADM

## 2022-01-01 RX ORDER — AMOXICILLIN AND CLAVULANATE POTASSIUM 250; 125 MG/1; MG/1
1 TABLET, FILM COATED ORAL 2 TIMES DAILY
Qty: 10 TABLET | Refills: 0 | Status: SHIPPED | OUTPATIENT
Start: 2022-01-01 | End: 2022-01-01

## 2022-01-01 RX ORDER — NICOTINE POLACRILEX 4 MG
15-30 LOZENGE BUCCAL
Status: DISCONTINUED | OUTPATIENT
Start: 2022-01-01 | End: 2022-01-01 | Stop reason: HOSPADM

## 2022-01-01 RX ORDER — BISACODYL 10 MG
10 SUPPOSITORY, RECTAL RECTAL DAILY PRN
Status: DISCONTINUED | OUTPATIENT
Start: 2022-01-01 | End: 2022-01-01 | Stop reason: HOSPADM

## 2022-01-01 RX ORDER — ENTECAVIR 0.5 MG/1
0.5 TABLET, FILM COATED ORAL
Status: DISCONTINUED | OUTPATIENT
Start: 2022-01-01 | End: 2022-01-01 | Stop reason: HOSPADM

## 2022-01-01 RX ORDER — FENTANYL CITRATE 50 UG/ML
INJECTION, SOLUTION INTRAMUSCULAR; INTRAVENOUS PRN
Status: COMPLETED | OUTPATIENT
Start: 2022-01-01 | End: 2022-01-01

## 2022-01-01 RX ORDER — AMOXICILLIN 250 MG
2 CAPSULE ORAL 2 TIMES DAILY PRN
Status: DISCONTINUED | OUTPATIENT
Start: 2022-01-01 | End: 2022-01-01 | Stop reason: HOSPADM

## 2022-01-01 RX ORDER — ZOLPIDEM TARTRATE 10 MG/1
10 TABLET ORAL
Qty: 30 TABLET | Refills: 1 | Status: SHIPPED | OUTPATIENT
Start: 2022-01-01

## 2022-01-01 RX ORDER — OXYCODONE HYDROCHLORIDE 5 MG/1
5 TABLET ORAL DAILY PRN
Status: DISCONTINUED | OUTPATIENT
Start: 2022-01-01 | End: 2022-01-01 | Stop reason: HOSPADM

## 2022-01-01 RX ORDER — CARVEDILOL 12.5 MG/1
12.5 TABLET ORAL 2 TIMES DAILY WITH MEALS
Status: DISCONTINUED | OUTPATIENT
Start: 2022-01-01 | End: 2022-01-01 | Stop reason: HOSPADM

## 2022-01-01 RX ORDER — CALCIUM CARBONATE 500(1250)
1000 TABLET ORAL AT BEDTIME
Status: DISCONTINUED | OUTPATIENT
Start: 2022-01-01 | End: 2022-01-01 | Stop reason: HOSPADM

## 2022-01-01 RX ORDER — OLANZAPINE 10 MG/2ML
5 INJECTION, POWDER, FOR SOLUTION INTRAMUSCULAR ONCE
Status: COMPLETED | OUTPATIENT
Start: 2022-01-01 | End: 2022-01-01

## 2022-01-01 RX ORDER — HYDROXYZINE HYDROCHLORIDE 25 MG/1
25 TABLET, FILM COATED ORAL 3 TIMES DAILY PRN
Qty: 90 TABLET | Refills: 3 | Status: SHIPPED | OUTPATIENT
Start: 2022-01-01 | End: 2022-01-01

## 2022-01-01 RX ORDER — LIDOCAINE HYDROCHLORIDE 10 MG/ML
INJECTION, SOLUTION EPIDURAL; INFILTRATION; INTRACAUDAL; PERINEURAL PRN
Status: DISCONTINUED | OUTPATIENT
Start: 2022-01-01 | End: 2022-01-01 | Stop reason: HOSPADM

## 2022-01-01 RX ORDER — OXYCODONE HYDROCHLORIDE 5 MG/1
5 TABLET ORAL DAILY PRN
Qty: 14 TABLET | Refills: 0 | Status: SHIPPED | OUTPATIENT
Start: 2022-01-01 | End: 2022-01-01

## 2022-01-01 RX ORDER — BISACODYL 5 MG
10 TABLET, DELAYED RELEASE (ENTERIC COATED) ORAL DAILY PRN
Status: DISCONTINUED | OUTPATIENT
Start: 2022-01-01 | End: 2022-01-01 | Stop reason: HOSPADM

## 2022-01-01 RX ORDER — METOCLOPRAMIDE HYDROCHLORIDE 5 MG/ML
5 INJECTION INTRAMUSCULAR; INTRAVENOUS ONCE
Status: COMPLETED | OUTPATIENT
Start: 2022-01-01 | End: 2022-01-01

## 2022-01-01 RX ORDER — ACETAMINOPHEN 325 MG/1
975 TABLET ORAL ONCE
Status: COMPLETED | OUTPATIENT
Start: 2022-01-01 | End: 2022-01-01

## 2022-01-01 RX ORDER — IODIXANOL 320 MG/ML
100 INJECTION, SOLUTION INTRAVASCULAR ONCE
Status: COMPLETED | OUTPATIENT
Start: 2022-01-01 | End: 2022-01-01

## 2022-01-01 RX ORDER — FLUMAZENIL 0.1 MG/ML
0.2 INJECTION, SOLUTION INTRAVENOUS
Status: DISCONTINUED | OUTPATIENT
Start: 2022-01-01 | End: 2022-01-01

## 2022-01-01 RX ORDER — LACTULOSE 10 G/15ML
20 SOLUTION ORAL 3 TIMES DAILY
Qty: 946 ML | Refills: 11 | Status: SHIPPED | OUTPATIENT
Start: 2022-01-01 | End: 2022-01-01

## 2022-01-01 RX ORDER — ACETAMINOPHEN 325 MG/1
975 TABLET ORAL EVERY 6 HOURS PRN
Status: DISCONTINUED | OUTPATIENT
Start: 2022-01-01 | End: 2022-01-01 | Stop reason: HOSPADM

## 2022-01-01 RX ORDER — CEFAZOLIN SODIUM/WATER 2 G/20 ML
2 SYRINGE (ML) INTRAVENOUS SEE ADMIN INSTRUCTIONS
Status: CANCELLED | OUTPATIENT
Start: 2022-01-01

## 2022-01-01 RX ORDER — DILTIAZEM HYDROCHLORIDE 180 MG/1
180 CAPSULE, COATED, EXTENDED RELEASE ORAL 2 TIMES DAILY
Status: DISCONTINUED | OUTPATIENT
Start: 2022-01-01 | End: 2022-01-01 | Stop reason: HOSPADM

## 2022-01-01 RX ORDER — BUPIVACAINE HYDROCHLORIDE AND EPINEPHRINE 5; 5 MG/ML; UG/ML
INJECTION, SOLUTION PERINEURAL
Status: COMPLETED | OUTPATIENT
Start: 2022-01-01 | End: 2022-01-01

## 2022-01-01 RX ORDER — AMOXICILLIN AND CLAVULANATE POTASSIUM 500; 125 MG/1; MG/1
1 TABLET, FILM COATED ORAL EVERY EVENING
Status: DISCONTINUED | OUTPATIENT
Start: 2022-01-01 | End: 2022-01-01 | Stop reason: HOSPADM

## 2022-01-01 RX ORDER — VANCOMYCIN HYDROCHLORIDE 1 G/200ML
1000 INJECTION, SOLUTION INTRAVENOUS ONCE
Status: COMPLETED | OUTPATIENT
Start: 2022-01-01 | End: 2022-01-01

## 2022-01-01 RX ORDER — LORAZEPAM 2 MG/ML
1 CONCENTRATE ORAL
Status: DISCONTINUED | OUTPATIENT
Start: 2022-01-01 | End: 2022-01-01 | Stop reason: HOSPADM

## 2022-01-01 RX ORDER — ACETAMINOPHEN 500 MG
500 TABLET ORAL EVERY 6 HOURS PRN
Status: DISCONTINUED | OUTPATIENT
Start: 2022-01-01 | End: 2022-01-01 | Stop reason: HOSPADM

## 2022-01-01 RX ORDER — CARVEDILOL 12.5 MG/1
25-50 TABLET ORAL 2 TIMES DAILY WITH MEALS
Status: DISCONTINUED | OUTPATIENT
Start: 2022-01-01 | End: 2022-01-01

## 2022-01-01 RX ORDER — HEPARIN SODIUM,PORCINE 10 UNIT/ML
5 VIAL (ML) INTRAVENOUS
Status: CANCELLED | OUTPATIENT
Start: 2022-01-01

## 2022-01-01 RX ORDER — HYDROMORPHONE HYDROCHLORIDE 1 MG/ML
.3-.5 INJECTION, SOLUTION INTRAMUSCULAR; INTRAVENOUS; SUBCUTANEOUS
Status: DISCONTINUED | OUTPATIENT
Start: 2022-01-01 | End: 2022-01-01 | Stop reason: HOSPADM

## 2022-01-01 RX ORDER — MIDODRINE HYDROCHLORIDE 10 MG/1
10 TABLET ORAL
COMMUNITY
End: 2022-01-01

## 2022-01-01 RX ORDER — BISACODYL 10 MG
10 SUPPOSITORY, RECTAL RECTAL
Status: DISCONTINUED | OUTPATIENT
Start: 2022-11-13 | End: 2022-01-01 | Stop reason: HOSPADM

## 2022-01-01 RX ORDER — DILTIAZEM HYDROCHLORIDE 180 MG/1
180 CAPSULE, COATED, EXTENDED RELEASE ORAL DAILY
Status: DISCONTINUED | OUTPATIENT
Start: 2022-01-01 | End: 2022-01-01

## 2022-01-01 RX ORDER — SIMETHICONE 80 MG
80 TABLET,CHEWABLE ORAL EVERY 6 HOURS PRN
Status: DISCONTINUED | OUTPATIENT
Start: 2022-01-01 | End: 2022-01-01 | Stop reason: HOSPADM

## 2022-01-01 RX ORDER — CARBOXYMETHYLCELLULOSE SODIUM 5 MG/ML
1-2 SOLUTION/ DROPS OPHTHALMIC
Status: DISCONTINUED | OUTPATIENT
Start: 2022-01-01 | End: 2022-01-01 | Stop reason: HOSPADM

## 2022-01-01 RX ORDER — ZOLPIDEM TARTRATE 5 MG/1
5 TABLET ORAL
Qty: 10 TABLET | Refills: 5 | Status: SHIPPED | OUTPATIENT
Start: 2022-01-01 | End: 2022-01-01

## 2022-01-01 RX ORDER — FENTANYL CITRATE 50 UG/ML
50 INJECTION, SOLUTION INTRAMUSCULAR; INTRAVENOUS
Status: COMPLETED | OUTPATIENT
Start: 2022-01-01 | End: 2022-01-01

## 2022-01-01 RX ORDER — ACETAMINOPHEN 325 MG/1
975 TABLET ORAL ONCE
Status: CANCELLED | OUTPATIENT
Start: 2022-01-01 | End: 2022-01-01

## 2022-01-01 RX ORDER — NALOXONE HYDROCHLORIDE 0.4 MG/ML
0.1 INJECTION, SOLUTION INTRAMUSCULAR; INTRAVENOUS; SUBCUTANEOUS
Status: CANCELLED | OUTPATIENT
Start: 2022-01-01

## 2022-01-01 RX ORDER — HEPARIN SODIUM,PORCINE 10 UNIT/ML
VIAL (ML) INTRAVENOUS PRN
Status: DISCONTINUED | OUTPATIENT
Start: 2022-01-01 | End: 2022-01-01 | Stop reason: HOSPADM

## 2022-01-01 RX ORDER — DEXAMETHASONE SODIUM PHOSPHATE 4 MG/ML
INJECTION, SOLUTION INTRA-ARTICULAR; INTRALESIONAL; INTRAMUSCULAR; INTRAVENOUS; SOFT TISSUE PRN
Status: DISCONTINUED | OUTPATIENT
Start: 2022-01-01 | End: 2022-01-01

## 2022-01-01 RX ORDER — GABAPENTIN 300 MG/1
300 CAPSULE ORAL AT BEDTIME
COMMUNITY
End: 2022-01-01

## 2022-01-01 RX ORDER — DILTIAZEM HYDROCHLORIDE 180 MG/1
180 CAPSULE, EXTENDED RELEASE ORAL DAILY
Status: DISCONTINUED | OUTPATIENT
Start: 2022-01-01 | End: 2022-01-01 | Stop reason: CLARIF

## 2022-01-01 RX ORDER — OXYCODONE HYDROCHLORIDE 5 MG/1
5 TABLET ORAL
Status: COMPLETED | OUTPATIENT
Start: 2022-01-01 | End: 2022-01-01

## 2022-01-01 RX ORDER — HYDROXYZINE HYDROCHLORIDE 25 MG/1
25 TABLET, FILM COATED ORAL 3 TIMES DAILY PRN
Qty: 90 TABLET | Refills: 3 | Status: ON HOLD | OUTPATIENT
Start: 2022-01-01 | End: 2022-01-01

## 2022-01-01 RX ORDER — AMOXICILLIN 250 MG
1 CAPSULE ORAL DAILY PRN
Qty: 30 TABLET | Refills: 11 | Status: ON HOLD | OUTPATIENT
Start: 2022-01-01 | End: 2022-01-01

## 2022-01-01 RX ORDER — SODIUM CHLORIDE 0.65 %
1 AEROSOL, SPRAY (ML) NASAL DAILY PRN
Status: ON HOLD | COMMUNITY
Start: 2022-01-01 | End: 2022-01-01

## 2022-01-01 RX ORDER — IOPAMIDOL 755 MG/ML
100 INJECTION, SOLUTION INTRAVASCULAR ONCE
Status: COMPLETED | OUTPATIENT
Start: 2022-01-01 | End: 2022-01-01

## 2022-01-01 RX ORDER — ALBUMIN, HUMAN INJ 5% 5 %
50-250 SOLUTION INTRAVENOUS
Status: DISCONTINUED | OUTPATIENT
Start: 2022-01-01 | End: 2022-01-01 | Stop reason: HOSPADM

## 2022-01-01 RX ORDER — PROPOFOL 10 MG/ML
INJECTION, EMULSION INTRAVENOUS CONTINUOUS PRN
Status: DISCONTINUED | OUTPATIENT
Start: 2022-01-01 | End: 2022-01-01

## 2022-01-01 RX ORDER — DILTIAZEM HYDROCHLORIDE 180 MG/1
180 CAPSULE, EXTENDED RELEASE ORAL DAILY
Qty: 90 CAPSULE | Refills: 3 | Status: ON HOLD | OUTPATIENT
Start: 2022-01-01 | End: 2022-01-01

## 2022-01-01 RX ORDER — ONDANSETRON 4 MG/1
4 TABLET, ORALLY DISINTEGRATING ORAL EVERY 6 HOURS PRN
Status: DISCONTINUED | OUTPATIENT
Start: 2022-01-01 | End: 2022-01-01

## 2022-01-01 RX ORDER — OXYCODONE HYDROCHLORIDE 5 MG/1
5 TABLET ORAL 2 TIMES DAILY PRN
Qty: 60 TABLET | Refills: 0 | Status: ON HOLD | OUTPATIENT
Start: 2022-01-01 | End: 2022-01-01

## 2022-01-01 RX ORDER — BENZONATATE 100 MG/1
100 CAPSULE ORAL 3 TIMES DAILY PRN
Qty: 30 CAPSULE | Refills: 0 | Status: SHIPPED | OUTPATIENT
Start: 2022-01-01 | End: 2022-01-01

## 2022-01-01 RX ORDER — FENTANYL CITRATE 50 UG/ML
25 INJECTION, SOLUTION INTRAMUSCULAR; INTRAVENOUS EVERY 5 MIN PRN
Status: CANCELLED | OUTPATIENT
Start: 2022-01-01

## 2022-01-01 RX ORDER — ENTECAVIR 0.5 MG/1
0.5 TABLET, FILM COATED ORAL
Status: DISCONTINUED | OUTPATIENT
Start: 2022-11-13 | End: 2022-01-01

## 2022-01-01 RX ORDER — DILTIAZEM HYDROCHLORIDE 180 MG/1
180 CAPSULE, COATED, EXTENDED RELEASE ORAL DAILY
Status: DISCONTINUED | OUTPATIENT
Start: 2022-01-01 | End: 2022-01-01 | Stop reason: HOSPADM

## 2022-01-01 RX ORDER — NALOXONE HYDROCHLORIDE 0.4 MG/ML
0.2 INJECTION, SOLUTION INTRAMUSCULAR; INTRAVENOUS; SUBCUTANEOUS
Status: CANCELLED | OUTPATIENT
Start: 2022-01-01

## 2022-01-01 RX ORDER — OLANZAPINE 10 MG/2ML
5 INJECTION, POWDER, FOR SOLUTION INTRAMUSCULAR DAILY PRN
Status: DISCONTINUED | OUTPATIENT
Start: 2022-01-01 | End: 2022-01-01

## 2022-01-01 RX ORDER — OXYCODONE HYDROCHLORIDE 5 MG/1
5 TABLET ORAL EVERY 6 HOURS PRN
Status: DISCONTINUED | OUTPATIENT
Start: 2022-01-01 | End: 2022-01-01 | Stop reason: HOSPADM

## 2022-01-01 RX ORDER — CEFAZOLIN SODIUM 2 G/50ML
2 SOLUTION INTRAVENOUS SEE ADMIN INSTRUCTIONS
Status: DISCONTINUED | OUTPATIENT
Start: 2022-01-01 | End: 2022-01-01 | Stop reason: HOSPADM

## 2022-01-01 RX ORDER — VANCOMYCIN HYDROCHLORIDE 1 G/200ML
20 INJECTION, SOLUTION INTRAVENOUS ONCE
Status: COMPLETED | OUTPATIENT
Start: 2022-01-01 | End: 2022-01-01

## 2022-01-01 RX ORDER — DILTIAZEM HYDROCHLORIDE 180 MG/1
180 CAPSULE, EXTENDED RELEASE ORAL 2 TIMES DAILY
Status: DISCONTINUED | OUTPATIENT
Start: 2022-01-01 | End: 2022-01-01

## 2022-01-01 RX ORDER — ENTECAVIR 0.5 MG/1
0.5 TABLET, FILM COATED ORAL
Status: DISCONTINUED | OUTPATIENT
Start: 2022-01-01 | End: 2022-01-01

## 2022-01-01 RX ORDER — MINERAL OIL/HYDROPHIL PETROLAT
OINTMENT (GRAM) TOPICAL
Status: DISCONTINUED | OUTPATIENT
Start: 2022-01-01 | End: 2022-01-01 | Stop reason: HOSPADM

## 2022-01-01 RX ORDER — CEFAZOLIN SODIUM 1 G/3ML
1 INJECTION, POWDER, FOR SOLUTION INTRAMUSCULAR; INTRAVENOUS EVERY 24 HOURS
Status: DISCONTINUED | OUTPATIENT
Start: 2022-01-01 | End: 2022-01-01 | Stop reason: HOSPADM

## 2022-01-01 RX ORDER — HYDROCODONE BITARTRATE AND ACETAMINOPHEN 5; 325 MG/1; MG/1
1-2 TABLET ORAL EVERY 4 HOURS PRN
Qty: 15 TABLET | Refills: 0 | Status: SHIPPED | OUTPATIENT
Start: 2022-01-01 | End: 2022-01-01

## 2022-01-01 RX ORDER — CLONIDINE HYDROCHLORIDE 0.1 MG/1
0.1 TABLET ORAL AT BEDTIME
Status: DISCONTINUED | OUTPATIENT
Start: 2022-01-01 | End: 2022-01-01 | Stop reason: HOSPADM

## 2022-01-01 RX ORDER — CEPHALEXIN 500 MG/1
500 CAPSULE ORAL EVERY 12 HOURS SCHEDULED
Status: DISCONTINUED | OUTPATIENT
Start: 2022-01-01 | End: 2022-01-01 | Stop reason: HOSPADM

## 2022-01-01 RX ORDER — AMOXICILLIN 250 MG
1 CAPSULE ORAL DAILY PRN
Status: DISCONTINUED | OUTPATIENT
Start: 2022-01-01 | End: 2022-01-01 | Stop reason: HOSPADM

## 2022-01-01 RX ORDER — CEFAZOLIN SODIUM/WATER 2 G/20 ML
2 SYRINGE (ML) INTRAVENOUS
Status: CANCELLED | OUTPATIENT
Start: 2022-01-01

## 2022-01-01 RX ORDER — BACITRACIN ZINC 500 [USP'U]/G
OINTMENT TOPICAL PRN
Status: DISCONTINUED | OUTPATIENT
Start: 2022-01-01 | End: 2022-01-01 | Stop reason: HOSPADM

## 2022-01-01 RX ORDER — IODIXANOL 320 MG/ML
200 INJECTION, SOLUTION INTRAVASCULAR ONCE
Status: COMPLETED | OUTPATIENT
Start: 2022-01-01 | End: 2022-01-01

## 2022-01-01 RX ORDER — ZOLPIDEM TARTRATE 5 MG/1
10 TABLET ORAL
Status: DISCONTINUED | OUTPATIENT
Start: 2022-01-01 | End: 2022-01-01 | Stop reason: HOSPADM

## 2022-01-01 RX ORDER — ACETAMINOPHEN 325 MG/1
650 TABLET ORAL EVERY 6 HOURS PRN
Status: DISCONTINUED | OUTPATIENT
Start: 2022-01-01 | End: 2022-01-01 | Stop reason: HOSPADM

## 2022-01-01 RX ORDER — DILTIAZEM HYDROCHLORIDE 180 MG/1
180 CAPSULE, COATED, EXTENDED RELEASE ORAL 2 TIMES DAILY PRN
Status: DISCONTINUED | OUTPATIENT
Start: 2022-01-01 | End: 2022-01-01 | Stop reason: HOSPADM

## 2022-01-01 RX ORDER — HEPARIN SODIUM (PORCINE) LOCK FLUSH IV SOLN 100 UNIT/ML 100 UNIT/ML
5 SOLUTION INTRAVENOUS
Status: DISCONTINUED | OUTPATIENT
Start: 2022-01-01 | End: 2022-01-01

## 2022-01-01 RX ORDER — MORPHINE SULFATE 2 MG/ML
1 INJECTION, SOLUTION INTRAMUSCULAR; INTRAVENOUS ONCE
Status: COMPLETED | OUTPATIENT
Start: 2022-01-01 | End: 2022-01-01

## 2022-01-01 RX ORDER — ONDANSETRON 2 MG/ML
4 INJECTION INTRAMUSCULAR; INTRAVENOUS EVERY 6 HOURS PRN
Status: DISCONTINUED | OUTPATIENT
Start: 2022-01-01 | End: 2022-01-01

## 2022-01-01 RX ORDER — HYDRALAZINE HYDROCHLORIDE 20 MG/ML
10 INJECTION INTRAMUSCULAR; INTRAVENOUS EVERY 6 HOURS PRN
Status: DISCONTINUED | OUTPATIENT
Start: 2022-01-01 | End: 2022-01-01 | Stop reason: HOSPADM

## 2022-01-01 RX ORDER — CEFAZOLIN SODIUM 2 G/50ML
2 SOLUTION INTRAVENOUS SEE ADMIN INSTRUCTIONS
Status: CANCELLED | OUTPATIENT
Start: 2022-01-01

## 2022-01-01 RX ORDER — CARVEDILOL 12.5 MG/1
25 TABLET ORAL 2 TIMES DAILY WITH MEALS
Status: DISCONTINUED | OUTPATIENT
Start: 2022-01-01 | End: 2022-01-01

## 2022-01-01 RX ORDER — CEFEPIME HYDROCHLORIDE 1 G/1
1 INJECTION, POWDER, FOR SOLUTION INTRAMUSCULAR; INTRAVENOUS EVERY 24 HOURS
Status: DISCONTINUED | OUTPATIENT
Start: 2022-01-01 | End: 2022-01-01

## 2022-01-01 RX ORDER — CEPHALEXIN 500 MG/1
500 CAPSULE ORAL 2 TIMES DAILY
Qty: 14 CAPSULE | Refills: 0 | Status: SHIPPED | OUTPATIENT
Start: 2022-01-01 | End: 2022-01-01

## 2022-01-01 RX ORDER — BENZONATATE 100 MG/1
100 CAPSULE ORAL 3 TIMES DAILY PRN
Status: DISCONTINUED | OUTPATIENT
Start: 2022-01-01 | End: 2022-01-01 | Stop reason: HOSPADM

## 2022-01-01 RX ORDER — NOREPINEPHRINE BITARTRATE 0.02 MG/ML
.01-.6 INJECTION, SOLUTION INTRAVENOUS CONTINUOUS
Status: DISCONTINUED | OUTPATIENT
Start: 2022-01-01 | End: 2022-01-01

## 2022-01-01 RX ORDER — DEXTROSE MONOHYDRATE 25 G/50ML
25-50 INJECTION, SOLUTION INTRAVENOUS
Status: DISCONTINUED | OUTPATIENT
Start: 2022-01-01 | End: 2022-01-01 | Stop reason: HOSPADM

## 2022-01-01 RX ORDER — ALBUMIN (HUMAN) 12.5 G/50ML
50 SOLUTION INTRAVENOUS
Status: DISCONTINUED | OUTPATIENT
Start: 2022-01-01 | End: 2022-01-01

## 2022-01-01 RX ORDER — VANCOMYCIN HYDROCHLORIDE 1 G/200ML
1000 INJECTION, SOLUTION INTRAVENOUS EVERY 24 HOURS
Status: DISCONTINUED | OUTPATIENT
Start: 2022-01-01 | End: 2022-01-01

## 2022-01-01 RX ORDER — ALBUMIN (HUMAN) 12.5 G/50ML
12.5-5 SOLUTION INTRAVENOUS ONCE
Status: DISCONTINUED | OUTPATIENT
Start: 2022-01-01 | End: 2022-01-01 | Stop reason: HOSPADM

## 2022-01-01 RX ORDER — HYDROMORPHONE HYDROCHLORIDE 1 MG/ML
0.2 INJECTION, SOLUTION INTRAMUSCULAR; INTRAVENOUS; SUBCUTANEOUS EVERY 4 HOURS PRN
Status: DISCONTINUED | OUTPATIENT
Start: 2022-01-01 | End: 2022-01-01 | Stop reason: HOSPADM

## 2022-01-01 RX ORDER — HYDROMORPHONE HCL IN WATER/PF 6 MG/30 ML
0.2 PATIENT CONTROLLED ANALGESIA SYRINGE INTRAVENOUS EVERY 5 MIN PRN
Status: CANCELLED | OUTPATIENT
Start: 2022-01-01

## 2022-01-01 RX ORDER — ZOLPIDEM TARTRATE 5 MG/1
5 TABLET ORAL
COMMUNITY
End: 2022-01-01

## 2022-01-01 RX ORDER — OXYCODONE HYDROCHLORIDE 5 MG/1
5 TABLET ORAL 2 TIMES DAILY PRN
Status: DISCONTINUED | OUTPATIENT
Start: 2022-01-01 | End: 2022-01-01

## 2022-01-01 RX ORDER — CARVEDILOL 12.5 MG/1
25 TABLET ORAL 2 TIMES DAILY WITH MEALS
Status: DISCONTINUED | OUTPATIENT
Start: 2022-01-01 | End: 2022-01-01 | Stop reason: HOSPADM

## 2022-01-01 RX ORDER — PANTOPRAZOLE SODIUM 40 MG/1
40 TABLET, DELAYED RELEASE ORAL DAILY
Status: DISCONTINUED | OUTPATIENT
Start: 2022-01-01 | End: 2022-01-01

## 2022-01-01 RX ORDER — VANCOMYCIN HYDROCHLORIDE 1 G/200ML
1000 INJECTION, SOLUTION INTRAVENOUS ONCE
Status: DISCONTINUED | OUTPATIENT
Start: 2022-01-01 | End: 2022-01-01

## 2022-01-01 RX ORDER — LACTULOSE 10 G/15ML
20 SOLUTION ORAL 3 TIMES DAILY
Qty: 946 ML | Refills: 3 | Status: ON HOLD | OUTPATIENT
Start: 2022-01-01 | End: 2022-01-01

## 2022-01-01 RX ORDER — CODEINE PHOSPHATE/GUAIFENESIN 10-100MG/5
5 LIQUID (ML) ORAL EVERY 4 HOURS PRN
Qty: 236 ML | Refills: 1 | Status: SHIPPED | OUTPATIENT
Start: 2022-01-01 | End: 2022-01-01

## 2022-01-01 RX ORDER — ZOLPIDEM TARTRATE 5 MG/1
5 TABLET ORAL ONCE
Status: COMPLETED | OUTPATIENT
Start: 2022-01-01 | End: 2022-01-01

## 2022-01-01 RX ORDER — HEPARIN SODIUM,PORCINE 10 UNIT/ML
5 VIAL (ML) INTRAVENOUS
Status: DISCONTINUED | OUTPATIENT
Start: 2022-01-01 | End: 2022-01-01

## 2022-01-01 RX ORDER — CEPHALEXIN 500 MG/1
500 CAPSULE ORAL 2 TIMES DAILY
Qty: 60 CAPSULE | Refills: 1 | Status: ON HOLD | OUTPATIENT
Start: 2022-01-01 | End: 2022-01-01

## 2022-01-01 RX ORDER — FENTANYL CITRATE 50 UG/ML
50 INJECTION, SOLUTION INTRAMUSCULAR; INTRAVENOUS ONCE
Status: COMPLETED | OUTPATIENT
Start: 2022-01-01 | End: 2022-01-01

## 2022-01-01 RX ORDER — AMOXICILLIN 250 MG
1 CAPSULE ORAL DAILY PRN
Qty: 30 TABLET | Refills: 11 | Status: SHIPPED | OUTPATIENT
Start: 2022-01-01 | End: 2022-01-01

## 2022-01-01 RX ORDER — ACETAMINOPHEN 325 MG/1
650 TABLET ORAL EVERY 8 HOURS PRN
Status: DISCONTINUED | OUTPATIENT
Start: 2022-01-01 | End: 2022-01-01 | Stop reason: HOSPADM

## 2022-01-01 RX ORDER — LEVOFLOXACIN 5 MG/ML
750 INJECTION, SOLUTION INTRAVENOUS ONCE
Status: COMPLETED | OUTPATIENT
Start: 2022-01-01 | End: 2022-01-01

## 2022-01-01 RX ORDER — ONDANSETRON 4 MG/1
4 TABLET, ORALLY DISINTEGRATING ORAL EVERY 30 MIN PRN
Status: DISCONTINUED | OUTPATIENT
Start: 2022-01-01 | End: 2022-01-01 | Stop reason: HOSPADM

## 2022-01-01 RX ORDER — ONDANSETRON 2 MG/ML
INJECTION INTRAMUSCULAR; INTRAVENOUS PRN
Status: DISCONTINUED | OUTPATIENT
Start: 2022-01-01 | End: 2022-01-01

## 2022-01-01 RX ORDER — CALCIUM ACETATE 667 MG/1
667 CAPSULE ORAL
Status: DISCONTINUED | OUTPATIENT
Start: 2022-01-01 | End: 2022-01-01 | Stop reason: HOSPADM

## 2022-01-01 RX ORDER — NALOXONE HYDROCHLORIDE 0.4 MG/ML
0.4 INJECTION, SOLUTION INTRAMUSCULAR; INTRAVENOUS; SUBCUTANEOUS
Status: DISCONTINUED | OUTPATIENT
Start: 2022-01-01 | End: 2022-01-01

## 2022-01-01 RX ORDER — DILTIAZEM HYDROCHLORIDE 180 MG/1
180 CAPSULE, EXTENDED RELEASE ORAL DAILY
Qty: 90 CAPSULE | Refills: 3 | Status: CANCELLED | OUTPATIENT
Start: 2022-01-01

## 2022-01-01 RX ORDER — CARVEDILOL 25 MG/1
25-50 TABLET ORAL 2 TIMES DAILY WITH MEALS
Qty: 120 TABLET | Refills: 3 | Status: ON HOLD | OUTPATIENT
Start: 2022-01-01 | End: 2022-01-01

## 2022-01-01 RX ORDER — PIPERACILLIN SODIUM, TAZOBACTAM SODIUM 3; .375 G/15ML; G/15ML
3.38 INJECTION, POWDER, LYOPHILIZED, FOR SOLUTION INTRAVENOUS EVERY 12 HOURS
Status: DISCONTINUED | OUTPATIENT
Start: 2022-01-01 | End: 2022-01-01

## 2022-01-01 RX ORDER — FENTANYL CITRATE 50 UG/ML
25 INJECTION, SOLUTION INTRAMUSCULAR; INTRAVENOUS EVERY 5 MIN PRN
Status: DISCONTINUED | OUTPATIENT
Start: 2022-01-01 | End: 2022-01-01

## 2022-01-01 RX ORDER — CEPHALEXIN 500 MG/1
500 CAPSULE ORAL 2 TIMES DAILY
Qty: 120 CAPSULE | Refills: 1 | Status: SHIPPED | OUTPATIENT
Start: 2022-01-01 | End: 2022-01-01

## 2022-01-01 RX ORDER — OXYCODONE HYDROCHLORIDE 5 MG/1
5 TABLET ORAL 2 TIMES DAILY PRN
Status: DISCONTINUED | OUTPATIENT
Start: 2022-01-01 | End: 2022-01-01 | Stop reason: HOSPADM

## 2022-01-01 RX ADMIN — ZOLPIDEM TARTRATE 5 MG: 5 TABLET ORAL at 22:46

## 2022-01-01 RX ADMIN — MIDAZOLAM HYDROCHLORIDE 1 MG: 1 INJECTION, SOLUTION INTRAMUSCULAR; INTRAVENOUS at 14:38

## 2022-01-01 RX ADMIN — LACTULOSE 20 G: 10 SOLUTION ORAL at 11:50

## 2022-01-01 RX ADMIN — EPOETIN ALFA-EPBX 20000 UNITS: 10000 INJECTION, SOLUTION INTRAVENOUS; SUBCUTANEOUS at 17:33

## 2022-01-01 RX ADMIN — ZOLPIDEM TARTRATE 10 MG: 5 TABLET ORAL at 21:41

## 2022-01-01 RX ADMIN — ACETAMINOPHEN 650 MG: 325 TABLET, FILM COATED ORAL at 19:46

## 2022-01-01 RX ADMIN — CARVEDILOL 25 MG: 12.5 TABLET, FILM COATED ORAL at 21:52

## 2022-01-01 RX ADMIN — MORPHINE SULFATE 1 MG: 2 INJECTION, SOLUTION INTRAMUSCULAR; INTRAVENOUS at 14:47

## 2022-01-01 RX ADMIN — CARVEDILOL 25 MG: 12.5 TABLET, FILM COATED ORAL at 17:58

## 2022-01-01 RX ADMIN — ACETAMINOPHEN 975 MG: 325 TABLET ORAL at 06:20

## 2022-01-01 RX ADMIN — CALCIUM ACETATE 667 MG: 667 CAPSULE ORAL at 18:38

## 2022-01-01 RX ADMIN — MIDAZOLAM HYDROCHLORIDE 0.5 MG: 1 INJECTION, SOLUTION INTRAMUSCULAR; INTRAVENOUS at 15:08

## 2022-01-01 RX ADMIN — OXYCODONE HYDROCHLORIDE 5 MG: 5 TABLET ORAL at 01:52

## 2022-01-01 RX ADMIN — FENTANYL CITRATE 25 MCG: 50 INJECTION INTRAMUSCULAR; INTRAVENOUS at 13:04

## 2022-01-01 RX ADMIN — FENTANYL CITRATE 50 MCG: 50 INJECTION INTRAMUSCULAR; INTRAVENOUS at 14:36

## 2022-01-01 RX ADMIN — ACETAMINOPHEN 975 MG: 325 TABLET ORAL at 16:38

## 2022-01-01 RX ADMIN — Medication 1000 MG: at 21:01

## 2022-01-01 RX ADMIN — MIDAZOLAM HYDROCHLORIDE 0.5 MG: 1 INJECTION, SOLUTION INTRAMUSCULAR; INTRAVENOUS at 12:37

## 2022-01-01 RX ADMIN — LACTULOSE 20 G: 10 SOLUTION ORAL at 17:33

## 2022-01-01 RX ADMIN — CALCIUM ACETATE 667 MG: 667 CAPSULE ORAL at 12:44

## 2022-01-01 RX ADMIN — CALCIUM ACETATE 667 MG: 667 CAPSULE ORAL at 08:06

## 2022-01-01 RX ADMIN — LACTULOSE 20 G: 10 SOLUTION ORAL at 20:57

## 2022-01-01 RX ADMIN — MIDAZOLAM 0.5 MG: 1 INJECTION INTRAMUSCULAR; INTRAVENOUS at 12:42

## 2022-01-01 RX ADMIN — LACTULOSE 20 G: 10 SOLUTION ORAL at 13:01

## 2022-01-01 RX ADMIN — DOCUSATE SODIUM 50 MG AND SENNOSIDES 8.6 MG 2 TABLET: 8.6; 5 TABLET, FILM COATED ORAL at 04:56

## 2022-01-01 RX ADMIN — IOPAMIDOL 75 ML: 755 INJECTION, SOLUTION INTRAVENOUS at 12:56

## 2022-01-01 RX ADMIN — ACETAMINOPHEN 975 MG: 325 TABLET ORAL at 00:20

## 2022-01-01 RX ADMIN — SODIUM CHLORIDE 300 ML: 9 INJECTION, SOLUTION INTRAVENOUS at 10:37

## 2022-01-01 RX ADMIN — LACTULOSE 20 G: 10 SOLUTION ORAL at 10:22

## 2022-01-01 RX ADMIN — OLANZAPINE 5 MG: 10 INJECTION, POWDER, FOR SOLUTION INTRAMUSCULAR at 14:31

## 2022-01-01 RX ADMIN — BUPIVACAINE HYDROCHLORIDE AND EPINEPHRINE BITARTRATE 25 ML: 5; .005 INJECTION, SOLUTION PERINEURAL at 14:39

## 2022-01-01 RX ADMIN — VANCOMYCIN HYDROCHLORIDE 1250 MG: 5 INJECTION, POWDER, LYOPHILIZED, FOR SOLUTION INTRAVENOUS at 02:16

## 2022-01-01 RX ADMIN — ACETAMINOPHEN 975 MG: 325 TABLET ORAL at 22:38

## 2022-01-01 RX ADMIN — OXYCODONE HYDROCHLORIDE 5 MG: 5 TABLET ORAL at 00:49

## 2022-01-01 RX ADMIN — CEPHALEXIN 500 MG: 500 CAPSULE ORAL at 08:17

## 2022-01-01 RX ADMIN — SODIUM CHLORIDE 250 ML: 9 INJECTION, SOLUTION INTRAVENOUS at 15:00

## 2022-01-01 RX ADMIN — ACETAMINOPHEN 975 MG: 325 TABLET ORAL at 19:27

## 2022-01-01 RX ADMIN — SODIUM CHLORIDE 500 ML: 9 INJECTION, SOLUTION INTRAVENOUS at 09:37

## 2022-01-01 RX ADMIN — ZOLPIDEM TARTRATE 5 MG: 5 TABLET ORAL at 22:08

## 2022-01-01 RX ADMIN — PANTOPRAZOLE SODIUM 40 MG: 40 TABLET, DELAYED RELEASE ORAL at 07:50

## 2022-01-01 RX ADMIN — CARVEDILOL 25 MG: 12.5 TABLET, FILM COATED ORAL at 16:43

## 2022-01-01 RX ADMIN — FENTANYL CITRATE 25 MCG: 50 INJECTION, SOLUTION INTRAMUSCULAR; INTRAVENOUS at 14:19

## 2022-01-01 RX ADMIN — CARVEDILOL 25 MG: 12.5 TABLET, FILM COATED ORAL at 08:17

## 2022-01-01 RX ADMIN — MIDAZOLAM HYDROCHLORIDE 0.5 MG: 1 INJECTION, SOLUTION INTRAMUSCULAR; INTRAVENOUS at 14:34

## 2022-01-01 RX ADMIN — AMOXICILLIN AND CLAVULANATE POTASSIUM 1 TABLET: 500; 125 TABLET, FILM COATED ORAL at 19:57

## 2022-01-01 RX ADMIN — OXYCODONE HYDROCHLORIDE 5 MG: 5 TABLET ORAL at 02:33

## 2022-01-01 RX ADMIN — CARVEDILOL 12.5 MG: 12.5 TABLET, FILM COATED ORAL at 09:33

## 2022-01-01 RX ADMIN — CLONIDINE HYDROCHLORIDE 0.1 MG: 0.1 TABLET ORAL at 21:45

## 2022-01-01 RX ADMIN — OXYCODONE HYDROCHLORIDE 5 MG: 5 TABLET ORAL at 11:23

## 2022-01-01 RX ADMIN — CEPHALEXIN 500 MG: 500 CAPSULE ORAL at 20:14

## 2022-01-01 RX ADMIN — IODIXANOL 100 ML: 320 INJECTION, SOLUTION INTRAVASCULAR at 13:30

## 2022-01-01 RX ADMIN — PANTOPRAZOLE SODIUM 40 MG: 40 TABLET, DELAYED RELEASE ORAL at 08:10

## 2022-01-01 RX ADMIN — LIDOCAINE HYDROCHLORIDE 15 ML: 10 INJECTION, SOLUTION INFILTRATION; PERINEURAL at 15:03

## 2022-01-01 RX ADMIN — FENTANYL CITRATE 25 MCG: 50 INJECTION INTRAMUSCULAR; INTRAVENOUS at 12:39

## 2022-01-01 RX ADMIN — LACTULOSE 20 G: 10 SOLUTION ORAL at 19:26

## 2022-01-01 RX ADMIN — ACETAMINOPHEN 500 MG: 500 TABLET ORAL at 18:17

## 2022-01-01 RX ADMIN — FENTANYL CITRATE 25 MCG: 50 INJECTION, SOLUTION INTRAMUSCULAR; INTRAVENOUS at 12:44

## 2022-01-01 RX ADMIN — PANTOPRAZOLE SODIUM 40 MG: 40 TABLET, DELAYED RELEASE ORAL at 11:59

## 2022-01-01 RX ADMIN — OXYCODONE HYDROCHLORIDE 5 MG: 5 TABLET ORAL at 00:00

## 2022-01-01 RX ADMIN — FENTANYL CITRATE 25 MCG: 50 INJECTION, SOLUTION INTRAMUSCULAR; INTRAVENOUS at 07:29

## 2022-01-01 RX ADMIN — CARVEDILOL 25 MG: 12.5 TABLET, FILM COATED ORAL at 09:32

## 2022-01-01 RX ADMIN — MIDAZOLAM HYDROCHLORIDE 0.5 MG: 1 INJECTION, SOLUTION INTRAMUSCULAR; INTRAVENOUS at 13:57

## 2022-01-01 RX ADMIN — LEVOFLOXACIN 750 MG: 5 INJECTION, SOLUTION INTRAVENOUS at 12:59

## 2022-01-01 RX ADMIN — ACETAMINOPHEN 650 MG: 325 TABLET, FILM COATED ORAL at 10:07

## 2022-01-01 RX ADMIN — DILTIAZEM HYDROCHLORIDE 180 MG: 180 CAPSULE, COATED, EXTENDED RELEASE ORAL at 18:18

## 2022-01-01 RX ADMIN — PANTOPRAZOLE SODIUM 40 MG: 40 TABLET, DELAYED RELEASE ORAL at 09:32

## 2022-01-01 RX ADMIN — CALCIUM ACETATE 667 MG: 667 CAPSULE ORAL at 17:57

## 2022-01-01 RX ADMIN — Medication 0.4 MCG/KG/MIN: at 04:36

## 2022-01-01 RX ADMIN — SODIUM BICARBONATE 50 MEQ: 84 INJECTION, SOLUTION INTRAVENOUS at 10:25

## 2022-01-01 RX ADMIN — ACETAMINOPHEN 650 MG: 325 TABLET, FILM COATED ORAL at 21:01

## 2022-01-01 RX ADMIN — LACTULOSE 20 G: 10 SOLUTION ORAL at 08:02

## 2022-01-01 RX ADMIN — PANTOPRAZOLE SODIUM 40 MG: 40 INJECTION, POWDER, FOR SOLUTION INTRAVENOUS at 07:57

## 2022-01-01 RX ADMIN — GUAIFENESIN AND CODEINE PHOSPHATE 5 ML: 10; 100 LIQUID ORAL at 18:57

## 2022-01-01 RX ADMIN — ACETAMINOPHEN 975 MG: 325 TABLET ORAL at 10:01

## 2022-01-01 RX ADMIN — CALCIUM ACETATE 667 MG: 667 CAPSULE ORAL at 13:20

## 2022-01-01 RX ADMIN — HYDROXYZINE HYDROCHLORIDE 25 MG: 25 TABLET ORAL at 12:05

## 2022-01-01 RX ADMIN — LACTULOSE 20 G: 10 SOLUTION ORAL at 18:15

## 2022-01-01 RX ADMIN — PANTOPRAZOLE SODIUM 40 MG: 40 TABLET, DELAYED RELEASE ORAL at 08:58

## 2022-01-01 RX ADMIN — CEFEPIME HYDROCHLORIDE 1 G: 1 INJECTION, POWDER, FOR SOLUTION INTRAMUSCULAR; INTRAVENOUS at 10:30

## 2022-01-01 RX ADMIN — CARVEDILOL 25 MG: 12.5 TABLET, FILM COATED ORAL at 09:11

## 2022-01-01 RX ADMIN — SODIUM CHLORIDE 250 ML: 9 INJECTION, SOLUTION INTRAVENOUS at 22:01

## 2022-01-01 RX ADMIN — SENNOSIDES AND DOCUSATE SODIUM 1 TABLET: 8.6; 5 TABLET ORAL at 22:43

## 2022-01-01 RX ADMIN — HYDROXYZINE HYDROCHLORIDE 25 MG: 25 TABLET, FILM COATED ORAL at 11:38

## 2022-01-01 RX ADMIN — LACTULOSE 20 G: 10 SOLUTION ORAL at 07:51

## 2022-01-01 RX ADMIN — SODIUM CHLORIDE, POTASSIUM CHLORIDE, SODIUM LACTATE AND CALCIUM CHLORIDE: 600; 310; 30; 20 INJECTION, SOLUTION INTRAVENOUS at 14:41

## 2022-01-01 RX ADMIN — SODIUM CHLORIDE 300 ML: 9 INJECTION, SOLUTION INTRAVENOUS at 13:03

## 2022-01-01 RX ADMIN — OXYCODONE HYDROCHLORIDE 5 MG: 5 TABLET ORAL at 16:37

## 2022-01-01 RX ADMIN — CALCIUM ACETATE 667 MG: 667 CAPSULE ORAL at 09:39

## 2022-01-01 RX ADMIN — ZOLPIDEM TARTRATE 5 MG: 5 TABLET ORAL at 21:01

## 2022-01-01 RX ADMIN — LACTULOSE 20 G: 10 SOLUTION ORAL at 21:00

## 2022-01-01 RX ADMIN — Medication: at 14:22

## 2022-01-01 RX ADMIN — HYDROXYZINE HYDROCHLORIDE 25 MG: 25 TABLET ORAL at 10:46

## 2022-01-01 RX ADMIN — CLONIDINE HYDROCHLORIDE 0.1 MG: 0.1 TABLET ORAL at 05:40

## 2022-01-01 RX ADMIN — Medication 0.35 MCG/KG/MIN: at 07:50

## 2022-01-01 RX ADMIN — IOHEXOL 60 ML: 350 INJECTION, SOLUTION INTRAVENOUS at 14:46

## 2022-01-01 RX ADMIN — MIDAZOLAM HYDROCHLORIDE 0.5 MG: 1 INJECTION, SOLUTION INTRAMUSCULAR; INTRAVENOUS at 14:59

## 2022-01-01 RX ADMIN — VASOPRESSIN 2.4 UNITS/HR: 20 INJECTION INTRAVENOUS at 11:24

## 2022-01-01 RX ADMIN — ACETAMINOPHEN 650 MG: 325 TABLET ORAL at 09:19

## 2022-01-01 RX ADMIN — OXYCODONE HYDROCHLORIDE 5 MG: 5 TABLET ORAL at 23:44

## 2022-01-01 RX ADMIN — ACETAMINOPHEN 650 MG: 325 TABLET, FILM COATED ORAL at 00:20

## 2022-01-01 RX ADMIN — CARVEDILOL 25 MG: 12.5 TABLET, FILM COATED ORAL at 17:00

## 2022-01-01 RX ADMIN — LACTULOSE 20 G: 10 SOLUTION ORAL at 13:28

## 2022-01-01 RX ADMIN — HYDROXYZINE HYDROCHLORIDE 25 MG: 25 TABLET, FILM COATED ORAL at 12:54

## 2022-01-01 RX ADMIN — SODIUM CHLORIDE 250 ML: 9 INJECTION, SOLUTION INTRAVENOUS at 23:55

## 2022-01-01 RX ADMIN — LIDOCAINE HYDROCHLORIDE 4 ML: 10 INJECTION, SOLUTION INFILTRATION; PERINEURAL at 13:00

## 2022-01-01 RX ADMIN — ACETAMINOPHEN 500 MG: 500 TABLET ORAL at 12:44

## 2022-01-01 RX ADMIN — OXYCODONE HYDROCHLORIDE 5 MG: 5 TABLET ORAL at 00:02

## 2022-01-01 RX ADMIN — CARVEDILOL 25 MG: 12.5 TABLET, FILM COATED ORAL at 10:59

## 2022-01-01 RX ADMIN — ACETAMINOPHEN 650 MG: 325 TABLET, FILM COATED ORAL at 22:31

## 2022-01-01 RX ADMIN — CARVEDILOL 12.5 MG: 12.5 TABLET, FILM COATED ORAL at 16:21

## 2022-01-01 RX ADMIN — HYDROXYZINE HYDROCHLORIDE 25 MG: 25 TABLET ORAL at 14:54

## 2022-01-01 RX ADMIN — HYDROXYZINE HYDROCHLORIDE 25 MG: 25 TABLET ORAL at 04:56

## 2022-01-01 RX ADMIN — PANTOPRAZOLE SODIUM 40 MG: 40 INJECTION, POWDER, FOR SOLUTION INTRAVENOUS at 18:15

## 2022-01-01 RX ADMIN — FENTANYL CITRATE 50 MCG: 50 INJECTION, SOLUTION INTRAMUSCULAR; INTRAVENOUS at 02:26

## 2022-01-01 RX ADMIN — OXYCODONE HYDROCHLORIDE 5 MG: 5 TABLET ORAL at 20:14

## 2022-01-01 RX ADMIN — ACETAMINOPHEN 975 MG: 325 TABLET ORAL at 00:07

## 2022-01-01 RX ADMIN — OXYCODONE HYDROCHLORIDE 5 MG: 5 TABLET ORAL at 01:19

## 2022-01-01 RX ADMIN — SODIUM CHLORIDE 1000 ML: 9 INJECTION, SOLUTION INTRAVENOUS at 11:58

## 2022-01-01 RX ADMIN — ALTEPLASE 2 MG: 2.2 INJECTION, POWDER, LYOPHILIZED, FOR SOLUTION INTRAVENOUS at 21:03

## 2022-01-01 RX ADMIN — CALCIUM ACETATE 667 MG: 667 CAPSULE ORAL at 16:16

## 2022-01-01 RX ADMIN — LACTULOSE 20 G: 10 SOLUTION ORAL at 08:08

## 2022-01-01 RX ADMIN — B-COMPLEX W/ C & FOLIC ACID TAB 1 MG 1 TABLET: 1 TAB at 18:43

## 2022-01-01 RX ADMIN — ENTECAVIR 0.5 MG: 0.5 TABLET ORAL at 09:20

## 2022-01-01 RX ADMIN — FENTANYL CITRATE 25 MCG: 50 INJECTION INTRAMUSCULAR; INTRAVENOUS at 15:17

## 2022-01-01 RX ADMIN — CARVEDILOL 25 MG: 12.5 TABLET, FILM COATED ORAL at 17:09

## 2022-01-01 RX ADMIN — SODIUM CHLORIDE 150 ML: 9 INJECTION, SOLUTION INTRAVENOUS at 20:30

## 2022-01-01 RX ADMIN — PANTOPRAZOLE SODIUM 40 MG: 40 TABLET, DELAYED RELEASE ORAL at 18:38

## 2022-01-01 RX ADMIN — CALCIUM ACETATE 667 MG: 667 CAPSULE ORAL at 07:54

## 2022-01-01 RX ADMIN — DILTIAZEM HYDROCHLORIDE 180 MG: 180 CAPSULE, COATED, EXTENDED RELEASE ORAL at 08:52

## 2022-01-01 RX ADMIN — DOCUSATE SODIUM 50 MG AND SENNOSIDES 8.6 MG 2 TABLET: 8.6; 5 TABLET, FILM COATED ORAL at 13:24

## 2022-01-01 RX ADMIN — PANTOPRAZOLE SODIUM 40 MG: 40 TABLET, DELAYED RELEASE ORAL at 08:52

## 2022-01-01 RX ADMIN — CALCIUM ACETATE 667 MG: 667 CAPSULE ORAL at 13:51

## 2022-01-01 RX ADMIN — CALCIUM ACETATE 667 MG: 667 CAPSULE ORAL at 17:00

## 2022-01-01 RX ADMIN — EPOETIN ALFA-EPBX 12000 UNITS: 10000 INJECTION, SOLUTION INTRAVENOUS; SUBCUTANEOUS at 08:10

## 2022-01-01 RX ADMIN — OXYCODONE HYDROCHLORIDE 5 MG: 5 TABLET ORAL at 22:46

## 2022-01-01 RX ADMIN — CALCIUM ACETATE 667 MG: 667 CAPSULE ORAL at 08:59

## 2022-01-01 RX ADMIN — ZOLPIDEM TARTRATE 5 MG: 5 TABLET ORAL at 21:15

## 2022-01-01 RX ADMIN — THIAMINE HCL TAB 100 MG 100 MG: 100 TAB at 08:48

## 2022-01-01 RX ADMIN — PANTOPRAZOLE SODIUM 40 MG: 40 INJECTION, POWDER, FOR SOLUTION INTRAVENOUS at 09:18

## 2022-01-01 RX ADMIN — PANTOPRAZOLE SODIUM 40 MG: 40 TABLET, DELAYED RELEASE ORAL at 09:18

## 2022-01-01 RX ADMIN — CALCIUM ACETATE 667 MG: 667 CAPSULE ORAL at 09:31

## 2022-01-01 RX ADMIN — ZOLPIDEM TARTRATE 5 MG: 5 TABLET ORAL at 00:00

## 2022-01-01 RX ADMIN — ZOLPIDEM TARTRATE 5 MG: 5 TABLET ORAL at 23:35

## 2022-01-01 RX ADMIN — PANTOPRAZOLE SODIUM 40 MG: 40 TABLET, DELAYED RELEASE ORAL at 09:15

## 2022-01-01 RX ADMIN — DILTIAZEM HYDROCHLORIDE 180 MG: 180 CAPSULE, COATED, EXTENDED RELEASE ORAL at 03:17

## 2022-01-01 RX ADMIN — MIDAZOLAM HYDROCHLORIDE 0.5 MG: 1 INJECTION, SOLUTION INTRAMUSCULAR; INTRAVENOUS at 15:24

## 2022-01-01 RX ADMIN — HYDROMORPHONE HYDROCHLORIDE 0.5 MG: 1 INJECTION, SOLUTION INTRAMUSCULAR; INTRAVENOUS; SUBCUTANEOUS at 20:19

## 2022-01-01 RX ADMIN — LACTULOSE 20 G: 10 SOLUTION ORAL at 08:52

## 2022-01-01 RX ADMIN — ZOLPIDEM TARTRATE 10 MG: 5 TABLET ORAL at 02:08

## 2022-01-01 RX ADMIN — PANTOPRAZOLE SODIUM 40 MG: 40 TABLET, DELAYED RELEASE ORAL at 09:39

## 2022-01-01 RX ADMIN — THIAMINE HCL TAB 100 MG 100 MG: 100 TAB at 18:15

## 2022-01-01 RX ADMIN — INFLUENZA A VIRUS A/WISCONSIN/588/2019 (H1N1) RECOMBINANT HEMAGGLUTININ ANTIGEN, INFLUENZA A VIRUS A/DARWIN/6/2021 (H3N2) RECOMBINANT HEMAGGLUTININ ANTIGEN, INFLUENZA B VIRUS B/AUSTRIA/1359417/2021 RECOMBINANT HEMAGGLUTININ ANTIGEN, AND INFLUENZA B VIRUS B/PHUKET/3073/2013 RECOMBINANT HEMAGGLUTININ ANTIGEN 0.5 ML: 45; 45; 45; 45 INJECTION INTRAMUSCULAR at 09:35

## 2022-01-01 RX ADMIN — HYDROXYZINE HYDROCHLORIDE 25 MG: 25 TABLET ORAL at 04:20

## 2022-01-01 RX ADMIN — DILTIAZEM HYDROCHLORIDE 180 MG: 180 CAPSULE, COATED, EXTENDED RELEASE ORAL at 10:41

## 2022-01-01 RX ADMIN — Medication 0.08 MCG/KG/MIN: at 00:37

## 2022-01-01 RX ADMIN — CALCIUM ACETATE 667 MG: 667 CAPSULE ORAL at 18:08

## 2022-01-01 RX ADMIN — LACTULOSE 20 G: 10 SOLUTION ORAL at 08:48

## 2022-01-01 RX ADMIN — FENTANYL CITRATE 50 MCG: 50 INJECTION, SOLUTION INTRAMUSCULAR; INTRAVENOUS at 13:39

## 2022-01-01 RX ADMIN — PANTOPRAZOLE SODIUM 40 MG: 40 TABLET, DELAYED RELEASE ORAL at 08:07

## 2022-01-01 RX ADMIN — FENTANYL CITRATE 25 MCG: 50 INJECTION INTRAMUSCULAR; INTRAVENOUS at 12:46

## 2022-01-01 RX ADMIN — FENTANYL CITRATE 25 MCG: 50 INJECTION, SOLUTION INTRAMUSCULAR; INTRAVENOUS at 05:58

## 2022-01-01 RX ADMIN — CALCIUM ACETATE 667 MG: 667 CAPSULE ORAL at 17:13

## 2022-01-01 RX ADMIN — ZOLPIDEM TARTRATE 5 MG: 5 TABLET ORAL at 21:29

## 2022-01-01 RX ADMIN — ZOLPIDEM TARTRATE 5 MG: 5 TABLET ORAL at 02:11

## 2022-01-01 RX ADMIN — PIPERACILLIN AND TAZOBACTAM 3.38 G: 3; .375 INJECTION, POWDER, LYOPHILIZED, FOR SOLUTION INTRAVENOUS at 11:59

## 2022-01-01 RX ADMIN — CARVEDILOL 25 MG: 12.5 TABLET, FILM COATED ORAL at 16:16

## 2022-01-01 RX ADMIN — MIDAZOLAM 0.5 MG: 1 INJECTION INTRAMUSCULAR; INTRAVENOUS at 07:26

## 2022-01-01 RX ADMIN — CALCIUM ACETATE 667 MG: 667 CAPSULE ORAL at 08:17

## 2022-01-01 RX ADMIN — LACTULOSE 20 G: 10 SOLUTION ORAL at 08:07

## 2022-01-01 RX ADMIN — HYDROMORPHONE HYDROCHLORIDE 0.5 MG: 1 INJECTION, SOLUTION INTRAMUSCULAR; INTRAVENOUS; SUBCUTANEOUS at 18:32

## 2022-01-01 RX ADMIN — LACTULOSE 20 G: 10 SOLUTION ORAL at 20:46

## 2022-01-01 RX ADMIN — CEFEPIME HYDROCHLORIDE 1 G: 1 INJECTION, POWDER, FOR SOLUTION INTRAMUSCULAR; INTRAVENOUS at 12:38

## 2022-01-01 RX ADMIN — ACETAMINOPHEN 975 MG: 325 TABLET ORAL at 13:52

## 2022-01-01 RX ADMIN — OXYCODONE HYDROCHLORIDE 5 MG: 5 TABLET ORAL at 21:41

## 2022-01-01 RX ADMIN — PANTOPRAZOLE SODIUM 40 MG: 40 TABLET, DELAYED RELEASE ORAL at 08:35

## 2022-01-01 RX ADMIN — DEXAMETHASONE SODIUM PHOSPHATE 4 MG: 4 INJECTION, SOLUTION INTRA-ARTICULAR; INTRALESIONAL; INTRAMUSCULAR; INTRAVENOUS; SOFT TISSUE at 15:31

## 2022-01-01 RX ADMIN — OXYCODONE HYDROCHLORIDE 5 MG: 5 TABLET ORAL at 10:21

## 2022-01-01 RX ADMIN — ACETAMINOPHEN 975 MG: 325 TABLET ORAL at 20:41

## 2022-01-01 RX ADMIN — CALCIUM ACETATE 667 MG: 667 CAPSULE ORAL at 08:10

## 2022-01-01 RX ADMIN — HYDROXYZINE HYDROCHLORIDE 25 MG: 25 TABLET ORAL at 20:20

## 2022-01-01 RX ADMIN — OXYCODONE HYDROCHLORIDE 5 MG: 5 TABLET ORAL at 02:03

## 2022-01-01 RX ADMIN — CARVEDILOL 25 MG: 12.5 TABLET, FILM COATED ORAL at 16:12

## 2022-01-01 RX ADMIN — OXYCODONE HYDROCHLORIDE 5 MG: 5 TABLET ORAL at 15:27

## 2022-01-01 RX ADMIN — SENNOSIDES AND DOCUSATE SODIUM 1 TABLET: 8.6; 5 TABLET ORAL at 22:38

## 2022-01-01 RX ADMIN — CARVEDILOL 25 MG: 12.5 TABLET, FILM COATED ORAL at 18:18

## 2022-01-01 RX ADMIN — FENTANYL CITRATE 25 MCG: 50 INJECTION INTRAMUSCULAR; INTRAVENOUS at 15:02

## 2022-01-01 RX ADMIN — CALCIUM ACETATE 667 MG: 667 CAPSULE ORAL at 09:32

## 2022-01-01 RX ADMIN — LACTULOSE 20 G: 10 SOLUTION ORAL at 20:32

## 2022-01-01 RX ADMIN — OXYCODONE HYDROCHLORIDE 5 MG: 5 TABLET ORAL at 04:14

## 2022-01-01 RX ADMIN — OXYCODONE HYDROCHLORIDE 5 MG: 5 TABLET ORAL at 23:21

## 2022-01-01 RX ADMIN — SENNOSIDES AND DOCUSATE SODIUM 1 TABLET: 8.6; 5 TABLET ORAL at 22:50

## 2022-01-01 RX ADMIN — MIDAZOLAM HYDROCHLORIDE 0.5 MG: 1 INJECTION, SOLUTION INTRAMUSCULAR; INTRAVENOUS at 05:54

## 2022-01-01 RX ADMIN — LIDOCAINE HYDROCHLORIDE 15 ML: 10 INJECTION, SOLUTION INFILTRATION; PERINEURAL at 06:00

## 2022-01-01 RX ADMIN — HYDROMORPHONE HYDROCHLORIDE 0.2 MG: 1 INJECTION, SOLUTION INTRAMUSCULAR; INTRAVENOUS; SUBCUTANEOUS at 18:22

## 2022-01-01 RX ADMIN — Medication 0.08 MCG/KG/MIN: at 14:25

## 2022-01-01 RX ADMIN — HYDROXYZINE HYDROCHLORIDE 25 MG: 25 TABLET ORAL at 21:59

## 2022-01-01 RX ADMIN — FENTANYL CITRATE 25 MCG: 50 INJECTION, SOLUTION INTRAMUSCULAR; INTRAVENOUS at 14:04

## 2022-01-01 RX ADMIN — DILTIAZEM HYDROCHLORIDE 180 MG: 180 CAPSULE, COATED, EXTENDED RELEASE ORAL at 08:02

## 2022-01-01 RX ADMIN — PANTOPRAZOLE SODIUM 40 MG: 40 TABLET, DELAYED RELEASE ORAL at 09:46

## 2022-01-01 RX ADMIN — ZOLPIDEM TARTRATE 5 MG: 5 TABLET ORAL at 22:42

## 2022-01-01 RX ADMIN — DOCUSATE SODIUM 50 MG AND SENNOSIDES 8.6 MG 2 TABLET: 8.6; 5 TABLET, FILM COATED ORAL at 21:33

## 2022-01-01 RX ADMIN — MIDAZOLAM HYDROCHLORIDE 0.5 MG: 1 INJECTION, SOLUTION INTRAMUSCULAR; INTRAVENOUS at 14:15

## 2022-01-01 RX ADMIN — CARVEDILOL 12.5 MG: 12.5 TABLET, FILM COATED ORAL at 07:57

## 2022-01-01 RX ADMIN — CEPHALEXIN 250 MG: 250 CAPSULE ORAL at 07:53

## 2022-01-01 RX ADMIN — IOPAMIDOL 50 ML: 755 INJECTION, SOLUTION INTRAVENOUS at 17:18

## 2022-01-01 RX ADMIN — ZOLPIDEM TARTRATE 5 MG: 5 TABLET ORAL at 23:57

## 2022-01-01 RX ADMIN — CALCIUM ACETATE 667 MG: 667 CAPSULE ORAL at 16:12

## 2022-01-01 RX ADMIN — CALCIUM ACETATE 667 MG: 667 CAPSULE ORAL at 08:20

## 2022-01-01 RX ADMIN — IOPAMIDOL 100 ML: 755 INJECTION, SOLUTION INTRAVENOUS at 18:10

## 2022-01-01 RX ADMIN — CALCIUM ACETATE 667 MG: 667 CAPSULE ORAL at 08:35

## 2022-01-01 RX ADMIN — PANTOPRAZOLE SODIUM 40 MG: 40 TABLET, DELAYED RELEASE ORAL at 10:01

## 2022-01-01 RX ADMIN — OXYCODONE HYDROCHLORIDE 5 MG: 5 TABLET ORAL at 14:53

## 2022-01-01 RX ADMIN — PANTOPRAZOLE SODIUM 40 MG: 40 TABLET, DELAYED RELEASE ORAL at 09:34

## 2022-01-01 RX ADMIN — CARVEDILOL 12.5 MG: 12.5 TABLET, FILM COATED ORAL at 16:29

## 2022-01-01 RX ADMIN — THIAMINE HCL TAB 100 MG 100 MG: 100 TAB at 08:02

## 2022-01-01 RX ADMIN — ACETAMINOPHEN 650 MG: 325 TABLET, FILM COATED ORAL at 09:10

## 2022-01-01 RX ADMIN — FENTANYL CITRATE 50 MCG: 50 INJECTION, SOLUTION INTRAMUSCULAR; INTRAVENOUS at 12:35

## 2022-01-01 RX ADMIN — MIDAZOLAM HYDROCHLORIDE 1 MG: 1 INJECTION, SOLUTION INTRAMUSCULAR; INTRAVENOUS at 13:35

## 2022-01-01 RX ADMIN — HYDROXYZINE HYDROCHLORIDE 25 MG: 25 TABLET ORAL at 22:39

## 2022-01-01 RX ADMIN — OXYCODONE HYDROCHLORIDE 5 MG: 5 TABLET ORAL at 15:26

## 2022-01-01 RX ADMIN — OXYCODONE HYDROCHLORIDE 5 MG: 5 TABLET ORAL at 19:03

## 2022-01-01 RX ADMIN — ONDANSETRON 4 MG: 2 INJECTION INTRAMUSCULAR; INTRAVENOUS at 15:31

## 2022-01-01 RX ADMIN — CALCIUM ACETATE 667 MG: 667 CAPSULE ORAL at 22:07

## 2022-01-01 RX ADMIN — LACTULOSE 20 G: 10 SOLUTION ORAL at 13:02

## 2022-01-01 RX ADMIN — B-COMPLEX W/ C & FOLIC ACID TAB 1 MG 1 TABLET: 1 TAB at 09:18

## 2022-01-01 RX ADMIN — CLONIDINE HYDROCHLORIDE 0.1 MG: 0.1 TABLET ORAL at 22:14

## 2022-01-01 RX ADMIN — Medication 1000 MG: at 20:12

## 2022-01-01 RX ADMIN — LIDOCAINE HYDROCHLORIDE 20 ML: 10 INJECTION, SOLUTION EPIDURAL; INFILTRATION; INTRACAUDAL; PERINEURAL at 13:39

## 2022-01-01 RX ADMIN — THIAMINE HCL TAB 100 MG 100 MG: 100 TAB at 08:08

## 2022-01-01 RX ADMIN — MIDAZOLAM HYDROCHLORIDE 0.5 MG: 1 INJECTION, SOLUTION INTRAMUSCULAR; INTRAVENOUS at 12:26

## 2022-01-01 RX ADMIN — PANTOPRAZOLE SODIUM 40 MG: 40 INJECTION, POWDER, FOR SOLUTION INTRAVENOUS at 08:09

## 2022-01-01 RX ADMIN — CLONIDINE HYDROCHLORIDE 0.1 MG: 0.1 TABLET ORAL at 20:12

## 2022-01-01 RX ADMIN — IODIXANOL 25 ML: 320 INJECTION, SOLUTION INTRAVASCULAR at 15:38

## 2022-01-01 RX ADMIN — CARVEDILOL 12.5 MG: 12.5 TABLET, FILM COATED ORAL at 13:34

## 2022-01-01 RX ADMIN — HYDROXYZINE HYDROCHLORIDE 25 MG: 25 TABLET ORAL at 09:10

## 2022-01-01 RX ADMIN — OXYCODONE HYDROCHLORIDE 5 MG: 5 TABLET ORAL at 20:51

## 2022-01-01 RX ADMIN — AMOXICILLIN AND CLAVULANATE POTASSIUM 1 TABLET: 500; 125 TABLET, FILM COATED ORAL at 20:46

## 2022-01-01 RX ADMIN — ZOLPIDEM TARTRATE 5 MG: 5 TABLET ORAL at 23:59

## 2022-01-01 RX ADMIN — ENTECAVIR 0.5 MG: 0.5 TABLET ORAL at 08:33

## 2022-01-01 RX ADMIN — EPOETIN ALFA-EPBX 8000 UNITS: 10000 INJECTION, SOLUTION INTRAVENOUS; SUBCUTANEOUS at 17:13

## 2022-01-01 RX ADMIN — PANTOPRAZOLE SODIUM 40 MG: 40 TABLET, DELAYED RELEASE ORAL at 10:18

## 2022-01-01 RX ADMIN — VASOPRESSIN 2.4 UNITS/HR: 20 INJECTION INTRAVENOUS at 03:40

## 2022-01-01 RX ADMIN — SODIUM CHLORIDE 250 ML: 9 INJECTION, SOLUTION INTRAVENOUS at 23:10

## 2022-01-01 RX ADMIN — DILTIAZEM HYDROCHLORIDE 180 MG: 180 CAPSULE, COATED, EXTENDED RELEASE ORAL at 08:08

## 2022-01-01 RX ADMIN — VANCOMYCIN HYDROCHLORIDE 1000 MG: 1 INJECTION, SOLUTION INTRAVENOUS at 00:41

## 2022-01-01 RX ADMIN — Medication 1000 MG: at 20:44

## 2022-01-01 RX ADMIN — Medication 1000 MG: at 20:20

## 2022-01-01 RX ADMIN — CALCIUM ACETATE 667 MG: 667 CAPSULE ORAL at 17:08

## 2022-01-01 RX ADMIN — VANCOMYCIN HYDROCHLORIDE 500 MG: 1 INJECTION, POWDER, LYOPHILIZED, FOR SOLUTION INTRAVENOUS at 00:54

## 2022-01-01 RX ADMIN — CALCIUM ACETATE 667 MG: 667 CAPSULE ORAL at 18:28

## 2022-01-01 RX ADMIN — DILTIAZEM HYDROCHLORIDE 180 MG: 180 CAPSULE, COATED, EXTENDED RELEASE ORAL at 09:31

## 2022-01-01 RX ADMIN — Medication 1000 MG: at 20:47

## 2022-01-01 RX ADMIN — LACTULOSE 20 G: 10 SOLUTION ORAL at 20:29

## 2022-01-01 RX ADMIN — LACTULOSE 20 G: 10 SOLUTION ORAL at 13:51

## 2022-01-01 RX ADMIN — Medication 1 MG: at 02:38

## 2022-01-01 RX ADMIN — CEFEPIME HYDROCHLORIDE 1 G: 1 INJECTION, POWDER, FOR SOLUTION INTRAMUSCULAR; INTRAVENOUS at 10:13

## 2022-01-01 RX ADMIN — Medication: at 20:00

## 2022-01-01 RX ADMIN — VANCOMYCIN HYDROCHLORIDE 1000 MG: 1 INJECTION, SOLUTION INTRAVENOUS at 16:36

## 2022-01-01 RX ADMIN — PROPOFOL 50 MCG/KG/MIN: 10 INJECTION, EMULSION INTRAVENOUS at 15:31

## 2022-01-01 RX ADMIN — SODIUM CHLORIDE 200 ML: 9 INJECTION, SOLUTION INTRAVENOUS at 20:00

## 2022-01-01 RX ADMIN — CEFEPIME HYDROCHLORIDE 1 G: 1 INJECTION, POWDER, FOR SOLUTION INTRAMUSCULAR; INTRAVENOUS at 10:26

## 2022-01-01 RX ADMIN — FENTANYL CITRATE 25 MCG: 50 INJECTION INTRAMUSCULAR; INTRAVENOUS at 12:30

## 2022-01-01 RX ADMIN — METOCLOPRAMIDE HYDROCHLORIDE 5 MG: 5 INJECTION INTRAMUSCULAR; INTRAVENOUS at 23:57

## 2022-01-01 RX ADMIN — AMOXICILLIN AND CLAVULANATE POTASSIUM 1 TABLET: 500; 125 TABLET, FILM COATED ORAL at 20:23

## 2022-01-01 RX ADMIN — CALCIUM ACETATE 667 MG: 667 CAPSULE ORAL at 18:44

## 2022-01-01 RX ADMIN — ZOLPIDEM TARTRATE 5 MG: 5 TABLET ORAL at 02:15

## 2022-01-01 RX ADMIN — OXYCODONE HYDROCHLORIDE 5 MG: 5 TABLET ORAL at 13:07

## 2022-01-01 RX ADMIN — CALCIUM ACETATE 667 MG: 667 CAPSULE ORAL at 13:49

## 2022-01-01 RX ADMIN — ENTECAVIR 0.5 MG: 0.5 TABLET ORAL at 10:31

## 2022-01-01 RX ADMIN — LACTULOSE 20 G: 10 SOLUTION ORAL at 20:38

## 2022-01-01 RX ADMIN — DILTIAZEM HYDROCHLORIDE 180 MG: 180 CAPSULE, COATED, EXTENDED RELEASE ORAL at 09:47

## 2022-01-01 RX ADMIN — EPOETIN ALFA-EPBX 8000 UNITS: 10000 INJECTION, SOLUTION INTRAVENOUS; SUBCUTANEOUS at 10:25

## 2022-01-01 RX ADMIN — MIDAZOLAM HYDROCHLORIDE 0.5 MG: 1 INJECTION, SOLUTION INTRAMUSCULAR; INTRAVENOUS at 06:42

## 2022-01-01 RX ADMIN — LACTULOSE 20 G: 10 SOLUTION ORAL at 09:30

## 2022-01-01 RX ADMIN — LACTULOSE 20 G: 10 SOLUTION ORAL at 21:15

## 2022-01-01 RX ADMIN — CEFAZOLIN 1 G: 1 INJECTION, POWDER, FOR SOLUTION INTRAMUSCULAR; INTRAVENOUS at 13:27

## 2022-01-01 RX ADMIN — ZOLPIDEM TARTRATE 5 MG: 5 TABLET ORAL at 23:31

## 2022-01-01 RX ADMIN — GELATIN ABSORBABLE SPONGE 12-7 MM 1 EACH: 12-7 MISC at 18:11

## 2022-01-01 RX ADMIN — CARVEDILOL 12.5 MG: 12.5 TABLET, FILM COATED ORAL at 16:40

## 2022-01-01 RX ADMIN — MIDAZOLAM 1 MG: 1 INJECTION INTRAMUSCULAR; INTRAVENOUS at 12:32

## 2022-01-01 RX ADMIN — ACETAMINOPHEN 975 MG: 325 TABLET ORAL at 21:45

## 2022-01-01 RX ADMIN — IODIXANOL 75 ML: 320 INJECTION, SOLUTION INTRAVASCULAR at 08:05

## 2022-01-01 RX ADMIN — EPOETIN ALFA-EPBX 12000 UNITS: 40000 INJECTION, SOLUTION INTRAVENOUS; SUBCUTANEOUS at 16:25

## 2022-01-01 RX ADMIN — ACETAMINOPHEN 650 MG: 325 TABLET ORAL at 17:33

## 2022-01-01 RX ADMIN — CEFAZOLIN 1 G: 1 INJECTION, POWDER, FOR SOLUTION INTRAMUSCULAR; INTRAVENOUS at 11:20

## 2022-01-01 RX ADMIN — ACETAMINOPHEN 975 MG: 325 TABLET ORAL at 10:10

## 2022-01-01 RX ADMIN — CARVEDILOL 25 MG: 12.5 TABLET, FILM COATED ORAL at 09:16

## 2022-01-01 RX ADMIN — PANTOPRAZOLE SODIUM 40 MG: 40 TABLET, DELAYED RELEASE ORAL at 07:53

## 2022-01-01 RX ADMIN — PIPERACILLIN AND TAZOBACTAM 3.38 G: 3; .375 INJECTION, POWDER, LYOPHILIZED, FOR SOLUTION INTRAVENOUS at 00:46

## 2022-01-01 RX ADMIN — DILTIAZEM HYDROCHLORIDE 180 MG: 180 CAPSULE, COATED, EXTENDED RELEASE ORAL at 09:39

## 2022-01-01 RX ADMIN — VANCOMYCIN HYDROCHLORIDE 500 MG: 1 INJECTION, POWDER, LYOPHILIZED, FOR SOLUTION INTRAVENOUS at 08:35

## 2022-01-01 RX ADMIN — HYDROXYZINE HYDROCHLORIDE 25 MG: 25 TABLET, FILM COATED ORAL at 20:55

## 2022-01-01 RX ADMIN — Medication 0.03 MCG/KG/MIN: at 00:28

## 2022-01-01 RX ADMIN — CALCIUM ACETATE 667 MG: 667 CAPSULE ORAL at 09:15

## 2022-01-01 RX ADMIN — SODIUM CHLORIDE 300 ML: 9 INJECTION, SOLUTION INTRAVENOUS at 15:00

## 2022-01-01 RX ADMIN — HYDROXYZINE HYDROCHLORIDE 25 MG: 25 TABLET, FILM COATED ORAL at 02:11

## 2022-01-01 RX ADMIN — PANTOPRAZOLE SODIUM 40 MG: 40 TABLET, DELAYED RELEASE ORAL at 09:03

## 2022-01-01 RX ADMIN — LACTULOSE 20 G: 10 SOLUTION ORAL at 14:16

## 2022-01-01 RX ADMIN — ALBUMIN (HUMAN) 25 G: 12.5 INJECTION, SOLUTION INTRAVENOUS at 12:51

## 2022-01-01 RX ADMIN — CARVEDILOL 25 MG: 12.5 TABLET, FILM COATED ORAL at 08:35

## 2022-01-01 RX ADMIN — HYDROXYZINE HYDROCHLORIDE 25 MG: 25 TABLET, FILM COATED ORAL at 01:52

## 2022-01-01 RX ADMIN — ACETAMINOPHEN 975 MG: 325 TABLET ORAL at 23:24

## 2022-01-01 RX ADMIN — AMOXICILLIN AND CLAVULANATE POTASSIUM 1 TABLET: 500; 125 TABLET, FILM COATED ORAL at 20:55

## 2022-01-01 RX ADMIN — LACTULOSE 20 G: 10 SOLUTION ORAL at 14:11

## 2022-01-01 RX ADMIN — SODIUM CHLORIDE 500 ML: 9 INJECTION, SOLUTION INTRAVENOUS at 00:23

## 2022-01-01 RX ADMIN — SODIUM CHLORIDE 100 ML: 9 INJECTION, SOLUTION INTRAVENOUS at 21:00

## 2022-01-01 RX ADMIN — OXYCODONE HYDROCHLORIDE 5 MG: 5 TABLET ORAL at 20:56

## 2022-01-01 RX ADMIN — THIAMINE HCL TAB 100 MG 100 MG: 100 TAB at 09:18

## 2022-01-01 RX ADMIN — OXYCODONE HYDROCHLORIDE 5 MG: 5 TABLET ORAL at 23:23

## 2022-01-01 RX ADMIN — ACETAMINOPHEN 975 MG: 325 TABLET ORAL at 17:59

## 2022-01-01 RX ADMIN — SODIUM CHLORIDE 100 ML: 9 INJECTION, SOLUTION INTRAVENOUS at 14:30

## 2022-01-01 RX ADMIN — ACETAMINOPHEN 500 MG: 500 TABLET ORAL at 07:50

## 2022-01-01 RX ADMIN — LIDOCAINE 2 PATCH: 246 PATCH TOPICAL at 11:15

## 2022-01-01 RX ADMIN — PANTOPRAZOLE SODIUM 40 MG: 40 TABLET, DELAYED RELEASE ORAL at 08:17

## 2022-01-01 RX ADMIN — OXYCODONE HYDROCHLORIDE 5 MG: 5 TABLET ORAL at 23:08

## 2022-01-01 RX ADMIN — CARVEDILOL 12.5 MG: 12.5 TABLET, FILM COATED ORAL at 09:18

## 2022-01-01 RX ADMIN — CALCIUM ACETATE 667 MG: 667 CAPSULE ORAL at 16:39

## 2022-01-01 RX ADMIN — DILTIAZEM HYDROCHLORIDE 180 MG: 180 CAPSULE, COATED, EXTENDED RELEASE ORAL at 09:16

## 2022-01-01 RX ADMIN — IOPAMIDOL 75 ML: 755 INJECTION, SOLUTION INTRAVENOUS at 03:01

## 2022-01-01 RX ADMIN — SENNOSIDES AND DOCUSATE SODIUM 1 TABLET: 8.6; 5 TABLET ORAL at 13:31

## 2022-01-01 RX ADMIN — CALCIUM ACETATE 667 MG: 667 CAPSULE ORAL at 13:07

## 2022-01-01 RX ADMIN — CLONIDINE HYDROCHLORIDE 0.1 MG: 0.1 TABLET ORAL at 20:41

## 2022-01-01 RX ADMIN — DILTIAZEM HYDROCHLORIDE 180 MG: 180 CAPSULE, COATED, EXTENDED RELEASE ORAL at 07:51

## 2022-01-01 RX ADMIN — IOPAMIDOL 75 ML: 755 INJECTION, SOLUTION INTRAVENOUS at 22:23

## 2022-01-01 RX ADMIN — OXYCODONE HYDROCHLORIDE 5 MG: 5 TABLET ORAL at 05:56

## 2022-01-01 RX ADMIN — CALCIUM ACETATE 667 MG: 667 CAPSULE ORAL at 14:04

## 2022-01-01 RX ADMIN — OXYCODONE HYDROCHLORIDE 5 MG: 5 TABLET ORAL at 19:26

## 2022-01-01 RX ADMIN — CLONIDINE HYDROCHLORIDE 0.1 MG: 0.1 TABLET ORAL at 21:17

## 2022-01-01 RX ADMIN — HYDROXYZINE HYDROCHLORIDE 25 MG: 25 TABLET ORAL at 21:33

## 2022-01-01 RX ADMIN — CEFAZOLIN SODIUM 2 G: 2 INJECTION, SOLUTION INTRAVENOUS at 15:25

## 2022-01-01 RX ADMIN — CARVEDILOL 12.5 MG: 12.5 TABLET, FILM COATED ORAL at 16:22

## 2022-01-01 RX ADMIN — CALCIUM ACETATE 667 MG: 667 CAPSULE ORAL at 10:18

## 2022-01-01 RX ADMIN — CARVEDILOL 25 MG: 12.5 TABLET, FILM COATED ORAL at 09:46

## 2022-01-01 RX ADMIN — LACTULOSE 20 G: 10 SOLUTION ORAL at 08:10

## 2022-01-01 RX ADMIN — ACETAMINOPHEN 650 MG: 325 TABLET, FILM COATED ORAL at 17:56

## 2022-01-01 RX ADMIN — PANTOPRAZOLE SODIUM 40 MG: 40 TABLET, DELAYED RELEASE ORAL at 09:57

## 2022-01-01 RX ADMIN — SENNOSIDES AND DOCUSATE SODIUM 1 TABLET: 8.6; 5 TABLET ORAL at 21:58

## 2022-01-01 RX ADMIN — CARVEDILOL 12.5 MG: 12.5 TABLET, FILM COATED ORAL at 08:08

## 2022-01-01 RX ADMIN — ACETAMINOPHEN 650 MG: 325 TABLET ORAL at 09:31

## 2022-01-01 RX ADMIN — THIAMINE HCL TAB 100 MG 100 MG: 100 TAB at 12:40

## 2022-01-01 RX ADMIN — CALCIUM ACETATE 667 MG: 667 CAPSULE ORAL at 18:18

## 2022-01-01 RX ADMIN — Medication 1 MG: at 23:20

## 2022-01-01 RX ADMIN — CLONIDINE HYDROCHLORIDE 0.1 MG: 0.1 TABLET ORAL at 20:43

## 2022-01-01 RX ADMIN — ENTECAVIR 0.5 MG: 0.5 TABLET ORAL at 09:02

## 2022-01-01 RX ADMIN — PANTOPRAZOLE SODIUM 40 MG: 40 INJECTION, POWDER, FOR SOLUTION INTRAVENOUS at 12:38

## 2022-01-01 RX ADMIN — PHENYLEPHRINE HYDROCHLORIDE 1 DROP: 0.5 SPRAY NASAL at 22:47

## 2022-01-01 RX ADMIN — PANTOPRAZOLE SODIUM 40 MG: 40 INJECTION, POWDER, FOR SOLUTION INTRAVENOUS at 08:34

## 2022-01-01 RX ADMIN — ACETAMINOPHEN 975 MG: 325 TABLET ORAL at 14:27

## 2022-01-01 RX ADMIN — PANTOPRAZOLE SODIUM 40 MG: 40 INJECTION, POWDER, FOR SOLUTION INTRAVENOUS at 08:18

## 2022-01-01 ASSESSMENT — ENCOUNTER SYMPTOMS
CHILLS: 0
ABDOMINAL PAIN: 0
FEVER: 0
SHORTNESS OF BREATH: 0
EYE REDNESS: 0
CONFUSION: 0
DIFFICULTY URINATING: 0
DIARRHEA: 0
SHORTNESS OF BREATH: 0
HEMATURIA: 0
NECK STIFFNESS: 0
JOINT SWELLING: 0
DIZZINESS: 0
DIARRHEA: 0
LIGHT-HEADEDNESS: 0
WEAKNESS: 1
VOMITING: 0
APPETITE CHANGE: 0
HEADACHES: 0
ABDOMINAL PAIN: 0
BLOOD IN STOOL: 0
SHORTNESS OF BREATH: 0
DIZZINESS: 0
WEAKNESS: 1
ABDOMINAL PAIN: 0
FATIGUE: 1
COLOR CHANGE: 0
NAUSEA: 0
ARTHRALGIAS: 0
SHORTNESS OF BREATH: 0
CONFUSION: 0
HEADACHES: 1
HEADACHES: 0
FEVER: 0
VOMITING: 1
NAUSEA: 1
VOMITING: 1
ABDOMINAL PAIN: 0
SHORTNESS OF BREATH: 1
DYSURIA: 0
COUGH: 1
DIZZINESS: 1
SORE THROAT: 0
SHORTNESS OF BREATH: 0
ABDOMINAL PAIN: 0
ABDOMINAL DISTENTION: 1

## 2022-01-01 ASSESSMENT — ACTIVITIES OF DAILY LIVING (ADL)
ADLS_ACUITY_SCORE: 11
ADLS_ACUITY_SCORE: 11
ADLS_ACUITY_SCORE: 6
ADLS_ACUITY_SCORE: 7
WALKING_OR_CLIMBING_STAIRS: AMBULATION DIFFICULTY, REQUIRES EQUIPMENT
ADLS_ACUITY_SCORE: 7
ADLS_ACUITY_SCORE: 6
ADLS_ACUITY_SCORE: 5
ADLS_ACUITY_SCORE: 35
ADLS_ACUITY_SCORE: 28
ADLS_ACUITY_SCORE: 35
ADLS_ACUITY_SCORE: 11
ADLS_ACUITY_SCORE: 35
ADLS_ACUITY_SCORE: 35
ADLS_ACUITY_SCORE: 6
BATHING: 1-->ASSISTANCE NEEDED
DRESSING/BATHING_MANAGEMENT: FAMILY ASSIST
ADLS_ACUITY_SCORE: 24
ADLS_ACUITY_SCORE: 6
ADLS_ACUITY_SCORE: 6
ADLS_ACUITY_SCORE: 11
ADLS_ACUITY_SCORE: 7
ADLS_ACUITY_SCORE: 5
ADLS_ACUITY_SCORE: 6
ADLS_ACUITY_SCORE: 30
ADLS_ACUITY_SCORE: 6
ADLS_ACUITY_SCORE: 12
ADLS_ACUITY_SCORE: 10
ADLS_ACUITY_SCORE: 6
ADLS_ACUITY_SCORE: 5
ADLS_ACUITY_SCORE: 6
ADLS_ACUITY_SCORE: 6
ADLS_ACUITY_SCORE: 35
ADLS_ACUITY_SCORE: 31
ADLS_ACUITY_SCORE: 10
ADLS_ACUITY_SCORE: 10
DIFFICULTY_EATING/SWALLOWING: NO
ADLS_ACUITY_SCORE: 6
ADLS_ACUITY_SCORE: 11
ADLS_ACUITY_SCORE: 6
ADLS_ACUITY_SCORE: 5
ADLS_ACUITY_SCORE: 6
DRESSING/BATHING: BATHING DIFFICULTY, ASSISTANCE 1 PERSON
ADLS_ACUITY_SCORE: 35
ADLS_ACUITY_SCORE: 34
ADLS_ACUITY_SCORE: 11
ADLS_ACUITY_SCORE: 6
ADLS_ACUITY_SCORE: 6
ADLS_ACUITY_SCORE: 10
ADLS_ACUITY_SCORE: 34
ADLS_ACUITY_SCORE: 35
ADLS_ACUITY_SCORE: 6
ADLS_ACUITY_SCORE: 24
ADLS_ACUITY_SCORE: 28
ADLS_ACUITY_SCORE: 34
FALL_HISTORY_WITHIN_LAST_SIX_MONTHS: NO
ADLS_ACUITY_SCORE: 10
ADLS_ACUITY_SCORE: 35
WEAR_GLASSES_OR_BLIND: NO
DRESSING/BATHING_DIFFICULTY: YES
ADLS_ACUITY_SCORE: 6
ADLS_ACUITY_SCORE: 6
ADLS_ACUITY_SCORE: 10
ADLS_ACUITY_SCORE: 24
ADLS_ACUITY_SCORE: 14
ADLS_ACUITY_SCORE: 5
ADLS_ACUITY_SCORE: 34
DRESS: 1-->ASSISTANCE (EQUIPMENT/PERSON) NEEDED
ADLS_ACUITY_SCORE: 29
ADLS_ACUITY_SCORE: 24
ADLS_ACUITY_SCORE: 6
ADLS_ACUITY_SCORE: 34
CONCENTRATING,_REMEMBERING_OR_MAKING_DECISIONS_DIFFICULTY: NO
DRESSING/BATHING: BATHING DIFFICULTY, ASSISTANCE 1 PERSON
ADLS_ACUITY_SCORE: 10
ADLS_ACUITY_SCORE: 28
ADLS_ACUITY_SCORE: 10
ADLS_ACUITY_SCORE: 6
ADLS_ACUITY_SCORE: 10
ADLS_ACUITY_SCORE: 24
FALL_HISTORY_WITHIN_LAST_SIX_MONTHS: NO
ADLS_ACUITY_SCORE: 6
ADLS_ACUITY_SCORE: 35
ADLS_ACUITY_SCORE: 32
ADLS_ACUITY_SCORE: 35
ADLS_ACUITY_SCORE: 6
ADLS_ACUITY_SCORE: 35
ADLS_ACUITY_SCORE: 11
ADLS_ACUITY_SCORE: 35
ADLS_ACUITY_SCORE: 6
ADLS_ACUITY_SCORE: 6
ADLS_ACUITY_SCORE: 35
ADLS_ACUITY_SCORE: 6
ADLS_ACUITY_SCORE: 10
ADLS_ACUITY_SCORE: 39
ADLS_ACUITY_SCORE: 35
ADLS_ACUITY_SCORE: 7
ADLS_ACUITY_SCORE: 6
ADLS_ACUITY_SCORE: 6
ADLS_ACUITY_SCORE: 34
ADLS_ACUITY_SCORE: 6
ADLS_ACUITY_SCORE: 6
ADLS_ACUITY_SCORE: 35
ADLS_ACUITY_SCORE: 6
ADLS_ACUITY_SCORE: 6
DEPENDENT_IADLS:: INDEPENDENT
ADLS_ACUITY_SCORE: 10
ADLS_ACUITY_SCORE: 6
ADLS_ACUITY_SCORE: 11
ADLS_ACUITY_SCORE: 37
DEPENDENT_IADLS:: INDEPENDENT
ADLS_ACUITY_SCORE: 10
ADLS_ACUITY_SCORE: 7
ADLS_ACUITY_SCORE: 10
ADLS_ACUITY_SCORE: 10
ADLS_ACUITY_SCORE: 5
ADLS_ACUITY_SCORE: 12
ADLS_ACUITY_SCORE: 6
ADLS_ACUITY_SCORE: 10
ADLS_ACUITY_SCORE: 6
ADLS_ACUITY_SCORE: 10
ADLS_ACUITY_SCORE: 10
ADLS_ACUITY_SCORE: 35
ADLS_ACUITY_SCORE: 30
ADLS_ACUITY_SCORE: 10
ADLS_ACUITY_SCORE: 33
ADLS_ACUITY_SCORE: 10
ADLS_ACUITY_SCORE: 6
ADLS_ACUITY_SCORE: 31
ADLS_ACUITY_SCORE: 6
ADLS_ACUITY_SCORE: 35
ADLS_ACUITY_SCORE: 6
ADLS_ACUITY_SCORE: 34
ADLS_ACUITY_SCORE: 10
ADLS_ACUITY_SCORE: 29
ADLS_ACUITY_SCORE: 10
ADLS_ACUITY_SCORE: 24
ADLS_ACUITY_SCORE: 6
DRESSING/BATHING_DIFFICULTY: YES
ADLS_ACUITY_SCORE: 6
ADLS_ACUITY_SCORE: 10
ADLS_ACUITY_SCORE: 6
ADLS_ACUITY_SCORE: 6
ADLS_ACUITY_SCORE: 32
DOING_ERRANDS_INDEPENDENTLY_DIFFICULTY: YES
ADLS_ACUITY_SCORE: 35
ADLS_ACUITY_SCORE: 11
ADLS_ACUITY_SCORE: 33
ADLS_ACUITY_SCORE: 6
ADLS_ACUITY_SCORE: 10
ADLS_ACUITY_SCORE: 11
ADLS_ACUITY_SCORE: 7
ADLS_ACUITY_SCORE: 10
FALL_HISTORY_WITHIN_LAST_SIX_MONTHS: NO
ADLS_ACUITY_SCORE: 24
ADLS_ACUITY_SCORE: 14
ADLS_ACUITY_SCORE: 6
ADLS_ACUITY_SCORE: 10
ADLS_ACUITY_SCORE: 24
ADLS_ACUITY_SCORE: 6
TOILETING_ISSUES: NO
ADLS_ACUITY_SCORE: 34
ADLS_ACUITY_SCORE: 10
ADLS_ACUITY_SCORE: 6
ADLS_ACUITY_SCORE: 11
ADLS_ACUITY_SCORE: 10
ADLS_ACUITY_SCORE: 10
ADLS_ACUITY_SCORE: 7
CONCENTRATING,_REMEMBERING_OR_MAKING_DECISIONS_DIFFICULTY: NO
ADLS_ACUITY_SCORE: 24
ADLS_ACUITY_SCORE: 32
ADLS_ACUITY_SCORE: 10
TRANSFERRING: 1-->ASSISTANCE (EQUIPMENT/PERSON) NEEDED (NOT DEVELOPMENTALLY APPROPRIATE)
ADLS_ACUITY_SCORE: 6
ADLS_ACUITY_SCORE: 28
ADLS_ACUITY_SCORE: 7
ADLS_ACUITY_SCORE: 10
ADLS_ACUITY_SCORE: 10
ADLS_ACUITY_SCORE: 6
ADLS_ACUITY_SCORE: 35
ADLS_ACUITY_SCORE: 6
ADLS_ACUITY_SCORE: 7
ADLS_ACUITY_SCORE: 28
ADLS_ACUITY_SCORE: 6
ADLS_ACUITY_SCORE: 6
ADLS_ACUITY_SCORE: 11
WALKING_OR_CLIMBING_STAIRS_DIFFICULTY: YES
ADLS_ACUITY_SCORE: 33
ADLS_ACUITY_SCORE: 29
ADLS_ACUITY_SCORE: 7
TOILETING_ISSUES: NO
ADLS_ACUITY_SCORE: 32
DEPENDENT_IADLS:: INDEPENDENT
ADLS_ACUITY_SCORE: 35
ADLS_ACUITY_SCORE: 6
DRESSING/BATHING_DIFFICULTY: NO
ADLS_ACUITY_SCORE: 35
ADLS_ACUITY_SCORE: 6
ADLS_ACUITY_SCORE: 6
ADLS_ACUITY_SCORE: 33
WEAR_GLASSES_OR_BLIND: NO
ADLS_ACUITY_SCORE: 24
ADLS_ACUITY_SCORE: 10
ADLS_ACUITY_SCORE: 11
ADLS_ACUITY_SCORE: 10
ADLS_ACUITY_SCORE: 6
ADLS_ACUITY_SCORE: 6
ADLS_ACUITY_SCORE: 35
ADLS_ACUITY_SCORE: 6
ADLS_ACUITY_SCORE: 11
ADLS_ACUITY_SCORE: 26
ADLS_ACUITY_SCORE: 35
ADLS_ACUITY_SCORE: 12
ADLS_ACUITY_SCORE: 29
ADLS_ACUITY_SCORE: 35
ADLS_ACUITY_SCORE: 35
ADLS_ACUITY_SCORE: 12
ADLS_ACUITY_SCORE: 12
ADLS_ACUITY_SCORE: 31
ADLS_ACUITY_SCORE: 32
ADLS_ACUITY_SCORE: 6
ADLS_ACUITY_SCORE: 35
ADLS_ACUITY_SCORE: 10
ADLS_ACUITY_SCORE: 6
ADLS_ACUITY_SCORE: 24
ADLS_ACUITY_SCORE: 35
ADLS_ACUITY_SCORE: 35
ADLS_ACUITY_SCORE: 39
ADLS_ACUITY_SCORE: 6
ADLS_ACUITY_SCORE: 35
CONCENTRATING,_REMEMBERING_OR_MAKING_DECISIONS_DIFFICULTY: NO
ADLS_ACUITY_SCORE: 6
ADLS_ACUITY_SCORE: 24
ADLS_ACUITY_SCORE: 28
ADLS_ACUITY_SCORE: 35
ADLS_ACUITY_SCORE: 24
TRANSFERRING: 0-->INDEPENDENT
ADLS_ACUITY_SCORE: 11
ADLS_ACUITY_SCORE: 6
ADLS_ACUITY_SCORE: 6
ADLS_ACUITY_SCORE: 10
ADLS_ACUITY_SCORE: 6
ADLS_ACUITY_SCORE: 6
ADLS_ACUITY_SCORE: 11
ADLS_ACUITY_SCORE: 11
ADLS_ACUITY_SCORE: 6
ADLS_ACUITY_SCORE: 11
CHANGE_IN_FUNCTIONAL_STATUS_SINCE_ONSET_OF_CURRENT_ILLNESS/INJURY: YES
DOING_ERRANDS_INDEPENDENTLY_DIFFICULTY: NO
ADLS_ACUITY_SCORE: 35
ADLS_ACUITY_SCORE: 10
ADLS_ACUITY_SCORE: 35
DOING_ERRANDS_INDEPENDENTLY_DIFFICULTY: YES
ADLS_ACUITY_SCORE: 35
ADLS_ACUITY_SCORE: 34
ADLS_ACUITY_SCORE: 24
FALL_HISTORY_WITHIN_LAST_SIX_MONTHS: NO
ADLS_ACUITY_SCORE: 11
ADLS_ACUITY_SCORE: 10
ADLS_ACUITY_SCORE: 10
ADLS_ACUITY_SCORE: 24
ADLS_ACUITY_SCORE: 6
ADLS_ACUITY_SCORE: 10
ADLS_ACUITY_SCORE: 28
ADLS_ACUITY_SCORE: 24
ADLS_ACUITY_SCORE: 11
ADLS_ACUITY_SCORE: 29
ADLS_ACUITY_SCORE: 24
ADLS_ACUITY_SCORE: 35
ADLS_ACUITY_SCORE: 6
ADLS_ACUITY_SCORE: 34
ADLS_ACUITY_SCORE: 10
ADLS_ACUITY_SCORE: 11
ADLS_ACUITY_SCORE: 32
ADLS_ACUITY_SCORE: 11
DEPENDENT_IADLS:: INDEPENDENT
ADLS_ACUITY_SCORE: 6
TOILETING_ISSUES: NO
ADLS_ACUITY_SCORE: 6
ADLS_ACUITY_SCORE: 10
ADLS_ACUITY_SCORE: 31
ADLS_ACUITY_SCORE: 30
WEAR_GLASSES_OR_BLIND: NO
ADLS_ACUITY_SCORE: 24
ADLS_ACUITY_SCORE: 11
CONCENTRATING,_REMEMBERING_OR_MAKING_DECISIONS_DIFFICULTY: NO
ADLS_ACUITY_SCORE: 11
ADLS_ACUITY_SCORE: 10
ADLS_ACUITY_SCORE: 31
ADLS_ACUITY_SCORE: 12
ADLS_ACUITY_SCORE: 24
DIFFICULTY_EATING/SWALLOWING: NO
ADLS_ACUITY_SCORE: 6
ADLS_ACUITY_SCORE: 11
ADLS_ACUITY_SCORE: 35
ADLS_ACUITY_SCORE: 7
ADLS_ACUITY_SCORE: 10
ADLS_ACUITY_SCORE: 6
WALKING_OR_CLIMBING_STAIRS: AMBULATION DIFFICULTY, ASSISTANCE 1 PERSON
ADLS_ACUITY_SCORE: 32
ADLS_ACUITY_SCORE: 35
ADLS_ACUITY_SCORE: 10
ADLS_ACUITY_SCORE: 33
ADLS_ACUITY_SCORE: 35
ADLS_ACUITY_SCORE: 6
ADLS_ACUITY_SCORE: 34
CHANGE_IN_FUNCTIONAL_STATUS_SINCE_ONSET_OF_CURRENT_ILLNESS/INJURY: YES
DIFFICULTY_EATING/SWALLOWING: NO
ADLS_ACUITY_SCORE: 11
ADLS_ACUITY_SCORE: 31
DIFFICULTY_EATING/SWALLOWING: NO
ADLS_ACUITY_SCORE: 10
ADLS_ACUITY_SCORE: 35
ADLS_ACUITY_SCORE: 24
DRESS: 0-->ASSISTANCE NEEDED (DEVELOPMENTALLY APPROPRIATE)
ADLS_ACUITY_SCORE: 6
ADLS_ACUITY_SCORE: 31
WALKING_OR_CLIMBING_STAIRS: AMBULATION DIFFICULTY, REQUIRES EQUIPMENT
ADLS_ACUITY_SCORE: 10
ADLS_ACUITY_SCORE: 24
ADLS_ACUITY_SCORE: 6
EQUIPMENT_CURRENTLY_USED_AT_HOME: CANE, STRAIGHT
ADLS_ACUITY_SCORE: 35
WALKING_OR_CLIMBING_STAIRS_DIFFICULTY: YES
ADLS_ACUITY_SCORE: 6
ADLS_ACUITY_SCORE: 12
ADLS_ACUITY_SCORE: 6
ADLS_ACUITY_SCORE: 11
ADLS_ACUITY_SCORE: 14
DEPENDENT_IADLS:: CLEANING;COOKING;LAUNDRY;SHOPPING;MEAL PREPARATION;TRANSPORTATION
ADLS_ACUITY_SCORE: 6
ADLS_ACUITY_SCORE: 10
ADLS_ACUITY_SCORE: 10
ADLS_ACUITY_SCORE: 34
DRESSING/BATHING_DIFFICULTY: YES
ADLS_ACUITY_SCORE: 6
ADLS_ACUITY_SCORE: 11
ADLS_ACUITY_SCORE: 6
ADLS_ACUITY_SCORE: 32
ADLS_ACUITY_SCORE: 10
ADLS_ACUITY_SCORE: 35
ADLS_ACUITY_SCORE: 11
ADLS_ACUITY_SCORE: 11
ADLS_ACUITY_SCORE: 7
ADLS_ACUITY_SCORE: 10
ADLS_ACUITY_SCORE: 34
ADLS_ACUITY_SCORE: 30
DEPENDENT_IADLS:: CLEANING;COOKING;LAUNDRY;SHOPPING;MEAL PREPARATION;TRANSPORTATION
ADLS_ACUITY_SCORE: 28
ADLS_ACUITY_SCORE: 33
ADLS_ACUITY_SCORE: 35
ADLS_ACUITY_SCORE: 6
ADLS_ACUITY_SCORE: 32
ADLS_ACUITY_SCORE: 6
ADLS_ACUITY_SCORE: 10
ADLS_ACUITY_SCORE: 6
WALKING_OR_CLIMBING_STAIRS_DIFFICULTY: NO
ADLS_ACUITY_SCORE: 35
ADLS_ACUITY_SCORE: 24
DRESSING/BATHING: BATHING DIFFICULTY, ASSISTANCE 1 PERSON
ADLS_ACUITY_SCORE: 11
ADLS_ACUITY_SCORE: 6
ADLS_ACUITY_SCORE: 6
ADLS_ACUITY_SCORE: 10
ADLS_ACUITY_SCORE: 6
ADLS_ACUITY_SCORE: 11
ADLS_ACUITY_SCORE: 6
ADLS_ACUITY_SCORE: 6
ADLS_ACUITY_SCORE: 10
ADLS_ACUITY_SCORE: 10
ADLS_ACUITY_SCORE: 33
HEARING_DIFFICULTY_OR_DEAF: NO
ADLS_ACUITY_SCORE: 6
ADLS_ACUITY_SCORE: 10
ADLS_ACUITY_SCORE: 35
ADLS_ACUITY_SCORE: 24
ADLS_ACUITY_SCORE: 11
WEAR_GLASSES_OR_BLIND: NO
ADLS_ACUITY_SCORE: 35
ADLS_ACUITY_SCORE: 35
ADLS_ACUITY_SCORE: 30
ADLS_ACUITY_SCORE: 11
ADLS_ACUITY_SCORE: 28
ADLS_ACUITY_SCORE: 39
ADLS_ACUITY_SCORE: 32
ADLS_ACUITY_SCORE: 12
ADLS_ACUITY_SCORE: 11
ADLS_ACUITY_SCORE: 24
DEPENDENT_IADLS:: CLEANING;COOKING;LAUNDRY;SHOPPING;MEAL PREPARATION;MEDICATION MANAGEMENT
ADLS_ACUITY_SCORE: 35
ADLS_ACUITY_SCORE: 32
ADLS_ACUITY_SCORE: 32
ADLS_ACUITY_SCORE: 31
CHANGE_IN_FUNCTIONAL_STATUS_SINCE_ONSET_OF_CURRENT_ILLNESS/INJURY: NO
ADLS_ACUITY_SCORE: 11
ADLS_ACUITY_SCORE: 10
ADLS_ACUITY_SCORE: 10
ADLS_ACUITY_SCORE: 31
ADLS_ACUITY_SCORE: 11
ADLS_ACUITY_SCORE: 7
ADLS_ACUITY_SCORE: 35
ADLS_ACUITY_SCORE: 35
ADLS_ACUITY_SCORE: 30
ADLS_ACUITY_SCORE: 24
TOILETING_ISSUES: NO
DRESS: 0-->INDEPENDENT
ADLS_ACUITY_SCORE: 5
TRANSFERRING: 0-->INDEPENDENT
ADLS_ACUITY_SCORE: 10
ADLS_ACUITY_SCORE: 11
ADLS_ACUITY_SCORE: 6
ADLS_ACUITY_SCORE: 11
ADLS_ACUITY_SCORE: 6
ADLS_ACUITY_SCORE: 24
ADLS_ACUITY_SCORE: 5
ADLS_ACUITY_SCORE: 5
ADLS_ACUITY_SCORE: 35
ADLS_ACUITY_SCORE: 32
ADLS_ACUITY_SCORE: 6
CHANGE_IN_FUNCTIONAL_STATUS_SINCE_ONSET_OF_CURRENT_ILLNESS/INJURY: YES
DOING_ERRANDS_INDEPENDENTLY_DIFFICULTY: YES
DEPENDENT_IADLS:: CLEANING;COOKING;LAUNDRY;SHOPPING;MEAL PREPARATION
ADLS_ACUITY_SCORE: 6
ADLS_ACUITY_SCORE: 11
WALKING_OR_CLIMBING_STAIRS_DIFFICULTY: YES
BATHING: 1-->ASSISTANCE NEEDED
ADLS_ACUITY_SCORE: 6
ADLS_ACUITY_SCORE: 24
ADLS_ACUITY_SCORE: 34
ADLS_ACUITY_SCORE: 28
ADLS_ACUITY_SCORE: 34
ADLS_ACUITY_SCORE: 6
ADLS_ACUITY_SCORE: 6
ADLS_ACUITY_SCORE: 30
ADLS_ACUITY_SCORE: 5
ADLS_ACUITY_SCORE: 7
ADLS_ACUITY_SCORE: 10
ADLS_ACUITY_SCORE: 6
ADLS_ACUITY_SCORE: 35
ADLS_ACUITY_SCORE: 14
ADLS_ACUITY_SCORE: 24
ADLS_ACUITY_SCORE: 11
ADLS_ACUITY_SCORE: 6
ADLS_ACUITY_SCORE: 11
ADLS_ACUITY_SCORE: 28
ADLS_ACUITY_SCORE: 6
ADLS_ACUITY_SCORE: 6
ADLS_ACUITY_SCORE: 35
ADLS_ACUITY_SCORE: 26
ADLS_ACUITY_SCORE: 35
ADLS_ACUITY_SCORE: 30
ADLS_ACUITY_SCORE: 28
ADLS_ACUITY_SCORE: 35
ADLS_ACUITY_SCORE: 10
ADLS_ACUITY_SCORE: 4
ADLS_ACUITY_SCORE: 10
ADLS_ACUITY_SCORE: 10
ADLS_ACUITY_SCORE: 7
ADLS_ACUITY_SCORE: 6
ADLS_ACUITY_SCORE: 11
ADLS_ACUITY_SCORE: 6
ADLS_ACUITY_SCORE: 11
ADLS_ACUITY_SCORE: 32
ADLS_ACUITY_SCORE: 34
ADLS_ACUITY_SCORE: 6
ADLS_ACUITY_SCORE: 11
ADLS_ACUITY_SCORE: 31
ADLS_ACUITY_SCORE: 5
ADLS_ACUITY_SCORE: 29
ADLS_ACUITY_SCORE: 28
ADLS_ACUITY_SCORE: 34
ADLS_ACUITY_SCORE: 28
ADLS_ACUITY_SCORE: 34
ADLS_ACUITY_SCORE: 10
ADLS_ACUITY_SCORE: 35
ADLS_ACUITY_SCORE: 6
ADLS_ACUITY_SCORE: 11
ADLS_ACUITY_SCORE: 6
ADLS_ACUITY_SCORE: 7
ADLS_ACUITY_SCORE: 6
ADLS_ACUITY_SCORE: 12
ADLS_ACUITY_SCORE: 34
ADLS_ACUITY_SCORE: 35
ADLS_ACUITY_SCORE: 14
ADLS_ACUITY_SCORE: 34
ADLS_ACUITY_SCORE: 32
ADLS_ACUITY_SCORE: 10
ADLS_ACUITY_SCORE: 6
ADLS_ACUITY_SCORE: 39
ADLS_ACUITY_SCORE: 6
ADLS_ACUITY_SCORE: 34
ADLS_ACUITY_SCORE: 6
ADLS_ACUITY_SCORE: 6
ADLS_ACUITY_SCORE: 24
ADLS_ACUITY_SCORE: 10
ADLS_ACUITY_SCORE: 31
ADLS_ACUITY_SCORE: 34
ADLS_ACUITY_SCORE: 6
ADLS_ACUITY_SCORE: 10
ADLS_ACUITY_SCORE: 10
ADLS_ACUITY_SCORE: 24
ADLS_ACUITY_SCORE: 5
ADLS_ACUITY_SCORE: 35
ADLS_ACUITY_SCORE: 6
ADLS_ACUITY_SCORE: 7
ADLS_ACUITY_SCORE: 6
ADLS_ACUITY_SCORE: 10
DIFFICULTY_COMMUNICATING: NO
ADLS_ACUITY_SCORE: 6
ADLS_ACUITY_SCORE: 24
ADLS_ACUITY_SCORE: 6
ADLS_ACUITY_SCORE: 35
ADLS_ACUITY_SCORE: 28
ADLS_ACUITY_SCORE: 6
ADLS_ACUITY_SCORE: 24
ADLS_ACUITY_SCORE: 7
TRANSFERRING: 1-->ASSISTANCE (EQUIPMENT/PERSON) NEEDED
ADLS_ACUITY_SCORE: 6
ADLS_ACUITY_SCORE: 10
ADLS_ACUITY_SCORE: 10
ADLS_ACUITY_SCORE: 6
ADLS_ACUITY_SCORE: 28
ADLS_ACUITY_SCORE: 28
ADLS_ACUITY_SCORE: 6
ADLS_ACUITY_SCORE: 24
EQUIPMENT_CURRENTLY_USED_AT_HOME: CANE, STRAIGHT;GRAB BAR, TOILET;GRAB BAR, TUB/SHOWER
ADLS_ACUITY_SCORE: 35
ADLS_ACUITY_SCORE: 11
ADLS_ACUITY_SCORE: 10
ADLS_ACUITY_SCORE: 6
ADLS_ACUITY_SCORE: 24
ADLS_ACUITY_SCORE: 6
ADLS_ACUITY_SCORE: 6
ADLS_ACUITY_SCORE: 10
ADLS_ACUITY_SCORE: 28
ADLS_ACUITY_SCORE: 6
ADLS_ACUITY_SCORE: 6
ADLS_ACUITY_SCORE: 24
ADLS_ACUITY_SCORE: 24
DRESS: 0-->INDEPENDENT

## 2022-01-01 ASSESSMENT — PATIENT HEALTH QUESTIONNAIRE - PHQ9
SUM OF ALL RESPONSES TO PHQ QUESTIONS 1-9: 4
SUM OF ALL RESPONSES TO PHQ QUESTIONS 1-9: 4
SUM OF ALL RESPONSES TO PHQ QUESTIONS 1-9: 0
SUM OF ALL RESPONSES TO PHQ QUESTIONS 1-9: 0
10. IF YOU CHECKED OFF ANY PROBLEMS, HOW DIFFICULT HAVE THESE PROBLEMS MADE IT FOR YOU TO DO YOUR WORK, TAKE CARE OF THINGS AT HOME, OR GET ALONG WITH OTHER PEOPLE: SOMEWHAT DIFFICULT
10. IF YOU CHECKED OFF ANY PROBLEMS, HOW DIFFICULT HAVE THESE PROBLEMS MADE IT FOR YOU TO DO YOUR WORK, TAKE CARE OF THINGS AT HOME, OR GET ALONG WITH OTHER PEOPLE: NOT DIFFICULT AT ALL
SUM OF ALL RESPONSES TO PHQ QUESTIONS 1-9: 5
10. IF YOU CHECKED OFF ANY PROBLEMS, HOW DIFFICULT HAVE THESE PROBLEMS MADE IT FOR YOU TO DO YOUR WORK, TAKE CARE OF THINGS AT HOME, OR GET ALONG WITH OTHER PEOPLE: NOT DIFFICULT AT ALL
SUM OF ALL RESPONSES TO PHQ QUESTIONS 1-9: 5
SUM OF ALL RESPONSES TO PHQ QUESTIONS 1-9: 5

## 2022-01-01 ASSESSMENT — ANXIETY QUESTIONNAIRES
4. TROUBLE RELAXING: NOT AT ALL
7. FEELING AFRAID AS IF SOMETHING AWFUL MIGHT HAPPEN: NOT AT ALL
5. BEING SO RESTLESS THAT IT IS HARD TO SIT STILL: NOT AT ALL
2. NOT BEING ABLE TO STOP OR CONTROL WORRYING: NOT AT ALL
7. FEELING AFRAID AS IF SOMETHING AWFUL MIGHT HAPPEN: NOT AT ALL
8. IF YOU CHECKED OFF ANY PROBLEMS, HOW DIFFICULT HAVE THESE MADE IT FOR YOU TO DO YOUR WORK, TAKE CARE OF THINGS AT HOME, OR GET ALONG WITH OTHER PEOPLE?: NOT DIFFICULT AT ALL
GAD7 TOTAL SCORE: 0
4. TROUBLE RELAXING: NOT AT ALL
1. FEELING NERVOUS, ANXIOUS, OR ON EDGE: NOT AT ALL
GAD7 TOTAL SCORE: 0
1. FEELING NERVOUS, ANXIOUS, OR ON EDGE: NOT AT ALL
7. FEELING AFRAID AS IF SOMETHING AWFUL MIGHT HAPPEN: NOT AT ALL
6. BECOMING EASILY ANNOYED OR IRRITABLE: NOT AT ALL
7. FEELING AFRAID AS IF SOMETHING AWFUL MIGHT HAPPEN: NOT AT ALL
3. WORRYING TOO MUCH ABOUT DIFFERENT THINGS: NOT AT ALL
6. BECOMING EASILY ANNOYED OR IRRITABLE: NOT AT ALL
7. FEELING AFRAID AS IF SOMETHING AWFUL MIGHT HAPPEN: NOT AT ALL
GAD7 TOTAL SCORE: 0
1. FEELING NERVOUS, ANXIOUS, OR ON EDGE: NOT AT ALL
8. IF YOU CHECKED OFF ANY PROBLEMS, HOW DIFFICULT HAVE THESE MADE IT FOR YOU TO DO YOUR WORK, TAKE CARE OF THINGS AT HOME, OR GET ALONG WITH OTHER PEOPLE?: NOT DIFFICULT AT ALL
4. TROUBLE RELAXING: NOT AT ALL
3. WORRYING TOO MUCH ABOUT DIFFERENT THINGS: NOT AT ALL
GAD7 TOTAL SCORE: 0
GAD7 TOTAL SCORE: 0
2. NOT BEING ABLE TO STOP OR CONTROL WORRYING: NOT AT ALL
GAD7 TOTAL SCORE: 0
7. FEELING AFRAID AS IF SOMETHING AWFUL MIGHT HAPPEN: NOT AT ALL
GAD7 TOTAL SCORE: 0
GAD7 TOTAL SCORE: 0
2. NOT BEING ABLE TO STOP OR CONTROL WORRYING: NOT AT ALL
GAD7 TOTAL SCORE: 0
3. WORRYING TOO MUCH ABOUT DIFFERENT THINGS: NOT AT ALL
5. BEING SO RESTLESS THAT IT IS HARD TO SIT STILL: NOT AT ALL
6. BECOMING EASILY ANNOYED OR IRRITABLE: NOT AT ALL
GAD7 TOTAL SCORE: 0
5. BEING SO RESTLESS THAT IT IS HARD TO SIT STILL: NOT AT ALL

## 2022-01-01 ASSESSMENT — COPD QUESTIONNAIRES
CAT_SEVERITY: MODERATE
COPD: 1

## 2022-01-01 ASSESSMENT — PAIN SCALES - GENERAL
PAINLEVEL: MODERATE PAIN (5)
PAINLEVEL: NO PAIN (0)
PAINLEVEL: NO PAIN (0)

## 2022-01-01 ASSESSMENT — MIFFLIN-ST. JEOR
SCORE: 1348.77
SCORE: 1321.56
SCORE: 1362.38

## 2022-01-06 NOTE — PROGRESS NOTES
THORACIC SURGERY - NEW PATIENT OFFICE VISIT      Dear Dr. Flores,    I saw Franny at your request in consultation for the evaluation and treatment of a RIGHT trapped lung     HPI  Mr. Elle Blanton is a 61 year old man with a RIGHT trapped lung. He has had an effusion and later empyema (VRE) on that side since April of 2021 and has had several chest tubes placed. However, now the lung is trapped.he feels well from the respiratory perspective. Mr. Blanton felt relief after drainage of his effusion 6 weeks ago in the hospital, and now he states that his breathing is fine. He does not feel like he needs an intervention at this time.                                      Previsit Tests   CXR 1/6/2022:      CXR 11/15/2021:      CT chest (11/14/2021): RIGHT trapped lung, 5.5 cm cm type II aortic dissection, cirrhosis without ascites.      PMH    Past Medical History:   Diagnosis Date     NAHUM (acute kidney injury) (H)      GI (gastrointestinal bleed)      Gout      H. pylori infection 7/5/2017    UGI bleed implied by Hgb 9.0 6/22/17 and melena. Admitted and hgb decreased to 7.6, CT abdomen showed all bladder wall thickening. + Hep B surface antigen noted. Melena resolved and hgb stabalized without transfusion, epigastric pain resolved with PPI. Recommend referral for gastroscopy.     Heart attack (H)      Hepatitis B      Normocytic anemia      PONV (postoperative nausea and vomiting)      Thrombocytopenia (H)         PSH    Past Surgical History:   Procedure Laterality Date     ABCESS DRAINAGE      finger     BURSECTOMY ELBOW Left 10/15/2021    Procedure: LEFT OLECRANON BURSECTOMY;  Surgeon: Tico Myers MD;  Location: [facility]     CREATE FISTULA ARTERIOVENOUS UPPER EXTREMITY Left 4/20/2021    Procedure: left arm dialysis graft placement;  Surgeon: Roxana Cosby MD;  Location: [facility];  Service: General     FOREIGN BODY REMOVAL      finger     INCISION AND DRAINAGE FINGER, COMBINED Left 10/20/2016     "Procedure: COMBINED INCISION AND DRAINAGE FINGER;  Surgeon: Mireya Pizano MD;  Location: [facility]     IR CHEST TUBE PLACEMENT NON-TUNNELED RIGHT  4/19/2021     IR CHEST TUBE PLACEMENT NON-TUNNELED RIGHT  10/19/2021     IR CHEST TUBE PLACEMENT NON-TUNNELED RIGHT  11/10/2021     IR PLEURAL DRAINAGE WITH CATHETER INSERTION  4/19/2021     MIDLINE INSERTION - DOUBLE LUMEN  4/23/2021          KS ESOPHAGOGASTRODUODENOSCOPY TRANSORAL DIAGNOSTIC N/A 8/18/2020    Procedure: ESOPHAGOGASTRODUODENOSCOPY (EGD) with biopsy;  Surgeon: Nilson Gonzales MD;  Location: [facility];  Service: Gastroenterology      PARACENTESIS  8/14/2020      PARACENTESIS  8/19/2020     US THORACENTESIS  4/18/2021        ETOH negative  TOB negative    Physical examination  /76 (BP Location: Right arm, Patient Position: Sitting, Cuff Size: Adult Regular)   Pulse 66   Ht 1.651 m (5' 5\")   Wt 63 kg (139 lb)   SpO2 98%   BMI 23.13 kg/m     He is very frail, in a wheelchair, he appears chronically ill.      From a personal perspective, he is here with his wife, Maico.    IMPRESSION (J98.19) Trapped lung  (primary encounter diagnosis)     61 year old year-old male with a RIGHT trapped lung   ESRD on hemodialysis  Cirrhosis and ascites (Hepatitis B)    PLAN  I spent 45 min on the date of the encounter in chart review, patient visit, review of tests, documentation and/or discussion with other providers about the issues documented above. I reviewed the plan as follows:  We had a long conversation about his trapped lung. I explained that surgery is not an option for him. A VATS or open decortication would be very challenging due to the chronicity and lead to significant intraoperative blood loss, which he would not be able to tolerate.    Recommendation: Palliation of dyspnea with intermittent outpatient thoracentesis as needed. At this time he does not need a thoracentesis, since he feels well. The chest cavity has filled USP, maybe he " can get by with a thoracentesis every 8-12 weeks..    All questions were answered and Elle Blanton and present family were in agreement with the plan.  I appreciate the opportunity to participate in the care of your patient and will keep you updated.  Sincerely,    Pilo Zurita MD

## 2022-01-09 PROBLEM — D69.6 THROMBOCYTOPENIA (H): Status: ACTIVE | Noted: 2017-06-24

## 2022-01-09 PROBLEM — R04.0 EPISTAXIS: Status: ACTIVE | Noted: 2022-01-01

## 2022-01-09 PROBLEM — S02.2XXA CLOSED FRACTURE OF NASAL BONE, INITIAL ENCOUNTER: Status: ACTIVE | Noted: 2022-01-01
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
## 2022-01-09 NOTE — ED PROVIDER NOTES
ED SIGNOUT  Date/Time:1/8/2022 10:25 PM    Patient signed out to me by my colleague, Dominga Pizano MD.  Please see their note for complete history and physical. Plan to follow up on bleeding of wound.    The creation of this record is based on the scribe s observations of the work being performed by Denzel Bergeron MD and the provider s statements to them. It was created on their behalf by Pranay cardenas trained medical scribe. This document has been checked and approved by the attending provider.      REMAINING ED WORKUP:  Re-evaluation for resolution of epistaxis    Vitals:  BP (!) 165/104   Pulse 95   Temp 98.2  F (36.8  C)   Resp 18   Wt 61.2 kg (135 lb)   SpO2 99%   BMI 22.47 kg/m        Pertinent labs results reviewed   Results for orders placed or performed during the hospital encounter of 01/08/22   Cervical spine CT w/o contrast    Impression    IMPRESSION:  HEAD CT:  1.  No acute intracranial process.    FACIAL BONE CT:  1.  Anterior nasal bone fracture with overlying soft tissue swelling and blood products in the left maxillary sinus. There is nasal packing.  2.  Large left paramedian frontal scalp contusion/hematoma. No associated calvarial fracture.  3.  Nonspecific calcific density within the right masseter muscle. Correlate clinically.    CERVICAL SPINE CT:  1.  No fracture or posttraumatic subluxation.  2.  No high-grade spinal canal or neural foraminal stenosis.   Head CT w/o contrast    Impression    IMPRESSION:  HEAD CT:  1.  No acute intracranial process.    FACIAL BONE CT:  1.  Anterior nasal bone fracture with overlying soft tissue swelling and blood products in the left maxillary sinus. There is nasal packing.  2.  Large left paramedian frontal scalp contusion/hematoma. No associated calvarial fracture.  3.  Nonspecific calcific density within the right masseter muscle. Correlate clinically.    CERVICAL SPINE CT:  1.  No fracture or posttraumatic subluxation.  2.  No high-grade spinal  canal or neural foraminal stenosis.   CT Facial Bones without Contrast    Impression    IMPRESSION:  HEAD CT:  1.  No acute intracranial process.    FACIAL BONE CT:  1.  Anterior nasal bone fracture with overlying soft tissue swelling and blood products in the left maxillary sinus. There is nasal packing.  2.  Large left paramedian frontal scalp contusion/hematoma. No associated calvarial fracture.  3.  Nonspecific calcific density within the right masseter muscle. Correlate clinically.    CERVICAL SPINE CT:  1.  No fracture or posttraumatic subluxation.  2.  No high-grade spinal canal or neural foraminal stenosis.   XR Wrist Left G/E 3 Views    Impression    IMPRESSION: Small chronic appearing dorsal triquetral fracture. Moderate soft tissue swelling along the dorsum of the wrist. Osteopenia. Mild degenerative changes at the first CMC joint. Cystic change base of the lunate.   CBC (+ platelets, no diff)   Result Value Ref Range    WBC Count 5.5 4.0 - 11.0 10e3/uL    RBC Count 2.95 (L) 4.40 - 5.90 10e6/uL    Hemoglobin 8.9 (L) 13.3 - 17.7 g/dL    Hematocrit 29.0 (L) 40.0 - 53.0 %    MCV 98 78 - 100 fL    MCH 30.2 26.5 - 33.0 pg    MCHC 30.7 (L) 31.5 - 36.5 g/dL    RDW 16.2 (H) 10.0 - 15.0 %    Platelet Count 75 (L) 150 - 450 10e3/uL   Basic metabolic panel   Result Value Ref Range    Sodium 138 136 - 145 mmol/L    Potassium 4.6 3.5 - 5.0 mmol/L    Chloride 100 98 - 107 mmol/L    Carbon Dioxide (CO2) 23 22 - 31 mmol/L    Anion Gap 15 5 - 18 mmol/L    Urea Nitrogen 74 (H) 8 - 22 mg/dL    Creatinine 9.46 (HH) 0.70 - 1.30 mg/dL    Calcium 8.0 (L) 8.5 - 10.5 mg/dL    Glucose 96 70 - 125 mg/dL    GFR Estimate 6 (L) >60 mL/min/1.73m2   CBC with platelets and differential   Result Value Ref Range    WBC Count 8.8 4.0 - 11.0 10e3/uL    RBC Count 2.72 (L) 4.40 - 5.90 10e6/uL    Hemoglobin 8.3 (L) 13.3 - 17.7 g/dL    Hematocrit 27.0 (L) 40.0 - 53.0 %    MCV 99 78 - 100 fL    MCH 30.5 26.5 - 33.0 pg    MCHC 30.7 (L) 31.5 - 36.5  g/dL    RDW 16.4 (H) 10.0 - 15.0 %    Platelet Count 129 (L) 150 - 450 10e3/uL    % Neutrophils 77 %    % Lymphocytes 11 %    % Monocytes 9 %    % Eosinophils 1 %    % Basophils 1 %    % Immature Granulocytes 1 %    NRBCs per 100 WBC 0 <1 /100    Absolute Neutrophils 6.9 1.6 - 8.3 10e3/uL    Absolute Lymphocytes 1.0 0.8 - 5.3 10e3/uL    Absolute Monocytes 0.8 0.0 - 1.3 10e3/uL    Absolute Eosinophils 0.0 0.0 - 0.7 10e3/uL    Absolute Basophils 0.0 0.0 - 0.2 10e3/uL    Absolute Immature Granulocytes 0.1 <=0.4 10e3/uL    Absolute NRBCs 0.0 10e3/uL   Prepare pheresed platelets (unit)   Result Value Ref Range    UNIT ABO/RH A Pos     Unit Number [id number]     Unit Status Issued     Blood Component Type Platelets     Product Code [id number]     CODING SYSTEM [id number]     UNIT TYPE ISBT 6200     ISSUE DATE AND TIME [id number]        Pertinent imaging reviewed   Please see official radiology report.  XR Wrist Left G/E 3 Views   Final Result   IMPRESSION: Small chronic appearing dorsal triquetral fracture. Moderate soft tissue swelling along the dorsum of the wrist. Osteopenia. Mild degenerative changes at the first CMC joint. Cystic change base of the lunate.      Cervical spine CT w/o contrast   Final Result   IMPRESSION:   HEAD CT:   1.  No acute intracranial process.      FACIAL BONE CT:   1.  Anterior nasal bone fracture with overlying soft tissue swelling and blood products in the left maxillary sinus. There is nasal packing.   2.  Large left paramedian frontal scalp contusion/hematoma. No associated calvarial fracture.   3.  Nonspecific calcific density within the right masseter muscle. Correlate clinically.      CERVICAL SPINE CT:   1.  No fracture or posttraumatic subluxation.   2.  No high-grade spinal canal or neural foraminal stenosis.      CT Facial Bones without Contrast   Final Result   IMPRESSION:   HEAD CT:   1.  No acute intracranial process.      FACIAL BONE CT:   1.  Anterior nasal bone fracture  with overlying soft tissue swelling and blood products in the left maxillary sinus. There is nasal packing.   2.  Large left paramedian frontal scalp contusion/hematoma. No associated calvarial fracture.   3.  Nonspecific calcific density within the right masseter muscle. Correlate clinically.      CERVICAL SPINE CT:   1.  No fracture or posttraumatic subluxation.   2.  No high-grade spinal canal or neural foraminal stenosis.      Head CT w/o contrast   Final Result   IMPRESSION:   HEAD CT:   1.  No acute intracranial process.      FACIAL BONE CT:   1.  Anterior nasal bone fracture with overlying soft tissue swelling and blood products in the left maxillary sinus. There is nasal packing.   2.  Large left paramedian frontal scalp contusion/hematoma. No associated calvarial fracture.   3.  Nonspecific calcific density within the right masseter muscle. Correlate clinically.      CERVICAL SPINE CT:   1.  No fracture or posttraumatic subluxation.   2.  No high-grade spinal canal or neural foraminal stenosis.           Interventions  Medications   phenylephrine (GENOVEVA-SYNEPHRINE) 0.5 % spray 1 drop (1 drop Both Nostrils Given by Other 1/8/22 2247)   acetaminophen (TYLENOL) tablet 975 mg (975 mg Oral Given 1/9/22 0007)      Procedures:    PROCEDURE: Epistaxis Management   INDICATIONS: Failure of epistaxis control with non-invasive management techniques.   PROCEDURE PROVIDER: Dr Dutch Bergeron   SITE: Left, Posterior   MEDICATION: Lidocaine with epi   NOTE:   Posterior Source: The area was evaluated and cleared with nasal suction.  The bleeding location was managed with b/l posterior rapid rhinos. Bleeding slowed significantly.     COMPLICATIONS: Patient tolerated procedure well, without complication          ED Course/MDM:  10:25 PM Signout accepted from Dominga Pizano MD.  Prior records were reviewed.  Diagnostics from this visit are reviewed.  10:56 PM I introduced myself to the patient.  Re-evaluated him.  He reports  continued mild oozing, just finished platelet transfusion.  Discussed replacing right side with rapid rhino as Dr. Pizano had before.  He would like to wait awhile longer and see if it resolves now after transfusion. Will recheck next 30-45 minutes.  11:58 PM I rechecked and updated the patient.    1:06 AM Pt with continued oozing.  Tried to replace left side and bleeding increased.  New ant/poster rapid rhino placed.  Will call IR.  Unable to visualize point of bleeding, suspect this is posterior and despite Dr. Pizano and my efforts, we are unable to resolve now 7 hours later.  1:12 AM I spoke with nursing working with Dr. Valles, IR.  He's currently in a procedure, so unable to take my call.  He will call back when available.  1:44 AM I spoke with Dr. Valles IR. If pt stable, will plan for IR intervention in the morning.  Pt remains stable here. Will reassess after repeat Hgb to decide if earlier intervention needed.    2:10 AM I rechecked and updated the patient.  Posterior Rapid Rhino placed to right nostril now.  Pt with b/l rapid rhinos in place.  Hgb returned at 8.3.  Pt remains hemodynamically stable. Will hospitalize overnight for IR to see in the morning.  2:35 AM I spoke with [place], Dr. Lundeen.   3:23 AM Bleeding essentially resolved with b/l posterior rapid rhinos.    3:48 AM I spoke with IR. Will plan for AM.      ED Course as of 01/09/22 0235   Sat Jan 08, 2022 1937 Patient is a 61-year-old male who comes in today for evaluation of a nosebleed after he tripped and fell and landed on his face.  He has tenderness over the nose and his CT scan did show a nasal bone fracture.  He has been having bleeding from both naris ever since.  He reports a low hemoglobin and issues with pretty significant nosebleeds in the past.  He was worried about his hemoglobin.  We obey it when he first got here and it was at 8.9.  He has since had from significant bleeding.  I removed the packing that he had  placed which he had significant clot on the left side.  I tried to place a rapid Rhino 2 different times.  I got him placed but he was still bleeding.  The second 1 I soaked in phenylephrine.  I tried Merisel which was soaked in blood and then just continued to drip.  I am going to let them sit in the nose for a little bit but when I left the room he was still having pretty significant oozing despite the rapid Rhino.  I will check back in on him a little bit.  His imaging was otherwise unremarkable.  He complains of left wrist pain but has pretty good pronation and supination.  No acute fracture was seen on the wrist x-ray.   1948 I spoke with Dr. Gage with ENT.  She had no further recommendations other than to transfuse the patient and if he is still not responding then to get IR involved.   2016 I discussed the plan for platelet transfusion with the patient.  He has had platelets before.  He consented for them.  He is still losing a little bit despite bilateral rapid Rhino's.  I talked to him about needing to leave both sides in.  He is very uncomfortable with that idea.  He wants to at least try to pull out the right side once we do the platelets.  I advised against that but he is pretty adamant that he is not can be able to breathe and sleep well enough if he leaves both sides then.  The right side was bleeding far less than the left side so I might be willing to try it once the platelets are in.   2109 Patient is starting his platelets now.  He is still having a little bit of oozing.  I am hoping that that will resolve.  If it does not resolve then we will have to call interventional radiology.    2127 Patient keeps asking to remove the packing.  I told him I do not think it is a good idea.  We will start the platelet transfusion shortly.   2139 I just spoke with neuro IR.  He said that if the platelets do not stop the bleeding then [facility] depending on the stability.  Patient will likely need signout to Dr. Bergeron to follow-up on the bleeding after the platelets are done.   2158 Patient was adamant about wanting the right side of his packing out.  He wants to put in the Vaseline gauze.  He was not having significant bleeding from the right side so I told him that we could try it.  If he has more bleeding again he may need to be repacked.  I told him I did not want to remove the packing on the left side because that is where he was having the most bleeding and that is where he was still oozing.  His platelets are going in right now and we will reassess.           1. Epistaxis    2. Fall, initial encounter    3. Closed fracture of nasal bone, initial encounter    4. Thrombocytopenia (H)          Denzel Bergeron,   [facility] Emergency Department       Denzel Bergeron MD  01/09/22 0396

## 2022-01-09 NOTE — ED NOTES
When I released pt's admission orders there was an order for 1 unit of platelets. When I called the blood bank they said that pt's platelets is 129 now and this is an old order even though it still appears to be active. They do not recommend another transfusion at this time. This order was placed by Dr. Pizano last night and could have been possible a duplicate order. Pt already received 1 unit of platelets earlier.

## 2022-01-09 NOTE — H&P
[facility]    History and Physical - [facility] Medicine Residency Service        Date of Admission:  1/8/2022    Assessment & Plan      Elle Blanton is a 61 year old male admitted on 1/8/2022. He has a history of ESRD on dialysis, chronic empyema/trapped lung, chronic liver disease, and is admitted for difficult to control epistaxis after a fall.    *note that med rec was not yet completed upon admit - needs to be done in AM    Epistaxis  Nasal Bone Fracture  Bleeding better controlled at time of admit. Plan is for him to go to IR in the morning if he is still having oozing.  - keep rhino rockets in place for now  - IR as needed for embolization  - ENT consult for nasal bone fracture management    Anemia  Thrombocytopenia  Hemoglobin is 8.9 --> 8.3, which is actually at his baseline due to chronic renal disease. Will recheck hemoglobin once more in AM to ensure it is not downtrending due to this acute blood loss which sounds significant per the ED notes. Platelets were 75, thrombocytopenia is chronic for him as well with his liver disease. He received 1 unit of platelets in the ED and they came up to 129 with better control of his bleeding.  - CBC in AM    Abdominal Swelling  Concern for Ascites  Chronic Liver Disease - Hep B vs Hematoma vs malignancy  Patient states this swelling is new/worsening over the past several days. Fluid palpable on admission exam, with limited exam due to patient sitting up/forward with nosebleed. Would obtain a better exam of his abdomen once he is able to lay flat, and then consider US paracentesis.     History of R Trapped Lung   No VATS surgery indicated per N cardiothoracic surgery. Patient is not having any difficulty breathing. Not hypoxic. Continue to monitor.     ESRD on Hemodialysis M/F  - nephrology consult for dialysis if still hospitalized Monday 1/10    Chronic Aortic Dissection  Hypertension  Continue PTA carvedilol, diltiazem,  and clonidine (last hospitalization he would decline to take carvedilol or diltiazem unless his BP was >150 otherwise it made him dizzy).     Left Olecranon Bursitis s/p I&D on 10/15/21  Continues to be some discharge/drainage from the site. Will need to clarify whether he saw Dr. Myers at [facility] on 12/20 in [place] - this appointment was noted at his last visit with Dr. Flores.        Diet: NPO per Anesthesia Guidelines for Procedure/Surgery Except for: Meds    DVT Prophylaxis: Pneumatic Compression Devices  Beltre Catheter: Not present  Fluids: None  Central Lines: None  Code Status: Full Code      Disposition: 1-2 days pending control of epistaxis, possible paracentesis  ______________________________________________________________________    Chief Complaint   Nosebleed    History is obtained from the patient    History of Present Illness   Elle Blanton is a 61 year old male who has a history of ESRD on dialysis, chronic empyema/trapped lung, chronic liver disease, and is admitted for nose bleed that started after he tripped on a rug in the house while walking to the bathroom yesterday evening. He landed on his face and had profuse bleeding and was very worried about his hemoglobin. The ED providers attempted numerous interventions with packing, rhino rockets, packing coated in phenylephrine, etc. And by the time I saw him this morning the bleeding had mostly subsided with two rhino rockets in place.     Elle states he has been doing pretty well overall. He saw pulmonology at the  on Thursday and was told they would not do the VATS procedure as it would be too risky and the lung is too scarred. He has not had any issues with his breathing. He does feel like his abdomen is more distended with fluid over the past several days and he is wondering if we can drain that fluid while he is here in the hospital.    He dialyzes Monday and Friday, and had normal dialysis on Friday. He received a platelet transfusion  in the ED.     Review of Systems    The 5 point Review of Systems is negative other than noted in the HPI or here.     Past Medical History    I have reviewed this patient's medical history and updated it with pertinent information if needed.   Past Medical History:   Diagnosis Date     NAHUM (acute kidney injury) (H)      GI (gastrointestinal bleed)      Gout      H. pylori infection 7/5/2017    UGI bleed implied by Hgb 9.0 6/22/17 and melena. Admitted and hgb decreased to 7.6, CT abdomen showed all bladder wall thickening. + Hep B surface antigen noted. Melena resolved and hgb stabalized without transfusion, epigastric pain resolved with PPI. Recommend referral for gastroscopy.     Heart attack (H)      Hepatitis B      Normocytic anemia      PONV (postoperative nausea and vomiting)      Thrombocytopenia (H)       Past Surgical History   I have reviewed this patient's surgical history and updated it with pertinent information if needed.  Past Surgical History:   Procedure Laterality Date     ABCESS DRAINAGE      finger     BURSECTOMY ELBOW Left 10/15/2021    Procedure: LEFT OLECRANON BURSECTOMY;  Surgeon: Tico Myers MD;  Location: [facility]     CREATE FISTULA ARTERIOVENOUS UPPER EXTREMITY Left 4/20/2021    Procedure: left arm dialysis graft placement;  Surgeon: Roxana Cosby MD;  Location: [facility];  Service: General     FOREIGN BODY REMOVAL      finger     INCISION AND DRAINAGE FINGER, COMBINED Left 10/20/2016    Procedure: COMBINED INCISION AND DRAINAGE FINGER;  Surgeon: Mireya Pizano MD;  Location: [facility]     IR CHEST TUBE PLACEMENT NON-TUNNELED RIGHT  4/19/2021     IR CHEST TUBE PLACEMENT NON-TUNNELED RIGHT  10/19/2021     IR CHEST TUBE PLACEMENT NON-TUNNELED RIGHT  11/10/2021     IR PLEURAL DRAINAGE WITH CATHETER INSERTION  4/19/2021     MIDLINE INSERTION - DOUBLE LUMEN  4/23/2021          OK ESOPHAGOGASTRODUODENOSCOPY TRANSORAL DIAGNOSTIC N/A 8/18/2020    Procedure:  ESOPHAGOGASTRODUODENOSCOPY (EGD) with biopsy;  Surgeon: Nilson Gonzales MD;  Location: [facility];  Service: Gastroenterology      PARACENTESIS  8/14/2020      PARACENTESIS  8/19/2020     US THORACENTESIS  4/18/2021      Social History   I have reviewed this patient's social history and updated it with pertinent information if needed. Elle Blanton  reports that he has never smoked. He has never used smokeless tobacco. He reports that he does not drink alcohol and does not use drugs.    Family History   I have reviewed this patient's family history and updated it with pertinent information if needed.  Family History   Problem Relation Age of Onset     Diabetes Father      Hypertension No family hx of      Asthma No family hx of      Cancer No family hx of      Coronary Artery Disease No family hx of      Liver Cancer No family hx of      Ulcers Father        Prior to Admission Medications   Prior to Admission Medications   Prescriptions Last Dose Informant Patient Reported? Taking?   acetaminophen (TYLENOL) 500 MG tablet   Yes No   Sig: Take 500 mg by mouth every 6 hours as needed for mild pain   calcium acetate (PHOSLO) 667 MG CAPS capsule   Yes No   Sig: Take 1 capsule by mouth 3 times daily (with meals)   carvedilol (COREG) 25 MG tablet   Yes No   Sig: Take 25 mg by mouth 2 times daily   ceFAZolin (ANCEF) 1 GM vial   No No   Sig: Inject 2-3 g into the vein three times a week 2 g Monday, 2 g Wednesday, 3 g Friday per ID note   cloNIDine (CATAPRES) 0.1 MG tablet   Yes No   Sig: Take 0.1 mg by mouth At Bedtime    diclofenac (VOLTAREN) 1 % topical gel   No No   Sig: Apply 2 g topically 2 times daily To hands   diltiazem ER (DILT-XR) 180 MG 24 hr capsule   No No   Sig: Take 1 capsule (180 mg) by mouth 2 times daily   entecavir (BARACLUDE) 0.5 MG tablet   No No   Sig: Take 1 tablet (0.5 mg) by mouth every 7 days   hydrOXYzine (ATARAX) 25 MG tablet   No No   Sig: Take 1 tablet (25 mg) by mouth 3 times daily as needed  for itching   order for DME   No No   Sig: Equipment being ordered: Other: blood pressure monitor  Treatment Diagnosis: hypertension   oxyCODONE (ROXICODONE) 5 MG tablet   No No   Sig: Take 1 tablet (5 mg) by mouth every 6 hours as needed for moderate to severe pain or severe pain   Patient not taking: Reported on 1/6/2022   pantoprazole (PROTONIX) 40 MG EC tablet   No No   Sig: Take 1 tablet (40 mg) by mouth daily   senna-docusate (SENOKOT-S/PERICOLACE) 8.6-50 MG tablet   No No   Sig: Take 1 tablet by mouth daily as needed for constipation   zolpidem (AMBIEN) 5 MG tablet   No No   Sig: Take 1 tablet (5 mg) by mouth nightly as needed for sleep      Facility-Administered Medications: None     Allergies   Allergies   Allergen Reactions     Nka [No Known Allergies]        Physical Exam   Vital Signs: Temp: 98.2  F (36.8  C) Temp src: Oral BP: (!) 157/105 Pulse: 97   Resp: 18 SpO2: 99 % O2 Device: None (Room air)    Weight: 135 lbs 0 oz  GEN: Patient sitting comfortably in no acute distress.  HEEN: Significant swelling, bruising, and tenderness to the nose and forehead with hematoma on the forehead. Extraocular movements intact. Bilateral rapid rhinos in place in the nares with small amount of blood oozing that patient dabs with a kleenex. Dried blood all over gown, bed.   CV: Regular rate and rhythm without obvious murmurs.  PULM: Clear to auscultation bilaterally without wheezing or rales.  ABD: Soft, nontender, bowel sounds present. Slightly distended with palpable fluid wave.  NEURO: Alert and oriented x3.  No focal motor abnormalities.  Face symmetric.  PSYCH: Appropriate affect.    Data   Recent Labs   Lab 01/09/22  0129 01/08/22  2255 01/08/22  1838   WBC 8.8  --  5.5   HGB 8.3*  --  8.9*   MCV 99  --  98   *  --  75*   NA  --  138  --    POTASSIUM  --  4.6  --    CHLORIDE  --  100  --    CO2  --  23  --    BUN  --  74*  --    CR  --  9.46*  --    ANIONGAP  --  15  --    TANO  --  8.0*  --    GLC  --  96   --    ALBUMIN  --  2.7*  --    PROTTOTAL  --  8.1*  --    BILITOTAL  --  0.6  --    ALKPHOS  --  193*  --    ALT  --  <9  --    AST  --  40  --      Recent Results (from the past 24 hour(s))   Head CT w/o contrast    Narrative    EXAM: CT HEAD W/O CONTRAST, CT CERVICAL SPINE W/O CONTRAST, CT FACIAL BONES WITHOUT CONTRAST  LOCATION: [facility]  DATE/TIME: 1/8/2022 6:45 PM    INDICATION: Fall with facial trauma, pain, swelling, bleeding.  COMPARISON: None.  TECHNIQUE:   1) Routine CT Head without IV contrast. Multiplanar reformats. Dose reduction techniques were used.  2) Routine CT Facial Bones without IV contrast. Multiplanar reformats. Dose reduction techniques were used.  3) Routine CT Cervical Spine without IV contrast. Multiplanar reformats. Dose reduction techniques were used.    FINDINGS:  HEAD CT:   INTRACRANIAL CONTENTS: No intracranial hemorrhage, extraaxial collection, or mass effect. No CT evidence of acute infarct. Normal parenchymal density for age. The ventricles and sulci are normal for age.     OSSEOUS STRUCTURES/SOFT TISSUES: No significant abnormality.     FACIAL BONE CT:  OSSEOUS STRUCTURES/SOFT TISSUES: Large left paramedian frontal scalp contusion/hematoma with soft tissue swelling, increased density and adjacent fat stranding. No radiopaque foreign body. No soft tissue gas. Anterior nasal bone fracture with mild   posterior displacement and overlying soft tissue swelling. Bilateral nasal packing. No evidence for dental trauma or periapical abscess.    ORBITAL CONTENTS: No acute abnormality.    SINUSES: Blood products within the left maxillary sinus.    Nonspecific calcific density within the right masseter muscle measuring 0.6 x 0.4 cm.    CERVICAL SPINE CT:   VERTEBRA: 0.3 cm degenerative anterolisthesis of C4 on C5. Normal vertebral body heights. Severe C4-C5 degenerative disc disease. Bilateral facet osteoarthrosis right greater than left. No fracture or  posttraumatic subluxation.     CANAL/FORAMINA: No canal or neural foraminal stenosis.    PARASPINAL: Normal appearance of the prevertebral and posterior paraspinous soft tissues. Right pleural effusion and small focus of pleural gas. Left pleural effusion. Enlarged thoracic aorta.      Impression    IMPRESSION:  HEAD CT:  1.  No acute intracranial process.    FACIAL BONE CT:  1.  Anterior nasal bone fracture with overlying soft tissue swelling and blood products in the left maxillary sinus. There is nasal packing.  2.  Large left paramedian frontal scalp contusion/hematoma. No associated calvarial fracture.  3.  Nonspecific calcific density within the right masseter muscle. Correlate clinically.    CERVICAL SPINE CT:  1.  No fracture or posttraumatic subluxation.  2.  No high-grade spinal canal or neural foraminal stenosis.   CT Facial Bones without Contrast    Narrative    EXAM: CT HEAD W/O CONTRAST, CT CERVICAL SPINE W/O CONTRAST, CT FACIAL BONES WITHOUT CONTRAST  LOCATION: [facility]  DATE/TIME: 1/8/2022 6:45 PM    INDICATION: Fall with facial trauma, pain, swelling, bleeding.  COMPARISON: None.  TECHNIQUE:   1) Routine CT Head without IV contrast. Multiplanar reformats. Dose reduction techniques were used.  2) Routine CT Facial Bones without IV contrast. Multiplanar reformats. Dose reduction techniques were used.  3) Routine CT Cervical Spine without IV contrast. Multiplanar reformats. Dose reduction techniques were used.    FINDINGS:  HEAD CT:   INTRACRANIAL CONTENTS: No intracranial hemorrhage, extraaxial collection, or mass effect. No CT evidence of acute infarct. Normal parenchymal density for age. The ventricles and sulci are normal for age.     OSSEOUS STRUCTURES/SOFT TISSUES: No significant abnormality.     FACIAL BONE CT:  OSSEOUS STRUCTURES/SOFT TISSUES: Large left paramedian frontal scalp contusion/hematoma with soft tissue swelling, increased density and adjacent fat  stranding. No radiopaque foreign body. No soft tissue gas. Anterior nasal bone fracture with mild   posterior displacement and overlying soft tissue swelling. Bilateral nasal packing. No evidence for dental trauma or periapical abscess.    ORBITAL CONTENTS: No acute abnormality.    SINUSES: Blood products within the left maxillary sinus.    Nonspecific calcific density within the right masseter muscle measuring 0.6 x 0.4 cm.    CERVICAL SPINE CT:   VERTEBRA: 0.3 cm degenerative anterolisthesis of C4 on C5. Normal vertebral body heights. Severe C4-C5 degenerative disc disease. Bilateral facet osteoarthrosis right greater than left. No fracture or posttraumatic subluxation.     CANAL/FORAMINA: No canal or neural foraminal stenosis.    PARASPINAL: Normal appearance of the prevertebral and posterior paraspinous soft tissues. Right pleural effusion and small focus of pleural gas. Left pleural effusion. Enlarged thoracic aorta.      Impression    IMPRESSION:  HEAD CT:  1.  No acute intracranial process.    FACIAL BONE CT:  1.  Anterior nasal bone fracture with overlying soft tissue swelling and blood products in the left maxillary sinus. There is nasal packing.  2.  Large left paramedian frontal scalp contusion/hematoma. No associated calvarial fracture.  3.  Nonspecific calcific density within the right masseter muscle. Correlate clinically.    CERVICAL SPINE CT:  1.  No fracture or posttraumatic subluxation.  2.  No high-grade spinal canal or neural foraminal stenosis.   Cervical spine CT w/o contrast    Narrative    EXAM: CT HEAD W/O CONTRAST, CT CERVICAL SPINE W/O CONTRAST, CT FACIAL BONES WITHOUT CONTRAST  LOCATION: [facility]  DATE/TIME: 1/8/2022 6:45 PM    INDICATION: Fall with facial trauma, pain, swelling, bleeding.  COMPARISON: None.  TECHNIQUE:   1) Routine CT Head without IV contrast. Multiplanar reformats. Dose reduction techniques were used.  2) Routine CT Facial Bones without IV  contrast. Multiplanar reformats. Dose reduction techniques were used.  3) Routine CT Cervical Spine without IV contrast. Multiplanar reformats. Dose reduction techniques were used.    FINDINGS:  HEAD CT:   INTRACRANIAL CONTENTS: No intracranial hemorrhage, extraaxial collection, or mass effect. No CT evidence of acute infarct. Normal parenchymal density for age. The ventricles and sulci are normal for age.     OSSEOUS STRUCTURES/SOFT TISSUES: No significant abnormality.     FACIAL BONE CT:  OSSEOUS STRUCTURES/SOFT TISSUES: Large left paramedian frontal scalp contusion/hematoma with soft tissue swelling, increased density and adjacent fat stranding. No radiopaque foreign body. No soft tissue gas. Anterior nasal bone fracture with mild   posterior displacement and overlying soft tissue swelling. Bilateral nasal packing. No evidence for dental trauma or periapical abscess.    ORBITAL CONTENTS: No acute abnormality.    SINUSES: Blood products within the left maxillary sinus.    Nonspecific calcific density within the right masseter muscle measuring 0.6 x 0.4 cm.    CERVICAL SPINE CT:   VERTEBRA: 0.3 cm degenerative anterolisthesis of C4 on C5. Normal vertebral body heights. Severe C4-C5 degenerative disc disease. Bilateral facet osteoarthrosis right greater than left. No fracture or posttraumatic subluxation.     CANAL/FORAMINA: No canal or neural foraminal stenosis.    PARASPINAL: Normal appearance of the prevertebral and posterior paraspinous soft tissues. Right pleural effusion and small focus of pleural gas. Left pleural effusion. Enlarged thoracic aorta.      Impression    IMPRESSION:  HEAD CT:  1.  No acute intracranial process.    FACIAL BONE CT:  1.  Anterior nasal bone fracture with overlying soft tissue swelling and blood products in the left maxillary sinus. There is nasal packing.  2.  Large left paramedian frontal scalp contusion/hematoma. No associated calvarial fracture.  3.  Nonspecific calcific density  within the right masseter muscle. Correlate clinically.    CERVICAL SPINE CT:  1.  No fracture or posttraumatic subluxation.  2.  No high-grade spinal canal or neural foraminal stenosis.   XR Wrist Left G/E 3 Views    Narrative    EXAM: XR WRIST LEFT G/E 3 VIEWS  LOCATION: [facility]  DATE/TIME: 1/8/2022 7:00 PM    INDICATION: Left wrist pain.  COMPARISON: None.      Impression    IMPRESSION: Small chronic appearing dorsal triquetral fracture. Moderate soft tissue swelling along the dorsum of the wrist. Osteopenia. Mild degenerative changes at the first CMC joint. Cystic change base of the lunate.

## 2022-01-09 NOTE — H&P (VIEW-ONLY)
[facility]    History and Physical - [facility] Family Medicine Residency Service        Date of Admission:  1/8/2022    Assessment & Plan      Elle Blanton is a 61 year old male admitted on 1/8/2022. He has a history of ESRD on dialysis, chronic empyema/trapped lung, chronic liver disease, and is admitted for difficult to control epistaxis after a fall.    *note that med rec was not yet completed upon admit - needs to be done in AM    Epistaxis  Nasal Bone Fracture  Bleeding better controlled at time of admit. Plan is for him to go to IR in the morning if he is still having oozing.  - keep rhino rockets in place for now  - IR as needed for embolization  - ENT consult for nasal bone fracture management    Anemia  Thrombocytopenia  Hemoglobin is 8.9 --> 8.3, which is actually at his baseline due to chronic renal disease. Will recheck hemoglobin once more in AM to ensure it is not downtrending due to this acute blood loss which sounds significant per the ED notes. Platelets were 75, thrombocytopenia is chronic for him as well with his liver disease. He received 1 unit of platelets in the ED and they came up to 129 with better control of his bleeding.  - CBC in AM    Abdominal Swelling  Concern for Ascites  Chronic Liver Disease - Hep B vs Hematoma vs malignancy  Patient states this swelling is new/worsening over the past several days. Fluid palpable on admission exam, with limited exam due to patient sitting up/forward with nosebleed. Would obtain a better exam of his abdomen once he is able to lay flat, and then consider US paracentesis.     History of R Trapped Lung   No VATS surgery indicated per N cardiothoracic surgery. Patient is not having any difficulty breathing. Not hypoxic. Continue to monitor.     ESRD on Hemodialysis M/F  - nephrology consult for dialysis if still hospitalized Monday 1/10    Chronic Aortic Dissection  Hypertension  Continue PTA carvedilol, diltiazem,  and clonidine (last hospitalization he would decline to take carvedilol or diltiazem unless his BP was >150 otherwise it made him dizzy).     Left Olecranon Bursitis s/p I&D on 10/15/21  Continues to be some discharge/drainage from the site. Will need to clarify whether he saw Dr. Myers at [facility] on 12/20 in [place] - this appointment was noted at his last visit with Dr. Flores.        Diet: NPO per Anesthesia Guidelines for Procedure/Surgery Except for: Meds    DVT Prophylaxis: Pneumatic Compression Devices  Beltre Catheter: Not present  Fluids: None  Central Lines: None  Code Status: Full Code      Disposition: 1-2 days pending control of epistaxis, possible paracentesis  ______________________________________________________________________    Chief Complaint   Nosebleed    History is obtained from the patient    History of Present Illness   Elle Blanton is a 61 year old male who has a history of ESRD on dialysis, chronic empyema/trapped lung, chronic liver disease, and is admitted for nose bleed that started after he tripped on a rug in the house while walking to the bathroom yesterday evening. He landed on his face and had profuse bleeding and was very worried about his hemoglobin. The ED providers attempted numerous interventions with packing, rhino rockets, packing coated in phenylephrine, etc. And by the time I saw him this morning the bleeding had mostly subsided with two rhino rockets in place.     Elle states he has been doing pretty well overall. He saw pulmonology at the  on Thursday and was told they would not do the VATS procedure as it would be too risky and the lung is too scarred. He has not had any issues with his breathing. He does feel like his abdomen is more distended with fluid over the past several days and he is wondering if we can drain that fluid while he is here in the hospital.    He dialyzes Monday and Friday, and had normal dialysis on Friday. He received a platelet transfusion  in the ED.     Review of Systems    The 5 point Review of Systems is negative other than noted in the HPI or here.     Past Medical History    I have reviewed this patient's medical history and updated it with pertinent information if needed.   Past Medical History:   Diagnosis Date     NAHUM (acute kidney injury) (H)      GI (gastrointestinal bleed)      Gout      H. pylori infection 7/5/2017    UGI bleed implied by Hgb 9.0 6/22/17 and melena. Admitted and hgb decreased to 7.6, CT abdomen showed all bladder wall thickening. + Hep B surface antigen noted. Melena resolved and hgb stabalized without transfusion, epigastric pain resolved with PPI. Recommend referral for gastroscopy.     Heart attack (H)      Hepatitis B      Normocytic anemia      PONV (postoperative nausea and vomiting)      Thrombocytopenia (H)       Past Surgical History   I have reviewed this patient's surgical history and updated it with pertinent information if needed.  Past Surgical History:   Procedure Laterality Date     ABCESS DRAINAGE      finger     BURSECTOMY ELBOW Left 10/15/2021    Procedure: LEFT OLECRANON BURSECTOMY;  Surgeon: Tico Myers MD;  Location: [facility]     CREATE FISTULA ARTERIOVENOUS UPPER EXTREMITY Left 4/20/2021    Procedure: left arm dialysis graft placement;  Surgeon: Roxana Cosby MD;  Location: [facility];  Service: General     FOREIGN BODY REMOVAL      finger     INCISION AND DRAINAGE FINGER, COMBINED Left 10/20/2016    Procedure: COMBINED INCISION AND DRAINAGE FINGER;  Surgeon: Mireya Pizano MD;  Location: [facility]     IR CHEST TUBE PLACEMENT NON-TUNNELED RIGHT  4/19/2021     IR CHEST TUBE PLACEMENT NON-TUNNELED RIGHT  10/19/2021     IR CHEST TUBE PLACEMENT NON-TUNNELED RIGHT  11/10/2021     IR PLEURAL DRAINAGE WITH CATHETER INSERTION  4/19/2021     MIDLINE INSERTION - DOUBLE LUMEN  4/23/2021          NH ESOPHAGOGASTRODUODENOSCOPY TRANSORAL DIAGNOSTIC N/A 8/18/2020    Procedure:  ESOPHAGOGASTRODUODENOSCOPY (EGD) with biopsy;  Surgeon: Nilson Gonzales MD;  Location: [facility];  Service: Gastroenterology      PARACENTESIS  8/14/2020      PARACENTESIS  8/19/2020     US THORACENTESIS  4/18/2021      Social History   I have reviewed this patient's social history and updated it with pertinent information if needed. Elle Blanton  reports that he has never smoked. He has never used smokeless tobacco. He reports that he does not drink alcohol and does not use drugs.    Family History   I have reviewed this patient's family history and updated it with pertinent information if needed.  Family History   Problem Relation Age of Onset     Diabetes Father      Hypertension No family hx of      Asthma No family hx of      Cancer No family hx of      Coronary Artery Disease No family hx of      Liver Cancer No family hx of      Ulcers Father        Prior to Admission Medications   Prior to Admission Medications   Prescriptions Last Dose Informant Patient Reported? Taking?   acetaminophen (TYLENOL) 500 MG tablet   Yes No   Sig: Take 500 mg by mouth every 6 hours as needed for mild pain   calcium acetate (PHOSLO) 667 MG CAPS capsule   Yes No   Sig: Take 1 capsule by mouth 3 times daily (with meals)   carvedilol (COREG) 25 MG tablet   Yes No   Sig: Take 25 mg by mouth 2 times daily   ceFAZolin (ANCEF) 1 GM vial   No No   Sig: Inject 2-3 g into the vein three times a week 2 g Monday, 2 g Wednesday, 3 g Friday per ID note   cloNIDine (CATAPRES) 0.1 MG tablet   Yes No   Sig: Take 0.1 mg by mouth At Bedtime    diclofenac (VOLTAREN) 1 % topical gel   No No   Sig: Apply 2 g topically 2 times daily To hands   diltiazem ER (DILT-XR) 180 MG 24 hr capsule   No No   Sig: Take 1 capsule (180 mg) by mouth 2 times daily   entecavir (BARACLUDE) 0.5 MG tablet   No No   Sig: Take 1 tablet (0.5 mg) by mouth every 7 days   hydrOXYzine (ATARAX) 25 MG tablet   No No   Sig: Take 1 tablet (25 mg) by mouth 3 times daily as needed  for itching   order for DME   No No   Sig: Equipment being ordered: Other: blood pressure monitor  Treatment Diagnosis: hypertension   oxyCODONE (ROXICODONE) 5 MG tablet   No No   Sig: Take 1 tablet (5 mg) by mouth every 6 hours as needed for moderate to severe pain or severe pain   Patient not taking: Reported on 1/6/2022   pantoprazole (PROTONIX) 40 MG EC tablet   No No   Sig: Take 1 tablet (40 mg) by mouth daily   senna-docusate (SENOKOT-S/PERICOLACE) 8.6-50 MG tablet   No No   Sig: Take 1 tablet by mouth daily as needed for constipation   zolpidem (AMBIEN) 5 MG tablet   No No   Sig: Take 1 tablet (5 mg) by mouth nightly as needed for sleep      Facility-Administered Medications: None     Allergies   Allergies   Allergen Reactions     Nka [No Known Allergies]        Physical Exam   Vital Signs: Temp: 98.2  F (36.8  C) Temp src: Oral BP: (!) 157/105 Pulse: 97   Resp: 18 SpO2: 99 % O2 Device: None (Room air)    Weight: 135 lbs 0 oz  GEN: Patient sitting comfortably in no acute distress.  HEEN: Significant swelling, bruising, and tenderness to the nose and forehead with hematoma on the forehead. Extraocular movements intact. Bilateral rapid rhinos in place in the nares with small amount of blood oozing that patient dabs with a kleenex. Dried blood all over gown, bed.   CV: Regular rate and rhythm without obvious murmurs.  PULM: Clear to auscultation bilaterally without wheezing or rales.  ABD: Soft, nontender, bowel sounds present. Slightly distended with palpable fluid wave.  NEURO: Alert and oriented x3.  No focal motor abnormalities.  Face symmetric.  PSYCH: Appropriate affect.    Data   Recent Labs   Lab 01/09/22  0129 01/08/22  2255 01/08/22  1838   WBC 8.8  --  5.5   HGB 8.3*  --  8.9*   MCV 99  --  98   *  --  75*   NA  --  138  --    POTASSIUM  --  4.6  --    CHLORIDE  --  100  --    CO2  --  23  --    BUN  --  74*  --    CR  --  9.46*  --    ANIONGAP  --  15  --    TANO  --  8.0*  --    GLC  --  96   --    ALBUMIN  --  2.7*  --    PROTTOTAL  --  8.1*  --    BILITOTAL  --  0.6  --    ALKPHOS  --  193*  --    ALT  --  <9  --    AST  --  40  --      Recent Results (from the past 24 hour(s))   Head CT w/o contrast    Narrative    EXAM: CT HEAD W/O CONTRAST, CT CERVICAL SPINE W/O CONTRAST, CT FACIAL BONES WITHOUT CONTRAST  LOCATION: [facility]  DATE/TIME: 1/8/2022 6:45 PM    INDICATION: Fall with facial trauma, pain, swelling, bleeding.  COMPARISON: None.  TECHNIQUE:   1) Routine CT Head without IV contrast. Multiplanar reformats. Dose reduction techniques were used.  2) Routine CT Facial Bones without IV contrast. Multiplanar reformats. Dose reduction techniques were used.  3) Routine CT Cervical Spine without IV contrast. Multiplanar reformats. Dose reduction techniques were used.    FINDINGS:  HEAD CT:   INTRACRANIAL CONTENTS: No intracranial hemorrhage, extraaxial collection, or mass effect. No CT evidence of acute infarct. Normal parenchymal density for age. The ventricles and sulci are normal for age.     OSSEOUS STRUCTURES/SOFT TISSUES: No significant abnormality.     FACIAL BONE CT:  OSSEOUS STRUCTURES/SOFT TISSUES: Large left paramedian frontal scalp contusion/hematoma with soft tissue swelling, increased density and adjacent fat stranding. No radiopaque foreign body. No soft tissue gas. Anterior nasal bone fracture with mild   posterior displacement and overlying soft tissue swelling. Bilateral nasal packing. No evidence for dental trauma or periapical abscess.    ORBITAL CONTENTS: No acute abnormality.    SINUSES: Blood products within the left maxillary sinus.    Nonspecific calcific density within the right masseter muscle measuring 0.6 x 0.4 cm.    CERVICAL SPINE CT:   VERTEBRA: 0.3 cm degenerative anterolisthesis of C4 on C5. Normal vertebral body heights. Severe C4-C5 degenerative disc disease. Bilateral facet osteoarthrosis right greater than left. No fracture or  posttraumatic subluxation.     CANAL/FORAMINA: No canal or neural foraminal stenosis.    PARASPINAL: Normal appearance of the prevertebral and posterior paraspinous soft tissues. Right pleural effusion and small focus of pleural gas. Left pleural effusion. Enlarged thoracic aorta.      Impression    IMPRESSION:  HEAD CT:  1.  No acute intracranial process.    FACIAL BONE CT:  1.  Anterior nasal bone fracture with overlying soft tissue swelling and blood products in the left maxillary sinus. There is nasal packing.  2.  Large left paramedian frontal scalp contusion/hematoma. No associated calvarial fracture.  3.  Nonspecific calcific density within the right masseter muscle. Correlate clinically.    CERVICAL SPINE CT:  1.  No fracture or posttraumatic subluxation.  2.  No high-grade spinal canal or neural foraminal stenosis.   CT Facial Bones without Contrast    Narrative    EXAM: CT HEAD W/O CONTRAST, CT CERVICAL SPINE W/O CONTRAST, CT FACIAL BONES WITHOUT CONTRAST  LOCATION: [facility]  DATE/TIME: 1/8/2022 6:45 PM    INDICATION: Fall with facial trauma, pain, swelling, bleeding.  COMPARISON: None.  TECHNIQUE:   1) Routine CT Head without IV contrast. Multiplanar reformats. Dose reduction techniques were used.  2) Routine CT Facial Bones without IV contrast. Multiplanar reformats. Dose reduction techniques were used.  3) Routine CT Cervical Spine without IV contrast. Multiplanar reformats. Dose reduction techniques were used.    FINDINGS:  HEAD CT:   INTRACRANIAL CONTENTS: No intracranial hemorrhage, extraaxial collection, or mass effect. No CT evidence of acute infarct. Normal parenchymal density for age. The ventricles and sulci are normal for age.     OSSEOUS STRUCTURES/SOFT TISSUES: No significant abnormality.     FACIAL BONE CT:  OSSEOUS STRUCTURES/SOFT TISSUES: Large left paramedian frontal scalp contusion/hematoma with soft tissue swelling, increased density and adjacent fat  stranding. No radiopaque foreign body. No soft tissue gas. Anterior nasal bone fracture with mild   posterior displacement and overlying soft tissue swelling. Bilateral nasal packing. No evidence for dental trauma or periapical abscess.    ORBITAL CONTENTS: No acute abnormality.    SINUSES: Blood products within the left maxillary sinus.    Nonspecific calcific density within the right masseter muscle measuring 0.6 x 0.4 cm.    CERVICAL SPINE CT:   VERTEBRA: 0.3 cm degenerative anterolisthesis of C4 on C5. Normal vertebral body heights. Severe C4-C5 degenerative disc disease. Bilateral facet osteoarthrosis right greater than left. No fracture or posttraumatic subluxation.     CANAL/FORAMINA: No canal or neural foraminal stenosis.    PARASPINAL: Normal appearance of the prevertebral and posterior paraspinous soft tissues. Right pleural effusion and small focus of pleural gas. Left pleural effusion. Enlarged thoracic aorta.      Impression    IMPRESSION:  HEAD CT:  1.  No acute intracranial process.    FACIAL BONE CT:  1.  Anterior nasal bone fracture with overlying soft tissue swelling and blood products in the left maxillary sinus. There is nasal packing.  2.  Large left paramedian frontal scalp contusion/hematoma. No associated calvarial fracture.  3.  Nonspecific calcific density within the right masseter muscle. Correlate clinically.    CERVICAL SPINE CT:  1.  No fracture or posttraumatic subluxation.  2.  No high-grade spinal canal or neural foraminal stenosis.   Cervical spine CT w/o contrast    Narrative    EXAM: CT HEAD W/O CONTRAST, CT CERVICAL SPINE W/O CONTRAST, CT FACIAL BONES WITHOUT CONTRAST  LOCATION: [facility]  DATE/TIME: 1/8/2022 6:45 PM    INDICATION: Fall with facial trauma, pain, swelling, bleeding.  COMPARISON: None.  TECHNIQUE:   1) Routine CT Head without IV contrast. Multiplanar reformats. Dose reduction techniques were used.  2) Routine CT Facial Bones without IV  contrast. Multiplanar reformats. Dose reduction techniques were used.  3) Routine CT Cervical Spine without IV contrast. Multiplanar reformats. Dose reduction techniques were used.    FINDINGS:  HEAD CT:   INTRACRANIAL CONTENTS: No intracranial hemorrhage, extraaxial collection, or mass effect. No CT evidence of acute infarct. Normal parenchymal density for age. The ventricles and sulci are normal for age.     OSSEOUS STRUCTURES/SOFT TISSUES: No significant abnormality.     FACIAL BONE CT:  OSSEOUS STRUCTURES/SOFT TISSUES: Large left paramedian frontal scalp contusion/hematoma with soft tissue swelling, increased density and adjacent fat stranding. No radiopaque foreign body. No soft tissue gas. Anterior nasal bone fracture with mild   posterior displacement and overlying soft tissue swelling. Bilateral nasal packing. No evidence for dental trauma or periapical abscess.    ORBITAL CONTENTS: No acute abnormality.    SINUSES: Blood products within the left maxillary sinus.    Nonspecific calcific density within the right masseter muscle measuring 0.6 x 0.4 cm.    CERVICAL SPINE CT:   VERTEBRA: 0.3 cm degenerative anterolisthesis of C4 on C5. Normal vertebral body heights. Severe C4-C5 degenerative disc disease. Bilateral facet osteoarthrosis right greater than left. No fracture or posttraumatic subluxation.     CANAL/FORAMINA: No canal or neural foraminal stenosis.    PARASPINAL: Normal appearance of the prevertebral and posterior paraspinous soft tissues. Right pleural effusion and small focus of pleural gas. Left pleural effusion. Enlarged thoracic aorta.      Impression    IMPRESSION:  HEAD CT:  1.  No acute intracranial process.    FACIAL BONE CT:  1.  Anterior nasal bone fracture with overlying soft tissue swelling and blood products in the left maxillary sinus. There is nasal packing.  2.  Large left paramedian frontal scalp contusion/hematoma. No associated calvarial fracture.  3.  Nonspecific calcific density  within the right masseter muscle. Correlate clinically.    CERVICAL SPINE CT:  1.  No fracture or posttraumatic subluxation.  2.  No high-grade spinal canal or neural foraminal stenosis.   XR Wrist Left G/E 3 Views    Narrative    EXAM: XR WRIST LEFT G/E 3 VIEWS  LOCATION: [facility]  DATE/TIME: 1/8/2022 7:00 PM    INDICATION: Left wrist pain.  COMPARISON: None.      Impression    IMPRESSION: Small chronic appearing dorsal triquetral fracture. Moderate soft tissue swelling along the dorsum of the wrist. Osteopenia. Mild degenerative changes at the first CMC joint. Cystic change base of the lunate.

## 2022-01-09 NOTE — ED PROVIDER NOTES
EMERGENCY DEPARTMENT ENCOUNTER      NAME: Elle Blanton  AGE: 61 year old male  YOB: 1960  MRN: [id number]  EVALUATION DATE & TIME: 1/8/2022  6:28 PM    PCP: Jaswant Flores    ED PROVIDER: Dominga Pizaon M.D.      Chief Complaint   Patient presents with     Fall     FINAL IMPRESSION:  1. Epistaxis    2. Fall, initial encounter    3. Closed fracture of nasal bone, initial encounter    4. Thrombocytopenia (H)      ED COURSE & MEDICAL DECISION MAKING:    Pertinent Labs & Imaging studies reviewed. (See chart for details)  ED Course as of 01/08/22 2204   Sat Jan 08, 2022   1937 Patient is a 61-year-old male who comes in today for evaluation of a nosebleed after he tripped and fell and landed on his face.  He has tenderness over the nose and his CT scan did show a nasal bone fracture.  He has been having bleeding from both naris ever since.  He reports a low hemoglobin and issues with pretty significant nosebleeds in the past.  He was worried about his hemoglobin.  We obey it when he first got here and it was at 8.9.  He has since had from significant bleeding.  I removed the packing that he had placed which he had significant clot on the left side.  I tried to place a rapid Rhino 2 different times.  I got him placed but he was still bleeding.  The second 1 I soaked in phenylephrine.  I tried Merisel which was soaked in blood and then just continued to drip.  I am going to let them sit in the nose for a little bit but when I left the room he was still having pretty significant oozing despite the rapid Rhino.  I will check back in on him a little bit.  His imaging was otherwise unremarkable.  He complains of left wrist pain but has pretty good pronation and supination.  No acute fracture was seen on the wrist x-ray.   1948 I spoke with Dr. Gage with ENT.  She had no further recommendations other than to transfuse the patient and if he is still not responding then to get IR involved.   2016 I discussed the plan  for platelet transfusion with the patient.  He has had platelets before.  He consented for them.  He is still losing a little bit despite bilateral rapid Rhino's.  I talked to him about needing to leave both sides in.  He is very uncomfortable with that idea.  He wants to at least try to pull out the right side once we do the platelets.  I advised against that but he is pretty adamant that he is not can be able to breathe and sleep well enough if he leaves both sides then.  The right side was bleeding far less than the left side so I might be willing to try it once the platelets are in.   2109 Patient is starting his platelets now.  He is still having a little bit of oozing.  I am hoping that that will resolve.  If it does not resolve then we will have to call interventional radiology.    2127 Patient keeps asking to remove the packing.  I told him I do not think it is a good idea.  We will start the platelet transfusion shortly.   2139 I just spoke with neuro IR.  He said that if the platelets do not stop the bleeding then they would consider taking the patient to interventional radiology tonight or tomorrow depending on the stability.  Patient will likely need signout to Dr. Bergeron to follow-up on the bleeding after the platelets are done.   2158 Patient was adamant about wanting the right side of his packing out.  He wants to put in the Vaseline gauze.  He was not having significant bleeding from the right side so I told him that we could try it.  If he has more bleeding again he may need to be repacked.  I told him I did not want to remove the packing on the left side because that is where he was having the most bleeding and that is where he was still oozing.  His platelets are going in right now and we will reassess.        7:05 PM I met with the patient for the initial interview and physical examination. Discussed plan for treatment and workup in the ED.      7:40 PM I rechecked and updated the patient.       7:46 PM I discussed the case with Dr. Gage, ENT specialist.     8:14 PM I obtained consent from the patient for a blood transfusion.     8:57 PM I rechecked and updated the patient.      Patient tolerance: Patient tolerated the procedure well with no immediate complications.     At the conclusion of the encounter I discussed  the results of all of the tests and the disposition with patient.   All questions were answered.  The patient acknowledged understanding and was involved in the decision making regarding the overall care plan.      PPE: Provider wore gloves, N95 mask, surgical cap.       70 minutes of critical care time     MEDICATIONS GIVEN IN THE EMERGENCY:  Medications   phenylephrine (GENOVEVA-SYNEPHRINE) 0.5 % spray 1 drop (has no administration in time range)     =================================================================    HPI    Triage Note: Patient arrives from home.  States he tripped on the carpet causing him to fall forwards at 1700- striking his head on tile.  +Hematoma to forehead and epistaxis to bilateral nares.  Bleeding appears controlled with guaze in place.  Patient denies any LOC.  Denies any neck pain- no c-spine tenderness upon palpation.  C/o left wrist pain.  +Swelling and pulses intact.  NUNEZ.  GCS 15. Patient mentions he has a hx of anemia and concerned about losing a lot of blood.       Patient information was obtained from: patient     Use of : N/A         Elle Blanton is a 61 year old male with a pertinent medical history of hypertension, chronic kidney disease, MI, thrombocytopenia, GI bleed, who presents to the ED via private car for evaluation of a fall.     Patient reports he was walking at home when he tripped and landed on his left side face first onto the tile floor. No loss of consciousness. He now has a headache with swelling and bruising to his left forehead, bruising to his nose, epistaxis of the left nostril, and left sided wrist pain. Endorses a history  of nosebleeds. Patient is on dialysis and has a fistula. Denies any vision changes, and any other symptoms or complaints at this time.       REVIEW OF SYSTEMS   Except as stated in the HPI all other systems reviewed and are negative.    PAST MEDICAL HISTORY:  Past Medical History:   Diagnosis Date     NAHUM (acute kidney injury) (H)      GI (gastrointestinal bleed)      Gout      H. pylori infection 7/5/2017    UGI bleed implied by Hgb 9.0 6/22/17 and melena. Admitted and hgb decreased to 7.6, CT abdomen showed all bladder wall thickening. + Hep B surface antigen noted. Melena resolved and hgb stabalized without transfusion, epigastric pain resolved with PPI. Recommend referral for gastroscopy.     Heart attack (H)      Hepatitis B      Normocytic anemia      PONV (postoperative nausea and vomiting)      Thrombocytopenia (H)        PAST SURGICAL HISTORY:  Past Surgical History:   Procedure Laterality Date     ABCESS DRAINAGE      finger     BURSECTOMY ELBOW Left 10/15/2021    Procedure: LEFT OLECRANON BURSECTOMY;  Surgeon: Tico Myers MD;  [place] [facility]     CREATE FISTULA ARTERIOVENOUS UPPER EXTREMITY Left 4/20/2021    Procedure: left arm dialysis graft placement;  Surgeon: Roxana Cosby MD;  Location: [facility] OR;  Service: General     FOREIGN BODY REMOVAL      finger     INCISION AND DRAINAGE FINGER, COMBINED Left 10/20/2016    Procedure: COMBINED INCISION AND DRAINAGE FINGER;  Surgeon: Mireya Pizano MD;  Location: [facility]     IR CHEST TUBE PLACEMENT NON-TUNNELED RIGHT  4/19/2021     IR CHEST TUBE PLACEMENT NON-TUNNELED RIGHT  10/19/2021     IR CHEST TUBE PLACEMENT NON-TUNNELED RIGHT  11/10/2021     IR PLEURAL DRAINAGE WITH CATHETER INSERTION  4/19/2021     MIDLINE INSERTION - DOUBLE LUMEN  4/23/2021          ME ESOPHAGOGASTRODUODENOSCOPY TRANSORAL DIAGNOSTIC N/A 8/18/2020    Procedure: ESOPHAGOGASTRODUODENOSCOPY (EGD) with biopsy;  Surgeon: Nilson Gonzales MD;  Location[facility];  Service: Gastroenterology      PARACENTESIS  8/14/2020     US PARACENTESIS  8/19/2020     US THORACENTESIS  4/18/2021       CURRENT MEDICATIONS:      Current Facility-Administered Medications:      phenylephrine (GENOVEVA-SYNEPHRINE) 0.5 % spray 1 drop, 1 drop, Both Nostrils, Once, Dominga Pizano MD    Current Outpatient Medications:      acetaminophen (TYLENOL) 500 MG tablet, Take 500 mg by mouth every 6 hours as needed for mild pain, Disp: , Rfl:      calcium acetate (PHOSLO) 667 MG CAPS capsule, Take 1 capsule by mouth 3 times daily (with meals), Disp: , Rfl:      carvedilol (COREG) 25 MG tablet, Take 25 mg by mouth 2 times daily, Disp: , Rfl:      ceFAZolin (ANCEF) 1 GM vial, Inject 2-3 g into the vein three times a week 2 g Monday, 2 g Wednesday, 3 g Friday per ID note, Disp: 9 each, Rfl: 4     cloNIDine (CATAPRES) 0.1 MG tablet, Take 0.1 mg by mouth At Bedtime , Disp: , Rfl:      diclofenac (VOLTAREN) 1 % topical gel, Apply 2 g topically 2 times daily To hands, Disp: 50 g, Rfl: 1     diltiazem ER (DILT-XR) 180 MG 24 hr capsule, Take 1 capsule (180 mg) by mouth 2 times daily, Disp: 60 capsule, Rfl: 11     entecavir (BARACLUDE) 0.5 MG tablet, Take 1 tablet (0.5 mg) by mouth every 7 days, Disp: 45 tablet, Rfl: 3     hydrOXYzine (ATARAX) 25 MG tablet, Take 1 tablet (25 mg) by mouth 3 times daily as needed for itching, Disp: 90 tablet, Rfl: 3     order for DME, Equipment being ordered: Other: blood pressure monitor Treatment Diagnosis: hypertension, Disp: 1 each, Rfl: 0     oxyCODONE (ROXICODONE) 5 MG tablet, Take 1 tablet (5 mg) by mouth every 6 hours as needed for moderate to severe pain or severe pain (Patient not taking: Reported on 1/6/2022), Disp: 3 tablet, Rfl: 0     pantoprazole (PROTONIX) 40 MG EC tablet, Take 1 tablet (40 mg) by mouth daily, Disp: 30 tablet, Rfl: 11     senna-docusate (SENOKOT-S/PERICOLACE) 8.6-50 MG tablet, Take 1 tablet by mouth daily as needed for constipation, Disp:  30 tablet, Rfl: 11     zolpidem (AMBIEN) 5 MG tablet, Take 1 tablet (5 mg) by mouth nightly as needed for sleep, Disp: 10 tablet, Rfl: 5    ALLERGIES:  Allergies   Allergen Reactions     Nka [No Known Allergies]        FAMILY HISTORY:  Family History   Problem Relation Age of Onset     Diabetes Father      Hypertension No family hx of      Asthma No family hx of      Cancer No family hx of      Coronary Artery Disease No family hx of      Liver Cancer No family hx of      Ulcers Father        SOCIAL HISTORY:   Social History     Socioeconomic History     Marital status:      Spouse name: Not on file     Number of children: Not on file     Years of education: Not on file     Highest education level: Not on file   Occupational History     Not on file   Tobacco Use     Smoking status: Never Smoker     Smokeless tobacco: Never Used   Substance and Sexual Activity     Alcohol use: No     Drug use: No     Sexual activity: Yes     Partners: Female   Other Topics Concern     Not on file   Social History Narrative     Not on file     Social Determinants of Health     Financial Resource Strain: Not on file   Food Insecurity: Not on file   Transportation Needs: Not on file   Physical Activity: Not on file   Stress: Not on file   Social Connections: Not on file   Intimate Partner Violence: Not on file   Housing Stability: Not on file       PHYSICAL EXAM    VITAL SIGNS: BP (!) 173/115   Pulse 92   Temp 98.2  F (36.8  C)   Resp 18   Wt 61.2 kg (135 lb)   SpO2 99%   BMI 22.47 kg/m     GENERAL: Awake, Alert, answering questions, No acute distress, Well nourished  HEENT: Bilateral external ears normal, No scleral icterus, mask in place. Bruising and tenderness to the nose. Large hematoma to the left forehead. Extraocular movements intact. Large clot in the left nares.   NECK: No obvious swelling or abnormality, No stridor  PULMONARY:Normal and symmetric breath sounds, No respiratory distress, Lungs clear to auscultation  bilaterally. No wheezing  CARDIOVASCULAR: Regular rate and rhythm, Distal pulses present and normal.  ABDOMINAL: Soft, Nondistended, Nontender, No flank tenderness, No palpable masses  BACK: No bruising or tenderness.  EXTREMITIES: Moves all extremities spontaneously, warm, no edema, No major deformities. Tenderness to the left wrist. Fistula in the left forearm.   NEURO: No facial droop, normal motor function, Normal speech   PSYCH: Normal mood and affect  SKIN: No rashes on visualized skin, dry, warm     LAB:  All pertinent labs reviewed and interpreted.  Results for orders placed or performed during the hospital encounter of 01/08/22   Cervical spine CT w/o contrast    Impression    IMPRESSION:  HEAD CT:  1.  No acute intracranial process.    FACIAL BONE CT:  1.  Anterior nasal bone fracture with overlying soft tissue swelling and blood products in the left maxillary sinus. There is nasal packing.  2.  Large left paramedian frontal scalp contusion/hematoma. No associated calvarial fracture.  3.  Nonspecific calcific density within the right masseter muscle. Correlate clinically.    CERVICAL SPINE CT:  1.  No fracture or posttraumatic subluxation.  2.  No high-grade spinal canal or neural foraminal stenosis.   Head CT w/o contrast    Impression    IMPRESSION:  HEAD CT:  1.  No acute intracranial process.    FACIAL BONE CT:  1.  Anterior nasal bone fracture with overlying soft tissue swelling and blood products in the left maxillary sinus. There is nasal packing.  2.  Large left paramedian frontal scalp contusion/hematoma. No associated calvarial fracture.  3.  Nonspecific calcific density within the right masseter muscle. Correlate clinically.    CERVICAL SPINE CT:  1.  No fracture or posttraumatic subluxation.  2.  No high-grade spinal canal or neural foraminal stenosis.   CT Facial Bones without Contrast    Impression    IMPRESSION:  HEAD CT:  1.  No acute intracranial process.    FACIAL BONE CT:  1.  Anterior  nasal bone fracture with overlying soft tissue swelling and blood products in the left maxillary sinus. There is nasal packing.  2.  Large left paramedian frontal scalp contusion/hematoma. No associated calvarial fracture.  3.  Nonspecific calcific density within the right masseter muscle. Correlate clinically.    CERVICAL SPINE CT:  1.  No fracture or posttraumatic subluxation.  2.  No high-grade spinal canal or neural foraminal stenosis.   XR Wrist Left G/E 3 Views    Impression    IMPRESSION: Small chronic appearing dorsal triquetral fracture. Moderate soft tissue swelling along the dorsum of the wrist. Osteopenia. Mild degenerative changes at the first CMC joint. Cystic change base of the lunate.   CBC (+ platelets, no diff)   Result Value Ref Range    WBC Count 5.5 4.0 - 11.0 10e3/uL    RBC Count 2.95 (L) 4.40 - 5.90 10e6/uL    Hemoglobin 8.9 (L) 13.3 - 17.7 g/dL    Hematocrit 29.0 (L) 40.0 - 53.0 %    MCV 98 78 - 100 fL    MCH 30.2 26.5 - 33.0 pg    MCHC 30.7 (L) 31.5 - 36.5 g/dL    RDW 16.2 (H) 10.0 - 15.0 %    Platelet Count 75 (L) 150 - 450 10e3/uL   Prepare pheresed platelets (unit)   Result Value Ref Range    UNIT ABO/RH A Pos     Unit Number [id number]     Unit Status Issued     Blood Component Type Platelets     Product Code [id number]     CODING SYSTEM [id number]     UNIT TYPE ISBT 6200     ISSUE DATE AND TIME [id number]        RADIOLOGY:  XR Wrist Left G/E 3 Views   Final Result   IMPRESSION: Small chronic appearing dorsal triquetral fracture. Moderate soft tissue swelling along the dorsum of the wrist. Osteopenia. Mild degenerative changes at the first CMC joint. Cystic change base of the lunate.      Cervical spine CT w/o contrast   Final Result   IMPRESSION:   HEAD CT:   1.  No acute intracranial process.      FACIAL BONE CT:   1.  Anterior nasal bone fracture with overlying soft tissue swelling and blood products in the left maxillary sinus. There is nasal packing.   2.  Large left paramedian  frontal scalp contusion/hematoma. No associated calvarial fracture.   3.  Nonspecific calcific density within the right masseter muscle. Correlate clinically.      CERVICAL SPINE CT:   1.  No fracture or posttraumatic subluxation.   2.  No high-grade spinal canal or neural foraminal stenosis.      CT Facial Bones without Contrast   Final Result   IMPRESSION:   HEAD CT:   1.  No acute intracranial process.      FACIAL BONE CT:   1.  Anterior nasal bone fracture with overlying soft tissue swelling and blood products in the left maxillary sinus. There is nasal packing.   2.  Large left paramedian frontal scalp contusion/hematoma. No associated calvarial fracture.   3.  Nonspecific calcific density within the right masseter muscle. Correlate clinically.      CERVICAL SPINE CT:   1.  No fracture or posttraumatic subluxation.   2.  No high-grade spinal canal or neural foraminal stenosis.      Head CT w/o contrast   Final Result   IMPRESSION:   HEAD CT:   1.  No acute intracranial process.      FACIAL BONE CT:   1.  Anterior nasal bone fracture with overlying soft tissue swelling and blood products in the left maxillary sinus. There is nasal packing.   2.  Large left paramedian frontal scalp contusion/hematoma. No associated calvarial fracture.   3.  Nonspecific calcific density within the right masseter muscle. Correlate clinically.      CERVICAL SPINE CT:   1.  No fracture or posttraumatic subluxation.   2.  No high-grade spinal canal or neural foraminal stenosis.              PROCEDURES:     Critical Care     Performed by: Dr Dominga Pizano  Authorized by: Dr Dominga Pizano  Total critical care time: 70 minutes  Critical care was necessary to treat or prevent imminent or life-threatening deterioration of the following conditions:  Epistaxis with difficult to control bleeding and thrombocytopenia requiring platelet transfusion.  Interventional radiology was notified and patient required significant care for this  persistent hemorrhage.  Critical care was time spent personally by me on the following activities: development of treatment plan with patient or surrogate, discussions with consultants, examination of patient, evaluation of patient's response to treatment, obtaining history from patient or surrogate, ordering and performing treatments and interventions, ordering and review of laboratory studies, ordering and review of radiographic studies, re-evaluation of patient's condition and monitoring for potential decompensation.  Critical care time was exclusive of separately billable procedures and treating other patients.     I, Denita Mandel, am serving as a scribe to document services personally performed by Dr. Pizano based on my observation and the provider's statements to me. I, Dominga Pizano MD attest that Denita Mandel is acting in a scribe capacity, has observed my performance of the services and has documented them in accordance with my direction.    Dominga Pizano M.D.  Emergency Medicine  [facility] EMERGENCY DEPARTMENT  [facility] [facility] [id number] [phone]  Dept: [phone]      Dominga Pizano MD  01/08/22 3011

## 2022-01-09 NOTE — DISCHARGE SUMMARY
[facility]  Discharge Summary - Medicine & Pediatrics       Date of Admission:  1/8/2022  Date of Discharge:  1/9/2022  Discharging Provider: Dakotah Glez MD.  Discharge Service: [facility].    Discharge Diagnoses   Epistaxis due to fall and fractured nasal bone.  Recurrent ascites.  Chronic left triquetral fracture.    Follow-ups Needed After Discharge   Follow-up Appointments     Follow-up and recommended labs and tests       Follow up with primary care provider, Jaswant Flores, on Wednesday, for   hospital follow-up and to reassess left wrist and ascites as well as nasal   packing. The following labs/tests are recommended: CBC to check platelets.       Follow-up with [facility] on Friday as well.  You can reach them at   [phone].         Reassessed left wrist, ascites, nasal packing.  Consider CBC to check platelets, especially if still oozing or bleeding more badly.    Unresulted Labs Ordered in the Past 30 Days of this Admission     Date and Time Order Name Status Description    1/8/2022  8:00 PM Prepare pheresed platelets (unit) Preliminary       These results will be followed up by PCP.    Discharge Disposition   Discharged to home  Condition at discharge: Stable    Hospital Course   Elle Blanton is a 61 year old male admitted on 1/8/2022. He has a history of ESRD on dialysis, chronic empyema/trapped lung, chronic liver disease, and is admitted for difficult to control epistaxis after a fall.     Epistaxis  Nasal Bone Fracture  Bleeding better controlled at time of admit and did not bleed more severely than minor oozing overnight.  Discussed with IR and this is not something that can be embolized.  Discussed with ENT and there is no indication for surgery or ongoing hospitalization at this time seeing as bleeding is controlled.  As such, patient was stable for discharge 1/9/2022.  -ENT recommendations:       -Keep Rhino Rockets in place for 7 days and at least  until follow-up appointment with them on Friday, 1/14/2022.       -May use a syringe to add 1 cc air to Rhino Rocket in the event of worsening bleeding on the side of the bleeding.  Sent syringes with patient on discharge.       -Prophylactic Keflex 500 mg twice daily for 7 days while nasal packing in place.       -Follow-up with PCP earlier in the week for reassessment.    L wrist swelling  Chronic-appearing L triquetral fracture  Patient has chronic swelling in L wrist and forearm.  Was noted to have a chronic appearing distal triquetral fracture on XR.  -Bracing for fracture, follow-up with PCP on Wednesday.    Anemia  Thrombocytopenia  Hemoglobin in the 8 range this admission, at his baseline due to chronic renal disease. Platelets were 75, thrombocytopenia is chronic for him as well with his liver disease. He received 1 unit of platelets in the ED and they came up to 129 with better control of his bleeding.  -Consider CBC with platelet at follow-up with PCP if patient is still oozing/bleeding.     Abdominal Swelling  Concern for Ascites  Chronic Liver Disease - Hep B vs Hematoma vs malignancy  Patient states this swelling is new/worsening over the past several days.  He has had paracentesis in the past.  Fluid palpable on abdominal exam but not in large enough quantity to be amenable to paracentesis at this time.  -Reassess at follow-up appointment with PCP to determine whether he requires paracentesis.  -Recommended patient go to dialysis as scheduled MWF (often skips appointments) as this will reduce fluid accumulation.     History of R Trapped Lung   No VATS surgery indicated per [facility] cardiothoracic surgery. Patient was stable this admission and did not require intervention.     ESRD on Hemodialysis M/F  -Back to previously scheduled MWF dialysis.  Recommended he attend all of these appointments, as he has a history of skipping 1 to 2 per week.     Chronic Aortic Dissection  Hypertension  Continue home  regimen.     Left Olecranon Bursitis s/p I&D on 10/15/21  Continues to have some discharge/drainage from the site.  No acute intervention indicated at this time.  Can follow this up with PCP on Wednesday as well.    Consultations This Hospital Stay   None    Code Status   Full Code     The patient was discussed with Dr. Gustavo Solomon.    Jorje Glez MD  [facility], PGY-3  ______________________________________________________________________    Physical Exam   Vital Signs: Temp: 98.2  F (36.8  C) Temp src: Oral BP: (!) 157/107 Pulse: 98   Resp: 18 SpO2: 99 % O2 Device: None (Room air)    Weight: 135 lbs 0 oz    GEN: Patient sitting comfortably in no acute distress although not happy about his bilateral nasal packing.  HEEN: Significant swelling, bruising, and tenderness to the nose and forehead with hematoma on the forehead. Extraocular movements intact. Bilateral rapid rhinos in place in the nares with some visible caked blood but no obvious continuing oozing. Dried blood all over gown, bed.   CV: Good capillary refill.  PULM: No increased effort.  ABD: Soft, nontender. Slightly distended with some palpable ascites but not taut or a large volume.  NEURO: Alert and oriented x3.  No focal motor abnormalities.  Face symmetric.  PSYCH: Appropriate mood and affect.      Primary Care Physician   Jaswant Flores    Discharge Orders      Reason for your hospital stay    Severe nosebleed requiring observation and platelet transfusion.     Follow-up and recommended labs and tests     Follow up with primary care provider, Jaswant Flores, on Wednesday, for hospital follow-up and to reassess left wrist and ascites as well as nasal packing. The following labs/tests are recommended: CBC to check platelets.     Follow-up with [facility] on Friday as well.  You can reach them at [phone].     Activity    Your activity upon discharge: activity as tolerated     Discharge Instructions     Leave the nasal packing in place for 7 days.  Do not take it out for any reason without a physician's guidance before you see the ENT doctor on Friday.    Take the antibiotic Keflex twice daily for the next 7 days.  This will help prevent infection.    If you start bleeding a lot from either side of your nose, use the syringes provided to put 1 cc of air into the the nasal packing on that side to inflate it a little bit more.    Make sure you go to dialysis regularly Monday, Wednesday, and Friday.  This will help you not to build up as much fluid in your belly and will reduce your need for paracenteses.     When to contact your care team    Call your primary doctor if you have any of the following: increased drainage, increased swelling, increased pain, or increased bleeding.     Discharge patient     Wrist/Arm/Hand Supplies Order    Bracing Documentation:   Patient requires the use of the ordered bracing device due to following medical need/condition: Wrist fracture.    I, the undersigned, certify that the above prescribed supplies are medically necessary for this patient and is both reasonable and necessary in reference to accepted standards of medical and necessary in reference to accepted standards of medical practice in the treatment of this patient's condition and is not prescribed as a convenience.     Diet    Follow this diet upon discharge: Renal diet.       Significant Results and Procedures   Results for orders placed or performed during the hospital encounter of 01/08/22   Cervical spine CT w/o contrast    Narrative    EXAM: CT HEAD W/O CONTRAST, CT CERVICAL SPINE W/O CONTRAST, CT FACIAL BONES WITHOUT CONTRAST  LOCATION: [facility]  DATE/TIME: 1/8/2022 6:45 PM    INDICATION: Fall with facial trauma, pain, swelling, bleeding.  COMPARISON: None.  TECHNIQUE:   1) Routine CT Head without IV contrast. Multiplanar reformats. Dose reduction techniques were used.  2) Routine CT Facial  Bones without IV contrast. Multiplanar reformats. Dose reduction techniques were used.  3) Routine CT Cervical Spine without IV contrast. Multiplanar reformats. Dose reduction techniques were used.    FINDINGS:  HEAD CT:   INTRACRANIAL CONTENTS: No intracranial hemorrhage, extraaxial collection, or mass effect. No CT evidence of acute infarct. Normal parenchymal density for age. The ventricles and sulci are normal for age.     OSSEOUS STRUCTURES/SOFT TISSUES: No significant abnormality.     FACIAL BONE CT:  OSSEOUS STRUCTURES/SOFT TISSUES: Large left paramedian frontal scalp contusion/hematoma with soft tissue swelling, increased density and adjacent fat stranding. No radiopaque foreign body. No soft tissue gas. Anterior nasal bone fracture with mild   posterior displacement and overlying soft tissue swelling. Bilateral nasal packing. No evidence for dental trauma or periapical abscess.    ORBITAL CONTENTS: No acute abnormality.    SINUSES: Blood products within the left maxillary sinus.    Nonspecific calcific density within the right masseter muscle measuring 0.6 x 0.4 cm.    CERVICAL SPINE CT:   VERTEBRA: 0.3 cm degenerative anterolisthesis of C4 on C5. Normal vertebral body heights. Severe C4-C5 degenerative disc disease. Bilateral facet osteoarthrosis right greater than left. No fracture or posttraumatic subluxation.     CANAL/FORAMINA: No canal or neural foraminal stenosis.    PARASPINAL: Normal appearance of the prevertebral and posterior paraspinous soft tissues. Right pleural effusion and small focus of pleural gas. Left pleural effusion. Enlarged thoracic aorta.      Impression    IMPRESSION:  HEAD CT:  1.  No acute intracranial process.    FACIAL BONE CT:  1.  Anterior nasal bone fracture with overlying soft tissue swelling and blood products in the left maxillary sinus. There is nasal packing.  2.  Large left paramedian frontal scalp contusion/hematoma. No associated calvarial fracture.  3.  Nonspecific  calcific density within the right masseter muscle. Correlate clinically.    CERVICAL SPINE CT:  1.  No fracture or posttraumatic subluxation.  2.  No high-grade spinal canal or neural foraminal stenosis.   Head CT w/o contrast    Narrative    EXAM: CT HEAD W/O CONTRAST, CT CERVICAL SPINE W/O CONTRAST, CT FACIAL BONES WITHOUT CONTRAST  LOCATION: [facility]  DATE/TIME: 1/8/2022 6:45 PM    INDICATION: Fall with facial trauma, pain, swelling, bleeding.  COMPARISON: None.  TECHNIQUE:   1) Routine CT Head without IV contrast. Multiplanar reformats. Dose reduction techniques were used.  2) Routine CT Facial Bones without IV contrast. Multiplanar reformats. Dose reduction techniques were used.  3) Routine CT Cervical Spine without IV contrast. Multiplanar reformats. Dose reduction techniques were used.    FINDINGS:  HEAD CT:   INTRACRANIAL CONTENTS: No intracranial hemorrhage, extraaxial collection, or mass effect. No CT evidence of acute infarct. Normal parenchymal density for age. The ventricles and sulci are normal for age.     OSSEOUS STRUCTURES/SOFT TISSUES: No significant abnormality.     FACIAL BONE CT:  OSSEOUS STRUCTURES/SOFT TISSUES: Large left paramedian frontal scalp contusion/hematoma with soft tissue swelling, increased density and adjacent fat stranding. No radiopaque foreign body. No soft tissue gas. Anterior nasal bone fracture with mild   posterior displacement and overlying soft tissue swelling. Bilateral nasal packing. No evidence for dental trauma or periapical abscess.    ORBITAL CONTENTS: No acute abnormality.    SINUSES: Blood products within the left maxillary sinus.    Nonspecific calcific density within the right masseter muscle measuring 0.6 x 0.4 cm.    CERVICAL SPINE CT:   VERTEBRA: 0.3 cm degenerative anterolisthesis of C4 on C5. Normal vertebral body heights. Severe C4-C5 degenerative disc disease. Bilateral facet osteoarthrosis right greater than left. No fracture or  posttraumatic subluxation.     CANAL/FORAMINA: No canal or neural foraminal stenosis.    PARASPINAL: Normal appearance of the prevertebral and posterior paraspinous soft tissues. Right pleural effusion and small focus of pleural gas. Left pleural effusion. Enlarged thoracic aorta.      Impression    IMPRESSION:  HEAD CT:  1.  No acute intracranial process.    FACIAL BONE CT:  1.  Anterior nasal bone fracture with overlying soft tissue swelling and blood products in the left maxillary sinus. There is nasal packing.  2.  Large left paramedian frontal scalp contusion/hematoma. No associated calvarial fracture.  3.  Nonspecific calcific density within the right masseter muscle. Correlate clinically.    CERVICAL SPINE CT:  1.  No fracture or posttraumatic subluxation.  2.  No high-grade spinal canal or neural foraminal stenosis.   CT Facial Bones without Contrast    Narrative    EXAM: CT HEAD W/O CONTRAST, CT CERVICAL SPINE W/O CONTRAST, CT FACIAL BONES WITHOUT CONTRAST  LOCATION: [facility]  DATE/TIME: 1/8/2022 6:45 PM    INDICATION: Fall with facial trauma, pain, swelling, bleeding.  COMPARISON: None.  TECHNIQUE:   1) Routine CT Head without IV contrast. Multiplanar reformats. Dose reduction techniques were used.  2) Routine CT Facial Bones without IV contrast. Multiplanar reformats. Dose reduction techniques were used.  3) Routine CT Cervical Spine without IV contrast. Multiplanar reformats. Dose reduction techniques were used.    FINDINGS:  HEAD CT:   INTRACRANIAL CONTENTS: No intracranial hemorrhage, extraaxial collection, or mass effect. No CT evidence of acute infarct. Normal parenchymal density for age. The ventricles and sulci are normal for age.     OSSEOUS STRUCTURES/SOFT TISSUES: No significant abnormality.     FACIAL BONE CT:  OSSEOUS STRUCTURES/SOFT TISSUES: Large left paramedian frontal scalp contusion/hematoma with soft tissue swelling, increased density and adjacent fat  stranding. No radiopaque foreign body. No soft tissue gas. Anterior nasal bone fracture with mild   posterior displacement and overlying soft tissue swelling. Bilateral nasal packing. No evidence for dental trauma or periapical abscess.    ORBITAL CONTENTS: No acute abnormality.    SINUSES: Blood products within the left maxillary sinus.    Nonspecific calcific density within the right masseter muscle measuring 0.6 x 0.4 cm.    CERVICAL SPINE CT:   VERTEBRA: 0.3 cm degenerative anterolisthesis of C4 on C5. Normal vertebral body heights. Severe C4-C5 degenerative disc disease. Bilateral facet osteoarthrosis right greater than left. No fracture or posttraumatic subluxation.     CANAL/FORAMINA: No canal or neural foraminal stenosis.    PARASPINAL: Normal appearance of the prevertebral and posterior paraspinous soft tissues. Right pleural effusion and small focus of pleural gas. Left pleural effusion. Enlarged thoracic aorta.      Impression    IMPRESSION:  HEAD CT:  1.  No acute intracranial process.    FACIAL BONE CT:  1.  Anterior nasal bone fracture with overlying soft tissue swelling and blood products in the left maxillary sinus. There is nasal packing.  2.  Large left paramedian frontal scalp contusion/hematoma. No associated calvarial fracture.  3.  Nonspecific calcific density within the right masseter muscle. Correlate clinically.    CERVICAL SPINE CT:  1.  No fracture or posttraumatic subluxation.  2.  No high-grade spinal canal or neural foraminal stenosis.   XR Wrist Left G/E 3 Views    Narrative    EXAM: XR WRIST LEFT G/E 3 VIEWS  LOCATION: [facility]  DATE/TIME: 1/8/2022 7:00 PM    INDICATION: Left wrist pain.  COMPARISON: None.      Impression    IMPRESSION: Small chronic appearing dorsal triquetral fracture. Moderate soft tissue swelling along the dorsum of the wrist. Osteopenia. Mild degenerative changes at the first CMC joint. Cystic change base of the lunate.       Discharge  Medications   Current Discharge Medication List      START taking these medications    Details   cephALEXin (KEFLEX) 500 MG capsule Take 1 capsule (500 mg) by mouth 2 times daily for 7 days  Qty: 14 capsule, Refills: 0    Associated Diagnoses: Fall, initial encounter         CONTINUE these medications which have NOT CHANGED    Details   acetaminophen (TYLENOL) 500 MG tablet Take 500 mg by mouth every 6 hours as needed for mild pain      calcium acetate (PHOSLO) 667 MG CAPS capsule Take 1 capsule by mouth 3 times daily (with meals)      carvedilol (COREG) 25 MG tablet Take 25-50 mg by mouth 2 times daily       cloNIDine (CATAPRES) 0.1 MG tablet Take 0.1 mg by mouth At Bedtime       diclofenac (VOLTAREN) 1 % topical gel Apply 2 g topically 2 times daily To hands  Qty: 50 g, Refills: 1    Associated Diagnoses: Pain in both hands      diltiazem ER (DILT-XR) 180 MG 24 hr capsule Take 1 capsule (180 mg) by mouth 2 times daily  Qty: 60 capsule, Refills: 11    Associated Diagnoses: Essential hypertension      entecavir (BARACLUDE) 0.5 MG tablet Take 1 tablet (0.5 mg) by mouth every 7 days  Qty: 45 tablet, Refills: 3    Associated Diagnoses: Chronic viral hepatitis B without delta agent and without coma (H)      gabapentin (NEURONTIN) 300 MG capsule Take 300 mg by mouth At Bedtime      hydrOXYzine (ATARAX) 25 MG tablet Take 1 tablet (25 mg) by mouth 3 times daily as needed for itching  Qty: 90 tablet, Refills: 3    Associated Diagnoses: Pruritic disorder      oxyCODONE (ROXICODONE) 5 MG tablet Take 1 tablet (5 mg) by mouth every 6 hours as needed for moderate to severe pain or severe pain  Qty: 3 tablet, Refills: 0    Associated Diagnoses: Pleural effusion      pantoprazole (PROTONIX) 40 MG EC tablet Take 1 tablet (40 mg) by mouth daily  Qty: 30 tablet, Refills: 11    Associated Diagnoses: Cirrhosis of liver with ascites, unspecified hepatic cirrhosis type (H)      senna-docusate (SENOKOT-S/PERICOLACE) 8.6-50 MG tablet Take  1 tablet by mouth daily as needed for constipation  Qty: 30 tablet, Refills: 11    Associated Diagnoses: Constipation, unspecified constipation type      zolpidem (AMBIEN) 5 MG tablet Take 1 tablet (5 mg) by mouth nightly as needed for sleep  Qty: 10 tablet, Refills: 5    Associated Diagnoses: Insomnia due to medical condition      ceFAZolin (ANCEF) 1 GM vial Inject 2-3 g into the vein three times a week 2 g Monday, 2 g Wednesday, 3 g Friday per ID note  Qty: 9 each, Refills: 4    Associated Diagnoses: Empyema lung (H)      order for DME Equipment being ordered: Other: blood pressure monitor  Treatment Diagnosis: hypertension  Qty: 1 each, Refills: 0    Associated Diagnoses: Descending thoracic aortic dissection (H); Essential hypertension           Allergies   Allergies   Allergen Reactions     Nka [No Known Allergies]

## 2022-01-09 NOTE — PHARMACY-ADMISSION MEDICATION HISTORY
Pharmacy Note - Admission Medication History    Pertinent Provider Information: none     ______________________________________________________________________    Prior To Admission (PTA) med list completed and updated in EMR.       PTA Med List   Medication Sig Note Last Dose     acetaminophen (TYLENOL) 500 MG tablet Take 500 mg by mouth every 6 hours as needed for mild pain  1/8/2022 at pm     amoxicillin-clavulanate (AUGMENTIN) 250-125 MG tablet Take 1 tablet by mouth 2 times daily for 5 days       calcium acetate (PHOSLO) 667 MG CAPS capsule Take 1 capsule by mouth 3 times daily (with meals) 1/9/2022: Patient states he takes three times daily, last fill was 10/20/21 for a 30 day supply 1/8/2022 at Unknown time     carvedilol (COREG) 25 MG tablet Take 25-50 mg by mouth 2 times daily  1/9/2022: Patient states he takes only if his blood pressure is too high and holds if it is too low (he states diastolic less than 108)  1/8/2022 at Unknown time     cloNIDine (CATAPRES) 0.1 MG tablet Take 0.1 mg by mouth At Bedtime  1/9/2022: No recent fill history to support use 1/9/2022 at am     diclofenac (VOLTAREN) 1 % topical gel Apply 2 g topically 2 times daily To hands (Patient taking differently: Apply 2 g topically 2 times daily as needed To hands)  1/8/2022 at Unknown time     diltiazem ER (DILT-XR) 180 MG 24 hr capsule Take 1 capsule (180 mg) by mouth 2 times daily 1/9/2022: Patient states he only takes as needed Past Week at Unknown time     entecavir (BARACLUDE) 0.5 MG tablet Take 1 tablet (0.5 mg) by mouth every 7 days (Patient taking differently: Take 0.5 mg by mouth every 7 days On Sundays)  1/2/2022     gabapentin (NEURONTIN) 300 MG capsule Take 300 mg by mouth At Bedtime  1/8/2022 at Unknown time     hydrOXYzine (ATARAX) 25 MG tablet Take 1 tablet (25 mg) by mouth 3 times daily as needed for itching 1/9/2022: No recent fill history to support use Past Week at Unknown time     oxyCODONE (ROXICODONE) 5 MG tablet  Take 1 tablet (5 mg) by mouth every 6 hours as needed for moderate to severe pain or severe pain  Past Week at Unknown time     pantoprazole (PROTONIX) 40 MG EC tablet Take 1 tablet (40 mg) by mouth daily  1/8/2022 at am     senna-docusate (SENOKOT-S/PERICOLACE) 8.6-50 MG tablet Take 1 tablet by mouth daily as needed for constipation  Past Week at Unknown time     zolpidem (AMBIEN) 5 MG tablet Take 1 tablet (5 mg) by mouth nightly as needed for sleep 1/9/2022: Patient states he just ran out a couple of days ago Past Week at Unknown time       Information source(s): Patient and [facility]  Method of interview communication: in-person    Summary of Changes to PTA Med List  New: none   Discontinued: none  Changed: none    Patient was asked about OTC/herbal products specifically.  PTA med list reflects this.    In the past week, patient estimated taking medication this percent of the time:  50-90% due to other.    Allergies were reviewed, assessed, and updated with the patient.      Patient does not anticipate needing any multi-use medications during admission.    The information provided in this note is only as accurate as the sources available at the time of the update(s).    Thank you for the opportunity to participate in the care of this patient.    Radha Michelle  1/9/2022 8:12 AM

## 2022-01-09 NOTE — DISCHARGE INSTRUCTIONS
Use brace for wrist. Follow up Wednesday in clinic with primary. Keep Rhinorockets in for 7 total days. DO NOT REMOVE. If oozing occurs that is more than usual place 1ml of air to side of bleeding. Follow up with ENT on Friday.

## 2022-01-09 NOTE — ED TRIAGE NOTES
Patient arrives from home.  States he tripped on the carpet causing him to fall forwards at 1700- striking his head on tile.  +Hematoma to forehead and epistaxis to bilateral nares.  Bleeding appears controlled with guaze in place.  Patient denies any LOC.  Denies any neck pain- no c-spine tenderness upon palpation.  C/o left wrist pain.  +Swelling and pulses intact.  NUNEZ.  GCS 15. Patient mentions he has a hx of anemia and concerned about losing a lot of blood.

## 2022-01-10 NOTE — PROGRESS NOTES
Clinic Care Coordination Contact  [facility]/Voicemail       Clinical Data: Care Coordinator Outreach  Outreach attempted x 1.  Left message on patient's voicemail with call back information and requested return call.  Plan: Care Coordinator will try to reach patient again in 1-2 business days.    MAEGAN Monterroso  [phone]  [facility]

## 2022-01-11 NOTE — PROGRESS NOTES
Clinic Care Coordination Contact  [facility]/Voicemail       Clinical Data: Care Coordinator Outreach  Outreach attempted x 2.  Left message on patient's voicemail with call back information and requested return call.  Plan: Care Coordinator will do no further outreaches at this time.    MAEGAN Monterroso  [phone]  [facility]

## 2022-01-11 NOTE — PROGRESS NOTES
ASSESSMENT/PLAN:    ***      Pt is a 61 year old male last seen on *** here today for:     ***    Past Medical History:   Diagnosis Date     NAHUM (acute kidney injury) (H)      GI (gastrointestinal bleed)      Gout      H. pylori infection 7/5/2017    UGI bleed implied by Hgb 9.0 6/22/17 and melena. Admitted and hgb decreased to 7.6, CT abdomen showed all bladder wall thickening. + Hep B surface antigen noted. Melena resolved and hgb stabalized without transfusion, epigastric pain resolved with PPI. Recommend referral for gastroscopy.     Heart attack (H)      Hepatitis B      Normocytic anemia      PONV (postoperative nausea and vomiting)      Thrombocytopenia (H)       Past Surgical History:   Procedure Laterality Date     ABCESS DRAINAGE      finger     BURSECTOMY ELBOW Left 10/15/2021    Procedure: LEFT OLECRANON BURSECTOMY;  Surgeon: Tico Myers MD;  Location: [facility]     CREATE FISTULA ARTERIOVENOUS UPPER EXTREMITY Left 4/20/2021    Procedure: left arm dialysis graft placement;  Surgeon: Roxana Cosby MD;  Location: [facility] OR;  Service: General     FOREIGN BODY REMOVAL      finger     INCISION AND DRAINAGE FINGER, COMBINED Left 10/20/2016    Procedure: COMBINED INCISION AND DRAINAGE FINGER;  Surgeon: Mireya Pizano MD;  Location: [facility] OR     IR CHEST TUBE PLACEMENT NON-TUNNELED RIGHT  4/19/2021     IR CHEST TUBE PLACEMENT NON-TUNNELED RIGHT  10/19/2021     IR CHEST TUBE PLACEMENT NON-TUNNELED RIGHT  11/10/2021     IR PLEURAL DRAINAGE WITH CATHETER INSERTION  4/19/2021     MIDLINE INSERTION - DOUBLE LUMEN  4/23/2021          HI ESOPHAGOGASTRODUODENOSCOPY TRANSORAL DIAGNOSTIC N/A 8/18/2020    Procedure: ESOPHAGOGASTRODUODENOSCOPY (EGD) with biopsy;  Surgeon: Nilson Gonzales MD;  Location: [facility];  Service: Gastroenterology     US PARACENTESIS  8/14/2020     US PARACENTESIS  8/19/2020     US THORACENTESIS  4/18/2021      Current Outpatient Medications   Medication Sig  Dispense Refill     acetaminophen (TYLENOL) 500 MG tablet Take 500 mg by mouth every 6 hours as needed for mild pain       calcium acetate (PHOSLO) 667 MG CAPS capsule Take 1 capsule by mouth 3 times daily (with meals)       carvedilol (COREG) 25 MG tablet Take 25-50 mg by mouth 2 times daily        ceFAZolin (ANCEF) 1 GM vial Inject 2-3 g into the vein three times a week 2 g Monday, 2 g Wednesday, 3 g Friday per ID note 9 each 4     cephALEXin (KEFLEX) 500 MG capsule Take 1 capsule (500 mg) by mouth 2 times daily for 7 days 14 capsule 0     cloNIDine (CATAPRES) 0.1 MG tablet Take 0.1 mg by mouth At Bedtime        diclofenac (VOLTAREN) 1 % topical gel Apply 2 g topically 2 times daily To hands (Patient taking differently: Apply 2 g topically 2 times daily as needed To hands) 50 g 1     diltiazem ER (DILT-XR) 180 MG 24 hr capsule Take 1 capsule (180 mg) by mouth 2 times daily 60 capsule 11     entecavir (BARACLUDE) 0.5 MG tablet Take 1 tablet (0.5 mg) by mouth every 7 days (Patient taking differently: Take 0.5 mg by mouth every 7 days On Sundays) 45 tablet 3     gabapentin (NEURONTIN) 300 MG capsule Take 300 mg by mouth At Bedtime       hydrOXYzine (ATARAX) 25 MG tablet Take 1 tablet (25 mg) by mouth 3 times daily as needed for itching 90 tablet 3     order for DME Equipment being ordered: Other: blood pressure monitor  Treatment Diagnosis: hypertension 1 each 0     oxyCODONE (ROXICODONE) 5 MG tablet Take 1 tablet (5 mg) by mouth every 6 hours as needed for moderate to severe pain or severe pain 3 tablet 0     pantoprazole (PROTONIX) 40 MG EC tablet Take 1 tablet (40 mg) by mouth daily 30 tablet 11     senna-docusate (SENOKOT-S/PERICOLACE) 8.6-50 MG tablet Take 1 tablet by mouth daily as needed for constipation 30 tablet 11     zolpidem (AMBIEN) 5 MG tablet Take 1 tablet (5 mg) by mouth nightly as needed for sleep 10 tablet 5      Allergies   Allergen Reactions     Nka [No Known Allergies]         ROS:   Gen- no  weight change, no fevers/chills   Head/ Eyes- no blurred vision, no headaches   ENT- no cough, no congestion, no URI symptoms   Cardiac - no palpitations, no chest pain   Respiratory - no shortness of breath , no wheezing   GI - no nausea, no vomiting, no diarrhea, no constipation   Urinary - no dysuria, no polyuria   Neuro - no numbness, no tingling   Remainder of ROS negative.     Exam:   There were no vitals taken for this visit.   Alert and oriented x 3; No acute distress   HEENT: extraocular movements intact, tympanic membranes clear, oropharynx clear   Neck: no masses, no lymphadenopathy   Resp: clear to auscultation bilaterally, no wheezing/ronchi   CV: rate/rhythm regular, no murmurs/rubs/gallops   ABd: soft, + bowel sounds, nontender/nondistended, no rebound/guarding   Extrem: no clubbing/cyanosis/edema   MSK: full range of motion, nontender   Neuro: no focal deficits   Derm: no rashes

## 2022-01-11 NOTE — PROGRESS NOTES
Hospitalization Follow-up Visit         HPI       Hospital Follow-up Visit:    Hospital:  [facility]   Date of Admission: 1/8/2022  Date of Discharge: 1/9/2022  Reason(s) for Admission: Fall/ epistaxis/ anemia/ thrombocytopenia             Problems taking medications regularly:  None       Post Discharge Medication Reconciliation: discharge medications reconciled, continue medications without change.       Problems adhering to non-medication therapy:  None       Medications reviewed by: by myself. Findings include no issues.    Summary of hospitalization:  [facility] discharge summary reviewed  Diagnostic Tests/Treatments reviewed.  Follow up needed: ENT  Other Healthcare Providers Involved in Patient s Care:         None  Update since discharge: improved.   Plan of care communicated with patient, wife            1) Fall/ Epistaxis - whole face is swollen and bruised; has nasal packing in both nostrils; plan is for ENT follow-up in 2 days; they will decide whether to pull the packing at that visit; He thinks the bleeding has stopped; Denies lightheadedness or dizziness; + fatigue    Discharge Summary - Medicine & Pediatrics       Date of Admission:  1/8/2022  Date of Discharge:  1/9/2022  Discharging Provider: Dakotah Glez MD.  Discharge Service: [place] [facility].        Discharge Diagnoses     Epistaxis due to fall and fractured nasal bone.  Recurrent ascites.  Chronic left triquetral fracture.      Follow-ups Needed After Discharge     Follow-up Appointments     Follow-up and recommended labs and tests       Follow up with primary care provider, Jaswant Flores, on Wednesday, for   hospital follow-up and to reassess left wrist and ascites as well as nasal   packing. The following labs/tests are recommended: CBC to check platelets.       Follow-up with [facility] on Friday as well.  You can reach them at   [phone].        Reassessed left wrist, ascites, nasal packing.   Consider CBC to check platelets, especially if still oozing or bleeding more badly.    Hospital Course     Elle Blanton is a 61 year old male admitted on 1/8/2022. He has a history of ESRD on dialysis, chronic empyema/trapped lung, chronic liver disease, and is admitted for difficult to control epistaxis after a fall.     Epistaxis  Nasal Bone Fracture  Bleeding better controlled at time of admit and did not bleed more severely than minor oozing overnight.  Discussed with IR and this is not something that can be embolized.  Discussed with ENT and there is no indication for surgery or ongoing hospitalization at this time seeing as bleeding is controlled.  As such, patient was stable for discharge 1/9/2022.  -ENT recommendations:       -Keep Rhino Rockets in place for 7 days and at least until follow-up appointment with them on Friday, 1/14/2022.       -May use a syringe to add 1 cc air to Rhino Rocket in the event of worsening bleeding on the side of the bleeding.  Sent syringes with patient on discharge.       -Prophylactic Keflex 500 mg twice daily for 7 days while nasal packing in place.       -Follow-up with PCP earlier in the week for reassessment.     L wrist swelling  Chronic-appearing L triquetral fracture  Patient has chronic swelling in L wrist and forearm.  Was noted to have a chronic appearing distal triquetral fracture on XR.  -Bracing for fracture, follow-up with PCP on Wednesday.     Anemia  Thrombocytopenia  Hemoglobin in the 8 range this admission, at his baseline due to chronic renal disease. Platelets were 75, thrombocytopenia is chronic for him as well with his liver disease. He received 1 unit of platelets in the ED and they came up to 129 with better control of his bleeding.  -Consider CBC with platelet at follow-up with PCP if patient is still oozing/bleeding.     Abdominal Swelling  Concern for Ascites  Chronic Liver Disease - Hep B vs Hematoma vs malignancy  Patient states this swelling is  new/worsening over the past several days.  He has had paracentesis in the past.  Fluid palpable on abdominal exam but not in large enough quantity to be amenable to paracentesis at this time.  -Reassess at follow-up appointment with PCP to determine whether he requires paracentesis.  -Recommended patient go to dialysis as scheduled MWF (often skips appointments) as this will reduce fluid accumulation.     History of R Trapped Lung   No VATS surgery indicated per [facility] cardiothoracic surgery. Patient was stable this admission and did not require intervention.     ESRD on Hemodialysis M/F  -Back to previously scheduled MWF dialysis.  Recommended he attend all of these appointments, as he has a history of skipping 1 to 2 per week.     Chronic Aortic Dissection  Hypertension  Continue home regimen.     Left Olecranon Bursitis s/p I&D on 10/15/21  Continues to have some discharge/drainage from the site.  No acute intervention indicated at this time.  Can follow this up with PCP on Wednesday as well.    2) Trapped lung - per Dr Zurita's note on 1/6/2022 - we will plan for therapeutic thoracentesis moving forward  Today SOB is stable    IMPRESSION (J98.19) Trapped lung  (primary encounter diagnosis)     61 year old year-old male with a RIGHT trapped lung   ESRD on hemodialysis  Cirrhosis and ascites (Hepatitis B)     PLAN  I spent 45 min on the date of the encounter in chart review, patient visit, review of tests, documentation and/or discussion with other providers about the issues documented above. I reviewed the plan as follows:  We had a long conversation about his trapped lung. I explained that surgery is not an option for him. A VATS or open decortication would be very challenging due to the chronicity and lead to significant intraoperative blood loss, which he would not be able to tolerate.     Recommendation: Palliation of dyspnea with intermittent outpatient thoracentesis as needed. At this time he does not need a  thoracentesis, since he feels well. The chest cavity has filled [facility], maybe he can get by with a thoracentesis every 8-12 weeks..    3) Ortho - has bursectomy scheduled for L olecranon bursa with Dr Myers on 1/18/2022  For the R wrist, they have a follow-up scheduled on 1/26/22 w/ Dr Rangel     4) Ascites - pt notes inc abd distention; interested in therapeutic paracentesis            Review of Systems:   CONSTITUTIONAL: no fatigue, no unexpected change in weight  SKIN: no worrisome rashes, no worrisome moles, no worrisome lesions  EYES: no acute vision problems or changes  ENT:See HPI   RESP: no significant cough, no shortness of breath  CV: no chest pain, no palpitations, no new or worsening peripheral edema  GI: no nausea, no vomiting, no constipation, no diarrhea  GI: see HPI            Physical Exam:     Vitals:    01/12/22 1306 01/12/22 1307   BP: (!) 148/85 (!) 148/85   Pulse: 72    Resp: 18    Temp: 97.9  F (36.6  C)    TempSrc: Oral    SpO2: 96%    Weight: 62.1 kg (137 lb)      Body mass index is 22.8 kg/m .    GENERAL:  alert, mild distress  HENT: Extensive swelling and ecchymosis from below orbits to upper lip bilaterally; +extensive packing of both nares  NECK: no tenderness, no adenopathy, no asymmetry, no masses  RESP: lungs clear to auscultation - no rales, no rhonchi, no wheezes  CV: regular rates and rhythm, normal S1 S2, no S3 or S4 and no murmur, no click or rub -  ABDOMEN: soft, no tenderness, + significant distention and tympany to percussion consistent w/ ascites         Results:     CBC RESULTS: Recent Labs   Lab Test 01/13/22  0150 01/12/22  1320   WBC  --  6.7   RBC  --  2.06*   HGB 7.7* 6.3*   HCT  --  20.3*   MCV  --  99   MCH  --  30.6   MCHC  --  31.0*   RDW  --  16.9*   PLT  --  57*         Assessment and Plan      Elle was seen today for fall, hospital f/u, refill request and ultrasound.    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  -     Uric acid  Will also reach out to Dr Myers's office regarding his pending elbow surgery to see if his recent injury will affect plan for surgery given possible implications for covid testing and anesthesia    Closed fracture of nasal bone with routine healing, subsequent encounter  -     oxyCODONE (ROXICODONE) 5 MG tablet; Take 1 tablet (5 mg) by mouth daily as needed for severe pain        E&M code to be billed if TCM cannot be: 21992     Addendum:  Hgb came back at 6.4; I spoke w/ pt and his wife and recommended he go to ED for transfusion;   Spoke to ED physician and our hospital team (Dr Sanders).    Jaswant Flores MD  January 12, 2022  2:09 PM

## 2022-01-13 NOTE — PROGRESS NOTES
Clinic Care Coordination Contact    Clinic Care Coordination Contact  OUTREACH    Referral Information:            Chief Complaint   Patient presents with     Clinic Care Coordination - Post Hospital        Universal Utilization: NA     Utilization    Hospital Admissions  5             ED Visits  6             No Show Count (past year)  4                Current as of: 1/13/2022 10:43 AM              Clinical Concerns:  Current Medical Concerns:  NA    Current Behavioral Concerns: NA    Education Provided to patient: NA      Health Maintenance Reviewed:    Clinical Pathway: None    Medication Management:  Medication review status: Medications reviewed and no changes reported per patient.             Functional Status:       Living Situation:       Lifestyle & Psychosocial Needs:    Social Determinants of Health     Tobacco Use: Low Risk      Smoking Tobacco Use: Never Smoker     Smokeless Tobacco Use: Never Used   Alcohol Use: Not on file   Financial Resource Strain: Not on file   Food Insecurity: Not on file   Transportation Needs: Not on file   Physical Activity: Not on file   Stress: Not on file   Social Connections: Not on file   Intimate Partner Violence: Not on file   Depression: Not at risk     PHQ-2 Score: 0   Housing Stability: Not on file                       I called to check up on the pt and help the pt setup a hospital follow up. The pt was  [facility] for a fall/epistaxis/anemia/thrombocytopenis. I called the pt but got his vm, so I left a vm for the pt to give me a call back. I tried calling the pt on 01/10/2022, and 01/11/2022. I have not gotten a hold of the pt or heard from him.      Resources and Interventions:  Current Resources:                                    Goals:       Patient/Caregiver understanding: NA       Future Appointments              Tomorrow [facility] RADIOLOGIST 2; [facility] US 4 M [facility] Ultrasound, [facility]    In 3 days [facility] COVID TESTING [facility]UB 1 M [facility]  [facility]    In 2 weeks Jaswant Flores MD [facility] [place] [place]          Plan:

## 2022-01-13 NOTE — ED PROVIDER NOTES
Progress Note    1:14 AM The patient was signed out to me by Dr. Farias.    1:39 AM I rechecked and updated the patient.    Brief HPI:   Patient reports he was initially at his clinic for a follow up when they found him to have low HBG of 6.4 and was referred to ER for transfusion. Prior to this, he did not endorse any lightheadness or dizziness but did feel weak. He did have a prior fall on 1/8/21 where he sustained bruising onto his face, epistaxis and a fracture on his nose. He normally gets dialysis every Monday and Friday which he has had done this week.     ED Course as of 01/13/22 0227   Thu Jan 13, 2022   0109 Signout: 60 yo M with HGB of 6.3. not actively bleeding. PLT in 50s. Getting 1 U of PRBC. Plan for discharge afterwards   0149 Rechecked the patient, he is sitting up, ready to go, but feels strongly about recheck hemoglobin before discharge.  I think this is reasonable request.  We will wait for the repeat hemoglobin.   0227 Hemoglobin(!): 7.7       Final diagnoses:   Anemia, unspecified type   Thrombocytopenia (H)         Mani Locktet MD  Emergency Medicine  [facility]       Mani Lockett MD  01/13/22 0227

## 2022-01-13 NOTE — ED PROVIDER NOTES
Emergency Department Encounter      NAME: Elle Blanton  AGE: 61 year old male  YOB: 1960  MRN: [id number]  EVALUATION DATE & TIME: No admission date for patient encounter.    PCP: Jaswant Flores    ED PROVIDER: Prince Farias M.D.      Chief Complaint   Patient presents with     low hemoglobin         FINAL IMPRESSION:  1. Anemia, unspecified type    2. Thrombocytopenia (H)        MEDICAL DECISION MAKIN:47 PM I met with the patient, obtained history, performed an initial exam, and discussed options and plan for diagnostics and treatment here in the ED. Patient states he would prefer to go home. PPE worn: N95 mask and gloves.  12:15am I updated the patient with his results and discussed plans for discharge and given return precautions. Patient was agreeable with the plan.      This patient is a 61-year-old male with a history of end-stage renal disease on hemodialysis which she apparently gets twice a week on Monday and Friday.  The had a recent fall and has extensive bruising over his face and a nasal packing in place for a nosebleed.  He has an appointment in 2 days to get the nasal pack removed by ENT in [facility].  He was seen at the [facility] for follow-up and found to have a hemoglobin of 6.4.  He was transferred to the ER for transfusion.  The patient feels fine otherwise and was asking right away to be discharged home.  I typed and crossed for 1 unit and he is currently getting this unit and transfuse.  He is feeling fine.  We discussed his low platelets of 57,000 and the fact that he needs to get this looked into.  He is aware that he is at risk for bleeding with platelets this low.  I will sign this patient out to Dr. Lockett and Dr. Mullins at the end of my shift with the completion of his transfusion pending.  I have prepared discharge paperwork for him.  Pertinent Labs & Imaging studies reviewed. (See chart for details)     The importance of close follow up was  discussed. We reviewed warning signs and symptoms, and I instructed Mr. Blanton to return to the emergency department immediately if he develops any new or worsening symptoms. I provided additional verbal discharge instructions. Mr. Blanton expressed understanding and agreement with this plan of care, his questions were answered, and he was discharged in stable condition.       MEDICATIONS GIVEN IN THE EMERGENCY:  Medications   0.9% sodium chloride BOLUS (250 mLs Intravenous New Bag 1/12/22 3812)       NEW PRESCRIPTIONS STARTED AT TODAY'S ER VISIT:  New Prescriptions    No medications on file          =================================================================    HPI    Patient information was obtained from: patient     Use of : N/A         Elle Blanton is a 61 year old male with a past medical history of ERSD on dialysis, chronic liver disease,  H. Pylori infection, heart attack, Gout, CKD4, cirrhosis of liver without ascites, anemia, who presents low HGB.     Chart was reviewed: Patient was evaluated initially at [facility] for follow up office visit from a fall/epistaxis/anemia/thrombocytopenia that he was previously admitted for on 1/8/22-1/9/22 . He was found to have low HBG of 6.4 and was transferred to ER for transfusion.     Patient reports he was initially at his clinic for a follow up when they found him to have low HBG of 6.4 and was referred to ER for transfusion. Prior to this, he did not endorse any lightheadness or dizziness but did feel weak. He did have a prior fall on 1/8/21 where he sustained bruising onto his face, epistaxis and a fracture on his nose. He normally gets dialysis every Monday and Friday which he has had done this week.     He does have chronic left lower arm swelling for the past 1.5 years with no recent changes. He is getting an elbow surgery on 1/18 due to prior infection with leaking drainage. He is also seeing ENT specialist on Friday (2 days) to get his  shannan unpacked. He denies chest pain, abdominal pain, shortness of breath. No other medical complaints at this time.       REVIEW OF SYSTEMS   Review of Systems   HENT:        Positive bruising on face   Respiratory: Negative for shortness of breath.    Cardiovascular: Negative for chest pain.   Gastrointestinal: Negative for abdominal pain.   Neurological: Positive for weakness. Negative for dizziness and light-headedness.   All other systems reviewed and are negative.       PAST MEDICAL HISTORY:  Past Medical History:   Diagnosis Date     NAHUM (acute kidney injury) (H)      GI (gastrointestinal bleed)      Gout      H. pylori infection 7/5/2017    UGI bleed implied by Hgb 9.0 6/22/17 and melena. Admitted and hgb decreased to 7.6, CT abdomen showed all bladder wall thickening. + Hep B surface antigen noted. Melena resolved and hgb stabalized without transfusion, epigastric pain resolved with PPI. Recommend referral for gastroscopy.     Heart attack (H)      Hepatitis B      Normocytic anemia      PONV (postoperative nausea and vomiting)      Thrombocytopenia (H)        PAST SURGICAL HISTORY:  Past Surgical History:   Procedure Laterality Date     ABCESS DRAINAGE      finger     BURSECTOMY ELBOW Left 10/15/2021    Procedure: LEFT OLECRANON BURSECTOMY;  Surgeon: Tico Myers MD;  [facility] [place]     CREATE FISTULA ARTERIOVENOUS UPPER EXTREMITY Left 4/20/2021    Procedure: left arm dialysis graft placement;  Surgeon: Roxana Cosby MD;  [place] [facility] [place]  Service: General     FOREIGN BODY REMOVAL      finger     INCISION AND DRAINAGE FINGER, COMBINED Left 10/20/2016    Procedure: COMBINED INCISION AND DRAINAGE FINGER;  Surgeon: Mireya Pizano MD;  Location: [facility]     IR CHEST TUBE PLACEMENT NON-TUNNELED RIGHT  4/19/2021     IR CHEST TUBE PLACEMENT NON-TUNNELED RIGHT  10/19/2021     IR CHEST TUBE PLACEMENT NON-TUNNELED RIGHT  11/10/2021     IR PLEURAL DRAINAGE WITH CATHETER  INSERTION  4/19/2021     MIDLINE INSERTION - DOUBLE LUMEN  4/23/2021          [place] ESOPHAGOGASTRODUODENOSCOPY TRANSORAL DIAGNOSTIC N/A 8/18/2020    Procedure: ESOPHAGOGASTRODUODENOSCOPY (EGD) with biopsy;  Surgeon: Nilson Gonzales MD;  Location: [facility];  Service: Gastroenterology      PARACENTESIS  8/14/2020      PARACENTESIS  8/19/2020     US THORACENTESIS  4/18/2021       CURRENT MEDICATIONS:      Current Facility-Administered Medications:      0.9% sodium chloride BOLUS, 1-250 mL, Intravenous, Q1H PRN, Prince Farias MD, 250 mL at 01/12/22 2310    Current Outpatient Medications:      acetaminophen (TYLENOL) 500 MG tablet, Take 500 mg by mouth every 6 hours as needed for mild pain, Disp: , Rfl:      calcium acetate (PHOSLO) 667 MG CAPS capsule, Take 1 capsule by mouth 3 times daily (with meals), Disp: , Rfl:      carvedilol (COREG) 25 MG tablet, Take 25-50 mg by mouth 2 times daily , Disp: , Rfl:      ceFAZolin (ANCEF) 1 GM vial, Inject 2-3 g into the vein three times a week 2 g Monday, 2 g Wednesday, 3 g Friday per ID note, Disp: 9 each, Rfl: 4     cephALEXin (KEFLEX) 500 MG capsule, Take 1 capsule (500 mg) by mouth 2 times daily for 7 days, Disp: 14 capsule, Rfl: 0     cloNIDine (CATAPRES) 0.1 MG tablet, Take 0.1 mg by mouth At Bedtime , Disp: , Rfl:      diclofenac (VOLTAREN) 1 % topical gel, Apply 2 g topically 2 times daily To hands (Patient taking differently: Apply 2 g topically 2 times daily as needed To hands), Disp: 50 g, Rfl: 1     diltiazem ER (DILT-XR) 180 MG 24 hr capsule, Take 1 capsule (180 mg) by mouth 2 times daily, Disp: 60 capsule, Rfl: 11     entecavir (BARACLUDE) 0.5 MG tablet, Take 1 tablet (0.5 mg) by mouth every 7 days (Patient taking differently: Take 0.5 mg by mouth every 7 days On Sundays), Disp: 45 tablet, Rfl: 3     gabapentin (NEURONTIN) 300 MG capsule, Take 300 mg by mouth At Bedtime, Disp: , Rfl:      hydrOXYzine (ATARAX) 25 MG tablet, Take 1 tablet (25 mg) by mouth 3  times daily as needed for itching, Disp: 90 tablet, Rfl: 3     order for DME, Equipment being ordered: Other: blood pressure monitor Treatment Diagnosis: hypertension, Disp: 1 each, Rfl: 0     oxyCODONE (ROXICODONE) 5 MG tablet, Take 1 tablet (5 mg) by mouth daily as needed for severe pain, Disp: 14 tablet, Rfl: 0     oxyCODONE (ROXICODONE) 5 MG tablet, Take 1 tablet (5 mg) by mouth every 6 hours as needed for moderate to severe pain or severe pain, Disp: 3 tablet, Rfl: 0     pantoprazole (PROTONIX) 40 MG EC tablet, Take 1 tablet (40 mg) by mouth daily, Disp: 30 tablet, Rfl: 11     senna-docusate (SENOKOT-S/PERICOLACE) 8.6-50 MG tablet, Take 1 tablet by mouth daily as needed for constipation, Disp: 30 tablet, Rfl: 11     zolpidem (AMBIEN) 5 MG tablet, Take 1 tablet (5 mg) by mouth nightly as needed for sleep, Disp: 10 tablet, Rfl: 5    ALLERGIES:  Allergies   Allergen Reactions     Nka [No Known Allergies]        FAMILY HISTORY:  Family History   Problem Relation Age of Onset     Diabetes Father      Hypertension No family hx of      Asthma No family hx of      Cancer No family hx of      Coronary Artery Disease No family hx of      Liver Cancer No family hx of      Ulcers Father        SOCIAL HISTORY:   Social History     Socioeconomic History     Marital status:      Spouse name: Not on file     Number of children: Not on file     Years of education: Not on file     Highest education level: Not on file   Occupational History     Not on file   Tobacco Use     Smoking status: Never Smoker     Smokeless tobacco: Never Used   Substance and Sexual Activity     Alcohol use: No     Drug use: No     Sexual activity: Yes     Partners: Female   Other Topics Concern     Not on file   Social History Narrative     Not on file     Social Determinants of Health     Financial Resource Strain: Not on file   Food Insecurity: Not on file   Transportation Needs: Not on file   Physical Activity: Not on file   Stress: Not on  "file   Social Connections: Not on file   Intimate Partner Violence: Not on file   Housing Stability: Not on file       PHYSICAL EXAM:    Vitals: BP (!) 142/85   Pulse 82   Temp 98.4  F (36.9  C)   Resp 15   Ht 1.651 m (5' 5\")   Wt 61.7 kg (136 lb)   SpO2 97%   BMI 22.63 kg/m     Constitutional: Well developed, well nourished. Comfortable appearing.  HENT: bruising seen over patient's face with both nares packed. No active nasal bleeding.  No blood seen in the posterior pharynx.  Normocephalic,  mucous membranes moist, nose normal.   Neck- Supple, gross ROM intact.   Eyes: Pupils mid-range, sclera white, no discharge  Respiratory: Clear to auscultation bilaterally, decreased breath sounds over right lower lung base, moving air well, no wheezing, no rales, speaks full sentences easily.  Cardiovascular: Normal heart rate, regular rhythm, no murmurs. No lower extremity edema, 2+ DP pulses.   GI: Soft, no tenderness to deep palpation in all quadrants, no masses.  Musculoskeletal: Left arm is swollen and nontender. Left av fistula with positive bruit and thrill. Moving all 4 extremities intentionally and without pain. No obvious deformity.  Skin: Warm, dry, no rash.  Neurologic: Alert & oriented x 3, speech clear, moving all extremities spontaneously   Psychiatric: Affect normal, cooperative.     LAB:  All pertinent labs reviewed and interpreted.  Labs Ordered and Resulted from Time of ED Arrival to Time of ED Departure   TYPE AND SCREEN, ADULT       Result Value    ABO/RH(D) A POS      Antibody Screen Negative      SPECIMEN EXPIRATION DATE [id number]     PREPARE RED BLOOD CELLS (UNIT)    CROSSMATCH Compatible      UNIT ABO/RH A Pos      Unit Number [id number]      Unit Status Issued      Blood Component Type Red Blood Cells      Product Code A8309Y22      CODING SYSTEM [id number]      UNIT TYPE ISBT 6200      ISSUE DATE AND TIME [id number]     PREPARE RED BLOOD CELLS (UNIT)   TRANSFUSE RED BLOOD CELLS " (UNIT)   ABO/RH TYPE AND SCREEN       RADIOLOGY:  No orders to display       I, Debby Coling, am serving as a scribe to document services personally performed by Dr. Prince Farias based on my observation and the provider's statements to me. IPrince M.D. attest that Debby Blanton is acting in a scribe capacity, has observed my performance of the services and has documented them in accordance with my direction.      Prince Farias M.D.  Emergency Medicine  [facility] EMERGENCY DEPARTMENT  [address] [place] [id number]  [phone]  Dept: [phone]     Prince Farias MD  01/13/22 0033

## 2022-01-13 NOTE — ED TRIAGE NOTES
"Pt coming from clinic with \"low hemoglobin, need blood tranfusion\". Pt admitted to hospital on 1-8-22 for fall and anterior nasal bone fx that still has packing place. Pt says his doctor told him that his hmg=6.4. Pt understands English, but is a Hmong speaker as well  "

## 2022-01-14 NOTE — TELEPHONE ENCOUNTER
Patient really needs to speak to  before his surgery because he needs clarification on whether they should proceed with it or not due to not being able to stop his nose from bleeding from the hard fall he had. There are concerns that needs to be addressed. Please assist and advise as this is urgent and patient needs confirmation.

## 2022-01-15 NOTE — TELEPHONE ENCOUNTER
I spoke w/ pt and informed him that I called Dr Myers's office on Thursday and requested a call back from his surgery team. Have not received a response. Pt was seen in ENT clinic and had packing pulled from his nose and is treating bleeds w/ Julian slade.    Mer/ Loc Almeida will need a COVID swab for pending surgery on Tues 1/18 but we can't swab his nose. Throat swab? Saliva? Please contact him Monday morning to coordinate or we may need to delay his surgery. I will reach out to Dr Myers's team again Monday morning. Thanks!    Jaswant Flores MD  January 15, 2022  9:22 AM

## 2022-01-17 NOTE — TELEPHONE ENCOUNTER
RN has spoken to patient, a RN appointment has been scheduled for throat/oropharngeal pcr swab to rule out covid. Will apply surgery date of 1/18/2022 in hopes this will get test expedited in resulting in time for surgery. Ze RN

## 2022-01-18 NOTE — OR NURSING
Notified Ilan GARNICA with Dr Myers for pre op orders.  Orders received.   Notified Dr Roberts regarding previous hgb and platelet levels.  New orders obtained.  Results given to Dr. Roberts.

## 2022-01-18 NOTE — PHARMACY-ADMISSION MEDICATION HISTORY
Pharmacy Note - Admission Medication History    Pertinent Provider Information:     Patient is checking his blood pressure twice daily and is holding his diltiazem and carvedilol if it is low.      He seemed unsure whether gabapentin was a current medication.  But then stated that he did take it.   ______________________________________________________________________    Prior To Admission (PTA) med list completed and updated in EMR.       PTA Med List   Medication Sig Last Dose     acetaminophen (TYLENOL) 500 MG tablet Take 500 mg by mouth every 6 hours as needed for mild pain 1/17/2022 at pm     calcium acetate (PHOSLO) 667 MG CAPS capsule Take 1 capsule by mouth 3 times daily (with meals) 1/17/2022 at Unknown time     carvedilol (COREG) 25 MG tablet Take 25-50 mg by mouth 2 times daily as needed  1/17/2022 at Unknown time     cloNIDine (CATAPRES) 0.1 MG tablet Take 0.1 mg by mouth At Bedtime  1/17/2022 at Unknown time     [facility] SALINE NASAL SPRAY 0.65 % nasal spray Spray 1 spray in nostril daily as needed 1/17/2022 at Unknown time     diltiazem ER (DILT-XR) 180 MG 24 hr capsule Take 1 capsule (180 mg) by mouth 2 times daily (Patient taking differently: Take 180 mg by mouth 2 times daily as needed (Checks BP prior to taking, if too low he does not take this medication) ) 1/17/2022 at Unknown time     entecavir (BARACLUDE) 0.5 MG tablet Take 1 tablet (0.5 mg) by mouth every 7 days (Patient taking differently: Take 0.5 mg by mouth every 7 days On Sundays) 1/16/2022 at Unknown time     gabapentin (NEURONTIN) 300 MG capsule Take 300 mg by mouth At Bedtime 1/15/2022 at Unknown time     hydrOXYzine (ATARAX) 25 MG tablet Take 1 tablet (25 mg) by mouth 3 times daily as needed for itching Past Month at Unknown time     oxyCODONE (ROXICODONE) 5 MG tablet Take 1 tablet (5 mg) by mouth daily as needed for severe pain 1/15/2022 at Unknown time     pantoprazole (PROTONIX) 40 MG EC tablet Take 1 tablet (40 mg) by mouth daily  1/17/2022 at Unknown time     senna-docusate (SENOKOT-S/PERICOLACE) 8.6-50 MG tablet Take 1 tablet by mouth daily as needed for constipation Past Week at Unknown time     zolpidem (AMBIEN) 5 MG tablet Take 1 tablet (5 mg) by mouth nightly as needed for sleep Past Month at Unknown time       Information source(s): Patient and CareEverywhere/SureScripts  Method of interview communication: in-person    Patient was asked about OTC/herbal products specifically.  PTA med list reflects this.    Allergies were reviewed, assessed, and updated with the patient.      Patient does not anticipate needing any multi-use medications during admission.    The information provided in this note is only as accurate as the sources available at the time of the update(s).    Thank you for the opportunity to participate in the care of this patient.    Shala Hernandes, Pharm D, BCPS, [facility] CountyCP 1/18/2022 1:41 PM

## 2022-01-18 NOTE — ANESTHESIA PREPROCEDURE EVALUATION
Anesthesia Pre-Procedure Evaluation    Patient: Elle Blanton   MRN: [id number] : 1960        Preoperative Diagnosis: Elbow pain [M25.529]    Procedure : Procedure(s):  LEFT ELBOW OLECRANON BURSECTOMY  AND ELBOW INCISION AND DRAINAGE          Past Medical History:   Diagnosis Date     NAHUM (acute kidney injury) (H)      GI (gastrointestinal bleed)      Gout      H. pylori infection 2017    UGI bleed implied by Hgb 9.0 17 and melena. Admitted and hgb decreased to 7.6, CT abdomen showed all bladder wall thickening. + Hep B surface antigen noted. Melena resolved and hgb stabalized without transfusion, epigastric pain resolved with PPI. Recommend referral for gastroscopy.     Heart attack (H)      Hepatitis B      Normocytic anemia      PONV (postoperative nausea and vomiting)      Thrombocytopenia (H)       Past Surgical History:   Procedure Laterality Date     ABCESS DRAINAGE      finger     BURSECTOMY ELBOW Left 10/15/2021    Procedure: LEFT OLECRANON BURSECTOMY;  Surgeon: Tico Myers MD;  [place] [facility] [place]     CREATE FISTULA ARTERIOVENOUS UPPER EXTREMITY Left 2021    Procedure: left arm dialysis graft placement;  Surgeon: Roxana Cosby MD;  Location: [facility] OR;  Service: General     FOREIGN BODY REMOVAL      finger     INCISION AND DRAINAGE FINGER, COMBINED Left 10/20/2016    Procedure: COMBINED INCISION AND DRAINAGE FINGER;  Surgeon: Mireya Pizano MD;  Location: [place] OR     IR CHEST TUBE PLACEMENT NON-TUNNELED RIGHT  2021     IR CHEST TUBE PLACEMENT NON-TUNNELED RIGHT  10/19/2021     IR CHEST TUBE PLACEMENT NON-TUNNELED RIGHT  11/10/2021     IR PLEURAL DRAINAGE WITH CATHETER INSERTION  2021     MIDLINE INSERTION - DOUBLE LUMEN  2021          [place] ESOPHAGOGASTRODUODENOSCOPY TRANSORAL DIAGNOSTIC N/A 2020    Procedure: ESOPHAGOGASTRODUODENOSCOPY (EGD) with biopsy;  Surgeon: Nilson Gonzales MD;  Location: [facility];  Service: Gastroenterology       PARACENTESIS  8/14/2020      PARACENTESIS  8/19/2020     US THORACENTESIS  4/18/2021      Allergies   Allergen Reactions     Nka [No Known Allergies]       Social History     Tobacco Use     Smoking status: Never Smoker     Smokeless tobacco: Never Used   Substance Use Topics     Alcohol use: No      Wt Readings from Last 1 Encounters:   01/18/22 59 kg (130 lb)        Anesthesia Evaluation            ROS/MED HX  ENT/Pulmonary:     (+) moderate,  COPD,     Neurologic:  - neg neurologic ROS     Cardiovascular:     (+) hypertension-----    METS/Exercise Tolerance:     Hematologic:     (+) anemia,     Musculoskeletal:       GI/Hepatic:       Renal/Genitourinary:     (+) renal disease, type: ESRD, Pt requires dialysis, type: Hemodialysis,     Endo:  - neg endo ROS     Psychiatric/Substance Use:  - neg psychiatric ROS     Infectious Disease:  - neg infectious disease ROS     Malignancy:  - neg malignancy ROS     Other:            Physical Exam    Airway        Mallampati: I   TM distance: > 3 FB      Respiratory Devices and Support         Dental  no notable dental history         Cardiovascular   cardiovascular exam normal          Pulmonary   pulmonary exam normal                OUTSIDE LABS:  CBC:   Lab Results   Component Value Date    WBC 6.7 01/12/2022    WBC 8.8 01/09/2022    HGB 7.7 (L) 01/13/2022    HGB 6.3 (LL) 01/12/2022    HCT 20.3 (L) 01/12/2022    HCT 27.0 (L) 01/09/2022    PLT 57 (L) 01/12/2022     (L) 01/09/2022     BMP:   Lab Results   Component Value Date     01/08/2022     (L) 11/15/2021    POTASSIUM 4.6 01/08/2022    POTASSIUM 5.1 (H) 11/15/2021    CHLORIDE 100 01/08/2022    CHLORIDE 95 (L) 11/15/2021    CO2 23 01/08/2022    CO2 24 11/15/2021    BUN 74 (H) 01/08/2022    BUN 55 (H) 11/15/2021    CR 9.46 (HH) 01/08/2022    CR 11.44 (HH) 11/15/2021    GLC 96 01/08/2022    GLC 83 11/15/2021     COAGS:   Lab Results   Component Value Date    PTT 41 (H) 09/26/2021    INR 1.38 (H)  10/19/2021    FIBR 128 (L) 07/31/2019     POC:   Lab Results   Component Value Date    BGM 91 08/09/2019     HEPATIC:   Lab Results   Component Value Date    ALBUMIN 2.7 (L) 01/08/2022    PROTTOTAL 8.1 (H) 01/08/2022    ALT <9 01/08/2022    AST 40 01/08/2022    ALKPHOS 193 (H) 01/08/2022    BILITOTAL 0.6 01/08/2022    BILIDIRECT 0.2 10/21/2020     OTHER:   Lab Results   Component Value Date    PH 7.31 (L) 07/28/2019    LACT 1.6 11/08/2021    A1C 5.0 08/13/2020    TANO 8.0 (L) 01/08/2022    PHOS 2.6 04/27/2021    MAG 1.9 04/16/2021    LIPASE 517 (H) 07/28/2019    CRP 21.5 (H) 10/14/2021    SED 21 (H) 10/14/2021       Anesthesia Plan    ASA Status:  3      Anesthesia Type: Peripheral Nerve Block.              Consents    Anesthesia Plan(s) and associated risks, benefits, and realistic alternatives discussed. Questions answered and patient/representative(s) expressed understanding.    - Discussed:     - Discussed with:  Patient      - Extended Intubation/Ventilatory Support Discussed: No.      - Patient is DNR/DNI Status: No    Use of blood products discussed: No .     Postoperative Care    Pain management: IV analgesics.        Comments:                DC WAGONER MD

## 2022-01-18 NOTE — ANESTHESIA CARE TRANSFER NOTE
Patient: Elle Blanton    Procedure: Procedure(s):  LEFT ELBOW OLECRANON BURSECTOMY  AND ELBOW INCISION AND DRAINAGE       Diagnosis: Elbow pain [M25.529]  Diagnosis Additional Information: No value filed.    Anesthesia Type:   MAC     Note:    Oropharynx: oropharynx clear of all foreign objects  Level of Consciousness: awake  Oxygen Supplementation: face mask  Level of Supplemental Oxygen (L/min / FiO2): 8  Independent Airway: airway patency satisfactory and stable  Dentition: dentition unchanged  Vital Signs Stable: post-procedure vital signs reviewed and stable  Report to RN Given: handoff report given  Patient transferred to: Phase II          Vitals:  Vitals Value Taken Time   BP     Temp     Pulse     Resp     SpO2         Electronically Signed By: SSUY Brooks CRNA  January 18, 2022  4:11 PM

## 2022-01-18 NOTE — ANESTHESIA PROCEDURE NOTES
Brachial plexus Procedure Note    Pre-Procedure   Staff -        Anesthesiologist:  Brennen Roberts MD       Performed By: anesthesiologist       Location: pre-op       Procedure Start/Stop Times: 1/18/2022 2:37 PM and 1/18/2022 2:40 PM       Pre-Anesthestic Checklist: patient identified, IV checked, site marked, risks and benefits discussed, informed consent, monitors and equipment checked, pre-op evaluation, at physician/surgeon's request and post-op pain management  Timeout:       Correct Patient: Yes        Correct Procedure: Yes        Correct Site: Yes        Correct Position: Yes        Correct Laterality: Yes        Site Marked: Yes  Procedure Documentation  Procedure: Brachial plexus       Laterality: left       Patient Position: supine       Skin prep: Chloraprep (axillary approach).       Needle Gauge: 20.        Needle Length (Inches): 4        Ultrasound guided       1. Ultrasound was used to identify targeted nerve, plexus, vascular marker, or fascial plane and place a needle adjacent to it in real-time.       2. Ultrasound was used to visualize the spread of anesthetic in close proximity to the above referenced structure.       3. A permanent image is entered into the patient's record.       4. The visualized anatomic structures appeared normal.       5. There were no apparent abnormal pathologic findings.    Assessment/Narrative         The placement was negative for: blood aspirated, painful injection and site bleeding       Paresthesias: No.     Bolus given via needle..        Secured via.        Insertion/Infusion Method: Single Shot       Complications: none    Medication(s) Administered   Bupivacaine 0.5% w/ 1:200K Epi (Other), 25 mL  Medication Administration Time: 1/18/2022 2:39 PM

## 2022-01-18 NOTE — ANESTHESIA POSTPROCEDURE EVALUATION
Patient: Elle Blanton    Procedure: Procedure(s):  LEFT ELBOW OLECRANON BURSECTOMY  AND ELBOW INCISION AND DRAINAGE       Diagnosis:Elbow pain [M25.529]  Diagnosis Additional Information: No value filed.    Anesthesia Type:  MAC    Note:  Disposition: Outpatient   Postop Pain Control: Uneventful            Sign Out: Well controlled pain   PONV: No   Neuro/Psych: Uneventful            Sign Out: Acceptable/Baseline neuro status   Airway/Respiratory: Uneventful            Sign Out: Acceptable/Baseline resp. status   CV/Hemodynamics: Uneventful            Sign Out: Acceptable CV status; No obvious hypovolemia; No obvious fluid overload   Other NRE: NONE   DID A NON-ROUTINE EVENT OCCUR? No           Last vitals:  Vitals Value Taken Time   /87 01/18/22 1700   Temp 36.7  C (98  F) 01/18/22 1608   Pulse 69 01/18/22 1701   Resp 18 01/18/22 1615   SpO2 98 % 01/18/22 1701   Vitals shown include unvalidated device data.    Electronically Signed By: DC WAGONER MD  January 18, 2022  5:27 PM

## 2022-01-18 NOTE — PROVIDER NOTIFICATION
Call placed to Dr. Myers regarding patient and his wife's request for an anti-emetic Rx.  Per patient and his wife, patient has had N/V in the past after anesthesia, and they are concerned that patient could experience this at home after taking prescribed Norco.  Dr. Myers stated he will work on it, but cannot guarantee that he will be able to send this patient's pharmacy ([facility]).  Patient and his wife informed of above and verbalized understanding.  No other questions/concerns verbalized by patient/wife at this time.  OF NOTE:  Patient has had NO nausea/vomiting in Phase II Recovery. He tolerated apple juice well, declined offer of crackers.

## 2022-01-18 NOTE — OP NOTE
Preoperative diagnosis:    #1  Draining sinus, chronic olecranon bursitis    Postoperative diagnosis:    Same    Procedure     #1  Irrigation debridement, right olecranon bursa    Surgeon: Dr. Tico Myers    Assistant: Ilan Abad PA-C    Anesthesia: Regional block    History of present illness and indications:   This patient is a 61-year-old male, history of chronic left olecranon bursitis, status post I&D.  He still has a chronic draining sinus with and presents today for definitive irrigation and debridement.    We discussed the risks and benefits of the procedure which include but are not limited to pain, infection, bleeding, injury to blood vessels, tendons, nerves,scarring, and stiffness.  All questions were answered and operative consents were signed preoperatively.    Procedure note:  Patient was given a regional block to the left upper extremity by the anesthesia service.  He was taken to the OR, placed supine on an OR table.  Left hand was placed on a hand table.  Left upper extremities prepped and draped in standard sterile fashion.  Limb was exsanguinated, tourniquet was elevated to 250 mmHg.  The draining sinus was excised with an elliptical incision.  This was continued 1 cm distal and proximal.  Soft tissues were dissected down to the sinus and the sinus was excised completely.  Cultures were taken for aerobic and anaerobic identification and sensitivities.  There was minimal purulence.  The remaining bursa was excised completely.  The base of the wound was curetted.  The wound was irrigated copiously.  Small bleeders were controlled with bipolar electrocautery.  The wound was closed with multiple horizontal mattress 3-0 nylon sutures.  Undue tension was not required to repair the wound.  His left elbow was placed in a well-padded bulky soft dressing and a sling.  Blood loss approximately 5 cc.  No complications and he was taken recovery room in stable condition.

## 2022-02-07 PROBLEM — N18.6 ANEMIA IN END-STAGE RENAL DISEASE (H): Status: ACTIVE | Noted: 2022-01-01

## 2022-02-07 PROBLEM — D63.1 ANEMIA IN END-STAGE RENAL DISEASE (H): Status: ACTIVE | Noted: 2022-01-01

## 2022-02-09 NOTE — TELEPHONE ENCOUNTER
[facility] phone call message- general phone call:    Reason for call: Patient has cancelled appointment for today, he stated he is at [place] doing blood transfusion wanted to let Dr. Flores know. Patient has rescheduled for 3/2.    Return call needed: No    OK to leave a message on voice mail? Yes    Primary language: English      needed? No    Call taken on February 9, 2022 at 11:43 AM by Jef Golden

## 2022-02-09 NOTE — PROGRESS NOTES
.Infusion Nursing Note:  Elle Blanton presents today for Blood transfusion with one unit of RBC.    Patient seen by provider today: No   present during visit today: Not Applicable.  Note: Elle arrived ambulatory in a stable condition for one unit of blood today from [facility]. Hgb was 6.8 and Hgb today was 7. Which he met criteria for blood transfusion. Plan of care explained and he verbalized understanding of plan of care. He acknowledged receiving blood transfusion in the past with no reaction.   Intravenous Access:  Labs drawn without difficulty.  Peripheral IV placed.  Treatment Conditions:  Lab Results   Component Value Date    HGB 7.0 (L) 02/09/2022    WBC 4.9 02/09/2022    ANEU 2.8 08/06/2019    ANEUTAUTO 3.8 02/09/2022    PLT 89 (L) 02/09/2022      Results reviewed, labs MET treatment parameters, ok to proceed with treatment.  Blood transfusion consent signed 02/09/2022.  Post Infusion Assessment:  Patient tolerated infusion without incident.  Site patent and intact, free from redness, edema or discomfort.  No evidence of extravasations.  Access discontinued per protocol.  Biologic Infusion Post Education: Call the triage nurse at your clinic or seek medical attention if you have chills and/or temperature greater than or equal to 100.5, uncontrolled nausea/vomiting, diarrhea, constipation, dizziness, shortness of breath, chest pain, heart palpitations, weakness or any other new or concerning symptoms, questions or concerns.  You cannot have any live virus vaccines prior to or during treatment or up to 6 months post infusion.  If you have an upcoming surgery, medical procedure or dental procedure during treatment, this should be discussed with your ordering physician and your surgeon/dentist.  If you are having any concerning symptom, if you are unsure if you should get your next infusion or wish to speak to a provider before your next infusion, please call your care  coordinator or triage nurse at your clinic to notify them so we can adequately serve you.   Discharge Plan:   Discharge instructions reviewed with: Patient and Family.  Patient and/or family verbalized understanding of discharge instructions and all questions answered.  Patient discharged in stable condition accompanied by: self and wife.  Departure Mode: Ambulatory.  Rosa Salazar RN

## 2022-03-01 NOTE — DISCHARGE INSTRUCTIONS
Please keep your appointment with your primary care doctor on Wednesday, he can help set up an appointment with radiology to have a thoracentesis (lung cavity drainage).

## 2022-03-01 NOTE — PHARMACY-ADMISSION MEDICATION HISTORY
Pharmacy Note - Admission Medication History    Pertinent Provider Information:   -Pt states he is taking Phoslo tid with meals, only see one fill, which was for a 30 day supply in October. Reports not getting any meds through the mail, so unsure if fills are just not showing up on surescripts.   -Removed gabapentin and norco as pt states he is not taking them.  -Ran out of oxycodone and states he needs more refills. ______________________________________________________________________    Prior To Admission (PTA) med list completed and updated in EMR.       PTA Med List   Medication Sig Note Last Dose     acetaminophen (TYLENOL) 500 MG tablet Take 500 mg by mouth every 6 hours as needed for mild pain       calcium acetate (PHOSLO) 667 MG CAPS capsule Take 1 capsule by mouth 3 times daily (with meals) 2/28/2022: Only one fill noted, 10/20/21 2/28/2022 at x1     carvedilol (COREG) 25 MG tablet Take 25-50 mg by mouth 2 times daily as needed        cloNIDine (CATAPRES) 0.1 MG tablet Take 0.1 mg by mouth At Bedtime   2/27/2022     diltiazem ER (DILT-XR) 180 MG 24 hr capsule Take 1 capsule (180 mg) by mouth 2 times daily (Patient taking differently: Take 180 mg by mouth 2 times daily as needed (Checks BP prior to taking, if too low he does not take this medication) )  2/27/2022     entecavir (BARACLUDE) 0.5 MG tablet Take 1 tablet (0.5 mg) by mouth every 7 days (Patient taking differently: Take 0.5 mg by mouth every 7 days On Sundays)  2/27/2022     hydrOXYzine (ATARAX) 25 MG tablet Take 1 tablet (25 mg) by mouth 3 times daily as needed for itching       oxyCODONE (ROXICODONE) 5 MG tablet Take 1 tablet (5 mg) by mouth daily as needed for severe pain 2/28/2022: Ran out two weeks ago per pt      pantoprazole (PROTONIX) 40 MG EC tablet Take 1 tablet (40 mg) by mouth daily  2/27/2022     senna-docusate (SENOKOT-S/PERICOLACE) 8.6-50 MG tablet Take 1 tablet by mouth daily as needed for constipation       zolpidem (AMBIEN) 5 MG  tablet Take 1 tablet (5 mg) by mouth nightly as needed for sleep         Information source(s): Patient and CareEverywhere/SureScripts  Method of interview communication: in-person    Summary of Changes to PTA Med List  New: -  Discontinued: gabapentin, norco  Changed: -    Patient was asked about OTC/herbal products specifically.  PTA med list reflects this.    In the past week, patient estimated taking medication this percent of the time:  greater than 90%.    Allergies were reviewed, assessed, and updated with the patient.      Patient does not use any multi-dose medications prior to admission.    The information provided in this note is only as accurate as the sources available at the time of the update(s).    Thank you for the opportunity to participate in the care of this patient.    Italia Huntre, PharmD, BCPS 02/28/22 8:23 PM

## 2022-03-01 NOTE — PROGRESS NOTES
Clinic Care Coordination Contact    Follow Up Progress Note      Assessment: The pt was recently at the ED, I called to check up on the pt and help the pt setup a ED follow up. The pt was at [facility] for symptomatic anemia. I called the pt, he stated that he is doing better. He stated that he does have an appointment tomorrow at 9:00am to see .     Care Gaps:    Health Maintenance Due   Topic Date Due     PREVENTIVE CARE VISIT  Never done     LIPID  Never done     PARATHYROID  Never done     COLORECTAL CANCER SCREENING  Never done     DTAP/TDAP/TD IMMUNIZATION (1 - Tdap) Never done     ZOSTER IMMUNIZATION (1 of 2) Never done     Pneumococcal Vaccine: Pediatrics (0 to 5 Years) and At-Risk Patients (6 to 64 Years) (2 of 4 - PPSV23) 06/30/2021     MICROALBUMIN  07/17/2021     COVID-19 Vaccine (3 - Booster for Moderna series) 02/15/2022       Currently there are no Care Gaps.    Goals addressed this encounter:   Goals Addressed    None         Intervention/Education provided during outreach: NA          Plan:     Care Coordinator will follow up in month

## 2022-03-01 NOTE — PROGRESS NOTES
ASSESSMENT/PLAN:    (N18.6,  Z99.2) ESRD (end stage renal disease) on dialysis (H)  (primary encounter diagnosis)  Comment: check Hgb/plts today; he feels a bit dyspneic and this could be from his R pleural effusion of persistent anemia; precautions given  Plan: CBC with platelets          (J86.9) Empyema lung (H)  Comment: was able to schedule for stat thoracentesis later this afternoon; will call him w/ lab results prior  Plan: US Thoracentesis, CANCELED: US Thoracentesis          (N18.4,  D63.1) Anemia due to stage 4 chronic kidney disease (H)  Comment: see above  Plan: CBC with platelets          (M70.22) Olecranon bursitis of left elbow  Comment:   Plan: stable; cont mgmt per ortho    (B18.1) Chronic viral hepatitis B without delta agent and without coma (H)  Comment: refilled  Plan: entecavir (BARACLUDE) 0.5 MG tablet          (S02.2XXD) Closed fracture of nasal bone with routine healing, subsequent encounter  Comment:   Plan: stable/ resolved    (M10.9) 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  Comment: meds refilled for daily prn usage; precautions given; I need to reach out to his nephrologist as he would benefit from an increase in his allopurinol dosage but I am concerned that this could precipitate an attack and he cannot be on nsaids due to ESRD  Plan: oxyCODONE (ROXICODONE) 5 MG tablet         Jaswant Flores MD  March 2, 2022  9:50 AM    Addendum: reviewed his gout treatment with our clinical pharmacist. Dialysis dosing of allopurinol is 100mg 3x/weekly after dialysis. I would be unable to prophylax him on this as he can't use nsaids and colchicine has known risk of anemia/ thrombocytopenia (which pt already has). Would plan for him to have a course of prednisone available to fill if he had an acute flare when we started allopurinol.  Will review w/ pt.    Jaswant Flores MD  March 2, 2022  2:08 PM        Pt is a 61 year old male last seen on 1/12/2022 here today for:      Follow-up  1) Ortho - is s/p L  olecranon bursectomy on 1/18/2022 by Dr Myers - per post-op notes is doing well; minimal drainage now   Still needs to follow-up w/ Lucia for his wrist      2) Anemia - was sent to ED on day of our last admission due to Hgb of 6.4 -> admitted for transfusion and then discharged; subsequently      Hemoglobin   Date Value Ref Range Status   02/28/2022 6.5 (LL) 13.3 - 17.7 g/dL Final   02/09/2022 7.0 (L) 13.3 - 17.7 g/dL Final   04/29/2021 8.9 (L) 14.0 - 18.0 g/dL Final   12/30/2020 8.0 (L) 13.3 - 17.7 g/dL Final        Per ED note on 2/28/22  Elle Blanton is a 61 year old male with a pertinent history of hepatitis B, GI bleed, gout, NAHUM, PONV, H. Pylori infection, heart attack, and thrombocytopenia who presents to this ED for evaluation of low hemoglobin.      The patient reports going to his dialysis center today when they noticed that he had a low hemoglobin. After this test resulted, he was sent to the ED. He reports that this has happened before and that he needed a blood transfusion for an unknown reason. He endorses shortness of breath and fatigue but is otherwise in his usual state of health. He denies any current bleeding, appetite changes, chest pain, headache, vision changes, abdominal pain, or any other concerns at this time.     1827 Patient is a 61-year-old gentleman with history of end-stage renal disease on dialysis who was sent in by his dialysis clinic for anemia.  He has history of anemic and has received transfusions before, and has felt more tired and short of breath of late.  On exam he is comfortable appearing, blood pressures normal, heart rate is 74.  Plan to recheck his lab work, get a chest x-ray to rule out other causes for shortness of breath.   1828 EKG shows normal sinus with a rate of 74.  Incomplete right bundle branch block.  No acute ischemic ST or T wave morphology.  When compared EKG November 8, 2021, there is no significant change.  Pression: Normal sinus rhythm, incomplete right  bundle branch block.   1910 Hemoglobin(!!): 6.5  I will consent the pt for 1 U PRBC   1938 XR Chest Port 1 View  Stable cardiomegaly with enlarged thoracic aorta. Normal pulmonary vascularity. Large recurrent right pleural effusion. Left lung clear. No pneumothorax.   2107 Patient is afebrile, not coughing.  The pleural effusion on the right side is recurrent, he is seen pulmonologist at the [facility] who does not recommend a surgical fix for this.  I think he would be a good candidate for outpatient thoracentesis.  He has a primary care doctor appointment in 2 days, his primary care doctor can help arrange this.   2107 He is still getting his transfusion, and will be set up for discharge after this.          3) Epistaxis - did he see ENT? - NO - went to ED that day; Nose bleeds have stopped      4) Ascites - s/p paracentesis on 1/14/2022 - stable      5) Gout - uric acid is elevated at 9.1 but is down from 12.0 three months ago; is not on allopurinol?    6) Pleural effusion - needs repeat thoracentesis; ordered today          Per my last note-   Elle was seen today for fall, hospital f/u, refill request and ultrasound.     Diagnoses and all orders for this visit:     Epistaxis  -     CBC with platelets  Will check CBC; Has ENT appt pending in 2 days     Cirrhosis of liver with ascites, unspecified hepatic cirrhosis type (H)  -     US Paracentesis; Future  Therapeutic tap ordered     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  -     Uric acid  Will also reach out to Dr Myers's office regarding his pending elbow surgery to see if his recent injury will affect plan for surgery given possible implications for covid testing and anesthesia     Closed fracture of nasal bone with routine healing, subsequent encounter  -     oxyCODONE (ROXICODONE) 5 MG tablet; Take 1 tablet (5 mg) by mouth daily as needed for severe pain        Addendum:  Hgb came back at 6.4; I spoke w/ pt and his wife and recommended he go to ED for transfusion;    Spoke to ED physician and our hospital team (Dr Sanders).     Jaswant Flores MD  January 12, 2022  2:09 PM        Past Medical History:   Diagnosis Date     NAHUM (acute kidney injury) (H)      GI (gastrointestinal bleed)      Gout      H. pylori infection 7/5/2017    UGI bleed implied by Hgb 9.0 6/22/17 and melena. Admitted and hgb decreased to 7.6, CT abdomen showed all bladder wall thickening. + Hep B surface antigen noted. Melena resolved and hgb stabalized without transfusion, epigastric pain resolved with PPI. Recommend referral for gastroscopy.     Heart attack (H)      Hepatitis B      Normocytic anemia      PONV (postoperative nausea and vomiting)      Thrombocytopenia (H)       Past Surgical History:   Procedure Laterality Date     ABCESS DRAINAGE      finger     BURSECTOMY ELBOW Left 10/15/2021    Procedure: LEFT OLECRANON BURSECTOMY;  Surgeon: Tico Myers MD;  [place] [facility] [place]     BURSECTOMY ELBOW Left 1/18/2022    Procedure: LEFT ELBOW OLECRANON BURSECTOMY;  Surgeon: Tico Myers MD;  Location: [facility] OR     CREATE FISTULA ARTERIOVENOUS UPPER EXTREMITY Left 4/20/2021    Procedure: left arm dialysis graft placement;  Surgeon: Roxana Cosby MD;  Location: [facility] OR;  Service: General     FOREIGN BODY REMOVAL      finger     INCISION AND DRAINAGE FINGER, COMBINED Left 10/20/2016    Procedure: COMBINED INCISION AND DRAINAGE FINGER;  Surgeon: Mireya Pizano MD;  Location: [place] OR     INCISION AND DRAINAGE UPPER EXTREMITY, COMBINED Left 1/18/2022    Procedure: AND ELBOW INCISION AND DRAINAGE;  Surgeon: Tico Myers MD;  Location: [facility] OR     IR CHEST TUBE PLACEMENT NON-TUNNELED RIGHT  4/19/2021     IR CHEST TUBE PLACEMENT NON-TUNNELED RIGHT  10/19/2021     IR CHEST TUBE PLACEMENT NON-TUNNELED RIGHT  11/10/2021     IR PLEURAL DRAINAGE WITH CATHETER INSERTION  4/19/2021     MIDLINE INSERTION - DOUBLE LUMEN  4/23/2021          [place]  ESOPHAGOGASTRODUODENOSCOPY TRANSORAL DIAGNOSTIC N/A 8/18/2020    Procedure: ESOPHAGOGASTRODUODENOSCOPY (EGD) with biopsy;  Surgeon: Nilson Gonzales MD;  Location: [facility];  Service: Gastroenterology      PARACENTESIS  8/14/2020      PARACENTESIS  8/19/2020     US THORACENTESIS  4/18/2021      Current Outpatient Medications   Medication Sig Dispense Refill     acetaminophen (TYLENOL) 500 MG tablet Take 500 mg by mouth every 6 hours as needed for mild pain       carvedilol (COREG) 25 MG tablet Take 25-50 mg by mouth 2 times daily as needed        cloNIDine (CATAPRES) 0.1 MG tablet Take 0.1 mg by mouth At Bedtime        diltiazem ER (DILT-XR) 180 MG 24 hr capsule Take 1 capsule (180 mg) by mouth 2 times daily (Patient taking differently: Take 180 mg by mouth 2 times daily as needed (Checks BP prior to taking, if too low he does not take this medication) ) 60 capsule 11     entecavir (BARACLUDE) 0.5 MG tablet Take 1 tablet (0.5 mg) by mouth every 7 days 52 tablet 1     hydrOXYzine (ATARAX) 25 MG tablet Take 1 tablet (25 mg) by mouth 3 times daily as needed for itching 90 tablet 3     oxyCODONE (ROXICODONE) 5 MG tablet Take 1 tablet (5 mg) by mouth daily as needed for severe pain 30 tablet 0     calcium acetate (PHOSLO) 667 MG CAPS capsule Take 1 capsule by mouth 3 times daily (with meals)       [employer] SALINE NASAL SPRAY 0.65 % nasal spray Spray 1 spray in nostril daily as needed       order for DME Equipment being ordered: Other: blood pressure monitor  Treatment Diagnosis: hypertension 1 each 0     pantoprazole (PROTONIX) 40 MG EC tablet Take 1 tablet (40 mg) by mouth daily 30 tablet 11     senna-docusate (SENOKOT-S/PERICOLACE) 8.6-50 MG tablet Take 1 tablet by mouth daily as needed for constipation 30 tablet 11     zolpidem (AMBIEN) 5 MG tablet Take 1 tablet (5 mg) by mouth nightly as needed for sleep 10 tablet 5      Allergies   Allergen Reactions     Nka [No Known Allergies]         ROS:   Gen- no weight  "change, no fevers/chills   Head/ Eyes- no blurred vision, no headaches   ENT- no cough, no congestion, no URI symptoms   Cardiac - no palpitations, no chest pain   Respiratory - + shortness of breath - see HPI, no wheezing   GI - no nausea, no vomiting, no diarrhea, no constipation   Remainder of ROS negative.     Exam:   /65 (BP Location: Right arm, Patient Position: Sitting, Cuff Size: Adult Regular)   Pulse 68   Temp 97.5  F (36.4  C) (Oral)   Resp 16   Ht 1.63 m (5' 4.17\")   Wt 64.3 kg (141 lb 12.8 oz)   SpO2 100%   BMI 24.21 kg/m        Alert and oriented x 3; No acute distress   Neuro: no focal deficits   Derm: no rashes       "

## 2022-03-07 NOTE — TELEPHONE ENCOUNTER
[facility]ed [facility] and advised correct dose and amount. Approved change to #12 tablets with 3 refills per insurance not covering quantity of 52tablets. Per pharmacist, dispense as #12 and not #13. Romain MELENDEZ

## 2022-03-07 NOTE — TELEPHONE ENCOUNTER
Routing to  to confirm if patient should have qty equivalent to one year's worth of medication. Instructions state to take 1 tablet p.o q7 days. Romain MELENDEZ

## 2022-03-07 NOTE — TELEPHONE ENCOUNTER
[facility] phone call message- medication clarification/question:    Full Medication Name: entecavir (BARACLUDE)   Dose: 0.5 MG tablet    Question: Pharmacy called needing clarification as medication is written for 1 tab 7 days for a qty of 52 and wants to know if that's correct or not, please call and advise.      Pharmacy confirmed as    [id number] - [place], [place] - [address]  : Yes    OK to leave a message on voice mail? Yes    Primary language: English      needed? No    Call taken on March 7, 2022 at 12:58 PM by Ana María Live

## 2022-03-14 NOTE — TELEPHONE ENCOUNTER
Patient called again regarding this, Pt stated Dr. Flores said previously said someone can put in this referral other than him to get drainage done. He stated needed urgently as he's having a hard time breathing. Please call and advise.

## 2022-03-14 NOTE — TELEPHONE ENCOUNTER
Spoke with patient ,he is adamant that he has had discussion with Dr Flores and was informed if he should have symptoms to call clinic and another appointment would be scheduled for US Thoracentesis. Last one done 3/2/2022. During this time only a certain max volume can be taken out, per pt he was informed by Radiology to return if should become short of breath. Has experienced SOB for past 3 days. Also mentioned PCP is not expected in clinic today, offered an office visit. Declined, patient would like message to be routed to Urgent Task physician to review and further advise. Thank you for your help. Ze MELENDEZ

## 2022-03-14 NOTE — TELEPHONE ENCOUNTER
Pt would like to get a referral to drain out fluid from the lungs similar to pt's last procedure. Pt had trouble sleeping last night. Pt was wondering if they need to be seen again in clinic in order to be referred to do this procedure again or if a referral can be sent n right away to complete it.     Pt would like some guidance and would like a call about this. Pls call [phone].

## 2022-03-14 NOTE — TELEPHONE ENCOUNTER
That sounds like a plan. He sounded quite stable over the phone at least. I messaged his nephrologist Dr. Hill to see if they could pull some extra fluid from him during dialysis this week. Might help his dyspnea.

## 2022-03-14 NOTE — TELEPHONE ENCOUNTER
Referral coordinator Moraima called [place] scheduling, first availability for US Thoracentesis is 3/23/2022. Advised due to SOB to present to ED for further assessment/ evaluation and intervention. Via phone conservation patient is able to speak in complete sentences, just difficult to sleep well at night time. Elle declined advise to present to ED for more prompt possible intervention.  He would like to wait until 3/23/2022 when Radiology has appointment availability to schedule procedure. US Thoracentesis scheduled for 3/23/2022 at 3 pm at [facility]. Patient again advised if symptoms worsen to present into ED otherwise appt is scheduled as above stated. RN will route this encounter to Dr Tam to review and further advise if needed or patient should take action sooner than 3/23/2022.  Elle receives dialysis every Tuesday and Thursday. Ze MELENDEZ

## 2022-03-16 NOTE — ED PROVIDER NOTES
ED Provider In Triage Note  [facility]  Encounter Date: Mar 16, 2022    Chief Complaint   Patient presents with     Abnormal Labs       Brief HPI:   Elle Blanton is a 61 year old male presenting to the Emergency Department with a chief complaint of abnormal labs.  Pt called today and told his hemoglobin was 5.8, needed to come in for treatment. They attempted to schedule pt for transfusion, but unable to.  Pt has chronic anemia from ESRD on HD M/F.  Pt does report some dyspnea with exertion. Reports fairly regular transfusions with last one a month ago.  Pt denies current chest pain.       Brief Physical Exam:  There were no vitals taken for this visit.  General: Non-toxic appearing  HEENT: Atraumatic  Resp: No respiratory distress  Abdomen: Non-peritoneal  Neuro: Alert, oriented, answers questions appropriately  Psych: Behavior appropriate  MSK: Fistula left arm      Plan Initiated in Triage:  No orders of the defined types were placed in this encounter.  Repeat Hgb, blood ordered.      PIT Dispo:   Return to lobby while awaiting workup and ED bed availability    Denzel Bergeron MD on 3/16/2022 at 4:48 PM    Patient was evaluated by the Physician in Triage due to a limitation of available rooms in the Emergency Department. A plan of care was discussed based on the information obtained on the initial evaluation and patient was consuled to return back to the Emergency Department lobby after this initial evalutaiton until results were obtained or a room became available in the Emergency Department. Patient was counseled not to leave prior to receiving the results of their workup.        Denzel Bergeron MD  03/16/22 9423

## 2022-03-17 NOTE — ED PROVIDER NOTES
EMERGENCY DEPARTMENT ENCOUNTER      NAME: Elle Blanton  AGE: 61 year old male  YOB: 1960  MRN: [id number]  EVALUATION DATE & TIME: 3/16/2022  7:47 PM    PCP: Jaswant Flores    ED PROVIDER: aPrker Mukherjee MD    Chief Complaint   Patient presents with     Abnormal Labs     FINAL IMPRESSION:  1. Anemia due to chronic kidney disease, on chronic dialysis (H)        ED COURSE & MEDICAL DECISION MAKING:    Pertinent Labs & Imaging studies reviewed. (See chart for details)  61 year old male presents to the Emergency Department for evaluation of low hemoglobin.  Patient has end-stage renal disease on dialysis.  He has issues with chronic anemia secondary to his kidney disease requiring repetitive transfusions.  Also has history of recurrent pleural effusion with scheduled thoracentesis.  Presents the emergency department with outpatient lab noting low hemoglobin.  I discussed this with his physicians who could not arrange for transfusion and recommended emergency department presentation.  Overall patient complaining of some increased fatigue.  Hemoglobin on outpatient basis was 5.  6.2 here in the emergency department.  Acute on chronic issue for this patient.  He is requesting transfusion in the emergency department for symptomatic anemia.  Nothing on history to suggest active bleeding at this time.  I do think this is secondary to his chronic medical issues.  ECG obtained and was normal.  The rest of his labs are consistent with his chronic kidney dysfunction with no other concerning findings.  Our plan of care at this time is to institute a unit of packed red blood cells.  His baseline hemoglobin tends to hover in the 7 range.  This should bring him up to his baseline and would anticipate discharge home as long as the patient was feeling improved.  We will plan to repeat assess after transfusion.  Patient consented and currently receiving PRBC.    10:08 PM  Patient and family requesting discharge from the  emergency department.  I went and reassessed the patient.  His blood transfusion had just been completed.  He is feeling improved.  Feels back to his baseline I think we are appropriate for proceeding with discharge and close follow-up on outpatient basis.  Reviewed return precautions prior to discharge.       8:00 PM I met with the patient, obtained an initial history, performed an examination and discussed the plan. PPE worn throughout all interactions with the patient, including N95 mask and gloves. Obtained consent for blood transfusion.    At the conclusion of the encounter I discussed the results of all of the tests and the disposition. The questions were answered. The patient or family acknowledged understanding and was agreeable with the care plan.       MEDICATIONS GIVEN IN THE EMERGENCY:  Medications - No data to display    NEW PRESCRIPTIONS STARTED AT TODAY'S ER VISIT  New Prescriptions    No medications on file          =================================================================    HPI    Patient information was obtained from: patient    Use of : N/A        Elle Blanton is a 61 year old male with a pertinent history of CKD, ESRD, anemia, chronic hepatitis B, HTN, cirrhosis of liver, descending thoracic aortic dissection, pleural effusion, heart attack, and GI bleed who presents to this ED for evaluation of abnormal lab results.    Patient's doctor called him today to inform him that his hemoglobin was 5.8 and told him to come into the ED. They attempted to schedule him for a transfusion, but were unable to get him in soon enough. Patient is on dialysis MF and had no issues with that on Monday. Patient endorses current slight chest pain that just onset while in the ED. He does not get chest pain very frequently. Patient denies shortness of breath, or any additional symptoms at this time. No recent changes to his chronic edema of left arm.       REVIEW OF SYSTEMS   Review of Systems    Respiratory: Negative for shortness of breath.    Cardiovascular: Positive for chest pain.   All other systems reviewed and are negative.       PAST MEDICAL HISTORY:  Past Medical History:   Diagnosis Date     NAHUM (acute kidney injury) (H)      GI (gastrointestinal bleed)      Gout      H. pylori infection 7/5/2017    UGI bleed implied by Hgb 9.0 6/22/17 and melena. Admitted and hgb decreased to 7.6, CT abdomen showed all bladder wall thickening. + Hep B surface antigen noted. Melena resolved and hgb stabalized without transfusion, epigastric pain resolved with PPI. Recommend referral for gastroscopy.     Heart attack (H)      Hepatitis B      Normocytic anemia      PONV (postoperative nausea and vomiting)      Thrombocytopenia (H)        PAST SURGICAL HISTORY:  Past Surgical History:   Procedure Laterality Date     ABCESS DRAINAGE      finger     BURSECTOMY ELBOW Left 10/15/2021    Procedure: LEFT OLECRANON BURSECTOMY;  Surgeon: Tico Myers MD;  Location: [facility] OR     BURSECTOMY ELBOW Left 1/18/2022    Procedure: LEFT ELBOW OLECRANON BURSECTOMY;  Surgeon: Tico Myers MD;  Location: [facility] OR     CREATE FISTULA ARTERIOVENOUS UPPER EXTREMITY Left 4/20/2021    Procedure: left arm dialysis graft placement;  Surgeon: Roxana Cosby MD;  Location: [facility] OR;  Service: General     FOREIGN BODY REMOVAL      finger     INCISION AND DRAINAGE FINGER, COMBINED Left 10/20/2016    Procedure: COMBINED INCISION AND DRAINAGE FINGER;  Surgeon: Mireya Pizano MD;  Location: [place] OR     INCISION AND DRAINAGE UPPER EXTREMITY, COMBINED Left 1/18/2022    Procedure: AND ELBOW INCISION AND DRAINAGE;  Surgeon: Tico Myers MD;  Location: [facility] OR     IR CHEST TUBE PLACEMENT NON-TUNNELED RIGHT  4/19/2021     IR CHEST TUBE PLACEMENT NON-TUNNELED RIGHT  10/19/2021     IR CHEST TUBE PLACEMENT NON-TUNNELED RIGHT  11/10/2021     IR PLEURAL DRAINAGE WITH CATHETER INSERTION   4/19/2021     MIDLINE INSERTION - DOUBLE LUMEN  4/23/2021          FL ESOPHAGOGASTRODUODENOSCOPY TRANSORAL DIAGNOSTIC N/A 8/18/2020    Procedure: ESOPHAGOGASTRODUODENOSCOPY (EGD) with biopsy;  Surgeon: Nilson Gonzales MD;  Location: [facility];  Service: Gastroenterology      PARACENTESIS  8/14/2020      PARACENTESIS  8/19/2020     US THORACENTESIS  4/18/2021           CURRENT MEDICATIONS:    acetaminophen (TYLENOL) 500 MG tablet  calcium acetate (PHOSLO) 667 MG CAPS capsule  carvedilol (COREG) 25 MG tablet  cloNIDine (CATAPRES) 0.1 MG tablet  [facility] SALINE NASAL SPRAY 0.65 % nasal spray  diltiazem ER (DILT-XR) 180 MG 24 hr capsule  entecavir (BARACLUDE) 0.5 MG tablet  hydrOXYzine (ATARAX) 25 MG tablet  order for DME  oxyCODONE (ROXICODONE) 5 MG tablet  pantoprazole (PROTONIX) 40 MG EC tablet  senna-docusate (SENOKOT-S/PERICOLACE) 8.6-50 MG tablet  zolpidem (AMBIEN) 5 MG tablet        ALLERGIES:  Allergies   Allergen Reactions     Nka [No Known Allergies]        FAMILY HISTORY:  Family History   Problem Relation Age of Onset     Diabetes Father      Hypertension No family hx of      Asthma No family hx of      Cancer No family hx of      Coronary Artery Disease No family hx of      Liver Cancer No family hx of      Ulcers Father        SOCIAL HISTORY:   Social History     Socioeconomic History     Marital status:      Spouse name: Not on file     Number of children: Not on file     Years of education: Not on file     Highest education level: Not on file   Occupational History     Not on file   Tobacco Use     Smoking status: Never Smoker     Smokeless tobacco: Never Used   Vaping Use     Vaping Use: Never used   Substance and Sexual Activity     Alcohol use: No     Drug use: No     Sexual activity: Yes     Partners: Female   Other Topics Concern     Parent/sibling w/ CABG, MI or angioplasty before 65F 55M? Not Asked   Social History Narrative     Not on file     Social Determinants of Health     Financial  Resource Strain: Not on file   Food Insecurity: Not on file   Transportation Needs: Not on file   Physical Activity: Not on file   Stress: Not on file   Social Connections: Not on file   Intimate Partner Violence: Not on file   Housing Stability: Not on file       VITALS:  BP (!) 163/89   Pulse 77   Temp (P) 98.3  F (36.8  C) (Oral)   Resp 20   Wt 61.7 kg (136 lb)   SpO2 98%   BMI 23.22 kg/m      PHYSICAL EXAM    Constitutional: Chronically ill-appearing adult male, does not appear to be in acute distress.  HENT: Normocephalic, Atraumatic, Bilateral external ears normal, Oropharynx normal, mucous membranes moist, Nose normal. Neck-  Normal range of motion, No tenderness, Supple, No stridor.   Eyes: PERRL, EOMI, Conjunctiva normal, No discharge.   Respiratory: Normal breath sounds, No respiratory distress, No wheezing, Speaks full sentences easily. No cough.   Cardiovascular: Normal heart rate, Regular rhythm, No murmurs Chest wall nontender.    GI:  Soft, No tenderness, No masses, No flank tenderness. No rebound or guarding.  : No cva tenderness    Musculoskeletal: Edema noted to the left upper extremity.  There is a palpable fistula that appears patent based on clinical examination.  Patient reporting edema is chronic.  Integument: Warm, Dry, No erythema, No rash. No petechiae.   Neurologic: Alert & oriented x 3, Normal motor function, Normal sensory function, No focal deficits noted.   Psychiatric: Affect normal, Judgment normal, Mood normal. Cooperative.      LAB:  All pertinent labs reviewed and interpreted.  Results for orders placed or performed during the hospital encounter of 03/16/22   Basic metabolic panel   Result Value Ref Range    Sodium 139 136 - 145 mmol/L    Potassium 4.7 3.5 - 5.0 mmol/L    Chloride 104 98 - 107 mmol/L    Carbon Dioxide (CO2) 18 (L) 22 - 31 mmol/L    Anion Gap 17 5 - 18 mmol/L    Urea Nitrogen 120 (H) 8 - 22 mg/dL    Creatinine 9.77 (HH) 0.70 - 1.30 mg/dL    Calcium 7.7 (L)  8.5 - 10.5 mg/dL    Glucose 86 70 - 125 mg/dL    GFR Estimate 6 (L) >60 mL/min/1.73m2   CBC with platelets and differential   Result Value Ref Range    WBC Count 5.5 4.0 - 11.0 10e3/uL    RBC Count 2.00 (L) 4.40 - 5.90 10e6/uL    Hemoglobin 6.2 (LL) 13.3 - 17.7 g/dL    Hematocrit 21.0 (L) 40.0 - 53.0 %     (H) 78 - 100 fL    MCH 31.0 26.5 - 33.0 pg    MCHC 29.5 (L) 31.5 - 36.5 g/dL    RDW 17.8 (H) 10.0 - 15.0 %    Platelet Count 52 (L) 150 - 450 10e3/uL    % Neutrophils 77 %    % Lymphocytes 14 %    % Monocytes 8 %    % Eosinophils 0 %    % Basophils 0 %    % Immature Granulocytes 1 %    NRBCs per 100 WBC 0 <1 /100    Absolute Neutrophils 4.3 1.6 - 8.3 10e3/uL    Absolute Lymphocytes 0.7 (L) 0.8 - 5.3 10e3/uL    Absolute Monocytes 0.4 0.0 - 1.3 10e3/uL    Absolute Eosinophils 0.0 0.0 - 0.7 10e3/uL    Absolute Basophils 0.0 0.0 - 0.2 10e3/uL    Absolute Immature Granulocytes 0.0 <=0.4 10e3/uL    Absolute NRBCs 0.0 10e3/uL   Adult Type and Screen   Result Value Ref Range    ABO/RH(D) A POS     Antibody Screen Negative Negative    SPECIMEN EXPIRATION DATE [id number]    Prepare red blood cells (unit)   Result Value Ref Range    CROSSMATCH Compatible     UNIT ABO/RH A Pos     Unit Number [id number]     Unit Status Issued     Blood Component Type Red Blood Cells     Product Code [id number]     CODING SYSTEM [id number]     UNIT TYPE ISBT 6200     ISSUE DATE AND TIME [id number]        RADIOLOGY:  Reviewed all pertinent imaging. Please see official radiology report.  No orders to display     EKG:    Performed at: 2033  EKG Interpretation    Independently interpreted by me    Rhythm: normal sinus   Rate: normal  Axis: normal  Ectopy: none  Conduction: normal  ST Segments: no acute change  T Waves: no acute change  Q Waves: none    Clinical Impression: no acute changes and normal EKG    I have independently reviewed and interpreted the EKG(s) documented above.      I, Joann Mandel, am serving as a scribe to  document services personally performed by Parker Mukherjee MD based on my observation and the provider's statements to me. I, Parker Mukherjee MD, attest that Joann Mandel is acting in a scribe capacity, has observed my performance of the services and has documented them in accordance with my direction.    Parker Mukherjee MD  Emergency Medicine  [facility] EMERGENCY DEPARTMENT  [address] [place] [id number] [phone]     Parker Mukherjee MD  03/16/22 9241

## 2022-03-17 NOTE — PROGRESS NOTES
Clinic Care Coordination Contact    Follow Up Progress Note      Assessment: The pt was at the ED, I called to check up on the pt and help the pt setup a ED follow up. The pt was at [facility] for abnormal labs. I called the pt but got his vm, so I left a vm for the pt to give me a call back.     Care Gaps:    Health Maintenance Due   Topic Date Due     PREVENTIVE CARE VISIT  Never done     LIPID  Never done     PARATHYROID  Never done     COLORECTAL CANCER SCREENING  Never done     DTAP/TDAP/TD IMMUNIZATION (1 - Tdap) Never done     ZOSTER IMMUNIZATION (1 of 2) Never done     Pneumococcal Vaccine: Pediatrics (0 to 5 Years) and At-Risk Patients (6 to 64 Years) (2 of 4 - PPSV23) 06/30/2021     MICROALBUMIN  07/17/2021     COVID-19 Vaccine (3 - Booster for Moderna series) 02/15/2022       Currently there are no Care Gaps.    Goals addressed this encounter:   Goals Addressed    None         Intervention/Education provided during outreach: NA          Plan:   NA  Care Coordinator will follow up in month

## 2022-03-18 NOTE — PROGRESS NOTES
Clinic Care Coordination Contact    Follow Up Progress Note      Assessment: The pt was recently in the ED, I called to check up on the pt and help the pt setup a ED follow up. The pt was at [facility] for abnormal labs. I called and talked to the pt, he stated that he is doing fine. Pt stated that he has an appointment with  on 03/30/2022 at 9:00pm.    Care Gaps:    Health Maintenance Due   Topic Date Due     PREVENTIVE CARE VISIT  Never done     LIPID  Never done     PARATHYROID  Never done     COLORECTAL CANCER SCREENING  Never done     DTAP/TDAP/TD IMMUNIZATION (1 - Tdap) Never done     ZOSTER IMMUNIZATION (1 of 2) Never done     Pneumococcal Vaccine: Pediatrics (0 to 5 Years) and At-Risk Patients (6 to 64 Years) (2 of 4 - PPSV23) 06/30/2021     MICROALBUMIN  07/17/2021     COVID-19 Vaccine (3 - Booster for Moderna series) 02/15/2022       Currently there are no Care Gaps.    Goals addressed this encounter:   Goals Addressed    None         Intervention/Education provided during outreach: NA          Plan:   NA  Care Coordinator will follow up in month

## 2022-03-29 NOTE — PROGRESS NOTES
ASSESSMENT/PLAN:    (N18.4,  D63.1) Anemia due to stage 4 chronic kidney disease (H)  (primary encounter diagnosis)  Comment: reviewed concerns regarding persistent anemia; will refer to hematology  Plan: Adult Oncology/Hematology Referral, Hemoglobin          (D59.9) Acquired hemolytic anemia (H)  Comment:   Plan: Adult Oncology/Hematology Referral, Hemoglobin          (N18.6,  Z99.2) ESRD (end stage renal disease) on dialysis (H)  Comment:   Plan: lengthy discussion today w/ pt and wife regarding his dialysis tx plan and how 2x/week is not working well in terms of keeping his fluid status stable; he gained 6kg (>13lbs!) in 5 days when skipping a dialysis session. Not acceptable! However, he is adamant that he felt more fatigued after 3 sessions/ week of dialysis; I explained that he has multifactorial fatigue and he really needs to consider being more diligent w/ dialysis when he is fatigued, not less. We was resistant but ultimately agreed to increase frequency as tolerated    (J90) Pleural effusion  Comment:   Plan: pt is adamant that he would like to be admitted for a chest tube as this helped take more fluid off than a thoracentesis; I explained that his last chest tube was in 11/2021 and he has since required two therapeutic thoracentesis procedures so it is not actually keeping fluid off as he perceives. It is also invasive and costly and could potentially lead to complications/ prolonged hospital stay for him. He would like me to review with our hospital team and specialists to see if this would be a consideration for him    >60 min was spent on the day of visit in review of records, direct patient care, documentation, and care coordination.     Jaswant Flores MD  March 30, 2022  9:52 AM        Pt is a 61 year old male last seen on 3/2/2022 here today for:     1) Anemia - see Dr Hill's message below  Recommendation is to proceed w/ hematology appt  We reviewed possible causes of his persistent  anemia  Unclear why he would be hemolyzing     2) Pleural effusions:   3/23/2022 -                                                                  IMPRESSION:  Status post right  ultrasound-guided thoracentesis. The fluid is loculated and only 16 mL of dark red fluid could be removed    Pt is interested in considering getting admitted for a chest tube -> feels like he got several months of relief    3) ESRD - see message below and A/P regarding discussion     Per Dr Hill message from 3/28/2022:  Nate Hill MD Kapur, MD Maxime Michaud,   I saw Elle on dialysis today.  He says he's seeing you tomorrow. Couple of things:     1) I got a call from one of your partners a few weeks ago regarding Elle's dyspnea.  The trouble is that Elle still refuses to run 3x a week.  AND he skips at least twice a month and won't reschedule.  So I think we could absolutely get more fluid off of him if he would come more frequently and not skip.  We actually got him down to 59kg last Monday but then he skipped Friday and he's back up to 64kg again and we'll likely only get 3kg off today.       2) Anemia.  Transfused a couple of times in the last few months.  He's on high dose EPO here and getting iron as he needs it.  I checked a few labs we can do here and his haptoglobin is undectable with his last LDH around 500.  However his reticulocyte count is also very low despite all the EPO.  Not sure why he'd be hemolyzing but he appears to be with those labs.  Might be reasonable to have him see Hematology.  He has a pending hgb from today, will know what it is late on Tuesday probably.     Nate Hill   [facility]   [phone]       Per ED note on 3/16/2022:  61 year old male presents to the Emergency Department for evaluation of low hemoglobin.  Patient has end-stage renal disease on dialysis.  He has issues with chronic anemia secondary to his kidney disease requiring repetitive transfusions.  Also has  history of recurrent pleural effusion with scheduled thoracentesis.  Presents the emergency department with outpatient lab noting low hemoglobin.  I discussed this with his physicians who could not arrange for transfusion and recommended emergency department presentation.  Overall patient complaining of some increased fatigue.  Hemoglobin on outpatient basis was 5.  6.2 here in the emergency department.  Acute on chronic issue for this patient.  He is requesting transfusion in the emergency department for symptomatic anemia.  Nothing on history to suggest active bleeding at this time.  I do think this is secondary to his chronic medical issues.  ECG obtained and was normal.  The rest of his labs are consistent with his chronic kidney dysfunction with no other concerning findings.  Our plan of care at this time is to institute a unit of packed red blood cells.  His baseline hemoglobin tends to hover in the 7 range.  This should bring him up to his baseline and would anticipate discharge home as long as the patient was feeling improved.  We will plan to repeat assess after transfusion.  Patient consented and currently receiving PRBC.    Per my last note:  (N18.6,  Z99.2) ESRD (end stage renal disease) on dialysis (H)  (primary encounter diagnosis)  Comment: check Hgb/plts today; he feels a bit dyspneic and this could be from his R pleural effusion of persistent anemia; precautions given  Plan: CBC with platelets          (J86.9) Empyema lung (H)  Comment: was able to schedule for stat thoracentesis later this afternoon; will call him w/ lab results prior  Plan: US Thoracentesis          (N18.4,  D63.1) Anemia due to stage 4 chronic kidney disease (H)  Comment: see above  Plan: CBC with platelets          (M70.22) Olecranon bursitis of left elbow  Comment:   Plan: stable; cont mgmt per ortho     (B18.1) Chronic viral hepatitis B without delta agent and without coma (H)  Comment: refilled  Plan: entecavir (BARACLUDE)  0.5 MG tablet          (S02.2XXD) Closed fracture of nasal bone with routine healing, subsequent encounter  Comment:   Plan: stable/ resolved     (M10.9) 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 pylori infection 7/5/2017    UGI bleed implied by Hgb 9.0 6/22/17 and melena. Admitted and hgb decreased to 7.6, CT abdomen showed all bladder wall thickening. + Hep B surface antigen noted. Melena resolved and hgb stabalized without transfusion, epigastric pain resolved with PPI. Recommend referral for gastroscopy.     Heart attack (H)      Hepatitis B      Normocytic anemia      PONV (postoperative nausea and vomiting)      Thrombocytopenia (H)       Past Surgical History:   Procedure Laterality Date     ABCESS DRAINAGE      finger     BURSECTOMY ELBOW Left 10/15/2021    Procedure: LEFT OLECRANON BURSECTOMY;  Surgeon: Tico Myers MD;  Location: [facility]     BURSECTOMY ELBOW Left 1/18/2022    Procedure: LEFT ELBOW OLECRANON BURSECTOMY;  Surgeon: Tico Myers MD;  Location: [facility]     CREATE FISTULA ARTERIOVENOUS UPPER EXTREMITY Left 4/20/2021    Procedure: left arm dialysis graft placement;  Surgeon: Roxana Cosby MD;  Location: [facility] OR;  Service: General     FOREIGN BODY REMOVAL      finger     INCISION AND DRAINAGE FINGER, COMBINED Left 10/20/2016    Procedure: COMBINED INCISION AND DRAINAGE FINGER;  Surgeon: Mireya Pizano MD;  Location: [facility] OR     INCISION AND DRAINAGE UPPER EXTREMITY, COMBINED Left 1/18/2022    Procedure: AND ELBOW INCISION  AND DRAINAGE;  Surgeon: Tico Myers MD;  Location: [facility]     IR CHEST TUBE PLACEMENT NON-TUNNELED RIGHT  4/19/2021     IR CHEST TUBE PLACEMENT NON-TUNNELED RIGHT  10/19/2021     IR CHEST TUBE PLACEMENT NON-TUNNELED RIGHT  11/10/2021     IR PLEURAL DRAINAGE WITH CATHETER INSERTION  4/19/2021     MIDLINE INSERTION - DOUBLE LUMEN  4/23/2021          MO ESOPHAGOGASTRODUODENOSCOPY TRANSORAL DIAGNOSTIC N/A 8/18/2020    Procedure: ESOPHAGOGASTRODUODENOSCOPY (EGD) with biopsy;  Surgeon: Nilson Gonzales MD;  Location: [facility];  Service: Gastroenterology      PARACENTESIS  8/14/2020     US PARACENTESIS  8/19/2020     US THORACENTESIS  4/18/2021      Current Outpatient Medications   Medication Sig Dispense Refill     calcium acetate (PHOSLO) 667 MG CAPS capsule Take 1 capsule by mouth 3 times daily (with meals)       cloNIDine (CATAPRES) 0.1 MG tablet Take 0.1 mg by mouth At Bedtime        hydrOXYzine (ATARAX) 25 MG tablet Take 1 tablet (25 mg) by mouth 3 times daily as needed for itching 90 tablet 3     oxyCODONE (ROXICODONE) 5 MG tablet Take 1 tablet (5 mg) by mouth daily as needed for severe pain 30 tablet 0     pantoprazole (PROTONIX) 40 MG EC tablet Take 1 tablet (40 mg) by mouth daily 30 tablet 11     senna-docusate (SENOKOT-S/PERICOLACE) 8.6-50 MG tablet Take 1 tablet by mouth daily as needed for constipation 30 tablet 11     zolpidem (AMBIEN) 5 MG tablet Take 1 tablet (5 mg) by mouth nightly as needed for sleep 10 tablet 5     acetaminophen (TYLENOL) 500 MG tablet Take 500 mg by mouth every 6 hours as needed for mild pain       carvedilol (COREG) 25 MG tablet Take 25-50 mg by mouth 2 times daily as needed        [employer] SALINE NASAL SPRAY 0.65 % nasal spray Spray 1 spray in nostril daily as needed (Patient not taking: Reported on 3/30/2022)       diltiazem ER (DILT-XR) 180 MG 24 hr capsule Take 1 capsule (180 mg) by mouth 2 times daily (Patient taking differently: Take 180 mg by mouth 2 times  "daily as needed (Checks BP prior to taking, if too low he does not take this medication) ) 60 capsule 11     entecavir (BARACLUDE) 0.5 MG tablet Take 1 tablet (0.5 mg) by mouth every 7 days 52 tablet 1     order for DME Equipment being ordered: Other: blood pressure monitor  Treatment Diagnosis: hypertension 1 each 0      Allergies   Allergen Reactions     Nka [No Known Allergies]         ROS:   Gen- no weight change, no fevers/chills   ENT- no cough, no congestion, no URI symptoms   Cardiac - no palpitations, no chest pain   Respiratory - + shortness of breath - at baseline , no wheezing   GI - no nausea, no vomiting, no diarrhea, no constipation   Remainder of ROS negative.     Exam:   /71 (BP Location: Right arm, Patient Position: Sitting, Cuff Size: Adult Regular)   Pulse 75   Temp 99  F (37.2  C) (Oral)   Resp 20   Ht 1.651 m (5' 5\")   Wt 60.8 kg (134 lb)   SpO2 99%   BMI 22.30 kg/m     Alert and oriented x 3; No acute distress   Extrem: no clubbing/cyanosis/edema   Derm: no rashes       Answers for HPI/ROS submitted by the patient on 3/30/2022  If you checked off any problems, how difficult have these problems made it for you to do your work, take care of things at home, or get along with other people?: Not difficult at all  PHQ9 TOTAL SCORE: 5  JOE 7 TOTAL SCORE: 0      "

## 2022-03-30 NOTE — PROGRESS NOTES
Writer placed call to Elle to discuss referral to [facility] Hematology for anemia. Current Hgb level is 6.2 dated 3/16/22. Has received 1 unit of PRBC's on 3/16/22 Per lab review, patient has critically low Hgb (below 7 or symptomatic per patient discussion) message to referring provider as well to advise possible ED evaluation/intervention.  Patient not available at time of call. Scheduling instructions updated and sent to NPS for completion.

## 2022-03-30 NOTE — ED NOTES
Expected Patient Referral to ER    10:38 AM    Referring clinic/provider: Dr. Flores, family medicine    Reason for referral: 60 yo M with ESRD, anemia, lung CA. Being sent in for anemia with HGB of 6.0. no infusion center availabilities. Likely need 2 unites and admission for subsequent dialysis (normally runs today).    Mode of arrival: Mani Francisco MD  03/30/22 2756

## 2022-03-30 NOTE — ED NOTES
"  eMERGENCY dEPARTMENT Physician Triage Note      BRIEF HISTORY AND PLAN   Patient with history of anemia.  Has had transfusions in the past.  Hgb was 5.4 at clinic and sent for transfusion.  Feeling \"low\" and weak lately.  No bloody stools or hemorrhage    Will repeat hbg and order type and screen.  Hold off on transfusion until labs confirmed    BRIEF EXAM    PHYSICAL EXAM    VITAL SIGNS: BP (!) 152/93   Pulse 84   Temp 98  F (36.7  C) (Oral)   Resp 22   Ht 1.651 m (5' 5\")   Wt 60.8 kg (134 lb)   SpO2 96%   BMI 22.30 kg/m    Constitutional:  Well developed, well nourished  EYES: Conjunctivae clear, no discharge  HENT: Atraumatic, normocephalic, bilateral external ears normal.  Oropharynx moist. Nose normal.   Neck: Normal ROM , Supple   Respiratory:  No respiratory distress, normal nonlabored respirations.   Cardiovascular:  Distal perfusion appears intact  Musculoskeletal:  No edema appreciated, No cyanosis, No clubbing. Good range of motion in all major joints.   Integument:  Warm, Dry, No erythema, No rash.   Neurologic:  Alert and oriented. No focal deficits noted.  Ambulatory  Psychiatric:  Affect normal      LAB:  All pertinent labs reviewed and interpreted.  Labs Ordered and Resulted from Time of ED Arrival to Time of ED Departure - No data to display  RADIOLOGY:  Reviewed all pertinent imaging. Please see official radiology report.  No orders to display        Graeme Sheppard MD  03/30/22 [id number]    "

## 2022-03-30 NOTE — ED TRIAGE NOTES
Patient here for blood transfusion due to low hemoglobin he was sent here by his PCP for hgb of 5.9. Patient has chronic anemia and weas to get a transfusion on Friday but his PCP told him his hgb was too low to wait that long.

## 2022-03-30 NOTE — LETTER
March 30, 2022      Elle Blanton  [address]  [facility] [address]    [phone]      Controlled Substance Agreement  I understand that my care provider has prescribed controlled substances (narcotics, tranquilizers, and/or stimulants) to help manage my condition(s).  I am taking this medicine to help me function or work.  I know that this is strong medicine.  It could have serious side effects and even cause a dependency on the drug.  If I stop these medicines suddenly, I could have severe withdrawal symptoms.    The risks, benefits, and side effects of these medication(s) were explained to me.  I agree that:  I will take part in other treatments as advised by my provider.  This may be psychiatry or counseling, physical therapy, behavioral therapy, group treatment, or a referral to a pain clinic.  I will reduce or stop my medicine when my provider tells me to do so.   I will take my medicines as prescribed.  I will not change the dose or schedule unless my provider tells me to.  There will be no refills if I  run out early.     I will keep all my appointments at the clinic.  If I miss appointments or fail to follow instructions, my provider may stop my medicine.  I will not ask other providers to prescribe controlled substances.  And I will not accept controlled substances from other people.  If I need a prescribed controlled substance for another reason, I will notify my provider.    I will use one pharmacy to fill all of my controlled substance prescriptions.  If my prescription is mailed to my pharmacy, it may take 5 to 7 days for my medicine to be ready.  I understand that my clinic can track controlled substance prescriptions from other providers through a central database (prescription monitoring program).  I will bring in my list of medications (or my medicine bottles) each time I come to the clinic.  Refills of controlled substances will be made only during office hours.  It is up to me to make sure that  I do not run out of my medicines on weekends or holidays.     I am responsible for my prescriptions.  If the medicine is lost or stolen, it will not be replaced.   I also agree not to share these medicines with anyone.  I agree to not use ANY illegal or recreational drugs.  This includes marijuana, cocaine, bath salts or other drugs.  I agree not to use alcohol unless my provider says I may.  I agree to give urine samples whenever asked.  If I fail to give a urine sample, the provider may stop my medicine.     I will tell my nurse or provider right away if I become pregnant or have a new medical problem treated outside of [facility].  I understand that this medicine can affect my thinking and judgment.  It may be unsafe for me to drive, use machinery and do dangerous tasks.  I will not do any of these things until I know how the medicine affects me.  If my dose changes, I will wait to see how it affects me.  I will contact my provider if I have concerns about medicine side effects.  I understand that if I do not follow any of the conditions above, my prescriptions or treatment may be stopped.    I agree that my provider, clinic care team, and pharmacy may work with any city, state or federal law enforcement agency that investigates the misuse, sale, or other diversion of my controlled medicine. I will allow my provider to discuss my care with or share a copy of this agreement with any other treating provider, pharmacy or emergency room where I receive care.  I agree to give up (waive) any right of privacy or confidentiality with respect to these authorizations.   I have read this agreement and have asked questions about anything I did not understand.   ___________________________________    ___________________________  Patient Signature                                                   Date  ___________________________________     ____________________________  Witness                                                                     Date  ___________________________________  No primary provider on file.

## 2022-03-30 NOTE — ED PROVIDER NOTES
EMERGENCY DEPARTMENT ENCOUNTER      NAME: Elle Blanton  AGE: 61 year old male  YOB: 1960  MRN: [id number]  EVALUATION DATE & TIME: 3/30/2022  4:10 PM    PCP: Jaswant Flores    ED PROVIDER: Glen Duron M.D.      Chief Complaint   Patient presents with     Abnormal Labs         FINAL IMPRESSION:  Renal failure  Anemia  Chronic hemothorax      ED COURSE & MEDICAL DECISION MAKING:    Pertinent Labs & Imaging studies reviewed. (See chart for details)  61 year old male presents to the Emergency Department for transfusion of packed red blood cells.  Patient with a history of renal dialysis for the past year.  Patient also with anemia of chronic disease.  Laboratory evaluation in clinic today with hemoglobin of 6.2.  Patient does report some increased fatigue.  Recently had nosebleed following a fall.  This has been corrected.  Denies any blood in stools.  Does feel slightly fatigued but no chest pain.  Minimal shortness of breath.  On exam he is a chronically ill-appearing male.  Patient with marked decreased breath sounds on the right.  Does report history of effusion.  Laboratory evaluation initiated from triage.  Will consent patient for transfusion.  Recent transfusion was proximately 2 weeks ago.  Patient appears non toxic with stable vitals signs.    4:12 PM I met with the patient for the initial interview and physical examination. Discussed plan for treatment and workup in the ED.    4:12 PM I spoke to critical labs and the patient has a hemoglobin count of 6.2.  4:20 PM.  Patient consented for transfusion.  4:30 PM.  INR slightly elevated 1.25.  White cell count normal 5.5.  Hemoglobin 6.2 with hematocrit 20.3.  Platelets reduced at 71.  4:48 PM I spoke to lab reports elevated creatinine consistent with his renal failure.  5:32 PM.  Sodium and potassium normal.  Bicarb slightly reduced at 20.  Alkaline phosphatase minimally elevated 162.  Chest x-ray reviewed.  Patient with marked right-sided effusion  with slight left shift.  Will discuss possible thoracentesis with patient.  Patient reports having 2 attempts at thoracentesis recently.  Records reviewed.  On one attempt 500 cc of old blood was removed.  Most recent attempt only 16 cc withdrawn.  Patient with possible loculated effusion versus old hemothorax.  Will obtain CT of the chest to clarify.    5:45 PM I rechecked and updated the patient.  Plan for CT imaging discussed  7:41 PM.  CT returns with large loculated effusion with acute densities consistent with acute hemorrhage.  If that certainly explain patient's recent need for repeat transfusions.  Plan will be for hospitalization and consultation with surgery.  7:43 PM I rechecked and updated the patient.  8:09 PM I spoke to Dr. Blanton, general surgery.  He agrees with plan for hospitalization but he does not do thoracic procedures.  Recommends checking other facilities for appropriate surgical coverage.  However if beds are not available will make plans for admission here for further coverage  8:28 PM I spoke to Dr. Curry, cardiothoracic surgery.  8:54 PM I spoke to thoracic surgery at the . They recommended on getting a CT angiogram of the chest to assess for active bleeding. Orders placed.  Patient informed of plan for CT scan/angiogram.  Patient wishes to have the study discussed with nephrology prior to proceeding due to the patient's renal failure.  Call placed to nephrology  9:20 PM.  Patient discussed with Dr. Mao he agrees with plan to proceed for CT imaging with contrast.  Will be available for dialysis tomorrow  11:16 PM.  Patient discussed with Dr. Davila at end of shift.  Patient will require hospitalization.  At the conclusion of the encounter I discussed the results of all of the tests and the disposition. The questions were answered and return precautions provided. The patient or family acknowledged understanding and was agreeable with the care plan.       Patient represents critical care  situation.  Approximately 70 minutes spent directly involved patient's care independent of any procedures.      PPE: Provider wore gloves, N95 mask, eye protection, surgical cap.     MEDICATIONS GIVEN IN THE EMERGENCY:  Medications - No data to display    NEW PRESCRIPTIONS STARTED AT TODAY'S ER VISIT  New Prescriptions    No medications on file          =================================================================    HPI    Patient information was obtained from: Patient     Use of Intrepreter: N/A        Elle Blanton is a 61 year old male with a pertient medical history of heart attack, PONV, hepatitis B, and NAHUM who presents to the ED for evaluation of abnormal labs.     The patient was seen earlier today at the clinic where his primary care provider found low platelets. His last blood transfusion was 2 weeks ago. The patient states that he has had a couple of blood transfusions for the past several years. No bloody stools. He does note that he fell and broke his nose, causing him to have nose bleeding for the past 2 days. He is currently not experiencing a bloody nose. No shortness of breath or chest pain. The patient endorses fatigue. The patient has been going to dialysis for a year now. The patient also does note having a history of fluid in his lungs. No other symptoms or complaints at this time.       REVIEW OF SYSTEMS   Constitutional: Positive for fatigue. Denies fever, chills  HENT: Positive for bloody noses  Respiratory:  Denies productive cough or increased work of breathing  Cardiovascular:  Denies chest pain, palpitations  GI:  Denies abdominal pain, nausea, vomiting, or change in bowel or bladder habits   Musculoskeletal:  Denies any new muscle/joint swelling  Skin:  Denies rash   Neurologic:  Denies focal weakness  All systems negative except as marked.     PAST MEDICAL HISTORY:  Past Medical History:   Diagnosis Date     NAHUM (acute kidney injury) (H)      GI (gastrointestinal bleed)      Gout       H. pylori infection 7/5/2017    UGI bleed implied by Hgb 9.0 6/22/17 and melena. Admitted and hgb decreased to 7.6, CT abdomen showed all bladder wall thickening. + Hep B surface antigen noted. Melena resolved and hgb stabalized without transfusion, epigastric pain resolved with PPI. Recommend referral for gastroscopy.     Heart attack (H)      Hepatitis B      Normocytic anemia      PONV (postoperative nausea and vomiting)      Thrombocytopenia (H)        PAST SURGICAL HISTORY:  Past Surgical History:   Procedure Laterality Date     ABCESS DRAINAGE      finger     BURSECTOMY ELBOW Left 10/15/2021    Procedure: LEFT OLECRANON BURSECTOMY;  Surgeon: Tico Myers MD;  Location: [facility] OR     BURSECTOMY ELBOW Left 1/18/2022    Procedure: LEFT ELBOW OLECRANON BURSECTOMY;  Surgeon: Tico Myers MD;  Location: [facility] OR     CREATE FISTULA ARTERIOVENOUS UPPER EXTREMITY Left 4/20/2021    Procedure: left arm dialysis graft placement;  Surgeon: Roxana Cosby MD;  Location: [facility] OR;  Service: General     FOREIGN BODY REMOVAL      finger     INCISION AND DRAINAGE FINGER, COMBINED Left 10/20/2016    Procedure: COMBINED INCISION AND DRAINAGE FINGER;  Surgeon: Mireya Pizano MD;  Location: [facility] OR     INCISION AND DRAINAGE UPPER EXTREMITY, COMBINED Left 1/18/2022    Procedure: AND ELBOW INCISION AND DRAINAGE;  Surgeon: Tico Myers MD;  Location: [facility] OR     IR CHEST TUBE PLACEMENT NON-TUNNELED RIGHT  4/19/2021     IR CHEST TUBE PLACEMENT NON-TUNNELED RIGHT  10/19/2021     IR CHEST TUBE PLACEMENT NON-TUNNELED RIGHT  11/10/2021     IR PLEURAL DRAINAGE WITH CATHETER INSERTION  4/19/2021     MIDLINE INSERTION - DOUBLE LUMEN  4/23/2021          PA ESOPHAGOGASTRODUODENOSCOPY TRANSORAL DIAGNOSTIC N/A 8/18/2020    Procedure: ESOPHAGOGASTRODUODENOSCOPY (EGD) with biopsy;  Surgeon: Nilson Gonzales MD;  Location: [facility] GI;  Service: Gastroenterology     US  PARACENTESIS  8/14/2020      PARACENTESIS  8/19/2020      THORACENTESIS  4/18/2021         CURRENT MEDICATIONS:    No current facility-administered medications for this encounter.    Current Outpatient Medications:      acetaminophen (TYLENOL) 500 MG tablet, Take 500 mg by mouth every 6 hours as needed for mild pain, Disp: , Rfl:      calcium acetate (PHOSLO) 667 MG CAPS capsule, Take 1 capsule by mouth 3 times daily (with meals), Disp: , Rfl:      carvedilol (COREG) 25 MG tablet, Take 25-50 mg by mouth 2 times daily as needed , Disp: , Rfl:      cloNIDine (CATAPRES) 0.1 MG tablet, Take 0.1 mg by mouth At Bedtime , Disp: , Rfl:      [employer] SALINE NASAL SPRAY 0.65 % nasal spray, Spray 1 spray in nostril daily as needed (Patient not taking: Reported on 3/30/2022), Disp: , Rfl:      diltiazem ER (DILT-XR) 180 MG 24 hr capsule, Take 1 capsule (180 mg) by mouth 2 times daily (Patient taking differently: Take 180 mg by mouth 2 times daily as needed (Checks BP prior to taking, if too low he does not take this medication) ), Disp: 60 capsule, Rfl: 11     entecavir (BARACLUDE) 0.5 MG tablet, Take 1 tablet (0.5 mg) by mouth every 7 days, Disp: 52 tablet, Rfl: 1     hydrOXYzine (ATARAX) 25 MG tablet, Take 1 tablet (25 mg) by mouth 3 times daily as needed for itching, Disp: 90 tablet, Rfl: 3     order for DME, Equipment being ordered: Other: blood pressure monitor Treatment Diagnosis: hypertension, Disp: 1 each, Rfl: 0     oxyCODONE (ROXICODONE) 5 MG tablet, Take 1 tablet (5 mg) by mouth daily as needed for severe pain, Disp: 30 tablet, Rfl: 0     pantoprazole (PROTONIX) 40 MG EC tablet, Take 1 tablet (40 mg) by mouth daily, Disp: 30 tablet, Rfl: 11     senna-docusate (SENOKOT-S/PERICOLACE) 8.6-50 MG tablet, Take 1 tablet by mouth daily as needed for constipation, Disp: 30 tablet, Rfl: 11     zolpidem (AMBIEN) 5 MG tablet, Take 1 tablet (5 mg) by mouth nightly as needed for sleep, Disp: 10 tablet, Rfl:  5    ALLERGIES:  Allergies   Allergen Reactions     Nka [No Known Allergies]        FAMILY HISTORY:  Family History   Problem Relation Age of Onset     Diabetes Father      Hypertension No family hx of      Asthma No family hx of      Cancer No family hx of      Coronary Artery Disease No family hx of      Liver Cancer No family hx of      Ulcers Father        SOCIAL HISTORY:   Social History     Socioeconomic History     Marital status:      Spouse name: Not on file     Number of children: Not on file     Years of education: Not on file     Highest education level: Not on file   Occupational History     Not on file   Tobacco Use     Smoking status: Never Smoker     Smokeless tobacco: Never Used   Vaping Use     Vaping Use: Never used   Substance and Sexual Activity     Alcohol use: No     Drug use: No     Sexual activity: Yes     Partners: Female   Other Topics Concern     Parent/sibling w/ CABG, MI or angioplasty before 65F 55M? Not Asked   Social History Narrative     Not on file     Social Determinants of Health     Financial Resource Strain: Not on file   Food Insecurity: Not on file   Transportation Needs: Not on file   Physical Activity: Not on file   Stress: Not on file   Social Connections: Not on file   Intimate Partner Violence: Not on file   Housing Stability: Not on file       VITALS:  Patient Vitals for the past 24 hrs:   BP Temp Temp src Pulse Resp SpO2 Height Weight   03/30/22 1900 (!) 158/80 -- -- 85 30 98 % -- --   03/30/22 1845 (!) 147/82 -- -- 83 27 96 % -- --   03/30/22 1800 (!) 143/72 -- -- -- -- -- -- --   03/30/22 1745 (!) 150/79 -- -- 79 11 96 % -- --   03/30/22 1730 128/74 -- -- 79 21 97 % -- --   03/30/22 1723 (!) 143/84 99.1  F (37.3  C) Oral 80 18 97 % -- --   03/30/22 1715 (!) 143/84 -- -- 81 24 98 % -- --   03/30/22 1709 (!) 142/76 99.2  F (37.3  C) Oral 82 18 98 % -- --   03/30/22 1700 (!) 142/76 -- -- -- -- -- -- --   03/30/22 1549 (!) 152/93 98  F (36.7  C) Oral 84 22 96 %  "1.651 m (5' 5\") 60.8 kg (134 lb)        PHYSICAL EXAM    Constitutional:  Awake, alert. Pale and chronically ill appearing   HENT:  Normocephalic, Atraumatic. Bilateral external ears normal. Oropharynx moist. Nose normal. Neck- Normal range of motion with no guarding, No midline cervical tenderness, Supple, No stridor.   Eyes:  PERRL, EOMI with no signs of entrapment, Conjunctiva normal, No discharge.   Respiratory:  No respiratory distress, No wheezing. Marked diminished breath sounds to right side of lung    Cardiovascular:  Normal heart rate, Normal rhythm, No appreciable rubs or gallops.   GI:  Soft, No tenderness, No distension, No palpable masses  Musculoskeletal:  Intact distal pulses, No edema. Good range of motion in all major joints. No tenderness to palpation or major deformities noted.  Integument:  Warm, Dry, No erythema, No rash. Marked left arm edema proximal to dialysis  Neurologic:  Alert & oriented, Normal motor function, Normal sensory function, No focal deficits noted.   Psychiatric:  Affect normal, Judgment normal, Mood normal.     LAB:  All pertinent labs reviewed and interpreted.  Results for orders placed or performed during the hospital encounter of 03/30/22   XR Chest Port 1 View    Impression    IMPRESSION: Since prior chest radiograph 02/20/2022 there has been increase in size of the right pleural effusion which is now marked. There is some associated mediastinal shift from right to left. Otherwise the chest is stable with again seen   significantly enlarged abdominal aortic arch which is partially calcified. The left lung shows no acute findings.   Chest CT w/o contrast    Impression    IMPRESSION:   1.  Large complicated right pleural effusion with scattered hyperdensities suggesting hemorrhage. Atelectasis involving the entire right lower lobe and most of the right upper lobe.  2.  Small left pleural effusion and left basilar atelectasis.  3.  Aneurysmal dilatation involving the aortic " arch and descending thoracic aorta, unchanged. There is also a chronic dissection with intimal flap upper abdominal aorta.  4.  Cirrhosis and splenomegaly. Small amount of ascites  .   Comprehensive metabolic panel   Result Value Ref Range    Sodium 138 136 - 145 mmol/L    Potassium 4.2 3.5 - 5.0 mmol/L    Chloride 101 98 - 107 mmol/L    Carbon Dioxide (CO2) 20 (L) 22 - 31 mmol/L    Anion Gap 17 5 - 18 mmol/L    Urea Nitrogen 92 (H) 8 - 22 mg/dL    Creatinine 8.04 (HH) 0.70 - 1.30 mg/dL    Calcium 7.7 (L) 8.5 - 10.5 mg/dL    Glucose 112 70 - 125 mg/dL    Alkaline Phosphatase 162 (H) 45 - 120 U/L    AST 29 0 - 40 U/L    ALT 31 0 - 45 U/L    Protein Total 8.1 (H) 6.0 - 8.0 g/dL    Albumin 3.1 (L) 3.5 - 5.0 g/dL    Bilirubin Total 1.0 0.0 - 1.0 mg/dL    GFR Estimate 7 (L) >60 mL/min/1.73m2   Result Value Ref Range    INR 1.25 (H) 0.90 - 1.15   CBC with platelets and differential   Result Value Ref Range    WBC Count 5.5 4.0 - 11.0 10e3/uL    RBC Count 2.02 (L) 4.40 - 5.90 10e6/uL    Hemoglobin 6.2 (LL) 13.3 - 17.7 g/dL    Hematocrit 20.3 (L) 40.0 - 53.0 %     (H) 78 - 100 fL    MCH 30.7 26.5 - 33.0 pg    MCHC 30.5 (L) 31.5 - 36.5 g/dL    RDW 17.5 (H) 10.0 - 15.0 %    Platelet Count 71 (L) 150 - 450 10e3/uL    % Neutrophils 81 %    % Lymphocytes 12 %    % Monocytes 7 %    % Eosinophils 0 %    % Basophils 0 %    % Immature Granulocytes 0 %    NRBCs per 100 WBC 0 <1 /100    Absolute Neutrophils 4.4 1.6 - 8.3 10e3/uL    Absolute Lymphocytes 0.6 (L) 0.8 - 5.3 10e3/uL    Absolute Monocytes 0.4 0.0 - 1.3 10e3/uL    Absolute Eosinophils 0.0 0.0 - 0.7 10e3/uL    Absolute Basophils 0.0 0.0 - 0.2 10e3/uL    Absolute Immature Granulocytes 0.0 <=0.4 10e3/uL    Absolute NRBCs 0.0 10e3/uL   Adult Type and Screen   Result Value Ref Range    ABO/RH(D) A POS     Antibody Screen Negative Negative    SPECIMEN EXPIRATION DATE [id number]    Prepare red blood cells (unit)   Result Value Ref Range    CROSSMATCH Compatible     UNIT  ABO/RH A Pos     Unit Number [id number]     Unit Status Issued     Blood Component Type Red Blood Cells     Product Code V6078B60     CODING SYSTEM [id number]     UNIT TYPE ISBT 6200     ISSUE DATE AND TIME [id number]        RADIOLOGY:  Reviewed all pertinent imaging. Please see official radiology report.  Chest CT w/o contrast   Final Result   IMPRESSION:    1.  Large complicated right pleural effusion with scattered hyperdensities suggesting hemorrhage. Atelectasis involving the entire right lower lobe and most of the right upper lobe.   2.  Small left pleural effusion and left basilar atelectasis.   3.  Aneurysmal dilatation involving the aortic arch and descending thoracic aorta, unchanged. There is also a chronic dissection with intimal flap upper abdominal aorta.   4.  Cirrhosis and splenomegaly. Small amount of ascites   .      XR Chest Port 1 View   Final Result   IMPRESSION: Since prior chest radiograph 02/20/2022 there has been increase in size of the right pleural effusion which is now marked. There is some associated mediastinal shift from right to left. Otherwise the chest is stable with again seen    significantly enlarged abdominal aortic arch which is partially calcified. The left lung shows no acute findings.      CTA Chest with Contrast    (Results Pending)             I, Richardson Blanton, am serving as a scribe to document services personally performed by Glen Duron MD, based on my observation and the provider's statements to me. I, Glen Duron MD attest that Richardson Blanton is acting in a scribe capacity, has observed my performance of the services and has documented them in accordance with my direction.    Glen Duron M.D.  Emergency Medicine  [facility] EMERGENCY DEPARTMENT     Glen Duron MD  03/30/22 [id number]

## 2022-03-30 NOTE — LETTER
[facility] ([facility]) Contract: Opioid Prescription  I understand that my provider, ____________________________, is prescribing an opioid medication to assist me in managing my chronic pain and assist me in improving my daily function and activity level. If my activity level or general function changes, the medication may be changed or discontinued. I understand that my provider is not required to prescribe this medication. The risks, side effects, and benefits of taking this medication have been explained to me and I agree to the following conditions related to this treatment. Failure to adhere to these conditions will result in discontinuation of this medication and possible termination of care from the [facility].     I will take my medication as prescribed. I will not change the amount or frequency of the medication without provider approval.    I will only receive these medications from _____________________________________________ (or my provider s designee as determined by my provider).    I am being prescribed opioid medications to treat the following condition(s):________________________________________________.    If I am hospitalized or seen in an emergency department or urgent care for a condition that results in a prescription for another controlled substance (such as anti-anxiety, additional pain medication, sleep aid, or stimulants), I will inform the clinic within one business day of the additional medication.    I will notify the clinic within one business day if I seek any care elsewhere related to this medication.    I will participate in all additional treatments that my provider recommends, such as physical therapy or consultations with a pain or mental health specialist.     I will notify my provider of any history of addiction or current chemical dependence.    I will not use illegal drugs (includes marijuana).     I will comply with random drug screening to confirm  proper use of my medication.    I will comply with state law. I understand that I may not be able to operate a motor vehicle while taking these medications. I will not participate in any activities that will put myself or others at physical risk while taking any medications.    I will not give, sell, or trade my medications to anyone else.      I will not take someone else s medications.    I will not forge or change my prescriptions in any way.    I understand I will have one clinic appointment every __________months (or more frequently) as determined by my provider.    I understand it is my responsibility to schedule future appointments with my provider at the end of each visit.    I will not request early refills. I understand that lost and/or stolen prescriptions/medications will not be replaced.    I will request refills of these medications in the following manner:    o I am responsible to keep track of my medications and plan ahead to arrange for refills. It is my responsibility to ensure that I do not run out of medication.  o I will call [phone] and request a refill of my opioid medication(s).  All other medication refill request should be requested through your pharmacy.  o I will give the clinic one full week notice prior to needing a refill.  o I will not walk in or call into the clinic and request this medication to be refilled same day (I understand I must give a full week notice of the need for refill).  o I understand that these prescriptions will be dispensed monthly with a maximum of one-month supply with no refills.  o Prescriptions will only be available to be picked up during regular office hours (7:30am-5:00pm Monday-Friday). Refills will not be provided at night, on weekends, or during holidays.  I will treat my provider and clinic staff with respect. I will not yell, name-call, shout, or use offensive or vulgar language. I will not threaten or act in an intimidating manner on the  telephone or while in the clinic toward my provider or clinic staff.  I agree if I break this agreement, my physician reserves the right to stop prescribing the controlled substance for me - my medications will be discontinued in a medically safe fashion, and I will not be allowed to get pain medications from any other provider in this clinic and/or it may result in a termination of care from this clinic.       I understand this contract is in effect for all current and future opioid prescriptions received through the [facility].          _________________________________________________________________________                _________________  Signature of Patient or Personal Representative (state relationship)                                        Date    _________________________________________________________________________                 ________________                                              Signature of Provider (also please print name)                                                                               Date

## 2022-03-30 NOTE — PROGRESS NOTES
Addendum:    Hgb - 6.0. Spoke w/ pt and our nurses. Attempted appt at infusion center for blood transfusion but soonest available is Friday. Spoke to pt and recommended he go to ED for admission for blood transfusion and dialysis as this is his scheduled day. He was amenable to the plan.    Jaswant Flores MD  March 30, 2022  10:32 AM

## 2022-03-31 PROBLEM — N18.6 ESRD (END STAGE RENAL DISEASE) ON DIALYSIS (H): Status: ACTIVE | Noted: 2021-09-26

## 2022-03-31 PROBLEM — J86.9 EMPYEMA (H): Status: ACTIVE | Noted: 2022-01-01

## 2022-03-31 PROBLEM — I71.019 THORACIC AORTIC DISSECTION (H): Status: ACTIVE | Noted: 2022-01-01

## 2022-03-31 PROBLEM — D64.9 ANEMIA, UNSPECIFIED TYPE: Status: ACTIVE | Noted: 2022-01-01

## 2022-03-31 PROBLEM — I77.79: Status: ACTIVE | Noted: 2022-01-01

## 2022-03-31 PROBLEM — Z99.2 ESRD (END STAGE RENAL DISEASE) ON DIALYSIS (H): Status: ACTIVE | Noted: 2021-09-26

## 2022-03-31 NOTE — PROGRESS NOTES
[facility]    Progress Note - Hospitalist Service       Date of Admission:  3/30/2022    Assessment & Plan        Elle Blanton is a 61 year old male with past medical history significant for thoracic aortic dissection, hepatitis B, hepatorenal syndrome with ESRD, pleural effusion and normocytic anemia who was admitted on 3/30/2022 after being fond to have a hgb of 6.2 at clinic.  Also admitted to the hospital in the hopes of getting a chest tube to help with recurrent empyema.        Acute macrocytic anemia on chronic normocytic anemia of chronic disease  History of GI bleed  -Patient was seen in clinic on 3/30 and found to have a hgb of 6.2 after being rechecked due to chronic anemia  -Was given 1 unit of packed RBCs in the ED  -He has required outpatient transfusions the most recent being 2 weeks ago  -Denies bloody bowel movements or dark stools  -Admits to falling and hitting his nose with 4 days of bleeding several days ago, no longer having a bloody nose  -Will monitor and transfuse for hemoglobin less than 7  -He is on high-dose EPO and takes iron supplements outpatient  -Being worked up outpatient for hemolytic anemia due to undetectable haptoglobin, LDH elevation, and low reticulocyte count despite able  -Primary care physician is placing a referral to heme onc to help address this     Empyema  History of recurrent pleural effusions  -CT chest 3/30:1.  Large complicated right pleural effusion with scattered hyperdensities suggesting hemorrhage. Atelectasis involving the entire right lower lobe and most of the right upper lobe. 2.  Small left pleural effusion and left basilar atelectasis. 3.  Aneurysmal dilatation involving the aortic arch and descending thoracic aorta, unchanged. There is also a chronic dissection with intimal flap upper abdominal aorta.4.  Cirrhosis and splenomegaly. 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  Dissection extending from the anterior aspect of the thoracic aortic arch into the abdominal aorta.2.  There is aneurysmal dilatation seen of the thoracic aorta most marked at the mid arch region which measures approximately 5.7 cm in greatest radial dimension. No active rupture is identified.3.  The dissection and size of the thoracic aorta appears stable since prior CT 11/09/2021.4.  There is a dissection seen involving the main pulmonary artery and the proximal aspect of the right pulmonary artery which is commonly associated with changes of chronic increased pulmonary arterial pressure.5.  There are findings worrisome for a large empyema in the right hemithorax with associated significant atelectasis6.  There are small free appearing bilateral pleural effusions.7.  There is mild abdominal ascites.8.  Changes of cirrhosis in the liver with associated portal venous hypertension and splenomegaly.  -Patient is aware and has been worked up for the thoracic aortic dissection and does not want surgery on this, the pulmonary artery and right pulmonary artery dissections are new  -Cardiovascular surgery has been consulted regarding the new pulmonary artery dissections     ESRD on hemodialysis (MF) secondary to hepatorenal syndrome due to cirrhosis  -Nephrology consulted, will be doing dialysis tomorrow as he had contrast imaging   -Outpatient he has only been getting dialysis twice a week but it has been recommended by his primary to increase this to 3 times weekly for better symptom management     Liver cirrhosis secondary to chronic hepatitis  B  -Stable, LFTs unremarkable except for alk phos elevated likely due to renal disease     History of MI        Diet: NPO for Medical/Clinical Reasons Except for: Meds, Ice Chips  Room Service    DVT Prophylaxis: Pneumatic Compression Devices  Beltre Catheter: Not present  Fluids: N/A  Central Lines: None  Cardiac Monitoring: ACTIVE order. Indication: Pulmonary artery dissection, empyema  Code Status: Full Code      Disposition Plan   Expected Discharge: 04/03/2022     Anticipated discharge location:  Awaiting care coordination huddle       The patient's care was discussed with the Attending Physician, Dr. Jeter.    Redd Healy MD  Hospitalist Service  [facility]  Securely message with the Vocera Web Console (learn more here)  Text page via [employer] Paging/Directory         Clinically Significant Risk Factors Present on Admission          # Hypocalcemia: corrected calcium <8.5 on admission, will replace as needed   # Anion Gap Metabolic Acidosis: AG = 17 mmol/L (Ref range: 5 - 18 mmol/L) on admission, will monitor and treat as appropriate  # Hypoalbuminemia: Albumin = 3.1 g/dL (Ref range: 3.5 - 5.0 g/dL) on admission, will monitor as appropriate   # Coagulation Defect: INR = 1.25 (Ref range: 0.90 - 1.15) and/or PTT = N/A on admission, will monitor for bleeding  # Thrombocytopenia: Plts = 71 10e3/uL (Ref range: 150 - 450 10e3/uL) on admission, will monitor for bleeding       ______________________________________________________________________    Interval History   Patient ready for chest tube this morning.  Discussed his multiple complicated pathologies at this time.  Patient is demoralized that he was told the surgery he had rony waiting for     Data reviewed today: I reviewed all medications, new labs and imaging results over the last 24 hours.     Physical Exam   Vital Signs: Temp: 97.4  F (36.3  C) Temp src: Oral BP: (!) 170/87 Pulse: 88   Resp: 20 SpO2: 97 % O2 Device: Nasal  cannula Oxygen Delivery: 1 LPM  Weight: 133 lbs 3.2 oz      Data

## 2022-03-31 NOTE — PLAN OF CARE
Problem: Plan of Care - These are the overarching goals to be used throughout the patient stay.    Goal: Plan of Care Review/Shift Note  Description: The Plan of Care Review/Shift note should be completed every shift.  The Outcome Evaluation is a brief statement about your assessment that the patient is improving, declining, or no change.  This information will be displayed automatically on your shift note.  Outcome: Ongoing, Progressing     Problem: Anemia  Goal: Anemia Symptom Improvement  Outcome: Ongoing, Progressing  Intervention: Monitor and Manage Anemia  Recent Flowsheet Documentation  Taken 3/31/2022 1600 by Irish Sykes RN  Safety Promotion/Fall Prevention:    activity supervised    clutter free environment maintained  Taken 3/31/2022 1300 by Irish Sykes RN  Safety Promotion/Fall Prevention:    activity supervised    clutter free environment maintained  Taken 3/31/2022 0834 by Irish Sykes RN  Safety Promotion/Fall Prevention:    activity supervised    clutter free environment maintained     Problem: Plan of Care - These are the overarching goals to be used throughout the patient stay.    Goal: Absence of Hospital-Acquired Illness or Injury  Intervention: Identify and Manage Fall Risk  Recent Flowsheet Documentation  Taken 3/31/2022 1600 by Irish Sykes RN  Safety Promotion/Fall Prevention:    activity supervised    clutter free environment maintained  Taken 3/31/2022 1300 by Irish Sykes RN  Safety Promotion/Fall Prevention:    activity supervised    clutter free environment maintained  Taken 3/31/2022 0834 by Irish Sykes RN  Safety Promotion/Fall Prevention:    activity supervised    clutter free environment maintained  Intervention: Prevent Skin Injury  Recent Flowsheet Documentation  Taken 3/31/2022 1600 by Irish Sykes RN  Body Position:    right    position changed independently  Taken 3/31/2022 1300 by Irish Sykes RN  Body Position: (sitting at  edge of bed) other (see comments)  Taken 3/31/2022 0834 by Irish Sykes, RN  Body Position: (sitting at edge of bed) other (see comments)  Intervention: Prevent and Manage VTE (Venous Thromboembolism) Risk  Recent Flowsheet Documentation  Taken 3/31/2022 1600 by Irish Sykes, RN  Activity Management: activity adjusted per tolerance  Taken 3/31/2022 1300 by Irish Sykes RN  Activity Management: activity adjusted per tolerance  Taken 3/31/2022 0834 by Irish Sykes RN  Activity Management: activity adjusted per tolerance   Goal Outcome Evaluation:        Hgb 6.6 this AM. Pt was transfused with 1 unit PRBC.  Last hgb post transfusion was 7.6.  Lab called with critical Cr 8.78.  Pt receives dialysis and nephrology following.  Chest was tube placed in IR today.  Upon return to P3 there was what sounded like an audible air leak.  Exited room to see if IR personal were in hallway still.  Ended up calling IR and requested if chest tube could be looked at.  Was given steps to take (clamp, vaseline gauze, check canister tubing).  Txt sent to Hospitalist to inform of critical Cr level, chest tube insertion with air leak and to see if they could look at it.  Also requested diet order.  Orders received and code status changed from full to DNR/DNI.  IR to address chest tube.  Call placed then to SWAT nurse to look at chest tube.  Three SWAT nurses looked at it and suggested IR be called.  Spoke with IR again and was given same suggestion as previous when asked if chest tube could be looked at.  Swat nurses assisted with assessing and placing vaseline dressing with gauze and foam tape.  There seems to be leak around stopcock connection.  Unable to stop the gurgling sound.  Chest tube is draining adequately.  No crepitus.  Respiration status is stable and SpO2 is in upper 90s on RA.    Pt was NPO most of day shift for chest tube placement.  He reported pain at 8/10 at insertion site.  Oxy 5mg given and reduced  pain to a tolerable 3/10

## 2022-03-31 NOTE — CONSULTS
Interventional Radiology - Consultation Note:  Inpatient - [facility]: Interventional Radiology   [phone]  3/31/2022    Reason for Consult:  empyema   Requesting Provider:  Marilee Smith MD    HPI:  Elle Blanton is a 61 year old male with history of AAA (5.5cm), chronic thoracic aortic dissection, cirrhosis 2/2 hepatitis B, hepatorenal syndrome with HD dependent ESRD (on HD M/F via L AVF, has declined increasing), recurrent pleural effusions (has required prior thoracentesis and chest tubes for management; most recently 11/10/21 - 11/16/21 and 10/19/2021 - 10/26/21) and normocytic anemia who was admitted on 3/30/2022 after having fatigue. Was found to be anemia (Hgb 6.2 in ED), with imaging findings concern for right empyema, and new main pulmonary artery and right pulmonary artery dissection in setting of known thoracic aortic dissection.    Patient has received a total of 2 U of PRBCs.    Noted that patient has required multiple chest tubes in the recent past secondary to recurrent pleural effusion/empyema/hemopneumothorax. See timeline below. At those instances patient noted to have right trapped lung with minimal function. Secondary to patient's portal hypertension with mediastinal varices, decortication would be very difficult. Patient has been recommended to be evaluated at the cardiothoracic surgery for possible intervention, but it appears patient has always been very reluctant to accept surgical therapy recommendations. Reports that he did meet with a surgeon as an OP who told him that he would not tolerate a surgical procedure well and recommended continuing with intermittent chest tube management.    R pleural effusion/empyema/hemopneumothorax:  2/23/22: Thoracentesis (16mL removed)  3/2/22:  Thoracentesis (500L removed)  11/10/21 - 11/16/21: R chest tube  10/19/2021 - 10/26/21: R chest tube      IMAGING:    CTA chest 3/30/22:  IMPRESSION:  1.  Dissection extending from the anterior  aspect of the thoracic aortic arch into the abdominal aorta.     2.  There is aneurysmal dilatation seen of the thoracic aorta most marked at the mid arch region which measures approximately 5.7 cm in greatest radial dimension. No active rupture is identified.     3.  The dissection and size of the thoracic aorta appears stable since prior CT 11/09/2021.     4.  There is a dissection seen involving the main pulmonary artery and the proximal aspect of the right pulmonary artery which is commonly associated with changes of chronic increased pulmonary arterial pressure.     5.  There are findings worrisome for a large empyema in the right hemithorax with associated significant atelectasis.     6.  There are small free appearing bilateral pleural effusions.     7.  There is mild abdominal ascites.     8.  Changes of cirrhosis in the liver with associated portal venous hypertension and splenomegaly.     NPO Status:  Midnight.  Anticoagulation/Antiplatelets/Bleeding tendencies:  none  Antibiotics:  none    REVIEW OF SYSTEMS:  A comprehensive 10-point review of systems was performed. All systems were reviewed and negative with exception to those reported in the HPI.     PAST MEDICAL HISTORY:  Past Medical History:   Diagnosis Date     NAHUM (acute kidney injury) (H)      GI (gastrointestinal bleed)      Gout      H. pylori infection 7/5/2017    UGI bleed implied by Hgb 9.0 6/22/17 and melena. Admitted and hgb decreased to 7.6, CT abdomen showed all bladder wall thickening. + Hep B surface antigen noted. Melena resolved and hgb stabalized without transfusion, epigastric pain resolved with PPI. Recommend referral for gastroscopy.     Heart attack (H)      Hepatitis B      Normocytic anemia      PONV (postoperative nausea and vomiting)      Thrombocytopenia (H)        PAST SURGICAL HISTORY:  Past Surgical History:   Procedure Laterality Date     ABCESS DRAINAGE      finger     BURSECTOMY ELBOW Left 10/15/2021    Procedure: LEFT  OLECRANON BURSECTOMY;  Surgeon: Tico Myers MD;  Location: [facility] Main OR     BURSECTOMY ELBOW Left 1/18/2022    Procedure: LEFT ELBOW OLECRANON BURSECTOMY;  Surgeon: Tico Myers MD;  Location: [facility] Main OR     CREATE FISTULA ARTERIOVENOUS UPPER EXTREMITY Left 4/20/2021    Procedure: left arm dialysis graft placement;  Surgeon: Roxana Cosby MD;  Location: [facility] Main OR;  Service: General     FOREIGN BODY REMOVAL      finger     INCISION AND DRAINAGE FINGER, COMBINED Left 10/20/2016    Procedure: COMBINED INCISION AND DRAINAGE FINGER;  Surgeon: Mireya Pizano MD;  Location: [facility] OR     INCISION AND DRAINAGE UPPER EXTREMITY, COMBINED Left 1/18/2022    Procedure: AND ELBOW INCISION AND DRAINAGE;  Surgeon: Tico Myers MD;  Location: [facility] OR     IR CHEST TUBE PLACEMENT NON-TUNNELED RIGHT  4/19/2021     IR CHEST TUBE PLACEMENT NON-TUNNELED RIGHT  10/19/2021     IR CHEST TUBE PLACEMENT NON-TUNNELED RIGHT  11/10/2021     IR PLEURAL DRAINAGE WITH CATHETER INSERTION  4/19/2021     MIDLINE INSERTION - DOUBLE LUMEN  4/23/2021          LA ESOPHAGOGASTRODUODENOSCOPY TRANSORAL DIAGNOSTIC N/A 8/18/2020    Procedure: ESOPHAGOGASTRODUODENOSCOPY (EGD) with biopsy;  Surgeon: Nilson Gonzales MD;  Location: [facility] GI;  Service: Gastroenterology     US PARACENTESIS  8/14/2020     US PARACENTESIS  8/19/2020     US THORACENTESIS  4/18/2021       ALLERGIES:  Allergies   Allergen Reactions     Nka [No Known Allergies]         MEDICATIONS:  Current Facility-Administered Medications   Medication     acetaminophen (TYLENOL) tablet 975 mg     lidocaine (LMX4) cream     lidocaine 1 % 0.1-1 mL     melatonin tablet 1 mg     senna-docusate (SENOKOT-S/PERICOLACE) 8.6-50 MG per tablet 1 tablet    Or     senna-docusate (SENOKOT-S/PERICOLACE) 8.6-50 MG per tablet 2 tablet     sodium chloride (PF) 0.9% PF flush 3 mL     sodium chloride (PF) 0.9% PF flush 3 mL        LABS:  INR   Date Value  "Ref Range Status   03/30/2022 1.25 (H) 0.90 - 1.15 Final   10/21/2020 1.26 (H) 0.90 - 1.10 Final      Hemoglobin   Date Value Ref Range Status   03/31/2022 6.6 (LL) 13.3 - 17.7 g/dL Final   04/29/2021 8.9 (L) 14.0 - 18.0 g/dL Final   ]  Platelet Count   Date Value Ref Range Status   03/30/2022 71 (L) 150 - 450 10e3/uL Final   08/10/2019 53 (L) 150 - 450 10e9/L Final     Creatinine   Date Value Ref Range Status   03/30/2022 8.04 (HH) 0.70 - 1.30 mg/dL Final   04/29/2021 5.13 (H) 0.70 - 1.30 mg/dL Final     Potassium   Date Value Ref Range Status   03/30/2022 4.2 3.5 - 5.0 mmol/L Final   04/29/2021 4.7 3.5 - 5.0 mmol/L Final         EXAM:  BP (!) 152/90   Pulse 88   Temp 97.4  F (36.3  C) (Oral)   Resp 20   Ht 1.651 m (5' 5\")   Wt 60.4 kg (133 lb 3.2 oz)   SpO2 97%   BMI 22.17 kg/m    General:  Stable.  In no acute distress.    Neuro:  A&O x 3. Moves all extremities equally.  Resp:  Diminished breath sounds throughout right lung fields.  Cardio:  S1S2 and reg, without murmur, clicks or rubs    Pre-Sedation Assessment:  Mallampati Airway Classification:  II - Faucial pillars and soft palate may be seen, but uvula is masked by the base of the tongue  Previous reaction to anesthesia/sedation:  No  Sedation plan based on assessment: Moderate (conscious) sedation as needed  ASA Classification: Class 3 - SEVERE SYSTEMIC DISEASE, DEFINITE FUNCTIONAL LIMITATIONS.   Code Status/Advanced care directive: FULL CODE    ASSESSMENT:  62 yo male with complex PMH as outlined above, admitted with anemia (Hgb 6.2 in ED), with imaging findings concern for right empyema, and new main pulmonary artery and right pulmonary artery dissection in setting of known thoracic aortic dissection.    PLAN:    Case and imaging reviewed with Dr. Owens. Discussed with attending Dr. Healy.     Recommend proceeding with Image guided large bore non tunneled chest tube placement into right fluid collection concerning for recurrent " empyema/hemopneumothorax for drainage and culturing. Patient may require lytics via chest tube, will need to assess OPs quality and amount further.    Image guided RIGHT non tunneled chest tube placement.  Recommendations, its risks/benefits, details and alternatives were discussed with patient, all questions answered and patient verbalized understanding. OK to proceed with above recommended radiology procedure.     Thank you for kindly for this consultation.     Total time spent on the date of the encounter is 45 minutes, including time spent counseling the patient, performing a medically appropriate evaluation, reviewing prior medical history, ordering medications and tests, documenting clinical information in the medical record, and communication of results.    Barb Skinner PA-C  Interventional Radiology  [phone]        E/M codes for reference only:  36943

## 2022-03-31 NOTE — ED PROVIDER NOTES
EMERGENCY DEPARTMENT SIGN OUT NOTE        ED COURSE AND MEDICAL DECISION MAKING  Patient was signed out to me by Dr Glen Duron at 11:16 PM  11:28 PM I spoke to [facility].  12:17 AM I spoke to Dr. Joiner, thoracic surgery.  12:36 AM I spoke to Dr. Maharaj, cardiovascular surgery.  12:52 AM I discussed case with Dr. Smith, hospitalist, who accepts patient for admission.    In brief, Elle Blanton is a 61 year old male who initially presented for evaluation of abnormal labs. The patient was seen earlier today at the clinic where his primary care provider found low platelets. His last blood transfusion was 2 weeks ago. The patient states that he has had a couple of blood transfusions for the past several years. No bloody stools. He does note that he fell and broke his nose, causing him to have nose bleeding for the past 2 days. He is currently not experiencing a bloody nose. No shortness of breath or chest pain. The patient endorses fatigue. The patient has been going to dialysis for a year now. The patient also does note having a history of fluid in his lungs.     At time of sign out, disposition was pending CTA Chest.    CT of the chest does reveal an empyema.  There also is a stable aortic dissection.  In addition to this there is a pulmonary artery dissection which is unclear if it is new or not.  Discussed with thoracic surgery at this time no acute thoracic surgery needed.  Also discussed with cardiac surgery regarding this pulmonary artery dissection.  At this time they recommend conservative treatment.  Patient did get transfused for his hemoglobin here.  Will need dialysis likely in the morning.  Will also need IR consultation for drainage of his empyema.  Vitals otherwise stable here.  Patient transferred to the floor.  Discussed with the hospitalist    FINAL IMPRESSION    1. Anemia, unspecified type    2. Empyema (H)    3. Acquired dissection of pulmonary artery (H)    4. Thoracic aortic dissection  (H)    5. ESRD (end stage renal disease) on dialysis (H)        ED MEDS  Medications   lidocaine 1 % 0.1-1 mL (has no administration in time range)   lidocaine (LMX4) cream (has no administration in time range)   sodium chloride (PF) 0.9% PF flush 3 mL (has no administration in time range)   sodium chloride (PF) 0.9% PF flush 3 mL (has no administration in time range)   melatonin tablet 1 mg (has no administration in time range)   acetaminophen (TYLENOL) tablet 975 mg (has no administration in time range)   senna-docusate (SENOKOT-S/PERICOLACE) 8.6-50 MG per tablet 1 tablet (has no administration in time range)     Or   senna-docusate (SENOKOT-S/PERICOLACE) 8.6-50 MG per tablet 2 tablet (has no administration in time range)   iopamidol (ISOVUE-370) solution 75 mL (75 mLs Intravenous Given 3/30/22 2223)       LAB  Labs Ordered and Resulted from Time of ED Arrival to Time of ED Departure   COMPREHENSIVE METABOLIC PANEL - Abnormal       Result Value    Sodium 138      Potassium 4.2      Chloride 101      Carbon Dioxide (CO2) 20 (*)     Anion Gap 17      Urea Nitrogen 92 (*)     Creatinine 8.04 (*)     Calcium 7.7 (*)     Glucose 112      Alkaline Phosphatase 162 (*)     AST 29      ALT 31      Protein Total 8.1 (*)     Albumin 3.1 (*)     Bilirubin Total 1.0      GFR Estimate 7 (*)    INR - Abnormal    INR 1.25 (*)    CBC WITH PLATELETS AND DIFFERENTIAL - Abnormal    WBC Count 5.5      RBC Count 2.02 (*)     Hemoglobin 6.2 (*)     Hematocrit 20.3 (*)      (*)     MCH 30.7      MCHC 30.5 (*)     RDW 17.5 (*)     Platelet Count 71 (*)     % Neutrophils 81      % Lymphocytes 12      % Monocytes 7      % Eosinophils 0      % Basophils 0      % Immature Granulocytes 0      NRBCs per 100 WBC 0      Absolute Neutrophils 4.4      Absolute Lymphocytes 0.6 (*)     Absolute Monocytes 0.4      Absolute Eosinophils 0.0      Absolute Basophils 0.0      Absolute Immature Granulocytes 0.0      Absolute NRBCs 0.0     COVID-19  VIRUS (CORONAVIRUS) BY PCR - Normal    SARS CoV2 PCR Negative     TYPE AND SCREEN, ADULT    ABO/RH(D) A POS      Antibody Screen Negative      SPECIMEN EXPIRATION DATE [id number]     PREPARE RED BLOOD CELLS (UNIT)    CROSSMATCH Compatible      UNIT ABO/RH A Pos      Unit Number [id number]      Unit Status Issued      Blood Component Type Red Blood Cells      Product Code [id number]      CODING SYSTEM [id number]      UNIT TYPE ISBT 6200      ISSUE DATE AND TIME [id number]     PREPARE RED BLOOD CELLS (UNIT)   TRANSFUSE RED BLOOD CELLS (UNIT)   ABO/RH TYPE AND SCREEN         RADIOLOGY    CTA Chest with Contrast   Final Result   Abnormal   IMPRESSION:   1.  Dissection extending from the anterior aspect of the thoracic aortic arch into the abdominal aorta.      2.  There is aneurysmal dilatation seen of the thoracic aorta most marked at the mid arch region which measures approximately 5.7 cm in greatest radial dimension. No active rupture is identified.      3.  The dissection and size of the thoracic aorta appears stable since prior CT 11/09/2021.      4.  There is a dissection seen involving the main pulmonary artery and the proximal aspect of the right pulmonary artery which is commonly associated with changes of chronic increased pulmonary arterial pressure.      5.  There are findings worrisome for a large empyema in the right hemithorax with associated significant atelectasis.      6.  There are small free appearing bilateral pleural effusions.      7.  There is mild abdominal ascites.      8.  Changes of cirrhosis in the liver with associated portal venous hypertension and splenomegaly.         [Critical Result: Right empyema. Aortic dissection. Pulmonary artery dissection.]      Finding was identified on 3/30/2022 10:59 PM.       Dr. Hartley was contacted by me on 3/30/2022 11:24 PM and verbalized understanding of the critical result.       Chest CT w/o contrast   Final Result   IMPRESSION:    1.  Large  complicated right pleural effusion with scattered hyperdensities suggesting hemorrhage. Atelectasis involving the entire right lower lobe and most of the right upper lobe.   2.  Small left pleural effusion and left basilar atelectasis.   3.  Aneurysmal dilatation involving the aortic arch and descending thoracic aorta, unchanged. There is also a chronic dissection with intimal flap upper abdominal aorta.   4.  Cirrhosis and splenomegaly. Small amount of ascites   .      XR Chest Port 1 View   Final Result   IMPRESSION: Since prior chest radiograph 02/20/2022 there has been increase in size of the right pleural effusion which is now marked. There is some associated mediastinal shift from right to left. Otherwise the chest is stable with again seen    significantly enlarged abdominal aortic arch which is partially calcified. The left lung shows no acute findings.      IR Consultation for IR Exam    (Results Pending)       DISCHARGE MEDS  Current Discharge Medication List            Forest Davila MD   Emergency Medicine  [facility] EMERGENCY DEPARTMENT  [address] [place] [id number]  [phone]     Forest Davila MD  03/31/22 0309

## 2022-03-31 NOTE — PROGRESS NOTES
Pt admitted to P3 for right lung empyema, anemia and pulmonary artery dissection. Admission completed. Pt oriented to room, call light, bed controls and plan of care. Pt was transfused with one unit of blood in ED for a Hgb of 6.2; Recheck with AM Labs. Nephrology and cardiology consults ordered.

## 2022-03-31 NOTE — PHARMACY-ADMISSION MEDICATION HISTORY
Pharmacy Note - Admission Medication History    Pertinent Provider Information: Phoslo and clonidine have not been filled in the last 3 months but pt reports taking them. Also, only taking Coreg and Diltiazem as needed.      ______________________________________________________________________    Prior To Admission (PTA) med list completed and updated in EMR.       PTA Med List   Medication Sig Note Last Dose     acetaminophen (TYLENOL) 500 MG tablet Take 500 mg by mouth every 6 hours as needed for mild pain  Unknown at Unknown time     calcium acetate (PHOSLO) 667 MG CAPS capsule Take 1 capsule by mouth 3 times daily (with meals)  3/30/2022 at AM     carvedilol (COREG) 25 MG tablet Take 25-50 mg by mouth 2 times daily as needed   Unknown at Unknown time     cloNIDine (CATAPRES) 0.1 MG tablet Take 0.1 mg by mouth At Bedtime   3/29/2022 at PM     diltiazem ER (DILT-XR) 180 MG 24 hr capsule Take 1 capsule (180 mg) by mouth 2 times daily (Patient taking differently: Take 180 mg by mouth 2 times daily as needed (Checks BP prior to taking, if too low he does not take this medication) )  Unknown at Unknown time     entecavir (BARACLUDE) 0.5 MG tablet Take 1 tablet (0.5 mg) by mouth every 7 days 3/31/2022: Sundays 3/27/2022     hydrOXYzine (ATARAX) 25 MG tablet Take 1 tablet (25 mg) by mouth 3 times daily as needed for itching  Unknown at Unknown time     oxyCODONE (ROXICODONE) 5 MG tablet Take 1 tablet (5 mg) by mouth daily as needed for severe pain  Unknown at Unknown time     pantoprazole (PROTONIX) 40 MG EC tablet Take 1 tablet (40 mg) by mouth daily  3/30/2022 at AM     senna-docusate (SENOKOT-S/PERICOLACE) 8.6-50 MG tablet Take 1 tablet by mouth daily as needed for constipation  Unknown at Unknown time     zolpidem (AMBIEN) 5 MG tablet Take 1 tablet (5 mg) by mouth nightly as needed for sleep  Unknown at Unknown time       Information source(s): Patient and CareEverywhere/SureScripts  Method of interview  communication: in-person    Summary of Changes to PTA Med List  New: N/A  Discontinued: N/A  Changed: N/A    Patient was asked about OTC/herbal products specifically.  PTA med list reflects this.    In the past week, patient estimated taking medication this percent of the time:  50-90% due to other.    Allergies were reviewed, assessed, and updated with the patient.      Patient does not use any multi-dose medications prior to admission.    The information provided in this note is only as accurate as the sources available at the time of the update(s).    Thank you for the opportunity to participate in the care of this patient.    Mikal Cornell [facility]  3/31/2022 7:57 AM

## 2022-03-31 NOTE — PROGRESS NOTES
Pt transported back to room 332 drowsy but easily arousable.   Report given to receiving RN without questions or concerns.

## 2022-03-31 NOTE — SIGNIFICANT EVENT
Significant Event Note    Time of event: 7:45 AM March 31, 2022    Description of event:  Notified of hgb of 6.6 this AM. Will give another 1 U PRBC, has already received 1 U in ED.    Discussed with: bedside nurse    Eli León MD

## 2022-03-31 NOTE — PROGRESS NOTES
Oxygen sats mid to high 90's on room air while awake but once asleep, sats dropped to 87% so pt placed on 1 liter NC.

## 2022-03-31 NOTE — PROGRESS NOTES
[facility]    Progress Note - Hospitalist Service       Date of Admission:  3/30/2022    Assessment & Plan        lEle Blanton is a 61 year old male with past medical history significant for thoracic aortic dissection, hepatitis B, hepatorenal syndrome with ESRD, pleural effusion and normocytic anemia who was admitted on 3/30/2022 after being fond to have a hgb of 6.2 at clinic.  Also admitted to the hospital in the hopes of getting a chest tube to help with recurrent empyema.        Acute macrocytic anemia on chronic normocytic anemia of chronic disease  History of GI bleed  -Patient was seen in clinic on 3/30 and found to have a hgb of 6.2 after being rechecked due to chronic anemia  -Was given 1 unit of packed RBCs in the ED  -He has required outpatient transfusions the most recent being 2 weeks ago  -Denies bloody bowel movements or dark stools  -Admits to falling and hitting his nose with 4 days of bleeding several days ago, no longer having a bloody nose  -Will monitor and transfuse for hemoglobin less than 7  -He is on high-dose EPO and takes iron supplements outpatient  -Being worked up outpatient for hemolytic anemia due to undetectable haptoglobin, LDH elevation, and low reticulocyte count  -Primary care physician is placing a referral to heme onc to help address this     Empyema  History of recurrent pleural effusions  -CT chest 3/30:1.  Large complicated right pleural effusion with scattered hyperdensities suggesting hemorrhage. Atelectasis involving the entire right lower lobe and most of the right upper lobe. 2.  Small left pleural effusion and left basilar atelectasis. 3.  Aneurysmal dilatation involving the aortic arch and descending thoracic aorta, unchanged. There is also a chronic dissection with intimal flap upper abdominal aorta.4.  Cirrhosis and splenomegaly. Small amount of ascites  -Patient has had pleural effusions in the past that required chest tubes and multiple  thoracenteses   -Patient last underwent thoracentesis on 3/23 and was unable to get much fluid off of the lung  -It has been recommended the patient undergo surgery for this, on 1/6/22 the patient followed up with Dr. 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  Dissection extending from the anterior aspect of the thoracic aortic arch into the abdominal aorta.2.  There is aneurysmal dilatation seen of the thoracic aorta most marked at the mid arch region which measures approximately 5.7 cm in greatest radial dimension. No active rupture is identified.3.  The dissection and size of the thoracic aorta appears stable since prior CT 11/09/2021.4.  There is a dissection seen involving the main pulmonary artery and the proximal aspect of the right pulmonary artery which is commonly associated with changes of chronic increased pulmonary arterial pressure.5.  There are findings worrisome for a large empyema in the right hemithorax with associated significant atelectasis6.  There are small free appearing bilateral pleural effusions.7.  There is mild abdominal ascites.8.  Changes of cirrhosis in the liver with associated portal venous hypertension and splenomegaly.  -Patient is aware and has been worked up for the thoracic aortic dissection and does not want surgery on this, the pulmonary artery and right pulmonary artery dissections are new  -Cardiovascular surgery has been consulted regarding the new pulmonary artery dissections     ESRD on hemodialysis (MF) secondary to hepatorenal syndrome due to cirrhosis  -Nephrology consulted, will be doing  dialysis tomorrow as he had contrast imaging   -Outpatient he has only been getting dialysis twice a week but it has been recommended by his primary to increase this to 3 times weekly for better symptom management and possible improvement of the empyema      LUE edema from the hand to the elbow  -No significant warmth associated with this  -Not significantly tender on exam  -Will obtain US of the LUE to ensure there is no significant DVT at this time     Liver cirrhosis secondary to chronic hepatitis B  -Stable, LFTs unremarkable except for alk phos elevated likely due to renal disease     Goals of Care  Given the patient's multiple comorbidities it was felt appropriate to start the discussion regarding goals of care.  It was discussed with the patient that he was not a good candidate for chest compressions or intubation.    -Will discuss further in the outpatient setting       Diet: NPO for Medical/Clinical Reasons Except for: Meds, Ice Chips  Room Service    DVT Prophylaxis: Pneumatic Compression Devices  Beltre Catheter: Not present  Fluids: N/A  Central Lines: None  Cardiac Monitoring: ACTIVE order. Indication: Pulmonary artery dissection, empyema  Code Status: Full Code      Disposition Plan   Expected Discharge: 04/03/2022     Anticipated discharge location:  Awaiting care coordination huddle     The patient's care was discussed with the Attending Physician, Dr. Jeter.    Redd Healy MD  Hospitalist Service  [facility]  Securely message with the Vocera Web Console (learn more here)  Text page via numberFire Paging/Directory     Clinically Significant Risk Factors Present on Admission          # Hypocalcemia: corrected calcium <8.5 on admission, will replace as needed   # Anion Gap Metabolic Acidosis: AG = 17 mmol/L (Ref range: 5 - 18 mmol/L) on admission, will monitor and treat as appropriate  # Hypoalbuminemia: Albumin = 3.1 g/dL (Ref range: 3.5 - 5.0 g/dL) on admission, will  monitor as appropriate   # Coagulation Defect: INR = 1.25 (Ref range: 0.90 - 1.15) and/or PTT = N/A on admission, will monitor for bleeding  # Thrombocytopenia: Plts = 71 10e3/uL (Ref range: 150 - 450 10e3/uL) on admission, will monitor for bleeding       ______________________________________________________________________    Interval History   Patient ready for chest tube this morning.  Discussed his multiple complicated pathologies at this time.  Patient is demoralized that he was told the surgery he was not a candidate for surgery and that his recent thoracentesis did not appear to help with his lungs.  Does state that is breathing is close to his baseline.      Data reviewed today: I reviewed all medications, new labs and imaging results over the last 24 hours.     Physical Exam   Vital Signs: Temp: 97.4  F (36.3  C) Temp src: Oral BP: (!) 170/87 Pulse: 88   Resp: 20 SpO2: 97 % O2 Device: Nasal cannula Oxygen Delivery: 1 LPM  Weight: 133 lbs 3.2 oz  General Appearance: Sitting up in bed getting blood transfusion, unwell appearing  Respiratory: Decreased lung sounds on the right, no wheezing, rales or rhonchi appreciated  Cardiovascular: RRR, no murmurs appreciated  MUSC: Patient has significant edema noted from the left elbow throughout the forearm, non tender to palpation, but he is resting it on a pillow for comfort, no edema appreciated in the LUE or bilateral lower extremities  GI: Abdomen non-tender to palpation, active bowel sounds  Skin: Jaundiced skin with bruises on arms and legs of various stages of healing    Data   Recent Labs   Lab 03/31/22  0710 03/30/22  1557 03/30/22  0959   WBC  --  5.5  --    HGB 6.6* 6.2* 6.2*   MCV  --  101*  --    PLT  --  71*  --    INR  --  1.25*  --    NA  --  138  --    POTASSIUM  --  4.2  --    CHLORIDE  --  101  --    CO2  --  20*  --    BUN  --  92*  --    CR  --  8.04*  --    ANIONGAP  --  17  --    TANO  --  7.7*  --    GLC  --  112  --    ALBUMIN  --  3.1*  --     PROTTOTAL  --  8.1*  --    BILITOTAL  --  1.0  --    ALKPHOS  --  162*  --    ALT  --  31  --    AST  --  29  --      Recent Results (from the past 24 hour(s))   XR Chest Port 1 View    Narrative    EXAM: XR CHEST PORT 1 VIEW  LOCATION: [facility]  DATE/TIME: 3/30/2022 4:35 PM    INDICATION: Diminished breath sounds on right  COMPARISON: 02/20/2022      Impression    IMPRESSION: Since prior chest radiograph 02/20/2022 there has been increase in size of the right pleural effusion which is now marked. There is some associated mediastinal shift from right to left. Otherwise the chest is stable with again seen   significantly enlarged abdominal aortic arch which is partially calcified. The left lung shows no acute findings.   Chest CT w/o contrast    Narrative    EXAM: CT CHEST W/O CONTRAST  LOCATION: [facility]  DATE/TIME: 3/30/2022 6:07 PM    INDICATION: Abnormal x-ray. Recurrent right pleural effusions.  COMPARISON: Chest x-ray 03/30/2022 and CT chest exam 11/14/2021  TECHNIQUE: CT chest without IV contrast. Multiplanar reformats were obtained. Dose reduction techniques were used.  CONTRAST: None.    FINDINGS:   LUNGS AND PLEURA: Large loculated appearing right pleural effusion, with scattered hyperdensities within the collection compatible with acute or subacute hemorrhage. Numerous small gas pockets within the collection likely related to recent thoracentesis.   Atelectasis involving the entire right lower lobe and most of the right upper lobe. Small left pleural effusion and minimal left basilar atelectasis.    MEDIASTINUM/AXILLAE: No aneurysmal dilatation involving the aortic arch measuring 6 cm in width. The descending thoracic aorta is also dilated measuring 5.6 x 5.4 cm. Calcified intimal flap suggesting chronic dissection just below the diaphragm as seen   previously. Plaque in the proximal right renal artery. No lymphadenopathy.     CORONARY ARTERY  CALCIFICATION: Severe.    UPPER ABDOMEN: Cirrhotic changes of the liver. 4.3 x 3.7 cm right hepatic lobe mass and additional smaller adjacent hypodense lesions are unchanged. Small amount of adjacent ascites. Gallstones. Atherosclerotic disease abdominal aorta. Splenomegaly.    MUSCULOSKELETAL: Unremarkable.      Impression    IMPRESSION:   1.  Large complicated right pleural effusion with scattered hyperdensities suggesting hemorrhage. Atelectasis involving the entire right lower lobe and most of the right upper lobe.  2.  Small left pleural effusion and left basilar atelectasis.  3.  Aneurysmal dilatation involving the aortic arch and descending thoracic aorta, unchanged. There is also a chronic dissection with intimal flap upper abdominal aorta.  4.  Cirrhosis and splenomegaly. Small amount of ascites  .   CTA Chest with Contrast   Result Value    Radiologist flags (AA)     Right empyema. Aortic dissection. Pulmonary artery dissection.    Narrative    EXAM: CTA CHEST WITH CONTRAST  LOCATION: [facility]  DATE/TIME: 3/30/2022 10:18 PM    INDICATION: Chest pain, nonspecific. Evaluate for aortic dissection.  COMPARISON: CT of the chest 11/09/2021 performed without IV contrast.    TECHNIQUE: Multiphase helical acquisition through the chest was performed including noncontrast, arterial phase, and delayed images before and after the administration of IV contrast. 2D and 3D reconstructions were performed by the CT technologist. Dose   reduction techniques were used.   CONTRAST: 75 mL Isovue 370.    FINDINGS:  CT ANGIOGRAM CHEST: CTA examination demonstrates a dissection with origin involving the anterior aspect of the thoracic aortic arch at the takeoff of the left subclavian artery. This extends into the upper abdominal aorta. The false lumen supplies the   left renal artery. The remainder of the major vessels are supplied by the true lumen. Greatest radial dimension of the thoracic aorta is at  the posterior arch and this measures 5.7 cm in greatest radial dimension. The prior correlative CT 11/09/2021   shows no significant contrast within the vessels, however the calcification can be seen involving the aorta and the dissection is not changed significantly. There is no evidence for extravasation of contrast to suggest a rupture. The size of the thoracic   aorta is not changed significantly since 11/09/2021 CT examination. There is a dissection seen involving the left aspect of the main pulmonary artery which extends into the proximal aspect of the right pulmonary artery. This is typically associated   changes of chronic pulmonary arterial hypertension. I am not sure if this was present on the prior study given lack of IV contrast.    CHEST:  LUNGS AND PLEURA: There is a large loculated right pleural effusion which contains some heterogenous density and gas worrisome for empyema. There is a mild free component to the right pleural effusion seen inferiorly. There is advanced atelectatic   changes seen of the right lung. There is a minimal free pleural effusion seen on the left.    MEDIASTINUM: No adenopathy.    CORONARY ARTERY CALCIFICATION: Moderate most pronounced in the left anterior descending.    UPPER ABDOMEN: There is a mild amount of ascites. There are changes of cholelithiasis with no evidence for cholecystitis. There are findings most typical for hemangioma and adjacent benign cysts seen in the superior aspect of the right lobe of liver.   There is cirrhotic configuration seen of the liver. The spleen is not entirely included on this study, but appears enlarged and there are some varicosities consistent with portal venous hypertension. The portal vein is patent.     MUSCULOSKELETAL: Mild scattered degenerative changes most marked in the spine.      Impression    IMPRESSION:  1.  Dissection extending from the anterior aspect of the thoracic aortic arch into the abdominal aorta.    2.  There is  aneurysmal dilatation seen of the thoracic aorta most marked at the mid arch region which measures approximately 5.7 cm in greatest radial dimension. No active rupture is identified.    3.  The dissection and size of the thoracic aorta appears stable since prior CT 11/09/2021.    4.  There is a dissection seen involving the main pulmonary artery and the proximal aspect of the right pulmonary artery which is commonly associated with changes of chronic increased pulmonary arterial pressure.    5.  There are findings worrisome for a large empyema in the right hemithorax with associated significant atelectasis.    6.  There are small free appearing bilateral pleural effusions.    7.  There is mild abdominal ascites.    8.  Changes of cirrhosis in the liver with associated portal venous hypertension and splenomegaly.      [Critical Result: Right empyema. Aortic dissection. Pulmonary artery dissection.]    Finding was identified on 3/30/2022 10:59 PM.     Dr. Hartley was contacted by me on 3/30/2022 11:24 PM and verbalized understanding of the critical result.

## 2022-03-31 NOTE — CONSULTS
RENAL CONSULT NOTE    REQUESTING PHYSICIAN: Marilee Smith    REASON FOR CONSULT: ESRD management    ASSESSMENT/PLAN:    ESRD on HD: He still making urine and stated normal amount. He is getting only Monday and Friday dialysis at [facility]. TW is 60.5kg and last run was on 3/28/2022. He received one unit of PRBC yesterday and currently receiving another unit of PRBC. Also waiting for IR procedure for chest tube placement.   His volume status is euvolemic (no LE edema and not on oxygen). Denies sob. No lab for today and ordered stat. No indication for urgent HD today, we will plan for HD tomorrow.   His primary nephrologist is Dr. Nate Rivero. Dialyze for 3 hr.   - HD plan for tomorrow perschedule  - renal diet  - renally dosing medication.   - strict Is and Os as he is still making urine.     Possible contrast associated kidney injury: Contrast can cause kidney injury from pre renal from renal artery vasoconstriction as soon as it is injected it and not possible to prevent kidney injury from that mechanism even though   - no additional HD indicated  - plan for HD tomorrow per schedule.     BP/ Volume: BP above goal. Volume is euvolemic. PTA clonidine 0.1 mg at bedtime, diltiazem  mg bid.   - Continue PTA meds    Electrolytes: Na 139 and K 4.5 today  - monitor  - no indication of urgent HD today  - HD tomorrow    Anemia: likely combined with ESRD and ABL.   Received PRBC here.   - Transfuse per protocol  - Will continue CAROLYNN per protocol.     Acid-Base: Bicarb 20  Today (same as yesterday)  - no indication for HD    Access: JOHNNY AVG (+).   Thrill (+) on exam.     BMD: PTA phoslo 667 mg tid with meal. Ca 7.8 with albmin 3.1 and corrected Ca ~ 8.6. No phos ordered.   - Continue phoslo TID with meals.     Thank you for consult.   Please call with question or concern.   We will follow along with you.       HPI: 61 M with PMHx of ESRD on M-F dialysis, history of AAA, chronic  thoracic dissection, cirrhosis secondary to hepatis B, history of HRS, recurrent pleural effusion required thoracentesis and chest tube in the plast chronic anemia sent by primary for transfusion and work up showed finding concerning for empyema and admitted. Nephrology consult for management of ESRD.   He was receiving PRBC upon encounter. He denies any CP, palpitation. Stated he is still making normal amount of urine.        REVIEW OF SYSTEMS: a 12 point review of systems was reviewed and negative other than noted in the HPI above.      Past Medical History:   Diagnosis Date     NAHUM (acute kidney injury) (H)      GI (gastrointestinal bleed)      Gout      H. pylori infection 7/5/2017    UGI bleed implied by Hgb 9.0 6/22/17 and melena. Admitted and hgb decreased to 7.6, CT abdomen showed all bladder wall thickening. + Hep B surface antigen noted. Melena resolved and hgb stabalized without transfusion, epigastric pain resolved with PPI. Recommend referral for gastroscopy.     Heart attack (H)      Hepatitis B      Normocytic anemia      PONV (postoperative nausea and vomiting)      Thrombocytopenia (H)        No current facility-administered medications on file prior to encounter.  acetaminophen (TYLENOL) 500 MG tablet, Take 500 mg by mouth every 6 hours as needed for mild pain  calcium acetate (PHOSLO) 667 MG CAPS capsule, Take 1 capsule by mouth 3 times daily (with meals)  carvedilol (COREG) 25 MG tablet, Take 25-50 mg by mouth 2 times daily as needed   cloNIDine (CATAPRES) 0.1 MG tablet, Take 0.1 mg by mouth At Bedtime   diltiazem ER (DILT-XR) 180 MG 24 hr capsule, Take 1 capsule (180 mg) by mouth 2 times daily (Patient taking differently: Take 180 mg by mouth 2 times daily as needed (Checks BP prior to taking, if too low he does not take this medication) )  entecavir (BARACLUDE) 0.5 MG tablet, Take 1 tablet (0.5 mg) by mouth every 7 days  hydrOXYzine (ATARAX) 25 MG tablet, Take 1 tablet (25 mg) by mouth 3 times  daily as needed for itching  oxyCODONE (ROXICODONE) 5 MG tablet, Take 1 tablet (5 mg) by mouth daily as needed for severe pain  pantoprazole (PROTONIX) 40 MG EC tablet, Take 1 tablet (40 mg) by mouth daily  senna-docusate (SENOKOT-S/PERICOLACE) 8.6-50 MG tablet, Take 1 tablet by mouth daily as needed for constipation  zolpidem (AMBIEN) 5 MG tablet, Take 1 tablet (5 mg) by mouth nightly as needed for sleep  [employer] SALINE NASAL SPRAY 0.65 % nasal spray, Spray 1 spray in nostril daily as needed (Patient not taking: Reported on 3/30/2022)  order for DME, Equipment being ordered: Other: blood pressure monitor  Treatment Diagnosis: hypertension        No current outpatient medications on file.      ALLERGIES/SENSITIVITIES:  Allergies   Allergen Reactions     Nka [No Known Allergies]      Social History     Tobacco Use     Smoking status: Never Smoker     Smokeless tobacco: Never Used   Vaping Use     Vaping Use: Never used   Substance Use Topics     Alcohol use: No     Drug use: No     I have reviewed this patient's family history and updated it with pertinent information if needed.  Family History   Problem Relation Age of Onset     Diabetes Father      Hypertension No family hx of      Asthma No family hx of      Cancer No family hx of      Coronary Artery Disease No family hx of      Liver Cancer No family hx of      Ulcers Father          PHYSICAL EXAM:  Physical Exam   Temp: 97.8  F (36.6  C) Temp src: Oral BP: (!) 145/88 Pulse: 78   Resp: (!) 32 SpO2: 99 % O2 Device: Nasal cannula Oxygen Delivery: 1 LPM  Vitals:    03/30/22 1549 03/31/22 0303   Weight: 60.8 kg (134 lb) 60.4 kg (133 lb 3.2 oz)     Vital Signs with Ranges  Temp:  [97.4  F (36.3  C)-99.2  F (37.3  C)] 97.8  F (36.6  C)  Pulse:  [76-92] 78  Resp:  [10-37] 32  BP: (128-170)/() 145/88  SpO2:  [87 %-100 %] 99 %  I/O last 3 completed shifts:  In: 760 [P.O.:60]  Out: 250 [Urine:100; Chest Tube:150]    @TMAXR(24)@    Patient Vitals for the past 72 hrs:    Weight   03/31/22 0303 60.4 kg (133 lb 3.2 oz)   03/30/22 1549 60.8 kg (134 lb)       General - A & O x 3, NAD  HEENT - PERRLA, no scleral icterus  Neck - no carotid bruits, no JVD  Respiratory - Lungs CTA bilat without wheeze, rhonchi, rales  Cardiovascular - AP RRR without murmur  Abdomen - soft, BS present, no bruits, no fluid wave  Extremities - well perfused, no edema  Integumentary - intact, good turgor, no rash/lesions  Neurologic - grossly intact  Psych:  Judgement intact, affect WNL  :  No masterson's catheter     Laboratory:     Recent Labs   Lab 03/31/22  1322 03/31/22  0710 03/30/22  1557 03/30/22  0959   WBC 5.1  --  5.5  --    RBC 2.57*  --  2.02*  --    HGB 7.6* 6.6* 6.2* 6.2*   HCT 23.5*  --  20.3*  --    PLT 65*  --  71*  --        Basic Metabolic Panel:  Recent Labs   Lab 03/31/22  1322 03/30/22  1557    138   POTASSIUM 4.5 4.2   CHLORIDE 103 101   CO2 20* 20*   BUN 95* 92*   CR 8.78* 8.04*   GLC 80 112   TANO 7.8* 7.7*       INR  Recent Labs   Lab 03/30/22  1557   INR 1.25*       Recent Labs   Lab Test 03/30/22  1557 03/16/22  1853   POTASSIUM 4.2 4.7   CHLORIDE 101 104   BUN 92* 120*      Recent Labs   Lab Test 03/30/22  1557 01/08/22  2255 12/30/20  0830 10/21/20  1032 08/20/20  0728 08/19/20  1701 08/07/19  0344 08/06/19  2349   ALBUMIN 3.1* 2.7*   < > 3.0*   < >  --    < >  --    BILITOTAL 1.0 0.6   < > 0.6   < >  --    < >  --    BILIDIRECT  --   --   --  0.2  --   --   --   --    ALT 31 <9   < >  --    < >  --    < >  --    AST 29 40   < >  --    < >  --    < >  --    PROTEIN  --   --   --   --   --  Negative  --  100*    < > = values in this interval not displayed.       Personally reviewed today's laboratory studies      Thank you for involving us in the care of this patient. We will continue to follow along with you.      Merrill Lares MD  [facility]  [phone]

## 2022-03-31 NOTE — H&P
[facility]    History and Physical - Hospitalist Service       Date of Admission:  3/30/2022    Assessment & Plan      Elle Blanton is a 61 year old male with past medical history significant for thoracic aortic dissection, hepatitis B, hepatorenal syndrome with ESRD, pleural effusion and normocytic anemia who was admitted on 3/30/2022. He has been having fatigue and was found to have a hemoglobin of 6.2 requiring transfusion of 1 unit packed RBCs given in the ED.    Acute macrocytic anemia on chronic normocytic anemia of chronic disease  History of GI bleed  -Was given 1 unit of packed RBCs in the ED  -He has required outpatient transfusions the most recent being 2 weeks ago  -Denies bloody bowel movements or dark stools  -Admits to falling and hitting his nose with 4 days of bleeding, no longer having a bloody nose  -Will monitor and transfuse for hemoglobin less than 7  -He is on high-dose EPO and takes iron supplements outpatient  -Being worked up outpatient for hemolytic anemia due to undetectable haptoglobin, LDH elevation, and low reticulocyte count despite able    Empyema  History of recurrent pleural effusions  -CT chest 3/30:1.  Large complicated right pleural effusion with scattered hyperdensities suggesting hemorrhage. Atelectasis involving the entire right lower lobe and most of the right upper lobe. 2.  Small left pleural effusion and left basilar atelectasis. 3.  Aneurysmal dilatation involving the aortic arch and descending thoracic aorta, unchanged. There is also a chronic dissection with intimal flap upper abdominal aorta.4.  Cirrhosis and splenomegaly. 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  Dissection extending from the anterior aspect of the thoracic aortic arch into the abdominal aorta.2.  There  is aneurysmal dilatation seen of the thoracic aorta most marked at the mid arch region which measures approximately 5.7 cm in greatest radial dimension. No active rupture is identified.3.  The dissection and size of the thoracic aorta appears stable since prior CT 11/09/2021.4.  There is a dissection seen involving the main pulmonary artery and the proximal aspect of the right pulmonary artery which is commonly associated with changes of chronic increased pulmonary arterial pressure.5.  There are findings worrisome for a large empyema in the right hemithorax with associated significant atelectasis6.  There are small free appearing bilateral pleural effusions.7.  There is mild abdominal ascites.8.  Changes of cirrhosis in the liver with associated portal venous hypertension and splenomegaly.  -Patient is aware and has been worked up for the thoracic aortic dissection and does not want surgery on this, the pulmonary artery and right pulmonary artery dissections are new  -Cardiovascular surgery has been consulted regarding the new pulmonary artery dissections    ESRD on hemodialysis (MF) secondary to hepatorenal syndrome due to cirrhosis  -Nephrology consulted, will be doing dialysis tomorrow as he had contrast imaging   -Outpatient he has only been getting dialysis twice a week but it has been recommended by his primary to increase this to 3 times weekly for better symptom management    Liver cirrhosis secondary to chronic hepatitis B  -Stable, LFTs unremarkable except for alk phos elevated likely due to renal disease    History of MI     Diet: NPO for Medical/Clinical Reasons Except for: Meds, Ice Chips  Room Service    DVT Prophylaxis: Pneumatic Compression Devices  Beltre Catheter: Not present  Central Lines: None  Cardiac Monitoring: ACTIVE order. Indication: Pulmonary artery dissection, empyema  Code Status: Full Code    Clinically Significant Risk Factors Present on Admission          # Hypocalcemia: corrected  calcium <8.5 on admission, will replace as needed   # Anion Gap Metabolic Acidosis: AG = 17 mmol/L (Ref range: 5 - 18 mmol/L) on admission, will monitor and treat as appropriate  # Hypoalbuminemia: Albumin = 3.1 g/dL (Ref range: 3.5 - 5.0 g/dL) on admission, will monitor as appropriate   # Coagulation Defect: INR = 1.25 (Ref range: 0.90 - 1.15) and/or PTT = N/A on admission, will monitor for bleeding  # Thrombocytopenia: Plts = 71 10e3/uL (Ref range: 150 - 450 10e3/uL) on admission, will monitor for bleeding       Disposition Plan   Expected Discharge: 04/03/2022  Anticipated discharge location:  Awaiting care coordination huddle  Delays:            The patient's care was discussed with the Patient and Patient's Family.    Marilee Smith MD  Hospitalist Service  [facility]  Securely message with the Vocera Web Console (learn more here)  Text page via [employer] Paging/Directory         ______________________________________________________________________    Chief Complaint   Shortness of breath    History is obtained from the patient    History of Present Illness   Elle Blanton is a 61 year old male who has a very complex medical history with recurrent pleural effusions.  He has had increasing shortness of breath over the past few days and had missed a day of dialysis.  He came in due to the shortness of breath and was found to have a worsening pleural effusion that looks like an empyema.  He was also found to have new pulmonary artery and right pulmonary artery dissection.  He has a known chronic thoracic aortic dissection that was also seen.  He has ESRD secondary to hepatorenal syndrome on hemodialysis only twice a week but will be changing to 3 times a week.  With the contrast he received due to the imaging in the ED he will have dialysis with nephrology today.    Review of Systems    The 10 point Review of Systems is negative other than noted in the HPI or here.     Past Medical History     I have reviewed this patient's medical history and updated it with pertinent information if needed.   Past Medical History:   Diagnosis Date     NAHUM (acute kidney injury) (H)      GI (gastrointestinal bleed)      Gout      H. pylori infection 7/5/2017    UGI bleed implied by Hgb 9.0 6/22/17 and melena. Admitted and hgb decreased to 7.6, CT abdomen showed all bladder wall thickening. + Hep B surface antigen noted. Melena resolved and hgb stabalized without transfusion, epigastric pain resolved with PPI. Recommend referral for gastroscopy.     Heart attack (H)      Hepatitis B      Normocytic anemia      PONV (postoperative nausea and vomiting)      Thrombocytopenia (H)        Past Surgical History   I have reviewed this patient's surgical history and updated it with pertinent information if needed.  Past Surgical History:   Procedure Laterality Date     ABCESS DRAINAGE      finger     BURSECTOMY ELBOW Left 10/15/2021    Procedure: LEFT OLECRANON BURSECTOMY;  Surgeon: Tico Myers MD;  Location: [facility] OR     BURSECTOMY ELBOW Left 1/18/2022    Procedure: LEFT ELBOW OLECRANON BURSECTOMY;  Surgeon: Tico Myers MD;  Location: [facility] OR     CREATE FISTULA ARTERIOVENOUS UPPER EXTREMITY Left 4/20/2021    Procedure: left arm dialysis graft placement;  Surgeon: Roxana Cosby MD;  Location: [facility] OR;  Service: General     FOREIGN BODY REMOVAL      finger     INCISION AND DRAINAGE FINGER, COMBINED Left 10/20/2016    Procedure: COMBINED INCISION AND DRAINAGE FINGER;  Surgeon: Mireya Pizano MD;  Location: [place] OR     INCISION AND DRAINAGE UPPER EXTREMITY, COMBINED Left 1/18/2022    Procedure: AND ELBOW INCISION AND DRAINAGE;  Surgeon: Tico Myers MD;  Location: [facility] OR     IR CHEST TUBE PLACEMENT NON-TUNNELED RIGHT  4/19/2021     IR CHEST TUBE PLACEMENT NON-TUNNELED RIGHT  10/19/2021     IR CHEST TUBE PLACEMENT NON-TUNNELED RIGHT  11/10/2021     IR PLEURAL  DRAINAGE WITH CATHETER INSERTION  4/19/2021     MIDLINE INSERTION - DOUBLE LUMEN  4/23/2021          DC ESOPHAGOGASTRODUODENOSCOPY TRANSORAL DIAGNOSTIC N/A 8/18/2020    Procedure: ESOPHAGOGASTRODUODENOSCOPY (EGD) with biopsy;  Surgeon: Nilson Gonzales MD;  Location: [facility];  Service: Gastroenterology      PARACENTESIS  8/14/2020      PARACENTESIS  8/19/2020      THORACENTESIS  4/18/2021       Social History   I have reviewed this patient's social history and updated it with pertinent information if needed.  Social History     Tobacco Use     Smoking status: Never Smoker     Smokeless tobacco: Never Used   Vaping Use     Vaping Use: Never used   Substance Use Topics     Alcohol use: No     Drug use: No       Family History   I have reviewed this patient's family history and updated it with pertinent information if needed.  Family History   Problem Relation Age of Onset     Diabetes Father      Hypertension No family hx of      Asthma No family hx of      Cancer No family hx of      Coronary Artery Disease No family hx of      Liver Cancer No family hx of      Ulcers Father        Prior to Admission Medications   Prior to Admission Medications   Prescriptions Last Dose Informant Patient Reported? Taking?   [employer] SALINE NASAL SPRAY 0.65 % nasal spray   Yes No   Sig: Spray 1 spray in nostril daily as needed   Patient not taking: Reported on 3/30/2022   acetaminophen (TYLENOL) 500 MG tablet   Yes No   Sig: Take 500 mg by mouth every 6 hours as needed for mild pain   calcium acetate (PHOSLO) 667 MG CAPS capsule   Yes No   Sig: Take 1 capsule by mouth 3 times daily (with meals)   carvedilol (COREG) 25 MG tablet   Yes No   Sig: Take 25-50 mg by mouth 2 times daily as needed    cloNIDine (CATAPRES) 0.1 MG tablet   Yes No   Sig: Take 0.1 mg by mouth At Bedtime    diltiazem ER (DILT-XR) 180 MG 24 hr capsule   No No   Sig: Take 1 capsule (180 mg) by mouth 2 times daily   Patient taking differently: Take 180 mg by  mouth 2 times daily as needed (Checks BP prior to taking, if too low he does not take this medication)    entecavir (BARACLUDE) 0.5 MG tablet   No No   Sig: Take 1 tablet (0.5 mg) by mouth every 7 days   hydrOXYzine (ATARAX) 25 MG tablet   No No   Sig: Take 1 tablet (25 mg) by mouth 3 times daily as needed for itching   order for DME   No No   Sig: Equipment being ordered: Other: blood pressure monitor  Treatment Diagnosis: hypertension   oxyCODONE (ROXICODONE) 5 MG tablet   No No   Sig: Take 1 tablet (5 mg) by mouth daily as needed for severe pain   pantoprazole (PROTONIX) 40 MG EC tablet   No No   Sig: Take 1 tablet (40 mg) by mouth daily   senna-docusate (SENOKOT-S/PERICOLACE) 8.6-50 MG tablet   No No   Sig: Take 1 tablet by mouth daily as needed for constipation   zolpidem (AMBIEN) 5 MG tablet   No No   Sig: Take 1 tablet (5 mg) by mouth nightly as needed for sleep      Facility-Administered Medications: None     Allergies   Allergies   Allergen Reactions     Nka [No Known Allergies]        Physical Exam   Vital Signs: Temp: 98.5  F (36.9  C) Temp src: Oral BP: (!) 157/92 Pulse: 84   Resp: 20 SpO2: 98 % O2 Device: None (Room air)    Weight: 133 lbs 3.2 oz    Constitutional: awake, alert, cooperative, no apparent distress, and appears stated age  Eyes: Lids and lashes normal, pupils equal, round, extra ocular muscles intact, sclera slightly yellow  Respiratory: No increased work of breathing, good air exchange, clear to auscultation bilaterally, no crackles or wheezing  Cardiovascular:regular rate and rhythm and no murmur noted  GI: normal bowel sounds, soft, non-distended, non-tender  Skin: normal skin color, texture, turgor, no rashes and no jaundice  Musculoskeletal: no lower extremity pitting edema present  tone is normal, no weakness noted  Neurologic: Awake, alert, oriented to name, place and time.  Cranial nerves II-XII are grossly intact.  No gross focal abnormalities   Neuropsychiatric: Appropriate mood  and affect      Data   Data reviewed today: I reviewed all medications, new labs and imaging results over the last 24 hours. I personally reviewed no images or EKG's today.    Recent Labs   Lab 03/30/22  1557 03/30/22  0959   WBC 5.5  --    HGB 6.2* 6.2*   *  --    PLT 71*  --    INR 1.25*  --      --    POTASSIUM 4.2  --    CHLORIDE 101  --    CO2 20*  --    BUN 92*  --    CR 8.04*  --    ANIONGAP 17  --    TANO 7.7*  --      --    ALBUMIN 3.1*  --    PROTTOTAL 8.1*  --    BILITOTAL 1.0  --    ALKPHOS 162*  --    ALT 31  --    AST 29  --      Recent Results (from the past 24 hour(s))   XR Chest Port 1 View    Narrative    EXAM: XR CHEST PORT 1 VIEW  LOCATION: [facility]  DATE/TIME: 3/30/2022 4:35 PM    INDICATION: Diminished breath sounds on right  COMPARISON: 02/20/2022      Impression    IMPRESSION: Since prior chest radiograph 02/20/2022 there has been increase in size of the right pleural effusion which is now marked. There is some associated mediastinal shift from right to left. Otherwise the chest is stable with again seen   significantly enlarged abdominal aortic arch which is partially calcified. The left lung shows no acute findings.   Chest CT w/o contrast    Narrative    EXAM: CT CHEST W/O CONTRAST  LOCATION: [facility]  DATE/TIME: 3/30/2022 6:07 PM    INDICATION: Abnormal x-ray. Recurrent right pleural effusions.  COMPARISON: Chest x-ray 03/30/2022 and CT chest exam 11/14/2021  TECHNIQUE: CT chest without IV contrast. Multiplanar reformats were obtained. Dose reduction techniques were used.  CONTRAST: None.    FINDINGS:   LUNGS AND PLEURA: Large loculated appearing right pleural effusion, with scattered hyperdensities within the collection compatible with acute or subacute hemorrhage. Numerous small gas pockets within the collection likely related to recent thoracentesis.   Atelectasis involving the entire right lower lobe and most of  the right upper lobe. Small left pleural effusion and minimal left basilar atelectasis.    MEDIASTINUM/AXILLAE: No aneurysmal dilatation involving the aortic arch measuring 6 cm in width. The descending thoracic aorta is also dilated measuring 5.6 x 5.4 cm. Calcified intimal flap suggesting chronic dissection just below the diaphragm as seen   previously. Plaque in the proximal right renal artery. No lymphadenopathy.     CORONARY ARTERY CALCIFICATION: Severe.    UPPER ABDOMEN: Cirrhotic changes of the liver. 4.3 x 3.7 cm right hepatic lobe mass and additional smaller adjacent hypodense lesions are unchanged. Small amount of adjacent ascites. Gallstones. Atherosclerotic disease abdominal aorta. Splenomegaly.    MUSCULOSKELETAL: Unremarkable.      Impression    IMPRESSION:   1.  Large complicated right pleural effusion with scattered hyperdensities suggesting hemorrhage. Atelectasis involving the entire right lower lobe and most of the right upper lobe.  2.  Small left pleural effusion and left basilar atelectasis.  3.  Aneurysmal dilatation involving the aortic arch and descending thoracic aorta, unchanged. There is also a chronic dissection with intimal flap upper abdominal aorta.  4.  Cirrhosis and splenomegaly. Small amount of ascites  .   CTA Chest with Contrast   Result Value    Radiologist flags (AA)     Right empyema. Aortic dissection. Pulmonary artery dissection.    Narrative    EXAM: CTA CHEST WITH CONTRAST  LOCATION: [facility]  DATE/TIME: 3/30/2022 10:18 PM    INDICATION: Chest pain, nonspecific. Evaluate for aortic dissection.  COMPARISON: CT of the chest 11/09/2021 performed without IV contrast.    TECHNIQUE: Multiphase helical acquisition through the chest was performed including noncontrast, arterial phase, and delayed images before and after the administration of IV contrast. 2D and 3D reconstructions were performed by the CT technologist. Dose   reduction techniques were  used.   CONTRAST: 75 mL Isovue 370.    FINDINGS:  CT ANGIOGRAM CHEST: CTA examination demonstrates a dissection with origin involving the anterior aspect of the thoracic aortic arch at the takeoff of the left subclavian artery. This extends into the upper abdominal aorta. The false lumen supplies the   left renal artery. The remainder of the major vessels are supplied by the true lumen. Greatest radial dimension of the thoracic aorta is at the posterior arch and this measures 5.7 cm in greatest radial dimension. The prior correlative CT 11/09/2021   shows no significant contrast within the vessels, however the calcification can be seen involving the aorta and the dissection is not changed significantly. There is no evidence for extravasation of contrast to suggest a rupture. The size of the thoracic   aorta is not changed significantly since 11/09/2021 CT examination. There is a dissection seen involving the left aspect of the main pulmonary artery which extends into the proximal aspect of the right pulmonary artery. This is typically associated   changes of chronic pulmonary arterial hypertension. I am not sure if this was present on the prior study given lack of IV contrast.    CHEST:  LUNGS AND PLEURA: There is a large loculated right pleural effusion which contains some heterogenous density and gas worrisome for empyema. There is a mild free component to the right pleural effusion seen inferiorly. There is advanced atelectatic   changes seen of the right lung. There is a minimal free pleural effusion seen on the left.    MEDIASTINUM: No adenopathy.    CORONARY ARTERY CALCIFICATION: Moderate most pronounced in the left anterior descending.    UPPER ABDOMEN: There is a mild amount of ascites. There are changes of cholelithiasis with no evidence for cholecystitis. There are findings most typical for hemangioma and adjacent benign cysts seen in the superior aspect of the right lobe of liver.   There is cirrhotic  configuration seen of the liver. The spleen is not entirely included on this study, but appears enlarged and there are some varicosities consistent with portal venous hypertension. The portal vein is patent.     MUSCULOSKELETAL: Mild scattered degenerative changes most marked in the spine.      Impression    IMPRESSION:  1.  Dissection extending from the anterior aspect of the thoracic aortic arch into the abdominal aorta.    2.  There is aneurysmal dilatation seen of the thoracic aorta most marked at the mid arch region which measures approximately 5.7 cm in greatest radial dimension. No active rupture is identified.    3.  The dissection and size of the thoracic aorta appears stable since prior CT 11/09/2021.    4.  There is a dissection seen involving the main pulmonary artery and the proximal aspect of the right pulmonary artery which is commonly associated with changes of chronic increased pulmonary arterial pressure.    5.  There are findings worrisome for a large empyema in the right hemithorax with associated significant atelectasis.    6.  There are small free appearing bilateral pleural effusions.    7.  There is mild abdominal ascites.    8.  Changes of cirrhosis in the liver with associated portal venous hypertension and splenomegaly.      [Critical Result: Right empyema. Aortic dissection. Pulmonary artery dissection.]    Finding was identified on 3/30/2022 10:59 PM.     Dr. Hartley was contacted by me on 3/30/2022 11:24 PM and verbalized understanding of the critical result.

## 2022-04-01 NOTE — PROCEDURES
Hemodialysis Treatment Note    Pre weight: 60.5 KG                              Post weight: 60.7 kg    Run length: minutes    Total fluid removed (ml): 0    Blood Volume Processed: NA  Vascular Access: Loop AVG, left UE.   Treatment Summary:Unable to complete HD treatment.  Interventions: 2 venous needles place , 1 arterial needle placed. Started HD, minutes after started HD Arterial pressures shelia quickly, attempted to readjust arterial  needle, this intervention did not work. Needle removed, and bleeding would not stop, gelfoam used, this still did not help, IR called by nephrologist and a stitch was placed at bedside.      Plan: IR to see pt in AM and HD post IR  Fallon Dias RN  [facility]

## 2022-04-01 NOTE — PLAN OF CARE
Pt refused middle of the night VS. Refused to do LUE ultrasound during the night and again this morning. Wants to discuss with MD prior to consenting to procedure.       Problem: Gas Exchange Impaired  Goal: Optimal Gas Exchange  Outcome: Ongoing, Progressing  Lung sounds clear on le left and diminished on right. This is a great improvement as lung sounds were absent in the R lung prior to chest tube placement. CT with 200 mls of serosanaguinous drng over past 12 hrs. Total output since CT placed is 850 mls.     Problem: Anemia  Goal: Anemia Symptom Improvement  Outcome: Ongoing, Progressing  Hgb stable today 7.5. Dialysis RN notified of pt admission. No dialysis orders yet in place.

## 2022-04-01 NOTE — PROGRESS NOTES
RENAL PROGRESS NOTE    CC: ESRD management    ASSESSMENT & PLAN:   ESRD on HD: He still making urine and stated normal amount. He is getting only Monday and Friday dialysis at [facility] [place]. TW is 60.5kg and last run was on 3/28/2022. His primary nephrologist is Dr. Nate Rivero. Dialyze for 3 hr.   Bleeding from cannulation of his AVG and Dr. Cosby and his team evaluated and placed suture urgently.   - fistulogram in am by IR (Dr. Reyes)  - HD plan for tomorrow after fistulogram  - renal diet  - renally dosing medication.   - strict Is and Os as he is still making urine.      BP/ Volume: BP above goal. Volume is euvolemic. PTA clonidine 0.1 mg at bedtime, diltiazem  mg bid.   - Continue PTA meds     Electrolytes: Na 136 and K 4.6 today  - monitor  - no indication of urgent HD today  - HD tomorrow     Anemia: likely combined with ESRD and ABL.   Received PRBC here. Hb 7.5 today.   - Transfuse per protocol  - Will continue CAROLYNN per protocol.      Acid-Base: Bicarb 19  today  - HD as above.     Access: JOHNNY AVG (+). Issue with bleeding and likely from stenosis.   Thrill (+) on exam.   - fistulogram tomorrow.      BMD: PTA phoslo 667 mg tid with meal. Ca 7.8 with albmin 3.1 and corrected Ca ~ 8.6. No phos ordered.   - Continue phoslo TID with meals.        SUBJECTIVE:  Unable to cannulate for Hd today. Bleeding unable to stop with pressure and vascular surgeon Dr. Cosby was contacted. Sutured the bleeding site and plan for fistulogram in am.     OBJECTIVE:  Physical Exam   Temp: 98.3  F (36.8  C) Temp src: Oral BP: (!) 143/77 Pulse: 73   Resp: 22 SpO2: 100 % O2 Device: None (Room air) Oxygen Delivery: 1 LPM  Vitals:    03/30/22 1549 03/31/22 0303 04/01/22 0500   Weight: 60.8 kg (134 lb) 60.4 kg (133 lb 3.2 oz) 60.8 kg (134 lb 1.6 oz)     Vital Signs with Ranges  Temp:  [97.7  F (36.5  C)-98.3  F (36.8  C)] 98.3  F (36.8  C)  Pulse:  [72-81] 73  Resp:  [18-22] 22  BP: (130-154)/(77-92)  143/77  SpO2:  [98 %-100 %] 100 %  I/O last 3 completed shifts:  In: 720 [P.O.:720]  Out: 800 [Urine:300; Chest Tube:500]    @TMAXR(24)@    Patient Vitals for the past 72 hrs:   Weight   04/01/22 0500 60.8 kg (134 lb 1.6 oz)   03/31/22 0303 60.4 kg (133 lb 3.2 oz)   03/30/22 1549 60.8 kg (134 lb)       Intake/Output Summary (Last 24 hours) at 4/1/2022 1738  Last data filed at 4/1/2022 1000  Gross per 24 hour   Intake 480 ml   Output 650 ml   Net -170 ml       PHYSICAL EXAM:  General - Alert and oriented x3, appears comfortable, NAD  Cardiovascular - Regular rate and rhythm, no rub  Respiratory - Clear to auscultation bilaterally, no crackles or wheezes  Abd: BS present, no guarding or pain with palpation, no ascites  Extremities - LL arm AVG bleeding site (+) s/p sutured.   Skin: dry, intact, no rash, good turgor  Neuro:  Grossly intact, no focal deficits  MSK:  Grossly intact  Psych:  Normal affect    LABORATORY STUDIES:     Recent Labs   Lab 04/01/22  0534 03/31/22  1322 03/31/22  0710 03/30/22  1557 03/30/22  0959   WBC 5.3 5.1  --  5.5  --    RBC 2.51* 2.57*  --  2.02*  --    HGB 7.5* 7.6* 6.6* 6.2* 6.2*   HCT 23.0* 23.5*  --  20.3*  --    PLT 60* 65*  --  71*  --        Basic Metabolic Panel:  Recent Labs   Lab 04/01/22  0534 03/31/22  1322 03/30/22  1557    139 138   POTASSIUM 4.6 4.5 4.2   CHLORIDE 103 103 101   CO2 19* 20* 20*   * 95* 92*   CR 9.19* 8.78* 8.04*   GLC 92 80 112   TANO 7.7* 7.8* 7.7*       INR  Recent Labs   Lab 03/30/22  1557   INR 1.25*        Recent Labs   Lab Test 04/01/22  0534 03/31/22  1322 03/31/22  0710 03/30/22  1557 10/19/21  1006 10/19/21  0940   INR  --   --   --  1.25*  --  1.38*   WBC 5.3 5.1  --  5.5   < >  --    HGB 7.5* 7.6*   < > 6.2*   < >  --    PLT 60* 65*  --  71*   < >  --     < > = values in this interval not displayed.       Personally reviewed current labs      Merrill Lares MD  [facility]  [phone]

## 2022-04-02 NOTE — PLAN OF CARE
Problem: Adjustment to Illness (Chronic Kidney Disease)  Goal: Optimal Coping with Chronic Illness  Outcome: Ongoing, Progressing     Problem: Anemia  Goal: Anemia Symptom Improvement  Intervention: Monitor and Manage Anemia     Goal Outcome Evaluation:    Hemodialysis on going with a unit of blood PRBC running . Vitals stable. Chest Tube was oozing/bloody, reinforce well per instructions from IR, which resolved for now. Draining. C/o headache, was given Tylenol earlier. Offered Oxycodone, pt prefers Tylenol and will wait when its due next.

## 2022-04-02 NOTE — PROGRESS NOTES
RENAL PROGRESS NOTE    CC: ESRD management    ASSESSMENT & PLAN:   ESRD on HD: He still making urine and stated normal amount. He is getting only Monday and Friday dialysis at [facility] [place]. TW is 60.5kg and last run was on 3/28/2022. His primary nephrologist is Dr. Nate Rivero. Dialyze for 3 hr.   Bleeding from cannulation of his AVG and Dr. Cosby and his team evaluated and placed suture urgently on 4/1. 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 Concerning for aneurysm and plan to fix it on Monday.   Venous needle went in easily. Still an issue with arterial needle and was removed. After bleeding stopped from arterial needle site, second needle was inserted and able to do dialysis that time. Saw him on HD. No CP, palpitation, nausea.   - HD today.    - plan to transfuse with HD for Hb 6.1.   - renal diet  - renally dosing medication.   - strict Is and Os as he is still making urine.      BP/ Volume: BP above goal. Volume is euvolemic. PTA clonidine 0.1 mg at bedtime, diltiazem  mg bid.   - Continue PTA meds  - monitor BP  - plan to achieve TW of 60.5kg.      Electrolytes: Na 138 and K 4.8 today  - monitor  - HD today     Anemia: likely combined with ESRD and ABL.   Received PRBC here. Hb 6.1 today.   - Transfuse per protocol  - monitor for now.      Acid-Base: Bicarb 18  today  - HD as above.     Access: JOHNNY AVG (+). Issue with bleeding and likely from stenosis. S/P stenting by IR.   - HD today  - plan for IR procedure on Monday (NPO after midnight on Sunday for procedure)      BMD: PTA phoslo 667 mg tid with meal. Ca 7.6  with albmin 3.1 and corrected Ca ~ 8.3. No phos ordered.   - Continue phoslo TID with meals.        SUBJECTIVE:  Had IR fistulogram with stenting of left brachiocephalic chronic stenosis done today. During HD, Venous needle went in easily. Still an issue with arterial needle and was removed. After bleeding stopped from  arterial needle site, second needle was inserted and able to do dialysis that time. Saw him on HD. No CP, palpitation, nausea.    OBJECTIVE:  Physical Exam   Temp: 97.6  F (36.4  C) Temp src: Oral BP: 116/68 Pulse: 65   Resp: 20 SpO2: 99 % O2 Device: None (Room air)    Vitals:    04/01/22 1600 04/01/22 1757 04/02/22 0658   Weight: 60.5 kg (133 lb 6.1 oz) 60.6 kg (133 lb 7.8 oz) 60.3 kg (133 lb)     Vital Signs with Ranges  Temp:  [97.5  F (36.4  C)-98.3  F (36.8  C)] 97.6  F (36.4  C)  Pulse:  [58-75] 65  Resp:  [0-22] 20  BP: ()/(50-92) 116/68  SpO2:  [94 %-100 %] 99 %  I/O last 3 completed shifts:  In: -   Out: 380 [Urine:100; Chest Tube:280]    @TMAXR(24)@    Patient Vitals for the past 72 hrs:   Weight   04/02/22 0658 60.3 kg (133 lb)   04/01/22 1757 60.6 kg (133 lb 7.8 oz)   04/01/22 1600 60.5 kg (133 lb 6.1 oz)   04/01/22 0500 60.8 kg (134 lb 1.6 oz)   03/31/22 0303 60.4 kg (133 lb 3.2 oz)   03/30/22 1549 60.8 kg (134 lb)       Intake/Output Summary (Last 24 hours) at 4/1/2022 4508  Last data filed at 4/1/2022 1000  Gross per 24 hour   Intake 480 ml   Output 650 ml   Net -170 ml       PHYSICAL EXAM:  General - Alert and oriented x3, appears comfortable, NAD  Cardiovascular - Regular rate and rhythm, no rub  Respiratory - Clear to auscultation bilaterally, no crackles or wheezes  Abd: BS present, no guarding or pain with palpation, no ascites  Extremities - LL arm AVG bleeding site (+) s/p sutured.   Skin: dry, intact, no rash, good turgor  Neuro:  Grossly intact, no focal deficits  MSK:  Grossly intact  Psych:  Normal affect    LABORATORY STUDIES:     Recent Labs   Lab 04/02/22  1118 04/01/22  0534 03/31/22  1322 03/31/22  0710 03/30/22  1557 03/30/22  0959   WBC 4.0 5.3 5.1  --  5.5  --    RBC 2.07* 2.51* 2.57*  --  2.02*  --    HGB 6.1* 7.5* 7.6* 6.6* 6.2* 6.2*   HCT 18.9* 23.0* 23.5*  --  20.3*  --    PLT 53* 60* 65*  --  71*  --        Basic Metabolic Panel:  Recent Labs   Lab 04/02/22  1118  04/01/22  0534 03/31/22  1322 03/30/22  1557    136 139 138   POTASSIUM 4.8 4.6 4.5 4.2   CHLORIDE 105 103 103 101   CO2 18* 19* 20* 20*   * 103* 95* 92*   CR 9.38* 9.19* 8.78* 8.04*   GLC 78 92 80 112   TANO 7.6* 7.7* 7.8* 7.7*       INR  Recent Labs   Lab 03/30/22  1557   INR 1.25*        Recent Labs   Lab Test 04/02/22  1118 04/01/22  0534 03/31/22  0710 03/30/22  1557 10/19/21  1006 10/19/21  0940   INR  --   --   --  1.25*  --  1.38*   WBC 4.0 5.3   < > 5.5   < >  --    HGB 6.1* 7.5*   < > 6.2*   < >  --    PLT 53* 60*   < > 71*   < >  --     < > = values in this interval not displayed.       Personally reviewed current labs      Merrill Lares MD  [facility]  [phone]

## 2022-04-02 NOTE — PLAN OF CARE
Problem: Plan of Care - These are the overarching goals to be used throughout the patient stay.    Goal: Plan of Care Review/Shift Note  Description: The Plan of Care Review/Shift note should be completed every shift.  The Outcome Evaluation is a brief statement about your assessment that the patient is improving, declining, or no change.  This information will be displayed automatically on your shift note.  Outcome: Ongoing, Progressing  Flowsheets (Taken 4/1/2022 2042)  Plan of Care Reviewed With: patient     Problem: Anemia  Goal: Anemia Symptom Improvement  Outcome: Ongoing, Progressing  Intervention: Monitor and Manage Anemia  Recent Flowsheet Documentation  Taken 4/1/2022 1600 by Irish Sykes, RN  Safety Promotion/Fall Prevention:   activity supervised   clutter free environment maintained  Taken 4/1/2022 1200 by Irish Sykes RN  Safety Promotion/Fall Prevention:   activity supervised   clutter free environment maintained  Taken 4/1/2022 0800 by Irish Sykes, RN  Safety Promotion/Fall Prevention:   activity supervised   clutter free environment maintained     Problem: Gas Exchange Impaired  Goal: Optimal Gas Exchange  Outcome: Ongoing, Progressing   Goal Outcome Evaluation:    Plan of Care Reviewed With: patient     Chest tube to suction.  Dressing changed X 2 today.  4x4s saturated with sanguinous/serosanguinous drainage each time.  Oxy 5mg given for pain at insertion site.  Lungs sound much today.  Fistulagram tomorrow.  No further bleeding from fistula this evening post IR stitch intervention.

## 2022-04-02 NOTE — PROGRESS NOTES
[facility]    Progress Note - Hospitalist Service       Date of Admission:  3/30/2022    Assessment & Plan        Elle Blanton is a 61 year old male with past medical history significant for thoracic aortic dissection, hepatitis B, hepatorenal syndrome with ESRD, pleural effusion and normocytic anemia who was admitted on 3/30/2022 after being found to have a hgb of 6.2 at clinic.  Also admitted to the hospital in the hopes of getting a chest tube to help with recurrent empyema.        Acute macrocytic anemia on chronic normocytic anemia of chronic disease  History of GI bleed  Thrombocytopenia  -Patient was seen in clinic on 3/30 and found to have a hgb of 6.2 after being rechecked due to chronic anemia  -Was given 1 unit of packed RBCs in the ED  -He has required outpatient transfusions the most recent being 2 weeks ago  -Denies bloody bowel movements or dark stools  -Admits to falling and hitting his nose with 4 days of bleeding several days ago prior to admission, no longer having a bloody nose  -Will monitor and transfuse for hemoglobin less than 7  -He is on high-dose EPO and takes iron supplements outpatient  -Being worked up outpatient for hemolytic anemia due to undetectable haptoglobin, LDH elevation, and low reticulocyte count  -Primary care physician is placing a referral to heme onc to help address this  -PLT 53 today, and overall has been trending down slowly.  -Hemoglobin 6.1 today.  Will transfuse 1 unit PRBC     Empyema  History of recurrent pleural effusions  -CT chest 3/30:1.  Large complicated right pleural effusion with scattered hyperdensities suggesting hemorrhage. Atelectasis involving the entire right lower lobe and most of the right upper lobe. 2.  Small left pleural effusion and left basilar atelectasis. 3.  Aneurysmal dilatation involving the aortic arch and descending thoracic aorta, unchanged. There is also a chronic dissection with intimal flap upper abdominal  aorta.4.  Cirrhosis and splenomegaly. Small amount of ascites  -Patient has had pleural effusions in the past that required chest tubes and multiple thoracenteses   -Patient last underwent thoracentesis on 3/23 and was unable to get much fluid off of the lung  -It has been recommended the patient undergo surgery for this, on 1/6/22 the patient followed up with Dr. 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  Dissection extending from the anterior aspect of the thoracic aortic arch into the abdominal aorta.2.  There is aneurysmal dilatation seen of the thoracic aorta most marked at the mid arch region which measures approximately 5.7 cm in greatest radial dimension. No active rupture is identified.3.  The dissection and size of the thoracic aorta appears stable since prior CT 11/09/2021.4.  There is a dissection seen involving the main pulmonary artery and the proximal aspect of the right pulmonary artery which is commonly associated with changes of chronic increased pulmonary arterial pressure.5.  There are findings worrisome for a large empyema in the right hemithorax with associated significant atelectasis6.  There are small free appearing bilateral pleural effusions.7.  There is mild abdominal ascites.8.  Changes of cirrhosis in the liver with associated portal venous hypertension and splenomegaly.  -Patient is aware and has been worked up for the thoracic aortic dissection and does not want surgery on this, the pulmonary artery and right pulmonary artery dissections are  new  -Cardiovascular surgery has been consulted regarding the new pulmonary artery dissections and Dr. Maharaj recommended conservative treatment at this time.     ESRD on hemodialysis (MF) secondary to hepatorenal syndrome due to cirrhosis  -Nephrology consulted; fistulogram placement today.  Dialysis today after fistulogram  -Outpatient he has only been getting dialysis twice a week but it has been recommended by his primary to increase this to 3 times weekly for better symptom management and possible improvement of the empyema      LUE edema from the hand to the elbow  -No significant warmth associated with this  -Not significantly tender on exam  -Will obtain US of the LUE to ensure there is no significant DVT at this time     Liver cirrhosis secondary to chronic hepatitis B  -Stable, LFTs unremarkable except for alk phos elevated likely due to renal disease     Goals of Care  Given the patient's multiple comorbidities it was felt appropriate to start the discussion regarding goals of care.  It was discussed with the patient that he was not a good candidate for chest compressions or intubation.    -Will discuss further in the outpatient setting       Diet: Room Service  Advance Diet as Tolerated: Regular Diet Adult; Renal Diet (dialysis)    DVT Prophylaxis: Pneumatic Compression Devices  Beltre Catheter: Not present  Fluids: N/A  Central Lines: None  Cardiac Monitoring: ACTIVE order. Indication: Pulmonary artery dissection, empyema  Code Status: No CPR- Do NOT Intubate      Disposition Plan   Expected Discharge: 04/03/2022     Anticipated discharge location:  Awaiting care coordination huddle     The patient's care was discussed with the Attending Physician, Dr. Arce.    Anuj Caraballo MD  Hospitalist Service  [facility]  Securely message with the Vocera Web Console (learn more here)  Text page via [employer] Paging/Directory     Clinically Significant Risk Factors Present on  Admission                  ______________________________________________________________________    Interval History   Chest tube working with no air leak today. Output has been 380ml over 24hours.  Sleeping comfortable in room.  Denies any complaints or concerns at this time.      Data reviewed today: I reviewed all medications, new labs and imaging results over the last 24 hours.     Physical Exam   Vital Signs: Temp: 97.6  F (36.4  C) Temp src: Oral BP: 100/68 Pulse: 63   Resp: 20 SpO2: 97 % O2 Device: None (Room air)    Weight: 133 lbs 0 oz  General Appearance: Sitting up in bed   Respiratory: Decreased lung sounds on the right, no wheezing, rales or rhonchi appreciated  Cardiovascular: RRR, no murmurs appreciated  MUSC: Patient has significant edema noted from the left elbow throughout the forearm, non tender to palpation, but he is resting it on a pillow for comfort, no edema appreciated in the LUE or bilateral lower extremities  GI: Abdomen non-tender to palpation,  Skin: Jaundiced skin with bruises on arms and legs of various stages of healing    Data   Recent Labs   Lab 04/02/22  1118 04/01/22  0534 03/31/22  1322 03/31/22  0710 03/30/22  1557   WBC 4.0 5.3 5.1  --  5.5   HGB 6.1* 7.5* 7.6*   < > 6.2*   MCV 91 92 91  --  101*   PLT 53* 60* 65*  --  71*   INR  --   --   --   --  1.25*    136 139  --  138   POTASSIUM 4.8 4.6 4.5  --  4.2   CHLORIDE 105 103 103  --  101   CO2 18* 19* 20*  --  20*   * 103* 95*  --  92*   CR 9.38* 9.19* 8.78*  --  8.04*   ANIONGAP 15 14 16  --  17   TANO 7.6* 7.7* 7.8*  --  7.7*   GLC 78 92 80  --  112   ALBUMIN  --   --   --   --  3.1*   PROTTOTAL  --   --   --   --  8.1*   BILITOTAL  --   --   --   --  1.0   ALKPHOS  --   --   --   --  162*   ALT  --   --   --   --  31   AST  --   --   --   --  29    < > = values in this interval not displayed.     Recent Results (from the past 24 hour(s))   XR Chest Port 1 View    Narrative    EXAM: XR CHEST PORT 1  VIEW  LOCATION: [facility]  DATE/TIME: 4/1/2022 1:23 PM    INDICATION: R empyema s p chest tube  COMPARISON: 03/30/2022      Impression    IMPRESSION: Right sided chest tube in good position with moderate decrease in size of the right pleural effusion. Moderate loculated appearing fluid does remain along with some infiltrates and atelectasis right lung base.    Left lung is clear and expanded.   US Upper Extremity Venous Duplex Left    Narrative    EXAM: US UPPER EXTREMITY VENOUS DUPLEX LEFT  LOCATION: [facility]  DATE/TIME: 4/1/2022 2:29 PM    INDICATION: swelling, Rule out DVT  COMPARISON: CTA chest 03/30/2022.  TECHNIQUE: Venous Duplex ultrasound of the left upper extremity with (when possible) and without compression, augmentation, and duplex. Color flow and spectral Doppler with waveform analysis performed.    FINDINGS: Ultrasound includes evaluation of the internal jugular vein, innominate vein, subclavian vein, axillary vein, and brachial vein. The superficial cephalic and basilic veins were also evaluated where seen.     LEFT: No deep venous thrombosis. No superficial thrombophlebitis.       Impression    IMPRESSION:   1.  No deep venous thrombosis in the left upper extremity.

## 2022-04-02 NOTE — PROGRESS NOTES
"  Interventional Radiology - Progress Note  Inpatient - [facility]: Interventional Radiology   [phone]  04/02/2022     S:  Pt with recurrent right side pleural effusion; s/p multiple chest tube placement and thoracentesis (please see IR consult note from 03/31). R hemothorax/emphyema on CTA from 03/30. S/p R chest tube placement 03/31. Unable to complete HD on 04/01. Anticipating LUE fistulogram today. Pt denies any CP or SOB. Pt's O2 level are in the upper 90's w/o supplemental oxygen. Medicine and nephrology are following. RN reports bleeding from outside the chest tube last night requiring dressing change. Blood OP to Pleuro Vac.        O:  /65   Pulse 63   Temp 97.5  F (36.4  C) (Oral)   Resp 18   Ht 1.651 m (5' 5\")   Wt 60.3 kg (133 lb)   SpO2 98%   BMI 22.13 kg/m    General:  Stable.  In no acute distress.    Neuro:  A&O x 3. Moves all extremities equally.  Resp:  Breathing is not labored. R chest tube is in place. Blood OP noted. No bleeding/oozing from at the exit site.   Cardio:  S1S2 and reg, without murmur, clicks or rubs  Abdomen:  Soft, non-distended, non-tender, positive bowel sounds.  Vascular:  LUE fistula  Skin:  Without excoriations, ecchymosis, erythema, lesions or open sores.   MSK:  No gross motor weakness.  Sensation intact.  Proprioception intact.    IMAGING:    CXR 04/01/22  IMPRESSION:   Right sided chest tube in good position with moderate decrease in size of the right pleural effusion. Moderate loculated appearing fluid does remain along with some infiltrates and atelectasis right lung base.    IR R chest tube placement 03/21/22  IMPRESSION:    1.  Uncomplicated image guided placement of 18 [place] pleural drainage catheter into a large right hemothorax. Drainage catheter attached to Pleur-Evac.          LABS:  Recent Labs   Lab 04/01/22  0534 03/31/22  1322 03/31/22  0710 03/30/22  1557   WBC 5.3 5.1  --  5.5   HGB 7.5* 7.6* 6.6* 6.2*   PLT 60* 65*  --  71* "   INR  --   --   --  1.25*   CR 9.19* 8.78*  --  8.04*     Drain Outputs (in mL):          A:  61 year old yo male with recurrent R pleural effusions. S/p R chest tube placement on 03/31. Bloody OP to Pleuro Vac. Most recent daily volume 380 ml. No bleeding from outside the tube at the present time. CXR from 04/01 demonstrates appropriate tube location with moderate loculated plural fluid collections.      P:    - Continue chest tube management.  - Continue OP monitoring.   - Continue dressing change if needed. Reach out to IR if bleeding outside the tube increases.   - May repeat imaging in 2-3 days to re-evaluate R pleural effusion  - May consider lytics if loculated pleural fluid collections persist.    - IR will follow.     Total time spent on the date of the encounter is 20 minutes, including time spent counseling the patient, performing a medically appropriate evaluation, reviewing prior medical history, ordering medications and tests, documenting clinical information in the medical record, and communication of results.    John Miller PA-C  Interventional Radiology  [phone]    E/M codes for reference only:  90993

## 2022-04-02 NOTE — PROVIDER NOTIFICATION
Verbal bedside report given to Kim MELENDEZ. Patient transferred to bed with use of hover mat, no bleeding noted from site. Patient tolerated well.

## 2022-04-02 NOTE — PROCEDURES
INTERVENTIONAL RADIOLOGY    IR dialysis shunt evaluation complete.      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 This should resolve the dominant issue with the hemodialysis access.    Additionally there is a tiny pseudoaneurysm/graft break down at the site of the purse string suture along the medial graft.  This may need to be treated with a short stent graft on Monday (note since this procedure was performed over the weekend, the necessary supplies needed are unavailable at [facility] and will need to be obtained).      From IR standpoint a trial of hemodialysis may safely be performed.  If bleeding or expansion occurs from the purse string suture site (unlikely) would recommend stopping dialysis until after a stent graft has been placed.      I have ordered an ultrasound for tomorrow to reassess the small pseudoaneurysm.  If it spontaneously resolves then we will cancel the fistulagram for Monday.  If it persists we will obtain the necessary materials with a repeat study and possible stent graft reconstruction.  Please keep the patient NPO after midnight on Sunday for possible intervention.    August Torres MD  Vascular and Interventional Radiology.

## 2022-04-02 NOTE — PLAN OF CARE
"Goal Outcome Evaluation:        Problem: Anemia  Goal: Anemia Symptom Improvement  Outcome: Ongoing, Not Progressing  Hgb =6.1 today, 1 UPRBCs will be given during dialysis. Fistulogram done this afternoon. Dr. Torres reported, \" central stenosis fixed.\" Arrived back approx 14:30. CT site leaking at site. Dr Caraballo, Dr Lares & radiology notified. Radiologist \"expected drng & said to reinforce drsgs\". 150 ml sang drng from CT this shift. Tylenol given for headache with gd relief. Dialysis nurse here to start run.                   "

## 2022-04-02 NOTE — PLAN OF CARE
Problem: Plan of Care - These are the overarching goals to be used throughout the patient stay.    Goal: Absence of Hospital-Acquired Illness or Injury  Outcome: Met  Intervention: Identify and Manage Fall Risk  Recent Flowsheet Documentation  Taken 4/2/2022 0015 by Maribel Canales RN  Safety Promotion/Fall Prevention:   activity supervised   clutter free environment maintained  Goal: Optimal Comfort and Wellbeing  Outcome: Met  Intervention: Monitor Pain and Promote Comfort  Recent Flowsheet Documentation  Taken 4/2/2022 0120 by Maribel Canales RN  Pain Management Interventions: medication (see MAR)     Problem: Gas Exchange Impaired  Goal: Optimal Gas Exchange  Outcome: Met   Goal Outcome Evaluation:    Pt. Alert and oriented. On telemetry NSR. Has a right chest tube in place for recurrent pleural effusion. Draining sanginous fluid. Prn Oxycodone X1 for right flank pain. BP was soft. House resident Dr. Gonzalez updated. She wanted him to be rechecked and he's running 89/57 presently. NPO at 0000 for a fistulogram today for left AVF. No time on the schedule yet. Will continue to monitor.    Maribel Canales RN

## 2022-04-02 NOTE — CARE PLAN
INTERVENTIONAL RADIOLOGY    Spoke with the patients nurse.      IR is aware of Mr. Blanton and hopes that he will dialyze today following our study.  He is currently 3rd in line for cases, however an absolute time for the case cannot be provided as on weekends case order is dependent on acuity of the clinical situation.  I suspect this will be performed early this afternoon.  As this is a preventative maintenence routine study for prolonged bleeding (typically scheduled as an outpatient case) we will do our best to accommodate.        In brief he receives HD Monday and Friday, last dialyzing on 3/28.  Potassium is currently 5.1.  He does still make some urine.    August Torres MD  Vascular and Interventional Radiology

## 2022-04-03 NOTE — PROGRESS NOTES
[facility]    Medicine Progress Note - Hospitalist Service    Date of Admission:  3/30/2022    Assessment & Plan          Elle Blanton is a 61 year old male with past medical history significant for thoracic aortic dissection, hepatitis B, hepatorenal syndrome with ESRD, pleural effusion and normocytic anemia who was admitted on 3/30/2022 after being found to have a hgb of 6.2 at clinic.  Also admitted to the hospital in the hopes of getting a chest tube to help with recurrent empyema. Will undergo repair of pseudoaneurysm of LUE tomorrow with possible stent placement.       LUE edema from the hand to the elbow  L chronic brachiocephalic vein occlusion s/p fistulogram 4/2 with stent placement  LUE US today showing 3 separate small pseudoaneurysms arising from loop graft. 1st and 3rd with patent blood flow. IR notes tiny pseudoaneurysm/graft break down at medial graft, which may benefit from stent. IR ok with trial of HD, but if site expands, stop HD until stent graft in place.   -Fistulogram planned for Monday with possible stent placement to pseudoanuerysms.  -No significant warmth associated with this  -Not significantly tender on exam  -NPO at [place]     Acute macrocytic anemia on chronic normocytic anemia of chronic disease  History of GI bleed  Thrombocytopenia  -Patient was seen in clinic on 3/30 and found to have a hgb of 6.2 after being rechecked due to chronic anemia  -Was given 1 unit of packed RBCs in the ED  -He has required outpatient transfusions the most recent being 2 weeks ago  -Denies bloody bowel movements or dark stools  -Admits to falling and hitting his nose with 4 days of bleeding several days ago prior to admission, no longer having a bloody nose  -He is on high-dose EPO and takes iron supplements outpatient  -Being worked up outpatient for hemolytic anemia due to undetectable haptoglobin, LDH elevation, and low reticulocyte count  -Primary care physician is placing a  referral to heme onc to help address this  -PLT 53 today, and overall has been trending down slowly-->48 today  -Hemoglobin 6.8, will transfuse another unit of PRBC, total 3 units PRBC since presentation.  -Chest tube with bloody output.  -Will monitor and transfuse for hemoglobin less than 7     Empyema  History of recurrent pleural effusions  -CT chest 3/30:1.  Large complicated right pleural effusion with scattered hyperdensities suggesting hemorrhage. Atelectasis involving the entire right lower lobe and most of the right upper lobe. 2.  Small left pleural effusion and left basilar atelectasis. 3.  Aneurysmal dilatation involving the aortic arch and descending thoracic aorta, unchanged. There is also a chronic dissection with intimal flap upper abdominal aorta.4.  Cirrhosis and splenomegaly. Small amount of ascites  -Patient has had pleural effusions in the past that required chest tubes and multiple thoracenteses   -Patient last underwent thoracentesis on 3/23 and was unable to get much fluid off of the lung  -It has been recommended the patient undergo surgery for this, on 1/6/22 the patient followed up with Dr. 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  Dissection extending from the anterior aspect of the thoracic aortic arch into the abdominal aorta.2.  There is aneurysmal dilatation seen of the thoracic aorta most marked at the mid arch region which measures approximately 5.7 cm in greatest radial dimension. No active rupture is identified.3.  The  dissection and size of the thoracic aorta appears stable since prior CT 11/09/2021.4.  There is a dissection seen involving the main pulmonary artery and the proximal aspect of the right pulmonary artery which is commonly associated with changes of chronic increased pulmonary arterial pressure.5.  There are findings worrisome for a large empyema in the right hemithorax with associated significant atelectasis6.  There are small free appearing bilateral pleural effusions.7.  There is mild abdominal ascites.8.  Changes of cirrhosis in the liver with associated portal venous hypertension and splenomegaly.  -Patient is aware and has been worked up for the thoracic aortic dissection and does not want surgery on this, the pulmonary artery and right pulmonary artery dissections are new  -Cardiovascular surgery has been consulted regarding the new pulmonary artery dissections and Dr. Maharaj recommended conservative treatment at this time.  -No changes today     ESRD on hemodialysis (MF) secondary to hepatorenal syndrome due to cirrhosis  -Nephrology consulted; No HD today, will plan for tomorrow.   -Outpatient he has only been getting dialysis twice a week but it has been recommended by his primary to increase this to 3 times weekly for better symptom management and possible improvement of the empyema      Liver cirrhosis secondary to chronic hepatitis B  -Stable, LFTs unremarkable except for alk phos elevated likely due to renal disease  -Stable, no changes today     Goals of Care  Given the patient's multiple comorbidities it was felt appropriate to start the discussion regarding goals of care.  It was discussed with the patient that he was not a good candidate for chest compressions or intubation.    -Will discuss further in the outpatient setting       Diet: Room Service  Combination Diet Regular Diet; Renal Diet (dialysis)  NPO per Anesthesia Guidelines for Procedure/Surgery Except for: Meds    DVT Prophylaxis:  Pneumatic Compression Devices  Beltre Catheter: Not present  Central Lines: None  Cardiac Monitoring: ACTIVE order. Indication: Pulmonary artery dissection, empyema  Code Status: No CPR- Do NOT Intubate      Disposition Plan   Expected Discharge: 04/04/2022     Anticipated discharge location:  Awaiting care coordination huddle       The patient's care was discussed with the Attending Physician, Dr. Vickey Arce III.    Connie Stanton MD  Hospitalist [facility]  Securely message with the Vocera Web Console (learn more here)  Text page via Dashbell Paging/Directory         Clinically Significant Risk Factors Present on Admission                 ______________________________________________________________________    Interval History   Patient denies any pain. States he is tolerating chest tube well. No other complaints. All questions were answered.     Data reviewed today: I reviewed all medications, new labs and imaging results over the last 24 hours.     Physical Exam   Vital Signs: Temp: 98.2  F (36.8  C) Temp src: Oral BP: (!) 143/83 Pulse: 75   Resp: 16 SpO2: 98 % O2 Device: None (Room air) Oxygen Delivery: 1 LPM  Weight: 133 lbs .06 oz  General Appearance:  Lying in bed   Respiratory: Decreased lung sounds on the right, no wheezing, rales or rhonchi appreciated  Cardiovascular: RRR, no murmurs appreciated  MUSC: no edema appreciated in the LUE or bilateral lower extremities  Skin: Jaundiced skin with bruises on arms and legs of various stages of healing    Data   Recent Labs   Lab 04/03/22  0731 04/02/22  1118 04/01/22  0534 03/31/22  0710 03/30/22  1557   WBC 3.9* 4.0 5.3   < > 5.5   HGB 6.8* 6.1* 7.5*   < > 6.2*   MCV 89 91 92   < > 101*   PLT 48* 53* 60*   < > 71*   INR  --   --   --   --  1.25*   * 138 136   < > 138   POTASSIUM 3.4* 4.8 4.6   < > 4.2   CHLORIDE 99 105 103   < > 101   CO2 24 18* 19*   < > 20*   BUN 40* 103* 103*   < > 92*   CR 5.13* 9.38* 9.19*   < > 8.04*    ANIONGAP 12 15 14   < > 17   TANO 7.4* 7.6* 7.7*   < > 7.7*   GLC 95 78 92   < > 112   ALBUMIN  --   --   --   --  3.1*   PROTTOTAL  --   --   --   --  8.1*   BILITOTAL  --   --   --   --  1.0   ALKPHOS  --   --   --   --  162*   ALT  --   --   --   --  31   AST  --   --   --   --  29    < > = values in this interval not displayed.     Recent Results (from the past 24 hour(s))   US Ext Arterial Venous Dialys Acs [facility]  ULTRASOUND EXTREMITY ARTERIAL VENOUS DIALYSIS ACCESS GRAFT  04/03/2022, 10:48 AM    INDICATION: Patient with left forearm loop graft. Patient had fistulogram performed the day prior on 04/02/2022. Request made for evaluation of patency and possibility of pseudoaneurysm.    TECHNIQUE: Color Doppler and spectral waveform analysis obtained throughout the loop graft as well as inflow artery and outflow venous system.    FINDINGS: The loop graft is patent. Inflow brachial artery is patent with velocity of 184 cm/s. The arterial anastomosis is widely patent with velocity of 391 cm/s. Velocities within the graft range from 207-323 cm/s. Venous anastomosis is 403 cm/s, with   minimal stenosis, though not felt to be hemodynamically significant.    In the medial aspect of the graft is a small pseudoaneurysm measuring 0.4 x 0.6 x 0.6 cm, found to have blood flow within.    A second pseudoaneurysm is 0.5 x 0.7 x 1 cm, blood flow not clearly assessed.    Third pseudoaneurysm is 0.4 x 0.5 x 0.8 cm, also seemingly patent.      Impression    IMPRESSION:  1.  Patent left upper extremity loop forearm graft. No obvious stenosis at the venous or arterial anastomoses.  2.  Three separate and relatively small pseudoaneurysms identified arising from the loop graft. The first and third are patent with blood flow within the pseudoaneurysm. A second pseudoaneurysm not assessed for blood flow. The first measures up to 6 mm,   second measures up to 10 mm, and third measures  up to 8 mm.

## 2022-04-03 NOTE — PLAN OF CARE
Problem: Anemia  Goal: Anemia Symptom Improvement  Outcome: Ongoing, Progressing  HGB=6.8 Platelet=48. HO notified. 1 unit(s) UPRB running, nearing completion. US of dialysis graft completed, see report. Tylenol given for Headache with relief.

## 2022-04-03 NOTE — PROGRESS NOTES
Checked in with pt. At 7pm he was still getting dialysis and that nurse was in there and he is getting a unit of blood with dialysis.    Looked in room unable to get in as dialysis nurse has her back to door and tray table in front of door.  Appears she may be holding pressure on arm.   From flow sheet, his blood pressure appears more normal.

## 2022-04-03 NOTE — PLAN OF CARE
Goal Outcome Evaluation:    Plan of Care Reviewed With: patient     Overall Patient Progress: no change    Received blood today and Chest tube draining.

## 2022-04-03 NOTE — PROGRESS NOTES
Chest tube dressing changed and there is no active bleeding outside of tube.  There was a lot of saturated dressing and that was all taken off and new guaze and ABD pads applied.

## 2022-04-03 NOTE — PLAN OF CARE
Problem: Anemia  Goal: Anemia Symptom Improvement  Outcome: Ongoing, Progressing  Intervention: Monitor and Manage Anemia  Recent Flowsheet Documentation  Taken 4/3/2022 0415 by Ijeoma Domingo RN  Safety Promotion/Fall Prevention:   activity supervised   fall prevention program maintained   clutter free environment maintained   safety round/check completed  Taken 4/2/2022 2350 by Ijeoma Domingo RN  Safety Promotion/Fall Prevention:   activity supervised   fall prevention program maintained   clutter free environment maintained   safety round/check completed   Goal Outcome Evaluation:        Patient dressing around Chest tube site was changed by previous shift.  Currently dressing intact and no signs of bleeding noted through dressing.  Chest tube output was small amount, but appeared bloody.  Patient is alert and oriented.  Staff grouped cares per patient request, as he was feeling tired, but patient instructed to call staff if needing pain medication or sleeping medication.  Staff offered sleeping medication at midnight, but patient declined at that time.  Later, patient received oxycodone for headache, then senna for constipation.        Problem: Gas Exchange Impaired  Goal: Optimal Gas Exchange  Outcome: Ongoing, Progressing  Intervention: Optimize Oxygenation and Ventilation  Recent Flowsheet Documentation  Taken 4/3/2022 0415 by Ijeoma Domingo RN  Head of Bed (HOB) Positioning: HOB at 15 degrees     Patient continues to have chest tube to suction.   Patient's oxygen saturation was 100 on room air.  Lung sounds on left side of lungs are clear, but right side sounds are very diminished.    Patient instructed to void in urinal, to measure intake and output.

## 2022-04-03 NOTE — SIGNIFICANT EVENT
Significant Event Note    Time of event: 8:05 AM April 3, 2022    Description of event:  Notified of hgb of 6.8, plan for 1 U PRBC.      Eli León MD

## 2022-04-03 NOTE — PROGRESS NOTES
Talked to on call  And ok to change chest tube dressing.   If we think that bleeding is worse call her back. And then possibly call IR. And call  Back if any questions.

## 2022-04-03 NOTE — PROCEDURES
Hemodialysis Treatment Note    Pre weight: 60.33 kg  Post weight:60.33 kg    Run length: 3 hours  Total fluid removed (ml): 947 ml ,  Net UF 0 ml  Blood Volume Processed: 61.0  Vascular Access: AVG, LUE. Pt went to IR today, HD post.  Treatment Summary: one unit of blood given started at 1813 finished 1930.   Interventions: Documented vitals q 15 minutes and PRN. Crit line monitoring, ending in a profile B.  Plan:Per renal team  Fallon Estrella RN  [facility] [place]

## 2022-04-03 NOTE — PROGRESS NOTES
Problem anemia.      Pt. Received blood and finished at 4 pm with out reaction.   Has labs ordered tomorrow AM.  Has procedure tomorrow and possible dialysis tomorrow.

## 2022-04-03 NOTE — PROGRESS NOTES
Pt. Is sitting up on bedside is now  Going to eat. BP started to go up and his BP medications given now.  He does want his prn  Diltiazem beads  too. They have to be ordered up from pharmacy and I did but also told him we will check his BP in a while after the medications he has already gotten and eating.      He has a chest tube dressing that is leaking blood and they have been reinforcing every shift is what has been told to me.  Leaking so much his wife changed his pants.

## 2022-04-03 NOTE — PROGRESS NOTES
RENAL PROGRESS NOTE    CC: ESRD management    ASSESSMENT & PLAN:   ESRD on HD: He still making urine and stated normal amount. He is getting only Monday and Friday dialysis at [facility]. TW is 60.5kg and last run was on 3/28/2022. His primary nephrologist is Dr. Nate Rivero. Dialyze for 3 hr.   Bleeding from cannulation of his AVG and Dr. Cosby and his team evaluated and placed suture urgently on 4/1. 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 Concerning for aneurysm and plan to fix it on Monday.   Venous needle went in easily. Still an issue with arterial needle and was removed. After bleeding stopped from arterial needle site, second needle was inserted and able to do dialysis that time. Saw him on HD. No CP, palpitation, nausea.   - No HD today.    - Plan for HD tomorrow per schedule.   - renal diet  - renally dosing medication.   - strict Is and Os as he is still making urine.      BP/ Volume: BP at goal. Volume is euvolemic. PTA clonidine 0.1 mg at bedtime, diltiazem  mg bid.   - Continue PTA meds  - monitor BP  - TW of 60.5kg.      Electrolytes: Na 135 and K 3.4 today  - monitor   - hold off K replacement  - No HD today     Anemia: likely combined with ESRD and ABL.   Received PRBC here. Hb 6.8 today.   - Transfuse per protocol  - monitor for now.      Acid-Base: Bicarb 24  today  - Monitor.     Access: JOHNNY AVG (+). Issue with bleeding and likely from stenosis. S/P stenting by IR (4/2). Plan for repair of aneurysm tomorrow   - No HD today  - plan for IR procedure on Monday (NPO after midnight on Sunday for procedure)      BMD: PTA phoslo 667 mg tid with meal. Ca 7.4  with albmin 3.1 and corrected Ca ~ 8.1.   - phos added to am lab   - Continue phoslo TID with meals.        SUBJECTIVE:  Feeling ok. Denies any issue after HD.  No CP, palpitation, nausea.    OBJECTIVE:  Physical Exam   Temp: 97.4  F (36.3  C) Temp src: Oral BP: (!)  143/81 Pulse: 77   Resp: 18 SpO2: 100 % O2 Device: None (Room air) Oxygen Delivery: 1 LPM  Vitals:    04/02/22 1615 04/02/22 1730 04/02/22 2045   Weight: 60.3 kg (133 lb 0.1 oz) 60.3 kg (133 lb 0.1 oz) 60.3 kg (133 lb 0.1 oz)     Vital Signs with Ranges  Temp:  [97.3  F (36.3  C)-98.5  F (36.9  C)] 97.4  F (36.3  C)  Pulse:  [58-77] 77  Resp:  [0-20] 18  BP: ()/(55-91) 143/81  SpO2:  [94 %-100 %] 100 %  I/O last 3 completed shifts:  In: 60 [P.O.:60]  Out: 700 [Urine:350; Chest Tube:350]    @TMAXR(24)@    Patient Vitals for the past 72 hrs:   Weight   04/02/22 2045 60.3 kg (133 lb 0.1 oz)   04/02/22 1730 60.3 kg (133 lb 0.1 oz)   04/02/22 1615 60.3 kg (133 lb 0.1 oz)   04/02/22 0658 60.3 kg (133 lb)   04/01/22 1757 60.6 kg (133 lb 7.8 oz)   04/01/22 1600 60.5 kg (133 lb 6.1 oz)   04/01/22 0500 60.8 kg (134 lb 1.6 oz)       Intake/Output Summary (Last 24 hours) at 4/1/2022 1738  Last data filed at 4/1/2022 1000  Gross per 24 hour   Intake 480 ml   Output 650 ml   Net -170 ml       PHYSICAL EXAM:  General - Alert and oriented x3, appears comfortable, NAD  Cardiovascular - Regular rate and rhythm, no rub  Respiratory - Clear to auscultation bilaterally, no crackles or wheezes  Abd: BS present, no guarding or pain with palpation, no ascites  Extremities - LL arm AVG bleeding site (+) s/p sutured.   Skin: dry, intact, no rash, good turgor  Neuro:  Grossly intact, no focal deficits  MSK:  Grossly intact  Psych:  Normal affect    LABORATORY STUDIES:     Recent Labs   Lab 04/03/22  0731 04/02/22  1118 04/01/22  0534 03/31/22  1322 03/31/22  0710 03/30/22  1557   WBC 3.9* 4.0 5.3 5.1  --  5.5   RBC 2.31* 2.07* 2.51* 2.57*  --  2.02*   HGB 6.8* 6.1* 7.5* 7.6* 6.6* 6.2*   HCT 20.6* 18.9* 23.0* 23.5*  --  20.3*   PLT 48* 53* 60* 65*  --  71*       Basic Metabolic Panel:  Recent Labs   Lab 04/03/22  0731 04/02/22  1118 04/01/22  0534 03/31/22  1322 03/30/22  1557   * 138 136 139 138   POTASSIUM 3.4* 4.8 4.6 4.5 4.2    CHLORIDE 99 105 103 103 101   CO2 24 18* 19* 20* 20*   BUN 40* 103* 103* 95* 92*   CR 5.13* 9.38* 9.19* 8.78* 8.04*   GLC 95 78 92 80 112   TANO 7.4* 7.6* 7.7* 7.8* 7.7*       INR  Recent Labs   Lab 03/30/22  1557   INR 1.25*        Recent Labs   Lab Test 04/03/22  0731 04/02/22  1118 03/31/22  0710 03/30/22  1557 10/19/21  1006 10/19/21  0940   INR  --   --   --  1.25*  --  1.38*   WBC 3.9* 4.0   < > 5.5   < >  --    HGB 6.8* 6.1*   < > 6.2*   < >  --    PLT 48* 53*   < > 71*   < >  --     < > = values in this interval not displayed.       Personally reviewed current labs      Merrill Lares MD  [facility]  [phone]

## 2022-04-04 PROBLEM — K74.60 CIRRHOSIS OF LIVER WITHOUT ASCITES (H): Status: ACTIVE | Noted: 2019-08-01

## 2022-04-04 PROBLEM — D61.818 OTHER PANCYTOPENIA (H): Status: ACTIVE | Noted: 2017-07-05

## 2022-04-04 PROBLEM — B18.1 CHRONIC HEPATITIS B WITHOUT HEPATIC COMA (H): Status: ACTIVE | Noted: 2019-08-01

## 2022-04-04 PROBLEM — I10 ESSENTIAL HYPERTENSION: Status: ACTIVE | Noted: 2019-08-01

## 2022-04-04 NOTE — PRE-PROCEDURE
GENERAL PRE-PROCEDURE:   Procedure:  LUE fistulagram  Date/Time:  4/4/2022 11:47 AM    Written consent obtained?: Yes    Risks and benefits: Risks, benefits and alternatives were discussed    Consent given by:  Patient  Patient states understanding of procedure being performed: Yes    Patient's understanding of procedure matches consent: Yes    Procedure consent matches procedure scheduled: Yes    Expected level of sedation:  Moderate  Appropriately NPO:  Yes  ASA Class:  2  Mallampati  :  Grade 2- soft palate, base of uvula, tonsillar pillars, and portion of posterior pharyngeal wall visible  Lungs:  Lungs clear with good breath sounds bilaterally  Heart:  Normal heart sounds and rate  History & Physical reviewed:  History and physical reviewed and no updates needed  Statement of review:  I have reviewed the lab findings, diagnostic data, medications, and the plan for sedation

## 2022-04-04 NOTE — PROGRESS NOTES
[facility]    Medicine Progress Note - Hospitalist Service    Date of Admission:  3/30/2022    Assessment & Plan          Elle Blanton is a 61 year old male with past medical history significant for thoracic aortic dissection, hepatitis B, hepatorenal syndrome with ESRD, pleural effusion and normocytic anemia who was admitted on 3/30/2022 after being found to have a hgb of 6.2 at clinic.  Also admitted to the hospital in the hopes of getting a chest tube to help with recurrent empyema. Will undergo repair of pseudoaneurysm of LUE today with possible stent placement.       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 1st and 3rd with patent blood flow. IR notes tiny pseudoaneurysm/graft break down at medial graft, which may benefit from stent. IR ok with trial of HD, but if site expands, stop HD until stent graft in place.   -Fistulogram planned for today with possible stent placement to pseudoanuerysms.  -No significant warmth associated with this  -Not significantly tender on exam  -NPO for this. Resume diet post procedure    Acute macrocytic anemia on chronic normocytic anemia of chronic disease  History of GI bleed  Thrombocytopenia  -Patient was seen in clinic on 3/30 and found to have a hgb of 6.2 after being rechecked due to chronic anemia  -Was given 1 unit of packed RBCs in the ED  -He has required outpatient transfusions the most recent being 2 weeks ago  -Denies bloody bowel movements or dark stools  -Admits to falling and hitting his nose with 4 days of bleeding several days ago prior to admission, no longer having a bloody nose  -He is on high-dose EPO and takes iron supplements outpatient  -Being worked up outpatient for hemolytic anemia due to undetectable haptoglobin, LDH elevation, and low reticulocyte count  -Primary care  physician is placing a referral to heme onc to help address this  -PLT 62 today  -Hemoglobin 8.1 today, received transfusion yesterday- total 3 units PRBC since presentation.  -Chest tube with serosang output.  -Will monitor and transfuse for hemoglobin less than 7     Empyema  History of recurrent pleural effusions  -CT chest 3/30:1.  Large complicated right pleural effusion with scattered hyperdensities suggesting hemorrhage. Atelectasis involving the entire right lower lobe and most of the right upper lobe. 2.  Small left pleural effusion and left basilar atelectasis. 3.  Aneurysmal dilatation involving the aortic arch and descending thoracic aorta, unchanged. There is also a chronic dissection with intimal flap upper abdominal aorta.4.  Cirrhosis and splenomegaly. Small amount of ascites  -Patient has had pleural effusions in the past that required chest tubes and multiple thoracenteses  -Patient last underwent thoracentesis on 3/23 and was unable to get much fluid off of the lung  -It has been recommended the patient undergo surgery for this, on 1/6/22 the patient followed up with Dr. Zurita and it was explained that the patient is not a surgical candidate due to his multiple comorbidities and the chronicity of the trapped lung  -It was recommended to potentially get a thoracentesis Q8-12 weeks  -IR consulted for chest tube placed 4/1/2022  -Chest tube had 340 ml over the last 24hrs, continue chest tube  - Continue R chest tube to suction until output <250mL 24hr period. Will water seal when output decreased.   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 Maharaj  recommended conservative treatment at this time.       ESRD on hemodialysis (MF) secondary to hepatorenal syndrome due to cirrhosis  -Nephrology consulted; HD today.   -Outpatient he has only been getting dialysis twice a week but it has been recommended by his primary to increase this to 3 times weekly for better symptom management and possible improvement of the empyema      Liver cirrhosis secondary to chronic hepatitis B  -Stable, LFTs unremarkable except for alk phos elevated likely due to renal disease  -Stable, no changes today     Goals of Care  Given the patient's multiple comorbidities it was felt appropriate to start the discussion regarding goals of care.  It was discussed with the patient that he was not a good candidate for chest compressions or intubation.    -Will discuss further in the outpatient setting       Diet: Room Service  NPO per Anesthesia Guidelines for Procedure/Surgery Except for: Meds    DVT Prophylaxis: Pneumatic Compression Devices  Beltre Catheter: Not present  Central Lines: None  Cardiac Monitoring: ACTIVE order. Indication: Pulmonary artery dissection, empyema  Code Status: No CPR- Do NOT Intubate      Disposition Plan   Expected Discharge: 04/05/2022     Anticipated discharge location:  Awaiting care coordination huddle       The patient's care was discussed with the Attending Physician, Dr. Vickey Arce III.    Henrietta Scott MD  Hospitalist Service  [facility]  Securely message with the [employer] Web Console (learn more here)  Text page via [employer] Paging/Directory         Clinically Significant Risk Factors Present on Admission                    ______________________________________________________________________    Interval History   Patient denies any pain. States he is tolerating chest tube well. No chest pain or shortness of breath. Awaiting fistulagram.     Data reviewed today: I reviewed all medications, new labs and imaging results over the last  24 hours.     Physical Exam   Vital Signs: Temp: 98.8  F (37.1  C) Temp src: Oral BP: (!) 143/92 Pulse: 74   Resp: 18 SpO2: 95 % O2 Device: None (Room air)    Weight: 132 lbs 11.2 oz  General Appearance:  sitting up in bed   Respiratory: Decreased lung sounds on the right, no wheezing, rales or rhonchi appreciated  Cardiovascular: RRR, no murmurs appreciated  MUSC: no edema appreciated in the LUE or bilateral lower extremities  Skin: Jaundiced skin with bruises on arms and legs of various stages of healing    Data   Recent Labs   Lab 04/04/22  0528 04/03/22  0731 04/02/22  1118 03/31/22  0710 03/30/22  1557   WBC 3.9* 3.9* 4.0   < > 5.5   HGB 8.1* 6.8* 6.1*   < > 6.2*   MCV 90 89 91   < > 101*   PLT 62* 48* 53*   < > 71*   INR  --   --   --   --  1.25*   * 135* 138   < > 138   POTASSIUM 3.9 3.4* 4.8   < > 4.2   CHLORIDE 98 99 105   < > 101   CO2 24 24 18*   < > 20*   BUN 49* 40* 103*   < > 92*   CR 6.29* 5.13* 9.38*   < > 8.04*   ANIONGAP 11 12 15   < > 17   TANO 7.6* 7.4* 7.6*   < > 7.7*   GLC 79 95 78   < > 112   ALBUMIN  --   --   --   --  3.1*   PROTTOTAL  --   --   --   --  8.1*   BILITOTAL  --   --   --   --  1.0   ALKPHOS  --   --   --   --  162*   ALT  --   --   --   --  31   AST  --   --   --   --  29    < > = values in this interval not displayed.     Recent Results (from the past 24 hour(s))   US Ext Arterial Venous Dialys Acs Graft    [facility]  ULTRASOUND EXTREMITY ARTERIAL VENOUS DIALYSIS ACCESS GRAFT  04/03/2022, 10:48 AM    INDICATION: Patient with left forearm loop graft. Patient had  fistulogram performed the day prior on 04/02/2022. Request made for  evaluation of patency and possibility of pseudoaneurysm.    TECHNIQUE: Color Doppler and spectral waveform analysis obtained  throughout the loop graft as well as inflow artery and outflow venous  system.    FINDINGS: The loop graft is patent. Inflow brachial artery is patent  with velocity of 184 cm/s. The  arterial anastomosis is widely patent  with velocity of 391 cm/s. Velocities within the graft range from  207-323 cm/s. Venous anastomosis is 403 cm/s, with minimal stenosis,  though not felt to be hemodynamically significant.    In the medial aspect of the graft is a small pseudoaneurysm measuring  0.4 x 0.6 x 0.6 cm, found to have blood flow within.    A second pseudoaneurysm is 0.5 x 0.7 x 1 cm, blood flow not clearly  assessed.    Third pseudoaneurysm is 0.4 x 0.5 x 0.8 cm, also seemingly patent.      Impression    IMPRESSION:  1.  Patent left upper extremity loop forearm graft. No obvious  stenosis at the venous or arterial anastomoses.  2.  Three separate and relatively small pseudoaneurysms identified  arising from the loop graft. The first and third are patent with blood  flow within the pseudoaneurysm. A second pseudoaneurysm not assessed  for blood flow. The first measures up to 6 mm, second measures up to  10 mm, and third measures up to 8 mm.    MIGUEL ÁNGEL JOSEPH MD         SYSTEM ID:  [id number]

## 2022-04-04 NOTE — H&P
"  Interventional Radiology - Pre-Procedure Note:  4/4/2022    Procedure Requested: LUE fistulogram  Requested by: August Torres MD    History and Physical Reviewed: H&P documented within 30 days (by Barb Skinner PA on 3/31/22).  I have personally reviewed the patient's medical history and have updated the medical record as necessary.    Brief HPI: Elle Blanton is a 61 year old male with ESRD on HD Monday and Friday via LUE fistula with last run 4/2/22 after IR fistulogram. Patient had bleeding from fistula and was seen 4/1 to place suture; IR fistulogram completed 4/2/22 with \"stenting of chronic left brachiocephalic vein occlusion with good result.\" Small pseudoaneurysm noted along medial graft. US completed today showing three separate small pseudoaneurysms from loop graft.     Plans for hemodialysis today 4/4.    Pt also with recurrent right side pleural effusion; s/p multiple chest tube placement and thoracentesis (please see IR consult note from 03/31). R hemothorax/emphyema on CTA from 03/30. S/p R chest tube placement 03/31 found to be hemothorax. Pt denies any chest pain or SOB. Pt's O2 level are in the upper 90's w/o supplemental oxygen. Medicine and nephrology are following. Nursing chart review notes no drainage from chest tube or leakage from insertion site but saturated bloody dressings. Scant bloody OP in peluro vac.       IMAGING:    [facility] Impression   [facility]  ULTRASOUND EXTREMITY ARTERIAL VENOUS DIALYSIS ACCESS GRAFT  04/03/2022, 10:48 AM     INDICATION: Patient with left forearm loop graft. Patient had  fistulogram performed the day prior on 04/02/2022. Request made for  evaluation of patency and possibility of pseudoaneurysm.     TECHNIQUE: Color Doppler and spectral waveform analysis obtained  throughout the loop graft as well as inflow artery and outflow venous  system.     FINDINGS: The loop graft is patent. Inflow brachial artery is patent  with velocity of 184 " cm/s. The arterial anastomosis is widely patent  with velocity of 391 cm/s. Velocities within the graft range from  207-323 cm/s. Venous anastomosis is 403 cm/s, with minimal stenosis,  though not felt to be hemodynamically significant.     In the medial aspect of the graft is a small pseudoaneurysm measuring  0.4 x 0.6 x 0.6 cm, found to have blood flow within.     A second pseudoaneurysm is 0.5 x 0.7 x 1 cm, blood flow not clearly  assessed.     Third pseudoaneurysm is 0.4 x 0.5 x 0.8 cm, also seemingly patent.                                                                      IMPRESSION:  1.  Patent left upper extremity loop forearm graft. No obvious  stenosis at the venous or arterial anastomoses.  2.  Three separate and relatively small pseudoaneurysms identified  arising from the loop graft. The first and third are patent with blood  flow within the pseudoaneurysm. A second pseudoaneurysm not assessed  for blood flow. The first measures up to 6 mm, second measures up to  10 mm, and third measures up to 8 mm.     MIGUEL ÁNGEL JOSEPH MD          NPO: midnight  ANTICOAGULANTS: none  ANTIBIOTICS: none indicated    ALLERGIES  Allergies   Allergen Reactions     Nka [No Known Allergies]          LABS:  INR   Date Value Ref Range Status   03/30/2022 1.25 (H) 0.90 - 1.15 Final   10/21/2020 1.26 (H) 0.90 - 1.10 Final      Hemoglobin   Date Value Ref Range Status   04/04/2022 8.1 (L) 13.3 - 17.7 g/dL Final   04/29/2021 8.9 (L) 14.0 - 18.0 g/dL Final   ]  Platelet Count   Date Value Ref Range Status   04/04/2022 62 (L) 150 - 450 10e3/uL Final   08/10/2019 53 (L) 150 - 450 10e9/L Final     Creatinine   Date Value Ref Range Status   04/04/2022 6.29 (HH) 0.70 - 1.30 mg/dL Final   04/29/2021 5.13 (H) 0.70 - 1.30 mg/dL Final     Potassium   Date Value Ref Range Status   04/04/2022 3.9 3.5 - 5.0 mmol/L Final   04/29/2021 4.7 3.5 - 5.0 mmol/L Final         EXAM:  BP (!) 143/92   Pulse 74   Temp 98.8  F (37.1  C) (Oral)   Resp  "18   Ht 1.651 m (5' 5\")   Wt 60.2 kg (132 lb 11.2 oz)   SpO2 95%   BMI 22.08 kg/m    General:  Stable.  In no acute distress.    Neuro:  A&O x 3. Moves all extremities equally.  Resp:  Lungs clear to auscultation bilaterally. Diminished RLL  Cardio:  S1S2 and reg, without murmur, clicks or rubs  Vascular: LUE with mild swelling. Fistula with +bruit and +thrill but slightly diminished.      Pre-Sedation Assessment:  Mallampati Airway Classification:  II - Faucial pillars and soft palate may be seen, but uvula is masked by the base of the tongue  Previous reaction to anesthesia/sedation:  No  Sedation plan based on assessment: Moderate (conscious) sedation  ASA Classification: Class 2 - MILD SYSTEMIC DISEASE, NO ACUTE PROBLEMS, NO FUNCTIONAL LIMITATIONS.   Code Status: Full Code intra procedure, per discussion with patient.      ASSESSMENT/PLAN:   62yo male on HD via LUE fistula; with pseudoaneurysm identified on US.  Fistulogram with potential intervention including stenting with sedation.    Procedure, risks/benefits, details, alternatives, and sedation reviewed with patient and patient verbalized understanding. All questions answered. OK to proceed with above radiology procedure.     -Continue current chest tube management and cares.  - Continue dressing changes as needed related to saturation. Reach out to IR if bleeding outside of tube at insertion site greatly increases.   -Discussed with Dr. Stephenson. Recommends 2mg TPA mixed with approx 20mL NS and instilled into chest tube catheter. Sit for one hour with TPA mixture instilled after one hour time may drain back to chest tube. (discussed with floor nurse)  - Recommend follow up imaging 4/5/22 if TPA administration is not successful. Consider ealier imaging if there are changes in chest tube's function or in patient's clinical course.  - IR will continue to follow. Please contact IR with chest tube related questions or concerns.      Zakia Pelaez, SUSY " CNP  Interventional Radiology

## 2022-04-04 NOTE — CONSULTS
Cardiothoracic Surgery Consult Note    Elle Blanton MRN# [id number]   Age: 61 year old YOB: 1960     Date of Admission:  3/30/2022    Date of Consult:   3/30/22    Reason for consult: Pulmonary artery dissection and chronic thoracic aortic dissection              History of Present Illness:   61 year old male who was admitted to ER with fatigue and found to have anemia with a Hb of 6.2. He also has chronic right pleural effusion which has been drained many time on the past and had a right chest pigtail placed by IR in this admission. He has a past medical history of chronic thoracic aortic dissection, hepatitis B, hepatorenal syndrome with ESRD, pleural effusion and normocytic anemia. CTA chest was done in this admission for suspected hemothorax to rule out a source of active bleeding which did not show any active extravasation but showed chronic Type B dissection and dissection of the main pulmonary artery extending into the RPA. Cardiac surgery was consulted for evaluation of chronic aortic dissection and PA dissection. No prior known h/o pulmonary hypertension, ECHO from April 2021 shows no evidence of pulmonary hypertension. Chart reviewed in detail.          Past Medical History:   ESRD- left brachio-cephalic fistula  Hepato-renal syndrome, Hep B, cirrhosis  Chronic right effusion with trapped lung, has been deemed not a surgical candidate in the past  Chronic type B dissection  HTN  Anemia    Past Medical History:   Diagnosis Date     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 pylori infection 7/5/2017    UGI bleed implied by Hgb 9.0 6/22/17 and melena. Admitted and hgb decreased to 7.6, CT abdomen  showed all bladder wall thickening. + Hep B surface antigen noted. Melena resolved and hgb stabalized without transfusion, epigastric pain resolved with PPI. Recommend referral for gastroscopy.     Heart attack (H)      Hepatitis B      Normocytic anemia      Other pancytopenia (H) 7/5/2017 6/24/17 Peripheral smear at [facility].Pancytopenia of uncertain cause. Ruled out acute leukemia. Hematologist suggests hemolytic anemia should be considered possible cause and LDH and haptoglobin should be assessed in future.      PONV (postoperative nausea and vomiting)      Thrombocytopenia (H)           Past Surgical History:   Left arm AV fistula  Elbow surgery    Past Surgical History:   Procedure Laterality Date     ABCESS DRAINAGE      finger     BURSECTOMY ELBOW Left 10/15/2021    Procedure: LEFT OLECRANON BURSECTOMY;  Surgeon: Tico Myers MD;  [place] [facility] [place] [place]     BURSECTOMY ELBOW Left 1/18/2022    Procedure: LEFT ELBOW OLECRANON BURSECTOMY;  Surgeon: Tico Myers MD;  Location: [facility] OR     CREATE FISTULA ARTERIOVENOUS UPPER EXTREMITY Left 4/20/2021    Procedure: left arm dialysis graft placement;  Surgeon: Roxana Cosby MD;  Location: [facility] OR;  Service: General     FOREIGN BODY REMOVAL      finger     INCISION AND DRAINAGE FINGER, COMBINED Left 10/20/2016    Procedure: COMBINED INCISION AND DRAINAGE FINGER;  Surgeon: Mireya Pizano MD;  Location: [place] OR     INCISION AND DRAINAGE UPPER EXTREMITY, COMBINED Left 1/18/2022    Procedure: AND ELBOW INCISION AND DRAINAGE;  Surgeon: Tico Myers MD;  Location: [facility] OR     IR CHEST TUBE PLACEMENT NON-TUNNELED RIGHT  4/19/2021     IR CHEST TUBE PLACEMENT NON-TUNNELED RIGHT  10/19/2021     IR CHEST TUBE PLACEMENT NON-TUNNELED RIGHT  11/10/2021     IR CHEST TUBE PLACEMENT NON-TUNNELED RIGHT  3/31/2022     IR PLEURAL DRAINAGE WITH CATHETER INSERTION  4/19/2021     MIDLINE INSERTION - DOUBLE LUMEN   4/23/2021          CA ESOPHAGOGASTRODUODENOSCOPY TRANSORAL DIAGNOSTIC N/A 8/18/2020    Procedure: ESOPHAGOGASTRODUODENOSCOPY (EGD) with biopsy;  Surgeon: Nilson Gonzales MD;  Location: [facility];  Service: Gastroenterology      PARACENTESIS  8/14/2020      PARACENTESIS  8/19/2020      THORACENTESIS  4/18/2021          Social History:     Social History     Tobacco Use     Smoking status: Never Smoker     Smokeless tobacco: Never Used   Substance Use Topics     Alcohol use: No          Family History:     Family History   Problem Relation Age of Onset     Diabetes Father      Hypertension No family hx of      Asthma No family hx of      Cancer No family hx of      Coronary Artery Disease No family hx of      Liver Cancer No family hx of      Ulcers Father           Allergies:     Allergies   Allergen Reactions     Nka [No Known Allergies]             Medications:     Current Facility-Administered Medications   Medication     0.9% sodium chloride BOLUS     0.9% sodium chloride BOLUS     0.9% sodium chloride BOLUS     0.9% sodium chloride BOLUS     acetaminophen (TYLENOL) tablet 975 mg     alteplase (ACTIVASE) 2 mg in sodium chloride (PF) 0.9% PF 20 mL solution in syringe for intracavitary/chest tube irrigation     calcium acetate (PHOSLO) capsule 667 mg     carvedilol (COREG) tablet 25 mg     cloNIDine (CATAPRES) tablet 0.1 mg     diltiazem ER COATED BEADS (CARDIZEM CD/CARTIA XT) 24 hr capsule 180 mg     entecavir (BARACLUDE) tablet 0.5 mg     fentaNYL (PF) (SUBLIMAZE) injection 25-50 mcg     flumazenil (ROMAZICON) injection 0.2 mg     gelatin absorbable (GELFOAM) sponge 1 each     Hold: Metformin and metformin containing medications on day of the procedure and for 48 hours after IV contrast given- Patients with acute kidney injury or severe chronic kidney disease (stage IV or stage V; i.e., eGFR less than 30)     hydrOXYzine (ATARAX) tablet 25 mg     lidocaine (LMX4) cream     lidocaine 1 % 0.1-1 mL      melatonin tablet 1 mg     midazolam (VERSED) injection 0.5-2 mg     naloxone (NARCAN) injection 0.2 mg    Or     naloxone (NARCAN) injection 0.4 mg    Or     naloxone (NARCAN) injection 0.2 mg    Or     naloxone (NARCAN) injection 0.4 mg     naloxone (NARCAN) injection 0.2 mg    Or     naloxone (NARCAN) injection 0.4 mg    Or     naloxone (NARCAN) injection 0.2 mg    Or     naloxone (NARCAN) injection 0.4 mg     No heparin via hemodialysis machine     ondansetron (ZOFRAN) injection 4 mg     oxyCODONE (ROXICODONE) tablet 5 mg     pantoprazole (PROTONIX) EC tablet 40 mg     senna-docusate (SENOKOT-S/PERICOLACE) 8.6-50 MG per tablet 1 tablet    Or     senna-docusate (SENOKOT-S/PERICOLACE) 8.6-50 MG per tablet 2 tablet     senna-docusate (SENOKOT-S/PERICOLACE) 8.6-50 MG per tablet 1 tablet     sodium chloride (PF) 0.9% PF flush 3 mL     sodium chloride (PF) 0.9% PF flush 3 mL     zolpidem (AMBIEN) tablet 5 mg          Review of Systems:    ROS: 10 point ROS neg other than the symptoms noted above in the HPI.           Physical Exam:   All vitals have been reviewed  Temp:  [98.1  F (36.7  C)-98.8  F (37.1  C)] 98.6  F (37  C)  Pulse:  [73-76] 73  Resp:  [16-20] 20  BP: (110-155)/(72-92) 125/81  SpO2:  [95 %-98 %] 98 %    Physical Exam:  GENERAL: appears well, in no acute distress  HEENT: within normal limits  NECK:  supple, no lymphadenopathy  CARDIOVASCULAR:  Regular rate and rhythm; normal S1, S2;  no murmurs  LUNGS: Clear bilaterally, decreased sounds right side, right pleural pigtail in place  ABDOMEN:  Soft, nontender  EXTREMITIES:  pedal edema present bilaterally, bilateral pulses present  NEUROLOGIC:  Alert and oriented x 3 with no focal deficits          Data:   All laboratory data reviewed    Results:  BMP  Recent Labs   Lab 04/04/22  0528 04/03/22  0731 04/02/22  1118 04/01/22  0534   * 135* 138 136   POTASSIUM 3.9 3.4* 4.8 4.6   CHLORIDE 98 99 105 103   CO2 24 24 18* 19*   BUN 49* 40* 103* 103*   CR  6.29* 5.13* 9.38* 9.19*   GLC 79 95 78 92     CBC  Recent Labs   Lab 04/04/22  0528 04/03/22  0731 04/02/22  1118 04/01/22  0534   WBC 3.9* 3.9* 4.0 5.3   HGB 8.1* 6.8* 6.1* 7.5*   PLT 62* 48* 53* 60*     LFT  Recent Labs   Lab 03/30/22  1557   AST 29   ALT 31   ALKPHOS 162*   BILITOTAL 1.0   ALBUMIN 3.1*   INR 1.25*     Recent Labs   Lab 04/04/22  0528 04/03/22  0731 04/02/22  1118 04/01/22  0534 03/31/22  1322 03/30/22  1557   GLC 79 95 78 92 80 112       Imaging:  CTA chest (3/31/2022):  1.  Dissection extending from the anterior aspect of the thoracic aortic arch into the abdominal aorta.  2.  There is aneurysmal dilatation seen of the thoracic aorta most marked at the mid arch region which measures approximately 5.7 cm in greatest radial dimension. No active rupture is identified.  3.  The dissection and size of the thoracic aorta appears stable since prior CT 11/09/2021.  4.  There is a dissection seen involving the main pulmonary artery and the proximal aspect of the right pulmonary artery which is commonly associated with changes of chronic increased pulmonary arterial pressure.  5.  There are findings worrisome for a large empyema in the right hemithorax with associated significant atelectasis.  6.  There are small free appearing bilateral pleural effusions.  7.  There is mild abdominal ascites.  8.  Changes of cirrhosis in the liver with associated portal venous hypertension and splenomegaly.          Assessment and Plan:   Assessment:   - ESRD on dialysis secondary to Hepatorenal syndrome  - Hep B Cirrhosis  - Chronic right effusion with trapped lung, s/p multiple chest tube placements, right pigtail in place, deemed non-surgical candidate for decortication due to co-morbidities  - Chronic type B dissection  - PA dissection, ? Pulmonary hypertension  - Chronic anemia      Plan:   - No acute surgical intervention needed for chronic type B dissection and PA dissection.  - Please continue medical management  per primary team.  - Please call Cardiac surgery as needed.    Discussed with staff, Dr. Curry     Thanks for the opportunity to take care of this patient.    CHAYO SANTIAGO MD  Fellow, Cardiothoracic Surgery  Pager # [phone]

## 2022-04-04 NOTE — PROGRESS NOTES
Chest tubes  -Pt. Had drainage from chest tube site and co worker changed dressing and when I went in there to view, again like last night there was no drainage or no visible bleeding from site.  And yet the dressing was saturated with blood.

## 2022-04-04 NOTE — PLAN OF CARE
Problem: Plan of Care - These are the overarching goals to be used throughout the patient stay.    Goal: Optimal Comfort and Wellbeing  Outcome: Met     Problem: Oral Intake Inadequate (Chronic Kidney Disease)  Goal: Optimal Oral Intake  Outcome: Met     Problem: Pain (Chronic Kidney Disease)  Goal: Acceptable Pain Control  Outcome: Met   Goal Outcome Evaluation:      Pt. Alert and oriented. Continues on telemetry. Continues with right chest tube. Very minimal output. About 10ml over the night. NPO since midnight for a stent grafting to right AVF. Vital signs stable. Sat up a lot at edge of bed through the night. Did not rest much. Will continue to monitor.    Maribel Canales RN

## 2022-04-04 NOTE — PLAN OF CARE
Problem: Plan of Care - These are the overarching goals to be used throughout the patient stay.    Goal: Plan of Care Review/Shift Note  Description: The Plan of Care Review/Shift note should be completed every shift.  The Outcome Evaluation is a brief statement about your assessment that the patient is improving, declining, or no change.  This information will be displayed automatically on your shift note.  Outcome: Ongoing, Progressing  Goal: Optimal Comfort and Wellbeing  Outcome: Ongoing, Progressing     Problem: Renal Function Impairment (Chronic Kidney Disease)  Goal: Minimize Renal Failure Effects  Outcome: Ongoing, Progressing     Problem: Plan of Care - These are the overarching goals to be used throughout the patient stay.    Goal: Absence of Hospital-Acquired Illness or Injury  Intervention: Identify and Manage Fall Risk  Recent Flowsheet Documentation  Taken 4/4/2022 1600 by Irish Sykes RN  Safety Promotion/Fall Prevention: activity supervised  Taken 4/4/2022 0800 by Irish Sykes RN  Safety Promotion/Fall Prevention: activity supervised  Intervention: Prevent Skin Injury  Recent Flowsheet Documentation  Taken 4/4/2022 1600 by Irish Sykes RN  Body Position: position changed independently  Taken 4/4/2022 0800 by Irish Sykes RN  Body Position: position changed independently  Intervention: Prevent and Manage VTE (Venous Thromboembolism) Risk  Recent Flowsheet Documentation  Taken 4/4/2022 1600 by Irish Sykes RN  Activity Management: activity adjusted per tolerance  Taken 4/4/2022 0800 by Irish Sykes RN  Activity Management: activity adjusted per tolerance     Problem: Anemia  Goal: Anemia Symptom Improvement  Intervention: Monitor and Manage Anemia  Recent Flowsheet Documentation  Taken 4/4/2022 1600 by Irish Sykes RN  Safety Promotion/Fall Prevention: activity supervised  Taken 4/4/2022 0800 by Irish Sykes RN  Safety Promotion/Fall Prevention:  activity supervised     Problem: Gas Exchange Impaired  Goal: Optimal Gas Exchange  Intervention: Optimize Oxygenation and Ventilation  Recent Flowsheet Documentation  Taken 4/4/2022 1600 by Irish Sykes RN  Head of Bed (HOB) Positioning: HOB at 20-30 degrees  Taken 4/4/2022 0800 by Irish Sykes RN  Head of Bed (HOB) Positioning: HOB at 20-30 degrees     Problem: Functional Decline (Chronic Kidney Disease)  Goal: Optimal Functional Ability  Intervention: Optimize Functional Ability  Recent Flowsheet Documentation  Taken 4/4/2022 1600 by Irish Sykes, RN  Activity Management: activity adjusted per tolerance  Taken 4/4/2022 0800 by Irish Sykes RN  Activity Management: activity adjusted per tolerance   Goal Outcome Evaluation:          Pt returned to P3 after fistulagram.  No issues.  He is receiving dialysis with planned run till about 2000.  Wife is at bedside.  Tyl 650 was given for headache.  Spoke with Swat nurse who is aware of TPA to chest tube.  Planning on doing this after dialysis. 100cc sanguinous output from chest tube during day shift.

## 2022-04-04 NOTE — PROCEDURES
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  Placement of covered stent graft across the psueodaneurysms with good results.     OK to use dialysis graft for dialysis.

## 2022-04-04 NOTE — PROGRESS NOTES
RENAL PROGRESS NOTE    CC: ESRD management    ASSESSMENT & PLAN:   ESRD on HD: He still making urine and stated normal amount. He is getting only Monday and Friday dialysis at [facility]. TW is 60.5kg and last run was on 3/28/2022. His primary nephrologist is Dr. Nate Rivero. Dialyze for 3 hr.   Bleeding from cannulation of his AVG and Dr. Cosby and his team evaluated and placed suture urgently on 4/1. 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 Concerning for aneurysm and plan to fix it today  - Plan for HD today as per usual schedule.   - renal diet  - renally dosing medication.   - strict Is and Os as he is still making urine.      BP/ Volume: BP at goal. Volume is euvolemic. PTA clonidine 0.1 mg at bedtime, diltiazem  mg bid.   - Continue PTA meds  - monitor BP  - TW of 60.5kg.      Electrolytes: Na 133 and K 3.9 today  - monitor   - hold off K replacement       Anemia: likely combined with ESRD and ABL.   Received 4U PRBC during this hospitalization.   Received Epo 60363Y x2 per week at [facility]  Today Hgb is trending up to 8.1 g/dl today.   Give Epo [id number] today  - Transfuse per protocol  - monitor for now.      Acid-Base: Bicarb is stable at 24  today  - Monitor.     Access: JOHNNY AVG (+). Issue with bleeding and likely from stenosis. S/P stenting by IR (4/2). Plan for repair of aneurysm today       BMD: PTA phoslo 667 mg tid with meal. Ca 7.4  with albmin 3.1 and corrected Ca ~ 8.1.   - phos added to am lab   - Continue phoslo TID with meals.     We will follow       SUBJECTIVE:    No acute issues. Patient was seen at the bedside and events were reviewed with nursing. Patient states that he is feeling the same.No new complaints.. Denies any issue after HD.  No CP, palpitation, nausea.    OBJECTIVE:  Physical Exam   Temp: 98.8  F (37.1  C) Temp src: Oral BP: (!) 143/92 Pulse: 74   Resp: 18 SpO2: 95 % O2 Device: None (Room air)     Vitals:    04/02/22 1730 04/02/22 2045 04/04/22 0419   Weight: 60.3 kg (133 lb 0.1 oz) 60.3 kg (133 lb 0.1 oz) 60.2 kg (132 lb 11.2 oz)     Vital Signs with Ranges  Temp:  [97.4  F (36.3  C)-98.8  F (37.1  C)] 98.8  F (37.1  C)  Pulse:  [69-77] 74  Resp:  [16-20] 18  BP: (110-155)/(72-92) 143/92  SpO2:  [95 %-100 %] 95 %  I/O last 3 completed shifts:  In: 555 [P.O.:200]  Out: 590 [Urine:250; Chest Tube:340]    @TMAXR(24)@    Patient Vitals for the past 72 hrs:   Weight   04/04/22 0419 60.2 kg (132 lb 11.2 oz)   04/02/22 2045 60.3 kg (133 lb 0.1 oz)   04/02/22 1730 60.3 kg (133 lb 0.1 oz)   04/02/22 1615 60.3 kg (133 lb 0.1 oz)   04/02/22 0658 60.3 kg (133 lb)   04/01/22 1757 60.6 kg (133 lb 7.8 oz)   04/01/22 1600 60.5 kg (133 lb 6.1 oz)       Intake/Output Summary (Last 24 hours) at 4/1/2022 1738  Last data filed at 4/1/2022 1000  Gross per 24 hour   Intake 480 ml   Output 650 ml   Net -170 ml       PHYSICAL EXAM:  General - Alert and oriented x3, appears comfortable, NAD  Cardiovascular - Regular rate and rhythm, no rub  Respiratory - Clear to auscultation bilaterally, no crackles or wheezes  Abd: BS present, no guarding or pain with palpation, no ascites  Extremities - edema of the right foot and ankle, LL arm AVG bleeding site (+) s/p sutured.   Skin: dry, intact, no rash, good turgor  Neuro:  Grossly intact, no focal deficits  MSK:  Grossly intact  Psych:  Normal affect    LABORATORY STUDIES:     Recent Labs   Lab 04/04/22  0528 04/03/22  0731 04/02/22  1118 04/01/22  0534 03/31/22  1322 03/31/22  0710 03/30/22  1557   WBC 3.9* 3.9* 4.0 5.3 5.1  --  5.5   RBC 2.75* 2.31* 2.07* 2.51* 2.57*  --  2.02*   HGB 8.1* 6.8* 6.1* 7.5* 7.6* 6.6* 6.2*   HCT 24.7* 20.6* 18.9* 23.0* 23.5*  --  20.3*   PLT 62* 48* 53* 60* 65*  --  71*       Basic Metabolic Panel:  Recent Labs   Lab 04/04/22  0528 04/03/22  0731 04/02/22  1118 04/01/22  0534 03/31/22  1322 03/30/22  1557   * 135* 138 136 139 138   POTASSIUM 3.9 3.4*  4.8 4.6 4.5 4.2   CHLORIDE 98 99 105 103 103 101   CO2 24 24 18* 19* 20* 20*   BUN 49* 40* 103* 103* 95* 92*   CR 6.29* 5.13* 9.38* 9.19* 8.78* 8.04*   GLC 79 95 78 92 80 112   TANO 7.6* 7.4* 7.6* 7.7* 7.8* 7.7*       INR  Recent Labs   Lab 03/30/22  1557   INR 1.25*        Recent Labs   Lab Test 04/04/22  0528 04/03/22  0731 03/31/22  0710 03/30/22  1557 10/19/21  1006 10/19/21  0940   INR  --   --   --  1.25*  --  1.38*   WBC 3.9* 3.9*   < > 5.5   < >  --    HGB 8.1* 6.8*   < > 6.2*   < >  --    PLT 62* 48*   < > 71*   < >  --     < > = values in this interval not displayed.       Personally reviewed current labs      Elizabeth Salmeron MD  [facility]  [address], [address]  [place] [address]  Phone# [phone]  Fax# [phone]

## 2022-04-05 NOTE — SIGNIFICANT EVENT
RN notified House officer of low blood pressure reading while patient was sitting on side of bed. (73/47). Pt laid down, drank some fluids, and recheck was 98/59. At this time, patient is not symptomatic, and blood pressure is stable. Will encourage PO intake. Feel that this is related to dialysis today, where 1L was removed. Monitor for return of dizziness/low Bps.  Discussed with: bedside nurse    Steven Watkins DO    Sepsis Evaluation Progress Note    I was called to see Elle Blanton due to abnormal vital signs triggering the Sepsis SIRS screening alert. He is not known to have an infection.     Physical Exam   Vital Signs:  Temp: 98  F (36.7  C) Temp src: Oral BP: 106/66 Pulse: 57   Resp: 18 SpO2: 100 % O2 Device: Nasal cannula Oxygen Delivery: 2 LPM     General: in no acute distress  Mental Status: AAOx4.     Remainder of physical exam is significant for chest tube in place (right side), cachectic. CV: RRR no murmurs, abd nondistended, nontender. No perpiheral edema. Lungs CTA.    Data   Lactic Acid   Date Value Ref Range Status   04/05/2022 2.3 (H) 0.7 - 2.0 mmol/L Final   04/02/2022 1.3 0.7 - 2.0 mmol/L Final   08/10/2019 0.6 (L) 0.7 - 2.0 mmol/L Final   08/09/2019 0.6 (L) 0.7 - 2.0 mmol/L Final       Assessment & Plan   NO EVIDENCE OF SEPSIS at this time.  Vital sign, physical exam, and lab findings are due to possibly hypotensive episode vs CKD.    Disposition: The patient will remain on the current unit. We will continue to monitor this patient closely..  Steven Watkins DO     Sepsis Criteria   Sepsis: 2+ SIRS criteria due to infection  Severe Sepsis: Sepsis AND 1+ new sign of acute organ dysfunction (Note: lactate >2 or acute encephalopathy each qualify as organ dysfunction)  Septic Shock: Sepsis AND hypotension despite volume resuscitation with 30 ml/kg crystalloid or lactate >=4  Note: HYPOTENSION is defined as 2 BP readings measured 3 hrs apart that have a SBP <90, MAP <65, or decrease >40 mmHg, occurring 6  hrs before or after t-zero

## 2022-04-05 NOTE — PROGRESS NOTES
Pt received from P3,alert and oriented,dangled in bed per request because chest tube site hurts 6/10 but declined meds.Chest tube to suction.

## 2022-04-05 NOTE — PLAN OF CARE
Problem: Gas Exchange Impaired  Goal: Optimal Gas Exchange  Outcome: Ongoing, Progressing     Problem: Adjustment to Illness (Chronic Kidney Disease)  Goal: Optimal Coping with Chronic Illness  Outcome: Ongoing, Progressing     Problem: Pain (Chronic Kidney Disease)  Goal: Acceptable Pain Control  Outcome: Ongoing, Progressing  Intervention: Prevent or Manage Pain  Recent Flowsheet Documentation  Taken 4/5/2022 0002 by Suzan Navarrete RN  Pain Management Interventions: medication (see MAR)   Goal Outcome Evaluation:  Pt a/o x4, c/o pain on surgical site, requested and had prn oxycodone and tylenol, which was effective, had an episode of symptomatic low BP in the 70's while dangling feet at bedside, encourage fluid intake, on oxygen, while in bed BP trending up,  septic triggered  with lactic of 2.3 critical reported to MD, pt was seen by MD  with no new order. pt now stable, on room air, chest tube flushed per order, dressing C/D/I. Patent and draining.Will continue to monitor.

## 2022-04-05 NOTE — PROGRESS NOTES
HO informed about pt change of status (low bp and dyspnea on exertion) encourage fluid intake, and put on 2l of oxygen at later 3L to get sat above 90% now at 2L with sat above 90%and stable BP. Ordered to continue monitoring. Pt stable at this time

## 2022-04-05 NOTE — PROGRESS NOTES
[facility]    Medicine Progress Note - Hospitalist Service    Date of Admission:  3/30/2022    Assessment & Plan          Elle Blanton is a 61 year old male with past medical history significant for thoracic aortic dissection, hepatitis B, hepatorenal syndrome with ESRD, pleural effusion and normocytic anemia who was admitted on 3/30/2022 after being found to have a hgb of 6.2 at clinic.  Also admitted to the hospital in the hopes of getting a chest tube to help with recurrent empyema. Multiple pseudoaneurysms of LUE, underwent fistulagram and stent placement on 4/4.       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 1st and 3rd with patent blood flow. IR notes tiny pseudoaneurysm/graft break down at medial graft, which may benefit from stent. 4/4: Underwent multiple stent placements of pseudoaneurysms. Per IR, OK for HD.    Acute macrocytic anemia on chronic normocytic anemia of chronic disease  History of GI bleed  Thrombocytopenia  -Patient was seen in clinic on 3/30 and found to have a hgb of 6.2 after being rechecked due to chronic anemia  -Was given 1 unit of packed RBCs in the ED  -He has required outpatient transfusions the most recent being 2 weeks ago  -Denies bloody bowel movements or dark stools  -Admits to falling and hitting his nose with 4 days of bleeding several days ago prior to admission, no longer having a bloody nose  -He is on high-dose EPO and takes iron supplements outpatient  -Being worked up outpatient for hemolytic anemia due to undetectable haptoglobin, LDH elevation, and low reticulocyte count  -Primary care physician is placing a referral to heme onc to help address this  -  total 3 units PRBC since presentation.  -Chest tube with serosang output.  -Will monitor and transfuse for hemoglobin less than  7     Empyema  History of recurrent pleural effusions  -CT chest 3/30:1.  Large complicated right pleural effusion with scattered hyperdensities suggesting hemorrhage. Atelectasis involving the entire right lower lobe and most of the right upper lobe. 2.  Small left pleural effusion and left basilar atelectasis. 3.  Aneurysmal dilatation involving the aortic arch and descending thoracic aorta, unchanged. There is also a chronic dissection with intimal flap upper abdominal aorta.4.  Cirrhosis and splenomegaly. Small amount of ascites  -Patient has had pleural effusions in the past that required chest tubes and multiple thoracenteses  -Patient last underwent thoracentesis on 3/23 and was unable to get much fluid off of the lung  -It has been recommended the patient undergo surgery for this, on 1/6/22 the patient followed up with Dr. Zurita and it was explained that the patient is not a surgical candidate due to his multiple comorbidities and the chronicity of the trapped lung  -It was recommended to potentially get a thoracentesis Q8-12 weeks  -IR consulted for chest tube placed 4/1/2022  -Chest tube had 340 ml over the last 24hrs, continue chest tube  - Continue R chest tube to suction until output <250mL 24hr period. Will water seal when output decreased. Total output was not charted overnight but is about 350 ml/24.   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 Maharaj recommended conservative treatment at this time.       ESRD on hemodialysis (MF) secondary to hepatorenal syndrome due to cirrhosis  -Nephrology consulted; HD M/F  -Outpatient he has only been getting dialysis  twice a week but it has been recommended by his primary to increase this to 3 times weekly for better symptom management and possible improvement of the empyema      Liver cirrhosis secondary to chronic hepatitis B  -Stable, LFTs unremarkable except for alk phos elevated likely due to renal disease  -Stable, no changes today     Goals of Care  Given the patient's multiple comorbidities it was felt appropriate to start the discussion regarding goals of care.  It was discussed with the patient that he was not a good candidate for chest compressions or intubation.    -Will discuss further in the outpatient setting       Diet: Room Service  Renal Diet (dialysis)    DVT Prophylaxis: Pneumatic Compression Devices  Beltre Catheter: Not present  Central Lines: None  Cardiac Monitoring: None  Code Status: No CPR- Do NOT Intubate      Disposition Plan   Expected Discharge: 04/06/2022     Anticipated discharge location:  Awaiting care coordination huddle       The patient's care was discussed with Dr Rosenstein Angeline David, MD  Hospitalist Service  [facility]  Securely message with the Vocera Web Console (learn more here)  Text page via Info Paging/Directory         Clinically Significant Risk Factors Present on Admission                    ______________________________________________________________________    Interval History   Patient denies any pain. States he is tolerating chest tube well. No chest pain or shortness of breath. Wishes to go home    Data reviewed today: I reviewed all medications, new labs and imaging results over the last 24 hours.     Physical Exam   Vital Signs: Temp: 98.2  F (36.8  C) Temp src: Oral BP: 108/67 Pulse: 67   Resp: 18 SpO2: 99 % O2 Device: None (Room air) Oxygen Delivery: 2 LPM  Weight: 132 lbs 11.47 oz  General Appearance:  sitting up in bed   Respiratory: Decreased lung sounds on the right, no wheezing, rales or rhonchi appreciated. CT with serosang  output, mild air leak  Cardiovascular: RRR, no murmurs appreciated  MUSC: no edema appreciated in the LUE or bilateral lower extremities  Skin: Jaundiced skin with bruises on arms and legs of various stages of healing    Data   Recent Labs   Lab 04/05/22  0540 04/04/22  0528 04/03/22  0731 03/31/22  0710 03/30/22  1557   WBC 4.3 3.9* 3.9*   < > 5.5   HGB 7.7* 8.1* 6.8*   < > 6.2*   MCV 91 90 89   < > 101*   PLT 50* 62* 48*   < > 71*   INR  --   --   --   --  1.25*   * 133* 135*   < > 138   POTASSIUM 3.7 3.9 3.4*   < > 4.2   CHLORIDE 99 98 99   < > 101   CO2 25 24 24   < > 20*   BUN 24* 49* 40*   < > 92*   CR 4.13* 6.29* 5.13*   < > 8.04*   ANIONGAP 11 11 12   < > 17   TANO 7.6* 7.6* 7.4*   < > 7.7*   GLC 80 79 95   < > 112   ALBUMIN  --   --   --   --  3.1*   PROTTOTAL  --   --   --   --  8.1*   BILITOTAL  --   --   --   --  1.0   ALKPHOS  --   --   --   --  162*   ALT  --   --   --   --  31   AST  --   --   --   --  29    < > = values in this interval not displayed.     Recent Results (from the past 24 hour(s))   IR Dialysis Fistulogram Left    Narrative    [facility]  LOCATION: [facility]  DATE: 4/4/2022    PROCEDURE: LEFT ARM FISTULOGRAM AND PLACEMENT OF COVERED STENT GRAFT    INTERVENTIONAL RADIOLOGIST: Zbigniew Stephenson MD.    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    CONSENT: The risks, benefits and alternatives of left arm fistulogram with possible intervention were discussed with the patient  in detail. All questions were answered. Informed consent was given to proceed with the procedure.    MODERATE SEDATION: Versed 1.5 mg IV; Fentanyl 50 mcg IV.  Under physician supervision, Versed and fentanyl were administered for moderate sedation. Pulse oximetry, heart rate and blood pressure were continuously monitored by an independent trained   observer. The physician spent 30 minutes of face-to-face sedation time with the  patient.    CONTRAST: 25 cc of Omni  ANTIBIOTICS: None.  ADDITIONAL MEDICATIONS: None.    FLUOROSCOPIC TIME: 2.9 minutes.  RADIATION DOSE: Air Kerma: 8 mGy.    COMPLICATIONS: No immediate complications.    STERILE BARRIER TECHNIQUE: Maximum sterile barrier technique was used. Cutaneous antisepsis was performed at the operative site with application of 2% chlorhexidine and large sterile drape. Prior to the procedure, the  and assistant performed   hand hygiene and wore hat, mask, sterile gown, and sterile gloves during the entire procedure.    PROCEDURE:    The left arm dialysis graft was accessed using a micropuncture needle towards the arterial limb of the graft and eventually a 8 Amharic sheath was placed. Through the sheath over a [employer] wire a 5 Amharic KMP catheter was advanced into the arterial limb   of the graft. A left arm dialysis fistulogram was performed. This revealed to moderate size pseudoaneurysms coming off near the apex of the graft and a small pseudoaneurysm coming off of the arterial limb of the graft. This was successfully treated with   overlapping 8 mm x 2.5 cm and 8 mm x 5 cm Viabahn covered stent grafts. The stent graft were then angioplastied to 8 mm. Final fistulogram demonstrates no residual active bleed or pseudoaneurysm.        Impression    IMPRESSION:    Left arm fistulogram reveals 3 pseudoaneurysms coming off of the arterial limb of the graft successfully treated with placement of Viabahn covered stent grafts.  ____________________________________________________________________       XR Chest Port 1 View    Narrative    EXAM: XR CHEST PORT 1 VIEW  LOCATION: [facility]  DATE/TIME: 4/5/2022 4:59 AM    INDICATION: R empyema s p chest tube  COMPARISON: 04/01/2022. CT 03/30/2022.      Impression    IMPRESSION: Right chest tube in place with continued mild decrease in the right pleural effusion with mild complex effusion remaining. Some mild  hydropneumothorax in the right lung base likely due to trapped lung right lower lobe.    New vascular stent across the left brachiocephalic vein place for 04/02/2022.

## 2022-04-05 NOTE — PLAN OF CARE
Problem: Adjustment to Illness (Chronic Kidney Disease)  Goal: Optimal Coping with Chronic Illness  Outcome: Ongoing, Progressing   Goal Outcome Evaluation:  Patient receiving dialysis 2 times a week.   Problem: Pain (Chronic Kidney Disease)  Goal: Acceptable Pain Control  Outcome: Ongoing, Progressing   Minimal pain reported at chest tube site. Patient refuse pain medication. He will let nurse know if he needs pain medication. Chest tube intact, see flow sheet for drainage amount this shift.

## 2022-04-05 NOTE — PROGRESS NOTES
RENAL PROGRESS NOTE    CC: ESRD management    ASSESSMENT & PLAN:   ESRD on HD: He still making urine and stated normal amount. He is getting only Monday and Friday dialysis at [facility]. TW is 60.5kg and last run was on 3/28/2022. His primary nephrologist is Dr. Nate Rivero. Dialyze for 3 hr.   Last hemodialysis 04/04  -No indication for hemodialysis today.  Advised the patient to do dialysis run tomorrow.  However the patient insisted that he wants to do dialysis on Friday as per his usual schedule.  - renal diet  - renally dosing medication.       Access: Bleeding from cannulation of his AVG and Dr. Cosby and his team evaluated and placed suture urgently on 4/1. 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  Had a placement of covered stent graft across of pseudoaneurysm with good results.  -Patient had no issues with cannulation during dialysis 04/04.     BP/ Volume: BP at goal.  Patient did have episodes of hypotension with systolic pressure in 70s earlier this morning.  Volume is euvolemic.  Estimated dry weight 60.5 kg.  PTA clonidine 0.1 mg at bedtime, diltiazem  mg bid, carvedilol 25 mg twice daily  -Diltiazem was discontinued this morning given hypotension  --strict Is and Os as he is still making urine. UOP is ranging between 175 to 775 cc daily  -Daily weight     Electrolytes/acid-base status:   -Mild hyponatremia of 135.    -Serum potassium 3.7.  Run with K3 past.  Renal diet.  -Serum bicarbonate within normal limits at 25.  Run with 32 bicarb bath     Anemia: likely combined with ESRD and ABL.   Received 4U PRBC during this hospitalization.   Receives Epo 38199V x2 per week at [facility].  Received 68176 units of Epogen 04/04  Today Hgb is trending down from 8.1 to 7.7 g/dl.  Signs of active  bleeding leaving  - Transfuse per protocol  - monitor for now.           BMD:   -Phosphorus 3.2. On phoslo 667 mg tid with meal.  - Ca 7.6  with albmin 3.1 and corrected Ca ~ 8.4.   -Renal diet    Chronic right effusion with trapped lung- s/p multiple chest tube placements, right pigtail in place, deemed non-surgical candidate for decortication due to co-morbidities.    Chronic type B dissection-seen by  cardiothoracic surgery.No acute surgical intervention needed    We will follow       SUBJECTIVE:    No acute issues.  Patient underwent left arm fistulogram, found to have 3 small pseudoaneurysm of the arterial limb of the existing graft.  Had a placement of covered stent graft across of pseudoaneurysm with good results.  I reviewed dialysis run from yesterday.  Patient tolerated dialysis without issues, net fluid removed 1000 cc.  Noted this morning, the patient had an episode of symptomatic hypotension with systolic blood pressure in 70s while dangling feet at the bedside.  Patient reports feeling lightheaded during the hypotension episode.  Patient was encouraged to drink more fluids and lay down.  Patient was seen at the bedside and events were reviewed with nursing. Patient states that he is feeling the same.lightheadedness resolved.  No new complaints.   Patient denies: fever, chills, cough, shortness of breath , chest pain, palpitations, orthopnea, nausea, vomiting, abdominal pain, changes in bowel habits, dysuria, urinary frequency, urgency, hematuria, rash.      OBJECTIVE:  Physical Exam   Temp: 98.2  F (36.8  C) Temp src: Oral BP: 108/67 Pulse: 67   Resp: 18 SpO2: 99 % O2 Device: None (Room air) Oxygen Delivery: 2 LPM  Vitals:    04/02/22 2045 04/04/22 0419 04/04/22 1714   Weight: 60.3 kg (133 lb 0.1 oz) 60.2 kg (132 lb 11.2 oz) 60.2 kg (132 lb 11.5 oz)     Vital Signs with Ranges  Temp:  [97.7  F (36.5  C)-98.5  F (36.9  C)] 98.2  F (36.8  C)  Pulse:  [57-82] 67  Resp:  [14-20] 18  BP: ()/(47-93)  108/67  SpO2:  [88 %-100 %] 99 %  I/O last 3 completed shifts:  In: 10 [Other:10]  Out: 725 [Urine:625; Chest Tube:100]    @TMAXR(24)@    Patient Vitals for the past 72 hrs:   Weight   04/04/22 1714 60.2 kg (132 lb 11.5 oz)   04/04/22 0419 60.2 kg (132 lb 11.2 oz)   04/02/22 2045 60.3 kg (133 lb 0.1 oz)   04/02/22 1730 60.3 kg (133 lb 0.1 oz)   04/02/22 1615 60.3 kg (133 lb 0.1 oz)       Intake/Output Summary (Last 24 hours) at 4/1/2022 1738  Last data filed at 4/1/2022 1000  Gross per 24 hour   Intake 480 ml   Output 650 ml   Net -170 ml       PHYSICAL EXAM:  General - Alert and oriented x3, appears comfortable, NAD  Cardiovascular - Regular rate and rhythm, no rub  Respiratory -right-sided chest tube draining serosanguineous fluid, diminished breath sounds in the right posterior lung fields, left lung clear to auscultation anterior and posterior fields, no crackles or wheezes  Abd: BS present, no guarding or pain with palpation, no ascites  Extremities -resolved edema of the right foot and ankle  Skin: dry, intact, no rash, good turgor  Neuro:  Grossly intact, no focal deficits  MSK:  Grossly intact  Psych:  Normal affect  Access:  LUE AVG with sutures, mild edema, no tenderness no warmth, palpable thrill and audible bruit    LABORATORY STUDIES:     Recent Labs   Lab 04/05/22  0540 04/04/22  0528 04/03/22  0731 04/02/22  1118 04/01/22  0534 03/31/22  1322   WBC 4.3 3.9* 3.9* 4.0 5.3 5.1   RBC 2.56* 2.75* 2.31* 2.07* 2.51* 2.57*   HGB 7.7* 8.1* 6.8* 6.1* 7.5* 7.6*   HCT 23.4* 24.7* 20.6* 18.9* 23.0* 23.5*   PLT 50* 62* 48* 53* 60* 65*       Basic Metabolic Panel:  Recent Labs   Lab 04/05/22  0540 04/04/22  0528 04/03/22  0731 04/02/22  1118 04/01/22  0534 03/31/22  1322   * 133* 135* 138 136 139   POTASSIUM 3.7 3.9 3.4* 4.8 4.6 4.5   CHLORIDE 99 98 99 105 103 103   CO2 25 24 24 18* 19* 20*   BUN 24* 49* 40* 103* 103* 95*   CR 4.13* 6.29* 5.13* 9.38* 9.19* 8.78*   GLC 80 79 95 78 92 80   TANO 7.6* 7.6* 7.4*  7.6* 7.7* 7.8*       INR  Recent Labs   Lab 03/30/22  1557   INR 1.25*        Recent Labs   Lab Test 04/05/22  0540 04/04/22  0528 03/31/22  0710 03/30/22  1557 10/19/21  1006 10/19/21  0940   INR  --   --   --  1.25*  --  1.38*   WBC 4.3 3.9*   < > 5.5   < >  --    HGB 7.7* 8.1*   < > 6.2*   < >  --    PLT 50* 62*   < > 71*   < >  --     < > = values in this interval not displayed.       Personally reviewed current labs      Elizabeth Salmeron MD  [facility]  [address], [address]  [place] [address]  Phone# [phone]  Fax# [phone]

## 2022-04-05 NOTE — PLAN OF CARE
Problem: Anemia  Goal: Anemia Symptom Improvement  Outcome: Ongoing, Progressing  Intervention: Monitor and Manage Anemia  Recent Flowsheet Documentation  Taken 4/4/2022 2100 by Angelina Fountain RN  Safety Promotion/Fall Prevention: activity supervised     Problem: Anemia  Goal: Anemia Symptom Improvement  Intervention: Monitor and Manage Anemia  Recent Flowsheet Documentation  Taken 4/4/2022 2100 by Angelina Fountain RN  Safety Promotion/Fall Prevention: activity supervised     Problem: Plan of Care - These are the overarching goals to be used throughout the patient stay.    Goal: Absence of Hospital-Acquired Illness or Injury  Intervention: Prevent Skin Injury  Recent Flowsheet Documentation  Taken 4/4/2022 2100 by Angelina Fountain RN  Body Position: position changed independently   Goal Outcome Evaluation:    Pt is alert oriented x4. Had dialysis today tolerated well. Chest tube present at bedside. Transferred pt to P4

## 2022-04-05 NOTE — PROGRESS NOTES
Hemodialysis Treatment Note      Run length: 3 hours    Total fluid removed (ml): 1000 ml net    Blood Volume Processed: 59 L      Vascular Access:  Pre-dialysis:  Right arm AVG, S/p fistula gram, sutures intact, bruit/thrill present, accessed with 16 gauge needles, 350 blood flow rate achieved and maintained.   Post dialysis : Rinsed back successfully, needles removed with ease, pressure dressing applied homeostasis achieved in 20 minutes.    Treatment Summary:  Pt was hemodynamically stable during dialysis, blood pressure remained stable throughout, critline used for fluid volume monitoring/management, profile A/B mostly, net fluid removed 1000 ml, uneventful run ended as expected, see HD flow sheet for details.    Interventions:  Vital signs monitoring every 15 minutes and PRN  Goal adjustment per crit line and hemodynamics    Plan:  Per renal MD Barbara Gaona RN  [facility]

## 2022-04-06 NOTE — PROGRESS NOTES
Pts chest tube dressing completely saturated and leaking. Had redressed with morning with 2 abd pads and multiple 4x4 with pressure tape. Dressing changed. Spoke to house officer. They will look at it.

## 2022-04-06 NOTE — PROGRESS NOTES
[facility]    Medicine Progress Note - Hospitalist Service    Date of Admission:  3/30/2022    Assessment & Plan          Elle Blanton is a 61 year old male with past medical history significant for thoracic aortic dissection, hepatitis B, hepatorenal syndrome with ESRD, pleural effusion and normocytic anemia who was admitted on 3/30/2022 after being found to have a hgb of 6.2 at clinic.  Also admitted to the hospital in the hopes of getting a chest tube to help with recurrent empyema. Multiple pseudoaneurysms of LUE, underwent fistulagram and stent placement on 4/4.       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 1st and 3rd with patent blood flow. IR notes tiny pseudoaneurysm/graft break down at medial graft, which may benefit from stent. 4/4: Underwent multiple stent placements of pseudoaneurysms. Per IR, OK for HD.    Acute macrocytic anemia on chronic normocytic anemia of chronic disease  History of GI bleed  Thrombocytopenia  -Patient was seen in clinic on 3/30 and found to have a hgb of 6.2 after being rechecked due to chronic anemia  -Was given 1 unit of packed RBCs in the ED  -He has required outpatient transfusions the most recent being 2 weeks ago  -Denies bloody bowel movements or dark stools  -Admits to falling and hitting his nose with 4 days of bleeding several days ago prior to admission, no longer having a bloody nose  -He is on high-dose EPO and takes iron supplements outpatient  -Being worked up outpatient for hemolytic anemia due to undetectable haptoglobin, LDH elevation, and low reticulocyte count  -Primary care physician is placing a referral to heme onc to help address this  -  total 3 units PRBC since presentation.  -Chest tube with serosang output.  -Will monitor and transfuse for hemoglobin less than  7     Empyema  History of recurrent pleural effusions  -CT chest 3/30:1.  Large complicated right pleural effusion with scattered hyperdensities suggesting hemorrhage. Atelectasis involving the entire right lower lobe and most of the right upper lobe. 2.  Small left pleural effusion and left basilar atelectasis. 3.  Aneurysmal dilatation involving the aortic arch and descending thoracic aorta, unchanged. There is also a chronic dissection with intimal flap upper abdominal aorta.4.  Cirrhosis and splenomegaly. Small amount of ascites  -Patient has had pleural effusions in the past that required chest tubes and multiple thoracenteses  -Patient last underwent thoracentesis on 3/23 and was unable to get much fluid off of the lung  -It has been recommended the patient undergo surgery for this, on 1/6/22 the patient followed up with Dr. Zurita and it was explained that the patient is not a surgical candidate due to his multiple comorbidities and the chronicity of the trapped lung  -It was recommended to potentially get a thoracentesis Q8-12 weeks  -IR consulted for chest tube placed 4/1/2022  - Continue R chest tube to suction until output <250mL 24hr period. Will water seal when output decreased.   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 Maharaj recommended conservative treatment at this time.       ESRD on hemodialysis (MF) secondary to hepatorenal syndrome due to cirrhosis  -Nephrology consulted; HD M/F  -Outpatient he has only been getting dialysis twice a week but it has been recommended by his primary to increase this to 3 times weekly for  better symptom management and possible improvement of the empyema      Liver cirrhosis secondary to chronic hepatitis B  -Stable, LFTs unremarkable except for alk phos elevated likely due to renal disease  -Stable, no changes today     Goals of Care  Given the patient's multiple comorbidities it was felt appropriate to start the discussion regarding goals of care.  It was discussed with the patient that he was not a good candidate for chest compressions or intubation.    -Will discuss further in the outpatient setting       Diet: Room Service  Renal Diet (dialysis)    DVT Prophylaxis: Pneumatic Compression Devices  Beltre Catheter: Not present  Central Lines: None  Cardiac Monitoring: None  Code Status: No CPR- Do NOT Intubate      Disposition Plan   Expected Discharge: 04/08/2022     Anticipated discharge location:  Awaiting care coordination huddle       The patient's care was discussed with Dr Yazmin Scott MD  Hospitalist Service  [facility]  Securely message with the Vocera Web Console (learn more here)  Text page via [employer] Paging/Directory         Clinically Significant Risk Factors Present on Admission                    ______________________________________________________________________    Interval History   Having pain at the chest tube site this morning. Feels better when he sits up. No other concerns today.    Data reviewed today: I reviewed all medications, new labs and imaging results over the last 24 hours.     Physical Exam   Vital Signs: Temp: 98.2  F (36.8  C) Temp src: Oral BP: (!) 152/87 Pulse: 79   Resp: 16 SpO2: 96 % O2 Device: None (Room air)    Weight: 132 lbs 11.47 oz  General Appearance:  sitting up in bed   Respiratory: Decreased lung sounds on the right, no wheezing, rales or rhonchi appreciated. CT with serosang output, mild air leak with cough. Site not assessed as it is covered with foam tape.  Cardiovascular: RRR, no murmurs appreciated  MUSC: no  edema appreciated in the LUE or bilateral lower extremities  Skin: Jaundiced skin with bruises on arms and legs of various stages of healing    Data   Recent Labs   Lab 04/06/22  0638 04/05/22  0540 04/04/22  0528 03/31/22  0710 03/30/22  1557   WBC 5.0 4.3 3.9*   < > 5.5   HGB 7.8* 7.7* 8.1*   < > 6.2*   MCV 92 91 90   < > 101*   PLT 49* 50* 62*   < > 71*   INR  --   --   --   --  1.25*   * 135* 133*   < > 138   POTASSIUM 4.2 3.7 3.9   < > 4.2   CHLORIDE 100 99 98   < > 101   CO2 25 25 24   < > 20*   BUN 35* 24* 49*   < > 92*   CR 5.68* 4.13* 6.29*   < > 8.04*   ANIONGAP 9 11 11   < > 17   TANO 7.4* 7.6* 7.6*   < > 7.7*   GLC 83 80 79   < > 112   ALBUMIN  --   --   --   --  3.1*   PROTTOTAL  --   --   --   --  8.1*   BILITOTAL  --   --   --   --  1.0   ALKPHOS  --   --   --   --  162*   ALT  --   --   --   --  31   AST  --   --   --   --  29    < > = values in this interval not displayed.     No results found for this or any previous visit (from the past 24 hour(s)).

## 2022-04-06 NOTE — PLAN OF CARE
Problem: Hematologic Alteration (Chronic Kidney Disease)  Goal: Absence of Anemia Signs and Symptoms  Outcome: Ongoing, Progressing     Problem: Oral Intake Inadequate (Chronic Kidney Disease)  Goal: Optimal Oral Intake  Outcome: Ongoing, Progressing     Problem: Pain (Chronic Kidney Disease)  Goal: Acceptable Pain Control  Outcome: Ongoing, Progressing   Goal Outcome Evaluation:        Patient is pleasant and cooperative in his room.  Denies pain.  Dressing intact to chest tube, patent and sanguinous output of 150 ml this shift.  No air leak.  Dressing intact to fistula on left forearm.  Patient requested PRN senna as he states no BM x 3 days.  Will monitor for results.  C/o feeling itchy, PRN hydroxyzine given.  Patient declined blood pressure medications as he was not comfortable taking the medications with blood pressures. No other concerns at this time and will continue to monitor.

## 2022-04-06 NOTE — PROGRESS NOTES
Interventional Radiology  Inpatient - [facility]: Interventional Radiology   [phone]  04/06/2022     LUE fistula stitch with attempted removal by IR tech on 4/5 but with rebleed. IR tech reattempted stitch removal today with success.     Zakia Pelaez, APRN CNP  Interventional Radiology  [phone]

## 2022-04-06 NOTE — PROGRESS NOTES
Care Management Follow Up    Length of Stay (days): 6    Expected Discharge Date: 04/08/2022     Concerns to be Addressed:     Medical Progression-Right chest tube, monitor hemoglobin  Patient plan of care discussed at interdisciplinary rounds: Yes    Anticipated Discharge Disposition: Home     Anticipated Discharge Services:  To be determined  Anticipated Discharge DME:  To be determined    Patient/family educated on Medicare website which has current facility and service quality ratings:  Not at this time   Education Provided on the Discharge Plan:  Per team  Patient/Family in Agreement with the Plan: yes    Referrals Placed by CM/SW:  None at this time  Private pay costs discussed: Not applicable    Additional Information:  Chart Reviewed. Continues to require Chest tube. From home with spouse and adult son who assist as needed. Has outpatient dialysis. Discharge plan pending, goal to return home. Family to transport. Care manager to follow.       Khushi Pink RN

## 2022-04-06 NOTE — PLAN OF CARE
Right chest tube in place, to suction, -20 and no air leaks. Dressing changed. Large bloody drainage on old dressing. Tubing striped. Total canister 1240. Total for shift 140, bloody. Clamps at bedside. Urine output 150.  Pt reports pain with movement 9/10. Gave prn Oxycodone 5 at 1300. Pain improved to 3/10 with movement. Pt stable sitting up on side of bed often. Continue to monitor.

## 2022-04-06 NOTE — PROGRESS NOTES
RENAL PROGRESS NOTE    CC: ESRD management    ASSESSMENT & PLAN:   ESRD on HD: He still making urine and stated normal amount. He is getting only Monday and Friday dialysis at [facility]. TW is 60.5kg and last run was on 3/28/2022. His primary nephrologist is Dr. Nate Rivero. Dialyze for 3 hr.   Last hemodialysis 04/04  -No indication for urgent hemodialysis today.  Advised the patient to do dialysis run today. However the patient insisted that he wants to do dialysis on Friday as per his usual twice per week schedule.  - renal diet  - renally dosing medication.       Access: Bleeding from cannulation of his AVG and Dr. Cosby and his team evaluated and placed suture urgently on 4/1. 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  Had a placement of covered stent graft across of pseudoaneurysm with good results.  -Patient had no issues with cannulation during dialysis 04/04.     BP/ Volume: BP is acceptable.  Patient did have episodes of hypotension with systolic pressure in 70s on 04/05. Volume is euvolemic.  Estimated dry weight 60.5 kg.  PTA clonidine 0.1 mg at bedtime, diltiazem  mg bid, carvedilol 25 mg twice daily  Diltiazem was discontinued 04/05 given hypotension  Patient appears euvolemic on exam. I/O are not accurate, UF was not recorded in patient's chart. No weight checked in last 2 days.  -continue current dose Clonidine and Coreg  --strict Is and Os as he is still making urine. UOP is ranging between 175 to 775 cc daily  -Daily weight     Electrolytes/acid-base status:   -Mild hyponatremia of 134.    -Serum potassium 4.2.  Run with K3 bath.  Renal diet.  -Serum bicarbonate within normal limits at 25.  Run with 32 bicarb bath     Anemia: likely combined with ESRD and ABL.   Received 4U PRBC  during this hospitalization.   Receives Epo 16025C x2 per week at [facility].  Received 64517 units of Epogen on 04/04  Today Hgb is 7.8 g/dl.  -trend Hgb, Transfuse per protocol  - monitor for now.           BMD:   -Phosphorus 3.2. On phoslo 667 mg tid with meal.  - Ca 7.4  with albmin 3.1 and corrected Ca ~ 8.2. Start on calcium carbonate 100 mg at bedtime  -Renal diet    Chronic right effusion with trapped lung- s/p multiple chest tube placements, right pigtail in place, deemed non-surgical candidate for decortication due to co-morbidities.    Chronic type B dissection-seen by  cardiothoracic surgery.No acute surgical intervention needed    We will follow       SUBJECTIVE:    No acute issues.  Patient states that he is feeling the same.  No new complaints.   Patient denies: fever, chills, cough, shortness of breath , chest pain, palpitations, orthopnea, nausea, vomiting, abdominal pain, changes in bowel habits, dysuria, urinary frequency, urgency, hematuria, rash.      OBJECTIVE:  Physical Exam   Temp: 98.2  F (36.8  C) Temp src: Oral BP: (!) 152/87 Pulse: 79   Resp: 16 SpO2: 96 % O2 Device: None (Room air)    Vitals:    04/02/22 2045 04/04/22 0419 04/04/22 1714   Weight: 60.3 kg (133 lb 0.1 oz) 60.2 kg (132 lb 11.2 oz) 60.2 kg (132 lb 11.5 oz)     Vital Signs with Ranges  Temp:  [98.2  F (36.8  C)-98.7  F (37.1  C)] 98.2  F (36.8  C)  Pulse:  [77-82] 79  Resp:  [16-18] 16  BP: (120-152)/(63-87) 152/87  SpO2:  [96 %-98 %] 96 %  I/O last 3 completed shifts:  In: 540 [P.O.:540]  Out: 880 [Urine:400; Chest Tube:480]    @TMAXR(24)@    Patient Vitals for the past 72 hrs:   Weight   04/04/22 1714 60.2 kg (132 lb 11.5 oz)   04/04/22 0419 60.2 kg (132 lb 11.2 oz)       Intake/Output Summary (Last 24 hours) at 4/1/2022 3868  Last data filed at 4/1/2022 1000  Gross per 24 hour   Intake 480 ml   Output 650 ml   Net -170 ml       PHYSICAL EXAM:  General - Alert and oriented x3, appears comfortable, NAD  Cardiovascular - Regular  rate and rhythm, no rub  Respiratory -right-sided chest tube draining serosanguineous fluid, diminished breath sounds in the right posterior lung fields, left lung clear to auscultation anterior and posterior fields, no crackles or wheezes  Abd: BS present, no guarding or pain with palpation, no ascites  Extremities -resolved edema of the right foot and ankle  Skin: dry, intact, no rash, good turgor  Neuro:  Grossly intact, no focal deficits  MSK:  Grossly intact  Psych:  Normal affect  Access:  LUE AVG with sutures, mild edema, no tenderness no warmth, palpable thrill and audible bruit    LABORATORY STUDIES:     Recent Labs   Lab 04/06/22  0638 04/05/22  0540 04/04/22  0528 04/03/22  0731 04/02/22  1118 04/01/22  0534   WBC 5.0 4.3 3.9* 3.9* 4.0 5.3   RBC 2.61* 2.56* 2.75* 2.31* 2.07* 2.51*   HGB 7.8* 7.7* 8.1* 6.8* 6.1* 7.5*   HCT 23.9* 23.4* 24.7* 20.6* 18.9* 23.0*   PLT 49* 50* 62* 48* 53* 60*       Basic Metabolic Panel:  Recent Labs   Lab 04/06/22  0638 04/05/22  0540 04/04/22  0528 04/03/22  0731 04/02/22  1118 04/01/22  0534   * 135* 133* 135* 138 136   POTASSIUM 4.2 3.7 3.9 3.4* 4.8 4.6   CHLORIDE 100 99 98 99 105 103   CO2 25 25 24 24 18* 19*   BUN 35* 24* 49* 40* 103* 103*   CR 5.68* 4.13* 6.29* 5.13* 9.38* 9.19*   GLC 83 80 79 95 78 92   TANO 7.4* 7.6* 7.6* 7.4* 7.6* 7.7*       INR  Recent Labs   Lab 03/30/22  1557   INR 1.25*        Recent Labs   Lab Test 04/06/22  0638 04/05/22  0540 03/31/22  0710 03/30/22  1557 10/19/21  1006 10/19/21  0940   INR  --   --   --  1.25*  --  1.38*   WBC 5.0 4.3   < > 5.5   < >  --    HGB 7.8* 7.7*   < > 6.2*   < >  --    PLT 49* 50*   < > 71*   < >  --     < > = values in this interval not displayed.       Personally reviewed current labs      Elizabeth Salmeron MD  [facility]  [address] [place] [address], [address]  [place] [address]  Phone# [phone]  Fax# [phone]

## 2022-04-06 NOTE — PLAN OF CARE
Problem: Gas Exchange Impaired  Goal: Optimal Gas Exchange  Outcome: Ongoing, Progressing     Problem: Pain (Chronic Kidney Disease)  Goal: Acceptable Pain Control  Outcome: Ongoing, Progressing  Intervention: Prevent or Manage Pain  Recent Flowsheet Documentation  Taken 4/6/2022 0556 by Rupinder Fletcher RN  Pain Management Interventions: medication (see MAR)     Problem: Renal Function Impairment (Chronic Kidney Disease)  Goal: Minimize Renal Failure Effects  Outcome: Ongoing, Progressing  Intervention: Protect and Monitor Dialysis Access Site  Recent Flowsheet Documentation  Taken 4/6/2022 0020 by Rupinder Fletcher RN  Safety Precautions: emergency equipment at bedside   Goal Outcome Evaluation:        Severe pain to right chest tube insertion site managed with PRN oxycodone. Hemodialysis pt 2x/weekly (M,F)  Oliguria; urinary output 100 ml. Right chest tube to -20 cm H2O; had sanguinous output = 100 ml overnight. Bedrest.

## 2022-04-07 NOTE — PLAN OF CARE
Pt requested tylenol and oxycodone for pain 5/10. Pain improved to 2/10. CT completed. Returned to suction -20. No air leaks. Dressing intact. Total output from 9728-8293 is 550. Pt denies SOB. BP WNL. Pt refused 1700 carvedilol. Continue to monitor

## 2022-04-07 NOTE — CARE PLAN
Pt resting in bed without any distress. Chest tube maintained. Dressing dry and intact, output 200cc. Report given to RN

## 2022-04-07 NOTE — PROGRESS NOTES
Dr. Scott notified that Pt is SOB. Placed on 2L NC for comfort. sats 100%. No air leak in chest tube canister. -20 suction. MD will order portable x ray. Pulmonary  consult in

## 2022-04-07 NOTE — PROGRESS NOTES
RENAL PROGRESS NOTE    CC: ESRD management    ASSESSMENT & PLAN:   ESRD on HD: He still making urine and stated normal amount. He is getting only Monday and Friday dialysis at [facility]. TW is 60.5kg and last run was on 3/28/2022. His primary nephrologist is Dr. Nate Rivero. Dialyze for 3 hr.   Last hemodialysis 04/04  -No indication for urgent hemodialysis today.  Advised the patient to do dialysis run today. However the patient insisted that he wants to do dialysis on Friday as per his usual twice per week schedule.  - renal diet  - renally dosing medication.       Access: Patient had bleeding from cannulation of his AVG and Dr. Cosby and his team evaluated and placed suture urgently on 4/1. 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  Had a placement of covered stent graft across of pseudoaneurysm with good results.  -Patient had no issues with cannulation during dialysis 04/04.     BP/ Volume: BP is controlled on current regimen.  Patient had recurrent episodes of symptomatic hypotension this morning.?  Secondary to active bleeding from right pleural space.  Patient appears euvolemic on exam.  Estimated dry weight 60.5 kg.  PTA clonidine 0.1 mg at bedtime, diltiazem  mg bid, carvedilol 25 mg twice daily  Diltiazem was discontinued 04/05 given hypotension  Patient appears euvolemic on exam. I/O are not accurate, UF was not recorded in patient's chart. No weight checked in last 3 days.  -continue current dose Clonidine and Coreg  --strict Is and Os as he is still making urine. UOP is ranging between 175 to 775 cc daily  -Daily weight     Electrolytes/acid-base status:   -Mild hyponatremia of 132.    -Serum potassium is wnl 4.2.  Run with K3 bath.  Renal diet.  -Serum bicarbonate within normal limits  at 22.  Run with 32 bicarb bath     Anemia: likely combined with ESRD and ABL.   Being worked up outpatient for hemolytic anemia due to undetectable haptoglobin, LDH elevation, and low reticulocyte count  Received 4U PRBC during this hospitalization.   Receives Epo 24408N x2 per week at [facility].  Received 23386 units of Epogen on 04/04  Today Hgb is 7.0 g/dl. ? Secondary to active bleeding from right pleural space.  Patient scheduled to receive 1 unit of packed red blood cells .  -consider hematology consult  -trend Hgb, Transfuse per protocol  - monitor for now.           BMD:   -Phosphorus 3.2. On phoslo 667 mg tid with meal.  - Ca 7.4  with albmin 3.1 and corrected Ca ~ 8.2. Start on calcium carbonate 100 mg at bedtime  -Renal diet    Chronic right effusion with trapped lung- s/p multiple chest tube placements, right pigtail in place, deemed non-surgical candidate for decortication due to co-morbidities.  Pulmonary service consulted.    Chronic type B dissection-seen by  cardiothoracic surgery.No acute surgical intervention needed    We will follow       SUBJECTIVE:    No acute issues overnight.  Patient seen the bedside and events were reviewed with nursing.  Nurse report, the patient develop symptomatic hypotension with systolic blood pressure of 74 mmHg about an hour ago.  He just completed an 1 L bolus of normal saline.  At the time of physical exam, the patient stated he is feeling better, reports improved lightheadedness.  Patient complained of severe burning pain in the right side of the chest lateral wall.  Patient denies: fever, chills, cough, shortness of breath , chest pain, palpitations, orthopnea, nausea, vomiting, abdominal pain, changes in bowel habits, dysuria, urinary frequency, urgency, hematuria, rash.      OBJECTIVE:  Physical Exam   Temp: 97.4  F (36.3  C) Temp src: Oral BP: 132/77 Pulse: 81   Resp: 20 SpO2: 97 % O2 Device: None (Room air)    Vitals:    04/02/22 2045 04/04/22 0419 04/04/22  1714   Weight: 60.3 kg (133 lb 0.1 oz) 60.2 kg (132 lb 11.2 oz) 60.2 kg (132 lb 11.5 oz)     Vital Signs with Ranges  Temp:  [97.4  F (36.3  C)-99.6  F (37.6  C)] 97.4  F (36.3  C)  Pulse:  [76-86] 81  Resp:  [18-20] 20  BP: ()/(55-79) 132/77  SpO2:  [95 %-98 %] 97 %  I/O last 3 completed shifts:  In: 240 [P.O.:240]  Out: 500 [Urine:150; Chest Tube:350]    @TMAXR(24)@    Patient Vitals for the past 72 hrs:   Weight   04/04/22 1714 60.2 kg (132 lb 11.5 oz)       Intake/Output Summary (Last 24 hours) at 4/1/2022 1738  Last data filed at 4/1/2022 1000  Gross per 24 hour   Intake 480 ml   Output 650 ml   Net -170 ml       PHYSICAL EXAM:  General - Alert and oriented x3, appears comfortable, NAD  Cardiovascular - Regular rate and rhythm, no rub  Respiratory -right-sided chest tube draining bloody fluid, diminished breath sounds in the right posterior lung fields, left lung clear to auscultation anterior and posterior fields, no crackles or wheezes  Abd: BS present, no guarding or pain with palpation, no ascites  Extremities -resolved edema of the right foot and ankle  Skin: dry, intact, no rash, good turgor  Neuro:  Grossly intact, no focal deficits  MSK:  Grossly intact  Psych:  Normal affect  Access:  LUE AVG with sutures, mild edema, no tenderness no warmth, palpable thrill and audible bruit    LABORATORY STUDIES:     Recent Labs   Lab 04/07/22  0637 04/06/22  0638 04/05/22  0540 04/04/22  0528 04/03/22  0731 04/02/22  1118   WBC 7.0 5.0 4.3 3.9* 3.9* 4.0   RBC 2.37* 2.61* 2.56* 2.75* 2.31* 2.07*   HGB 7.0* 7.8* 7.7* 8.1* 6.8* 6.1*   HCT 22.6* 23.9* 23.4* 24.7* 20.6* 18.9*   PLT 40* 49* 50* 62* 48* 53*       Basic Metabolic Panel:  Recent Labs   Lab 04/07/22  0637 04/06/22  0638 04/05/22  0540 04/04/22  0528 04/03/22  0731 04/02/22  1118   * 134* 135* 133* 135* 138   POTASSIUM 4.2 4.2 3.7 3.9 3.4* 4.8   CHLORIDE 98 100 99 98 99 105   CO2 22 25 25 24 24 18*   BUN 46* 35* 24* 49* 40* 103*   CR 6.89* 5.68*  4.13* 6.29* 5.13* 9.38*    83 80 79 95 78   TANO 7.4* 7.4* 7.6* 7.6* 7.4* 7.6*       INR  No lab results found in last 7 days.     Recent Labs   Lab Test 04/07/22  0637 04/06/22  0638 03/31/22  0710 03/30/22  1557 10/19/21  1006 10/19/21  0940   INR  --   --   --  1.25*  --  1.38*   WBC 7.0 5.0   < > 5.5   < >  --    HGB 7.0* 7.8*   < > 6.2*   < >  --    PLT 40* 49*   < > 71*   < >  --     < > = values in this interval not displayed.       Personally reviewed current labs      Elizabeth Salmeron MD  [facility]  [address], [address]  [place] [address]  Phone# [phone]  Fax# [phone]

## 2022-04-07 NOTE — PROGRESS NOTES
Called Dr. Scott to the floor to assess Pt. Bp 74/55, dizziness, SOB, diaphoretic, increased pain to right mid/lower right rib. MD came to room. Bolus 1000 ml NS given. BP improved 132/77. Intermittent pain continued. Right abdomen distended, tender. Orders for hgb, 1 unit blood, CT and labs.  Chest tube dressing changed. securement tab no longer holding tube in place. Taped tube to Pt. Dressing completely saturated, bloody.  Per MD x ray showed tube in place.

## 2022-04-07 NOTE — CARE PLAN
"Pt inquires about seeing the Dr, for the chest tube area pain and drainage, the morning nurse told him, she contacted the Dr and he's coming to see it.\" Follow-up message sent to house to resident. Awaiting for call back.  "

## 2022-04-07 NOTE — PLAN OF CARE
Problem: Electrolyte Imbalance (Chronic Kidney Disease)  Goal: Electrolyte Balance  Outcome: Ongoing, Progressing     Problem: Fluid Volume Excess (Chronic Kidney Disease)  Goal: Fluid Balance  Outcome: Ongoing, Progressing     Problem: Pain (Chronic Kidney Disease)  Goal: Acceptable Pain Control  Outcome: Ongoing, Progressing     Problem: Plan of Care - These are the overarching goals to be used throughout the patient stay.    Goal: Absence of Hospital-Acquired Illness or Injury  Intervention: Identify and Manage Fall Risk  Recent Flowsheet Documentation  Taken 4/6/2022 1929 by Yoselin Lin RN  Safety Promotion/Fall Prevention:    bed alarm on    nonskid shoes/slippers when out of bed    fall prevention program maintained    clutter free environment maintained    patient and family education  Intervention: Prevent Skin Injury  Recent Flowsheet Documentation  Taken 4/6/2022 1929 by Yoselin Lin RN  Body Position: position changed independently  Intervention: Prevent and Manage VTE (Venous Thromboembolism) Risk  Recent Flowsheet Documentation  Taken 4/6/2022 1929 by Yoselin Lin RN  VTE Prevention/Management: SCDs (sequential compression devices) on  Activity Management: activity adjusted per tolerance     Problem: Anemia  Goal: Anemia Symptom Improvement  Intervention: Monitor and Manage Anemia  Recent Flowsheet Documentation  Taken 4/6/2022 1929 by Yoselin Lin RN  Safety Promotion/Fall Prevention:    bed alarm on    nonskid shoes/slippers when out of bed    fall prevention program maintained    clutter free environment maintained    patient and family education     Problem: Gas Exchange Impaired  Goal: Optimal Gas Exchange  Intervention: Optimize Oxygenation and Ventilation  Recent Flowsheet Documentation  Taken 4/6/2022 1929 by Yoselin Lin RN  Head of Bed (HOB) Positioning: HOB at 30 degrees     Problem: Functional Decline (Chronic Kidney Disease)  Goal: Optimal Functional  Ability  Intervention: Optimize Functional Ability  Recent Flowsheet Documentation  Taken 4/6/2022 1929 by Yoselin Lin, RN  Activity Management: activity adjusted per tolerance     Problem: Renal Function Impairment (Chronic Kidney Disease)  Goal: Minimize Renal Failure Effects  Intervention: Monitor and Support Renal Function  Recent Flowsheet Documentation  Taken 4/6/2022 1929 by Yoselin Lin, RN  Medication Review/Management: medications reviewed  Intervention: Protect and Monitor Dialysis Access Site  Recent Flowsheet Documentation  Taken 4/6/2022 1929 by Yoselin Lin, RN  Safety Precautions: emergency equipment at bedside     Problem: Plan of Care - These are the overarching goals to be used throughout the patient stay.    Goal: Absence of Hospital-Acquired Illness or Injury  Intervention: Prevent Skin Injury  Recent Flowsheet Documentation  Taken 4/6/2022 1929 by Yoselin Lin RN  Body Position: position changed independently   Goal Outcome Evaluation:   Oxycodone x1 given for c/o right flank Surgical site, with Mod. Eff.  Will continue to monitor.      Dressing saturated with red blood. Resident notified and seen Pt. No new order received, states to continue with dressing change as order. Dressing change without any complications. Pt tolerated well.

## 2022-04-07 NOTE — CONSULTS
"[facility]  Palliative Care Consultation Note    Patient: Elle Blanton  Date of Admission:  3/30/2022    Requesting Clinician / Team: Dr. 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  Patient currently stating he wants to continue with all current medical cares and treatments.  Waiting to hear further recommendations from pulmonary.  He did confirm his wish for DNR/DNI today.  Will have more detailed discussion regarding care goals tomorrow.     -Regarding his quality of life patient states he still has been able to spend time with family, be independent with ADLs.  No longer able to do \"\" work around his house, which he previously enjoyed.  States he gets extremely fatigued when he has dialysis 3 days/week which is why he has only been going twice/week.      ADVANCE CARE PLANNING:   Advance directive: Completed in 3/2021  POLST: Recommend POSLT completion prior to discharge  Surrogate decision maker: Wife   Code status: DNR/DNI.  This was discussed in detail and clarified with patient, as he was initially stating he would not want intubation but does want CPR.  After discussing further, he confirmed his wish for DNR/DNI    SYMPTOM MANAGEMENT:   Right sided chest wall pain 2/2 chest tube.  Currently has tylenol 975 mg every 6 hours PRN and oxycodone 5 mg every 6 hours PRN.  -Agree with the above  -Continue to monitor    Shortness of breath-patient states his shortness of breath is mild at this time.  Has been getting outpatient thoracentesis, which have been very helpful for symptoms.    -Appreciate assistance from pulmonary  -Continue to monitor-likely will need ongoing thoracentesis after " "discharge to help with symptom control.      These recommendations have been discussed with Dr. Dick.      Thank you for the opportunity to participate in the care of this patient and family. Our team: will continue to follow.     During regular M-F work hours -- if you are not sure who specifically to contact -- please contact us by calling us directly at the Palliative Care Main Line [phone]    After regular work hours and on weekends/holidays, you can leave a message at [phone]      Summary/HPI:  Elle Blanton is a 61 year old male with PMH of thoracic aortic dissection, hepatitis B, hepatorenal syndrome with ESRD, hx of pleural effusions and normocytic anemia who presented to the ED on 3/30/22 with shortness of breath.  Admitted to the hospital with acute macrocytic anemia on chronic normocytic anemia, empyema.    Renal, pulmonary, IR, cardiothoracic surgery (no surgery recommended at this time) have also been consulted this admission.  Chest tube placed on 3/31/22 found to have hemothorax.     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   I introduced our role as an extra layer of support and how we help patients and families dealing with serious, potentially life-limiting illnesses. I explained the composition of the palliative care team.  Palliative care helps patients and families navigate their care while focusing on the whole person; providing emotional, social and spiritual support  Palliative care often assists with symptom management, information sharing about what to expect from the illness, available treatment options and what effect those " options may have on the disease course, and provide effective communication and caring support.    Prognosis, Goals, & Planning:      Functional Status just prior to hospitalization: 2 (Ambulatory and capable of all selfcare but unable to carry out any work activities; may need help with IADLs up and about > 50% of waking hours)      Prognosis, Goals, and/or Advance Care Planning were addressed today: Yes      Patient's decision making preferences: shared with support from loved ones          Patient has decision-making capacity today for complex decisions: Yes            I have concerns about the patient/family's health literacy today: No           Patient has a completed Health Care Directive: Yes, and on file.      Code status: No CPR / No Intubation    Coping, Meaning, & Spirituality:   Mood, coping, and/or meaning in the context of serious illness were addressed today: Yes      Social:     Living situation: lives with spouse    Key family / caregivers: , 2 children    Occupational history: worked at [employer]    Key Palliative Symptom Data:  # Pain severity the last 12 hours: moderate  # Dyspnea severity the last 12 hours: low  # Nausea severity the last 12 hours: none  # Anxiety severity the last 12 hours: none    ROS:  Comprehensive ROS is reviewed and is negative except as here & per HPI     Past Medical History:  Past Medical History:   Diagnosis Date     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 pylori infection 7/5/2017    UGI bleed implied by Hgb 9.0 6/22/17 and melena. Admitted and hgb decreased to 7.6, CT abdomen showed all bladder wall thickening. + Hep B surface antigen noted. Melena  resolved and hgb stabalized without transfusion, epigastric pain resolved with PPI. Recommend referral for gastroscopy.     Heart attack (H)      Hepatitis B      Normocytic anemia      Other pancytopenia (H) 7/5/2017 6/24/17 Peripheral smear at [facility].Pancytopenia of uncertain cause. Ruled out acute leukemia. Hematologist suggests hemolytic anemia should be considered possible cause and LDH and haptoglobin should be assessed in future.      PONV (postoperative nausea and vomiting)      Thrombocytopenia (H)         Past Surgical History:  Past Surgical History:   Procedure Laterality Date     ABCESS DRAINAGE      finger     BURSECTOMY ELBOW Left 10/15/2021    Procedure: LEFT OLECRANON BURSECTOMY;  Surgeon: Tico Myers MD;  Location: [facility] OR     BURSECTOMY ELBOW Left 1/18/2022    Procedure: LEFT ELBOW OLECRANON BURSECTOMY;  Surgeon: Tico Myers MD;  Location: [facility] OR     CREATE FISTULA ARTERIOVENOUS UPPER EXTREMITY Left 4/20/2021    Procedure: left arm dialysis graft placement;  Surgeon: Roxana Cosby MD;  Location: [facility] OR;  Service: General     FOREIGN BODY REMOVAL      finger     INCISION AND DRAINAGE FINGER, COMBINED Left 10/20/2016    Procedure: COMBINED INCISION AND DRAINAGE FINGER;  Surgeon: Mireya Pizano MD;  Location: [place] OR     INCISION AND DRAINAGE UPPER EXTREMITY, COMBINED Left 1/18/2022    Procedure: AND ELBOW INCISION AND DRAINAGE;  Surgeon: Tico Myers MD;  Location: [facility] OR     IR CHEST TUBE PLACEMENT NON-TUNNELED RIGHT  4/19/2021     IR CHEST TUBE PLACEMENT NON-TUNNELED RIGHT  10/19/2021     IR CHEST TUBE PLACEMENT NON-TUNNELED RIGHT  11/10/2021     IR CHEST TUBE PLACEMENT NON-TUNNELED RIGHT  3/31/2022     IR DIALYSIS FISTULOGRAM LEFT  4/2/2022     IR DIALYSIS FISTULOGRAM LEFT  4/4/2022     IR PLEURAL DRAINAGE WITH CATHETER INSERTION  4/19/2021     MIDLINE INSERTION - DOUBLE LUMEN  4/23/2021          GA  "ESOPHAGOGASTRODUODENOSCOPY TRANSORAL DIAGNOSTIC N/A 8/18/2020    Procedure: ESOPHAGOGASTRODUODENOSCOPY (EGD) with biopsy;  Surgeon: Nilson Gonzales MD;  Location: [facility];  Service: Gastroenterology      PARACENTESIS  8/14/2020      PARACENTESIS  8/19/2020      THORACENTESIS  4/18/2021         Family History:  Family History   Problem Relation Age of Onset     Diabetes Father      Hypertension No family hx of      Asthma No family hx of      Cancer No family hx of      Coronary Artery Disease No family hx of      Liver Cancer No family hx of      Ulcers Father          Allergies:  Allergies   Allergen Reactions     Nka [No Known Allergies]         Medications:  I have reviewed this patient's medication profile and medications from this hospitalization.     PERTINENT PHYSICAL EXAMINATION:  Vital Signs: Blood pressure 132/77, pulse 81, temperature 97.4  F (36.3  C), temperature source Oral, resp. rate 20, height 1.651 m (5' 5\"), weight 60.2 kg (132 lb 11.5 oz), SpO2 97 %.   GENERAL: Lying in bed, frail appearing, NAD    SKIN: Warm and dry   HEENT: Normocephalic, anicteric sclera, moist mucous membranes  LUNGS: breathing non-labored   ABDOMINAL: soft, non distended, non tender  MUSKL: no gross joint deformities   EXTREMITIES: No edema or cyanosis  NEUROLOGIC: Oriented X 4    Data reviewed:    Results for orders placed or performed during the hospital encounter of 03/30/22   XR Chest Port 1 View    Impression    IMPRESSION: Since prior chest radiograph 02/20/2022 there has been increase in size of the right pleural effusion which is now marked. There is some associated mediastinal shift from right to left. Otherwise the chest is stable with again seen   significantly enlarged abdominal aortic arch which is partially calcified. The left lung shows no acute findings.   Chest CT w/o contrast    Impression    IMPRESSION:   1.  Large complicated right pleural effusion with scattered hyperdensities suggesting " hemorrhage. Atelectasis involving the entire right lower lobe and most of the right upper lobe.  2.  Small left pleural effusion and left basilar atelectasis.  3.  Aneurysmal dilatation involving the aortic arch and descending thoracic aorta, unchanged. There is also a chronic dissection with intimal flap upper abdominal aorta.  4.  Cirrhosis and splenomegaly. Small amount of ascites  .   CTA Chest with Contrast   Result Value Ref Range    Radiologist flags (AA)      Right empyema. Aortic dissection. Pulmonary artery dissection.    Impression    IMPRESSION:  1.  Dissection extending from the anterior aspect of the thoracic aortic arch into the abdominal aorta.    2.  There is aneurysmal dilatation seen of the thoracic aorta most marked at the mid arch region which measures approximately 5.7 cm in greatest radial dimension. No active rupture is identified.    3.  The dissection and size of the thoracic aorta appears stable since prior CT 11/09/2021.    4.  There is a dissection seen involving the main pulmonary artery and the proximal aspect of the right pulmonary artery which is commonly associated with changes of chronic increased pulmonary arterial pressure.    5.  There are findings worrisome for a large empyema in the right hemithorax with associated significant atelectasis.    6.  There are small free appearing bilateral pleural effusions.    7.  There is mild abdominal ascites.    8.  Changes of cirrhosis in the liver with associated portal venous hypertension and splenomegaly.      [Critical Result: Right empyema. Aortic dissection. Pulmonary artery dissection.]    Finding was identified on 3/30/2022 10:59 PM.     Dr. Hartley was contacted by me on 3/30/2022 11:24 PM and verbalized understanding of the critical result.    IR Chest Tube Place Non Tunneled Right    Impression    IMPRESSION:    1.  Uncomplicated image guided placement of 18 Estonian pleural drainage catheter into a large right hemothorax. Drainage  catheter attached to Pleur-Evac.          US Upper Extremity Venous Duplex Left    Impression    IMPRESSION:   1.  No deep venous thrombosis in the left upper extremity.   XR Chest Port 1 View    Impression    IMPRESSION: Right sided chest tube in good position with moderate decrease in size of the right pleural effusion. Moderate loculated appearing fluid does remain along with some infiltrates and atelectasis right lung base.    Left lung is clear and expanded.   IR Dialysis Fistulogram Left    Impression    IMPRESSION:    1.  Diagnostic fistulography reveals occlusion of the left brachiocephalic vein with a exuberant collaterals. This is in the region of the large thoracic aortic aneurysm which may be exerting extrinsic compression causing the occlusion. This was treated   with stenting with an excellent result, as detailed above.  2.  Small focal pseudoaneurysm associated with the graft defect within the inflow segment of the left forearm loop graft. The only stent graft available for review. Today was an 8 cm x 5 cm stent graft which would have realignment a significant portion   of the loop graft making dialysis access challenging. The plan will be to repeat an ultrasound tomorrow to see if this spontaneously resolves. If not we will obtain a shorter 2.5 cm stent graft for endovascular repair.       US Ext Arterial Venous Dialys Acs Graft    Impression    IMPRESSION:  1.  Patent left upper extremity loop forearm graft. No obvious  stenosis at the venous or arterial anastomoses.  2.  Three separate and relatively small pseudoaneurysms identified  arising from the loop graft. The first and third are patent with blood  flow within the pseudoaneurysm. A second pseudoaneurysm not assessed  for blood flow. The first measures up to 6 mm, second measures up to  10 mm, and third measures up to 8 mm.    MIGUEL ÁNGEL JOSEPH MD         SYSTEM ID:  [id number]   IR Dialysis Fistulogram Left    Impression    IMPRESSION:    Left  arm fistulogram reveals 3 pseudoaneurysms coming off of the arterial limb of the graft successfully treated with placement of Viabahn covered stent grafts.  ____________________________________________________________________       XR Chest Port 1 View    Impression    IMPRESSION: Right chest tube in place with continued mild decrease in the right pleural effusion with mild complex effusion remaining. Some mild hydropneumothorax in the right lung base likely due to trapped lung right lower lobe.    New vascular stent across the left brachiocephalic vein place for 04/02/2022.   XR Chest Port 1 View    Impression    IMPRESSION: Similar right chest tube. Persistent but slightly decreased partially loculated right effusion and associated atelectasis. No pneumothorax. Left lung is clear. Heart size is stable. Similar endovascular stent over the expected brachiocephalic   vein.       Recent Labs   Lab 04/07/22  1142 04/07/22  0637 04/06/22  0638   WBC 6.3 7.0 5.0   HGB 6.3* 7.0* 7.8*   MCV 97 95 92   PLT 37* 40* 49*   * 132* 134*   POTASSIUM 4.3 4.2 4.2   CHLORIDE 100 98 100   CO2 23 22 25   BUN 48* 46* 35*   CR 7.10* 6.89* 5.68*   ANIONGAP 11 12 9   TANO 7.2* 7.4* 7.4*   GLC 95 100 83   ALBUMIN 2.4*  --   --    PROTTOTAL 6.1  --   --    BILITOTAL 0.6  --   --    ALKPHOS 94  --   --    ALT <9  --   --    AST 15  --   --       Lab Results   Component Value Date    WBC 6.3 04/07/2022    HGB 6.3 (LL) 04/07/2022    HCT 20.3 (L) 04/07/2022    PLT 37 (LL) 04/07/2022     (L) 04/07/2022    POTASSIUM 4.3 04/07/2022    CHLORIDE 100 04/07/2022    CO2 23 04/07/2022    BUN 48 (H) 04/07/2022    CR 7.10 (HH) 04/07/2022    GLC 95 04/07/2022    SED 21 (H) 10/14/2021    DD 18.2 (H) 08/06/2019    TROPONIN 0.02 08/06/2019    AST 15 04/07/2022    ALT <9 04/07/2022    ALKPHOS 94 04/07/2022    BILITOTAL 0.6 04/07/2022    BILIDIRECT 0.2 10/21/2020    INR 1.25 (H) 03/30/2022      Lab Results   Component Value Date    ALBUMIN 2.4  04/07/2022    ALBUMIN 2.9 12/30/2020            ====================================================  TT: I have personally spent a total of 55 minutes on the unit in review of medical record, consultation with the medical providers and assessment of patient today, with more than 50% of this time spent in counseling, coordination of care, and discussion with patient and spouse re: diagnostic results, prognosis, symptom management, risks and benefits of management options, and development of plan of care as noted above.  ====================================================    SUSY Kuhn, NS-BC  Clinical Nurse Specialist  [facility]  [phone]

## 2022-04-07 NOTE — PROGRESS NOTES
Lab called critical creatinine level at 0715 6.89. order to not notify provider for abnormal creatinine. ALISSON Carbone.

## 2022-04-07 NOTE — CONSULTS
PULMONARY MEDICINE CONSULT  4/7/2022    Admit Date: 3/30/2022  CODE: No CPR- Do NOT Intubate    Reason for Consult: right pleural effusion    Assessment/Plan:   61 year old male with a history of HBV, cirrhosis, ESKD on HD (only Monday and Friday as patient declines thrice-weekly dialysis), hypoalbuminemia, coagulopathy, thrombocytopenia, chronic anemia, chronic thoracic aortic dissection, recurrent right pleural effusion with trapped right lung requiring prior chest tubes and thoracenteses, presented on 3/30 with severe anemia from clinic due to no infusion center availability, found to have recurrent right pleural effusion s/p right pleural pigtail catheter placement on 3/31, followed by IR, pulmonary service consulted on 4/7 for right chest pain, bloody chest tube output, anemia.    Right pleural effusion, right chest pain: The patient has a chronic trapped right lung, with underlying cirrhosis and ESKD. He will have bloody serosanguinous output in the context of thrombocytopenia and coagulopathy. With his cirrhosis, ESKD, and trapped lung, with a chest tube in place the output will never drop below 250 mL in 24 hours; he will continue to lose fluid and protein through the tube. In general, he is not someone for whom an indwelling pleural tube is a good option. He presented for anemia rather than respiratory symptoms, and intermittent palliative thoracentesis was his outpatient plan. Overall, his life expectancy is limited with his comorbidities. Intermittent palliative right thoracentesis would be a better option, and consideration of hospice; will consult palliative care for further discussion. He has already been seen by interventional pulmonology and thoracic surgery in the past, and they reiterated the above recommendation. Thoracoscopy with attempted decortication would be highly morbid and he would likely not survive it.    Plan:  - palliative care consult; address goals of care  - better pain control  -  chest tube should be removed soon; with cirrhosis and ESKD with trapped lung he will continue to lose fluid and protein which will only lead to hypotension and worsening hypoalbuminemia; would not use a chest tube output threshold for removal  - send right pleural fluid for cell differential, micro, cytology, and other studies  - CT C/A/P with contrast  - DNR/DNI    Jose Dick MD  Pulmonary and Critical Care Medicine  [facility]  Office [phone]  Pager [phone]  (he/him/his)                                                                                                                                                        HPI:   CCx: right pleural effusion, trapped right lung    HPI: 61 year old male with a history of HBV, cirrhosis, ESKD on HD (only Monday and Friday as patient declines thrice-weekly dialysis), hypoalbuminemia, coagulopathy, thrombocytopenia, chronic anemia, chronic thoracic aortic dissection, recurrent right pleural effusion with trapped right lung requiring prior chest tubes and thoracenteses, presented on 3/30 with severe anemia from clinic due to no infusion center availability, found to have recurrent right pleural effusion s/p right pleural pigtail catheter placement on 3/31, followed by IR, pulmonary service consulted on 4/7 for right chest pain, bloody chest tube output, anemia. The patient has a chronic trapped right lung, with underlying cirrhosis and ESKD. He will have bloody serosanguinous output in the context of thrombocytopenia and coagulopathy. With his cirrhosis, ESKD, and trapped lung, with a chest tube in place the output will never drop below 250 mL in 24 hours; he will continue to lose fluid and protein through the tube. In general, he is not someone for whom an indwelling pleural tube is a good option. Overall, his life expectancy is limited with his comorbidities. Intermittent palliative right thoracentesis would be a better option, and  consideration of hospice; will consult palliative care for further discussion. He has already been seen by interventional pulmonology and thoracic surgery in the past, and they reiterated the above recommendation. Thoracoscopy with attempted decortication would be highly morbid and he would likely not survive it. The pleural fluid was not sent for cell differential, microbiology, or cytology on initial presentation.                                                                                                                                                        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 pylori infection 7/5/2017    UGI bleed implied by Hgb 9.0 6/22/17 and melena. Admitted and hgb decreased to 7.6, CT abdomen showed all bladder wall thickening. + Hep B surface antigen noted. Melena resolved and hgb stabalized without transfusion, epigastric pain resolved with PPI. Recommend referral for gastroscopy.     Heart attack (H)      Hepatitis B      Normocytic anemia      Other pancytopenia (H) 7/5/2017 6/24/17 Peripheral smear at [facility].Pancytopenia of uncertain cause. Ruled out acute leukemia. Hematologist suggests hemolytic anemia should be considered possible cause and LDH and haptoglobin should be assessed in future.      PONV (postoperative nausea and vomiting)      Thrombocytopenia (H)        Past Surgical History  Past Surgical History:   Procedure Laterality Date     ABCESS DRAINAGE      finger     BURSECTOMY ELBOW Left 10/15/2021    Procedure: LEFT OLECRANON BURSECTOMY;  Surgeon: Tico Myers MD;  Location: [facility]  Main OR     BURSECTOMY ELBOW Left 1/18/2022    Procedure: LEFT ELBOW OLECRANON BURSECTOMY;  Surgeon: Tico Myers MD;  Location: [facility] Main OR     CREATE FISTULA ARTERIOVENOUS UPPER EXTREMITY Left 4/20/2021    Procedure: left arm dialysis graft placement;  Surgeon: Roxana Cosby MD;  Location: [facility] Main OR;  Service: General     FOREIGN BODY REMOVAL      finger     INCISION AND DRAINAGE FINGER, COMBINED Left 10/20/2016    Procedure: COMBINED INCISION AND DRAINAGE FINGER;  Surgeon: Mireya Piazno MD;  Location: [facility] OR     INCISION AND DRAINAGE UPPER EXTREMITY, COMBINED Left 1/18/2022    Procedure: AND ELBOW INCISION AND DRAINAGE;  Surgeon: Tico Myers MD;  Location: [facility] Main OR     IR CHEST TUBE PLACEMENT NON-TUNNELED RIGHT  4/19/2021     IR CHEST TUBE PLACEMENT NON-TUNNELED RIGHT  10/19/2021     IR CHEST TUBE PLACEMENT NON-TUNNELED RIGHT  11/10/2021     IR CHEST TUBE PLACEMENT NON-TUNNELED RIGHT  3/31/2022     IR DIALYSIS FISTULOGRAM LEFT  4/2/2022     IR DIALYSIS FISTULOGRAM LEFT  4/4/2022     IR PLEURAL DRAINAGE WITH CATHETER INSERTION  4/19/2021     MIDLINE INSERTION - DOUBLE LUMEN  4/23/2021          AZ ESOPHAGOGASTRODUODENOSCOPY TRANSORAL DIAGNOSTIC N/A 8/18/2020    Procedure: ESOPHAGOGASTRODUODENOSCOPY (EGD) with biopsy;  Surgeon: Nilson Gonzales MD;  Location: [facility] GI;  Service: Gastroenterology     US PARACENTESIS  8/14/2020     US PARACENTESIS  8/19/2020     US THORACENTESIS  4/18/2021       Allergies:  Allergies   Allergen Reactions     Nka [No Known Allergies]        PTA medications:  Medications Prior to Admission   Medication Sig Dispense Refill Last Dose     acetaminophen (TYLENOL) 500 MG tablet Take 500 mg by mouth every 6 hours as needed for mild pain   Unknown at Unknown time     calcium acetate (PHOSLO) 667 MG CAPS capsule Take 1 capsule by mouth 3 times daily (with meals)   3/30/2022 at AM     carvedilol (COREG) 25 MG tablet Take 25-50 mg by mouth 2  times daily as needed    Unknown at Unknown time     cloNIDine (CATAPRES) 0.1 MG tablet Take 0.1 mg by mouth At Bedtime    3/29/2022 at PM     diltiazem ER (DILT-XR) 180 MG 24 hr capsule Take 1 capsule (180 mg) by mouth 2 times daily (Patient taking differently: Take 180 mg by mouth 2 times daily as needed (Checks BP prior to taking, if too low he does not take this medication) ) 60 capsule 11 Unknown at Unknown time     entecavir (BARACLUDE) 0.5 MG tablet Take 1 tablet (0.5 mg) by mouth every 7 days 52 tablet 1 3/27/2022     hydrOXYzine (ATARAX) 25 MG tablet Take 1 tablet (25 mg) by mouth 3 times daily as needed for itching 90 tablet 3 Unknown at Unknown time     oxyCODONE (ROXICODONE) 5 MG tablet Take 1 tablet (5 mg) by mouth daily as needed for severe pain 30 tablet 0 Unknown at Unknown time     pantoprazole (PROTONIX) 40 MG EC tablet Take 1 tablet (40 mg) by mouth daily 30 tablet 11 3/30/2022 at AM     senna-docusate (SENOKOT-S/PERICOLACE) 8.6-50 MG tablet Take 1 tablet by mouth daily as needed for constipation 30 tablet 11 Unknown at Unknown time     zolpidem (AMBIEN) 5 MG tablet Take 1 tablet (5 mg) by mouth nightly as needed for sleep 10 tablet 5 Unknown at Unknown time     [employer] SALINE NASAL SPRAY 0.65 % nasal spray Spray 1 spray in nostril daily as needed (Patient not taking: Reported on 3/30/2022)        order for DME Equipment being ordered: Other: blood pressure monitor  Treatment Diagnosis: hypertension 1 each 0        Family Hx:  Family History   Problem Relation Age of Onset     Diabetes Father      Hypertension No family hx of      Asthma No family hx of      Cancer No family hx of      Coronary Artery Disease No family hx of      Liver Cancer No family hx of      Ulcers Father        Social Hx:  Social History     Socioeconomic History     Marital status:      Spouse name: Not on file     Number of children: Not on file     Years of education: Not on file     Highest education level: Not on  "file   Occupational History     Not on file   Tobacco Use     Smoking status: Never Smoker     Smokeless tobacco: Never Used   Vaping Use     Vaping Use: Never used   Substance and Sexual Activity     Alcohol use: No     Drug use: No     Sexual activity: Yes     Partners: Female   Other Topics Concern     Parent/sibling w/ CABG, MI or angioplasty before 65F 55M? Not Asked   Social History Narrative     Not on file     Social Determinants of Health     Financial Resource Strain: Not on file   Food Insecurity: Not on file   Transportation Needs: Not on file   Physical Activity: Not on file   Stress: Not on file   Social Connections: Not on file   Intimate Partner Violence: Not on file   Housing Stability: Not on file       Exam/Data:     Vitals  /77 (BP Location: Right arm, Patient Position: Supine)   Pulse 81   Temp 97.4  F (36.3  C) (Oral)   Resp 20   Ht 1.651 m (5' 5\")   Wt 60.2 kg (132 lb 11.5 oz)   SpO2 97%   BMI 22.09 kg/m       I/O last 3 completed shifts:  In: 240 [P.O.:240]  Out: 500 [Urine:150; Chest Tube:350]  Weight change:   [unfilled]  EXAM:  /77 (BP Location: Right arm, Patient Position: Supine)   Pulse 81   Temp 97.4  F (36.3  C) (Oral)   Resp 20   Ht 1.651 m (5' 5\")   Wt 60.2 kg (132 lb 11.5 oz)   SpO2 97%   BMI 22.09 kg/m      Intake/Output last 3 shifts:  I/O last 3 completed shifts:  In: 240 [P.O.:240]  Out: 500 [Urine:150; Chest Tube:350]  Intake/Output this shift:  I/O this shift:  In: -   Out: 300 [Chest Tube:300]    Physical Exam  Gen: appears malnourished, fatigued  HEENT: NT, no HOMERO  CV: RRR, no m/g/r  Resp: decreased on right, has right lateral chest pain, right pleural catheter with extensive dressing  Abd: soft, nontender, BS+  Skin: no rashes or lesions  Ext: no edema, appears cachectic  Neuro: PERRL, nonfocal exam    ROS: A complete 10-system review of systems was obtained and is negative with the exception of what is noted in the history of present " illness.    Medications:       - MEDICATION INSTRUCTIONS -         sodium chloride 0.9%  250 mL Intravenous Once in dialysis/CRRT     sodium chloride 0.9%  300 mL Hemodialysis Machine Once     sodium chloride 0.9%  250 mL Intravenous Once in dialysis/CRRT     sodium chloride 0.9%  300 mL Hemodialysis Machine Once     calcium acetate  667 mg Oral TID w/meals     calcium carbonate (OS-TANO) 500 mg (elemental) tablet  1,000 mg Oral At Bedtime     carvedilol  25 mg Oral BID w/meals     cloNIDine  0.1 mg Oral At Bedtime     entecavir  0.5 mg Oral Q7 Days     gelatin absorbable  1 each Topical Once     - MEDICATION INSTRUCTIONS -   Does not apply Once     - MEDICATION INSTRUCTIONS -   Does not apply Once     pantoprazole  40 mg Oral Daily     sodium chloride (PF)  3 mL Intracatheter Q8H         DATA  All laboratory and radiology has been personally reviewed by myself today.  Recent Labs   Lab 04/07/22  1142   WBC 6.3   HGB 6.3*   HCT 20.3*   PLT 37*     Recent Labs   Lab 04/07/22  1142 04/07/22  0637 04/06/22  0638   * 132* 134*   CO2 23 22 25   BUN 48* 46* 35*   ALKPHOS 94  --   --    ALT <9  --   --    AST 15  --   --      IMAGING:   CT C/A/P (3/30):  - images directly reviewed, formal interpretation follows:  FINDINGS:  CT ANGIOGRAM CHEST: CTA examination demonstrates a dissection with origin involving the anterior aspect of the thoracic aortic arch at the takeoff of the left subclavian artery. This extends into the upper abdominal aorta. The false lumen supplies the   left renal artery. The remainder of the major vessels are supplied by the true lumen. Greatest radial dimension of the thoracic aorta is at the posterior arch and this measures 5.7 cm in greatest radial dimension. The prior correlative CT 11/09/2021   shows no significant contrast within the vessels, however the calcification can be seen involving the aorta and the dissection is not changed significantly. There is no evidence for extravasation of  contrast to suggest a rupture. The size of the thoracic   aorta is not changed significantly since 11/09/2021 CT examination. There is a dissection seen involving the left aspect of the main pulmonary artery which extends into the proximal aspect of the right pulmonary artery. This is typically associated   changes of chronic pulmonary arterial hypertension. I am not sure if this was present on the prior study given lack of IV contrast.     CHEST:  LUNGS AND PLEURA: There is a large loculated right pleural effusion which contains some heterogenous density and gas worrisome for empyema. There is a mild free component to the right pleural effusion seen inferiorly. There is advanced atelectatic   changes seen of the right lung. There is a minimal free pleural effusion seen on the left.     MEDIASTINUM: No adenopathy.     CORONARY ARTERY CALCIFICATION: Moderate most pronounced in the left anterior descending.     UPPER ABDOMEN: There is a mild amount of ascites. There are changes of cholelithiasis with no evidence for cholecystitis. There are findings most typical for hemangioma and adjacent benign cysts seen in the superior aspect of the right lobe of liver.   There is cirrhotic configuration seen of the liver. The spleen is not entirely included on this study, but appears enlarged and there are some varicosities consistent with portal venous hypertension. The portal vein is patent.      MUSCULOSKELETAL: Mild scattered degenerative changes most marked in the spine.                                                                      IMPRESSION:  1.  Dissection extending from the anterior aspect of the thoracic aortic arch into the abdominal aorta.     2.  There is aneurysmal dilatation seen of the thoracic aorta most marked at the mid arch region which measures approximately 5.7 cm in greatest radial dimension. No active rupture is identified.     3.  The dissection and size of the thoracic aorta appears stable since  prior CT 11/09/2021.     4.  There is a dissection seen involving the main pulmonary artery and the proximal aspect of the right pulmonary artery which is commonly associated with changes of chronic increased pulmonary arterial pressure.     5.  There are findings worrisome for a large empyema in the right hemithorax with associated significant atelectasis.     6.  There are small free appearing bilateral pleural effusions.     7.  There is mild abdominal ascites.     8.  Changes of cirrhosis in the liver with associated portal venous hypertension and splenomegaly.    CXR (4/7/22):  - images directly reviewed, formal interpretation follows:  IMPRESSION: Similar right chest tube. Persistent but slightly decreased partially loculated right effusion and associated atelectasis. No pneumothorax. Left lung is clear. Heart size is stable. Similar endovascular stent over the expected brachiocephalic   vein.

## 2022-04-07 NOTE — SIGNIFICANT EVENT
"Significant Event Note    Time of event: 11:53 AM April 7, 2022    Description of event:  Called to bedside to assess SOB and increased bloody output from chest tube. Elle was hypotensive with SBP in mid-upper 70s and was supine in bed. He noted pain along lateral lower R chest wall/RUQ that came and went. He said it felt like something was stabbing him intermittently. Felt dizzy and lightheaded when sitting up.     Exam:  /77 (BP Location: Right arm, Patient Position: Supine)   Pulse 81   Temp 97.4  F (36.3  C) (Oral)   Resp 20   Ht 1.651 m (5' 5\")   Wt 60.2 kg (132 lb 11.5 oz)   SpO2 97%   BMI 22.09 kg/m    General: mild distress  Resp: no increased WOB, satting 99% on 2L. Chest tube with sanguinous output.   CV: RRR  Abd: mild distension, soft, mild TTP RUQ near rib edge, some ecchymosis lower right abdomen    Plan:  - 1 L NS bolus/1 hr. BP recheck after 500 ml was 132/77.  - 1 unit pRBC transfusion now  - Stat CBC, CMP  - Stat CXR reviewed - chest tube in good position, no PTX   - Pulm consulted this AM for chest tube management, appreciate assistance.     Discussed with: patient's family/emergency contact, bedside nurse and supervising physician, Dr. Corona Scott MD    "

## 2022-04-07 NOTE — PLAN OF CARE
Pt receiving blood. Tolerating well. No s/s of reaction. Denies SOB. Right rib/flank pain 4/10 intermittent. Improved from earlier in day. /68. Denies dizziness. Chest tube in place. Canister changed. -20 suction. No air leak. Serosanguinous drainage. Total for shift 340 ml.   Plural cavity fluid sent to lab. Will send to CT once blood complete.

## 2022-04-07 NOTE — PROGRESS NOTES
"CLINICAL NUTRITION SERVICES - ASSESSMENT NOTE     Nutrition Prescription    RECOMMENDATIONS FOR MDs/PROVIDERS TO ORDER:  Renal multivitamin    Malnutrition Status:    Unable to determine    Recommendations already ordered by Registered Dietitian (RD):  Trial ensure clear and magic cup daily    Future/Additional Recommendations:  Adjust supplements pending intake/acceptance     REASON FOR ASSESSMENT  Elle Blanton is a/an 61 year old male assessed by the dietitian for [facility]    Pt presents with Macrocytic anemia and empyema  Hx ESRD on HD 2 x a week, pulmonary artery dissection    NUTRITION HISTORY  Unable to get hx at this time  Lives in home with wife  And son who assist  Pt indicated not eating poorly d/t decreased appetite on nursing nutrition screen.   Noted fatigued after twice weekly HD     CURRENT NUTRITION ORDERS  Diet: Renal- dialysis  Intake/Tolerance: 1 meal charted per day at 100%.   Ordering 680-1000 kcal, 27-33 g protein/day over 2-3 meals/day  NPO 4/4  Eating some food from home    Difficult to determine adequacy of intake. Nsg does not feel pt ate well today.     LABS  Labs reviewed  Na 132, decreased  Bun/cR 54/7.9, increased  Hgb 7.2- up from 6.3 after - blood transfusion yesterday    MEDICATIONS  Medications reviewed  Phoslo, oscal, entecavir, protonix  CA carb and Iv abx added yesterday    ANTHROPOMETRICS  Height: 165.1 cm (5' 5\")  Most Recent Weight: 60.2 kg (132 lb 11.5 oz)  4/4  IBW: 61.8 kg  BMI: Normal BMI  Weight History:   Wt Readings from Last 10 Encounters:   04/04/22 60.2 kg (132 lb 11.5 oz)   03/30/22 60.8 kg (134 lb)- admit   03/16/22 61.7 kg (136 lb)   03/02/22 64.3 kg (141 lb 12.8 oz)   02/28/22 62.4 kg (137 lb 9.1 oz)   01/18/22 59 kg (130 lb)   01/12/22 61.7 kg (136 lb)   01/12/22 62.1 kg (137 lb)   01/08/22 61.2 kg (135 lb)   01/06/22 63 kg (139 lb)   Weight fluctuates with HD 6.3% weight loss x 1 month-unable to determine if fluid related    Dosing Weight: 60.2 kg    ASSESSED NUTRITION " NEEDS  Estimated Energy Needs: 0597-7070 kcals/day (25 - 30 kcals/kg)  Justification: Maintenance  Estimated Protein Needs: 72-90 grams protein/day (1.2 - 1.5 grams of pro/kg)  Justification: Dialysis  Estimated Fluid Needs: 6702-5660 mL/day (25 - 30 mL/kg)   Justification: Per provider pending fluid status    PHYSICAL FINDINGS  See malnutrition section below.  Increased bloody drainage in/around chest tube-  ml yesterday  BM x 1 yesterday    MALNUTRITION:  % Weight Loss:  Unable to determine d/t fluid shifts  % Intake: Unable to determine  Subcutaneous Fat Loss:  Unable to determine  Muscle Loss: Unable to determine  Fluid Retention: Trace +1 generalized    Malnutrition Diagnosis: Unable to determine due to did not disturb pt  In Context of:  Acute illness or injury    NUTRITION DIAGNOSIS  Altered nutrition-related laboratory values related to ESKD as evidenced by need for HD, elevated renal labs      INTERVENTIONS  Implementation  Recommend renal vitamin d/t possible inadequate intake    Trial Ensure clear and magic cup daily    Goals  Intake > 75% of estimated needs  Maintain weight     Monitoring/Evaluation  Progress toward goals will be monitored and evaluated per protocol.

## 2022-04-07 NOTE — PROGRESS NOTES
[facility]    Medicine Progress Note - Hospitalist Service    Date of Admission:  3/30/2022    Assessment & Plan          Elle Blanton is a 61 year old male with past medical history significant for thoracic aortic dissection, hepatitis B, hepatorenal syndrome with ESRD, pleural effusion and normocytic anemia who was admitted on 3/30/2022 after being found to have a hgb of 6.2 at clinic.Chest tube placed 4/1 by IR for empyema (R). Multiple pseudoaneurysms of LUE, underwent fistulagram and stent placement on 4/4.       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 1st and 3rd with patent blood flow. IR notes tiny pseudoaneurysm/graft break down at medial graft, which may benefit from stent. 4/4: Underwent multiple stent placements of pseudoaneurysms. Per IR, OK for HD.    Acute macrocytic anemia on chronic normocytic anemia of chronic disease  History of GI bleed  Thrombocytopenia  -He is on high-dose EPO and takes iron supplements outpatient  -Being worked up outpatient for hemolytic anemia due to undetectable haptoglobin, LDH elevation, and low reticulocyte count  -Primary care physician is placing a referral to heme onc to help address this  - Total 3 units PRBC since presentation.  -Chest tube with now bloody output. Hgb this AM 7 and saturating dressings.  -Will monitor and transfuse for hemoglobin less than 7     Empyema  History of recurrent pleural effusions  -Patient has had pleural effusions in the past that required chest tubes and multiple thoracenteses  -Patient last underwent thoracentesis on 3/23 and was unable to get much fluid off of the lung  -It has been recommended the patient undergo surgery for this, on 1/6/22 the patient followed up with Dr. Zurita and it was explained that the patient is not a surgical candidate due  to his multiple comorbidities and the chronicity of the trapped lung  -It was recommended to potentially get a thoracentesis Q8-12 weeks  -IR consulted for chest tube placed 4/1/2022  - Pulmonology consulted for chest tube management given continued output x 1 week and now change to bloody output.   - Patient endorsing new SOB this AM - stat CXR ordered. No air leaks noted.  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 Maharaj recommended conservative treatment at this time.     ESRD on hemodialysis (MF) secondary to hepatorenal syndrome due to cirrhosis  -Nephrology consulted; HD M/F  -Outpatient he has only been getting dialysis twice a week but it has been recommended by his primary and Nephrology to increase this to 3 times weekly for better symptom management and possible improvement of the empyema   - Addressed this with patient again today who declines MWF HD. He states he will do a 3rd day of HD only if he feels like it.       Goals of Care  Given the patient's multiple comorbidities it was felt appropriate to start the discussion regarding goals of care.  It was discussed with the patient that he was not a good candidate for chest compressions or intubation.    -Will discuss further in the outpatient setting       Diet: Room Service  Renal Diet (dialysis)    DVT Prophylaxis: Pneumatic Compression Devices  Beltre Catheter: Not present  Central Lines: None  Cardiac Monitoring: None  Code Status: No CPR- Do NOT Intubate      Disposition Plan   Expected Discharge: 04/08/2022     Anticipated discharge location:  Awaiting care coordination huddle       The patient's care was  discussed with Dr Corona Scott MD  Hospitalist Service  [facility]  Securely message with the [employer] Web Console (learn more here)  Text page via [employer] Paging/Directory         Clinically Significant Risk Factors Present on Admission                    ______________________________________________________________________    Interval History   Noted to have dressing saturations x 3 overnight with bloody output of chest tube. Hgb 7 this AM.  Patient doesn't feel dizzy or lightheaded. Pain at chest tube site is improved.     Data reviewed today: I reviewed all medications, new labs and imaging results over the last 24 hours.     Physical Exam   Vital Signs: Temp: 99  F (37.2  C) Temp src: Oral BP: 101/63 Pulse: 80   Resp: 18 SpO2: 98 % O2 Device: None (Room air)    Weight: 132 lbs 11.47 oz  General Appearance:  sitting up in bed   Respiratory: Decreased lung sounds on the right, no wheezing, rales or rhonchi appreciated. CT with sanguinous output, no air leak with cough. Site not assessed as it is covered with foam tape.  Cardiovascular: RRR, no murmurs appreciated  MUSC: no edema appreciated in the LUE or bilateral lower extremities  Skin: Jaundiced skin with bruises on arms and legs of various stages of healing    Data   Recent Labs   Lab 04/07/22  0637 04/06/22  0638 04/05/22  0540   WBC 7.0 5.0 4.3   HGB 7.0* 7.8* 7.7*   MCV 95 92 91   PLT 40* 49* 50*   * 134* 135*   POTASSIUM 4.2 4.2 3.7   CHLORIDE 98 100 99   CO2 22 25 25   BUN 46* 35* 24*   CR 6.89* 5.68* 4.13*   ANIONGAP 12 9 11   TANO 7.4* 7.4* 7.6*    83 80     No results found for this or any previous visit (from the past 24 hour(s)).

## 2022-04-08 NOTE — PLAN OF CARE
Problem: Gas Exchange Impaired  Goal: Optimal Gas Exchange  Outcome: Ongoing, Progressing  Intervention: Optimize Oxygenation and Ventilation  Recent Flowsheet Documentation  Taken 4/8/2022 0908 by Neelam Gonzalez, RN  Head of Bed (HOB) Positioning: HOB at 45 degrees   Goal Outcome Evaluation:      Pt denied SOB and fatigue, stated he felt better today than yesterday. LS mostly clear with some crackles on the rt, diminished in yair bases. Pt denied cough, sats are mid-upper 90's on RA, rt chest tube is intact and patent, site covered with pressure dressing from yesterday with no evidence of bleeding today. Large output of serosanginous fluid.  Problem: Hematologic Alteration (Chronic Kidney Disease)  Goal: Absence of Anemia Signs and Symptoms  Outcome: Ongoing, Progressing     Hgb 7.2 this am. Pt denied fatigue and SOB, bp WNL, O2 sats mid to upper 90's  Problem: Oral Intake Inadequate (Chronic Kidney Disease)  Goal: Optimal Oral Intake  Outcome: Ongoing, Progressing   Pt ate 75% of breakfast and about 25% of lunch, taking sips  Problem: Pain (Chronic Kidney Disease)  Goal: Acceptable Pain Control  Outcome: Ongoing, Progressing  Intervention: Prevent or Manage Pain  Recent Flowsheet Documentation  Taken 4/8/2022 0907 by Neelam Gonzalez, RN  Pain Management Interventions:   medication offered but refused   distraction   emotional support   relaxation techniques promoted   repositioned   Pt denied pain today. States he only has pain with activity at chest tube site. Pain meds offered but pt refused  Problem: Infection  Goal: Absence of Infection Signs and Symptoms  Outcome: Ongoing, Progressing   Pleural fluid sent for culture. Micro lab called back stated aerobic culture growing 4+ staph aureus, joseph CAMPO to update. Pt afebrile, VSS today  Problem: Plan of Care - These are the overarching goals to be used throughout the patient stay.    Goal: Absence of Hospital-Acquired Illness or Injury  Intervention: Identify and  Manage Fall Risk  Recent Flowsheet Documentation  Taken 4/8/2022 0908 by Neelam Gonzalez RN  Safety Promotion/Fall Prevention:   activity supervised   assistive device/personal items within reach   nonskid shoes/slippers when out of bed   patient and family education  Intervention: Prevent Skin Injury  Recent Flowsheet Documentation  Taken 4/8/2022 0908 by Neelam Gonzalez RN  Body Position: position changed independently  Intervention: Prevent and Manage VTE (Venous Thromboembolism) Risk  Recent Flowsheet Documentation  Taken 4/8/2022 0908 by Neelam Gonzalez RN  Activity Management: activity adjusted per tolerance  Goal: Optimal Comfort and Wellbeing  Intervention: Monitor Pain and Promote Comfort  Recent Flowsheet Documentation  Taken 4/8/2022 0907 by Neelam Gonzalez RN  Pain Management Interventions:   medication offered but refused   distraction   emotional support   relaxation techniques promoted   repositioned     Problem: Anemia  Goal: Anemia Symptom Improvement  Intervention: Monitor and Manage Anemia  Recent Flowsheet Documentation  Taken 4/8/2022 0908 by Neelam Gonzalez RN  Safety Promotion/Fall Prevention:   activity supervised   assistive device/personal items within reach   nonskid shoes/slippers when out of bed   patient and family education     Problem: Functional Decline (Chronic Kidney Disease)  Goal: Optimal Functional Ability  Intervention: Optimize Functional Ability  Recent Flowsheet Documentation  Taken 4/8/2022 0908 by Neelam Gonzalez RN  Activity Management: activity adjusted per tolerance

## 2022-04-08 NOTE — PROGRESS NOTES
Microbiology lab called with information regarding pleural fluid cultures. Stated aerobic culture is growing 4+ staph aureus. Paged MD to update

## 2022-04-08 NOTE — PROGRESS NOTES
"  Interventional Radiology - Chart Review  Inpatient - [facility]: Interventional Radiology   [phone]  04/08/2022     S:  High 90 02 saturations on room air. X-ray completed yesterday.   TPA administered 4/4 into chest tube to assist with drainage. Good OP reported from chest tube      O:  /78 (BP Location: Right arm)   Pulse 82   Temp 98.7  F (37.1  C) (Oral)   Resp 17   Ht 1.651 m (5' 5\")   Wt 59.9 kg (132 lb)   SpO2 96%   BMI 21.97 kg/m        IMAGING:  EXAM: XR CHEST PORT 1 VIEW  LOCATION: [facility]  DATE/TIME: 4/7/2022 10:31 AM     INDICATION: right empyema pleural effusion with chest tube in place, now having new shortness of breath.  COMPARISON: 04/05/2022                                                                      IMPRESSION: Similar right chest tube. Persistent but slightly decreased partially loculated right effusion and associated atelectasis. No pneumothorax. Left lung is clear. Heart size is stable. Similar endovascular stent over the expected brachiocephalic   vein.    LABS:  Recent Labs   Lab 04/08/22  0543 04/07/22  2133 04/07/22  1142 04/07/22  0637 04/06/22  0638   WBC 6.4  --  6.3 7.0 5.0   HGB 7.2* 7.2* 6.3* 7.0* 7.8*   PLT 43*  --  37* 40* 49*   CR 7.90*  --  7.10* 6.89* 5.68*     Chest tube outputs (in mL):  DATE OP   4/3 300   4/4 140   4/5 380   4/6 100       A:  61 year old yo male with recurrent right side pleural effusion; s/p multiple chest tube placement and thoracentesis (please see IR consult note from 03/31). R hemothorax/emphyema on CTA from 03/30. S/p R chest tube placement 03/31 found to be hemothorax. TPA administration 4/4 into chest tube.    P:    - Continue present chest tube cares. Chest tube to -20mm wall suction.  - Agree with medicine team to continue wall suction until output <250mL in 24hr period; water seal when OP decreased.  - Pulmonology consulted by Medicine team for chest tube management. Would " appreciate this consult as chest tube cares remain complex with continued OP.  - Consider ealier imaging if there are changes in chest tube's function or in patient's clinical course.  - IR will continue to follow loosely. Please contact IR with chest tube related questions or concerns.      Total time spent on the date of the encounter is 10 minutes, including time spent counseling the patient, performing a medically appropriate evaluation, reviewing prior medical history, ordering medications and tests, documenting clinical information in the medical record, and communication of results.    Zakia Pelaez, APRN CNP  Interventional Radiology  [phone]    E/M codes for reference only:  47328

## 2022-04-08 NOTE — PROGRESS NOTES
[facility] [place]    Medicine Progress Note - Hospitalist Service    Date of Admission:  3/30/2022    Assessment & Plan          Elle Blanton is a 61 year old male with past medical history significant for thoracic aortic dissection, hepatitis B, hepatorenal syndrome with ESRD, pleural effusion and normocytic anemia who was admitted on 3/30/2022 after being found to have a hgb of 6.2 at clinic.Chest tube placed 4/1 by IR for empyema (R). Multiple pseudoaneurysms of LUE, underwent fistulagram and stent placement on 4/4.       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 1st and 3rd with patent blood flow. IR notes tiny pseudoaneurysm/graft break down at medial graft, which may benefit from stent. 4/4: Underwent multiple stent placements of pseudoaneurysms. Per IR, OK for HD.    Acute macrocytic anemia on chronic normocytic anemia of chronic disease  History of GI bleed  Thrombocytopenia  -He is on high-dose EPO and takes iron supplements outpatient  -Being worked up outpatient for hemolytic anemia due to undetectable haptoglobin, LDH elevation, and low reticulocyte count  -Primary care physician is placing a referral to heme onc to help address this  - Total 4 units PRBC since presentation.  -Will monitor and transfuse for hemoglobin less than 7     Empyema  History of recurrent pleural effusions  -Patient has had pleural effusions in the past that required chest tubes and multiple thoracenteses  -Patient last underwent thoracentesis on 3/23 and was unable to get much fluid off of the lung  -It has been recommended the patient undergo surgery for this, on 1/6/22 the patient followed up with Dr. Zurita and it was explained that the patient is not a surgical candidate due to his multiple comorbidities and the chronicity of the trapped lung  -It was  recommended to potentially get a thoracentesis Q8-12 weeks  -IR consulted for chest tube placed 4/1/2022  - Pulmonology consulted for chest tube management given continued output x 1 week and now change to bloody output. SOB improved. CTa CAP done yesterday which showed suspicious liver lesions. Palliative consulted yesterday. Goals of care are restorative at this time.   - Pleural fluid came back with GPC overnight and Vanc started. Appreciate pulm guidance regarding chest tube removal/abx. De-escalate with speciation/sensitivites. Would hope to remove chest tube today/soon as he will continue to pour out fluid through this given his comorbidities.     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 Maharaj recommended conservative treatment at this time.     ESRD on hemodialysis (MF) secondary to hepatorenal syndrome due to cirrhosis  -Nephrology consulted; HD M/F  -Outpatient he has only been getting dialysis twice a week but it has been recommended by his primary and Nephrology to increase this to 3 times weekly for better symptom management and possible improvement of the empyema      Goals of Care  - Palliative Care consulted yesterday. DNR/DNI and goals are restorative at this time. Continuing HD and current medical cares/treatments.     Diet: Room Service  Renal Diet (dialysis)    DVT Prophylaxis: Pneumatic Compression Devices  Beltre Catheter: Not present  Central Lines: None  Cardiac Monitoring: None  Code Status: No CPR- Do NOT Intubate      Disposition Plan   Expected Discharge: 04/08/2022     Anticipated discharge location:  Awaiting care coordination huddle       The patient's care was  discussed with Dr Rosenstein Angeline David, MD  Hospitalist Service  [facility]  Securely message with the [employer] Web Console (learn more here)  Text page via [employer] Paging/Directory         Clinically Significant Risk Factors Present on Admission                    ______________________________________________________________________    Interval History   - NAEON  - in good spirits this morning  - chest tube still in place but with less bloody output  - no SOB  - still has intermittent r chest wall/ruq pain    Data reviewed today: I reviewed all medications, new labs and imaging results over the last 24 hours.     Physical Exam   Vital Signs: Temp: 98.8  F (37.1  C) Temp src: Oral BP: 121/71 Pulse: 79   Resp: 18 SpO2: 97 % O2 Device: None (Room air)    Weight: 132 lbs 0 oz  General Appearance:  sitting up in bed   Respiratory: Decreased lung sounds on the right, no wheezing, rales or rhonchi appreciated. CT with serosang output, no air leak with cough.    Cardiovascular: RRR, no murmurs appreciated  MUSC: no edema appreciated in the LUE or bilateral lower extremities  Skin: Jaundiced skin with bruises on arms and legs of various stages of healing    Data   Recent Labs   Lab 04/08/22  0543 04/07/22  2133 04/07/22  1142 04/07/22  0637   WBC 6.4  --  6.3 7.0   HGB 7.2* 7.2* 6.3* 7.0*   MCV 92  --  97 95   PLT 43*  --  37* 40*   *  --  134* 132*   POTASSIUM 4.7  --  4.3 4.2   CHLORIDE 100  --  100 98   CO2 22  --  23 22   BUN 54*  --  48* 46*   CR 7.90*  --  7.10* 6.89*   ANIONGAP 10  --  11 12   TANO 7.3*  --  7.2* 7.4*   GLC 90  --  95 100   ALBUMIN  --   --  2.4*  --    PROTTOTAL  --   --  6.1  --    BILITOTAL  --   --  0.6  --    ALKPHOS  --   --  94  --    ALT  --   --  <9  --    AST  --   --  15  --      Recent Results (from the past 24 hour(s))   CTA Chest Abdomen Pelvis w Contrast   Result Value    Radiologist flags Hepatic masses    Narrative    EXAM: CTA CHEST ABDOMEN PELVIS  W CONTRAST  LOCATION: [facility]  DATE/TIME: 4/7/2022 6:00 PM    INDICATION: Right chest pain. Complex right pleural effusion. Aortic and pulmonary artery dissection.  COMPARISON: 3/30/2022  TECHNIQUE: CT angiogram chest abdomen pelvis during arterial phase of injection of IV contrast. 2D and 3D MIP reconstructions were performed by the CT technologist. Dose reduction techniques were used.   CONTRAST: [id number] 100ml    FINDINGS:   CT ANGIOGRAM CHEST, ABDOMEN, AND PELVIS: No significant interval change in a Panfilo type B aortic dissection extending from the aortic arch through the aortic bifurcation. The celiac axis and superior mesenteric artery and right renal artery are   opacified by the true lumen. The left renal artery is opacified by the false lumen. The inferior mesenteric artery is opacified by the false lumen. The dissection extends a short distance into the left common iliac artery. The iliac arteries are   otherwise normal. No evidence of rupture. 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 An infrarenal abdominal aortic artery aneurysm measuring 3.7 x 3.5 cm. The ascending aorta is   stable and dilated at 4.0 cm. A stent within the left brachiocephalic vein. Immediately proximal to the stent is marked narrowing of the left subclavian vein between the right clavicle and first rib, image 10:6. Stable peripheral irregularity of the main   pulmonary artery extending into the proximal right pulmonary artery, image 46:6.    LUNGS AND PLEURA: Loculated large right hydropneumothorax is similar in size to prior with an increased pneumothorax component. A stable hyperdense component posteriorly within the pleural collection is consistent with a hematoma. Right chest tube in   place. Atelectasis involving the majority of the right lung. Small layering left pleural effusion.    MEDIASTINUM/AXILLAE: No lymphadenopathy.  Trace pericardial fluid.    CORONARY ARTERY CALCIFICATION: Moderate.    HEPATOBILIARY: An indeterminant 4.3 x 3.9 cm mass in segment 8 of the with a peripheral nodular enhancing component, image 87:6. An indeterminate 2.2 x 2.2 cm hypodensity in segment 8, image 84:6. Nodular hepatic contour consistent with   cirrhosis/fibrosis. Cholelithiasis. No biliary ductal dilatation.    PANCREAS: Normal.    SPLEEN: Stable splenomegaly measuring 17.5 cm in maximal dimension.    ADRENAL GLANDS: Normal.    KIDNEYS/BLADDER: Atrophic kidneys. Hypoenhancing left kidney. Subcentimeter renal hypodensities which are too small to characterize. No hydronephrosis.    BOWEL: No obstruction or inflammatory change.    LYMPH NODES: Normal.    PELVIC ORGANS: Small volume ascites.    MUSCULOSKELETAL: Degenerative changes of the spine and hips.      Impression    IMPRESSION:  1.  Stable Rougon type B aortic dissection.  2.  Stable dilatation of the ascending aorta, aortic arch, descending aorta and abdominal aorta.  3.  Stable peripheral irregularity in the main pulmonary artery which is most consistent with a dissection.  4.  Stable size of a large loculated right hydropneumothorax with increased pneumothorax component. Right chest tube in place.  5.  Marked narrowing of the left subclavian vein proximal to a stent.  6.  The left kidney is opacified by the false lumen and is hypoenhancing which may represent ischemia or artifact due to timing of the contrast bolus.  7.  There are 2 indeterminant hepatic masses which may represent hepatocellular carcinoma. Recommend a contrast enhanced magnetic resonance imaging examination of the liver.      [Recommend Follow Up: Hepatic masses]    This report will be copied to the [facility] to ensure a provider acknowledges the finding.

## 2022-04-08 NOTE — CONSULTS
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     Assessment:  Elle Blanton is a 61 year old old male with   1.  End-stage renal disease on HD.  2.  End-stage liver disease secondary to hepatitis B infection.  Has liver lesions on imaging worrisome for HCC.    3.  History of left-sided septic olecranon bursitis status post I&D.  Cultures with MSSA 10/2021.    4.  History of large right-sided empyema status post thoracentesis on 10/16/2021.  Cultures were positive with MSSA.  Treated with chest tube at the time.  Decortication was indicated at the time but the patient declined.  Was treated with IV Ancef for at least 4 weeks.  5.  Readmitted on 2021 with recurrent empyema/hydrothorax.  Had a chest tube placed on 11/10/2021.  Decortication was indicated at the time but thought to be high risk with referral to the [facility].  Patient saw Dr. Zurita on 2022.  Thought to have trapped lung on the right side.  He was not thought to be a surgical candidate and palliative thoracentesis recommended at the time.  6.  Recurrent right-sided hemothorax status post ultrasound guided chest tube placement on 3/31/2022.  Initial cultures from this procedure came back no growth.  Repeat cultures on 2022 from the chest tube with 4+ staph aureus.  Clinical significance of this positive culture is not clear especially in light of cultures from 3/31/2022 with no growth.  The question at this point is whether he has secondary infection of his hemothorax with staph Aureus.  He has no fever,  leukocytosis, although he is an end-stage renal disease patient and may not mount systemic signs of in flexion.  Did have an episode of hypotension earlier this morning.  IV vancomycin ordered to be given after dialysis.  7.  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  8.  Goals of care: Complex right-sided hemothorax in a patient with trapped lung, ESRD on HD, ESLD with possible HCC on imaging, severe anemia requiring transfusion even outpatient, type B aortic dissection.  Overall prognosis is poor.    Recommendations:   -Give first dose of vancomycin after dialysis today.  -Check procalcitonin and CRP.  -Follow-up pending susceptibility of the staph Aureus.  -Given limited room for maneuver in this patient with multiple comorbidities, may have to treat the staph aureus as real although treatment without source control will likely lead to recurrence.  -Poor prognosis.    Discussed with the patient, nursing staff, Dr. Davalos.      Thank you for letting us be part of the patient care team. We will follow.    Nicolle Yan MD,MD  [facility].   [facility]  Office Telephone [phone].  Fax [phone]  [facility] paging    HPI:    Elle Blanton is a 61 year old old male. History is provided by patient, extensive chart review, speaking with Dr. Davalos.    ID is asked to see this patient for positive right-sided pleural fluid culture.  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  He was also found to have a large right-sided pleural effusion with cultures positive with MSSA from 10/15/2021.  He was treated with IV Ancef 2 g on Monday and Wednesday and 3 g on Friday for about 4 weeks.  While he was still on IV antibiotic, he was readmitted on 11/8/2021 with recurrent effusion and had a new chest tube placed.  Was seen by Dr. joshua at the time and  decortication was indicated but patient was thought to be too high risk.  He was referred to thoracic surgery at [facility].  On 1/6/2022, he was seen by Dr. Zurita who estimated the patient to be a poor surgical candidate and palliative intermittent thoracentesis was recommended.  On chart review, it appears the patient has received a few sessions of transfusion for anemia this year.  On 3/30/2022 he was found to have large right-sided pleural effusion.  On 3/31/2022, he underwent ultrasound-guided chest tube placement.  Cultures were collected and came back no growth.  On 4/7/2022, cultures from chest tube were repeated and now back positive with Staph aureus.  IV vancomycin ordered and will be given after dialysis later today.  On chart review the patient has not been febrile this admission.  He has no leukocytosis.  Seen today, the patient reports feeling okay except for chest pain at the site of chest tube placement.  Output from chest tube is grossly bloody.  Hemoglobin appears stable from yesterday.  According to nursing staff, rapid response was called earlier this morning for hypotension and feeling unwell.  Appears to have resolved at the current time.        ===========================================    Medical History  Past Medical History:   Diagnosis Date     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 pylori infection 7/5/2017    UGI bleed implied by Hgb 9.0 6/22/17 and melena. Admitted and hgb decreased to 7.6, CT abdomen showed all bladder wall thickening. + Hep B surface antigen noted. Melena resolved and hgb stabalized without transfusion, epigastric pain resolved with PPI. Recommend referral for  gastroscopy.     Heart attack (H)      Hepatitis B      Normocytic anemia      Other pancytopenia (H) 7/5/2017 6/24/17 Peripheral smear at [facility].Pancytopenia of uncertain cause. Ruled out acute leukemia. Hematologist suggests hemolytic anemia should be considered possible cause and LDH and haptoglobin should be assessed in future.      PONV (postoperative nausea and vomiting)      Thrombocytopenia (H)         Surgical History  Past Surgical History:   Procedure Laterality Date     ABCESS DRAINAGE      finger     BURSECTOMY ELBOW Left 10/15/2021    Procedure: LEFT OLECRANON BURSECTOMY;  Surgeon: Tico Myers MD;  Location: [facility] OR     BURSECTOMY ELBOW Left 1/18/2022    Procedure: LEFT ELBOW OLECRANON BURSECTOMY;  Surgeon: Tico Myers MD;  Location: [facility] OR     CREATE FISTULA ARTERIOVENOUS UPPER EXTREMITY Left 4/20/2021    Procedure: left arm dialysis graft placement;  Surgeon: Roxana Cosby MD;  Location: [facility] OR;  Service: General     FOREIGN BODY REMOVAL      finger     INCISION AND DRAINAGE FINGER, COMBINED Left 10/20/2016    Procedure: COMBINED INCISION AND DRAINAGE FINGER;  Surgeon: Mireya Pizano MD;  Location: [facility] OR     INCISION AND DRAINAGE UPPER EXTREMITY, COMBINED Left 1/18/2022    Procedure: AND ELBOW INCISION AND DRAINAGE;  Surgeon: Tico Myers MD;  Location: [facility] OR     IR CHEST TUBE PLACEMENT NON-TUNNELED RIGHT  4/19/2021     IR CHEST TUBE PLACEMENT NON-TUNNELED RIGHT  10/19/2021     IR CHEST TUBE PLACEMENT NON-TUNNELED RIGHT  11/10/2021     IR CHEST TUBE PLACEMENT NON-TUNNELED RIGHT  3/31/2022     IR DIALYSIS FISTULOGRAM LEFT  4/2/2022     IR DIALYSIS FISTULOGRAM LEFT  4/4/2022     IR PLEURAL DRAINAGE WITH CATHETER INSERTION  4/19/2021     MIDLINE INSERTION - DOUBLE LUMEN  4/23/2021          CO ESOPHAGOGASTRODUODENOSCOPY TRANSORAL DIAGNOSTIC N/A 8/18/2020    Procedure: ESOPHAGOGASTRODUODENOSCOPY (EGD) with biopsy;   Surgeon: Nilson Gonzales MD;  Location: [facility];  Service: Gastroenterology      PARACENTESIS  8/14/2020      PARACENTESIS  8/19/2020     US THORACENTESIS  4/18/2021            Social History  Reviewed, and he  reports that he has never smoked. He has never used smokeless tobacco. He reports that he does not drink alcohol and does not use drugs.        Family History  Family History   Problem Relation Age of Onset     Diabetes Father      Hypertension No family hx of      Asthma No family hx of      Cancer No family hx of      Coronary Artery Disease No family hx of      Liver Cancer No family hx of      Ulcers Father       Psychosocial Needs  Social History     Social History Narrative     Not on file     Additional psychosocial needs reviewed per nursing assessment.       Allergies   Allergen Reactions     Nka [No Known Allergies]         Medications Prior to Admission   Medication Sig Dispense Refill Last Dose     acetaminophen (TYLENOL) 500 MG tablet Take 500 mg by mouth every 6 hours as needed for mild pain   Unknown at Unknown time     calcium acetate (PHOSLO) 667 MG CAPS capsule Take 1 capsule by mouth 3 times daily (with meals)   3/30/2022 at AM     carvedilol (COREG) 25 MG tablet Take 25-50 mg by mouth 2 times daily as needed    Unknown at Unknown time     cloNIDine (CATAPRES) 0.1 MG tablet Take 0.1 mg by mouth At Bedtime    3/29/2022 at PM     diltiazem ER (DILT-XR) 180 MG 24 hr capsule Take 1 capsule (180 mg) by mouth 2 times daily (Patient taking differently: Take 180 mg by mouth 2 times daily as needed (Checks BP prior to taking, if too low he does not take this medication) ) 60 capsule 11 Unknown at Unknown time     entecavir (BARACLUDE) 0.5 MG tablet Take 1 tablet (0.5 mg) by mouth every 7 days 52 tablet 1 3/27/2022     hydrOXYzine (ATARAX) 25 MG tablet Take 1 tablet (25 mg) by mouth 3 times daily as needed for itching 90 tablet 3 Unknown at Unknown time     oxyCODONE (ROXICODONE) 5 MG  tablet Take 1 tablet (5 mg) by mouth daily as needed for severe pain 30 tablet 0 Unknown at Unknown time     pantoprazole (PROTONIX) 40 MG EC tablet Take 1 tablet (40 mg) by mouth daily 30 tablet 11 3/30/2022 at AM     senna-docusate (SENOKOT-S/PERICOLACE) 8.6-50 MG tablet Take 1 tablet by mouth daily as needed for constipation 30 tablet 11 Unknown at Unknown time     zolpidem (AMBIEN) 5 MG tablet Take 1 tablet (5 mg) by mouth nightly as needed for sleep 10 tablet 5 Unknown at Unknown time     [employer] SALINE NASAL SPRAY 0.65 % nasal spray Spray 1 spray in nostril daily as needed (Patient not taking: Reported on 3/30/2022)        order for DME Equipment being ordered: Other: blood pressure monitor  Treatment Diagnosis: hypertension 1 each 0         Review of Systems:  Negative except for findings in the HPI Physical Exam:  Temp:  [98  F (36.7  C)-99  F (37.2  C)] 98.7  F (37.1  C)  Pulse:  [79-82] 82  Resp:  [16-20] 17  BP: (121-139)/(68-81) 124/78  SpO2:  [92 %-98 %] 96 %      Gen: Looks chronically ill.  HEENT: NCAT. EOMI. PERRL.  Neck: No bruit, JVD or thyromegaly.  Lungs: Absent breath sounds on the right side.  Large chest tube with bloody output in place.  Large dressing on the chest wall..  Card: RRR. NSR. No RMG. Peripheral pulses present and symmetric. No edema.  Abd: Soft NT ND. No mass. Normal bowel sounds.  Skin: No rash.  Extr: No edema.  Neuro: Alert and oriented to place time and person. Cranial nerves II to XII intact.          ============================    Pertinent Labs  personally reviewed.   Recent Labs   Lab 04/08/22  0543 04/07/22  2133 04/07/22  1142 04/07/22  0637   WBC 6.4  --  6.3 7.0   HGB 7.2* 7.2* 6.3* 7.0*   HCT 22.6*  --  20.3* 22.6*   PLT 43*  --  37* 40*       Recent Labs   Lab 04/08/22  0543 04/07/22  1142 04/07/22  0637   * 134* 132*   CO2 22 23 22   BUN 54* 48* 46*   ALBUMIN  --  2.4*  --    ALKPHOS  --  94  --    ALT  --  <9  --    AST  --  15  --        MICROBIOLOGY  DATA:  Personally reviewed     Contains abnormal data Pleural fluid Aerobic Bacterial Culture Routine  Order: [id number]   Collected 4/7/2022  3:04 PM     Status: Preliminary result     Visible to patient: No (not released)    Specimen Information: Pleural Cavity, Right; Pleural fluid         0 Result Notes    Culture Culture in progress       4+ Staphylococcus aureus Abnormal             Resulting Agency: [facility]           Specimen Collected: 04/07/22  3:04 PM Last Resulted: 04/08/22  1:22 PM             Contains abnormal data Body fluid, unsp Aerobic Bacterial Culture Routine  Order: [id number]   Collected 10/16/2021  4:35 PM     Status: Final result     Visible to patient: No (inaccessible in MyChart)    Specimen Information: Pleural Cavity, Right; Body fluid, unsp         0 Result Notes    Culture 1+ Staphylococcus aureus Abnormal             Resulting Agency: [facility]       Susceptibility     Staphylococcus aureus     REGLA     Clindamycin <=0.25 ug/mL Susceptible     Erythromycin <=0.25 ug/mL Susceptible     Gentamicin <=0.5 ug/mL Susceptible     Oxacillin <=0.25 ug/mL Susceptible     Tetracycline <=1.0 ug/mL Susceptible     Trimethoprim/Sulfamethoxazole <=0.5/9.5 u... Susceptible     Vancomycin 1.0 ug/mL Susceptible                  Specimen Collected: 10/16/21  4:35 PM Last Resulted: 10/21/21  7:19 AM              Contains abnormal data Wound Aerobic Bacterial Culture Routine  Order: [id number]   Collected 10/15/2021  4:32 PM     Status: Final result     Visible to patient: No (inaccessible in MyChart)    Specimen Information: Elbow, Left; Wound         0 Result Notes    Culture 2+ Staphylococcus aureus Abnormal             Resulting Agency: [facility]       Susceptibility     Staphylococcus aureus     REGLA     Clindamycin <=0.25 ug/mL Susceptible     Erythromycin <=0.25 ug/mL Susceptible     Gentamicin <=0.5 ug/mL Susceptible     Oxacillin <=0.25 ug/mL Susceptible     Tetracycline <=1.0 ug/mL Susceptible      Trimethoprim/Sulfamethoxazole <=0.5/9.5 u... Susceptible     Vancomycin 1.0 ug/mL Susceptible                  Specimen Collected: 10/15/21  4:32 PM Last Resulted: 10/18/21  4:16 AM             Pertinent Radiology  personally reviewed.      Study Result    Narrative & Impression   EXAM: CTA CHEST WITH CONTRAST  LOCATION: [facility]  DATE/TIME: 3/30/2022 10:18 PM     INDICATION: Chest pain, nonspecific. Evaluate for aortic dissection.  COMPARISON: CT of the chest 11/09/2021 performed without IV contrast.     TECHNIQUE: Multiphase helical acquisition through the chest was performed including noncontrast, arterial phase, and delayed images before and after the administration of IV contrast. 2D and 3D reconstructions were performed by the CT technologist. Dose   reduction techniques were used.   CONTRAST: 75 mL Isovue 370.     FINDINGS:  CT ANGIOGRAM CHEST: CTA examination demonstrates a dissection with origin involving the anterior aspect of the thoracic aortic arch at the takeoff of the left subclavian artery. This extends into the upper abdominal aorta. The false lumen supplies the   left renal artery. The remainder of the major vessels are supplied by the true lumen. Greatest radial dimension of the thoracic aorta is at the posterior arch and this measures 5.7 cm in greatest radial dimension. The prior correlative CT 11/09/2021   shows no significant contrast within the vessels, however the calcification can be seen involving the aorta and the dissection is not changed significantly. There is no evidence for extravasation of contrast to suggest a rupture. The size of the thoracic   aorta is not changed significantly since 11/09/2021 CT examination. There is a dissection seen involving the left aspect of the main pulmonary artery which extends into the proximal aspect of the right pulmonary artery. This is typically associated   changes of chronic pulmonary arterial hypertension. I am not  sure if this was present on the prior study given lack of IV contrast.     CHEST:  LUNGS AND PLEURA: There is a large loculated right pleural effusion which contains some heterogenous density and gas worrisome for empyema. There is a mild free component to the right pleural effusion seen inferiorly. There is advanced atelectatic   changes seen of the right lung. There is a minimal free pleural effusion seen on the left.     MEDIASTINUM: No adenopathy.     CORONARY ARTERY CALCIFICATION: Moderate most pronounced in the left anterior descending.     UPPER ABDOMEN: There is a mild amount of ascites. There are changes of cholelithiasis with no evidence for cholecystitis. There are findings most typical for hemangioma and adjacent benign cysts seen in the superior aspect of the right lobe of liver.   There is cirrhotic configuration seen of the liver. The spleen is not entirely included on this study, but appears enlarged and there are some varicosities consistent with portal venous hypertension. The portal vein is patent.      MUSCULOSKELETAL: Mild scattered degenerative changes most marked in the spine.                                                                      IMPRESSION:  1.  Dissection extending from the anterior aspect of the thoracic aortic arch into the abdominal aorta.     2.  There is aneurysmal dilatation seen of the thoracic aorta most marked at the mid arch region which measures approximately 5.7 cm in greatest radial dimension. No active rupture is identified.     3.  The dissection and size of the thoracic aorta appears stable since prior CT 11/09/2021.     4.  There is a dissection seen involving the main pulmonary artery and the proximal aspect of the right pulmonary artery which is commonly associated with changes of chronic increased pulmonary arterial pressure.     5.  There are findings worrisome for a large empyema in the right hemithorax with associated significant atelectasis.     6.   There are small free appearing bilateral pleural effusions.     7.  There is mild abdominal ascites.     8.  Changes of cirrhosis in the liver with associated portal venous hypertension and splenomegaly.        [Critical Result: Right empyema. Aortic dissection. Pulmonary artery dissection.]     Finding was identified on 3/30/2022 10:59 PM.      Dr. Hartley was contacted by me on 3/30/2022 11:24 PM and verbalized understanding of the critical result.      Study Result    Narrative & Impression   EXAM: CTA CHEST ABDOMEN PELVIS W CONTRAST  LOCATION: [facility]  DATE/TIME: 4/7/2022 6:00 PM     INDICATION: Right chest pain. Complex right pleural effusion. Aortic and pulmonary artery dissection.  COMPARISON: 3/30/2022  TECHNIQUE: CT angiogram chest abdomen pelvis during arterial phase of injection of IV contrast. 2D and 3D MIP reconstructions were performed by the CT technologist. Dose reduction techniques were used.   CONTRAST: [id number] 100ml     FINDINGS:   CT ANGIOGRAM CHEST, ABDOMEN, AND PELVIS: No significant interval change in a [place] type B aortic dissection extending from the aortic arch through the aortic bifurcation. The celiac axis and superior mesenteric artery and right renal artery are   opacified by the true lumen. The left renal artery is opacified by the false lumen. The inferior mesenteric artery is opacified by the false lumen. The dissection extends a short distance into the left common iliac artery. The iliac arteries are   otherwise normal. No evidence of rupture. 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 An infrarenal abdominal aortic artery aneurysm measuring 3.7 x 3.5 cm. The ascending aorta is   stable and dilated at 4.0 cm. A stent within the left brachiocephalic vein. Immediately proximal to the stent is marked narrowing of the left subclavian vein between the right clavicle and first rib,  image 10:6. Stable peripheral irregularity of the main   pulmonary artery extending into the proximal right pulmonary artery, image 46:6.     LUNGS AND PLEURA: Loculated large right hydropneumothorax is similar in size to prior with an increased pneumothorax component. A stable hyperdense component posteriorly within the pleural collection is consistent with a hematoma. Right chest tube in   place. Atelectasis involving the majority of the right lung. Small layering left pleural effusion.     MEDIASTINUM/AXILLAE: No lymphadenopathy. Trace pericardial fluid.     CORONARY ARTERY CALCIFICATION: Moderate.     HEPATOBILIARY: An indeterminant 4.3 x 3.9 cm mass in segment 8 of the with a peripheral nodular enhancing component, image 87:6. An indeterminate 2.2 x 2.2 cm hypodensity in segment 8, image 84:6. Nodular hepatic contour consistent with   cirrhosis/fibrosis. Cholelithiasis. No biliary ductal dilatation.     PANCREAS: Normal.     SPLEEN: Stable splenomegaly measuring 17.5 cm in maximal dimension.     ADRENAL GLANDS: Normal.     KIDNEYS/BLADDER: Atrophic kidneys. Hypoenhancing left kidney. Subcentimeter renal hypodensities which are too small to characterize. No hydronephrosis.     BOWEL: No obstruction or inflammatory change.     LYMPH NODES: Normal.     PELVIC ORGANS: Small volume ascites.     MUSCULOSKELETAL: Degenerative changes of the spine and hips.                                                                      IMPRESSION:  1.  Stable [place] type B aortic dissection.  2.  Stable dilatation of the ascending aorta, aortic arch, descending aorta and abdominal aorta.  3.  Stable peripheral irregularity in the main pulmonary artery which is most consistent with a dissection.  4.  Stable size of a large loculated right hydropneumothorax with increased pneumothorax component. Right chest tube in place.  5.  Marked narrowing of the left subclavian vein proximal to a stent.  6.  The left kidney is opacified by  the false lumen and is hypoenhancing which may represent ischemia or artifact due to timing of the contrast bolus.  7.  There are 2 indeterminant hepatic masses which may represent hepatocellular carcinoma. Recommend a contrast enhanced magnetic resonance imaging examination of the liver.        [Recommend Follow Up: Hepatic masses]     This report will be copied to the [facility] to ensure a provider acknowledges the finding.

## 2022-04-08 NOTE — PROGRESS NOTES
[facility]  Palliative Care Daily Progress Note    Discussed with Dr. Davalos and Dr. Smith.  Currently patient's goals are established and remain restorative.  Confirmed on 4/7 that his code status is DNR/DNI.  Palliative care will sign off at this time, however if further assistance is needed from our team in the future please let us know.   Thank you,    SUSY Kuhn, GCNS-BC  Clinical Nurse Specialist  [facility]  [phone]

## 2022-04-08 NOTE — PROGRESS NOTES
RENAL PROGRESS NOTE    CC: ESRD management    ASSESSMENT & PLAN:   ESRD on HD: He still making urine and stated normal amount. He is getting only Monday and Friday dialysis at [facility]. TW is 60.5kg and last run was on 3/28/2022. His primary nephrologist is Dr. Nate Rivero. Dialyze for 3 hr.   Last hemodialysis 04/04  Patient is scheduled for hemodialysis today as per usual M and F schedule.  The patient has been refusing to dialyze three times per week.   - renal diet  - renally dosing medication.       Access: Patient had bleeding from cannulation of his AVG and Dr. Csoby and his team evaluated and placed suture urgently on 4/1. 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  Had a placement of covered stent graft across of pseudoaneurysm with good results.  -Patient had no issues with cannulation during dialysis 04/04.     BP/ Volume: BP is controlled on current regimen.  No recurrent episodes of symptomatic hypotension since yesterday morning. Patient appears euvolemic on exam.  Estimated dry weight 60.5 kg. Weight today is 59.9 kgs.  PTA clonidine 0.1 mg at bedtime, diltiazem  mg bid, carvedilol 25 mg twice daily  Diltiazem was discontinued 04/05 given hypotension  Patient appears euvolemic on exam. I/O are not accurate, UF was not recorded in patient's chart  -continue current dose Clonidine and Coreg with holding parameters.  --strict Is and Os as he is still making urine. UOP is ranging between 175 to 775 cc daily  -Daily weight     Electrolytes/acid-base status:   -Mild hyponatremia of 132.  Stable.  -Serum potassium is wnl 4.7.  Run with K3 bath.  Renal diet.  -Serum bicarbonate within normal limits at 22.  Run with 32 bicarb bath     Anemia: likely combined with ESRD and ABL( Secondary to  active bleeding from right pleural space.)  Being worked up outpatient for hemolytic anemia due to undetectable haptoglobin, LDH elevation, and low reticulocyte count.  Received 5U PRBC during this hospitalization.   Receives Epo 25646H x2 per week at [facility].  Received 63589 units of Epogen on 04/04  Today Hgb is 7.2 g/dl.   -trend Hgb, Transfuse per protocol  - monitor for now.           BMD:   -Phosphorus 3.2. On phoslo 667 mg tid with meal.  - Ca 7.3 with albmin 3.1 and corrected Ca ~ 8.0. Started on calcium carbonate 1000 mg at bedtime  -Renal diet    Chronic right effusion with trapped lung- s/p multiple chest tube placements, right pigtail in place, deemed non-surgical candidate for decortication due to co-morbidities.  Pulmonary input appreciated.   CT chest 04/07shwoed stable size of a large loculated right hydropneumothorax with increased pneumothorax component.  Pleural fluid grew Staph aureus. Started on IV Vancomycin. ID consulted.    Chronic type B dissection-seen by  cardiothoracic surgery.No acute surgical intervention needed.    Hepatic masses on CT- may represent Hepatocellular carcinoma      Goals of care-seen by palliative care team. Patient is DNI/DNR.    Addendum:  I saw and examined the patient again in afternoon during dialysis treatment. Tolerating without issues.  Cannulated without issues.    We will follow       SUBJECTIVE:    No acute issues overnight.  Patient seen the bedside and events were reviewed with nursing.    Patient states that he is feeling the same, c/o burning pain in the right side of the chest lateral wall.  Patient denies: fever, chills, cough, shortness of breath , chest pain, palpitations, orthopnea, nausea, vomiting, abdominal pain, changes in bowel habits, dysuria, urinary frequency, urgency, hematuria, rash.      OBJECTIVE:  Physical Exam   Temp: 98.8  F (37.1  C) Temp src: Oral BP: 121/71 Pulse: 79   Resp: 18 SpO2: 97 % O2 Device: None (Room air)    Vitals:     04/04/22 0419 04/04/22 1714 04/08/22 0830   Weight: 60.2 kg (132 lb 11.2 oz) 60.2 kg (132 lb 11.5 oz) 59.9 kg (132 lb)     Vital Signs with Ranges  Temp:  [97.7  F (36.5  C)-99  F (37.2  C)] 98.8  F (37.1  C)  Pulse:  [79-82] 79  Resp:  [16-20] 18  BP: (106-139)/(56-81) 121/71  SpO2:  [92 %-98 %] 97 %  I/O last 3 completed shifts:  In: 560 [P.O.:240]  Out: 880 [Urine:300; Chest Tube:580]    @TMAXR(24)@    Patient Vitals for the past 72 hrs:   Weight   04/08/22 0830 59.9 kg (132 lb)       Intake/Output Summary (Last 24 hours) at 4/1/2022 1738  Last data filed at 4/1/2022 1000  Gross per 24 hour   Intake 480 ml   Output 650 ml   Net -170 ml       PHYSICAL EXAM:  General - Alert and oriented x3, appears comfortable, NAD  Cardiovascular - Regular rate and rhythm, no rub  Respiratory -right-sided chest tube draining bloody fluid, diminished breath sounds in the right posterior lung fields, left lung clear to auscultation anterior and posterior fields, no crackles or wheezes  Abd: BS present, no guarding or pain with palpation, no ascites  Extremities -no edema  Skin: dry, intact, no rash, good turgor  Neuro:  Grossly intact, no focal deficits  MSK:  Grossly intact  Psych:  Normal affect  Access:  LUE AVG with sutures, mild edema, no tenderness no warmth, palpable thrill and audible bruit    LABORATORY STUDIES:     Recent Labs   Lab 04/08/22  0543 04/07/22  2133 04/07/22  1142 04/07/22  0637 04/06/22  0638 04/05/22  0540 04/04/22  0528   WBC 6.4  --  6.3 7.0 5.0 4.3 3.9*   RBC 2.46*  --  2.10* 2.37* 2.61* 2.56* 2.75*   HGB 7.2* 7.2* 6.3* 7.0* 7.8* 7.7* 8.1*   HCT 22.6*  --  20.3* 22.6* 23.9* 23.4* 24.7*   PLT 43*  --  37* 40* 49* 50* 62*       Basic Metabolic Panel:  Recent Labs   Lab 04/08/22  0543 04/07/22  1142 04/07/22  0637 04/06/22  0638 04/05/22  0540 04/04/22  0528   * 134* 132* 134* 135* 133*   POTASSIUM 4.7 4.3 4.2 4.2 3.7 3.9   CHLORIDE 100 100 98 100 99 98   CO2 22 23 22 25 25 24   BUN 54* 48* 46* 35*  24* 49*   CR 7.90* 7.10* 6.89* 5.68* 4.13* 6.29*   GLC 90 95 100 83 80 79   TANO 7.3* 7.2* 7.4* 7.4* 7.6* 7.6*       INR  No lab results found in last 7 days.     Recent Labs   Lab Test 04/08/22  0543 04/07/22  2133 04/07/22  1142 03/31/22  0710 03/30/22  1557 10/19/21  1006 10/19/21  0940   INR  --   --   --   --  1.25*  --  1.38*   WBC 6.4  --  6.3   < > 5.5   < >  --    HGB 7.2* 7.2* 6.3*   < > 6.2*   < >  --    PLT 43*  --  37*   < > 71*   < >  --     < > = values in this interval not displayed.       Personally reviewed current labs      Elizabeth Salmeron MD  [facility]  [address], [address]  [place] [address]  Phone# [phone]  Fax# [phone]

## 2022-04-08 NOTE — PLAN OF CARE
Problem: Anemia  Goal: Anemia Symptom Improvement  Outcome: Ongoing, Progressing  Intervention: Monitor and Manage Anemia  Recent Flowsheet Documentation  Taken 4/7/2022 2327 by Jennie Nunes, RN  Safety Promotion/Fall Prevention: assistive device/personal items within reach  Taken 4/7/2022 2024 by Jennie Nunes, RN  Safety Promotion/Fall Prevention: assistive device/personal items within reach   HGB recheck after blood transfusion was 7.2.  Problem: Infection  Goal: Absence of Infection Signs and Symptoms  Outcome: Ongoing, Progressing   Pleural fluid positive for gram +cocci in  clusters,Dr Nelson notified,Iv vanco ordered and administered as ordered.  Problem: Gas Exchange Impaired  Goal: Optimal Gas Exchange  Outcome: Ongoing, Progressing   Pt denied SOB  Problem: Pain (Chronic Kidney Disease)  Goal: Acceptable Pain Control  Outcome: Ongoing, Progressing  PRN oxycodone and Hydroxyzine given per pt request with effective results. Pt slept most of the night.     Chest tube site  dressing intact,had 350 ml serosanguinous  out put from 7pm-7Am.   Goal Outcome Evaluation:

## 2022-04-08 NOTE — SIGNIFICANT EVENT
Significant Event Note    Time of event: 1:15 AM April 8, 2022    Description of event:  RN notified house officer of pleural fluid gram stain results: Growing gram positive cocci in clusters.     Plan:  Will start empiric vancomycin, and await culture results.     Discussed with: bedside nurse    Steven Watkins DO

## 2022-04-08 NOTE — PROGRESS NOTES
Future appt:    Last Appointment with provider:  10/13/20 HTN follow up, medication follow up,   [place] [facility]:  10/13/2020  Cholesterol, Total (mg/dL)   Date Value   05/28/2020 181     HDL Cholesterol (mg/dL)   Date Value   05/28/2020 5 PULMONARY MEDICINE CONSULT  4/7/2022    Admit Date: 3/30/2022  CODE: No CPR- Do NOT Intubate    Reason for Consult: right pleural effusion    Assessment/Plan:   61 year old male with a history of HBV, cirrhosis, ESKD on HD (only Monday and Friday as patient declines thrice-weekly dialysis), hypoalbuminemia, coagulopathy, thrombocytopenia, chronic anemia, chronic thoracic aortic dissection, recurrent right pleural effusion with trapped right lung requiring prior chest tubes and thoracenteses.  He is here with another effusion.  Some characteristics of more complicated effusion on fluid including very low glucose.    The underlying cause of his pleural effusion is likely third spacing of fluid in the setting of his liver disease and end-stage kidney disease.  Further complicated by possible transient bacteremia that may make him susceptible to these infections.    Is difficult to determine the need for cancer of a positive culture on the tube that is been in place for some time.  Overall after discussion with infectious disease it seems to make the most sense to treat.    Plan:  Chest tube to waterseal-for this type of effusion-trapped lung, some infection he does not need to be on suction  Treat according to culture data with infectious disease  He will continue to have output for quite some time.  Given his positive culture lets continue drainage for 1 day.  Continue antibiotics.  Maybe take chest tube out tomorrow.    Discussed with primary team and infectious disease consultant.    HPI:   CCx: right pleural effusion, trapped right lung    Subjective: He is feeling okay today.  He is having some pain at the right chest.  Is worse with cough movement.  Localized to the site of the chest tube.  It is better with lying still.  He is getting restless and would like to be able to go home soon if possible.                                                                                                                                                         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 pylori infection 7/5/2017    UGI bleed implied by Hgb 9.0 6/22/17 and melena. Admitted and hgb decreased to 7.6, CT abdomen showed all bladder wall thickening. + Hep B surface antigen noted. Melena resolved and hgb stabalized without transfusion, epigastric pain resolved with PPI. Recommend referral for gastroscopy.     Heart attack (H)      Hepatitis B      Normocytic anemia      Other pancytopenia (H) 7/5/2017 6/24/17 Peripheral smear at [facility].Pancytopenia of uncertain cause. Ruled out acute leukemia. Hematologist suggests hemolytic anemia should be considered possible cause and LDH and haptoglobin should be assessed in future.      PONV (postoperative nausea and vomiting)      Thrombocytopenia (H)        Past Surgical History  Past Surgical History:   Procedure Laterality Date     ABCESS DRAINAGE      finger     BURSECTOMY ELBOW Left 10/15/2021    Procedure: LEFT OLECRANON BURSECTOMY;  Surgeon: Tico Myers MD;  Location: [facility]     BURSECTOMY ELBOW Left 1/18/2022    Procedure: LEFT ELBOW OLECRANON BURSECTOMY;  Surgeon: Tico Myers MD;  Location: [facility]     CREATE FISTULA ARTERIOVENOUS UPPER EXTREMITY Left 4/20/2021    Procedure: left arm dialysis graft placement;  Surgeon: Roxana Cosby MD;  Location: [facility] OR;  Service: General     FOREIGN BODY REMOVAL      finger     INCISION AND DRAINAGE FINGER, COMBINED Left 10/20/2016    Procedure: COMBINED INCISION AND DRAINAGE FINGER;  Surgeon: Mireya Pizano MD;   Location: [facility] OR     INCISION AND DRAINAGE UPPER EXTREMITY, COMBINED Left 1/18/2022    Procedure: AND ELBOW INCISION AND DRAINAGE;  Surgeon: Tico Myers MD;  Location: [facility] OR     IR CHEST TUBE PLACEMENT NON-TUNNELED RIGHT  4/19/2021     IR CHEST TUBE PLACEMENT NON-TUNNELED RIGHT  10/19/2021     IR CHEST TUBE PLACEMENT NON-TUNNELED RIGHT  11/10/2021     IR CHEST TUBE PLACEMENT NON-TUNNELED RIGHT  3/31/2022     IR DIALYSIS FISTULOGRAM LEFT  4/2/2022     IR DIALYSIS FISTULOGRAM LEFT  4/4/2022     IR PLEURAL DRAINAGE WITH CATHETER INSERTION  4/19/2021     MIDLINE INSERTION - DOUBLE LUMEN  4/23/2021          IL ESOPHAGOGASTRODUODENOSCOPY TRANSORAL DIAGNOSTIC N/A 8/18/2020    Procedure: ESOPHAGOGASTRODUODENOSCOPY (EGD) with biopsy;  Surgeon: Nilson Gonzales MD;  Location: [facility];  Service: Gastroenterology      PARACENTESIS  8/14/2020      PARACENTESIS  8/19/2020     US THORACENTESIS  4/18/2021       Allergies:  Allergies   Allergen Reactions     Nka [No Known Allergies]        PTA medications:  Medications Prior to Admission   Medication Sig Dispense Refill Last Dose     acetaminophen (TYLENOL) 500 MG tablet Take 500 mg by mouth every 6 hours as needed for mild pain   Unknown at Unknown time     calcium acetate (PHOSLO) 667 MG CAPS capsule Take 1 capsule by mouth 3 times daily (with meals)   3/30/2022 at AM     carvedilol (COREG) 25 MG tablet Take 25-50 mg by mouth 2 times daily as needed    Unknown at Unknown time     cloNIDine (CATAPRES) 0.1 MG tablet Take 0.1 mg by mouth At Bedtime    3/29/2022 at PM     diltiazem ER (DILT-XR) 180 MG 24 hr capsule Take 1 capsule (180 mg) by mouth 2 times daily (Patient taking differently: Take 180 mg by mouth 2 times daily as needed (Checks BP prior to taking, if too low he does not take this medication) ) 60 capsule 11 Unknown at Unknown time     entecavir (BARACLUDE) 0.5 MG tablet Take 1 tablet (0.5 mg) by mouth every 7 days 52 tablet 1 3/27/2022      hydrOXYzine (ATARAX) 25 MG tablet Take 1 tablet (25 mg) by mouth 3 times daily as needed for itching 90 tablet 3 Unknown at Unknown time     oxyCODONE (ROXICODONE) 5 MG tablet Take 1 tablet (5 mg) by mouth daily as needed for severe pain 30 tablet 0 Unknown at Unknown time     pantoprazole (PROTONIX) 40 MG EC tablet Take 1 tablet (40 mg) by mouth daily 30 tablet 11 3/30/2022 at AM     senna-docusate (SENOKOT-S/PERICOLACE) 8.6-50 MG tablet Take 1 tablet by mouth daily as needed for constipation 30 tablet 11 Unknown at Unknown time     zolpidem (AMBIEN) 5 MG tablet Take 1 tablet (5 mg) by mouth nightly as needed for sleep 10 tablet 5 Unknown at Unknown time     [employer] SALINE NASAL SPRAY 0.65 % nasal spray Spray 1 spray in nostril daily as needed (Patient not taking: Reported on 3/30/2022)        order for DME Equipment being ordered: Other: blood pressure monitor  Treatment Diagnosis: hypertension 1 each 0        Family Hx:  Family History   Problem Relation Age of Onset     Diabetes Father      Hypertension No family hx of      Asthma No family hx of      Cancer No family hx of      Coronary Artery Disease No family hx of      Liver Cancer No family hx of      Ulcers Father        Social Hx:  Social History     Socioeconomic History     Marital status:      Spouse name: Not on file     Number of children: Not on file     Years of education: Not on file     Highest education level: Not on file   Occupational History     Not on file   Tobacco Use     Smoking status: Never Smoker     Smokeless tobacco: Never Used   Vaping Use     Vaping Use: Never used   Substance and Sexual Activity     Alcohol use: No     Drug use: No     Sexual activity: Yes     Partners: Female   Other Topics Concern     Parent/sibling w/ CABG, MI or angioplasty before 65F 55M? Not Asked   Social History Narrative     Not on file     Social Determinants of Health     Financial Resource Strain: Not on file   Food Insecurity: Not on file  "  Transportation Needs: Not on file   Physical Activity: Not on file   Stress: Not on file   Social Connections: Not on file   Intimate Partner Violence: Not on file   Housing Stability: Not on file       Exam/Data:     Vitals  BP (!) 154/88   Pulse 81   Temp 98.8  F (37.1  C) (Oral)   Resp 16   Ht 1.651 m (5' 5\")   Wt 59.9 kg (132 lb)   SpO2 96%   BMI 21.97 kg/m    BP - Mean:  [100-104] 104  I/O last 3 completed shifts:  In: 320   Out: 990 [Urine:500; Chest Tube:490]  Weight change:   [unfilled]  EXAM:  BP (!) 154/88   Pulse 81   Temp 98.8  F (37.1  C) (Oral)   Resp 16   Ht 1.651 m (5' 5\")   Wt 59.9 kg (132 lb)   SpO2 96%   BMI 21.97 kg/m      Intake/Output last 3 shifts:  I/O last 3 completed shifts:  In: 320   Out: 990 [Urine:500; Chest Tube:490]  Intake/Output this shift:  No intake/output data recorded.    Physical Exam  Gen: Sitting in bed, no distress  HEENT: NT, no HOMERO  CV: RRR, no m/g/r  Resp: decreased on right, has right lateral chest pain, right pleural catheter with extensive dressing  Abd: soft, nontender, BS+  Skin: no rashes or lesions  Ext: no edema, appears cachectic  Neuro: PERRL, nonfocal exam    ROS: A complete 10-system review of systems was obtained and is negative with the exception of what is noted in the history of present illness.    Medications:       - MEDICATION INSTRUCTIONS -         sodium chloride 0.9%  250 mL Intravenous Once in dialysis/CRRT     sodium chloride 0.9%  300 mL Hemodialysis Machine Once     sodium chloride 0.9%  250 mL Intravenous Once in dialysis/CRRT     sodium chloride 0.9%  300 mL Hemodialysis Machine Once     calcium acetate  667 mg Oral TID w/meals     calcium carbonate (OS-TANO) 500 mg (elemental) tablet  1,000 mg Oral At Bedtime     carvedilol  25 mg Oral BID w/meals     cloNIDine  0.1 mg Oral At Bedtime     entecavir  0.5 mg Oral Q7 Days     gelatin absorbable  1 each Topical Once     multivitamin RENAL  1 tablet Oral Daily     - MEDICATION " INSTRUCTIONS -   Does not apply Once     - MEDICATION INSTRUCTIONS -   Does not apply Once     pantoprazole  40 mg Oral Daily     sodium chloride (PF)  3 mL Intracatheter Q8H     [START ON 4/9/2022] vancomycin  500 mg Intravenous Once     vancomycin place rivera - receiving intermittent dosing  1 each Intravenous See Admin Instructions         DATA  All laboratory and radiology has been personally reviewed by myself today.  Recent Labs   Lab 04/08/22  0543   WBC 6.4   HGB 7.2*   HCT 22.6*   PLT 43*     Recent Labs   Lab 04/08/22  0543 04/07/22  1142 04/07/22  0637   * 134* 132*   CO2 22 23 22   BUN 54* 48* 46*   ALKPHOS  --  94  --    ALT  --  <9  --    AST  --  15  --      IMAGING:   CT chest with thickened pleural rind, chest tube in place.

## 2022-04-08 NOTE — PHARMACY-VANCOMYCIN DOSING SERVICE
Pharmacy Vancomycin Initial Note  Date of Service 2022  Patient's  1960  61 year old, male  Indication: Intra-abdominal infection / Pleural infection - GM+ gram stain on pleural fluid/ cocci in chains    Current estimated CrCl = Estimated Creatinine Clearance: 9.3 mL/min (A) (based on SCr of 7.1 mg/dL (HH)).    Creatinine for last 3 days  2022:  5:40 AM Creatinine 4.13 mg/dL  2022:  6:38 AM Creatinine 5.68 mg/dL  2022:  6:37 AM Creatinine 6.89 mg/dL; 11:42 AM Creatinine 7.10 mg/dL    Recent Vancomycin Level(s) for last 3 days  No results found for requested labs within last 72 hours.      Vancomycin IV Administrations (past 72 hours)      No vancomycin orders with administrations in past 72 hours.                Nephrotoxins and other renal medications (From now, onward)    Start     Dose/Rate Route Frequency Ordered Stop    22 0230  vancomycin (VANCOCIN) 1,250 mg in sodium chloride 0.9 % 250 mL intermittent infusion         20 mg/kg × 60.2 kg  over 90 Minutes Intravenous ONCE 22 0142      22 0141  vancomycin place rivera - receiving intermittent dosing         1 each Intravenous SEE ADMIN INSTRUCTIONS 22 0142            Contrast Orders - past 72 hours (72h ago, onward)            None          [employer] Prediction of Planned Initial Vancomycin Regimen  Not appropriate due to Renal function - on dialysis        Plan:  1. Give vancomycin  1250 mg IVx1. Further dosing based upon levels and HD scheduled. Next HD   2. Vancomycin monitoring method: Trough (Method 1 = dosing nomogram)  3. Vancomycin therapeutic monitoring goal: 15-20 mg/L  4. Pharmacy will check vancomycin levels as appropriate in 1-3 Days.    5. Serum creatinine levels will be ordered daily for the first week of therapy and at least twice weekly for subsequent weeks.      Lucretia Sanders, [facility]

## 2022-04-08 NOTE — PHARMACY-VANCOMYCIN DOSING SERVICE
Pharmacy Vancomycin Note  Date of Service 2022  Patient's  1960   61 year old, male    Indication: pleural infection  Day of Therapy: 2  Current vancomycin regimen:  Intermittent vancomycin  Current vancomycin monitoring method: Trough (Method 2 = manual dose calculation)  Current vancomycin therapeutic monitoring goal: 15-20 mg/L    Current estimated CrCl = Estimated Creatinine Clearance: 8.3 mL/min (A) (based on SCr of 7.9 mg/dL ()).    Creatinine for last 3 days  2022:  6:38 AM Creatinine 5.68 mg/dL  2022:  6:37 AM Creatinine 6.89 mg/dL; 11:42 AM Creatinine 7.10 mg/dL  2022:  5:43 AM Creatinine 7.90 mg/dL    Recent Vancomycin Levels (past 3 days)  No results found for requested labs within last 72 hours.    Vancomycin IV Administrations (past 72 hours)                   vancomycin (VANCOCIN) 1,250 mg in sodium chloride 0.9 % 250 mL intermittent infusion (mg) 1,250 mg New Bag 22 0216                Nephrotoxins and other renal medications (From now, onward)    Start     Dose/Rate Route Frequency Ordered Stop    22 0000  vancomycin (VANCOCIN) 500 mg in sodium chloride 0.9 % 100 mL intermittent infusion         500 mg  over 1 Hours Intravenous ONCE 22 1330      22 0141  vancomycin place rivera - receiving intermittent dosing         1 each Intravenous SEE ADMIN INSTRUCTIONS 22 0142               Contrast Orders - past 72 hours (72h ago, onward)            None            Plan:  1. Vancomycin 500 mg IV x 1 tonight post-HD.  2. Vancomycin monitoring method: Trough (Method 2 = manual dose calculation)  3. Vancomycin therapeutic monitoring goal: 15-20 mg/L  4. Pharmacy will check vancomycin levels after next HD run.    Nolan Zimmerman [facility]

## 2022-04-08 NOTE — PROGRESS NOTES
HEMODIALYSIS TREATMENT NOTE    Date: 4/8/2022  Time: 7.00 PM    Data:  Pre Wt: 59.9kg    Desired Wt: 57.9 kg   Post Wt:58.4kg(Estimated)    Weight change: 1.5  kg  Ultrafiltration - Post Run Net Total Removed (mL): 1500mL  Vascular Access Status: Graft  patent  Dialyzer Rinse: Streaked  Total Blood Volume Processed:63.0 L   Total Dialysis (Treatment) Time: 3hours   Dialysate Bath: K 2, Ca 2.5  Heparin: None    Lab:   No    Assessment / Interventions:Pt had uncomplicated 3.0 hours HD via LAVG. Thrill & bruit present. 2 16g needle cannulated successful.  with good flow. Hemodynamic stable throughout the treatment, 1.5kg UF removed &Crit-line steady. Seen by Nephrology during treatment, no change. Pt completed his treatment time, blood rinsed back , Graft hemostasis achieved in less than 10 minutes & handoff report given.         Plan:    Per Renal Team.

## 2022-04-08 NOTE — PROGRESS NOTES
[facility]  I was called by Dr. Scott on 4/8/2022 at 1:12 PM to provide input for Elle Blanton MRN [id number]. The patient is located at distant [facility]. The nature of this [facility] does not permit me to perform a comprehensive review of health care records, patient/family interview, nor an examination of the patient. I obtained limited patient information from the provider on the phone call and a limited chart review.    Based on only the information I was provided today, I make the following recommendations to the treating provider/team for their review and consideration:     61 year old male with a history of HBV, cirrhosis, ESKD on HD (only Monday and Friday as patient declines thrice-weekly dialysis), coagulopathy, thrombocytopenia, chronic anemia, chronic thoracic aortic dissection, recurrent right pleural effusion with trapped right lung requiring prior chest tubes and thoracenteses, who originally presented on 3/30 with sever anemia. Imaging showed right pleural effusion and had a chest tube placed 3/31. Chest tube with bloody output, seen by Pulm and pleural fluid sampled on 4/7. Gram stain with GPCs in clusters leading to Vancomycin being started    Patient has been afebrile the entire admission. No systemic symptoms concerning for infection. No leukocytosis. In addition, pleural fluid was sampled off a chest tube that was in place for 7 days, thereby increasing likelihood that chest tube was colonized with skin faustina. Based on history and presentation, low suspicion for active infection. Ok to continue Vancomycin for now, however if the GPCs are identified as coagulase negative staph, would discontinue antibiotics    These recommendations are not intended to take the place of the care team's clinical judgement, which should always be utilized to provide the most appropriate care to meet the unique needs of each patient. The recommendations  offered were based on the limited scope of information provided as today's date. Should additional guidance be needed or required a formal consultation with infectious diseases is recommended.

## 2022-04-09 NOTE — PROGRESS NOTES
"PULMONARY MEDICINE CONSULT  4/9/2022     Admit Date: 3/30/2022  CODE: No CPR- Do NOT Intubate    Reason for Consult: right pleural effusion    Assessment/Plan:   61 year old male with a history of HBV, cirrhosis, ESKD on HD (only Monday and Friday as patient declines thrice-weekly dialysis), hypoalbuminemia, coagulopathy, thrombocytopenia, chronic anemia, chronic thoracic aortic dissection, recurrent right pleural effusion with trapped right lung requiring prior chest tubes and thoracenteses.  He is here with another effusion.       The underlying cause of his pleural effusion is likely third spacing of fluid in the setting of his liver disease and end-stage kidney disease.  Further complicated by possible transient bacteremia that may make him susceptible to these infections.     Plan:  Continue chest tube tonight; likely remove in AM.   Treat according to culture data with infectious disease  OK to put chest tube to water seal and allow him to ambulate freely.     Danisha Chase, CNP  [facility] Pulmonary/Critical Care     HPI:   CCx: right pleural effusion, trapped right lung    Subjective: Has some pain at the chest tube site and feeling like something is \"pinching\" him inside the chest. Breathing is only difficult if he tries to take a deep breath.                                                                                                                                                         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 pylori infection 7/5/2017    UGI bleed implied by Hgb 9.0 6/22/17 and melena. Admitted and hgb " decreased to 7.6, CT abdomen showed all bladder wall thickening. + Hep B surface antigen noted. Melena resolved and hgb stabalized without transfusion, epigastric pain resolved with PPI. Recommend referral for gastroscopy.     Heart attack (H)      Hepatitis B      Normocytic anemia      Other pancytopenia (H) 7/5/2017 6/24/17 Peripheral smear at [facility].Pancytopenia of uncertain cause. Ruled out acute leukemia. Hematologist suggests hemolytic anemia should be considered possible cause and LDH and haptoglobin should be assessed in future.      PONV (postoperative nausea and vomiting)      Thrombocytopenia (H)         Allergies:  Allergies   Allergen Reactions     Nka [No Known Allergies]        PTA medications:  Medications Prior to Admission   Medication Sig Dispense Refill Last Dose     acetaminophen (TYLENOL) 500 MG tablet Take 500 mg by mouth every 6 hours as needed for mild pain   Unknown at Unknown time     calcium acetate (PHOSLO) 667 MG CAPS capsule Take 1 capsule by mouth 3 times daily (with meals)   3/30/2022 at AM     carvedilol (COREG) 25 MG tablet Take 25-50 mg by mouth 2 times daily as needed    Unknown at Unknown time     cloNIDine (CATAPRES) 0.1 MG tablet Take 0.1 mg by mouth At Bedtime    3/29/2022 at PM     diltiazem ER (DILT-XR) 180 MG 24 hr capsule Take 1 capsule (180 mg) by mouth 2 times daily (Patient taking differently: Take 180 mg by mouth 2 times daily as needed (Checks BP prior to taking, if too low he does not take this medication) ) 60 capsule 11 Unknown at Unknown time     entecavir (BARACLUDE) 0.5 MG tablet Take 1 tablet (0.5 mg) by mouth every 7 days 52 tablet 1 3/27/2022     hydrOXYzine (ATARAX) 25 MG tablet Take 1 tablet (25 mg) by mouth 3 times daily as needed for itching 90 tablet 3 Unknown at Unknown time     oxyCODONE (ROXICODONE) 5 MG tablet Take 1 tablet (5 mg) by mouth daily as needed for severe pain 30 tablet 0 Unknown at Unknown time     pantoprazole (PROTONIX) 40 MG  "EC tablet Take 1 tablet (40 mg) by mouth daily 30 tablet 11 3/30/2022 at AM     senna-docusate (SENOKOT-S/PERICOLACE) 8.6-50 MG tablet Take 1 tablet by mouth daily as needed for constipation 30 tablet 11 Unknown at Unknown time     zolpidem (AMBIEN) 5 MG tablet Take 1 tablet (5 mg) by mouth nightly as needed for sleep 10 tablet 5 Unknown at Unknown time     [employer] SALINE NASAL SPRAY 0.65 % nasal spray Spray 1 spray in nostril daily as needed (Patient not taking: Reported on 3/30/2022)        order for DME Equipment being ordered: Other: blood pressure monitor  Treatment Diagnosis: hypertension 1 each 0           Exam/Data:     Vitals  BP (!) 142/84 (BP Location: Right leg)   Pulse 78   Temp 98.3  F (36.8  C) (Oral)   Resp 16   Ht 1.651 m (5' 5\")   Wt 59.9 kg (132 lb)   SpO2 97%   BMI 21.97 kg/m       I/O last 3 completed shifts:  In: 0   Out: 1500 [Other:1500]  Weight change:     EXAM:  BP (!) 142/84 (BP Location: Right leg)   Pulse 78   Temp 98.3  F (36.8  C) (Oral)   Resp 16   Ht 1.651 m (5' 5\")   Wt 59.9 kg (132 lb)   SpO2 97%   BMI 21.97 kg/m      Intake/Output last 3 shifts:  I/O last 3 completed shifts:  In: 0   Out: 1500 [Other:1500]  Intake/Output this shift:  I/O this shift:  In: 500   Out: -     Physical Exam  Gen: Sitting on edge of bed, no distress  HEENT: NT, no HOMERO  CV: RRR, no m/g/r  Resp: decreased on right, right side chest tube, dressing intact; draining old dark blood. Lungs are clear on the left.   Abd: soft, nontender, BS+  Skin: no rashes or lesions  Ext: no edema   Neuro: PERRL, nonfocal exam    ROS: A complete 10-system review of systems was obtained and is negative with the exception of what is noted in the HPI/Subjective.    Medications:       - MEDICATION INSTRUCTIONS -         sodium chloride 0.9%  250 mL Intravenous Once in dialysis/CRRT     sodium chloride 0.9%  300 mL Hemodialysis Machine Once     sodium chloride 0.9%  250 mL Intravenous Once in dialysis/CRRT     sodium " chloride 0.9%  300 mL Hemodialysis Machine Once     calcium acetate  667 mg Oral TID w/meals     calcium carbonate (OS-TANO) 500 mg (elemental) tablet  1,000 mg Oral At Bedtime     carvedilol  25 mg Oral BID w/meals     cloNIDine  0.1 mg Oral At Bedtime     entecavir  0.5 mg Oral Q7 Days     gelatin absorbable  1 each Topical Once     multivitamin RENAL  1 tablet Oral Daily     - MEDICATION INSTRUCTIONS -   Does not apply Once     - MEDICATION INSTRUCTIONS -   Does not apply Once     pantoprazole  40 mg Oral Daily     sodium chloride (PF)  3 mL Intracatheter Q8H     vancomycin place rivera - receiving intermittent dosing  1 each Intravenous See Admin Instructions         DATA  All laboratory and radiology has been personally reviewed by myself today.  Recent Labs   Lab 04/09/22  0456   WBC 5.7   HGB 6.9*   HCT 21.3*   PLT 55*     Recent Labs   Lab 04/09/22  0456 04/08/22  0543 04/07/22  1142   * 132* 134*   CO2 28 22 23   BUN 31* 54* 48*   ALKPHOS  --   --  94   ALT  --   --  <9   AST  --   --  15     IMAGING:   CT chest 4/7 - 1.  Stable Panfilo type B aortic dissection.  2.  Stable dilatation of the ascending aorta, aortic arch, descending aorta and abdominal aorta.  3.  Stable peripheral irregularity in the main pulmonary artery which is most consistent with a dissection.  4.  Stable size of a large loculated right hydropneumothorax with increased pneumothorax component. Right chest tube in place.  5.  Marked narrowing of the left subclavian vein proximal to a stent.  6.  The left kidney is opacified by the false lumen and is hypoenhancing which may represent ischemia or artifact due to timing of the contrast bolus.  7.  There are 2 indeterminant hepatic masses which may represent hepatocellular carcinoma. Recommend a contrast enhanced magnetic resonance imaging examination of the liver.

## 2022-04-09 NOTE — PROGRESS NOTES
[facility]    Medicine Progress Note - Hospitalist Service    Date of Admission:  3/30/2022    Assessment & Plan          Elle Blanton is a 61 year old male with past medical history significant for thoracic aortic dissection, hepatitis B, hepatorenal syndrome with ESRD, pleural effusion and normocytic anemia who was admitted on 3/30/2022 after being found to have a hgb of 6.2 at clinic.Chest tube placed 4/1 by IR for empyema (R). Multiple pseudoaneurysms of LUE, underwent fistulagram and stent placement on 4/4.  Pleural fluid + S. Aureus on 4/8.    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 1st and 3rd with patent blood flow. IR notes tiny pseudoaneurysm/graft break down at medial graft, which may benefit from stent. 4/4: Underwent multiple stent placements of pseudoaneurysms. Per IR, OK for HD.    Acute macrocytic anemia on chronic normocytic anemia of chronic disease  History of GI bleed  Thrombocytopenia  -He is on high-dose EPO and takes iron supplements outpatient  -Being worked up outpatient for hemolytic anemia due to undetectable haptoglobin, LDH elevation, and low reticulocyte count  -Primary care physician is placing a referral to heme onc to help address this  - Total 4 units PRBC since presentation.  -Will monitor and transfuse for hemoglobin less than 7. Hgb today 6.9 and symptomatic. Transfuse 1 unit now.      Empyema  History of recurrent pleural effusions  Pleural fluid + S aureus  -Patient has had pleural effusions in the past that required chest tubes and multiple thoracenteses  -Patient last underwent thoracentesis on 3/23 and was unable to get much fluid off of the lung  -It has been recommended the patient undergo surgery for this, on 1/6/22 the patient followed up with Dr. Zurita and it was explained that the  patient is not a surgical candidate due to his multiple comorbidities and the chronicity of the trapped lung  -It was recommended to potentially get a thoracentesis Q8-12 weeks  -IR consulted for chest tube placed 4/1/2022  - Pulmonology consulted for chest tube management. Appreciate guidance. CTa CAP done yesterday which showed suspicious liver lesions. Palliative consulted yesterday. Goals of care are restorative at this time.   - Chest tube to water seal yesterday.  - Pleural fluid + S aureus. Susceptibilities pending. ID consulted. Appreciate guidance. Pro anish 0.96 and CRP 10.3 on 4/8.  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 Maharaj recommended conservative treatment at this time.     ESRD on hemodialysis (MF) secondary to hepatorenal syndrome due to cirrhosis  -Nephrology consulted; HD M/F  -Outpatient he has only been getting dialysis twice a week but it has been recommended by his primary and Nephrology to increase this to 3 times weekly for better symptom management and possible improvement of the empyema      Goals of Care  - Palliative Care consulted  4/7 DNR/DNI and goals are restorative at this time. Continuing HD and current medical cares/treatments.     Diet: Room Service  Renal Diet (dialysis)  Snacks/Supplements Adult: Ensure Clear; Between Meals  Snacks/Supplements Adult: Magic Cup; Between Meals    DVT Prophylaxis: Pneumatic Compression Devices  Beltre Catheter: Not present  Central Lines: None  Cardiac Monitoring: None  Code Status: No CPR- Do NOT Intubate      Disposition Plan   Expected Discharge: 04/10/2022     Anticipated discharge location:   Awaiting care coordination huddle       The patient's care was discussed with Dr Luis Scott MD  Hospitalist Service  [facility]  Securely message with the [employer] Web Console (learn more here)  Text page via [employer] Paging/Directory         Clinically Significant Risk Factors Present on Admission                    ______________________________________________________________________    Interval History   - NAEON  - Doing okay this morning  - Denies SOB. Still has intermittent chest wall pain at chest tube site.    Data reviewed today: I reviewed all medications, new labs and imaging results over the last 24 hours.     Physical Exam   Vital Signs: Temp: 99.4  F (37.4  C) Temp src: Oral BP: 127/82 Pulse: 85   Resp: 16 SpO2: 96 % O2 Device: None (Room air)    Weight: 132 lbs 0 oz  General Appearance:  sitting up in bed   Respiratory: Slightly decreased lung sounds on the right, no wheezing, rales or rhonchi appreciated. CT with serosang output, no air leak with cough. Tidaling well.   Cardiovascular: RRR, no murmurs appreciated  MUSC: no edema appreciated in the LUE or bilateral lower extremities  Skin: Jaundiced skin with bruises on arms and legs of various stages of healing    Data   Recent Labs   Lab 04/09/22  0456 04/08/22  0543 04/07/22  2133 04/07/22  1142   WBC 5.7 6.4  --  6.3   HGB 6.9* 7.2* 7.2* 6.3*   MCV 92 92  --  97   PLT 55* 43*  --  37*   INR 1.31*  --   --   --    * 132*  --  134*   POTASSIUM 4.0 4.7  --  4.3   CHLORIDE 99 100  --  100   CO2 28 22  --  23   BUN 31* 54*  --  48*   CR 5.30* 7.90*  --  7.10*   ANIONGAP 8 10  --  11   TANO 7.6* 7.3*  --  7.2*   GLC 87 90  --  95   ALBUMIN  --   --   --  2.4*   PROTTOTAL  --   --   --  6.1   BILITOTAL  --   --   --  0.6   ALKPHOS  --   --   --  94   ALT  --   --   --  <9   AST  --   --   --  15     No results found for this or any previous visit (from the past 24 hour(s)).

## 2022-04-09 NOTE — PLAN OF CARE
Problem: Plan of Care - These are the overarching goals to be used throughout the patient stay.    Goal: Optimal Comfort and Wellbeing  Outcome: Ongoing, Progressing   Goal Outcome Evaluation:

## 2022-04-09 NOTE — PROGRESS NOTES
0558: Writer received a call from lab. Critical Hgb of 6.9  On call MD paged.     0605:Per MD, advised to contact resident for orders.  Resident paged, awaiting return call/further orders.

## 2022-04-09 NOTE — PLAN OF CARE
Problem: Anemia  Goal: Anemia Symptom Improvement  Intervention: Monitor and Manage Anemia  Recent Flowsheet Documentation  Taken 4/8/2022 1600 by Mathew Dawson, RN  Safety Promotion/Fall Prevention: bed alarm on     Problem: Gas Exchange Impaired  Goal: Optimal Gas Exchange  Outcome: Ongoing, Progressing     Problem: Renal Function Impairment (Chronic Kidney Disease)  Goal: Minimize Renal Failure Effects  Intervention: Monitor and Support Renal Function  Recent Flowsheet Documentation  Taken 4/8/2022 1600 by Mathew Dawson, RN  Medication Review/Management: medications reviewed   Goal Outcome Evaluation:      Patient A and O times 4. Hmong speaking as a first language but able to communicate effectively in english. Dialysis today at 1500. Note in from dialysis nurse. 1.5 KG removed. Patient verbalized pain of 4/10. Tylenol given. Proved effective. /58. Clonidine held, provider notified.   Patient chest tube put out 175ml this shift, dark red fluid. Dressing CDI.   Oxy given at 2300 for pain.

## 2022-04-09 NOTE — PROGRESS NOTES
[facility] ID Inpatient follow up       Patient:  Elle Blanton  Date of birth 1960, Medical record number [id number]  Date of Visit:  04/09/2022  Attending Physician: Neel Smith MD         Assessment and Recommendations:   Assessment:  Elle Blanton is a 61 year old male with   1.  End-stage renal disease on HD.  2.  End-stage liver disease secondary to hepatitis B infection.  Has liver lesions on imaging worrisome for HCC.    3.  History of left-sided septic olecranon bursitis status post I&D.  Cultures with MSSA 10/2021.    4.  History of large right-sided empyema status post thoracentesis on 10/16/2021.  Cultures were positive with MSSA.  Treated with chest tube at the time.  Decortication was indicated at the time but the patient declined.  Was treated with IV Ancef for at least 4 weeks.  5.  Readmitted on 11/8/2021 with recurrent empyema/hydrothorax.  Had a chest tube placed on 11/10/2021.  Decortication was indicated at the time but thought to be high risk with referral to the [facility].  Patient saw Dr. Zurita on 1/6/2022.  Thought to have trapped lung on the right side.  He was not thought to be a surgical candidate and palliative thoracentesis recommended at the time.  6.  Recurrent right-sided hemothorax status post ultrasound guided chest tube placement on 3/31/2022.  Initial cultures from this procedure came back no growth.  Repeat cultures on 4/7/2022 from the chest tube with 4+ staph aureus.  Clinical significance of this positive culture is not clear especially in light of cultures from 3/31/2022 with no growth.  The question at this point is whether he has secondary infection of his hemothorax with staph Aureus.  He has no fever, leukocytosis, although he is an end-stage renal disease patient and may not mount systemic signs of in flexion.  Mild elevation of procalcitonin.  Susceptibility of the staph remain in process.   7.  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  8.  Goals of care: Complex right-sided hemothorax in a patient with trapped lung, ESRD on HD, ESLD with possible HCC on imaging, severe anemia requiring transfusion even outpatient, type B aortic dissection.  Overall prognosis is poor.     Recommendations:   -Continue renally dose IV vancomycin for now  -Follow-up pending susceptibility of the staph Aureus.  -Given limited room for maneuver in this patient with multiple comorbidities, may have to treat the staph aureus as real although treatment without source control will likely lead to recurrence.  -Chest tube care per pulmonary.  -Poor prognosis.    Discussed with the patient, nursing staff.    ID will follow.    Nicolle Yan MD.  [facility].   [facility]  Office Telephone [phone].  Fax [phone]  [facility] paging            Interval History:     HPI:  The interval history was reviewed.   Doing about the same.  Chest tubes remain in place.  Cultures are in process.    Pertinent cultures include:  Culture Micro   Date Value Ref Range Status   2019 No growth  Final       Recent Inflammatory Biomarkers:   Recent Labs   Lab Test 22  0456 22  1728 22  0543 22  1142 22  0637 22  0638 22  0540 10/15/21  0507 10/14/21  2112 21  0833 21  1312   CRP  --   --  10.3*  --   --   --   --   --  21.5*  --  0.8*   PCAL  --  0.96*  --   --   --   --   --   --   --   --  0.26   WBC 5.7  --  6.4 6.3 7.0 5.0 4.3   < > 6.6   < > 5.4    < > = values in this interval not displayed.            Review of Systems:   CONSTITUTIONAL:    Temp Max: Temp (24hrs), Av.6  F (37  C), Min:98  F (36.7  C), Max:99.4  F (37.4  C)   .  Negative except for findings in the HPI.           Current Medications (antimicrobials listed in bold):       sodium chloride 0.9%  250 mL Intravenous Once in dialysis/CRRT     sodium chloride  0.9%  300 mL Hemodialysis Machine Once     sodium chloride 0.9%  250 mL Intravenous Once in dialysis/CRRT     sodium chloride 0.9%  300 mL Hemodialysis Machine Once     calcium acetate  667 mg Oral TID w/meals     calcium carbonate (OS-TANO) 500 mg (elemental) tablet  1,000 mg Oral At Bedtime     carvedilol  25 mg Oral BID w/meals     cloNIDine  0.1 mg Oral At Bedtime     entecavir  0.5 mg Oral Q7 Days     gelatin absorbable  1 each Topical Once     multivitamin RENAL  1 tablet Oral Daily     - MEDICATION INSTRUCTIONS -   Does not apply Once     - MEDICATION INSTRUCTIONS -   Does not apply Once     pantoprazole  40 mg Oral Daily     sodium chloride (PF)  3 mL Intracatheter Q8H     vancomycin place rivera - receiving intermittent dosing  1 each Intravenous See Admin Instructions              Allergies:     Allergies   Allergen Reactions     Nka [No Known Allergies]             Physical Exam:   Vitals were reviewed  Patient Vitals for the past 24 hrs:   BP Temp Temp src Pulse Resp SpO2   04/09/22 0735 127/82 99.4  F (37.4  C) Oral 85 16 96 %   04/09/22 0031 115/70 98  F (36.7  C) Oral 85 15 98 %   04/08/22 2053 102/58 98  F (36.7  C) Oral 68 18 99 %   04/08/22 1830 125/74 98.6  F (37  C) Oral 88 16 --   04/08/22 1825 114/73 -- -- 89 16 --   04/08/22 1815 112/68 -- -- 90 16 --   04/08/22 1800 (!) 138/93 -- -- 86 16 --   04/08/22 1745 114/73 -- -- 90 16 --   04/08/22 1730 122/78 -- -- 87 16 --   04/08/22 1715 129/78 -- -- 85 16 --   04/08/22 1700 116/73 -- -- 87 16 --   04/08/22 1630 123/77 -- -- 81 16 --   04/08/22 1615 (!) 154/88 -- -- 81 16 --   04/08/22 1600 128/74 -- -- 79 16 --   04/08/22 1545 129/78 -- -- 79 16 --   04/08/22 1525 135/81 -- -- 81 18 --   04/08/22 1515 127/73 98.8  F (37.1  C) Oral 81 16 --   04/08/22 1252 124/78 98.7  F (37.1  C) Oral 82 17 96 %       Physical Examination:  Gen: Pleasant in no acute distress.  HEENT: NCAT. EOMI. PERRL.  Neck: No bruit, JVD or thyromegaly.  Lungs: Absent breath  sounds on right lower chest.  Chest: Chest tube in place.  Mostly bloody output.  Card: RRR. NSR. No RMG. Peripheral pulses present and symmetric. No edema.  Abd: Soft NT ND. No mass. Normal bowel sounds.  Skin: No rash.  Extr: No edema.  Neuro: Alert and oriented to place time and person.          Laboratory Data:   ID Labs:  Microbiology labs:  Reviewed.      Recent Labs   Lab Test 04/08/22  0543 10/14/21  2112 09/26/21  1312   CRP 10.3* 21.5* 0.8*     Recent Labs   Lab Test 04/09/22  0456 04/08/22  0543 04/07/22  1142 04/07/22  0637 04/06/22  0638 04/05/22  0540   WBC 5.7 6.4 6.3 7.0 5.0 4.3     Recent Labs   Lab Test 04/09/22  0456 04/08/22  0543 04/07/22  1142 04/07/22  0637   CR 5.30* 7.90* 7.10* 6.89*   GFRESTIMATED 12* 7* 8* 8*       Hematology Studies  Recent Labs   Lab Test 04/09/22  0456 04/08/22  0543 04/07/22  1142 04/07/22  0637 04/06/22  0638 04/05/22  0540 08/07/19  0138 08/06/19  2020 08/02/19  0437 07/28/19  1001 07/28/19  0950   WBC 5.7 6.4 6.3 7.0 5.0 4.3   < > 3.4* 1.7*   < > 2.8*   ANEU  --   --   --   --   --   --   --  2.8 1.2*  --  2.4   AEOS  --   --   --   --   --   --   --  0.0 0.0  --  0.0   HCT 21.3* 22.6* 20.3* 22.6* 23.9* 23.4*   < > 27.0* 24.8*   < > 26.6*   PLT 55* 43* 37* 40* 49* 50*   < > 44* 53*   < > 28*    < > = values in this interval not displayed.       Metabolic  Recent Labs   Lab Test 04/09/22  0456 04/08/22  0543 04/07/22  1142   * 132* 134*   BUN 31* 54* 48*   CO2 28 22 23   CR 5.30* 7.90* 7.10*   GFRESTIMATED 12* 7* 8*       Hepatic Studies  Recent Labs   Lab Test 04/07/22  1142 03/30/22  1557 01/08/22  2255   BILITOTAL 0.6 1.0 0.6   ALKPHOS 94 162* 193*   ALBUMIN 2.4* 3.1* 2.7*   AST 15 29 40   ALT <9 31 <9       Immunologlobulins  Recent Labs   Lab Test 10/14/21  2112 09/26/21  1312   SED 21* 4            Imaging Data:   Reviewed

## 2022-04-09 NOTE — PROGRESS NOTES
HGB this am is 6.9, text paged Dr. Smith at 740am. Pt denies SOB, LS clear/diminished on left, clear with crackles on the rt. Chest tube dressing is clean, dry and intact, bloody drainage in chest tube, chest tube on suction at -20. Pt denies dizziness and fatigue, resting in bed...

## 2022-04-09 NOTE — PLAN OF CARE
Problem: Anemia  Goal: Anemia Symptom Improvement  Outcome: Ongoing, Progressing  Intervention: Monitor and Manage Anemia  Recent Flowsheet Documentation  Taken 4/9/2022 0757 by Neelam Gonzalez RN  Safety Promotion/Fall Prevention:   patient and family education   nonskid shoes/slippers when out of bed   Goal Outcome Evaluation:      HGB 6.9 today. Initially pt stated he did not feel fatigue or SOB but as the day progressed pt became more fatigued. Order for 1 unit of blood  Problem: Gas Exchange Impaired  Goal: Optimal Gas Exchange  Outcome: Ongoing, Progressing  Intervention: Optimize Oxygenation and Ventilation  Recent Flowsheet Documentation  Taken 4/9/2022 0757 by Neelam Gonzalez, RN  Head of Bed (HOB) Positioning: HOB at 30-45 degrees     Denied SOB and cough, chest tube intact and patent, dressing clean, dry and intact, on wall suction at -20, no air leak assessed. Drainage from chest tube is bloody with roughly 300ml output. Sats are in the mid-upper 90's on RA. LS clear with diminished base on left, rt lung is clear with crackles  Problem: Functional Decline (Chronic Kidney Disease)  Goal: Optimal Functional Ability  Outcome: Ongoing, Progressing  Intervention: Optimize Functional Ability  Recent Flowsheet Documentation  Taken 4/9/2022 0757 by Neelam Gonzalez, RN  Activity Management: activity adjusted per tolerance      SBA to BR  Problem: Oral Intake Inadequate (Chronic Kidney Disease)  Goal: Optimal Oral Intake  Outcome: Ongoing, Progressing   Pt ate about 1/2 of breakfast ordered and declined  to order lunch  Problem: Pain (Chronic Kidney Disease)  Goal: Acceptable Pain Control  Outcome: Ongoing, Progressing  Intervention: Prevent or Manage Pain  Recent Flowsheet Documentation  Taken 4/9/2022 0753 by Neelam Gonzalez RN  Pain Management Interventions:   medication offered but refused   distraction   emotional support   repositioned   relaxation techniques promoted   rest   Pt eported headache and  requested tylenol which was effective  Problem: Infection  Goal: Absence of Infection Signs and Symptoms  Outcome: Ongoing, Progressing   Receiving IV abx  Problem: Plan of Care - These are the overarching goals to be used throughout the patient stay.    Goal: Absence of Hospital-Acquired Illness or Injury  Intervention: Identify and Manage Fall Risk  Recent Flowsheet Documentation  Taken 4/9/2022 0757 by Neelam Gonzalez RN  Safety Promotion/Fall Prevention:   patient and family education   nonskid shoes/slippers when out of bed  Intervention: Prevent Skin Injury  Recent Flowsheet Documentation  Taken 4/9/2022 0757 by Neelam Gonzalez RN  Body Position: position changed independently  Intervention: Prevent and Manage VTE (Venous Thromboembolism) Risk  Recent Flowsheet Documentation  Taken 4/9/2022 0757 by Neelam Gonzalez RN  Activity Management: activity adjusted per tolerance  Goal: Optimal Comfort and Wellbeing  Intervention: Monitor Pain and Promote Comfort  Recent Flowsheet Documentation  Taken 4/9/2022 0753 by Neelam Gonzalez RN  Pain Management Interventions:   medication offered but refused   distraction   emotional support   repositioned   relaxation techniques promoted   rest

## 2022-04-09 NOTE — PROGRESS NOTES
RENAL PROGRESS NOTE    CC: ESRD management    ASSESSMENT & PLAN:   ESRD on HD: He still making urine and stated normal amount. He is getting only Monday and Friday dialysis at [facility]. TW is 60.5kg and last run was on 3/28/2022. His primary nephrologist is Dr. Nate Rivero. Dialyze for 3 hr.   Last hemodialysis 04/08/22.   -No indication for hemodialysis today.  We will plan next hemodialysis on Monday as per usual M and F schedule.  The patient has been refusing to dialyze three times per week.   - renal diet  - renally dosing medication.       Access: Patient had bleeding from cannulation of his AVG and Dr. Cosby and his team evaluated and placed suture urgently on 4/1. 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  Had a placement of covered stent graft across of pseudoaneurysm with good results.  -Patient had no issues with cannulation during dialysis 04/04 and 04/08.     BP/ Volume: BP is controlled on current regimen.  No recurrent episodes of symptomatic hypotension since yesterday morning. Patient appears euvolemic on exam.  Estimated dry weight 60.5 kg. Weight today is 59.9 kgs.  PTA clonidine 0.1 mg at bedtime, diltiazem  mg bid, carvedilol 25 mg twice daily  Diltiazem was discontinued 04/05 given hypotension  Patient appears euvolemic on exam. I/O are not accurate, UF was not recorded in patient's chart  -continue current dose Clonidine and Coreg with holding parameters.  --strict Is and Os as he is still making urine. UOP is ranging between 175 to 775 cc daily  -Daily weight     Electrolytes/acid-base status:   -Mild hyponatremia of 135.  Stable.  -Serum potassium is wnl 4.0.  Run with K3 bath.  Renal diet.  -Serum bicarbonate within normal limits at 28.  Run with 32 bicarb bath     Anemia:  likely combined with ESRD and ABL( Secondary to active bleeding from right pleural space.)  Being worked up outpatient for hemolytic anemia due to undetectable haptoglobin, LDH elevation, and low reticulocyte count.  Received 5U PRBC during this hospitalization.   Receives Epo 95128R x2 per week at [facility].  Received 04468 units of Epogen on 04/04  Today Hgb is trended down from 7.2 to 6.9 g/dl.   She scheduled to receive 1 unit of packed red blood cells.  - monitor for now.         BMD:   -Phosphorus 3.2. On phoslo 667 mg tid with meal.  - Ca 7.6 with albmin 3.1 and corrected Ca ~ 8.0. Started on calcium carbonate 1000 mg at bedtime  -Renal diet    Chronic right effusion with trapped lung- s/p multiple chest tube placements, right pigtail in place, deemed non-surgical candidate for decortication due to co-morbidities.  Pulmonary input appreciated.   CT chest 04/07shwoed stable size of a large loculated right hydropneumothorax with increased pneumothorax component.  Pleural fluid grew Staph aureus. Started on IV Vancomycin. ID consulted.    Chronic type B dissection-seen by  cardiothoracic surgery.No acute surgical intervention needed.    Hepatic masses on CT- may represent Hepatocellular carcinoma      Goals of care-seen by palliative care team. Patient is DNI/DNR.      We will follow       SUBJECTIVE:    I reviewed patient dialysis run from yesterday.  Tolerated without issues, 1500 cc of fluid removed.  No acute issues overnight.  Instructed on to 6.9 g/dL this morning.  Patient is scheduled to receive 1 unit of packed red blood cells.  Patient seen the bedside and events were reviewed with nursing.    Patient states that he is feeling the same, c/o burning pain in the right side of the chest lateral wall.  Patient denies: fever, chills, cough, shortness of breath , chest pain, palpitations, orthopnea, nausea, vomiting, abdominal pain, changes in bowel habits, dysuria, urinary frequency, urgency, hematuria,  rash.      OBJECTIVE:  Physical Exam   Temp: 98.6  F (37  C) Temp src: (P) Oral BP: (!) 140/85 Pulse: 83   Resp: 16 SpO2: (P) 96 % O2 Device: None (Room air)    Vitals:    04/04/22 0419 04/04/22 1714 04/08/22 0830   Weight: 60.2 kg (132 lb 11.2 oz) 60.2 kg (132 lb 11.5 oz) 59.9 kg (132 lb)     Vital Signs with Ranges  Temp:  [98  F (36.7  C)-99.4  F (37.4  C)] 98.6  F (37  C)  Pulse:  [68-90] 83  Resp:  [15-18] 16  BP: (102-154)/(58-93) 140/85  SpO2:  [96 %-99 %] (P) 96 %  I/O last 3 completed shifts:  In: 0   Out: 1950 [Urine:200; Other:1500; Chest Tube:250]    @TMAXR(24)@    Patient Vitals for the past 72 hrs:   Weight   04/08/22 0830 59.9 kg (132 lb)       Intake/Output Summary (Last 24 hours) at 4/1/2022 1738  Last data filed at 4/1/2022 1000  Gross per 24 hour   Intake 480 ml   Output 650 ml   Net -170 ml       PHYSICAL EXAM:  General - Alert and oriented x3, appears comfortable, NAD  Cardiovascular - Regular rate and rhythm, no rub  Respiratory -right-sided chest tube draining bloody fluid, diminished breath sounds in the right posterior lung fields, left lung clear to auscultation anterior and posterior fields, no crackles or wheezes  Abd: BS present, no guarding or pain with palpation, no ascites  Extremities -no edema  Skin: dry, intact, no rash, good turgor  Neuro:  Grossly intact, no focal deficits  MSK:  Grossly intact  Psych:  Normal affect  Access:  LUE AVG with sutures, mild edema, no tenderness no warmth, palpable thrill and audible bruit    LABORATORY STUDIES:     Recent Labs   Lab 04/09/22  0456 04/08/22  0543 04/07/22  2133 04/07/22  1142 04/07/22  0637 04/06/22  0638 04/05/22  0540   WBC 5.7 6.4  --  6.3 7.0 5.0 4.3   RBC 2.32* 2.46*  --  2.10* 2.37* 2.61* 2.56*   HGB 6.9* 7.2* 7.2* 6.3* 7.0* 7.8* 7.7*   HCT 21.3* 22.6*  --  20.3* 22.6* 23.9* 23.4*   PLT 55* 43*  --  37* 40* 49* 50*       Basic Metabolic Panel:  Recent Labs   Lab 04/09/22  0456 04/08/22  0543 04/07/22  1142 04/07/22  0637  04/06/22  0638 04/05/22  0540   * 132* 134* 132* 134* 135*   POTASSIUM 4.0 4.7 4.3 4.2 4.2 3.7   CHLORIDE 99 100 100 98 100 99   CO2 28 22 23 22 25 25   BUN 31* 54* 48* 46* 35* 24*   CR 5.30* 7.90* 7.10* 6.89* 5.68* 4.13*   GLC 87 90 95 100 83 80   TANO 7.6* 7.3* 7.2* 7.4* 7.4* 7.6*       INR  Recent Labs   Lab 04/09/22  0456   INR 1.31*        Recent Labs   Lab Test 04/09/22  0456 04/08/22  0543 03/31/22  0710 03/30/22  1557   INR 1.31*  --   --  1.25*   WBC 5.7 6.4   < > 5.5   HGB 6.9* 7.2*   < > 6.2*   PLT 55* 43*   < > 71*    < > = values in this interval not displayed.       Personally reviewed current labs      Elizabeth Salmeron MD  [facility]  [address], [address]  [place] [address]  Phone# [phone]  Fax# [phone]

## 2022-04-10 NOTE — PLAN OF CARE
Problem: Anemia  Goal: Anemia Symptom Improvement  Intervention: Monitor and Manage Anemia  Recent Flowsheet Documentation  Taken 4/9/2022 1600 by Mathew Dawson, RN  Safety Promotion/Fall Prevention:   activity supervised   clutter free environment maintained     Problem: Functional Decline (Chronic Kidney Disease)  Goal: Optimal Functional Ability  Outcome: Ongoing, Not Progressing     Problem: Renal Function Impairment (Chronic Kidney Disease)  Goal: Minimize Renal Failure Effects  Outcome: Ongoing, Not Progressing  Intervention: Monitor and Support Renal Function  Recent Flowsheet Documentation  Taken 4/9/2022 1600 by Mathew Dawson, RN  Medication Review/Management: medications reviewed   Goal Outcome Evaluation:      Patient A and O times 4. Speaks primarily Hmong and able to communicate well in english. Patient has rt chest tube pleural effusion. Chest tube took out 130 ml dark red liquid. Patient HGB this morning was 6.9. One unit blood given over day shift into evening shift. Patient had no negative reaction to transfusion and tolerated procedure well.   Patient verbalized pain of 6/10. Oxycodone given PRN PO. Proved ineffective. Tylenol offered and refused.

## 2022-04-10 NOTE — PROGRESS NOTES
[facility]    Medicine Progress Note - Hospitalist Service    Date of Admission:  3/30/2022    Assessment & Plan          Elle Blanton is a 61 year old male with past medical history significant for thoracic aortic dissection, hepatitis B, hepatorenal syndrome with ESRD, pleural effusion and normocytic anemia who was admitted on 3/30/2022 after being found to have a hgb of 6.2 at clinic.Chest tube placed 4/1 by IR for empyema (R). Multiple pseudoaneurysms of LUE, underwent fistulagram and stent placement on 4/4.  Pleural fluid + S. Aureus on 4/8.    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 1st and 3rd with patent blood flow. IR notes tiny pseudoaneurysm/graft break down at medial graft, which may benefit from stent. 4/4: Underwent multiple stent placements of pseudoaneurysms. Per IR, OK for HD. Sutures placed during fistulagram, will discuss with IR tomorrow regarding timing of removal.    Acute macrocytic anemia on chronic normocytic anemia of chronic disease  History of GI bleed  Thrombocytopenia  -He is on high-dose EPO and takes iron supplements outpatient  -Being worked up outpatient for hemolytic anemia due to undetectable haptoglobin, LDH elevation, and low reticulocyte count  -Primary care physician is placing a referral to heme onc to help address this  - Total 5 units PRBC since presentation.  -Will monitor and transfuse for hemoglobin less than 7.      Empyema  History of recurrent pleural effusions  Pleural fluid + S aureus  -Patient has had pleural effusions in the past that required chest tubes and multiple thoracenteses  -Patient last underwent thoracentesis on 3/23 and was unable to get much fluid off of the lung  -It has been recommended the patient undergo surgery for this, on 1/6/22 the patient followed up with   Parminder and it was explained that the patient is not a surgical candidate due to his multiple comorbidities and the chronicity of the trapped lung  -It was recommended to potentially get a thoracentesis Q8-12 weeks  -IR consulted for chest tube placed 4/1/2022  - Pulmonology consulted for chest tube management. Appreciate guidance. CTa CAP done yesterday which showed suspicious liver lesions. Palliative consulted. Goals of care are restorative at this time.   - Chest tube to water seal. Anticipate removal this AM.  - Pleural fluid + S aureus. Susceptibilities pending. ID consulted. Appreciate guidance. Pro anish 0.96 and CRP 10.3 on 4/8.  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 Maharaj recommended conservative treatment at this time.     ESRD on hemodialysis (MF) secondary to hepatorenal syndrome due to cirrhosis  -Nephrology consulted; HD M/F  -Outpatient he has only been getting dialysis twice a week but it has been recommended by his primary and Nephrology to increase this to 3 times weekly for better symptom management and possible improvement of the empyema      Goals of Care  - Palliative Care consulted  4/7 DNR/DNI and goals are restorative at this time. Continuing HD and current medical cares/treatments.     Diet: Room Service  Renal Diet (dialysis)  Snacks/Supplements Adult: Ensure Clear; Between Meals  Snacks/Supplements Adult: Magic Cup; Between Meals    DVT Prophylaxis: Pneumatic Compression Devices  Beltre Catheter: Not present  Central Lines: None  Cardiac Monitoring: None  Code Status: No CPR- Do NOT Intubate      Disposition Plan   Expected Discharge:  04/11/2022     Anticipated discharge location:  Awaiting care coordination huddle  Ancitipate discharge tomorrow after discontinuation of chest tube,  antibiotic plan and HD.     The patient's care was discussed with Dr Luis Scott MD  Hospitalist Service  [facility]  Securely message with the Vocera Web Console (learn more here)  Text page via [facility] Paging/Directory         Clinically Significant Risk Factors Present on Admission                    ______________________________________________________________________    Interval History   - NAEON  - Didn't get much sleep overnight and is sleeping well this morning  - No chest pain or shortness of breath   - Tolerating diet without n/v.    Data reviewed today: I reviewed all medications, new labs and imaging results over the last 24 hours.     Physical Exam   Vital Signs: Temp: 99.1  F (37.3  C) Temp src: Oral BP: 126/81 Pulse: 77   Resp: 16 SpO2: 95 % O2 Device: None (Room air)    Weight: 132 lbs 0 oz  General Appearance:  sitting up in bed, sleeping but arousable to voice.  Respiratory: Slightly decreased lung sounds on the right, no wheezing, rales or rhonchi appreciated. CT with serosang output, no air leak with cough.    Cardiovascular: RRR, no murmurs appreciated  MUSC: no edema appreciated in the LUE or bilateral lower extremities  Skin: Jaundiced skin with bruises on arms and legs of various stages of healing    Data   Recent Labs   Lab 04/09/22  0456 04/08/22  0543 04/07/22  2133 04/07/22  1142   WBC 5.7 6.4  --  6.3   HGB 6.9* 7.2* 7.2* 6.3*   MCV 92 92  --  97   PLT 55* 43*  --  37*   INR 1.31*  --   --   --    * 132*  --  134*   POTASSIUM 4.0 4.7  --  4.3   CHLORIDE 99 100  --  100   CO2 28 22  --  23   BUN 31* 54*  --  48*   CR 5.30* 7.90*  --  7.10*   ANIONGAP 8 10  --  11   TANO 7.6* 7.3*  --  7.2*   GLC 87 90  --  95   ALBUMIN  --   --   --  2.4*   PROTTOTAL  --   --   --  6.1   BILITOTAL  --   --   --  0.6    ALKPHOS  --   --   --  94   ALT  --   --   --  <9   AST  --   --   --  15     No results found for this or any previous visit (from the past 24 hour(s)).

## 2022-04-10 NOTE — PLAN OF CARE
Goal Outcome Evaluation:        Chest tube changed to water seal this am. Davon SOB and cough, sats mid 90's on RA, LS clear on left and more diminished on the rt today. Chest tube patent, dressing giulia, dry and intact, roughly 200ml output. Pt remained in bed today, shifting weight independently in bed. Ate about 100% of breakfast and lunch, taking sips. Denied pain today but reports some discomfort from chest tube with activity or movement. Continues on IV abx  Problem: Gas Exchange Impaired  Goal: Optimal Gas Exchange  Outcome: Ongoing, Progressing  Intervention: Optimize Oxygenation and Ventilation  Recent Flowsheet Documentation  Taken 4/10/2022 0812 by Neelam Gonzalez RN  Head of Bed (HOB) Positioning: HOB at 30-45 degrees     Problem: Functional Decline (Chronic Kidney Disease)  Goal: Optimal Functional Ability  Outcome: Ongoing, Progressing  Intervention: Optimize Functional Ability  Recent Flowsheet Documentation  Taken 4/10/2022 0812 by Neelam Gonzalez RN  Activity Management: activity adjusted per tolerance     Problem: Oral Intake Inadequate (Chronic Kidney Disease)  Goal: Optimal Oral Intake  Outcome: Ongoing, Progressing     Problem: Pain (Chronic Kidney Disease)  Goal: Acceptable Pain Control  Outcome: Ongoing, Progressing     Problem: Infection  Goal: Absence of Infection Signs and Symptoms  Outcome: Ongoing, Progressing     Problem: Plan of Care - These are the overarching goals to be used throughout the patient stay.    Goal: Absence of Hospital-Acquired Illness or Injury  Intervention: Identify and Manage Fall Risk  Recent Flowsheet Documentation  Taken 4/10/2022 0812 by Neelam Gonzalez RN  Safety Promotion/Fall Prevention:   activity supervised   patient and family education   nonskid shoes/slippers when out of bed  Intervention: Prevent Skin Injury  Recent Flowsheet Documentation  Taken 4/10/2022 0812 by Neelam Gonzalez RN  Body Position: position changed independently  Intervention: Prevent  and Manage VTE (Venous Thromboembolism) Risk  Recent Flowsheet Documentation  Taken 4/10/2022 0812 by Neelam Gonzalez, RN  Activity Management: activity adjusted per tolerance     Problem: Anemia  Goal: Anemia Symptom Improvement  Intervention: Monitor and Manage Anemia  Recent Flowsheet Documentation  Taken 4/10/2022 0812 by Neelam Gonzalez, RN  Safety Promotion/Fall Prevention:   activity supervised   patient and family education   nonskid shoes/slippers when out of bed

## 2022-04-10 NOTE — PROGRESS NOTES
"Chart check-     Chest tube just placed on water seal now.    Per progress note order- 4/9/21 1758.    \"OK to put chest tube to water seal and allow him to ambulate freely. Danisha Chase CNP\"    Shagufta Parisi RN Charge    "

## 2022-04-10 NOTE — PROGRESS NOTES
"PULMONARY MEDICINE CONSULT  4/9/2022     Admit Date: 3/30/2022  CODE: No CPR- Do NOT Intubate    Reason for Consult: right pleural effusion    Assessment/Plan:   61 year old male with a history of HBV, cirrhosis, ESKD on HD (only Monday and Friday as patient declines thrice-weekly dialysis), hypoalbuminemia, coagulopathy, thrombocytopenia, chronic anemia, chronic thoracic aortic dissection, recurrent right pleural effusion with trapped right lung requiring prior chest tubes and thoracenteses.  He is here with another effusion.       The underlying cause of his pleural effusion is likely third spacing of fluid in the setting of his liver disease and end-stage kidney disease.  Further complicated by possible transient bacteremia that may make him susceptible to these infections.     Plan:  Chest tube needs to come out; I attempted to remove today and he adamantly refused and would not let me take it out. He would like to have it removed tomorrow to allow \"one more day of draining\". I tried to explain that it is not fixing the problem and is no longer needed but he was adamant that it stay in until tomorrow.   Treat according to culture data with infectious disease       Danisha Chase, [facility] Pulmonary/Critical Care     HPI:   CCx: right pleural effusion, trapped right lung    Subjective: Feels fine; wants to keep the tube in.                                                                                                                                                         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 pylori infection " 7/5/2017    UGI bleed implied by Hgb 9.0 6/22/17 and melena. Admitted and hgb decreased to 7.6, CT abdomen showed all bladder wall thickening. + Hep B surface antigen noted. Melena resolved and hgb stabalized without transfusion, epigastric pain resolved with PPI. Recommend referral for gastroscopy.     Heart attack (H)      Hepatitis B      Normocytic anemia      Other pancytopenia (H) 7/5/2017 6/24/17 Peripheral smear at [facility].Pancytopenia of uncertain cause. Ruled out acute leukemia. Hematologist suggests hemolytic anemia should be considered possible cause and LDH and haptoglobin should be assessed in future.      PONV (postoperative nausea and vomiting)      Thrombocytopenia (H)         Allergies:  Allergies   Allergen Reactions     Nka [No Known Allergies]        PTA medications:  Medications Prior to Admission   Medication Sig Dispense Refill Last Dose     acetaminophen (TYLENOL) 500 MG tablet Take 500 mg by mouth every 6 hours as needed for mild pain   Unknown at Unknown time     calcium acetate (PHOSLO) 667 MG CAPS capsule Take 1 capsule by mouth 3 times daily (with meals)   3/30/2022 at AM     carvedilol (COREG) 25 MG tablet Take 25-50 mg by mouth 2 times daily as needed    Unknown at Unknown time     cloNIDine (CATAPRES) 0.1 MG tablet Take 0.1 mg by mouth At Bedtime    3/29/2022 at PM     diltiazem ER (DILT-XR) 180 MG 24 hr capsule Take 1 capsule (180 mg) by mouth 2 times daily (Patient taking differently: Take 180 mg by mouth 2 times daily as needed (Checks BP prior to taking, if too low he does not take this medication) ) 60 capsule 11 Unknown at Unknown time     entecavir (BARACLUDE) 0.5 MG tablet Take 1 tablet (0.5 mg) by mouth every 7 days 52 tablet 1 3/27/2022     hydrOXYzine (ATARAX) 25 MG tablet Take 1 tablet (25 mg) by mouth 3 times daily as needed for itching 90 tablet 3 Unknown at Unknown time     oxyCODONE (ROXICODONE) 5 MG tablet Take 1 tablet (5 mg) by mouth daily as needed for  "severe pain 30 tablet 0 Unknown at Unknown time     pantoprazole (PROTONIX) 40 MG EC tablet Take 1 tablet (40 mg) by mouth daily 30 tablet 11 3/30/2022 at AM     senna-docusate (SENOKOT-S/PERICOLACE) 8.6-50 MG tablet Take 1 tablet by mouth daily as needed for constipation 30 tablet 11 Unknown at Unknown time     zolpidem (AMBIEN) 5 MG tablet Take 1 tablet (5 mg) by mouth nightly as needed for sleep 10 tablet 5 Unknown at Unknown time     [employer] SALINE NASAL SPRAY 0.65 % nasal spray Spray 1 spray in nostril daily as needed (Patient not taking: Reported on 3/30/2022)        order for DME Equipment being ordered: Other: blood pressure monitor  Treatment Diagnosis: hypertension 1 each 0           Exam/Data:     Vitals  /78 (BP Location: Right arm)   Pulse 82   Temp 99  F (37.2  C) (Oral)   Resp 16   Ht 1.651 m (5' 5\")   Wt 59.9 kg (132 lb)   SpO2 94%   BMI 21.97 kg/m       I/O last 3 completed shifts:  In: 778 [I.V.:3]  Out: 400 [Urine:300; Chest Tube:100]  Weight change:     EXAM:  /78 (BP Location: Right arm)   Pulse 82   Temp 99  F (37.2  C) (Oral)   Resp 16   Ht 1.651 m (5' 5\")   Wt 59.9 kg (132 lb)   SpO2 94%   BMI 21.97 kg/m      Intake/Output last 3 shifts:  I/O last 3 completed shifts:  In: 778 [I.V.:3]  Out: 400 [Urine:300; Chest Tube:100]  Intake/Output this shift:  No intake/output data recorded.    Physical Exam  Gen: Sitting on edge of bed, no distress  HEENT: NT, no HOMERO  CV: RRR, no m/g/r  Resp: decreased on right, right side chest tube, dressing intact; draining old dark blood. Lungs are clear on the left.   Abd: soft, nontender, BS+  Skin: no rashes or lesions  Ext: no edema   Neuro: PERRL, nonfocal exam    ROS: A complete 10-system review of systems was obtained and is negative with the exception of what is noted in the HPI/Subjective.    Medications:       - MEDICATION INSTRUCTIONS -         sodium chloride 0.9%  250 mL Intravenous Once in dialysis/CRRT     sodium chloride 0.9%  " 300 mL Hemodialysis Machine Once     sodium chloride 0.9%  250 mL Intravenous Once in dialysis/CRRT     sodium chloride 0.9%  300 mL Hemodialysis Machine Once     calcium acetate  667 mg Oral TID w/meals     calcium carbonate (OS-TANO) 500 mg (elemental) tablet  1,000 mg Oral At Bedtime     carvedilol  25 mg Oral BID w/meals     cloNIDine  0.1 mg Oral At Bedtime     entecavir  0.5 mg Oral Q7 Days     gelatin absorbable  1 each Topical Once     multivitamin RENAL  1 tablet Oral Daily     - MEDICATION INSTRUCTIONS -   Does not apply Once     - MEDICATION INSTRUCTIONS -   Does not apply Once     pantoprazole  40 mg Oral Daily     sodium chloride (PF)  3 mL Intracatheter Q8H     vancomycin place rivera - receiving intermittent dosing  1 each Intravenous See Admin Instructions         DATA  All laboratory and radiology has been personally reviewed by myself today.  Recent Labs   Lab 04/09/22  0456   WBC 5.7   HGB 6.9*   HCT 21.3*   PLT 55*     Recent Labs   Lab 04/09/22  0456 04/08/22  0543 04/07/22  1142   * 132* 134*   CO2 28 22 23   BUN 31* 54* 48*   ALKPHOS  --   --  94   ALT  --   --  <9   AST  --   --  15     IMAGING:   CT chest 4/7 - 1.  Stable Geary type B aortic dissection.  2.  Stable dilatation of the ascending aorta, aortic arch, descending aorta and abdominal aorta.  3.  Stable peripheral irregularity in the main pulmonary artery which is most consistent with a dissection.  4.  Stable size of a large loculated right hydropneumothorax with increased pneumothorax component. Right chest tube in place.  5.  Marked narrowing of the left subclavian vein proximal to a stent.  6.  The left kidney is opacified by the false lumen and is hypoenhancing which may represent ischemia or artifact due to timing of the contrast bolus.  7.  There are 2 indeterminant hepatic masses which may represent hepatocellular carcinoma. Recommend a contrast enhanced magnetic resonance imaging examination of the liver.

## 2022-04-10 NOTE — PROGRESS NOTES
[facility] ID Inpatient follow up       Patient:  Elle Blanton  Date of birth 1960, Medical record number [id number]  Date of Visit:  04/10/2022  Attending Physician: Neel Smith MD         Assessment and Recommendations:   Assessment:  Elle Blanton is a 61 year old male with   1.  End-stage renal disease on HD.  2.  End-stage liver disease secondary to hepatitis B infection.  Has liver lesions on imaging worrisome for HCC.    3.  History of left-sided septic olecranon bursitis status post I&D.  Cultures with MSSA 10/2021.    4.  History of large right-sided empyema status post thoracentesis on 10/16/2021.  Cultures were positive with MSSA.  Treated with chest tube at the time.  Decortication was indicated at the time but the patient declined.  Was treated with IV Ancef for at least 4 weeks.  5.  Readmitted on 11/8/2021 with recurrent empyema/hydrothorax.  Had a chest tube placed on 11/10/2021.  Decortication was indicated at the time but thought to be high risk with referral to the [facility].  Patient saw Dr. Zurita on 1/6/2022.  Thought to have trapped lung on the right side.  He was not thought to be a surgical candidate and palliative thoracentesis recommended at the time.  6.  Recurrent right-sided hemothorax status post ultrasound guided chest tube placement on 3/31/2022.  Initial cultures from this procedure came back no growth.  Repeat cultures on 4/7/2022 from the chest tube with 4+ staph aureus.  Clinical significance of this positive culture is not clear especially in light of cultures from 3/31/2022 with no growth.  The question at this point is whether he has secondary infection of his hemothorax with staph Aureus.  He has no fever, leukocytosis, although he is an end-stage renal disease patient and may not mount systemic signs of infection.  Mild elevation of procalcitonin.  MSSA on cultures.  7.  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  8.  Goals of care: Complex right-sided hemothorax in a patient with trapped lung, ESRD on HD, ESLD with possible HCC on imaging, severe anemia requiring transfusion even outpatient, type B aortic dissection.  Overall prognosis is poor.    Overall positive cultures with MSSA from a chest tube placed for hemothorax in a patient known to have right-sided MSSA empyema in 10/2021.  It is unclear if this is infection but given the high risk of rapid decline in clinical condition if untreated infection, the patient, myself, and nephrology have agreed to do IV Ancef with dialysis on Monday and Friday.  He has done IV Ancef once weekly on Wednesdays at the infusion center at [facility].  He chose this regimen over just suppressive therapy with Keflex.  He also understands that this is not curative.     Recommendations:   -Continue renally dose IV vancomycin inpatient.  -Planning IV Ancef course as discussed above.  -Chest tube care per pulmonary.  -Poor prognosis.    Discussed with the patient, nursing staff.    ID will follow.    Nicolle Yan MD.  [facility].   [facility]  Office Telephone [phone].  Fax [phone]  [facility] paging            Interval History:     HPI:  The interval history was reviewed.   Chest tube remains in place.  Grossly bloody output.  MSSA on cultures.    Pertinent cultures include:  Culture Micro   Date Value Ref Range Status   07/29/2019 No growth  Final       Recent Inflammatory Biomarkers:   Recent Labs   Lab Test 04/09/22  0456 04/08/22  1728 04/08/22  0543 04/07/22  1142 04/07/22  0637 04/06/22  0638 04/05/22  0540 10/15/21  0507 10/14/21  2112 09/27/21  0833 09/26/21  1312   CRP  --   --  10.3*  --   --   --   --   --  21.5*  --  0.8*   PCAL  --  0.96*  --   --   --   --   --   --   --   --  0.26   WBC 5.7  --  6.4 6.3 7.0 5.0 4.3   < > 6.6   < > 5.4    < > = values in this interval not displayed.            Review  of Systems:   CONSTITUTIONAL:    Temp Max: Temp (24hrs), Av.6  F (37  C), Min:98  F (36.7  C), Max:99.4  F (37.4  C)   .  Negative except for findings in the HPI.           Current Medications (antimicrobials listed in bold):       sodium chloride 0.9%  250 mL Intravenous Once in dialysis/CRRT     sodium chloride 0.9%  300 mL Hemodialysis Machine Once     sodium chloride 0.9%  250 mL Intravenous Once in dialysis/CRRT     sodium chloride 0.9%  300 mL Hemodialysis Machine Once     calcium acetate  667 mg Oral TID w/meals     calcium carbonate (OS-TANO) 500 mg (elemental) tablet  1,000 mg Oral At Bedtime     carvedilol  25 mg Oral BID w/meals     cloNIDine  0.1 mg Oral At Bedtime     entecavir  0.5 mg Oral Q7 Days     gelatin absorbable  1 each Topical Once     multivitamin RENAL  1 tablet Oral Daily     - MEDICATION INSTRUCTIONS -   Does not apply Once     - MEDICATION INSTRUCTIONS -   Does not apply Once     pantoprazole  40 mg Oral Daily     sodium chloride (PF)  3 mL Intracatheter Q8H     vancomycin place rivera - receiving intermittent dosing  1 each Intravenous See Admin Instructions              Allergies:     Allergies   Allergen Reactions     Nka [No Known Allergies]             Physical Exam:   Vitals were reviewed  Patient Vitals for the past 24 hrs:   BP Temp Temp src Pulse Resp SpO2   04/10/22 0725 126/81 99.1  F (37.3  C) Oral 77 16 95 %   04/10/22 0000 (!) 141/88 98.7  F (37.1  C) Oral 82 16 96 %   22 1936 139/82 98.3  F (36.8  C) Oral 81 16 95 %   22 1537 (!) 142/84 98.3  F (36.8  C) Oral 78 16 97 %   22 1513 (!) 140/82 98.7  F (37.1  C) Oral 76 16 96 %   22 1410 (!) 140/85 -- Oral 83 16 96 %   22 1351 131/77 98.6  F (37  C) Oral 76 16 97 %       Physical Examination:  Gen: Pleasant in no acute distress.  HEENT: NCAT. EOMI. PERRL.  Neck: No bruit, JVD or thyromegaly.  Lungs: Absent breath sounds on right lower chest.  Chest: Chest tube in place.  Mostly bloody  output.  Card: RRR. NSR. No RMG. Peripheral pulses present and symmetric. No edema.  Abd: Soft NT ND. No mass. Normal bowel sounds.  Skin: No rash.  Extr: No edema.  Neuro: Alert and oriented to place time and person.          Laboratory Data:   ID Labs:  Microbiology labs:  Reviewed.      Pleural fluid Aerobic Bacterial Culture Routine  Order: [id number]   Collected 4/7/2022  3:04 PM     Status: Final result     Visible to patient: No (inaccessible in MyChart)    Specimen Information: Pleural Cavity, Right; Pleural fluid         0 Result Notes    Culture 4+ Staphylococcus aureus Abnormal             Resulting Agency: [facility]       Susceptibility     Staphylococcus aureus     REGLA     Clindamycin <=0.25 ug/mL Susceptible     Erythromycin <=0.25 ug/mL Susceptible     Gentamicin <=0.5 ug/mL Susceptible     Oxacillin 0.5 ug/mL Susceptible 1     Tetracycline <=1.0 ug/mL Susceptible     Trimethoprim/Sulfamethoxazole <=0.5/9.5 u... Susceptible     Vancomycin 1.0 ug/mL Susceptible              1 Oxacillin susceptible isolates are susceptible to cephalosporins (example: cefazolin and cephalexin) and beta lactam combination agents. Oxacillin resistant isolates are resistant to these agents.            Specimen Collected: 04/07/22  3:04 PM Last Resulted: 04/10/22  5:37 AM               Recent Labs   Lab Test 04/08/22  0543 10/14/21  2112 09/26/21  1312   CRP 10.3* 21.5* 0.8*     Recent Labs   Lab Test 04/09/22  0456 04/08/22  0543 04/07/22  1142 04/07/22  0637 04/06/22  0638 04/05/22  0540   WBC 5.7 6.4 6.3 7.0 5.0 4.3     Recent Labs   Lab Test 04/09/22  0456 04/08/22  0543 04/07/22  1142 04/07/22  0637   CR 5.30* 7.90* 7.10* 6.89*   GFRESTIMATED 12* 7* 8* 8*       Hematology Studies  Recent Labs   Lab Test 04/09/22  0456 04/08/22  0543 04/07/22  1142 04/07/22  0637 04/06/22  0638 04/05/22  0540 08/07/19  0138 08/06/19 2020 08/02/19  0437 07/28/19  1001 07/28/19  0950   WBC 5.7 6.4 6.3 7.0 5.0 4.3   < > 3.4* 1.7*   < > 2.8*    ANEU  --   --   --   --   --   --   --  2.8 1.2*  --  2.4   AEOS  --   --   --   --   --   --   --  0.0 0.0  --  0.0   HCT 21.3* 22.6* 20.3* 22.6* 23.9* 23.4*   < > 27.0* 24.8*   < > 26.6*   PLT 55* 43* 37* 40* 49* 50*   < > 44* 53*   < > 28*    < > = values in this interval not displayed.       Metabolic  Recent Labs   Lab Test 04/09/22  0456 04/08/22  0543 04/07/22  1142   * 132* 134*   BUN 31* 54* 48*   CO2 28 22 23   CR 5.30* 7.90* 7.10*   GFRESTIMATED 12* 7* 8*       Hepatic Studies  Recent Labs   Lab Test 04/07/22  1142 03/30/22  1557 01/08/22  2255   BILITOTAL 0.6 1.0 0.6   ALKPHOS 94 162* 193*   ALBUMIN 2.4* 3.1* 2.7*   AST 15 29 40   ALT <9 31 <9       Immunologlobulins  Recent Labs   Lab Test 10/14/21  2112 09/26/21  1312   SED 21* 4            Imaging Data:   Reviewed

## 2022-04-10 NOTE — PROGRESS NOTES
RENAL PROGRESS NOTE    CC: ESRD management    ASSESSMENT & PLAN:   ESRD -on iHD Monday and Friday dialysis at [facility]. TW is 60.5kg and last run was on 3/28/2022. His primary nephrologist is Dr. Nate Hill. Dialyze for 3 hr.   Last hemodialysis 04/08/22.   -No indication for hemodialysis today.  We will plan next hemodialysis on Monday as per usual M and F schedule.  The patient has been refusing to dialyze three times per week.   - renal diet  - renally dosing medication.       Access: Patient had bleeding from cannulation of his AVG and Dr. Cosby and his team evaluated and placed suture urgently on 4/1. 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  Had a placement of covered stent graft across of pseudoaneurysm with good results.  -Patient had no issues with cannulation during dialysis 04/04 and 04/08.     BP/ Volume: BP is controlled on current regimen.  Patient appears euvolemic on exam.  Estimated dry weight 60.5 kg. Weight was 59.9 kgs 04/08.  PTA clonidine 0.1 mg at bedtime, diltiazem  mg bid, carvedilol 25 mg twice daily  Diltiazem was discontinued 04/05 given hypotension  Patient appears euvolemic on exam.  He still making urine.  UOP is ranging between 175 to 775 cc daily  I/O are not accurate, No UF recorded in patient's chart.  -continue current dose Clonidine and Coreg with holding parameters.  --strict Is and Os as he is still making urine.  -Daily weight     Electrolytes/acid-base status as per labs 04/09  -Mild hyponatremia of 135.  Stable.  -Serum potassium is wnl 4.0.  Run with K3 bath.  Renal diet.  -Serum bicarbonate within normal limits at 28.  Run with 32 bicarb bath     Anemia: likely combined with ESRD and ABL( Secondary to active bleeding from right pleural space.)  Being  worked up outpatient for hemolytic anemia due to undetectable haptoglobin, LDH elevation, and low reticulocyte count.  S/p 4PRBC during this admission  Receives Epo 26340W x2 per week at [facility].  Received 58771 units of Epogen on 04/04  Hgb was trended down from 7.2 to 6.9 g/dl 04/09 .HGb is not checked this am  - monitor for now.         BMD as per labs 04/09  -Phosphorus 3.2. On phoslo 667 mg tid with meal.  - Ca 7.6 with albmin 3.1 and corrected Ca ~ 8.0. Started on calcium carbonate 1000 mg at bedtime  -Renal diet    Chronic right effusion with trapped lung- felt likely third spacing of fluid in the setting of his liver disease and end-stage kidney disease.  S/p multiple chest tube placements, right pigtail in place, deemed non-surgical candidate for decortication due to co-morbidities.  CT chest 04/07showed stable size of a large loculated right hydropneumothorax with increased pneumothorax component.  Pleural fluid grew MSSA. On IV Vancomycin. ID is following.  Pulmonary is following, input appreciated, planning to remove CT today.    Chronic type B dissection-seen by  cardiothoracic surgery.No acute surgical intervention needed.    Hepatic masses on CT- may represent Hepatocellular carcinoma    Goals of care-seen by palliative care team. Patient is DNI/DNR.      We will follow       SUBJECTIVE:    No acute issues.  Patient seen the bedside and events were reviewed with nursing.    Patient states that he is feeling the same, reports improved burning pain in the right side of the chest wall.Admits dyspnea with deep breath.   Patient denies: fever, chills, cough,  chest pain, palpitations, orthopnea, nausea, vomiting, abdominal pain, changes in bowel habits, dysuria, urinary frequency, urgency, hematuria, rash.      OBJECTIVE:  Physical Exam   Temp: 99.1  F (37.3  C) Temp src: Oral BP: 126/81 Pulse: 77   Resp: 16 SpO2: 95 % O2 Device: None (Room air)    Vitals:    04/04/22 0419 04/04/22 1714 04/08/22 0830    Weight: 60.2 kg (132 lb 11.2 oz) 60.2 kg (132 lb 11.5 oz) 59.9 kg (132 lb)     Vital Signs with Ranges  Temp:  [98.3  F (36.8  C)-99.1  F (37.3  C)] 99.1  F (37.3  C)  Pulse:  [76-83] 77  Resp:  [16] 16  BP: (126-142)/(77-88) 126/81  SpO2:  [95 %-97 %] 95 %  I/O last 3 completed shifts:  In: 775   Out: -     @TMAXR(24)@    Patient Vitals for the past 72 hrs:   Weight   04/08/22 0830 59.9 kg (132 lb)       Intake/Output Summary (Last 24 hours) at 4/1/2022 1738  Last data filed at 4/1/2022 1000  Gross per 24 hour   Intake 480 ml   Output 650 ml   Net -170 ml       PHYSICAL EXAM:  General - Alert and oriented x3, appears comfortable, NAD  Cardiovascular - Regular rate and rhythm, no rub  Respiratory -right-sided chest tube is draining dark bloody fluid, diminished breath sounds in the right posterior lung fields, left lung clear to auscultation anterior and posterior fields, no crackles or wheezes  Abd: BS present, no guarding or pain with palpation, no ascites  Extremities -no edema  Skin: dry, intact, no rash, good turgor  Neuro:  Grossly intact, no focal deficits  MSK:  Grossly intact  Psych:  Normal affect  Access:  LUE AVG with sutures, mild edema, no tenderness no warmth, palpable thrill and audible bruit    LABORATORY STUDIES:     Recent Labs   Lab 04/09/22  0456 04/08/22  0543 04/07/22  2133 04/07/22  1142 04/07/22  0637 04/06/22  0638 04/05/22  0540   WBC 5.7 6.4  --  6.3 7.0 5.0 4.3   RBC 2.32* 2.46*  --  2.10* 2.37* 2.61* 2.56*   HGB 6.9* 7.2* 7.2* 6.3* 7.0* 7.8* 7.7*   HCT 21.3* 22.6*  --  20.3* 22.6* 23.9* 23.4*   PLT 55* 43*  --  37* 40* 49* 50*       Basic Metabolic Panel:  Recent Labs   Lab 04/09/22  0456 04/08/22  0543 04/07/22  1142 04/07/22  0637 04/06/22  0638 04/05/22  0540   * 132* 134* 132* 134* 135*   POTASSIUM 4.0 4.7 4.3 4.2 4.2 3.7   CHLORIDE 99 100 100 98 100 99   CO2 28 22 23 22 25 25   BUN 31* 54* 48* 46* 35* 24*   CR 5.30* 7.90* 7.10* 6.89* 5.68* 4.13*   GLC 87 90 95 100 83 80    TANO 7.6* 7.3* 7.2* 7.4* 7.4* 7.6*       INR  Recent Labs   Lab 04/09/22  0456   INR 1.31*        Recent Labs   Lab Test 04/09/22  0456 04/08/22  0543 03/31/22  0710 03/30/22  1557   INR 1.31*  --   --  1.25*   WBC 5.7 6.4   < > 5.5   HGB 6.9* 7.2*   < > 6.2*   PLT 55* 43*   < > 71*    < > = values in this interval not displayed.       Personally reviewed current labs      Elizabeth Salmeron MD  [facility]  [address], [address]  [place] [address]  Phone# [phone]  Fax# [phone]

## 2022-04-11 NOTE — PROGRESS NOTES
"PULMONARY MEDICINE FOLLOW UP  4/9/2022     Admit Date: 3/30/2022  CODE: No CPR- Do NOT Intubate    Reason for Consult: right pleural effusion    Assessment/Plan:   61 year old male with a history of HBV, cirrhosis, ESKD on HD (only Monday and Friday as patient declines thrice-weekly dialysis), hypoalbuminemia, coagulopathy, thrombocytopenia, chronic anemia, chronic thoracic aortic dissection, recurrent right pleural effusion, MSSA empyema that continues to grow MSSA,  with trapped right lung requiring prior chest tubes and thoracenteses.  He is here with another effusion.       The underlying cause of his pleural effusion is third spacing of fluid in the setting of his liver disease and end-stage kidney disease with infrequent dialysis sessions. .  Further complicated by possible transient bacteremia that may make him susceptible to these infections.     Plan:  Chest tube removed today.  Attempts to keep euvolemic to prevent volume overload  Will expect some re-accumulation of fluid.  Pulmonary will sign off.  Antibiotics per ID (Ancef with HD as outpatient).     HPI:   CCx: right pleural effusion, trapped right lung    Subjective: Feeling better, ready for tube to come out, acknowledges that there is minimal drainage.            Exam/Data:     Vitals  /84 (BP Location: Right arm)   Pulse 73   Temp 98.3  F (36.8  C) (Oral)   Resp 18   Ht 1.651 m (5' 5\")   Wt 59.9 kg (132 lb)   SpO2 97%   BMI 21.97 kg/m       I/O last 3 completed shifts:  In: 3 [I.V.:3]  Out: 430 [Urine:300; Chest Tube:130]  Weight change:     EXAM:  /84 (BP Location: Right arm)   Pulse 73   Temp 98.3  F (36.8  C) (Oral)   Resp 18   Ht 1.651 m (5' 5\")   Wt 59.9 kg (132 lb)   SpO2 97%   BMI 21.97 kg/m      Intake/Output last 3 shifts:  I/O last 3 completed shifts:  In: 3 [I.V.:3]  Out: 430 [Urine:300; Chest Tube:130]  Intake/Output this shift:  I/O this shift:  In: 150 [P.O.:150]  Out: -     Physical Exam  Gen: Sitting on " Pt presents with continued fever after ibuprofen wears off. Pt was diagnosed with the flu two days ago. Pt not medicating with Tylenol. edge of bed, no distress  HEENT: NT, no HOMERO  CV: RRR, no m/g/r  Resp: decreased on right, clear on left.   Abd: soft, nontender, BS+  Skin: no rashes or lesions  Ext: no edema   Neuro: PERRL, nonfocal exam    ROS: A complete 10-system review of systems was obtained and is negative with the exception of what is noted in the HPI/Subjective.    Medications:       - MEDICATION INSTRUCTIONS -         sodium chloride 0.9%  250 mL Intravenous Once in dialysis/CRRT     sodium chloride 0.9%  300 mL Hemodialysis Machine Once     sodium chloride 0.9%  250 mL Intravenous Once in dialysis/CRRT     sodium chloride 0.9%  300 mL Hemodialysis Machine Once     calcium acetate  667 mg Oral TID w/meals     calcium carbonate (OS-TANO) 500 mg (elemental) tablet  1,000 mg Oral At Bedtime     carvedilol  25 mg Oral BID w/meals     cloNIDine  0.1 mg Oral At Bedtime     entecavir  0.5 mg Oral Q7 Days     gelatin absorbable  1 each Topical Once     multivitamin RENAL  1 tablet Oral Daily     - MEDICATION INSTRUCTIONS -   Does not apply Once     - MEDICATION INSTRUCTIONS -   Does not apply Once     pantoprazole  40 mg Oral Daily     sodium chloride (PF)  3 mL Intracatheter Q8H     vancomycin  1,000 mg Intravenous Once     vancomycin place rivera - receiving intermittent dosing  1 each Intravenous See Admin Instructions         DATA  All laboratory and radiology has been personally reviewed by myself today.  Recent Labs   Lab 04/11/22 0528   WBC 4.5   HGB 7.3*   HCT 22.7*   PLT 53*     Recent Labs   Lab 04/11/22  0528 04/09/22  0456 04/08/22  0543 04/07/22  1142   * 135* 132* 134*   CO2 24 28 22 23   BUN 58* 31* 54* 48*   ALKPHOS  --   --   --  94   ALT  --   --   --  <9   AST  --   --   --  15     IMAGING:   CT chest 4/7 - 1.  Stable [place] type B aortic dissection.  2.  Stable dilatation of the ascending aorta, aortic arch, descending aorta and abdominal aorta.  3.  Stable peripheral irregularity in the main pulmonary artery which is  most consistent with a dissection.  4.  Stable size of a large loculated right hydropneumothorax with increased pneumothorax component. Right chest tube in place.  5.  Marked narrowing of the left subclavian vein proximal to a stent.  6.  The left kidney is opacified by the false lumen and is hypoenhancing which may represent ischemia or artifact due to timing of the contrast bolus.  7.  There are 2 indeterminant hepatic masses which may represent hepatocellular carcinoma. Recommend a contrast enhanced magnetic resonance imaging examination of the liver.

## 2022-04-11 NOTE — PLAN OF CARE
Problem: Plan of Care - These are the overarching goals to be used throughout the patient stay.    Goal: Plan of Care Review/Shift Note  Description: The Plan of Care Review/Shift note should be completed every shift.  The Outcome Evaluation is a brief statement about your assessment that the patient is improving, declining, or no change.  This information will be displayed automatically on your shift note.  Outcome: Ongoing, Progressing   Goal Outcome Evaluation:  Patient getting Dialysis at this time-will discharge later today after receiving another dose of vanco.Tolerating dialysis well per Dialysis RN-BP 130s.

## 2022-04-11 NOTE — PROGRESS NOTES
RENAL PROGRESS NOTE    ASSESSMENT & PLAN:   ESKD - On chronic hemodialysis on Mondays and Fridays at [facility]. Prior to admission his last run was on 3/28/22. Have encouraged him to consider 3x/week dialysis but he is not interested    Access - With bleeding from his AVG. Dr. Cosby and team placed suture urgently on 4/1/22. S/p fistulogram with IR and underwent stenting. Had placement of covered stent graft across of pseudoaneurysm     Hypertension - Reasonable blood pressures on current regimen    ESKD-MBD - On binders with meals. Started on calcium carbonate this admission. On renal diet    Chronic right effusion with trapped lung - Felt likely third spacing of fluid in setting of liver disease and ESKD. S/p multiple chest tube placements, right pigtail in place, deemed non-surgical candidate for decortication due to comorbidities    Chronic type B dissection - Seen by cardiothoracic surgery, no acute surgical intervention needed    Hepatic masses on CT - May represent hepatocellular carcinoma      SUBJECTIVE:  Seen at end of run of dialysis. Feeling fine. No shortness of breath. Access functioned well    OBJECTIVE:  Physical Exam   Temp: 98.3  F (36.8  C) Temp src: Oral BP: (!) 147/88 Pulse: 90   Resp: 16 SpO2: 97 % O2 Device: None (Room air)    Vitals:    04/04/22 1714 04/08/22 0830 04/11/22 1130   Weight: 60.2 kg (132 lb 11.5 oz) 59.9 kg (132 lb) 61.2 kg (135 lb)     Vital Signs with Ranges  Temp:  [98.3  F (36.8  C)-100.7  F (38.2  C)] 98.3  F (36.8  C)  Pulse:  [71-90] 90  Resp:  [16-18] 16  BP: (109-147)/(61-88) 147/88  SpO2:  [94 %-98 %] 97 %  I/O last 3 completed shifts:  In: 300 [P.O.:300]  Out: 30 [Chest Tube:30]        Patient Vitals for the past 72 hrs:   Weight   04/11/22 1130 61.2 kg (135 lb)       Intake/Output Summary (Last 24 hours) at 4/11/2022 1508  Last data filed at 4/11/2022 1400  Gross per 24 hour   Intake 300 ml   Output 30 ml   Net 270 ml       PHYSICAL EXAM:  General -  Alert, sitting up in bed comfortably  Cardiovascular - Normal S1S2, no rub  Respiratory - Clear to auscultation bilaterally, no crackles or wheezes  Abdomen - Soft, non-tender, bowel sounds present.    Extremities - No lower extremity edema bilaterally. Left forearm AVG with 1 suture in place  Integumentary - Warm, dry, no rash  Neurologic - Grossly intact, no focal deficits      LABORATORY STUDIES:     Recent Labs   Lab 04/11/22 0528 04/09/22 0456 04/08/22  0543 04/07/22  2133 04/07/22  1142 04/07/22  0637 04/06/22  0638   WBC 4.5 5.7 6.4  --  6.3 7.0 5.0   RBC 2.43* 2.32* 2.46*  --  2.10* 2.37* 2.61*   HGB 7.3* 6.9* 7.2* 7.2* 6.3* 7.0* 7.8*   HCT 22.7* 21.3* 22.6*  --  20.3* 22.6* 23.9*   PLT 53* 55* 43*  --  37* 40* 49*       Basic Metabolic Panel:  Recent Labs   Lab 04/11/22 0528 04/09/22 0456 04/08/22  0543 04/07/22  1142 04/07/22  0637 04/06/22  0638   * 135* 132* 134* 132* 134*   POTASSIUM 4.6 4.0 4.7 4.3 4.2 4.2   CHLORIDE 101 99 100 100 98 100   CO2 24 28 22 23 22 25   BUN 58* 31* 54* 48* 46* 35*   CR 8.15* 5.30* 7.90* 7.10* 6.89* 5.68*   GLC 94 87 90 95 100 83   TANO 7.5* 7.6* 7.3* 7.2* 7.4* 7.4*       Recent Labs   Lab Test 04/11/22 0528 04/09/22 0456 03/31/22  0710 03/30/22  1557   INR  --  1.31*  --  1.25*   WBC 4.5 5.7   < > 5.5   HGB 7.3* 6.9*   < > 6.2*   PLT 53* 55*   < > 71*    < > = values in this interval not displayed.         Personally reviewed current labs      Khushi Miller PA-C  [facility]  [phone]

## 2022-04-11 NOTE — PROCEDURES
Hemodialysis for 3 hours on a 3K bath for potassium of 4.6    Access: LLAG, cannulated with 16 gauge needles per pt request, Maunie d/c'd. Dressing applied and secured once hemostasis obtained-10 Minutes.  Has a suture on arterial side of access, primary RN will check with Dr Cosby, who placed suture, when he would like to have it removed.     Fluid removed 2000 ml for net off of 1600 ml. Pt. was hemodynamically stable during dialysis.Critline used for fluid monitoring/management.     Hepatitis B surface antigen is POSITIVE, machine bleached per protocol

## 2022-04-11 NOTE — PROGRESS NOTES
Chest tube removed by Surgery late this am-small abrasion noted on left side of dressing -1x1 cm-area covered with mepilex. Dressing placed by Surgery  remains dry and intact.

## 2022-04-11 NOTE — PROGRESS NOTES
[facility] ID Inpatient follow up       Patient:  Elle Blanton  Date of birth 1960, Medical record number [id number]  Date of Visit:  04/11/2022  Attending Physician: Neel Smith MD         Assessment and Recommendations:   Assessment:  Elle Blanton is a 61 year old male with   1.  End-stage renal disease on HD.  2.  End-stage liver disease secondary to hepatitis B infection.  Has liver lesions on imaging worrisome for HCC.    3.  History of left-sided septic olecranon bursitis status post I&D.  Cultures with MSSA 10/2021.    4.  History of large right-sided empyema status post thoracentesis on 10/16/2021.  Cultures were positive with MSSA.  Treated with chest tube at the time.  Decortication was indicated at the time but the patient declined.  Was treated with IV Ancef for at least 4 weeks.  5.  Readmitted on 11/8/2021 with recurrent empyema/hydrothorax.  Had a chest tube placed on 11/10/2021.  Decortication was indicated at the time but thought to be high risk with referral to the [facility].  Patient saw Dr. Zurita on 1/6/2022.  Thought to have trapped lung on the right side.  He was not thought to be a surgical candidate and palliative thoracentesis recommended at the time.  6.  Recurrent right-sided hemothorax status post ultrasound guided chest tube placement on 3/31/2022.  Initial cultures from this procedure came back no growth.  Repeat cultures on 4/7/2022 from the chest tube with 4+ staph aureus.  Clinical significance of this positive culture is not clear especially in light of cultures from 3/31/2022 with no growth.  The question at this point is whether he has secondary infection of his hemothorax with staph Aureus.  He has no fever, leukocytosis, although he is an end-stage renal disease patient and may not mount systemic signs of infection.  Mild elevation of procalcitonin.  MSSA on cultures.  7.  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  8.  Goals of care: Complex right-sided hemothorax in a patient with trapped lung, ESRD on HD, ESLD with possible HCC on imaging, severe anemia requiring transfusion even outpatient, type B aortic dissection.  Overall prognosis is poor.    Overall positive cultures with MSSA from a chest tube placed for hemothorax in a patient known to have right-sided MSSA empyema in 10/2021. After another visit with the patient today, the patient agreed to doing renally dosed Keflex instead of IV Ancef given that cure is not the goal.   Chest tube out.      Recommendations:   -Give one dose of IV Vanco today and stop.   -Can discharge on PO Keflex 500 mg Q 12 hours for 4 weeks  -High risk of readmission for recurrent hemothorax.   -Poor prognosis.       Discussed with the patient, nursing staff, Dr. Solomon.    ID will sign off.    Nicolle Yan MD.  [facility].   [facility]  Office Telephone [phone].  Fax [phone]  [facility] paging            Interval History:     HPI:  The interval history was reviewed.   Chest tube removed. Getting HD today. Scheduled to get a dose of IV Vanco today. After more discussion with the patient today, he agreed to doing PO Keflex instead of IV Ancef as previously discussed.     Pertinent cultures include:  Culture Micro   Date Value Ref Range Status   2019 No growth  Final       Recent Inflammatory Biomarkers:   Recent Labs   Lab Test 22  0528 22  0456 22  1728 22  0543 22  1142 22  0637 22  0638 10/15/21  0507 10/14/21  2112 21  0833 21  1312   CRP  --   --   --  10.3*  --   --   --   --  21.5*  --  0.8*   PCAL  --   --  0.96*  --   --   --   --   --   --   --  0.26   WBC 4.5 5.7  --  6.4 6.3 7.0 5.0   < > 6.6   < > 5.4    < > = values in this interval not displayed.            Review of Systems:   CONSTITUTIONAL:    Temp Max: Temp (24hrs), Av.6  F (37  C),  Min:98  F (36.7  C), Max:99.4  F (37.4  C)   .  Negative except for findings in the HPI.           Current Medications (antimicrobials listed in bold):       sodium chloride 0.9%  250 mL Intravenous Once in dialysis/CRRT     sodium chloride 0.9%  300 mL Hemodialysis Machine Once     sodium chloride 0.9%  250 mL Intravenous Once in dialysis/CRRT     sodium chloride 0.9%  300 mL Hemodialysis Machine Once     calcium acetate  667 mg Oral TID w/meals     calcium carbonate (OS-TANO) 500 mg (elemental) tablet  1,000 mg Oral At Bedtime     carvedilol  25 mg Oral BID w/meals     cloNIDine  0.1 mg Oral At Bedtime     entecavir  0.5 mg Oral Q7 Days     gelatin absorbable  1 each Topical Once     multivitamin RENAL  1 tablet Oral Daily     - MEDICATION INSTRUCTIONS -   Does not apply Once     - MEDICATION INSTRUCTIONS -   Does not apply Once     pantoprazole  40 mg Oral Daily     sodium chloride (PF)  3 mL Intracatheter Q8H     vancomycin  1,000 mg Intravenous Once     vancomycin place rivera - receiving intermittent dosing  1 each Intravenous See Admin Instructions              Allergies:     Allergies   Allergen Reactions     Nka [No Known Allergies]             Physical Exam:   Vitals were reviewed  Patient Vitals for the past 24 hrs:   BP Temp Temp src Pulse Resp SpO2 Weight   04/11/22 1455 (!) 161/110 -- -- 92 16 -- --   04/11/22 1430 (!) 147/88 -- -- 90 16 -- --   04/11/22 1415 133/76 -- -- 88 16 -- --   04/11/22 1400 (!) 147/85 -- -- 88 16 -- --   04/11/22 1345 135/84 -- -- 88 16 -- --   04/11/22 1330 138/82 -- -- 87 16 -- --   04/11/22 1315 129/81 -- -- 81 16 -- --   04/11/22 1300 129/82 -- -- 85 16 -- --   04/11/22 1245 122/73 -- -- 84 16 -- --   04/11/22 1230 115/66 -- -- 83 16 -- --   04/11/22 1215 125/72 -- -- 84 16 -- --   04/11/22 1200 137/76 -- -- 81 16 -- --   04/11/22 1141 139/83 -- -- 81 16 -- --   04/11/22 1130 118/61 -- -- 83 16 -- 61.2 kg (135 lb)   04/11/22 0735 134/84 98.3  F (36.8  C) Oral 73 18 97 % --    04/11/22 0355 118/74 98.5  F (36.9  C) Oral 79 -- -- --   04/11/22 0015 109/65 (!) 100.7  F (38.2  C) Oral 71 16 98 % --   04/10/22 2042 116/69 -- -- -- -- -- --   04/10/22 2030 -- 98.9  F (37.2  C) Oral 84 -- 95 % --   04/10/22 1654 138/80 100.3  F (37.9  C) Oral 81 16 96 % --   04/10/22 1552 133/78 99  F (37.2  C) Oral 82 16 94 % --       Physical Examination:  Gen: Pleasant in no acute distress.  HEENT: NCAT. EOMI. PERRL.  Neck: No bruit, JVD or thyromegaly.  Lungs: Absent breath sounds on right lower chest.  Chest: Chest tube in place.  Mostly bloody output.  Card: RRR. NSR. No RMG. Peripheral pulses present and symmetric. No edema.  Abd: Soft NT ND. No mass. Normal bowel sounds.  Skin: No rash.  Extr: No edema.  Neuro: Alert and oriented to place time and person.          Laboratory Data:   ID Labs:  Microbiology labs:  Reviewed.      Pleural fluid Aerobic Bacterial Culture Routine  Order: [id number]   Collected 4/7/2022  3:04 PM     Status: Final result     Visible to patient: No (inaccessible in MyChart)    Specimen Information: Pleural Cavity, Right; Pleural fluid         0 Result Notes    Culture 4+ Staphylococcus aureus Abnormal             Resulting Agency: [facility]       Susceptibility     Staphylococcus aureus     REGLA     Clindamycin <=0.25 ug/mL Susceptible     Erythromycin <=0.25 ug/mL Susceptible     Gentamicin <=0.5 ug/mL Susceptible     Oxacillin 0.5 ug/mL Susceptible 1     Tetracycline <=1.0 ug/mL Susceptible     Trimethoprim/Sulfamethoxazole <=0.5/9.5 u... Susceptible     Vancomycin 1.0 ug/mL Susceptible              1 Oxacillin susceptible isolates are susceptible to cephalosporins (example: cefazolin and cephalexin) and beta lactam combination agents. Oxacillin resistant isolates are resistant to these agents.            Specimen Collected: 04/07/22  3:04 PM Last Resulted: 04/10/22  5:37 AM               Recent Labs   Lab Test 04/08/22  0543 10/14/21  2112 09/26/21  1312   CRP 10.3* 21.5* 0.8*      Recent Labs   Lab Test 04/11/22  0528 04/09/22  0456 04/08/22  0543 04/07/22  1142 04/07/22  0637 04/06/22  0638   WBC 4.5 5.7 6.4 6.3 7.0 5.0     Recent Labs   Lab Test 04/11/22  0528 04/09/22  0456 04/08/22  0543 04/07/22  1142   CR 8.15* 5.30* 7.90* 7.10*   GFRESTIMATED 7* 12* 7* 8*       Hematology Studies  Recent Labs   Lab Test 04/11/22  0528 04/09/22  0456 04/08/22  0543 04/07/22  1142 04/07/22  0637 04/06/22  0638 08/07/19  0138 08/06/19 2020 08/02/19  0437 07/28/19  1001 07/28/19  0950   WBC 4.5 5.7 6.4 6.3 7.0 5.0   < > 3.4* 1.7*   < > 2.8*   ANEU  --   --   --   --   --   --   --  2.8 1.2*  --  2.4   AEOS  --   --   --   --   --   --   --  0.0 0.0  --  0.0   HCT 22.7* 21.3* 22.6* 20.3* 22.6* 23.9*   < > 27.0* 24.8*   < > 26.6*   PLT 53* 55* 43* 37* 40* 49*   < > 44* 53*   < > 28*    < > = values in this interval not displayed.       Metabolic  Recent Labs   Lab Test 04/11/22  0528 04/09/22  0456 04/08/22  0543   * 135* 132*   BUN 58* 31* 54*   CO2 24 28 22   CR 8.15* 5.30* 7.90*   GFRESTIMATED 7* 12* 7*       Hepatic Studies  Recent Labs   Lab Test 04/07/22  1142 03/30/22  1557 01/08/22  2255   BILITOTAL 0.6 1.0 0.6   ALKPHOS 94 162* 193*   ALBUMIN 2.4* 3.1* 2.7*   AST 15 29 40   ALT <9 31 <9       Immunologlobulins  Recent Labs   Lab Test 10/14/21  2112 09/26/21  1312   SED 21* 4            Imaging Data:   Reviewed

## 2022-04-11 NOTE — SIGNIFICANT EVENT
Significant Event Note    Time of event: 6:13 PM April 11, 2022    Description of event:  House officer paged about patient spiking a fever of 102.2 and feeling chilled. He was set to discharge this evening after vanc was done running. Given his new fever we will keep him over night and continue antibiotics. May need to broaden based on lab findings.     Plan:  -PRN Tylenol   -lactic acid   -cbc and repeat blood cultures  -continue vanc  -pending lactic acid may add zosyn    Discussed with: bedside nurse    Nathaly Hernandez MD

## 2022-04-11 NOTE — PROGRESS NOTES
Care Management Discharge Note    Discharge Date: 04/11/2022       Discharge Disposition: Home    Discharge Services:  Will continue OP Dialysis     Discharge DME:  None     Discharge Transportation:  Family     Private pay costs discussed: Not applicable    Education Provided on the Discharge Plan:  yes  Persons Notified of Discharge Plans: yes  Patient/Family in Agreement with the Plan: yes    Handoff Referral Completed: No    Additional Information:  SWCM reviewed pt chart and plan is for pt to discharge home with continued OP Dialysis .         Sola Vergara, YENNIFERSW

## 2022-04-11 NOTE — PHARMACY-VANCOMYCIN DOSING SERVICE
Pharmacy Vancomycin Note  Date of Service 2022  Patient's  1960   61 year old, male    Indication: pleural infection  Day of Therapy: 5  Current vancomycin regimen: Intermittent vancomycin  Current vancomycin monitoring method: Trough (Method 2 = manual dose calculation)  Current vancomycin therapeutic monitoring goal: 15-20 mg/L      Current estimated CrCl = Estimated Creatinine Clearance: 8.1 mL/min (A) (based on SCr of 8.15 mg/dL (HH)).    Creatinine for last 3 days  2022:  4:56 AM Creatinine 5.30 mg/dL  2022:  5:28 AM Creatinine 8.15 mg/dL    Recent Vancomycin Levels (past 3 days)  2022:  5:28 AM Vancomycin 15.9 mg/L    Vancomycin IV Administrations (past 72 hours)                   vancomycin (VANCOCIN) 500 mg in sodium chloride 0.9 % 100 mL intermittent infusion (mg) 500 mg New Bag 22 0054                Nephrotoxins and other renal medications (From now, onward)    Start     Dose/Rate Route Frequency Ordered Stop    22 1600  vancomycin (VANCOCIN) 1000 mg in dextrose 5% 200 mL PREMIX         1,000 mg  200 mL/hr over 1 Hours Intravenous ONCE 22 0857      22 0141  vancomycin place rivera - receiving intermittent dosing         1 each Intravenous SEE ADMIN INSTRUCTIONS 22 0142               Contrast Orders - past 72 hours (72h ago, onward)            None            Plan:  1. Will give Vancomycin 1000mg after dialysis today.  2. Vancomycin monitoring method: Trough (Method 2 = manual dose calculation)  3. Vancomycin therapeutic monitoring goal: 15-20 mg/L  4. Pharmacy will check vancomycin levels as appropriate in  before next HD run..      Yee Nielsen, PharmD  2022

## 2022-04-11 NOTE — DISCHARGE SUMMARY
Faculty Supervision of Residents    I have examined this patient on 4/11/2022 and the medical care has been evaluated and discussed with the resident.  The documentation has been reviewed.  I agree with the medical care provided and confirm the findings.     D/W Dr Yan.  Transition to oral abx.  No further abx on dialysis.  Nash Solomon MD        [facility]  Discharge Summary - Medicine & Pediatrics       Date of Admission:  3/30/2022  Date of Discharge:  4/11/2022  Discharging Provider: Henrietta Scott MD   Discharge Service: Hospitalist Service    Discharge Diagnoses   1. Empyema lung (H)    2. Anemia, unspecified type    3. Empyema (H)    4. Acquired dissection of pulmonary artery (H)    5. Thoracic aortic dissection (H)    6. ESRD (end stage renal disease) on dialysis (H)          Follow-ups Needed After Discharge   Follow-up Appointments     Follow-up and recommended labs and tests       Follow up with primary care provider, Jaswant Flores, within 7 days for   hospital follow- up.  No follow up labs or test are needed.             Unresulted Labs Ordered in the Past 30 Days of this Admission     Date and Time Order Name Status Description    4/7/2022  2:04 PM Cytology, non-gynecologic In process     4/7/2022  2:04 PM Fungal or Yeast Culture Routine Preliminary     4/7/2022  2:04 PM Acid-Fast Bacilli Culture and Stain In process       These results will be followed up by Infectious DIsease    Discharge Disposition   Discharged to home  Condition at discharge: Stable    Hospital Course   Elle Blanton was admitted on 3/30/2022 for right lung empyema requiring chest tube drainage and anemia of chronic disease (found to have Hgb 6.2 at clinic prior to admission).  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 1st  and 3rd with patent blood flow. 4/4: Underwent multiple stent placements of pseudoaneurysms.      Acute macrocytic anemia on chronic normocytic anemia of chronic disease  History of GI bleed  Thrombocytopenia  He is on high-dose EPO and takes iron supplements outpatient. Received total 5 units PRBC since presentation.     Empyema  History of recurrent pleural effusions  Pleural fluid + S aureus  Patient has had pleural effusions in the past that required chest tubes and multiple thoracenteses. It has been recommended the patient undergo surgery for this, on 1/6/22 the patient followed up with Dr. Zurita and it was explained that the patient is not a surgical candidate due to his multiple comorbidities and the chronicity of the trapped lung. IR consulted for chest tube placed 4/1/2022. Pulmonology consulted for chest tube management which was subsequently removed 4/11. Pleural fluid + S aureus. ID consulted. Vanc started 4/8.      D/W Dr Yan from ID.  Transition to oral keflex BID for control of staph empyema.  No further IV abx at this time.     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 Maharaj recommended conservative treatment at this time.     ESRD on hemodialysis (MF) secondary to hepatorenal syndrome due to cirrhosis  -Nephrology consulted; HD M/F     Goals of Care  Palliative Care consulted  4/7 DNR/DNI and goals are restorative at this time. Continuing HD and current medical cares/treatments.       Consultations This Hospital Stay   NEPHROLOGY IP CONSULT  CARDIOVASCULAR SURGERY IP CONSULT  CARDIOVASCULAR SURGERY IP CONSULT  PULMONARY IP CONSULT  PALLIATIVE CARE ADULT IP CONSULT  PHARMACY TO DOSE VANCO  INFECTIOUS DISEASES IP CONSULT    Code Status   No CPR- Do NOT Intubate       The patient was discussed with Dr. Juanito Solomon MD  [place]  [facility]  [address] [address]  Phone: [phone]  Fax: [phone]  ______________________________________________________________________    Physical Exam   Vital Signs: Temp: 98.3  F (36.8  C) Temp src: Oral BP: 133/76 Pulse: 88   Resp: 16 SpO2: 97 % O2 Device: None (Room air)    Weight: 135 lbs 0 oz  General Appearance: Alert, awake, NAD  Respiratory: CTABL, chest tube without air leak, on water seal, serosang output  Cardiovascular: RRR  GI: S/ND/NT  Skin: L fistula access site c/d/i  Neuro: AOx3       Primary Care Physician   Jaswant Flores    Discharge Orders      Reason for your hospital stay    Right lung infection requiring chest tube drainage.     Follow-up and recommended labs and tests     Follow up with primary care provider, Jaswant Flores, within 7 days for hospital follow- up.  No follow up labs or test are needed.     Activity    Your activity upon discharge: activity as tolerated     Diet    Follow this diet upon discharge: Orders Placed This Encounter      Room Service      Snacks/Supplements Adult: Ensure Clear; Between Meals      Snacks/Supplements Adult: Magic Cup; Between Meals      Renal Diet (dialysis)       Significant Results and Procedures   Results for orders placed or performed during the hospital encounter of 03/30/22   XR Chest Port 1 View    Narrative    EXAM: XR CHEST PORT 1 VIEW  LOCATION: [facility]  DATE/TIME: 3/30/2022 4:35 PM    INDICATION: Diminished breath sounds on right  COMPARISON: 02/20/2022      Impression    IMPRESSION: Since prior chest radiograph 02/20/2022 there has been increase in size of the right pleural effusion which is now marked. There is some associated mediastinal shift from right to left. Otherwise the chest is stable with again seen   significantly enlarged abdominal aortic arch which is partially calcified. The left lung shows no acute findings.   Chest CT w/o contrast     Narrative    EXAM: CT CHEST W/O CONTRAST  LOCATION: [facility]  DATE/TIME: 3/30/2022 6:07 PM    INDICATION: Abnormal x-ray. Recurrent right pleural effusions.  COMPARISON: Chest x-ray 03/30/2022 and CT chest exam 11/14/2021  TECHNIQUE: CT chest without IV contrast. Multiplanar reformats were obtained. Dose reduction techniques were used.  CONTRAST: None.    FINDINGS:   LUNGS AND PLEURA: Large loculated appearing right pleural effusion, with scattered hyperdensities within the collection compatible with acute or subacute hemorrhage. Numerous small gas pockets within the collection likely related to recent thoracentesis.   Atelectasis involving the entire right lower lobe and most of the right upper lobe. Small left pleural effusion and minimal left basilar atelectasis.    MEDIASTINUM/AXILLAE: No aneurysmal dilatation involving the aortic arch measuring 6 cm in width. The descending thoracic aorta is also dilated measuring 5.6 x 5.4 cm. Calcified intimal flap suggesting chronic dissection just below the diaphragm as seen   previously. Plaque in the proximal right renal artery. No lymphadenopathy.     CORONARY ARTERY CALCIFICATION: Severe.    UPPER ABDOMEN: Cirrhotic changes of the liver. 4.3 x 3.7 cm right hepatic lobe mass and additional smaller adjacent hypodense lesions are unchanged. Small amount of adjacent ascites. Gallstones. Atherosclerotic disease abdominal aorta. Splenomegaly.    MUSCULOSKELETAL: Unremarkable.      Impression    IMPRESSION:   1.  Large complicated right pleural effusion with scattered hyperdensities suggesting hemorrhage. Atelectasis involving the entire right lower lobe and most of the right upper lobe.  2.  Small left pleural effusion and left basilar atelectasis.  3.  Aneurysmal dilatation involving the aortic arch and descending thoracic aorta, unchanged. There is also a chronic dissection with intimal flap upper abdominal aorta.  4.  Cirrhosis and  splenomegaly. Small amount of ascites  .   CTA Chest with Contrast     Value    Radiologist flags (AA)     Right empyema. Aortic dissection. Pulmonary artery dissection.    Narrative    EXAM: CTA CHEST WITH CONTRAST  LOCATION: [facility]  DATE/TIME: 3/30/2022 10:18 PM    INDICATION: Chest pain, nonspecific. Evaluate for aortic dissection.  COMPARISON: CT of the chest 11/09/2021 performed without IV contrast.    TECHNIQUE: Multiphase helical acquisition through the chest was performed including noncontrast, arterial phase, and delayed images before and after the administration of IV contrast. 2D and 3D reconstructions were performed by the CT technologist. Dose   reduction techniques were used.   CONTRAST: 75 mL Isovue 370.    FINDINGS:  CT ANGIOGRAM CHEST: CTA examination demonstrates a dissection with origin involving the anterior aspect of the thoracic aortic arch at the takeoff of the left subclavian artery. This extends into the upper abdominal aorta. The false lumen supplies the   left renal artery. The remainder of the major vessels are supplied by the true lumen. Greatest radial dimension of the thoracic aorta is at the posterior arch and this measures 5.7 cm in greatest radial dimension. The prior correlative CT 11/09/2021   shows no significant contrast within the vessels, however the calcification can be seen involving the aorta and the dissection is not changed significantly. There is no evidence for extravasation of contrast to suggest a rupture. The size of the thoracic   aorta is not changed significantly since 11/09/2021 CT examination. There is a dissection seen involving the left aspect of the main pulmonary artery which extends into the proximal aspect of the right pulmonary artery. This is typically associated   changes of chronic pulmonary arterial hypertension. I am not sure if this was present on the prior study given lack of IV contrast.    CHEST:  LUNGS AND PLEURA:  There is a large loculated right pleural effusion which contains some heterogenous density and gas worrisome for empyema. There is a mild free component to the right pleural effusion seen inferiorly. There is advanced atelectatic   changes seen of the right lung. There is a minimal free pleural effusion seen on the left.    MEDIASTINUM: No adenopathy.    CORONARY ARTERY CALCIFICATION: Moderate most pronounced in the left anterior descending.    UPPER ABDOMEN: There is a mild amount of ascites. There are changes of cholelithiasis with no evidence for cholecystitis. There are findings most typical for hemangioma and adjacent benign cysts seen in the superior aspect of the right lobe of liver.   There is cirrhotic configuration seen of the liver. The spleen is not entirely included on this study, but appears enlarged and there are some varicosities consistent with portal venous hypertension. The portal vein is patent.     MUSCULOSKELETAL: Mild scattered degenerative changes most marked in the spine.      Impression    IMPRESSION:  1.  Dissection extending from the anterior aspect of the thoracic aortic arch into the abdominal aorta.    2.  There is aneurysmal dilatation seen of the thoracic aorta most marked at the mid arch region which measures approximately 5.7 cm in greatest radial dimension. No active rupture is identified.    3.  The dissection and size of the thoracic aorta appears stable since prior CT 11/09/2021.    4.  There is a dissection seen involving the main pulmonary artery and the proximal aspect of the right pulmonary artery which is commonly associated with changes of chronic increased pulmonary arterial pressure.    5.  There are findings worrisome for a large empyema in the right hemithorax with associated significant atelectasis.    6.  There are small free appearing bilateral pleural effusions.    7.  There is mild abdominal ascites.    8.  Changes of cirrhosis in the liver with associated portal  venous hypertension and splenomegaly.      [Critical Result: Right empyema. Aortic dissection. Pulmonary artery dissection.]    Finding was identified on 3/30/2022 10:59 PM.     Dr. Hartley was contacted by me on 3/30/2022 11:24 PM and verbalized understanding of the critical result.    IR Chest Tube Place Non Tunneled Right    Narrative    LOCATION: [facility]  DATE: 3/31/2022    PROCEDURE:  1. ULTRASOUND AND FLUOROSCOPICALLY GUIDED CHEST TUBE PLACEMENT  2. MODERATE SEDATION    INTERVENTIONAL RADIOLOGIST: Dixon Owens MD    INDICATION: Large right pleural effusion plan for image guided chest tube placement.    CONSENT: The risks, benefits and alternatives of the procedure were discussed with the patient or representative in detail. All questions were answered. Informed consent was given to proceed with the procedure.    MODERATE SEDATION: Versed 1.5 mg IV; Fentanyl 75 mcg IV. During the time out, immediately prior to the administration of medications, the patient was reassessed for adequacy to receive conscious sedation.  Under physician supervision, Versed and fentanyl   were administered for moderate sedation. Pulse oximetry, heart rate and blood pressure were continuously monitored by an independent trained observer. The physician spent 15 minutes of face-to-face sedation time with the patient.    CONTRAST: None.  ANTIBIOTICS: None.  ADDITIONAL MEDICATIONS: None.    FLUOROSCOPIC TIME: 0.5 minutes.  RADIATION DOSE: Air Kerma: 12 mGy.    COMPLICATIONS: No immediate complications.    UNIVERSAL PROTOCOL: The operative site was marked and any prior imaging was reviewed. Required items including blood products, implants, devices and special equipment was made available. Patient identity was confirmed either verbally, with demographic   information, hospital assigned identification or other identification markers. A timeout was performed immediately prior to the procedure.    STERILE BARRIER  TECHNIQUE: Maximum sterile barrier technique was used. Cutaneous antisepsis was performed at the operative site with application of 2% chlorhexidine and large sterile drape. Prior to the procedure, the  and assistant performed   hand hygiene and wore hat, mask, sterile gown, and sterile gloves during the entire procedure.    PROCEDURE:  Utilizing fluoroscopic imaging, a  image was obtained. The skin was anesthetized using 1% lidocaine. A GreenPocket needle/catheter combination was advanced under real-time ultrasound and fluoroscopic guidance into the right pleural space. A permanent image   was stored to the patient's medical record. A sterile probe cover and sterile gel were used. The needle was removed. Through the needle, a 0.035 inch guidewire was coiled within the pleural space. The catheter was removed. The tract was serially   dilated. Over the wire, a 18 Fr Thal Quick drainage catheter was placed. The catheter was then attached to a Pleur-Evac device. The catheter was secured to the skin utilizing a suture. A clean and sterile dressing was applied. The patient tolerated the   procedure well. A sample was sent for Gram stain and culture.    FINDINGS:   imaging demonstrated a complex large right hemothorax. Postplacement fluoroscopic imaging demonstrates good positioning of the drainage catheter.       Impression    IMPRESSION:    1.  Uncomplicated image guided placement of 18 Burmese pleural drainage catheter into a large right hemothorax. Drainage catheter attached to Pleur-Evac.          US Upper Extremity Venous Duplex Left    Narrative    EXAM: US UPPER EXTREMITY VENOUS DUPLEX LEFT  LOCATION: [facility]  DATE/TIME: 4/1/2022 2:29 PM    INDICATION: swelling, Rule out DVT  COMPARISON: CTA chest 03/30/2022.  TECHNIQUE: Venous Duplex ultrasound of the left upper extremity with (when possible) and without compression, augmentation, and duplex. Color flow and spectral Doppler  with waveform analysis performed.    FINDINGS: Ultrasound includes evaluation of the internal jugular vein, innominate vein, subclavian vein, axillary vein, and brachial vein. The superficial cephalic and basilic veins were also evaluated where seen.     LEFT: No deep venous thrombosis. No superficial thrombophlebitis.       Impression    IMPRESSION:   1.  No deep venous thrombosis in the left upper extremity.   XR Chest Port 1 View    Narrative    EXAM: XR CHEST PORT 1 VIEW  LOCATION: [facility]  DATE/TIME: 4/1/2022 1:23 PM    INDICATION: R empyema s p chest tube  COMPARISON: 03/30/2022      Impression    IMPRESSION: Right sided chest tube in good position with moderate decrease in size of the right pleural effusion. Moderate loculated appearing fluid does remain along with some infiltrates and atelectasis right lung base.    Left lung is clear and expanded.   IR Dialysis Fistulogram Left    Narrative    [place] [facility]  LOCATION: [facility]  DATE: 4/2/2022    PROCEDURE: DIALYSIS ARTERIOVENOUS SHUNT EVALUATION, CENTRAL VENOUS ANGIOPLASTY AND STENTING OF THE LEFT BRACHIOCEPHALIC VEIN    INTERVENTIONAL RADIOLOGIST: August Torres MD.    INDICATION: 61-year-old male with end-stage renal disease. The patient is having prolonged bleeding at hemodialysis. On physical examination there is a surgically placed pursestring suture along the inflow segment of the left forearm loop graft.   Prominent left upper chest collaterals and left upper extremity swelling is noted. Emergent diagnostic fistulography is requested prior to reattempting hemodialysis.    CONSENT: The risks, benefits and alternatives of dialysis arteriovenous shunt evaluation with possible angioplasty, stent placement, thrombolysis and/or tunneled dialysis catheter placement were discussed with the patient  in detail. All questions were   answered. Informed consent was given to proceed with the  procedure.    MODERATE SEDATION: Versed 1 mg IV; Fentanyl 100 mcg IV.  Under physician supervision, Versed and fentanyl were administered for moderate sedation. Pulse oximetry, heart rate and blood pressure were continuously monitored by an independent trained   observer. The physician spent 40 minutes of face-to-face sedation time with the patient.    CONTRAST: 100 mL Visipaque 320.  ANTIBIOTICS: None.  ADDITIONAL MEDICATIONS: None.    FLUOROSCOPIC TIME: 8.6 minutes.  RADIATION DOSE: Air Kerma: 127 mGy.    COMPLICATIONS: No immediate complications.    STERILE BARRIER TECHNIQUE: Maximum sterile barrier technique was used. Cutaneous antisepsis was performed at the operative site with application of 2% chlorhexidine and large sterile drape. Prior to the procedure, the  and assistant performed   hand hygiene and wore hat, mask, sterile gown, and sterile gloves during the entire procedure.    PROCEDURE:    Access to the left upper arm arteriovenous shunt was achieved directed towards the venous anastomosis and conversion was made for a short 7 Marshallese vascular sheath.     Diagnostic fistulography was obtained from the arterial inflow through the right atrium.    A Kumpe catheter was then advanced into the left subclavian vein and repeat digital subtraction venography was obtained.    The catheter and a stiff Glidewire remains dilated through the severely stenotic/occluded right brachiocephalic vein and into the inferior vena cava.    Angioplasty was performed using an 8 mm balloon at the left brachiocephalic vein followed by repeat venography. Angioplasty was performed using a 12 mm balloon at the left brachiocephalic vein followed by repeat venography.     There was determined that stenting would be necessary. The sheath was exchanged for a 9 Marshallese vascular sheath. Primary stenting of the left brachiocephalic vein was performed with a 14 mm x 8 cm Abre self expandable bare-metal stent followed by repeat    angioplasty to 12 mm.    The sheath was removed and compression applied to the puncture site until hemostasis was achieved.     FINDINGS:  The initial diagnostic fistulography reveals a patent left forearm brachiocephalic loop graft. The left brachiocephalic vein is occluded in the vicinity of the known intrathoracic aortic aneurysm with numerous bridging collaterals draining into the   contralateral internal jugular and subclavian veins.    Additionally there is a small area of the graft irregularity with contrast extravasation from the graft material along the inflow segment of the graft at the site of the surgically placed pursestring suture. This functionally represents a small   pseudoaneurysm measuring approximately 26 x 8 mm.    Images obtained following the 8 and 12 mm angioplasty of the left brachiocephalic vein shows slight interval improvement however significant collaterals remain. This may be secondary to the large aneurysm as opposed to a true stenosis.    Images obtained following primary stenting of the left brachiocephalic vein with a 14 mm stent show a significantly improved angiographic appearance with resolution of the collaterals and brisk flow through the segment.      Impression    IMPRESSION:    1.  Diagnostic fistulography reveals occlusion of the left brachiocephalic vein with a exuberant collaterals. This is in the region of the large thoracic aortic aneurysm which may be exerting extrinsic compression causing the occlusion. This was treated   with stenting with an excellent result, as detailed above.  2.  Small focal pseudoaneurysm associated with the graft defect within the inflow segment of the left forearm loop graft. The only stent graft available for review. Today was an 8 cm x 5 cm stent graft which would have realignment a significant portion   of the loop graft making dialysis access challenging. The plan will be to repeat an ultrasound tomorrow to see if this spontaneously  resolves. If not we will obtain a shorter 2.5 cm stent graft for endovascular repair.       US Ext Arterial Venous Dialys Acs Graft    Narrative    [facility]  ULTRASOUND EXTREMITY ARTERIAL VENOUS DIALYSIS ACCESS GRAFT  04/03/2022, 10:48 AM    INDICATION: Patient with left forearm loop graft. Patient had  fistulogram performed the day prior on 04/02/2022. Request made for  evaluation of patency and possibility of pseudoaneurysm.    TECHNIQUE: Color Doppler and spectral waveform analysis obtained  throughout the loop graft as well as inflow artery and outflow venous  system.    FINDINGS: The loop graft is patent. Inflow brachial artery is patent  with velocity of 184 cm/s. The arterial anastomosis is widely patent  with velocity of 391 cm/s. Velocities within the graft range from  207-323 cm/s. Venous anastomosis is 403 cm/s, with minimal stenosis,  though not felt to be hemodynamically significant.    In the medial aspect of the graft is a small pseudoaneurysm measuring  0.4 x 0.6 x 0.6 cm, found to have blood flow within.    A second pseudoaneurysm is 0.5 x 0.7 x 1 cm, blood flow not clearly  assessed.    Third pseudoaneurysm is 0.4 x 0.5 x 0.8 cm, also seemingly patent.      Impression    IMPRESSION:  1.  Patent left upper extremity loop forearm graft. No obvious  stenosis at the venous or arterial anastomoses.  2.  Three separate and relatively small pseudoaneurysms identified  arising from the loop graft. The first and third are patent with blood  flow within the pseudoaneurysm. A second pseudoaneurysm not assessed  for blood flow. The first measures up to 6 mm, second measures up to  10 mm, and third measures up to 8 mm.    MIGUEL ÁNGEL JOSEPH MD         SYSTEM ID:  [id number]   IR Dialysis Fistulogram Left    [facility]  LOCATION: [facility]  DATE: 4/4/2022    PROCEDURE: LEFT ARM FISTULOGRAM AND PLACEMENT OF COVERED STENT GRAFT    INTERVENTIONAL  RADIOLOGIST: Zbigniew Stephenson MD.    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    CONSENT: The risks, benefits and alternatives of left arm fistulogram with possible intervention were discussed with the patient  in detail. All questions were answered. Informed consent was given to proceed with the procedure.    MODERATE SEDATION: Versed 1.5 mg IV; Fentanyl 50 mcg IV.  Under physician supervision, Versed and fentanyl were administered for moderate sedation. Pulse oximetry, heart rate and blood pressure were continuously monitored by an independent trained   observer. The physician spent 30 minutes of face-to-face sedation time with the patient.    CONTRAST: 25 cc of Omni  ANTIBIOTICS: None.  ADDITIONAL MEDICATIONS: None.    FLUOROSCOPIC TIME: 2.9 minutes.  RADIATION DOSE: Air Kerma: 8 mGy.    COMPLICATIONS: No immediate complications.    STERILE BARRIER TECHNIQUE: Maximum sterile barrier technique was used. Cutaneous antisepsis was performed at the operative site with application of 2% chlorhexidine and large sterile drape. Prior to the procedure, the  and assistant performed   hand hygiene and wore hat, mask, sterile gown, and sterile gloves during the entire procedure.    PROCEDURE:    The left arm dialysis graft was accessed using a micropuncture needle towards the arterial limb of the graft and eventually a 8 Turkish sheath was placed. Through the sheath over a Bentson wire a 5 Turkish KMP catheter was advanced into the arterial limb   of the graft. A left arm dialysis fistulogram was performed. This revealed to moderate size pseudoaneurysms coming off near the apex of the graft and a small pseudoaneurysm coming off of the arterial limb of the graft. This was successfully treated with   overlapping 8 mm x 2.5 cm and 8 mm x 5 cm Viabahn covered stent grafts. The stent graft were then angioplastied to 8 mm. Final fistulogram demonstrates no residual active bleed  or pseudoaneurysm.        Impression    IMPRESSION:    Left arm fistulogram reveals 3 pseudoaneurysms coming off of the arterial limb of the graft successfully treated with placement of Viabahn covered stent grafts.  ____________________________________________________________________       XR Chest Port 1 View    Narrative    EXAM: XR CHEST PORT 1 VIEW  LOCATION: [facility]  DATE/TIME: 4/5/2022 4:59 AM    INDICATION: R empyema s p chest tube  COMPARISON: 04/01/2022. CT 03/30/2022.      Impression    IMPRESSION: Right chest tube in place with continued mild decrease in the right pleural effusion with mild complex effusion remaining. Some mild hydropneumothorax in the right lung base likely due to trapped lung right lower lobe.    New vascular stent across the left brachiocephalic vein place for 04/02/2022.   XR Chest Port 1 View    Narrative    EXAM: XR CHEST PORT 1 VIEW  LOCATION: [facility]  DATE/TIME: 4/7/2022 10:31 AM    INDICATION: right empyema pleural effusion with chest tube in place, now having new shortness of breath.  COMPARISON: 04/05/2022      Impression    IMPRESSION: Similar right chest tube. Persistent but slightly decreased partially loculated right effusion and associated atelectasis. No pneumothorax. Left lung is clear. Heart size is stable. Similar endovascular stent over the expected brachiocephalic   vein.   CTA Chest Abdomen Pelvis w Contrast     Value    Radiologist flags Hepatic masses    Narrative    EXAM: CTA CHEST ABDOMEN PELVIS W CONTRAST  LOCATION: [facility]  DATE/TIME: 4/7/2022 6:00 PM    INDICATION: Right chest pain. Complex right pleural effusion. Aortic and pulmonary artery dissection.  COMPARISON: 3/30/2022  TECHNIQUE: CT angiogram chest abdomen pelvis during arterial phase of injection of IV contrast. 2D and 3D MIP reconstructions were performed by the CT technologist. Dose reduction techniques were used.    CONTRAST: [id number] 100ml    FINDINGS:   CT ANGIOGRAM CHEST, ABDOMEN, AND PELVIS: No significant interval change in a [place] type B aortic dissection extending from the aortic arch through the aortic bifurcation. The celiac axis and superior mesenteric artery and right renal artery are   opacified by the true lumen. The left renal artery is opacified by the false lumen. The inferior mesenteric artery is opacified by the false lumen. The dissection extends a short distance into the left common iliac artery. The iliac arteries are   otherwise normal. No evidence of rupture. 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 An infrarenal abdominal aortic artery aneurysm measuring 3.7 x 3.5 cm. The ascending aorta is   stable and dilated at 4.0 cm. A stent within the left brachiocephalic vein. Immediately proximal to the stent is marked narrowing of the left subclavian vein between the right clavicle and first rib, image 10:6. Stable peripheral irregularity of the main   pulmonary artery extending into the proximal right pulmonary artery, image 46:6.    LUNGS AND PLEURA: Loculated large right hydropneumothorax is similar in size to prior with an increased pneumothorax component. A stable hyperdense component posteriorly within the pleural collection is consistent with a hematoma. Right chest tube in   place. Atelectasis involving the majority of the right lung. Small layering left pleural effusion.    MEDIASTINUM/AXILLAE: No lymphadenopathy. Trace pericardial fluid.    CORONARY ARTERY CALCIFICATION: Moderate.    HEPATOBILIARY: An indeterminant 4.3 x 3.9 cm mass in segment 8 of the with a peripheral nodular enhancing component, image 87:6. An indeterminate 2.2 x 2.2 cm hypodensity in segment 8, image 84:6. Nodular hepatic contour consistent with   cirrhosis/fibrosis. Cholelithiasis. No biliary ductal dilatation.    PANCREAS: Normal.    SPLEEN: Stable  splenomegaly measuring 17.5 cm in maximal dimension.    ADRENAL GLANDS: Normal.    KIDNEYS/BLADDER: Atrophic kidneys. Hypoenhancing left kidney. Subcentimeter renal hypodensities which are too small to characterize. No hydronephrosis.    BOWEL: No obstruction or inflammatory change.    LYMPH NODES: Normal.    PELVIC ORGANS: Small volume ascites.    MUSCULOSKELETAL: Degenerative changes of the spine and hips.      Impression    IMPRESSION:  1.  Stable [place] type B aortic dissection.  2.  Stable dilatation of the ascending aorta, aortic arch, descending aorta and abdominal aorta.  3.  Stable peripheral irregularity in the main pulmonary artery which is most consistent with a dissection.  4.  Stable size of a large loculated right hydropneumothorax with increased pneumothorax component. Right chest tube in place.  5.  Marked narrowing of the left subclavian vein proximal to a stent.  6.  The left kidney is opacified by the false lumen and is hypoenhancing which may represent ischemia or artifact due to timing of the contrast bolus.  7.  There are 2 indeterminant hepatic masses which may represent hepatocellular carcinoma. Recommend a contrast enhanced magnetic resonance imaging examination of the liver.      [Recommend Follow Up: Hepatic masses]    This report will be copied to the [facility] to ensure a provider acknowledges the finding.              Discharge Medications   Current Discharge Medication List      START taking these medications    Details   cephALEXin (KEFLEX) 500 MG capsule Take 1 capsule (500 mg) by mouth in the morning and 1 capsule (500 mg) in the evening.  Qty: 120 capsule, Refills: 1    Comments: On dialysis days, take AM dose after dialysis.  Associated Diagnoses: Empyema lung (H)         CONTINUE these medications which have NOT CHANGED    Details   acetaminophen (TYLENOL) 500 MG tablet Take 500 mg by mouth every 6 hours as needed for mild pain      calcium acetate (PHOSLO) 667 MG  CAPS capsule Take 1 capsule by mouth 3 times daily (with meals)      carvedilol (COREG) 25 MG tablet Take 25-50 mg by mouth 2 times daily as needed       cloNIDine (CATAPRES) 0.1 MG tablet Take 0.1 mg by mouth At Bedtime       diltiazem ER (DILT-XR) 180 MG 24 hr capsule Take 1 capsule (180 mg) by mouth 2 times daily  Qty: 60 capsule, Refills: 11    Associated Diagnoses: Essential hypertension      entecavir (BARACLUDE) 0.5 MG tablet Take 1 tablet (0.5 mg) by mouth every 7 days  Qty: 52 tablet, Refills: 1    Associated Diagnoses: Chronic viral hepatitis B without delta agent and without coma (H)      hydrOXYzine (ATARAX) 25 MG tablet Take 1 tablet (25 mg) by mouth 3 times daily as needed for itching  Qty: 90 tablet, Refills: 3    Associated Diagnoses: Pruritic disorder      oxyCODONE (ROXICODONE) 5 MG tablet Take 1 tablet (5 mg) by mouth daily as needed for severe pain  Qty: 30 tablet, Refills: 0    Associated Diagnoses: 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      pantoprazole (PROTONIX) 40 MG EC tablet Take 1 tablet (40 mg) by mouth daily  Qty: 30 tablet, Refills: 11    Associated Diagnoses: Cirrhosis of liver with ascites, unspecified hepatic cirrhosis type (H)      senna-docusate (SENOKOT-S/PERICOLACE) 8.6-50 MG tablet Take 1 tablet by mouth daily as needed for constipation  Qty: 30 tablet, Refills: 11    Associated Diagnoses: Constipation, unspecified constipation type      zolpidem (AMBIEN) 5 MG tablet Take 1 tablet (5 mg) by mouth nightly as needed for sleep  Qty: 10 tablet, Refills: 5    Associated Diagnoses: Insomnia due to medical condition      [employer] SALINE NASAL SPRAY 0.65 % nasal spray Spray 1 spray in nostril daily as needed      order for DME Equipment being ordered: Other: blood pressure monitor  Treatment Diagnosis: hypertension  Qty: 1 each, Refills: 0    Associated Diagnoses: Descending thoracic aortic dissection (H); Essential hypertension           Allergies   Allergies   Allergen Reactions     Nka  [No Known Allergies]

## 2022-04-11 NOTE — PLAN OF CARE
Problem: Renal Function Impairment (Chronic Kidney Disease)  Goal: Minimize Renal Failure Effects  Intervention: Monitor and Support Renal Function  Recent Flowsheet Documentation  Taken 4/10/2022 1626 by Mathew Dawson, RN  Medication Review/Management: medications reviewed     Problem: Gas Exchange Impaired  Goal: Optimal Gas Exchange  Outcome: Ongoing, Not Progressing     Problem: Anemia  Goal: Anemia Symptom Improvement  Intervention: Monitor and Manage Anemia  Recent Flowsheet Documentation  Taken 4/10/2022 1626 by Mathew Dawson, RN  Safety Promotion/Fall Prevention: clutter free environment maintained   Goal Outcome Evaluation:      Patient A and O times 4. Patient Chest tube on rt side is intact. Now on water seal. Dressing CDI.Patient drainage from chest tube was 30 ml dark drainage.  Patient  Refused HS clonidine. /69. Will monitor.   Patient requested hydroxyzine for itching, tylenol for moderate pain, and senna for constipation. PRNs given. Results pending. .

## 2022-04-12 NOTE — PLAN OF CARE
Problem: Plan of Care - These are the overarching goals to be used throughout the patient stay.    Goal: Plan of Care Review/Shift Note  Description: The Plan of Care Review/Shift note should be completed every shift.  The Outcome Evaluation is a brief statement about your assessment that the patient is improving, declining, or no change.  This information will be displayed automatically on your shift note.  Outcome: Ongoing, Progressing   Goal Outcome Evaluation:  Temp 99.2 this am-and then 98.1-patient is alert and oriented-tolerated renal diet-anxious to go home. Dressing dry and intact to right lower back/ribs.

## 2022-04-12 NOTE — PROGRESS NOTES
[facility]    Medicine Progress Note - Hospitalist Service    Date of Admission:  3/30/2022    Assessment & Plan          Elle Blanton is a 61 year old male with past medical history significant for thoracic aortic dissection, hepatitis B, hepatorenal syndrome with ESRD, pleural effusion and normocytic anemia who was admitted on 3/30/2022 after being found to have a hgb of 6.2 at clinic.Chest tube placed 4/1 by IR for empyema (R). Multiple pseudoaneurysms of LUE, underwent fistulagram and stent placement on 4/4.  Pleural fluid + S. Aureus on 4/8.    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 1st and 3rd with patent blood flow. IR notes tiny pseudoaneurysm/graft break down at medial graft, which may benefit from stent. 4/4: Underwent multiple stent placements of pseudoaneurysms. Per IR, OK for HD.      Acute macrocytic anemia on chronic normocytic anemia of chronic disease  History of GI bleed  Thrombocytopenia  -He is on high-dose EPO and takes iron supplements outpatient  -Being worked up outpatient for hemolytic anemia due to undetectable haptoglobin, LDH elevation, and low reticulocyte count  -Primary care physician is placing a referral to heme onc to help address this  - Total 5 units PRBC since presentation.  -Will monitor and transfuse for hemoglobin less than 7.      Empyema  History of recurrent pleural effusions  Pleural fluid + S aureus  -Patient has had pleural effusions in the past that required chest tubes and multiple thoracenteses  -Patient last underwent thoracentesis on 3/23 and was unable to get much fluid off of the lung  -It has been recommended the patient undergo surgery for this, on 1/6/22 the patient followed up with Dr. Zurita and it was explained that the patient is not a surgical candidate due to his multiple  comorbidities and the chronicity of the trapped lung  -It was recommended to potentially get a thoracentesis Q8-12 weeks  -IR consulted for chest tube placed 4/1/2022  - Pulmonology consulted for chest tube management. Appreciate guidance. CTa CAP done yesterday which showed suspicious liver lesions. Palliative consulted. Goals of care are restorative at this time.   - Chest tube removal this AM  - Pleural fluid + S aureus. ID plan on 4 weeks of Keflex.  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 Maharaj recommended conservative treatment at this time.     ESRD on hemodialysis (MF) secondary to hepatorenal syndrome due to cirrhosis  -Nephrology consulted; HD M/F  -Outpatient he has only been getting dialysis twice a week but it has been recommended by his primary and Nephrology to increase this to 3 times weekly for better symptom management and possible improvement of the empyema      Goals of Care  - Palliative Care consulted  4/7 DNR/DNI and goals are restorative at this time. Continuing HD and current medical cares/treatments.     Diet: Room Service  Renal Diet (dialysis)  Snacks/Supplements Adult: Ensure Clear; Between Meals  Snacks/Supplements Adult: Magic Cup; Between Meals  Diet    DVT Prophylaxis: Pneumatic Compression Devices  Beltre Catheter: Not present  Central Lines: None  Cardiac Monitoring: None  Code Status: No CPR- Do NOT Intubate      Disposition Plan   Expected Discharge: 04/12/2022     Anticipated discharge location:  Awaiting care coordination huddle   The patient's care was discussed with Dr Juanito Scott MD  Hospitalist Service  [facility]  [facility]  Securely message with the [employer] Web Console (learn more here)  Text page via [employer] Paging/Directory         Clinically Significant Risk Factors Present on Admission                    ______________________________________________________________________    Interval History   - NAEON  - No chest pain or shortness of breath   - Tolerating diet without n/v.    Data reviewed today: I reviewed all medications, new labs and imaging results over the last 24 hours.     Physical Exam   Vital Signs: Temp: 98.1  F (36.7  C) Temp src: Oral BP: 108/65 Pulse: 79   Resp: 20 SpO2: 99 % O2 Device: None (Room air)    Weight: 135 lbs 0 oz  General Appearance:  sitting up in bed, sleeping but arousable to voice.  Respiratory: Slightly decreased lung sounds on the right, no wheezing, rales or rhonchi appreciated. CT with serosang output, no air leak with cough.    Cardiovascular: RRR, no murmurs appreciated  MUSC: no edema appreciated in the LUE or bilateral lower extremities  Skin: Jaundiced skin with bruises on arms and legs of various stages of healing    Data   Recent Labs   Lab 04/11/22  1849 04/11/22  0528 04/09/22  0456 04/08/22  0543 04/07/22  2133 04/07/22  1142   WBC 6.4 4.5 5.7 6.4  --  6.3   HGB 8.4* 7.3* 6.9* 7.2*   < > 6.3*   MCV 92 93 92 92  --  97   PLT 55* 53* 55* 43*  --  37*   INR  --   --  1.31*  --   --   --    NA  --  135* 135* 132*  --  134*   POTASSIUM  --  4.6 4.0 4.7  --  4.3   CHLORIDE  --  101 99 100  --  100   CO2  --  24 28 22  --  23   BUN  --  58* 31* 54*  --  48*   CR  --  8.15* 5.30* 7.90*  --  7.10*   ANIONGAP  --  10 8 10  --  11   TANO  --  7.5* 7.6* 7.3*  --  7.2*   GLC  --  94 87 90  --  95   ALBUMIN  --   --   --   --   --  2.4*   PROTTOTAL  --   --   --   --   --  6.1   BILITOTAL  --   --   --   --   --  0.6   ALKPHOS  --   --   --   --   --  94   ALT  --   --   --   --   --  <9   AST  --   --   --   --   --  15    < > = values in this interval not displayed.     No  results found for this or any previous visit (from the past 24 hour(s)).

## 2022-04-12 NOTE — PLAN OF CARE
Problem: Pain (Chronic Kidney Disease)  Goal: Acceptable Pain Control  Intervention: Prevent or Manage Pain  Recent Flowsheet Documentation  Taken 4/12/2022 0620 by Khushi Kunz, RN  Pain Management Interventions: medication (see MAR)  Taken 4/12/2022 0203 by Khushi Kunz, RN  Pain Management Interventions: medication (see MAR)  Pt c/o pain at chest tube site.  Requested Oxycodone which he received.  Pt slept and now this am pt is c/o headache and requested tylenol.     Problem: Infection  Goal: Absence of Infection Signs and Symptoms  Outcome: Ongoing, Progressing  Pt started shift with temp of 101.4.  Recheck was 98.8 without treatment.  Subsequent temps have been 99.2 and 98.6.  Continue to monitor.   Goal Outcome Evaluation:

## 2022-04-12 NOTE — PROGRESS NOTES
"PULMONARY MEDICINE FOLLOW UP  4/9/2022     Admit Date: 3/30/2022  CODE: No CPR- Do NOT Intubate    Reason for Consult: right pleural effusion    Assessment/Plan:   61 year old male with a history of HBV, cirrhosis, ESKD on HD (only Monday and Friday as patient declines thrice-weekly dialysis), hypoalbuminemia, coagulopathy, thrombocytopenia, chronic anemia, chronic thoracic aortic dissection, recurrent right pleural effusion, MSSA empyema that continues to grow MSSA,  with trapped right lung requiring prior chest tubes and thoracenteses.  He is here with another effusion.       The underlying cause of his pleural effusion is third spacing of fluid in the setting of his liver disease and end-stage kidney disease with infrequent dialysis sessions. .  Further complicated by possible transient bacteremia that may make him susceptible to these infections.    Fever that has now resolved after removal of chest tube--?transient sepsis from movement of remaining fluid.      Plan:  Reiterated to patient that some fluid will reaccumulate (he has trapped lung)  Ok from pulmonary standpoint to discharge  Antibiotics per ID (Ancef with HD as outpatient).   Discussed with Dr. Hooker.    HPI:   CCx: right pleural effusion, trapped right lung    Subjective: Never felt febrile, breathing stable.       Exam/Data:     Vitals  /65 (BP Location: Left arm)   Pulse 79   Temp 98.1  F (36.7  C) (Oral)   Resp 20   Ht 1.651 m (5' 5\")   Wt 61.2 kg (135 lb)   SpO2 99%   BMI 22.47 kg/m       I/O last 3 completed shifts:  In: 665 [P.O.:665]  Out: 2630 [Urine:600; Other:2000; Chest Tube:30]  Weight change:     EXAM:  /65 (BP Location: Left arm)   Pulse 79   Temp 98.1  F (36.7  C) (Oral)   Resp 20   Ht 1.651 m (5' 5\")   Wt 61.2 kg (135 lb)   SpO2 99%   BMI 22.47 kg/m      Intake/Output last 3 shifts:  I/O last 3 completed shifts:  In: 665 [P.O.:665]  Out: 2630 [Urine:600; Other:2000; Chest Tube:30]  Intake/Output this " shift:  No intake/output data recorded.    Physical Exam  Gen: Sitting on edge of bed, no distress  HEENT: NT, no HOMERO  CV: RRR, no m/g/r  Resp: decreased on right, clear on left.   Abd: soft, nontender, BS+  Skin: no rashes or lesions  Ext: no edema   Neuro: PERRL, nonfocal exam    ROS: A complete 10-system review of systems was obtained and is negative with the exception of what is noted in the HPI/Subjective.    Medications:       - MEDICATION INSTRUCTIONS -         sodium chloride 0.9%  250 mL Intravenous Once in dialysis/CRRT     sodium chloride 0.9%  300 mL Hemodialysis Machine Once     sodium chloride 0.9%  250 mL Intravenous Once in dialysis/CRRT     sodium chloride 0.9%  300 mL Hemodialysis Machine Once     calcium acetate  667 mg Oral TID w/meals     calcium carbonate (OS-TANO) 500 mg (elemental) tablet  1,000 mg Oral At Bedtime     carvedilol  25 mg Oral BID w/meals     cephALEXin  500 mg Oral Q12H CARL     cloNIDine  0.1 mg Oral At Bedtime     entecavir  0.5 mg Oral Q7 Days     gelatin absorbable  1 each Topical Once     multivitamin RENAL  1 tablet Oral Daily     - MEDICATION INSTRUCTIONS -   Does not apply Once     - MEDICATION INSTRUCTIONS -   Does not apply Once     pantoprazole  40 mg Oral Daily     sodium chloride (PF)  3 mL Intracatheter Q8H     vancomycin place rivera - receiving intermittent dosing  1 each Intravenous See Admin Instructions         DATA  All laboratory and radiology has been personally reviewed by myself today.  Recent Labs   Lab 04/11/22  1849   WBC 6.4   HGB 8.4*   HCT 26.1*   PLT 55*     Recent Labs   Lab 04/11/22  0528 04/09/22  0456 04/08/22  0543 04/07/22  1142   * 135* 132* 134*   CO2 24 28 22 23   BUN 58* 31* 54* 48*   ALKPHOS  --   --   --  94   ALT  --   --   --  <9   AST  --   --   --  15     IMAGING:   CT chest 4/7 - 1.  Stable [place] type B aortic dissection.  2.  Stable dilatation of the ascending aorta, aortic arch, descending aorta and abdominal aorta.  3.   Stable peripheral irregularity in the main pulmonary artery which is most consistent with a dissection.  4.  Stable size of a large loculated right hydropneumothorax with increased pneumothorax component. Right chest tube in place.  5.  Marked narrowing of the left subclavian vein proximal to a stent.  6.  The left kidney is opacified by the false lumen and is hypoenhancing which may represent ischemia or artifact due to timing of the contrast bolus.  7.  There are 2 indeterminant hepatic masses which may represent hepatocellular carcinoma. Recommend a contrast enhanced magnetic resonance imaging examination of the liver.

## 2022-04-12 NOTE — DISCHARGE SUMMARY
[facility]  Discharge Summary - Medicine & Pediatrics       Date of Admission:  3/30/2022  Date of Discharge:  4/12/2022  Discharging Provider: Henrietta Scott MD   Discharge Service: Hospitalist Service    Discharge Diagnoses   1. Empyema lung (H)    2. Anemia, unspecified type    3. Empyema (H)    4. Acquired dissection of pulmonary artery (H)    5. Thoracic aortic dissection (H)    6. ESRD (end stage renal disease) on dialysis (H)          Follow-ups Needed After Discharge   Follow-up Appointments     Follow-up and recommended labs and tests       Follow up with primary care provider, Jaswant Flores, within 7 days for   hospital follow- up.  No follow up labs or test are needed.             Unresulted Labs Ordered in the Past 30 Days of this Admission     Date and Time Order Name Status Description    4/11/2022  6:12 PM Blood Culture Wrist, Right Preliminary     4/11/2022  6:12 PM Blood Culture Hand, Right In process     4/7/2022  2:04 PM Cytology, non-gynecologic In process     4/7/2022  2:04 PM Fungal or Yeast Culture Routine Preliminary     4/7/2022  2:04 PM Acid-Fast Bacilli Culture and Stain In process       These results will be followed up by Infectious DIsease    Discharge Disposition   Discharged to home  Condition at discharge: Stable    Hospital Course   Elle Blanton was admitted on 3/30/2022 for right lung empyema requiring chest tube drainage and anemia of chronic disease (found to have Hgb 6.2 at clinic prior to admission).  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 1st and 3rd with patent blood flow. 4/4: Underwent multiple stent placements of pseudoaneurysms.      Acute macrocytic anemia on chronic normocytic anemia of chronic disease  History of GI bleed  Thrombocytopenia  He is on high-dose EPO and  takes iron supplements outpatient. Received total 5 units PRBC since presentation.     Empyema  History of recurrent pleural effusions  Pleural fluid + S aureus  Patient has had pleural effusions in the past that required chest tubes and multiple thoracenteses. It has been recommended the patient undergo surgery for this, on 1/6/22 the patient followed up with Dr. Zurita and it was explained that the patient is not a surgical candidate due to his multiple comorbidities and the chronicity of the trapped lung. IR consulted for chest tube placed 4/1/2022. Pulmonology consulted for chest tube management which was subsequently removed 4/11. Pleural fluid + S aureus. ID consulted. Vanc started 4/8. Did spike a temp of 102 on 4/11 and was kept overnight for monitoring prior to discharge. BCx NGTD and WBC WNL. D/W Dr Yan from ID.  Transition to oral keflex BID for control of staph empyema.  No further IV abx at this time.     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 Maharaj recommended conservative treatment at this time.     ESRD on hemodialysis (MF) secondary to hepatorenal syndrome due to cirrhosis  -Nephrology consulted; HD M/F     Goals of Care  Palliative Care consulted  4/7 DNR/DNI and goals are restorative at this time. Continuing HD and current medical cares/treatments.       Consultations This Hospital Stay   NEPHROLOGY IP CONSULT  CARDIOVASCULAR SURGERY IP CONSULT  CARDIOVASCULAR SURGERY IP CONSULT  PULMONARY IP CONSULT  PALLIATIVE CARE ADULT IP CONSULT  PHARMACY TO DOSE VANCO  INFECTIOUS DISEASES IP CONSULT    Code Status   No CPR- Do NOT Intubate       The patient was discussed with Dr. Juanito Scott MD  [place] [facility] [address] [place] [address]  Phone: [phone]  Fax:  [phone]  ______________________________________________________________________    Physical Exam   Vital Signs: Temp: 98.1  F (36.7  C) Temp src: Oral BP: 108/65 Pulse: 79   Resp: 20 SpO2: 99 % O2 Device: None (Room air)    Weight: 135 lbs 0 oz  General Appearance: Alert, awake, NAD  Respiratory: CTABL, chest tube site c/d/i.  Cardiovascular: RRR  GI: S/ND/NT  Skin: L fistula access site c/d/i  Neuro: AOx3       Primary Care Physician   Jaswant Flores    Discharge Orders      Reason for your hospital stay    Right lung infection requiring chest tube drainage.     Follow-up and recommended labs and tests     Follow up with primary care provider, Jaswant Flores, within 7 days for hospital follow- up.  No follow up labs or test are needed.     Activity    Your activity upon discharge: activity as tolerated     Diet    Follow this diet upon discharge: Orders Placed This Encounter      Room Service      Snacks/Supplements Adult: Ensure Clear; Between Meals      Snacks/Supplements Adult: Magic Cup; Between Meals      Renal Diet (dialysis)       Significant Results and Procedures   Results for orders placed or performed during the hospital encounter of 03/30/22   XR Chest Port 1 View    Narrative    EXAM: XR CHEST PORT 1 VIEW  LOCATION: [facility]  DATE/TIME: 3/30/2022 4:35 PM    INDICATION: Diminished breath sounds on right  COMPARISON: 02/20/2022      Impression    IMPRESSION: Since prior chest radiograph 02/20/2022 there has been increase in size of the right pleural effusion which is now marked. There is some associated mediastinal shift from right to left. Otherwise the chest is stable with again seen   significantly enlarged abdominal aortic arch which is partially calcified. The left lung shows no acute findings.   Chest CT w/o contrast    Narrative    EXAM: CT CHEST W/O CONTRAST  LOCATION: [facility]  DATE/TIME: 3/30/2022 6:07 PM    INDICATION: Abnormal x-ray.  Recurrent right pleural effusions.  COMPARISON: Chest x-ray 03/30/2022 and CT chest exam 11/14/2021  TECHNIQUE: CT chest without IV contrast. Multiplanar reformats were obtained. Dose reduction techniques were used.  CONTRAST: None.    FINDINGS:   LUNGS AND PLEURA: Large loculated appearing right pleural effusion, with scattered hyperdensities within the collection compatible with acute or subacute hemorrhage. Numerous small gas pockets within the collection likely related to recent thoracentesis.   Atelectasis involving the entire right lower lobe and most of the right upper lobe. Small left pleural effusion and minimal left basilar atelectasis.    MEDIASTINUM/AXILLAE: No aneurysmal dilatation involving the aortic arch measuring 6 cm in width. The descending thoracic aorta is also dilated measuring 5.6 x 5.4 cm. Calcified intimal flap suggesting chronic dissection just below the diaphragm as seen   previously. Plaque in the proximal right renal artery. No lymphadenopathy.     CORONARY ARTERY CALCIFICATION: Severe.    UPPER ABDOMEN: Cirrhotic changes of the liver. 4.3 x 3.7 cm right hepatic lobe mass and additional smaller adjacent hypodense lesions are unchanged. Small amount of adjacent ascites. Gallstones. Atherosclerotic disease abdominal aorta. Splenomegaly.    MUSCULOSKELETAL: Unremarkable.      Impression    IMPRESSION:   1.  Large complicated right pleural effusion with scattered hyperdensities suggesting hemorrhage. Atelectasis involving the entire right lower lobe and most of the right upper lobe.  2.  Small left pleural effusion and left basilar atelectasis.  3.  Aneurysmal dilatation involving the aortic arch and descending thoracic aorta, unchanged. There is also a chronic dissection with intimal flap upper abdominal aorta.  4.  Cirrhosis and splenomegaly. Small amount of ascites  .   CTA Chest with Contrast     Value    Radiologist flags (AA)     Right empyema. Aortic dissection. Pulmonary artery  dissection.    Narrative    EXAM: CTA CHEST WITH CONTRAST  LOCATION: [facility]  DATE/TIME: 3/30/2022 10:18 PM    INDICATION: Chest pain, nonspecific. Evaluate for aortic dissection.  COMPARISON: CT of the chest 11/09/2021 performed without IV contrast.    TECHNIQUE: Multiphase helical acquisition through the chest was performed including noncontrast, arterial phase, and delayed images before and after the administration of IV contrast. 2D and 3D reconstructions were performed by the CT technologist. Dose   reduction techniques were used.   CONTRAST: 75 mL Isovue 370.    FINDINGS:  CT ANGIOGRAM CHEST: CTA examination demonstrates a dissection with origin involving the anterior aspect of the thoracic aortic arch at the takeoff of the left subclavian artery. This extends into the upper abdominal aorta. The false lumen supplies the   left renal artery. The remainder of the major vessels are supplied by the true lumen. Greatest radial dimension of the thoracic aorta is at the posterior arch and this measures 5.7 cm in greatest radial dimension. The prior correlative CT 11/09/2021   shows no significant contrast within the vessels, however the calcification can be seen involving the aorta and the dissection is not changed significantly. There is no evidence for extravasation of contrast to suggest a rupture. The size of the thoracic   aorta is not changed significantly since 11/09/2021 CT examination. There is a dissection seen involving the left aspect of the main pulmonary artery which extends into the proximal aspect of the right pulmonary artery. This is typically associated   changes of chronic pulmonary arterial hypertension. I am not sure if this was present on the prior study given lack of IV contrast.    CHEST:  LUNGS AND PLEURA: There is a large loculated right pleural effusion which contains some heterogenous density and gas worrisome for empyema. There is a mild free component to the  right pleural effusion seen inferiorly. There is advanced atelectatic   changes seen of the right lung. There is a minimal free pleural effusion seen on the left.    MEDIASTINUM: No adenopathy.    CORONARY ARTERY CALCIFICATION: Moderate most pronounced in the left anterior descending.    UPPER ABDOMEN: There is a mild amount of ascites. There are changes of cholelithiasis with no evidence for cholecystitis. There are findings most typical for hemangioma and adjacent benign cysts seen in the superior aspect of the right lobe of liver.   There is cirrhotic configuration seen of the liver. The spleen is not entirely included on this study, but appears enlarged and there are some varicosities consistent with portal venous hypertension. The portal vein is patent.     MUSCULOSKELETAL: Mild scattered degenerative changes most marked in the spine.      Impression    IMPRESSION:  1.  Dissection extending from the anterior aspect of the thoracic aortic arch into the abdominal aorta.    2.  There is aneurysmal dilatation seen of the thoracic aorta most marked at the mid arch region which measures approximately 5.7 cm in greatest radial dimension. No active rupture is identified.    3.  The dissection and size of the thoracic aorta appears stable since prior CT 11/09/2021.    4.  There is a dissection seen involving the main pulmonary artery and the proximal aspect of the right pulmonary artery which is commonly associated with changes of chronic increased pulmonary arterial pressure.    5.  There are findings worrisome for a large empyema in the right hemithorax with associated significant atelectasis.    6.  There are small free appearing bilateral pleural effusions.    7.  There is mild abdominal ascites.    8.  Changes of cirrhosis in the liver with associated portal venous hypertension and splenomegaly.      [Critical Result: Right empyema. Aortic dissection. Pulmonary artery dissection.]    Finding was identified on  3/30/2022 10:59 PM.     Dr. Hartley was contacted by me on 3/30/2022 11:24 PM and verbalized understanding of the critical result.    IR Chest Tube Place Non Tunneled Right    Narrative    LOCATION: [facility]  DATE: 3/31/2022    PROCEDURE:  1. ULTRASOUND AND FLUOROSCOPICALLY GUIDED CHEST TUBE PLACEMENT  2. MODERATE SEDATION    INTERVENTIONAL RADIOLOGIST: Dixon Owens MD    INDICATION: Large right pleural effusion plan for image guided chest tube placement.    CONSENT: The risks, benefits and alternatives of the procedure were discussed with the patient or representative in detail. All questions were answered. Informed consent was given to proceed with the procedure.    MODERATE SEDATION: Versed 1.5 mg IV; Fentanyl 75 mcg IV. During the time out, immediately prior to the administration of medications, the patient was reassessed for adequacy to receive conscious sedation.  Under physician supervision, Versed and fentanyl   were administered for moderate sedation. Pulse oximetry, heart rate and blood pressure were continuously monitored by an independent trained observer. The physician spent 15 minutes of face-to-face sedation time with the patient.    CONTRAST: None.  ANTIBIOTICS: None.  ADDITIONAL MEDICATIONS: None.    FLUOROSCOPIC TIME: 0.5 minutes.  RADIATION DOSE: Air Kerma: 12 mGy.    COMPLICATIONS: No immediate complications.    UNIVERSAL PROTOCOL: The operative site was marked and any prior imaging was reviewed. Required items including blood products, implants, devices and special equipment was made available. Patient identity was confirmed either verbally, with demographic   information, hospital assigned identification or other identification markers. A timeout was performed immediately prior to the procedure.    STERILE BARRIER TECHNIQUE: Maximum sterile barrier technique was used. Cutaneous antisepsis was performed at the operative site with application of 2% chlorhexidine and  large sterile drape. Prior to the procedure, the  and assistant performed   hand hygiene and wore hat, mask, sterile gown, and sterile gloves during the entire procedure.    PROCEDURE:  Utilizing fluoroscopic imaging, a  image was obtained. The skin was anesthetized using 1% lidocaine. A [place] Heels needle/catheter combination was advanced under real-time ultrasound and fluoroscopic guidance into the right pleural space. A permanent image   was stored to the patient's medical record. A sterile probe cover and sterile gel were used. The needle was removed. Through the needle, a 0.035 inch guidewire was coiled within the pleural space. The catheter was removed. The tract was serially   dilated. Over the wire, a 18 Fr Thal Quick drainage catheter was placed. The catheter was then attached to a Pleur-Evac device. The catheter was secured to the skin utilizing a suture. A clean and sterile dressing was applied. The patient tolerated the   procedure well. A sample was sent for Gram stain and culture.    FINDINGS:   imaging demonstrated a complex large right hemothorax. Postplacement fluoroscopic imaging demonstrates good positioning of the drainage catheter.       Impression    IMPRESSION:    1.  Uncomplicated image guided placement of 18 Icelandic pleural drainage catheter into a large right hemothorax. Drainage catheter attached to Pleur-Evac.          US Upper Extremity Venous Duplex Left    Narrative    EXAM: US UPPER EXTREMITY VENOUS DUPLEX LEFT  LOCATION: [facility]  DATE/TIME: 4/1/2022 2:29 PM    INDICATION: swelling, Rule out DVT  COMPARISON: CTA chest 03/30/2022.  TECHNIQUE: Venous Duplex ultrasound of the left upper extremity with (when possible) and without compression, augmentation, and duplex. Color flow and spectral Doppler with waveform analysis performed.    FINDINGS: Ultrasound includes evaluation of the internal jugular vein, innominate vein, subclavian vein, axillary  vein, and brachial vein. The superficial cephalic and basilic veins were also evaluated where seen.     LEFT: No deep venous thrombosis. No superficial thrombophlebitis.       Impression    IMPRESSION:   1.  No deep venous thrombosis in the left upper extremity.   XR Chest Port 1 View    Narrative    EXAM: XR CHEST PORT 1 VIEW  LOCATION: [facility]  DATE/TIME: 4/1/2022 1:23 PM    INDICATION: R empyema s p chest tube  COMPARISON: 03/30/2022      Impression    IMPRESSION: Right sided chest tube in good position with moderate decrease in size of the right pleural effusion. Moderate loculated appearing fluid does remain along with some infiltrates and atelectasis right lung base.    Left lung is clear and expanded.   IR Dialysis Fistulogram Left    Narrative    [place] [facility]  LOCATION: [facility]  DATE: 4/2/2022    PROCEDURE: DIALYSIS ARTERIOVENOUS SHUNT EVALUATION, CENTRAL VENOUS ANGIOPLASTY AND STENTING OF THE LEFT BRACHIOCEPHALIC VEIN    INTERVENTIONAL RADIOLOGIST: August Torres MD.    INDICATION: 61-year-old male with end-stage renal disease. The patient is having prolonged bleeding at hemodialysis. On physical examination there is a surgically placed pursestring suture along the inflow segment of the left forearm loop graft.   Prominent left upper chest collaterals and left upper extremity swelling is noted. Emergent diagnostic fistulography is requested prior to reattempting hemodialysis.    CONSENT: The risks, benefits and alternatives of dialysis arteriovenous shunt evaluation with possible angioplasty, stent placement, thrombolysis and/or tunneled dialysis catheter placement were discussed with the patient  in detail. All questions were   answered. Informed consent was given to proceed with the procedure.    MODERATE SEDATION: Versed 1 mg IV; Fentanyl 100 mcg IV.  Under physician supervision, Versed and fentanyl were administered for moderate sedation. Pulse  oximetry, heart rate and blood pressure were continuously monitored by an independent trained   observer. The physician spent 40 minutes of face-to-face sedation time with the patient.    CONTRAST: 100 mL Visipaque 320.  ANTIBIOTICS: None.  ADDITIONAL MEDICATIONS: None.    FLUOROSCOPIC TIME: 8.6 minutes.  RADIATION DOSE: Air Kerma: 127 mGy.    COMPLICATIONS: No immediate complications.    STERILE BARRIER TECHNIQUE: Maximum sterile barrier technique was used. Cutaneous antisepsis was performed at the operative site with application of 2% chlorhexidine and large sterile drape. Prior to the procedure, the  and assistant performed   hand hygiene and wore hat, mask, sterile gown, and sterile gloves during the entire procedure.    PROCEDURE:    Access to the left upper arm arteriovenous shunt was achieved directed towards the venous anastomosis and conversion was made for a short 7 [place] vascular sheath.     Diagnostic fistulography was obtained from the arterial inflow through the right atrium.    A Kumpe catheter was then advanced into the left subclavian vein and repeat digital subtraction venography was obtained.    The catheter and a stiff Glidewire remains dilated through the severely stenotic/occluded right brachiocephalic vein and into the inferior vena cava.    Angioplasty was performed using an 8 mm balloon at the left brachiocephalic vein followed by repeat venography. Angioplasty was performed using a 12 mm balloon at the left brachiocephalic vein followed by repeat venography.     There was determined that stenting would be necessary. The sheath was exchanged for a 9 [place] vascular sheath. Primary stenting of the left brachiocephalic vein was performed with a 14 mm x 8 cm Abre self expandable bare-metal stent followed by repeat   angioplasty to 12 mm.    The sheath was removed and compression applied to the puncture site until hemostasis was achieved.     FINDINGS:  The initial diagnostic  fistulography reveals a patent left forearm brachiocephalic loop graft. The left brachiocephalic vein is occluded in the vicinity of the known intrathoracic aortic aneurysm with numerous bridging collaterals draining into the   contralateral internal jugular and subclavian veins.    Additionally there is a small area of the graft irregularity with contrast extravasation from the graft material along the inflow segment of the graft at the site of the surgically placed pursestring suture. This functionally represents a small   pseudoaneurysm measuring approximately 26 x 8 mm.    Images obtained following the 8 and 12 mm angioplasty of the left brachiocephalic vein shows slight interval improvement however significant collaterals remain. This may be secondary to the large aneurysm as opposed to a true stenosis.    Images obtained following primary stenting of the left brachiocephalic vein with a 14 mm stent show a significantly improved angiographic appearance with resolution of the collaterals and brisk flow through the segment.      Impression    IMPRESSION:    1.  Diagnostic fistulography reveals occlusion of the left brachiocephalic vein with a exuberant collaterals. This is in the region of the large thoracic aortic aneurysm which may be exerting extrinsic compression causing the occlusion. This was treated   with stenting with an excellent result, as detailed above.  2.  Small focal pseudoaneurysm associated with the graft defect within the inflow segment of the left forearm loop graft. The only stent graft available for review. Today was an 8 cm x 5 cm stent graft which would have realignment a significant portion   of the loop graft making dialysis access challenging. The plan will be to repeat an ultrasound tomorrow to see if this spontaneously resolves. If not we will obtain a shorter 2.5 cm stent graft for endovascular repair.       US Ext Arterial Venous Dialys [facility]  [facility]  ULTRASOUND EXTREMITY ARTERIAL VENOUS DIALYSIS ACCESS GRAFT  04/03/2022, 10:48 AM    INDICATION: Patient with left forearm loop graft. Patient had  fistulogram performed the day prior on 04/02/2022. Request made for  evaluation of patency and possibility of pseudoaneurysm.    TECHNIQUE: Color Doppler and spectral waveform analysis obtained  throughout the loop graft as well as inflow artery and outflow venous  system.    FINDINGS: The loop graft is patent. Inflow brachial artery is patent  with velocity of 184 cm/s. The arterial anastomosis is widely patent  with velocity of 391 cm/s. Velocities within the graft range from  207-323 cm/s. Venous anastomosis is 403 cm/s, with minimal stenosis,  though not felt to be hemodynamically significant.    In the medial aspect of the graft is a small pseudoaneurysm measuring  0.4 x 0.6 x 0.6 cm, found to have blood flow within.    A second pseudoaneurysm is 0.5 x 0.7 x 1 cm, blood flow not clearly  assessed.    Third pseudoaneurysm is 0.4 x 0.5 x 0.8 cm, also seemingly patent.      Impression    IMPRESSION:  1.  Patent left upper extremity loop forearm graft. No obvious  stenosis at the venous or arterial anastomoses.  2.  Three separate and relatively small pseudoaneurysms identified  arising from the loop graft. The first and third are patent with blood  flow within the pseudoaneurysm. A second pseudoaneurysm not assessed  for blood flow. The first measures up to 6 mm, second measures up to  10 mm, and third measures up to 8 mm.    MIGUEL ÁNGEL JOSEPH MD         SYSTEM ID:  [id number]   IR Dialysis Fistulogram Left    Narrative    [place] [facility]  LOCATION: [facility]  DATE: 4/4/2022    PROCEDURE: LEFT ARM FISTULOGRAM AND PLACEMENT OF COVERED STENT GRAFT    INTERVENTIONAL RADIOLOGIST: Zbigniew Stephenson MD.    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    CONSENT: The risks, benefits and  alternatives of left arm fistulogram with possible intervention were discussed with the patient  in detail. All questions were answered. Informed consent was given to proceed with the procedure.    MODERATE SEDATION: Versed 1.5 mg IV; Fentanyl 50 mcg IV.  Under physician supervision, Versed and fentanyl were administered for moderate sedation. Pulse oximetry, heart rate and blood pressure were continuously monitored by an independent trained   observer. The physician spent 30 minutes of face-to-face sedation time with the patient.    CONTRAST: 25 cc of Omni  ANTIBIOTICS: None.  ADDITIONAL MEDICATIONS: None.    FLUOROSCOPIC TIME: 2.9 minutes.  RADIATION DOSE: Air Kerma: 8 mGy.    COMPLICATIONS: No immediate complications.    STERILE BARRIER TECHNIQUE: Maximum sterile barrier technique was used. Cutaneous antisepsis was performed at the operative site with application of 2% chlorhexidine and large sterile drape. Prior to the procedure, the  and assistant performed   hand hygiene and wore hat, mask, sterile gown, and sterile gloves during the entire procedure.    PROCEDURE:    The left arm dialysis graft was accessed using a micropuncture needle towards the arterial limb of the graft and eventually a 8 Welsh sheath was placed. Through the sheath over a Bentson wire a 5 Welsh KMP catheter was advanced into the arterial limb   of the graft. A left arm dialysis fistulogram was performed. This revealed to moderate size pseudoaneurysms coming off near the apex of the graft and a small pseudoaneurysm coming off of the arterial limb of the graft. This was successfully treated with   overlapping 8 mm x 2.5 cm and 8 mm x 5 cm Viabahn covered stent grafts. The stent graft were then angioplastied to 8 mm. Final fistulogram demonstrates no residual active bleed or pseudoaneurysm.        Impression    IMPRESSION:    Left arm fistulogram reveals 3 pseudoaneurysms coming off of the arterial limb of the graft successfully  treated with placement of Viabahn covered stent grafts.  ____________________________________________________________________       XR Chest Port 1 View    Narrative    EXAM: XR CHEST PORT 1 VIEW  LOCATION: [facility]  DATE/TIME: 4/5/2022 4:59 AM    INDICATION: R empyema s p chest tube  COMPARISON: 04/01/2022. CT 03/30/2022.      Impression    IMPRESSION: Right chest tube in place with continued mild decrease in the right pleural effusion with mild complex effusion remaining. Some mild hydropneumothorax in the right lung base likely due to trapped lung right lower lobe.    New vascular stent across the left brachiocephalic vein place for 04/02/2022.   XR Chest Port 1 View    Narrative    EXAM: XR CHEST PORT 1 VIEW  LOCATION: [facility]  DATE/TIME: 4/7/2022 10:31 AM    INDICATION: right empyema pleural effusion with chest tube in place, now having new shortness of breath.  COMPARISON: 04/05/2022      Impression    IMPRESSION: Similar right chest tube. Persistent but slightly decreased partially loculated right effusion and associated atelectasis. No pneumothorax. Left lung is clear. Heart size is stable. Similar endovascular stent over the expected brachiocephalic   vein.   CTA Chest Abdomen Pelvis w Contrast     Value    Radiologist flags Hepatic masses    Narrative    EXAM: CTA CHEST ABDOMEN PELVIS W CONTRAST  LOCATION: [facility]  DATE/TIME: 4/7/2022 6:00 PM    INDICATION: Right chest pain. Complex right pleural effusion. Aortic and pulmonary artery dissection.  COMPARISON: 3/30/2022  TECHNIQUE: CT angiogram chest abdomen pelvis during arterial phase of injection of IV contrast. 2D and 3D MIP reconstructions were performed by the CT technologist. Dose reduction techniques were used.   CONTRAST: [id number] 100ml    FINDINGS:   CT ANGIOGRAM CHEST, ABDOMEN, AND PELVIS: No significant interval change in a Panfilo type B aortic dissection  extending from the aortic arch through the aortic bifurcation. The celiac axis and superior mesenteric artery and right renal artery are   opacified by the true lumen. The left renal artery is opacified by the false lumen. The inferior mesenteric artery is opacified by the false lumen. The dissection extends a short distance into the left common iliac artery. The iliac arteries are   otherwise normal. No evidence of rupture. 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 An infrarenal abdominal aortic artery aneurysm measuring 3.7 x 3.5 cm. The ascending aorta is   stable and dilated at 4.0 cm. A stent within the left brachiocephalic vein. Immediately proximal to the stent is marked narrowing of the left subclavian vein between the right clavicle and first rib, image 10:6. Stable peripheral irregularity of the main   pulmonary artery extending into the proximal right pulmonary artery, image 46:6.    LUNGS AND PLEURA: Loculated large right hydropneumothorax is similar in size to prior with an increased pneumothorax component. A stable hyperdense component posteriorly within the pleural collection is consistent with a hematoma. Right chest tube in   place. Atelectasis involving the majority of the right lung. Small layering left pleural effusion.    MEDIASTINUM/AXILLAE: No lymphadenopathy. Trace pericardial fluid.    CORONARY ARTERY CALCIFICATION: Moderate.    HEPATOBILIARY: An indeterminant 4.3 x 3.9 cm mass in segment 8 of the with a peripheral nodular enhancing component, image 87:6. An indeterminate 2.2 x 2.2 cm hypodensity in segment 8, image 84:6. Nodular hepatic contour consistent with   cirrhosis/fibrosis. Cholelithiasis. No biliary ductal dilatation.    PANCREAS: Normal.    SPLEEN: Stable splenomegaly measuring 17.5 cm in maximal dimension.    ADRENAL GLANDS: Normal.    KIDNEYS/BLADDER: Atrophic kidneys. Hypoenhancing left kidney.  Subcentimeter renal hypodensities which are too small to characterize. No hydronephrosis.    BOWEL: No obstruction or inflammatory change.    LYMPH NODES: Normal.    PELVIC ORGANS: Small volume ascites.    MUSCULOSKELETAL: Degenerative changes of the spine and hips.      Impression    IMPRESSION:  1.  Stable Panfilo type B aortic dissection.  2.  Stable dilatation of the ascending aorta, aortic arch, descending aorta and abdominal aorta.  3.  Stable peripheral irregularity in the main pulmonary artery which is most consistent with a dissection.  4.  Stable size of a large loculated right hydropneumothorax with increased pneumothorax component. Right chest tube in place.  5.  Marked narrowing of the left subclavian vein proximal to a stent.  6.  The left kidney is opacified by the false lumen and is hypoenhancing which may represent ischemia or artifact due to timing of the contrast bolus.  7.  There are 2 indeterminant hepatic masses which may represent hepatocellular carcinoma. Recommend a contrast enhanced magnetic resonance imaging examination of the liver.      [Recommend Follow Up: Hepatic masses]    This report will be copied to the [facility] to ensure a provider acknowledges the finding.              Discharge Medications   Current Discharge Medication List      START taking these medications    Details   cephALEXin (KEFLEX) 500 MG capsule Take 1 capsule (500 mg) by mouth in the morning and 1 capsule (500 mg) in the evening.  Qty: 120 capsule, Refills: 1    Comments: On dialysis days, take AM dose after dialysis.  Associated Diagnoses: Empyema lung (H)         CONTINUE these medications which have NOT CHANGED    Details   acetaminophen (TYLENOL) 500 MG tablet Take 500 mg by mouth every 6 hours as needed for mild pain      calcium acetate (PHOSLO) 667 MG CAPS capsule Take 1 capsule by mouth 3 times daily (with meals)      carvedilol (COREG) 25 MG tablet Take 25-50 mg by mouth 2 times daily as  needed       cloNIDine (CATAPRES) 0.1 MG tablet Take 0.1 mg by mouth At Bedtime       diltiazem ER (DILT-XR) 180 MG 24 hr capsule Take 1 capsule (180 mg) by mouth 2 times daily  Qty: 60 capsule, Refills: 11    Associated Diagnoses: Essential hypertension      entecavir (BARACLUDE) 0.5 MG tablet Take 1 tablet (0.5 mg) by mouth every 7 days  Qty: 52 tablet, Refills: 1    Associated Diagnoses: Chronic viral hepatitis B without delta agent and without coma (H)      hydrOXYzine (ATARAX) 25 MG tablet Take 1 tablet (25 mg) by mouth 3 times daily as needed for itching  Qty: 90 tablet, Refills: 3    Associated Diagnoses: Pruritic disorder      oxyCODONE (ROXICODONE) 5 MG tablet Take 1 tablet (5 mg) by mouth daily as needed for severe pain  Qty: 30 tablet, Refills: 0    Associated Diagnoses: 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      pantoprazole (PROTONIX) 40 MG EC tablet Take 1 tablet (40 mg) by mouth daily  Qty: 30 tablet, Refills: 11    Associated Diagnoses: Cirrhosis of liver with ascites, unspecified hepatic cirrhosis type (H)      senna-docusate (SENOKOT-S/PERICOLACE) 8.6-50 MG tablet Take 1 tablet by mouth daily as needed for constipation  Qty: 30 tablet, Refills: 11    Associated Diagnoses: Constipation, unspecified constipation type      zolpidem (AMBIEN) 5 MG tablet Take 1 tablet (5 mg) by mouth nightly as needed for sleep  Qty: 10 tablet, Refills: 5    Associated Diagnoses: Insomnia due to medical condition      [employer] SALINE NASAL SPRAY 0.65 % nasal spray Spray 1 spray in nostril daily as needed      order for DME Equipment being ordered: Other: blood pressure monitor  Treatment Diagnosis: hypertension  Qty: 1 each, Refills: 0    Associated Diagnoses: Descending thoracic aortic dissection (H); Essential hypertension           Allergies   Allergies   Allergen Reactions     Nka [No Known Allergies]

## 2022-04-12 NOTE — PROGRESS NOTES
[facility]  [facility]   Follow up    Elle Blanton   MRN: [id number]  : 1960   DOA: 3/30/2022   CC: ESRD      Assessment and Plan:  61 year old male  1. ESRD:  On HD Q M and F; pt has been recommended to run Q MWF for volume and electrolyte control and he refuses;  next HD on Friday planned  2. Access: recent bleeding from his AVG. Dr. Cosby and team placed suture urgently on 22. S/p fistulogram with IR and underwent stenting. Had placement of covered stent graft across of pseudoaneurysm  3.  Anemia; on chronic epo  4. Secondary hyperpara; on binders  5. H/o Septic L olecranon bursitis  6. Chronic hepatitis B cirrhosis  7. HTN; on chronic meds  8. Chronic right effusion with trapped lung: felt likely third spacing of fluid in setting of liver disease and ESKD. S/p multiple chest tube placements, right pigtail in place, deemed non-surgical candidate for decortication due to comorbidities  9. AAA with chronic type B dissection: seen by cardiothoracic surgery, no acute surgical intervention needed  10. Hepatic masses on CT: may represent hepatocellular carcinoma    Subjective  Pt chart reviewed; still does not want to entertain doing dialysis 3x/week  Feels ok without nausea and no edema; breathing ok  Objective    Vital signs in last 24 hours  Temp:  [98.1  F (36.7  C)-102.1  F (38.9  C)] 98.1  F (36.7  C)  Pulse:  [] 79  Resp:  [16-20] 20  BP: ()/() 108/65  SpO2:  [96 %-99 %] 99 %      Intake/Output last 3 shifts  I/O last 3 completed shifts:  In: 665 [P.O.:665]  Out: 2630 [Urine:600; Other:2000; Chest Tube:30]  Intake/Output this shift:  I/O this shift:  In: 250 [P.O.:250]  Out: -     Physical Exam  Alert/oriented x 3, awake, NAD  CV: RRR without murmur or rub  Lung: clear and equal; no extra sounds  Ab: soft and NT; not distended; normal bs  Ext: no edema and well perfused  Skin; no rash    Pertinent Labs     Last Renal  Panel:  Sodium   Date Value Ref Range Status   04/11/2022 135 (L) 136 - 145 mmol/L Final   04/29/2021 134 (L) 136 - 145 mmol/L Final     Potassium   Date Value Ref Range Status   04/11/2022 4.6 3.5 - 5.0 mmol/L Final   04/29/2021 4.7 3.5 - 5.0 mmol/L Final     Chloride   Date Value Ref Range Status   04/11/2022 101 98 - 107 mmol/L Final   04/29/2021 99 98 - 107 mmol/L Final     Carbon Dioxide   Date Value Ref Range Status   12/30/2020 21.0 20.0 - 32.0 mmol/L Final     Carbon Dioxide (CO2)   Date Value Ref Range Status   04/11/2022 24 22 - 31 mmol/L Final     Anion Gap   Date Value Ref Range Status   04/11/2022 10 5 - 18 mmol/L Final   08/10/2019 6 3 - 14 mmol/L Final     Glucose   Date Value Ref Range Status   04/11/2022 94 70 - 125 mg/dL Final   04/29/2021 111 70 - 125 mg/dL Final     Urea Nitrogen   Date Value Ref Range Status   04/11/2022 58 (H) 8 - 22 mg/dL Final   04/29/2021 71 (H) 8 - 22 mg/dL Final     Creatinine   Date Value Ref Range Status   04/11/2022 8.15 (HH) 0.70 - 1.30 mg/dL Final   04/29/2021 5.13 (H) 0.70 - 1.30 mg/dL Final     GFR Estimate   Date Value Ref Range Status   04/11/2022 7 (L) >60 mL/min/1.73m2 Final     Comment:     Effective December 21, 2021 eGFRcr in adults is calculated using the 2021 CKD-EPI creatinine equation which includes age and gender (Moncho et al., NEJM, DOI: 10.1056/JNRUee1279596)   04/29/2021 12 (L) >60 mL/min/1.73m2 Final     Calcium   Date Value Ref Range Status   04/11/2022 7.5 (L) 8.5 - 10.5 mg/dL Final   04/29/2021 7.9 (L) 8.5 - 10.5 mg/dL Final     Phosphorus   Date Value Ref Range Status   04/03/2022 3.2 2.5 - 4.5 mg/dL Final   08/10/2019 3.4 2.5 - 4.5 mg/dL Final     Albumin   Date Value Ref Range Status   04/07/2022 2.4 (L) 3.5 - 5.0 g/dL Final   12/30/2020 2.9 (L) 3.2 - 4.5 g/dL Final     Recent Labs   Lab 04/11/22  1849 04/11/22  0528 04/09/22  0456 04/08/22  0543 04/07/22  2133   HGB 8.4* 7.3* 6.9* 7.2* 7.2*        UA RESULTS:  Recent Labs   Lab Test  08/19/20  1701   COLOR Yellow   APPEARANCE Clear   URINEGLC Negative   URINEBILI Negative   URINEKETONE Negative   SG 1.007   UBLD Trace*   URINEPH 6.0   PROTEIN Negative   UROBILINOGEN <2.0 E.U./dL   NITRITE Negative   LEUKEST Negative   RBCU 0-2   WBCU 0-5            I reviewed all lab results  Juliana Olivas MD

## 2022-04-15 NOTE — TELEPHONE ENCOUNTER
[facility] phone call message- general phone call:    Reason for call: Tearsa call for an update on patient    Patient was hospitalize on 03/31/2022 - 04/12/2022 at [facility].    Right lung, empyema, Required chest tube.   Pulmonary artesy dissection.     Call and spoke to Patient and patient advised doing okay, follow up appointment with Dr. Flores 04/24/2022.   Natalie will be working with patient and will be following up with plan of care. If any questions, please do call, okay to leave detail message.     Return call needed: No    OK to leave a message on voice mail? Yes    Primary language: English      needed? No    Call taken on April 15, 2022 at 11:25 AM by Ana Segura

## 2022-04-19 NOTE — PROGRESS NOTES
ASSESSMENT/PLAN:    (K74.60,  R18.8) Cirrhosis of liver with ascites, unspecified hepatic cirrhosis type (H)  (primary encounter diagnosis)  Comment: abdomen is quite distended today; will refer for therapeutic paracentesis; he deferred liver labs today; precautions given; given his ESRD, will discuss diuretic options (spironolactone/ lasix) w/ his nephrologist  Plan: US Paracentesis          (N18.6,  Z99.2) ESRD (end stage renal disease) on dialysis (H)  Comment:   Plan: cont M/F dialysis; his arm is significantly better so I will continue to encourage more frequent dialysis as this was the rate-limiting step    (J86.9) Empyema lung (H)  Comment:   Plan: stable/ improved; cont Abx prophylaxis    (N18.4,  D63.1) Anemia due to stage 4 chronic kidney disease (H)  Comment:   Plan: stable Hgb at dialysis earlier this week; cont Epo and Fe tx    Plan for follow-up in 4wks    >30 min was spent on the day of visit in review of records, direct patient care, documentation, and care coordination.     Jaswant Flores MD  April 27, 2022  9:51 AM        Pt is a 61 year old male last seen on 3/30/22 here today for:     1) ESRD - now L arm is no longer swollen; was present x 1 yr  No pain now!  Wife suspects that swelling in arm may have contributed to septic olecranon bursitis  He notes that dialysis is running much better  No bleeding issue now   Plans to stay w/ dialysis 2x/week    2) Anemia - last Hgb check at dialysis was over 8 just two days ago  Does note he is fatigued  Sleep is ok  No CP    3) Empyema - breathing is stable  Is on Keflex bid for prophylaxis      4) Cirrhosis - is more distended         Per discharge summary:  [facility]  Discharge Summary - Medicine & Pediatrics       Date of Admission:  3/30/2022  Date of Discharge:  4/12/2022  Discharging Provider: Henrietta Scott MD   Discharge Service: Hospitalist Service        Discharge Diagnoses     1. Empyema lung (H)   2. Anemia, unspecified  type   3. Empyema (H)   4. Acquired dissection of pulmonary artery (H)   5. Thoracic aortic dissection (H)   6. ESRD (end stage renal disease) on dialysis (H)       Follow-ups Needed After Discharge     Follow-up Appointments     Follow-up and recommended labs and tests       Follow up with primary care provider, Jaswant Flores, within 7 days for   hospital follow- up.  No follow up labs or test are needed.       Hospital Course     Elle Blanton was admitted on 3/30/2022 for right lung empyema requiring chest tube drainage and anemia of chronic disease (found to have Hgb 6.2 at clinic prior to admission).  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 1st and 3rd with patent blood flow. 4/4: Underwent multiple stent placements of pseudoaneurysms.      Acute macrocytic anemia on chronic normocytic anemia of chronic disease  History of GI bleed  Thrombocytopenia  He is on high-dose EPO and takes iron supplements outpatient. Received total 5 units PRBC since presentation.     Empyema  History of recurrent pleural effusions  Pleural fluid + S aureus  Patient has had pleural effusions in the past that required chest tubes and multiple thoracenteses. It has been recommended the patient undergo surgery for this, on 1/6/22 the patient followed up with Dr. Zurita and it was explained that the patient is not a surgical candidate due to his multiple comorbidities and the chronicity of the trapped lung. IR consulted for chest tube placed 4/1/2022. Pulmonology consulted for chest tube management which was subsequently removed 4/11. Pleural fluid + S aureus. ID consulted. Vanc started 4/8. Did spike a temp of 102 on 4/11 and was kept overnight for monitoring prior to discharge. BCx NGTD and WBC WNL. D/W Dr Yan from ID.  Transition to oral keflex BID for  control of staph empyema.  No further IV abx at this time.     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 Maharaj recommended conservative treatment at this time.     ESRD on hemodialysis (MF) secondary to hepatorenal syndrome due to cirrhosis  -Nephrology consulted; HD M/F     Goals of Care  Palliative Care consulted  4/7 DNR/DNI and goals are restorative at this time. Continuing HD and current medical cares/treatments.    Past Medical History:   Diagnosis Date     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 pylori infection 7/5/2017    UGI bleed implied by Hgb 9.0 6/22/17 and melena. Admitted and hgb decreased to 7.6, CT abdomen showed all bladder wall thickening. + Hep B surface antigen noted. Melena resolved and hgb stabalized without transfusion, epigastric pain resolved with PPI. Recommend referral for gastroscopy.     Heart attack (H)      Hepatitis B      Normocytic anemia      Other pancytopenia (H) 7/5/2017 6/24/17 Peripheral smear at [facility].Pancytopenia of uncertain cause. Ruled out acute leukemia. Hematologist suggests hemolytic anemia should be considered possible cause and LDH and haptoglobin should be assessed in future.      PONV (postoperative nausea and vomiting)      Thrombocytopenia (H)       Past Surgical History:   Procedure Laterality Date     ABCESS DRAINAGE      finger     BURSECTOMY ELBOW Left 10/15/2021    Procedure: LEFT OLECRANON BURSECTOMY;  Surgeon: Tico Myers MD;  Location: [facility] OR     BURSECTOMY ELBOW Left 1/18/2022     Procedure: LEFT ELBOW OLECRANON BURSECTOMY;  Surgeon: Tico Myers MD;  Location: [facility] Main OR     CREATE FISTULA ARTERIOVENOUS UPPER EXTREMITY Left 4/20/2021    Procedure: left arm dialysis graft placement;  Surgeon: Roxana Cosby MD;  Location: [facility] Main OR;  Service: General     FOREIGN BODY REMOVAL      finger     INCISION AND DRAINAGE FINGER, COMBINED Left 10/20/2016    Procedure: COMBINED INCISION AND DRAINAGE FINGER;  Surgeon: Mireya Pizano MD;  Location: [place] OR     INCISION AND DRAINAGE UPPER EXTREMITY, COMBINED Left 1/18/2022    Procedure: AND ELBOW INCISION AND DRAINAGE;  Surgeon: Tico Myers MD;  Location: [facility] Main OR     IR CHEST TUBE PLACEMENT NON-TUNNELED RIGHT  4/19/2021     IR CHEST TUBE PLACEMENT NON-TUNNELED RIGHT  10/19/2021     IR CHEST TUBE PLACEMENT NON-TUNNELED RIGHT  11/10/2021     IR CHEST TUBE PLACEMENT NON-TUNNELED RIGHT  3/31/2022     IR DIALYSIS FISTULOGRAM LEFT  4/2/2022     IR DIALYSIS FISTULOGRAM LEFT  4/4/2022     IR PLEURAL DRAINAGE WITH CATHETER INSERTION  4/19/2021     MIDLINE INSERTION - DOUBLE LUMEN  4/23/2021          CO ESOPHAGOGASTRODUODENOSCOPY TRANSORAL DIAGNOSTIC N/A 8/18/2020    Procedure: ESOPHAGOGASTRODUODENOSCOPY (EGD) with biopsy;  Surgeon: Nilson Gonzales MD;  Location: [facility] GI;  Service: Gastroenterology     US PARACENTESIS  8/14/2020     US PARACENTESIS  8/19/2020     US THORACENTESIS  4/18/2021      Current Outpatient Medications   Medication Sig Dispense Refill     acetaminophen (TYLENOL) 500 MG tablet Take 500 mg by mouth every 6 hours as needed for mild pain       calcium acetate (PHOSLO) 667 MG CAPS capsule Take 1 capsule by mouth 3 times daily (with meals)       carvedilol (COREG) 25 MG tablet Take 25-50 mg by mouth 2 times daily as needed        cephALEXin (KEFLEX) 500 MG capsule Take 1 capsule (500 mg) by mouth in the morning and 1 capsule (500 mg) in the evening. 120 capsule 1     cloNIDine (CATAPRES) 0.1  "MG tablet Take 0.1 mg by mouth At Bedtime        [employer] SALINE NASAL SPRAY 0.65 % nasal spray Spray 1 spray in nostril daily as needed (Patient not taking: Reported on 3/30/2022)       diltiazem ER (DILT-XR) 180 MG 24 hr capsule Take 1 capsule (180 mg) by mouth 2 times daily (Patient taking differently: Take 180 mg by mouth 2 times daily as needed (Checks BP prior to taking, if too low he does not take this medication) ) 60 capsule 11     entecavir (BARACLUDE) 0.5 MG tablet Take 1 tablet (0.5 mg) by mouth every 7 days 52 tablet 1     hydrOXYzine (ATARAX) 25 MG tablet Take 1 tablet (25 mg) by mouth 3 times daily as needed for itching 90 tablet 3     order for DME Equipment being ordered: Other: blood pressure monitor  Treatment Diagnosis: hypertension 1 each 0     oxyCODONE (ROXICODONE) 5 MG tablet Take 1 tablet (5 mg) by mouth daily as needed for severe pain 30 tablet 0     pantoprazole (PROTONIX) 40 MG EC tablet Take 1 tablet (40 mg) by mouth daily 30 tablet 11     senna-docusate (SENOKOT-S/PERICOLACE) 8.6-50 MG tablet Take 1 tablet by mouth daily as needed for constipation 30 tablet 11     zolpidem (AMBIEN) 5 MG tablet Take 1 tablet (5 mg) by mouth nightly as needed for sleep 10 tablet 5      Allergies   Allergen Reactions     Nka [No Known Allergies]         ROS:   Gen- no weight change, no fevers/chills   Cardiac - no palpitations, no chest pain   Respiratory - no shortness of breath , no wheezing   GI - no nausea, no vomiting, no diarrhea, no constipation; see HPI  Urinary - no dysuria, no polyuria     Remainder of ROS negative.     Exam:   /65 (BP Location: Right arm, Patient Position: Sitting, Cuff Size: Adult Regular)   Pulse 81   Temp 98  F (36.7  C) (Oral)   Resp 20   Ht 1.645 m (5' 4.75\")   Wt 60.3 kg (133 lb)   SpO2 96%   BMI 22.30 kg/m     Alert and oriented x 3; No acute distress   ABd: soft, + bowel sounds, distended w/ prominent fluid wave; tympanic to percussion; no " rebound/guarding  Extrem: no clubbing/cyanosis/edema   Derm: no rashes

## 2022-05-03 NOTE — PROGRESS NOTES
Preceptor Attestation:   Patient seen, evaluated and discussed with the resident. I have verified the content of the note, which accurately reflects my assessment of the patient and the plan of care.  Supervising Physician:Rosio Morales MD  [facility]

## 2022-05-03 NOTE — TELEPHONE ENCOUNTER
Called patient, left message with results of CT scan. Imaging of chest lump suggests hematoma vs phlegmon vs infection, but no abscess. No significant worsening of hemopneumothorax, at this time no urgent intervention necessary. Lab workup today showing no WBC count elevation, and at time of exam, pt reported no pain or discomfort.     Recommended to keep it protected, not to lexie/drain it. Recommend returning to hospital if fever, chills, increased pain, or pain. Will have team reach out to schedule follow up appointment in 1 week, at that time may consider referral to cardiothoracic surgery vs IR.     Will attempt to reach patient with results again.    Discussed results with Dakotah Glez MD, and Dr. Juanita MD over phone.    Steven Watkins DO  [facility] Medicine Resident PGY-2  Pager: [phone]

## 2022-05-03 NOTE — PROGRESS NOTES
Assessment and Plan   Elle Blanton is a 61 year old male with a hx of liver cirrhosis with ascites, ESRD on dialysis, anemia of chronic disease, hepatorenal syndrome, thoracic aortic dissection, pulmonary artery dissection, and empyema who presents with likely reaccumulation of empyema on right lateral chest wall vs. abscess near site of chest tube on right lateral chest wall. Given his recent history of empyema requiring chest tube placement during his hospitalization in 4/2022 and decreased breath sounds on the ipsilateral side, suspicion is highest for reaccumulation of empyema. He reports that he is still taking PO keflex BID for prophylaxis. It is somewhat surprising that he does not report SOB given he has very reduced air exchange in the entire right lung. However, abscess is also possible and is supported by a pimple just inferior to the mass. Imaging is necessary to characterize the mass and assess for need for likely incision and drainage. Recommend culture of possible fluid drainage if done. He has no signs of systemic infection at present and was instructed to go to ED immediately if he develops fever, chills, or other signs of infection.    - CTCAP   - Will schedule CT for today at [facility]. Patient instructed to present to ED pending imaging findings.  - CBC    Options for treatment and follow-up care were reviewed with the patient and/or guardian. Elle Blanton and/or guardian engaged in the decision making process and verbalized understanding of the options discussed and agreed with the final plan.    Clint Whitehead MS4    Resident/Fellow Attestation   I, Dawson Glez MD, was present with the medical/ANA student who participated in the service and in the documentation of the note.  I have verified the history and personally performed the physical exam and medical decision making.  I agree with the assessment and plan of care as documented in the note.      Dawson Recinos  Slim Glez MD  PGY3    Precepted patient with Dr. Rosio Morales.       HPI:   Elle Blanton is a 61 year old male who presents to clinic today for evaluation of a lump on his right lateral chest wall near his recently removed chest tube. 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 Reports he had been taking prophylactic PO keflex BID. Had no issues until he noticed a mildly painful lump just inferior to site of previous chest tube on right lateral chest wall. Denies SOB, chest pain, nausea, vomiting, diarrhea, night sweats, fever, or chills. Says his last paracentesis was about a week ago and he feels more distended today. He wants to know if he will need imaging today.          Review of Systems:     Complete ROS negative except as noted in HPI.         PMHX:   Active Problems List  Patient Active Problem List   Diagnosis     Melena     Normocytic anemia     Thrombocytopenia (H)     Other pancytopenia (H)     Chronic hepatitis B without hepatic coma (H)     Cirrhosis of liver without ascites (H)     Essential hypertension     CKD (chronic kidney disease) stage 4, GFR 15-29 ml/min (H)     Descending thoracic aortic dissection (H)     Anemia due to other bone marrow failure (H)     Thrombocytopenia due to hypersplenism     Iliopsoas muscle hematoma, left, initial encounter     Chronic dissection of thoracic aorta (H)     ESRD (end stage renal disease) on dialysis (H)     Anemia due to blood loss, acute     Hepatic cirrhosis, unspecified hepatic cirrhosis type, unspecified whether ascites present (H)     Septic bursitis     Pleural effusion     Empyema  lung (H)     Hydropneumothorax     Hyperkalemia     Gout     Lymphedema of left upper extremity     Epistaxis     Closed fracture of nasal bone, initial encounter     Fall, initial encounter     Anemia in end-stage renal disease (H)     Empyema (H)     Thoracic aortic dissection (H)     Anemia, unspecified type     Acquired dissection of pulmonary artery (H)     Active problem list reviewed and updated.    Current Medications  Current Outpatient Medications   Medication Sig Dispense Refill     acetaminophen (TYLENOL) 500 MG tablet Take 500 mg by mouth every 6 hours as needed for mild pain       calcium acetate (PHOSLO) 667 MG CAPS capsule Take 1 capsule by mouth 3 times daily (with meals)       carvedilol (COREG) 25 MG tablet Take 25-50 mg by mouth 2 times daily as needed        cephALEXin (KEFLEX) 500 MG capsule Take 1 capsule (500 mg) by mouth in the morning and 1 capsule (500 mg) in the evening. 120 capsule 1     cloNIDine (CATAPRES) 0.1 MG tablet Take 0.1 mg by mouth At Bedtime        [employer] SALINE NASAL SPRAY 0.65 % nasal spray Spray 1 spray in nostril daily as needed (Patient not taking: Reported on 3/30/2022)       diltiazem ER (DILT-XR) 180 MG 24 hr capsule Take 1 capsule (180 mg) by mouth 2 times daily (Patient taking differently: Take 180 mg by mouth 2 times daily as needed (Checks BP prior to taking, if too low he does not take this medication) ) 60 capsule 11     entecavir (BARACLUDE) 0.5 MG tablet Take 1 tablet (0.5 mg) by mouth every 7 days 52 tablet 1     hydrOXYzine (ATARAX) 25 MG tablet Take 1 tablet (25 mg) by mouth 3 times daily as needed for itching 90 tablet 3     order for DME Equipment being ordered: Other: blood pressure monitor  Treatment Diagnosis: hypertension 1 each 0     oxyCODONE (ROXICODONE) 5 MG tablet Take 1 tablet (5 mg) by mouth daily as needed for severe pain 30 tablet 0     pantoprazole (PROTONIX) 40 MG EC tablet Take 1 tablet (40 mg) by mouth daily 30 tablet 11     senna-docusate  (SENOKOT-S/PERICOLACE) 8.6-50 MG tablet Take 1 tablet by mouth daily as needed for constipation 30 tablet 11     zolpidem (AMBIEN) 5 MG tablet Take 1 tablet (5 mg) by mouth nightly as needed for sleep 10 tablet 5     Medication list reviewed and updated.    Social History  Social History     Tobacco Use     Smoking status: Never Smoker     Smokeless tobacco: Never Used   Vaping Use     Vaping Use: Never used   Substance Use Topics     Alcohol use: No     Drug use: No     History   Drug Use No       Family History  Family History   Problem Relation Age of Onset     Diabetes Father      Hypertension No family hx of      Asthma No family hx of      Cancer No family hx of      Coronary Artery Disease No family hx of      Liver Cancer No family hx of      Ulcers Father        Allergies  Allergies   Allergen Reactions     Nka [No Known Allergies]             Physical Exam:     Vitals:    05/03/22 1119   BP: 132/78   Pulse: 85   Resp: 16   SpO2: 99%   Weight: 61.1 kg (134 lb 12.8 oz)     Body mass index is 22.61 kg/m .    GENERAL APPEARANCE: jaundiced, alert, appears stated age, no acute distress.  HEENT: scleral icterus  RESP: decreased air exchange in all 3 fields of right lung, CTA on left lung  - no rales, rhonchi, or wheezes  CV: regular rate and rhythm, loud S1 and S2  ABDOMEN: soft, nontender, distended, no shifting dullness, no tenderness to palpation, bowel sounds present  MSK: 5-6 cm wide hard lump near site of chest tube on right lateral chest wall- there is no fluctuance, erythema, or warmth; extremities normal, no gross deformities noted, no lower extremity edema.  SKIN: no suspicious lesions or rashes.  NEURO: Normal strength and tone, mentation appears intact and speech normal.  PSYCH: mood and affect appropriate.

## 2022-05-04 NOTE — PROGRESS NOTES
[facility] Medical Oncology Consult Note    Patient: Elle Blanton  MRN: [id number]  Date of Service: 05/04/2022    Assessment / Impression   Normocytic anemia [D64.9] and Thrombocytopenia of at least 5 years' duration, with negative assessment for hemolysis and no findings indicative of hematologic malignancy, no obvious trend;  This is likely secondary to co-existing diseases and no additional evaluation is needed.      Cirrhosis, likely related to chronic hepatitis, with focal abnormalities that are not fully characterized (differential includes malignancy, MRI suggested).  I've discussed this with patient and wife, lesions appear little changed from last year so MRI to assess is low priority in context of other problems.    Empyema, despite recent chest tube, actively being managed, may require additional therapy with chest tube / antibiotics.    Multiple additional medical problems (ESRD, aneurysms, gout, without recent status change)    Cancer Staging  No matching staging information was found for the patient.          Plan:     He can have transfusions if needed for critically low platelets (<10) or thrombocytopenia with bleeding; or for critically low hemoglobin (<7 grams).      At some point, MRI of the liver would be appropriate to complete assessment of hepatic abnormalities, but this is lower priority than management of the empyema.       He does not need follow up by our clinic for hematologic abnormalities, but if found to have an hepatic neoplasm, we would be happy to see him.    Subjective:      HPI: Elle Blanton is a 61 year old male with a litany of severe medical problems including probable empyema, end stage kidney disease, cirrhosis, prior GI bleed, gout, heart disease and other issues per problem list.      His abnormal counts date at least from 2017.  A peripheral smear read by Naif Lloyd MD on 24 June 2017 showed:  Final Diagnosis   PERIPHERAL BLOOD SMEAR:       1) PANCYTOPENIA       2)  RARE SCHISTOCYTES AND MICROSPHEROCYTES ARE PRESENT       3) MILD ABSOLUTE NEUTROPENIA       4) NEUTROPHILS WITH SUBTLE MEGALOBLASTOID CHANGES       5) NEGATIVE FOR ACUTE LEUKEMIA       6) PLEASE SEE COMMENT   Electronically signed by Naif Lloyd MD on 6/26/2017 at  6:44 PM       Comment    Pancytopenia can be caused by diseases infiltrating the bone marrow, disorders involving the spleen, vitamin B12 or folate deficiency, autoimmune disorders, paroxysmal nocturnal hemoglobinuria, drugs, or overwhelming infection and aplastic anemias.  There are rare schistocytes and several microspherocytes identified, and the differential diagnosis should include hemolytic anemia with intravascular and extravascular components. Suggest correlation with serum haptoglobin and LDH for additional information.     Prior haptoglobin in 2021 was 50 (), discordant with significant hemolysis.        Chief Complaint   Patient presents with     Hematology     New consult         Current Outpatient Medications   Medication Sig Dispense Refill     acetaminophen (TYLENOL) 500 MG tablet Take 500 mg by mouth every 6 hours as needed for mild pain       calcium acetate (PHOSLO) 667 MG CAPS capsule Take 1 capsule by mouth 3 times daily (with meals)       carvedilol (COREG) 25 MG tablet Take 25-50 mg by mouth 2 times daily as needed        cephALEXin (KEFLEX) 500 MG capsule Take 1 capsule (500 mg) by mouth in the morning and 1 capsule (500 mg) in the evening. 120 capsule 1     cloNIDine (CATAPRES) 0.1 MG tablet Take 0.1 mg by mouth At Bedtime        diltiazem ER (DILT-XR) 180 MG 24 hr capsule Take 1 capsule (180 mg) by mouth 2 times daily (Patient taking differently: Take 180 mg by mouth 2 times daily as needed (Checks BP prior to taking, if too low he does not take this medication)) 60 capsule 11     entecavir (BARACLUDE) 0.5 MG tablet Take 1 tablet (0.5 mg) by mouth every 7 days 52 tablet 1     hydrOXYzine (ATARAX) 25 MG tablet Take 1  tablet (25 mg) by mouth 3 times daily as needed for itching 90 tablet 3     order for DME Equipment being ordered: Other: blood pressure monitor  Treatment Diagnosis: hypertension 1 each 0     pantoprazole (PROTONIX) 40 MG EC tablet Take 1 tablet (40 mg) by mouth daily 30 tablet 11     senna-docusate (SENOKOT-S/PERICOLACE) 8.6-50 MG tablet Take 1 tablet by mouth daily as needed for constipation 30 tablet 11     zolpidem (AMBIEN) 5 MG tablet Take 1 tablet (5 mg) by mouth nightly as needed for sleep 10 tablet 5     [employer] SALINE NASAL SPRAY 0.65 % nasal spray Spray 1 spray in nostril daily as needed (Patient not taking: No sig reported)       oxyCODONE (ROXICODONE) 5 MG tablet Take 1 tablet (5 mg) by mouth daily as needed for severe pain (Patient not taking: Reported on 5/4/2022) 30 tablet 0     Past Medical History:   Diagnosis Date     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 pylori infection 7/5/2017    UGI bleed implied by Hgb 9.0 6/22/17 and melena. Admitted and hgb decreased to 7.6, CT abdomen showed all bladder wall thickening. + Hep B surface antigen noted. Melena resolved and hgb stabalized without transfusion, epigastric pain resolved with PPI. Recommend referral for gastroscopy.     Heart attack (H)      Hepatitis B      Normocytic anemia      Other pancytopenia (H) 7/5/2017 6/24/17 Peripheral smear at [facility].Pancytopenia of uncertain cause. Ruled out acute leukemia. Hematologist suggests hemolytic anemia should be considered possible cause and LDH and haptoglobin should be assessed in future.      PONV (postoperative nausea and vomiting)      Thrombocytopenia (H)      Past Surgical History:   Procedure  Laterality Date     ABCESS DRAINAGE      finger     BURSECTOMY ELBOW Left 10/15/2021    Procedure: LEFT OLECRANON BURSECTOMY;  Surgeon: Tico Myers MD;  Location: [facility] Main OR     BURSECTOMY ELBOW Left 1/18/2022    Procedure: LEFT ELBOW OLECRANON BURSECTOMY;  Surgeon: Tico Myers MD;  Location: [facility] Main OR     CREATE FISTULA ARTERIOVENOUS UPPER EXTREMITY Left 4/20/2021    Procedure: left arm dialysis graft placement;  Surgeon: Roxana Cosby MD;  Location: [facility] Main OR;  Service: General     FOREIGN BODY REMOVAL      finger     INCISION AND DRAINAGE FINGER, COMBINED Left 10/20/2016    Procedure: COMBINED INCISION AND DRAINAGE FINGER;  Surgeon: Mireya Pizano MD;  Location: [place] OR     INCISION AND DRAINAGE UPPER EXTREMITY, COMBINED Left 1/18/2022    Procedure: AND ELBOW INCISION AND DRAINAGE;  Surgeon: Tico Myers MD;  Location: [facility] Main OR     IR CHEST TUBE PLACEMENT NON-TUNNELED RIGHT  4/19/2021     IR CHEST TUBE PLACEMENT NON-TUNNELED RIGHT  10/19/2021     IR CHEST TUBE PLACEMENT NON-TUNNELED RIGHT  11/10/2021     IR CHEST TUBE PLACEMENT NON-TUNNELED RIGHT  3/31/2022     IR DIALYSIS FISTULOGRAM LEFT  4/2/2022     IR DIALYSIS FISTULOGRAM LEFT  4/4/2022     IR PLEURAL DRAINAGE WITH CATHETER INSERTION  4/19/2021     MIDLINE INSERTION - DOUBLE LUMEN  4/23/2021          NH ESOPHAGOGASTRODUODENOSCOPY TRANSORAL DIAGNOSTIC N/A 8/18/2020    Procedure: ESOPHAGOGASTRODUODENOSCOPY (EGD) with biopsy;  Surgeon: Nilson Gonzales MD;  Location: [facility] GI;  Service: Gastroenterology     US PARACENTESIS  8/14/2020     US PARACENTESIS  8/19/2020     US THORACENTESIS  4/18/2021     Nka [no known allergies]  Family History   Problem Relation Age of Onset     Diabetes Father      Hypertension No family hx of      Asthma No family hx of      Cancer No family hx of      Coronary Artery Disease No family hx of      Liver Cancer No family hx of      Ulcers Father      Social  "History     Socioeconomic History     Marital status:      Spouse name: Not on file     Number of children: Not on file     Years of education: Not on file     Highest education level: Not on file   Occupational History     Not on file   Tobacco Use     Smoking status: Never Smoker     Smokeless tobacco: Never Used   Vaping Use     Vaping Use: Never used   Substance and Sexual Activity     Alcohol use: No     Drug use: No     Sexual activity: Yes     Partners: Female   Other Topics Concern     Parent/sibling w/ CABG, MI or angioplasty before 65F 55M? Not Asked   Social History Narrative     Not on file     Social Determinants of Health     Financial Resource Strain: Not on file   Food Insecurity: Not on file   Transportation Needs: Not on file   Physical Activity: Not on file   Stress: Not on file   Social Connections: Not on file   Intimate Partner Violence: Not on file   Housing Stability: Not on file        Review of Systems:      Review of Systems   Constitutional: Positive for fatigue (his body feels \"heavy\"). Negative for diaphoresis and fever.   HENT:   Positive for nosebleeds (related to broken nose).    Eyes: Negative.    Respiratory: Negative.    Cardiovascular: Negative.    Gastrointestinal: Negative.    Endocrine: Negative.    Genitourinary:         He does continue to make urine.  He has dialysis Mondays and Fridays   Musculoskeletal:        Most of his problems are in his right hand which is too stiff to close     Skin: Negative.    Neurological:        He fell at home in the past and broke his nose with prolonged epistaxis.  No recent falling, but does use a cane.   Hematological: Negative for adenopathy. Bruises/bleeds easily.   Psychiatric/Behavioral: Negative.    All other systems reviewed and are negative.       Pain              Pain Score: Moderate Pain (5)                     Accompanied by wife         Objective:     Physical Exam    Vitals:    05/04/22 1035   BP: (!) 151/98   Pulse: 79 " "  Resp: 16   SpO2: 98%   Weight: 60.8 kg (134 lb)   Height: 1.645 m (5' 4.75\")   PainSc: Moderate Pain (5)   PainLoc: Abdomen   Physical Exam  Vitals and nursing note reviewed. Exam conducted with a chaperone present.   Constitutional:       Appearance: He is normal weight. He is ill-appearing (chronically).   HENT:      Head: Normocephalic and atraumatic.      Nose: Nose normal.   Eyes:      Extraocular Movements: Extraocular movements intact.      Conjunctiva/sclera: Conjunctivae normal.      Pupils: Pupils are equal, round, and reactive to light.   Neck:      Comments: Head tilts to the right  Cardiovascular:      Rate and Rhythm: Normal rate and regular rhythm.      Heart sounds: Normal heart sounds.   Pulmonary:      Breath sounds: Examination of the right-upper field reveals decreased breath sounds. Examination of the right-middle field reveals decreased breath sounds. Examination of the right-lower field reveals decreased breath sounds. Decreased breath sounds present.      Comments: Dullness to percussion throughout right lung  Abdominal:      General: Abdomen is protuberant. Bowel sounds are normal.      Palpations: Abdomen is soft. There is no fluid wave or mass.      Tenderness: There is no guarding.   Musculoskeletal:      Right hand: Deformity present. Decreased range of motion.      Left hand: Decreased strength: tophi.      Cervical back: Normal range of motion. No tenderness.   Lymphadenopathy:      Cervical: No cervical adenopathy.   Skin:     General: Skin is warm.      Coloration: Skin is pale.   Neurological:      General: No focal deficit present.      Mental Status: He is alert and oriented to person, place, and time.      Gait: Gait abnormal (uses a cane).      Deep Tendon Reflexes: Reflexes normal.   Psychiatric:         Mood and Affect: Mood normal.         Behavior: Behavior normal.         Thought Content: Thought content normal.     Recent Results (from the past 720 hour(s))   Lactic Acid " STAT    Collection Time: 04/05/22  1:12 AM   Result Value Ref Range    Lactic Acid 2.3 (H) 0.7 - 2.0 mmol/L   CBC with platelets    Collection Time: 04/05/22  5:40 AM   Result Value Ref Range    WBC Count 4.3 4.0 - 11.0 10e3/uL    RBC Count 2.56 (L) 4.40 - 5.90 10e6/uL    Hemoglobin 7.7 (L) 13.3 - 17.7 g/dL    Hematocrit 23.4 (L) 40.0 - 53.0 %    MCV 91 78 - 100 fL    MCH 30.1 26.5 - 33.0 pg    MCHC 32.9 31.5 - 36.5 g/dL    RDW 17.3 (H) 10.0 - 15.0 %    Platelet Count 50 (L) 150 - 450 10e3/uL   Basic metabolic panel    Collection Time: 04/05/22  5:40 AM   Result Value Ref Range    Sodium 135 (L) 136 - 145 mmol/L    Potassium 3.7 3.5 - 5.0 mmol/L    Chloride 99 98 - 107 mmol/L    Carbon Dioxide (CO2) 25 22 - 31 mmol/L    Anion Gap 11 5 - 18 mmol/L    Urea Nitrogen 24 (H) 8 - 22 mg/dL    Creatinine 4.13 (H) 0.70 - 1.30 mg/dL    Calcium 7.6 (L) 8.5 - 10.5 mg/dL    Glucose 80 70 - 125 mg/dL    GFR Estimate 16 (L) >60 mL/min/1.73m2   CBC with platelets    Collection Time: 04/06/22  6:38 AM   Result Value Ref Range    WBC Count 5.0 4.0 - 11.0 10e3/uL    RBC Count 2.61 (L) 4.40 - 5.90 10e6/uL    Hemoglobin 7.8 (L) 13.3 - 17.7 g/dL    Hematocrit 23.9 (L) 40.0 - 53.0 %    MCV 92 78 - 100 fL    MCH 29.9 26.5 - 33.0 pg    MCHC 32.6 31.5 - 36.5 g/dL    RDW 17.2 (H) 10.0 - 15.0 %    Platelet Count 49 (LL) 150 - 450 10e3/uL   Basic metabolic panel    Collection Time: 04/06/22  6:38 AM   Result Value Ref Range    Sodium 134 (L) 136 - 145 mmol/L    Potassium 4.2 3.5 - 5.0 mmol/L    Chloride 100 98 - 107 mmol/L    Carbon Dioxide (CO2) 25 22 - 31 mmol/L    Anion Gap 9 5 - 18 mmol/L    Urea Nitrogen 35 (H) 8 - 22 mg/dL    Creatinine 5.68 (H) 0.70 - 1.30 mg/dL    Calcium 7.4 (L) 8.5 - 10.5 mg/dL    Glucose 83 70 - 125 mg/dL    GFR Estimate 11 (L) >60 mL/min/1.73m2   CBC with platelets    Collection Time: 04/07/22  6:37 AM   Result Value Ref Range    WBC Count 7.0 4.0 - 11.0 10e3/uL    RBC Count 2.37 (L) 4.40 - 5.90 10e6/uL     Hemoglobin 7.0 (L) 13.3 - 17.7 g/dL    Hematocrit 22.6 (L) 40.0 - 53.0 %    MCV 95 78 - 100 fL    MCH 29.5 26.5 - 33.0 pg    MCHC 31.0 (L) 31.5 - 36.5 g/dL    RDW 17.4 (H) 10.0 - 15.0 %    Platelet Count 40 (LL) 150 - 450 10e3/uL   Basic metabolic panel    Collection Time: 04/07/22  6:37 AM   Result Value Ref Range    Sodium 132 (L) 136 - 145 mmol/L    Potassium 4.2 3.5 - 5.0 mmol/L    Chloride 98 98 - 107 mmol/L    Carbon Dioxide (CO2) 22 22 - 31 mmol/L    Anion Gap 12 5 - 18 mmol/L    Urea Nitrogen 46 (H) 8 - 22 mg/dL    Creatinine 6.89 (HH) 0.70 - 1.30 mg/dL    Calcium 7.4 (L) 8.5 - 10.5 mg/dL    Glucose 100 70 - 125 mg/dL    GFR Estimate 8 (L) >60 mL/min/1.73m2   Adult Type and Screen    Collection Time: 04/07/22  6:37 AM   Result Value Ref Range    ABO/RH(D) A POS     Antibody Screen Negative Negative    SPECIMEN EXPIRATION DATE [id number]    Asymptomatic COVID-19 Virus (Coronavirus) by PCR Nasopharyngeal    Collection Time: 04/07/22  8:26 AM    Specimen: Nasopharyngeal; Swab   Result Value Ref Range    SARS CoV2 PCR Negative Negative   Prepare red blood cells (unit)    Collection Time: 04/07/22 11:30 AM   Result Value Ref Range    CROSSMATCH Compatible     UNIT ABO/RH A Pos     Unit Number [id number]     Unit Status Transfused     Blood Component Type Red Blood Cells     Product Code M4427E34     CODING SYSTEM [id number]     UNIT TYPE ISBT 6200     ISSUE DATE AND TIME [id number]    Comprehensive metabolic panel    Collection Time: 04/07/22 11:42 AM   Result Value Ref Range    Sodium 134 (L) 136 - 145 mmol/L    Potassium 4.3 3.5 - 5.0 mmol/L    Chloride 100 98 - 107 mmol/L    Carbon Dioxide (CO2) 23 22 - 31 mmol/L    Anion Gap 11 5 - 18 mmol/L    Urea Nitrogen 48 (H) 8 - 22 mg/dL    Creatinine 7.10 (HH) 0.70 - 1.30 mg/dL    Calcium 7.2 (L) 8.5 - 10.5 mg/dL    Glucose 95 70 - 125 mg/dL    Alkaline Phosphatase 94 45 - 120 U/L    AST 15 0 - 40 U/L    ALT <9 0 - 45 U/L    Protein Total 6.1 6.0 - 8.0 g/dL     Albumin 2.4 (L) 3.5 - 5.0 g/dL    Bilirubin Total 0.6 0.0 - 1.0 mg/dL    GFR Estimate 8 (L) >60 mL/min/1.73m2   CBC with platelets    Collection Time: 04/07/22 11:42 AM   Result Value Ref Range    WBC Count 6.3 4.0 - 11.0 10e3/uL    RBC Count 2.10 (L) 4.40 - 5.90 10e6/uL    Hemoglobin 6.3 (LL) 13.3 - 17.7 g/dL    Hematocrit 20.3 (L) 40.0 - 53.0 %    MCV 97 78 - 100 fL    MCH 30.0 26.5 - 33.0 pg    MCHC 31.0 (L) 31.5 - 36.5 g/dL    RDW 17.6 (H) 10.0 - 15.0 %    Platelet Count 37 (LL) 150 - 450 10e3/uL   Gram Stain    Collection Time: 04/07/22  3:04 PM    Specimen: Pleural Cavity, Right; Pleural fluid   Result Value Ref Range    Gram Stain Result 4+ Gram positive cocci in clusters (A)     Gram Stain Result 2+ WBC seen (A)    Protein fluid    Collection Time: 04/07/22  3:04 PM   Result Value Ref Range    Protein Fluid Source Pleural Cavity, Right     Protein Total Fluid 5.0 g/dL   Glucose fluid    Collection Time: 04/07/22  3:04 PM   Result Value Ref Range    Glucose Fluid Source Pleural Cavity, Right     Glucose fluid <5 mg/dL   Lactate dehydrogenase fluid    Collection Time: 04/07/22  3:04 PM   Result Value Ref Range    LD Fluid Source Pleural Cavity, Right     Lactate dehydrogenase fluid 1,089 U/L   Fungal or Yeast Culture Routine    Collection Time: 04/07/22  3:04 PM    Specimen: Pleural Cavity, Right; Pleural fluid   Result Value Ref Range    Culture No growth after 26 days    Pleural fluid Aerobic Bacterial Culture Routine    Collection Time: 04/07/22  3:04 PM    Specimen: Pleural Cavity, Right; Pleural fluid   Result Value Ref Range    Culture 4+ Staphylococcus aureus (A)        Susceptibility    Staphylococcus aureus - REGLA     Oxacillin* 0.5 Susceptible ug/mL      * Oxacillin susceptible isolates are susceptible to cephalosporins (example: cefazolin and cephalexin) and beta lactam combination agents. Oxacillin resistant isolates are resistant to these agents.     Gentamicin <=0.5 Susceptible ug/mL      Erythromycin <=0.25 Susceptible ug/mL     Clindamycin <=0.25 Susceptible ug/mL     Vancomycin 1.0 Susceptible ug/mL     Tetracycline <=1.0 Susceptible ug/mL     Trimethoprim/Sulfamethoxazole <=0.5/9.5 Susceptible ug/mL   Cytology, non-gynecologic    Collection Time: 04/07/22  3:04 PM   Result Value Ref Range    Final Diagnosis       Specimen A     Interpretation:      Negative for malignancy    RIGHT PLEURAL FLUID:     OVERTLY PURULENT EXUDATE, CONSISTENT WITH EMPYEMA     LARGE NUMBERS OF INTACT AND DEGENERATING RED BLOOD CELLS, CONSISTENT WITH RECENT HEMORRHAGE     NUMEROUS CLUSTERS OF COCCOID BACTERIA     NEGATIVE FOR ATYPICAL OR MALIGNANT CELLS     Adequacy:     Satisfactory for evaluation          Clinical Information       61 year old male with cirrhosis, ESKD, recurrent right pleural effusion      Gross Description       17 ml cloudy dark red fluid    1 air-dried slide    1 SurePath slide    1 cell block in formalin 1450, 04/08/22, are prepared      Microscopic Description       Material examined consists of cell block sections, one monolayer preparation and one smear preparation. These demonstrate large numbers of neutrophils, large numbers of intact and degenerating red blood cells, a moderate amount of amorphous proteinaceous debris, and numerous clusters of coccoid bacteria. Smaller numbers of lymphocytes and macrophages are in the background, with rare mesothelial cells. No atypical or malignant features are identified.       Performing Labs       The technical component of this testing was completed at [facility]     Cell Count Body Fluid    Collection Time: 04/07/22  3:04 PM   Result Value Ref Range    Color Red (A) Colorless, Yellow    Clarity Bloody Clear, Bloody    Total Nucleated Cells 4,116 /uL    RBC Count 1,166,000 /uL    Cell Count Fluid Source Pleural Cavity, Right    Acid-Fast Bacilli Culture and Stain    Collection Time: 04/07/22  3:04 PM    Specimen: Pleural  Cavity, Right; Pleural fluid   Result Value Ref Range    Acid Fast Stain      Acid Fast Stain      Acid Fast Stain      Acid Fast Stain     Differential Body Fluid    Collection Time: 04/07/22  3:04 PM   Result Value Ref Range    % Neutrophils 72 %    % Lymphocytes 5 %    % Monocyte/Macrophages 20 %    % Eosinophils 3 %   CTA Chest Abdomen Pelvis w Contrast    Collection Time: 04/07/22  6:34 PM   Result Value Ref Range    Radiologist flags Hepatic masses    Hemoglobin    Collection Time: 04/07/22  9:33 PM   Result Value Ref Range    Hemoglobin 7.2 (L) 13.3 - 17.7 g/dL   CBC with platelets    Collection Time: 04/08/22  5:43 AM   Result Value Ref Range    WBC Count 6.4 4.0 - 11.0 10e3/uL    RBC Count 2.46 (L) 4.40 - 5.90 10e6/uL    Hemoglobin 7.2 (L) 13.3 - 17.7 g/dL    Hematocrit 22.6 (L) 40.0 - 53.0 %    MCV 92 78 - 100 fL    MCH 29.3 26.5 - 33.0 pg    MCHC 31.9 31.5 - 36.5 g/dL    RDW 17.2 (H) 10.0 - 15.0 %    Platelet Count 43 (LL) 150 - 450 10e3/uL   Basic metabolic panel    Collection Time: 04/08/22  5:43 AM   Result Value Ref Range    Sodium 132 (L) 136 - 145 mmol/L    Potassium 4.7 3.5 - 5.0 mmol/L    Chloride 100 98 - 107 mmol/L    Carbon Dioxide (CO2) 22 22 - 31 mmol/L    Anion Gap 10 5 - 18 mmol/L    Urea Nitrogen 54 (H) 8 - 22 mg/dL    Creatinine 7.90 (HH) 0.70 - 1.30 mg/dL    Calcium 7.3 (L) 8.5 - 10.5 mg/dL    Glucose 90 70 - 125 mg/dL    GFR Estimate 7 (L) >60 mL/min/1.73m2   CRP inflammation    Collection Time: 04/08/22  5:43 AM   Result Value Ref Range    CRP 10.3 (H) 0.0-<0.8 mg/dL   Procalcitonin    Collection Time: 04/08/22  5:28 PM   Result Value Ref Range    Procalcitonin 0.96 (H) 0.00 - 0.49 ng/mL   CBC with platelets    Collection Time: 04/09/22  4:56 AM   Result Value Ref Range    WBC Count 5.7 4.0 - 11.0 10e3/uL    RBC Count 2.32 (L) 4.40 - 5.90 10e6/uL    Hemoglobin 6.9 (LL) 13.3 - 17.7 g/dL    Hematocrit 21.3 (L) 40.0 - 53.0 %    MCV 92 78 - 100 fL    MCH 29.7 26.5 - 33.0 pg    MCHC 32.4  31.5 - 36.5 g/dL    RDW 17.6 (H) 10.0 - 15.0 %    Platelet Count 55 (L) 150 - 450 10e3/uL   Basic metabolic panel    Collection Time: 04/09/22  4:56 AM   Result Value Ref Range    Sodium 135 (L) 136 - 145 mmol/L    Potassium 4.0 3.5 - 5.0 mmol/L    Chloride 99 98 - 107 mmol/L    Carbon Dioxide (CO2) 28 22 - 31 mmol/L    Anion Gap 8 5 - 18 mmol/L    Urea Nitrogen 31 (H) 8 - 22 mg/dL    Creatinine 5.30 (H) 0.70 - 1.30 mg/dL    Calcium 7.6 (L) 8.5 - 10.5 mg/dL    Glucose 87 70 - 125 mg/dL    GFR Estimate 12 (L) >60 mL/min/1.73m2   INR    Collection Time: 04/09/22  4:56 AM   Result Value Ref Range    INR 1.31 (H) 0.85 - 1.15   Prepare red blood cells (unit)    Collection Time: 04/09/22  1:30 PM   Result Value Ref Range    CROSSMATCH Compatible     UNIT ABO/RH A Pos     Unit Number [id number]     Unit Status Transfused     Blood Component Type Red Blood Cells     Product Code O4839N45     CODING SYSTEM [id number]     UNIT TYPE ISBT 6200     ISSUE DATE AND TIME [id number]    Vancomycin level    Collection Time: 04/11/22  5:28 AM   Result Value Ref Range    Vancomycin 15.9   mg/L   Basic metabolic panel    Collection Time: 04/11/22  5:28 AM   Result Value Ref Range    Sodium 135 (L) 136 - 145 mmol/L    Potassium 4.6 3.5 - 5.0 mmol/L    Chloride 101 98 - 107 mmol/L    Carbon Dioxide (CO2) 24 22 - 31 mmol/L    Anion Gap 10 5 - 18 mmol/L    Urea Nitrogen 58 (H) 8 - 22 mg/dL    Creatinine 8.15 (HH) 0.70 - 1.30 mg/dL    Calcium 7.5 (L) 8.5 - 10.5 mg/dL    Glucose 94 70 - 125 mg/dL    GFR Estimate 7 (L) >60 mL/min/1.73m2   CBC with platelets    Collection Time: 04/11/22  5:28 AM   Result Value Ref Range    WBC Count 4.5 4.0 - 11.0 10e3/uL    RBC Count 2.43 (L) 4.40 - 5.90 10e6/uL    Hemoglobin 7.3 (L) 13.3 - 17.7 g/dL    Hematocrit 22.7 (L) 40.0 - 53.0 %    MCV 93 78 - 100 fL    MCH 30.0 26.5 - 33.0 pg    MCHC 32.2 31.5 - 36.5 g/dL    RDW 17.0 (H) 10.0 - 15.0 %    Platelet Count 53 (L) 150 - 450 10e3/uL   Lactic Acid STAT     Collection Time: 04/11/22  6:49 PM   Result Value Ref Range    Lactic Acid 1.6 0.7 - 2.0 mmol/L   Blood Culture Wrist, Right    Collection Time: 04/11/22  6:49 PM    Specimen: Wrist, Right; Blood   Result Value Ref Range    Culture No Growth    CBC with platelets    Collection Time: 04/11/22  6:49 PM   Result Value Ref Range    WBC Count 6.4 4.0 - 11.0 10e3/uL    RBC Count 2.85 (L) 4.40 - 5.90 10e6/uL    Hemoglobin 8.4 (L) 13.3 - 17.7 g/dL    Hematocrit 26.1 (L) 40.0 - 53.0 %    MCV 92 78 - 100 fL    MCH 29.5 26.5 - 33.0 pg    MCHC 32.2 31.5 - 36.5 g/dL    RDW 17.0 (H) 10.0 - 15.0 %    Platelet Count 55 (L) 150 - 450 10e3/uL   Blood Culture Hand, Right    Collection Time: 04/11/22  7:49 PM    Specimen: Hand, Right; Blood   Result Value Ref Range    Culture No Growth    CBC with platelets and differential    Collection Time: 05/03/22 12:06 PM   Result Value Ref Range    WBC Count 7.3 4.0 - 11.0 10e3/uL    RBC Count 2.88 (L) 4.40 - 5.90 10e6/uL    Hemoglobin 8.5 (L) 13.3 - 17.7 g/dL    Hematocrit 26.5 (L) 40.0 - 53.0 %    MCV 92 78 - 100 fL    MCH 29.5 26.5 - 33.0 pg    MCHC 32.1 31.5 - 36.5 g/dL    RDW 17.6 (H) 10.0 - 15.0 %    Platelet Count 100 (L) 150 - 450 10e3/uL    % Neutrophils 81 %    % Lymphocytes 11 %    % Monocytes 7 %    % Eosinophils 1 %    % Basophils 0 %    % Immature Granulocytes 0 %    Absolute Neutrophils 5.9 1.6 - 8.3 10e3/uL    Absolute Lymphocytes 0.8 0.8 - 5.3 10e3/uL    Absolute Monocytes 0.5 0.0 - 1.3 10e3/uL    Absolute Eosinophils 0.1 0.0 - 0.7 10e3/uL    Absolute Basophils 0.0 0.0 - 0.2 10e3/uL    Absolute Immature Granulocytes 0.0 <=0.4 10e3/uL          Recent Labs:   Recent Results (from the past 168 hour(s))   CBC with platelets and differential   Result Value Ref Range    WBC Count 7.3 4.0 - 11.0 10e3/uL    RBC Count 2.88 (L) 4.40 - 5.90 10e6/uL    Hemoglobin 8.5 (L) 13.3 - 17.7 g/dL    Hematocrit 26.5 (L) 40.0 - 53.0 %    MCV 92 78 - 100 fL    MCH 29.5 26.5 - 33.0 pg    MCHC 32.1 31.5  - 36.5 g/dL    RDW 17.6 (H) 10.0 - 15.0 %    Platelet Count 100 (L) 150 - 450 10e3/uL    % Neutrophils 81 %    % Lymphocytes 11 %    % Monocytes 7 %    % Eosinophils 1 %    % Basophils 0 %    % Immature Granulocytes 0 %    Absolute Neutrophils 5.9 1.6 - 8.3 10e3/uL    Absolute Lymphocytes 0.8 0.8 - 5.3 10e3/uL    Absolute Monocytes 0.5 0.0 - 1.3 10e3/uL    Absolute Eosinophils 0.1 0.0 - 0.7 10e3/uL    Absolute Basophils 0.0 0.0 - 0.2 10e3/uL    Absolute Immature Granulocytes 0.0 <=0.4 10e3/uL       Imaging:    Recent Results (from the past 744 hour(s))   IR Dialysis Fistulogram Left    Narrative    [facility]  LOCATION: [facility]  DATE: 4/4/2022    PROCEDURE: LEFT ARM FISTULOGRAM AND PLACEMENT OF COVERED STENT GRAFT    INTERVENTIONAL RADIOLOGIST: Zbigniew Stephenson MD.    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    CONSENT: The risks, benefits and alternatives of left arm fistulogram with possible intervention were discussed with the patient  in detail. All questions were answered. Informed consent was given to proceed with the procedure.    MODERATE SEDATION: Versed 1.5 mg IV; Fentanyl 50 mcg IV.  Under physician supervision, Versed and fentanyl were administered for moderate sedation. Pulse oximetry, heart rate and blood pressure were continuously monitored by an independent trained   observer. The physician spent 30 minutes of face-to-face sedation time with the patient.    CONTRAST: 25 cc of Omni  ANTIBIOTICS: None.  ADDITIONAL MEDICATIONS: None.    FLUOROSCOPIC TIME: 2.9 minutes.  RADIATION DOSE: Air Kerma: 8 mGy.    COMPLICATIONS: No immediate complications.    STERILE BARRIER TECHNIQUE: Maximum sterile barrier technique was used. Cutaneous antisepsis was performed at the operative site with application of 2% chlorhexidine and large sterile drape. Prior to the procedure, the  and assistant performed   hand hygiene and wore  hat, mask, sterile gown, and sterile gloves during the entire procedure.    PROCEDURE:    The left arm dialysis graft was accessed using a micropuncture needle towards the arterial limb of the graft and eventually a 8 Martiniquais sheath was placed. Through the sheath over a Bentson wire a 5 Martiniquais KMP catheter was advanced into the arterial limb   of the graft. A left arm dialysis fistulogram was performed. This revealed to moderate size pseudoaneurysms coming off near the apex of the graft and a small pseudoaneurysm coming off of the arterial limb of the graft. This was successfully treated with   overlapping 8 mm x 2.5 cm and 8 mm x 5 cm Viabahn covered stent grafts. The stent graft were then angioplastied to 8 mm. Final fistulogram demonstrates no residual active bleed or pseudoaneurysm.        Impression    IMPRESSION:    Left arm fistulogram reveals 3 pseudoaneurysms coming off of the arterial limb of the graft successfully treated with placement of Viabahn covered stent grafts.  ____________________________________________________________________       XR Chest Port 1 View    Narrative    EXAM: XR CHEST PORT 1 VIEW  LOCATION: [facility]  DATE/TIME: 4/5/2022 4:59 AM    INDICATION: R empyema s p chest tube  COMPARISON: 04/01/2022. CT 03/30/2022.      Impression    IMPRESSION: Right chest tube in place with continued mild decrease in the right pleural effusion with mild complex effusion remaining. Some mild hydropneumothorax in the right lung base likely due to trapped lung right lower lobe.    New vascular stent across the left brachiocephalic vein place for 04/02/2022.   XR Chest Port 1 View    Narrative    EXAM: XR CHEST PORT 1 VIEW  LOCATION: [facility]  DATE/TIME: 4/7/2022 10:31 AM    INDICATION: right empyema pleural effusion with chest tube in place, now having new shortness of breath.  COMPARISON: 04/05/2022      Impression    IMPRESSION: Similar right chest  tube. Persistent but slightly decreased partially loculated right effusion and associated atelectasis. No pneumothorax. Left lung is clear. Heart size is stable. Similar endovascular stent over the expected brachiocephalic   vein.   CTA Chest Abdomen Pelvis w Contrast   Result Value    Radiologist flags Hepatic masses    Narrative    EXAM: CTA CHEST ABDOMEN PELVIS W CONTRAST  LOCATION: [facility]  DATE/TIME: 4/7/2022 6:00 PM    INDICATION: Right chest pain. Complex right pleural effusion. Aortic and pulmonary artery dissection.  COMPARISON: 3/30/2022  TECHNIQUE: CT angiogram chest abdomen pelvis during arterial phase of injection of IV contrast. 2D and 3D MIP reconstructions were performed by the CT technologist. Dose reduction techniques were used.   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 The celiac axis and superior mesenteric artery and right renal artery are   opacified by the true lumen. The left renal artery is opacified by the false lumen. The inferior mesenteric artery is opacified by the false lumen. The dissection extends a short distance into the left common iliac artery. The iliac arteries are   otherwise normal. No evidence of rupture. 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 An infrarenal abdominal aortic artery aneurysm measuring 3.7 x 3.5 cm. The ascending aorta is   stable and dilated at 4.0 cm. A stent within the left brachiocephalic vein. Immediately proximal to the stent is marked narrowing of the left subclavian vein between the right clavicle and first rib, image 10:6. Stable peripheral irregularity of the main   pulmonary artery extending into the proximal right pulmonary artery, image 46:6.    LUNGS AND PLEURA: Loculated  large right hydropneumothorax is similar in size to prior with an increased pneumothorax component. A stable hyperdense component posteriorly within the pleural collection is consistent with a hematoma. Right chest tube in   place. Atelectasis involving the majority of the right lung. Small layering left pleural effusion.    MEDIASTINUM/AXILLAE: No lymphadenopathy. Trace pericardial fluid.    CORONARY ARTERY CALCIFICATION: Moderate.    HEPATOBILIARY: An indeterminant 4.3 x 3.9 cm mass in segment 8 of the with a peripheral nodular enhancing component, image 87:6. An indeterminate 2.2 x 2.2 cm hypodensity in segment 8, image 84:6. Nodular hepatic contour consistent with   cirrhosis/fibrosis. Cholelithiasis. No biliary ductal dilatation.    PANCREAS: Normal.    SPLEEN: Stable splenomegaly measuring 17.5 cm in maximal dimension.    ADRENAL GLANDS: Normal.    KIDNEYS/BLADDER: Atrophic kidneys. Hypoenhancing left kidney. Subcentimeter renal hypodensities which are too small to characterize. No hydronephrosis.    BOWEL: No obstruction or inflammatory change.    LYMPH NODES: Normal.    PELVIC ORGANS: Small volume ascites.    MUSCULOSKELETAL: Degenerative changes of the spine and hips.      Impression    IMPRESSION:  1.  Stable Panfilo type B aortic dissection.  2.  Stable dilatation of the ascending aorta, aortic arch, descending aorta and abdominal aorta.  3.  Stable peripheral irregularity in the main pulmonary artery which is most consistent with a dissection.  4.  Stable size of a large loculated right hydropneumothorax with increased pneumothorax component. Right chest tube in place.  5.  Marked narrowing of the left subclavian vein proximal to a stent.  6.  The left kidney is opacified by the false lumen and is hypoenhancing which may represent ischemia or artifact due to timing of the contrast bolus.  7.  There are 2 indeterminant hepatic masses which may represent hepatocellular carcinoma. Recommend a contrast  enhanced magnetic resonance imaging examination of the liver.      [Recommend Follow Up: Hepatic masses]    This report will be copied to the [facility] to ensure a provider acknowledges the finding.        US Paracentesis    Narrative    EXAM:  1. PARACENTESIS  2. ULTRASOUND GUIDANCE  LOCATION: [facility]  DATE/TIME: 4/29/2022 2:56 PM    INDICATION: Ascites.    PROCEDURE: Informed consent obtained. Time out performed. The abdomen was prepped and draped in a sterile fashion. 10 mL of 1% lidocaine was infused into local soft tissues. A 5 Kuwaiti catheter system was introduced into the abdominal ascites under   ultrasound guidance.    0.45 liters of clear fluid were removed and sent to lab if requested.    Patient tolerated procedure well.    Ultrasound imaging was obtained and placed in the patient's permanent medical record.      Impression    IMPRESSION:  1.  Status post ultrasound-guided paracentesis.    Reference CPT Code: 20496   CT CHEST W CONTRAST    Narrative    EXAM: CT CHEST W CONTRAST  LOCATION: [facility]  DATE/TIME: 5/3/2022 5:10 PM    INDICATION: Right chest wall lump. Possible abscess. History of empyema.  COMPARISON: CT 04/07/2022.  TECHNIQUE: CT chest with IV contrast. Multiplanar reformats were obtained. Dose reduction techniques were used.    CONTRAST: Isovue 370 50mL    FINDINGS:   LUNGS AND PLEURA: The previous large caliber right chest tube has been removed. Large hydropneumothorax remains on the right side mildly larger since 04/07/2022. Severe compressive atelectasis of the right mid and lower lungs similar to previous. Slight   increased density within this pocket of fluid consistent with some blood products.    Left lung is clear.    MEDIASTINUM/AXILLAE: No lymphadenopathy. Stable type the dissection and stable aneurysmal dilatation of the distal aortic arch and upper descending aorta the distal large measuring 5.7 cm in maximum  diameter. Stent in the left brachiocephalic vein is   patent and unchanged.    CORONARY ARTERY CALCIFICATION: Moderate.    UPPER ABDOMEN: Stable hypodensities in the liver. Gallstones.    MUSCULOSKELETAL: Soft tissue lump right lateral chest wall at the site of previous chest tube. This may represent a hematoma or possibly infection but no rachid abscess.      Impression    IMPRESSION:   1.  The lump right lateral chest wall is at the site of previous chest tube and may represent a hematoma or phlegmonous change/infection but no rachid abscess.    2.  Large right hydropneumothorax remains minimally larger when compared to 04/07/2022.    3.  Stable type B aortic dissection.       Pathology:   No results found for this or any previous visit (from the past 8760 hour(s)).          Signed by: Li Glasgow MD

## 2022-05-04 NOTE — PROGRESS NOTES
"Oncology Rooming Note    May 4, 2022 10:46 AM   Elle Blanton is a 61 year old male who presents for:    Chief Complaint   Patient presents with     Hematology     New consult     Initial Vitals: BP (!) 151/98   Pulse 79   Resp 16   Ht 1.645 m (5' 4.75\")   Wt 60.8 kg (134 lb)   SpO2 98%   BMI 22.47 kg/m   Estimated body mass index is 22.47 kg/m  as calculated from the following:    Height as of this encounter: 1.645 m (5' 4.75\").    Weight as of this encounter: 60.8 kg (134 lb). Body surface area is 1.67 meters squared.  Moderate Pain (5) Comment: Data Unavailable   No LMP for male patient.  Allergies reviewed: Yes  Medications reviewed: Yes    Medications: Medication refills not needed today.  Pharmacy name entered into [employer]:    [id number] - [place], [place] - [address]  [facility] - [facility], [place] - [address] [place] [address] [facility] [place], [place] - [address] [place] [address]    Clinical concerns:  New consult       Faviola Chang            "

## 2022-05-04 NOTE — LETTER
"    5/4/2022         RE: Elle Blanton  [address]  [facility] [address]        Dear Colleague,    Thank you for referring your patient, Elle Blanton, to the [facility] [place]. Please see a copy of my visit note below.    Oncology Rooming Note    May 4, 2022 10:46 AM   Elle Blanton is a 61 year old male who presents for:    Chief Complaint   Patient presents with     Hematology     New consult     Initial Vitals: BP (!) 151/98   Pulse 79   Resp 16   Ht 1.645 m (5' 4.75\")   Wt 60.8 kg (134 lb)   SpO2 98%   BMI 22.47 kg/m   Estimated body mass index is 22.47 kg/m  as calculated from the following:    Height as of this encounter: 1.645 m (5' 4.75\").    Weight as of this encounter: 60.8 kg (134 lb). Body surface area is 1.67 meters squared.  Moderate Pain (5) Comment: Data Unavailable   No LMP for male patient.  Allergies reviewed: Yes  Medications reviewed: Yes    Medications: Medication refills not needed today.  Pharmacy name entered into [employer]:    [id number] - [place], [place] - [address] [place] [address] [facility] [facility] [address] [facility] [address]    Clinical concerns:  New consult       Faviola Chang              [facility] Medical Oncology Consult Note    Patient: Elle Blanton  MRN: [id number]  Date of Service: 05/04/2022    Assessment / Impression   Normocytic anemia [D64.9] and Thrombocytopenia of at least 5 years' duration, with negative assessment for hemolysis and no findings indicative of hematologic malignancy, no obvious trend;  This is likely secondary to co-existing diseases and no additional evaluation is needed.      Cirrhosis, likely related to chronic hepatitis, with focal abnormalities that are not fully characterized (differential includes malignancy, MRI suggested).  I've discussed this with patient and wife, lesions appear little changed from last year so MRI to assess is " low priority in context of other problems.    Empyema, despite recent chest tube, actively being managed, may require additional therapy with chest tube / antibiotics.    Multiple additional medical problems (ESRD, aneurysms, gout, without recent status change)    Cancer Staging  No matching staging information was found for the patient.          Plan:     He can have transfusions if needed for critically low platelets (<10) or thrombocytopenia with bleeding; or for critically low hemoglobin (<7 grams).      At some point, MRI of the liver would be appropriate to complete assessment of hepatic abnormalities, but this is lower priority than management of the empyema.       He does not need follow up by our clinic for hematologic abnormalities, but if found to have an hepatic neoplasm, we would be happy to see him.    Subjective:      HPI: Elle Blanton is a 61 year old male with a litany of severe medical problems including probable empyema, end stage kidney disease, cirrhosis, prior GI bleed, gout, heart disease and other issues per problem list.      His abnormal counts date at least from 2017.  A peripheral smear read by Naif Lloyd MD on 24 June 2017 showed:  Final Diagnosis   PERIPHERAL BLOOD SMEAR:       1) PANCYTOPENIA       2) RARE SCHISTOCYTES AND MICROSPHEROCYTES ARE PRESENT       3) MILD ABSOLUTE NEUTROPENIA       4) NEUTROPHILS WITH SUBTLE MEGALOBLASTOID CHANGES       5) NEGATIVE FOR ACUTE LEUKEMIA       6) PLEASE SEE COMMENT   Electronically signed by Naif Lloyd MD on 6/26/2017 at  6:44 PM       Comment    Pancytopenia can be caused by diseases infiltrating the bone marrow, disorders involving the spleen, vitamin B12 or folate deficiency, autoimmune disorders, paroxysmal nocturnal hemoglobinuria, drugs, or overwhelming infection and aplastic anemias.  There are rare schistocytes and several microspherocytes identified, and the differential diagnosis should include hemolytic anemia with  intravascular and extravascular components. Suggest correlation with serum haptoglobin and LDH for additional information.     Prior haptoglobin in 2021 was 50 (), discordant with significant hemolysis.        Chief Complaint   Patient presents with     Hematology     New consult         Current Outpatient Medications   Medication Sig Dispense Refill     acetaminophen (TYLENOL) 500 MG tablet Take 500 mg by mouth every 6 hours as needed for mild pain       calcium acetate (PHOSLO) 667 MG CAPS capsule Take 1 capsule by mouth 3 times daily (with meals)       carvedilol (COREG) 25 MG tablet Take 25-50 mg by mouth 2 times daily as needed        cephALEXin (KEFLEX) 500 MG capsule Take 1 capsule (500 mg) by mouth in the morning and 1 capsule (500 mg) in the evening. 120 capsule 1     cloNIDine (CATAPRES) 0.1 MG tablet Take 0.1 mg by mouth At Bedtime        diltiazem ER (DILT-XR) 180 MG 24 hr capsule Take 1 capsule (180 mg) by mouth 2 times daily (Patient taking differently: Take 180 mg by mouth 2 times daily as needed (Checks BP prior to taking, if too low he does not take this medication)) 60 capsule 11     entecavir (BARACLUDE) 0.5 MG tablet Take 1 tablet (0.5 mg) by mouth every 7 days 52 tablet 1     hydrOXYzine (ATARAX) 25 MG tablet Take 1 tablet (25 mg) by mouth 3 times daily as needed for itching 90 tablet 3     order for DME Equipment being ordered: Other: blood pressure monitor  Treatment Diagnosis: hypertension 1 each 0     pantoprazole (PROTONIX) 40 MG EC tablet Take 1 tablet (40 mg) by mouth daily 30 tablet 11     senna-docusate (SENOKOT-S/PERICOLACE) 8.6-50 MG tablet Take 1 tablet by mouth daily as needed for constipation 30 tablet 11     zolpidem (AMBIEN) 5 MG tablet Take 1 tablet (5 mg) by mouth nightly as needed for sleep 10 tablet 5     [facility] SALINE NASAL SPRAY 0.65 % nasal spray Spray 1 spray in nostril daily as needed (Patient not taking: No sig reported)       oxyCODONE (ROXICODONE) 5 MG tablet Take  1 tablet (5 mg) by mouth daily as needed for severe pain (Patient not taking: Reported on 5/4/2022) 30 tablet 0     Past Medical History:   Diagnosis Date     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 pylori infection 7/5/2017    UGI bleed implied by Hgb 9.0 6/22/17 and melena. Admitted and hgb decreased to 7.6, CT abdomen showed all bladder wall thickening. + Hep B surface antigen noted. Melena resolved and hgb stabalized without transfusion, epigastric pain resolved with PPI. Recommend referral for gastroscopy.     Heart attack (H)      Hepatitis B      Normocytic anemia      Other pancytopenia (H) 7/5/2017 6/24/17 Peripheral smear at [facility].Pancytopenia of uncertain cause. Ruled out acute leukemia. Hematologist suggests hemolytic anemia should be considered possible cause and LDH and haptoglobin should be assessed in future.      PONV (postoperative nausea and vomiting)      Thrombocytopenia (H)      Past Surgical History:   Procedure Laterality Date     ABCESS DRAINAGE      finger     BURSECTOMY ELBOW Left 10/15/2021    Procedure: LEFT OLECRANON BURSECTOMY;  Surgeon: Tico Myers MD;  Location: [facility] OR     BURSECTOMY ELBOW Left 1/18/2022    Procedure: LEFT ELBOW OLECRANON BURSECTOMY;  Surgeon: Tico Meyrs MD;  Location: [facility] OR     CREATE FISTULA ARTERIOVENOUS UPPER EXTREMITY Left 4/20/2021    Procedure: left arm dialysis graft placement;  Surgeon: Roxana Cosby MD;  Location: [facility] OR;  Service: General     FOREIGN BODY REMOVAL      finger     INCISION AND DRAINAGE FINGER, COMBINED Left 10/20/2016    Procedure: COMBINED INCISION AND DRAINAGE FINGER;   Surgeon: Mireya Pizano MD;  Location: [facility] OR     INCISION AND DRAINAGE UPPER EXTREMITY, COMBINED Left 1/18/2022    Procedure: AND ELBOW INCISION AND DRAINAGE;  Surgeon: Tico Myers MD;  Location: [facility] OR     IR CHEST TUBE PLACEMENT NON-TUNNELED RIGHT  4/19/2021     IR CHEST TUBE PLACEMENT NON-TUNNELED RIGHT  10/19/2021     IR CHEST TUBE PLACEMENT NON-TUNNELED RIGHT  11/10/2021     IR CHEST TUBE PLACEMENT NON-TUNNELED RIGHT  3/31/2022     IR DIALYSIS FISTULOGRAM LEFT  4/2/2022     IR DIALYSIS FISTULOGRAM LEFT  4/4/2022     IR PLEURAL DRAINAGE WITH CATHETER INSERTION  4/19/2021     MIDLINE INSERTION - DOUBLE LUMEN  4/23/2021          DE ESOPHAGOGASTRODUODENOSCOPY TRANSORAL DIAGNOSTIC N/A 8/18/2020    Procedure: ESOPHAGOGASTRODUODENOSCOPY (EGD) with biopsy;  Surgeon: Nilson Gonzales MD;  Location: [facility];  Service: Gastroenterology      PARACENTESIS  8/14/2020      PARACENTESIS  8/19/2020      THORACENTESIS  4/18/2021     Nka [no known allergies]  Family History   Problem Relation Age of Onset     Diabetes Father      Hypertension No family hx of      Asthma No family hx of      Cancer No family hx of      Coronary Artery Disease No family hx of      Liver Cancer No family hx of      Ulcers Father      Social History     Socioeconomic History     Marital status:      Spouse name: Not on file     Number of children: Not on file     Years of education: Not on file     Highest education level: Not on file   Occupational History     Not on file   Tobacco Use     Smoking status: Never Smoker     Smokeless tobacco: Never Used   Vaping Use     Vaping Use: Never used   Substance and Sexual Activity     Alcohol use: No     Drug use: No     Sexual activity: Yes     Partners: Female   Other Topics Concern     Parent/sibling w/ CABG, MI or angioplasty before 65F 55M? Not Asked   Social History Narrative     Not on file     Social Determinants of Health     Financial Resource Strain:  "Not on file   Food Insecurity: Not on file   Transportation Needs: Not on file   Physical Activity: Not on file   Stress: Not on file   Social Connections: Not on file   Intimate Partner Violence: Not on file   Housing Stability: Not on file        Review of Systems:      Review of Systems   Constitutional: Positive for fatigue (his body feels \"heavy\"). Negative for diaphoresis and fever.   HENT:   Positive for nosebleeds (related to broken nose).    Eyes: Negative.    Respiratory: Negative.    Cardiovascular: Negative.    Gastrointestinal: Negative.    Endocrine: Negative.    Genitourinary:         He does continue to make urine.  He has dialysis Mondays and Fridays   Musculoskeletal:        Most of his problems are in his right hand which is too stiff to close     Skin: Negative.    Neurological:        He fell at home in the past and broke his nose with prolonged epistaxis.  No recent falling, but does use a cane.   Hematological: Negative for adenopathy. Bruises/bleeds easily.   Psychiatric/Behavioral: Negative.    All other systems reviewed and are negative.       Pain              Pain Score: Moderate Pain (5)                     Accompanied by wife         Objective:     Physical Exam    Vitals:    05/04/22 1035   BP: (!) 151/98   Pulse: 79   Resp: 16   SpO2: 98%   Weight: 60.8 kg (134 lb)   Height: 1.645 m (5' 4.75\")   PainSc: Moderate Pain (5)   PainLoc: Abdomen   Physical Exam  Vitals and nursing note reviewed. Exam conducted with a chaperone present.   Constitutional:       Appearance: He is normal weight. He is ill-appearing (chronically).   HENT:      Head: Normocephalic and atraumatic.      Nose: Nose normal.   Eyes:      Extraocular Movements: Extraocular movements intact.      Conjunctiva/sclera: Conjunctivae normal.      Pupils: Pupils are equal, round, and reactive to light.   Neck:      Comments: Head tilts to the right  Cardiovascular:      Rate and Rhythm: Normal rate and regular rhythm.      " Heart sounds: Normal heart sounds.   Pulmonary:      Breath sounds: Examination of the right-upper field reveals decreased breath sounds. Examination of the right-middle field reveals decreased breath sounds. Examination of the right-lower field reveals decreased breath sounds. Decreased breath sounds present.      Comments: Dullness to percussion throughout right lung  Abdominal:      General: Abdomen is protuberant. Bowel sounds are normal.      Palpations: Abdomen is soft. There is no fluid wave or mass.      Tenderness: There is no guarding.   Musculoskeletal:      Right hand: Deformity present. Decreased range of motion.      Left hand: Decreased strength: tophi.      Cervical back: Normal range of motion. No tenderness.   Lymphadenopathy:      Cervical: No cervical adenopathy.   Skin:     General: Skin is warm.      Coloration: Skin is pale.   Neurological:      General: No focal deficit present.      Mental Status: He is alert and oriented to person, place, and time.      Gait: Gait abnormal (uses a cane).      Deep Tendon Reflexes: Reflexes normal.   Psychiatric:         Mood and Affect: Mood normal.         Behavior: Behavior normal.         Thought Content: Thought content normal.     Recent Results (from the past 720 hour(s))   Lactic Acid STAT    Collection Time: 04/05/22  1:12 AM   Result Value Ref Range    Lactic Acid 2.3 (H) 0.7 - 2.0 mmol/L   CBC with platelets    Collection Time: 04/05/22  5:40 AM   Result Value Ref Range    WBC Count 4.3 4.0 - 11.0 10e3/uL    RBC Count 2.56 (L) 4.40 - 5.90 10e6/uL    Hemoglobin 7.7 (L) 13.3 - 17.7 g/dL    Hematocrit 23.4 (L) 40.0 - 53.0 %    MCV 91 78 - 100 fL    MCH 30.1 26.5 - 33.0 pg    MCHC 32.9 31.5 - 36.5 g/dL    RDW 17.3 (H) 10.0 - 15.0 %    Platelet Count 50 (L) 150 - 450 10e3/uL   Basic metabolic panel    Collection Time: 04/05/22  5:40 AM   Result Value Ref Range    Sodium 135 (L) 136 - 145 mmol/L    Potassium 3.7 3.5 - 5.0 mmol/L    Chloride 99 98 - 107  mmol/L    Carbon Dioxide (CO2) 25 22 - 31 mmol/L    Anion Gap 11 5 - 18 mmol/L    Urea Nitrogen 24 (H) 8 - 22 mg/dL    Creatinine 4.13 (H) 0.70 - 1.30 mg/dL    Calcium 7.6 (L) 8.5 - 10.5 mg/dL    Glucose 80 70 - 125 mg/dL    GFR Estimate 16 (L) >60 mL/min/1.73m2   CBC with platelets    Collection Time: 04/06/22  6:38 AM   Result Value Ref Range    WBC Count 5.0 4.0 - 11.0 10e3/uL    RBC Count 2.61 (L) 4.40 - 5.90 10e6/uL    Hemoglobin 7.8 (L) 13.3 - 17.7 g/dL    Hematocrit 23.9 (L) 40.0 - 53.0 %    MCV 92 78 - 100 fL    MCH 29.9 26.5 - 33.0 pg    MCHC 32.6 31.5 - 36.5 g/dL    RDW 17.2 (H) 10.0 - 15.0 %    Platelet Count 49 (LL) 150 - 450 10e3/uL   Basic metabolic panel    Collection Time: 04/06/22  6:38 AM   Result Value Ref Range    Sodium 134 (L) 136 - 145 mmol/L    Potassium 4.2 3.5 - 5.0 mmol/L    Chloride 100 98 - 107 mmol/L    Carbon Dioxide (CO2) 25 22 - 31 mmol/L    Anion Gap 9 5 - 18 mmol/L    Urea Nitrogen 35 (H) 8 - 22 mg/dL    Creatinine 5.68 (H) 0.70 - 1.30 mg/dL    Calcium 7.4 (L) 8.5 - 10.5 mg/dL    Glucose 83 70 - 125 mg/dL    GFR Estimate 11 (L) >60 mL/min/1.73m2   CBC with platelets    Collection Time: 04/07/22  6:37 AM   Result Value Ref Range    WBC Count 7.0 4.0 - 11.0 10e3/uL    RBC Count 2.37 (L) 4.40 - 5.90 10e6/uL    Hemoglobin 7.0 (L) 13.3 - 17.7 g/dL    Hematocrit 22.6 (L) 40.0 - 53.0 %    MCV 95 78 - 100 fL    MCH 29.5 26.5 - 33.0 pg    MCHC 31.0 (L) 31.5 - 36.5 g/dL    RDW 17.4 (H) 10.0 - 15.0 %    Platelet Count 40 (LL) 150 - 450 10e3/uL   Basic metabolic panel    Collection Time: 04/07/22  6:37 AM   Result Value Ref Range    Sodium 132 (L) 136 - 145 mmol/L    Potassium 4.2 3.5 - 5.0 mmol/L    Chloride 98 98 - 107 mmol/L    Carbon Dioxide (CO2) 22 22 - 31 mmol/L    Anion Gap 12 5 - 18 mmol/L    Urea Nitrogen 46 (H) 8 - 22 mg/dL    Creatinine 6.89 (HH) 0.70 - 1.30 mg/dL    Calcium 7.4 (L) 8.5 - 10.5 mg/dL    Glucose 100 70 - 125 mg/dL    GFR Estimate 8 (L) >60 mL/min/1.73m2   Adult Type  and Screen    Collection Time: 04/07/22  6:37 AM   Result Value Ref Range    ABO/RH(D) A POS     Antibody Screen Negative Negative    SPECIMEN EXPIRATION DATE [id number]    Asymptomatic COVID-19 Virus (Coronavirus) by PCR Nasopharyngeal    Collection Time: 04/07/22  8:26 AM    Specimen: Nasopharyngeal; Swab   Result Value Ref Range    SARS CoV2 PCR Negative Negative   Prepare red blood cells (unit)    Collection Time: 04/07/22 11:30 AM   Result Value Ref Range    CROSSMATCH Compatible     UNIT ABO/RH A Pos     Unit Number [id number]     Unit Status Transfused     Blood Component Type Red Blood Cells     Product Code [id number]     CODING SYSTEM [id number]     UNIT TYPE ISBT 6200     ISSUE DATE AND TIME [id number]    Comprehensive metabolic panel    Collection Time: 04/07/22 11:42 AM   Result Value Ref Range    Sodium 134 (L) 136 - 145 mmol/L    Potassium 4.3 3.5 - 5.0 mmol/L    Chloride 100 98 - 107 mmol/L    Carbon Dioxide (CO2) 23 22 - 31 mmol/L    Anion Gap 11 5 - 18 mmol/L    Urea Nitrogen 48 (H) 8 - 22 mg/dL    Creatinine 7.10 (HH) 0.70 - 1.30 mg/dL    Calcium 7.2 (L) 8.5 - 10.5 mg/dL    Glucose 95 70 - 125 mg/dL    Alkaline Phosphatase 94 45 - 120 U/L    AST 15 0 - 40 U/L    ALT <9 0 - 45 U/L    Protein Total 6.1 6.0 - 8.0 g/dL    Albumin 2.4 (L) 3.5 - 5.0 g/dL    Bilirubin Total 0.6 0.0 - 1.0 mg/dL    GFR Estimate 8 (L) >60 mL/min/1.73m2   CBC with platelets    Collection Time: 04/07/22 11:42 AM   Result Value Ref Range    WBC Count 6.3 4.0 - 11.0 10e3/uL    RBC Count 2.10 (L) 4.40 - 5.90 10e6/uL    Hemoglobin 6.3 (LL) 13.3 - 17.7 g/dL    Hematocrit 20.3 (L) 40.0 - 53.0 %    MCV 97 78 - 100 fL    MCH 30.0 26.5 - 33.0 pg    MCHC 31.0 (L) 31.5 - 36.5 g/dL    RDW 17.6 (H) 10.0 - 15.0 %    Platelet Count 37 (LL) 150 - 450 10e3/uL   Gram Stain    Collection Time: 04/07/22  3:04 PM    Specimen: Pleural Cavity, Right; Pleural fluid   Result Value Ref Range    Gram Stain Result 4+ Gram positive cocci in  clusters (A)     Gram Stain Result 2+ WBC seen (A)    Protein fluid    Collection Time: 04/07/22  3:04 PM   Result Value Ref Range    Protein Fluid Source Pleural Cavity, Right     Protein Total Fluid 5.0 g/dL   Glucose fluid    Collection Time: 04/07/22  3:04 PM   Result Value Ref Range    Glucose Fluid Source Pleural Cavity, Right     Glucose fluid <5 mg/dL   Lactate dehydrogenase fluid    Collection Time: 04/07/22  3:04 PM   Result Value Ref Range    LD Fluid Source Pleural Cavity, Right     Lactate dehydrogenase fluid 1,089 U/L   Fungal or Yeast Culture Routine    Collection Time: 04/07/22  3:04 PM    Specimen: Pleural Cavity, Right; Pleural fluid   Result Value Ref Range    Culture No growth after 26 days    Pleural fluid Aerobic Bacterial Culture Routine    Collection Time: 04/07/22  3:04 PM    Specimen: Pleural Cavity, Right; Pleural fluid   Result Value Ref Range    Culture 4+ Staphylococcus aureus (A)        Susceptibility    Staphylococcus aureus - REGLA     Oxacillin* 0.5 Susceptible ug/mL      * Oxacillin susceptible isolates are susceptible to cephalosporins (example: cefazolin and cephalexin) and beta lactam combination agents. Oxacillin resistant isolates are resistant to these agents.     Gentamicin <=0.5 Susceptible ug/mL     Erythromycin <=0.25 Susceptible ug/mL     Clindamycin <=0.25 Susceptible ug/mL     Vancomycin 1.0 Susceptible ug/mL     Tetracycline <=1.0 Susceptible ug/mL     Trimethoprim/Sulfamethoxazole <=0.5/9.5 Susceptible ug/mL   Cytology, non-gynecologic    Collection Time: 04/07/22  3:04 PM   Result Value Ref Range    Final Diagnosis       Specimen A     Interpretation:      Negative for malignancy    RIGHT PLEURAL FLUID:     OVERTLY PURULENT EXUDATE, CONSISTENT WITH EMPYEMA     LARGE NUMBERS OF INTACT AND DEGENERATING RED BLOOD CELLS, CONSISTENT WITH RECENT HEMORRHAGE     NUMEROUS CLUSTERS OF COCCOID BACTERIA     NEGATIVE FOR ATYPICAL OR MALIGNANT CELLS     Adequacy:     Satisfactory  for evaluation          Clinical Information       61 year old male with cirrhosis, ESKD, recurrent right pleural effusion      Gross Description       17 ml cloudy dark red fluid    1 air-dried slide    1 SurePath slide    1 cell block in formalin 1450, 04/08/22, are prepared      Microscopic Description       Material examined consists of cell block sections, one monolayer preparation and one smear preparation. These demonstrate large numbers of neutrophils, large numbers of intact and degenerating red blood cells, a moderate amount of amorphous proteinaceous debris, and numerous clusters of coccoid bacteria. Smaller numbers of lymphocytes and macrophages are in the background, with rare mesothelial cells. No atypical or malignant features are identified.       Performing Labs       The technical component of this testing was completed at [facility]     Cell Count Body Fluid    Collection Time: 04/07/22  3:04 PM   Result Value Ref Range    Color Red (A) Colorless, Yellow    Clarity Bloody Clear, Bloody    Total Nucleated Cells 4,116 /uL    RBC Count 1,166,000 /uL    Cell Count Fluid Source Pleural Cavity, Right    Acid-Fast Bacilli Culture and Stain    Collection Time: 04/07/22  3:04 PM    Specimen: Pleural Cavity, Right; Pleural fluid   Result Value Ref Range    Acid Fast Stain      Acid Fast Stain      Acid Fast Stain      Acid Fast Stain     Differential Body Fluid    Collection Time: 04/07/22  3:04 PM   Result Value Ref Range    % Neutrophils 72 %    % Lymphocytes 5 %    % Monocyte/Macrophages 20 %    % Eosinophils 3 %   CTA Chest Abdomen Pelvis w Contrast    Collection Time: 04/07/22  6:34 PM   Result Value Ref Range    Radiologist flags Hepatic masses    Hemoglobin    Collection Time: 04/07/22  9:33 PM   Result Value Ref Range    Hemoglobin 7.2 (L) 13.3 - 17.7 g/dL   CBC with platelets    Collection Time: 04/08/22  5:43 AM   Result Value Ref Range    WBC Count 6.4 4.0 - 11.0  10e3/uL    RBC Count 2.46 (L) 4.40 - 5.90 10e6/uL    Hemoglobin 7.2 (L) 13.3 - 17.7 g/dL    Hematocrit 22.6 (L) 40.0 - 53.0 %    MCV 92 78 - 100 fL    MCH 29.3 26.5 - 33.0 pg    MCHC 31.9 31.5 - 36.5 g/dL    RDW 17.2 (H) 10.0 - 15.0 %    Platelet Count 43 (LL) 150 - 450 10e3/uL   Basic metabolic panel    Collection Time: 04/08/22  5:43 AM   Result Value Ref Range    Sodium 132 (L) 136 - 145 mmol/L    Potassium 4.7 3.5 - 5.0 mmol/L    Chloride 100 98 - 107 mmol/L    Carbon Dioxide (CO2) 22 22 - 31 mmol/L    Anion Gap 10 5 - 18 mmol/L    Urea Nitrogen 54 (H) 8 - 22 mg/dL    Creatinine 7.90 (HH) 0.70 - 1.30 mg/dL    Calcium 7.3 (L) 8.5 - 10.5 mg/dL    Glucose 90 70 - 125 mg/dL    GFR Estimate 7 (L) >60 mL/min/1.73m2   CRP inflammation    Collection Time: 04/08/22  5:43 AM   Result Value Ref Range    CRP 10.3 (H) 0.0-<0.8 mg/dL   Procalcitonin    Collection Time: 04/08/22  5:28 PM   Result Value Ref Range    Procalcitonin 0.96 (H) 0.00 - 0.49 ng/mL   CBC with platelets    Collection Time: 04/09/22  4:56 AM   Result Value Ref Range    WBC Count 5.7 4.0 - 11.0 10e3/uL    RBC Count 2.32 (L) 4.40 - 5.90 10e6/uL    Hemoglobin 6.9 (LL) 13.3 - 17.7 g/dL    Hematocrit 21.3 (L) 40.0 - 53.0 %    MCV 92 78 - 100 fL    MCH 29.7 26.5 - 33.0 pg    MCHC 32.4 31.5 - 36.5 g/dL    RDW 17.6 (H) 10.0 - 15.0 %    Platelet Count 55 (L) 150 - 450 10e3/uL   Basic metabolic panel    Collection Time: 04/09/22  4:56 AM   Result Value Ref Range    Sodium 135 (L) 136 - 145 mmol/L    Potassium 4.0 3.5 - 5.0 mmol/L    Chloride 99 98 - 107 mmol/L    Carbon Dioxide (CO2) 28 22 - 31 mmol/L    Anion Gap 8 5 - 18 mmol/L    Urea Nitrogen 31 (H) 8 - 22 mg/dL    Creatinine 5.30 (H) 0.70 - 1.30 mg/dL    Calcium 7.6 (L) 8.5 - 10.5 mg/dL    Glucose 87 70 - 125 mg/dL    GFR Estimate 12 (L) >60 mL/min/1.73m2   INR    Collection Time: 04/09/22  4:56 AM   Result Value Ref Range    INR 1.31 (H) 0.85 - 1.15   Prepare red blood cells (unit)    Collection Time:  04/09/22  1:30 PM   Result Value Ref Range    CROSSMATCH Compatible     UNIT ABO/RH A Pos     Unit Number [id number]     Unit Status Transfused     Blood Component Type Red Blood Cells     Product Code [id number]     CODING SYSTEM [id number]     UNIT TYPE ISBT 6200     ISSUE DATE AND TIME [id number]    Vancomycin level    Collection Time: 04/11/22  5:28 AM   Result Value Ref Range    Vancomycin 15.9   mg/L   Basic metabolic panel    Collection Time: 04/11/22  5:28 AM   Result Value Ref Range    Sodium 135 (L) 136 - 145 mmol/L    Potassium 4.6 3.5 - 5.0 mmol/L    Chloride 101 98 - 107 mmol/L    Carbon Dioxide (CO2) 24 22 - 31 mmol/L    Anion Gap 10 5 - 18 mmol/L    Urea Nitrogen 58 (H) 8 - 22 mg/dL    Creatinine 8.15 (HH) 0.70 - 1.30 mg/dL    Calcium 7.5 (L) 8.5 - 10.5 mg/dL    Glucose 94 70 - 125 mg/dL    GFR Estimate 7 (L) >60 mL/min/1.73m2   CBC with platelets    Collection Time: 04/11/22  5:28 AM   Result Value Ref Range    WBC Count 4.5 4.0 - 11.0 10e3/uL    RBC Count 2.43 (L) 4.40 - 5.90 10e6/uL    Hemoglobin 7.3 (L) 13.3 - 17.7 g/dL    Hematocrit 22.7 (L) 40.0 - 53.0 %    MCV 93 78 - 100 fL    MCH 30.0 26.5 - 33.0 pg    MCHC 32.2 31.5 - 36.5 g/dL    RDW 17.0 (H) 10.0 - 15.0 %    Platelet Count 53 (L) 150 - 450 10e3/uL   Lactic Acid STAT    Collection Time: 04/11/22  6:49 PM   Result Value Ref Range    Lactic Acid 1.6 0.7 - 2.0 mmol/L   Blood Culture Wrist, Right    Collection Time: 04/11/22  6:49 PM    Specimen: Wrist, Right; Blood   Result Value Ref Range    Culture No Growth    CBC with platelets    Collection Time: 04/11/22  6:49 PM   Result Value Ref Range    WBC Count 6.4 4.0 - 11.0 10e3/uL    RBC Count 2.85 (L) 4.40 - 5.90 10e6/uL    Hemoglobin 8.4 (L) 13.3 - 17.7 g/dL    Hematocrit 26.1 (L) 40.0 - 53.0 %    MCV 92 78 - 100 fL    MCH 29.5 26.5 - 33.0 pg    MCHC 32.2 31.5 - 36.5 g/dL    RDW 17.0 (H) 10.0 - 15.0 %    Platelet Count 55 (L) 150 - 450 10e3/uL   Blood Culture Hand, Right    Collection Time:  04/11/22  7:49 PM    Specimen: Hand, Right; Blood   Result Value Ref Range    Culture No Growth    CBC with platelets and differential    Collection Time: 05/03/22 12:06 PM   Result Value Ref Range    WBC Count 7.3 4.0 - 11.0 10e3/uL    RBC Count 2.88 (L) 4.40 - 5.90 10e6/uL    Hemoglobin 8.5 (L) 13.3 - 17.7 g/dL    Hematocrit 26.5 (L) 40.0 - 53.0 %    MCV 92 78 - 100 fL    MCH 29.5 26.5 - 33.0 pg    MCHC 32.1 31.5 - 36.5 g/dL    RDW 17.6 (H) 10.0 - 15.0 %    Platelet Count 100 (L) 150 - 450 10e3/uL    % Neutrophils 81 %    % Lymphocytes 11 %    % Monocytes 7 %    % Eosinophils 1 %    % Basophils 0 %    % Immature Granulocytes 0 %    Absolute Neutrophils 5.9 1.6 - 8.3 10e3/uL    Absolute Lymphocytes 0.8 0.8 - 5.3 10e3/uL    Absolute Monocytes 0.5 0.0 - 1.3 10e3/uL    Absolute Eosinophils 0.1 0.0 - 0.7 10e3/uL    Absolute Basophils 0.0 0.0 - 0.2 10e3/uL    Absolute Immature Granulocytes 0.0 <=0.4 10e3/uL          Recent Labs:   Recent Results (from the past 168 hour(s))   CBC with platelets and differential   Result Value Ref Range    WBC Count 7.3 4.0 - 11.0 10e3/uL    RBC Count 2.88 (L) 4.40 - 5.90 10e6/uL    Hemoglobin 8.5 (L) 13.3 - 17.7 g/dL    Hematocrit 26.5 (L) 40.0 - 53.0 %    MCV 92 78 - 100 fL    MCH 29.5 26.5 - 33.0 pg    MCHC 32.1 31.5 - 36.5 g/dL    RDW 17.6 (H) 10.0 - 15.0 %    Platelet Count 100 (L) 150 - 450 10e3/uL    % Neutrophils 81 %    % Lymphocytes 11 %    % Monocytes 7 %    % Eosinophils 1 %    % Basophils 0 %    % Immature Granulocytes 0 %    Absolute Neutrophils 5.9 1.6 - 8.3 10e3/uL    Absolute Lymphocytes 0.8 0.8 - 5.3 10e3/uL    Absolute Monocytes 0.5 0.0 - 1.3 10e3/uL    Absolute Eosinophils 0.1 0.0 - 0.7 10e3/uL    Absolute Basophils 0.0 0.0 - 0.2 10e3/uL    Absolute Immature Granulocytes 0.0 <=0.4 10e3/uL       Imaging:    Recent Results (from the past 744 hour(s))   IR Dialysis Fistulogram Left    Narrative    [facility]  LOCATION: [facility]  DATE:  4/4/2022    PROCEDURE: LEFT ARM FISTULOGRAM AND PLACEMENT OF COVERED STENT GRAFT    INTERVENTIONAL RADIOLOGIST: Zbigniew Stephenson MD.    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    CONSENT: The risks, benefits and alternatives of left arm fistulogram with possible intervention were discussed with the patient  in detail. All questions were answered. Informed consent was given to proceed with the procedure.    MODERATE SEDATION: Versed 1.5 mg IV; Fentanyl 50 mcg IV.  Under physician supervision, Versed and fentanyl were administered for moderate sedation. Pulse oximetry, heart rate and blood pressure were continuously monitored by an independent trained   observer. The physician spent 30 minutes of face-to-face sedation time with the patient.    CONTRAST: 25 cc of Omni  ANTIBIOTICS: None.  ADDITIONAL MEDICATIONS: None.    FLUOROSCOPIC TIME: 2.9 minutes.  RADIATION DOSE: Air Kerma: 8 mGy.    COMPLICATIONS: No immediate complications.    STERILE BARRIER TECHNIQUE: Maximum sterile barrier technique was used. Cutaneous antisepsis was performed at the operative site with application of 2% chlorhexidine and large sterile drape. Prior to the procedure, the  and assistant performed   hand hygiene and wore hat, mask, sterile gown, and sterile gloves during the entire procedure.    PROCEDURE:    The left arm dialysis graft was accessed using a micropuncture needle towards the arterial limb of the graft and eventually a 8 Samoan sheath was placed. Through the sheath over a Bentson wire a 5 Samoan KMP catheter was advanced into the arterial limb   of the graft. A left arm dialysis fistulogram was performed. This revealed to moderate size pseudoaneurysms coming off near the apex of the graft and a small pseudoaneurysm coming off of the arterial limb of the graft. This was successfully treated with   overlapping 8 mm x 2.5 cm and 8 mm x 5 cm Viabahn covered stent grafts. The  stent graft were then angioplastied to 8 mm. Final fistulogram demonstrates no residual active bleed or pseudoaneurysm.        Impression    IMPRESSION:    Left arm fistulogram reveals 3 pseudoaneurysms coming off of the arterial limb of the graft successfully treated with placement of Viabahn covered stent grafts.  ____________________________________________________________________       XR Chest Port 1 View    Narrative    EXAM: XR CHEST PORT 1 VIEW  LOCATION: [facility]  DATE/TIME: 4/5/2022 4:59 AM    INDICATION: R empyema s p chest tube  COMPARISON: 04/01/2022. CT 03/30/2022.      Impression    IMPRESSION: Right chest tube in place with continued mild decrease in the right pleural effusion with mild complex effusion remaining. Some mild hydropneumothorax in the right lung base likely due to trapped lung right lower lobe.    New vascular stent across the left brachiocephalic vein place for 04/02/2022.   XR Chest Port 1 View    Narrative    EXAM: XR CHEST PORT 1 VIEW  LOCATION: [facility]  DATE/TIME: 4/7/2022 10:31 AM    INDICATION: right empyema pleural effusion with chest tube in place, now having new shortness of breath.  COMPARISON: 04/05/2022      Impression    IMPRESSION: Similar right chest tube. Persistent but slightly decreased partially loculated right effusion and associated atelectasis. No pneumothorax. Left lung is clear. Heart size is stable. Similar endovascular stent over the expected brachiocephalic   vein.   CTA Chest Abdomen Pelvis w Contrast   Result Value    Radiologist flags Hepatic masses    Narrative    EXAM: CTA CHEST ABDOMEN PELVIS W CONTRAST  LOCATION: [facility]  DATE/TIME: 4/7/2022 6:00 PM    INDICATION: Right chest pain. Complex right pleural effusion. Aortic and pulmonary artery dissection.  COMPARISON: 3/30/2022  TECHNIQUE: CT angiogram chest abdomen pelvis during arterial phase of injection of IV contrast.  2D and 3D MIP reconstructions were performed by the CT technologist. Dose reduction techniques were used.   CONTRAST: [id number] 100ml    FINDINGS:   CT ANGIOGRAM CHEST, ABDOMEN, AND PELVIS: No significant interval change in a [place] type B aortic dissection extending from the aortic arch through the aortic bifurcation. The celiac axis and superior mesenteric artery and right renal artery are   opacified by the true lumen. The left renal artery is opacified by the false lumen. The inferior mesenteric artery is opacified by the false lumen. The dissection extends a short distance into the left common iliac artery. The iliac arteries are   otherwise normal. No evidence of rupture. 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 An infrarenal abdominal aortic artery aneurysm measuring 3.7 x 3.5 cm. The ascending aorta is   stable and dilated at 4.0 cm. A stent within the left brachiocephalic vein. Immediately proximal to the stent is marked narrowing of the left subclavian vein between the right clavicle and first rib, image 10:6. Stable peripheral irregularity of the main   pulmonary artery extending into the proximal right pulmonary artery, image 46:6.    LUNGS AND PLEURA: Loculated large right hydropneumothorax is similar in size to prior with an increased pneumothorax component. A stable hyperdense component posteriorly within the pleural collection is consistent with a hematoma. Right chest tube in   place. Atelectasis involving the majority of the right lung. Small layering left pleural effusion.    MEDIASTINUM/AXILLAE: No lymphadenopathy. Trace pericardial fluid.    CORONARY ARTERY CALCIFICATION: Moderate.    HEPATOBILIARY: An indeterminant 4.3 x 3.9 cm mass in segment 8 of the with a peripheral nodular enhancing component, image 87:6. An indeterminate 2.2 x 2.2 cm hypodensity in segment 8, image 84:6. Nodular hepatic contour consistent with    cirrhosis/fibrosis. Cholelithiasis. No biliary ductal dilatation.    PANCREAS: Normal.    SPLEEN: Stable splenomegaly measuring 17.5 cm in maximal dimension.    ADRENAL GLANDS: Normal.    KIDNEYS/BLADDER: Atrophic kidneys. Hypoenhancing left kidney. Subcentimeter renal hypodensities which are too small to characterize. No hydronephrosis.    BOWEL: No obstruction or inflammatory change.    LYMPH NODES: Normal.    PELVIC ORGANS: Small volume ascites.    MUSCULOSKELETAL: Degenerative changes of the spine and hips.      Impression    IMPRESSION:  1.  Stable [place] type B aortic dissection.  2.  Stable dilatation of the ascending aorta, aortic arch, descending aorta and abdominal aorta.  3.  Stable peripheral irregularity in the main pulmonary artery which is most consistent with a dissection.  4.  Stable size of a large loculated right hydropneumothorax with increased pneumothorax component. Right chest tube in place.  5.  Marked narrowing of the left subclavian vein proximal to a stent.  6.  The left kidney is opacified by the false lumen and is hypoenhancing which may represent ischemia or artifact due to timing of the contrast bolus.  7.  There are 2 indeterminant hepatic masses which may represent hepatocellular carcinoma. Recommend a contrast enhanced magnetic resonance imaging examination of the liver.      [Recommend Follow Up: Hepatic masses]    This report will be copied to the [facility] to ensure a provider acknowledges the finding.        US Paracentesis    Narrative    EXAM:  1. PARACENTESIS  2. ULTRASOUND GUIDANCE  LOCATION: [facility]  DATE/TIME: 4/29/2022 2:56 PM    INDICATION: Ascites.    PROCEDURE: Informed consent obtained. Time out performed. The abdomen was prepped and draped in a sterile fashion. 10 mL of 1% lidocaine was infused into local soft tissues. A 5 Canadian catheter system was introduced into the abdominal ascites under   ultrasound guidance.    0.45  liters of clear fluid were removed and sent to lab if requested.    Patient tolerated procedure well.    Ultrasound imaging was obtained and placed in the patient's permanent medical record.      Impression    IMPRESSION:  1.  Status post ultrasound-guided paracentesis.    Reference CPT Code: 93352   CT CHEST W CONTRAST    Narrative    EXAM: CT CHEST W CONTRAST  LOCATION: [facility]  DATE/TIME: 5/3/2022 5:10 PM    INDICATION: Right chest wall lump. Possible abscess. History of empyema.  COMPARISON: CT 04/07/2022.  TECHNIQUE: CT chest with IV contrast. Multiplanar reformats were obtained. Dose reduction techniques were used.    CONTRAST: Isovue 370 50mL    FINDINGS:   LUNGS AND PLEURA: The previous large caliber right chest tube has been removed. Large hydropneumothorax remains on the right side mildly larger since 04/07/2022. Severe compressive atelectasis of the right mid and lower lungs similar to previous. Slight   increased density within this pocket of fluid consistent with some blood products.    Left lung is clear.    MEDIASTINUM/AXILLAE: No lymphadenopathy. Stable type the dissection and stable aneurysmal dilatation of the distal aortic arch and upper descending aorta the distal large measuring 5.7 cm in maximum diameter. Stent in the left brachiocephalic vein is   patent and unchanged.    CORONARY ARTERY CALCIFICATION: Moderate.    UPPER ABDOMEN: Stable hypodensities in the liver. Gallstones.    MUSCULOSKELETAL: Soft tissue lump right lateral chest wall at the site of previous chest tube. This may represent a hematoma or possibly infection but no rachid abscess.      Impression    IMPRESSION:   1.  The lump right lateral chest wall is at the site of previous chest tube and may represent a hematoma or phlegmonous change/infection but no rachid abscess.    2.  Large right hydropneumothorax remains minimally larger when compared to 04/07/2022.    3.  Stable type B aortic dissection.  "      Pathology:   No results found for this or any previous visit (from the past 8760 hour(s)).          Signed by: Li Glasgow MD    Review of Systems - Oncology    BP (!) 151/98   Pulse 79   Resp 16   Ht 1.645 m (5' 4.75\")   Wt 60.8 kg (134 lb)   SpO2 98%   BMI 22.47 kg/m      Physical Exam  Vitals and nursing note reviewed. Exam conducted with a chaperone present.   Constitutional:       Appearance: He is normal weight. He is ill-appearing (chronically).   HENT:      Head: Normocephalic and atraumatic.      Nose: Nose normal.   Eyes:      Extraocular Movements: Extraocular movements intact.      Conjunctiva/sclera: Conjunctivae normal.      Pupils: Pupils are equal, round, and reactive to light.   Neck:      Comments: Head tilts to the right  Cardiovascular:      Rate and Rhythm: Normal rate and regular rhythm.      Heart sounds: Normal heart sounds.   Pulmonary:      Breath sounds: Examination of the right-upper field reveals decreased breath sounds. Examination of the right-middle field reveals decreased breath sounds. Examination of the right-lower field reveals decreased breath sounds. Decreased breath sounds present.      Comments: Dullness to percussion throughout right lung  Abdominal:      General: Abdomen is protuberant. Bowel sounds are normal.      Palpations: Abdomen is soft. There is no fluid wave or mass.      Tenderness: There is no guarding.   Musculoskeletal:      Right hand: Deformity present. Decreased range of motion.      Left hand: Decreased strength: tophi.      Cervical back: Normal range of motion. No tenderness.   Lymphadenopathy:      Cervical: No cervical adenopathy.   Skin:     General: Skin is warm.      Coloration: Skin is pale.   Neurological:      General: No focal deficit present.      Mental Status: He is alert and oriented to person, place, and time.      Gait: Gait abnormal (uses a cane).      Deep Tendon Reflexes: Reflexes normal.   Psychiatric:         Mood and " Affect: Mood normal.         Behavior: Behavior normal.         Thought Content: Thought content normal.             Again, thank you for allowing me to participate in the care of your patient.        Sincerely,        Li Glasgow MD

## 2022-05-04 NOTE — PROGRESS NOTES
"Review of Systems - Oncology    BP (!) 151/98   Pulse 79   Resp 16   Ht 1.645 m (5' 4.75\")   Wt 60.8 kg (134 lb)   SpO2 98%   BMI 22.47 kg/m      Physical Exam  Vitals and nursing note reviewed. Exam conducted with a chaperone present.   Constitutional:       Appearance: He is normal weight. He is ill-appearing (chronically).   HENT:      Head: Normocephalic and atraumatic.      Nose: Nose normal.   Eyes:      Extraocular Movements: Extraocular movements intact.      Conjunctiva/sclera: Conjunctivae normal.      Pupils: Pupils are equal, round, and reactive to light.   Neck:      Comments: Head tilts to the right  Cardiovascular:      Rate and Rhythm: Normal rate and regular rhythm.      Heart sounds: Normal heart sounds.   Pulmonary:      Breath sounds: Examination of the right-upper field reveals decreased breath sounds. Examination of the right-middle field reveals decreased breath sounds. Examination of the right-lower field reveals decreased breath sounds. Decreased breath sounds present.      Comments: Dullness to percussion throughout right lung  Abdominal:      General: Abdomen is protuberant. Bowel sounds are normal.      Palpations: Abdomen is soft. There is no fluid wave or mass.      Tenderness: There is no guarding.   Musculoskeletal:      Right hand: Deformity present. Decreased range of motion.      Left hand: Decreased strength: tophi.      Cervical back: Normal range of motion. No tenderness.   Lymphadenopathy:      Cervical: No cervical adenopathy.   Skin:     General: Skin is warm.      Coloration: Skin is pale.   Neurological:      General: No focal deficit present.      Mental Status: He is alert and oriented to person, place, and time.      Gait: Gait abnormal (uses a cane).      Deep Tendon Reflexes: Reflexes normal.   Psychiatric:         Mood and Affect: Mood normal.         Behavior: Behavior normal.         Thought Content: Thought content normal.         "

## 2022-05-10 NOTE — PROGRESS NOTES
Assessment and Plan   (R22.2) Chest wall mass  (primary encounter diagnosis)  Comment: Looks about the same as last week.  Workup including CT last week did not show obvious elements of new/worsening infection.  May be leakage of fluid from prior chest tube site with its waxing/waning characteristics.  Will refer to cardiothoracic surgery for further evaluation and possible management.  Discussed reasons to return for reevaluation.  Plan: Cardiovascular Surgery Referral          (D61.478) Other pancytopenia (H)  Comment: Historical problem.  No new intervention at this time.  Plan: As above.    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  No new intervention at this time.  Plan: As above.    Follow up in 2-4 weeks or sooner as needed.    Options for treatment and follow-up care were reviewed with the patient and/or guardian. Elle Blanton and/or guardian engaged in the decision making process and verbalized understanding of the options discussed and agreed with the final plan.    Jorje Glez MD  [facility], PGY-3    Precepted patient with Dr. Rosio Morales.       HPI:   Elle Blanton is a 61 year old male who presents to clinic today for   Chief Complaint   Patient presents with     RECHECK     Follow up ribs       Patient following up chest wall lump.  States it intermittently grows and decreases in size but is not painful or inflamed.  No fevers/chills or other new systemic symptoms.  Has not gone away.         Review of Systems:     Complete ROS negative except as noted in HPI.         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)     Thrombocytopenia due to hypersplenism     Iliopsoas muscle hematoma, left, initial encounter     Chronic dissection of thoracic aorta (H)     ESRD (end stage renal disease) on dialysis (H)     Anemia due to blood loss, acute     Hepatic cirrhosis, unspecified hepatic cirrhosis type, unspecified whether ascites present (H)     Septic bursitis     Pleural effusion     Empyema lung (H)     Hydropneumothorax     Hyperkalemia     Gout     Lymphedema of left upper extremity     Epistaxis     Closed fracture of nasal bone, initial encounter     Fall, initial encounter     Anemia in end-stage renal disease (H)     Empyema (H)     Thoracic aortic dissection (H)     Anemia, unspecified type     Acquired dissection of pulmonary artery (H)     Active problem list reviewed and updated.    Current Medications  Current Outpatient Medications   Medication Sig Dispense Refill     acetaminophen (TYLENOL) 500 MG tablet Take 500 mg by mouth every 6 hours as needed for mild pain       calcium acetate (PHOSLO) 667 MG CAPS capsule Take 1 capsule by mouth 3 times daily (with meals)       carvedilol (COREG) 25 MG tablet Take 25-50 mg by mouth 2 times daily as needed        cephALEXin (KEFLEX) 500 MG capsule Take 1 capsule (500 mg) by mouth in the morning and 1 capsule (500 mg) in the evening. 120 capsule 1     cloNIDine (CATAPRES) 0.1 MG tablet Take 0.1 mg by mouth At Bedtime        [employer] SALINE NASAL SPRAY 0.65 % nasal spray Spray 1 spray in nostril daily as needed (Patient not taking: No sig reported)       diltiazem ER (DILT-XR) 180 MG 24 hr capsule Take 1 capsule (180 mg) by mouth 2 times daily (Patient taking differently: Take 180 mg by mouth 2 times daily as needed (Checks BP prior to taking, if too low he does not take this medication)) 60 capsule 11     entecavir (BARACLUDE) 0.5 MG tablet Take 1 tablet (0.5 mg) by mouth  every 7 days 52 tablet 1     hydrOXYzine (ATARAX) 25 MG tablet Take 1 tablet (25 mg) by mouth 3 times daily as needed for itching 90 tablet 3     order for DME Equipment being ordered: Other: blood pressure monitor  Treatment Diagnosis: hypertension 1 each 0     oxyCODONE (ROXICODONE) 5 MG tablet Take 1 tablet (5 mg) by mouth daily as needed for severe pain (Patient not taking: Reported on 5/4/2022) 30 tablet 0     pantoprazole (PROTONIX) 40 MG EC tablet Take 1 tablet (40 mg) by mouth daily 30 tablet 11     senna-docusate (SENOKOT-S/PERICOLACE) 8.6-50 MG tablet Take 1 tablet by mouth daily as needed for constipation 30 tablet 11     zolpidem (AMBIEN) 5 MG tablet Take 1 tablet (5 mg) by mouth nightly as needed for sleep 10 tablet 5     Medication list reviewed and updated.    Social History  Social History     Tobacco Use     Smoking status: Never Smoker     Smokeless tobacco: Never Used   Vaping Use     Vaping Use: Never used   Substance Use Topics     Alcohol use: No     Drug use: No     History   Drug Use No       Family History  Family History   Problem Relation Age of Onset     Diabetes Father      Hypertension No family hx of      Asthma No family hx of      Cancer No family hx of      Coronary Artery Disease No family hx of      Liver Cancer No family hx of      Ulcers Father        Allergies  Allergies   Allergen Reactions     Nka [No Known Allergies]             Physical Exam:     Vitals:    05/10/22 0906 05/10/22 0907   BP: (!) 160/93 (!) 161/93   Pulse: 88    Resp: 16    Temp: 98  F (36.7  C)    TempSrc: Oral    SpO2: 94%    Weight: 60.7 kg (133 lb 12.8 oz)      Body mass index is 22.44 kg/m .    GENERAL APPEARANCE: alert, appears stated age, no acute distress.  HEENT: Eyes grossly normal to inspection, nares normal, MMM.  CHEST: Lump on R lateral chest wall about 5-6 cm in diameter and raised 2-3 cm.  Firm, nontender, not erythematous.  Not fluctuant.  RESP: no increased respiratory effort.  CV: good  capillary refill.  ABDOMEN: nondistended.  MSK: extremities normal, no gross deformities noted, no lower extremity edema.  SKIN: no suspicious lesions or rashes.  NEURO: Normal strength and tone, sensory exam grossly normal, mentation appears intact and speech normal.  PSYCH: mood and affect appropriate.

## 2022-05-17 NOTE — PROGRESS NOTES
ASSESSMENT/PLAN:  (R22.2) Chest wall mass  (primary encounter diagnosis)  -Patient denies any acute changes from previous clinic visit  -Does not have follow up with surgeon until 6/28  -States the mass waxes and wanes in size, is not painful, and is more emotionally distressing than actually causing problems   -We will follow up with the patient in four weeks to ensure no acute changes  -Plan to continue keflex until chest wall mass has been figured out with surgeon  -Discussed reasons to return for evaluation sooner vs present to the ED for further evaluation     (J86.9) Empyema lung (H)  -Patient was started on antibiotics for empyema of the lung  -Per ID note from hospitalization wanted BID for 4 weeks  -Was sent home with 8 weeks of antibioitcs  -At this time will continue until this chest wall mass is figured out      Pt is a 61 year old male last seen on 5/10/22 by Dr Glez here today for:     Chest wall mas follow up  -No acute changes or concerns  -Not painful, no warmth or discharge/drainage  -Has been waxing and waning most days    Per Dr Glez's note on 5/10/22:  (R22.2) Chest wall mass  (primary encounter diagnosis)  Comment: Looks about the same as last week.  Workup including CT last week did not show obvious elements of new/worsening infection.  May be leakage of fluid from prior chest tube site with its waxing/waning characteristics.  Will refer to cardiothoracic surgery for further evaluation and possible management.  Discussed reasons to return for reevaluation.  Plan: Cardiovascular Surgery Referral          (D61.818) Other pancytopenia (H)  Comment: Historical problem.  No new intervention at this time.  Plan: As above.     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  No new intervention at this time.  Plan: As above.    Chest CT on 5/3/2022:                                                                   IMPRESSION:   1.  The lump right lateral chest wall  is at the site of previous chest tube and may represent a hematoma or phlegmonous change/infection but no rachid abscess.   2.  Large right hydropneumothorax remains minimally larger when compared to 04/07/2022.   3.  Stable type B aortic dissection.    Per my last note on 4/27/22:  (K74.60,  R18.8) Cirrhosis of liver with ascites, unspecified hepatic cirrhosis type (H)  (primary encounter diagnosis)  Comment: abdomen is quite distended today; will refer for therapeutic paracentesis; he deferred liver labs today; precautions given; given his ESRD, will discuss diuretic options (spironolactone/ lasix) w/ his nephrologist  Plan: US Paracentesis          (N18.6,  Z99.2) ESRD (end stage renal disease) on dialysis (H)  Comment:   Plan: cont M/F dialysis; his arm is significantly better so I will continue to encourage more frequent dialysis as this was the rate-limiting step     (J86.9) Empyema lung (H)  Comment:   Plan: stable/ improved; cont Abx prophylaxis     (N18.4,  D63.1) Anemia due to stage 4 chronic kidney disease (H)  Comment:   Plan: stable Hgb at dialysis earlier this week; cont Epo and Fe tx    Past Medical History:   Diagnosis Date     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 pylori infection 7/5/2017    UGI bleed implied by Hgb 9.0 6/22/17 and melena. Admitted and hgb decreased to 7.6, CT abdomen showed all bladder wall thickening. + Hep B surface antigen noted. Melena resolved and hgb stabalized without transfusion, epigastric pain resolved with PPI. Recommend referral for gastroscopy.     Heart attack (H)      Hepatitis B      Normocytic anemia      Other pancytopenia (H) 7/5/2017 6/24/17 Peripheral  smear at [facility].Pancytopenia of uncertain cause. Ruled out acute leukemia. Hematologist suggests hemolytic anemia should be considered possible cause and LDH and haptoglobin should be assessed in future.      PONV (postoperative nausea and vomiting)      Thrombocytopenia (H)       Past Surgical History:   Procedure Laterality Date     ABCESS DRAINAGE      finger     BURSECTOMY ELBOW Left 10/15/2021    Procedure: LEFT OLECRANON BURSECTOMY;  Surgeon: Tico Myers MD;  Location: [facility] OR     BURSECTOMY ELBOW Left 1/18/2022    Procedure: LEFT ELBOW OLECRANON BURSECTOMY;  Surgeon: Tico Myers MD;  Location: [facility] OR     CREATE FISTULA ARTERIOVENOUS UPPER EXTREMITY Left 4/20/2021    Procedure: left arm dialysis graft placement;  Surgeon: Roxana Cosby MD;  Location: [facility] OR;  Service: General     FOREIGN BODY REMOVAL      finger     INCISION AND DRAINAGE FINGER, COMBINED Left 10/20/2016    Procedure: COMBINED INCISION AND DRAINAGE FINGER;  Surgeon: Mireya Pizano MD;  Location: [facility] OR     INCISION AND DRAINAGE UPPER EXTREMITY, COMBINED Left 1/18/2022    Procedure: AND ELBOW INCISION AND DRAINAGE;  Surgeon: Tico Myers MD;  Location: [facility] OR     IR CHEST TUBE PLACEMENT NON-TUNNELED RIGHT  4/19/2021     IR CHEST TUBE PLACEMENT NON-TUNNELED RIGHT  10/19/2021     IR CHEST TUBE PLACEMENT NON-TUNNELED RIGHT  11/10/2021     IR CHEST TUBE PLACEMENT NON-TUNNELED RIGHT  3/31/2022     IR DIALYSIS FISTULOGRAM LEFT  4/2/2022     IR DIALYSIS FISTULOGRAM LEFT  4/4/2022     IR PLEURAL DRAINAGE WITH CATHETER INSERTION  4/19/2021     MIDLINE INSERTION - DOUBLE LUMEN  4/23/2021          MD ESOPHAGOGASTRODUODENOSCOPY TRANSORAL DIAGNOSTIC N/A 8/18/2020    Procedure: ESOPHAGOGASTRODUODENOSCOPY (EGD) with biopsy;  Surgeon: Nilson Gonzales MD;  Location: [facility] GI;  Service: Gastroenterology     US PARACENTESIS  8/14/2020      PARACENTESIS  8/19/2020       THORACENTESIS  4/18/2021      Current Outpatient Medications   Medication Sig Dispense Refill     acetaminophen (TYLENOL) 500 MG tablet Take 500 mg by mouth every 6 hours as needed for mild pain       calcium acetate (PHOSLO) 667 MG CAPS capsule Take 1 capsule by mouth 3 times daily (with meals)       carvedilol (COREG) 25 MG tablet Take 25-50 mg by mouth 2 times daily as needed        cephALEXin (KEFLEX) 500 MG capsule Take 1 capsule (500 mg) by mouth in the morning and 1 capsule (500 mg) in the evening. 120 capsule 1     cloNIDine (CATAPRES) 0.1 MG tablet Take 0.1 mg by mouth At Bedtime        [employer] SALINE NASAL SPRAY 0.65 % nasal spray Spray 1 spray in nostril daily as needed (Patient not taking: No sig reported)       diltiazem ER (DILT-XR) 180 MG 24 hr capsule Take 1 capsule (180 mg) by mouth 2 times daily (Patient taking differently: Take 180 mg by mouth 2 times daily as needed (Checks BP prior to taking, if too low he does not take this medication)) 60 capsule 11     entecavir (BARACLUDE) 0.5 MG tablet Take 1 tablet (0.5 mg) by mouth every 7 days 52 tablet 1     hydrOXYzine (ATARAX) 25 MG tablet Take 1 tablet (25 mg) by mouth 3 times daily as needed for itching 90 tablet 3     order for DME Equipment being ordered: Other: blood pressure monitor  Treatment Diagnosis: hypertension 1 each 0     oxyCODONE (ROXICODONE) 5 MG tablet Take 1 tablet (5 mg) by mouth daily as needed for severe pain (Patient not taking: Reported on 5/4/2022) 30 tablet 0     pantoprazole (PROTONIX) 40 MG EC tablet Take 1 tablet (40 mg) by mouth daily 30 tablet 11     senna-docusate (SENOKOT-S/PERICOLACE) 8.6-50 MG tablet Take 1 tablet by mouth daily as needed for constipation 30 tablet 11     zolpidem (AMBIEN) 5 MG tablet Take 1 tablet (5 mg) by mouth nightly as needed for sleep 10 tablet 5      Allergies   Allergen Reactions     Nka [No Known Allergies]       ROS:   Gen- no weight change, no fevers/chills   ENT- no cough, no congestion, no  "URI symptoms   Cardiac - no palpitations, no chest pain   Respiratory - no shortness of breath , no wheezing   GI - no nausea, no vomiting, no diarrhea, no constipation   Urinary - no dysuria, no polyuria   Remainder of ROS negative.     Exam:   /70 (BP Location: Right arm, Patient Position: Sitting, Cuff Size: Adult Regular)   Pulse 71   Temp 97.9  F (36.6  C) (Oral)   Resp 12   Ht 1.651 m (5' 5\")   Wt 59 kg (130 lb 1.9 oz)   SpO2 97%   BMI 21.65 kg/m     Alert and oriented x 3; No acute distress, appears jaundiced and chronically unwell  Chest wall: Lump on R lateral chest wall about 5-6 cm in diameter and raised 2-3 cm.  Firm, nontender, not erythematous.  Not fluctuant  Extrem: no clubbing/cyanosis/edema   Neuro: no focal deficits   Derm: no rashes     Precepted with Dr. Mark Flores MD on 5/18/2022 at 9:14 AM    "

## 2022-05-18 NOTE — PROGRESS NOTES
I have personally reviewed the history and examination as documented by Dr. Healy.  I was present during key portions of the visit and agree with the assessment and plan as documented for 61 yr old male with ESRD on dialysis, chronic empyema, cirrhosis here for follow-up of chest wall mass at site of previous chest tube. Remains stable. Chest CT reassuring. Precautions given. Anticipatory guidance given.     Jaswant Flores MD  May 18, 2022  9:26 AM

## 2022-05-25 NOTE — TELEPHONE ENCOUNTER
[facility] phone call message- medication clarification/question:    Full Medication Name:     - cephALEXin (KEFLEX) 500 MG capsule    - oxyCODONE (ROXICODONE) 5 MG tablet    - zolpidem (AMBIEN) 5 MG tablet    - senna-docusate (SENOKOT-S/PERICOLACE) 8.6-50 MG tablet    Question: Patient called requesting for refill on medications.    Pharmacy confirmed as    [facility] [place], [place] - [address] [place] [address]: Yes    OK to leave a message on voice mail? Yes    Primary language: English      needed? No    Call taken on May 25, 2022 at 11:03 AM by Jef Golden

## 2022-05-27 NOTE — TELEPHONE ENCOUNTER
Phone message is noted. I will reach out to pt clarifying whether he needs additional care coordination.    Jaswant Flores MD  May 27, 2022  1:10 PM

## 2022-05-27 NOTE — TELEPHONE ENCOUNTER
[facility] phone call message- general phone call:    Reason for call: Caller just wanted to let  know she was working with patient over the phone but has stop responding to calls so she will be closing the case but will still be available in the future if it is being needed.     Return call needed: No    OK to leave a message on voice mail? Yes    Primary language: English      needed? No    Call taken on May 27, 2022 at 9:19 AM by Ana María Live

## 2022-06-11 NOTE — PROGRESS NOTES
ASSESSMENT/PLAN:    (J86.9) Empyema lung (H)  (primary encounter diagnosis)  Comment: referral needs to be changed from cardiology to thoracic surgery; will forward to our referral coordinator  Plan: Thoracic Surgery Referral          (R22.2) Chest wall mass  Comment: dressing changed; will cont Abx and use warm compresses; see above regarding thoracic surgery eval  Plan: Thoracic Surgery Referral        `  (D61.818) Other pancytopenia (H)  Comment: will check repeat cbc per pt request as he would prefer labs in our clinic vs dialysis; cbc was stable  Plan: CBC with platelets          (N18.4,  D63.1) Anemia due to stage 4 chronic kidney disease (H)  Comment: see above  Plan: CBC with platelets    (N18.6,  Z99.2) ESRD (end stage renal disease) on dialysis (H)  Comment: see above; cont dialysis m/w/f  Plan: CBC with platelets    (R13.19) Esophageal dysphagia  Comment: will check barium swallow given   Plan: XR Video Swallow with SLP or OT - Order with Speech Therapy Referral         57 min was spent on the day of visit in review of records, direct patient care, documentation, and care coordination.     Jaswant Flores MD  Mary 15, 2022  9:16 AM        Pt is a 62 year old male last seen on 5/18/2022 by Dr Healy and myself here today for:     1) chest wall mass - states that the chest wall mass got very large and then burst; purulent fluid came out; swelling has decreased a lot; Has remained on Abx  Chest CT 5/3/2022:  IMPRESSION:   1.  The lump right lateral chest wall is at the site of previous chest tube and may represent a hematoma or phlegmonous change/infection but no rachid abscess    2) empyema lung - stable breathing; no f/c  3) cirrhosis - mild distended feeling; feels like food is getting stuck in his throat; ?reflux   4) anemia - Hgb was 8.8 at dialysis over 1 wk ago; would like repeat again today as he is worried about bleeding from the chest wall mass  5) esrd on dialysis - m/w/f    Has appt w/ cardiology on  6/28/22 but per my review he was recommended to see cardiothoracic surgery by Dr Glez (see below note from 5/10/2022)    Per last note by Dr Healy on 5/18/2022:  ASSESSMENT/PLAN:  (R22.2) Chest wall mass  (primary encounter diagnosis)  -Patient denies any acute changes from previous clinic visit  -Does not have follow up with surgeon until 6/28  -States the mass waxes and wanes in size, is not painful, and is more emotionally distressing than actually causing problems   -We will follow up with the patient in four weeks to ensure no acute changes  -Plan to continue keflex until chest wall mass has been figured out with surgeon  -Discussed reasons to return for evaluation sooner vs present to the ED for further evaluation      (J86.9) Empyema lung (H)  -Patient was started on antibiotics for empyema of the lung  -Per ID note from hospitalization wanted BID for 4 weeks  -Was sent home with 8 weeks of antibioitcs  -At this time will continue until this chest wall mass is figured out    Per Dr Glez note on 5/10/2022:   (R22.2) Chest wall mass  (primary encounter diagnosis)  Comment: Looks about the same as last week.  Workup including CT last week did not show obvious elements of new/worsening infection.  May be leakage of fluid from prior chest tube site with its waxing/waning characteristics.  Will refer to cardiothoracic surgery for further evaluation and possible management.  Discussed reasons to return for reevaluation.  Plan: Cardiovascular Surgery Referral          (D61.818) Other pancytopenia (H)  Comment: Historical problem.  No new intervention at this time.  Plan: As above.     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  No new intervention at this time.  Plan: As above.    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 pylori infection 7/5/2017    UGI bleed implied by Hgb 9.0 6/22/17 and melena. Admitted and hgb decreased to 7.6, CT abdomen showed all bladder wall thickening. + Hep B surface antigen noted. Melena resolved and hgb stabalized without transfusion, epigastric pain resolved with PPI. Recommend referral for gastroscopy.     Heart attack (H)      Hepatitis B      Normocytic anemia      Other pancytopenia (H) 7/5/2017 6/24/17 Peripheral smear at [facility].Pancytopenia of uncertain cause. Ruled out acute leukemia. Hematologist suggests hemolytic anemia should be considered possible cause and LDH and haptoglobin should be assessed in future.      PONV (postoperative nausea and vomiting)      Thrombocytopenia (H)       Past Surgical History:   Procedure Laterality Date     ABCESS DRAINAGE      finger     BURSECTOMY ELBOW Left 10/15/2021    Procedure: LEFT OLECRANON BURSECTOMY;  Surgeon: Tico Myers MD;  Location: [facility] [place] OR     BURSECTOMY ELBOW Left 1/18/2022    Procedure: LEFT ELBOW OLECRANON BURSECTOMY;  Surgeon: Tico Myers MD;  Location: [facility]     CREATE FISTULA ARTERIOVENOUS UPPER EXTREMITY Left 4/20/2021    Procedure: left arm dialysis graft placement;  Surgeon: Roxana Cosby MD;  Location: [facility] [place];  Service: General     FOREIGN BODY REMOVAL      finger     INCISION AND DRAINAGE FINGER, COMBINED Left 10/20/2016    Procedure: COMBINED INCISION AND DRAINAGE FINGER;  Surgeon: Mireya Pizano MD;  Location: [facility] OR     INCISION AND DRAINAGE UPPER EXTREMITY, COMBINED Left 1/18/2022    Procedure: AND ELBOW INCISION AND DRAINAGE;  Surgeon: Tico Myers MD;  Location: [facility] OR     IR CHEST TUBE PLACEMENT NON-TUNNELED RIGHT  4/19/2021     IR CHEST TUBE  PLACEMENT NON-TUNNELED RIGHT  10/19/2021     IR CHEST TUBE PLACEMENT NON-TUNNELED RIGHT  11/10/2021     IR CHEST TUBE PLACEMENT NON-TUNNELED RIGHT  3/31/2022     IR DIALYSIS FISTULOGRAM LEFT  4/2/2022     IR DIALYSIS FISTULOGRAM LEFT  4/4/2022     IR PLEURAL DRAINAGE WITH CATHETER INSERTION  4/19/2021     MIDLINE INSERTION - DOUBLE LUMEN  4/23/2021          MI ESOPHAGOGASTRODUODENOSCOPY TRANSORAL DIAGNOSTIC N/A 8/18/2020    Procedure: ESOPHAGOGASTRODUODENOSCOPY (EGD) with biopsy;  Surgeon: Nilson Gonzales MD;  Location: [facility];  Service: Gastroenterology      PARACENTESIS  8/14/2020     US PARACENTESIS  8/19/2020     US THORACENTESIS  4/18/2021      Current Outpatient Medications   Medication Sig Dispense Refill     acetaminophen (TYLENOL) 500 MG tablet Take 500 mg by mouth every 6 hours as needed for mild pain       calcium acetate (PHOSLO) 667 MG CAPS capsule Take 1 capsule by mouth 3 times daily (with meals)       carvedilol (COREG) 25 MG tablet Take 1-2 tablets (25-50 mg) by mouth 2 times daily (with meals) 120 tablet 3     cephALEXin (KEFLEX) 500 MG capsule Take 1 capsule (500 mg) by mouth 2 times daily 60 capsule 1     cloNIDine (CATAPRES) 0.1 MG tablet Take 0.1 mg by mouth At Bedtime        [employer] SALINE NASAL SPRAY 0.65 % nasal spray Spray 1 spray in nostril daily as needed (Patient not taking: No sig reported)       diltiazem ER (DILT-XR) 180 MG 24 hr capsule Take 1 capsule (180 mg) by mouth 2 times daily (Patient taking differently: Take 180 mg by mouth 2 times daily as needed (Checks BP prior to taking, if too low he does not take this medication)) 60 capsule 11     entecavir (BARACLUDE) 0.5 MG tablet Take 1 tablet (0.5 mg) by mouth every 7 days 52 tablet 1     hydrOXYzine (ATARAX) 25 MG tablet Take 1 tablet (25 mg) by mouth 3 times daily as needed for itching 90 tablet 3     order for DME Equipment being ordered: Other: blood pressure monitor  Treatment Diagnosis: hypertension 1 each 0      oxyCODONE (ROXICODONE) 5 MG tablet Take 1 tablet (5 mg) by mouth daily as needed for severe pain 30 tablet 0     pantoprazole (PROTONIX) 40 MG EC tablet Take 1 tablet (40 mg) by mouth daily 30 tablet 11     senna-docusate (SENOKOT-S/PERICOLACE) 8.6-50 MG tablet Take 1 tablet by mouth daily as needed for constipation 30 tablet 11     zolpidem (AMBIEN) 5 MG tablet Take 1 tablet (5 mg) by mouth nightly as needed for sleep 10 tablet 5      Allergies   Allergen Reactions     Nka [No Known Allergies]       ROS:   Gen- no weight change, no fevers/chills   Cardiac - no palpitations, no chest pain   Respiratory - at baseline shortness of breath , no wheezing; see HPI  GI - no nausea, no vomiting, no diarrhea, no constipation   Urinary - no dysuria, no polyuria   Remainder of ROS negative.     Exam:   /73 (BP Location: Right arm, Patient Position: Sitting, Cuff Size: Adult Regular)   Pulse 76   Temp 97.9  F (36.6  C) (Oral)   Resp 12   Wt 59.4 kg (131 lb)   SpO2 98%   BMI 21.80 kg/m       Alert and oriented x 3; No acute distress   Chest wall:

## 2022-06-15 NOTE — NURSING NOTE
Writer was requested to perform wound cares on Elle. Elle has a right chest wall mass that started draining serosanguinous fluid. Wound was draining upon writer's arrival, no warmth or redness. Denies fever or pain. Writer cleansed the area with gauze and sterile water, pat dry. Covered wound with sterile 4x4 gauze, covered with telfa pad, secured with fabric tape. Elle tolerated procedure. Writer sent Elle home with telfa pads, sterile gauze, sterile water, and fabric tape. Recommended warm compress to the area, advised will likely start draining more when they apply warm compress and may need to change dressing more often. Elle and his wife verbalized understanding. Romain MELENDEZ

## 2022-06-20 NOTE — TELEPHONE ENCOUNTER
[facility] phone call message - order or referral request for patient:     Order or referral being requested: surgery      Additional Comments: Patient wife called stating that they were supposed to receive a call from alex fuller to have her help them call over to [facility] to schedule an appointment but has yet heard from her. They would like a call back soon to help schedule that appointment. Please call and advise.     OK to leave a message on voice mail? Yes    Primary language: English      needed? No    Call taken on June 20, 2022 at 2:52 PM by Ana María Live

## 2022-06-24 NOTE — TELEPHONE ENCOUNTER
Called Amarilis and advised patient is needing thoracic surgery consult due to draining chest wall mass since chest tube removed. Amarilis verbalized understanding and stated their thoracic surgeon is in clinic Thursdays, she stated she will reach out to the family and offer a few different appointment day and times. Romain MELENDEZ

## 2022-06-24 NOTE — TELEPHONE ENCOUNTER
Amarilis called wanted to clarify what this appointment is for, she stated is it for surgery or it is just to schedule with any doctor there. Im unsure will send message and see if someone can call her and assist since Moraima is out. Please call and advise.

## 2022-06-30 NOTE — TELEPHONE ENCOUNTER
"Covering for Dr. Flores.    Previously refilled by Dr. Flores on 25 May 22 with #30. Has upcoming appointment in 2 weeks. Will fill until then. Didn't find a specific plan for his pain/oxycodone. Continue with one pill/day as with prior prescription. PDMP reviewed.    Error received about the prescription's \"transmission to pharmacy\" failing and it being auto sent to a different pool. Will also send to [facility] for follow up.    Vickey Arce III, MD, FAAFP  [facility]  06/30/22 3:52 PM    "

## 2022-06-30 NOTE — TELEPHONE ENCOUNTER
[facility] phone call message- medication clarification/question:    Full Medication Name: oxyCODONE (ROXICODONE) 5 MG tablet    Question: Patient stated he has been out of this medication for 3 days, needing refill.    Pharmacy confirmed as    [id number] - [place], [place] - [address]: Yes    OK to leave a message on voice mail? Yes    Primary language: English      needed? No    Call taken on June 30, 2022 at 10:42 AM by Jef Golden

## 2022-07-01 NOTE — TELEPHONE ENCOUNTER
RECORDS STATUS - ALL OTHER DIAGNOSIS      RECORDS RECEIVED FROM: [facility]   DATE RECEIVED: 7/1   NOTES STATUS DETAILS   OFFICE NOTE from referring provider Jaswant Matias MD in [facility]: 5/18/22   DISCHARGE SUMMARY from hospital [facility]   DISCHARGE REPORT from the ER [facility]    OPERATIVE REPORT [facility] 8/18/20   MEDICATION LIST Epic 6/30/22   PFT Epic 6/17/22   LABS     ANYTHING RELATED TO DIAGNOSIS Epic 6/15/22   IMAGING (NEED IMAGES & REPORT)     CT SCANS PACS [facility]   MRI PACS [facility]   XR PACS Epic   ULTRASOUND PACS Epic

## 2022-07-07 NOTE — PROGRESS NOTES
THORACIC SURGERY - NEW PATIENT OFFICE VISIT      Dear Dr. Flores,    I saw Elle Blanton at your request in consultation for the evaluation and treatment of a trapped RIGHT lung and draining chest wall sinus.     HPI  Elle Blanton is a 62 year old male a trapped lung and he does not complain of dyspnea, but is very sedentary.    Previsit Tests   Chest imaging over time  CT abdomen 8/19/2020:  Last normal imaging of lower chest    CXR 4/17/2021    US thoracentesis 4/18/2021:  Drained 2000 ml, clear serous fluid, transudate by lab tests    CT chest 4/22/2021: After placement of a chest tube on 4/19/2022; RIGHT lung not fully expanded    CXR 4/17/2021: Fully expanded RIGHT lung      No further imaging after 4/27/2021 until September 2021.  CT chest 9/26/2021:Small-to-moderate RIGHT pleural effusion in the setting of acute LEFT retroperitoneal hemorrhage      CXR 10/15/2021: Recurrent large RIGHT pleural effusion    US thoracentesis 10/15/2021: 1.5 l of clear fluid  US thoracentesis 10/16/2021: 350 ml cloudy yellow fluid; lab: turbid brown fluid, cultures negative.  CT chest after chest tube (10/21/2021): Complex hydropneumothorax with new pleural thickening compared to 9/26/2022; suggestive of empyema.      CT 11/9/2021: After chest tube removal; space is mostly filled with fluid with air-pockets      CT 11/14/2021: Drained fluid, trapped lung.      Thoracentesis 4/7/2022: S. 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 pylori infection 7/5/2017    UGI  bleed implied by Hgb 9.0 6/22/17 and melena. Admitted and hgb decreased to 7.6, CT abdomen showed all bladder wall thickening. + Hep B surface antigen noted. Melena resolved and hgb stabalized without transfusion, epigastric pain resolved with PPI. Recommend referral for gastroscopy.     Heart attack (H)      Hepatitis B      Normocytic anemia      Other pancytopenia (H) 7/5/2017 6/24/17 Peripheral smear at [facility].Pancytopenia of uncertain cause. Ruled out acute leukemia. Hematologist suggests hemolytic anemia should be considered possible cause and LDH and haptoglobin should be assessed in future.      PONV (postoperative nausea and vomiting)      Thrombocytopenia (H)         PSH  Reviewed, as below    Past Surgical History:   Procedure Laterality Date     ABCESS DRAINAGE      finger     BURSECTOMY ELBOW Left 10/15/2021    Procedure: LEFT OLECRANON BURSECTOMY;  Surgeon: Tico Myers MD;  Location: [facility] OR     BURSECTOMY ELBOW Left 1/18/2022    Procedure: LEFT ELBOW OLECRANON BURSECTOMY;  Surgeon: Tico Myers MD;  Location: [facility] OR     CREATE FISTULA ARTERIOVENOUS UPPER EXTREMITY Left 4/20/2021    Procedure: left arm dialysis graft placement;  Surgeon: Roxana Cosby MD;  Location: [facility] OR;  Service: General     FOREIGN BODY REMOVAL      finger     INCISION AND DRAINAGE FINGER, COMBINED Left 10/20/2016    Procedure: COMBINED INCISION AND DRAINAGE FINGER;  Surgeon: Mireya Pizano MD;  Location: [facility] OR     INCISION AND DRAINAGE UPPER EXTREMITY, COMBINED Left 1/18/2022    Procedure: AND ELBOW INCISION AND DRAINAGE;  Surgeon: Tico Myers MD;  Location: [facility] OR     IR CHEST TUBE PLACEMENT NON-TUNNELED RIGHT  4/19/2021     IR CHEST TUBE PLACEMENT NON-TUNNELED RIGHT  10/19/2021     IR CHEST TUBE PLACEMENT NON-TUNNELED RIGHT  11/10/2021     IR CHEST TUBE PLACEMENT NON-TUNNELED RIGHT  3/31/2022     IR DIALYSIS FISTULOGRAM LEFT  4/2/2022      "IR DIALYSIS FISTULOGRAM LEFT  4/4/2022     IR PLEURAL DRAINAGE WITH CATHETER INSERTION  4/19/2021     MIDLINE INSERTION - DOUBLE LUMEN  4/23/2021          [place] ESOPHAGOGASTRODUODENOSCOPY TRANSORAL DIAGNOSTIC N/A 8/18/2020    Procedure: ESOPHAGOGASTRODUODENOSCOPY (EGD) with biopsy;  Surgeon: Nilson Gonzales MD;  Location: [facility];  Service: Gastroenterology      PARACENTESIS  8/14/2020      PARACENTESIS  8/19/2020     US THORACENTESIS  4/18/2021        ETOH negative  TOB negative    Physical examination  BP (!) 165/87 (BP Location: Right arm, Patient Position: Sitting, Cuff Size: Adult Regular)   Pulse 86   Ht 1.651 m (5' 5\")   Wt 60 kg (132 lb 4 oz)   SpO2 97%   BMI 22.01 kg/m      He is cachectic, pale, and very debilitated. His right chest has a draining sinus with purulent fluid. He is in a wheelchair.    From a personal perspective, he is here with his wife, Maico. They are [place] from [facility].    IMPRESSION   (J98.19) Trapped lung  (primary encounter diagnosis)  (J86.9) Empyema lung (H)  (R22.2) Chest wall mass  (N18.6,  Z99.2) ESRD (end stage renal disease) on dialysis (H)  (K74.60,  R18.8) Cirrhosis of liver with ascites, unspecified hepatic cirrhosis type (H)  (E43) Severe protein-calorie malnutrition (H)       This person is a 62 year old male with trapped RIGHT lung and draining chest wall sinus.   Empyema  ESRD  Cirrhosis  Severe malnutrition     PLAN  I spent 60 min on the date of the encounter in chart review, patient visit, review of tests, documentation and/or discussion with other providers about the issues documented above. I reviewed the plan as follows:    I mentioned that the only safe way to manage his chronic right empyema is a chronic empyema chest tube. We discussed that any more aggressive surgery is not an option for him due to his chronically very debilitated state, ESRD, and cirrhosis.    He needs to think about the empyema tube, and we'll review his CT from today together next " week (CT scheduled today for after his clinic visit).    I appreciate the opportunity to participate in the care of your patient and will keep you updated.  Sincerely,    Pilo Zurita MD

## 2022-07-07 NOTE — LETTER
7/7/2022      RE: Elle Blanton  [address]  [facility] [address]       THORACIC SURGERY - NEW PATIENT OFFICE VISIT      Dear Dr. Flores,    I saw Elle Blanton at your request in consultation for the evaluation and treatment of a trapped RIGHT lung and draining chest wall sinus.     HPI  Elle Blanton is a 62 year old male a trapped lung and he does not complain of dyspnea, but is very sedentary.    Previsit Tests   Chest imaging over time  CT abdomen 8/19/2020:  Last normal imaging of lower chest    CXR 4/17/2021    US thoracentesis 4/18/2021:  Drained 2000 ml, clear serous fluid, transudate by lab tests    CT chest 4/22/2021: After placement of a chest tube on 4/19/2022; RIGHT lung not fully expanded    CXR 4/17/2021: Fully expanded RIGHT lung      No further imaging after 4/27/2021 until September 2021.  CT chest 9/26/2021:Small-to-moderate RIGHT pleural effusion in the setting of acute LEFT retroperitoneal hemorrhage      CXR 10/15/2021: Recurrent large RIGHT pleural effusion    US thoracentesis 10/15/2021: 1.5 l of clear fluid  US thoracentesis 10/16/2021: 350 ml cloudy yellow fluid; lab: turbid brown fluid, cultures negative.  CT chest after chest tube (10/21/2021): Complex hydropneumothorax with new pleural thickening compared to 9/26/2022; suggestive of empyema.      CT 11/9/2021: After chest tube removal; space is mostly filled with fluid with air-pockets      CT 11/14/2021: Drained fluid, trapped lung.      Thoracentesis 4/7/2022: S. 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 pylori infection 7/5/2017    UGI bleed implied by Hgb 9.0 6/22/17 and melena. Admitted and hgb decreased to 7.6, CT abdomen showed all bladder wall thickening. + Hep B surface antigen noted. Melena resolved and hgb stabalized without transfusion, epigastric pain resolved with PPI. Recommend referral for gastroscopy.     Heart attack (H)      Hepatitis B      Normocytic anemia      Other pancytopenia (H) 7/5/2017 6/24/17 Peripheral smear at [facility].Pancytopenia of uncertain cause. Ruled out acute leukemia. Hematologist suggests hemolytic anemia should be considered possible cause and LDH and haptoglobin should be assessed in future.      PONV (postoperative nausea and vomiting)      Thrombocytopenia (H)         PSH  Reviewed, as below    Past Surgical History:   Procedure Laterality Date     ABCESS DRAINAGE      finger     BURSECTOMY ELBOW Left 10/15/2021    Procedure: LEFT OLECRANON BURSECTOMY;  Surgeon: Tico Myers MD;  Location: [facility] [place]     BURSECTOMY ELBOW Left 1/18/2022    Procedure: LEFT ELBOW OLECRANON BURSECTOMY;  Surgeon: Tico Myers MD;  Location: [facility]     CREATE FISTULA ARTERIOVENOUS UPPER EXTREMITY Left 4/20/2021    Procedure: left arm dialysis graft placement;  Surgeon: Roxana Cosby MD;  Location: [facility] [place];  Service: General     FOREIGN BODY REMOVAL      finger     INCISION AND DRAINAGE FINGER, COMBINED Left 10/20/2016    Procedure: COMBINED INCISION AND DRAINAGE FINGER;  Surgeon: Mireya Pizano MD;  Location: [facility] OR     INCISION AND DRAINAGE UPPER EXTREMITY, COMBINED Left 1/18/2022    Procedure: AND ELBOW INCISION AND DRAINAGE;  Surgeon: Tico Myers MD;  Location: [facility] OR     IR CHEST TUBE PLACEMENT NON-TUNNELED RIGHT  4/19/2021     IR CHEST TUBE PLACEMENT NON-TUNNELED RIGHT  10/19/2021     IR CHEST TUBE PLACEMENT NON-TUNNELED RIGHT  11/10/2021     IR CHEST TUBE PLACEMENT  "NON-TUNNELED RIGHT  3/31/2022     IR DIALYSIS FISTULOGRAM LEFT  4/2/2022     IR DIALYSIS FISTULOGRAM LEFT  4/4/2022     IR PLEURAL DRAINAGE WITH CATHETER INSERTION  4/19/2021     MIDLINE INSERTION - DOUBLE LUMEN  4/23/2021          [place] ESOPHAGOGASTRODUODENOSCOPY TRANSORAL DIAGNOSTIC N/A 8/18/2020    Procedure: ESOPHAGOGASTRODUODENOSCOPY (EGD) with biopsy;  Surgeon: Nilson Gonzales MD;  Location: [facility];  Service: Gastroenterology      PARACENTESIS  8/14/2020      PARACENTESIS  8/19/2020     US THORACENTESIS  4/18/2021        ETOH negative  TOB negative    Physical examination  BP (!) 165/87 (BP Location: Right arm, Patient Position: Sitting, Cuff Size: Adult Regular)   Pulse 86   Ht 1.651 m (5' 5\")   Wt 60 kg (132 lb 4 oz)   SpO2 97%   BMI 22.01 kg/m      He is cachectic, pale, and very debilitated. His right chest has a draining sinus with purulent fluid. He is in a wheelchair.    From a personal perspective, he is here with his wife, Maico. They are [place] from [facility].    IMPRESSION   (J98.19) Trapped lung  (primary encounter diagnosis)  (J86.9) Empyema lung (H)  (R22.2) Chest wall mass  (N18.6,  Z99.2) ESRD (end stage renal disease) on dialysis (H)  (K74.60,  R18.8) Cirrhosis of liver with ascites, unspecified hepatic cirrhosis type (H)  (E43) Severe protein-calorie malnutrition (H)       This person is a 62 year old male with trapped RIGHT lung and draining chest wall sinus.   Empyema  ESRD  Cirrhosis  Severe malnutrition     PLAN  I spent 60 min on the date of the encounter in chart review, patient visit, review of tests, documentation and/or discussion with other providers about the issues documented above. I reviewed the plan as follows:    I mentioned that the only safe way to manage his chronic right empyema is a chronic empyema chest tube. We discussed that any more aggressive surgery is not an option for him due to his chronically very debilitated state, ESRD, and cirrhosis.    He needs to " think about the empyema tube, and we'll review his CT from today together next week (CT scheduled today for after his clinic visit).    I appreciate the opportunity to participate in the care of your patient and will keep you updated.  Sincerely,    MD Pilo Baca MD

## 2022-07-11 NOTE — TELEPHONE ENCOUNTER
Team - please inform Elle that his Ambien has been sent to the [facility] on [address] in [place] as requested. Thanks!    Jaswant Flores MD  July 11, 2022  1:26 PM

## 2022-07-11 NOTE — TELEPHONE ENCOUNTER
[facility] phone call message- medication clarification/question:    Full Medication Name:     zolpidem (AMBIEN)      Dose:     5 MG tablet    Question:    Caller advised out of medication, tired to go get a refill and couldn't go grab it. Please place in medication and or call and advised.    Patient do have an up coming event withe Dr. Flores on Wed at 13:00    Pharmacy confirmed as    [facility] [id number] [place] [address]    : Yes    OK to leave a message on voice mail? Yes    Primary language: English      needed? No    Call taken on July 11, 2022 at 10:50 AM by Ana Segura

## 2022-07-12 NOTE — PROGRESS NOTES
"ASSESSMENT/PLAN:    (J86.9) Empyema lung (H)  (primary encounter diagnosis)  Comment: concerning drainage from chest wall wound; per CT this could reflect a sinus tract vs abscess? He has an appt w/ Dr Zurita pending; I will send him a note to see if he needs a procedure to close this possible sinus tract; will send a wound cx as he is on Abx but the drainage is concerning for purulence   Plan: Wound Aerobic Bacterial Culture Routine with         Gram Stain, oxyCODONE (ROXICODONE) 5 MG tablet          (R22.2) Chest wall mass  Comment: see above  Plan: Wound Aerobic Bacterial Culture Routine with         Gram Stain, oxyCODONE (ROXICODONE) 5 MG tablet          (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  Comment: see above  Plan: Wound Aerobic Bacterial Culture Routine with         Gram Stain, oxyCODONE (ROXICODONE) 5 MG tablet          Jaswant Flores MD  July 14, 2022  8:37 AM        Pt is a 62 year old male last seen on 6/15/2022 here today for:     1) Lung empyema - see on 7/7/2022 by Dr Zurita of CT surgery  Follow-up appt is tomorrow  Breathing is good  \"I feel normal\"  Mild abdominal pain -> normal appetite so seems unlikely to be pancreatitis which was an incidental finding on the CT scan   Still draining -> ?purulent fluid  Per CT there was concern for a sinus tract vs abscess vs phlegmon  No F/C  No pain at drainage site   Has also noted that he is getting a bad HA after dialysis   Still on Abx      2) dysphagia - has resolved  I would cancel the swallow study scheduled for next week      Per Dr Zurita's note:  This person is a 62 year old male with trapped RIGHT lung and draining chest wall sinus.   Empyema  ESRD  Cirrhosis  Severe malnutrition      PLAN  I mentioned that the only safe way to manage his chronic right empyema is a chronic empyema chest tube. We discussed that any more aggressive surgery is not an option for him due to his chronically very debilitated state, ESRD, and cirrhosis.   He needs to " think about the empyema tube, and we'll review his CT from today together next week (CT scheduled today for after his clinic visit).    IMPRESSION:  1.  Large right hydropneumothorax, with interval decrease in the gas component.  2.  Possible acute pancreatitis, incompletely imaged and assessed. Correlation with amylase/lipase is recommended.  3.  Low-attenuation consolidation within the inferior right lower lobe, which may reflect infectious consolidation.  4.  Persistent soft tissue density within the right lateral chest wall, at site of previous thoracostomy tube. This is incompletely assessed, given the absence of intravenous contrast, and could reflect abscess, phlegmon, or draining sinus tract with the adjacent pleural effusion.  5.  Hepatic cirrhosis.  6.  Cholelithiasis.  7.  Grossly unchanged type B aortic dissection and aneurysmal dilatation of the thoracic aorta.      Per my last note on 6/15/2022:  (J86.9) Empyema lung (H)  (primary encounter diagnosis)  Comment: referral needs to be changed from cardiology to thoracic surgery; will forward to our referral coordinator  Plan: Thoracic Surgery Referral          (R22.2) Chest wall mass  Comment: dressing changed; will cont Abx and use warm compresses; see above regarding thoracic surgery eval  Plan: Thoracic Surgery Referral        `  (D61.818) Other pancytopenia (H)  Comment: will check repeat cbc per pt request as he would prefer labs in our clinic vs dialysis; cbc was stable  Plan: CBC with platelets          (N18.4,  D63.1) Anemia due to stage 4 chronic kidney disease (H)  Comment: see above  Plan: CBC with platelets     (N18.6,  Z99.2) ESRD (end stage renal disease) on dialysis (H)  Comment: see above; cont dialysis m/w/f  Plan: CBC with platelets     (R13.19) Esophageal dysphagia  Comment: will check barium swallow given   Plan: XR Video Swallow with SLP or OT - Order with Speech Therapy Referral    Past Medical History:   Diagnosis Date     Acquired  dissection of pulmonary artery (H) 3/31/2022     NAHUM (acute kidney injury) (H)      Chronic hepatitis B without hepatic coma (H) 8/1/2019     Cirrhosis of liver without ascites (H) 8/1/2019     ESRD (end stage renal disease) on dialysis (H) 9/26/2021     Essential hypertension 8/1/2019     GI (gastrointestinal bleed)      Gout      H. pylori infection 7/5/2017    UGI bleed implied by Hgb 9.0 6/22/17 and melena. Admitted and hgb decreased to 7.6, CT abdomen showed all bladder wall thickening. + Hep B surface antigen noted. Melena resolved and hgb stabalized without transfusion, epigastric pain resolved with PPI. Recommend referral for gastroscopy.     Heart attack (H)      Hepatitis B      Normocytic anemia      Other pancytopenia (H) 7/5/2017 6/24/17 Peripheral smear at [facility].Pancytopenia of uncertain cause. Ruled out acute leukemia. Hematologist suggests hemolytic anemia should be considered possible cause and LDH and haptoglobin should be assessed in future.      PONV (postoperative nausea and vomiting)      Thrombocytopenia (H)       Past Surgical History:   Procedure Laterality Date     ABCESS DRAINAGE      finger     BURSECTOMY ELBOW Left 10/15/2021    Procedure: LEFT OLECRANON BURSECTOMY;  Surgeon: Tico Myers MD;  Location: [facility] [place]     BURSECTOMY ELBOW Left 1/18/2022    Procedure: LEFT ELBOW OLECRANON BURSECTOMY;  Surgeon: Tico Myers MD;  Location: [facility] OR     CREATE FISTULA ARTERIOVENOUS UPPER EXTREMITY Left 4/20/2021    Procedure: left arm dialysis graft placement;  Surgeon: Roxana Cosby MD;  Location: [facility] OR;  Service: General     FOREIGN BODY REMOVAL      finger     INCISION AND DRAINAGE FINGER, COMBINED Left 10/20/2016    Procedure: COMBINED INCISION AND DRAINAGE FINGER;  Surgeon: Mireya Pizano MD;  Location: [facility] OR     INCISION AND DRAINAGE UPPER EXTREMITY, COMBINED Left 1/18/2022    Procedure: AND ELBOW INCISION AND DRAINAGE;   Surgeon: Tico Myers MD;  Location: [facility] OR     IR CHEST TUBE PLACEMENT NON-TUNNELED RIGHT  4/19/2021     IR CHEST TUBE PLACEMENT NON-TUNNELED RIGHT  10/19/2021     IR CHEST TUBE PLACEMENT NON-TUNNELED RIGHT  11/10/2021     IR CHEST TUBE PLACEMENT NON-TUNNELED RIGHT  3/31/2022     IR DIALYSIS FISTULOGRAM LEFT  4/2/2022     IR DIALYSIS FISTULOGRAM LEFT  4/4/2022     IR PLEURAL DRAINAGE WITH CATHETER INSERTION  4/19/2021     MIDLINE INSERTION - DOUBLE LUMEN  4/23/2021          NE ESOPHAGOGASTRODUODENOSCOPY TRANSORAL DIAGNOSTIC N/A 8/18/2020    Procedure: ESOPHAGOGASTRODUODENOSCOPY (EGD) with biopsy;  Surgeon: Nilson Gonzales MD;  Location: [facility];  Service: Gastroenterology      PARACENTESIS  8/14/2020      PARACENTESIS  8/19/2020     US THORACENTESIS  4/18/2021      Current Outpatient Medications   Medication Sig Dispense Refill     acetaminophen (TYLENOL) 500 MG tablet Take 500 mg by mouth every 6 hours as needed for mild pain       calcium acetate (PHOSLO) 667 MG CAPS capsule Take 1 capsule by mouth 3 times daily (with meals)       carvedilol (COREG) 25 MG tablet Take 1-2 tablets (25-50 mg) by mouth 2 times daily (with meals) 120 tablet 3     cephALEXin (KEFLEX) 500 MG capsule Take 1 capsule (500 mg) by mouth 2 times daily 60 capsule 1     cloNIDine (CATAPRES) 0.1 MG tablet Take 0.1 mg by mouth At Bedtime        [employer] SALINE NASAL SPRAY 0.65 % nasal spray Spray 1 spray in nostril daily as needed       diltiazem ER (DILT-XR) 180 MG 24 hr capsule Take 1 capsule (180 mg) by mouth 2 times daily (Patient taking differently: Take 180 mg by mouth 2 times daily as needed (Checks BP prior to taking, if too low he does not take this medication)) 60 capsule 11     entecavir (BARACLUDE) 0.5 MG tablet Take 1 tablet (0.5 mg) by mouth every 7 days 52 tablet 1     hydrOXYzine (ATARAX) 25 MG tablet Take 1 tablet (25 mg) by mouth 3 times daily as needed for itching 90 tablet 3     order for DME  Equipment being ordered: Other: blood pressure monitor  Treatment Diagnosis: hypertension 1 each 0     oxyCODONE (ROXICODONE) 5 MG tablet Take 1 tablet (5 mg) by mouth daily as needed for severe pain 14 tablet 0     pantoprazole (PROTONIX) 40 MG EC tablet Take 1 tablet (40 mg) by mouth daily 30 tablet 11     senna-docusate (SENOKOT-S/PERICOLACE) 8.6-50 MG tablet Take 1 tablet by mouth daily as needed for constipation 30 tablet 11     zolpidem (AMBIEN) 5 MG tablet Take 1 tablet (5 mg) by mouth nightly as needed for sleep 10 tablet 5      Allergies   Allergen Reactions     Nka [No Known Allergies]         ROS:   Gen-  no fevers/chills   ENT- no cough, no congestion, no URI symptoms   Cardiac - no palpitations, no chest pain   Respiratory - no shortness of breath , no wheezing   GI - no nausea, no vomiting, no diarrhea, no constipation   Remainder of ROS negative.     Exam:   BP (!) 155/91 (BP Location: Right arm, Patient Position: Sitting, Cuff Size: Adult Regular)   Pulse 100   Temp 98.9  F (37.2  C) (Oral)   Resp 12   Wt 58.1 kg (128 lb)   SpO2 95%   BMI 21.30 kg/m     Alert and oriented x 3; No acute distress   ABd: soft, very mild epigastric tenderness w/o radiation to back; no rebound/guarding   Neuro: no focal deficits   Derm: see image below

## 2022-07-13 NOTE — NURSING NOTE
Writer was requested to do wound care for patient. Patient had dressing in place and taped on but tape coming off. Patient requested writer just apply new tape and not a new dressing, declined a new dressing x2. Writer applied new tape and provided dressing supplies to take home and change (gauze, gauze wrap, telfa, and tape). Patient had no other questions, tolerated procedure. Writer advised will call with culture results, advised will take some time to result. Writer requested they call with questions or concerns. Patient and visitor verbalized understanding. Romain MELENDEZ

## 2022-07-13 NOTE — Clinical Note
Moraima Almeida wants to get a 2nd opinion for his lung infection at the [facility]. That's the reason for the referral. Just BERNARDA.  Jaswant Flores MD July 15, 2022 10:03 AM

## 2022-07-13 NOTE — Clinical Note
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 I know you discussed placing a permanent chest tube for his empyema. I was unsure if he would require a specific procedure to close the sinus tract that is draining as he is still unsure about getting the chest tube placed. Appreciate your advice. - Jaswant

## 2022-07-14 NOTE — LETTER
7/14/2022      RE: Elle Blanton  [address]  [facility] [address]       THORACIC SURGERY - ESTABLISHED PATIENT OFFICE VISIT      Dear Dr. Flores,    I saw Elle Blanton in follow-up for the evaluation and treatment of a trapped RIGHT lung and draining chest wall sinus.     HPI  Elle Blanton is a 62 year old male with a trapped lung and he does not complain of dyspnea, but is very sedentary.    Previsit Tests   Chest imaging over time  CT abdomen 8/19/2020:  Last normal imaging of lower chest    CXR 4/17/2021    US thoracentesis 4/18/2021:  Drained 2000 ml, clear serous fluid, transudate by lab tests    CT chest 4/22/2021: After placement of a chest tube on 4/19/2022; RIGHT lung not fully expanded    CXR 4/17/2021: Fully expanded RIGHT lung      No further imaging after 4/27/2021 until September 2021.  CT chest 9/26/2022:Small-to-moderate RIGHT pleural effusion in the setting of acute LEFT retroperitoneal hemorrhage      CXR 10/15/2021: Recurrent large RIGHT pleural effusion    US thoracentesis 10/15/2021: 1.5 l of clear fluid  US thoracentesis 10/16/2021: 350 ml cloudy yellow fluid; lab: turbid brown fluid, cultures negative.  CT chest after chest tube (10/21/2021): Complex hydropneumothorax with new pleural thickening compared to 9/26/2022; suggestive of empyema.      CT 11/9/2021: After chest tube removal; space is mostly filled with fluid with air-pockets      CT 11/14/2021: Drained fluid, trapped lung.      Thoracentesis 4/7/2022: S. Aureus 4+  CT 5/3/2022: New soft tissue mass at chest tube site suggestive of a hematoma      CT 7/7/2022: Unchanged, less air in the pleural fluid collection, there does not appear to be a connection between the chest wall abscess and the intrapleural fluid.      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 pylori infection 7/5/2017    UGI bleed implied by Hgb 9.0 6/22/17 and melena. Admitted and hgb decreased to 7.6, CT abdomen showed all bladder wall thickening. + Hep B surface antigen noted. Melena resolved and hgb stabalized without transfusion, epigastric pain resolved with PPI. Recommend referral for gastroscopy.     Heart attack (H)      Hepatitis B      Normocytic anemia      Other pancytopenia (H) 7/5/2017 6/24/17 Peripheral smear at [facility].Pancytopenia of uncertain cause. Ruled out acute leukemia. Hematologist suggests hemolytic anemia should be considered possible cause and LDH and haptoglobin should be assessed in future.      PONV (postoperative nausea and vomiting)      Thrombocytopenia (H)         PSH  Reviewed, as below    Past Surgical History:   Procedure Laterality Date     ABCESS DRAINAGE      finger     BURSECTOMY ELBOW Left 10/15/2021    Procedure: LEFT OLECRANON BURSECTOMY;  Surgeon: Tico Myers MD;  Location: [facility] OR     BURSECTOMY ELBOW Left 1/18/2022    Procedure: LEFT ELBOW OLECRANON BURSECTOMY;  Surgeon: Tico Myers MD;  Location: [facility]     CREATE FISTULA ARTERIOVENOUS UPPER EXTREMITY Left 4/20/2021    Procedure: left arm dialysis graft placement;  Surgeon: Roxana Cosby MD;  Location: [facility];  Service: General     FOREIGN BODY REMOVAL      finger     INCISION AND DRAINAGE FINGER, COMBINED Left 10/20/2016    Procedure: COMBINED INCISION AND DRAINAGE FINGER;  Surgeon: Mireya Pizano MD;  Location: [facility]     INCISION AND DRAINAGE UPPER EXTREMITY, COMBINED Left 1/18/2022    Procedure: AND ELBOW INCISION AND DRAINAGE;  Surgeon: Tico Myers MD;  Location: [facility]     IR CHEST TUBE PLACEMENT NON-TUNNELED RIGHT   4/19/2021     IR CHEST TUBE PLACEMENT NON-TUNNELED RIGHT  10/19/2021     IR CHEST TUBE PLACEMENT NON-TUNNELED RIGHT  11/10/2021     IR CHEST TUBE PLACEMENT NON-TUNNELED RIGHT  3/31/2022     IR DIALYSIS FISTULOGRAM LEFT  4/2/2022     IR DIALYSIS FISTULOGRAM LEFT  4/4/2022     IR PLEURAL DRAINAGE WITH CATHETER INSERTION  4/19/2021     MIDLINE INSERTION - DOUBLE LUMEN  4/23/2021          OR ESOPHAGOGASTRODUODENOSCOPY TRANSORAL DIAGNOSTIC N/A 8/18/2020    Procedure: ESOPHAGOGASTRODUODENOSCOPY (EGD) with biopsy;  Surgeon: Nilson Gonzales MD;  Location: [facility];  Service: Gastroenterology      PARACENTESIS  8/14/2020     US PARACENTESIS  8/19/2020     US THORACENTESIS  4/18/2021        ETOH negative  TOB negative    Physical examination  /66   Pulse 78   Wt 56.1 kg (123 lb 9.6 oz)   SpO2 94%   BMI 20.57 kg/m        He is cachectic, pale, and very debilitated. His right chest has a draining sinus with purulent fluid. He is in a wheelchair.    From a personal perspective, he is here with his wife, Maico. They are ong from [facility].    IMPRESSION   (J98.19) Trapped lung  (primary encounter diagnosis)  (R22.2) Chest wall mass  (N18.6,  Z99.2) ESRD (end stage renal disease) on dialysis (H)  (K74.60,  R18.8) Cirrhosis of liver with ascites, unspecified hepatic cirrhosis type (H)  (E43) Severe protein-calorie malnutrition (H)       This person is a 62 year old male with trapped RIGHT lung and draining chest wall sinus.   ESRD  Cirrhosis  Severe malnutrition     PLAN  I spent 40 min on the date of the encounter in chart review, patient visit, review of tests, documentation and/or discussion with other providers about the issues documented above. I reviewed the plan as follows:    Based on the recent CT, it is possible that the chest wall abscess is  from the intrapleural fluid. In that case, we can do an incision and drainage and manage the superficial wound. If they are indeed not connected, then his  chest wall infection should eventually heal and the wound would close by secondary intention.    We discussed that if these 2 sites are connected, then the only solution would be a chronic empyema tube, but we do not have to make that decision at the time of the incision and drainage.    PAC  Surgery on a Thursday so he can have dialysis M-W-F.  Picc line placement        I appreciate the opportunity to participate in the care of your patient and will keep you updated.  Sincerely,    MD Pilo Baca MD

## 2022-07-14 NOTE — PROGRESS NOTES
THORACIC SURGERY - ESTABLISHED PATIENT OFFICE VISIT      Dear Dr. Flores,    I saw Elle Blanton in follow-up for the evaluation and treatment of a trapped RIGHT lung and draining chest wall sinus.     HPI  Elle Blanton is a 62 year old male with a trapped lung and he does not complain of dyspnea, but is very sedentary.    Previsit Tests   Chest imaging over time  CT abdomen 8/19/2020:  Last normal imaging of lower chest    CXR 4/17/2021    US thoracentesis 4/18/2021:  Drained 2000 ml, clear serous fluid, transudate by lab tests    CT chest 4/22/2021: After placement of a chest tube on 4/19/2022; RIGHT lung not fully expanded    CXR 4/17/2021: Fully expanded RIGHT lung      No further imaging after 4/27/2021 until September 2021.  CT chest 9/26/2022:Small-to-moderate RIGHT pleural effusion in the setting of acute LEFT retroperitoneal hemorrhage      CXR 10/15/2021: Recurrent large RIGHT pleural effusion    US thoracentesis 10/15/2021: 1.5 l of clear fluid  US thoracentesis 10/16/2021: 350 ml cloudy yellow fluid; lab: turbid brown fluid, cultures negative.  CT chest after chest tube (10/21/2021): Complex hydropneumothorax with new pleural thickening compared to 9/26/2022; suggestive of empyema.      CT 11/9/2021: After chest tube removal; space is mostly filled with fluid with air-pockets      CT 11/14/2021: Drained fluid, trapped lung.      Thoracentesis 4/7/2022: S. Aureus 4+  CT 5/3/2022: New soft tissue mass at chest tube site suggestive of a hematoma      CT 7/7/2022: Unchanged, less air in the pleural fluid collection, there does not appear to be a connection between the chest wall abscess and the intrapleural fluid.      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 pylori infection 7/5/2017    UGI bleed implied by Hgb 9.0 6/22/17 and melena. Admitted and hgb decreased to 7.6, CT abdomen showed all bladder wall thickening. + Hep B surface antigen noted. Melena resolved and hgb stabalized without transfusion, epigastric pain resolved with PPI. Recommend referral for gastroscopy.     Heart attack (H)      Hepatitis B      Normocytic anemia      Other pancytopenia (H) 7/5/2017 6/24/17 Peripheral smear at [facility].Pancytopenia of uncertain cause. Ruled out acute leukemia. Hematologist suggests hemolytic anemia should be considered possible cause and LDH and haptoglobin should be assessed in future.      PONV (postoperative nausea and vomiting)      Thrombocytopenia (H)         PSH  Reviewed, as below    Past Surgical History:   Procedure Laterality Date     ABCESS DRAINAGE      finger     BURSECTOMY ELBOW Left 10/15/2021    Procedure: LEFT OLECRANON BURSECTOMY;  Surgeon: Tico Myers MD;  Location: [facility] OR     BURSECTOMY ELBOW Left 1/18/2022    Procedure: LEFT ELBOW OLECRANON BURSECTOMY;  Surgeon: Tico Myers MD;  Location: [facility]     CREATE FISTULA ARTERIOVENOUS UPPER EXTREMITY Left 4/20/2021    Procedure: left arm dialysis graft placement;  Surgeon: Roxana Cosby MD;  Location: [facility];  Service: General     FOREIGN BODY REMOVAL      finger     INCISION AND DRAINAGE FINGER, COMBINED Left 10/20/2016    Procedure: COMBINED INCISION AND DRAINAGE FINGER;  Surgeon: Mireya Pizano MD;  Location: [facility]     INCISION AND DRAINAGE UPPER EXTREMITY, COMBINED Left 1/18/2022    Procedure: AND ELBOW INCISION AND DRAINAGE;  Surgeon: Tico Myers MD;  Location: [facility] OR     IR CHEST TUBE PLACEMENT NON-TUNNELED RIGHT  4/19/2021     IR CHEST TUBE PLACEMENT NON-TUNNELED RIGHT  10/19/2021     IR  CHEST TUBE PLACEMENT NON-TUNNELED RIGHT  11/10/2021     IR CHEST TUBE PLACEMENT NON-TUNNELED RIGHT  3/31/2022     IR DIALYSIS FISTULOGRAM LEFT  4/2/2022     IR DIALYSIS FISTULOGRAM LEFT  4/4/2022     IR PLEURAL DRAINAGE WITH CATHETER INSERTION  4/19/2021     MIDLINE INSERTION - DOUBLE LUMEN  4/23/2021          [place] ESOPHAGOGASTRODUODENOSCOPY TRANSORAL DIAGNOSTIC N/A 8/18/2020    Procedure: ESOPHAGOGASTRODUODENOSCOPY (EGD) with biopsy;  Surgeon: Nilson Gonzales MD;  Location: [facility];  Service: Gastroenterology     US PARACENTESIS  8/14/2020     US PARACENTESIS  8/19/2020     US THORACENTESIS  4/18/2021        ETOH negative  TOB negative    Physical examination  /66   Pulse 78   Wt 56.1 kg (123 lb 9.6 oz)   SpO2 94%   BMI 20.57 kg/m        He is cachectic, pale, and very debilitated. His right chest has a draining sinus with purulent fluid. He is in a wheelchair.    From a personal perspective, he is here with his wife, Maico. They are [place] from [facility].    IMPRESSION   (J98.19) Trapped lung  (primary encounter diagnosis)  (R22.2) Chest wall mass  (N18.6,  Z99.2) ESRD (end stage renal disease) on dialysis (H)  (K74.60,  R18.8) Cirrhosis of liver with ascites, unspecified hepatic cirrhosis type (H)  (E43) Severe protein-calorie malnutrition (H)       This person is a 62 year old male with trapped RIGHT lung and draining chest wall sinus.   ESRD  Cirrhosis  Severe malnutrition     PLAN  I spent 40 min on the date of the encounter in chart review, patient visit, review of tests, documentation and/or discussion with other providers about the issues documented above. I reviewed the plan as follows:    Based on the recent CT, it is possible that the chest wall abscess is  from the intrapleural fluid. In that case, we can do an incision and drainage and manage the superficial wound. If they are indeed not connected, then his chest wall infection should eventually heal and the wound would close by  secondary intention.    We discussed that if these 2 sites are connected, then the only solution would be a chronic empyema tube, but we do not have to make that decision at the time of the incision and drainage.    PAC  Surgery on a Thursday so he can have dialysis M-W-F.  Picc line placement        I appreciate the opportunity to participate in the care of your patient and will keep you updated.  Sincerely,    Pilo Zurita MD

## 2022-07-19 NOTE — PROGRESS NOTES
Dr Zurita requested that patient have a PICC placed prior to his upcoming Thoracic Surgery. Patient undergoes Hemodialysis MWF. Writer called patient to inform him of the need for the PICC line and to confirm location of dialysis access site. Patient reports that he has a Left arm dialysis graft. Patient verbalized his understanding of the PICC line. Patient asked if PICC could be placed as close to the surgery date as possible and if a surgery date had been scheduled. Writer informed patient his request will be passed on to IR and our surgery scheduler and that our surgery scheduler is actively working on finalizing a surgery date and she will be in contact with him to confirm the date when known.   Writer also informed patient of the need for a blood type and screen prior to surgery, preferably within the week, and that someone from our scheduling department will be in touch with him to schedule the lab appointment.  Patient states that he has difficulty talking due to coughing, and requests that all calls and scheduling calls go to his spouse, Kelly. Writer noted request in patient's demographics. Patient had no further questions at this time.

## 2022-07-20 NOTE — TELEPHONE ENCOUNTER
S: New onset cough x2 days. Dry mostly, sometimes productive. New redness at wound site. No other symptoms    B: Chest wall wound with staph aureus. CKD requiring dialysis. Anemia; thrombocytopenia; ESRD. Takes cephalexin 500mg BID, started on 5/25/22.    A: Patient needing appointment to rule out worsening infection due to chest wall wound. Needing imaging/labs    R: Recommend office appointment, if hesitant can we order anything to be done outside of an appointment: Imaging & Labs.         Called patient's wife to discuss. Elle started a mostly dry, sometimes productive cough approximately 2 days ago, denies chest congestion or SOB. Denies fever. Endorses increased redness at wound site. No change to drainage. Painful at wound site but no change from previous.     Patient unable to come in this afternoon due to dialysis.   Wife endorsed needing AM appointments,  only in afternoons the next two weeks. Advised likely will require an appointment or imaging due to ongoing wound with positive culture.     Advised will discuss with  and see if anything can be ordered for tests/imaging/medications without a clinic visit. Advised will call and update after discussion this afternoon.    Patient's wife also wanting to know the update on getting a second opinion at [facility].     Romain MELENDEZ

## 2022-07-20 NOTE — TELEPHONE ENCOUNTER
[facility] phone call message- medication clarification/question:    Full Medication Name:     Cough medication      Dose:     N/A     Question:     Caller wanting some medication for patient for cough and I asked how long has it been going. Caller advised a couple of days now. I advised patient would like to be seen if requesting for medication. Patient decline and advised that Dr. Luna have mention if patient or spouse have any question and if he is here in the clinic they can call and ask questions. I advised yes they can, but I dont know if they will get an answer same day due to Dr. luna will be seeing patient. Caller is geraldine and advised coming up to clinic.      Caller wanted to know if Dr. Luna is still reffarling patient out to a male clinic.     Please call back and advised. Thanks.    Pharmacy confirmed as      [id number] [address] [facility]: Yes    OK to leave a message on voice mail? Yes    Primary language: English      needed? No    Call taken on July 20, 2022 at 8:20 AM by Ana Segura

## 2022-07-20 NOTE — PROGRESS NOTES
Outpatient IR Referral    Referring team requesting a SINGLE lumen PICC per epic messaging with Ijeoma JAIN and Shawna Damon RN.     SUSY Thompson CNP  Interventional Radiology   IR on-call pager: [phone]

## 2022-07-21 NOTE — TELEPHONE ENCOUNTER
Spoke to pt's wife. I would like to see him in clinic for his cough so he can get a covid swab. Sent in tessalon perles in the mean time.    Team/ - please schedule Elle at 2pm on Wed 7/27/22 for cough/covid swab. Please make a note that he will arrive at 1pm. Thanks!    Jaswant Flores MD  July 21, 2022  4:45 PM

## 2022-07-26 NOTE — PROGRESS NOTES
"ASSESSMENT/PLAN:    (R05.9) Cough  (primary encounter diagnosis)  Comment: check for covid; cough medication sent; f/u prn; precautions given   Plan: Symptomatic; Yes; 7/20/2022 COVID-19 Virus         (Coronavirus) by PCR Nose, guaiFENesin-codeine         (GUAIFENESIN AC) 100-10 MG/5ML syrup    They will reach out to Dr Zurita to proceed w/ I&D of chest wall wound; I will reach out to [facility] for 2nd opinion regarding permanent chest tube          Jaswant Flores MD  July 27, 2022  1:17 PM      Pt is a 62 year old male last seen on 7/13/2022 here today for:     Cough - has coughing spells; sputum is clear  No post tussive emesis  No fevers  No other sick contacts  No wheeze   No allergic rhinitis symptoms  +heartburn    Per my phone call on 7/20/22 -   \"Spoke to pt's wife. I would like to see him in clinic for his cough so he can get a covid swab. Sent in tessalon perles in the mean time.   Team/ - please schedule Elle at 2pm on Wed 7/27/22 for cough/covid swab. Please make a note that he will arrive at 1pm. Thanks!\"    Per my last note:  (J86.9) Empyema lung (H)  (primary encounter diagnosis)  Comment: concerning drainage from chest wall wound; per CT this could reflect a sinus tract vs abscess? He has an appt w/ Dr Zurita pending; I will send him a note to see if he needs a procedure to close this possible sinus tract; will send a wound cx as he is on Abx but the drainage is concerning for purulence   Plan: Wound Aerobic Bacterial Culture Routine with         Gram Stain, oxyCODONE (ROXICODONE) 5 MG tablet          (R22.2) Chest wall mass  Comment: see above  Plan: Wound Aerobic Bacterial Culture Routine with         Gram Stain, oxyCODONE (ROXICODONE) 5 MG tablet          (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  Comment: see above  Plan: Wound Aerobic Bacterial Culture Routine with         Gram Stain, oxyCODONE (ROXICODONE) 5 MG tablet    Past Medical History:   Diagnosis Date     Acquired dissection of " pulmonary artery (H) 3/31/2022     NAHUM (acute kidney injury) (H)      Chronic hepatitis B without hepatic coma (H) 8/1/2019     Cirrhosis of liver without ascites (H) 8/1/2019     ESRD (end stage renal disease) on dialysis (H) 9/26/2021     Essential hypertension 8/1/2019     GI (gastrointestinal bleed)      Gout      H. pylori infection 7/5/2017    UGI bleed implied by Hgb 9.0 6/22/17 and melena. Admitted and hgb decreased to 7.6, CT abdomen showed all bladder wall thickening. + Hep B surface antigen noted. Melena resolved and hgb stabalized without transfusion, epigastric pain resolved with PPI. Recommend referral for gastroscopy.     Heart attack (H)      Hepatitis B      Normocytic anemia      Other pancytopenia (H) 7/5/2017 6/24/17 Peripheral smear at [facility].Pancytopenia of uncertain cause. Ruled out acute leukemia. Hematologist suggests hemolytic anemia should be considered possible cause and LDH and haptoglobin should be assessed in future.      PONV (postoperative nausea and vomiting)      Thrombocytopenia (H)       Past Surgical History:   Procedure Laterality Date     ABCESS DRAINAGE      finger     BURSECTOMY ELBOW Left 10/15/2021    Procedure: LEFT OLECRANON BURSECTOMY;  Surgeon: Tico Myers MD;  Location: [facility]     BURSECTOMY ELBOW Left 1/18/2022    Procedure: LEFT ELBOW OLECRANON BURSECTOMY;  Surgeon: Tico Myers MD;  Location: [facility] OR     CREATE FISTULA ARTERIOVENOUS UPPER EXTREMITY Left 4/20/2021    Procedure: left arm dialysis graft placement;  Surgeon: Roxana Cosby MD;  Location: [facility] OR;  Service: General     FOREIGN BODY REMOVAL      finger     INCISION AND DRAINAGE FINGER, COMBINED Left 10/20/2016    Procedure: COMBINED INCISION AND DRAINAGE FINGER;  Surgeon: Mireya Pizano MD;  Location: [facility] OR     INCISION AND DRAINAGE UPPER EXTREMITY, COMBINED Left 1/18/2022    Procedure: AND ELBOW INCISION AND DRAINAGE;  Surgeon: Lucia  Tico Osman MD;  Location: [facility] OR     IR CHEST TUBE PLACEMENT NON-TUNNELED RIGHT  4/19/2021     IR CHEST TUBE PLACEMENT NON-TUNNELED RIGHT  10/19/2021     IR CHEST TUBE PLACEMENT NON-TUNNELED RIGHT  11/10/2021     IR CHEST TUBE PLACEMENT NON-TUNNELED RIGHT  3/31/2022     IR DIALYSIS FISTULOGRAM LEFT  4/2/2022     IR DIALYSIS FISTULOGRAM LEFT  4/4/2022     IR PLEURAL DRAINAGE WITH CATHETER INSERTION  4/19/2021     MIDLINE INSERTION - DOUBLE LUMEN  4/23/2021          NM ESOPHAGOGASTRODUODENOSCOPY TRANSORAL DIAGNOSTIC N/A 8/18/2020    Procedure: ESOPHAGOGASTRODUODENOSCOPY (EGD) with biopsy;  Surgeon: Nilson Gonzales MD;  Location: [facility];  Service: Gastroenterology      PARACENTESIS  8/14/2020      PARACENTESIS  8/19/2020     US THORACENTESIS  4/18/2021      Current Outpatient Medications   Medication Sig Dispense Refill     guaiFENesin-codeine (GUAIFENESIN AC) 100-10 MG/5ML syrup Take 5 mLs by mouth every 4 hours as needed for cough 236 mL 1     acetaminophen (TYLENOL) 500 MG tablet Take 500 mg by mouth every 6 hours as needed for mild pain       benzonatate (TESSALON) 100 MG capsule Take 1 capsule (100 mg) by mouth 3 times daily as needed for cough 30 capsule 0     calcium acetate (PHOSLO) 667 MG CAPS capsule Take 1 capsule by mouth 3 times daily (with meals)       carvedilol (COREG) 25 MG tablet Take 1-2 tablets (25-50 mg) by mouth 2 times daily (with meals) 120 tablet 3     cephALEXin (KEFLEX) 500 MG capsule Take 1 capsule (500 mg) by mouth 2 times daily 60 capsule 1     cloNIDine (CATAPRES) 0.1 MG tablet Take 0.1 mg by mouth At Bedtime        [employer] SALINE NASAL SPRAY 0.65 % nasal spray Spray 1 spray in nostril daily as needed       diltiazem ER (DILT-XR) 180 MG 24 hr capsule Take 1 capsule (180 mg) by mouth 2 times daily (Patient taking differently: Take 180 mg by mouth 2 times daily as needed (Checks BP prior to taking, if too low he does not take this medication)) 60 capsule 11      entecavir (BARACLUDE) 0.5 MG tablet Take 1 tablet (0.5 mg) by mouth every 7 days 52 tablet 1     hydrOXYzine (ATARAX) 25 MG tablet Take 1 tablet (25 mg) by mouth 3 times daily as needed for itching 90 tablet 3     order for DME Equipment being ordered: Other: blood pressure monitor  Treatment Diagnosis: hypertension 1 each 0     oxyCODONE (ROXICODONE) 5 MG tablet Take 1 tablet (5 mg) by mouth daily as needed for severe pain 30 tablet 0     pantoprazole (PROTONIX) 40 MG EC tablet Take 1 tablet (40 mg) by mouth daily 30 tablet 11     senna-docusate (SENOKOT-S/PERICOLACE) 8.6-50 MG tablet Take 1 tablet by mouth daily as needed for constipation 30 tablet 11     zolpidem (AMBIEN) 5 MG tablet Take 1 tablet (5 mg) by mouth nightly as needed for sleep 10 tablet 5      Allergies   Allergen Reactions     Nka [No Known Allergies]      ROS:   Gen- no weight change, no fevers/chills   ENT- + cough, no congestion, no URI symptoms   Cardiac - no palpitations, no chest pain   Respiratory - no shortness of breath , no wheezing   GI - no nausea, no vomiting, no diarrhea, no constipation   Remainder of ROS negative.     Exam:   /80 (BP Location: Right arm, Patient Position: Sitting, Cuff Size: Adult Regular)   Pulse 90   Temp 98.8  F (37.1  C) (Oral)   Resp 12   SpO2 95%    Alert and oriented x 3; No acute distress   HEENT:oropharynx clear   Neck: no masses, no lymphadenopathy

## 2022-07-29 NOTE — TELEPHONE ENCOUNTER
Called patient to schedule procedure with Dr. Zurita.   Patient stated he is not ready to schedule this procedure.     Writer asked if he was aware the PICC line should be scheduled close to the procedure with Dr. Zurita.  Patient was confused, he stated he sees his doctor on 08/08 and will call back to schedule if he decides to proceed.     Writer asked if I should cancel the PICC line since he was unsure of having the procedure.   Patient confirmed, he stated he didn't know about the procedure.     Message was sent to his care team, PICC line was cancelled.     Alondra Sen  Lenora-Op Coordinator  [phone]

## 2022-08-05 NOTE — TELEPHONE ENCOUNTER
"[facility] phone call message- general phone call:    Reason for call:      Caller advised they went to [facility] for an appointment yesterday and was told there was no appointment for patient. He was not in the systems. Caller is mad and demand for answers. Caller advised she spoke to me, \"a hmong lady\". I advise she did not speak to me, I do not make appointment for patient for referral, its either our referral called with inter or it may have been the location where patient is going in for. Caller advised she spoke to me and need answers now. Told Caller that it look like appointment was cancel, but it is not with the [facility], its with [facility]. Caller decline of knowledge.    Sending Dr. Flores and his team message.     Return call needed: Yes    OK to leave a message on voice mail? Yes    Primary language: English      needed? No    Call taken on August 5, 2022 at 10:53 AM by Ana Segura    "

## 2022-08-05 NOTE — TELEPHONE ENCOUNTER
Left voicemail clarifying as our clinic staff have already done that we had nothing to do with the scheduling or cancelling of Elle's PICC line. The procedure was ordered by Dr Zurita's team and was cancelled when Elle decided to hold on the procedure and seek a 2nd opinion. He should call that office to clarify. I have recommended he proceed with the I&D procedure as recommended by Dr Zurita. I am happy to connect with Elle and Maico again next week.     Jaswant Flores MD  August 5, 2022  2:35 PM

## 2022-08-05 NOTE — TELEPHONE ENCOUNTER
Our clinic did not write the orders for this procedure nor did we schedule or cancel the appointments after chart review. Routing to PCP for FYI that patient's PICC line insertion appointment not completed. Also routing to the coordinator who spoke to the patient. Romain MELENDEZ

## 2022-08-08 NOTE — TELEPHONE ENCOUNTER
[facility] phone call message- general phone call:    Reason for call: patient called stating he is needing to speak with . aware  is not in clinic today. Patient informs emergency and would like to speak with him, patient states  informed him if he ever needed to speak with  to call clinic and leave message for himself or the nurses if needing to get squeezed into the scheduled but patient is requesting a call back only from . Please call and advise.     Return call needed: Yes    OK to leave a message on voice mail? Yes    Primary language: English      needed? No    Call taken on August 8, 2022 at 12:15 PM by Ana María Live

## 2022-08-08 NOTE — TELEPHONE ENCOUNTER
Spoke to pt and his wife. He notes that he is having hemoptysis vs hematemesis. Pt and wife notes small amount of blood in sputum. Since yesterday. No fevers. He notes that the chest wall wound is completely sealed now and not draining. I gave Elle and his wife strict precautions to go to ED if symptoms worsen. He seems stable at this time. Will see him Wednesday and check CXR and review case w/ Dr Zurita again.     Team - please schedule Elle to see me at 1pm on Wed 8/10/22. Please double-book 1pm slot.    Jaswant Flores MD  August 8, 2022  12:57 PM

## 2022-08-08 NOTE — TELEPHONE ENCOUNTER
Due to message/patient stating this is an emergency, this writer attempted to call patient back to obtain further information in regards to concern. Called, no answer, did not leave message for patient with this encounter. This writer has routed message to Dr Flores for review. Will attempt another outreach for contact at later time again. Ze MELENDEZ

## 2022-08-09 NOTE — TELEPHONE ENCOUNTER
FUTURE VISIT INFORMATION      SURGERY INFORMATION:    Date: 8/25/22    Location:  or    Surgeon:  Jarret Caal MD    Anesthesia Type:  Local    Procedure: INSERTION, PICC    RECORDS REQUESTED FROM:        Primary Care Provider: Jaswant Flores MD- [facility]    Pertinent Medical History: hypertension, descending thoracic aortic dissection    Most recent EKG+ Tracing: 3/30/22    Most recent ECHO: 4/16/21

## 2022-08-10 NOTE — NURSING NOTE
Writer was requested to do wound care for patient on right side of torso. Patient had dressing partially removed in preparation for new dressing gauze to be placed.  Wife voiced she had just changed dressing today prior to leaving home for clinic appointment at [place]. Wife informs writer when patient coughs often, this increases drainage. Drainage- serosanguineous, about 20- 25 ml. Chest wall wound is healthy appearance, slight pinkish at opening. Writer applied new non adherent gauze x 4-5 and tape to area, pt tolerated dressing change well.  Ze RN

## 2022-08-10 NOTE — TELEPHONE ENCOUNTER
Spoke with patient to schedule procedure with Dr. Pilo Zurita    Procedure was scheduled on 08/26 at [facility] OR  Patient will have H&P with PAC    Patient is aware a COVID-19 test is needed before their procedure.   [place] [facility]    Patient is aware a / is needed day of surgery.   Surgery letter was sent via Mail, patient has my direct contact information for any further questions.     Note:   Spoke with wife on 08/08  Confirmed PICC was cancelled because patient was unsure of scheduling procedure with Dr. Zurita.  Wife confirmed and had no further questions.

## 2022-08-10 NOTE — PROGRESS NOTES
ASSESSMENT/PLAN:    (R04.2) Hemoptysis  (primary encounter diagnosis)  Comment: CXR w/ significant empyema which is chronic but x-ray looks worse than previous; he declined additional intervention as he is not more short of breath at this time; he declined labwork as well; strict precautions to go to ED for worsening symptoms  Plan: XR CHEST 2 VW, Quantiferon-TB Gold Plus, CBC         with platelets, Partial thromboplastin time, INR          (J86.9) Empyema lung (H)  Comment: see above  Plan: oxyCODONE (ROXICODONE) 5 MG tablet          (R22.2) Chest wall mass  Comment: drainage persistent; has procedure scheduled w/ Dr Zurita at end of month; dressing changes performed in clinic by Mer Guzman RN  Plan: oxyCODONE (ROXICODONE) 5 MG tablet          (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  Comment: see above  Plan: oxyCODONE (ROXICODONE) 5 MG tablet          (K59.00) Constipation, unspecified constipation type  Comment: refilled  Plan: senna-docusate (SENOKOT-S/PERICOLACE) 8.6-50 MG tablet            Jaswant Flores MD  August 10, 2022  1:26 PM        Pt is a 62 year old male last seen on 7/27/22 here today for:     Hemoptysis - mixed blood and sputum; amount is small  +cough, no SOB/fevers  Cough has been chronic but noted blood-tinged sputum 2 days ago      Past Medical History:   Diagnosis Date     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 pylori infection 7/5/2017    UGI bleed implied by Hgb 9.0 6/22/17 and melena. Admitted and hgb decreased to 7.6, CT abdomen showed all bladder wall thickening. + Hep B surface antigen noted. Melena resolved and hgb stabalized without transfusion, epigastric pain resolved with PPI.  Recommend referral for gastroscopy.     Heart attack (H)      Hepatitis B      Normocytic anemia      Other pancytopenia (H) 7/5/2017 6/24/17 Peripheral smear at [facility].Pancytopenia of uncertain cause. Ruled out acute leukemia. Hematologist suggests hemolytic anemia should be considered possible cause and LDH and haptoglobin should be assessed in future.      PONV (postoperative nausea and vomiting)      Thrombocytopenia (H)       Past Surgical History:   Procedure Laterality Date     ABCESS DRAINAGE      finger     BURSECTOMY ELBOW Left 10/15/2021    Procedure: LEFT OLECRANON BURSECTOMY;  Surgeon: Tico Myers MD;  Location: [facility] OR     BURSECTOMY ELBOW Left 1/18/2022    Procedure: LEFT ELBOW OLECRANON BURSECTOMY;  Surgeon: Tico Myers MD;  Location: [facility] OR     CREATE FISTULA ARTERIOVENOUS UPPER EXTREMITY Left 4/20/2021    Procedure: left arm dialysis graft placement;  Surgeon: Roxana Cosby MD;  Location: [facility] OR;  Service: General     FOREIGN BODY REMOVAL      finger     INCISION AND DRAINAGE FINGER, COMBINED Left 10/20/2016    Procedure: COMBINED INCISION AND DRAINAGE FINGER;  Surgeon: Mireya Pizano MD;  Location: [facility] OR     INCISION AND DRAINAGE UPPER EXTREMITY, COMBINED Left 1/18/2022    Procedure: AND ELBOW INCISION AND DRAINAGE;  Surgeon: Tico Myers MD;  Location: [facility] OR     IR CHEST TUBE PLACEMENT NON-TUNNELED RIGHT  4/19/2021     IR CHEST TUBE PLACEMENT NON-TUNNELED RIGHT  10/19/2021     IR CHEST TUBE PLACEMENT NON-TUNNELED RIGHT  11/10/2021     IR CHEST TUBE PLACEMENT NON-TUNNELED RIGHT  3/31/2022     IR DIALYSIS FISTULOGRAM LEFT  4/2/2022     IR DIALYSIS FISTULOGRAM LEFT  4/4/2022     IR PLEURAL DRAINAGE WITH CATHETER INSERTION  4/19/2021     MIDLINE INSERTION - DOUBLE LUMEN  4/23/2021          PA ESOPHAGOGASTRODUODENOSCOPY TRANSORAL DIAGNOSTIC N/A 8/18/2020    Procedure: ESOPHAGOGASTRODUODENOSCOPY (EGD) with biopsy;   Surgeon: Nilson Gonzales MD;  Location: [facility];  Service: Gastroenterology      PARACENTESIS  8/14/2020     US PARACENTESIS  8/19/2020     US THORACENTESIS  4/18/2021      Current Outpatient Medications   Medication Sig Dispense Refill     acetaminophen (TYLENOL) 500 MG tablet Take 500 mg by mouth every 6 hours as needed for mild pain       benzonatate (TESSALON) 100 MG capsule Take 1 capsule (100 mg) by mouth 3 times daily as needed for cough 30 capsule 0     calcium acetate (PHOSLO) 667 MG CAPS capsule Take 1 capsule by mouth 3 times daily (with meals)       carvedilol (COREG) 25 MG tablet Take 1-2 tablets (25-50 mg) by mouth 2 times daily (with meals) 120 tablet 3     cephALEXin (KEFLEX) 500 MG capsule Take 1 capsule (500 mg) by mouth 2 times daily 60 capsule 1     cloNIDine (CATAPRES) 0.1 MG tablet Take 0.1 mg by mouth At Bedtime        [employer] SALINE NASAL SPRAY 0.65 % nasal spray Spray 1 spray in nostril daily as needed       diltiazem ER (DILT-XR) 180 MG 24 hr capsule Take 1 capsule (180 mg) by mouth 2 times daily (Patient taking differently: Take 180 mg by mouth 2 times daily as needed (Checks BP prior to taking, if too low he does not take this medication)) 60 capsule 11     entecavir (BARACLUDE) 0.5 MG tablet Take 1 tablet (0.5 mg) by mouth every 7 days 52 tablet 1     guaiFENesin-codeine (GUAIFENESIN AC) 100-10 MG/5ML syrup Take 5 mLs by mouth every 4 hours as needed for cough 236 mL 1     hydrOXYzine (ATARAX) 25 MG tablet Take 1 tablet (25 mg) by mouth 3 times daily as needed for itching 90 tablet 3     order for DME Equipment being ordered: Other: blood pressure monitor  Treatment Diagnosis: hypertension 1 each 0     oxyCODONE (ROXICODONE) 5 MG tablet Take 1 tablet (5 mg) by mouth daily as needed for severe pain 30 tablet 0     pantoprazole (PROTONIX) 40 MG EC tablet Take 1 tablet (40 mg) by mouth daily 30 tablet 11     senna-docusate (SENOKOT-S/PERICOLACE) 8.6-50 MG tablet Take 1 tablet by mouth  daily as needed for constipation 30 tablet 11     zolpidem (AMBIEN) 5 MG tablet Take 1 tablet (5 mg) by mouth nightly as needed for sleep 10 tablet 5      Allergies   Allergen Reactions     Nka [No Known Allergies]         ROS:   Gen- no weight change, no fevers/chills   ENT- see HPI   Cardiac - no palpitations, no chest pain   Respiratory - no shortness of breath , no wheezing   GI - no nausea, no vomiting, no diarrhea, no constipation   Remainder of ROS negative.     Exam:   /73 (BP Location: Right arm, Patient Position: Sitting, Cuff Size: Adult Regular)   Pulse 81   Temp 98.7  F (37.1  C) (Oral)   Resp 20   Wt 55.4 kg (122 lb 1.3 oz)   SpO2 93%   BMI 20.32 kg/m     Alert and oriented x 3; No acute distress   Neck: no masses, no lymphadenopathy   Resp: R lung field w/ diminished sounds   CV: rate/rhythm regular, no murmurs/rubs/gallops   Neuro: no focal deficits   Derm: + drainage from R-sided chest wall wound      Xray of chest on August 10, 2022 [facility] - films personally reviewed with patient at time of visit     My impression: R lung field w/ significant effusion involving >50% of visible lung field

## 2022-08-23 NOTE — PROGRESS NOTES
Clinic Care Coordination - Chart Review Only    Situation/Background: Patient chart reviewed by care coordinator related to [facility] conversion.    Assessment: Patient no longer  followed by Clinic Care Coordination.    Plan: Patient's chart updated to align with [facility]

## 2022-08-24 NOTE — PROGRESS NOTES
Called wife, at the request of Dr Zurita, to inquire what patient's Dialysis plan is in regards to surgery that is scheduled for Friday. Patient's wife informed writer that they plan to have Dialysis as scheduled on Friday.  Reviewed upcoming appointment dates, times and locations in detail with patient's wife, including necessary arrival time.   Updated Dr Zurita on patient's dialysis plan. Per Dr Zurita, patient might be able to make clinic dialysis appointment. Epic message sent to in-pateint thoracic team to inform them of possible need for dialysis post-operatively on Friday.     Shawna Damon RN, BSN  Thoracic Surgery RN Care Coordinator

## 2022-08-25 NOTE — H&P
Pre-Operative H & P     CC:  Preoperative exam to assess for increased cardiopulmonary risk while undergoing surgery and anesthesia.    Date of Encounter: 8/25/2022  Primary Care Physician:  Jaswant Flores     Reason for visit: pre op   HPI  Elle Blanton is a 62 year old male who presents for pre-operative H & P in preparation for  Procedure Information     Date/Time: 8/26/2022     Procedure: right I and D torso    Anesthesia type: MAC    Pre-op diagnosis: chest wall abscess    Location: [facility]    Providers:Pilo Zurita        62 year old male with past medical history of  draining chest wall wound. He was seen by Dr Zurita on 7/14/22 for trapped lung.  He wishes to proceed with above.   On renal hemodialysis for ESRD twice weekly.     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 pylori infection 7/5/2017    UGI bleed implied by Hgb 9.0 6/22/17 and melena. Admitted and hgb decreased to 7.6, CT abdomen showed all bladder wall thickening. + Hep B surface antigen noted. Melena resolved and hgb stabalized without transfusion, epigastric pain resolved with PPI. Recommend referral for gastroscopy.     Heart attack (H)      Hepatitis B      Normocytic anemia      Other pancytopenia (H) 7/5/2017 6/24/17 Peripheral smear at [facility].Pancytopenia of uncertain cause. Ruled out acute leukemia. Hematologist suggests hemolytic anemia should be considered possible cause and LDH and haptoglobin should be  assessed in future.      PONV (postoperative nausea and vomiting)      Thrombocytopenia (H)        Past Surgical History  Past Surgical History:   Procedure Laterality Date     ABCESS DRAINAGE      finger     BURSECTOMY ELBOW Left 10/15/2021    Procedure: LEFT OLECRANON BURSECTOMY;  Surgeon: Tico Myers MD;  Location: [facility] Main OR     BURSECTOMY ELBOW Left 1/18/2022    Procedure: LEFT ELBOW OLECRANON BURSECTOMY;  Surgeon: Tico Myers MD;  Location: [facility] Main OR     CREATE FISTULA ARTERIOVENOUS UPPER EXTREMITY Left 4/20/2021    Procedure: left arm dialysis graft placement;  Surgeon: Roxana Cosby MD;  Location: [facility] Main OR;  Service: General     FOREIGN BODY REMOVAL      finger     INCISION AND DRAINAGE FINGER, COMBINED Left 10/20/2016    Procedure: COMBINED INCISION AND DRAINAGE FINGER;  Surgeon: Mireya Pizano MD;  Location: [place] OR     INCISION AND DRAINAGE UPPER EXTREMITY, COMBINED Left 1/18/2022    Procedure: AND ELBOW INCISION AND DRAINAGE;  Surgeon: Tico Myers MD;  Location: [facility] OR     IR CHEST TUBE PLACEMENT NON-TUNNELED RIGHT  4/19/2021     IR CHEST TUBE PLACEMENT NON-TUNNELED RIGHT  10/19/2021     IR CHEST TUBE PLACEMENT NON-TUNNELED RIGHT  11/10/2021     IR CHEST TUBE PLACEMENT NON-TUNNELED RIGHT  3/31/2022     IR DIALYSIS FISTULOGRAM LEFT  4/2/2022     IR DIALYSIS FISTULOGRAM LEFT  4/4/2022     IR PLEURAL DRAINAGE WITH CATHETER INSERTION  4/19/2021     MIDLINE INSERTION - DOUBLE LUMEN  4/23/2021          KS ESOPHAGOGASTRODUODENOSCOPY TRANSORAL DIAGNOSTIC N/A 8/18/2020    Procedure: ESOPHAGOGASTRODUODENOSCOPY (EGD) with biopsy;  Surgeon: Nilson Gonzales MD;  Location: [facility] GI;  Service: Gastroenterology     US PARACENTESIS  8/14/2020     US PARACENTESIS  8/19/2020     US THORACENTESIS  4/18/2021       Prior to Admission Medications  Current Outpatient Medications   Medication Sig Dispense Refill     acetaminophen (TYLENOL) 500 MG  tablet Take 500 mg by mouth every 6 hours as needed for mild pain       benzonatate (TESSALON) 100 MG capsule Take 1 capsule (100 mg) by mouth 3 times daily as needed for cough 30 capsule 0     calcium acetate (PHOSLO) 667 MG CAPS capsule Take 1 capsule by mouth 2 times daily (with meals)       carvedilol (COREG) 25 MG tablet Take 1-2 tablets (25-50 mg) by mouth 2 times daily (with meals) 120 tablet 3     cephALEXin (KEFLEX) 500 MG capsule Take 1 capsule (500 mg) by mouth 2 times daily (Patient taking differently: Take 500 mg by mouth every morning) 60 capsule 1     cloNIDine (CATAPRES) 0.1 MG tablet Take 0.1 mg by mouth At Bedtime        diltiazem ER (DILT-XR) 180 MG 24 hr capsule Take 1 capsule (180 mg) by mouth 2 times daily (Patient taking differently: Take 180 mg by mouth 2 times daily as needed (Checks BP prior to taking, if too low he does not take this medication)) 60 capsule 11     guaiFENesin-codeine (GUAIFENESIN AC) 100-10 MG/5ML syrup Take 5 mLs by mouth every 4 hours as needed for cough 236 mL 1     hydrOXYzine (ATARAX) 25 MG tablet Take 1 tablet (25 mg) by mouth 3 times daily as needed for itching 90 tablet 3     oxyCODONE (ROXICODONE) 5 MG tablet Take 1 tablet (5 mg) by mouth daily as needed for severe pain 30 tablet 0     pantoprazole (PROTONIX) 40 MG EC tablet Take 1 tablet (40 mg) by mouth daily (Patient taking differently: Take 40 mg by mouth every morning) 30 tablet 11     senna-docusate (SENOKOT-S/PERICOLACE) 8.6-50 MG tablet Take 1 tablet by mouth daily as needed for constipation 30 tablet 11     zolpidem (AMBIEN) 5 MG tablet Take 1 tablet (5 mg) by mouth nightly as needed for sleep 10 tablet 5     [employer] SALINE NASAL SPRAY 0.65 % nasal spray Spray 1 spray in nostril daily as needed       entecavir (BARACLUDE) 0.5 MG tablet Take 1 tablet (0.5 mg) by mouth every 7 days (Patient taking differently: Take 0.5 mg by mouth every 7 days Sunday) 52 tablet 1     order for DME Equipment being ordered:  Other: blood pressure monitor  Treatment Diagnosis: hypertension 1 each 0       Allergies  Allergies   Allergen Reactions     Nka [No Known Allergies]        Social History  Social History     Socioeconomic History     Marital status:      Spouse name: Not on file     Number of children: Not on file     Years of education: Not on file     Highest education level: Not on file   Occupational History     Not on file   Tobacco Use     Smoking status: Never Smoker     Smokeless tobacco: Never Used   Vaping Use     Vaping Use: Never used   Substance and Sexual Activity     Alcohol use: No     Drug use: No     Sexual activity: Yes     Partners: Female   Other Topics Concern     Parent/sibling w/ CABG, MI or angioplasty before 65F 55M? Not Asked   Social History Narrative     Not on file     Social Determinants of Health     Financial Resource Strain: Not on file   Food Insecurity: Not on file   Transportation Needs: Not on file   Physical Activity: Not on file   Stress: Not on file   Social Connections: Not on file   Intimate Partner Violence: Not on file   Housing Stability: Not on file       Family History  Family History   Problem Relation Age of Onset     Diabetes Father      Hypertension No family hx of      Asthma No family hx of      Cancer No family hx of      Coronary Artery Disease No family hx of      Liver Cancer No family hx of      Ulcers Father        Review of Systems  The complete review of systems is negative other than noted in the HPI or here.     Anesthesia Evaluation   Pt has had prior anesthetic. Type: General, MAC and Regional.    History of anesthetic complications  - PONV.      ROS/MED HX  ENT/Pulmonary:  - neg pulmonary ROS     Neurologic:  - neg neurologic ROS     Cardiovascular: Comment:  4/18/21    Left ventricle ejection fraction is normal. The calculated left ventricular ejection fraction is 72% without wall motion abnormality.    Normal right ventricular size and systolic  "function.    Aortic valve sclerosis with mild aortic insufficiency    Mild tricuspid insufficiency with normal estimation of pulmonary artery pressures.    No previous study for comparison.    (+) hypertension--CAD -past MI --     (+) hypertension-range: 130/70/ ----    METS/Exercise Tolerance: 3 - Able to walk 1-2 blocks without stopping    Hematologic:     (+) anemia, history of blood transfusion, no previous transfusion reaction,     Musculoskeletal:  - neg musculoskeletal ROS     GI/Hepatic:     (+) hepatitis type B, liver disease,     Renal/Genitourinary:     (+) renal disease, type: ESRD, Pt requires dialysis, type: Hemodialysis,     Endo:  - neg endo ROS     Psychiatric/Substance Use:  - neg psychiatric ROS     Infectious Disease:  - neg infectious disease ROS     Malignancy:  - neg malignancy ROS     Other:  - neg other ROS          BP (!) 150/99 (BP Location: Right arm, Patient Position: Chair, Cuff Size: Adult Regular)   Pulse 86   Temp 98.2  F (36.8  C) (Oral)   Resp 16   Ht 1.651 m (5' 5\")   Wt 54.4 kg (120 lb)   SpO2 96%   BMI 19.97 kg/m    Physical Exam   Constitutional: Awake, alert, cooperative, no apparent distress, and appears stated age.  Eyes: Pupils equal, round and reactive to light, extra ocular muscles intact, sclera clear, conjunctiva normal.  HENT: Normocephalic, oral pharynx with moist mucus membranes, good dentition. No goiter appreciated.   Respiratory: Clear to auscultation bilaterally, no crackles or wheezing.  Cardiovascular: Regular rate and rhythm, normal S1 and S2, and no murmur noted.  Carotids +2, no bruits. No edema. Palpable pulses to radial  DP and PT arteries.   GI: Normal bowel sounds, soft, non-distended, non-tender, no masses palpated, no hepatosplenomegaly.  Surgical scars  Right torso with dressing.  Lymph/Hematologic: No cervical lymphadenopathy and no supraclavicular lymphadenopathy.  Skin: Warm and dry.  No rashes at anticipated surgical site. Right arm PICC " line placed today, dressing over  Musculoskeletal: Full ROM of neck. There is no redness, warmth, or swelling of the joints. Gross motor strength is normal.    Neurologic: Awake, alert, oriented to name, place and time. Cranial nerves II-XII are grossly intact. Gait is normal.   Neuropsychiatric: Calm, cooperative. Normal affect.     Prior Labs/Diagnostic Studies   All labs and imaging personally reviewed     EKG/ stress test - if available please see in ROS above   Echo result w/o MOPS:    4/16/2021  Left ventricle ejection fraction is normal. The calculated left ventricular ejection fraction is 72% without wall motion abnormality.    Normal right ventricular size and systolic function.    Aortic valve sclerosis with mild aortic insufficiency    Mild tricuspid insufficiency with normal estimation of pulmonary artery pressures.    No previous study for comparison.    No flowsheet data found.    The patient's records and results personally reviewed by this provider.     Outside records reviewed from: Care Everywhere    LAB/DIAGNOSTIC STUDIES TODAY:  none    Assessment      Elle Blanton is a 62 year old male seen as a PAC referral for risk assessment and optimization for anesthesia.    Plan/Recommendations  Pt will be optimized for the proposed procedure.  See below for details on the assessment, risk, and preoperative recommendations    NEUROLOGY  - No history of TIA, CVA or seizure    ENT  - No current airway concerns.  Will need to be reassessed day of surgery.  Mallampati: II  TM: > 3    CARDIAC  History of MI  - Hypertension  Blood pressure elevated at today's exam.  Recommended checking over the next two weeks and if remains >160/90, instructed to follow up with PCP for further evaluation and treatment.  - METS (Metabolic Equivalents)  Patient CANNOT perform 4 METS exercise without symptoms            Total Score: 1    Functional Capacity: Unable to complete 4 METS      RCRI-Low risk: Class 2 0.9% complication rate  "           Total Score: 1    RCRI: Elevated Creatinine        PULMONARY  - Obstructive Sleep Apnea  No current risk of obstructive sleep apnea   NICK Medium Risk            Total Score: 3    NICK: Hypertension    NICK: Over 50 ys old    NICK: Male      - Denies asthma or inhaler use  - Tobacco History      History   Smoking Status     Never Smoker   Smokeless Tobacco     Never Used       GI  Cirrhosis is liver  Hepatitis B    PONV High Risk  Total Score: 3           1 AN PONV: Patient is not a current smoker    1 AN PONV: Patient has history of PONV    1 AN PONV: Intended Post Op Opioids        /RENAL  - Baseline Creatinine 5.30 on  4/9/22  Renal disease  ESRD. Pt requires hemodialysis twice weekly  Pt just had a Picc line placed this morning.     ENDOCRINE    - BMI: Estimated body mass index is 19.97 kg/m  as calculated from the following:    Height as of this encounter: 1.651 m (5' 5\").    Weight as of this encounter: 54.4 kg (120 lb).  Healthy Weight (BMI 18.5-24.9)  - No history of Diabetes Mellitus    HEME  VTE Medium Risk 1.8%            Total Score: 6    VTE: Greater than 59 yrs old    VTE: Male    VTE: Sepsis      - No history of abnormal bleeding or antiplatelet use.  - Chronic anemia  Recommend perioperative use of blood conservation techniques intraoperatively and close monitoring for postoperative bleeding.      MSK  Patient is NOT Frail            Total Score: 1    Frailty: Decrease in strength      Due to the patient's risk, a referral to Physical Medicine and Rehabilitation has been made.  We are making you aware, as rehabilitation will be recommended before surgery and all recommendations from the PMR physician should be in collaboration with you as the patient's primary care provider (PCP).          The patient is optimized for their procedure. AVS with information on surgery time/arrival time, meds and NPO status given by nursing staff. No further diagnostic testing indicated.      On the day of " service:     Prep time: 30 minutes  Visit time: 30 minutes  Documentation time: 30 minutes  ------------------------------------------  Total time: 90 minutes      Lin Rubio PA-C  Preoperative Assessment Center  [facility]  Clinic and Surgery Center  Phone: [phone]  Fax: [phone]

## 2022-08-25 NOTE — BRIEF OP NOTE
[facility] [place]    Brief Operative Note    Pre-operative diagnosis: Poor venous access [I87.8]  Post-operative diagnosis Same as pre-operative diagnosis    Procedure: Procedure(s):  INSERTION, PICC  Surgeon: Surgeon(s) and Role:     * Osmin Villa MD - Primary  Anesthesia: Local   Estimated Blood Loss: Minimal    Drains: None  Specimens: * No specimens in log *  Findings:   None.  Complications: None.  Implants:   Implant Name Type Inv. Item Serial No.  Lot No. LRB No. Used Action   CATH VA POWER PICC 5FR 70CM SL [id number] - [id number] Catheter CATH VA POWER PICC 5FR 70CM SL [id number] PAULINA Sincerely [id number] Right 1 Used as a Supply

## 2022-08-25 NOTE — PATIENT INSTRUCTIONS
Preparing for Your Surgery      Name:  Elle Blanton   MRN:  [id number]   :  1960   Today's Date:  2022       Arriving for surgery:  Surgery date:  22  Arrival time:  5:30AM    Restrictions due to COVID 19:       Effective 08/15/22, [facility] is implementing the following visitor policy:     1 person may accompany the patient through the Pre-Op process.      That same person may wait in the Surgery Waiting room, provided there is enough room  to social distance.           Visitors must wear a mask.      Visitors must not be ill.        Inpatients are allowed 2 visitors per day for the duration of their stay.      Children age 5 and older may visit.      Visiting hours are 8 am to 8 pm.        For everyone's health and safety, visitors are requested to remain in patients room as much as possible.      Please come to:     [facility] [address] [place] [address]    - ?Pin-Digital parking is available in front of the hospital      -   Parking is available in the Patient Visitor Ramp on [facility].     -   When entering the hospital you will be asked COVID screening questions, you will then be directed to Registration.  Registration will direct you to the 3rd floor Surgery waiting room.     -   Please ask if you need an escort or a wheelchair to the Surgery Waiting Room.  Preop number- [phone]     What can I eat or drink?  -  You may eat and drink normally for up to 8 hours before your surgery. (Until 22, 11:30PM)  -  You may have clear liquids until 2 hours before surgery. (Until 22, 5:30AM)    Examples of clear liquids:  Water  Clear broth  Juices (apple, white grape, white cranberry  and cider) without pulp  Noncarbonated, powder based beverages  (lemonade and Jus-Aid)  Sodas (Sprite, 7-Up, ginger ale and seltzer)  Coffee or tea (without milk or cream)  Gatorade    -  No Alcohol for at least 24  hours before surgery.     Which medicines can I take?    Hold Aspirin for 7 days before surgery.   Hold Multivitamins for 7 days before surgery.  Hold Supplements for 7 days before surgery.  Hold Ibuprofen (Advil, Motrin) for 1 day before surgery--unless otherwise directed by surgeon.  Hold Naproxen (Aleve) for 4 days before surgery.    -  DO NOT take these medications the day of surgery:    Calcium(Phoslo)   Senna    -  PLEASE TAKE these medications the day of surgery:   Acetaminophen(Tylenol) as needed    Benzonatate(Tessalon) as needed    Carvedilol(Coreg)    Cephalexin(Keflex)    Saline Nasal Spray    Diltiazem as needed    Guaifenesin-Codeine cough syrup as needed    Hydroxyzine(Atarax) as needed    Oxycodone(Roxicodone) as needed    Pantoprazole(Protonix)    How do I prepare myself?  - Please take 2 showers before surgery using Scrubcare or Hibiclens soap.    Use this soap only from the neck to your toes.     Leave the soap on your skin for one minute--then rinse thoroughly.      You may use your own shampoo and conditioner. No other hair products.   - Please remove all jewelry and body piercings.  - No lotions, deodorants or fragrance.  - Bring your ID and insurance card.    -If you have a Deep Brain Stimulator, Spinal Cord Stimulator, or any Neuro Stimulator device---you must bring the remote control to the hospital.        Questions or Concerns:    - For any questions regarding the day of surgery or your hospital stay, please contact the Pre Admission Nursing Office at [phone].       - If you have health changes between today and your surgery, please call your surgeon.       - For questions after surgery, please call your surgeons office.

## 2022-08-25 NOTE — DISCHARGE INSTRUCTIONS
A collaboration between [facility] and [facility]  Experts in minimally invasive, targeted treatments performed using imaging guidance    Venous Access Device,  Port Catheter or Tunneled or Non-Tunneled Central Line Placement    Today you had a procedure today to install a venous access device; either a tunneled central vein catheter or a subcutaneous port catheter.    After you go home:  Drink plenty of fluids.  Generally 6-8 (8 ounce) glasses a day is recommended.  Resume your regular diet unless otherwise ordered by a medical provider.  Keep any applied tape/gauze dressings clean and dry.  Change tape/gauze dressings if they get wet or soiled.  You may shower the following day after procedure, however cover and protect from moisture any tape/gauze dressings.  You may let water hit and run over dried skin glue, but do not scrub.  Pat the area dry after showering.  Port placement incisions are closed with absorbable suture, meaning they do not need to be removed at a later date, and a topical skin adhesive (skin glue).  This glue will wear off in 7-14 days.  Do not remove before this time.  If 14 days have passed and residual glue is present, you may gently remove it.  Do not apply gels, lotions, or ointments to the glue site for the first 10 days as this may cause the glue to prematurely soften and fail.  Do not perform strenuous activities or lift greater than 10 pounds for the next three days.  If there is bleeding or oozing from the procedure site, apply firm pressure to the area for 5-10 minutes.  If the bleeding continues seek medical advice at the numbers below.  Mild procedure site discomfort can be treated with an ice pack and over-the-counter pain relievers.        For 24 hours after any sedation used:  Relax and take it easy.  No strenuous activities.  Do not drive or operate machines at home or at work.  No alcohol consumption.  Do not make any  important or legal decisions.    Call our Interventional Radiology (IR) service if:  If you start bleeding from the procedure site.  If you do start to bleed from the site, lie down and hold some pressure on the site.  Our radiology provider can help you decide if you need to return to the hospital.  If you have new or worsening pain related to the procedure.  If you have concerning swelling at the procedure site.  If you develop persistent nausea or vomiting.  If you develop hives or a rash or any unexplained itching.  If you have a fever of greater than 100.5  F and chills in the first 5 days after procedure.  Any other concerns related to your procedure.      [facility]  Interventional Radiology (IR)  [address] [facility] [facility]  2nd [facility] Waiting Room  [place] [address]    Contact Number:  [phone]  (IR control desk)  Monday - Friday 8:00 am - 4:30 pm    After hours for urgent concerns:  [phone]  After 4:30 pm Monday - Friday, Weekends and Holidays.   Ask for Interventional Radiology on-call.  Someone is available 24 hours a day.  [facility] toll free number:  [phone]

## 2022-08-25 NOTE — OP NOTE
PROCEDURES 8/25/2022:  1. Ultrasound guidance for venous access  2. Placement centrally inserted catheter (age > 5 yrs.) tunneled, no port, no pump  3. Fluoroscopic guidance placement    History: Patient needs PICC for upcoming surgery within next 4 weeks. Currently on waitlist for surgery 8/11.; Poor venous access    Comparison: None    Staff: MD BERRY Mackenzie, MIGUEL SCHULTZ MD, attest that I was present in the procedure room for the entire procedure.    Fellow/Resident: ANETA Crooks DO      Medications:   1. 2 cc 1% lidocaine  Fluoroscopy time: 1.25 minutes    Complications: None    Consent: verbal and written informed consent obtained prior to procedure.    PROCEDURE: The patient understood the limitations, alternatives, and risks of the procedure and requested the procedure be performed. Both written and oral consent were obtained.    The right upper extremity was prepped and draped in the usual sterile fashion. Ten to one volume mixture of 1% lidocaine without epinephrine buffered with 8.4% bicarbonate solution was used for local anesthesia.     Under ultrasound guidance, right basilic vein venotomy was made with a micropuncture needle. Permanent copy of the image documenting the vein was entered into the patients record.    Micropuncture needle exchanged over guidewire for the 5 Fr peel away sheath. Catheter length measured with the 0.018 guidewire. A  5 Faroese single lumen PICC was cut to 45 cm and loaded with the 0.018 guidewire and advanced into the sheath under fluoroscopic guidance with tip positioned in the high right atrium. The lumen flushed and aspirated adequately. The catheter was secured with a Statlock and dressed with sterile dressing. The catheter was heparin locked with 10:1 heparin solution. No immediate complication.      IMPRESSION:   1. Ultrasound guided right basilic venotomy.  2. A 5 Faroese 45 cm single lumen PICC placed under fluoroscopic guidance with the tip in the high right atrium.  Lumen is heparin locked and ready for use.

## 2022-08-26 PROBLEM — R57.1 HYPOVOLEMIC SHOCK (H): Status: ACTIVE | Noted: 2022-01-01

## 2022-08-26 PROBLEM — J90 PLEURAL EFFUSION: Status: ACTIVE | Noted: 2021-10-15

## 2022-08-26 PROBLEM — J94.8 HYDROPNEUMOTHORAX: Status: ACTIVE | Noted: 2021-11-08

## 2022-08-26 PROBLEM — K66.1 HEMOPERITONEUM: Status: ACTIVE | Noted: 2022-01-01

## 2022-08-26 PROBLEM — R18.8 OTHER ASCITES: Status: ACTIVE | Noted: 2022-01-01

## 2022-08-26 PROBLEM — R16.0 LIVER MASS: Status: ACTIVE | Noted: 2022-01-01

## 2022-08-26 NOTE — H&P
[facility]    History and Physical - Hospitalist Service       Date of Admission:  8/25/2022    Assessment & Plan      Elle Blanton is a 62 year old male admitted on 8/25/2022. He has a complicated medical history including multiple prior admissions for right lung empyema requiring chest tube drainage, anemia of chronic disease, end-stage renal disease on dialysis (Monday and Friday) secondary to hepatorenal syndrome due to cirrhosis, thoracic aortic dissection.  Admitted today for further evaluation of syncope, abdominal pain, hypotension and found to have hemoperitoneum. Patient in critical condition and will be admitted to ICU.     Syncope, likely due to the conditions mentioned below:  Septic versus hypovolemic shock  Hemoperitoneum and right upper quadrant 2/2 ruptured HCC with pericapsular hematoma  - Noted to have active arterial bleeding 2/2 malignant mass in liver  - Hypotensive and requiring Levophed and vasopressin support.  - IR this AM for angio and possible embolization.  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  Type B thoracic aortic dissection  - Stable per CTa on admission    HRS 2/2 cirrhosis  ESRD on HD ()  - Nephrology consulted    Chronic Medical Conditions   - GERD - on protonix 40 daily, management per ICU team  - Gout - no active treatment  - Hep B - entecavir per ICU TEAM  - HTN - hold diltiazem, carvedilol, clonidine as pt is hypotensive     Diet:   NPO  DVT Prophylaxis: VTE Prophylaxis contraindicated due to active bleed  Beltre Catheter: Not present  Fluids: s/p boluses, no mivf  Central Lines:  PRESENT     Cardiac Monitoring: None  Code Status:   full    Clinically Significant Risk Factors Present on Admission             # Hypoalbuminemia: Albumin = 1.7 g/dL (Ref range: 3.5 - 5.0 g/dL) on admission, will monitor as appropriate    # Thrombocytopenia: Plts = 125 10e3/uL (Ref range: 150 - 450 10e3/uL) on admission, will monitor for bleeding  # Hypertension: home medication list includes antihypertensive(s)   # Circulatory Shock: currently requiring pressors for blood pressure support         Disposition Plan      Expected Discharge Date: 08/30/2022                The patient's care was discussed with the faculty in Am report.    Henrietta Scott MD  Hospitalist Service  [facility]  Securely message with the [employer] Web Console (learn more here)  Text page via [employer] Paging/Directory       ______________________________________________________________________    Chief Complaint   weak    History is obtained from the patient    History of Present Illness   Elle Blanton is a 62 year old male who has a significant medical history as above presenting with syncope, abdominal pain.     Patient presented after getting a PICC line placed in his right arm.  After patient got home yesterday he suddenly became weak and dizzy and had a syncopal episode.  He denies any chest pain or shortness of breath.  He tells me that he had a surgery today at the  for incision and drainage from the prior chest tube site.    Lang also reports nausea, vomiting, headache.  He also said that he had generalized abdominal pain.    He hasn't really ate much recently due to nausea.       Review of Systems    The 10 point Review of Systems is negative other than noted in the HPI or here.     Past Medical History    I have reviewed this patient's medical history and updated it with pertinent information if needed.   Past Medical History:   Diagnosis Date     Acquired dissection of pulmonary artery (H) 3/31/2022     NAHUM  (acute kidney injury) (H)      Chronic hepatitis B without hepatic coma (H) 8/1/2019     Cirrhosis of liver without ascites (H) 8/1/2019     ESRD (end stage renal disease) on dialysis (H) 9/26/2021     Essential hypertension 8/1/2019     GI (gastrointestinal bleed)      Gout      H. pylori infection 7/5/2017    UGI bleed implied by Hgb 9.0 6/22/17 and melena. Admitted and hgb decreased to 7.6, CT abdomen showed all bladder wall thickening. + Hep B surface antigen noted. Melena resolved and hgb stabalized without transfusion, epigastric pain resolved with PPI. Recommend referral for gastroscopy.     Heart attack (H)      Hepatitis B      Normocytic anemia      Other pancytopenia (H) 7/5/2017 6/24/17 Peripheral smear at [facility].Pancytopenia of uncertain cause. Ruled out acute leukemia. Hematologist suggests hemolytic anemia should be considered possible cause and LDH and haptoglobin should be assessed in future.      PONV (postoperative nausea and vomiting)      Thrombocytopenia (H)         Past Surgical History   I have reviewed this patient's surgical history and updated it with pertinent information if needed.  Past Surgical History:   Procedure Laterality Date     ABCESS DRAINAGE      finger     BURSECTOMY ELBOW Left 10/15/2021    Procedure: LEFT OLECRANON BURSECTOMY;  Surgeon: Tico Myers MD;  [place] [facility]     BURSECTOMY ELBOW Left 1/18/2022    Procedure: LEFT ELBOW OLECRANON BURSECTOMY;  Surgeon: Tico Myers MD;  Location: [facility]     CREATE FISTULA ARTERIOVENOUS UPPER EXTREMITY Left 4/20/2021    Procedure: left arm dialysis graft placement;  Surgeon: Roxana Cosby MD;  Location: [facility] OR;  Service: General     FOREIGN BODY REMOVAL      finger     INCISION AND DRAINAGE FINGER, COMBINED Left 10/20/2016    Procedure: COMBINED INCISION AND DRAINAGE FINGER;  Surgeon: Mireya Pizano MD;  Location: [facility]     INCISION AND DRAINAGE UPPER EXTREMITY,  COMBINED Left 1/18/2022    Procedure: AND ELBOW INCISION AND DRAINAGE;  Surgeon: Tico Myers MD;  Location: [facility] OR     INSERT PICC LINE Right 8/25/2022    Procedure: INSERTION, PICC;  Surgeon: Osmin Villa MD;  Location: [facility] OR     IR CHEST TUBE PLACEMENT NON-TUNNELED RIGHT  4/19/2021     IR CHEST TUBE PLACEMENT NON-TUNNELED RIGHT  10/19/2021     IR CHEST TUBE PLACEMENT NON-TUNNELED RIGHT  11/10/2021     IR CHEST TUBE PLACEMENT NON-TUNNELED RIGHT  3/31/2022     IR DIALYSIS FISTULOGRAM LEFT  4/2/2022     IR DIALYSIS FISTULOGRAM LEFT  4/4/2022     IR PICC PLACEMENT > 5 YRS OF AGE  8/25/2022     IR PLEURAL DRAINAGE WITH CATHETER INSERTION  4/19/2021     IR VISCERAL ANGIOGRAM  8/26/2022     MIDLINE INSERTION - DOUBLE LUMEN  4/23/2021          OH ESOPHAGOGASTRODUODENOSCOPY TRANSORAL DIAGNOSTIC N/A 8/18/2020    Procedure: ESOPHAGOGASTRODUODENOSCOPY (EGD) with biopsy;  Surgeon: Nilson Gonzales MD;  Location: [facility];  Service: Gastroenterology      PARACENTESIS  8/14/2020      PARACENTESIS  8/19/2020     US THORACENTESIS  4/18/2021        Social History   I have reviewed this patient's social history and updated it with pertinent information if needed. Elle Blanton  reports that he has never smoked. He has never used smokeless tobacco. He reports that he does not drink alcohol and does not use drugs.    Family History   I have reviewed this patient's family history and updated it with pertinent information if needed.  Family History   Problem Relation Age of Onset     Diabetes Father      Hypertension No family hx of      Asthma No family hx of      Cancer No family hx of      Coronary Artery Disease No family hx of      Liver Cancer No family hx of      Ulcers Father        Prior to Admission Medications   Prior to Admission Medications   Prescriptions Last Dose Informant Patient Reported? Taking?   [employer] SALINE NASAL SPRAY 0.65 % nasal spray   Yes No   Sig: Spray 1 spray in nostril daily as  needed   acetaminophen (TYLENOL) 500 MG tablet   Yes No   Sig: Take 500 mg by mouth every 6 hours as needed for mild pain   benzonatate (TESSALON) 100 MG capsule   No No   Sig: Take 1 capsule (100 mg) by mouth 3 times daily as needed for cough   calcium acetate (PHOSLO) 667 MG CAPS capsule   Yes No   Sig: Take 1 capsule by mouth 2 times daily (with meals)   carvedilol (COREG) 25 MG tablet   No No   Sig: Take 1-2 tablets (25-50 mg) by mouth 2 times daily (with meals)   cephALEXin (KEFLEX) 500 MG capsule   No No   Sig: Take 1 capsule (500 mg) by mouth 2 times daily   Patient taking differently: Take 500 mg by mouth every morning   cloNIDine (CATAPRES) 0.1 MG tablet   Yes No   Sig: Take 0.1 mg by mouth At Bedtime    diltiazem ER (DILT-XR) 180 MG 24 hr capsule   No No   Sig: Take 1 capsule (180 mg) by mouth 2 times daily   Patient taking differently: Take 180 mg by mouth 2 times daily as needed (Checks BP prior to taking, if too low he does not take this medication)   entecavir (BARACLUDE) 0.5 MG tablet   No No   Sig: Take 1 tablet (0.5 mg) by mouth every 7 days   Patient taking differently: Take 0.5 mg by mouth every 7 days Sunday   guaiFENesin-codeine (GUAIFENESIN AC) 100-10 MG/5ML syrup   No No   Sig: Take 5 mLs by mouth every 4 hours as needed for cough   hydrOXYzine (ATARAX) 25 MG tablet   No No   Sig: Take 1 tablet (25 mg) by mouth 3 times daily as needed for itching   order for DME   No No   Sig: Equipment being ordered: Other: blood pressure monitor  Treatment Diagnosis: hypertension   oxyCODONE (ROXICODONE) 5 MG tablet   No No   Sig: Take 1 tablet (5 mg) by mouth daily as needed for severe pain   pantoprazole (PROTONIX) 40 MG EC tablet   No No   Sig: Take 1 tablet (40 mg) by mouth daily   Patient taking differently: Take 40 mg by mouth every morning   senna-docusate (SENOKOT-S/PERICOLACE) 8.6-50 MG tablet   No No   Sig: Take 1 tablet by mouth daily as needed for constipation   zolpidem (AMBIEN) 5 MG tablet   No  No   Sig: Take 1 tablet (5 mg) by mouth nightly as needed for sleep      Facility-Administered Medications: None     Allergies   Allergies   Allergen Reactions     Nka [No Known Allergies]        Physical Exam   Vital Signs: Temp: (!) 94.4  F (34.7  C) (Dr. Yefri elias, Sonu hugger applied) Temp src: Oral BP: 124/62 Pulse: 64   Resp: 16 SpO2: 95 % O2 Device: None (Room air)    Weight: 125 lbs .01 oz    Constitutional: fatigued, somnolent, but arousable, cooperative and severe distress  Eyes: lids and lashes normal and extra-ocular muscles intact  ENT: normocepalic, without obvious abnormality  Respiratory: decreased breath sounds R >L, L clear  Cardiovascular: tachycardic with regular rhythm, normal S1 and S2 and no murmur noted  GI: soft, distended, mild tenderness diffusely. No rebound or guarding.  Skin: noted to have purulence draining from r chest tube site  Musculoskeletal: moving all extremities spontaneously.  Neurologic: fatigued, aox2    Data   Data reviewed today: I reviewed all medications, new labs and imaging results over the last 24 hours.   Recent Labs   Lab 08/26/22  0901 08/26/22  0850 08/25/22  2353   WBC  --  16.7* 12.1*   HGB  --  7.4* 6.0*   MCV  --  94 99   PLT  --  101* 125*   INR  --  1.93*  --    NA  --  138 138   POTASSIUM  --  5.7* 4.6   CHLORIDE  --  102 99   CO2  --  21* 25   BUN  --  67* 63*   CR  --  7.78* 8.09*   ANIONGAP  --  15 14   TANO  --  6.6* 7.1*   GLC 87 89 137*   ALBUMIN  --  1.7* 1.7*   PROTTOTAL  --  5.5* 6.2   BILITOTAL  --  0.9 0.7   ALKPHOS  --  103 114   ALT  --  19 <9   AST  --  46* 19     Recent Results (from the past 24 hour(s))   Chest CT w/o contrast   Result Value    Radiologist flags (AA)     Findings suspicious for ruptured hepatocellular carcinoma with pericapsular hematoma and hemoperitoneum    Narrative    EXAM: CT CHEST W/O CONTRAST  LOCATION: [facility]  DATE/TIME: 8/26/2022 1:17 AM    INDICATION: Chest wall infection.  COMPARISON:  07/07/2022  TECHNIQUE: CT chest without IV contrast. Multiplanar reformats were obtained. Dose reduction techniques were used.  CONTRAST: None.    FINDINGS:   LUNGS AND PLEURA: Large thick-walled right hydropneumothorax, which has a large air component compared to fluid component. Near complete atelectasis of the right lung. No focal airspace disease in the left lung. Trace left pleural effusion. No left   pneumothorax.    MEDIASTINUM/AXILLAE: Right PICC with tip near the cavoatrial junction. Redemonstrate ascending thoracic aortic aneurysm measuring 4.1 cm. Redemonstrated type B aortic dissection with aneurysmal dilatation of aortic arch measuring up to 5.9 cm and   aneurysmal dilatation of the descending thoracic aorta measuring up to 5.7 cm. No lymphadenopathy. No pericardial effusion.    CORONARY ARTERY CALCIFICATION: Severe.    UPPER ABDOMEN: Morphologic changes of cirrhosis. Increased size of right hepatic lobe mass at the dome measuring 7.1 x 4.9 cm, previously 5.0 x 4.8 cm. Hyperdense fluid along the liver capsule. Moderate volume free fluid in the upper abdomen, which   appears slightly hyperdense.    MUSCULOSKELETAL: Small amount of soft tissue gas in the right lateral chest wall, with trace gas in the adjacent pleural space. Surrounding soft tissue thickening. Multilevel degenerative changes of the spine.      Impression    IMPRESSION:   1.  Large thick-walled right hydropneumothorax, with near complete atelectasis of the right lung. No mediastinal shift to suggest tension pneumothorax.    2.  Soft tissue thickening with small amount of gas in the right lateral chest wall, possibly communicating with the right pleural space. Correlate for pleurocutaneous fistula.    3.  Increased size of right hepatic dome 7.1 cm mass, with hyperdense perihepatic fluid. Findings suspicious for ruptured hepatocellular carcinoma with pericapsular hematoma. Moderate volume abdominal free fluid, with a component of  hemoperitoneum.      [Critical Result: Findings suspicious for ruptured hepatocellular carcinoma with pericapsular hematoma and hemoperitoneum]    Finding was identified on 8/26/2022 1:25 AM.     Dr. Davila was contacted by me on 8/26/2022 at 1:51 AM and verbalized understanding of the critical result. Right hydropneumothorax and possible pleurocutaneous fistula also discussed at this time.     CT Head w/o Contrast    Narrative    EXAM: CT HEAD W/O CONTRAST  LOCATION: [facility]  DATE/TIME: 8/26/2022 1:18 AM    INDICATION: headache  COMPARISON: 1/8/2022  TECHNIQUE: Routine CT Head without IV contrast. Multiplanar reformats. Dose reduction techniques were used.    FINDINGS:  INTRACRANIAL CONTENTS: No intracranial hemorrhage, extraaxial collection, or mass effect.  No CT evidence of acute infarct. Mild presumed chronic small vessel ischemic changes. Mild generalized volume loss. No hydrocephalus.     VISUALIZED ORBITS/SINUSES/MASTOIDS: No intraorbital abnormality. No paranasal sinus mucosal disease. No middle ear or mastoid effusion.    BONES/SOFT TISSUES: No acute abnormality.      Impression    IMPRESSION:  1.  No acute intracranial process.   CTA Chest Abdomen Pelvis w Contrast    Narrative    EXAM: CTA CHEST ABDOMEN PELVIS W CONTRAST  LOCATION: [facility]  DATE/TIME: 8/26/2022 3:10 AM    INDICATION: Syncopal episode. History of dissection.  COMPARISON: Multiple prior exams with the most recent study a noncontrast chest CT from earlier today. Most recent CTA exam is from 4/7/2022.  TECHNIQUE: CT angiogram chest abdomen pelvis during arterial phase of injection of IV contrast. 2D and 3D MIP reconstructions were performed by the CT technologist. Dose reduction techniques were used.   CONTRAST: 75 mL Isovue 370    FINDINGS:   CT ANGIOGRAM CHEST, ABDOMEN, AND PELVIS: Ectatic ascending thoracic aorta measuring 4.0 cm in AP diameter and negative for dissection, unchanged.  Again seen is a type B dissection beginning at the level of the distal aortic arch just distal to the left   subclavian artery. The dissection extends to the aortic bifurcation similar to previous. Stable aneurysmal dilatation of the distal aortic arch measuring 5.7 cm and stable aneurysmal dilatation of the descending thoracic aorta measuring 5.3 cm at the   level of the left pulmonary vein. The true lumen again gives rise to the celiac and superior mesenteric arteries as well as right renal artery. False lumen gives rise to the left renal artery and RADHA. Aneurysmal dilatation of the infrarenal abdominal   aorta measuring 3.7 cm, unchanged. Patent bilateral iliac arteries. 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 No acute aortic findings and no significant interval change involving the aorta.    LUNGS AND PLEURA: Again seen is a large, thick-walled loculated hydropneumothorax occupying much of the right hemithorax. Associated consolidation and volume loss involving much of the right mid and lower lung. Dependent atelectasis left lower lung.   Small bilateral pleural effusions.    MEDIASTINUM/AXILLAE: No adenopathy. Mild cardiac enlargement. No pericardial effusion. No mediastinal shift. Esophagus is grossly negative. Patent left brachiocephalic vein stent.    CORONARY ARTERY CALCIFICATION: Severe.    HEPATOBILIARY: Cirrhotic appearance of the liver with a large malignant lesion in the right hepatic lobe superiorly measuring up to at least 7.5 cm in diameter. There is a prominent surrounding vascularity associated with this lesion which also shows   contrast material outside this lesion at the dome of the liver compatible with active arterial bleeding. A large amount of associated acute blood products around the liver in the right upper quadrant as well as a large amount of high density ascites   elsewhere throughout the abdomen and pelvis compatible with acute  hemoperitoneum. Cholelithiasis. No biliary dilatation.    PANCREAS: Normal.    SPLEEN: Normal.    ADRENAL GLANDS: Normal.    KIDNEYS/BLADDER: Atrophy of the native kidneys bilaterally. A small left renal cyst with no follow-up needed. No hydronephrosis. A Beltre catheter in the bladder which is decompressed.    BOWEL: The bowel is normal in caliber with no evidence for obstruction. No definite acute bowel findings.    LYMPH NODES: No adenopathy. There is some air in the left-sided pelvic veins presumably related to venous access in the left lower extremity.    PELVIC ORGANS: Normal.    MUSCULOSKELETAL: Mild to moderate degenerative changes in the spine.      Impression    IMPRESSION:  1.  Stable type B aortic dissection as detailed above and stable aneurysmal dilatation of the aorta. No acute aortic findings.    2.  Cirrhotic configuration of the liver with malignant-appearing lesion in the right hepatic lobe superiorly measuring at least 7.5 cm in diameter, likely representing HCC.    3.  There is evidence for active arterial bleeding secondary to the malignant mass in the liver with a large amount of acute blood products in the right upper quadrant around the liver and a large amount of ascites in the abdomen and pelvis which is   higher in density and compatible with hemoperitoneum.    4.  Again seen is a similar-appearing large thick-walled loculated right hydropneumothorax. This is associated with some significant consolidation and volume loss in the right lung and, overall, is unchanged from the earlier study. No mediastinal shift.   See additional details above.    Findings discussed with Dr. Davila 8/26/2022 3:44 AM.     IR Visceral Angiogram    Narrative    [facility]  EXAM: HEPATIC ARTERIOGRAPHY IR VISCERAL ANGIOGRAM  LOCATION: [facility]    CLINICAL HISTORY: Bleeding hepatic mass. Medically complex patient with aortic aneurysmal dissection, renal failure on dialysis, profound  hypotension on pressors. Bleeding presumptive hepatocellular carcinoma with underlying cirrhosis. Empyema with   chronic pleurocutaneous fistula.    PROCEDURES PERFORMED:  1. Common femoral artery access using ultrasound guidance followed by vascular sheath placement.  2. Selective catheterization and angiography of the celiac artery.  4. Subselective catheterization and angiography of the common hepatic artery.  5. Subselective catheterization and angiography of the right hepatic branches.  6. Superselective catheterization of the segment 7 and 8 branches of the right hepatic artery and imaging of these vessels.  7. Embolization of separate segment 7 and segment 8 branches right hepatic artery feeding the mass.  8. Arterial closure/ hemostasis with [employer].    MODERATE SEDATION: Versed and Fentanyl were administered intravenously for moderate sedation. Pulse oximetry, heart rate and blood pressure were continuously monitored by an independent trained observer. The physician spent 120 minutes of face-to-face   moderate sedation time with the patient.    ADDITIONAL MEDICATIONS: see EMR    CONTRAST: See EMR    FLUOROSCOPIC TIME: 45.1 minutes  CUMULATIVE AIR KERMA/DOSE: 3392 mGy    STERILE BARRIER TECHNIQUE: Maximal Sterile Barrier Technique Utilized: Cap AND mask AND sterile gown AND sterile gloves AND sterile full body drape AND hand hygiene AND skin preparation 2% chlorhexidine for cutaneous antisepsis (or acceptable alternative   antiseptics).   Sterile Ultrasound Technique Utilized ?Sterile gel AND sterile probe covers.    UNIVERSAL PROTOCOL: Standard universal protocol per facility guidelines was followed. See EMR for documentation.     TECHNIQUE:   Risks, benefits and alternatives were explained to the patient and written, informed consent was obtained. The patient was placed in the supine position on the angiography table. The groins were prepped and draped in the usual fashion and anesthetized   with 1%  lidocaine. The right common femoral artery was accessed under ultrasound guidance with a micropuncture system and a 5 [place] vascular sheath placed.    A C2 catheter, was used to select the celiac artery, followed by celiac arteriography.  A microcatheter was advanced over a wire, through the base catheter and used to select the common hepatic artery and angiography performed. Confirmation of a saccular aneurysm from the proximal proper hepatic artery. This is successfully negotiated and   the microcatheter advanced into the proper hepatic artery and angiography performed. Further super selection into a segment 7 main feeding artery to the tumor. From this position 500-700 um embosphere particles were infused to prune the vasculature. The   catheter was withdrawn and a single 3 mm x 5 cm detachable coil was placed to further diminish the antegrade arterial flow. The microcatheter was withdrawn into the right hepatic artery and angiography confirms good embolization. There is a tiny branch   to the segment 8 region which was too small to cannulate. The microcatheter was placed near the origin and a small Gelfoam slurry was administered to prune the vessel with excepted more distal embolization as well. The catheter was withdrawn into the   common hepatic artery and angiography confirms good embolization of the tumor mass. All catheters, guidewires and the sheath were removed.   Hemostasis was achieved using Perclose.  The patient tolerated the procedure well without immediate complication.    FINDINGS:   Survey ultrasound images of the right common femoral artery demonstrate that is is patent and appropriate for access, and ultrasound images obtained during access show the needle within the artery. Ultrasound images have been archived permanently for   documentation.    Celiac artery: Standard celiac anatomy.    Hepatic artery: Common hepatic artery aneurysm. Normal branching to the right and left hepatic artery.  Dome hypervascular lesion with intense contrast pooling. Prominent vessel is a segment 7 vessel, which is successfully embolized with embosphere beads   and coils. A smaller branch more proximally feeding the segment 8 portion is unable to be entered but is gently embolized with Gelfoam slurry.      Impression    IMPRESSION:  1. Successful embolization of a bleeding presumptive dome hepatocellular carcinoma fed by segment 7 and 8 branches, both of which are embolized as above.  2. Common hepatic artery aneurysm.

## 2022-08-26 NOTE — CONSULTS
Care Management Initial Consult    General Information  Assessment completed with: Patient, Spouse or significant other, Samena  Type of CM/SW Visit: Initial Assessment    Primary Care Provider verified and updated as needed: Yes   Readmission within the last 30 days: no previous admission in last 30 days         Advance Care Planning:            Communication Assessment  Patient's communication style: spoken language (English or Bilingual)             Cognitive  Cognitive/Neuro/Behavioral:                        Living Environment:   People in home: spouse, child(eulalio), adult     Current living Arrangements: house      Able to return to prior arrangements: yes       Family/Social Support:  Care provided by: spouse/significant other  Provides care for: no one, unable/limited ability to care for self  Marital Status:              Description of Support System: Supportive, Involved         Current Resources:   Patient receiving home care services:       Community Resources:    Equipment currently used at home:    Supplies currently used at home:      Employment/Financial:  Employment Status: disabled        Financial Concerns: No concerns identified           Lifestyle & Psychosocial Needs:  Social Determinants of Health     Tobacco Use: Low Risk      Smoking Tobacco Use: Never Smoker     Smokeless Tobacco Use: Never Used   Alcohol Use: Not on file   Financial Resource Strain: Not on file   Food Insecurity: Not on file   Transportation Needs: Not on file   Physical Activity: Not on file   Stress: Not on file   Social Connections: Not on file   Intimate Partner Violence: Not on file   Depression: Not at risk     PHQ-2 Score: 0   Housing Stability: Not on file       Functional Status:  Prior to admission patient needed assistance:   Dependent ADLs:: Ambulation-cane, Dressing, Bathing  Dependent IADLs:: Cleaning, Cooking, Laundry, Shopping, Meal Preparation, Medication Management       Mental Health Status:           Chemical Dependency Status:                Values/Beliefs:  Spiritual, Cultural Beliefs, [employer] Practices, Values that affect care:                 Additional Information:  Met with patient and patient's wife at bedside, Samena - Wife.  Samena spoke on behalf of patient. Patient previous to the hospital was ambulating with a cane, requiring assistance from her for ADLS/ IADLS. Patient lives in house with Spouse and adult Son. Patient receives dialysis from [facility] in [place] on M/W/F per wife. Received last on Wednesday per patients wife.    RNCM explained CM role, CM will continue to follow care progression and aide in discharge planning as needed.     Christy Flores RN

## 2022-08-26 NOTE — PLAN OF CARE
[facility] - ICU    RN Progress Note:            Pertinent Assessments:      Please refer to flowsheet rows for full assessment     VSS, decreasing pressor requirements. Remains on room air.         Mobility Level:     4         Key Events - This Shift:     Paracentesis by Dr. Asif this afternoon, pt states improved abdominal comfort. Continuing to monitor lactic acid and hemoglobin q6hr. Hemodialysis this afternoon.              Plan:     Ongoing family discussions about goals of care. Maintain appropriate blood pressure.         Point of Contact Update YES-OR-NO: Yes  Name: Wife Maico updated throughout the day at bedside            Goal Outcome Evaluation:        Problem: Malnutrition  Goal: Improved Nutritional Intake  Outcome: Ongoing, Progressing     Problem: Plan of Care - These are the overarching goals to be used throughout the patient stay.    Goal: Optimal Comfort and Wellbeing  Intervention: Provide Person-Centered Care  Recent Flowsheet Documentation  Taken 8/26/2022 1600 by Charlie Elder, RN  Trust Relationship/Rapport:   care explained   choices provided   emotional support provided   empathic listening provided   questions answered   questions encouraged   reassurance provided   thoughts/feelings acknowledged

## 2022-08-26 NOTE — PROCEDURES
PARACENTESIS PROCEDURE NOTE  (NON-OR)    PATIENT NAME:    Elle Blanton,  1960,  MRN# [id number]      Procedure Date: 8/26/2022   Performing Physician: Gopal Asif MD    Procedure: ultrasound-guided paracentesis  Pre-Procedure Diagnosis: hemoperitoneum, ascites, cirrhosis.   Post-Procedure Diagnosis: same as pre-procedure diagnosis    Indications: as above    Estimated Blood Loss: minimal    Complications: none immediate  Specimen: 2400 mL hemorrhagic ascites fluid    Procedure Details/Findings:   The risks, benefits, potential complications, treatment options, and expected outcomes were discussed with the patient's wife Maico.. Discussed that the risks and potential complications include but are not limited to infection, bleeding, pain, bowel perforation, and death.  The patient's wife concurred with the proposed plan, giving informed consent. The site of the procedure was properly noted/marked. The patient was identified as Elle Blanton with date of birth 1960 and the procedure verified as paracentesis. A time out was held and the above information confirmed.    The patient was properly positioned. The procedure was performed using sterile precautions, including chlorhexidine at the procedure site, cap, mask with face shield, sterile gown, and sterile gloves, a sterile drape, and a sterile ultrasound probe cover. Under ultrasound guidance, 10 mL of 1% lidocaine was infiltrated into the subcutaneous tissue and over the LLQ. A small skin nick was made with a scalpel. A safe-T-centesis catheter was introduced over a paracentesis needle with bloody ascites fluid return. The needle was removed and manual suction device used to obtain 2400 mL of hemorrhagic/bloody serosanguinous ascites. The catheter was then removed and a dressing was applied to the wound. The procedure then concluded.    Condition: critical and unchanged    Post-procedure US showed very minimal ascites fluid remaining in the peritoneal cavity. No  complications noted on US.   ________________________________________________________________________  Gopal Asif MD  8/26/051414:46 PM

## 2022-08-26 NOTE — PROCEDURES
ARTERIAL LINE INSERTION PROCEDURE NOTE  (NON-OR)    Procedure Date:  8/26/2022   Performing Physician:  Gopal Asif MD    Pre-Procedure Diagnosis:     SEPTIC SHOCK  Post-Procedure Diagnosis:  Same as Pre-Procedure Diagnosis    Procedure:  Arterial line insertion  Right axillary  Indications:  HYPOTENSION, RESPIRATORY FAILURE and HEMODYNAMIC SHOCK      Estimated Blood Loss: Minimal   Complications: none      Procedure Details: emergent procedure due to marked hemodynamic instability, high doses of vasopressors.  The site of the procedure was properly noted/marked. The patient was identified as Elle Blanton with Date of Birth 1960 and the procedure verified as Arterial Line Insertion.  A Time Out was held and the above information confirmed.    In sterile fashion, the line site was prepped with Chlorhexidine.  Strict sterile conditions were maintained,  Cap, mask, and sterile gloves were worn by all participants.  5 ml of   Lidocaine 1% without epinephrine anesthetic were infiltrated into the skin.  The arterial line was placed in the Right axillary artery percutaneously,  without  difficulty.  The linewas  sutured in place  and an occlusive sterile dressing was applied.  The total number of needle stick attempts was 1.      Condition: critical and unchanged    Gopal Asif MD, 8/26/2022, 10:07 AM

## 2022-08-26 NOTE — PROGRESS NOTES
CLINICAL NUTRITION SERVICES - ASSESSMENT NOTE     Nutrition Prescription    RECOMMENDATIONS FOR MDs/PROVIDERS TO ORDER:  May be at risk for refeeding.  Continue to monitor electrolytes and replace if low.    Malnutrition Status:    Severe in acute and chronic    Recommendations already ordered by Registered Dietitian (RD):  100 mg thiamine/day    Future/Additional Recommendations:  Diet advance, intake     REASON FOR ASSESSMENT  Elle Blanton is a/an 62 year old male assessed by the dietitian for team rounds referral.    NUTRITION HISTORY  Pt admitted with hx of cirrhosis, liver mass, right sided empyema with trapped right lung, chest wall mass, cachexia, ESRD, anemia, chronic aortic dissection, gout, abdominal pain, hemoperitoneum.  Met pt and pt's spouse.  Pt's spouse assisting pt with broth.  Pt affirms he has lost weight.  Pt eats two meals/day which are usually a small bowl of rice and another bowl of soup that includes a meat such as chicken, pork or fish and vegetables.  Pt does not like sweet supplements.      CURRENT NUTRITION ORDERS  Diet: Clear Liquid  Intake/Tolerance: minimal intake today.    LABS  ROUTINE ICU LABS (Last four results)  CMPRecent Labs   Lab 08/26/22  0901 08/26/22  0850 08/25/22  2353   NA  --  138 138   POTASSIUM  --  5.7* 4.6   CHLORIDE  --  102 99   CO2  --  21* 25   ANIONGAP  --  15 14   GLC 87 89 137*   BUN  --  67* 63*   CR  --  7.78* 8.09*   GFRESTIMATED  --  7* 7*   TANO  --  6.6* 7.1*   MAG  --   --  1.6*   PROTTOTAL  --  5.5* 6.2   ALBUMIN  --  1.7* 1.7*   BILITOTAL  --  0.9 0.7   ALKPHOS  --  103 114   AST  --  46* 19   ALT  --  19 <9   NH3 48  CBC  Recent Labs   Lab 08/26/22  1155 08/26/22  0850 08/25/22  2353   WBC  --  16.7* 12.1*   RBC  --  2.46* 2.01*   HGB 7.2* 7.4* 6.0*   HCT  --  23.0* 19.9*   MCV  --  94 99   MCH  --  30.1 29.9   MCHC  --  32.2 30.2*   RDW  --  17.3* 18.5*   PLT  --  101* 125*     INR  Recent Labs   Lab 08/26/22  0850   INR 1.93*     Arterial Blood GasNo  "lab results found in last 7 days.  Labs reviewed    MEDICATIONS    ceFEPIme (MAXIPIME) IV  1 g Intravenous Q24H     [START ON 8/28/2022] entecavir  0.5 mg Oral Q7 Days     - MEDICATION INSTRUCTIONS -   Does not apply Once     pantoprazole (PROTONIX) IV  40 mg Intravenous Daily with breakfast     vancomycin place rivera - receiving intermittent dosing  1 each Intravenous See Admin Instructions        norepinephrine 0.08 mcg/kg/min (08/26/22 1425)     - MEDICATION INSTRUCTIONS -       vasopressin Stopped (08/26/22 1500)      sodium chloride 0.9%, acetaminophen **OR** acetaminophen, albumin human, glucose **OR** dextrose **OR** glucagon, flumazenil, lidocaine (buffered or not buffered), lidocaine (buffered or not buffered), naloxone **OR** naloxone **OR** naloxone **OR** naloxone, senna-docusate **OR** senna-docusate, - MEDICATION INSTRUCTIONS -     Medications reviewed    ANTHROPOMETRICS  Height: 165.1 cm (5' 5\")  Most Recent Weight: 56.7 kg (125 lb)    BMI: Normal BMI  Weight History:   Wt Readings from Last 15 Encounters:   08/26/22 56.7 kg (125 lb)   08/25/22 57.2 kg (126 lb)   08/25/22 54.4 kg (120 lb)   08/10/22 55.4 kg (122 lb 1.3 oz)   07/14/22 56.1 kg (123 lb 9.6 oz)   07/13/22 58.1 kg (128 lb)   07/07/22 60 kg (132 lb 4 oz)   06/15/22 59.4 kg (131 lb)   05/18/22 59 kg (130 lb 1.9 oz)   05/10/22 60.7 kg (133 lb 12.8 oz)   05/04/22 60.8 kg (134 lb)   05/03/22 61.1 kg (134 lb 12.8 oz)   04/27/22 60.3 kg (133 lb)   04/11/22 61.2 kg (135 lb)   03/30/22 60.8 kg (134 lb)   9% loss in 1 month though has gained a few lbs since.    Dosing Weight: 56.7 kg    ASSESSED NUTRITION NEEDS  Estimated Energy Needs: 1420+ kcals/day (25+)  Justification: Increased needs  Estimated Protein Needs: 57 grams protein/day (1)  Justification: elevated creat and CKD  Estimated Fluid Needs: 1200 mL/day (or per renal)   Justification: ESRD    PHYSICAL FINDINGS  See malnutrition section below.  Anuric  Surgical incision-chest  Muscle and " fat wasting, see below.      MALNUTRITION:  % Weight Loss:  > 5% in 1 month (severe malnutrition)  % Intake:  </= 75% for >/= 1 month (severe malnutrition)  Subcutaneous Fat Loss:  Orbital region moderate depletion and Upper arm region severe depletion  Muscle Loss:  Arms and elbow-severe, Clavicle bone region severe depletion and Acromion bone region severe depletion  Fluid Retention:  Cirrhosis. Abdominal fullness    Malnutrition Diagnosis: Severe malnutrition  In Context of:  Acute illness or injury  Chronic illness or disease    NUTRITION DIAGNOSIS  Malnutrition related to acute and chronic illness as evidenced by wt loss, muscle and fat loss      INTERVENTIONS  Implementation  Nutrition Education: discussed protein foods for healing to include with meals.   Start 100 mg thiamine/day for suspected deficiency in malnutrition.  Pt declines supplements  Pt may be at risk for refeeding     Goals  Diet advancement vs nutrition support within 2-3 days.  Patient to consume % of nutritionally adequate meals three times per day, or the equivalent with supplements/snacks.  Maintain dry wt     Monitoring/Evaluation  Progress toward goals will be monitored and evaluated per protocol.

## 2022-08-26 NOTE — CONSULTS
GENERAL SURGERY CONSULTATION    Elle Blanton  [facility]  Medical Record #:  [id number]  YOB: 1960  Age:  62 year old     Date of Consultation: 8/26/2022    Reason for Consultation: Acute intra-abdominal hemorrhage secondary to neoplasm of liver with cirrhosis and chronic right hemopneumothorax    Elle Blanton is a 62 year old male who presents with a history of hypertension having had dialysis yesterday.  At the current time he is seen in the intensive care unit.  Case is reviewed with Dr. Asif.  The patient is familiar to me as I was consulted about him in 2021 at which time he presented with a right sided empyema.  Patient refused any intervention at that time.  All of those records are reviewed.  He was found at that time also to have cirrhosis of the liver and probable neoplasm.  Subsequently he was followed by physicians at the [facility]  At that point he was determined to not be a surgical candidate as a result of chronic empyema with trapped lung.  He had chest tubes placed and at chest tube site he had some drainage and a small mass on the side of his chest.  He was scheduled for that surgical intervention today for incision and drainage of that abnormality.  Review with his wife indicates that the patient has been functioning quite well at home on 3 days/week dialysis.  He does all of his own cares of daily living he has been eating well and sleeping well at home and weight has been stable.  Yesterday became hypotensive and found to be anemic and thus far he is undergone appropriate resuscitation by intensive care physicians and he is status post interventional radiology procedure with embolization of a bleeding site from what appears to be liver neoplasm.  There is no histologic diagnosis of that neoplasm.  In addition this CT scan has revealed the presence of the Hydro pneumothorax on the right side with air bubbles in the chest wall as well.  That is the area that he was  going to have incised and drained by [facility] physicians today.  Of note is the fact patient has not been acting like he is septic or with fevers or chills at home.  At 1 point there was some drainage from one of the chest tube sites in the right chest wall but that has not been draining most recently according to patient's wife.  Patient is seen in the intensive care unit.  He has mild sedation effects from his recent admission radiology procedure but he does respond well.  He does recall me.  He reports his abdomen is increased in size.  He has mild abdominal discomfort.  He has not been informed with family present about the bleeding in his abdomen as well as the abnormality of the chest which has been addressed on multiple occasions previously.  Again noted is the fact the patient refused any intervention for his initially recognized thoracic probable pneumonia and empyema last year.    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 pylori infection 7/5/2017    UGI bleed implied by Hgb 9.0 6/22/17 and melena. Admitted and hgb decreased to 7.6, CT abdomen showed all bladder wall thickening. + Hep B surface antigen noted. Melena resolved and hgb stabalized without transfusion, epigastric pain resolved with PPI. Recommend referral for gastroscopy.     Heart attack (H)      Hepatitis B      Normocytic anemia      Other pancytopenia (H) 7/5/2017 6/24/17 Peripheral smear at [facility].Pancytopenia of uncertain cause. Ruled out acute leukemia. Hematologist suggests hemolytic anemia should be considered possible cause and LDH and haptoglobin should be assessed  in future.      PONV (postoperative nausea and vomiting)      Thrombocytopenia (H)         Past Surgical History:   Procedure Laterality Date     ABCESS DRAINAGE      finger     BURSECTOMY ELBOW Left 10/15/2021    Procedure: LEFT OLECRANON BURSECTOMY;  Surgeon: Tico Myers MD;  Location: [facility] Main OR     BURSECTOMY ELBOW Left 1/18/2022    Procedure: LEFT ELBOW OLECRANON BURSECTOMY;  Surgeon: Tico Myers MD;  Location: [facility] Main OR     CREATE FISTULA ARTERIOVENOUS UPPER EXTREMITY Left 4/20/2021    Procedure: left arm dialysis graft placement;  Surgeon: Roxana Cosby MD;  Location: [facility] Main OR;  Service: General     FOREIGN BODY REMOVAL      finger     INCISION AND DRAINAGE FINGER, COMBINED Left 10/20/2016    Procedure: COMBINED INCISION AND DRAINAGE FINGER;  Surgeon: Mireya Pizano MD;  Location: [place] OR     INCISION AND DRAINAGE UPPER EXTREMITY, COMBINED Left 1/18/2022    Procedure: AND ELBOW INCISION AND DRAINAGE;  Surgeon: Tico Myers MD;  Location: [facility] OR     INSERT PICC LINE Right 8/25/2022    Procedure: INSERTION, PICC;  Surgeon: Osmin Villa MD;  Location[facility] [place]     IR CHEST TUBE PLACEMENT NON-TUNNELED RIGHT  4/19/2021     IR CHEST TUBE PLACEMENT NON-TUNNELED RIGHT  10/19/2021     IR CHEST TUBE PLACEMENT NON-TUNNELED RIGHT  11/10/2021     IR CHEST TUBE PLACEMENT NON-TUNNELED RIGHT  3/31/2022     IR DIALYSIS FISTULOGRAM LEFT  4/2/2022     IR DIALYSIS FISTULOGRAM LEFT  4/4/2022     IR PICC PLACEMENT > 5 YRS OF AGE  8/25/2022     IR PLEURAL DRAINAGE WITH CATHETER INSERTION  4/19/2021     MIDLINE INSERTION - DOUBLE LUMEN  4/23/2021          TX ESOPHAGOGASTRODUODENOSCOPY TRANSORAL DIAGNOSTIC N/A 8/18/2020    Procedure: ESOPHAGOGASTRODUODENOSCOPY (EGD) with biopsy;  Surgeon: Nilson Gonzales MD;  Location: [facility];  Service: Gastroenterology     US PARACENTESIS  8/14/2020     US PARACENTESIS  8/19/2020     US THORACENTESIS  4/18/2021        ALLERGIES:  Nka [no known allergies]    MEDS:    Current Facility-Administered Medications:      acetaminophen (TYLENOL) tablet 650 mg, 650 mg, Oral, Q8H PRN **OR** acetaminophen (TYLENOL) solution 650 mg, 650 mg, Per NG tube, Q4H PRN, Gopal Asif MD     ceFEPIme (MAXIPIME) 1g vial to attach to  ml bag for ADULTS or NS 50 ml bag for PEDS, 1 g, Intravenous, Q24H, Gopal Asif MD     glucose gel 15-30 g, 15-30 g, Oral, Q15 Min PRN **OR** dextrose 50 % injection 25-50 mL, 25-50 mL, Intravenous, Q15 Min PRN **OR** glucagon injection 1 mg, 1 mg, Subcutaneous, Q15 Min PRN, Gopal Asif MD     flumazenil (ROMAZICON) injection 0.2 mg, 0.2 mg, Intravenous, q1 min prn, Zbigniew Pearce MD     naloxone (NARCAN) injection 0.2 mg, 0.2 mg, Intravenous, Q2 Min PRN **OR** naloxone (NARCAN) injection 0.4 mg, 0.4 mg, Intravenous, Q2 Min PRN **OR** naloxone (NARCAN) injection 0.2 mg, 0.2 mg, Intramuscular, Q2 Min PRN **OR** naloxone (NARCAN) injection 0.4 mg, 0.4 mg, Intramuscular, Q2 Min PRN, Zbigniew Pearce MD     norepinephrine (LEVOPHED) 4 mg in  mL infusion PREMIX, 0.01-0.6 mcg/kg/min, Intravenous, Continuous, Forest Davila MD, Last Rate: 44.9 mL/hr at 08/26/22 0915, 0.22 mcg/kg/min at 08/26/22 0915     senna-docusate (SENOKOT-S/PERICOLACE) 8.6-50 MG per tablet 1 tablet, 1 tablet, Oral, BID PRN **OR** senna-docusate (SENOKOT-S/PERICOLACE) 8.6-50 MG per tablet 2 tablet, 2 tablet, Oral, BID PRN, Gopal Asfi MD     vancomycin place rivera - receiving intermittent dosing, 1 each, Intravenous, See Admin Instructions, Gopal Asif MD     vasopressin (VASOSTRICT) 20 Units in sodium chloride 0.9 % 100 mL standard conc infusion, 2.4 Units/hr, Intravenous, Continuous, Forest Davila MD, Last Rate: 12 mL/hr at 08/26/22 0820, 2.4 Units/hr at 08/26/22 0820    SOCIAL HABITS:    History   Smoking Status     Never Smoker   Smokeless Tobacco     Never Used     Social History     "Substance and Sexual Activity      Alcohol use: No      History   Drug Use No       FAMILY HISTORY:    Family History   Problem Relation Age of Onset     Diabetes Father      Hypertension No family hx of      Asthma No family hx of      Cancer No family hx of      Coronary Artery Disease No family hx of      Liver Cancer No family hx of      Ulcers Father        REVIEW OF SYSTEMS: Cirrhosis with probable liver tumor noted.  He is without myocardial infarction or cerebrovascular accident the best of anyone's knowledge.  He has had previous GI bleeds as well.  Without genitourinary abnormality anatomically but he does have chronic renal failure and is on dialysis 3 times weekly.  Denies significant swelling lower extremities or tissue loss in the lower extremities.  Patient has stable dissecting aneurysm aorta as well.    PE:    /65   Pulse 65   Temp (!) 94.4  F (34.7  C) (Oral)   Resp 18   Ht 1.651 m (5' 5\")   Wt 56.7 kg (125 lb)   SpO2 96%   BMI 20.80 kg/m      HEENT: Patient appears chronically ill and much older than his stated age.  No mass or adenopathy.  Chest: Right chest wall has a soft tissue mass approximately the seventh eighth intercostal space mid axillary line no drainage acutely.  Old chest tube site noted.  Lungs: Marked decreased breath sounds right side.  Left side fairly clear.  Heart: Heart rate and rhythm noted per monitor  Abd: Very distended and very active bowel sounds.  Very mild tenderness to palpation.  It is nonacute abdomen currently.  Ext: Lower extremities without significant edema at the current time.  Pedal pulses are present.  Popliteal pulses present.  Femoral pulses present.  Vascular: Carotids without bruit.  As noted lower extremities arterial supply is noted to be present at the ankle mortise.  Without significant edema.    LABS:    Recent Labs   Lab 08/25/22  2353      CO2 25   BUN 63*      Recent Labs   Lab 08/26/22  0850   WBC 16.7*   HGB 7.4*   HCT 23.0* "   *      Recent Labs   Lab 08/25/22  2353   ALKPHOS 114   ALT <9   AST 19     No results for input(s): AMYLASE in the last 168 hours.    Recent Labs   Lab 08/26/22  0850   INR 1.93*       XRAYS: Previous medical records 2021 2022 reviewed.  In addition current CT a reviewed.  Interventional radiology procedure noted.  Hemopneumothorax right side with soft tissue mass of the chest wall.    ASSESSMENT: Patient with defined probable hepatic neoplasm acute bleed hemoperitoneum status post interventional radiology procedure.  He has not had major surgical intervention on thorax except for tube thoracostomy is in the past.  He had some limited drainage from one of the sites and was scheduled at the end of his [facility] for an I&D today.  This canceled secondary to the acute is bleed from the liver.  Reviewed with Dr. Asif.  In light of the chronic chest abnormality would proceed with fine-needle aspirate for cultures gram stain etc. at this point.  Patient has previously grown staph from this site.  Would avoid any major surgical intervention at this time.  Observe with resuscitation for acute bleed.    PLAN: See above.    Dawson joshua md  [facility]

## 2022-08-26 NOTE — CONSULTS
NEPHROLOGY CONSULTATION    Addendum: labs noted with K 5.7  Will treat with 250/500 flows and k2 bath for 2 hrs      ASSESSMENT/PLAN:  62 year old male with history of ESRD on hemodialysis Monday Friday ideally will need 3 times treatment but refuses more than twice a week treatments, at [facility], history of hypertension , hepatitis B, chronic recurrent right-sided effusion with trapped lung not a candidate for surgical decortication multiple chest tubes in the past, chronic type B aortic dissection, hepatic mass on CT ??  HCC, presented with an episode of syncope and hypotension.  Complaining of abdominal and chest wall pain, admission CT concerning for hemoperitoneum, possible active extravasation from hepatic mass now s/p embolization with IR of the feeding hepatic artery, currently hypotensive hypothermic in the ICU on 2 pressors with elevated lactate and leukocytosis on antibiotics for concerns septic shock, nephrology consulted for ESRD management    ESRD on IHD Monday/Friday  Ideally needs to be treated 3 times a week  Follows at [facility] Dr. Hill  Treatment time is 3 hours barely completes his treatment mostly treatments are around 130 to 140 minutes  Minimal UF on treatment around 1-1.5L  Current target weight is 53.5, although noted to be around 52kg some treatments as well  --> Will follow up in afternoon and if otherwise hemodynamically stable plan for dialysis today otherwise we will hold today and dialyze tomorrow morning  Volume status and electrolytes are acceptable at this point    Access  Left upper extremity AVG  Has had chronic issues with bleeding although with currently appears nonedematous and thrill positive  Dr. Cosby 4/2022    History of hypertension  Currently hypotensive on 2 pressors  --> No evidence of hypervolemia which is usual baseline for him  We will monitor closely    Electrolytes stable    MBD  Has been on Tums with meals  Phosphorus has been around 4  at goal mild low calcium is chronically around 8  PTH last checked on 8/3/2022 was 86  Alk phos around 1  chronically  Evaluate once diet resumed    Anemia acute on chronic  With bleed  Hold off any iron with transfusions and possible infection  -- We will keep on EPO while inpatient, OP dose 12K x2/wk    Chronic right-sided effusion/empyema with trapped lung  Follows pulmonology outpatient with Dr. Parminder Christian noted from outpatient visits  Not a candidate for decortication possibly will need a chest tube currently does not appear dyspneic  Oxygenation stable    Chronic hepatitis B  Has been off all antiviral treatment  Possible hepatocellular carcinoma with hepatic mass on CT with active bleed  Status post IR embolization of hepatic artery that was bleeding  --> In ICU for monitoring and pressors    Hypotension with possible septic shock  On antibiotics and pressors  Management per ICU  -- Possible SBP>     History of chronic type B aortic dissection  Stable has been followed by cardiothoracic surgery    Thank you for the consultation  We will follow  Jayme Clement MD  [facility]  [phone]      CC: ESRD management    REASON FOR CONSULTATION: We are asked to see pt by Dr. Asif    HISTORY OF PRESENT ILLNESS:62 year old male  62 year old male with history of ESRD on hemodialysis Monday Friday ideally will need 3 times treatment but refuses more than twice a week treatments, at [facility], history of hypertension , hepatitis B, chronic recurrent right-sided effusion with trapped lung not a candidate for surgical decortication multiple chest tubes in the past, chronic type B aortic dissection, hepatic mass on CT ??  HCC, presented with an episode of syncope and hypotension.  Complaining of abdominal and chest wall pain, admission CT concerning for hemoperitoneum, possible active extravasation from hepatic mass now s/p embolization with IR of the feeding hepatic artery, currently  hypotensive hypothermic in the ICU on 2 pressors with elevated lactate and leukocytosis on antibiotics for concerns septic shock, nephrology consulted for ESRD management  Patient is currently very drowsy on 2 pressors just came back from his IR procedure, unable to talk much due to drowsiness  Patient is well-known to me from his prior outpatient visits and admissions.  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 pylori infection 7/5/2017    UGI bleed implied by Hgb 9.0 6/22/17 and melena. Admitted and hgb decreased to 7.6, CT abdomen showed all bladder wall thickening. + Hep B surface antigen noted. Melena resolved and hgb stabalized without transfusion, epigastric pain resolved with PPI. Recommend referral for gastroscopy.     Heart attack (H)      Hepatitis B      Normocytic anemia      Other pancytopenia (H) 7/5/2017 6/24/17 Peripheral smear at [facility].Pancytopenia of uncertain cause. Ruled out acute leukemia. Hematologist suggests hemolytic anemia should be considered possible cause and LDH and haptoglobin should be assessed in future.       "PONV (postoperative nausea and vomiting)      Thrombocytopenia (H)        Social History     Socioeconomic History     Marital status:      Spouse name: Not on file     Number of children: Not on file     Years of education: Not on file     Highest education level: Not on file   Occupational History     Not on file   Tobacco Use     Smoking status: Never Smoker     Smokeless tobacco: Never Used   Vaping Use     Vaping Use: Never used   Substance and Sexual Activity     Alcohol use: No     Drug use: No     Sexual activity: Yes     Partners: Female   Other Topics Concern     Parent/sibling w/ CABG, MI or angioplasty before 65F 55M? Not Asked   Social History Narrative     Not on file     Social Determinants of Health     Financial Resource Strain: Not on file   Food Insecurity: Not on file   Transportation Needs: Not on file   Physical Activity: Not on file   Stress: Not on file   Social Connections: Not on file   Intimate Partner Violence: Not on file   Housing Stability: Not on file       Family History   Problem Relation Age of Onset     Diabetes Father      Hypertension No family hx of      Asthma No family hx of      Cancer No family hx of      Coronary Artery Disease No family hx of      Liver Cancer No family hx of      Ulcers Father        Allergies   Allergen Reactions     Nka [No Known Allergies]        MEDICATIONS:    ceFEPIme (MAXIPIME) IV  1 g Intravenous Q8H         PHYSICAL EXAM    /59   Pulse 65   Temp (!) 94.4  F (34.7  C) (Oral)   Resp 19   Ht 1.651 m (5' 5\")   Wt 56.7 kg (125 lb)   SpO2 96%   BMI 20.80 kg/m        Intake/Output Summary (Last 24 hours) at 8/26/2022 0916  Last data filed at 8/26/2022 0459  Gross per 24 hour   Intake 1014 ml   Output --   Net 1014 ml       Drowsy but awake and NAD,   HEENT NC/AT; perrla; OP clear without lesions; mmm  Neck supple without LAD, TM  CV; RRR without rub or murmur  Lung: clear and equal; no extra sounds  Ab: soft and NT; not distended; " normal bs  Ext: no edema and well perfused  Skin; no rash  Neuro; grossly intact    LABORATORIES    Recent Labs   Lab 08/25/22  2353   WBC 12.1*   HGB 6.0*   HCT 19.9*   *     Recent Labs   Lab 08/25/22  2353      CO2 25   BUN 63*   ALKPHOS 114   ALT <9   AST 19     No results for input(s): INR, PTT in the last 168 hours.    Invalid input(s): APTT  Invalid input(s): FERRITIN  No results for input(s): IRON in the last 168 hours.    Invalid input(s): TIBC    I reviewed all labs and imaging    Thank you for the consultation we will follow    Jayme Clement MD  [facility]  [phone]

## 2022-08-26 NOTE — PHARMACY-VANCOMYCIN DOSING SERVICE
"Pharmacy Vancomycin Initial Note  Date of Service 2022  Patient's  1960  62 year old, male  Indication: Abscess -   Current estimated CrCl = CrCl cannot be calculated (Patient's most recent lab result is older than the maximum 30 days allowed.).  Creatinine for last 3 days  No results found for requested labs within last 72 hours.  Recent Vancomycin Level(s) for last 3 days  No results found for requested labs within last 72 hours.      Vancomycin IV Administrations (past 72 hours)      No vancomycin orders with administrations in past 72 hours.                Nephrotoxins and other renal medications (From now, onward)    Start     Dose/Rate Route Frequency Ordered Stop    22 0100  vancomycin (VANCOCIN) 1000 mg in dextrose 5% 200 mL PREMIX         20 mg/kg × 54.4 kg  200 mL/hr over 1 Hours Intravenous ONCE 22 0022      22 0030  norepinephrine (LEVOPHED) 4 mg in  mL infusion PREMIX         0.01-0.6 mcg/kg/min × 54.4 kg  2-122.4 mL/hr  Intravenous CONTINUOUS 22 0014      22 0030  piperacillin-tazobactam (ZOSYN) 3.375 g vial to attach to  mL bag        Note to Pharmacy: For [facility], [facility] and [facility]: For Zosyn-naive patients, use the \"Zosyn initial dose + extended infusion\" order panel.    3.375 g  over 30 Minutes Intravenous ONCE 22 0014            Contrast Orders - past 72 hours (72h ago, onward)    None              Plan:  Give vancomycin  1000 mg IV x1. Reconsult pharmacy to dose if continued IP.     Lucretia Sanders, [facility]    "

## 2022-08-26 NOTE — CONSULTS
[facility] consult     Impression: 1.  Hepatocellular cancer- it appears this has ruptured and bled intraperitoneally.  This is a presumptive nonbiopsy based diagnosis based on CT appearance.  This is a large (greater than 5 cm).  We should check alpha-fetoprotein.  If this is a hepatocellular cancer, it is likely spread extrahepatically now.  Thus, the patient would not likely be a transplant candidate (also greater than 5 cm would likely make him not a transplant candidate based on that).    2.  HBV- previously, the patient apparently had been on Entecavir.  Its unclear whether he has been taking that recently or not.  Previous notes talk about him stopping it on his own.  We should have him on Entecavir for now.  Also, I will set up a follow-up appointment in the [facility] liver clinic.    3.  Hemoperitoneum-status post paracentesis White blood cell count up, this is probably nonspecific and related to the likely ruptured hepatocellular cancer.  Await culture results for better clarity on whether this would be SBP.  5 days of empiric antibiotics would be reasonable.    Recommendation: Resume Entecavir.  Check alpha-fetoprotein.  Watch for any rebleeding.  Follow-up appointment in our [facility] liver clinic.  We will follow with you.    Name: Elle Blanton    Medical Record #: [id number]    YOB: 1960    Date/Time: 8/26/2022/2:49 PM    Reason for Consultation: Gopal Asif MD has asked me to evaluate Elle Blanton regarding hepatitis B cirrhosis, ruptured bleeding hepatocellular cancer.    HPI: This is a 62-year-old  male who comes in with syncope, low blood pressure, apparently hemoperitoneum from what appears to be based on imaging a 7.5 cm hepatocellular cancer at the dome of the liver that ruptured and is bleeding intraperitoneally.  Patient underwent IR embolization that was successful.  Earlier, the patient also underwent paracentesis.  Since embolization, the patient has required less pressor  support.    The patient has a right lung empyema and plans were made for treatment of the hydropneumothorax for drainage today.  Patient also has anemia of chronic disease, end-stage renal disease on hemodialysis, pararenal syndrome, thoracic aortic dissection, pancytopenia.  Last EGD 8/18/2020 for melena showed a normal esophagus, small antral cratered ulcerations.  Biopsies from the stomach showed gastritis, H. pylori negative.  Examination the small bowel was normal.  The patient was to follow-up with an upper endoscopy after that, that was never completed as far as I can tell.  Previously, we had seen the patient in [facility], last in 2020.  Notes talk about the patient being on Entecavir and prior to that stopping Entecavir on his own, then we restarted it.  The patient is wife are very vague.  It sounds like he may still have been on Entecavir recently but not followed by a gastroenterologist/hepatologist.    Review of Systems (ROS): Complete ROS otherwise negative except for as above.    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 pylori infection 7/5/2017    UGI bleed implied by Hgb 9.0 6/22/17 and melena. Admitted and hgb decreased to 7.6, CT abdomen showed all bladder wall thickening. + Hep B surface antigen noted. Melena resolved and hgb stabalized without transfusion, epigastric pain resolved with PPI. Recommend referral for gastroscopy.     Heart attack (H)      Hepatitis B      Normocytic anemia      Other pancytopenia (H) 7/5/2017 6/24/17 Peripheral smear at [facility].Pancytopenia of uncertain cause. Ruled out acute leukemia.  Hematologist suggests hemolytic anemia should be considered possible cause and LDH and haptoglobin should be assessed in future.      PONV (postoperative nausea and vomiting)      Thrombocytopenia (H)        Medications:   Medications Prior to Admission   Medication Sig Dispense Refill Last Dose     acetaminophen (TYLENOL) 500 MG tablet Take 500 mg by mouth every 6 hours as needed for mild pain   Past Week at Unknown time     benzonatate (TESSALON) 100 MG capsule Take 1 capsule (100 mg) by mouth 3 times daily as needed for cough 30 capsule 0 Unknown at Unknown time     calcium acetate (PHOSLO) 667 MG CAPS capsule Take 1 capsule by mouth 2 times daily (with meals)   8/24/2022     carvedilol (COREG) 25 MG tablet Take 1-2 tablets (25-50 mg) by mouth 2 times daily (with meals) 120 tablet 3 8/24/2022     cephALEXin (KEFLEX) 500 MG capsule Take 1 capsule (500 mg) by mouth 2 times daily (Patient taking differently: Take 500 mg by mouth every morning) 60 capsule 1 8/24/2022 at am     cloNIDine (CATAPRES) 0.1 MG tablet Take 0.1 mg by mouth At Bedtime    8/24/2022 at pm     diltiazem ER (DILT-XR) 180 MG 24 hr capsule Take 1 capsule (180 mg) by mouth 2 times daily (Patient taking differently: Take 180 mg by mouth 2 times daily as needed (Checks BP prior to taking, if too low he does not take this medication)) 60 capsule 11 Unknown at Unknown time     entecavir (BARACLUDE) 0.5 MG tablet Take 1 tablet (0.5 mg) by mouth every 7 days (Patient taking differently: Take 0.5 mg by mouth every 7 days Sunday) 52 tablet 1 8/21/2022     guaiFENesin-codeine (GUAIFENESIN AC) 100-10 MG/5ML syrup Take 5 mLs by mouth every 4 hours as needed for cough 236 mL 1 Past Week     hydrOXYzine (ATARAX) 25 MG tablet Take 1 tablet (25 mg) by mouth 3 times daily as needed for itching 90 tablet 3 Unknown at Unknown time     midodrine (PROAMATINE) 10 MG tablet Take 10 mg by mouth three times a week Prior to dialysis runs   8/24/2022     oxyCODONE  (ROXICODONE) 5 MG tablet Take 1 tablet (5 mg) by mouth daily as needed for severe pain 30 tablet 0 8/24/2022     pantoprazole (PROTONIX) 40 MG EC tablet Take 1 tablet (40 mg) by mouth daily (Patient taking differently: Take 40 mg by mouth every morning) 30 tablet 11 8/24/2022 at am     senna-docusate (SENOKOT-S/PERICOLACE) 8.6-50 MG tablet Take 1 tablet by mouth daily as needed for constipation 30 tablet 11 Unknown at Unknown time     zolpidem (AMBIEN) 5 MG tablet Take 1 tablet (5 mg) by mouth nightly as needed for sleep 10 tablet 5 8/24/2022 at pm     order for DME Equipment being ordered: Other: blood pressure monitor  Treatment Diagnosis: hypertension 1 each 0        Current Facility-Administered Medications:      0.9% sodium chloride BOLUS, 250 mL, Intravenous, Once in dialysis/CRRT, Jayme Clement MD     0.9% sodium chloride BOLUS, 300 mL, Hemodialysis Machine, Once, Jayme Clement MD     0.9% sodium chloride BOLUS, 100-150 mL, Intravenous, Q15 Min PRN, Jayme Clement MD     acetaminophen (TYLENOL) tablet 650 mg, 650 mg, Oral, Q8H PRN **OR** acetaminophen (TYLENOL) solution 650 mg, 650 mg, Per NG tube, Q4H PRN, Gopal Asif MD     albumin human 25 % injection 50 mL, 50 mL, Intravenous, Q1H PRN, Jayme Clement MD     ceFEPIme (MAXIPIME) 1g vial to attach to  ml bag for ADULTS or NS 50 ml bag for PEDS, 1 g, Intravenous, Q24H, Gopal Asif MD, 1 g at 08/26/22 1030     glucose gel 15-30 g, 15-30 g, Oral, Q15 Min PRN **OR** dextrose 50 % injection 25-50 mL, 25-50 mL, Intravenous, Q15 Min PRN **OR** glucagon injection 1 mg, 1 mg, Subcutaneous, Q15 Min PRN, Gopal Asif MD     [START ON 8/28/2022] entecavir (BARACLUDE) tablet 0.5 mg, 0.5 mg, Oral, Q7 Days, Gopal Asif MD     flumazenil (ROMAZICON) injection 0.2 mg, 0.2 mg, Intravenous, q1 min prn, Zbigniew Pearce MD     lidocaine 1 % 0.5 mL, 0.5 mL, Intradermal, Once PRN, Jayme Clement MD     lidocaine 1 % 0.5 mL, 0.5 mL, Intradermal, Once PRN,  Jayme Clement MD     naloxone (NARCAN) injection 0.2 mg, 0.2 mg, Intravenous, Q2 Min PRN **OR** naloxone (NARCAN) injection 0.4 mg, 0.4 mg, Intravenous, Q2 Min PRN **OR** naloxone (NARCAN) injection 0.2 mg, 0.2 mg, Intramuscular, Q2 Min PRN **OR** naloxone (NARCAN) injection 0.4 mg, 0.4 mg, Intramuscular, Q2 Min PRN, Zbigniew Pearce MD     No heparin via hemodialysis machine, , Does not apply, Once, Jayme Clement MD     norepinephrine (LEVOPHED) 4 mg in  mL infusion PREMIX, 0.01-0.6 mcg/kg/min, Intravenous, Continuous, Forest Davila MD, Last Rate: 16.3 mL/hr at 08/26/22 1425, 0.08 mcg/kg/min at 08/26/22 1425     pantoprazole (PROTONIX) IV push injection 40 mg, 40 mg, Intravenous, Daily with breakfast, Gopal Asif MD     senna-docusate (SENOKOT-S/PERICOLACE) 8.6-50 MG per tablet 1 tablet, 1 tablet, Oral, BID PRN **OR** senna-docusate (SENOKOT-S/PERICOLACE) 8.6-50 MG per tablet 2 tablet, 2 tablet, Oral, BID PRN, Gopal Asif MD     Stop Heparin 60 minutes before end of treatment, , Does not apply, Continuous PRN, Jayme Clement MD     vancomycin place rivera - receiving intermittent dosing, 1 each, Intravenous, See Admin Instructions, Gopal Asif MD     vasopressin (VASOSTRICT) 20 Units in sodium chloride 0.9 % 100 mL standard conc infusion, 2.4 Units/hr, Intravenous, Continuous, Forest Davila MD, Last Rate: 12 mL/hr at 08/26/22 1200, 2.4 Units/hr at 08/26/22 1200       Allergies: Nka [no known allergies]    Family History:  Family History   Problem Relation Age of Onset     Diabetes Father      Hypertension No family hx of      Asthma No family hx of      Cancer No family hx of      Coronary Artery Disease No family hx of      Liver Cancer No family hx of      Ulcers Father        Social History:  Social History     Socioeconomic History     Marital status:      Spouse name: Not on file     Number of children: Not on file     Years of education: Not on file     Highest  "education level: Not on file   Occupational History     Not on file   Tobacco Use     Smoking status: Never Smoker     Smokeless tobacco: Never Used   Vaping Use     Vaping Use: Never used   Substance and Sexual Activity     Alcohol use: No     Drug use: No     Sexual activity: Yes     Partners: Female   Other Topics Concern     Parent/sibling w/ CABG, MI or angioplasty before 65F 55M? Not Asked   Social History Narrative     Not on file     Social Determinants of Health     Financial Resource Strain: Not on file   Food Insecurity: Not on file   Transportation Needs: Not on file   Physical Activity: Not on file   Stress: Not on file   Social Connections: Not on file   Intimate Partner Violence: Not on file   Housing Stability: Not on file       Physical Exam: /57   Pulse 75   Temp 97.5  F (36.4  C) (Oral)   Resp 18   Ht 1.651 m (5' 5\")   Wt 56.7 kg (125 lb)   SpO2 97%   BMI 20.80 kg/m      General: NAD, cachectic  Eyes: No scleral icterus or conjunctivitis  Oropharynx: WNL  Neck/Thyroid: No neck masses or thyromegaly  Pulmonary: Lungs are clear to auscultation bilaterally  Cardiovascular: Regular, no lower extermity edema   Gastrointestinal: Positive bowel sounds, soft, mildly distended with fluid, non-tender, no rebound or guarding. No HSM.  Lymph nodes: No cervical or supraclavicular lymphadenopathy  Skin: The patient is not jaundiced.      Labs:    CBC RESULTS:   Recent Labs   Lab Test 08/26/22  1155 08/26/22  0850   WBC  --  16.7*   RBC  --  2.46*   HGB 7.2* 7.4*   HCT  --  23.0*   MCV  --  94   MCH  --  30.1   MCHC  --  32.2   RDW  --  17.3*   PLT  --  101*        CMP Results:   Recent Labs   Lab Test 08/26/22  0901 08/26/22  0850   NA  --  138   POTASSIUM  --  5.7*   CHLORIDE  --  102   CO2  --  21*   ANIONGAP  --  15   GLC 87 89   BUN  --  67*   CR  --  7.78*   BILITOTAL  --  0.9   ALKPHOS  --  103   ALT  --  19   AST  --  46*        INR Results:   Recent Labs   Lab Test 08/26/22  0850   INR " 1.93*        Component      Latest Ref Rng & Units 12/30/2020 4/17/2021 4/18/2021 4/19/2021                Bilirubin Total      0.0 - 1.0 mg/dL 0.8 0.4 0.6 0.6   Bilirubin Direct      <=0.5 mg/dL       Protein Total      6.0 - 8.0 g/dL 6.9 5.5 (L) 5.7 (L) 5.1 (L)   Albumin      3.5 - 5.0 g/dL 2.9 (L) 2.8 (L) 2.8 (L) 2.4 (L)   Alkaline Phosphatase      45 - 120 U/L  53 51 43 (L)   AST      0 - 40 U/L 35.0 12 10 11   ALT      0 - 45 U/L 45.0 21 18 14     Component      Latest Ref Rng & Units 4/20/2021 8/18/2021 9/27/2021 10/16/2021                Bilirubin Total      0.0 - 1.0 mg/dL 1.4 (H) 0.7 1.2 (H)    Bilirubin Direct      <=0.5 mg/dL       Protein Total      6.0 - 8.0 g/dL 6.0 7.1 5.9 (L) 7.0   Albumin      3.5 - 5.0 g/dL 3.0 (L) 3.1 (L) 3.1 (L)    Alkaline Phosphatase      45 - 120 U/L 54 129 71    AST      0 - 40 U/L 17 31 18    ALT      0 - 45 U/L 20 49 21      Component      Latest Ref Rng & Units 10/19/2021 11/8/2021 1/8/2022 3/30/2022                Bilirubin Total      0.0 - 1.0 mg/dL 1.0 0.8 0.6 1.0   Bilirubin Direct      <=0.5 mg/dL   0.2    Protein Total      6.0 - 8.0 g/dL 6.9 8.2 (H) 8.1 (H) 8.1 (H)   Albumin      3.5 - 5.0 g/dL 2.3 (L) 2.3 (L) 2.7 (L) 3.1 (L)   Alkaline Phosphatase      45 - 120 U/L 181 (H) 160 (H) 193 (H) 162 (H)   AST      0 - 40 U/L 29 43 (H) 40 29   ALT      0 - 45 U/L <9 <9 <9 31     Component      Latest Ref Rng & Units 4/7/2022 8/25/2022 8/26/2022               Bilirubin Total      0.0 - 1.0 mg/dL 0.6 0.7 0.9   Bilirubin Direct      <=0.5 mg/dL  0.3    Protein Total      6.0 - 8.0 g/dL 6.1 6.2 5.5 (L)   Albumin      3.5 - 5.0 g/dL 2.4 (L) 1.7 (L) 1.7 (L)   Alkaline Phosphatase      45 - 120 U/L 94 114 103   AST      0 - 40 U/L 15 19 46 (H)   ALT      0 - 45 U/L <9 <9 19       7/2019: Hepatitis B E antigen negative, hepatitis B E antibody positive, hepatitis B DNA quantitative 3,624,222 (hepatitis B DNA quantitative log 6.6 (normal less than 1.3).  HCV antibody negative, HIV  negative, ferritin 88.  In 7/2017, hepatitis B surface antigen positive hepatitis B surface antibody negative hepatitis B core IgM negative hepatitis B core antibody positive, hepatitis A IgM antibody negative, anti-smooth muscle antibody negative.     08/26/22 1230    Color Colorless, Yellow Red Abnormal     Clarity Clear, Bloody Cloudy Abnormal     Total Nucleated Cells /uL 3,966    RBC Count /uL 1,545,000    Cell Count Fluid Source  Peritoneum    Resulting Agency  [facility]     Narrative        Radiology: CTA Chest Abdomen Pelvis w Contrast    Result Date: 8/26/2022  EXAM: CTA CHEST ABDOMEN PELVIS W CONTRAST LOCATION: [facility] DATE/TIME: 8/26/2022 3:10 AM INDICATION: Syncopal episode. History of dissection. COMPARISON: Multiple prior exams with the most recent study a noncontrast chest CT from earlier today. Most recent CTA exam is from 4/7/2022. TECHNIQUE: CT angiogram chest abdomen pelvis during arterial phase of injection of IV contrast. 2D and 3D MIP reconstructions were performed by the CT technologist. Dose reduction techniques were used. CONTRAST: 75 mL Isovue 370 FINDINGS: CT ANGIOGRAM CHEST, ABDOMEN, AND PELVIS: Ectatic ascending thoracic aorta measuring 4.0 cm in AP diameter and negative for dissection, unchanged. Again seen is a type B dissection beginning at the level of the distal aortic arch just distal to the left subclavian artery. The dissection extends to the aortic bifurcation similar to previous. Stable aneurysmal dilatation of the distal aortic arch measuring 5.7 cm and stable aneurysmal dilatation of the descending thoracic aorta measuring 5.3 cm at the level of the left pulmonary vein. The true lumen again gives rise to the celiac and superior mesenteric arteries as well as right renal artery. False lumen gives rise to the left renal artery and RADHA. Aneurysmal dilatation of the infrarenal abdominal aorta measuring 3.7 cm, unchanged. Patent bilateral iliac arteries.  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 No acute aortic findings and no significant interval change involving the aorta. LUNGS AND PLEURA: Again seen is a large, thick-walled loculated hydropneumothorax occupying much of the right hemithorax. Associated consolidation and volume loss involving much of the right mid and lower lung. Dependent atelectasis left lower lung. Small bilateral pleural effusions. MEDIASTINUM/AXILLAE: No adenopathy. Mild cardiac enlargement. No pericardial effusion. No mediastinal shift. Esophagus is grossly negative. Patent left brachiocephalic vein stent. CORONARY ARTERY CALCIFICATION: Severe. HEPATOBILIARY: Cirrhotic appearance of the liver with a large malignant lesion in the right hepatic lobe superiorly measuring up to at least 7.5 cm in diameter. There is a prominent surrounding vascularity associated with this lesion which also shows contrast material outside this lesion at the dome of the liver compatible with active arterial bleeding. A large amount of associated acute blood products around the liver in the right upper quadrant as well as a large amount of high density ascites elsewhere throughout the abdomen and pelvis compatible with acute hemoperitoneum. Cholelithiasis. No biliary dilatation. PANCREAS: Normal. SPLEEN: Normal. ADRENAL GLANDS: Normal. KIDNEYS/BLADDER: Atrophy of the native kidneys bilaterally. A small left renal cyst with no follow-up needed. No hydronephrosis. A Beltre catheter in the bladder which is decompressed. BOWEL: The bowel is normal in caliber with no evidence for obstruction. No definite acute bowel findings. LYMPH NODES: No adenopathy. There is some air in the left-sided pelvic veins presumably related to venous access in the left lower extremity. PELVIC ORGANS: Normal. MUSCULOSKELETAL: Mild to moderate degenerative changes in the spine.     IMPRESSION: 1.  Stable type B aortic dissection as detailed above and  stable aneurysmal dilatation of the aorta. No acute aortic findings. 2.  Cirrhotic configuration of the liver with malignant-appearing lesion in the right hepatic lobe superiorly measuring at least 7.5 cm in diameter, likely representing HCC. 3.  There is evidence for active arterial bleeding secondary to the malignant mass in the liver with a large amount of acute blood products in the right upper quadrant around the liver and a large amount of ascites in the abdomen and pelvis which is higher in density and compatible with hemoperitoneum. 4.  Again seen is a similar-appearing large thick-walled loculated right hydropneumothorax. This is associated with some significant consolidation and volume loss in the right lung and, overall, is unchanged from the earlier study. No mediastinal shift. See additional details above. Findings discussed with Dr. Davila 8/26/2022 3:44 AM.     IR PICC Placement > 5 Yrs of Age    Result Date: 8/25/2022  PROCEDURES 8/25/2022: 1. Ultrasound guidance for venous access 2. Placement centrally inserted catheter (age > 5 yrs.) tunneled, no port, no pump 3. Fluoroscopic guidance placement History: Patient needs PICC for upcoming surgery within next 4 weeks. Currently on waitlist for surgery 8/11.; Poor venous access Comparison: None Staff: MD BERRY Mackenzie, MIGUEL SCHULTZ MD, attest that I was present in the procedure room for the entire procedure. Fellow/Resident: ANETA Crooks DO Medications: 1. 2 cc 1% lidocaine Fluoroscopy time: 1.25 minutes Complications: None Consent: verbal and written informed consent obtained prior to procedure. PROCEDURE: The patient understood the limitations, alternatives, and risks of the procedure and requested the procedure be performed. Both written and oral consent were obtained. The right upper extremity was prepped and draped in the usual sterile fashion. Ten to one volume mixture of 1% lidocaine without epinephrine buffered with 8.4% bicarbonate solution was used  for local anesthesia. Under ultrasound guidance, right basilic vein venotomy was made with a micropuncture needle. Permanent copy of the image documenting the vein was entered into the patients record. Micropuncture needle exchanged over guidewire for the 5 Fr peel away sheath. Catheter length measured with the 0.018 guidewire. A  5 Swiss single lumen PICC was cut to 45 cm and loaded with the 0.018 guidewire and advanced into the sheath under fluoroscopic guidance with tip positioned in the high right atrium. The lumen flushed and aspirated adequately. The catheter was secured with a Statlock and dressed with sterile dressing. The catheter was heparin locked with 10:1 heparin solution. No immediate complication.     IMPRESSION: 1. Ultrasound guided right basilic venotomy. 2. A 5 Swiss 45 cm single lumen PICC placed under fluoroscopic guidance with the tip in the high right atrium. Lumen is heparin locked and ready for use. I have personally reviewed the examination and initial interpretation and I agree with the findings. MIGUEL SCHULTZ MD   SYSTEM ID:  [id number]    IR Visceral Angiogram    Result Date: 8/26/2022  [facility] EXAM: HEPATIC ARTERIOGRAPHY IR VISCERAL ANGIOGRAM LOCATION: [facility] CLINICAL HISTORY: Bleeding hepatic mass. Medically complex patient with aortic aneurysmal dissection, renal failure on dialysis, profound hypotension on pressors. Bleeding presumptive hepatocellular carcinoma with underlying cirrhosis. Empyema with chronic pleurocutaneous fistula. PROCEDURES PERFORMED: 1. Common femoral artery access using ultrasound guidance followed by vascular sheath placement. 2. Selective catheterization and angiography of the celiac artery. 4. Subselective catheterization and angiography of the common hepatic artery. 5. Subselective catheterization and angiography of the right hepatic branches. 6. Superselective catheterization of the segment 7 and 8 branches of the right  hepatic artery and imaging of these vessels. 7. Embolization of separate segment 7 and segment 8 branches right hepatic artery feeding the mass. 8. Arterial closure/ hemostasis with [employer]. MODERATE SEDATION: Versed and Fentanyl were administered intravenously for moderate sedation. Pulse oximetry, heart rate and blood pressure were continuously monitored by an independent trained observer. The physician spent 120 minutes of face-to-face moderate sedation time with the patient. ADDITIONAL MEDICATIONS: see EMR CONTRAST: See EMR FLUOROSCOPIC TIME: 45.1 minutes CUMULATIVE AIR KERMA/DOSE: 3392 mGy STERILE BARRIER TECHNIQUE: Maximal Sterile Barrier Technique Utilized: Cap AND mask AND sterile gown AND sterile gloves AND sterile full body drape AND hand hygiene AND skin preparation 2% chlorhexidine for cutaneous antisepsis (or acceptable alternative  antiseptics).   Sterile Ultrasound Technique Utilized ?Sterile gel AND sterile probe covers. UNIVERSAL PROTOCOL: Standard universal protocol per facility guidelines was followed. See EMR for documentation. TECHNIQUE: Risks, benefits and alternatives were explained to the patient and written, informed consent was obtained. The patient was placed in the supine position on the angiography table. The groins were prepped and draped in the usual fashion and anesthetized with 1% lidocaine. The right common femoral artery was accessed under ultrasound guidance with a micropuncture system and a 5 Bahamian vascular sheath placed. A C2 catheter, was used to select the celiac artery, followed by celiac arteriography. A microcatheter was advanced over a wire, through the base catheter and used to select the common hepatic artery and angiography performed. Confirmation of a saccular aneurysm from the proximal proper hepatic artery. This is successfully negotiated and the microcatheter advanced into the proper hepatic artery and angiography performed. Further super selection into a segment  7 main feeding artery to the tumor. From this position 500-700 um embosphere particles were infused to prune the vasculature. The catheter was withdrawn and a single 3 mm x 5 cm detachable coil was placed to further diminish the antegrade arterial flow. The microcatheter was withdrawn into the right hepatic artery and angiography confirms good embolization. There is a tiny branch to the segment 8 region which was too small to cannulate. The microcatheter was placed near the origin and a small Gelfoam slurry was administered to prune the vessel with excepted more distal embolization as well. The catheter was withdrawn into the common hepatic artery and angiography confirms good embolization of the tumor mass. All catheters, guidewires and the sheath were removed. Hemostasis was achieved using Perclose.  The patient tolerated the procedure well without immediate complication. FINDINGS: Survey ultrasound images of the right common femoral artery demonstrate that is is patent and appropriate for access, and ultrasound images obtained during access show the needle within the artery. Ultrasound images have been archived permanently for documentation. Celiac artery: Standard celiac anatomy. Hepatic artery: Common hepatic artery aneurysm. Normal branching to the right and left hepatic artery. Dome hypervascular lesion with intense contrast pooling. Prominent vessel is a segment 7 vessel, which is successfully embolized with embosphere beads and coils. A smaller branch more proximally feeding the segment 8 portion is unable to be entered but is gently embolized with Gelfoam slurry.     IMPRESSION: 1. Successful embolization of a bleeding presumptive dome hepatocellular carcinoma fed by segment 7 and 8 branches, both of which are embolized as above. 2. Common hepatic artery aneurysm.     Chest CT w/o contrast    Result Date: 8/26/2022  EXAM: CT CHEST W/O CONTRAST LOCATION: [facility] DATE/TIME:  8/26/2022 1:17 AM INDICATION: Chest wall infection. COMPARISON: 07/07/2022 TECHNIQUE: CT chest without IV contrast. Multiplanar reformats were obtained. Dose reduction techniques were used. CONTRAST: None. FINDINGS: LUNGS AND PLEURA: Large thick-walled right hydropneumothorax, which has a large air component compared to fluid component. Near complete atelectasis of the right lung. No focal airspace disease in the left lung. Trace left pleural effusion. No left pneumothorax. MEDIASTINUM/AXILLAE: Right PICC with tip near the cavoatrial junction. Redemonstrate ascending thoracic aortic aneurysm measuring 4.1 cm. Redemonstrated type B aortic dissection with aneurysmal dilatation of aortic arch measuring up to 5.9 cm and aneurysmal dilatation of the descending thoracic aorta measuring up to 5.7 cm. No lymphadenopathy. No pericardial effusion. CORONARY ARTERY CALCIFICATION: Severe. UPPER ABDOMEN: Morphologic changes of cirrhosis. Increased size of right hepatic lobe mass at the dome measuring 7.1 x 4.9 cm, previously 5.0 x 4.8 cm. Hyperdense fluid along the liver capsule. Moderate volume free fluid in the upper abdomen, which appears slightly hyperdense. MUSCULOSKELETAL: Small amount of soft tissue gas in the right lateral chest wall, with trace gas in the adjacent pleural space. Surrounding soft tissue thickening. Multilevel degenerative changes of the spine.     IMPRESSION: 1.  Large thick-walled right hydropneumothorax, with near complete atelectasis of the right lung. No mediastinal shift to suggest tension pneumothorax. 2.  Soft tissue thickening with small amount of gas in the right lateral chest wall, possibly communicating with the right pleural space. Correlate for pleurocutaneous fistula. 3.  Increased size of right hepatic dome 7.1 cm mass, with hyperdense perihepatic fluid. Findings suspicious for ruptured hepatocellular carcinoma with pericapsular hematoma. Moderate volume abdominal free fluid, with a  component of hemoperitoneum. [Critical Result: Findings suspicious for ruptured hepatocellular carcinoma with pericapsular hematoma and hemoperitoneum] Finding was identified on 8/26/2022 1:25 AM. Dr. Davila was contacted by me on 8/26/2022 at 1:51 AM and verbalized understanding of the critical result. Right hydropneumothorax and possible pleurocutaneous fistula also discussed at this time.     CT Head w/o Contrast    Result Date: 8/26/2022  EXAM: CT HEAD W/O CONTRAST LOCATION: [facility] DATE/TIME: 8/26/2022 1:18 AM INDICATION: headache COMPARISON: 1/8/2022 TECHNIQUE: Routine CT Head without IV contrast. Multiplanar reformats. Dose reduction techniques were used. FINDINGS: INTRACRANIAL CONTENTS: No intracranial hemorrhage, extraaxial collection, or mass effect.  No CT evidence of acute infarct. Mild presumed chronic small vessel ischemic changes. Mild generalized volume loss. No hydrocephalus. VISUALIZED ORBITS/SINUSES/MASTOIDS: No intraorbital abnormality. No paranasal sinus mucosal disease. No middle ear or mastoid effusion. BONES/SOFT TISSUES: No acute abnormality.     IMPRESSION: 1.  No acute intracranial process.     EXAM: CTA CHEST ABDOMEN PELVIS W CONTRAST  LOCATION: [facility]  DATE/TIME: 4/7/2022 6:00 PM     INDICATION: Right chest pain. Complex right pleural effusion. Aortic and pulmonary artery dissection.  COMPARISON: 3/30/2022  TECHNIQUE: CT angiogram chest abdomen pelvis during arterial phase of injection of IV contrast. 2D and 3D MIP reconstructions were performed by the CT technologist. Dose reduction techniques were used.   CONTRAST: [id number] 100ml     FINDINGS:   CT ANGIOGRAM CHEST, ABDOMEN, AND PELVIS: No significant interval change in a Rifton type B aortic dissection extending from the aortic arch through the aortic bifurcation. The celiac axis and superior mesenteric artery and right renal artery are   opacified by the true lumen. The left  renal artery is opacified by the false lumen. The inferior mesenteric artery is opacified by the false lumen. The dissection extends a short distance into the left common iliac artery. The iliac arteries are   otherwise normal. No evidence of rupture. 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 An infrarenal abdominal aortic artery aneurysm measuring 3.7 x 3.5 cm. The ascending aorta is   stable and dilated at 4.0 cm. A stent within the left brachiocephalic vein. Immediately proximal to the stent is marked narrowing of the left subclavian vein between the right clavicle and first rib, image 10:6. Stable peripheral irregularity of the main   pulmonary artery extending into the proximal right pulmonary artery, image 46:6.     LUNGS AND PLEURA: Loculated large right hydropneumothorax is similar in size to prior with an increased pneumothorax component. A stable hyperdense component posteriorly within the pleural collection is consistent with a hematoma. Right chest tube in   place. Atelectasis involving the majority of the right lung. Small layering left pleural effusion.     MEDIASTINUM/AXILLAE: No lymphadenopathy. Trace pericardial fluid.     CORONARY ARTERY CALCIFICATION: Moderate.     HEPATOBILIARY: An indeterminant 4.3 x 3.9 cm mass in segment 8 of the with a peripheral nodular enhancing component, image 87:6. An indeterminate 2.2 x 2.2 cm hypodensity in segment 8, image 84:6. Nodular hepatic contour consistent with   cirrhosis/fibrosis. Cholelithiasis. No biliary ductal dilatation.     PANCREAS: Normal.     SPLEEN: Stable splenomegaly measuring 17.5 cm in maximal dimension.     ADRENAL GLANDS: Normal.     KIDNEYS/BLADDER: Atrophic kidneys. Hypoenhancing left kidney. Subcentimeter renal hypodensities which are too small to characterize. No hydronephrosis.     BOWEL: No obstruction or inflammatory change.     LYMPH NODES: Normal.     PELVIC  ORGANS: Small volume ascites.     MUSCULOSKELETAL: Degenerative changes of the spine and hips.                                                                      IMPRESSION:  1.  Stable Panfilo type B aortic dissection.  2.  Stable dilatation of the ascending aorta, aortic arch, descending aorta and abdominal aorta.  3.  Stable peripheral irregularity in the main pulmonary artery which is most consistent with a dissection.  4.  Stable size of a large loculated right hydropneumothorax with increased pneumothorax component. Right chest tube in place.  5.  Marked narrowing of the left subclavian vein proximal to a stent.  6.  The left kidney is opacified by the false lumen and is hypoenhancing which may represent ischemia or artifact due to timing of the contrast bolus.  7.  There are 2 indeterminant hepatic masses which may represent hepatocellular carcinoma. Recommend a contrast enhanced magnetic resonance imaging examination of the liver.        [Recommend Follow Up: Hepatic masses]     This report will be copied to the [facility] to ensure a provider acknowledges the finding.                                                       Dereck Stover M.D.  Thank you for the opportunity to participate in the care of this patient.   Please feel free to call me with any questions or concerns.  Phone number [phone].

## 2022-08-26 NOTE — CONSULTS
"[facility]  Palliative Care Consultation Note    Patient: Elle Blanton  Date of Admission:  8/25/2022    Requesting Clinician / Team: William Asif MD/ICU  Reason for consult: Cirrhosis, ESRD on HD, bleeding liver mass, previously seen by Jeni Herrera, was DNR/DNI. Critically ill with poor prognosis.  Please eval for [facility] discusions    Goals of care    Impression & Recommendations:  Family meeting held (see note below)      Goals of Care: currently life-prolonging but Mr. Blanton noting desire to stop HD, family wishes to discuss further and open to supporting his decisions.  Anticipate transition to comfort focused goals of care in time ahead but currently not on comfort care or comfort focused goals of care.  - hospice consult will be needed should he decide to stop HD with consideration of where he/family would like to have end of life occur.  Could have days-short week(s) with stopping HD--recommend hospice agency that can mobilize within 24 hours of consult request, family and I discussed possibly [facility] hosp[facility] and I also affirmed they have choice over hospice agency.    Advanced Care Planning: HCD on file reviewed, naming wife Maico and son Hudson as HCA and first-alternate HCA.  Daughter Francie is not listed as a health care agent.  Code status: now DNR/DNI.  Wife notes Elle has consistently noted he does not wish to be intubated/\"on machines\".     Support: [employer]  Wife, Maico; adult children Hudson and Francie.  Palliative medicine will continue to follow (next visit would be 8/29)  Will request spiritual care support for Mr. Blanton as well.    Symptom Management:  Palliative was not consulted for symptom management.   From symptom assessment, recommend the following:    Pain:   Noting diffuse pain during HD run.   - low dose APAP ordered   - Suggest low dose hydromorphone--ordered 1mg liquid (avoid splitting tabs) q4H prn mod-severe pain.  Should goals of care change to comfort focus, would " recommend more liberal use of hydromorphone.  ----would not recommend morphine given ESRD.    These recommendations have been discussed with Dr Flores and Dr Asif.      Thank you for the opportunity to participate in the care of this patient and family. Our team: will continue to follow.     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 Found to have hemoperitoneum due to ruptured HCC with pericapsular hematoma.   Underwent IR embolization of bleeding site on 8/26/22.    Today, the patient was seen for:  Goals of care    Prognosis, Goals, & Planning:      Functional Status just prior to hospitalization: Palliative Performance Score:       50%- 1. Mainly sit/lie; 2. Unable to do any work, extensive disease; 3. Considerable assistance required; 4. Normal or reduced; 5. Full or confusion       Prognosis, Goals, and/or Advance Care Planning were addressed today: Yes        Summary/Comments:  See family meeting notes below.      Patient's decision making preferences: independently          Patient has decision-making capacity today for complex decisions: Yes            I have concerns about the patient/family's health literacy today: No           Patient has a completed Health Care Directive: Yes, and on file.      Code status: Full Code; had been DNR on prior hospitalization 3/30-4/12.    Coping, Meaning, & Spirituality:   Mood, coping, and/or meaning in the context of  serious illness were addressed today: Yes  Summary/Comments:     Social:     Living situation: resides at home with wife, Maico, and adult son, Hudson.    [place] family / caregivers: Daughter Francie resides in [place]; Hudson and Maico live with Mr. Blanton.    Occupational history: medically disabled.  Previously worked at [email or web address].    Substance use history: no alcohol use, no VINOD history.    Financial concerns: not discussed.    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 Found to have hemoperitoneum due to ruptured HCC with pericapsular hematoma and hemorrhagic shock (Hgb 6 on presentation)  Underwent IR embolization of bleeding site on 8/26/22.    Consultants to date: general surgery (intervention for trapped R lung not recommended; FNA for culture/gram stain suggested from chronic chest abnormality; too small for FNA so swab sent per ICU).  -- nephrology (Faye Clement is primary nephrologist), needed HD today due to hyperkalemia  -- ICU/Pulmonary medicine following; pt on pressors for hypotension.  --in past had seen [facility] hematology for thrombocytopenia; MRI had been advised but per chart Elle has not pursued MRI and biopsy of mass in lung has not been pursued.    Introduced Palliative medicine as a specialty that works to help patients with serious illness live as well as possible, and manage symptoms/side effects of the illness or its treatment.  Palliative medicine also works to provide an extra layer  of support to patients experiencing serious illness and their families.  Our specialty also helps with advance care planning (making health care directives and helping a person receive care that is in keeping with their individual hopes and values).     Elle was unable to participate in initial visit due to recently receiving sedation for his abdominal angiogram to treat bleeding from hepatic lesion.   He was able to engage in second visit/family meeting.  The entirety of family meeting visit with this patient and his family occurred with a ong language intepreter to facilitate clear communication.      What brings ritika: time with family, being able to do work around the house (hasn't been able due to illness).    What is most important: being able to have control over his body, be able to get out of bed if he wishes.    [facility] Palliative Care Family Meeting Note    Date/time:  8/26/2022, 1530 to 1440    Location:  Bedside room 357    Patient present: Yes    Reason for Meeting:  Goals of care discussion    People Present:  Dr Flores (primary provider--attendance and rapport with Mr. Blanton and his family appreciated), Dr Archer, Elle's wife Maico, son Hudson, daughter Francie (via telephone), Beb[employer] ([employer] )    Meeting Led by:  Evelyn Archer MD    Meeting Summary:    Elle and his family expressed understanding of recent events, and plan to have I/D of chest wound.    Dr Flores arrived, medical update provided by him as well, and he informed family and Elle that there is no curative treatment for his hydrothorax/trapped lung on R lung, that liver failure is advanced and no cure, and HD is treatment for kidney failure.  He noted Elle had been reluctant to start HD and has struggled with that.    Reviewed possibility of acute decompensation and that Elle survived what could have been a life-ending hemorrhage with medical interventions.  Elle noted if his heart stopped, he would want to be allowed a  natural death and wouldn't want CPR.  HE also would not want intubation if he had respiratory failure.  Family members posed questions, and ultimately all present in agreement for placing DNR/DNI order.  Expressions of grief/sadness from daughter Francie.    Elle asked about stopping dialysis.  After gaining permision, reviewed life expectancy would be days-short weeks and had brief discussion of what end of life might be like with stopping HD.   Reviewed one is not required to continue medical treatments that no longer provide acceptable quality of life for a patient.  Elle inquired about MAiD and this provider informed him that is not legal in the state [place].      Elle notes desire to be able to get out of bed to the commode for toileting.  He notes desire to have less medical intervention (les blood draws, etc).      Recommended that if decision to stop HD, hospice support would be advised--and that Elle and family would need to consider whether end of life at home, in a facility (hospice facility possibly) would be desired.    Decision making capacity: Elle retains decisional capacity.    Estimated length of life:  With continuing medical interventions, likely weeks-short months.                       If stopping HD, likely days-short weeks.    Symptoms:  PAIN-bothering Elle during HD run.    Understanding/Coping:  Daughter Francie noting distress at having Elle voice desire to stop treatments.  Son Hudson and wife Maico express understanding of Elle's preferences regarding CPR/intubation and accepting of his wishes.    Meeting Action Items & Recommendations:   1.) DNR/DNI order placed.  2.)  Elle and his family will discuss whether to/timing of stopping HD.  They will discuss hospice involvement; we discussed use of [facility] if HD is discontinued to provide timely access to end of life care.   3.)  Recommend use of pain medications to lessen musculoskeletal pain/cramping.    Next Meeting date/time:   TBD     Key Palliative Symptom Data:  # Pain severity the last 12 hours: moderate  # Dyspnea severity the last 12 hours: low  # Nausea severity the last 12 hours: low  # Anxiety severity the last 12 hours: moderate  Existential distress noted in multiple statements from Mr. Blanton    ROS:  Comprehensive ROS is reviewed and is negative except as here & per HPI: noting fecal urgency.  Abdominal pain lessened after paracentesis.  Denies dyspnea.  Notes cramping/headache with HD.     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 pylori infection 7/5/2017    UGI bleed implied by Hgb 9.0 6/22/17 and melena. Admitted and hgb decreased to 7.6, CT abdomen showed all bladder wall thickening. + Hep B surface antigen noted. Melena resolved and hgb stabalized without transfusion, epigastric pain resolved with PPI. Recommend referral for gastroscopy.     Heart attack (H)      Hepatitis B      Normocytic anemia      Other pancytopenia (H) 7/5/2017 6/24/17 Peripheral smear at [facility].Pancytopenia of uncertain cause. Ruled out acute leukemia. Hematologist suggests hemolytic anemia should be considered possible cause and LDH and haptoglobin should be assessed in future.      PONV (postoperative nausea and vomiting)      Thrombocytopenia (H)         Past Surgical History:  Past Surgical History:   Procedure Laterality Date     ABCESS DRAINAGE      finger     BURSECTOMY ELBOW Left 10/15/2021    Procedure: LEFT OLECRANON BURSECTOMY;  Surgeon: Tico Myers MD;  Location: [facility] OR     BURSECTOMY ELBOW Left 1/18/2022    Procedure: LEFT ELBOW OLECRANON  BURSECTOMY;  Surgeon: Tico Myers MD;  Location: [facility] Main OR     CREATE FISTULA ARTERIOVENOUS UPPER EXTREMITY Left 4/20/2021    Procedure: left arm dialysis graft placement;  Surgeon: Roxana Cosby MD;  Location: [facility] Main OR;  Service: General     FOREIGN BODY REMOVAL      finger     INCISION AND DRAINAGE FINGER, COMBINED Left 10/20/2016    Procedure: COMBINED INCISION AND DRAINAGE FINGER;  Surgeon: Mireya Pizano MD;  Location: [facility] OR     INCISION AND DRAINAGE UPPER EXTREMITY, COMBINED Left 1/18/2022    Procedure: AND ELBOW INCISION AND DRAINAGE;  Surgeon: Tico Myers MD;  Location: [facility] Main OR     INSERT PICC LINE Right 8/25/2022    Procedure: INSERTION, PICC;  Surgeon: Osmin Villa MD;  Location: [facility] OR     IR CHEST TUBE PLACEMENT NON-TUNNELED RIGHT  4/19/2021     IR CHEST TUBE PLACEMENT NON-TUNNELED RIGHT  10/19/2021     IR CHEST TUBE PLACEMENT NON-TUNNELED RIGHT  11/10/2021     IR CHEST TUBE PLACEMENT NON-TUNNELED RIGHT  3/31/2022     IR DIALYSIS FISTULOGRAM LEFT  4/2/2022     IR DIALYSIS FISTULOGRAM LEFT  4/4/2022     IR PICC PLACEMENT > 5 YRS OF AGE  8/25/2022     IR PLEURAL DRAINAGE WITH CATHETER INSERTION  4/19/2021     IR VISCERAL ANGIOGRAM  8/26/2022     MIDLINE INSERTION - DOUBLE LUMEN  4/23/2021          [place] ESOPHAGOGASTRODUODENOSCOPY TRANSORAL DIAGNOSTIC N/A 8/18/2020    Procedure: ESOPHAGOGASTRODUODENOSCOPY (EGD) with biopsy;  Surgeon: Nilson Gonzales MD;  Location: [facility] GI;  Service: Gastroenterology     US PARACENTESIS  8/14/2020     US PARACENTESIS  8/19/2020     US THORACENTESIS  4/18/2021         Family History:  Family History   Problem Relation Age of Onset     Diabetes Father      Hypertension No family hx of      Asthma No family hx of      Cancer No family hx of      Coronary Artery Disease No family hx of      Liver Cancer No family hx of      Ulcers Father          Allergies:  Allergies   Allergen Reactions     Nka [No Known Allergies]   "       Medications:  I have reviewed this patient's medication profile and medications from this hospitalization.   Noted:  Current Facility-Administered Medications   Medication     0.9% sodium chloride BOLUS     0.9% sodium chloride BOLUS     0.9% sodium chloride BOLUS     acetaminophen (TYLENOL) tablet 650 mg    Or     acetaminophen (TYLENOL) solution 650 mg     albumin human 25 % injection 50 mL     ceFEPIme (MAXIPIME) 1g vial to attach to  ml bag for ADULTS or NS 50 ml bag for PEDS     glucose gel 15-30 g    Or     dextrose 50 % injection 25-50 mL    Or     glucagon injection 1 mg     flumazenil (ROMAZICON) injection 0.2 mg     lidocaine 1 % 0.5 mL     lidocaine 1 % 0.5 mL     naloxone (NARCAN) injection 0.2 mg    Or     naloxone (NARCAN) injection 0.4 mg    Or     naloxone (NARCAN) injection 0.2 mg    Or     naloxone (NARCAN) injection 0.4 mg     No heparin via hemodialysis machine     norepinephrine (LEVOPHED) 4 mg in  mL infusion PREMIX     pantoprazole (PROTONIX) IV push injection 40 mg     senna-docusate (SENOKOT-S/PERICOLACE) 8.6-50 MG per tablet 1 tablet    Or     senna-docusate (SENOKOT-S/PERICOLACE) 8.6-50 MG per tablet 2 tablet     Stop Heparin 60 minutes before end of treatment     vancomycin place rivera - receiving intermittent dosing     vasopressin (VASOSTRICT) 20 Units in sodium chloride 0.9 % 100 mL standard conc infusion   24-hr MME: 0.100 mg fentanyl = 16.7 MME    PERTINENT PHYSICAL EXAMINATION:  Vital Signs: Blood pressure 101/57, pulse 75, temperature 97.5  F (36.4  C), temperature source Oral, resp. rate 18, height 1.651 m (5' 5\"), weight 56.7 kg (125 lb), SpO2 94 %.   Body mass index is 20.8 kg/m .    Intake/Output Summary (Last 24 hours) at 8/26/2022 1412  Last data filed at 8/26/2022 1400  Gross per 24 hour   Intake 1977 ml   Output 2475 ml   Net -498 ml       GENERAL: sleepy 61 yo male, opens eyes to verbal stim, provides brief answers to questions and falls back asleep.  "   SKIN: Warm and dry   HEENT: Normocephalic, anicteric sclera, moist mucous membranes with temporal muscle wasting.  LUNGS: Clear to auscultation anterolaterally; non-labored   CARDIAC: RRR, normal s1/s2, w/o m/r/g   ABDOMINAL: BS (+), firm, distended, mildly tender without guarding.  : masterson in place  MUSKL: no gross joint deformities   EXTREMITIES: AV fistula noted in L arm w palpable thrill.  No edema or cyanosis,   NEUROLOGIC: no tremor.  PSYCH: affect flat, sleepy.    Data reviewed:  Recent imaging reviewed, my comments on pertinents:   Ultrasound guided paracentesis revealed hemoperitoneum, ascites, with 2400ml hemorrhagic ascites fluid noted.    IR Visceral angiogram 8/26/22:  IMPRESSION:   1. Successful embolization of a bleeding presumptive dome hepatocellular carcinoma fed by segment 7 and 8 branches, both of which are embolized as above.   2. Common hepatic artery aneurysm.     CTA chest/abdomen/pelvis w contrast:  IMPRESSION:  1.  Stable type B aortic dissection as detailed above and stable aneurysmal dilatation of the aorta. No acute aortic findings.  2.  Cirrhotic configuration of the liver with malignant-appearing lesion in the right hepatic lobe superiorly measuring at least 7.5 cm in diameter, likely representing HCC.  3.  There is evidence for active arterial bleeding secondary to the malignant mass in the liver with a large amount of acute blood products in the right upper quadrant around the liver and a large amount of ascites in the abdomen and pelvis which is   higher in density and compatible with hemoperitoneum.  4.  Again seen is a similar-appearing large thick-walled loculated right hydropneumothorax. This is associated with some significant consolidation and volume loss in the right lung and, overall, is unchanged from the earlier study. No mediastinal shift.   See additional details above.    Chest CT w contrast 8/26/22:  1.  Large thick-walled right hydropneumothorax, with near  complete atelectasis of the right lung. No mediastinal shift to suggest tension pneumothorax.     2.  Soft tissue thickening with small amount of gas in the right lateral chest wall, possibly communicating with the right pleural space. Correlate for pleurocutaneous fistula.     3.  Increased size of right hepatic dome 7.1 cm mass, with hyperdense perihepatic fluid. Findings suspicious for ruptured hepatocellular carcinoma with pericapsular hematoma. Moderate volume abdominal free fluid, with a component of hemoperitoneum.     CT head w/o contrast: no acute intracranial process    Recent lab data reviewed, my comments on pertinents:   CMP  Recent Labs   Lab 08/26/22  0901 08/26/22  0850 08/25/22  2353   NA  --  138 138   POTASSIUM  --  5.7* 4.6   CHLORIDE  --  102 99   CO2  --  21* 25   ANIONGAP  --  15 14   GLC 87 89 137*   BUN  --  67* 63*   CR  --  7.78* 8.09*   GFRESTIMATED  --  7* 7*   TANO  --  6.6* 7.1*   MAG  --   --  1.6*   PROTTOTAL  --  5.5* 6.2   ALBUMIN  --  1.7* 1.7*   BILITOTAL  --  0.9 0.7   ALKPHOS  --  103 114   AST  --  46* 19   ALT  --  19 <9     CBC  Recent Labs   Lab 08/26/22  1155 08/26/22  0850 08/25/22  2353   WBC  --  16.7* 12.1*   RBC  --  2.46* 2.01*   HGB 7.2* 7.4* 6.0*   HCT  --  23.0* 19.9*   MCV  --  94 99   MCH  --  30.1 29.9   MCHC  --  32.2 30.2*   RDW  --  17.3* 18.5*   PLT  --  101* 125*        Evelyn Archer MD  [facility],  Palliative Medicine Service  [phone] department number  Can page via [employer] [facility]

## 2022-08-26 NOTE — ED PROVIDER NOTES
EMERGENCY DEPARTMENT ENCOUNTER      NAME: Elle Blanton  AGE: 62 year old male  YOB: 1960  MRN: [id number]  EVALUATION DATE & TIME: 8/25/2022 11:43 PM    PCP: Jaswant Flores    ED PROVIDER: Forest Davila M.D.      Chief Complaint   Patient presents with     Syncope         FINAL IMPRESSION:  1. Hemoperitoneum    2. Liver mass    3. Hydropneumothorax    4. ESRD (end stage renal disease) on dialysis (H)    5. Hypovolemic shock (H)          ED COURSE & MEDICAL DECISION MAKING:    Pertinent Labs & Imaging studies reviewed. (See chart for details)  62 year old male presents to the Emergency Department for evaluation of syncope abdominal pain hypotension.  Patient brought in by EMS as he had a syncopal episode at home today.  Has been vomiting.  He is complaining of chest and abdominal pain.  Both of this is on the right.  He is supposed to have a procedure with Dr. Zurita at the [facility] for this fistula.  He had a PICC placed this morning.  Had been having worsening vomiting when he got home.  On arrival he is hypotensive.  I am concerned about septic shock given the purulent drainage.  IV antibiotics including Zosyn and Vanco were given.  Lactic acid is elevated.  White count is elevated.  He does have a hemoglobin of 6.  Did do a CT scan of his chest which does show a hemoperitoneum on the right this gotten larger.  He has a type B aortic dissection and aneurysm which is unchanged.  He has new hemoperitoneum in the right upper quadrant.  There is a mass in his lung which looks like it may have ruptured.  Initially talked to thoracic surgery.  Then talked to the [facility] for possible transfer as that is where most of his care has been.  Unfortunately did not have beds for him currently.  Did talk with Dr. Harvey here from interventional radiology.  They did recommend getting a CT scan with IV contrast.  This was reviewed.  Patient be taken to IR to try to see if we can embolize  and sling stop the bleeding.  Patient is on IV Levophed, IV vasopressin in addition to getting blood.  Blood pressure improving with this.  I was hesitant to give too much fluid if he is a dialysis patient.  Discussed with the ICU and the admitting physician.  Patient be transferred to the ICU when a bed is available.  Discussed with the patient and his wife.    11:46 PM I met with the patient to gather history and to perform my initial exam. I discussed the plan for care while in the Emergency Department. PPE: gloves, surgical mask, and eye protection.  12:38 AM Per nursing report, there are no beds available at the [facility].  12:44 AM I spoke to Dr. Pa, [facility] Thoracic Surgery.  1:56 AM I spoke to Dr. Conteh, [facility] Emergency Department  2:05 AM I spoke to Dr. Sherman, General Surgery at the [facility].   2:13 AM I spoke to Dr. Harvey, IR.  2:27 AM I spoke to Dr. Harvey, IR. Recommends obtaining CT Chest Abdomen Pelvis.  3:45 AM I spoke to Dr. Matias, General Surgery.  3:59 AM I spoke to Dr. Harvey, IR.  4:10 AM I discussed case with Dr. Scott, [place] resident.  4:11 AM I discussed case with Dr. Dick, intensivist, who accepts the patient for admission.    At the conclusion of the encounter I discussed the results of all of the tests and the disposition. The questions were answered. The patient or family acknowledged understanding and was agreeable with the care plan.       Critical Care     Performed by: Dr Forest Davila  Authorized by: Dr Forest Davila  Total critical care time: 2 like the whole time I was here shock, hypotension, hemoperitoneum, hemopneumothorax,40 minutes  Critical care was necessary to treat or prevent imminent or life-threatening deterioration of the following conditions:  Shock, end-stage renal disease, hemoperitoneum, hemopneumothorax.  Critical care was time spent personally by me on the following activities: development  of treatment plan with patient or surrogate, discussions with consultants, examination of patient, evaluation of patient's response to treatment, obtaining history from patient or surrogate, ordering and performing treatments and interventions, ordering and review of laboratory studies, ordering and review of radiographic studies, re-evaluation of patient's condition and monitoring for potential decompensation.  Critical care time was exclusive of separately billable procedures and treating other patients.    MEDICATIONS GIVEN IN THE EMERGENCY:  Medications   norepinephrine (LEVOPHED) 4 mg in  mL infusion PREMIX (0.33 mcg/kg/min × 54.4 kg Intravenous Rate/Dose Change 8/26/22 0456)   vasopressin (VASOSTRICT) 20 Units in sodium chloride 0.9 % 100 mL standard conc infusion (2.4 Units/hr Intravenous New Bag 8/26/22 0340)   metoclopramide (REGLAN) injection 5 mg (5 mg Intravenous Given 8/25/22 2357)   0.9% sodium chloride BOLUS (0 mLs Intravenous Stopped 8/26/22 0013)   0.9% sodium chloride BOLUS (0 mLs Intravenous Stopped 8/26/22 0040)   piperacillin-tazobactam (ZOSYN) 3.375 g vial to attach to  mL bag (3.375 g Intravenous Given 8/26/22 0046)   vancomycin (VANCOCIN) 1000 mg in dextrose 5% 200 mL PREMIX (0 mg Intravenous Stopped 8/26/22 0209)   fentaNYL (PF) (SUBLIMAZE) injection 50 mcg (50 mcg Intravenous Given 8/26/22 0226)   iopamidol (ISOVUE-370) solution 75 mL (75 mLs Intravenous Given 8/26/22 0301)       NEW PRESCRIPTIONS STARTED AT TODAY'S ER VISIT  New Prescriptions    No medications on file          =================================================================    HPI    Patient information was obtained from: EMS and Patient    Use of : N/A       Elle Blanton is a 62 year old male with a pertinent history of HTN, descending thoracic aortic dissection, lung empyema, CKD, ESRD, anemia, chronic hepatitis B, cirrhosis of liver without ascites, anemia, who presents to this ED via EMS for  evaluation of syncope.    Per EMS, patient is a dialysis patient. He had a normal run yesterday. Today, patient had a PICC line placed in his right forearm. After patient got home, family states that patient suddenly became weak and dizzy then had a syncopal episode, unknown duration. Upon EMS arrival, patient was complaining of dizziness and nausea. He was vomiting  Uncontrollably for EMS. Patient was initially given 4 of Zofran, but he was still vomiting afterwards, so EMS gave another 2. Initial blood pressure was 84 systolic. Last blood pressure was 75 systolic. Patient was given 400 mL of fluids without any significant changes in blood pressure. Blood glucose was 175. SATs 99%. Otherwise, patient did not complain of any chest pain and shortness of breath. Of note, patient is schedule for a surgery tomorrow at the [facility] to clean an infection wound from a prior chest tube.    Patient reports that after he came home today after his PICC line placement, he developed a headache and started vomiting. Patient then had a syncopal episode. Otherwise, he denies any abdominal pain, diarrhea, chest pain, and shortness of breath.    Per chart review, patient was seen at [facility] on 3/30-4/12/22 for lung empyema. Patient required chest tube drainage and anemia of chronic disease (found to have a hemoglobin of 6.2 at clinic prior to admission). 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 1st and 3rd with patent blood flow. 4/4: Underwent multiple stent placements of pseudoaneurysms. He is on high-dose EPO and takes iron supplements outpatient. Received total 5 units PRBC since presentation. Patient has had pleural effusions in the past that required chest tubes and multiple thoracenteses. It has been recommended the patient undergo surgery for this, on 1/6/22 the patient followed up with Dr. Zurita and it was explained that the patient is not a surgical candidate due  to his multiple comorbidities and the chronicity of the trapped lung. IR consulted for chest tube placed 4/1/2022. Pulmonology consulted for chest tube management which was subsequently removed 4/11. Pleural fluid + S aureus. ID consulted. Vanc started 4/8. Did spike a temp of 102 on 4/11 and was kept overnight for monitoring prior to discharge. BCx NGTD and WBC WNL. D/W Dr Yan from ID.  Transition to oral keflex BID for control of staph empyema.  No further IV abx at this time. Cardiovascular surgery has been consulted regarding the new pulmonary artery dissections and Dr. Maharaj recommended conservative treatment at this time. Patient was seen at [facility] in [place] on 5/3/22 for chest wall mass. Patient sent to [place]'s ED for CT of chest wall mass. Recommended culture of possible fluid drainage if done. Chest CT showed the lump right lateral chest wall is at the site of previous chest tube and may represent a hematoma or phlegmonous change/infection but no rachid abscess. Large right hydropneumothorax remains minimally larger when compared to 04/07/2022.Stable type B aortic dissection. Patient was seen at [facility] at [place] on 7/13/22 for empyema lung. There is concerning drainage from chest wall wound; per CT this could reflect a sinus tract vs abscess? He has an appt w/ Dr Zurita pending. Plan for wound culture. Patient was seen at [facility] in [place] by Pulmonology on 7/14/22 for trapped lung. Based on recent CT, it is possible that the chest wall abscess is  from the intrapleural fluid. In that case, will do an incision and drainage and manage the superficial wound. If they are indeed not connected, then patient's chest wall infection should eventually heal and the wound would close by secondary intention. Patient was seen at [facility] in [place] on 8/10/22. CXR with significant empyema which is chronic but X-ray looks way  worse than previous. Patient declined additional intervention as he was not more short of breath at this time. He declined lab work as well. Patient has a procedure scheduled at the end of August for chest wall mass.    REVIEW OF SYSTEMS   Review of Systems   Respiratory: Negative for shortness of breath.    Cardiovascular: Negative for chest pain.   Gastrointestinal: Positive for nausea and vomiting. Negative for abdominal pain and diarrhea.   Neurological: Positive for dizziness, syncope, weakness and headaches.   All other systems reviewed and are negative.       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 pylori infection 7/5/2017    UGI bleed implied by Hgb 9.0 6/22/17 and melena. Admitted and hgb decreased to 7.6, CT abdomen showed all bladder wall thickening. + Hep B surface antigen noted. Melena resolved and hgb stabalized without transfusion, epigastric pain resolved with PPI. Recommend referral for gastroscopy.     Heart attack (H)      Hepatitis B      Normocytic anemia      Other pancytopenia (H) 7/5/2017 6/24/17 Peripheral smear at [facility].Pancytopenia of uncertain cause. Ruled out acute leukemia. Hematologist suggests hemolytic anemia should be considered possible cause and LDH and haptoglobin should be assessed in future.      PONV (postoperative nausea and vomiting)      Thrombocytopenia (H)        PAST SURGICAL HISTORY:  Past Surgical History:   Procedure Laterality Date     ABCESS DRAINAGE      finger     BURSECTOMY ELBOW Left 10/15/2021    Procedure: LEFT OLECRANON BURSECTOMY;  Surgeon: Tico Myers MD;  Location:   [facility] OR     BURSECTOMY ELBOW Left 1/18/2022    Procedure: LEFT ELBOW OLECRANON BURSECTOMY;  Surgeon: Tico Myers MD;  Location: [facility] Main OR     CREATE FISTULA ARTERIOVENOUS UPPER EXTREMITY Left 4/20/2021    Procedure: left arm dialysis graft placement;  Surgeon: Roxana Cosby MD;  Location: [facility] Main OR;  Service: General     FOREIGN BODY REMOVAL      finger     INCISION AND DRAINAGE FINGER, COMBINED Left 10/20/2016    Procedure: COMBINED INCISION AND DRAINAGE FINGER;  Surgeon: Mireya Pizano MD;  Location: [facility] OR     INCISION AND DRAINAGE UPPER EXTREMITY, COMBINED Left 1/18/2022    Procedure: AND ELBOW INCISION AND DRAINAGE;  Surgeon: Tico Myers MD;  Location: [facility] Main OR     IR CHEST TUBE PLACEMENT NON-TUNNELED RIGHT  4/19/2021     IR CHEST TUBE PLACEMENT NON-TUNNELED RIGHT  10/19/2021     IR CHEST TUBE PLACEMENT NON-TUNNELED RIGHT  11/10/2021     IR CHEST TUBE PLACEMENT NON-TUNNELED RIGHT  3/31/2022     IR DIALYSIS FISTULOGRAM LEFT  4/2/2022     IR DIALYSIS FISTULOGRAM LEFT  4/4/2022     IR PICC PLACEMENT > 5 YRS OF AGE  8/25/2022     IR PLEURAL DRAINAGE WITH CATHETER INSERTION  4/19/2021     MIDLINE INSERTION - DOUBLE LUMEN  4/23/2021          SD ESOPHAGOGASTRODUODENOSCOPY TRANSORAL DIAGNOSTIC N/A 8/18/2020    Procedure: ESOPHAGOGASTRODUODENOSCOPY (EGD) with biopsy;  Surgeon: Nilson Gonzales MD;  Location: [facility] GI;  Service: Gastroenterology     US PARACENTESIS  8/14/2020     US PARACENTESIS  8/19/2020     US THORACENTESIS  4/18/2021           CURRENT MEDICATIONS:    Current Facility-Administered Medications   Medication     norepinephrine (LEVOPHED) 4 mg in  mL infusion PREMIX     vasopressin (VASOSTRICT) 20 Units in sodium chloride 0.9 % 100 mL standard conc infusion     Current Outpatient Medications   Medication     acetaminophen (TYLENOL) 500 MG tablet     benzonatate (TESSALON) 100 MG capsule     calcium acetate (PHOSLO) 667 MG CAPS capsule  "    carvedilol (COREG) 25 MG tablet     cephALEXin (KEFLEX) 500 MG capsule     cloNIDine (CATAPRES) 0.1 MG tablet     [employer] SALINE NASAL SPRAY 0.65 % nasal spray     diltiazem ER (DILT-XR) 180 MG 24 hr capsule     entecavir (BARACLUDE) 0.5 MG tablet     guaiFENesin-codeine (GUAIFENESIN AC) 100-10 MG/5ML syrup     hydrOXYzine (ATARAX) 25 MG tablet     order for DME     oxyCODONE (ROXICODONE) 5 MG tablet     pantoprazole (PROTONIX) 40 MG EC tablet     senna-docusate (SENOKOT-S/PERICOLACE) 8.6-50 MG tablet     zolpidem (AMBIEN) 5 MG tablet         ALLERGIES:  Allergies   Allergen Reactions     Nka [No Known Allergies]        FAMILY HISTORY:  Family History   Problem Relation Age of Onset     Diabetes Father      Hypertension No family hx of      Asthma No family hx of      Cancer No family hx of      Coronary Artery Disease No family hx of      Liver Cancer No family hx of      Ulcers Father        SOCIAL HISTORY:   Social History     Socioeconomic History     Marital status:    Tobacco Use     Smoking status: Never Smoker     Smokeless tobacco: Never Used   Vaping Use     Vaping Use: Never used   Substance and Sexual Activity     Alcohol use: No     Drug use: No     Sexual activity: Yes     Partners: Female       VITALS:  /73   Pulse 61   Temp (!) 95.8  F (35.4  C)   Resp 18   Ht 1.651 m (5' 5\")   Wt 54.4 kg (120 lb)   SpO2 97%   BMI 19.97 kg/m      PHYSICAL EXAM    Physical Exam  Constitutional:       General: He is in acute distress.      Appearance: He is ill-appearing and toxic-appearing. He is not diaphoretic.   HENT:      Head: Atraumatic.   Eyes:      General: No scleral icterus.     Pupils: Pupils are equal, round, and reactive to light.   Cardiovascular:      Rate and Rhythm: Normal rate and regular rhythm.      Heart sounds: Normal heart sounds.   Pulmonary:      Effort: No respiratory distress.      Comments: The right side of his chest has a wound that is draining purulent fluid and when " he coughs expresses air.  He has diminished breath sounds on the right.  Abdominal:      Palpations: Abdomen is soft.      Tenderness: There is abdominal tenderness. There is guarding.   Musculoskeletal:         General: No tenderness.   Skin:     General: Skin is warm.      Findings: No rash.           LAB:  All pertinent labs reviewed and interpreted.  Labs Ordered and Resulted from Time of ED Arrival to Time of ED Departure   BASIC METABOLIC PANEL - Abnormal       Result Value    Sodium 138      Potassium 4.6      Chloride 99      Carbon Dioxide (CO2) 25      Anion Gap 14      Urea Nitrogen 63 (*)     Creatinine 8.09 (*)     Calcium 7.1 (*)     Glucose 137 (*)     GFR Estimate 7 (*)    LACTIC ACID WHOLE BLOOD - Abnormal    Lactic Acid 7.0 (*)    MAGNESIUM - Abnormal    Magnesium 1.6 (*)    CBC WITH PLATELETS AND DIFFERENTIAL - Abnormal    WBC Count 12.1 (*)     RBC Count 2.01 (*)     Hemoglobin 6.0 (*)     Hematocrit 19.9 (*)     MCV 99      MCH 29.9      MCHC 30.2 (*)     RDW 18.5 (*)     Platelet Count 125 (*)     % Neutrophils 69      % Lymphocytes 17      % Monocytes 11      % Eosinophils 1      % Basophils 1      % Immature Granulocytes 1      NRBCs per 100 WBC 0      Absolute Neutrophils 8.5 (*)     Absolute Lymphocytes 2.1      Absolute Monocytes 1.3      Absolute Eosinophils 0.1      Absolute Basophils 0.1      Absolute Immature Granulocytes 0.1      Absolute NRBCs 0.0     HEPATIC FUNCTION PANEL - Abnormal    Bilirubin Total 0.7      Bilirubin Direct 0.3      Protein Total 6.2      Albumin 1.7 (*)     Alkaline Phosphatase 114      AST 19      ALT <9     TROPONIN I - Normal    Troponin I 0.01     INR   PARTIAL THROMBOPLASTIN TIME   TYPE AND SCREEN, ADULT    ABO/RH(D) A POS      Antibody Screen Negative      SPECIMEN EXPIRATION DATE [id number]     PREPARE RED BLOOD CELLS (UNIT)    Blood Component Type Red Blood Cells      Product Code B4286T79      Unit Status Issued      Unit Number [id number]       CROSSMATCH Compatible      CODING SYSTEM [id number]      ISSUE DATE AND TIME [id number]      UNIT ABO/RH A+      UNIT TYPE ISBT 6200     PREPARE RED BLOOD CELLS (UNIT)    Blood Component Type Red Blood Cells      Product Code O8542J34      Unit Status Issued      Unit Number [id number]      CROSSMATCH Compatible      CODING SYSTEM [id number]      ISSUE DATE AND TIME [id number]      UNIT ABO/RH A+      UNIT TYPE ISBT 6200     PREPARE RED BLOOD CELLS (UNIT)   BLOOD CULTURE   BLOOD CULTURE   TRANSFUSE RED BLOOD CELLS (UNIT)   TRANSFUSE RED BLOOD CELLS (UNIT)   ABO/RH TYPE AND SCREEN       RADIOLOGY:  Reviewed all pertinent imaging. Please see official radiology report.  CTA Chest Abdomen Pelvis w Contrast   Final Result   IMPRESSION:   1.  Stable type B aortic dissection as detailed above and stable aneurysmal dilatation of the aorta. No acute aortic findings.      2.  Cirrhotic configuration of the liver with malignant-appearing lesion in the right hepatic lobe superiorly measuring at least 7.5 cm in diameter, likely representing HCC.      3.  There is evidence for active arterial bleeding secondary to the malignant mass in the liver with a large amount of acute blood products in the right upper quadrant around the liver and a large amount of ascites in the abdomen and pelvis which is    higher in density and compatible with hemoperitoneum.      4.  Again seen is a similar-appearing large thick-walled loculated right hydropneumothorax. This is associated with some significant consolidation and volume loss in the right lung and, overall, is unchanged from the earlier study. No mediastinal shift.    See additional details above.      Findings discussed with Dr. Davila 8/26/2022 3:44 AM.         CT Head w/o Contrast   Final Result   IMPRESSION:   1.  No acute intracranial process.      Chest CT w/o contrast   Final Result   Abnormal   IMPRESSION:    1.  Large thick-walled right hydropneumothorax, with near complete  atelectasis of the right lung. No mediastinal shift to suggest tension pneumothorax.      2.  Soft tissue thickening with small amount of gas in the right lateral chest wall, possibly communicating with the right pleural space. Correlate for pleurocutaneous fistula.      3.  Increased size of right hepatic dome 7.1 cm mass, with hyperdense perihepatic fluid. Findings suspicious for ruptured hepatocellular carcinoma with pericapsular hematoma. Moderate volume abdominal free fluid, with a component of hemoperitoneum.         [Critical Result: Findings suspicious for ruptured hepatocellular carcinoma with pericapsular hematoma and hemoperitoneum]      Finding was identified on 8/26/2022 1:25 AM.       Dr. Davila was contacted by me on 8/26/2022 at 1:51 AM and verbalized understanding of the critical result. Right hydropneumothorax and possible pleurocutaneous fistula also discussed at this time.         IR Visceral Angiogram    (Results Pending)       EKG:    Performed at: 2356  Impression: Sinus rhythm first-degree AV block.  Incomplete right bundle branch block.  When compared to previous dated March 16, 2022 now is a first-degree AV block.  Sinus rhythm ventricular to 67.  P interval 234.  QRS 96.  QTc 505.    I have independently reviewed and interpreted the EKG(s) documented above.          I, Shagufta Cannon, am serving as a scribe to document services personally performed by Dr. Forest Davila, based on my observation and the provider's statements to me. I, Forest Davila MD attest that Shagufta Cannon is acting in a scribe capacity, has observed my performance of the services and has documented them in accordance with my direction.    Forest Davila M.D.  Emergency Medicine  [facility] EMERGENCY DEPARTMENT  [address] [place] [id number] [phone]  Dept: [phone]     Forest Davila MD  08/26/22 0511

## 2022-08-26 NOTE — PROGRESS NOTES
HEMODIALYSIS TREATMENT NOTE    Date: 8/26/2022  Time: 5.30 PM    Data:  Pre Wt: 56.7Kg    Desired Wt: 56.7  kg   Post Wt:  56.7kg (Estimated)  Weight change:  0 kg  Ultrafiltration - Post Run Net Total Removed (mL):  No UF  Vascular Access Status: patent  Dialyzer Rinse: Streaked . Light   Total Blood Volume Processed: 39.1 L   Total Dialysis (Treatment) Time: 2.0 hours      Lab:   No    Assessment / Interventions: Pt had 2.0 hours HD via LAVG thrill & bruit present. 2 16g needle cannulated successful.  with good flow.  Albumin 5% 25g 500 mls was given by primary Nurse prior dialysis. Hemodynamic stable throughout the treatment, 0 UF per prescription, Crit -line steep down -16 % with A profile.  Pt completed his treatment time, blood rinsed back, Graft  Hemostasis achieved in less than 10 minutes, dressing applied & handoff report given.          Plan:    Per Renal Team.

## 2022-08-26 NOTE — PHARMACY-ADMISSION MEDICATION HISTORY
Pharmacy Note - Admission Medication History    Pertinent Provider Information: patient unable to help with med list at this time(uncomfortable), his wife helped with med list- she was not sure when he took meds list- Wednesday for sure- yesterday(8/25/22) not sure. Some meds not real clear on exact dosing(like the coreg) List based on clinic list, rx fills and wife's help.     ______________________________________________________________________    Prior To Admission (PTA) med list completed and updated in EMR.       PTA Med List   Medication Sig Note Last Dose     acetaminophen (TYLENOL) 500 MG tablet Take 500 mg by mouth every 6 hours as needed for mild pain  Past Week at Unknown time     benzonatate (TESSALON) 100 MG capsule Take 1 capsule (100 mg) by mouth 3 times daily as needed for cough  Unknown at Unknown time     calcium acetate (PHOSLO) 667 MG CAPS capsule Take 1 capsule by mouth 2 times daily (with meals) 8/26/2022: Only rx fill is from 10/2021- but on current med list and wife said he is taking. 8/24/2022     carvedilol (COREG) 25 MG tablet Take 1-2 tablets (25-50 mg) by mouth 2 times daily (with meals) 8/26/2022: 8/26/22 not able to confirm exact dosing at this time(25-50mg  bid)was on current list but also has an rx fill for 7/8/22 for 6.25mg bid. 8/24/2022     cephALEXin (KEFLEX) 500 MG capsule Take 1 capsule (500 mg) by mouth 2 times daily (Patient taking differently: Take 500 mg by mouth every morning)  8/24/2022 at am     cloNIDine (CATAPRES) 0.1 MG tablet Take 0.1 mg by mouth At Bedtime   8/24/2022 at pm     diltiazem ER (DILT-XR) 180 MG 24 hr capsule Take 1 capsule (180 mg) by mouth 2 times daily (Patient taking differently: Take 180 mg by mouth 2 times daily as needed (Checks BP prior to taking, if too low he does not take this medication))  Unknown at Unknown time     entecavir (BARACLUDE) 0.5 MG tablet Take 1 tablet (0.5 mg) by mouth every 7 days (Patient taking differently: Take 0.5 mg by  mouth every 7 days Sunday)  8/21/2022     guaiFENesin-codeine (GUAIFENESIN AC) 100-10 MG/5ML syrup Take 5 mLs by mouth every 4 hours as needed for cough  Past Week     hydrOXYzine (ATARAX) 25 MG tablet Take 1 tablet (25 mg) by mouth 3 times daily as needed for itching  Unknown at Unknown time     midodrine (PROAMATINE) 10 MG tablet Take 10 mg by mouth three times a week Prior to dialysis runs  8/24/2022     oxyCODONE (ROXICODONE) 5 MG tablet Take 1 tablet (5 mg) by mouth daily as needed for severe pain 8/26/2022: Takes most days 8/24/2022     pantoprazole (PROTONIX) 40 MG EC tablet Take 1 tablet (40 mg) by mouth daily (Patient taking differently: Take 40 mg by mouth every morning)  8/24/2022 at am     senna-docusate (SENOKOT-S/PERICOLACE) 8.6-50 MG tablet Take 1 tablet by mouth daily as needed for constipation  Unknown at Unknown time     zolpidem (AMBIEN) 5 MG tablet Take 1 tablet (5 mg) by mouth nightly as needed for sleep  8/24/2022 at pm       Information source(s): Patient, Family member, Clinic records and [facility]  Method of interview communication: in-person    Summary of Changes to PTA Med List  New: none  Discontinued: saline nasal spray  Changed: none    Patient was asked about OTC/herbal products specifically.  PTA med list reflects this.    In the past week, patient estimated taking medication this percent of the time:  50-90% due to illness.    Allergies were reviewed, assessed, and updated with the patient.      Patient does not use any multi-dose medications prior to admission.    The information provided in this note is only as accurate as the sources available at the time of the update(s).    Thank you for the opportunity to participate in the care of this patient.    Redd Funez RPH  8/26/2022 10:38 AM

## 2022-08-26 NOTE — ED NOTES
Unable to draw second set of cultures off patient line. Provider notified and orders given to start abx at this time.

## 2022-08-26 NOTE — ED TRIAGE NOTES
Patient arrives from home via [facility].     Dialysis patient that had his normal dialysis run 8/24 with no complications.   Patient was at the [facility] today for PICC placement for upcoming I&D of wound on right side of chest that is scheduled for 8/26.    Tonight patient complains of being dizzy and had a syncopal episode at home. Family unsure how long syncopal episode lasted.     Medics state patient given 400mL's of fluid enroute with systolic BP's remaining in the low 70's,   6mg Zofran given for n/v with little effect.     Denies CP.

## 2022-08-26 NOTE — H&P
CRITICAL CARE CONSULT:    Assessment/Plan:  62M w/ hx of HBV cirrhosis, liver mass with probable HCC, chronic right sided empyema with trapped right lung and probable sinus tract infection with R chest wall mass/abscess, cachexia, ESRD on HD, anemia, chronic type B aortic dissection, GERD, HTN, gout, admitted with syncope, abdominal pain and hemoperitoneum. Admitted to ICU for circulatory shock, likely hemorrhagic shock due to bleeding liver mass with hemoperitoneum.    RESP:  No issues, on room air. Chronic trapped R lung with empyema, not a surgical candidate. Seen by Dr. Zurita at the , was supposed to have I and D of R chest wall fluctuant mass, ?abscess vs. Sinus tract vs. Pleurocutaneous fistula. Previously had chest tube drainage of R pleural space process, followed by pulmonary and Dr. Reynaga.     Appreciate Dr. Reynaga's prompt evaluation.     Keep SpO2 94-96%, currently room air    No surgical intervention or chest tube for R pleural process    Encourage OOB, PT/OT, push IS when able    Will culture the R chest wall drainage to see if this is infected. Spoke with radiology/IR and too small and contained to do FNA, so will just send a swab for now.     Updated Dr. Zurita about current admission; he agrees with current management, no surgical intervention for any chest/thoracic/pleural process at this time.     CV/vascular:  Shock, likely hemorrhagic from acute bleeding into liver mass and hemoperitoneum. Possible septic shock from infectious process. Echo from 2021: EF 72%, normal RV size/function, LV normal, no priors. Troponin negative. Of note has chronic Hudson type B aortic dissection as well as main PA dissection. Lactate elevated at 7 on 8/25 (presentation) c/w tissue hypoperfusion from hemorrhagic shock, however also with liver dysfunction and impaired lactate clearance. Encouraging that lactate is coming down with fluids, blood.     MAP >65, wean NE as able; cont to vaso until NE dose  0.1mcg/kg/min    Trend lactate q6h; may not clear fully due to liver disease; however encouraging that it continues to trend down.    Hold PTA coreg, diltiazem, clonidine for now    Tele monitoring    NEURO:  Sleepy after sedation from IR procedure, otherwise appropriate without signs of encephalopathy. At risk for PSE due to liver disease, acute issues.     Tylenol prn pain, limit to 2g per day or less    Cautious use of narcotics    Avoid benzo's    Check ammonia level     GI:  HBV with cirrhosis. Liver mass likely HCC, no formal tissue dx yet. Now with hemorrhagic ascites likely 2/2 hemoperitoneum. S/p IR angiogram with successful embolizatin of bleeding presumptive dome HCC fed by segment 7 and 8 branches. 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 S/p bedside paracentesis with hemorrhagic ascites present likely from bleeding HCC; could also have SBP. Of note had small volume ascites on CT ap from April 2022 so current ascites likely acute in the setting of hemoperitoneum.    GI consultation, appreciate input    Spoke with Dr. Stover, will resume home entecavir    Check ammonia as above    IV PPI (home med)    Clear liquid diet for now    Bowel regimen prn    Follow up ascites fluid testing; has apparently never had a paracentesis or sig ascites before per his wife.     RENAL:  ESRD on HD M-W-F. Was supposed to have HD after procedure with Dr. Zurita today. Hyperkalemic otherwise normal acid-base.     Appreciate nephrology input, Dr. Clement following    Plan for HD with pressor support.    Access: LUE AVF.     Anuric, no indication for masterson    Follow BUN/SCr, lytes    ID:  Concern for septic shock, relative immunosuppression with chronic R pleurocutaneous fistula and R empyema, not a surgical candidate. Possible SBP; also at risk for nocardiosis and actinomycosis given pleurocutaneous fistula.     Empiric vanc + cefepime    Culture R chest wall wound as above; unable to send nocardia and actino per  lab.    HEMATOLOGIC:  Blood loss anemia, hemoperitoneum.     hgb >7    hgb checks q6h    No heparin products    ENDOCRINE:  No hx of DM; last A1C 5.0 on 8/13/20.    FSBG checks, insulin sliding scale/drip per ICU protocol.     ICU PROPHYLAXIS:    SCDs, no heparin products    Home PPI (IV form)    Lines/Drains/Tubes:  LUE AVF (chronic)  PICC 8/25, RUE  PIV  R axillary art line 8/26    CODE STATUS, DISPOSITION, FAMILY COMMUNICATION: full code  Palliative care following    Gopal (Joshua Asif MD  [facility]/"[employer]"  Pulmonary & Critical Care  Pager [phone]  Clinic [phone]  Fax [phone]     Restraints  Not needed        CCx: hemorrhagic shock, hemoperitoneum    HPI: 62M w/ hx of HBV cirrhosis, liver mass with probable HCC, chronic right sided empyema with trapped right lung and probable sinus tract infection with R chest wall mass/abscess, cachexia, ESRD on HD, anemia, chronic type B aortic dissection, GERD, HTN, gout, admitted with syncope, abdominal pain and hemoperitoneum.  Was found to have hgb of 6 in the ER, went to IR for embolization of the liver mass with cessation of bleeding.  Came to ICU on 2 vasopressors (NE @0.4, vaso @2.4).  Room air, not in respiratory distress.  Abdomen was very distended and tender.   Other than that he appeared non-toxic and had no real complaints.     Clinically Significant Risk Factors Present on Admission        # Hyperkalemia: K = 5.7 mmol/L (Ref range: 3.5 - 5.0 mmol/L) on admission, will monitor as appropriate   # Hypocalcemia: corrected calcium <8.5 on admission, will replace as needed    # Hypoalbuminemia: Albumin = 1.7 g/dL (Ref range: 3.5 - 5.0 g/dL) on admission, will monitor as appropriate   # Coagulation Defect: INR = 1.93 (Ref range: 0.85 - 1.15) and/or PTT = 38 Seconds (Ref range: 22 - 38 Seconds) on admission, will monitor for bleeding  # Thrombocytopenia: Plts = 101 10e3/uL (Ref range: 150 - 450 10e3/uL) on admission, will monitor for  bleeding   # Circulatory Shock: currently requiring pressors for blood pressure support  # Anemia: based on hgb <11              Past Medical History:  Past Medical History:   Diagnosis Date     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 pylori infection 7/5/2017    UGI bleed implied by Hgb 9.0 6/22/17 and melena. Admitted and hgb decreased to 7.6, CT abdomen showed all bladder wall thickening. + Hep B surface antigen noted. Melena resolved and hgb stabalized without transfusion, epigastric pain resolved with PPI. Recommend referral for gastroscopy.     Heart attack (H)      Hepatitis B      Normocytic anemia      Other pancytopenia (H) 7/5/2017 6/24/17 Peripheral smear at [facility].Pancytopenia of uncertain cause. Ruled out acute leukemia. Hematologist suggests hemolytic anemia should be considered possible cause and LDH and haptoglobin should be assessed in future.      PONV (postoperative nausea and vomiting)      Thrombocytopenia (H)        Past Surgical History:  Past Surgical History:   Procedure Laterality Date     ABCESS DRAINAGE      finger     BURSECTOMY ELBOW Left 10/15/2021    Procedure: LEFT OLECRANON BURSECTOMY;  Surgeon: Tico Myers MD;  Location: [facility] OR     BURSECTOMY ELBOW Left 1/18/2022    Procedure: LEFT ELBOW OLECRANON BURSECTOMY;  Surgeon: Tico Myers MD;  Location: [facility] OR     CREATE FISTULA ARTERIOVENOUS UPPER EXTREMITY Left 4/20/2021    Procedure: left arm dialysis graft placement;  Surgeon: Roxana Cosby MD;  Location: [facility] OR;  Service: General     FOREIGN BODY REMOVAL      finger     INCISION AND DRAINAGE FINGER, COMBINED Left  10/20/2016    Procedure: COMBINED INCISION AND DRAINAGE FINGER;  Surgeon: Mireya Pizano MD;  Location: [facility] OR     INCISION AND DRAINAGE UPPER EXTREMITY, COMBINED Left 1/18/2022    Procedure: AND ELBOW INCISION AND DRAINAGE;  Surgeon: Tico Myers MD;  Location: [facility] OR     INSERT PICC LINE Right 8/25/2022    Procedure: INSERTION, PICC;  Surgeon: Osmin Villa MD;  Location: [facility] OR     IR CHEST TUBE PLACEMENT NON-TUNNELED RIGHT  4/19/2021     IR CHEST TUBE PLACEMENT NON-TUNNELED RIGHT  10/19/2021     IR CHEST TUBE PLACEMENT NON-TUNNELED RIGHT  11/10/2021     IR CHEST TUBE PLACEMENT NON-TUNNELED RIGHT  3/31/2022     IR DIALYSIS FISTULOGRAM LEFT  4/2/2022     IR DIALYSIS FISTULOGRAM LEFT  4/4/2022     IR PICC PLACEMENT > 5 YRS OF AGE  8/25/2022     IR PLEURAL DRAINAGE WITH CATHETER INSERTION  4/19/2021     IR VISCERAL ANGIOGRAM  8/26/2022     MIDLINE INSERTION - DOUBLE LUMEN  4/23/2021          NV ESOPHAGOGASTRODUODENOSCOPY TRANSORAL DIAGNOSTIC N/A 8/18/2020    Procedure: ESOPHAGOGASTRODUODENOSCOPY (EGD) with biopsy;  Surgeon: Nilson Gonzales MD;  Location: [facility];  Service: Gastroenterology      PARACENTESIS  8/14/2020      PARACENTESIS  8/19/2020     US THORACENTESIS  4/18/2021       Social History:  Social History     Socioeconomic History     Marital status:      Spouse name: Not on file     Number of children: Not on file     Years of education: Not on file     Highest education level: Not on file   Occupational History     Not on file   Tobacco Use     Smoking status: Never Smoker     Smokeless tobacco: Never Used   Vaping Use     Vaping Use: Never used   Substance and Sexual Activity     Alcohol use: No     Drug use: No     Sexual activity: Yes     Partners: Female   Other Topics Concern     Parent/sibling w/ CABG, MI or angioplasty before 65F 55M? Not Asked   Social History Narrative     Not on file     Social Determinants of Health     Financial Resource Strain:  "Not on file   Food Insecurity: Not on file   Transportation Needs: Not on file   Physical Activity: Not on file   Stress: Not on file   Social Connections: Not on file   Intimate Partner Violence: Not on file   Housing Stability: Not on file       Family History:  Family History   Problem Relation Age of Onset     Diabetes Father      Hypertension No family hx of      Asthma No family hx of      Cancer No family hx of      Coronary Artery Disease No family hx of      Liver Cancer No family hx of      Ulcers Father        Allergies:  Allergies   Allergen Reactions     Nka [No Known Allergies]        MAR Reviewed      Physical Exam:  Vent settings for last 24 hours:     /57   Pulse 72   Temp 97.5  F (36.4  C) (Oral)   Resp 17   Ht 1.651 m (5' 5\")   Wt 56.7 kg (125 lb)   SpO2 96%   BMI 20.80 kg/m      Intake/output data:    Intake/Output Summary (Last 24 hours) at 8/26/2022 1253  Last data filed at 8/26/2022 1251  Gross per 24 hour   Intake 1811 ml   Output 75 ml   Net 1736 ml       Physical Exam  Gen: awake, alert, oriented, no distress  Very malnourished, cachectic.   HEENT: NT, no HOMERO  CV: RRR, no m/g/r  Resp: CTAB  Abd: distended, TTP. Hypoactive bS.   Skin: no rashes or lesions  Ext: no edema  Neuro: PERRL, nonfocal exam    LAB:  @24HOURRESULTS@    IMAGING:  [unfilled]    Critical care attestation: 55 minutes spent managing the following issues: circulatory shock requiring continuous vasopressor infusions, hemorrhagic shock from bleeding HCC with hemoperitoneum, chronic R empyema with R trapped lung, chronic R pleurocutaneous fistula with R chest wall abscess/collection, possible septic shock, ESRD on HD.  High risk for organ deterioration and death requiring ICU level care.  Above time is separate from billable procedures.           "

## 2022-08-27 NOTE — PROGRESS NOTES
"Care Management Follow Up    Length of Stay (days): 1    Expected Discharge Date: 08/31/2022     Concerns to be Addressed:   Medical workup. Palliative consulting    Patient plan of care discussed at interdisciplinary rounds: Yes    Anticipated Discharge Disposition:  TBD     Anticipated Discharge Services:  TBD   Anticipated Discharge DME:  TBD    Education Provided on the Discharge Plan: Per Care Team   Patient/Family in Agreement with the Plan:  Yes    Referrals Placed by CM/SW: TBD    Private pay costs discussed: Not applicable    Additional Information:  Chart reviewed.    Per CM Note (8/26)  \"Patient previous to the hospital was ambulating with a cane, requiring assistance from her for ADLS/ IADLS. Patient lives in house with Spouse and adult Son. Patient [facility] in [place] on M/W/F per wife. Received last on Wednesday per patients wife.\"    Per [facility] [place] (8/27)  \"He knows he is likely near the end of his life, but still feels strong and wants to continue treatment for now. Palliative was consulted for a family meeting 8/26.   Is in discussion with family about transition to comfort care and/or hospice. Elle wants to wait and see if he continues to bleed into his belly before deciding on next steps.  For now, he is NOT on comfort cares.\"    CM will continue to follow plan of care, review recommendations, and assist with any discharge needs anticipated.          Laura Williamson RN        "

## 2022-08-27 NOTE — PROGRESS NOTES
Renal progress note  CC: ESRD, hyperkalemia  Assessment and Plan:  62 year old male with history of ESRD on hemodialysis Monday Friday ideally will need 3 times treatment but refuses more than twice a week treatments, at [facility], history of hypertension , hepatitis B, chronic recurrent right-sided effusion with trapped lung not a candidate for surgical decortication multiple chest tubes in the past, chronic type B aortic dissection, hepatic mass on CT ??  HCC, presented with an episode of syncope and hypotension.  Complaining of abdominal and chest wall pain, admission CT concerning for hemoperitoneum, possible active extravasation from hepatic mass now s/p embolization with IR of the feeding hepatic artery, currently hypotensive hypothermic in the ICU on 2 pressors with elevated lactate and leukocytosis on antibiotics for concerns septic shock, nephrology consulted for ESRD management     ESRD on IHD Monday/Friday  Ideally needs to be treated 3 times a week  Follows at [facility] Dr. Hill  Treatment time is 3 hours barely completes his treatment mostly treatments are around 130 to 140 minutes  Minimal UF on treatment around 1-1.5L  Current target weight is 53.5, although noted to be around 52kg some treatments as well  --> Will continue HD MWF   For now will like to continue current levels of care, although will like to change to DNR. He does report his body is sick but fighting!!     Access  Left upper extremity AVG  Has had chronic issues with bleeding although with currently appears nonedematous and thrill positive  Dr. Cosby 4/2022     History of hypertension  Currently hypotensive on 2 pressors  --> No evidence of hypervolemia which is usual baseline for him  We will monitor closely     Electrolytes stable     MBD  Has been on Tums with meals  Phosphorus has been around 4 at goal mild low calcium is chronically around 8  PTH last checked on 8/3/2022 was 86  Alk phos around 1   chronically  Evaluate once diet resumed     Anemia acute on chronic  With bleed  Hold off any iron with transfusions and possible infection  -- We will keep on EPO while inpatient, OP dose 12K x2/wk     Chronic right-sided effusion/empyema with trapped lung  Follows pulmonology outpatient with Dr. Parminder Christian noted from outpatient visits  Not a candidate for decortication possibly will need a chest tube currently does not appear dyspneic  Oxygenation stable     Chronic hepatitis B  Has been off all antiviral treatment  Possible hepatocellular carcinoma with hepatic mass on CT with active bleed  Status post IR embolization of hepatic artery that was bleeding  --> In ICU for monitoring and pressors     Hypotension with possible septic shock  On antibiotics and pressors  Management per ICU  -- Possible SBP>      History of chronic type B aortic dissection  Stable has been followed by cardiothoracic surgery    Thank you for the consultation we will follow  Jayme Clement MD  [facility]  [phone]      Subjective  Hemodynamics better  No new events  Off pressers  Will dialyze on Monday next  On ABx  Wife at bedside    Objective    Vital signs in last 24 hours  Temp:  [97.1  F (36.2  C)-98.7  F (37.1  C)] 98.3  F (36.8  C)  Pulse:  [70-95] 92  Resp:  [14-38] 18  BP: (141)/(83) 141/83  MAP:  [61 mmHg-156 mmHg] 106 mmHg  Arterial Line BP: ()/() 144/81  SpO2:  [90 %-100 %] 96 %  Weight:   [unfilled]    Intake/Output last 3 shifts  I/O last 3 completed shifts:  In: 2902.31 [P.O.:630; I.V.:1092.31]  Out: 2475 [Urine:75; Blood:2400]  Intake/Output this shift:  I/O this shift:  In: 120 [P.O.:120]  Out: -     Physical Exam  awake, NAD  CV: RRR without murmur or rub  Lung: clear and equal; no extra sounds  Ab: soft and NT; not distended; normal bs  Ext: no edema and well perfused  Skin; no rash    Pertinent Labs   Lab Results   Component Value Date    WBC 16.7 (H) 08/26/2022    HGB 7.4 (L)  08/27/2022    HCT 23.0 (L) 08/26/2022    MCV 94 08/26/2022     (L) 08/26/2022     Lab Results   Component Value Date    BUN 43 (H) 08/27/2022     (L) 08/27/2022    CO2 24 08/27/2022       Lab Results   Component Value Date    ALBUMIN 1.7 (L) 08/26/2022     Lab Results   Component Value Date    PHOS 3.2 04/03/2022     I reviewed all lab results  Jayme Clement MD

## 2022-08-27 NOTE — PHARMACY-VANCOMYCIN DOSING SERVICE
Pharmacy Vancomycin Note  Date of Service 2022  Patient's  1960   62 year old, male    Indication: Abscess  Day of Therapy: 2  Current vancomycin regimen:  Dialysis dosing- had 1gm iv x1 22 at 0041  Current vancomycin monitoring method: Renal Replacement Therapy  Current vancomycin therapeutic monitoring goal: 15-20 mg/L    Current estimated CrCl = Estimated Creatinine Clearance: 11 mL/min (A) (based on SCr of 5.59 mg/dL (H)).    Creatinine for last 3 days  2022: 11:53 PM Creatinine 8.09 mg/dL  2022:  8:50 AM Creatinine 7.78 mg/dL  2022:  6:03 AM Creatinine 5.59 mg/dL    Recent Vancomycin Levels (past 3 days)  2022:  6:03 AM Vancomycin 11.6 mg/L    Vancomycin IV Administrations (past 72 hours)                   vancomycin (VANCOCIN) 500 mg in sodium chloride 0.9 % 100 mL intermittent infusion (mg) 500 mg New Bag 22 0835    vancomycin (VANCOCIN) 1000 mg in dextrose 5% 200 mL PREMIX (mg) 1,000 mg New Bag 22 0041                Nephrotoxins and other renal medications (From now, onward)    Start     Dose/Rate Route Frequency Ordered Stop    22 0921  vancomycin place rivera - receiving intermittent dosing         1 each Intravenous SEE ADMIN INSTRUCTIONS 22 09               Contrast Orders - past 72 hours (72h ago, onward)    Start     Dose/Rate Route Frequency Stop    22 0830  iodixanol (VISIPAQUE 320) injection 100 mL         100 mL Intravenous ONCE 22 0805    22 0300  iopamidol (ISOVUE-370) solution 75 mL         75 mL Intravenous ONCE 22 0301          Interpretation of levels and current regimen:  Vancomycin level is reflective of subtherapeutic level    Has serum creatinine changed greater than 50% in last 72 hours: No    Renal Function: ESRD on Dialysis          Plan:Had dialysis run yesterday after 1gm vancomycin dose given, followed up with vancomycin level with am labs today (level = 11.6) would like trough level  ~15-20, when redosing.  1. will dose this am with vancomycin 500mg iv x1, will follow up dosing - depending on dialysis runs.  2. Vancomycin monitoring method: Renal Replacement Therapy  3. Vancomycin therapeutic monitoring goal: 15-20 mg/L  4. Pharmacy will check vancomycin levels as appropriate in 3-5 Days.  5. Serum creatinine levels will be ordered daily for the first week of therapy and at least twice weekly for subsequent weeks.    Redd Funez, [facility]

## 2022-08-27 NOTE — PROGRESS NOTES
CRITICAL CARE PROGRESS NOTE:    Assessment/Plan:  62M w/ hx of HBV cirrhosis, liver mass with probable HCC, chronic right sided empyema with trapped right lung and probable sinus tract infection with R chest wall mass/abscess, cachexia, ESRD on HD, anemia, chronic type B aortic dissection, GERD, HTN, gout, admitted with syncope, abdominal pain and hemoperitoneum. Admitted to ICU for circulatory shock, likely hemorrhagic shock due to bleeding liver mass with hemoperitoneum. Improved with pRBC     RESP:  No issues, on room air. Chronic trapped R lung with empyema, not a surgical candidate. Seen by Dr. Zurita at the , was supposed to have I and D of R chest wall fluctuant mass on 8/26, ?abscess vs. Sinus tract vs. Pleurocutaneous fistula. Previously had chest tube drainage of R pleural space process, followed by pulmonary and Dr. Reynaga.     Appreciate Dr. Reynaga's evaluation.     Keep SpO2 94-96%, currently room air    No surgical intervention or chest tube for R pleural process    Encourage OOB, PT/OT, push IS when able    Follow up cultures of the R chest wall fluctuant mass.      Updated Dr. Zurita about current admission on 8/26; he agrees with current management, no surgical intervention for any chest/thoracic/pleural process at this time.      CV/vascular:  Shock, likely hemorrhagic from acute bleeding into liver mass and hemoperitoneum. Possible septic shock from infectious process. Echo from 2021: EF 72%, normal RV size/function, LV normal, no priors. Troponin negative. Of note has chronic Hudson type B aortic dissection as well as main PA dissection. Off pressors, shock resolved today.     MAP >65, off pressors.    Can stop checking lactates, last was wnl.     Hold PTA coreg, diltiazem, clonidine for now. Resume when BP stabilizes.     Tele monitoring     NEURO:  Sleepy after sedation from IR procedure, otherwise appropriate without signs of encephalopathy. At risk for PSE due to liver disease, acute  issues. Ammonia 43. Much more alert, awake today after BM overnight.     Tylenol prn pain, limit to 2g per day or less    Cautious use of narcotics    Avoid benzo's    Cont lactulose for PSE prevention, per GI section below.      GI:  HBV with cirrhosis. Liver mass likely HCC, no formal tissue dx yet. Now with hemorrhagic ascites likely 2/2 hemoperitoneum. S/p IR angiogram with successful embolizatin of bleeding presumptive dome HCC fed by segment 7 and 8 branches. 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 S/p bedside paracentesis with hemorrhagic ascites present likely from bleeding HCC; 2.4L bloody ascites fluid removed; negative for SB when wbc corrected for the rbc count. Of note had small volume ascites on CT ap from April 2022 so current ascites likely acute in the setting of hemoperitoneum. Bedside abd US today with slightly more ascites fluid than yesterday, small pocket available to tap in LLQ. Doubt worsening hemoperitoneum as hgb is now steady and off pressors.     Appreciate GI input.     Spoke with Dr. Stover, continue  home entecavir weekly    Follow up AFP level.     Cont lactulose 20g tid and titrate to 3-4 loose BM's per day    Cont IV PPI (home med)    ADAT    Bowel regimen prn, lactulose    SAAG <1.1; likely due to hemorrhagic ascites; suspect ascites collection is due to bleeding from known liver mass and portal HTN.     May need another paracentesis this admission if he develops worsening abdominal distension; would monitor abdominal exam for now and consult IR for US guided para when indicated.      RENAL:  ESRD on HD M-W-F. Tolerate HD 2.4L removed on 8/26 with small dose of NE. Lytes, acid-base normal after HD yesterday.     Appreciate nephrology input, Dr. Clement following    Next HD session Monday if pt wishes to continue.     Access: LUE AVF.     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 Possible SBP; also at risk for nocardiosis and actinomycosis given pleurocutaneous fistula. Chest wall mass gram stain previously grew MSSA (also had MSSA empyema over the Spring). Current chest wall culture showing GPC, culture pending. Neg for SBP when wbc corrected for rbc in ascites fluid, covering anyway with abx. Not a transplant candidate per GI.     Empiric vanc + cefepime    Follow up culture data     HEMATOLOGIC:  Blood loss anemia, hemoperitoneum. Sp 3U pRBC this admission, hgb steady at 7.4.      hgb >7    Cont hgb checks q6h    No heparin products     ENDOCRINE:  No hx of DM; last A1C 5.0 on 8/13/20.    FSBG checks, insulin sliding scale/drip per ICU protocol.      ICU PROPHYLAXIS:    SCDs, no heparin products    Home PPI (IV form)     Lines/Drains/Tubes:  LUE AVF (chronic)  PICC 8/25, RUE  PIV  R axillary art line 8/26 - REMOVE today     CODE STATUS, DISPOSITION, FAMILY COMMUNICATION: DNR/DNI  Palliative care following, appreciate input  Updated pt and wife  OK to downgrade from ICU  Our service will sign off  Resident team will assume care.   Multiple consultants on board this complex case, appreciate everyone's input/expertise.      Gopal (William) MD Yefri  [facility] Pulmonary & Critical Care  Pager [phone]  Clinic [phone]  Fax [phone]      Restraints  Not needed    Clinically Significant Risk Factors Present on Admission              # Severe Malnutrition: based on nutrition assessment    Code Status: No CPR- Do NOT Intubate          Overnight events:  Much more awake this morning.  Had a large BM overnight with lactulose.  Off pressors  Abdomen a bit more distended than yeterday but he feels overall it's soft and less painful than before the paracentesis.   Tolerated HD yesterday  I gave him 2 more U pRBC before I left yesterday, but hgb only went up to 7.4 and has been holding steady there.  "    Subjective:  Denies pain    Objective:  Physical Exam:  Vent settings for last 24 hours:  Resp: 18      BP (!) 141/83 (BP Location: Right arm)   Pulse 92   Temp 98.3  F (36.8  C) (Oral)   Resp 18   Ht 1.651 m (5' 5\")   Wt 56.7 kg (125 lb)   SpO2 96%   BMI 20.80 kg/m      Intake/Output last 3 shifts:  I/O last 3 completed shifts:  In: 2902.31 [P.O.:630; I.V.:1092.31]  Out: 2475 [Urine:75; Blood:2400]  Intake/Output this shift:  I/O this shift:  In: 120 [P.O.:120]  Out: -     Physical Exam  Gen: awake, alert, oriented, no distress  HEENT: no OP lesions, no HOMERO  CV: RRR, no m/g/r  Resp: CTAB  Abd: distended, much softer compared to admission exam; hypoactive BS. Slightly TTP   Neuro: PERRL, nonfocal. No asterixis.   Ext: no edema    LAB:  Recent Labs   Lab 08/27/22  0603 08/26/22  1155 08/26/22  0850   WBC  --   --  16.7*   HGB 7.4*   < > 7.4*   HCT  --   --  23.0*   PLT  --   --  101*    < > = values in this interval not displayed.     Recent Labs   Lab 08/27/22  0603 08/26/22  0850 08/25/22  2353   * 138 138   CO2 24 21* 25   BUN 43* 67* 63*   ALKPHOS  --  103 114   ALT  --  19 <9   AST  --  46* 19     Micro  Blood NGTD  Chest wall mass culture: 1+ GPC, culture pending  actino pending  KOH prep neg  Fungal culture pending    Ascites 8/26  Gram stain neg, culture pending  Wbc 1.5M  Wbc ~4000, 80% PMNs  Protein 5.5  Albumin 1.7  SAAG <1.1    No current outpatient medications on file.       Total Critical Care Time: not critically ill    "

## 2022-08-27 NOTE — PROGRESS NOTES
[facility]    Progress Note - Hospitalist Service       Date of Admission:  8/25/2022    Assessment & Plan          Elle Blanton is a 62 year old male admitted on 8/25/2022. He has a complicated medical history including multiple prior admissions for right lung empyema requiring chest tube drainage, anemia of chronic disease, end-stage renal disease on dialysis (Monday and Friday) secondary to hepatorenal syndrome due to cirrhosis, hepatocellular carcinoma, chronic Hepatitis B, thoracic aortic dissection.  Admitted for hemoperitoneum likely secondary to ruptured HCC.      Syncope  Hypovolemic shock- resolved  Hemoperitoneum-suspect ruptured HCC with pericapsular hematoma  Chronic hepatitis B  Cirrhosis  Noted to have active arterial bleeding secondary to malignant mass in liver, s/p IR embolization. Suspect HCC, although this is based on imaging and not biopsy. Paracentesis 8/26 with 2.4 L hemorrhagic fluid.  Initially requiring multiple pressors for blood pressure support.Patient is not a transplant candidate based on the size of his mass and likely metastasis. Some increase in peritoneal fluid based on Dr. Asif bedside us 8/27, Hgb stable so unlikley to be continued bleeding. S/p 3 units pRBCs.   -GI consulted, appreciate recs   -Alpha-fetoprotein pending   -Restart Entecavir   -IV Cefepime- SBP prophylaxis and to treat chest wall abscess   -increase lactulose until 3-4 loose BMs per day   -Restart coreg 12.5 mg BID. If BP remains stable will titrate up to home dosing of 50 mg and eventually restart diltiazem and clonidine.   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  This was canceled in the setting  of his acute liver bleed.  Evaluated by Dr. Reynaga who does not think he is a good surgical candidate.  History of MSSA empyema. Current chest wall culture showing gram positive cocci.   -General surgery consulted, appreciate recs  -Vancomycin  - O2 support as needed, 94-96% SpO2  -Follow chest wall aspirate cultures    ESRD on HD (EPI)  Has refused three times a week dialysis in the past. 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  He knows he is likely near the end of his life, but still feels strong and wants to continue treatment for now. Palliative was consulted for a family meeting 8/26.   Is in discussion with family about transition to comfort care and/or hospice. Elle wants to wait and see if he continues to bleed into his belly before deciding on next steps.  For now, he is NOT on comfort cares.    -Palliative consult, appreciate recs         Diet: Advance Diet as Tolerated: Regular Diet Adult; Renal Diet (dialysis)    DVT Prophylaxis: Pneumatic Compression Devices  Beltre Catheter: Not present  Fluids: PO  Central Lines: PRESENT  PICC Single Lumen 08/25/22 Right Basilic-Site Assessment: WDL  Cardiac Monitoring: None  Code Status: No CPR- Do NOT Intubate      Disposition Plan      Expected Discharge Date: 08/31/2022                The patient's care was discussed with the Attending Physician, Dr. Jeter.    Nico Deal MD  Hospitalist Service  [facility]  Securely message with the Vocera Web Console (learn more here)  Text page via [employer] Paging/Directory         Clinically Significant Risk Factors Present on Admission                  # Severe Malnutrition: based on nutrition assessment     ______________________________________________________________________    Interval History   Reports feeling somewhat better today.  He is not having any pain.  Says he is feeling strong.    Discussed with patient and his wife that the family is still discussing the transition to comfort cares. They are not ready to make this decision yet.  They understood the Elle had more fluid in his belly this morning but we are not sure if this is ongoing bleeding versus ascites. They would like to wait to see what happens with this fluid before deciding about next steps. Elle thinks that if his liver is still bleeding, he would likely transition to comfort cares, as he does not want to continue to receive blood transfusions and be actively bleeding and does not want to have multiple repeated procedure.     Data reviewed today: I reviewed all medications, new labs and imaging results over the last 24 hours. I personally reviewed no images or EKG's today.    Physical Exam   Vital Signs: Temp: 98  F (36.7  C) Temp src: Oral BP: (!) 148/86 Pulse: 92   Resp: 16 SpO2: 95 % O2 Device: None (Room air)    Weight: 125 lbs .01 oz  EXAM  General: Alert, chronically ill appearing, no acute distress  Head: Nontraumatic   Eyes: PERRL, EOM intact   Oropharynx: moist oral mucus membranes  Pulm: normal work of breathing, no tachypnea, speaking in full sentences  CV: RRR, no murmur  Abd: soft, NTND  Ext: Warm, well perfused,  no LE edema  Skin: No rash on limited skin exam  Neuro:  moves all 4 extremities  Psych: Mood appropriate to visit content, rational thought content and process      Data   Recent Labs   Lab 08/27/22  1218 08/27/22  1216 08/27/22  0603 08/27/22  0139 08/27/22  0137 08/26/22  0901 08/26/22  0850 08/25/22  2353   WBC  --   --   --   --   --   --  16.7* 12.1*   HGB  --  7.5* 7.4*  --  7.4*   < > 7.4* 6.0*   MCV  --   --   --   --   --   --  94 99   PLT  --    --   --   --   --   --  101* 125*   INR  --   --   --   --   --   --  1.93*  --    NA  --   --  135*  --   --   --  138 138   POTASSIUM  --   --  4.0  --   --   --  5.7* 4.6   CHLORIDE  --   --  100  --   --   --  102 99   CO2  --   --  24  --   --   --  21* 25   BUN  --   --  43*  --   --   --  67* 63*   CR  --   --  5.59*  --   --   --  7.78* 8.09*   ANIONGAP  --   --  11  --   --   --  15 14   TANO  --   --  7.3*  --   --   --  6.6* 7.1*   GLC 94  --  85 84  --    < > 89 137*   ALBUMIN  --   --   --   --   --   --  1.7* 1.7*   PROTTOTAL  --   --   --   --   --   --  5.5* 6.2   BILITOTAL  --   --   --   --   --   --  0.9 0.7   ALKPHOS  --   --   --   --   --   --  103 114   ALT  --   --   --   --   --   --  19 <9   AST  --   --   --   --   --   --  46* 19    < > = values in this interval not displayed.     No results found for this or any previous visit (from the past 24 hour(s)).

## 2022-08-27 NOTE — PROGRESS NOTES
08/27/22 1115   Quick Adds   Type of Visit Initial Occupational Therapy Evaluation   Living Environment   People in Home child(eulalio), adult;spouse  (son)   Current Living Arrangements house   Home Accessibility stairs to enter home   Number of Stairs, Main Entrance 1   Stair Railings, Main Entrance railings safe and in good condition   Living Environment Comments stated lives in split level home, bedroom downstairs   Self-Care   Current Activity Tolerance moderate   Equipment Currently Used at Home grab bar, toilet;shower chair  (cane, walk in shower,)   Instrumental Activities of Daily Living (IADL)   IADL Comments per chart receives A w/ ADLs, pt lethargic-difficulty answering Q's   General Information   Onset of Illness/Injury or Date of Surgery 08/25/22   Patient/Family Therapy Goal Statement (OT) none stated   Existing Precautions/Restrictions fall   Cognitive Status Examination   Affect/Mental Status (Cognitive) low arousal/lethargic   Follows Commands WFL   Visual Perception   Visual Impairment/Limitations   (no glasses worn at eval, no c/o vision issues)   Range of Motion Comprehensive   General Range of Motion no range of motion deficits identified   Strength Comprehensive (MMT)   General Manual Muscle Testing (MMT) Assessment no strength deficits identified   Coordination   Upper Extremity Coordination No deficits were identified   Bed Mobility   Bed Mobility supine-sit;sit-supine   Supine-Sit [place] (Bed Mobility) contact guard   Sit-Supine [place] (Bed Mobility) supervision   Transfers   Transfers toilet transfer;sit-stand transfer  (commode by bed, toilet in BR)   Transfer Comments commode by bed CGA w/ FWW, amb. in rm CGA w/ FWW   Sit-Stand Transfer   Sit-Stand [place] (Transfers) contact guard   Assistive Device (Sit-Stand Transfers) walker, front-wheeled   Toilet Transfer   Type (Toilet Transfer) sit-stand;stand-sit   [place] Level (Toilet Transfer) contact guard   Assistive  Device (Toilet Transfer) grab bars/safety frame;walker, front-wheeled   Balance   Balance Assessment standing balance: dynamic   Balance Comments WFL   Grooming Assessment/Training   [place] Level (Grooming) contact guard assist   Comment, (Grooming) washed hands partially at sink   Clinical Impression   Criteria for Skilled Therapeutic Interventions Met (OT) Yes, treatment indicated   OT Diagnosis decreased ADLs   OT Problem List-Impairments impacting ADL mobility;activity tolerance impaired   Assessment of Occupational Performance 1-3 Performance Deficits   Identified Performance Deficits toileting, g/h, trsfs   Planned Therapy Interventions (OT) ADL retraining;transfer training   Clinical Decision Making Complexity (OT) low complexity   Anticipated Equipment Needs Upon Discharge (OT)   (cont. to assess)   Risk & Benefits of therapy have been explained evaluation/treatment results reviewed;care plan/treatment goals reviewed;risks/benefits reviewed;patient   OT Discharge Planning   OT Discharge Recommendation (DC Rec) home with assist   OT Rationale for DC Rec Ax1 for ADLs and mobility, per family son home w/ pt during day while sp. at work,   Total Evaluation Time (Minutes)   Total Evaluation Time (Minutes) 8   OT Goals   Therapy Frequency (OT) Daily   OT Predicted Duration/Target Date for Goal Attainment 09/01/22   OT Goals Hygiene/Grooming;Toilet Transfer/Toileting;Transfers   OT: Hygiene/Grooming supervision/stand-by assist;while standing   OT: Transfer Supervision/stand-by assist;with assistive device   OT: Toilet Transfer/Toileting Supervision/stand-by assist;cleaning and garment management

## 2022-08-27 NOTE — PLAN OF CARE
[facility] - ICU    RN Progress Note:            Pertinent Assessments:      Please refer to flowsheet rows for full assessment     - Patient received 2 units of blood. Hgb went up to 7.4.   Hgb Q 6 hrs. No active bleeding noted.   - Levophed on hold. Vital signs stable.  - lactid acid of 1.0.  - Slept most of the shift.      Mobility Level:     2         Key Events - This Shift:    Vital signs stable. Hgb >7.0. slept most of the shift.             Plan:     Keep Hgb>7.0. and vital signs stable.

## 2022-08-27 NOTE — PROGRESS NOTES
Seen on dialysis   Tolerated 2 hrs wo UF  Has para and then 500cc albumin prior to HD  On pressers    Family meeting palliative regarding GOC    Next HD plans for Monday    Jayme Clement MD  [facility]  [phone]

## 2022-08-27 NOTE — PROGRESS NOTES
[facility] progress Note    Subjective: Patient without complaints.  Hemoglobin decreased slightly, he was given 2 units of blood.  Lactulose started.  Bedside ultrasound possibly showed more fluid.  Recent bowel movement was brown per the nurse, the patient thought he might have seen some bright red blood.    Objective:  Vital signs in last 24 hours  Temp:  [97.1  F (36.2  C)-98.7  F (37.1  C)] 98.3  F (36.8  C)  Pulse:  [70-95] 92  Resp:  [14-38] 18  BP: (141)/(83) 141/83  MAP:  [61 mmHg-156 mmHg] 106 mmHg  Arterial Line BP: ()/() 144/81  SpO2:  [90 %-100 %] 96 %     Gen: Awake, no acute distress  Pulmonary: Lungs clear to ausculation bilaterally  Cardiovascular: Regular  Gastrointestinal: Positive bowel sounds, soft, tender, non-distended, no rebound or guarding    Assessment: 1.  Probable hepatocellular carcinoma-this is 7.5 cm.  This ruptured spontaneously intra-abdominal he causing significant hemoperitoneum bleeding.  Alpha-fetoprotein pending.  If this is hepatocellular carcinoma, given its size and the fact that it ruptured, likely spreading tumor, this would mean that the patient would not be a liver transplant candidate.    2.  HBV- Entecavir restarted.  Lactulose given (for relative constipation also).    Plan: Follow-up AFP results.  Watch for overt rebleeding from the likely tumor.  We will follow the patient up in our [facility] liver clinic also.  We will follow here also.    Patient Active Problem List   Diagnosis     Melena     Normocytic anemia     Thrombocytopenia (H)     Other pancytopenia (H)     Chronic hepatitis B without hepatic coma (H)     Cirrhosis of liver without ascites (H)     Essential hypertension     CKD (chronic kidney disease) stage 4, GFR 15-29 ml/min (H)     Descending thoracic aortic dissection (H)     Anemia due to other bone marrow failure (H)     Thrombocytopenia due to hypersplenism     Iliopsoas muscle hematoma, left, initial encounter     Chronic  dissection of thoracic aorta (H)     ESRD (end stage renal disease) on dialysis (H)     Anemia due to blood loss, acute     Hepatic cirrhosis, unspecified hepatic cirrhosis type, unspecified whether ascites present (H)     Septic bursitis     Pleural effusion     Empyema lung (H)     Hydropneumothorax     Hyperkalemia     Gout     Lymphedema of left upper extremity     Epistaxis     Closed fracture of nasal bone, initial encounter     Fall, initial encounter     Anemia in end-stage renal disease (H)     Empyema (H)     Thoracic aortic dissection (H)     Anemia, unspecified type     Acquired dissection of pulmonary artery (H)     Hypovolemic shock (H)     Hemoperitoneum     Liver mass     Other ascites       Labs:  CMP Results:   Recent Labs   Lab Test 08/27/22  0603 08/26/22  0901 08/26/22  0850   *  --  138   POTASSIUM 4.0  --  5.7*   CHLORIDE 100  --  102   CO2 24  --  21*   ANIONGAP 11  --  15   GLC 85   < > 89   BUN 43*  --  67*   CR 5.59*  --  7.78*   BILITOTAL  --   --  0.9   ALKPHOS  --   --  103   ALT  --   --  19   AST  --   --  46*    < > = values in this interval not displayed.      CBC  Recent Labs   Lab 08/27/22  0603 08/27/22  0137 08/26/22  1824 08/26/22  1155 08/26/22  0850 08/25/22  2353   WBC  --   --   --   --  16.7* 12.1*   RBC  --   --   --   --  2.46* 2.01*   HGB 7.4* 7.4* 6.1* 7.2* 7.4* 6.0*   HCT  --   --   --   --  23.0* 19.9*   MCV  --   --   --   --  94 99   MCH  --   --   --   --  30.1 29.9   MCHC  --   --   --   --  32.2 30.2*   RDW  --   --   --   --  17.3* 18.5*   PLT  --   --   --   --  101* 125*     INR  Recent Labs   Lab 08/26/22  0850   INR 1.93*      Lipase   Date Value Ref Range Status   07/28/2019 517 (H) 0 - 52 U/L Final     Recent Labs   Lab 08/26/22  0850 08/25/22  2353   AST 46* 19   ALT 19 <9   ALKPHOS 103 114   BILITOTAL 0.9 0.7                                                   Dereck Stover MD  Thank you for the opportunity to participate in the care of this  patient.   Please feel free to call me with any questions or concerns.  Phone number [phone].

## 2022-08-27 NOTE — PLAN OF CARE
Goal Outcome Evaluation:      [facility] - ICU    RN Progress Note:            Pertinent Assessments:      Please refer to flowsheet rows for full assessment     Alert and oriented, has generalized pain and discomfort but patient hesitant to take or try pain medication. He fears pain medication will bring his BP down, writer tried to explained patient is on IV BP meds to keep his BP up. Pain medication should be ok for now. Patient still hesitant to take pain medication.Adjusted some pillows and repositioned in bed for comfort.         Mobility Level:     Bedrest         Key Events - This Shift:     Hemoglobin was 6.1 so receiving blood, no acute sign and symptoms of active bleeding noted. Turned off Levo around 2240, has been holding MAP>65.              Plan:     Stable hemoglobin, transition to hospice         Point of Contact Update YES-OR-NO: Yes  If No, reason:   Name:Spouse and Son  Phone Number:  Summary of Conversation:            Problem: Plan of Care - These are the overarching goals to be used throughout the patient stay.    Goal: Optimal Comfort and Wellbeing  Outcome: Ongoing, Progressing  Intervention: Monitor Pain and Promote Comfort  Recent Flowsheet Documentation  Taken 8/26/2022 1946 by Martina Donaldson, RN  Pain Management Interventions: medication (see MAR)  Intervention: Provide Person-Centered Care  Recent Flowsheet Documentation  Taken 8/26/2022 2000 by Martina Donaldson RN  Trust Relationship/Rapport:    care explained    emotional support provided

## 2022-08-27 NOTE — PROGRESS NOTES
Progress Note    Assessment/Plan  Chest tubes placed to waterseal.  Plan in taking chest tubes out and probable home tomorrow.    Active Problems:    Chronic hepatitis B without hepatic coma (H)    ESRD (end stage renal disease) on dialysis (H)    Pleural effusion    Hydropneumothorax    Thoracic aortic dissection (H)    Hypovolemic shock (H)    Hemoperitoneum    Liver mass    Other ascites      Subjective  Reasonably comfortable good respiratory effort  Objective    Vital signs in last 24 hours  Temp:  [97.1  F (36.2  C)-98.7  F (37.1  C)] 98  F (36.7  C)  Pulse:  [75-95] 92  Resp:  [14-38] 16  BP: (141-148)/(83-86) 148/86  MAP:  [61 mmHg-156 mmHg] 106 mmHg  Arterial Line BP: ()/() 144/81  SpO2:  [93 %-100 %] 95 %  Weight:   [unfilled]    Intake/Output last 3 shifts  I/O last 3 completed shifts:  In: 2902.31 [P.O.:630; I.V.:1092.31]  Out: 2475 [Urine:75; Blood:2400]  Intake/Output this shift:  I/O this shift:  In: 120 [P.O.:120]  Out: -       Physical Exam  Chest tubes without air leak.  Chest tube output decreasing.    Pertinent Labs   Lab Results   Component Value Date    WBC 16.7 (H) 08/26/2022    HGB 7.5 (L) 08/27/2022    HCT 23.0 (L) 08/26/2022    MCV 94 08/26/2022     (L) 08/26/2022             Pertinent Radiology     [unfilled]        Dawson Reynaga MD

## 2022-08-27 NOTE — PROGRESS NOTES
Progress Note    Assessment/Plan  Continue present care.  Patient's hemoglobin noted.  Less distended in the abdomen though he still feels full.  Drainage right chest through small opening.  Reviewed with nursing staff.  They will place an ostomy bag on this site.  Preliminary staff noted.  Patient denies significant chest pain.  There is a significant complaint of shortness of breath.  Wife present.  No major surgical intervention planned.  Reviewed with intensivist with potential decisions regarding palliative care and discontinuation of dialysis noted.    Active Problems:    Chronic hepatitis B without hepatic coma (H)    ESRD (end stage renal disease) on dialysis (H)    Pleural effusion    Hydropneumothorax    Thoracic aortic dissection (H)    Hypovolemic shock (H)    Hemoperitoneum    Liver mass    Other ascites      Subjective  Right chest inspected.  The chest mass is essentially unchanged.  Small amount of drainage present.  Breath sounds minimal lateral right side.  Abdomen distended but benign bowel sounds very active.  Legs are without change without edema.  Objective    Vital signs in last 24 hours  Temp:  [97.1  F (36.2  C)-98.7  F (37.1  C)] 98  F (36.7  C)  Pulse:  [75-95] 92  Resp:  [14-38] 16  BP: (141-148)/(83-86) 148/86  MAP:  [61 mmHg-156 mmHg] 106 mmHg  Arterial Line BP: ()/() 144/81  SpO2:  [93 %-100 %] 95 %  Weight:   [unfilled]    Intake/Output last 3 shifts  I/O last 3 completed shifts:  In: 2902.31 [P.O.:630; I.V.:1092.31]  Out: 2475 [Urine:75; Blood:2400]  Intake/Output this shift:  I/O this shift:  In: 120 [P.O.:120]  Out: -       Physical Exam  See above.    Pertinent Labs   Lab Results   Component Value Date    WBC 16.7 (H) 08/26/2022    HGB 7.5 (L) 08/27/2022    HCT 23.0 (L) 08/26/2022    MCV 94 08/26/2022     (L) 08/26/2022             Pertinent Radiology     [unfilled]        Dawson Reynaga MD

## 2022-08-28 NOTE — PLAN OF CARE
Problem: Plan of Care - These are the overarching goals to be used throughout the patient stay.    Goal: Plan of Care Review/Shift Note  Description: The Plan of Care Review/Shift note should be completed every shift.  The Outcome Evaluation is a brief statement about your assessment that the patient is improving, declining, or no change.  This information will be displayed automatically on your shift note.  Outcome: Ongoing, Progressing  Flowsheets (Taken 8/28/2022 1343)  Plan of Care Reviewed With: patient     Problem: Renal Function Impairment (Chronic Kidney Disease)  Goal: Minimize Renal Failure Effects  Outcome: Ongoing, Progressing     Problem: Fluid and Electrolyte Imbalance (Liver Failure)  Goal: Fluid and Electrolyte Balance  Outcome: Ongoing, Progressing   Goal Outcome Evaluation:    Plan of Care Reviewed With: patient       Patient is A & O x 4, calm and cooperative with cares.  Declined lactulose, stating that he has been having too many BMs.  Education on importance for lactulose adherence d/t impaired liver function.  Pt will need teaching reinforcement.  Patient appeared somnolent in the morning.  C/o nausea for a brief moment, declined intervention. Denied abdominal discomfort.  Abdomen distended. Denied SOB. Hgb 7.1; re-check Q 8 hours.  Dressing on PICC dry and intact.

## 2022-08-28 NOTE — PLAN OF CARE
Problem: Hemodynamic Instability (Hemodialysis)  Goal: Effective Tissue Perfusion  Outcome: Ongoing, Progressing     Problem: Hematologic Alteration (Chronic Kidney Disease)  Goal: Absence of Anemia Signs and Symptoms  Outcome: Ongoing, Progressing     Problem: Impaired Coagulation (Liver Failure)  Goal: Optimal Coagulation Function  Outcome: Ongoing, Progressing   Goal Outcome Evaluation:    Patient transferred from ICU to room 105 at 2330. He is alert and oriented x 4. Patient's hemoglobin dropped from 7.6, checked at 6 pm, to 7.3 at midnight. Hgb is being checked every 6 hours. Scant serosanguineous drainage noted in right chest wound ostomy bag. Ostomy bag is intact. No shortness of breath of dyspnea. BP taken on right leg 162/87. Upper extremities restricted due to left arm graft fistula and PICC line to the right arm. Right groin band-aid dressing is intact and dry, small dry drainage noted. Abdomen is round, distended. Dressing to left abdomen is clean, dry and intact.

## 2022-08-28 NOTE — PROGRESS NOTES
Progress Note    Assessment/Plan  Patient doing much better today.  Transferred out of the ICU.  He is tolerating oral intake and ate a significant amount of his lunch.  Denies abdominal pain.  Much less distended.  Denies shortness of breath.  His right chest wound with drainage has had ostomy appliance placed.  Minimal discomfort in this location.  No plan for major surgical intervention thorax.    Active Problems:    Chronic hepatitis B without hepatic coma (H)    ESRD (end stage renal disease) on dialysis (H)    Pleural effusion    Hydropneumothorax    Thoracic aortic dissection (H)    Hypovolemic shock (H)    Hemoperitoneum    Liver mass    Other ascites      Subjective  As above patient is much more comfortable today.  Much less distention of the abdomen.  Passing flatus.  Tolerating oral intake.  Denies shortness of breath.  Objective    Vital signs in last 24 hours  Temp:  [97.9  F (36.6  C)-98.5  F (36.9  C)] 98.5  F (36.9  C)  Pulse:  [82-91] 82  Resp:  [18] 18  BP: ()/(54-89) 136/71  SpO2:  [95 %-99 %] 96 %  Weight:   [unfilled]    Intake/Output last 3 shifts  I/O last 3 completed shifts:  In: 643 [P.O.:390; I.V.:253]  Out: -   Intake/Output this shift:  I/O this shift:  In: 120 [P.O.:120]  Out: -       Physical Exam  Head and neck exam benign.  Lungs decreased breath sounds right side as the last number of days.  Small amount of drainage in ostomy bag right chest wall.  Mild tenderness in this location.  Abdomen quite benign.  Distended but benign.  Lower extremities without change.    Pertinent Labs   Lab Results   Component Value Date    WBC 16.7 (H) 08/26/2022    HGB 7.1 (L) 08/28/2022    HCT 23.0 (L) 08/26/2022    MCV 94 08/26/2022     (L) 08/26/2022             Pertinent Radiology     [unfilled]        Dawson Reynaga MD

## 2022-08-28 NOTE — PROGRESS NOTES
08/28/22 0845   Quick Adds   Type of Visit Initial PT Evaluation   Living Environment   People in Home child(eulalio), adult;spouse  (son)   Current Living Arrangements house   Home Accessibility stairs to enter home   Number of Stairs, Main Entrance 1   Stair Railings, Main Entrance railings safe and in good condition   Living Environment Comments lives in spllit level home, bedroom downstairs.   Self-Care   Usual Activity Tolerance moderate   Current Activity Tolerance moderate   Equipment Currently Used at Home grab bar, toilet;shower chair;walker, rolling;cane, straight   General Information   Onset of Illness/Injury or Date of Surgery 08/25/22   Referring Physician Gopal Asif MD   Patient/Family Therapy Goals Statement (PT) return home   Pertinent History of Current Problem (include personal factors and/or comorbidities that impact the POC) He has a complicated medical history including multiple prior admissions for right lung empyema requiring chest tube drainage, anemia of chronic disease, end-stage renal disease on dialysis (Monday and Friday) secondary to hepatorenal syndrome due to cirrhosis, hepatocellular carcinoma, chronic Hepatitis B, thoracic aortic dissection.  Admitted for hemoperitoneum likely secondary to ruptured HCC   Pain Assessment   Patient Currently in Pain No   Range of Motion (ROM)   Range of Motion ROM is WFL   Strength (Manual Muscle Testing)   Strength (Manual Muscle Testing) strength is WFL   Bed Mobility   Bed Mobility supine-sit;sit-supine   Supine-Sit [place] (Bed Mobility) independent   Sit-Supine [place] (Bed Mobility) independent   Comment, (Bed Mobility) no difficulty   Transfers   Transfers sit-stand transfer   Sit-Stand Transfer   Sit-Stand [place] (Transfers) supervision   Assistive Device (Sit-Stand Transfers) cane, straight   Gait/Stairs (Locomotion)   [place] Level (Gait) supervision   Assistive Device (Gait) cane, straight   Distance in Feet  (Required for LE Total Joints) 25'   Comment, (Gait/Stairs) declines trial of stairs   Clinical Impression   Criteria for Skilled Therapeutic Intervention Yes, treatment indicated   PT Diagnosis (PT) impaired functional mobility, gait abnormality   Influenced by the following impairments decreased endurance   Functional limitations due to impairments transfers, gait, stairs   Clinical Presentation (PT Evaluation Complexity) Stable/Uncomplicated   Clinical Presentation Rationale pt presents as medically diagnosed   Clinical Decision Making (Complexity) low complexity   Planned Therapy Interventions (PT) balance training;gait training;home exercise program;neuromuscular re-education;patient/family education;strengthening;stair training;transfer training   Anticipated Equipment Needs at Discharge (PT) cane, straight   Risk & Benefits of therapy have been explained evaluation/treatment results reviewed;patient   PT Discharge Planning   PT Discharge Recommendation (DC Rec) home with assist   PT Rationale for DC Rec assist for activities as needed   Total Evaluation Time   Total Evaluation Time (Minutes) 10   Physical Therapy Goals   PT Frequency One time eval and treatment only  (pt declines further need for PT at this time, requests d/c of PT services)   PT Predicted Duration/Target Date for Goal Attainment 08/28/22   PT Goals Transfers;Gait   PT: Transfers Supervision/stand-by assist;Sit to/from stand;Assistive device   PT: Gait Supervision/stand-by assist;Assistive device;Straight cane;50 feet

## 2022-08-28 NOTE — PROGRESS NOTES
[facility]    Progress Note - Hospitalist Service       Date of Admission:  8/25/2022    Assessment & Plan          Elle Blanton is a 62 year old male admitted on 8/25/2022. He has a complicated medical history including multiple prior admissions for right lung empyema requiring chest tube drainage, anemia of chronic disease, end-stage renal disease on dialysis (Monday and Friday) secondary to hepatorenal syndrome due to cirrhosis, hepatocellular carcinoma, chronic Hepatitis B, thoracic aortic dissection.  Admitted for hemoperitoneum likely secondary to ruptured HCC.      Syncope  Hypovolemic shock- resolved  Hemoperitoneum-suspect ruptured HCC with pericapsular hematoma  Chronic hepatitis B  Cirrhosis  Noted to have active arterial bleeding secondary to malignant mass in liver, s/p IR embolization. Suspect HCC, although this is based on imaging and not biopsy. Paracentesis 8/26 with 2.4 L hemorrhagic fluid.  Initially requiring multiple pressors for blood pressure support.Patient is not a transplant candidate based on the size of his mass and likely metastasis. Some increase in peritoneal fluid based on Dr. Asif bedside us 8/27, Hgb stable so unlikley to be continued bleeding. S/p 3 units pRBCs. Hgb stable today.   -GI consulted, appreciate recs   -Alpha-fetoprotein elevated   -Restart Entecavir   -IV Cefepime- SBP prophylaxis and to treat chest wall abscess   -lactulose- goal of 3-4 loose BMs per day   -Coreg 12.5 mg BID. Plan to titrate up to home dosing of 50 mg if BP allows, and eventually restart clonidine.   - Diltiazem 180mg daily restarted today.   -Continue PTA PPI  - Dilaudid PRN for pain   -Hgb q8h  -Tele  -If increasing belly pain or distension consider IR consult for US guided paracentesis      R hydropneumothorax with volume loss of R lung  Pleurocutaneous fistula vs chest wall abscess  Chronic trapped lung, empyema   Was scheduled for I&D of possible chest abscess 8/26.   This was canceled in the setting of his acute liver bleed.  Evaluated by Dr. Reynaga who does not think he is a good surgical candidate.  History of MSSA empyema. Current chest wall culture showing gram positive cocci.   -General surgery consulted, appreciate recs  -Vancomycin  - O2 support as needed, 94-96% SpO2  -Adjust antibiotics pending chest wall aspirate cultures    ESRD on HD (MF)  Has refused three times a week dialysis in the past. Has been agreeable to M,F dialysis schedule   - Nephrology consulted, dialyzed 8/26, next HD 8/29.      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  He knows he is likely near the end of his life, but still feels strong and wants to continue treatment for now. Palliative was consulted for a family meeting 8/26.   Is in discussion with family about transition to comfort care and/or hospice. Elle wants to wait and see if he continues to bleed into his belly before deciding on next steps.  For now, he is NOT on comfort cares.    -Palliative consult, appreciate recs, next palliative visit will be 8/29        Diet: Advance Diet as Tolerated: Regular Diet Adult; Renal Diet (dialysis)    DVT Prophylaxis: Pneumatic Compression Devices  Beltre Catheter: Not present  Fluids: PO  Central Lines: PRESENT  PICC Single Lumen 08/25/22 Right Basilic-Site Assessment: WDL  Cardiac Monitoring: None  Code Status: No CPR- Do NOT Intubate      Disposition Plan     Expected Discharge Date: 08/31/2022                The patient's care was discussed with the Attending Physician, Dr. Jeter.    Steven Watkins, DO  Hospitalist Service  [facility]  Securely message with the Vocera Web Console (learn more here)  Text  page via [facility] Paging/Directory         Clinically Significant Risk Factors Present on Admission                 # Severe Malnutrition: based on nutrition assessment     ______________________________________________________________________    Interval History   Feels better. Now out of the ICU. Denies any new pain, feels that abdomen is softer now, though still distended. He denies any other complaints. He is quite fatigued, and attributes this to poor sleep.   Wife present with him. Per her report, he has been eating well, and does not appear confused, just sleepy.       Data reviewed today: I reviewed all medications, new labs and imaging results over the last 24 hours. I personally reviewed no images or EKG's today.    Physical Exam   Vital Signs: Temp: 98.3  F (36.8  C) Temp src: Oral BP: (!) 176/89 (nurse notified) Pulse: 88   Resp: 18 SpO2: 96 % O2 Device: None (Room air)    Weight: 125 lbs .01 oz  EXAM  General: Alert, chronically ill appearing, no acute distress, fatigued.  Head: Nontraumatic   Eyes: PERRL, EOM intact   Oropharynx: moist oral mucus membranes  Pulm: normal work of breathing, no tachypnea, speaking in full sentences  CV: RRR, no murmur  Abd: soft, NTND  Ext: Warm, well perfused,  no LE edema  Skin: No rash on limited skin exam  Neuro:  moves all 4 extremities  Psych: Mood appropriate to visit content, rational thought content and process      Data   Recent Labs   Lab 08/28/22  0633 08/27/22  2354 08/27/22  1810 08/27/22  1218 08/27/22  1216 08/27/22  0603 08/27/22  0139 08/26/22  0901 08/26/22  0850 08/25/22  2353   WBC  --   --   --   --   --   --   --   --  16.7* 12.1*   HGB 7.2* 7.3* 7.6*  --    < > 7.4*  --    < > 7.4* 6.0*   MCV  --   --   --   --   --   --   --   --  94 99   PLT  --   --   --   --   --   --   --   --  101* 125*   INR  --   --   --   --   --   --   --   --  1.93*  --    NA  --   --   --   --   --  135*  --   --  138 138   POTASSIUM  --   --   --   --   --  4.0  --   --   5.7* 4.6   CHLORIDE  --   --   --   --   --  100  --   --  102 99   CO2  --   --   --   --   --  24  --   --  21* 25   BUN  --   --   --   --   --  43*  --   --  67* 63*   CR  --   --   --   --   --  5.59*  --   --  7.78* 8.09*   ANIONGAP  --   --   --   --   --  11  --   --  15 14   TANO  --   --   --   --   --  7.3*  --   --  6.6* 7.1*   GLC  --   --   --  94  --  85 84   < > 89 137*   ALBUMIN  --   --   --   --   --   --   --   --  1.7* 1.7*   PROTTOTAL  --   --   --   --   --   --   --   --  5.5* 6.2   BILITOTAL  --   --   --   --   --   --   --   --  0.9 0.7   ALKPHOS  --   --   --   --   --   --   --   --  103 114   ALT  --   --   --   --   --   --   --   --  19 <9   AST  --   --   --   --   --   --   --   --  46* 19    < > = values in this interval not displayed.     No results found for this or any previous visit (from the past 24 hour(s)).

## 2022-08-28 NOTE — PROGRESS NOTES
"GASTROENTEROLOGY PROGRESS NOTE     SUBJECTIVE: Feels well. AFP elevated at 9,702 reviewed with patient and his wife. Given he feels well they want to continue supportive care. His HGB is slowly drifting. His wife had several questions about prognosis. We discussed this is not operable. We reviewed the size the tumor ect.     OBJECTIVE:   /71 (BP Location: Right leg)   Pulse 82   Temp 98.5  F (36.9  C) (Oral)   Resp 18   Ht 1.651 m (5' 5\")   Wt 56.7 kg (125 lb)   SpO2 96%   BMI 20.80 kg/m     Temp (24hrs), Av.2  F (36.8  C), Min:97.9  F (36.6  C), Max:98.5  F (36.9  C)     Patient Vitals for the past 72 hrs:   Weight   22 0830 56.7 kg (125 lb)   22 2347 54.4 kg (120 lb)        Intake/Output Summary (Last 24 hours) at 2022 1355  Last data filed at 2022 0900  Gross per 24 hour   Intake 643 ml   Output --   Net 643 ml      PHYSICAL EXAM   Constitutional: ill appearing male, in bed, no acute distress  Cardiovascular: Regular rate and rhythm.   Respiratory:  respirations non labored.   Abdomen: Soft,rounded.   I have reviewed the patient's new clinical lab results:   Recent Labs   Lab Test 22  1212 22  0633 22  2354 22  1155 22  0850 22  2353 06/15/22  0921 22  0528 22  0456 22  0710 22  1557   WBC  --   --   --   --  16.7* 12.1* 9.1   < > 5.7   < > 5.5   HGB 7.1* 7.2* 7.3*   < > 7.4* 6.0* 8.4*   < > 6.9*   < > 6.2*   MCV  --   --   --   --  94 99 100   < > 92   < > 101*   PLT  --   --   --   --  101* 125* 73*   < > 55*   < > 71*   INR  --   --   --   --  1.93*  --   --   --  1.31*  --  1.25*    < > = values in this interval not displayed.      Recent Labs   Lab Test 22  0603 22  0850 22  2353   * 138 138   POTASSIUM 4.0 5.7* 4.6   CHLORIDE 100 102 99   CO2 24 21* 25   BUN 43* 67* 63*   CR 5.59* 7.78* 8.09*   ANIONGAP 11 15 14   TANO 7.3* 6.6* 7.1*      Recent Labs   Lab Test 22  0850 " 08/25/22  2353 04/07/22  1142 12/30/20  0830 10/21/20  1032 08/20/20  0728 08/19/20  1701 08/07/19  0344 08/06/19  2349 07/31/19  0339 07/29/19  1018 07/28/19  0539 07/28/19  0335   ALBUMIN 1.7* 1.7* 2.4*   < > 3.0*   < >  --    < >  --    < >  --    < > 3.4*   BILITOTAL 0.9 0.7 0.6   < > 0.6   < >  --    < >  --    < >  --    < > 0.6   BILIDIRECT  --   --   --   --  0.2  --   --   --   --   --   --   --   --    ALT 19 <9 <9   < >  --    < >  --    < >  --    < >  --    < > 165*   AST 46* 19 15   < >  --    < >  --    < >  --    < >  --    < > 113*   ALKPHOS 103 114 94   < >  --    < >  --    < >  --    < >  --    < > 122*   PROTEIN  --   --   --   --   --   --  Negative  --  100*  --  100*  --   --    LIPASE  --   --   --   --   --   --   --   --   --   --   --   --  517*    < > = values in this interval not displayed.      Assessment:   1.   hepatocellular carcinoma-this is 7.5 cm.  This ruptured spontaneously intra-abdominal he causing significant hemoperitoneum bleeding.  Alpha-fetoprotein 9, 702  He is not a liver transplant candidate given the size the fact that this has ruptured. This was discussed with the wife. They would like to continue supportive care.     Plan  Follow HGB transfuse PRN  GI will sign off, please call with questions or concerns.      Approximately 21 minutes of total time was spent providing patient care, including patient evaluation, reviewing documentation/test result, and .  Debbie Norris CNP  [facility]   Office

## 2022-08-28 NOTE — PLAN OF CARE
Physical Therapy Discharge Summary    Reason for therapy discharge:    Patient/family request discontinuation of services.    Progress towards therapy goal(s). See goals on Care Plan in [facility] electronic health record for goal details.  Goals met    Therapy recommendation(s):    Continued therapy is recommended.  Rationale/Recommendations:  for endurance. Instructed pt to walk with RN staff throughout the day. Declined further PT treatment while hospitalized.

## 2022-08-28 NOTE — PROGRESS NOTES
Renal progress note  CC: ESRD, hyperkalemia  Assessment and Plan:  62 year old male with history of ESRD on hemodialysis Monday Friday ideally will need 3 times treatment but refuses more than twice a week treatments, at [facility], history of hypertension , hepatitis B, chronic recurrent right-sided effusion with trapped lung not a candidate for surgical decortication multiple chest tubes in the past, chronic type B aortic dissection, hepatic mass on CT ??  HCC, presented with an episode of syncope and hypotension.  Complaining of abdominal and chest wall pain, admission CT concerning for hemoperitoneum, possible active extravasation from hepatic mass now s/p embolization with IR of the feeding hepatic artery, currently hypotensive hypothermic in the ICU on 2 pressors with elevated lactate and leukocytosis on antibiotics for concerns septic shock, nephrology consulted for ESRD management     ESRD on IHD Monday/Friday  Ideally needs to be treated 3 times a week  Follows at [facility] Dr. Hill  Treatment time is 3 hours barely completes his treatment mostly treatments are around 130 to 140 minutes  Minimal UF on treatment around 1-1.5L  Current target weight is 53.5, although noted to be around 52kg some treatments as well  --> Will continue HD MWF , next HD tomorrow  For now will like to continue current levels of care, although will like to change to DNR. He does report his body is sick but fighting!!     Access  Left upper extremity AVG  Has had chronic issues with bleeding although with currently appears nonedematous and thrill positive  Dr. Cosby 4/2022      History of hypertension  Improved BP  --> coreg resumed , will also resume diltiazem 180mg daily  --> follow BP   --> No evidence of hypervolemia which is usual baseline for him  We will monitor closely     Electrolytes stable     MBD  Has been on Tums with meals  Phosphorus has been around 4 at goal mild low calcium is chronically  around 8  PTH last checked on 8/3/2022 was 86  Alk phos around 160-200 chronically  Evaluate once diet resumed     Anemia acute on chronic  With bleed  Hold off any iron with transfusions and possible infection  -- We will keep on EPO while inpatient, OP dose 12K x2/wk     Chronic right-sided effusion/empyema with trapped lung  Follows pulmonology outpatient with Dr. Parminder Christian noted from outpatient visits  Not a candidate for decortication possibly will need a chest tube currently does not appear dyspneic  Oxygenation stable     Chronic hepatitis B  Has been off all antiviral treatment  Possible hepatocellular carcinoma with hepatic mass on CT with active bleed  Status post IR embolization of hepatic artery that was bleeding  --> In ICU for monitoring and pressors     Hypotension with possible septic shock  On antibiotics and pressors  Management per ICU  -- Possible SBP>      History of chronic type B aortic dissection  Stable has been followed by cardiothoracic surgery    Thank you for the consultation we will follow  Jayme Clement MD  [facility]  [phone]      Subjective  Hemodynamics better  No new events  Will dialyze on Monday next  On ABx  Wife at bedside, reports he ate well  Agrees with Hd tomorrow    Objective    Vital signs in last 24 hours  Temp:  [97.9  F (36.6  C)-98.3  F (36.8  C)] 98.3  F (36.8  C)  Pulse:  [88-92] 88  Resp:  [16-18] 18  BP: ()/(54-89) 176/89  SpO2:  [95 %-99 %] 96 %  Weight:   [unfilled]    Intake/Output last 3 shifts  I/O last 3 completed shifts:  In: 643 [P.O.:390; I.V.:253]  Out: -   Intake/Output this shift:  I/O this shift:  In: 120 [P.O.:120]  Out: -     Physical Exam  awake, NAD  CV: RRR without murmur or rub  Lung: clear and equal; no extra sounds  Ab: soft and NT; not distended; normal bs  Ext: no edema and well perfused  Skin; no rash    Pertinent Labs   Lab Results   Component Value Date    WBC 16.7 (H) 08/26/2022    HGB 7.2 (L)  08/28/2022    HCT 23.0 (L) 08/26/2022    MCV 94 08/26/2022     (L) 08/26/2022     Lab Results   Component Value Date    BUN 43 (H) 08/27/2022     (L) 08/27/2022    CO2 24 08/27/2022       Lab Results   Component Value Date    ALBUMIN 1.7 (L) 08/26/2022     Lab Results   Component Value Date    PHOS 3.2 04/03/2022     I reviewed all lab results  Jayme Clement MD

## 2022-08-28 NOTE — PROGRESS NOTES
Patient transfer to P1 , report given, belonging sent along with patient. Patient declined writer to notify family stating he will let his wife know in the am about the transfer out.

## 2022-08-29 NOTE — PROCEDURES
"Hemodialysis Treatment Note    Pre treatment report from Aishwarya Rivero RN.      Assessment:    Patient is sitting on edge of bed, requesting to eat breakfast his wife is bringing before starting dialysis.  Patient is alert and oriented x3, apical pulse and on remote tele, rate and rhythm regular.  Patient is on room air, denies SOB, lung sounds diminished on R clear to diminished on L side, occasional cough, patient states, \"Sometimes cough things up.\"  Patient's skin is warm ,dry, pale, no extremity edema.  Patient's abdomen firm, non tender,  distended, drain in place on R side.  Patient ate meat and soup family brought before initiating dialysis treatment.    Pre weight:  56.9 KG standing scale before eating breakfast  Post weight:  56.2 KG standing scale    Run length:  2.5 hours    Total fluid removed (ml):  1500 mls. With prime and rinse back, net fluid [id number]=0414 mls    Blood Volume Processed:  42.4 L    Dialyzer/rinse:  Optiflux 160, rinse:  clear      Pre Treatment Vascular Access:  LLA AVG, bandages removed, no s/s of infection, + bruit and thrill, prepped per protocol, 16 gauge needles placed with no difficulty, RLC=469    Treatment Summary:  Patient treated on K3 bath per protocol order.  This morning result of K=4.4.  No heparin administered as part of hemodialysis treatment.  Dr. Hill assessed patient during treatment.  Patient hemodynamically stable throughout treatment.  Post treatment report provided to Aishwarya Rivero RN.  For further details, please see Epic dialysis flow sheet and MAR.        Interventions:  -Staff nurse held morning dose of Coreg and diltiazem;  -Monitored BP, pulse and respirations every 15 minutes and PRN;  -Critline used for fluid management/monitoring;  -Goal adjustment per critline and hemodynamics.      Post Treatment Vascular Access:   Needles removed, pressure held until hemostasis, achieved:  Arterial site:  10 minutes, venous site:  15 minutes.  Patient requests " for only 1 site to be held at a time once needles removed.  Pressure dressing placed, + bruit and thrill.       Plan:  Per renal, hemodialysis orders for every MWF        Francoise Lawton, RN  [facility]

## 2022-08-29 NOTE — PROGRESS NOTES
Renal progress note  CC: ESRD, shock, liver bleed    Assessment and Plan:  62 year old male with history of ESRD on hemodialysis Monday Friday ideally will need 3 times treatment but refuses more than twice a week treatments, at [facility], history of hypertension , hepatitis B, chronic recurrent right-sided effusion with trapped lung not a candidate for surgical decortication multiple chest tubes in the past, chronic type B aortic dissection, hepatic mass on CT ??  HCC, presented with an episode of syncope and hypotension.  Complaining of abdominal and chest wall pain, admission CT concerning for hemoperitoneum, possible active extravasation from hepatic mass now s/p embolization with IR of the feeding hepatic artery, currently hypotensive hypothermic in the ICU on 2 pressors with elevated lactate and leukocytosis on antibiotics for concerns septic shock, nephrology consulted for ESRD management     ESRD on IHD Monday/Friday  Ideally needs to be treated 3 times a week, has has been intermittently compliant with this  Follows at [facility] with myself  Treatment time is 3 hours barely completes his treatment mostly treatments are around 130 to 140 minutes  Minimal UF on treatment around 1-1.5L  Current target weight is 53.5, standing scale weight is 57kg today, presumably a lot of this is ascites  --> Will continue HD MWF     Access  Left upper extremity AVG  Has had chronic issues with bleeding although with currently appears nonedematous and thrill positive  Dr. Cosby 4/2022      History of hypertension  Improved BP  --> on coreg and diltiazem 180mg daily  --> follow BP      Electrolytes stable     MBD  Has been on Tums with meals  Phosphorus has been around 4 at goal mild low calcium is chronically around 8  PTH last checked on 8/3/2022 was 86  Alk phos around 160-200 chronically     Anemia acute on chronic  With bleed  Hold off any iron with transfusions and possible infection  -- We will  keep on EPO while inpatient, OP dose 12K x2/wk     Chronic right-sided effusion/empyema with trapped lung  Follows pulmonology outpatient with Dr. Parminder Christian noted from outpatient visits  Not a candidate for decortication  Oxygenation stable     Chronic hepatitis B  Has been off all antiviral treatment  Possible hepatocellular carcinoma with hepatic mass on CT with active bleed  Status post IR embolization of hepatic artery that was bleeding  Further follow up per GI     Shock resovled     History of chronic type B aortic dissection  Stable has been followed by cardiothoracic surgery    Thank you for the consultation we will follow    Natetherese Hill  [facility]  [phone]        Subjective  Stable on the floor, BP on the lower side on dialysis but well within his usual parameters.    Abdomen moderately distended and mildly painful.  Has been able to eat.    Tolerating HD so far, AVG working adequately.  He confirms that he's DNR now, will update his outpatient unit.    Objective    Vital signs in last 24 hours  Temp:  [98.1  F (36.7  C)-98.6  F (37  C)] 98.1  F (36.7  C)  Pulse:  [70-86] 75  Resp:  [18-22] (P) 22  BP: ()/() 128/64  SpO2:  [94 %-96 %] 96 %  Weight:   [unfilled]    Intake/Output last 3 shifts  I/O last 3 completed shifts:  In: 405 [P.O.:360; I.V.:45]  Out: 50 [Chest Tube:50]  Intake/Output this shift:  I/O this shift:  In: 240 [P.O.:240]  Out: 10 [Chest Tube:10]    Physical Exam  awake, NAD  CV: RRR without murmur or rub  Lung: clear and equal; no extra sounds  Chest: has ostomy bag over right chest wall draining lesion  Ab: mildly distended, mildy tender  Ext: no edema and well perfused  Skin; no rash    Pertinent Labs   Lab Results   Component Value Date    WBC 16.7 (H) 08/26/2022    HGB 7.0 (L) 08/29/2022    HCT 23.0 (L) 08/26/2022    MCV 94 08/26/2022     (L) 08/26/2022     Lab Results   Component Value Date    BUN 68 (H) 08/29/2022      08/29/2022    CO2 22 08/29/2022       Lab Results   Component Value Date    ALBUMIN 1.7 (L) 08/26/2022     Lab Results   Component Value Date    PHOS 3.2 04/03/2022     I reviewed all lab results

## 2022-08-29 NOTE — PLAN OF CARE
Goal Outcome Evaluation:  Pt is alert oriented x4. Plan of care explained. Vss. Denies pain. SBA to indep in room. Pt's spouse and son here most part of [place] visiting. Pt appears comfortable. No resp distress noted or reported. 2030 Hgb=7.4. Will cont to monitor.

## 2022-08-29 NOTE — PLAN OF CARE
Problem: Plan of Care - These are the overarching goals to be used throughout the patient stay.    Goal: Absence of Hospital-Acquired Illness or Injury  Intervention: Identify and Manage Fall Risk  Recent Flowsheet Documentation  Taken 8/29/2022 0500 by Manda Cruz RN  Safety Promotion/Fall Prevention:   room door open   nonskid shoes/slippers when out of bed   lighting adjusted  Taken 8/29/2022 0010 by Manda Cruz RN  Safety Promotion/Fall Prevention:   room door open   nonskid shoes/slippers when out of bed   lighting adjusted  Taken 8/28/2022 2040 by Manda Cruz RN  Safety Promotion/Fall Prevention:   room door open   nonskid shoes/slippers when out of bed   lighting adjusted     Problem: Pain (Chronic Kidney Disease)  Goal: Acceptable Pain Control  8/29/2022 0733 by Manda Cruz RN  Outcome: Ongoing, Progressing  8/28/2022 2233 by Manda Cruz RN  Outcome: Ongoing, Progressing  Intervention: Prevent or Manage Pain  Recent Flowsheet Documentation  Taken 8/29/2022 0400 by Manda Cruz RN  Pain Management Interventions: medication (see MAR)  Taken 8/29/2022 0020 by Manda Cruz RN  Pain Management Interventions: medication (see MAR)   Goal Outcome Evaluation:      Pt is alert oriented x4. VSS. Pt was medicated with PRN tylenol and ambien for c/o of insomnia and headache. Pt slept for approx 1hr and c/o could not sleep. MD was notified and one time dose of ambien was ordered and given. Pt slept fairly well after that.

## 2022-08-29 NOTE — PLAN OF CARE
Problem: Malnutrition  Goal: Improved Nutritional Intake  Outcome: Ongoing, Progressing   Goal Outcome Evaluation:      Diet advanced to Renal (dialysis) 8/27/22.  Pt eating % of meals.

## 2022-08-29 NOTE — PROGRESS NOTES
SPIRITUAL HEALTH SERVICES Progress Note  [facility]  105    Saw pt Elle Blanton per [facility] consult order.  I introduced myself to his son, Hudson as Elle was sleeping.      Illness Narrative - Elle has multiple medical issues along with end stage renal disease and mass in liver.  [facility] was asked to see the family as Elle contemplated comfort cares.  This was on Friday.  Today, Hudson says his dad will continue to receive treatment.      Distress - Hudson acknowledged that it is very hard to watch his father get sicker and sicker.  He is trying to be strong for his mother and sister.      Coping - Family leans on each other for support.      Meaning-Making - Hudson talked about just wanting to spend as much time with his father as he can.      Plan - I let Hudson know that [facility] is available for the whole family and to talk to the nurse if they want to see a  again for more support.    Komal Acosta  Associate   Pager number:  [phone]

## 2022-08-29 NOTE — PROGRESS NOTES
"[facility]  Palliative Care Daily Progress Note       Recommendations & Counseling     Mr. Blanton is aware of diagnosis of probable hepatocellular carcinoma.  Today he notes he would wish to continue receiving life-prolonging treatments including transfer to ICU, transfusions, and hemodialysis.    Goals of care: currently life-prolonging.  Has had prior expressions of wanting to transition to comfort; Mr. Blanton continues to discern best path for him.    ACP:HCD on file reviewed, naming wife Maico and son Hudson as HCA and first-alternate HCA.  Daughter Francie is not listed as a health care agent.  Code status: now DNR/DNI.  Wife notes Elle has consistently noted he does not wish to be intubated/\"on machines\".    Support:Worship bry, spiritual care support requested and update today appreciated.  Wife, Maico; adult children Hudson and Francie.  Palliative medicine will continue to follow; today Mr. Blanton declined my request to call his wife to provide support.        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 Found to have hemoperitoneum due to ruptured HCC with pericapsular hematoma.   Underwent IR embolization of bleeding site on 8/26/22.  Has stabilized off pressors, and has continued to desire HD at this time.    Today, the patient was seen for:  Follow up, support    Prognosis, Goals, or Advance Care Planning was addressed today with: Yes.  Mood, coping, and/or meaning in the context of serious illness were addressed today: Yes.              Interval History:     Chart review/discussion with unit or clinical team members:   Had HD today, uneventful.    Per patient or family/caregivers today:  Elle denies " pain, denies dyspnea, and is hungry for lunch.  Drainage collection bag over his pleural space fistula not bothersome.  Denies worsening abdominal pain.    Key Palliative Symptoms:  # Pain severity the last 12 hours: low  # Dyspnea severity the last 12 hours: low  # Nausea severity the last 12 hours: low  # Anxiety severity the last 12 hours: low                Review of Systems:     Besides above, an additional 2 system ROS was reviewed and is unremarkable          Medications:     I have reviewed this patient's medication profile and medications during this hospitalization.    Noted meds:    Current Facility-Administered Medications   Medication     0.9% sodium chloride BOLUS     0.9% sodium chloride BOLUS     [START ON 8/31/2022] 0.9% sodium chloride BOLUS     acetaminophen (TYLENOL) tablet 650 mg    Or     acetaminophen (TYLENOL) solution 650 mg     albumin human 25 % injection 50 mL     carvedilol (COREG) tablet 12.5 mg     ceFEPIme (MAXIPIME) 1g vial to attach to  ml bag for ADULTS or NS 50 ml bag for PEDS     [Held by provider] cloNIDine (CATAPRES) tablet 0.1 mg     glucose gel 15-30 g    Or     dextrose 50 % injection 25-50 mL    Or     glucagon injection 1 mg     diltiazem ER COATED BEADS (CARDIZEM CD/CARTIA XT) 24 hr capsule 180 mg     entecavir (BARACLUDE) tablet 0.5 mg     epoetin kelton-epbx (RETACRIT) injection 12,000 Units     HYDROmorphone (STANDARD CONC) (DILAUDID) oral solution 1 mg     hydrOXYzine (ATARAX) tablet 25 mg     lactulose (CHRONULAC) solution 20 g     lidocaine 1 % 0.5 mL     lidocaine 1 % 0.5 mL     lidocaine 1 % 0.5 mL     naloxone (NARCAN) injection 0.2 mg    Or     naloxone (NARCAN) injection 0.4 mg     No heparin via hemodialysis machine     pantoprazole (PROTONIX) IV push injection 40 mg     senna-docusate (SENOKOT-S/PERICOLACE) 8.6-50 MG per tablet 1 tablet    Or     senna-docusate (SENOKOT-S/PERICOLACE) 8.6-50 MG per tablet 2 tablet     simethicone (MYLICON) chewable tablet  "80 mg     Stop Heparin 60 minutes before end of treatment     thiamine (B-1) tablet 100 mg     vancomycin place rivera - receiving intermittent dosing     zolpidem (AMBIEN) tablet 5 mg   24-hr MME: 0mg             Physical Exam:   Vital Signs: Blood pressure 124/72, pulse 75, temperature 98.1  F (36.7  C), temperature source Oral, resp. rate (P) 22, height 1.651 m (5' 5\"), weight 56.9 kg (125 lb 6.4 oz), SpO2 96 %.   GENERAL: Alert male, thin, appears chronically ill.  SKIN: Warm and dry   HEENT: Normocephalic, anicteric sclera, moist mucous membranes  LUNGS: Clear to auscultation anterolaterally; non-labored   CARDIAC: RRR, normal s1/s2, w/o m/r/g   ABDOMINAL: BS (+), soft, non distended, non tender  MUSKL: no gross joint deformities   EXTREMITIES: No edema or cyanosis, pulses 2+ and symmetrical  NEUROLOGIC: fluent speech, oriented, moves arms/legs equally.  PSYCH: affect flat.             Data Reviewed:     Reviewed recent pertinent imaging:   No new imaging results since last palliative medicine visit.    Reviewed recent labs:   Last Comprehensive Metabolic Panel:  Sodium   Date Value Ref Range Status   08/29/2022 136 136 - 145 mmol/L Final   04/29/2021 134 (L) 136 - 145 mmol/L Final     Potassium   Date Value Ref Range Status   08/29/2022 4.4 3.5 - 5.0 mmol/L Final   04/29/2021 4.7 3.5 - 5.0 mmol/L Final     Chloride   Date Value Ref Range Status   08/29/2022 104 98 - 107 mmol/L Final   04/29/2021 99 98 - 107 mmol/L Final     Carbon Dioxide   Date Value Ref Range Status   12/30/2020 21.0 20.0 - 32.0 mmol/L Final     Carbon Dioxide (CO2)   Date Value Ref Range Status   08/29/2022 22 22 - 31 mmol/L Final     Anion Gap   Date Value Ref Range Status   08/29/2022 10 5 - 18 mmol/L Final   08/10/2019 6 3 - 14 mmol/L Final     Glucose   Date Value Ref Range Status   08/29/2022 105 70 - 125 mg/dL Final   04/29/2021 111 70 - 125 mg/dL Final     Urea Nitrogen   Date Value Ref Range Status   08/29/2022 68 (H) 8 - 22 mg/dL " Final   04/29/2021 71 (H) 8 - 22 mg/dL Final     Creatinine   Date Value Ref Range Status   08/29/2022 8.64 (HH) 0.70 - 1.30 mg/dL Final   04/29/2021 5.13 (H) 0.70 - 1.30 mg/dL Final     GFR Estimate   Date Value Ref Range Status   08/29/2022 6 (L) >60 mL/min/1.73m2 Final     Comment:     Effective December 21, 2021 eGFRcr in adults is calculated using the 2021 CKD-EPI creatinine equation which includes age and gender (Moncho et al., NEJ, DOI: 10.1056/RNGIyw3223727)   04/29/2021 12 (L) >60 mL/min/1.73m2 Final     Calcium   Date Value Ref Range Status   08/29/2022 7.0 (L) 8.5 - 10.5 mg/dL Final   04/29/2021 7.9 (L) 8.5 - 10.5 mg/dL Final     CBC RESULTS: Recent Labs   Lab Test 08/29/22  0643 08/26/22  1155 08/26/22  0850   WBC  --   --  16.7*   RBC  --   --  2.46*   HGB 7.0*   < > 7.4*   HCT  --   --  23.0*   MCV  --   --  94   MCH  --   --  30.1   MCHC  --   --  32.2   RDW  --   --  17.3*   PLT  --   --  101*    < > = values in this interval not displayed.   AFP tumor marker: [id number]        Evelyn Archer MD  [facility],  Palliative Medicine Service  [phone] department number  Can page via [employer]

## 2022-08-29 NOTE — PROGRESS NOTES
[facility]    Progress Note - Hospitalist Service       Date of Admission:  8/25/2022    Assessment & Plan          Elle Blanton is a 62 year old male admitted on 8/25/2022. He has a complicated medical history including multiple prior admissions for right lung empyema requiring chest tube drainage, anemia of chronic disease, end-stage renal disease on dialysis (Monday and Friday) secondary to hepatorenal syndrome due to cirrhosis, hepatocellular carcinoma, chronic Hepatitis B, thoracic aortic dissection.  Admitted for hemoperitoneum likely secondary to ruptured HCC.      Syncope  Hypovolemic shock- resolved  Hemoperitoneum-suspect ruptured HCC with pericapsular hematoma - stable  Chronic hepatitis B  Cirrhosis  Noted to have active arterial bleeding secondary to malignant mass in liver, s/p IR embolization. Suspect HCC, although this is based on imaging and not biopsy. Paracentesis 8/26 with 2.4 L hemorrhagic fluid.  Initially requiring multiple pressors for blood pressure support. Patient is not a transplant candidate based on the size of his mass and likely metastasis. S/p 3 units pRBCs. Hgb stable/drifting down. Unlikely to be significant ongoing bleeding.  -GI has signed off at this point  -ID consulted, appreciate recommendations to stop vanco, continue cefepime, and will start Ancef if confirmed to have MSSA (can stop cefepime at that time)   -Continue Entecavir  -Lactulose- goal of 3-4 loose BMs per day   -Coreg 12.5 mg BID. Plan to titrate up to home dosing of 50 mg if BP allows, and eventually restart clonidine.   -Diltiazem 180mg daily   -Continue PTA PPI  -Dilaudid PRN for pain   -Hgb checks - will decrease to daily today as he is burdened by the frequent blood draws  -Telemetry  -If increasing belly pain or distension consider IR consult for US guided paracentesis      R hydropneumothorax with volume loss of R lung  Pleurocutaneous fistula vs chest wall abscess  Chronic trapped  lung, empyema   Was scheduled for I&D of possible chest abscess 8/26.  This was canceled in the setting of his acute liver bleed.  Evaluated by Dr. Reynaga who does not think he is a good surgical candidate.  History of MSSA empyema. Current chest wall culture showing gram positive cocci.   -General surgery consulted, appreciate recs  -O2 support as needed, 94-96% SpO2  -Will adjust antibiotics pending chest wall aspirate cultures, as per above    ESRD on HD ([facility])  Has refused three times a week dialysis in the past. Has been agreeable to M,F dialysis schedule   - Nephrology consulted, dialyzed 8/26, next HD 8/29.      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  He knows he is likely near the end of his life, but still feels strong and wants to continue treatment for now.  Had family meeting on 8/26, palliative to see him again today 8/29.  Is in discussion with family about transition to comfort care and/or hospice. Elle wants to wait and see if he continues to bleed into his belly before deciding on next steps.  For now, he is NOT on comfort cares.    -Palliative consult, appreciate recs        Diet: Advance Diet as Tolerated: Regular Diet Adult; Renal Diet (dialysis)    DVT Prophylaxis: Pneumatic Compression Devices  Beltre Catheter: Not present  Fluids: PO  Central Lines: PRESENT  PICC Single Lumen 08/25/22 Right Basilic-Site Assessment: WDL  Cardiac Monitoring: None  Code Status: No CPR- Do NOT Intubate      Disposition Plan      Expected Discharge Date: 08/31/2022        Discharge Comments: palliative to see 8/29  GI signed off  nephrology still following   surgery following        Patient staffed with Dr. Grant.    Christy Virk,  MD [facility], PGY-2  Pager #: [phone]      Clinically Significant Risk Factors Present on Admission                 # Severe Malnutrition: based on nutrition assessment     ______________________________________________________________________    Interval History   Elle is seen during his dialysis run this morning.  He states that he feels pretty well overall.  He is not having any pain in his belly at all.  He states it actually feels better if pressure is applied to his belly.  He does feel like he has a lot of gas.  He feels pretty tired but states that has his baseline.  He wishes to decrease the number of blood draws he is having.  For now he does want to have a transfusion if his hemoglobin goes below 7.    Data reviewed today: I reviewed all medications, new labs and imaging results over the last 24 hours. I personally reviewed no images or EKG's today.    Physical Exam   Vital Signs: Temp: 98.1  F (36.7  C) Temp src: Oral BP: 128/64 Pulse: 75   Resp: (P) 22 SpO2: 96 % O2 Device: None (Room air)    Weight: 125 lbs 6.4 oz  EXAM  General: Alert, chronically ill appearing, no acute distress, fatigued.  Head: Nontraumatic   Eyes: PERRL, EOM intact   Oropharynx: moist oral mucus membranes  Pulm: normal work of breathing, no tachypnea, speaking in full sentences  CV: RRR, no murmur  Abd: soft, moderately distended but not tender  Ext: Warm, well perfused, no LE edema  Skin: No rash on limited skin exam  Neuro:  moves all 4 extremities  Psych: Mood appropriate to visit content, rational thought content and process      Data   Recent Labs   Lab 08/29/22  0643 08/28/22  2041 08/28/22  1212 08/27/22  1810 08/27/22  1218 08/27/22  1216 08/27/22  0603 08/26/22  0901 08/26/22  0850 08/25/22  2353   WBC  --   --   --   --   --   --   --   --  16.7* 12.1*   HGB 7.0* 7.4* 7.1*   < >  --    < > 7.4*   < > 7.4* 6.0*   MCV  --   --   --   --   --   --   --   --  94 99   PLT  --   --   --    --   --   --   --   --  101* 125*   INR  --   --   --   --   --   --   --   --  1.93*  --      --   --   --   --   --  135*  --  138 138   POTASSIUM 4.4  --   --   --   --   --  4.0  --  5.7* 4.6   CHLORIDE 104  --   --   --   --   --  100  --  102 99   CO2 22  --   --   --   --   --  24  --  21* 25   BUN 68*  --   --   --   --   --  43*  --  67* 63*   CR 8.64*  --   --   --   --   --  5.59*  --  7.78* 8.09*   ANIONGAP 10  --   --   --   --   --  11  --  15 14   TANO 7.0*  --   --   --   --   --  7.3*  --  6.6* 7.1*     --   --   --  94  --  85   < > 89 137*   ALBUMIN  --   --   --   --   --   --   --   --  1.7* 1.7*   PROTTOTAL  --   --   --   --   --   --   --   --  5.5* 6.2   BILITOTAL  --   --   --   --   --   --   --   --  0.9 0.7   ALKPHOS  --   --   --   --   --   --   --   --  103 114   ALT  --   --   --   --   --   --   --   --  19 <9   AST  --   --   --   --   --   --   --   --  46* 19    < > = values in this interval not displayed.     No results found for this or any previous visit (from the past 24 hour(s)).

## 2022-08-29 NOTE — PLAN OF CARE
Goal Outcome Evaluation:        Pt. Pleasant and cooperative today, received dialysis this am, eating well, PRN tylenol give for HA with relief, remains on IV abx, Hg checks changed to daily,BP meds held until after dialysis and pt. Refused carvedilol and diltiazem after dialysis stating he does not want his BP to drop lower, MD updated, ostomy bag has minimal drainage to chest tube site, voided x1 prior to dialysis, will cont to monitor.

## 2022-08-29 NOTE — PROGRESS NOTES
Progress Note    Assessment/Plan  Patient clinically stable now for over 48 hours.  Hemoglobin noted to be down somewhat.  Patient has been eating.  Abdomen remains reasonably benign though distended.  Reviewed all data to the current time.  Would avoid major surgical invention at this time.  Right chest drainage bag to remain in place.    Active Problems:    Chronic hepatitis B without hepatic coma (H)    ESRD (end stage renal disease) on dialysis (H)    Pleural effusion    Hydropneumothorax    Thoracic aortic dissection (H)    Hypovolemic shock (H)    Hemoperitoneum    Liver mass    Other ascites      Subjective  Mild discomfort right chest wall drainage site.  Feels distended but no significant new pain.  Tolerating oral intake.  Objective    Vital signs in last 24 hours  Temp:  [98.1  F (36.7  C)-98.6  F (37  C)] 98.1  F (36.7  C)  Pulse:  [70-86] 75  Resp:  [18-22] (P) 22  BP: ()/() 124/72  SpO2:  [94 %-96 %] 96 %  Weight:   [unfilled]    Intake/Output last 3 shifts  I/O last 3 completed shifts:  In: 405 [P.O.:360; I.V.:45]  Out: 50 [Chest Tube:50]  Intake/Output this shift:  I/O this shift:  In: 240 [P.O.:240]  Out: 10 [Chest Tube:10]      Physical Exam  Abdomen still somewhat distended but certainly improved over the last few days.  Active bowel sounds.  No peritoneal findings.  Chest with drainage bag in place.  Small amount of drainage only.  No surrounding spreading cellulitis.    Pertinent Labs   Lab Results   Component Value Date    WBC 16.7 (H) 08/26/2022    HGB 7.0 (L) 08/29/2022    HCT 23.0 (L) 08/26/2022    MCV 94 08/26/2022     (L) 08/26/2022             Pertinent Radiology     [unfilled]        Dawson Reynaga MD

## 2022-08-30 NOTE — PROGRESS NOTES
Renal progress note  CC: ESRD, shock, liver bleed    Assessment and Plan:  62 year old male with history of ESRD on hemodialysis Monday Friday ideally will need 3 times treatment but refuses more than twice a week treatments, at [facility], history of hypertension , hepatitis B, chronic recurrent right-sided effusion with trapped lung not a candidate for surgical decortication multiple chest tubes in the past, chronic type B aortic dissection, hepatic mass on CT ??  HCC, presented with an episode of syncope and hypotension.  Complaining of abdominal and chest wall pain, admission CT concerning for hemoperitoneum, possible active extravasation from hepatic mass now s/p embolization with IR of the feeding hepatic artery, currently hypotensive hypothermic in the ICU on 2 pressors with elevated lactate and leukocytosis on antibiotics for concerns septic shock, nephrology consulted for ESRD management     ESRD on IHD ideally 3x a week  Follows at [facility] with myself  Ideally needs to be treated 3 times a week, has has been intermittently compliant with this  Willing to more regularly come to 3x weekly dialysis if we shorten the Wed treatment, he has a hard time lying still for 3 hours  Treatment time is 3 hours barely completes his treatment mostly treatments are around 130 to 140 minutes  More recently he's had less fluid on, I think he's been eating less as an outpatient  Current target weight is 53.5, standing scale weight is 57kg today, presumably a lot of this is ascites  --> Will continue HD MWF     Access  Left upper extremity AVG  Has had chronic issues with bleeding although with currently appears nonedematous and thrill positive  Dr. Cosby 4/2022      History of hypertension  Improved BP  --> on coreg and diltiazem 180mg daily  --> agree with increasing coreg, ideally we wouldn't have to add the clonidine back     Electrolytes stable     MBD  Has been on Tums with meals  Phosphorus has  been around 4 at goal mild low calcium is chronically around 8  PTH last checked on 8/3/2022 was 86  Alk phos around 160-200 chronically     Anemia acute on chronic  With bleed  Hold off any iron with transfusions and possible infection     Chronic right-sided effusion/empyema with trapped lung  Follows pulmonology outpatient with Dr. Parminder Christian noted from outpatient visits  Growing MSSA from his chest wall, on cefazolin currently     Chronic hepatitis B  Has been off all antiviral treatment  Possible hepatocellular carcinoma with hepatic mass on CT with active bleed  Status post IR embolization of hepatic artery that was bleeding  AFP is 9700 which seems to pretty much lock in the diagnosis of HCC  Further follow up per GI     Shock resolved     History of chronic type B aortic dissection  Stable has been followed by cardiothoracic surgery    Thank you for the consultation we will follow    Nate Hill  [facility]  [phone]        Subjective  Wife in room.  Wonders about next steps.  Discussed that we're all very suspicious for an HCC but it hasn't been confirmed.  She wonders how to go about this, he will need GI follow up.  I told her he may not be a candidate for any therapy for this given its size and likely spread.    Objective    Vital signs in last 24 hours  Temp:  [98.3  F (36.8  C)-98.7  F (37.1  C)] 98.7  F (37.1  C)  Pulse:  [83-95] 95  Resp:  [18-20] 18  BP: (139-173)/(77-89) 173/89  SpO2:  [95 %-98 %] 95 %  Weight:   [unfilled]    Intake/Output last 3 shifts  I/O last 3 completed shifts:  In: 358 [P.O.:358]  Out: 1040 [Other:1000; Chest Tube:40]  Intake/Output this shift:  No intake/output data recorded.    Physical Exam  awake, NAD  CV: RRR without murmur or rub  Lung: clear and equal; no extra sounds  Chest: has ostomy bag over right chest wall draining lesion  Ab: mildly distended, minimally tender  Ext: no edema and well perfused  Skin; no rash    Pertinent Labs    Lab Results   Component Value Date    WBC 16.7 (H) 08/26/2022    HGB 7.5 (L) 08/30/2022    HCT 23.0 (L) 08/26/2022    MCV 94 08/26/2022     (L) 08/26/2022     Lab Results   Component Value Date    BUN 68 (H) 08/29/2022     08/29/2022    CO2 22 08/29/2022       Lab Results   Component Value Date    ALBUMIN 1.7 (L) 08/26/2022     Lab Results   Component Value Date    PHOS 3.2 04/03/2022     I reviewed all lab results

## 2022-08-30 NOTE — PLAN OF CARE
Problem: Plan of Care - These are the overarching goals to be used throughout the patient stay.    Goal: Absence of Hospital-Acquired Illness or Injury  Intervention: Identify and Manage Fall Risk  Recent Flowsheet Documentation  Taken 8/30/2022 0000 by Amparo Krishnamurthy RN  Safety Promotion/Fall Prevention: room door open  Intervention: Prevent Skin Injury  Recent Flowsheet Documentation  Taken 8/30/2022 0000 by Amparo Krishnamurthy RN  Body Position:   position changed independently   side-lying  Taken 8/29/2022 2358 by Amparo Krishnamurthy RN  Body Position:   position changed independently   side-lying  Intervention: Prevent and Manage VTE (Venous Thromboembolism) Risk  Recent Flowsheet Documentation  Taken 8/30/2022 0000 by Amparo Krishnamurthy RN  VTE Prevention/Management: SCDs (sequential compression devices) on  Activity Management: activity adjusted per tolerance  Taken 8/29/2022 2358 by Amparo Krishnamurthy RN  Activity Management: activity adjusted per tolerance  Intervention: Prevent Infection  Recent Flowsheet Documentation  Taken 8/30/2022 0000 by Amparo Krishnamurthy RN  Infection Prevention: hand hygiene promoted     Problem: Device-Related Complication Risk (Hemodialysis)  Goal: Safe, Effective Therapy Delivery  Intervention: Optimize Device Care and Function  Recent Flowsheet Documentation  Taken 8/30/2022 0000 by Amparo Krishnamurthy RN  Medication Review/Management: medications reviewed     Problem: Functional Decline (Chronic Kidney Disease)  Goal: Optimal Functional Ability  Intervention: Optimize Functional Ability  Recent Flowsheet Documentation  Taken 8/30/2022 0000 by Amparo Krishnamurthy RN  Activity Management: activity adjusted per tolerance  Taken 8/29/2022 2358 by Amparo Krishnamurthy RN  Activity Management: activity adjusted per tolerance     Problem: Renal Function Impairment (Chronic Kidney Disease)  Goal: Minimize Renal Failure Effects  Intervention: Monitor and Support Renal Function  Recent Flowsheet  Documentation  Taken 8/30/2022 0000 by Amparo Krishnamurthy RN  Medication Review/Management: medications reviewed     Problem: Gastrointestinal Complications (Liver Failure)  Goal: Optimal Gastrointestinal Function  Intervention: Monitor and Support Gastrointestinal Function  Recent Flowsheet Documentation  Taken 8/30/2022 0000 by Amparo Krishnamurthy RN  Body Position:   position changed independently   side-lying  Taken 8/29/2022 2358 by Amparo Krishnamurthy RN  Body Position:   position changed independently   side-lying     Problem: Respiratory Compromise (Liver Failure)  Goal: Effective Oxygenation and Ventilation  Intervention: Promote Airway Secretion Clearance  Recent Flowsheet Documentation  Taken 8/30/2022 0000 by Amparo Krishnamurthy RN  Cough And Deep Breathing: done with encouragement  Activity Management: activity adjusted per tolerance  Taken 8/29/2022 2358 by Amparo Krishnamurthy RN  Activity Management: activity adjusted per tolerance  Intervention: Optimize Oxygenation and Ventilation  Recent Flowsheet Documentation  Taken 8/30/2022 0000 by Amparo Krishnamurthy RN  Head of Bed (HOB) Positioning: HOB at 20-30 degrees  Taken 8/29/2022 2358 by Amparo Krishnamurthy RN  Head of Bed (HOB) Positioning: HOB at 20-30 degrees     Problem: Hypertension Comorbidity  Goal: Blood Pressure in Desired Range  Intervention: Maintain Blood Pressure Management  Recent Flowsheet Documentation  Taken 8/30/2022 0000 by Amparo Krishnamurthy RN  Medication Review/Management: medications reviewed   Goal Outcome Evaluation:

## 2022-08-30 NOTE — PROGRESS NOTES
Progress Note    Assessment/Plan  Patient has a problem with a lack of seal of the drainage bag in his right thoracic fistula site.  He otherwise has been eating clinically is without significant additional change.  He abdomen is still distended but without significant pain at the current time.  Reviewed with patient and wife wife present at this time.  Recommended no major surgical intervention thoracic or liver.  He had previous surgical intervention scheduled for current admission in the hospital.  He can certainly return to that service for additional potential intervention  All diagnostic studies have been reviewed with wife.  He still has very limited understanding of the whole clinical situation at this time.  Undiagnosed liver neoplasm.  Chronic right pneumonia and empyema.  Active Problems:    Chronic hepatitis B without hepatic coma (H)    ESRD (end stage renal disease) on dialysis (H)    Pleural effusion    Hydropneumothorax    Thoracic aortic dissection (H)    Hypovolemic shock (H)    Hemoperitoneum    Liver mass    Other ascites      Subjective  Reasonably comfortable however significant drainage from right chest wound site where his bag is not sealed very well.  Denies significant abdominal pain.  Denies shortness of breath.  Objective    Vital signs in last 24 hours  Temp:  [98.3  F (36.8  C)-98.6  F (37  C)] 98.5  F (36.9  C)  Pulse:  [75-93] 93  Resp:  [18-20] 18  BP: (124-164)/(64-83) 163/83  SpO2:  [95 %-98 %] 98 %  Weight:   [unfilled]    Intake/Output last 3 shifts  I/O last 3 completed shifts:  In: 358 [P.O.:358]  Out: 1040 [Other:1000; Chest Tube:40]  Intake/Output this shift:  No intake/output data recorded.      Physical Exam  Exam indicates drainage around the PEG site right chest wall which is a chronic fistula from a chest tube site previously placed for empyema.  Abdomen is benign but still somewhat distended.  Bowel sounds present.  No peritoneal findings.  Left chest is with limited  rhonchi.  Right with significant decreased breath sounds and some expiratory wheeze.    Pertinent Labs   Lab Results   Component Value Date    WBC 16.7 (H) 08/26/2022    HGB 7.5 (L) 08/30/2022    HCT 23.0 (L) 08/26/2022    MCV 94 08/26/2022     (L) 08/26/2022             Pertinent Radiology     [unfilled]        Dawson Reynaga MD

## 2022-08-30 NOTE — PROGRESS NOTES
[facility]    Progress Note - Hospitalist Service       Date of Admission:  8/25/2022    Assessment & Plan          Elle Blanton is a 62 year old male admitted on 8/25/2022. He has a complicated medical history including multiple prior admissions for right lung empyema requiring chest tube drainage, anemia of chronic disease, end-stage renal disease on dialysis (Monday and Friday) secondary to hepatorenal syndrome due to cirrhosis, hepatocellular carcinoma, chronic Hepatitis B, thoracic aortic dissection.  Admitted for hemoperitoneum likely secondary to ruptured HCC.      Syncope  Hypovolemic shock- resolved  Hemoperitoneum-suspect ruptured HCC with pericapsular hematoma - stable  Chronic hepatitis B  Cirrhosis  Noted to have active arterial bleeding secondary to malignant mass in liver, s/p IR embolization. Suspect HCC, although this is based on imaging and not biopsy. Paracentesis 8/26 with 2.4 L hemorrhagic fluid.  Initially requiring multiple pressors for blood pressure support. Patient is not a transplant candidate based on the size of his mass and likely metastasis. S/p 3 units pRBCs. Hgb stable/drifting down. Unlikely to be significant ongoing bleeding.  -GI has signed off at this point  -ID consulted, appreciate recommendations to transition to ancef for fistula (see below)   -Continue Entecavir  -Lactulose- goal of 3-4 loose BMs per day   -Coreg 12.5 mg BID. Plan to titrate up to home dosing of 50 mg if BP allows (note that pt frequently refuses to take this for fear of hypotension)  -Diltiazem 180mg daily (note that pt frequently refuses to take this for fear of hypotension)  -Continue PTA PPI  -Dilaudid PRN for pain   -Hgb checks daily - stable today at 7.5  -Telemetry  -If increasing belly pain or distension consider IR consult for US guided paracentesis      R hydropneumothorax with volume loss of R lung  Pleurocutaneous fistula vs chest wall abscess  Chronic trapped lung,  empyema   Was scheduled for I&D of possible chest abscess 8/26.  This was canceled in the setting of his acute liver bleed.  Evaluated by Dr. Reynaga who does not think he is a good surgical candidate.  History of MSSA empyema. Current chest wall culture showing MSSA.  -General surgery consulted, appreciate recs  -O2 support as needed, 94-96% SpO2  -Continue Ancef - can transition to keflex daily and seek ID guidance for length of therapy  -WOC consult for draining fistula - appreciate assistance with ostomy bags over wound    ESRD on HD (MF)  Has refused three times a week dialysis in the past. Has been agreeable to M,F dialysis schedule   - Nephrology consulted, dialyzed 8/26, next HD 8/31.      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  He knows he is likely near the end of his life, but still feels strong and wants to continue treatment for now.  Had family meeting on 8/26, palliative following.  Is in discussion with family about transition to comfort care and/or hospice. For now, wants transfusions, dialysis, etc.       Diet: Advance Diet as Tolerated: Regular Diet Adult; Renal Diet (dialysis)    DVT Prophylaxis: Pneumatic Compression Devices  Beltre Catheter: Not present  Fluids: PO  Central Lines: PRESENT  PICC Single Lumen 08/25/22 Right Basilic-Site Assessment: WDL  Cardiac Monitoring: None  Code Status: No CPR- Do NOT Intubate      Disposition Plan      Expected Discharge Date: 08/31/2022        Discharge Comments: palliative to see 8/29- doesn't want hospice   GI signed off  nephrology still following   surgery following  Chest Tube        Patient staffed with Dr. Davalos.    Christy Virk MD  [facility] Medicine Residency  Program, PGY-2  Pager #: [phone]      Clinically Significant Risk Factors Present on Admission                 # Severe Malnutrition: based on nutrition assessment     ______________________________________________________________________    Interval History   Elle is seen with his wife and son at bedside today.  They have many questions about his probable hepatocellular carcinoma, what stage it is, etc.  They want to know his prognosis.  We discussed that this is difficult to do without a tissue biopsy and that in the past he has not wanted this.  Unclear if GI would pursue this due to his recent bleeding.  Likely this will have to be followed up as an outpatient.  He was happy to hear his hemoglobin is stable today at 7.5.  He is feeling about the same, just tired but no pain.  He is bothered by frequent coughing when he turns on his left side, is wondering why this is happening as no one has been able to give him a good answer.    Data reviewed today: I reviewed all medications, new labs and imaging results over the last 24 hours. I personally reviewed no images or EKG's today.    Physical Exam   Vital Signs: Temp: 98.7  F (37.1  C) Temp src: Oral BP: (!) 173/89 (nurse notify) Pulse: 95   Resp: 18 SpO2: 95 % O2 Device: None (Room air)    Weight: 124 lbs 0 oz  EXAM  General: Alert, chronically ill appearing, no acute distress, fatigued.  Head: Nontraumatic   Eyes: PERRL, EOM intact   Oropharynx: moist oral mucus membranes  Pulm: normal work of breathing, no tachypnea, speaking in full sentences  CV: RRR, no murmur  Abd: soft, moderately distended but not tender, improved from yesterday  Ext: Warm, well perfused, no LE edema  Skin: No rash on limited skin exam  Neuro:  moves all 4 extremities  Psych: Mood appropriate to visit content, rational thought content and process      Data   Recent Labs   Lab 08/30/22  1218 08/30/22  0803 08/30/22  0515 08/29/22  0643 08/28/22  2041 08/27/22  1216 08/27/22  0603  08/26/22  0901 08/26/22  0850 08/25/22  2353   WBC  --   --   --   --   --   --   --   --  16.7* 12.1*   HGB  --   --  7.5* 7.0* 7.4*   < > 7.4*   < > 7.4* 6.0*   MCV  --   --   --   --   --   --   --   --  94 99   PLT  --   --   --   --   --   --   --   --  101* 125*   INR  --   --   --   --   --   --   --   --  1.93*  --    NA  --   --   --  136  --   --  135*  --  138 138   POTASSIUM  --   --   --  4.4  --   --  4.0  --  5.7* 4.6   CHLORIDE  --   --   --  104  --   --  100  --  102 99   CO2  --   --   --  22  --   --  24  --  21* 25   BUN  --   --   --  68*  --   --  43*  --  67* 63*   CR  --   --   --  8.64*  --   --  5.59*  --  7.78* 8.09*   ANIONGAP  --   --   --  10  --   --  11  --  15 14   TANO  --   --   --  7.0*  --   --  7.3*  --  6.6* 7.1*   * 102*  --  105  --    < > 85   < > 89 137*   ALBUMIN  --   --   --   --   --   --   --   --  1.7* 1.7*   PROTTOTAL  --   --   --   --   --   --   --   --  5.5* 6.2   BILITOTAL  --   --   --   --   --   --   --   --  0.9 0.7   ALKPHOS  --   --   --   --   --   --   --   --  103 114   ALT  --   --   --   --   --   --   --   --  19 <9   AST  --   --   --   --   --   --   --   --  46* 19    < > = values in this interval not displayed.     No results found for this or any previous visit (from the past 24 hour(s)).

## 2022-08-30 NOTE — DISCHARGE INSTRUCTIONS
Angiogram Discharge Instructions:  You had an angiogram procedure.  An angiogram is a procedure that uses x-rays to take pictures of your blood vessels. A long, flexible tube or catheter is inserted through the blood stream (through the procedure site) to help deliver contrast (dye) into the arteries so they can be visible on the x-ray. Angiograms are used to evaluate possible blockages in the arterial system. Please follow the below instructions after your angiogram, including monitoring of your procedure site.    Care instructions after angiogram procedure:  -  If you received sedation for your procedure, do not drive or operate heavy machinery for the rest of the day.  -  Do not lift objects greater than 10 pounds for 2 days following angiogram procedure.  -  Avoid excessive exercise and straining for 2 days.   -  Avoid tub baths, pools, hot tubs and Jacuzzis for 3 days or until procedure site is well healed.   -  You may shower beginning tomorrow. Do not scrub procedure site until well healed; pat dry.  -  Return to your normal activities as you tolerate after the 2 day restriction.  -  You can expect to return to work 1-2 days after your procedure - depending on the nature of your profession.  -  It is normal to have some tenderness and minimal swelling at procedure puncture site. A small area of discoloration may be present. Tenderness typically subsides in 1-2 days. A small knot may also be present at puncture site for 6-8 weeks. This can be a normal part of the healing process.     Follow up:  - Follow up with your vascular surgeon or the ordering provider. [facility] may contact you to help arrange for additional follow up.    Please seek medical evaluation for:  - If you develop fevers (greater than 101 F (38.3C)).  - If you develop increasing pain, redness, purulent drainage, tenderness, or swelling at procedure site.   - If you experience any bleeding from procedure/puncture site: lie down, firmly  apply pressure to puncture site and call 911.  - Seek emergent evaluation if you experience any new leg/arm pain, discoloration or numbness.    Call [facility] at [phone] with questions/concerns or if you have any of the above symptoms.    Right chest fistula wound(s): Tuesday/Friday   1. Remove old pouch   2. Cleanse with skin water and dry  2. Apply ostomy pouch ([place] #[id number])-no need to cut pouch

## 2022-08-30 NOTE — CONSULTS
[facility]  WO Nurse Inpatient Assessment     Consulted for: Right chest    Patient History (according to provider note(s):      Elle Blanton is a 62 year old male admitted on 8/25/2022. He has a complicated medical history including multiple prior admissions for right lung empyema requiring chest tube drainage, anemia of chronic disease, end-stage renal disease on dialysis (Monday and Friday) secondary to hepatorenal syndrome due to cirrhosis, hepatocellular carcinoma, chronic Hepatitis B, thoracic aortic dissection.  Admitted for hemoperitoneum likely secondary to ruptured HCC.     Areas Assessed:      Areas visualized during today's visit: Focused:    Wound location: Right chest  Last photo: 8/3022    Wound due to: Fistula  Wound history/plan of care: Patient with right thoracic fistula which he has been just covering with gauze at home and changing a few times a day.  Area has been pouched in hospital and patient reports it is much better using the pouch.  Wound base: 100 % non-granular tissue,     Palpation of the wound bed: normal      Drainage: moderate     Description of drainage: tan     Measurements (length x width x depth, in cm): 0.3  x 0.5 cm (did not assess depth due to it being a fistula)     Tunneling: N/A     Undermining: N/A  Periwound skin: Intact      Color: normal and consistent with surrounding tissue      Temperature: normal   Odor: none  Pain: denies , none  Pain interventions prior to dressing change: N/A  Treatment goal: Heal   STATUS: initial assessment  Supplies ordered: ordered pouches       Treatment Plan:     Right chest fistula wound(s): Tuesday/Friday   1. Remove old pouch   2. Cleanse with skin water and dry  2. Apply ostomy pouch ([place] #[id number])-no need to cut pouch      Orders: Written    RECOMMEND PRIMARY TEAM ORDER: None, at this time  Education provided: plan of care  Discussed plan of care with: Patient, Family and Nurse  [facility] nurse follow-up plan:  weekly  Notify WOC if wound(s) deteriorate.  Nursing to notify the Provider(s) and re-consult the WOC Nurse if new skin concern.    DATA:     Current support surface: Standard  Atmos Air mattress  BMI: Body mass index is 20.63 kg/m .   Active diet order: Orders Placed This Encounter      Advance Diet as Tolerated: Regular Diet Adult; Renal Diet (dialysis)     Output: I/O last 3 completed shifts:  In: 358 [P.O.:358]  Out: 1040 [Other:1000; Chest Tube:40]     Labs: Recent Labs   Lab 08/30/22  0515 08/26/22  1155 08/26/22  0850   ALBUMIN  --   --  1.7*   HGB 7.5*   < > 7.4*   INR  --   --  1.93*   WBC  --   --  16.7*    < > = values in this interval not displayed.     Pressure injury risk assessment:   Sensory Perception: 4-->no impairment  Moisture: 4-->rarely moist  Activity: 3-->walks occasionally  Mobility: 3-->slightly limited  Nutrition: 3-->adequate  Friction and Shear: 3-->no apparent problem  Alex Score: 20    Main Berumen RN CWOCN

## 2022-08-30 NOTE — PLAN OF CARE
Alert and orientedx4. Room air. Complained of pain rated 5/10. PRN tylenol given. PRN ambien given for sleep. Checked colostomy bag no output. Will continue to monitor.

## 2022-08-30 NOTE — PLAN OF CARE
"  Problem: Plan of Care - These are the overarching goals to be used throughout the patient stay.    Goal: Plan of Care Review/Shift Note  Description: The Plan of Care Review/Shift note should be completed every shift.  The Outcome Evaluation is a brief statement about your assessment that the patient is improving, declining, or no change.  This information will be displayed automatically on your shift note.  Outcome: Ongoing, Progressing  Flowsheets (Taken 8/30/2022 1732)  Plan of Care Reviewed With: patient  Goal: Patient-Specific Goal (Individualized)  Description: You can add care plan individualizations to a care plan. Examples of Individualization might be:  \"Parent requests to be called daily at 9am for status\", \"I have a hard time hearing out of my right ear\", or \"Do not touch me to wake me up as it startles me\".  Outcome: Ongoing, Progressing  Goal: Absence of Hospital-Acquired Illness or Injury  Outcome: Ongoing, Progressing  Intervention: Identify and Manage Fall Risk  Recent Flowsheet Documentation  Taken 8/30/2022 1600 by Gutierrez Rey RN  Safety Promotion/Fall Prevention: activity supervised  Intervention: Prevent Skin Injury  Recent Flowsheet Documentation  Taken 8/30/2022 1600 by Gutierrez Rey RN  Body Position: position changed independently  Intervention: Prevent and Manage VTE (Venous Thromboembolism) Risk  Recent Flowsheet Documentation  Taken 8/30/2022 1600 by Gutierrez Rey RN  Activity Management: activity adjusted per tolerance  Intervention: Prevent Infection  Recent Flowsheet Documentation  Taken 8/30/2022 1600 by Gutierrez Rey RN  Infection Prevention: hand hygiene promoted  Goal: Optimal Comfort and Wellbeing  Outcome: Ongoing, Progressing  Goal: Readiness for Transition of Care  Outcome: Ongoing, Progressing     Problem: Risk for Delirium  Goal: Optimal Coping  Outcome: Ongoing, Progressing  Goal: Improved Behavioral Control  Outcome: Ongoing, Progressing  Goal: Improved " Attention and Thought Clarity  Outcome: Ongoing, Progressing  Goal: Improved Sleep  Outcome: Ongoing, Progressing     Problem: Malnutrition  Goal: Improved Nutritional Intake  Outcome: Ongoing, Progressing   Goal Outcome Evaluation:    Plan of Care Reviewed With: patient           Pt is alert and oriented x4. Pt has high Bp this afternoon. Bp med given.will recheck the bp in couple of hours. Pt is med complaint. Pt denies pain. Continue to monitor pt.

## 2022-08-30 NOTE — PLAN OF CARE
Goal Outcome Evaluation:      Patient denies pain this shift.  Colostomy bag on chest tube site changed by WOC nurse.  Abdomen remains distended.  Independent in room, call light within reach.  Continue IV antibiotics.

## 2022-08-31 NOTE — PROGRESS NOTES
Access: + thrill/bruit. Prior cannulation sites heeled with no sign of issue. Cannulated easily with 16g needles and set for prescribed 350 BFR with good pressures and alarm free treatment. Held post site for 15 minutes as pt stating having consistent bleeding issues. Bleeding confirmed stopped with dressing changes.     Treatment: Initiated at 1.4 with resulting C crit-line profile ~ reduced goal and met 1.0 UF. Pt remained alert and interactive with staff or phone throughout. B/p remained stable.     Plan: Pt is to receive blood today from unit staff and discharge.

## 2022-08-31 NOTE — PROGRESS NOTES
[facility]  Palliative Care Daily Progress Note       Recommendations & Counseling     Mr. Blanton and his family anticipate discharge in the next day.  As he continues to discern best path forward through treatment options, palliative care suggested to Mr. Blanton and his wife:  1. Early informational consult (as soon as he and family are ready) from hospice advised.  This would be to see if an agency might accept him with terminal diagnosis of HCC and still allow HD until he no longer wants/is able to go through HD (could give him more time/quality of life).  I reviewed that if no cancer-directed treatment is available, it may be possible for him to continue hemodialysis while receiving hospice care-this would be dependent/contingent on the hospice organization to subcontract with Elle's dialysis provider.  This would be because HCC would likely be primary admitting diagnosis for hospice, and ESRD might theoretically be a separate medical problem.      Palliative medicine will sign off; outpatient palliative appointments are available if Mr. Blanton and his family wish to have further discussion.  At family meeting, it was clear Dr Flores has major role in supporting Elle and his family--I did not place order for outpatient follow up but Elle and his family could request outpatient follow up at [phone]    Goals of care: currently life prolonging with limits.    ACP:HCD on file reviewed, naming wife Maico and son Hudson as HCA and first-alternate HCA.  Daughter Francie is not listed as a health care agent.  Code status: now DNR/DNI.  POLST (DNR/selective treatment) completed and emailed to honoring choices for inclusion with EMR    Support:Buddhism bry, spiritual care support appreciated by family and medical team.  Dr Flores is significant support to Elle and his family.  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 Found to have hemoperitoneum due to ruptured HCC with pericapsular hematoma.   Underwent IR embolization of bleeding site on 8/26/22.  Has stabilized off pressors, and has continued to desire HD at this time.    Today, the patient was seen for:  Advanced care planning, follow up on [facility].    Prognosis, Goals, or Advance Care Planning was addressed today with: Yes.  Mood, coping, and/or meaning in the context of serious illness were addressed today: Yes.              Interval History:     Chart review/discussion with unit or clinical team members:   Completed dialysis today without incident.  Plan for paracentesis and transfusion; nearing ability to discharge to home.  Discussed with Dr Virk of [facility].  Pt and family were working through decision of whether to undergo biopsy of liver mass, wanting to know and avail himself to treatment options.    Per patient or family/caregivers today:  Elle notes feeling well.  Looks forward to going home.  Reviewed code status here and that it changed to DNR/DNI.  Asked if Elle would wish to complete POLST to allow his wishes to be followed in outpatient setting.  He and wife Maico wished to complete POLST and prefer DNR/selective treatment polst--if recurrent hemorrhage he would still wish to be rehospitalized, would consider transfusions, if needed, pressors, and ICU level care but no intubation or CPR.    Their plan is for follow up as outpatient on cancer treatment options.  I reviewed that if no cancer-directed treatment is available, it may be possible for him to continue hemodialysis while receiving hospice care-this would be dependent/contingent on the hospice organization to subcontract with Elle's  dialysis provider.  This would be because HCC would likely be primary admitting diagnosis for hospice, and ESRD could theoretically be a separate issue.  Recommended further discussion and advance planning, talking to various hospice agencies BEFORE decision is made to see which hospice agency might allow (would likely need to be larger agency).    Key Palliative Symptoms:  # Pain severity the last 12 hours: none  # Dyspnea severity the last 12 hours: none  # Nausea severity the last 12 hours: low  We are not managing anxiety in this patient               Review of Systems:     Besides above, an additional 2 system ROS was reviewed and is unremarkable          Medications:     I have reviewed this patient's medication profile and medications during this hospitalization.    Noted meds:    Current Facility-Administered Medications   Medication     0.9% sodium chloride BOLUS     0.9% sodium chloride BOLUS     0.9% sodium chloride BOLUS     0.9% sodium chloride BOLUS     acetaminophen (TYLENOL) tablet 650 mg    Or     acetaminophen (TYLENOL) solution 650 mg     albumin human 25 % injection 50 mL     carvedilol (COREG) tablet 12.5 mg     ceFAZolin (ANCEF) 1 g vial to attach to  ml bag for ADULT or 50 ml bag for PEDS     [Held by provider] cloNIDine (CATAPRES) tablet 0.1 mg     glucose gel 15-30 g    Or     dextrose 50 % injection 25-50 mL    Or     glucagon injection 1 mg     diltiazem ER COATED BEADS (CARDIZEM CD/CARTIA XT) 24 hr capsule 180 mg     entecavir (BARACLUDE) tablet 0.5 mg     epoetin kelton-epbx (RETACRIT) injection 12,000 Units     HYDROmorphone (STANDARD CONC) (DILAUDID) oral solution 1 mg     hydrOXYzine (ATARAX) tablet 25 mg     lactulose (CHRONULAC) solution 20 g     lidocaine 1 % 0.5 mL     lidocaine 1 % 0.5 mL     lidocaine 1 % 0.5 mL     naloxone (NARCAN) injection 0.2 mg    Or     naloxone (NARCAN) injection 0.4 mg     No heparin via hemodialysis machine     pantoprazole (PROTONIX) IV push  "injection 40 mg     senna-docusate (SENOKOT-S/PERICOLACE) 8.6-50 MG per tablet 1 tablet    Or     senna-docusate (SENOKOT-S/PERICOLACE) 8.6-50 MG per tablet 2 tablet     simethicone (MYLICON) chewable tablet 80 mg     Stop Heparin 60 minutes before end of treatment     thiamine (B-1) tablet 100 mg     zolpidem (AMBIEN) tablet 5 mg                Physical Exam:   Vital Signs: Blood pressure (!) 152/77, pulse 88, temperature 98.3  F (36.8  C), temperature source Oral, resp. rate 18, height 1.651 m (5' 5\"), weight 56.2 kg (124 lb), SpO2 97 %.   GENERAL: alert male, oriented to situation and place,date time.  Wife Samena at bedside.   SKIN: Warm and dry   HEENT: Normocephalic, anicteric sclera, moist mucous membranes  LUNGS: breathing non-labored , no accessory muscle use  CARDIAC: no JVD  ABDOMINAL: BS (+), soft, distended, non tender  MUSKL: no gross joint deformities ; sarcopenia noted.  EXTREMITIES: No edema or cyanosis   NEUROLOGIC: no tremor, no myoclonus.  PSYCH: affect full, fluent speech.             Data Reviewed:     Reviewed recent pertinent imaging:   No new imaging studies since 8/26.    Reviewed recent labs:   Last Comprehensive Metabolic Panel:  Sodium   Date Value Ref Range Status   08/29/2022 136 136 - 145 mmol/L Final   04/29/2021 134 (L) 136 - 145 mmol/L Final     Potassium   Date Value Ref Range Status   08/29/2022 4.4 3.5 - 5.0 mmol/L Final   04/29/2021 4.7 3.5 - 5.0 mmol/L Final     Chloride   Date Value Ref Range Status   08/29/2022 104 98 - 107 mmol/L Final   04/29/2021 99 98 - 107 mmol/L Final     Carbon Dioxide   Date Value Ref Range Status   12/30/2020 21.0 20.0 - 32.0 mmol/L Final     Carbon Dioxide (CO2)   Date Value Ref Range Status   08/29/2022 22 22 - 31 mmol/L Final     Anion Gap   Date Value Ref Range Status   08/29/2022 10 5 - 18 mmol/L Final   08/10/2019 6 3 - 14 mmol/L Final     Glucose   Date Value Ref Range Status   08/29/2022 105 70 - 125 mg/dL Final   04/29/2021 111 70 - 125 " mg/dL Final     GLUCOSE BY METER POCT   Date Value Ref Range Status   08/30/2022 104 (H) 70 - 99 mg/dL Final     Urea Nitrogen   Date Value Ref Range Status   08/29/2022 68 (H) 8 - 22 mg/dL Final   04/29/2021 71 (H) 8 - 22 mg/dL Final     Creatinine   Date Value Ref Range Status   08/29/2022 8.64 (HH) 0.70 - 1.30 mg/dL Final   04/29/2021 5.13 (H) 0.70 - 1.30 mg/dL Final     GFR Estimate   Date Value Ref Range Status   08/29/2022 6 (L) >60 mL/min/1.73m2 Final     Comment:     Effective December 21, 2021 eGFRcr in adults is calculated using the 2021 CKD-EPI creatinine equation which includes age and gender (Moncho et al., NE, DOI: 10.1056/IHCKop0558540)   04/29/2021 12 (L) >60 mL/min/1.73m2 Final     Calcium   Date Value Ref Range Status   08/29/2022 7.0 (L) 8.5 - 10.5 mg/dL Final   04/29/2021 7.9 (L) 8.5 - 10.5 mg/dL Final     CBC RESULTS: Recent Labs   Lab Test 08/31/22  0633 08/26/22  1155 08/26/22  0850   WBC  --   --  16.7*   RBC  --   --  2.46*   HGB 7.5*   < > 7.4*   HCT  --   --  23.0*   MCV  --   --  94   MCH  --   --  30.1   MCHC  --   --  32.2   RDW  --   --  17.3*   PLT  --   --  101*    < > = values in this interval not displayed.          Evelyn Archer MD  [facility],  Palliative Medicine Service  [phone] department number  Can page via mGenerator

## 2022-08-31 NOTE — PROGRESS NOTES
Care Management Follow Up    Length of Stay (days): 5    Expected Discharge Date: 08/31/2022     Concerns to be Addressed:   Nephrology still following the patient, surgery following, chest tube   Patient plan of care discussed at interdisciplinary rounds: Yes    Anticipated Discharge Disposition:  Home with op HD.      Anticipated Discharge Services:  Home with op HD  Anticipated Discharge DME:  TBD      Education Provided on the Discharge Plan:  Per Care Team  Patient/Family in Agreement with the Plan:  Yes    Referrals Placed by CM/SW:  None at this time, patient open to dialyis at [place], [place]  Private pay costs discussed: Not applicable    Additional Information:    Patient previous to the hospital was ambulating with a cane, requiring assistance from her for ADLS/ IADLS. Patient lives in house with Spouse and adult Son. Patient receives HD for ESRD from [facility] in [place] on M/W/F per wife. Usually follows at the [facility] Family to transport.     Patient had palliative consult (8/29). Patient is declining Hospice at this time.     Awaiting further medical workup, and specialist consults.     CM will continue to follow plan of care, review recommendations, and assist with any discharge needs anticipated.       Laura Williamson RN

## 2022-08-31 NOTE — PLAN OF CARE
Problem: Plan of Care - These are the overarching goals to be used throughout the patient stay.    Goal: Absence of Hospital-Acquired Illness or Injury  Outcome: Ongoing, Progressing  Intervention: Identify and Manage Fall Risk  Recent Flowsheet Documentation  Taken 8/31/2022 0800 by Nina Almeida RN  Safety Promotion/Fall Prevention:   assistive device/personal items within reach   nonskid shoes/slippers when out of bed  Intervention: Prevent Infection  Recent Flowsheet Documentation  Taken 8/31/2022 0800 by Nina Almeida RN  Infection Prevention: hand hygiene promoted     Problem: Plan of Care - These are the overarching goals to be used throughout the patient stay.    Goal: Optimal Comfort and Wellbeing  Outcome: Ongoing, Progressing     Problem: Oral Intake Inadequate (Chronic Kidney Disease)  Goal: Optimal Oral Intake  Outcome: Ongoing, Progressing     Problem: Pain (Chronic Kidney Disease)  Goal: Acceptable Pain Control  Outcome: Ongoing, Progressing     Patient had C/O a headache which was relieved with APAP. Patient is alert and oriented, able to make needs known. IND. with transfers. Getting dialysis currently at this time. Hgb was 7.5 this morning.

## 2022-08-31 NOTE — PROGRESS NOTES
[facility]    Progress Note - Hospitalist Service       Date of Admission:  8/25/2022    Assessment & Plan          Elle Blanton is a 62 year old male admitted on 8/25/2022. He has a complicated medical history including multiple prior admissions for right lung empyema requiring chest tube drainage, anemia of chronic disease, end-stage renal disease on dialysis (Monday and Friday) secondary to hepatorenal syndrome due to cirrhosis, hepatocellular carcinoma, chronic Hepatitis B, thoracic aortic dissection.  Admitted for hemoperitoneum likely secondary to ruptured HCC. Overall much improved with plan to discharge late 8/31 vs 9/1 after therapeutic paracentesis, 1 unit pRBC, and dialysis.     Syncope  Hypovolemic shock- resolved  Hemoperitoneum-suspect ruptured HCC with pericapsular hematoma - stable  Chronic hepatitis B  Cirrhosis  Noted to have active arterial bleeding secondary to malignant mass in liver, s/p IR embolization. Suspect HCC, although this is based on imaging and not biopsy. Paracentesis 8/26 with 2.4 L hemorrhagic fluid.  Initially requiring multiple pressors for blood pressure support. Patient is not a transplant candidate based on the size of his mass and likely metastasis. S/p 3 units pRBCs. Hgb stable for past 4 days, therefore unlikely to be significant ongoing bleeding. Elle and family requested one more pRBC transfusion to get his hemoglobin back to his baseline in the 8s, this was ordered 8/31. Also requested therapeutic paracentesis to help his belly become less distended, ordered.  -GI has signed off at this point  -Continue Entecavir  -Lactulose- goal of 3-4 loose BMs per day   -Coreg 12.5 mg BID. Plan to titrate up to home dosing of 50 mg if BP allows (note that pt frequently refuses to take this for fear of hypotension)  -Diltiazem 180mg daily (note that pt frequently refuses to take this for fear of hypotension)  -Continue PTA PPI  -Dilaudid PRN for pain   -Hgb  checks daily - stable today again at 7.5  -Telemetry     R hydropneumothorax with volume loss of R lung  Pleurocutaneous fistula vs chest wall abscess  Chronic trapped lung, empyema   Was scheduled for I&D of possible chest abscess 8/26 with Dr. Zurita ([facility]).  This was canceled in the setting of his acute liver bleed.  Evaluated by Dr. Reynaga who does not think he is a good surgical candidate for major thoracic surgery, but also did not feel comfortable doing the I&D this admission. Current chest wall culture showing MSSA.  -General surgery consulted, appreciate recs  -O2 support as needed, 94-96% SpO2  -Continue Ancef - can transition to keflex daily upon discharge  -WOC consult for draining fistula - appreciate assistance with ostomy bags over wound    ESRD on HD ()   - Nephrology consulted, will dialyze today.      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  He knows he is likely near the end of his life, but still feels strong and wants to continue treatment for now.  Had family meeting on 8/26, palliative following.  Is in discussion with family about transition to comfort care and/or hospice. For now, wants transfusions, dialysis, etc.       Diet: Advance Diet as Tolerated: Regular Diet Adult; Renal Diet (dialysis)    DVT Prophylaxis: Pneumatic Compression Devices  Beltre Catheter: Not present  Fluids: PO  Central Lines: PRESENT  PICC Single Lumen 08/25/22 Right Basilic-Site Assessment: WDL  Cardiac Monitoring: None  Code Status: No CPR- Do NOT Intubate      Disposition Plan     Expected Discharge Date: 08/31/2022    Discharge Delays: Dialysis - arrange outpatient    Discharge Comments: palliative to see 8/29- doesn't  want hospice   GI signed off  nephrology still following   surgery following  Chest Tube        Patient staffed with Dr. Rosenstein.    Christy Virk MD  [facility], PGY-2  Pager #: [phone]      Clinically Significant Risk Factors Present on Admission                 # Severe Malnutrition: based on nutrition assessment     ______________________________________________________________________    Interval History   Elle is seen with his wife and son at bedside today.  He is feeling pretty well overall, has no pain.  He does find his abdomen somewhat uncomfortable and tense and wants this drained before he goes home.  He is also requesting another unit of blood before he goes home because he is worried about his hemoglobin dropping again and having to come back to the ED.  We discussed that his hemoglobin will drop again, but we can give a transfusion today to hopefully extend the time he spends at home.  He will dialyze today.  He and his wife have questions about his chest wound.  They were supposed to have this incised and drained with Dr. Zurita on the day of admission, this was canceled due to his illness.  They report that Dr. Zurita is now going to be on vacation until October or November.  They request that I contact his office to try and see if there is someone else who can do this procedure for him.  We discussed that this will heal on its own without the procedure but the procedure may make it heal faster.  It is already opened up and draining.  Also discussed case with palliative care today, who will see him again and recommend outpatient palliative care versus hospice support (possible with dialysis ongoing).    Data reviewed today: I reviewed all medications, new labs and imaging results over the last 24 hours. I personally reviewed no images or EKG's today.    Physical Exam   Vital Signs: Temp: 98.3  F (36.8  C) Temp src: Oral BP: (!) 153/86 Pulse: 87   Resp:  18 SpO2: 97 % O2 Device: None (Room air)    Weight: 124 lbs 0 oz  EXAM  General: Alert, chronically ill appearing, no acute distress, fatigued.  Head: Nontraumatic   Eyes: PERRL, EOM intact   Oropharynx: moist oral mucus membranes  Pulm: normal work of breathing, no tachypnea, speaking in full sentences  CV: RRR, no murmur  Abd: soft, moderately distended but not tender  Ext: Warm, well perfused, no LE edema  Skin: No rash on limited skin exam  Neuro:  moves all 4 extremities  Psych: Mood appropriate to visit content, rational thought content and process      Data   Recent Labs   Lab 08/31/22  0633 08/30/22  1218 08/30/22  0803 08/30/22  0515 08/29/22  0643 08/27/22  1216 08/27/22  0603 08/26/22  0901 08/26/22  0850 08/25/22  2353   WBC  --   --   --   --   --   --   --   --  16.7* 12.1*   HGB 7.5*  --   --  7.5* 7.0*   < > 7.4*   < > 7.4* 6.0*   MCV  --   --   --   --   --   --   --   --  94 99   PLT  --   --   --   --   --   --   --   --  101* 125*   INR  --   --   --   --   --   --   --   --  1.93*  --    NA  --   --   --   --  136  --  135*  --  138 138   POTASSIUM  --   --   --   --  4.4  --  4.0  --  5.7* 4.6   CHLORIDE  --   --   --   --  104  --  100  --  102 99   CO2  --   --   --   --  22  --  24  --  21* 25   BUN  --   --   --   --  68*  --  43*  --  67* 63*   CR  --   --   --   --  8.64*  --  5.59*  --  7.78* 8.09*   ANIONGAP  --   --   --   --  10  --  11  --  15 14   TANO  --   --   --   --  7.0*  --  7.3*  --  6.6* 7.1*   GLC  --  104* 102*  --  105   < > 85   < > 89 137*   ALBUMIN  --   --   --   --   --   --   --   --  1.7* 1.7*   PROTTOTAL  --   --   --   --   --   --   --   --  5.5* 6.2   BILITOTAL  --   --   --   --   --   --   --   --  0.9 0.7   ALKPHOS  --   --   --   --   --   --   --   --  103 114   ALT  --   --   --   --   --   --   --   --  19 <9   AST  --   --   --   --   --   --   --   --  46* 19    < > = values in this interval not displayed.     No results found for this or any previous  visit (from the past 24 hour(s)).

## 2022-08-31 NOTE — PROGRESS NOTES
Dialysis complete.  Pt now getting paracentesis.  After paracentesis, then plan for infusing 1U PRBC when back from paracentesis.

## 2022-08-31 NOTE — PROGRESS NOTES
Progress Note    Assessment/Plan  Patient clinically stable.  Currently on dialysis.  Eating well.  Less distention in the abdomen.  His right chest wall fistula is now better controlled.  Reviewed again no major surgical intervention.    Active Problems:    Chronic hepatitis B without hepatic coma (H)    ESRD (end stage renal disease) on dialysis (H)    Pleural effusion    Hydropneumothorax    Thoracic aortic dissection (H)    Hypovolemic shock (H)    Hemoperitoneum    Liver mass    Other ascites      Subjective  Patient reasonably comfortable.  Mild discomfort secondary to significant abdominal distention.  He has been eating well.  Objective    Vital signs in last 24 hours  Temp:  [98.3  F (36.8  C)-98.7  F (37.1  C)] 98.3  F (36.8  C)  Pulse:  [] 90  Resp:  [16-18] 18  BP: (124-195)/() 133/73  SpO2:  [95 %-97 %] 97 %  Weight:   [unfilled]    Intake/Output last 3 shifts  I/O last 3 completed shifts:  In: 480 [P.O.:480]  Out: 277 [Urine:250; Chest Tube:27]  Intake/Output this shift:  No intake/output data recorded.      Physical Exam  Exam indicates the patient is awake alert and oriented.  Head and neck is benign.  The chest exam with well-controlled right chest wall fistula.  Marked decreased breath sounds right side compared to left side with rhonchi.  Left side reasonably clear.  Abdomen exam mildly distended but much less so than last few days.  Bowel sounds present no peritoneal findings.    Pertinent Labs   Lab Results   Component Value Date    WBC 16.7 (H) 08/26/2022    HGB 7.5 (L) 08/31/2022    HCT 23.0 (L) 08/26/2022    MCV 94 08/26/2022     (L) 08/26/2022             Pertinent Radiology     [unfilled]        Dawson Reynaga MD

## 2022-08-31 NOTE — PROGRESS NOTES
Renal progress note  CC: ESRD, shock, liver bleed    Assessment and Plan:  62 year old male with history of ESRD on hemodialysis Monday Friday ideally will need 3 times treatment but refuses more than twice a week treatments, at [facility], history of hypertension , hepatitis B, chronic recurrent right-sided effusion with trapped lung not a candidate for surgical decortication multiple chest tubes in the past, chronic type B aortic dissection, hepatic mass on CT ??  HCC, presented with an episode of syncope and hypotension.  Complaining of abdominal and chest wall pain, admission CT concerning for hemoperitoneum, possible active extravasation from hepatic mass now s/p embolization with IR of the feeding hepatic artery, currently hypotensive hypothermic in the ICU on 2 pressors with elevated lactate and leukocytosis on antibiotics for concerns septic shock, nephrology consulted for ESRD management     ESRD on IHD ideally 3x a week  Follows at [facility] with myself  Ideally needs to be treated 3 times a week, has has been intermittently compliant with this  Willing to more regularly come to 3x weekly dialysis if we shorten the Wed treatment, he has a hard time lying still for 3 hours  Treatment time is 3 hours barely completes his treatment mostly treatments are around 130 to 140 minutes  More recently he's had less fluid on, I think he's been eating less as an outpatient  Current target weight is 53.5, standing scale weight is 57kg today, presumably a lot of this is ascites  --> Will continue HD MWF     Access  Left upper extremity AVG  Has had chronic issues with bleeding although with currently appears nonedematous and thrill positive  Dr. Cosby 4/2022      History of hypertension  Improved BP  --> on coreg and diltiazem 180mg daily  --> agree with increasing coreg, ideally we wouldn't have to add the clonidine back     Electrolytes stable     MBD  Has been on Tums with meals  Phosphorus has  been around 4 at goal mild low calcium is chronically around 8  PTH last checked on 8/3/2022 was 86  Alk phos around 160-200 chronically     Anemia acute on chronic  With bleed  Hold off any iron with transfusions and possible infection     Chronic right-sided effusion/empyema with trapped lung  Follows pulmonology outpatient with Dr. Parminder Christian noted from outpatient visits  Growing MSSA from his chest wall, on cefazolin currently     Chronic hepatitis B  Has been off all antiviral treatment  Possible hepatocellular carcinoma with hepatic mass on CT with active bleed  Status post IR embolization of hepatic artery that was bleeding  AFP is 9700 which seems to pretty much lock in the diagnosis of HCC  Further follow up per GI     Shock resolved     History of chronic type B aortic dissection  Stable has been followed by cardiothoracic surgery    Thank you for the consultation we will follow    Nate Hill  [facility]  [phone]        Subjective  Seen on dialysis.  Only wants a 2hr run today, I'm willing to compromise on this if he comes to dialysis 3x a week.  Also asking for a tranfusion and paracentesis, discussed with resident team, I think this is reasonable.  Potential for discharge after all this accomplished.      Overall I've discussed with him that he likely has a fairly limited life span    Objective    Vital signs in last 24 hours  Temp:  [98.3  F (36.8  C)-98.5  F (36.9  C)] 98.3  F (36.8  C)  Pulse:  [] 88  Resp:  [16-18] 18  BP: (116-195)/() 152/77  SpO2:  [96 %-97 %] 97 %  Weight:   [unfilled]    Intake/Output last 3 shifts  I/O last 3 completed shifts:  In: 480 [P.O.:480]  Out: 277 [Urine:250; Chest Tube:27]  Intake/Output this shift:  I/O this shift:  In: 0   Out: 1000 [Other:1000]    Physical Exam  awake, NAD  CV: RRR without murmur or rub  Lung: clear and equal; no extra sounds  Chest: has ostomy bag over right chest wall draining lesion  Ab: mildly  distended, minimally tender  Ext: no edema and well perfused  Skin; no rash    Pertinent Labs   Lab Results   Component Value Date    WBC 16.7 (H) 08/26/2022    HGB 7.5 (L) 08/31/2022    HCT 23.0 (L) 08/26/2022    MCV 94 08/26/2022     (L) 08/26/2022     Lab Results   Component Value Date    BUN 68 (H) 08/29/2022     08/29/2022    CO2 22 08/29/2022       Lab Results   Component Value Date    ALBUMIN 1.7 (L) 08/26/2022     Lab Results   Component Value Date    PHOS 3.2 04/03/2022     I reviewed all lab results

## 2022-08-31 NOTE — PLAN OF CARE
Problem: Plan of Care - These are the overarching goals to be used throughout the patient stay.    Goal: Plan of Care Review/Shift Note  Description: The Plan of Care Review/Shift note should be completed every shift.  The Outcome Evaluation is a brief statement about your assessment that the patient is improving, declining, or no change.  This information will be displayed automatically on your shift note.  Outcome: Ongoing, Progressing   Pt alert and oriented x4. Pt denies pain for the shift. Colostomy bag attached to his right side has serosanguinous output with 27 mL for output. Pt refused SCDs but has been ambulating independently. Pt had one high BP of 161/80.    Lung sounds are diminished and pt complains of a cough when laying on his left side, pt reports he will spit up red red tinged sputum which he reports has been happening for a while.      Pt given PRN ambien to help him sleep tonight which the pt reports was not very effective. Pt called two other times around 0300 and 0500 asking for additional sleeping medications and writer explained that it was too late in the night.

## 2022-09-01 NOTE — DISCHARGE SUMMARY
[facility]  Discharge Summary - Medicine & Pediatrics       Date of Admission:  8/25/2022  Date of Discharge:  9/1/2022 11:39 AM  Discharging Provider: Christy Virk MD  Discharge Service: Hospitalist Service    Discharge Diagnoses   The primary encounter diagnosis was Hepatic cirrhosis, unspecified hepatic cirrhosis type, unspecified whether ascites present (H). Diagnoses of Hemoperitoneum, Liver mass, Hydropneumothorax, ESRD (end stage renal disease) on dialysis (H), Hypovolemic shock (H), Empyema lung (H), and Essential hypertension were also pertinent to this visit.    Follow-ups Needed After Discharge   Follow-up Appointments     Follow-up and recommended labs and tests       Follow up with primary care provider, Dr. Flores, on 9/7 as scheduled for   hospital follow- up.  No follow up labs or test are needed.      Follow up with Dr. Zurita's office to discuss debridement of your chest   wall wound. [phone] is their phone number - I and Dr. Flores   are working on getting you follow-up with their office as well.    Follow up at the [facility] to discuss your liver disease   and mass. Even if they cannot do surgery or chemotherapy, I still   recommend you make an appointment to discuss prognosis and what to expect.   [phone]             Unresulted Labs Ordered in the Past 30 Days of this Admission     Date and Time Order Name Status Description    8/26/2022 12:43 PM Anaerobic bacterial culture Preliminary     8/26/2022 12:43 PM Cytology, non-gynecologic In process     8/26/2022 10:00 AM Fungal or Yeast Culture Routine Preliminary     8/26/2022 10:00 AM Actinomyces species culture Preliminary     8/26/2022 10:00 AM Anaerobic Bacterial Culture Routine Preliminary       These results will be followed up by myself or Dr. Flores.    Discharge Disposition   Discharged to home  Condition at discharge: Stable    Hospital Course   Elle Blanton was admitted on  8/25/2022 for syncope, acute blood loss anemia from hemoperitoneum secondary to ruptured liver mass.  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 Suspect HCC, although this is based on imaging and not biopsy. Paracentesis 8/26 with 2.4 L hemorrhagic fluid.  Initially requiring multiple pressors for blood pressure support. Patient is not a transplant candidate based on the size of his mass and likely metastasis. S/p 3 units pRBCs. Hgb stable for past 4 days, therefore unlikely to be significant ongoing bleeding. Elle and family requested one more pRBC transfusion to get his hemoglobin back to his baseline in the 8s, this was given 8/31.  He also received a second therapeutic paracentesis (2.5 L) to help his belly become less distended.  He was continued on Entecavir, Coreg, diltiazem.  He was started on lactulose with a goal of 3-4 loose bowel movements per day.  He was recommended to [facility]-up in [facility], information given in his after visit summary for how to set up this appointment.     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 Zurita ([facility]).  This was canceled in the setting of his acute liver bleed.  Evaluated by Dr. Reynaga who does not think he is a good surgical candidate for major thoracic surgery, but also did not feel comfortable doing the I&D this admission. Current chest wall culture showing MSSA.  He was given IV antibiotics while in the hospital and should continue on Keflex daily upon discharge.  Wound nurse saw him in the hospital and helped set him up with ostomy bags to  place over the wound and he was discharged with these.  I was in contact with Dr. Zurita's office and they will contact the patient to help reschedule this procedure for I&D.     ESRD on HD (MF)   He dialyzed on a Monday Wednesday Friday schedule while in the hospital and will continue with outpatient dialysis at [facility] upon discharge.     Goals of care  Lang changed his CODE STATUS to DNR/DNI this admission.  This was in the setting of acute life-threatening illness in the ICU.  At one point comfort cares were discussed, however his condition improved dramatically and therefore he decided to continue with blood transfusions as needed, dialysis, other medical cares.  At discharge he states he feels like his normal self.  It was explained to him that this liver mass can and likely will bleed again in the future.     Palliative care was involved during this hospital stay and recommended following up in palliative care clinic as an outpatient to continue discussions about goals of care, acknowledging that Dr. Flores is the best person to have these conversations with the patient and his family as his time in clinic allows.  Palliative is also recommending looking into different hospice programs to see if any would enroll him while he is still receiving dialysis.     Consultations This Hospital Stay   PHARMACY TO DOSE VANCO  NEPHROLOGY IP CONSULT  SURGERY GENERAL IP CONSULT  GASTROENTEROLOGY IP CONSULT  PHYSICAL THERAPY ADULT IP CONSULT  OCCUPATIONAL THERAPY ADULT IP CONSULT  PHARMACY TO DOSE VANCO  PALLIATIVE CARE ADULT IP CONSULT  INTERVENTIONAL RADIOLOGY ADULT/PEDS IP CONSULT  CARE MANAGEMENT / SOCIAL WORK IP CONSULT  SPIRITUAL HEALTH SERVICES IP CONSULT  PHYSICAL THERAPY ADULT IP CONSULT  OCCUPATIONAL THERAPY ADULT IP CONSULT  WOUND OSTOMY CONTINENCE NURSE  IP CONSULT    Code Status   No CPR- Do NOT Intubate       Patient staffed with Dr. Jeter.    Christy Virk MD  [facility] Medicine Residency Program,  PGY-2  Pager #: [phone]    ______________________________________________________________________    Physical Exam   Vital Signs: Temp: 97.5  F (36.4  C) Temp src: Oral BP: (!) 177/85 Pulse: 90   Resp: 18 SpO2: 98 % O2 Device: None (Room air)    Weight: 124 lbs 0 oz  General: Alert, chronically ill appearing, no acute distress, fatigued.  Head: Nontraumatic   Eyes: PERRL, EOM intact   Oropharynx: moist oral mucus membranes  Pulm: normal work of breathing, no tachypnea, speaking in full sentences  CV: RRR, no murmur  Abd: soft, moderately distended but not tender  Ext: Warm, well perfused, no LE edema  Skin: No rash on limited skin exam  Neuro:  moves all 4 extremities  Psych: Mood appropriate to visit content, rational thought content and process      Primary Care Physician   Jaswant Flores    Discharge Orders      Reason for your hospital stay    You were hospitalized with bleeding into your abdomen from your liver mass.  This is more than likely a hepatocellular carcinoma, however we cannot be 100% sure without a biopsy.  You received blood transfusions and we removed fluid from your belly and you got much better.  It is likely that this will bleed again, but it is hard to know when.     Activity    Your activity upon discharge: activity as tolerated     Follow-up and recommended labs and tests     Follow up with primary care provider, Dr. Flores, on 9/7 as scheduled for hospital follow- up.  No follow up labs or test are needed.      Follow up with Dr. Zurita's office to discuss debridement of your chest wall wound. [phone] is their phone number - I and Dr. Flores are working on getting you follow-up with their office as well.    Follow up at the [facility] to discuss your liver disease and mass. Even if they cannot do surgery or chemotherapy, I still recommend you make an appointment to discuss prognosis and what to expect. [phone]     Diet    Follow this diet upon discharge:  Regular       Significant Results and Procedures   Most Recent 3 CBC's:Recent Labs   Lab Test 09/01/22  0618 08/31/22  0633 08/30/22  0515 08/26/22  1155 08/26/22  0850 08/25/22  2353 06/15/22  0921   WBC  --   --   --   --  16.7* 12.1* 9.1   HGB 9.0* 7.5* 7.5*   < > 7.4* 6.0* 8.4*   MCV  --   --   --   --  94 99 100   PLT  --   --   --   --  101* 125* 73*    < > = values in this interval not displayed.     Most Recent 3 BMP's:Recent Labs   Lab Test 09/01/22 0812 08/30/22  1218 08/30/22  0803 08/29/22  0643 08/27/22  1218 08/27/22  0603 08/26/22  0901 08/26/22  0850   NA  --   --   --  136  --  135*  --  138   POTASSIUM  --   --   --  4.4  --  4.0  --  5.7*   CHLORIDE  --   --   --  104  --  100  --  102   CO2  --   --   --  22  --  24  --  21*   BUN  --   --   --  68*  --  43*  --  67*   CR  --   --   --  8.64*  --  5.59*  --  7.78*   ANIONGAP  --   --   --  10  --  11  --  15   TANO  --   --   --  7.0*  --  7.3*  --  6.6*   GLC 94 104* 102* 105   < > 85   < > 89    < > = values in this interval not displayed.     Most Recent 2 LFT's:Recent Labs   Lab Test 08/26/22  0850 08/25/22  2353   AST 46* 19   ALT 19 <9   ALKPHOS 103 114   BILITOTAL 0.9 0.7     Most Recent 3 INR's:Recent Labs   Lab Test 08/26/22  0850 04/09/22  0456 03/30/22  1557   INR 1.93* 1.31* 1.25*     Most Recent 3 Troponin's:Recent Labs   Lab Test 08/06/19  2055   TROPONIN 0.02   ,   Results for orders placed or performed during the hospital encounter of 08/25/22   CT Head w/o Contrast    Narrative    EXAM: CT HEAD W/O CONTRAST  LOCATION: [facility]  DATE/TIME: 8/26/2022 1:18 AM    INDICATION: headache  COMPARISON: 1/8/2022  TECHNIQUE: Routine CT Head without IV contrast. Multiplanar reformats. Dose reduction techniques were used.    FINDINGS:  INTRACRANIAL CONTENTS: No intracranial hemorrhage, extraaxial collection, or mass effect.  No CT evidence of acute infarct. Mild presumed chronic small vessel ischemic changes. Mild  generalized volume loss. No hydrocephalus.     VISUALIZED ORBITS/SINUSES/MASTOIDS: No intraorbital abnormality. No paranasal sinus mucosal disease. No middle ear or mastoid effusion.    BONES/SOFT TISSUES: No acute abnormality.      Impression    IMPRESSION:  1.  No acute intracranial process.   Chest CT w/o contrast     Value    Radiologist flags (TRACIE)     Findings suspicious for ruptured hepatocellular carcinoma with pericapsular hematoma and hemoperitoneum    Narrative    EXAM: CT CHEST W/O CONTRAST  LOCATION: [facility]  DATE/TIME: 8/26/2022 1:17 AM    INDICATION: Chest wall infection.  COMPARISON: 07/07/2022  TECHNIQUE: CT chest without IV contrast. Multiplanar reformats were obtained. Dose reduction techniques were used.  CONTRAST: None.    FINDINGS:   LUNGS AND PLEURA: Large thick-walled right hydropneumothorax, which has a large air component compared to fluid component. Near complete atelectasis of the right lung. No focal airspace disease in the left lung. Trace left pleural effusion. No left   pneumothorax.    MEDIASTINUM/AXILLAE: Right PICC with tip near the cavoatrial junction. Redemonstrate ascending thoracic aortic aneurysm measuring 4.1 cm. Redemonstrated type B aortic dissection with aneurysmal dilatation of aortic arch measuring up to 5.9 cm and   aneurysmal dilatation of the descending thoracic aorta measuring up to 5.7 cm. No lymphadenopathy. No pericardial effusion.    CORONARY ARTERY CALCIFICATION: Severe.    UPPER ABDOMEN: Morphologic changes of cirrhosis. Increased size of right hepatic lobe mass at the dome measuring 7.1 x 4.9 cm, previously 5.0 x 4.8 cm. Hyperdense fluid along the liver capsule. Moderate volume free fluid in the upper abdomen, which   appears slightly hyperdense.    MUSCULOSKELETAL: Small amount of soft tissue gas in the right lateral chest wall, with trace gas in the adjacent pleural space. Surrounding soft tissue thickening. Multilevel degenerative  changes of the spine.      Impression    IMPRESSION:   1.  Large thick-walled right hydropneumothorax, with near complete atelectasis of the right lung. No mediastinal shift to suggest tension pneumothorax.    2.  Soft tissue thickening with small amount of gas in the right lateral chest wall, possibly communicating with the right pleural space. Correlate for pleurocutaneous fistula.    3.  Increased size of right hepatic dome 7.1 cm mass, with hyperdense perihepatic fluid. Findings suspicious for ruptured hepatocellular carcinoma with pericapsular hematoma. Moderate volume abdominal free fluid, with a component of hemoperitoneum.      [Critical Result: Findings suspicious for ruptured hepatocellular carcinoma with pericapsular hematoma and hemoperitoneum]    Finding was identified on 8/26/2022 1:25 AM.     Dr. Davila was contacted by me on 8/26/2022 at 1:51 AM and verbalized understanding of the critical result. Right hydropneumothorax and possible pleurocutaneous fistula also discussed at this time.     CTA Chest Abdomen Pelvis w Contrast    Narrative    EXAM: CTA CHEST ABDOMEN PELVIS W CONTRAST  LOCATION: [facility]  DATE/TIME: 8/26/2022 3:10 AM    INDICATION: Syncopal episode. History of dissection.  COMPARISON: Multiple prior exams with the most recent study a noncontrast chest CT from earlier today. Most recent CTA exam is from 4/7/2022.  TECHNIQUE: CT angiogram chest abdomen pelvis during arterial phase of injection of IV contrast. 2D and 3D MIP reconstructions were performed by the CT technologist. Dose reduction techniques were used.   CONTRAST: 75 mL Isovue 370    FINDINGS:   CT ANGIOGRAM CHEST, ABDOMEN, AND PELVIS: Ectatic ascending thoracic aorta measuring 4.0 cm in AP diameter and negative for dissection, unchanged. Again seen is a type B dissection beginning at the level of the distal aortic arch just distal to the left   subclavian artery. The dissection extends to the aortic  bifurcation similar to previous. Stable aneurysmal dilatation of the distal aortic arch measuring 5.7 cm and stable aneurysmal dilatation of the descending thoracic aorta measuring 5.3 cm at the   level of the left pulmonary vein. The true lumen again gives rise to the celiac and superior mesenteric arteries as well as right renal artery. False lumen gives rise to the left renal artery and RADHA. Aneurysmal dilatation of the infrarenal abdominal   aorta measuring 3.7 cm, unchanged. Patent bilateral iliac arteries. 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 No acute aortic findings and no significant interval change involving the aorta.    LUNGS AND PLEURA: Again seen is a large, thick-walled loculated hydropneumothorax occupying much of the right hemithorax. Associated consolidation and volume loss involving much of the right mid and lower lung. Dependent atelectasis left lower lung.   Small bilateral pleural effusions.    MEDIASTINUM/AXILLAE: No adenopathy. Mild cardiac enlargement. No pericardial effusion. No mediastinal shift. Esophagus is grossly negative. Patent left brachiocephalic vein stent.    CORONARY ARTERY CALCIFICATION: Severe.    HEPATOBILIARY: Cirrhotic appearance of the liver with a large malignant lesion in the right hepatic lobe superiorly measuring up to at least 7.5 cm in diameter. There is a prominent surrounding vascularity associated with this lesion which also shows   contrast material outside this lesion at the dome of the liver compatible with active arterial bleeding. A large amount of associated acute blood products around the liver in the right upper quadrant as well as a large amount of high density ascites   elsewhere throughout the abdomen and pelvis compatible with acute hemoperitoneum. Cholelithiasis. No biliary dilatation.    PANCREAS: Normal.    SPLEEN: Normal.    ADRENAL GLANDS: Normal.    KIDNEYS/BLADDER: Atrophy of the native kidneys  bilaterally. A small left renal cyst with no follow-up needed. No hydronephrosis. A Beltre catheter in the bladder which is decompressed.    BOWEL: The bowel is normal in caliber with no evidence for obstruction. No definite acute bowel findings.    LYMPH NODES: No adenopathy. There is some air in the left-sided pelvic veins presumably related to venous access in the left lower extremity.    PELVIC ORGANS: Normal.    MUSCULOSKELETAL: Mild to moderate degenerative changes in the spine.      Impression    IMPRESSION:  1.  Stable type B aortic dissection as detailed above and stable aneurysmal dilatation of the aorta. No acute aortic findings.    2.  Cirrhotic configuration of the liver with malignant-appearing lesion in the right hepatic lobe superiorly measuring at least 7.5 cm in diameter, likely representing HCC.    3.  There is evidence for active arterial bleeding secondary to the malignant mass in the liver with a large amount of acute blood products in the right upper quadrant around the liver and a large amount of ascites in the abdomen and pelvis which is   higher in density and compatible with hemoperitoneum.    4.  Again seen is a similar-appearing large thick-walled loculated right hydropneumothorax. This is associated with some significant consolidation and volume loss in the right lung and, overall, is unchanged from the earlier study. No mediastinal shift.   See additional details above.    Findings discussed with Dr. Davila 8/26/2022 3:44 AM.     IR Visceral Angiogram    Narrative    [place]  EXAM: HEPATIC ARTERIOGRAPHY IR VISCERAL ANGIOGRAM  LOCATION: [facility]    CLINICAL HISTORY: Bleeding hepatic mass. Medically complex patient with aortic aneurysmal dissection, renal failure on dialysis, profound hypotension on pressors. Bleeding presumptive hepatocellular carcinoma with underlying cirrhosis. Empyema with   chronic pleurocutaneous fistula.    PROCEDURES  PERFORMED:  1. Common femoral artery access using ultrasound guidance followed by vascular sheath placement.  2. Selective catheterization and angiography of the celiac artery.  4. Subselective catheterization and angiography of the common hepatic artery.  5. Subselective catheterization and angiography of the right hepatic branches.  6. Superselective catheterization of the segment 7 and 8 branches of the right hepatic artery and imaging of these vessels.  7. Embolization of separate segment 7 and segment 8 branches right hepatic artery feeding the mass.  8. Arterial closure/ hemostasis with StarClose.    MODERATE SEDATION: Versed and Fentanyl were administered intravenously for moderate sedation. Pulse oximetry, heart rate and blood pressure were continuously monitored by an independent trained observer. The physician spent 120 minutes of face-to-face   moderate sedation time with the patient.    ADDITIONAL MEDICATIONS: see EMR    CONTRAST: See EMR    FLUOROSCOPIC TIME: 45.1 minutes  CUMULATIVE AIR KERMA/DOSE: 3392 mGy    STERILE BARRIER TECHNIQUE: Maximal Sterile Barrier Technique Utilized: Cap AND mask AND sterile gown AND sterile gloves AND sterile full body drape AND hand hygiene AND skin preparation 2% chlorhexidine for cutaneous antisepsis (or acceptable alternative   antiseptics).   Sterile Ultrasound Technique Utilized ?Sterile gel AND sterile probe covers.    UNIVERSAL PROTOCOL: Standard universal protocol per facility guidelines was followed. See EMR for documentation.     TECHNIQUE:   Risks, benefits and alternatives were explained to the patient and written, informed consent was obtained. The patient was placed in the supine position on the angiography table. The groins were prepped and draped in the usual fashion and anesthetized   with 1% lidocaine. The right common femoral artery was accessed under ultrasound guidance with a micropuncture system and a 5 Estonian vascular sheath placed.    A C2  catheter, was used to select the celiac artery, followed by celiac arteriography.  A microcatheter was advanced over a wire, through the base catheter and used to select the common hepatic artery and angiography performed. Confirmation of a saccular aneurysm from the proximal proper hepatic artery. This is successfully negotiated and   the microcatheter advanced into the proper hepatic artery and angiography performed. Further super selection into a segment 7 main feeding artery to the tumor. From this position 500-700 um embosphere particles were infused to prune the vasculature. The   catheter was withdrawn and a single 3 mm x 5 cm detachable coil was placed to further diminish the antegrade arterial flow. The microcatheter was withdrawn into the right hepatic artery and angiography confirms good embolization. There is a tiny branch   to the segment 8 region which was too small to cannulate. The microcatheter was placed near the origin and a small Gelfoam slurry was administered to prune the vessel with excepted more distal embolization as well. The catheter was withdrawn into the   common hepatic artery and angiography confirms good embolization of the tumor mass. All catheters, guidewires and the sheath were removed.   Hemostasis was achieved using Perclose.  The patient tolerated the procedure well without immediate complication.    FINDINGS:   Survey ultrasound images of the right common femoral artery demonstrate that is is patent and appropriate for access, and ultrasound images obtained during access show the needle within the artery. Ultrasound images have been archived permanently for   documentation.    Celiac artery: Standard celiac anatomy.    Hepatic artery: Common hepatic artery aneurysm. Normal branching to the right and left hepatic artery. Dome hypervascular lesion with intense contrast pooling. Prominent vessel is a segment 7 vessel, which is successfully embolized with embosphere beads   and  coils. A smaller branch more proximally feeding the segment 8 portion is unable to be entered but is gently embolized with Gelfoam slurry.      Impression    IMPRESSION:  1. Successful embolization of a bleeding presumptive dome hepatocellular carcinoma fed by segment 7 and 8 branches, both of which are embolized as above.  2. Common hepatic artery aneurysm.                             US Paracentesis without Albumin    Narrative    EXAM:  1. PARACENTESIS  2. ULTRASOUND GUIDANCE  LOCATION: [facility]  DATE/TIME: 8/31/2022 3:06 PM    INDICATION: Ascites.    PROCEDURE: Informed consent obtained. Time out performed. The abdomen was prepped and draped in a sterile fashion. 10 mL of 1% lidocaine was infused into local soft tissues. A 5 French catheter system was introduced into the abdominal ascites under   ultrasound guidance.    2.2 liters of dark old bloody fluid were removed and sent to lab if requested.    Patient tolerated procedure well.    Ultrasound imaging was obtained and placed in the patient's permanent medical record.      Impression    IMPRESSION:  1.  Status post ultrasound-guided paracentesis.    Reference CPT Code: 10537       Discharge Medications   Current Discharge Medication List      START taking these medications    Details   lactulose (CHRONULAC) 10 GM/15ML solution Take 30 mLs (20 g) by mouth 3 times daily  Qty: 946 mL, Refills: 11    Associated Diagnoses: Hepatic cirrhosis, unspecified hepatic cirrhosis type, unspecified whether ascites present (H)         CONTINUE these medications which have NOT CHANGED    Details   acetaminophen (TYLENOL) 500 MG tablet Take 500 mg by mouth every 6 hours as needed for mild pain      benzonatate (TESSALON) 100 MG capsule Take 1 capsule (100 mg) by mouth 3 times daily as needed for cough  Qty: 30 capsule, Refills: 0    Associated Diagnoses: Cough      calcium acetate (PHOSLO) 667 MG CAPS capsule Take 1 capsule by mouth 2 times daily  (with meals)      carvedilol (COREG) 25 MG tablet Take 1-2 tablets (25-50 mg) by mouth 2 times daily (with meals)  Qty: 120 tablet, Refills: 3    Associated Diagnoses: Essential hypertension      cephALEXin (KEFLEX) 500 MG capsule Take 1 capsule (500 mg) by mouth 2 times daily  Qty: 60 capsule, Refills: 1    Associated Diagnoses: Empyema lung (H)      diltiazem ER (DILT-XR) 180 MG 24 hr capsule Take 1 capsule (180 mg) by mouth 2 times daily  Qty: 60 capsule, Refills: 11    Associated Diagnoses: Essential hypertension      entecavir (BARACLUDE) 0.5 MG tablet Take 1 tablet (0.5 mg) by mouth every 7 days  Qty: 52 tablet, Refills: 1    Associated Diagnoses: Chronic viral hepatitis B without delta agent and without coma (H)      guaiFENesin-codeine (GUAIFENESIN AC) 100-10 MG/5ML syrup Take 5 mLs by mouth every 4 hours as needed for cough  Qty: 236 mL, Refills: 1    Associated Diagnoses: Cough      hydrOXYzine (ATARAX) 25 MG tablet Take 1 tablet (25 mg) by mouth 3 times daily as needed for itching  Qty: 90 tablet, Refills: 3    Associated Diagnoses: Pruritic disorder      midodrine (PROAMATINE) 10 MG tablet Take 10 mg by mouth three times a week Prior to dialysis runs      oxyCODONE (ROXICODONE) 5 MG tablet Take 1 tablet (5 mg) by mouth daily as needed for severe pain  Qty: 30 tablet, Refills: 0    Associated Diagnoses: Empyema lung (H); Chest wall mass; Draining cutaneous sinus tract      pantoprazole (PROTONIX) 40 MG EC tablet Take 1 tablet (40 mg) by mouth daily  Qty: 30 tablet, Refills: 11    Associated Diagnoses: Cirrhosis of liver with ascites, unspecified hepatic cirrhosis type (H)      senna-docusate (SENOKOT-S/PERICOLACE) 8.6-50 MG tablet Take 1 tablet by mouth daily as needed for constipation  Qty: 30 tablet, Refills: 11    Associated Diagnoses: Constipation, unspecified constipation type      zolpidem (AMBIEN) 5 MG tablet Take 1 tablet (5 mg) by mouth nightly as needed for sleep  Qty: 10 tablet, Refills: 5     Associated Diagnoses: Insomnia due to medical condition      order for DME Equipment being ordered: Other: blood pressure monitor  Treatment Diagnosis: hypertension  Qty: 1 each, Refills: 0    Associated Diagnoses: Descending thoracic aortic dissection (H); Essential hypertension         STOP taking these medications       cloNIDine (CATAPRES) 0.1 MG tablet Comments:   Reason for Stopping:             Allergies   Allergies   Allergen Reactions     Nka [No Known Allergies]

## 2022-09-01 NOTE — PROGRESS NOTES
Care Management Follow Up    Length of Stay (days): 6    Expected Discharge Date: 09/01/2022     Concerns to be Addressed:  Medical workup     Patient plan of care discussed at interdisciplinary rounds: Yes    Anticipated Discharge Disposition:  Home with OP dialysis already open to.      Anticipated Discharge Services:  Home with OP dialysis already open to.   Anticipated Discharge DME:  TBD    Education Provided on the Discharge Plan:  Per Care Team   Patient/Family in Agreement with the Plan:  Yes    Referrals Placed by CM/SW:  Called [facility] in [place], patient open to svcs.    Private pay costs discussed: Not applicable    Additional Information:  Chart reviewed.     Patient is to return home with OP HD for ERSD. Patient already attends [facility] in [place], [place]. On a Mon, Weds, Fri schedule.  Family will transport.    Writer called [facility] to give an update, writer will call if patient is able to attend OP dialysis tomorrow or not.    CM will continue to follow plan of care, review recommendations, and assist with any discharge needs anticipated.     Laura Williamson RN

## 2022-09-01 NOTE — PLAN OF CARE
Problem: Plan of Care - These are the overarching goals to be used throughout the patient stay.    Goal: Optimal Comfort and Wellbeing  Outcome: Ongoing, Progressing     Problem: Risk for Delirium  Goal: Improved Sleep  Outcome: Ongoing, Progressing     Problem: Respiratory Compromise (Liver Failure)  Goal: Effective Oxygenation and Ventilation  Outcome: Ongoing, Progressing  Intervention: Promote Airway Secretion Clearance     Pt had uneventful night.  Received Ambien for sleep aid, reported sleeping ok.  No respiratory complaints, no pain.  Up independently in room.

## 2022-09-01 NOTE — PROGRESS NOTES
Care Management Discharge Note    Discharge Date: 09/01/2022       Discharge Disposition: Dialysis Services, Home (OP dialysis)    Discharge Services:  OP HD    Discharge DME:  None    Discharge Transportation: family or friend will provide    Private pay costs discussed: Not applicable       Education Provided on the Discharge Plan:  Per care team  Persons Notified of Discharge Plans: YEs  Patient/Family in Agreement with the Plan:  Yes    Handoff Referral Completed: Yes    Additional Information:  Chart reviewed.    Patient is to return home with OP HD for ERSD. Patient already attends [facility] in [place], [place]. On a Mon, Weds, Fri schedule.  Family will transport.    Orders faxed to [facility].        Laura Williamson RN

## 2022-09-01 NOTE — PLAN OF CARE
"  Problem: Plan of Care - These are the overarching goals to be used throughout the patient stay.    Goal: Plan of Care Review/Shift Note  Description: The Plan of Care Review/Shift note should be completed every shift.  The Outcome Evaluation is a brief statement about your assessment that the patient is improving, declining, or no change.  This information will be displayed automatically on your shift note.  Outcome: Met  Goal: Patient-Specific Goal (Individualized)  Description: You can add care plan individualizations to a care plan. Examples of Individualization might be:  \"Parent requests to be called daily at 9am for status\", \"I have a hard time hearing out of my right ear\", or \"Do not touch me to wake me up as it startles me\".  Outcome: Met  Goal: Absence of Hospital-Acquired Illness or Injury  Outcome: Met  Intervention: Identify and Manage Fall Risk  Recent Flowsheet Documentation  Taken 9/1/2022 0753 by Nina Almeida RN  Safety Promotion/Fall Prevention:    nonskid shoes/slippers when out of bed    assistive device/personal items within reach  Goal: Optimal Comfort and Wellbeing  Outcome: Met  Goal: Readiness for Transition of Care  Outcome: Met     Problem: Risk for Delirium  Goal: Optimal Coping  Outcome: Met  Goal: Improved Behavioral Control  Outcome: Met  Goal: Improved Attention and Thought Clarity  Outcome: Met  Goal: Improved Sleep  Outcome: Met     Problem: Malnutrition  Goal: Improved Nutritional Intake  Outcome: Met     Problem: Device-Related Complication Risk (Hemodialysis)  Goal: Safe, Effective Therapy Delivery  Outcome: Met  Intervention: Optimize Device Care and Function  Recent Flowsheet Documentation  Taken 9/1/2022 0753 by Nina Almeida RN  Medication Review/Management: medications reviewed     Problem: Hemodynamic Instability (Hemodialysis)  Goal: Effective Tissue Perfusion  Outcome: Met     Problem: Infection (Hemodialysis)  Goal: Absence of Infection Signs and " Symptoms  Outcome: Met     Problem: Adjustment to Illness (Chronic Kidney Disease)  Goal: Optimal Coping with Chronic Illness  Outcome: Met     Problem: Electrolyte Imbalance (Chronic Kidney Disease)  Goal: Electrolyte Balance  Outcome: Met     Problem: Fluid Volume Excess (Chronic Kidney Disease)  Goal: Fluid Balance  Outcome: Met     Problem: Functional Decline (Chronic Kidney Disease)  Goal: Optimal Functional Ability  Outcome: Met     Problem: Hematologic Alteration (Chronic Kidney Disease)  Goal: Absence of Anemia Signs and Symptoms  Outcome: Met     Problem: Oral Intake Inadequate (Chronic Kidney Disease)  Goal: Optimal Oral Intake  Outcome: Met     Problem: Pain (Chronic Kidney Disease)  Goal: Acceptable Pain Control  Outcome: Met     Problem: Renal Function Impairment (Chronic Kidney Disease)  Goal: Minimize Renal Failure Effects  Outcome: Met  Intervention: Monitor and Support Renal Function  Recent Flowsheet Documentation  Taken 9/1/2022 0758 by Nina Almeida RN  Medication Review/Management: medications reviewed     Problem: Adjustment to Illness (Liver Failure)  Goal: Optimal Coping with Liver Failure  Outcome: Met     Problem: Fluid and Electrolyte Imbalance (Liver Failure)  Goal: Fluid and Electrolyte Balance  Outcome: Met     Problem: Gastrointestinal Complications (Liver Failure)  Goal: Optimal Gastrointestinal Function  Outcome: Met     Problem: Impaired Coagulation (Liver Failure)  Goal: Optimal Coagulation Function  Outcome: Met     Problem: Infection (Liver Failure)  Goal: Absence of Infection Signs and Symptoms  Outcome: Met     Problem: Neurologic Function Impaired (Liver Failure)  Goal: Optimal Neurologic Function  Outcome: Met     Problem: Oral Intake Inadequate (Liver Failure)  Goal: Improved Oral Intake  Outcome: Met     Problem: Pain (Liver Failure)  Goal: Optimal Pain Control  Outcome: Met     Problem: Renal Dysfunction (Liver Failure)  Goal: Optimize Renal Function  Outcome: Met      Problem: Respiratory Compromise (Liver Failure)  Goal: Effective Oxygenation and Ventilation  Outcome: Met  Intervention: Promote Airway Secretion Clearance  Recent Flowsheet Documentation  Taken 9/1/2022 0753 by Nina Almeida RN  Cough And Deep Breathing: done independently per patient     Problem: Hypertension Comorbidity  Goal: Blood Pressure in Desired Range  Outcome: Met  Intervention: Maintain Blood Pressure Management  Recent Flowsheet Documentation  Taken 9/1/2022 0753 by Nina Almeida RN  Medication Review/Management: medications reviewed   Goal Outcome Evaluation:          Patient discharged to home with family at 11:36. Writer went over discharge instructions and allowed time for questions. Patient discharged via private vehicle transported by son. PICC was removed this shift, tolerated removal well. Medications in bag were sent with. CNA assisted patient out to car via W/C.

## 2022-09-01 NOTE — PROGRESS NOTES
Progress Note    Assessment/Plan  Continue chest tube on suction at this point.  Lung did fall down 1 suction taken off for CT scan late yesterday afternoon.  Patient without significant shortness of breath.  She has ongoing air leak with coughing and deep inspiration.  She is reasonably comfortable except for chest tube site.  Reviewed with patient and daughter who is here presently the potential of endobronchial treatment of this leak or some simple ongoing observation for the potential of healing of the leak.  The other potential would be thoracoscopy for repair of the leak.  We will keep her on suction at the current time.  CT scan last p.m. reviewed.  Pneumothorax present.  Chest tube in reasonably good position.  Will review with pulmonary.    Active Problems:    Chronic hepatitis B without hepatic coma (H)    ESRD (end stage renal disease) on dialysis (H)    Pleural effusion    Hydropneumothorax    Thoracic aortic dissection (H)    Hypovolemic shock (H)    Hemoperitoneum    Liver mass    Other ascites      Subjective  Patient uncomfortable with chest tube site.  Discomfort with motion and with deep inspiration.  Without significant shortness of breath over baseline.  He has had some sputum with blood tinge since her bronchoscopy.  Objective    Vital signs in last 24 hours  Temp:  [97.5  F (36.4  C)-98.7  F (37.1  C)] 97.5  F (36.4  C)  Pulse:  [80-91] 90  Resp:  [16-18] 18  BP: (116-177)/(60-85) 177/85  SpO2:  [97 %-99 %] 98 %  Weight:   [unfilled]    Intake/Output last 3 shifts  I/O last 3 completed shifts:  In: 1070 [P.O.:720]  Out: 1055 [Other:1000; Chest Tube:55]  Intake/Output this shift:  No intake/output data recorded.      Physical Exam  Obvious air leak on chest tube minimal chest tube output.  She moves fairly freely.  Respiratory rate less than 20.  No significant distress.  O2 sats noted.    Pertinent Labs   Lab Results   Component Value Date    WBC 16.7 (H) 08/26/2022    HGB 9.0 (L) 09/01/2022     HCT 23.0 (L) 08/26/2022    MCV 94 08/26/2022     (L) 08/26/2022             Pertinent Radiology     [unfilled]        Dawson Reynaga MD

## 2022-09-01 NOTE — PLAN OF CARE
Problem: Infection (Hemodialysis)  Goal: Absence of Infection Signs and Symptoms  Outcome: Ongoing, Progressing   Goal Outcome Evaluation:           Pt is alert and oriented and denied any pain at the start o shift. Pt  was given 1 unit of blood, no reactions or adverse effects noted. Pt's VSS through out the blood administration. Pt later c/o headache towards the end of the administration, Pt was given prn tylenol and effective. Call light within reach and cooperative with cares.

## 2022-09-01 NOTE — PLAN OF CARE
Occupational Therapy Discharge Summary    Reason for therapy discharge:    Discharged to home.    Progress towards therapy goal(s). See goals on Care Plan in [facility] electronic health record for goal details.  Goals met    Therapy recommendation(s):    No further therapy is recommended.

## 2022-09-02 NOTE — PROGRESS NOTES
Clinic Care Coordination Contact  [facility]: Post-Discharge Note  SITUATION                                                      Admission:    Admission Date: 08/25/22   Reason for Admission: Hepatic cirrhosis  Discharge:   Discharge Date: 09/01/22  Discharge Diagnosis: Hepatic cirrhosis    BACKGROUND                                                      Per hospital discharge summary and inpatient provider notes.      ASSESSMENT           Discharge Assessment  How are you doing now that you are home?: Pt is doing better  How are your symptoms? (Red Flag symptoms escalate to triage hotline per guidelines): Improved  Do you feel your condition is stable enough to be safe at home until your provider visit?: Yes  Does the patient have their discharge instructions? : Yes  Does the patient have questions regarding their discharge instructions? : No  Were you started on any new medications or were there changes to any of your previous medications? : Yes  Does the patient have all of their medications?: Yes  Do you have questions regarding any of your medications? : No  Do you have all of your needed medical supplies or equipment (DME)?  (i.e. oxygen tank, CPAP, cane, etc.): No - What equipment or supplies are needed?  Discharge follow-up appointment scheduled within 14 calendar days? : Yes  Discharge Follow Up Appointment Date: 09/09/22  Discharge Follow Up Appointment Scheduled with?: Primary Care Provider                  PLAN                                                      Outpatient Plan:      Future Appointments   Date Time Provider Department Center   9/7/2022  1:20 PM [facility]   9/9/2022  8:40 AM Kapur, Rahul, MD [facility]   9/9/2022  9:00 AM Tere Maya, RPH [facility]         For any urgent concerns, please contact our 24 hour clinic line:   [facility]: [phone]       MARA Vance

## 2022-09-02 NOTE — PROGRESS NOTES
[facility]    Background: Transitional Care Management program auto-identified and prompting a chart review by [facility] team.    Assessment: Upon chart review, CCR Team member will cancel/close this episode of Transitional Care Management program due to reason below:    Patient has active communication with a nurse, provider or care team for reason of post-hospital follow up plan.  Outreach call by [facility] team not indicated to minimize duplicative efforts.     Plan: Transitional Care Management episode closed per reason above.      MAEGAN Corrigan  [facility]    *Connected Care Resource Team does NOT follow patient ongoing. Referrals are identified based on internal discharge reports and the outreach is to ensure patient has an understanding of their discharge instructions.

## 2022-09-07 NOTE — PROGRESS NOTES
Received a message from Dr. Janet Virk  - a resident at [facility] regarding patient.  Dr Virk was calling to inform Thoracic Surgery that patient is being discharged and would like someone to reach out to him to get a new surgery date scheduled with Dr. Zurita. They have been informed that that Dr. Zurita is out of the office until 9/20/22 but would like to get this scheduled as soon as possible so that he has a surgery date. Dr. Virk indicated they were looking for a surgery date in October or November.    Writer attempted to reach out to patient's wife, to inform them that due to patient's recent hospitalization, and time in the ICU, patient needs to be seen by Dr Zurita prior to moving forward with rescheduling surgery.     UT/Voicemail    Clinical Data: Care Coordinator Outreach    Outreach attempted x 1.  Left message on wife's  voicemail with call back information and requested return call.    Plan: Care Coordinator will try to reach patient again in 2 business days.    Shawna Damon RN, BSN  Thoracic Surgery RN Care Coordinator

## 2022-09-08 NOTE — PROGRESS NOTES
Hospitalization Follow-up Visit         [facility]       Hospital Follow-up Visit:    Hospital:  [facility]   Date of Admission: 8/25/22  Date of Discharge: 9/1/22  Reason(s) for Admission: hypovolemic shock, hemoperitoneum            Problems taking medications regularly:  None       Post Discharge Medication Reconciliation: discharge medications reconciled, continue medications without change.       Problems adhering to non-medication therapy:  None       Medications reviewed by: by myself and PharmD Findings include that scripts were sent to hospital pharmacy and not his local pharmacy -> will address.    Summary of hospitalization:  [facility] discharge summary reviewed  Diagnostic Tests/Treatments reviewed.  Follow up needed: with Dr Zurita   Other Healthcare Providers Involved in Patient s Care:         Surgical follow-up appointment - pending  Update since discharge: improved.   Plan of care communicated with patient and family            [facility]  Discharge Summary - Medicine & Pediatrics       Date of Admission:  8/25/2022  Date of Discharge:  9/1/2022 11:39 AM  Discharging Provider: Christy Virk MD  Discharge Service: Hospitalist Service        Discharge Diagnoses     The primary encounter diagnosis was Hepatic cirrhosis, unspecified hepatic cirrhosis type, unspecified whether ascites present (H). Diagnoses of Hemoperitoneum, Liver mass, Hydropneumothorax, ESRD (end stage renal disease) on dialysis (H), Hypovolemic shock (H), Empyema lung (H), and Essential hypertension were also pertinent to this visit.      Follow-ups Needed After Discharge     Follow-up Appointments     Follow-up and recommended labs and tests       Follow up with primary care provider, Dr. Flores, on 9/7 as scheduled for   hospital follow- up.  No follow up labs or test are needed.       Follow up with Dr. Zurita's office to discuss debridement of your chest   wall wound. [phone] ([phone]  is their phone number - I and Dr. Flores   are working on getting you follow-up with their office as well.     Follow up at the [facility] to discuss your liver disease   and mass. Even if they cannot do surgery or chemotherapy, I still   recommend you make an appointment to discuss prognosis and what to expect.   [phone]       Hospital Course     Elle Blanton was admitted on 8/25/2022 for syncope, acute blood loss anemia from hemoperitoneum secondary to ruptured liver mass.  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 Suspect HCC, although this is based on imaging and not biopsy. Paracentesis 8/26 with 2.4 L hemorrhagic fluid.  Initially requiring multiple pressors for blood pressure support. Patient is not a transplant candidate based on the size of his mass and likely metastasis. S/p 3 units pRBCs. Hgb stable for past 4 days, therefore unlikely to be significant ongoing bleeding. Elle and family requested one more pRBC transfusion to get his hemoglobin back to his baseline in the 8s, this was given 8/31.  He also received a second therapeutic paracentesis (2.5 L) to help his belly become less distended.  He was continued on Entecavir, Coreg, diltiazem.  He was started on lactulose with a goal of 3-4 loose bowel movements per day.  He was recommended to follow-up in [facility] liver clinic, information given in his after visit summary for how to set up this appointment.     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 Zurita ([facility]).  This was canceled in the setting of his acute liver  bleed.  Evaluated by Dr. Reynaga who does not think he is a good surgical candidate for major thoracic surgery, but also did not feel comfortable doing the I&D this admission. Current chest wall culture showing MSSA.  He was given IV antibiotics while in the hospital and should continue on Keflex daily upon discharge.  Wound nurse saw him in the hospital and helped set him up with ostomy bags to place over the wound and he was discharged with these.  I was in contact with Dr. Zurita's office and they will contact the patient to help reschedule this procedure for I&D.     ESRD on HD (MF)   He dialyzed on a Monday Wednesday Friday schedule while in the hospital and will continue with outpatient dialysis at [facility] upon discharge.     Goals of care  Lang changed his CODE STATUS to DNR/DNI this admission.  This was in the setting of acute life-threatening illness in the ICU.  At one point comfort cares were discussed, however his condition improved dramatically and therefore he decided to continue with blood transfusions as needed, dialysis, other medical cares.  At discharge he states he feels like his normal self.  It was explained to him that this liver mass can and likely will bleed again in the future.      Palliative care was involved during this hospital stay and recommended following up in palliative care clinic as an outpatient to continue discussions about goals of care, acknowledging that Dr. Flores is the best person to have these conversations with the patient and his family as his time in clinic allows.  Palliative is also recommending looking into different hospice programs to see if any would enroll him while he is still receiving dialysis.            Review of Systems:   CONSTITUTIONAL: no fatigue, no unexpected change in weight  SKIN: R chest wall wound   EYES: no acute vision problems or changes  ENT: no ear problems, no mouth problems, no throat problems  RESP: no significant cough, no shortness of  breath  CV: no chest pain, no palpitations, no new or worsening peripheral edema  GI: no nausea, no vomiting, no constipation, no diarrhea            Physical Exam:     Vitals:    09/09/22 0840   BP: 139/43   Pulse: 93   SpO2: 98%   Weight: 53.6 kg (118 lb 1.9 oz)     Body mass index is 19.66 kg/m .    GENERAL: healthy, alert, well nourished, well hydrated, no distress  SKIN: R chest wall wound - bag in place; underlying fluctuant mass w/ drainage into bad when compressed         Results:     HGB Pending    Assessment and Plan      Elle was seen today for hospital f/u.    Diagnoses and all orders for this visit:    Anemia due to blood loss, acute - > will check Hgb today  -     CBC with platelets; Future  -     CBC with platelets    Chest wall mass -> needs to schedule w/ Dr Zurita  -     Miscellaneous DME Order    Draining cutaneous sinus tract   -     Miscellaneous DME Order    Empyema lung (H)     ESRD (end stage renal disease) on dialysis (H) - tolerated dialysis 2x this week; has scheduled later today    Liver mass, R lobe -> referral placed for GI

## 2022-09-08 NOTE — TELEPHONE ENCOUNTER
[facility] phone call message- general phone call:    Reason for call:     Caller advised patient is out of the hospital and have been trying to reach out to patient to make an hospital follow up, however is unsuccessfully. If provider have any questions please do call and okay to leave detail message.       Return call needed: No    OK to leave a message on voice mail? Yes    Primary language: English      needed? No    Call taken on September 7, 2022 at 1:24 PM by Ana Segura    
Spoke to pt's wife. They thought they just had an appointment tomorrow with me, not yesterday.  Will see pt in am.    Jaswant Flores MD  September 8, 2022  9:36 AM    
Anxiety    Depression    History of supraventricular tachycardia    HTN (hypertension)    Hypertension    Tachycardia

## 2022-09-09 PROBLEM — N18.4 CKD (CHRONIC KIDNEY DISEASE) STAGE 4, GFR 15-29 ML/MIN (H): Status: RESOLVED | Noted: 2019-08-02 | Resolved: 2022-01-01

## 2022-09-09 NOTE — PROGRESS NOTES
Medication Therapy Management (MTM) Encounter    ASSESSMENT:                            Medication Adherence/Access: No issues identified    Cirrhosis of liver without ascites, unspecified hepatic cirrhosis type (H)  Controlled, Medications appear to be safe and effective. Frequency of bowel movements was not assessed at today's visit, although patient is not experiencing constipation which suggests efficacy of current medication regimen.     Cough  Controlled, Medications appear to be safe and effective.     Chronic viral hepatitis B without delta agent and without coma (H)  Controlled, Medications appear to be safe and effective.     ESRD (end stage renal disease) on dialysis (H)  Controlled, Hypertension related to ESRD seems to be well controlled with no reported episodes of hypotension/syncope. However taking Carvedilol atypically, should continue to follow and see if dose adjustment warranted so can take daily to get beneficial effects with variceal bleed prevention.     Empyema (H)  Controlled, Medications appear to be safe and effective. Patient does not have frequent complaints of severe pain and appears to be manageable with limited use of opioids.     PLAN:                            Updated medication list.   Call to see if should send discharge medicines to alternative pharmacy, currently refills at [place] [facility] pharmacy. Left message to call back.     Follow-up: Return for as needed per patient or provider request.    Medication issues to be addressed at a future visit       blood pressure control; address as needed use of Carvedilol --> Message sent to Dr. Flores    Assess continued effectiveness of lactulose therapy     SUBJECTIVE/OBJECTIVE:                          Elle Blanton is a 62 year old male coming in for a TCM visit.  He was discharged from [facility] on 9/1/22 for syncope, acute blood loss anemia 2/2 ruptured liver mass.      Reason for visit: medication reconciliation.    Allergies/ADRs:  Reviewed in chart  Past Medical History: Reviewed in chart  Social History     Tobacco Use     Smoking status: Never Smoker     Smokeless tobacco: Never Used   Vaping Use     Vaping Use: Never used   Substance Use Topics     Alcohol use: No     Drug use: No     ^Reviewed today    Medication Adherence/Access: Patient manages his own medicines.     Cirrhosis of liver without ascites, unspecified hepatic cirrhosis type (H)  Lactulose: patient reports taking 30 mL three times daily. Patient reports no adverse effects. No longer having concerns for constipation.   Hydroxyzine: patient reports has not experienced itching so has not taken recently.  Pantoprazole: patient reports taking  40 mg by mouth once daily as prescribed. Patient reports no adverse effects.   Senna-docusate: Patient reports not taking, has not experienced constipation.   Zolpidem: patient reports taking 5 mg by mouth only as needed, approximately 2 nights per week. Patient reports no adverse effects.     Cough  Benzonatate: patient reports taking 100 mg capsule by mouth only as needed, last dose was yesterday. Patient report benzonatate is effective at relieving occasional cough. Patient reports no adverse effects.   Guaifenesin-codeine: patient reports only takes when needed, hasn't needed to use recently, no complaints of frequent painful cough. Patient reports no adverse effects.     Chronic viral hepatitis B without delta agent and without coma (H)  Entevacir: Patient reports taking 0.5 mg once weekly. Last dose Sunday. Patient reports no adverse effects.     ESRD (end stage renal disease) on dialysis (H)  Calcium acetate: patient reports taking 667 mg twice daily with meals as prescribed. Patient reports no adverse effects.   Midodrine: patient reports he checks his blood pressure before dialysis and has not needed to take this medication for hypotension and does not report episodes of syncope.   Carvedilol: patient reports he checks his blood  "pressure and only takes 12.5 mg when high. Last dose this morning. Patient reports no adverse effects, specifically denies concerns about dizziness/lightheadedness.   Diltiazem: patient reports taking 180 mg by mouth daily as prescribed. Last dose yesterday. Patient reports no adverse effects., specifically denies dizziness/lightheadedness.     Empyema (H)  Cephalexin: patient reports taking 250 mg twice daily with meals as prescribed, last dose last night. Patient reports no adverse effects.   Oxycodone: patient reports only takes 5 mg when he needs it, approximately once per week.   Acetaminophen: patient reports taking 500 mg when needed, last dose yesterday. Patient reports no adverse effects.     Today's Vitals:   BP Readings from Last 1 Encounters:   09/09/22 139/43     Pulse Readings from Last 1 Encounters:   09/09/22 93     Wt Readings from Last 1 Encounters:   09/09/22 118 lb 1.9 oz (53.6 kg)     Ht Readings from Last 1 Encounters:   08/26/22 5' 5\" (1.651 m)     Estimated body mass index is 19.66 kg/m  as calculated from the following:    Height as of 8/26/22: 5' 5\" (1.651 m).    Weight as of an earlier encounter on 9/9/22: 118 lb 1.9 oz (53.6 kg).    Temp Readings from Last 1 Encounters:   09/01/22 97.5  F (36.4  C) (Oral)       ----------------  Post Discharge Medication Reconciliation Status: discharge medications reconciled, continue medications without change.    I spent 20 minutes with this patient today. All changes were made via collaborative practice agreement with Dr. Flores. A copy of the visit note was provided to the patient's provider(s).    The patient was given a summary of these recommendations. See Provider note/AVS from today.     Emily Mandel, Pharmacy Intern   4th Year Pharmacy Student   [employer]    Preceptor Attestation:  I was present with the pharmacy student who participated in the service and in the documentation of this note. I have verified " the history, personally performed the medical decision making, and have verified the content of the note, which accurately reflects my assessment of the patient and the plan of care.     Tere Watters, Pharm.D., CDCES [facility]  Phone: [phone]    For insurance/tracking purposes, [facility] Team Documentation:   Medication Therapy Recommendations  No medication therapy recommendations to display

## 2022-09-09 NOTE — NURSING NOTE
When rooming I asked pt if has felt depressed or little interest in the past two weeks he stated he has not. I asked him about his anxiety and his wife stated that he has been okay. Pt refused to fill out PHQ9/GAD7.

## 2022-09-09 NOTE — PATIENT INSTRUCTIONS
DME order faxed along with patient's demographic information and face to face visit to [facility] [phone]. As discussed with staff at [facility], once order goes through claims processing for coverage verification and approved, [place] out of state distributor will send supply directly to patient's home via Fedex. Wife of Elle has been informed of all above information and is ok with waiting until supply is delivered. Writer has given time duration of may take > 7 days, to allow 3-5 business days for delivery and a couple more days prior for processing. Ze MELENDEZ

## 2022-09-09 NOTE — PROGRESS NOTES
Per Mark he printed out Pt's CBC results that had hemoglobin on it. Per Mark it was 11 and a good score. I called wife to ask for [employer] Fax number. She did not know it so she told me which Benjaminita I confirmed with wife and grabbed the fax number on [employer] website. Faxed results two times.

## 2022-09-09 NOTE — Clinical Note
Yesterday was only day #2 with my student and I feel she hasn't yet got the hang of clinic. And the patient left before I could [place] back #fail :(  I think the only thing atypical with his medicines that needs follow up is that he is taking Carvedilol as needed only when blood pressure is high. Also sounds like when he takes dependent on dialysis schedule. Thinking he could use a dose adjustment so he could take every day so he can get the beneficial effects of a beta blocker, in his case thinking about variceal bleed prevention. I classically think of Propranolol and Nadolol for this role however [employer] says that Carvedilol is another agent that can be used! Fun fact.   Sorry again - KMS

## 2022-09-10 NOTE — TELEPHONE ENCOUNTER
Called patient, not available. Left message to call back.     If patient returns call, please inquire if needs medicine refills to be moved to a different pharmacy.    Currently refills of Carvedilol, Lactulose, Cephalexin and Diltiazem are available at hospital pharmacy ([facility]), unsure if this is patient's preferred pharmacy. If not preferred pharmacy, please update orders below with preferred pharmacy and route to primary care provider / team for approval.     Thanks so much,     Tere Watters, Pharm.D., CDCES [facility]  Phone: [phone]  September 10, 2022 at 2:09 PM

## 2022-09-12 NOTE — PROGRESS NOTES
Addendum:  Wife returned call. Writer reviewed with wife that Dr Zurita would like to see patient in clinic prior to moving forward with rescheduling his surgery. Wife verbalized her understanding. Informed her that Dr Parminder Lizama's RN at [facility] will be reaching out to her to assist in scheduling. Wife appreciated the call.  Shawna Damon RN, BSN  Thoracic Surgery RN Care Coordinator      [facility]/Voicemail    Clinical Data: Care Coordinator Outreach  Attempted to call patient's wife again to get patient schedule to see Dr Zurita to move forward with rescheduling surgery.  Outreach attempted x 1.  Left message on Wife's voicemail with call back information and requested return call.    Plan: Care Coordinator sent Epic message to Alda Vincent, RN with Dr Zurita at [facility] asking if she can follow-up with patient and wife to assist with scheduling patient for appointment.    Shawna Damon RN, BSN  Thoracic Surgery RN Care Coordinator

## 2022-09-16 PROBLEM — R04.2 HEMOPTYSIS: Status: ACTIVE | Noted: 2022-01-01

## 2022-09-16 NOTE — CONSULTS
INITIAL PULMONARY   9/16/2022    Admit Date: 9/16/2022  Hospital Day: 0   CODE: No CPR- Do NOT Intubate    Reason for Consult: Hemoptysis       Assessment/Plan:   Elle Blanton is a 62 year old male with a PMH significant for chronic empyema, cirrhosis, liver mass, ESRD on dialysis, Type B thoracic aortic dissection who is admitted for hemoptysis in the setting of worsening hydropneumothorax.     Hemoptysis   Patient's presentation today could be due to the development of an infection in his previously noted right sided chest hematoma (his imaging demonstrates air pockets) in the setting of chronic empyema and a trapped lung. While theoretically, we could consider bronchoscopy (to isolate which side his bleeding is originating from) and embolization by IR, both myself and IR feel that given his overall ill health, the presence of his thoracic aortic dissection and absence of any enlarged bronchial arteries this would not be a worthwhile option to pursue.   -Monitor CBC   -Transfuse if hemoglobin <7   -Oxycodone to help with cough suppression   - Would initiate broad spectrum antibiotics to cover anaerobic and gram negative organisms (Augmentin).    Hydropneumothorax  Chest CT today showed a large loculated right hydropneumothorax with increased internal fluid and debris. The patient has a long standing history of recurrent right pleural effusion on the right side that has progressed to a large loculated right hydropneumothorax with trapped right lung. 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 The patient has been seen by surgery who do not feel he is a good candidate for decortication because of his significant medical history.     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 Patient has been seen bythoracic surgery who  discussed possible I&D, however, with the patients significant medical diagnoses it is unlikely that these interventions will significantly affect his overall prognosis.     Ascities:Has a large volume of ascitic fluid in the pelvis and would benefit from a paracentesis.  -Will defer to the primary team for this.    Goals of care   Patient has multiple significant medical diagnoses including hydropneumothorax, liver mass (most likely HCC), cirrhosis, ESRD requiring dialysis, and large thoracic aortic dissection. This patient is not a good candidate for surgery with high risk of mortality during surgery. Patient is currently DNR/DNI and would benefit from transitioning to comfort cares. Each consultant that has been involved in his care feels the same and I was quite rachid with the patient and his wife about this today. She does not feel that she could manage him at home in the event that he were to take a turn for the worse and I explained that we could certainly help facilitate help if he were to transition to hospice.    Previsit Tests   Chest imaging over time  CT abdomen 8/19/2020:  Last normal imaging of lower chest    CXR 4/17/2021    US thoracentesis 4/18/2021:  Drained 2000 ml, clear serous fluid, transudate by lab tests     CT chest 4/22/2021: After placement of a chest tube on 4/19/2022; RIGHT lung not fully expanded    CXR 4/17/2021: Fully expanded RIGHT lung       No further imaging after 4/27/2021 until September 2021.  CT chest 9/26/2021:Small-to-moderate RIGHT pleural effusion in the setting of acute LEFT retroperitoneal hemorrhage       CXR 10/15/2021: Recurrent large RIGHT pleural effusion    US thoracentesis 10/15/2021: 1.5 l of clear fluid  US thoracentesis 10/16/2021: 350 ml cloudy yellow fluid; lab: turbid brown fluid, cultures negative.  CT chest after chest tube (10/21/2021): Complex hydropneumothorax with new pleural thickening compared to 9/26/2021; suggestive of empyema.       CT 11/9/2021:  After chest tube removal; space is mostly filled with fluid with air-pockets       CT 11/14/2021: Drained fluid, trapped lung.       Thoracentesis 4/7/2022: S. Aureus 4+  CT 5/3/2022: New soft tissue mass at chest tube site suggestive of a hematoma      CT Chest W/O Contrast 7/7/2022      CT Chest W/O Contrast 8/25/2022      CTA Chest Abdomen 9/16/2022    1.  Large loculated right hydropneumothorax has increased internal fluid and debris.  2.  Stable type B aortic dissection.  3.  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  4.  A stable 7.4 cm hepatic mass which may represent hepatocellular carcinoma.                                                                                                                                                          HPI:   CCx: Hemoptyiss     HPI:Elle Blanton is a 62 year old male with a PMH significant for chronic empyema, cirrhosis, liver mass, ESRD on dialysis, thoracic aoritc dissection who is admitted for hemoptysis in the setting of worsening hydropneumothorax. The patient reports that 2 days ago he started coughing up blood, about a teaspoon amount. At the same time he also started noticing bright red blood in his lung bag. The patient denies fever, light headedness, fatigue, shortness of breath, or blood in his stool.     ROS: Pertinent positives alluded to in the HPI. Remainder of 10 point ROS is negative.                                                                                                                                                       Medical/Surgical history:  Reviewed.     Allergies:  Reviewed in Epic    PTA medications:  Reviewed.    Family Hx:  Reviewed.    Social Hx:  Reviewed.    Exam/Data:   ROS: 10 point ROS obtained, pertinant positives alluded to in HPI    Vitals  /78   Pulse 80   Temp 98.1  F (36.7  C) (Oral)   Resp 18   Wt 54.4 kg (120 lb)   SpO2 98%   BMI 19.97 kg/m       No intake/output data  recorded.  Weight change:     EXAM:  Physical Exam  Constitutional:       Comments: Ill appearing, pale gentleman.   HENT:      Head: Normocephalic.      Mouth/Throat:      Mouth: Mucous membranes are dry.   Eyes:      General: Scleral icterus present.   Cardiovascular:      Comments: Sinus tachycardia.  S1 and S2 audible.  Pulmonary:      Comments: Diminished BS on the Right, normal BS on the left.  Abdominal:      General: There is distension.   Skin:     General: Skin is warm.   Neurological:      Mental Status: He is alert.           Medications:       sodium chloride (PF)  3 mL Intracatheter Q8H         DATA  All laboratory and radiology has been personally reviewed by myself today.  Recent Labs   Lab 09/16/22  1121   WBC 5.8   HGB 9.0*   HCT 29.3*   PLT 66*     Recent Labs   Lab 09/16/22  1121   *   CO2 27   BUN 47*   ALKPHOS 237*   ALT 26   AST 36       Leroy Muñiz   [facility], MS[facility]    Seen and staffed with Leroy Muñiz. This note reflects our joint assessment and plan.    Total time spent with the patient and coordinating care 80 minutes.    Violet Corona MD  Pulmonary and Critical Care  (P) [phone]

## 2022-09-16 NOTE — PROGRESS NOTES
"Assessment & Plan     Anemia due to blood loss, acute  Hemoptysis  Patient presents to clinic with two days of significant hemoptysis. Patient has a multiple hospitalizations for right lung drainage, and a recent hospitalization for bleeding from his liver mass resulting in shock. Patient also has end stage renal disease and is anemic at baseline. A hemoglobin was drawn in lab and returned at 8.4. While 8.4 is normally around the patient's baseline, his hemoglobin had been at 11.0 just a week prior. Discussed with Elle and his wife the value of going to the emergency room likely for further imaging to figure out where the bleeding is coming from. Elle agreed to this plan. Emergency room was called and debriefed on the patient.   - CBC with platelets    Hepatic cirrhosis, unspecified hepatic cirrhosis type, unspecified whether ascites present (H)  Patient requested refill  - lactulose (CHRONULAC) 10 GM/15ML solution  Dispense: 946 mL; Refill: 11    Nevin Gale MD  [facility]    Subjective   Elle is a 62 year old with PMHx significant for empyema of the right lung, ESRD, liver mass presenting for the following health issues: hemoptysis    HPI     Hemoptysis  How many days have you been coughing up blood? Two days   How much blood? Patient and family notes that it is a significant amount of blood.   Fever: No  Chest pain: No   Shortness of breath: No more than normal   Hematemesis: None   Abdominal pain: Patient notes that his belly feels slightly tight, but is not painful.     Review of Systems   Constitutional, HEENT, cardiovascular, and gu systems are negative, except as otherwise noted. See above for description of hemoptysis.       Objective    BP 96/62   Pulse 74   Temp 97.4  F (36.3  C)   Resp 20   Ht 1.651 m (5' 5\")   Wt 54.4 kg (120 lb)   SpO2 97%   BMI 19.97 kg/m    Body mass index is 19.97 kg/m .  Physical Exam   GENERAL: alert, no distress and frail  RESP: lungs " Noted.   clear to auscultation on left- no rales, rhonchi or wheezes. No breath sounds observed on right secondary to large fluid collection. Collection in bag was slightly blood tinged.   CV: regular rate and rhythm, normal S1 S2, no S3 or S4, no murmur, click or rub, no peripheral edema and peripheral pulses strong  ABDOMEN: soft, non-tender, non-distended  MS: no gross musculoskeletal defects noted, no edema    Results for orders placed or performed in visit on 09/16/22 (from the past 24 hour(s))   CBC with platelets   Result Value Ref Range    WBC Count 4.2 4.0 - 11.0 10e3/uL    RBC Count 2.75 (L) 4.40 - 5.90 10e6/uL    Hemoglobin 8.4 (L) 13.3 - 17.7 g/dL    Hematocrit 27.4 (L) 40.0 - 53.0 %     78 - 100 fL    MCH 30.5 26.5 - 33.0 pg    MCHC 30.7 (L) 31.5 - 36.5 g/dL    RDW 20.4 (H) 10.0 - 15.0 %    Platelet Count 52 (L) 150 - 450 10e3/uL

## 2022-09-16 NOTE — H&P
[facility]    History and Physical - Hospitalist Service       Date of Admission:  9/16/2022             Med review has not been done at time of this note     Assessment & Plan       Elle Blanton is a 62 year old male admitted on 9/16/2022.  he has complicated medical history significant for chronic empyema, ESRD on dialysis, thoracic aortic dissection.  He was admitted with two days of hemoptysis in the setting of worsening hydropneumothorax.     Hemoptysis   Acute blood loss anemia  R hydropneumothorax with volume loss of R lung  Chronic trapped lung, empyema   Patient has history of empyema requiring chest tube drainage in the past, but during last admission he was felt to be unstable for I&D and was evaluated by Dr. Reynaga who thought he was not a good surgical candidate. Patient went home with ostomy bag attached to chest wall wound and keflex. Previous wound cx growing MSSA. Patient presented from clinic today with history of 2 days hemoptysis, and drop in Hb and hematocrit from 11.0 to 9.0 on admission. But otherwise has no chest pain or sob. CT ABP showed  Large loculated right hydropneumothorax has increased internal fluid and debris. With CT findings and given pmhx of chronic empyema and liver malignancy. his presentation is likely related to new vascular injury or new mass that is eroding into tracheobronchial tree although not seen on CT. Will likey need bronchoscopy pending pulmonology consult and recs. PE is on the differential although less likely given clinical picture at this time.   - pulmonology consult appreciate recs  - continue PTA  keflex.  - consider chest wall wound culture  - 2 large pore IVs  - type/cross matching  - transfuse Hgb<7  -  Q 6 Hgb  - supplemental O2 support if needed, 94-96% O2  - WOC consult for draining chest fistula- appreciate assistance with ostomy bag over wound/    ESRD on HD   Patient HD Monday and Friday, he missed HD today prior to  admission  - Nephrology consulted appreciate  -  will likely dialyze today if stable.       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    HCC on imaging  Hx of ruptured hepatic mass  Chronic hep B  cirrhosis  Stable large hepatic mass on CTA, likely HCC cytology of peritoneal fluid pending      Chronic medical conditions  GERD- continue PTA protonix   HTN- consider restarting PTA meds if BP remains stable.                  Diet:   Renal diet  DVT Prophylaxis: Anti-embolisim stockings (TEDs)  Beltre Catheter: Not present  Fluids: none  Central Lines: None  Cardiac Monitoring: None  Code Status:   Full code.               Clinically Significant Risk Factors Present on Admission        # Hyperkalemia: K = 5.4 mmol/L (Ref range: 3.5 - 5.0 mmol/L) on admission, will monitor as appropriate      # Hypoalbuminemia: Albumin = 2.3 g/dL (Ref range: 3.5 - 5.0 g/dL) on admission, will monitor as appropriate  # Acute Kidney Injury, unspecified: based on a >150% or 0.3 mg/dL increase in creatinine on admission compared to past 90 day average, will monitor renal function  # Coagulation Defect: INR = 1.26 (Ref range: 0.85 - 1.15) and/or PTT = 37 Seconds (Ref range: 22 - 38 Seconds) on admission, will monitor for bleeding  # Thrombocytopenia: Plts = 66 10e3/uL (Ref range: 150 - 450 10e3/uL) on admission, will monitor for bleeding           Disposition Plan      Expected Discharge Date: 09/18/2022                The patient's care was discussed with the Attending Physician, Dr. Lindsey.    LAILA Childress  [facility] PGY1  [facility]  Securely message with the Vocera Web Console (learn more here)  Text page via [employer] Paging/Directory       ______________________________________________________________________    Chief Complaint   Hemoptysis for 2 days.     History is obtained from the patient    History of Present  "Illness   Elle Blanton is a 62 year old male with complicated medical history who presents from clinic with two days of hemoptysis, he reports that he started coughing up blood 2 days ago, he feels something itching and irritating his throat and then he would cough spoonfuls of blood mixed with mucus at times. No blood from nose, no vomiting, no bloody stool. He also denies any chest pain or increased cough.   He feels tired, but otherwise had no change in appetite no hx of fever/chills over the past few days. He mentions past few days he feels his belly is getting tight, state that he would like another fluid drainage \" paracentesis\" since he felt so comfortable last time he had one during previous admission; he was discharged recently in 9/1/22.    Review of Systems    The 10 point Review of Systems is negative other than noted in the HPI or here.    Past Medical History    I have reviewed this patient's medical history and updated it with pertinent information if needed.   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 pylori infection 7/5/2017    UGI bleed implied by Hgb 9.0 6/22/17 and melena. Admitted and hgb decreased to 7.6, CT abdomen showed all bladder wall thickening. + Hep B surface antigen noted. Melena resolved and hgb stabalized without transfusion, epigastric pain resolved with PPI. Recommend referral for gastroscopy.     Heart attack (H)      Hepatitis B      Normocytic anemia      Other pancytopenia (H) 7/5/2017 6/24/17 Peripheral smear at [facility].Pancytopenia of uncertain cause. Ruled out " acute leukemia. Hematologist suggests hemolytic anemia should be considered possible cause and LDH and haptoglobin should be assessed in future.      PONV (postoperative nausea and vomiting)      Thrombocytopenia (H)        Past Surgical History   I have reviewed this patient's surgical history and updated it with pertinent information if needed.  Past Surgical History:   Procedure Laterality Date     ABCESS DRAINAGE      finger     BURSECTOMY ELBOW Left 10/15/2021    Procedure: LEFT OLECRANON BURSECTOMY;  Surgeon: Tico Myers MD;  Location: [facility] OR     BURSECTOMY ELBOW Left 1/18/2022    Procedure: LEFT ELBOW OLECRANON BURSECTOMY;  Surgeon: Tico Myers MD;  Location: [facility] OR     CREATE FISTULA ARTERIOVENOUS UPPER EXTREMITY Left 4/20/2021    Procedure: left arm dialysis graft placement;  Surgeon: Roxana Cosby MD;  Location: [facility] OR;  Service: General     FOREIGN BODY REMOVAL      finger     INCISION AND DRAINAGE FINGER, COMBINED Left 10/20/2016    Procedure: COMBINED INCISION AND DRAINAGE FINGER;  Surgeon: Mireya Pizano MD;  Location: [facility] OR     INCISION AND DRAINAGE UPPER EXTREMITY, COMBINED Left 1/18/2022    Procedure: AND ELBOW INCISION AND DRAINAGE;  Surgeon: Tico Myers MD;  Location: [facility] OR     INSERT PICC LINE Right 8/25/2022    Procedure: INSERTION, PICC;  Surgeon: Osmin Villa MD;  Location: [facility] OR     IR CHEST TUBE PLACEMENT NON-TUNNELED RIGHT  4/19/2021     IR CHEST TUBE PLACEMENT NON-TUNNELED RIGHT  10/19/2021     IR CHEST TUBE PLACEMENT NON-TUNNELED RIGHT  11/10/2021     IR CHEST TUBE PLACEMENT NON-TUNNELED RIGHT  3/31/2022     IR DIALYSIS FISTULOGRAM LEFT  4/2/2022     IR DIALYSIS FISTULOGRAM LEFT  4/4/2022     IR PICC PLACEMENT > 5 YRS OF AGE  8/25/2022     IR PLEURAL DRAINAGE WITH CATHETER INSERTION  4/19/2021     IR VISCERAL ANGIOGRAM  8/26/2022     MIDLINE INSERTION - DOUBLE LUMEN  4/23/2021          CT  ESOPHAGOGASTRODUODENOSCOPY TRANSORAL DIAGNOSTIC N/A 8/18/2020    Procedure: ESOPHAGOGASTRODUODENOSCOPY (EGD) with biopsy;  Surgeon: Nilson Gonzales MD;  Location: [facility];  Service: Gastroenterology      PARACENTESIS  8/14/2020      PARACENTESIS  8/19/2020      THORACENTESIS  4/18/2021        Social History   I have reviewed this patient's social history and updated it with pertinent information if needed. Elle Blanton  reports that he has never smoked. He has never used smokeless tobacco. He reports that he does not drink alcohol and does not use drugs.    Family History   I have reviewed this patient's family history and updated it with pertinent information if needed.  Family History   Problem Relation Age of Onset     Ulcers Father      Hypertension No family hx of      Asthma No family hx of      Cancer No family hx of      Coronary Artery Disease No family hx of      Liver Cancer No family hx of        Prior to Admission Medications   Prior to Admission Medications   Prescriptions Last Dose Informant Patient Reported? Taking?   acetaminophen (TYLENOL) 500 MG tablet   Yes No   Sig: Take 500 mg by mouth every 6 hours as needed for mild pain   benzonatate (TESSALON) 100 MG capsule   No No   Sig: Take 1 capsule (100 mg) by mouth 3 times daily as needed for cough   calcium acetate (PHOSLO) 667 MG CAPS capsule   Yes No   Sig: Take 1 capsule by mouth 2 times daily (with meals)   carvedilol (COREG) 12.5 MG tablet   No No   Sig: Take 1 tablet (12.5 mg) by mouth 2 times daily (with meals)   cephALEXin (KEFLEX) 250 MG capsule   No No   Sig: Take 1 capsule (250 mg) by mouth 2 times daily   diltiazem ER (DILT-XR) 180 MG 24 hr capsule   No No   Sig: Take 1 capsule (180 mg) by mouth daily   entecavir (BARACLUDE) 0.5 MG tablet   No No   Sig: Take 1 tablet (0.5 mg) by mouth every 7 days   Patient taking differently: Take 0.5 mg by mouth every 7 days Sunday   guaiFENesin-codeine (GUAIFENESIN AC) 100-10 MG/5ML syrup    "No No   Sig: Take 5 mLs by mouth every 4 hours as needed for cough   hydrOXYzine (ATARAX) 25 MG tablet   No No   Sig: Take 1 tablet (25 mg) by mouth 3 times daily as needed for itching   lactulose (CHRONULAC) 10 GM/15ML solution   No No   Sig: Take 30 mLs (20 g) by mouth 3 times daily   midodrine (PROAMATINE) 10 MG tablet   Yes No   Sig: Take 10 mg by mouth three times a week Prior to dialysis runs   order for DME   No No   Sig: Equipment being ordered: Other: blood pressure monitor  Treatment Diagnosis: hypertension   oxyCODONE (ROXICODONE) 5 MG tablet   No No   Sig: Take 1 tablet (5 mg) by mouth daily as needed for severe pain   pantoprazole (PROTONIX) 40 MG EC tablet   No No   Sig: Take 1 tablet (40 mg) by mouth daily   Patient taking differently: Take 40 mg by mouth every morning   senna-docusate (SENOKOT-S/PERICOLACE) 8.6-50 MG tablet   No No   Sig: Take 1 tablet by mouth daily as needed for constipation   zolpidem (AMBIEN) 5 MG tablet   No No   Sig: Take 1 tablet (5 mg) by mouth nightly as needed for sleep      Facility-Administered Medications: None     Allergies   Allergies   Allergen Reactions     Nka [No Known Allergies]        Physical Exam   Vital Signs: Temp: 98.1  F (36.7  C) Temp src: Oral BP: 116/78 Pulse: 80   Resp: 18 SpO2: 98 %      Weight: 120 lbs 0 oz    To document using Notewriter, use smartphrase \"PhysExam  Physical Exam  Constitutional:       General: He is not in acute distress.  HENT:      Head: Normocephalic and atraumatic.      Nose: Nose normal.      Mouth/Throat:      Mouth: Mucous membranes are moist.      Pharynx: No posterior oropharyngeal erythema.   Eyes:      Conjunctiva/sclera: Conjunctivae normal.   Cardiovascular:      Rate and Rhythm: Normal rate.      Heart sounds: Normal heart sounds. No murmur heard.  Pulmonary:      Effort: Pulmonary effort is normal.      Breath sounds: Normal breath sounds.      Comments: Diminished BS on the right   Abdominal:      General: Bowel " sounds are normal. There is distension.      Palpations: Abdomen is soft.      Tenderness: There is no abdominal tenderness. There is no guarding.      Comments: Right sided with ostomy bag over rt lower fistula   Musculoskeletal:         General: No swelling or tenderness. Normal range of motion.      Right lower leg: No edema.      Left lower leg: No edema.   Skin:     General: Skin is dry.      Findings: No lesion or rash.   Neurological:      General: No focal deficit present.      Mental Status: He is alert and oriented to person, place, and time. Mental status is at baseline.   Psychiatric:         Mood and Affect: Mood normal.         Behavior: Behavior normal.         Thought Content: Thought content normal.           Data   Data reviewed today: I reviewed all medications, new labs and imaging results over the last 24 hours. I personally reviewed the abdominal CT image(s) showing  Large loculated right hydropneumothorax has increased internal fluid and debris..    Recent Labs   Lab 09/16/22  1121 09/16/22  0938   WBC 5.8 4.2   HGB 9.0* 8.4*    100   PLT 66* 52*   INR 1.26*  --    *  --    POTASSIUM 5.4*  --    CHLORIDE 99  --    CO2 27  --    BUN 47*  --    CR 7.96*  --    ANIONGAP 9  --    TANO 7.7*  --    GLC 97  --    ALBUMIN 2.3*  --    PROTTOTAL 8.5*  --    BILITOTAL 0.5  --    ALKPHOS 237*  --    ALT 26  --    AST 36  --      Most Recent 3 CBC's:Recent Labs   Lab Test 09/16/22  1121 09/16/22  0938 09/09/22  0908   WBC 5.8 4.2 5.2   HGB 9.0* 8.4* 11.0*    100 100   PLT 66* 52* 62*     Most Recent 2 LFT's:Recent Labs   Lab Test 09/16/22  1121 08/26/22  0850   AST 36 46*   ALT 26 19   ALKPHOS 237* 103   BILITOTAL 0.5 0.9     Most Recent 3 INR's:Recent Labs   Lab Test 09/16/22  1121 08/26/22  0850 04/09/22  0456   INR 1.26* 1.93* 1.31*     Most Recent 3 Creatinines:Recent Labs   Lab Test 09/16/22  1121 08/29/22  0643 08/27/22  0603   CR 7.96* 8.64* 5.59*     5.8    \    9.0 (L)    /     66 (L)   N 76    L N/A    135 (L)    99    47 (H) /   ------------------------------------ 97   ALT 26   AST 36    (H)   ALB 2.3 (L)   Ca 7.7 (L)  5.4 (H)    27    7.96 (HH) \    % RETIC N/A    LDH N/A  Troponin N/A    BNP N/A    CK N/A  INR 1.26 (H)   PTT 37    D-dimer N/A    Fibrinogen N/A    Antithrombin N/A  Ferritin N/A  CRP N/A    IL-6 N/A  Recent Results (from the past 24 hour(s))   CTA Chest Abdomen Pelvis w Contrast    Narrative    EXAM: CTA CHEST ABDOMEN PELVIS W CONTRAST  LOCATION: [facility]  DATE/TIME: 9/16/2022 1:02 PM    INDICATION: hemoptysis  COMPARISON: 08/26/2022  TECHNIQUE: CT angiogram chest abdomen pelvis during arterial phase of injection of IV contrast. 2D and 3D MIP reconstructions were performed by the CT technologist. Dose reduction techniques were used.   CONTRAST: isovue 370 75ml    FINDINGS:   CT ANGIOGRAM CHEST, ABDOMEN, AND PELVIS: Stable ectasia of the ascending aorta at 4.0 cm. No significant interval change in the descending thoracic aortic dissection extending from the arch into the left common iliac artery. 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 Celiac axis, superior mesenteric artery and right renal artery arises from the true lumen. The left renal artery and inferior mesenteric artery arise from false lumen. A stable 4.2 cm infrarenal abdominal aortic   artery aneurysm, image 45:11. Stable left brachiocephalic vein stent.    LUNGS AND PLEURA: A stable large, thick-walled, right hydropneumothorax encompassing the majority of the right hemithorax. Increased fluid and debris within the hydropneumothorax. Atelectasis involving the majority of the right lung. Minimal left basilar   atelectasis. Stable small layering bilateral pleural effusions.    MEDIASTINUM/AXILLAE: No lymphadenopathy. Esophageal varices. Right atrial enlargement.    CORONARY ARTERY CALCIFICATION: Severe.    HEPATOBILIARY: Embolization coils in the  right lobe of the liver. No significant interval change in a 7.4 x 6.2 cm heterogeneous nodular hypodense mass in segments 8/4A. No active extravasation. Nodular hepatic contour consistent with fibrosis/cirrhosis.   Cholelithiasis. Stable dilated portal vein.    PANCREAS: Normal.    SPLEEN: Stable splenomegaly.    ADRENAL GLANDS: Normal.    KIDNEYS/BLADDER: Stable bilateral renal atrophy. Hypoenhancing left kidney. Subcentimeter renal hypodensities which are too small to characterize. No hydronephrosis.    BOWEL: No obstruction or inflammatory change.    LYMPH NODES: Normal.    PELVIC ORGANS: Large volume ascites.    MUSCULOSKELETAL: Degenerative changes of the spine and hips.      Impression    IMPRESSION:  1.  Large loculated right hydropneumothorax has increased internal fluid and debris.  2.  Stable type B aortic dissection.  3.  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  4.  A stable 7.4 cm hepatic mass which may represent hepatocellular carcinoma.      Age appropriate behavior

## 2022-09-16 NOTE — ED TRIAGE NOTES
"Patient presents here for evaluation of coughing up blood that has occurred over the past two days. Additionally, he has a drain placed to his right lateral chest wall \"because of an infection\". He denies fevers or night sweats as well. Patient is unsure if he as been screened for TB in the past.       "

## 2022-09-16 NOTE — PHARMACY-ADMISSION MEDICATION HISTORY
Pharmacy Note - Admission Medication History     ______________________________________________________________________    Prior To Admission (PTA) med list completed and updated in EMR.       PTA Med List   Medication Sig Last Dose     acetaminophen (TYLENOL) 500 MG tablet Take 500 mg by mouth every 6 hours as needed for mild pain Unknown at PRN     benzonatate (TESSALON) 100 MG capsule Take 1 capsule (100 mg) by mouth 3 times daily as needed for cough Unknown at PRN     calcium acetate (PHOSLO) 667 MG CAPS capsule Take 1 capsule by mouth 2 times daily (with meals) 9/16/2022 at am     carvedilol (COREG) 12.5 MG tablet Take 1 tablet (12.5 mg) by mouth 2 times daily (with meals) 9/16/2022 at am     cephALEXin (KEFLEX) 250 MG capsule Take 1 capsule (250 mg) by mouth 2 times daily 9/16/2022 at am     diltiazem ER (DILT-XR) 180 MG 24 hr capsule Take 1 capsule (180 mg) by mouth daily 9/16/2022 at am     entecavir (BARACLUDE) 0.5 MG tablet Take 1 tablet (0.5 mg) by mouth every 7 days (Patient taking differently: Take 0.5 mg by mouth every 7 days Sunday) Past Week at Sunday     guaiFENesin-codeine (GUAIFENESIN AC) 100-10 MG/5ML syrup Take 5 mLs by mouth every 4 hours as needed for cough Unknown at PRN     hydrOXYzine (ATARAX) 25 MG tablet Take 1 tablet (25 mg) by mouth 3 times daily as needed for itching Unknown at PRN     lactulose (CHRONULAC) 10 GM/15ML solution Take 30 mLs (20 g) by mouth 3 times daily Unknown at Unknown time     oxyCODONE (ROXICODONE) 5 MG tablet Take 1 tablet (5 mg) by mouth daily as needed for severe pain Unknown at PRN     pantoprazole (PROTONIX) 40 MG EC tablet Take 1 tablet (40 mg) by mouth daily 9/16/2022 at am     senna-docusate (SENOKOT-S/PERICOLACE) 8.6-50 MG tablet Take 1 tablet by mouth daily as needed for constipation Unknown at PRN     zolpidem (AMBIEN) 5 MG tablet Take 1 tablet (5 mg) by mouth nightly as needed for sleep Unknown at PRN       Information source(s): Patient, Family member  and Annemarie/Dinorah  Method of interview communication: in-person    Summary of Changes to PTA Med List  New: none  Discontinued: midodrine  Changed: none    Patient was asked about OTC/herbal products specifically.  PTA med list reflects this.    In the past week, patient estimated taking medication this percent of the time:  greater than 90%.    Allergies were reviewed, assessed, and updated with the patient.      Patient does not use any multi-dose medications prior to admission.    The information provided in this note is only as accurate as the sources available at the time of the update(s).    Thank you,  Jessi Keller, [facility]  9/16/2022 3:50 PM

## 2022-09-16 NOTE — TELEPHONE ENCOUNTER
[facility] phone call message- general phone call:    Reason for call: Patient wife called wanting to speak with , informed patient wife  is not yet here and is not seeing patients today, wife Josias wants to know if  can send patient to the hospital due to coughing up blood, I asked if they were unable to get the the ER, patient wife states just too long of a wait and states  mentioned if they ever needed anything to call clinic and  will do it for them. I also recommended if they would like to schedule an appointment this morning that way they can see  and request to be sent to ER, patient wife declined and just wanted me to ask . please call and advise. No Auth on file for wife, did recommend next time to have patient fill one out to add her.     Return call needed: Yes    OK to leave a message on voice mail? Yes    Primary language: English      needed? No    Call taken on September 16, 2022 at 8:11 AM by Ana María Live

## 2022-09-16 NOTE — PROGRESS NOTES
I have personally reviewed the history and examination as documented by Dr. Gale.  I was present during key portions of the visit and agree with the assessment and plan as documented for 62 yr old male with ESRD. Lung empyema, cirrhosis/ liver mass here for 2 days of hemoptysis. Hgb down to 8.6 from 11. Wife notes significant amount of blood loss. Will send to ED for additional evaluation. Precautions given. Anticipatory guidance given.     Jaswant Flores MD  September 16, 2022  10:32 AM

## 2022-09-16 NOTE — TELEPHONE ENCOUNTER
Pt seen in clinic today by myself and Dr Gale.    Jaswant Flores MD  September 16, 2022  12:12 PM

## 2022-09-16 NOTE — ED NOTES
Bed: JNEDP-02  Expected date:   Expected time:   Means of arrival:   Comments:  Boarder PT HW 6 L.V.

## 2022-09-16 NOTE — ED PROVIDER NOTES
"EMERGENCY DEPARTMENT NOTE     Name: Elle Blanton    Age/Sex: 62 year old male   MRN: [id number]   Evaluation Date & Time:  No admission date for patient encounter.    PCP:    Jaswant Flores   ED Provider: Glen Suero D.O.       CHIEF COMPLAINT    Hemoptysis       DIAGNOSIS & DISPOSITION     1. Hemoptysis      DISPOSITION: Admit to [facility] telemetry/PVS    At the conclusion of the encounter I discussed the results of all of the tests and the disposition. The questions were answered. The patient or family acknowledged understanding and was agreeable with the care plan.    TOTAL CRITICAL CARE TIME (EXCLUDING PROCEDURES): Not applicable    PROCEDURES:   None    EMERGENCY DEPARTMENT COURSE/MEDICAL DECISION MAKING   11:08 AM I met with the patient to gather history and to perform my initial exam.  We discussed treatment options and the plan for care while in the Emergency Department.    PPE: Provider wore gloves and N95 mask.     Elle Blanton is a 62 year old year old male with a relevant past history of thoracic aortic dissection currently medically managed, anemia, ESRD dialysis, recent admission for empyema.  Surgery saw the patient and he had chest tube for drainage place.  Has been draining moderate amount of blood has 24 to 48 hours and today the patient has had intermittent hemoptysis of small to moderate amount.  He reports no acute chest pain or shortness of breath.  Who presents to this ED for evaluation of hemoptysis.    Triage note reviewed:Patient presents here for evaluation of coughing up blood that has occurred over the past two days. Additionally, he has a drain placed to his right lateral chest wall \"because of an infection\". He denies fevers or night sweats as well. Patient is unsure if he as been screened for TB in the past.           Vital signs:/78   Pulse 80   Temp 98.1  F (36.7  C) (Oral)   Resp 18   Wt 54.4 kg (120 lb)   SpO2 98%   BMI 19.97 kg/m    Pertinent physical exam " findings:  Cardiac: Regular rate and rhythm S1-S2 without murmur rub.  Peripheral pulses are equal  Pulmonary: Lungs are clear to ascultation bilaterally with some decrease in breath sounds right lung base.  In the right lateral chest wall there is a drain present which has small amount of dark blood.  Diagnostic studies:  Imaging:  CTA Chest Abdomen Pelvis w Contrast   Final Result   IMPRESSION:   1.  Large loculated right hydropneumothorax has increased internal fluid and debris.   2.  Stable type B aortic dissection.   3.  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   4.  A stable 7.4 cm hepatic mass which may represent hepatocellular carcinoma.      US Abdomen Limited    (Results Pending)      Lab:  Labs Ordered and Resulted from Time of ED Arrival to Time of ED Departure   INR - Abnormal       Result Value    INR 1.26 (*)    COMPREHENSIVE METABOLIC PANEL - Abnormal    Sodium 135 (*)     Potassium 5.4 (*)     Chloride 99      Carbon Dioxide (CO2) 27      Anion Gap 9      Urea Nitrogen 47 (*)     Creatinine 7.96 (*)     Calcium 7.7 (*)     Glucose 97      Alkaline Phosphatase 237 (*)     AST 36      ALT 26      Protein Total 8.5 (*)     Albumin 2.3 (*)     Bilirubin Total 0.5      GFR Estimate 7 (*)    CBC WITH PLATELETS AND DIFFERENTIAL - Abnormal    WBC Count 5.8      RBC Count 2.94 (*)     Hemoglobin 9.0 (*)     Hematocrit 29.3 (*)           MCH 30.6      MCHC 30.7 (*)     RDW 20.7 (*)     Platelet Count 66 (*)     % Neutrophils 76      % Lymphocytes 12      % Monocytes 9      % Eosinophils 2      % Basophils 1      % Immature Granulocytes 0      NRBCs per 100 WBC 0      Absolute Neutrophils 4.4      Absolute Lymphocytes 0.7 (*)     Absolute Monocytes 0.5      Absolute Eosinophils 0.1      Absolute Basophils 0.1      Absolute Immature Granulocytes 0.0      Absolute NRBCs 0.0     PARTIAL THROMBOPLASTIN TIME - Normal    aPTT 37     TROPONIN I - Normal    Troponin I <0.01      COVID-19 VIRUS (CORONAVIRUS) BY PCR - Normal    SARS CoV2 PCR Negative     TYPE AND SCREEN, ADULT    ABO/RH(D) A POS      Antibody Screen Negative      SPECIMEN EXPIRATION DATE [id number]     ABO/RH TYPE AND SCREEN      Interventions:None  Medical decision making: Patient is remained hemodynamically stable without acute respiratory distress.  Patient will be admitted for pulmonology and surgery consultation.  Patient is on dialysis and nephrology will also be consulted for routine dialysis.  Case was discussed with the [place] resident.  Current plan for admission discussed with the patient and his wife and they are in agreement.    ED INTERVENTIONS     Medications   lidocaine 1 % 0.1-1 mL (has no administration in time range)   lidocaine (LMX4) cream (has no administration in time range)   sodium chloride (PF) 0.9% PF flush 3 mL (3 mLs Intracatheter Given 9/16/22 8912)   sodium chloride (PF) 0.9% PF flush 3 mL (has no administration in time range)   melatonin tablet 1 mg (has no administration in time range)   senna-docusate (SENOKOT-S/PERICOLACE) 8.6-50 MG per tablet 1 tablet (has no administration in time range)     Or   senna-docusate (SENOKOT-S/PERICOLACE) 8.6-50 MG per tablet 2 tablet (has no administration in time range)   bisacodyl (DULCOLAX) suppository 10 mg (has no administration in time range)   ondansetron (ZOFRAN ODT) ODT tab 4 mg (has no administration in time range)     Or   ondansetron (ZOFRAN) injection 4 mg (has no administration in time range)   prochlorperazine (COMPAZINE) injection 10 mg (has no administration in time range)     Or   prochlorperazine (COMPAZINE) tablet 10 mg (has no administration in time range)     Or   prochlorperazine (COMPAZINE) suppository 25 mg (has no administration in time range)   HYDROmorphone (PF) (DILAUDID) injection 0.2 mg (has no administration in time range)   iopamidol (ISOVUE-370) solution 75 mL (75 mLs Intravenous Given 9/16/22 1256)        DISCHARGE MEDICATIONS        Review of your medicines      UNREVIEWED medicines. Ask your doctor about these medicines      Dose / Directions   acetaminophen 500 MG tablet  Commonly known as: TYLENOL      Dose: 500 mg  Take 500 mg by mouth every 6 hours as needed for mild pain  Refills: 0     benzonatate 100 MG capsule  Commonly known as: TESSALON  Used for: Cough      Dose: 100 mg  Take 1 capsule (100 mg) by mouth 3 times daily as needed for cough  Quantity: 30 capsule  Refills: 0     calcium acetate 667 MG Caps capsule  Commonly known as: PHOSLO      Dose: 1 capsule  Take 1 capsule by mouth 2 times daily (with meals)  Refills: 0     carvedilol 12.5 MG tablet  Commonly known as: COREG  Used for: Essential hypertension      Dose: 12.5 mg  Take 1 tablet (12.5 mg) by mouth 2 times daily (with meals)  Quantity: 180 tablet  Refills: 3     cephALEXin 250 MG capsule  Commonly known as: KEFLEX  Used for: Empyema lung (H)      Dose: 250 mg  Take 1 capsule (250 mg) by mouth 2 times daily  Quantity: 30 capsule  Refills: 1     diltiazem  MG 24 hr capsule  Commonly known as: DILT-XR  Used for: Essential hypertension      Dose: 180 mg  Take 1 capsule (180 mg) by mouth daily  Quantity: 90 capsule  Refills: 3     entecavir 0.5 MG tablet  Commonly known as: BARACLUDE  Used for: Chronic viral hepatitis B without delta agent and without coma (H)      Dose: 0.5 mg  Take 1 tablet (0.5 mg) by mouth every 7 days  Quantity: 52 tablet  Refills: 1     guaiFENesin-codeine 100-10 MG/5ML syrup  Commonly known as: guaiFENesin AC  Used for: Cough      Dose: 5 mL  Take 5 mLs by mouth every 4 hours as needed for cough  Quantity: 236 mL  Refills: 1     hydrOXYzine 25 MG tablet  Commonly known as: ATARAX  Used for: Pruritic disorder      Dose: 25 mg  Take 1 tablet (25 mg) by mouth 3 times daily as needed for itching  Quantity: 90 tablet  Refills: 3     lactulose 10 GM/15ML solution  Commonly known as: CHRONULAC  Used for: Hepatic  cirrhosis, unspecified hepatic cirrhosis type, unspecified whether ascites present (H)      Dose: 20 g  Take 30 mLs (20 g) by mouth 3 times daily  Quantity: 946 mL  Refills: 11     oxyCODONE 5 MG tablet  Commonly known as: ROXICODONE  Used for: Empyema lung (H), Chest wall mass, Draining cutaneous sinus tract      Dose: 5 mg  Take 1 tablet (5 mg) by mouth daily as needed for severe pain  Quantity: 30 tablet  Refills: 0     pantoprazole 40 MG EC tablet  Commonly known as: PROTONIX  Used for: Cirrhosis of liver with ascites, unspecified hepatic cirrhosis type (H)      Dose: 40 mg  Take 1 tablet (40 mg) by mouth daily  Quantity: 30 tablet  Refills: 11     senna-docusate 8.6-50 MG tablet  Commonly known as: SENOKOT-S/PERICOLACE  Used for: Constipation, unspecified constipation type      Dose: 1 tablet  Take 1 tablet by mouth daily as needed for constipation  Quantity: 30 tablet  Refills: 11     zolpidem 5 MG tablet  Commonly known as: AMBIEN  Used for: Insomnia due to medical condition      Dose: 5 mg  Take 1 tablet (5 mg) by mouth nightly as needed for sleep  Quantity: 10 tablet  Refills: 5        CONTINUE these medicines which have NOT CHANGED      Dose / Directions   order for DME  Used for: Descending thoracic aortic dissection (H), Essential hypertension      Equipment being ordered: Other: blood pressure monitor  Treatment Diagnosis: hypertension  Quantity: 1 each  Refills: 0              INFORMATION SOURCE AND LIMITATIONS    History/Exam limitations: N/A   Patient information was obtained from: The patient   Use of : N/A    HISTORY OF PRESENT ILLNESS   Elle Blanton is a 62 year old year old male with a relevant past history of melena, cirrhosis of liver, anemia, ESRD, heart attack, and CKD, who presents to this ED for evaluation of hemoptysis.    Patient was sent here from a clinic and has been experiencing persistent hemoptysis. With each cough there is a small amount of blood. His breathing has not been  affected. Denies chest pain. No other concerns or symptoms reported at this time.        REVIEW OF SYSTEMS:   Constitutional: Negative for fever.   HENT: Negative for URI symptoms or sore throat.    Cardiac: Negative for chest pain, palpitations, near syncope or syncope.  Respiratory: Negative for shortness of breath. Positive for hemoptysis.   Gastrointestinal: Negative for abdominal pain, nausea, vomiting, constipation, diarrhea, rectal bleeding or melena.  Genitourinary: Negative for dysuria, flank pain and hematuria.   Musculoskeletal: Negative for back pain.   Skin: Negative for rash.  Neurological: Negative for dizziness, headache, syncope, speech difficulty, unilateral weakness or imbalance with walking.   Hematological: Negative for adenopathy. Does not bruise/bleed easily.   Psychiatric/Behavioral: Negative for confusion.       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 pylori infection 7/5/2017     Heart attack (H)      Hepatitis B      Normocytic anemia      Other pancytopenia (H) 7/5/2017     PONV (postoperative nausea and vomiting)      Thrombocytopenia (H)      Patient Active Problem List   Diagnosis     Melena     Normocytic anemia     Thrombocytopenia (H)     Other pancytopenia (H)     Chronic hepatitis B without hepatic coma (H)     Cirrhosis of liver without ascites (H)     Essential hypertension     Descending thoracic aortic dissection (H)     Anemia due to other bone marrow failure (H)     Thrombocytopenia due to hypersplenism     Iliopsoas muscle hematoma, left,  initial encounter     Chronic dissection of thoracic aorta (H)     ESRD (end stage renal disease) on dialysis (H)     Anemia due to blood loss, acute     Hepatic cirrhosis, unspecified hepatic cirrhosis type, unspecified whether ascites present (H)     Septic bursitis     Pleural effusion     Empyema lung (H)     Hydropneumothorax     Hyperkalemia     Gout     Lymphedema of left upper extremity     Epistaxis     Closed fracture of nasal bone, initial encounter     Fall, initial encounter     Anemia in end-stage renal disease (H)     Empyema (H)     Thoracic aortic dissection (H)     Anemia, unspecified type     Acquired dissection of pulmonary artery (H)     Hypovolemic shock (H)     Hemoperitoneum     Liver mass     Other ascites     Hemoptysis     Past Surgical History:   Procedure Laterality Date     ABCESS DRAINAGE      finger     BURSECTOMY ELBOW Left 10/15/2021    Procedure: LEFT OLECRANON BURSECTOMY;  Surgeon: Tico Myers MD;  Location: [facility] OR     BURSECTOMY ELBOW Left 1/18/2022    Procedure: LEFT ELBOW OLECRANON BURSECTOMY;  Surgeon: Tico Myers MD;  Location: [facility] OR     CREATE FISTULA ARTERIOVENOUS UPPER EXTREMITY Left 4/20/2021    Procedure: left arm dialysis graft placement;  Surgeon: Roxana Cosby MD;  Location: [facility] OR;  Service: General     FOREIGN BODY REMOVAL      finger     INCISION AND DRAINAGE FINGER, COMBINED Left 10/20/2016    Procedure: COMBINED INCISION AND DRAINAGE FINGER;  Surgeon: Mireya Pizano MD;  Location: [facility] OR     INCISION AND DRAINAGE UPPER EXTREMITY, COMBINED Left 1/18/2022    Procedure: AND ELBOW INCISION AND DRAINAGE;  Surgeon: Tico Myers MD;  Location: [facility] OR     INSERT PICC LINE Right 8/25/2022    Procedure: INSERTION, PICC;  Surgeon: Osmin Villa MD;  Location: [facility] OR     IR CHEST TUBE PLACEMENT NON-TUNNELED RIGHT  4/19/2021     IR CHEST TUBE PLACEMENT NON-TUNNELED RIGHT  10/19/2021     IR  CHEST TUBE PLACEMENT NON-TUNNELED RIGHT  11/10/2021     IR CHEST TUBE PLACEMENT NON-TUNNELED RIGHT  3/31/2022     IR DIALYSIS FISTULOGRAM LEFT  4/2/2022     IR DIALYSIS FISTULOGRAM LEFT  4/4/2022     IR PICC PLACEMENT > 5 YRS OF AGE  8/25/2022     IR PLEURAL DRAINAGE WITH CATHETER INSERTION  4/19/2021     IR VISCERAL ANGIOGRAM  8/26/2022     MIDLINE INSERTION - DOUBLE LUMEN  4/23/2021          [place] ESOPHAGOGASTRODUODENOSCOPY TRANSORAL DIAGNOSTIC N/A 8/18/2020    Procedure: ESOPHAGOGASTRODUODENOSCOPY (EGD) with biopsy;  Surgeon: Nilson Gonzales MD;  Location: [facility];  Service: Gastroenterology      PARACENTESIS  8/14/2020      PARACENTESIS  8/19/2020     US THORACENTESIS  4/18/2021     Social Histrory  Smoking:  Alcohol Use:  Allergies   Allergen Reactions     Nka [No Known Allergies]          OUTPATIENT MEDICATIONS     New Prescriptions    No medications on file      Vitals:    09/16/22 1052   BP: 116/78   Pulse: 80   Resp: 18   Temp: 98.1  F (36.7  C)   TempSrc: Oral   SpO2: 98%   Weight: 54.4 kg (120 lb)       Physical Exam   Constitutional: Oriented to person, place, and time. Appears well-developed and well-nourished.   HEENT: Normal.  Head: Atraumatic.   Neck: Normal range of motion. Neck supple.   Cardiovascular: Normal rate, regular rhythm and normal heart sounds.    Pulmonary/Chest: Normal effort  and breath sounds normal. Right lateral chest tube draining dark blood.   Abdominal: Soft. Bowel sounds are normal.   Musculoskeletal: Normal range of motion.   Neurological: Alert and oriented to person, place, and time. Normal strength.No sensory deficit. No cranial nerve deficit . Skin: Skin is warm and dry.   Psychiatric: Normal mood and affect. Behavior is normal. Thought content normal.       DIAGNOSTICS    LABORATORY FINDINGS (REVIEWED AND INTERPRETED):  Labs Ordered and Resulted from Time of ED Arrival to Time of ED Departure   INR - Abnormal       Result Value    INR 1.26 (*)    COMPREHENSIVE  METABOLIC PANEL - Abnormal    Sodium 135 (*)     Potassium 5.4 (*)     Chloride 99      Carbon Dioxide (CO2) 27      Anion Gap 9      Urea Nitrogen 47 (*)     Creatinine 7.96 (*)     Calcium 7.7 (*)     Glucose 97      Alkaline Phosphatase 237 (*)     AST 36      ALT 26      Protein Total 8.5 (*)     Albumin 2.3 (*)     Bilirubin Total 0.5      GFR Estimate 7 (*)    CBC WITH PLATELETS AND DIFFERENTIAL - Abnormal    WBC Count 5.8      RBC Count 2.94 (*)     Hemoglobin 9.0 (*)     Hematocrit 29.3 (*)           MCH 30.6      MCHC 30.7 (*)     RDW 20.7 (*)     Platelet Count 66 (*)     % Neutrophils 76      % Lymphocytes 12      % Monocytes 9      % Eosinophils 2      % Basophils 1      % Immature Granulocytes 0      NRBCs per 100 WBC 0      Absolute Neutrophils 4.4      Absolute Lymphocytes 0.7 (*)     Absolute Monocytes 0.5      Absolute Eosinophils 0.1      Absolute Basophils 0.1      Absolute Immature Granulocytes 0.0      Absolute NRBCs 0.0     PARTIAL THROMBOPLASTIN TIME - Normal    aPTT 37     TROPONIN I - Normal    Troponin I <0.01     COVID-19 VIRUS (CORONAVIRUS) BY PCR - Normal    SARS CoV2 PCR Negative     TYPE AND SCREEN, ADULT    ABO/RH(D) A POS      Antibody Screen Negative      SPECIMEN EXPIRATION DATE [id number]     ABO/RH TYPE AND SCREEN         IMAGING (REVIEWED AND INTERPRETED):  CTA Chest Abdomen Pelvis w Contrast   Final Result   IMPRESSION:   1.  Large loculated right hydropneumothorax has increased internal fluid and debris.   2.  Stable type B aortic dissection.   3.  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   4.  A stable 7.4 cm hepatic mass which may represent hepatocellular carcinoma.      US Abdomen Limited    (Results Pending)           ECG (REVIEWED AND INTERPRETED):   ECG:   Performed at: 1109  HR:  80bpm  Rhythm: Sinus  Axis: 1  QRS duration: 88 ms  QTC: 479 ms  ST changes:No ST segment elevation or depression, no T wave inversion  Interpretation:  Normal sinus rhythm nonischemic  Compared to most recent ECG from August 2022 no acute change    I have reviewed the patient's ECG, with comments made as listed above. Please see scanned image for full interpretation.         I, Giselle Damon, am serving as a scribe to document services personally performed by Glen Suero D.O., based on my observation and the provider s statements to me.    I, Glen Suero D.O., attest that Giselle Damon is acting in a scribe capacity, has observed my performance of the services and has documented them in accordance with my direction.    Glne Suero D.O.  EMERGENCY MEDICINE   09/16/22  [facility] EMERGENCY DEPARTMENT  [address] [place] [id number]  [phone]  Dept: [phone]     Glen Suero DO  09/16/22 8645

## 2022-09-17 NOTE — PLAN OF CARE
Pt alertx4, lung sounds diminished. Abdomen rounded. Pt ate dinner, NPO at midnight. He was concerned about hgb, informed him that it is being watched closely. US paracentesis to be done tomorrow, 9/17, confirmed with Dr Osborne (text paged to discuss if this was to be done tonight or tomorrow morning).

## 2022-09-17 NOTE — PROGRESS NOTES
[facility]    Progress Note - Hospitalist Service       Date of Admission:  9/16/2022    Assessment & Plan          Elle Blanton is a 62 year old male admitted on 9/16/2022.   he has complicated medical history significant for chronic empyema, ESRD on dialysis, thoracic aortic dissection.  He was admitted with two days of hemoptysis in the setting of worsening hydropneumothorax, dropping hemoglobins.      Hemoptysis   Acute blood loss anemia  R hydropneumothorax with volume loss of R lung  Chronic trapped lung, empyema   HCC on imaging with Hx of ruptured hepatic mass  Chronic hep B cirrhosis  Patient has complex history of right lung empyema with relatively new pleurocutaneous fistula. Patient went home with ostomy bag attached to chest wall wound and keflex ([facility]). Patient presented from clinic with hemoptysis starting 9/14 and hemoglobin drop from 11 --> 8.6. Pulmonology saw him 9/16 and recommended against bronchoscopy or other intervention at this time. US Paracentesis 9/17 removed 1.5L of bloody ascites, indicating likely continued oozing from liver mass.   - Augmentin  - transfuse Hgb<7 (checking q8h, though Elle in the past has wanted to limit blood draws)  - supplemental O2 support if needed, 94-96% O2  - WOC consult for draining chest fistula- appreciate assistance with ostomy bag over wound     ESRD on HD   Patient HD Monday and Friday, he missed HD today prior to admission  - Nephrology consulted appreciate  -  will likely dialyze today if stable.      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 Davalos.    Christy Virk MD  Hospitalist Service  [facility]  Securely message with the Vocera Web Console (learn more here)  Text page via [employer] Paging/Directory         Clinically Significant Risk Factors Present on Admission        # Hyperkalemia: K = 6.2 mmol/L (Ref range: 3.5 - 5.0 mmol/L) on admission, will monitor as appropriate      # Hypoalbuminemia: Albumin = 2.3 g/dL (Ref range: 3.5 - 5.0 g/dL); 2.2 g/dL (Ref range: 3.5 - 5.0 g/dL) on admission, will monitor as appropriate  # Acute Kidney Injury, unspecified: based on a >150% or 0.3 mg/dL increase in creatinine on admission compared to past 90 day average, will monitor renal function  # Coagulation Defect: INR = 1.26 (Ref range: 0.85 - 1.15) and/or PTT = 37 Seconds (Ref range: 22 - 38 Seconds) on admission, will monitor for bleeding  # Thrombocytopenia: Plts = 52 10e3/uL (Ref range: 150 - 450 10e3/uL) on admission, will monitor for bleeding           ______________________________________________________________________    Interval History   He is seen in his room done in the emergency department.  He is feeling all right, other than his hemoptysis.  He states he was feeling fine at home since his last hospitalization until 3 days ago after dialysis, when he began having more of a cough productive of bloody sputum.  He states his stomach has also felt a little more distended.  He is happy to see me as they have taking care of him several times in the past.  I updated PCP, Dr. Flores via text.    Data reviewed today: I reviewed all medications, new labs and imaging results over the last 24 hours. .    Physical Exam   Vital Signs: Temp: 97.7  F (36.5  C) Temp src: Oral BP: 126/75 Pulse: 79   Resp: 18 SpO2: 96 % O2 Device: None (Room air)    Weight: 120 lbs  0 oz  GEN: Patient sitting comfortably in no acute distress.  HEEN: Head is atraumatic, normocephalic, eyes slightly icteric, mucous membranes moist.  CV: Regular rate and rhythm without obvious murmurs.  PULM: Decreased breath sounds on the right. Ostomy bag present over the posterolateral right chest wall with small amount of bright red bloody drainage. No air in the bag.  ABD: Soft, nontender, bowel sounds present. Mildly distended.  NEURO: Alert and oriented x3.  No focal motor abnormalities.  Face symmetric.  PSYCH: Appropriate affect.   SKIN: No rashes, bruising, or other lesions other than thoracic wall fistula as described above.    Data   Recent Labs   Lab 09/17/22  0742 09/16/22  1121 09/16/22  0938   WBC 6.3 5.8 4.2   HGB 7.6* 9.0* 8.4*   MCV 99 100 100   PLT 52* 66* 52*   INR  --  1.26*  --    * 135*  --    POTASSIUM 6.2* 5.4*  --    CHLORIDE 102 99  --    CO2 24 27  --    BUN 66* 47*  --    CR 9.44* 7.96*  --    ANIONGAP 9 9  --    TANO 7.6* 7.7*  --    GLC 80 97  --    ALBUMIN  --  2.2*  2.3*  --    PROTTOTAL  --  8.5*  --    BILITOTAL  --  0.5  --    ALKPHOS  --  237*  --    ALT  --  26  --    AST  --  36  --      Recent Results (from the past 24 hour(s))   US Paracentesis without Albumin    Narrative    EXAM:  1. PARACENTESIS  2. ULTRASOUND GUIDANCE  LOCATION: [facility]  DATE/TIME: 09/17/2022, 10:07 AM    INDICATION: Ascites.    PROCEDURE: Informed consent obtained. Time out performed. The abdomen was prepped and draped in a sterile fashion. 10 mL of 1% lidocaine was infused into local soft tissues. A 5 Serbian catheter system was introduced into the abdominal ascites under   ultrasound guidance.    1.5 liters of dark red ascitic fluid were removed and sent to lab.    Patient tolerated procedure well.    Ultrasound imaging was obtained and placed in the patient's permanent medical record.      Impression    IMPRESSION:  1.  Status post ultrasound-guided  paracentesis.    Reference CPT Code: 06262       Medications     - MEDICATION INSTRUCTIONS -         sodium chloride 0.9%  250 mL Intravenous Once in dialysis/CRRT     sodium chloride 0.9%  300 mL Hemodialysis Machine Once     amoxicillin-clavulanate  1 tablet Oral QPM     [Held by provider] carvedilol  12.5 mg Oral BID w/meals     diltiazem ER COATED BEADS  180 mg Oral Daily     [START ON 9/18/2022] entecavir  0.5 mg Oral Q7 Days     [START ON 9/19/2022] epoetin kelton-epbx  8,000 Units Subcutaneous Once per day on Mon Wed Fri     lactulose  20 g Oral TID     - MEDICATION INSTRUCTIONS -   Does not apply Once     pantoprazole  40 mg Oral Daily     sodium chloride (PF)  3 mL Intracatheter Q8H

## 2022-09-17 NOTE — PROGRESS NOTES
Spoke with Dr. Davalos, the patient is noted to be more anemic and underwent a paracentesis which revealed significantly bloody ascitic fluid. She reiterated the recommendation that Mr Blanton transition to comfort measures.   Pulmonary will sign off.    Violet Corona MD  Pulmonary and Critical Care  (P) [phone]

## 2022-09-17 NOTE — CONSULTS
NEPHROLOGY CONSULTATION    CC: Hemoptysis.      REASON FOR CONSULTATION: We are asked to see pt by .    HISTORY OF PRESENT ILLNESS:62 year old male with past medical history significant for end-stage renal disease on in center hemodialysis Monday Wednesday Friday at [facility] dialysis unit under care of Dr. Hill, hypertension, hepatitis B, recurrent Hydrea hemothorax on the right side not a candidate for surgical decortication status post multiple chest tubes, chronic type B aortic dissection, hepatic mass on CT presented for evaluation of hemoptysis, found to have worsening of right hydropneumothorax.  Patient stated that on Wednesday 09/14 after his usual dialysis treatment he developed hemoptysis and also notice bright red blood in his lung bag.  The patient denies fever, chills, dizziness, shortness of breath, orthopnea, changes in bowel habits, hematochezia/melena, hematuria.  Nephrology service consulted for management of his end-stage renal disease and comorbidities.  I reviewed patient [facility] records.  Patient was last dialyzed on September 14.  The dialysis line was uneventful.  He was able to achieve his target weight in the end of treatment.  Only 1.8 kg removed.  No episode of hypotension.      REVIEW OF SYSTEMS:  ROS was completely reviewed and otherwise negative and non-contributory    Past Medical History:   Diagnosis Date     Acquired dissection of pulmonary artery (H) 3/31/2022     NAHUM (acute kidney injury) (H)      Chronic hepatitis B without hepatic coma (H) 8/1/2019     Cirrhosis of liver without ascites (H) 8/1/2019     CKD (chronic kidney disease) stage 4, GFR 15-29 ml/min (H) 8/2/2019     ESRD (end stage renal disease) on dialysis (H) 9/26/2021     Essential hypertension 8/1/2019     GI (gastrointestinal bleed)      Gout      H. pylori infection 7/5/2017    UGI bleed implied by Hgb 9.0 6/22/17 and melena. Admitted and hgb decreased to 7.6, CT abdomen showed all  bladder wall thickening. + Hep B surface antigen noted. Melena resolved and hgb stabalized without transfusion, epigastric pain resolved with PPI. Recommend referral for gastroscopy.     Heart attack (H)      Hepatitis B      Normocytic anemia      Other pancytopenia (H) 7/5/2017 6/24/17 Peripheral smear at [facility].Pancytopenia of uncertain cause. Ruled out acute leukemia. Hematologist suggests hemolytic anemia should be considered possible cause and LDH and haptoglobin should be assessed in future.      PONV (postoperative nausea and vomiting)      Thrombocytopenia (H)        Social History     Socioeconomic History     Marital status:      Spouse name: Not on file     Number of children: Not on file     Years of education: Not on file     Highest education level: Not on file   Occupational History     Not on file   Tobacco Use     Smoking status: Never Smoker     Smokeless tobacco: Never Used   Vaping Use     Vaping Use: Never used   Substance and Sexual Activity     Alcohol use: No     Drug use: No     Sexual activity: Yes     Partners: Female   Other Topics Concern     Parent/sibling w/ CABG, MI or angioplasty before 65F 55M? Not Asked   Social History Narrative     Not on file     Social Determinants of Health     Financial Resource Strain: Not on file   Food Insecurity: Not on file   Transportation Needs: Not on file   Physical Activity: Not on file   Stress: Not on file   Social Connections: Not on file   Intimate Partner Violence: Not on file   Housing Stability: Not on file       Family History   Problem Relation Age of Onset     Ulcers Father      Hypertension No family hx of      Asthma No family hx of      Cancer No family hx of      Coronary Artery Disease No family hx of      Liver Cancer No family hx of        Allergies   Allergen Reactions     Nka [No Known Allergies]        MEDICATIONS:    amoxicillin-clavulanate  1 tablet Oral QPM     [Held by provider] carvedilol  12.5 mg Oral BID  w/meals     diltiazem ER COATED BEADS  180 mg Oral Daily     [START ON 9/18/2022] entecavir  0.5 mg Oral Q7 Days     lactulose  20 g Oral TID     pantoprazole  40 mg Oral Daily     sodium chloride (PF)  3 mL Intracatheter Q8H         PHYSICAL EXAM    /75 (BP Location: Right arm)   Pulse 79   Temp 97.7  F (36.5  C) (Oral)   Resp 18   Wt 54.4 kg (120 lb)   SpO2 96%   BMI 19.97 kg/m        Intake/Output Summary (Last 24 hours) at 9/17/2022 0854  Last data filed at 9/16/2022 2045  Gross per 24 hour   Intake --   Output 50 ml   Net -50 ml       Alert/oriented x 3; awake and NAD, chronically ill looking  HEENT NC/AT; perrla; OP clear without lesions; mmm  Neck supple without LAD, TM  CV; RRR without rub or murmur  Lung: Having hemoptysis during my exam no breath sounds appreciated in the right anteroposterior feilds, diminished breath sounds in the left anterior posterior fields, lung bag on the right side was the right blood in bag  Ab: Distended, + acsities  Ext: no edema and well perfused  Skin; no rash  Neuro; grossly intact  Access: left AVG with palpable thrill and audible bruit    LABORATORIES    Recent Labs   Lab 09/17/22  0742 09/16/22  1121 09/16/22  0938   WBC 6.3 5.8 4.2   HGB 7.6* 9.0* 8.4*   HCT 24.7* 29.3* 27.4*   PLT 52* 66* 52*     Recent Labs   Lab 09/17/22  0742 09/16/22  1121   * 135*   CO2 24 27   BUN 66* 47*   ALKPHOS  --  237*   ALT  --  26   AST  --  36     Recent Labs   Lab 09/16/22  1121   INR 1.26*   PTT 37     Invalid input(s): FERRITIN  No results for input(s): IRON in the last 168 hours.    Invalid input(s): TIBC    I reviewed all labs    ASSESSMENT/PLAN:  62 year old male with end-stage renal disease on in center hemodialysis Monday Wednesday Friday who presented for evaluation of recurrent hemoptysis found to have a large loculated right hydropneumothorax with increased internal fluid and debris's.  Nephrology was consulted for management of end-stage renal disease and  comorbidities.    ESRD on in center hemodialysis Monday Wednesday Friday simple capital under care off Dr. Hill  Treatment time is 3 hours barely completes his treatment mostly treatments are around 1120 minutes  Minimal UF on treatment around 1-1.5L.  Admitted dry weight 52.5 kg.  Patient stated he still urinates moderate amounts of urine few times a day.  Patient was last dialyzed on September 14 for 2 hours.  Patient missed his dialysis yesterday.  We will do 2 hours HD run today schedule for 3 hours from on Monday as per his usual schedule  Access    Left upper extremity AVG  Has had chronic issues with bleeding although with currently appears nonedematous and thrill positive  Dr. Cosby 4/2022  Uses 16-gauge needles     History of hypertension  Currently hypotensive on 2 pressors  --> No evidence of hypervolemia which is usual baseline for him  We will monitor closely     Electrolytes   -Moderate hyperkalemia.  Serum potassium is 6.2.  We will run with K2 bath.  Renal diet.  -Mild hyponatremia 135.  Should improve with dialysis.    Acid Base status no pressing issues.    Hypertension-patient blood pressure in normotensive range.  Prior to admission patient was taking carvedilol 6.25 mg twice daily but has been taking intermittently.     MBD  Has been on Tums with meals  Phosphorus has been around 4 at goal mild low calcium is chronically around 8  PTH last checked on 8/3/2022 was 86  Alk phos around 1  chronically  Evaluate once diet resumed     Anemia acute on chronic  With bleed.  Patient received 2 units of packed red blood cells September 1.  Patient hemoglobin was 9.4 on September  As per [facility] labs.  Patient presented with hemoglobin of 8.4.  Hold off any iron with transfusions and possible infection  -- We will keep on EPO while inpatient, OP dose 8K x3/wk    Hemoptysis -pulmonary input appreciated, felt could be due to infection in his previously noted right-sided chest hematoma.  Started on  broad-spectrum antibiotics.  I will defer management to pulmonary and primary team.    Hydropneumothorax-CT chest showed a large loculated right hydropneumothorax with increased internal fluid and debris's.  He has previously had multiple thoracocentesis and most recently a chest tube in place with subsequent sequent development of pleurocutaneous fistula that has been managed through his ostomy bag.  Patient currently draining bright red blood.  Has been seen by surgery who did not feel he is a good candidate for decortication because of his significant medical history.  As per patient report he is scheduled for surgical intervention at [facility] in October,2022.     Recurrent ascites-last paracentesis on the 31st with 2 L removed.  Patient scheduled for paracentesis today.     Chronic hepatitis B  Has been off all antiviral treatment  Possible hepatocellular carcinoma with hepatic mass on CT with active bleed  Status post IR embolization of hepatic artery           History of chronic type B aortic dissection  Stable on CAT, followed by cardiothoracic surgery    Goals of care-patient is currently DNR/DNI.  Comfort cares were recommended by pulmonary service.  Patient stated that he is not ready to transition to comfort care yet and wants to continue aggressive management.     Thank you for the consultation.  We will follow    Elizabeth Salmeron MD  [facility]  [address], [address]  [place] [address]  Phone# [phone]  Fax# [phone]

## 2022-09-17 NOTE — CONSULTS
Care Management Initial Consult    General Information  Assessment completed with: Patient, p  Type of CM/SW Visit: Initial Assessment    Primary Care Provider verified and updated as needed: Yes   Readmission within the last 30 days: previous discharge plan unsuccessful   Return Category: Progression of disease  Reason for Consult: discharge planning  Advance Care Planning: Advance Care Planning Reviewed: no concerns identified, verified with patient, present on chart          Communication Assessment  Patient's communication style: spoken language (non-English) (Hmong)             Cognitive  Cognitive/Neuro/Behavioral: WDL                      Living Environment:   People in home: child(eulalio), adult, spouse     Current living Arrangements: house      Able to return to prior arrangements: yes       Family/Social Support:  Care provided by: spouse/significant other, child(eulalio)  Provides care for: no one, unable/limited ability to care for self  Marital Status:   Wife, Children          Description of Support System: Supportive, Involved    Support Assessment: Adequate family and caregiver support, Adequate social supports    Current Resources:   Patient receiving home care services: No     Community Resources: Dialysis Services  Equipment currently used at home: cane, straight, walker, standard, grab bar, tub/shower, shower chair  Supplies currently used at home:      Employment/Financial:  Employment Status:          Financial Concerns: No concerns identified   Referral to Financial Worker: No       Lifestyle & Psychosocial Needs:  Social Determinants of Health     Tobacco Use: Low Risk      Smoking Tobacco Use: Never Smoker     Smokeless Tobacco Use: Never Used   Alcohol Use: Not on file   Financial Resource Strain: Not on file   Food Insecurity: Not on file   Transportation Needs: Not on file   Physical Activity: Not on file   Stress: Not on file   Social Connections: Not on file   Intimate Partner  Violence: Not on file   Depression: Not at risk     PHQ-2 Score: 0   Housing Stability: Not on file       Functional Status:  Prior to admission patient needed assistance:   Dependent ADLs:: Independent, Ambulation-cane  Dependent IADLs:: Independent  Assesssment of Functional Status: Not at baseline with ADL Functioning    Mental Health Status:  Mental Health Status: No Current Concerns       Chemical Dependency Status:                Values/Beliefs:  Spiritual, Cultural Beliefs, Oriental orthodox Practices, Values that affect care:                 Additional Information:  Assessed, speaks Hmong, at last discharge he was to have dialysis MWF, unsure of progress with dialysis as pt didn't say much, pt lives wife and children, family to transport.      Johanny Swartz RN

## 2022-09-17 NOTE — PROGRESS NOTES
Consulted request received. I reviewed patient's chart including labs, imaging, notes and VS.  This is 62 year old male with presented with worsening of hemoptysis in settings of worsening of hydropneumothorax. Pulmonary input appreciated, recommend transition to comfort care.  He had ESRD on IHD MWF, last HD 09/14. No indications for urgent HD tonight. We will consider HD tomorrow if indicated.    Elizabeth Salmeron MD  [facility]  [address] [place] [address], [address]  [place] [address]  Phone# [phone]  Fax# [phone]

## 2022-09-17 NOTE — PLAN OF CARE
Continues to have thick bright red sputum expectorated several times per hour.  Wound drainage collection bag to right mid axillary site changed.  Up independently in room.  Compliant with 1200 ml fluid restriction.  Family here in am - supportive. Standard falls prevention plan in place. Transferred to room 213 via cart.  Mary Davis RN

## 2022-09-17 NOTE — PLAN OF CARE
Problem: Plan of Care - These are the overarching goals to be used throughout the patient stay.    Goal: Plan of Care Review/Shift Note  Description: The Plan of Care Review/Shift note should be completed every shift.  The Outcome Evaluation is a brief statement about your assessment that the patient is improving, declining, or no change.  This information will be displayed automatically on your shift note.  Outcome: Ongoing, Progressing   Goal Outcome Evaluation:        Patient arrived from ED via cart. Denies pain. Has ostomy bag covering right back wound. Blood drainage leaking, requiring change. Renal Dialysis scheduled for today.

## 2022-09-18 NOTE — PROGRESS NOTES
Renal on call  Called re: HTN after angio.   Reviewed got diltiazem this am and no other anti HTN and bp mostly low and concern with bleeding.   HTN ?due to pain/anxiety with procedure or volume (60cc contrast). I would not treat now but if ongoing HTN will write for gentle PRN if sustained/ worse HTN tonight and can resume some carvedilol if ongoing tomorrow after HD.     Smita Raygoza MD  [facility]  [phone]

## 2022-09-18 NOTE — PROVIDER NOTIFICATION
PROVIDER NOTIFIED: Dr. Raygoza (urology    METHOD: phone     REASON: /112    OUTCOME: Dr Raygoza called back and had wanted to put him on clonidine, but writer informed her of pt hx of soft BP prior to having hepatin angiogram. Dr. Raygoza said she will review pt chart and respond to it accordingly

## 2022-09-18 NOTE — PROGRESS NOTES
Pt returned to Rm 213.  Bedside handoff given to Del MELENDEZ and Aracely MELEDNEZ.  Right groin site and picc line site reviewed.  Right groin site dry without hematoma.

## 2022-09-18 NOTE — PLAN OF CARE
Problem: Plan of Care - These are the overarching goals to be used throughout the patient stay.    Goal: Optimal Comfort and Wellbeing  Outcome: Ongoing, Progressing     Problem: Pain Acute  Goal: Acceptable Pain Control and Functional Ability  Outcome: Ongoing, Progressing  Intervention: Prevent or Manage Pain  Recent Flowsheet Documentation  Taken 9/17/2022 1559 by Del Corral RN  Medication Review/Management: medications reviewed     Problem: Adjustment to Illness (Chronic Kidney Disease)  Goal: Optimal Coping with Chronic Illness  Outcome: Ongoing, Progressing     Problem: Electrolyte Imbalance (Chronic Kidney Disease)  Goal: Electrolyte Balance  Outcome: Ongoing, Progressing     Problem: Fluid Volume Excess (Chronic Kidney Disease)  Goal: Fluid Balance  Outcome: Ongoing, Progressing     Problem: Renal Function Impairment (Chronic Kidney Disease)  Goal: Minimize Renal Failure Effects  Outcome: Ongoing, Progressing  Intervention: Monitor and Support Renal Function  Recent Flowsheet Documentation  Taken 9/17/2022 1559 by Del Corral RN  Medication Review/Management: medications reviewed     Problem: Device-Related Complication Risk (Hemodialysis)  Goal: Safe, Effective Therapy Delivery  Outcome: Ongoing, Progressing  Intervention: Optimize Device Care and Function  Recent Flowsheet Documentation  Taken 9/17/2022 1559 by Del Corral RN  Medication Review/Management: medications reviewed     Problem: Impaired Wound Healing  Goal: Optimal Wound Healing  Outcome: Ongoing, Progressing   Goal Outcome Evaluation:      Pt is A&O x4, VSS, denies pain. Pouch on right upper back wound changed due to leaking. Pt continues to cough up blood. Dialysis started at 2000. Pt tolerated it appropriately. Pt is independent in the room. Pt declined HS medication saying that he wants to wait for a while.    2235, pt called writer to report that he was feeling dizzy and wants his BP checked. BP was 87/56, P 103. Writer  paged Dr. Tan to update her on the vitals and hgb of 7.2

## 2022-09-18 NOTE — PLAN OF CARE
Goal Outcome Evaluation:        Problem: Impaired Wound Healing  Goal: Optimal Wound Healing  Outcome: Ongoing, Not Progressing  Intervention: Promote Wound Healing  Recent Flowsheet Documentation  Taken 9/18/2022 0054 by August Petersen, RN  Activity Management: activity adjusted per tolerance     Pt c/o 7/10 right upper lateral chest pain at site of wound, PRN Oxycodone administered, which was effective. LS: Left lobes clear but diminished, difficult to hear air movement in R lobes. VS: Soft BPs at first but normalized after 1 unit of PRBCs.     Call Received from Lab for Hgb of 6.3.  updated, 1 unit of PRBCs administered as ordered. Pt's dizziness slowly resolving. Recheck Hgb at 0330: 7.1. Dr William updated, no new orders for any further transfusions.     Appliance to R lateral chest wall wound remained intact, small amount of dark red drainage present. Pt reoprts coughing up a small amount of blood when he lies on his L side.       TELE: ASHLEIGH

## 2022-09-18 NOTE — PROCEDURES
Hemodialysis Treatment Note    Target weight loss: Target Weight Loss (kg): 1    Approximate (Net) weight removed:  + I/O+ vUF (ml)  Ultrafiltration Total Out: gain of 200 mL    Vascular Access Status: AVG with thrill/bruit. No s/s of infection. Accessed with 16 ga needles. Both ports aspirated and flushed easily. 350 BFR.     Dialyzer rinse: Dialyzer: light    Total Blood Volume Processed: Blood Volume Processed (BVP): 39.1     Total dialysis (treatment) time: Total Run Time  (hours): 2    Vascular Access Status: Needles d/c'd, pressure held until hemostasis obtained; 10 minutes. Drsg applied and secured.    Run Summary: 2K; 2 hour treatment; 350 BFR. Net gain of 200 mL. Pt hypotensive throughout treatment. Provider notified and orders received to continue treatment with MAP greater than 60 and no UF. A/O x 3, denies chest pain or other discomfort. Denies SOB on room air. See HD flowsheet for all data. Report given to primary RN at bedside.       Interventions:  Monitor VS q 15 minutes and PRN. Goal adjustment per critline and hemodynamics.    Plan: Per Renal    Annette Wilhelm RN on 9/17/2022 at 10:38 PM

## 2022-09-18 NOTE — SIGNIFICANT EVENT
"Significant Event Note    Time of event: 11:24 PM September 17, 2022    Description of event:  RN paged house officer regarding patient reports of dizziness, worse with sitting and improved with lying down. Also has low blood pressures, 97/56 when examining.     Patient has known bleeding sources (see H and P for details). Hemoglobin 7.2 at last check, but patient then went to dialysis and has been feeling more symptomatic since.     He is agreeable to repeat hemoglobin check, with the plan to tranfuse if hemoglobin in below 7.5. Consent signed.     Of note, discussed ICU transfer with the patient in the event his blood pressure remains difficult to control and does not improve with blood tranfusion. He is hesitant given pass experiences, is DNR/DNI, but states this evening that he \"would like to live\". This will likely be an ongoing conversation. ICU staff made aware of patient proactively in the case he does need to transfer. Request was made for proactive PICC placement.     Plan:  - Repeat hgb   - Trasfuse if below 7.5   - Continue to monitor BP, consider ICU transfer if BP continues to be difficult to improve   - PICC order placed     Viviane Tan MD PGY1       9/18/22 12:16am   -Hemoglobin returned at 6.3   -Transfusion ordered   -Repeat hemoglobin following transfusion       "

## 2022-09-18 NOTE — PROGRESS NOTES
RENAL PROGRESS NOTE    CC: Hemoptysis.    ASSESSMENT & PLAN:   62 year old male with end-stage renal disease on in center hemodialysis Monday Wednesday Friday who presented for evaluation of recurrent hemoptysis found to have a large loculated right hydropneumothorax with increased internal fluid and debris's.  Nephrology was consulted for management of end-stage renal disease and comorbidities.     ESRD on in center hemodialysis Monday Wednesday Friday simple capital under care off Dr. Hill  Treatment time is 3 hours barely completes his treatment mostly treatments are around 1120 minutes  Minimal UF on treatment around 1-1.5L.  Admitted dry weight 52.5 kg.  Patient stated he still urinates moderate amounts of urine few times a day.  Patient missed his dialysis 09/16.  Received 2 hours HD run 09/17. No indications for HD today. We will plan, next HD on Monday as per his usual schedule    Access-Left upper extremity AVG  Has had chronic issues with bleeding although with currently appears nonedematous and thrill positive  Dr. Cosby 4/2022  Uses 16-gauge needles        Electrolytes   -Moderate hyperkalemia.  Resolved with HD.  Serum potassium was 5.0 this morning. We will run with K2 bath.  Renal diet.  -Mild hyponatremia -serum sodium is 133 this morning.     Acid Base status no pressing issues.     Hypertension-patient blood pressure in normotensive range.  Prior to admission patient was taking carvedilol 6.25 mg twice daily and diltiazem 180 mg daily but has been taking intermittently.  Currently on diltiazem only.   Cardiac on hold given ongoing bleeding.   Recommend no changes.     MBD  Has been on Tums with meals  Phosphorus has been around 4 at goal mild low calcium is chronically around 8  PTH last checked on 8/3/2022 was 86  Alk phos around 160-200 chronically       Anemia acute on chronic-secondary to active bleeding.  Patient presented with hemoptysis and bloody drainage in lung bag.  Bloody fluid  removed during the paracentesis 09/17.  Patient presented with hemoglobin of 8.4, patient's hemoglobin dropped to 6.3 last night.  Hemoglobin improved to 7.1 status post packed red blood cell transfusion, then dropped again to 6.9 this morning.  Patient is currently stable.  Resolved.  Hold off any iron with transfusions and possible infection  We will keep on EPO while inpatient, OP dose 8K x3/wk   Trend hemoglobin and transfuse packed red blood cells if hemoglobin less than 7.0       Hemoptysis -pulmonary input appreciated, felt could be due to infection in his previously noted right-sided chest hematoma.   Currently on oral Augmentin. I will defer management to pulmonary and primary team.     Hydropneumothorax-CT chest showed a large loculated right hydropneumothorax with increased internal fluid and debris's.  He has previously had multiple thoracocentesis and most recently a chest tube in place with subsequent sequent development of pleurocutaneous fistula that has been managed through his ostomy bag.  Patient currently draining bright red blood.  Has been seen by surgery who did not feel he is a good candidate for decortication because of his significant medical history.  As per patient report he is scheduled for surgical intervention at [facility] in October,2022.     Chronic hepatitis B  On Entecavir  Possible hepatocellular carcinoma with hepatic mass on CT with active bleed  Recurrent ascites-last paracentesis 09/17 , 1.5 L bloody fluid removed.  Patient is scheduled for PICC line placement and antibiotic angiogram this morning.      History of chronic type B aortic dissection  Stable on CAT, followed by cardiothoracic surgery     Goals of care-patient is currently DNR/DNI.  Comfort cares were recommended by pulmonary service. Patient stated that he is not ready to transition to comfort care yet and wants to continue aggressive management.      We will follow     Elizabeth Salmeron MD  [facility]  [address], [address]  [place] [address]  Phone# [phone]  Fax# [phone]    SUBJECTIVE:    Patient underwent paracentesis, 1.5 L of dark red ascitic fluid was removed.  I reviewed dialysis run from yesterday.  Patient was hypotensive systolic pressure in the 80s through the treatment of blood MAP stayed greater than 60.  Therefore no UF.  Patient compliant by spouse at the bedside this morning.  Patient states that he is feeling much better reports improved hemoptysis.  Patient denies: fever, chills, shortness of breath , chest pain, palpitations, orthopnea, nausea, vomiting, abdominal pain, changes in bowel habits, dysuria, urinary frequency, urgency, hematuria, rash.      OBJECTIVE:  Physical Exam   Temp: 98.5  F (36.9  C) Temp src: Oral BP: 107/70 Pulse: 84   Resp: 16 SpO2: 100 % O2 Device: None (Room air)    Vitals:    09/16/22 1052 09/17/22 1254   Weight: 54.4 kg (120 lb) 51.6 kg (113 lb 11.2 oz)     Vital Signs with Ranges  Temp:  [97.2  F (36.2  C)-98.5  F (36.9  C)] 98.5  F (36.9  C)  Pulse:  [] 84  Resp:  [16-20] 16  BP: ()/(45-84) 107/70  Cuff Mean (mmHg):  [62-70] 66  SpO2:  [95 %-100 %] 100 %  I/O last 3 completed shifts:  In: 1170 [P.O.:620; Other:200]  Out: 25 [Drains:25]    @TMAXR(24)@    Patient Vitals for the past 72 hrs:   Weight   09/17/22 1254 51.6 kg (113 lb 11.2 oz)   09/16/22 1052 54.4 kg (120 lb)   [unfilled]    PHYSICAL EXAM:  Alert/oriented x 3; awake and NAD, chronically ill looking  HEENT NC/AT; perrla; OP clear without lesions; mmm  Neck supple without LAD, TM  CV; RRR without rub or murmur  Lung: Having hemoptysis during my exam no breath sounds appreciated in the right anteroposterior feilds, diminished breath sounds in the left anterior posterior fields, lung bag on the right side was the right blood in bag  Ab: Distended, + acsities  Ext: no edema and well perfused  Skin; no rash  Neuro; grossly intact  Access: left AVG with palpable thrill  and audible bruit    LABORATORY STUDIES:     Recent Labs   Lab 09/18/22  0948 09/18/22  0321 09/17/22  2306 09/17/22 2108 09/17/22  0742 09/16/22  1121 09/16/22  0938   WBC 4.8  --   --   --  6.3 5.8 4.2   RBC 2.24*  --   --   --  2.50* 2.94* 2.75*   HGB 6.9* 7.1* 6.3* 7.2* 7.6* 9.0* 8.4*   HCT 22.0*  --   --   --  24.7* 29.3* 27.4*   PLT 55*  --   --   --  52* 66* 52*       Basic Metabolic Panel:  Recent Labs   Lab 09/18/22  0948 09/17/22  0742 09/16/22  1121   * 135* 135*   POTASSIUM 5.0 6.2* 5.4*   CHLORIDE 99 102 99   CO2 24 24 27   BUN 42* 66* 47*   CR 6.96* 9.44* 7.96*    80 97   TANO 7.3* 7.6* 7.7*       INR  Recent Labs   Lab 09/16/22  1121   INR 1.26*        Recent Labs   Lab Test 09/18/22  0948 09/18/22 0321 09/17/22 2108 09/17/22  0742 09/16/22  1121 08/26/22  1155 08/26/22  0850   INR  --   --   --   --  1.26*  --  1.93*   WBC 4.8  --   --  6.3 5.8   < > 16.7*   HGB 6.9* 7.1*   < > 7.6* 9.0*   < > 7.4*   PLT 55*  --   --  52* 66*   < > 101*    < > = values in this interval not displayed.       Personally reviewed current labs     ~ 25 minutes spent in exam, POC, education regarding renal disease and management.  >90% time spent in education and counseling and/or discussion with patient's care team    Elizabeth Salmeron MD  [facility]  [address] [place] [address], [address]  [place] [address]  Phone# [phone]  Fax# [phone]

## 2022-09-18 NOTE — PROGRESS NOTES
CM sent referrals for [facility]. RNCM to follow for medical progression, recommendations, and final discharge plan.

## 2022-09-18 NOTE — PROGRESS NOTES
[facility]    Progress Note - Hospitalist Service       Date of Admission:  9/16/2022    Assessment & Plan   Elle Blanton is a 62 year old male admitted on 9/16/2022. He has complicated medical history significant for chronic empyema, ESRD on dialysis, thoracic aortic dissection.  He was admitted with two days of hemoptysis in the setting of worsening hydropneumothorax, dropping hemoglobins. Paracentesis with 1.5L bloody fluid removed, concerning for ongoing intraabdominal bleeding from liver mass. Going for IR embolization 9/18.     Hemoptysis   Acute blood loss anemia  R hydropneumothorax with volume loss of R lung  Chronic trapped lung, empyema   HCC on imaging with Hx of ruptured hepatic mass  Chronic hep B cirrhosis  Patient has complex history of right lung empyema with relatively new pleurocutaneous fistula. Patient went home with ostomy bag attached to chest wall wound and keflex (MSSA). Patient presented from clinic with hemoptysis starting 9/14 and hemoglobin drop from 11 --> 8.6. Pulmonology saw him 9/16 and recommended against bronchoscopy or other intervention at this time. US Paracentesis 9/17 removed 1.5L of bloody ascites, indicating likely continued oozing from liver mass.  Hemoglobin dropped further to 6.3 on 9/17, was given 2 units of packed red blood cells.  IR was consulted to attempt further embolization of bleeding liver mass.  - Augmentin  - transfuse Hgb<7 (checking q8h, though Elle in the past has wanted to limit blood draws)  - supplemental O2 support if needed, 94-96% O2  - WOC consult for draining chest fistula- appreciate assistance with ostomy bag over wound     ESRD on HD   Patient HD Monday and Friday, he missed HD today prior to admission  - Nephrology consulted appreciate  -  will likely dialyze today if stable.      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  He remains DNR/DNI.  Hospice has been discussed and recommended to him by several specialists at this point.  Suzie do not feel that they are at the point of starting hospice yet.  He is still very functional and able to do the activities that he wants to do at home.  Coming back to the hospital often does not bother them at this point.  They want to continue getting blood transfusions and other procedures as needed in order to continue his current quality of life.  He did reiterate that if he has a heart attack or something needing CPR or intubation would not be within his goals of care.  He would want to go to the ICU for pressors if needed.     Chronic medical conditions  GERD- continue PTA protonix   HTN- continue PTA diltiazem     Diet: Fluid restriction 1200 ML FLUID  NPO per Anesthesia Guidelines for Procedure/Surgery Except for: Meds    DVT Prophylaxis: Pneumatic Compression Devices  Beltre Catheter: Not present  Fluids: none  Central Lines: None  Cardiac Monitoring: None  Code Status: No CPR- Do NOT Intubate      Disposition Plan     Expected Discharge Date: 09/20/2022                The patient's care was discussed with the Attending Physician, Dr. Davalos.    Christy Virk MD  [facility], PGY-2  Pager #: [phone] or contact via [employer].      Clinically Significant Risk Factors Present on Admission                      ______________________________________________________________________    Interval History   Elle is seen with wife, Maico, at bedside this morning.  She is tearful and states she is not ready for him to leave her yet.  Reviewed events of last night including episode of hypotension and acute hemoglobin drop.  Discussed that this is most likely related to ongoing bleeding from his liver mass.  He is feeling better with the  blood transfusion and another one is on the way currently.  Discussed possibility of IR embolization of the liver mass and they both would very much like to explore this possibility.  Goals of care discussion as documented above in assessment and plan.    Data reviewed today: I reviewed all medications, new labs and imaging results over the last 24 hours. .    Physical Exam   Vital Signs: Temp: 97.7  F (36.5  C) Temp src: Oral BP: 125/71 Pulse: 83   Resp: 25 SpO2: 96 % O2 Device: None (Room air)    Weight: 113 lbs 11.2 oz  GEN: Patient sitting comfortably in no acute distress.  HEEN: Head is atraumatic, normocephalic, eyes slightly icteric, mucous membranes moist.  CV: Regular rate and rhythm without obvious murmurs.  PULM: Decreased breath sounds on the right. Ostomy bag present over the posterolateral right chest wall with small amount of bright red bloody drainage. No air in the bag.  ABD: Soft, nontender, bowel sounds present. Mildly distended.  NEURO: Alert and oriented x3.  No focal motor abnormalities.  Face symmetric.  PSYCH: Appropriate affect.   SKIN: No rashes, bruising, or other lesions other than thoracic wall fistula as described above.    Data   Recent Labs   Lab 09/18/22  0948 09/18/22  0321 09/17/22  2306 09/17/22  2108 09/17/22  0742 09/16/22  1121   WBC 4.8  --   --   --  6.3 5.8   HGB 6.9* 7.1* 6.3*   < > 7.6* 9.0*   MCV 98  --   --   --  99 100   PLT 55*  --   --   --  52* 66*   INR  --   --   --   --   --  1.26*   *  --   --   --  135* 135*   POTASSIUM 5.0  --   --   --  6.2* 5.4*   CHLORIDE 99  --   --   --  102 99   CO2 24  --   --   --  24 27   BUN 42*  --   --   --  66* 47*   CR 6.96*  --   --   --  9.44* 7.96*   ANIONGAP 10  --   --   --  9 9   TANO 7.3*  --   --   --  7.6* 7.7*     --   --   --  80 97   ALBUMIN  --   --   --   --   --  2.2*  2.3*   PROTTOTAL  --   --   --   --   --  8.5*   BILITOTAL  --   --   --   --   --  0.5   ALKPHOS  --   --   --   --   --  237*   ALT   --   --   --   --   --  26   AST  --   --   --   --   --  36    < > = values in this interval not displayed.     No results found for this or any previous visit (from the past 24 hour(s)).  Medications     - MEDICATION INSTRUCTIONS -         amoxicillin-clavulanate  1 tablet Oral QPM     [Held by provider] carvedilol  12.5 mg Oral BID w/meals     diltiazem ER COATED BEADS  180 mg Oral Daily     entecavir  0.5 mg Oral Q7 Days     [START ON 9/19/2022] epoetin kelton-epbx  8,000 Units Subcutaneous Once per day on Mon Wed Fri     lactulose  20 g Oral TID     pantoprazole  40 mg Oral Daily     sodium chloride (PF)  3 mL Intracatheter Q8H

## 2022-09-18 NOTE — PRE-PROCEDURE
GENERAL PRE-PROCEDURE:   Procedure:  Emergent PICC and hepatic angiogram  Date/Time:  9/18/2022 12:59 PM    Written consent obtained?: Yes    Emergent situation    Risks and benefits: Risks, benefits and alternatives were discussed    Consent given by:  Patient  Patient states understanding of procedure being performed: Yes    Patient's understanding of procedure matches consent: Yes    Procedure consent matches procedure scheduled: Yes    Expected level of sedation:  Moderate  Appropriately NPO:  Not NPO, but emergent condition outweighs risk (small volume breakfast)  ASA Class:  3  Mallampati  :  Grade 2- soft palate, base of uvula, tonsillar pillars, and portion of posterior pharyngeal wall visible  Lungs:  Lungs clear with good breath sounds bilaterally  Heart:  Normal heart sounds and rate  History & Physical reviewed:  History and physical reviewed and no updates needed  Statement of review:  I have reviewed the lab findings, diagnostic data, medications, and the plan for sedation

## 2022-09-18 NOTE — PROVIDER NOTIFICATION
PROVIDER NOTIFIED: Dr. Tan    METHOD: phone    REASON: Hgb 7.2, BP 87/56, pt is concerned that something bad might happen to him.    OUTCOME: Dr. Tan says she will come up to see the pt.

## 2022-09-19 NOTE — PLAN OF CARE
Problem: Pain Acute  Goal: Acceptable Pain Control and Functional Ability  Outcome: Ongoing, Progressing  Intervention: Prevent or Manage Pain  Recent Flowsheet Documentation  Taken 9/18/2022 1530 by Del Corral RN  Medication Review/Management: medications reviewed     Problem: Fluid Volume Deficit  Goal: Fluid Balance  Outcome: Ongoing, Progressing     Problem: Adjustment to Illness (Chronic Kidney Disease)  Goal: Optimal Coping with Chronic Illness  Outcome: Ongoing, Progressing     Problem: Hematologic Alteration (Chronic Kidney Disease)  Goal: Absence of Anemia Signs and Symptoms  Outcome: Ongoing, Progressing     Problem: Impaired Wound Healing  Goal: Optimal Wound Healing  Outcome: Ongoing, Progressing  Intervention: Promote Wound Healing  Recent Flowsheet Documentation  Taken 9/18/2022 1530 by Del Corral RN  Activity Management: (for 2 hours) bedrest   Goal Outcome Evaluation:      Pt returned to the floor at 1516, A&O x4, VSS ex fluctuating BP with significant drop between lying and sitting. Urology notified. Pt reported pain 5/10 and managed with prn oxycodone with relief. Pt requested prn Ambien. Access site at right groin WDL, no hematoma noted. Pt requested for PIV to be taken out. Pt had a BM. Small amount of dark red blood noted in the wound pouch.

## 2022-09-19 NOTE — PROGRESS NOTES
RENAL PROGRESS NOTE    CC:  Rosenstein, Benjamin, MD    ASSESSMENT & PLAN:   62 year old male with end-stage renal disease on in center hemodialysis Monday Wednesday Friday who presented for evaluation of recurrent hemoptysis found to have a large loculated right hydropneumothorax with increased internal fluid and debris's. Nephrology was consulted for management of end-stage renal disease and comorbidities.    ESRD on HD: Dialyze at [facility] schedule. Dr. Hill is his primary nephrologist. Last run on 9/14/22.  mins there. TW 52.5 kg.   - HD today per schedule.   - assess daily for additional HD  - renal diet  - renally dosing medications.     Electrolytes: Na 133 and K 5.2.   - HD as above  - UF 1.5Kg    Acid-Base: Bicarb 25.   - No need for replacement.     BP/Volume: Volume is below TW. Challenging 1.5 kg today. BP was elevated post angiogram.   - continue diltiazem  - resume coreg if BP consistently above 140.     BMD: Calcium 8.03, most recent albumin 2.2 on 9/16. Corrected Ca 9.4. Phos was 4.0 on 9/7.   - phoslo 667 mg tid wm.     Anemia: Hb below goal. Epo 8000U with HD outpatient.   - check iron study and replace if low.   - CAROLYNN after Iron replaced.     Access: JOHNNY AV AVG (+).   - No issue on HD.     Will follow    SUBJECTIVE:  Patient seen and examine during HD. Denies any CP, palpitation. No issue with access on HD. Plan to run 2 hr as outpatient schedule.     OBJECTIVE:  Physical Exam   Temp: 98.5  F (36.9  C) Temp src: Oral BP: 110/68 Pulse: 95   Resp: 18 SpO2: 97 % O2 Device: None (Room air) Oxygen Delivery: 2 LPM  Vitals:    09/16/22 1052 09/17/22 1254   Weight: 54.4 kg (120 lb) 51.6 kg (113 lb 11.2 oz)     Vital Signs with Ranges  Temp:  [97  F (36.1  C)-98.5  F (36.9  C)] 98.5  F (36.9  C)  Pulse:  [77-95] 95  Resp:  [14-21] 18  BP: (110-173)/() 110/68  SpO2:  [90 %-100 %] 97 %  I/O last 3 completed shifts:  In: 453 [P.O.:120]  Out: -     @TMAXR(24)@    Patient Vitals  for the past 72 hrs:   Weight   09/17/22 1254 51.6 kg (113 lb 11.2 oz)       Intake/Output Summary (Last 24 hours) at 9/19/2022 1327  Last data filed at 9/19/2022 0852  Gross per 24 hour   Intake 336 ml   Output --   Net 336 ml       PHYSICAL EXAM:  General - Alert and oriented x3, appears comfortable, NAD  Cardiovascular - Regular rate and rhythm, no rub  Respiratory - Clear to auscultation bilaterally, no crackles or wheezes  Abd: BS present, no guarding or pain with palpation, no ascites  Extremities - No lower extremity edema bilaterally, JOHNNY AVG (+).   Skin: dry, intact, no rash, good turgor  Neuro:  Grossly intact, no focal deficits  MSK:  Grossly intact  Psych:  Normal affect    LABORATORY STUDIES:     Recent Labs   Lab 09/19/22  0708 09/18/22  2301 09/18/22  1613 09/18/22  0948 09/18/22  0321 09/17/22  2306 09/17/22  2108 09/17/22  0742 09/16/22  1121 09/16/22  0938   WBC 6.3  --  4.5 4.8  --   --   --  6.3 5.8 4.2   RBC 2.53*  --  2.63* 2.24*  --   --   --  2.50* 2.94* 2.75*   HGB 7.8* 8.4* 8.1* 6.9* 7.1* 6.3*   < > 7.6* 9.0* 8.4*   HCT 24.2*  --  25.0* 22.0*  --   --   --  24.7* 29.3* 27.4*   PLT 58*  --  52* 55*  --   --   --  52* 66* 52*    < > = values in this interval not displayed.       Basic Metabolic Panel:  Recent Labs   Lab 09/19/22  0708 09/18/22  0948 09/17/22  0742 09/16/22  1121   * 133* 135* 135*   POTASSIUM 5.2* 5.0 6.2* 5.4*   CHLORIDE 100 99 102 99   CO2 25 24 24 27   BUN 50* 42* 66* 47*   CR 8.03* 6.96* 9.44* 7.96*   GLC 82 102 80 97   TANO 6.9* 7.3* 7.6* 7.7*       INR  Recent Labs   Lab 09/16/22  1121   INR 1.26*        Recent Labs   Lab Test 09/19/22  0708 09/18/22  2301 09/18/22  1613 09/17/22  0742 09/16/22  1121 08/26/22  1155 08/26/22  0850   INR  --   --   --   --  1.26*  --  1.93*   WBC 6.3  --  4.5   < > 5.8   < > 16.7*   HGB 7.8* 8.4* 8.1*   < > 9.0*   < > 7.4*   PLT 58*  --  52*   < > 66*   < > 101*    < > = values in this interval not displayed.       Personally reviewed  current labs      Merrill Lares MD  [facility]  [phone]

## 2022-09-19 NOTE — PROGRESS NOTES
Brief medicine progress note    Transfusion medicine called with update about unit of blood that laying received yesterday.  This was one half of a double red cell donation, and supposedly the red blood cells may not last as long in circulation as a typical transfusion.  The other half of his unit of blood tested CRYSTAL positive and so he is at increased risk for hemolysis.    Christy Virk MD  [facility] Residency Program, PGY-2  Pager #: [phone] or contact via [employer].

## 2022-09-19 NOTE — PROGRESS NOTES
[facility]    Progress Note - Hospitalist Service       Date of Admission:  9/16/2022    Assessment & Plan   Elle Blanton is a 62 year old male admitted on 9/16/2022. He has complicated medical history significant for chronic empyema, ESRD on dialysis, thoracic aortic dissection.  He was admitted with two days of hemoptysis in the setting of worsening hydropneumothorax, dropping hemoglobins. Paracentesis with 1.5L bloody fluid removed, concerning for ongoing intraabdominal bleeding from liver mass.  This was embolized by IR on 9/18 and hemoglobins have been stable in the sevens since that time.     Hemoptysis   Acute blood loss anemia  R hydropneumothorax with volume loss of R lung  Chronic trapped lung, empyema   HCC on imaging with Hx of ruptured hepatic mass  Chronic hep B cirrhosis  Patient has complex history of right lung empyema with relatively new pleurocutaneous fistula. Patient went home with ostomy bag attached to chest wall wound [facility] Patient presented from clinic with hemoptysis starting 9/14 and hemoglobin drop from 11 --> 8.6. Pulmonology saw him 9/16 and recommended against bronchoscopy or other intervention at this time. US Paracentesis 9/17 removed 1.5L of bloody ascites, indicating likely continued oozing from liver mass.  Hemoglobin dropped further to 6.3 on 9/17, was given 2 units of packed red blood cells.  IR embolized liver mass on 9/18.  Will monitor for another day or 2 to make sure hemoglobin remained stable.  - Augmentin  - transfuse Hgb<7 (checking q8h, though Elle in the past has wanted to limit blood draws)  - supplemental O2 support if needed, 94-96% O2  - [facility] consult for draining chest fistula- appreciate assistance with ostomy bag over wound     ESRD on HD   Patient HD Monday and Friday, he missed HD today prior to admission  - Nephrology consulted appreciate  -  will likely dialyze today if stable.      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  He remains DNR/DNI.  Hospice has been discussed and recommended to him by several specialists at this point.  Suzie do not feel that they are at the point of starting hospice yet.  He is still very functional and able to do the activities that he wants to do at home.  Coming back to the hospital often does not bother them at this point.  They want to continue getting blood transfusions and other procedures as needed in order to continue his current quality of life.  He did reiterate that if he has a heart attack or something needing CPR or intubation would not be within his goals of care.  He would want to go to the ICU for pressors if needed.     Chronic medical conditions  GERD- continue PTA protonix   HTN- continue PTA diltiazem     Diet: Fluid restriction 1200 ML FLUID  Renal Diet (non-dialysis)    DVT Prophylaxis: Pneumatic Compression Devices  Beltre Catheter: Not present  Fluids: none  Central Lines: PRESENT  PICC Double Lumen 09/18/22 Right Brachial vein medial-Site Assessment: Other (Comment) (hemotoma)  Cardiac Monitoring: None  Code Status: No CPR- Do NOT Intubate      Disposition Plan      Expected Discharge Date: 09/22/2022                The patient's care was discussed with the Attending Physician, Dr. Rosenstein.    Christy Virk MD  [facility] Residency [facility], PGY-2  Pager #: [phone] or contact via [employer] diogo.      Clinically Significant Risk Factors Present on Admission                      ______________________________________________________________________    Interval History   No acute events overnight.  Liver mass was embolized by IR yesterday.  Lying is feeling all right this morning.  He states he is still coughing up blood which is bothersome  for him, especially when he tries to lay on his left side.  He is hopeful that someone can do a procedure to help fix this.  We discussed that we are not going to be able to fix his lung problems and that he will likely be coughing up blood for the foreseeable future.  He is worried about this dropping his hemoglobin.  We discussed that the amount of blood he coughs up a small but we will keep an eye on it.  Plan for dialysis today.    Data reviewed today: I reviewed all medications, new labs and imaging results over the last 24 hours. .    Physical Exam   Vital Signs: Temp: 98.4  F (36.9  C) Temp src: Oral BP: 110/63 Pulse: 82   Resp: 18 SpO2: 97 % O2 Device: None (Room air)    Weight: 113 lbs 11.2 oz  GEN: Patient sitting comfortably in no acute distress.  HEEN: Head is atraumatic, normocephalic, eyes slightly icteric, mucous membranes moist.  CV: Regular rate and rhythm without obvious murmurs.  PULM: Decreased breath sounds on the right. Ostomy bag present over the posterolateral right chest wall with small amount of bright red bloody drainage. No air in the bag.  ABD: Soft, nontender, bowel sounds present. Mildly distended.  NEURO: Alert and oriented x3.  No focal motor abnormalities.  Face symmetric.  PSYCH: Appropriate affect.   SKIN: No rashes, bruising, or other lesions other than thoracic wall fistula as described above.    Data   Recent Labs   Lab 09/19/22  1327 09/19/22  0708 09/18/22  2301 09/18/22  1613 09/18/22  0948 09/17/22  2108 09/17/22  0742 09/16/22  1121   WBC  --  6.3  --  4.5 4.8  --  6.3 5.8   HGB 8.2* 7.8* 8.4* 8.1* 6.9*   < > 7.6* 9.0*   MCV  --  96  --  95 98  --  99 100   PLT  --  58*  --  52* 55*  --  52* 66*   INR  --   --   --   --   --   --   --  1.26*   NA  --  133*  --   --  133*  --  135* 135*   POTASSIUM  --  5.2*  --   --  5.0  --  6.2* 5.4*   CHLORIDE  --  100  --   --  99  --  102 99   CO2  --  25  --   --  24  --  24 27   BUN  --  50*  --   --  42*  --  66* 47*   CR  --   8.03*  --   --  6.96*  --  9.44* 7.96*   ANIONGAP  --  8  --   --  10  --  9 9   TANO  --  6.9*  --   --  7.3*  --  7.6* 7.7*   GLC  --  82  --   --  102  --  80 97   ALBUMIN  --   --   --   --   --   --   --  2.2*  2.3*   PROTTOTAL  --   --   --   --   --   --   --  8.5*   BILITOTAL  --   --   --   --   --   --   --  0.5   ALKPHOS  --   --   --   --   --   --   --  237*   ALT  --   --   --   --   --   --   --  26   AST  --   --   --   --   --   --   --  36    < > = values in this interval not displayed.     No results found for this or any previous visit (from the past 24 hour(s)).  Medications     - MEDICATION INSTRUCTIONS -         sodium chloride 0.9%  250 mL Intravenous Once in dialysis/CRRT     amoxicillin-clavulanate  1 tablet Oral QPM     calcium acetate (phos binder)  667 mg Oral TID w/meals     [Held by provider] carvedilol  12.5 mg Oral BID w/meals     diltiazem ER COATED BEADS  180 mg Oral Daily     entecavir  0.5 mg Oral Q7 Days     epoetin kelton-epbx  8,000 Units Subcutaneous Once per day on Mon Wed Fri     lactulose  20 g Oral TID     pantoprazole  40 mg Oral Daily     sodium chloride (PF)  3 mL Intracatheter Q8H

## 2022-09-19 NOTE — PLAN OF CARE
Problem: Plan of Care - These are the overarching goals to be used throughout the patient stay.    Goal: Plan of Care Review/Shift Note  Description: The Plan of Care Review/Shift note should be completed every shift.  The Outcome Evaluation is a brief statement about your assessment that the patient is improving, declining, or no change.  This information will be displayed automatically on your shift note.  Outcome: Ongoing, Progressing    Vitals stable. Denied any pain. Pt still coughed up some bloody mucus. HGB 7.8. No hematoma or bleeding at R groin angiogram site. Pt restarted dialysis around 1300. Will continue to monitor.  Leland Rudd RN

## 2022-09-19 NOTE — DISCHARGE INSTRUCTIONS
WOC DISCHARGE INSTRUCTIONS:  Right chest fistula wound(s): Weekly   1. Remove old pouch   2. Cleanse with skin water and dry  3. Apply Cavilon o sting skin prep to velvet skin  4. Apply ostomy pouch ([place] #[id number])-no need to cut pouch

## 2022-09-19 NOTE — TELEPHONE ENCOUNTER
From: Tere Maya, formerly [facility]  Sent: 9/10/2022   2:26 PM CDT  To: Jaswant Flores MD    I think the only thing atypical with his medicines that needs follow up is that he is taking Carvedilol as needed only when blood pressure is high. Also sounds like when he takes dependent on dialysis schedule. Thinking he could use a dose adjustment so he could take every day so he can get the beneficial effects of a beta blocker, in his case thinking about variceal bleed prevention. I classically think of Propranolol and Nadolol for this role however [employer] says that Carvedilol is another agent that can be used!       - KMS

## 2022-09-19 NOTE — PROGRESS NOTES
HD Progress Note    Assessment: PT AAO x4, denied c/o, lungs clear, no edema present    Vascular Access: Left forearm AVG patent, + bruit and thrill, no s/s of infection, cannulated with 16G needles w/o problems, BFR at 400 with good pressures. Hemostasis 10 min for becki site and 25 min for art site.    Dialyzer/Rinse: 160NRE / clear    HD time:3    HD fluid removal goal: 1kg    Treatment: Pt requested to only run 2hrs, Dr Lares here and aware. Pt hypotensive during run, goal adjusted per BP and crit line.    Fluid Removed Total:  1000ml             Net:  400ml    Interventions: Monitoring q 15 min    Plan: Dialysis MWF

## 2022-09-19 NOTE — PLAN OF CARE
Problem: Plan of Care - These are the overarching goals to be used throughout the patient stay.    Goal: Absence of Hospital-Acquired Illness or Injury  Intervention: Prevent Infection  Recent Flowsheet Documentation  Taken 9/19/2022 0100 by Elisa Jimenes RN  Infection Prevention: hand hygiene promoted     Problem: Plan of Care - These are the overarching goals to be used throughout the patient stay.    Goal: Readiness for Transition of Care  Outcome: Ongoing, Progressing   Goal Outcome Evaluation:    Patient alert, oriented x 4. Pleasant and co-operative with cares. Denied pain/shortness of breath. Noted coughing up thick bloody mucus when he sits up. Right groin site no bleeding noted. Wanted to have some sleep tonight since had wakeful nights previous night due to transfusion. Will keep monitoring.

## 2022-09-19 NOTE — CONSULTS
"[facility] Nurse Inpatient Assessment     Consulted for: Right chest    Patient History (according to provider note(s):      Per Dr. Corona's H+P note:  \"62 year old male with a PMH significant for chronic empyema, cirrhosis, liver mass, ESRD on dialysis, Type B thoracic aortic dissection who is admitted for hemoptysis in the setting of worsening hydropneumothorax.      Hemoptysis   Patient's presentation today could be due to the development of an infection in his previously noted right sided chest hematoma (his imaging demonstrates air pockets) in the setting of chronic empyema and a trapped lung. While theoretically, we could consider bronchoscopy (to isolate which side his bleeding is originating from) and embolization by IR, both myself and IR feel that given his overall ill health, the presence of his thoracic aortic dissection and absence of any enlarged bronchial arteries this would not be a worthwhile option to pursue.   -Monitor CBC   -Transfuse if hemoglobin <7   -Oxycodone to help with cough suppression   - Would initiate broad spectrum antibiotics to cover anaerobic and gram negative organisms (Augmentin).     Hydropneumothorax  Chest CT today showed a large loculated right hydropneumothorax with increased internal fluid and debris. The patient has a long standing history of recurrent right pleural effusion on the right side that has progressed to a large loculated right hydropneumothorax with trapped right lung. 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 The patient has been seen by surgery who do not feel he is a good candidate for decortication because of his significant medical history.      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 Patient has been seen bythoracic " "surgery who discussed possible I&D, however, with the patients significant medical diagnoses it is unlikely that these interventions will significantly affect his overall prognosis.\"     Areas Assessed:      Areas visualized during today's visit: Focused:    Wound location: Right chest  Last photo: 8/3022 at previous facility    Wound due to: Fistula Enteropulmonary  Wound history/plan of care: Patient with right thoracic fistula which has been pouched since previous facility  Wound base: 100 % non-granular tissue, viewed through pouch only     Palpation of the wound bed: normal      Drainage: large     Description of drainage: bloody     Measurements (length x width x depth, in cm): 0.5  x 0.5 cm measured through pouch, did not remove as waiting for supplies wound not assess depth due to fistula  Periwound skin: not assessed      Temperature: normal   Odor: none  Pain: denies , none  Pain interventions prior to dressing change: N/A  Treatment goal: Maintain (prevention of deterioration)  STATUS: initial assessment  Supplies ordered: ordered pouches       Treatment Plan:     Right chest fistula wound(s): Weekly   1. Remove old pouch   2. Cleanse with skin water and dry  3. Apply Cavilon o sting skin prep to velvet skin  4. Apply ostomy pouch ([place] #[id number])-no need to cut pouch      Orders: Written    RECOMMEND PRIMARY TEAM ORDER: None, at this time  Education provided: plan of care  Discussed plan of care with: Patient and Nurse  WOC nurse follow-up plan: weekly  Notify WOC if wound(s) deteriorate.  Nursing to notify the Provider(s) and re-consult the WOC Nurse if new skin concern.    DATA:     Current support surface: Standard  Atmos Air mattress  BMI: Body mass index is 18.92 kg/m .   Active diet order: Orders Placed This Encounter      Renal Diet (non-dialysis)     Output: I/O last 3 completed shifts:  In: 453 [P.O.:120]  Out: -      Labs:   Recent Labs   Lab 09/19/22  0708 09/17/22  0742 09/16/22  1121 "   ALBUMIN  --   --  2.2*  2.3*   HGB 7.8*   < > 9.0*   INR  --   --  1.26*   WBC 6.3   < > 5.8    < > = values in this interval not displayed.     Pressure injury risk assessment:   Sensory Perception: 4-->no impairment  Moisture: 4-->rarely moist  Activity: 3-->walks occasionally  Mobility: 4-->no limitation  Nutrition: 3-->adequate  Friction and Shear: 3-->no apparent problem  Alex Score: 21    Sola Brandt, MIRELLAN, RN, PHN, HNB-BC, CWOCN

## 2022-09-20 PROBLEM — I71.019 THORACIC AORTIC DISSECTION (H): Status: RESOLVED | Noted: 2022-01-01 | Resolved: 2022-01-01

## 2022-09-20 PROBLEM — D64.9 ANEMIA, UNSPECIFIED TYPE: Status: RESOLVED | Noted: 2022-01-01 | Resolved: 2022-01-01

## 2022-09-20 PROBLEM — I77.79: Status: RESOLVED | Noted: 2022-01-01 | Resolved: 2022-01-01

## 2022-09-20 PROBLEM — K74.60 HEPATIC CIRRHOSIS, UNSPECIFIED HEPATIC CIRRHOSIS TYPE, UNSPECIFIED WHETHER ASCITES PRESENT (H): Status: ACTIVE | Noted: 2021-09-26

## 2022-09-20 PROBLEM — S70.12XA ILIOPSOAS MUSCLE HEMATOMA, LEFT, INITIAL ENCOUNTER: Status: RESOLVED | Noted: 2021-09-26 | Resolved: 2022-01-01

## 2022-09-20 PROBLEM — E87.5 HYPERKALEMIA: Status: RESOLVED | Noted: 2021-11-08 | Resolved: 2022-01-01
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
## 2022-09-20 PROBLEM — K74.60 HEPATIC CIRRHOSIS, UNSPECIFIED HEPATIC CIRRHOSIS TYPE, UNSPECIFIED WHETHER ASCITES PRESENT (H): Status: RESOLVED | Noted: 2021-09-26 | Resolved: 2022-01-01

## 2022-09-20 PROBLEM — I71.019: Status: RESOLVED | Noted: 2021-09-26 | Resolved: 2022-01-01

## 2022-09-20 PROBLEM — J86.9 EMPYEMA (H): Status: RESOLVED | Noted: 2022-01-01 | Resolved: 2022-01-01

## 2022-09-20 PROBLEM — K92.1 MELENA: Status: RESOLVED | Noted: 2017-06-23 | Resolved: 2022-01-01

## 2022-09-20 PROBLEM — R04.0 EPISTAXIS: Status: RESOLVED | Noted: 2022-01-01 | Resolved: 2022-01-01

## 2022-09-20 PROBLEM — R57.1 HYPOVOLEMIC SHOCK (H): Status: RESOLVED | Noted: 2022-01-01 | Resolved: 2022-01-01

## 2022-09-20 PROBLEM — D64.9 NORMOCYTIC ANEMIA: Status: RESOLVED | Noted: 2017-06-24 | Resolved: 2022-01-01

## 2022-09-20 PROBLEM — R18.8 OTHER ASCITES: Status: RESOLVED | Noted: 2022-01-01 | Resolved: 2022-01-01

## 2022-09-20 PROBLEM — K74.60 CIRRHOSIS OF LIVER WITHOUT ASCITES (H): Status: RESOLVED | Noted: 2019-08-01 | Resolved: 2022-01-01

## 2022-09-20 PROBLEM — M71.10 SEPTIC BURSITIS: Status: RESOLVED | Noted: 2021-10-14 | Resolved: 2022-01-01

## 2022-09-20 PROBLEM — S02.2XXA CLOSED FRACTURE OF NASAL BONE, INITIAL ENCOUNTER: Status: RESOLVED | Noted: 2022-01-01 | Resolved: 2022-01-01

## 2022-09-20 NOTE — PROGRESS NOTES
[facility]    Progress Note - Hospitalist Service       Date of Admission:  9/16/2022    Assessment & Plan   Elle Blanton is a 62 year old male admitted on 9/16/2022. He has complicated medical history significant for chronic empyema, ESRD on dialysis, thoracic aortic dissection.  He was admitted with two days of hemoptysis in the setting of worsening hydropneumothorax, dropping hemoglobins. Paracentesis with 1.5L bloody fluid removed, concerning for ongoing intraabdominal bleeding from liver mass.  This was embolized by IR on 9/18 and hemoglobins have been stable in the sevens since that time.  Plan is to discharge tomorrow, 9/21, after dialysis run as long as hemoglobin stays stable.     Hemoptysis   Acute blood loss anemia  R hydropneumothorax with volume loss of R lung  Chronic trapped lung, empyema   HCC on imaging with Hx of ruptured hepatic mass  Chronic hep B cirrhosis  Patient has complex history of right lung empyema with relatively new pleurocutaneous fistula. Patient went home with ostomy bag attached to chest wall wound and keflex ([facility]). Patient presented from clinic with hemoptysis starting 9/14 and hemoglobin drop from 11 --> 8.6. Pulmonology saw him 9/16 and recommended against bronchoscopy or other intervention at this time. US Paracentesis 9/17 removed 1.5L of bloody ascites, indicating likely continued oozing from liver mass.  Hemoglobin dropped further to 6.3 on 9/17, was given 2 units of packed red blood cells.  IR embolized liver mass on 9/18.  Will monitor for 1 more day to make sure hemoglobin remains stable.  Third unit of packed red blood cells given on 9/20.  - Augmentin  - transfuse Hgb<7 (checking q8h, though Elle in the past has wanted to limit blood draws)  - supplemental O2 support if needed, 94-96% O2  - WOC consult for draining chest fistula- appreciate assistance with ostomy bag over wound     ESRD on HD   Patient HD Monday and Friday, he missed HD today  prior to admission  - Nephrology consulted appreciate  -  will likely dialyze tomorrow morning if stable.      Main pulmonary artery and Rt pulmonary artery dissection  Type B thoracic aorta dissection  - stable per CTA upon admission    Goals of care  Marvel and his wife decided to make him DNR/DNI on last admission when he was in the ICU and very critically ill.  He remains DNR/DNI.  Hospice has been discussed and recommended to him by several specialists at this point.  Suzie do not feel that they are at the point of starting hospice yet.  He is still very functional and able to do the activities that he wants to do at home.  Coming back to the hospital often does not bother them at this point.  They want to continue getting blood transfusions and other procedures as needed in order to continue his current quality of life.  He did reiterate that if he has a heart attack or something needing CPR or intubation would not be within his goals of care.  He would want to go to the ICU for pressors if needed.     Chronic medical conditions  GERD- continue PTA protonix   HTN- continue PTA diltiazem     Diet: Fluid restriction 1200 ML FLUID  Renal Diet (non-dialysis)    DVT Prophylaxis: Pneumatic Compression Devices  Beltre Catheter: Not present  Fluids: none  Central Lines: PRESENT       Cardiac Monitoring: None  Code Status: No CPR- Do NOT Intubate      Disposition Plan      Expected Discharge Date: 09/21/2022, 12:00 PM      Discharge Comments: will discharge home after dialysis - prefers morning dialysis run        The patient's care was discussed with the Attending Physician, Dr. Rosenstein.    Christy Virk MD  [facility], PGY-2  Pager #: [phone] or contact via [employer] diogo.      Clinically Significant Risk Factors Present on Admission                       ______________________________________________________________________    Interval History   No acute events  overnight.  Elle feels pretty well this morning.  He is requesting another blood transfusion to help bring his hemoglobin into the high sevens or low eights.  He states it always drifts back down.  He is still coughing up blood and he and his wife are very concerned about this.    Data reviewed today: I reviewed all medications, new labs and imaging results over the last 24 hours. .    Physical Exam   Vital Signs: Temp: 98.4  F (36.9  C) Temp src: Oral BP: 116/66 Pulse: 93   Resp: 16 SpO2: 96 % O2 Device: None (Room air)    Weight: 116 lbs 0 oz  GEN: Patient sitting comfortably in no acute distress.  HEEN: Head is atraumatic, normocephalic, eyes slightly icteric, mucous membranes moist.  CV: Regular rate and rhythm without obvious murmurs.  PULM: Decreased breath sounds on the right. Ostomy bag present over the posterolateral right chest wall with small amount of bright red bloody drainage. No air in the bag.  ABD: Soft, nontender, bowel sounds present. Mildly distended.  NEURO: Alert and oriented x3.  No focal motor abnormalities.  Face symmetric.  PSYCH: Appropriate affect.   SKIN: No rashes, bruising, or other lesions other than thoracic wall fistula as described above.    Data   Recent Labs   Lab 09/20/22  0609 09/19/22  2316 09/19/22  1327 09/19/22  0708 09/18/22  2301 09/18/22  1613 09/18/22  0948 09/17/22  0742 09/16/22  1121   WBC  --   --   --  6.3  --  4.5 4.8   < > 5.8   HGB 7.3* 7.5* 8.2* 7.8*   < > 8.1* 6.9*   < > 9.0*   MCV  --   --   --  96  --  95 98   < > 100   PLT  --   --   --  58*  --  52* 55*   < > 66*   INR  --   --   --   --   --   --   --   --  1.26*     --   --  133*  --   --  133*   < > 135*   POTASSIUM 4.0  --   --  5.2*  --   --  5.0   < > 5.4*   CHLORIDE 101  --   --  100  --   --  99   < > 99   CO2 26  --   --  25  --   --  24   < > 27   BUN 33*  --   --  50*  --   --  42*   < > 47*   CR 6.75*  --   --  8.03*  --   --  6.96*   < > 7.96*   ANIONGAP 9  --   --  8  --   --  10   < >  9   TANO 7.1*  --   --  6.9*  --   --  7.3*   < > 7.7*   GLC 92  --   --  82  --   --  102   < > 97   ALBUMIN  --   --   --   --   --   --   --   --  2.2*  2.3*   PROTTOTAL  --   --   --   --   --   --   --   --  8.5*   BILITOTAL  --   --   --   --   --   --   --   --  0.5   ALKPHOS  --   --   --   --   --   --   --   --  237*   ALT  --   --   --   --   --   --   --   --  26   AST  --   --   --   --   --   --   --   --  36    < > = values in this interval not displayed.     No results found for this or any previous visit (from the past 24 hour(s)).  Medications     - MEDICATION INSTRUCTIONS -         sodium chloride 0.9%  250 mL Intravenous Once in dialysis/CRRT     amoxicillin-clavulanate  1 tablet Oral QPM     calcium acetate (phos binder)  667 mg Oral TID w/meals     [Held by provider] carvedilol  12.5 mg Oral BID w/meals     diltiazem ER COATED BEADS  180 mg Oral Daily     entecavir  0.5 mg Oral Q7 Days     epoetin kelton-epbx  8,000 Units Subcutaneous Once per day on Mon Wed Fri     lactulose  20 g Oral TID     pantoprazole  40 mg Oral Daily     sodium chloride (PF)  3 mL Intracatheter Q8H

## 2022-09-20 NOTE — PROGRESS NOTES
RENAL PROGRESS NOTE    CC:  Rosenstein, Benjamin, MD    ASSESSMENT & PLAN:   62 year old male with end-stage renal disease on in center hemodialysis Monday Wednesday Friday who presented for evaluation of recurrent hemoptysis found to have a large loculated right hydropneumothorax with increased internal fluid and debris's. Nephrology was consulted for management of end-stage renal disease and comorbidities.    ESRD on HD: Dialyze at [facility] schedule. Dr. Hill is his primary nephrologist. Last run on 9/14/22.  mins there. TW 52.5 kg.   - Will keep HD per schedule.   - assess daily for additional HD  - renal diet  - renally dosing medications.   - No HD today     Electrolytes: Na 136  and K 4.0.   - Monitor    Acid-Base: Bicarb 26.   - No need for replacement.     BP/Volume: Volume is below TW. Challenging 1.5 kg today. BP was low during HD and mostly running in 110s.  - continue diltiazem  - resume coreg if BP consistently above 140.     BMD: Calcium 7.1 , most recent albumin 2.2 on 9/16. Corrected Ca 8.5. Phos was 4.0 on 9/7.   - continue phoslo 667 mg tid wm.     Anemia: Hb below goal. Epo 8000U with HD outpatient.   - Ferritin is elevated  - will give epogen 8000U x1 today and monitor for next dose.     Access: JOHNNY AV AVG (+).   - thrill and bruit on exam. .     Will follow    SUBJECTIVE:  Patient seen and examine. Denies any CP, palpitation.  ml yesterday with HD. BP low per report and post HD in 110s.     OBJECTIVE:  Physical Exam   Temp: 98.4  F (36.9  C) Temp src: Oral BP: 113/68 Pulse: 92   Resp: 16 SpO2: 96 % O2 Device: None (Room air)    Vitals:    09/16/22 1052 09/17/22 1254   Weight: 54.4 kg (120 lb) 51.6 kg (113 lb 11.2 oz)     Vital Signs with Ranges  Temp:  [97.5  F (36.4  C)-98.4  F (36.9  C)] 98.4  F (36.9  C)  Pulse:  [63-98] 92  Resp:  [16-18] 16  BP: ()/(49-74) 113/68  SpO2:  [94 %-96 %] 96 %  I/O last 3 completed shifts:  In: 1103 [P.O.:500; I.V.:3;  Other:600]  Out: 1035 [Drains:35; Other:1000]    @TMAXR(24)@    Patient Vitals for the past 72 hrs:   Weight   09/17/22 1254 51.6 kg (113 lb 11.2 oz)       Intake/Output Summary (Last 24 hours) at 9/19/2022 1327  Last data filed at 9/19/2022 0852  Gross per 24 hour   Intake 336 ml   Output --   Net 336 ml       PHYSICAL EXAM:  General - Alert and oriented x3, appears comfortable, NAD  Cardiovascular - Regular rate and rhythm, no rub  Respiratory - Clear to auscultation bilaterally, no crackles or wheezes  Abd: BS present, no guarding or pain with palpation, no ascites  Extremities - No lower extremity edema bilaterally, JOHNNY AVG (+).   Skin: dry, intact, no rash, good turgor  Neuro:  Grossly intact, no focal deficits  MSK:  Grossly intact  Psych:  Normal affect    LABORATORY STUDIES:     Recent Labs   Lab 09/20/22  0609 09/19/22  2316 09/19/22  1327 09/19/22  0708 09/18/22  2301 09/18/22  1613 09/18/22  0948 09/17/22  2108 09/17/22  0742 09/16/22  1121 09/16/22  0938   WBC  --   --   --  6.3  --  4.5 4.8  --  6.3 5.8 4.2   RBC  --   --   --  2.53*  --  2.63* 2.24*  --  2.50* 2.94* 2.75*   HGB 7.3* 7.5* 8.2* 7.8* 8.4* 8.1* 6.9*   < > 7.6* 9.0* 8.4*   HCT  --   --   --  24.2*  --  25.0* 22.0*  --  24.7* 29.3* 27.4*   PLT  --   --   --  58*  --  52* 55*  --  52* 66* 52*    < > = values in this interval not displayed.       Basic Metabolic Panel:  Recent Labs   Lab 09/20/22  0609 09/19/22  0708 09/18/22  0948 09/17/22  0742 09/16/22  1121    133* 133* 135* 135*   POTASSIUM 4.0 5.2* 5.0 6.2* 5.4*   CHLORIDE 101 100 99 102 99   CO2 26 25 24 24 27   BUN 33* 50* 42* 66* 47*   CR 6.75* 8.03* 6.96* 9.44* 7.96*   GLC 92 82 102 80 97   TANO 7.1* 6.9* 7.3* 7.6* 7.7*       INR  Recent Labs   Lab 09/16/22  1121   INR 1.26*        Recent Labs   Lab Test 09/20/22  0609 09/19/22  2316 09/19/22  1327 09/19/22  0708 09/18/22  2301 09/18/22  1613 09/17/22  0742 09/16/22  1121 08/26/22  1155 08/26/22  0850   INR  --   --   --   --    --   --   --  1.26*  --  1.93*   WBC  --   --   --  6.3  --  4.5   < > 5.8   < > 16.7*   HGB 7.3* 7.5*   < > 7.8*   < > 8.1*   < > 9.0*   < > 7.4*   PLT  --   --   --  58*  --  52*   < > 66*   < > 101*    < > = values in this interval not displayed.       Personally reviewed current labs      Merrill Lares MD  [facility]  [phone]

## 2022-09-20 NOTE — PROGRESS NOTES
9:52 AM  SW [facility]. FRANCISCO provided her with an update and Mary will play to call back in a couple of days.    OLAF Lowery

## 2022-09-20 NOTE — PLAN OF CARE
Problem: Plan of Care - These are the overarching goals to be used throughout the patient stay.    Goal: Optimal Comfort and Wellbeing  Outcome: Ongoing, Progressing     Problem: Renal Function Impairment (Chronic Kidney Disease)  Goal: Minimize Renal Failure Effects  Outcome: Ongoing, Progressing  Intervention: Monitor and Support Renal Function  Recent Flowsheet Documentation  Taken 9/19/2022 2315 by Vianney Marroquin RN  Medication Review/Management: medications reviewed     Problem: Impaired Wound Healing  Goal: Optimal Wound Healing  Outcome: Ongoing, Progressing  Intervention: Promote Wound Healing  Recent Flowsheet Documentation  Taken 9/19/2022 2315 by Vianney Marroquin RN  Activity Management: activity adjusted per tolerance     Goal Outcome Evaluation:        Pt is A&Ox4. Denies pain. Episodes of blood in cough noted. Ostomy bag intact. Scant amount of blood noted in ostomy bag. No measurable output.

## 2022-09-20 NOTE — PLAN OF CARE
Goal Outcome Evaluation:      Problem: Pain (Chronic Kidney Disease)  Goal: Acceptable Pain Control  Outcome: Ongoing, Progressing     Problem: Pain Acute  Goal: Acceptable Pain Control and Functional Ability  Outcome: Ongoing, Progressing  Intervention: Prevent or Manage Pain  Recent Flowsheet Documentation  Taken 9/19/2022 1474 by Mily Davis RN  Medication Review/Management: medications reviewed  PRN Oxycodone given for pain.    Problem: Oral Intake Inadequate (Chronic Kidney Disease)  Goal: Optimal Oral Intake  Outcome: Ongoing, Progressing  Pt ate food brought in by wife; per pt/wife ate a little.   Drinks sips from water bottle from family and apple juice; Total fluid intake 150 ml.   RN educated pt on fluid restriction.    PRN Ambien given per request for sleep.   No further complaint this shift.   VSS.  S/s respiratory distress; O2 sat 95% on RA.  Hgb 7.5.

## 2022-09-21 NOTE — DISCHARGE SUMMARY
[facility]  Discharge Summary - Medicine & Pediatrics       Date of Admission:  9/16/2022  Date of Discharge:  9/21/2022  Discharging Provider: Christy Virk MD  Discharge Service: Hospitalist Service    Discharge Diagnoses   The primary encounter diagnosis was Anemia in end-stage renal disease (H). A diagnosis of Hemoptysis was also pertinent to this visit.    Follow-ups Needed After Discharge   Follow-up Appointments     Follow-up and recommended labs and tests       Follow up with primary care provider, Jaswant Flores, on Friday 9/23 at   11:00 AM, for hospital follow- up. The following labs/tests are   recommended: CBC.           Discharge Disposition   Discharged to home  Condition at discharge: Stable    Hospital Course   Elle Blanton was admitted on 9/16/2022 for hemoptysis.  He is well-known to me.  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 Patient went home with ostomy bag attached to chest wall wound and keflex (MSSA). Patient presented from clinic with hemoptysis starting 9/14 and hemoglobin drop from 11 --> 8.6. Pulmonology saw him 9/16 and recommended against bronchoscopy or other intervention at this time. US Paracentesis 9/17 removed 1.5L of bloody ascites, indicating likely continued oozing from liver mass. IR embolized liver mass on 9/18.  He was given 4 total units of packed red blood cells this admission.      ESRD on HD   He dialyzes on a Monday Wednesday Friday schedule this hospitalization.     Goals of care  Marvel and his wife decided to make him DNR/DNI on last admission when he was in the ICU and very critically ill.  He remains DNR/DNI.  Hospice has been  discussed and recommended to him by several specialists at this point.  Elle and Maico do not feel that they are at the point of starting hospice yet.  He is still very functional and able to do the activities that he wants to do at home.  Coming back to the hospital often does not bother them at this point.  They want to continue getting blood transfusions and dialysis in order to continue his current quality of life.  He did reiterate that if he has a heart attack or something needing CPR or intubation would not be within his goals of care.    More in-depth goals of care conversation was had on the day of discharge.  He and his wife have had a hard time accepting that there are no further procedures that can be done to stop the bleeding in his lung or his liver.  Appeared that this had maybe started to sink in today.  We recapped his recent hospitalizations, especially the last one in which he was in the ICU and most of the doctors thought that he would not survive. Elle recalls blacking out during that hospitalization, likely due to his hypotension and severe anemia, and he states that was very comfortable and not scary to him.  He appears to be more accepting of the fact that he will likely not live longer than another couple of months.  He asked about physician aid in dying, and I informed him that this is not legal in the state [facility].  He can talk more with Dr. Flores about this if it is something he is serious about (e.g. which states allow it, what the process is - likely it is not a good fit for him as he would not survive long enough to move to a different state and start the process).  We discussed options for his care including continuing to do dialysis and blood transfusions as needed.  We also discussed the hospice program. Elle indicated that he would like to die in the hospital.  He and his wife have had a few family members die in the hospital and this was an okay experience for them, the  they do not want him to die at home where they would feel relatively unsupported and nervous.  They are not ready to do hospice yet because they do not want to have him sit around in the hospital for weeks to months waiting for his death (if they stop dialysis and transfusions).  The solution we agreed upon was to discharge him home today with follow-up with Mark in 2 days.  They will continue dialysis and blood transfusions as needed.  They are aware that at any time they can decide to stop these treatments and we will get them enrolled with hospice which will help promote comfort and other symptom control.  If he declines significantly at home, likely his wife will bring him into the hospital.  I did add a advance care planning note to his chart that indicates per our discussion, if he were to come into the ED and be significantly ill with hypotension, altered mental status, massive bleeding etc. we would make him comfort cares and allow him to pass comfortably in the hospital, surrounded by his family. Elle also repeatedly asked for his family to be able to stay with him overnight.  He had 1 night during this hospital stay where he did become hypotensive and he felt very uneasy being alone in his hospital room, as it sometimes takes the nurses a long time to respond to his calls.  He is worried about feeling ill and then pushing the nurse call button and then dying before someone has a chance to get to him.  During future hospitalizations, I do believe it would be appropriate for his wife to stay at the bedside even overnight due to his tenuous clinical status.     Consultations This Hospital Stay   NEPHROLOGY IP CONSULT  PULMONARY IP CONSULT  WOUND OSTOMY CONTINENCE NURSE  IP CONSULT  CARE MANAGEMENT / SOCIAL WORK IP CONSULT  VASCULAR ACCESS ADULT IP CONSULT  INTERVENTIONAL RADIOLOGY ADULT/PEDS IP CONSULT    Code Status   No CPR- Do NOT Intubate     The patient was discussed with   Rosenstein.  ______________________________________________________________________    Physical Exam   Vital Signs: Temp: 97.8  F (36.6  C) Temp src: Oral BP: (!) 150/95 Pulse: 98   Resp: 18 SpO2: 97 % O2 Device: None (Room air)    Weight: 118 lbs 1.6 oz  GEN: Patient sitting comfortably in no acute distress.  HEEN: Head is atraumatic, normocephalic, eyes anicteric, mucous membranes moist.  CV: Extremities well perfused.  PULM: Breathing comfortably on room air.  NEURO: Alert and oriented x3.  No focal motor abnormalities.  Face symmetric.  PSYCH: Appropriate affect.  SKIN: No rashes, bruising, or other lesions.      Primary Care Physician   Jaswant Flores    Discharge Orders      Medication Therapy Management Referral      Reason for your hospital stay    You were hospitalized for coughing up blood.  Your hemoglobin was low.  You received blood transfusions.  We drained the fluid from your belly which showed some blood, which is likely coming from the liver mass again.  Interventional radiology did a procedure to help slow down the bleeding from your liver.  You will continue to cough up blood and there is nothing we can do to stop this from happening, unfortunately.     Follow-up and recommended labs and tests     Follow up with primary care provider, Jaswant Flores, on Friday 9/23 at 11:00 AM, for hospital follow- up. The following labs/tests are recommended: CBC.     Activity    Your activity upon discharge: activity as tolerated       Significant Results and Procedures   Most Recent 3 CBC's:Recent Labs   Lab Test 09/21/22  0548 09/20/22  1921 09/20/22  0609 09/19/22  1327 09/19/22  0708 09/18/22  2301 09/18/22  1613 09/18/22  0948   WBC  --   --   --   --  6.3  --  4.5 4.8   HGB 8.1* 8.1* 7.3*   < > 7.8*   < > 8.1* 6.9*   MCV  --   --   --   --  96  --  95 98   PLT  --   --   --   --  58*  --  52* 55*    < > = values in this interval not displayed.     Most Recent 3 BMP's:Recent Labs   Lab Test 09/20/22  0609  09/19/22  0708 09/18/22  0948    133* 133*   POTASSIUM 4.0 5.2* 5.0   CHLORIDE 101 100 99   CO2 26 25 24   BUN 33* 50* 42*   CR 6.75* 8.03* 6.96*   ANIONGAP 9 8 10   TANO 7.1* 6.9* 7.3*   GLC 92 82 102     Most Recent 2 LFT's:Recent Labs   Lab Test 09/16/22  1121 08/26/22  0850   AST 36 46*   ALT 26 19   ALKPHOS 237* 103   BILITOTAL 0.5 0.9   ,   Results for orders placed or performed during the hospital encounter of 09/16/22   CTA Chest Abdomen Pelvis w Contrast    Narrative    EXAM: CTA CHEST ABDOMEN PELVIS W CONTRAST  LOCATION: [facility]  DATE/TIME: 9/16/2022 1:02 PM    INDICATION: hemoptysis  COMPARISON: 08/26/2022  TECHNIQUE: CT angiogram chest abdomen pelvis during arterial phase of injection of IV contrast. 2D and 3D MIP reconstructions were performed by the CT technologist. Dose reduction techniques were used.   CONTRAST: isovue 370 75ml    FINDINGS:   CT ANGIOGRAM CHEST, ABDOMEN, AND PELVIS: Stable ectasia of the ascending aorta at 4.0 cm. No significant interval change in the descending thoracic aortic dissection extending from the arch into the left common iliac artery. 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 Celiac axis, superior mesenteric artery and right renal artery arises from the true lumen. The left renal artery and inferior mesenteric artery arise from false lumen. A stable 4.2 cm infrarenal abdominal aortic   artery aneurysm, image 45:11. Stable left brachiocephalic vein stent.    LUNGS AND PLEURA: A stable large, thick-walled, right hydropneumothorax encompassing the majority of the right hemithorax. Increased fluid and debris within the hydropneumothorax. Atelectasis involving the majority of the right lung. Minimal left basilar   atelectasis. Stable small layering bilateral pleural effusions.    MEDIASTINUM/AXILLAE: No lymphadenopathy. Esophageal varices. Right atrial enlargement.    CORONARY ARTERY CALCIFICATION:  Severe.    HEPATOBILIARY: Embolization coils in the right lobe of the liver. No significant interval change in a 7.4 x 6.2 cm heterogeneous nodular hypodense mass in segments 8/4A. No active extravasation. Nodular hepatic contour consistent with fibrosis/cirrhosis.   Cholelithiasis. Stable dilated portal vein.    PANCREAS: Normal.    SPLEEN: Stable splenomegaly.    ADRENAL GLANDS: Normal.    KIDNEYS/BLADDER: Stable bilateral renal atrophy. Hypoenhancing left kidney. Subcentimeter renal hypodensities which are too small to characterize. No hydronephrosis.    BOWEL: No obstruction or inflammatory change.    LYMPH NODES: Normal.    PELVIC ORGANS: Large volume ascites.    MUSCULOSKELETAL: Degenerative changes of the spine and hips.      Impression    IMPRESSION:  1.  Large loculated right hydropneumothorax has increased internal fluid and debris.  2.  Stable type B aortic dissection.  3.  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  4.  A stable 7.4 cm hepatic mass which may represent hepatocellular carcinoma.   US Paracentesis without Albumin    Narrative    EXAM:  1. PARACENTESIS  2. ULTRASOUND GUIDANCE  LOCATION: [facility]  DATE/TIME: 09/17/2022, 10:07 AM    INDICATION: Ascites.    PROCEDURE: Informed consent obtained. Time out performed. The abdomen was prepped and draped in a sterile fashion. 10 mL of 1% lidocaine was infused into local soft tissues. A 5 Estonian catheter system was introduced into the abdominal ascites under   ultrasound guidance.    1.5 liters of dark red ascitic fluid were removed and sent to lab.    Patient tolerated procedure well.    Ultrasound imaging was obtained and placed in the patient's permanent medical record.      Impression    IMPRESSION:  1.  Status post ultrasound-guided paracentesis.    Reference CPT Code: 14111     IR PICC Placement > 5 Yrs of Age    Narrative    [place] [facility]  LOCATION: [facility]  HOSPITAL  DATE: 9/18/2022    PROCEDURE: PERIPHERALLY INSERTED CENTRAL CATHETER (PICC) PLACEMENT    INTERVENTIONAL RADIOLOGIST: August Torres MD.    INDICATION: 62-year-old male with blood loss anemia and hemodynamic instability in the setting of a hemorrhagic hepatic malignancy. Stable central venous access is required for transfusion and possibly vasopressor therapy. A dual-lumen PICC is most   appropriate.    CONSENT: The risks, benefits and alternatives of peripherally inserted central catheter placement were discussed with the patient  in detail. All questions were answered. Informed consent was given to proceed with the procedure.    MODERATE SEDATION: None.    CONTRAST: None.  ANTIBIOTICS: None.  ADDITIONAL MEDICATIONS: None.    FLUOROSCOPIC TIME: 0.6 minutes.  RADIATION DOSE: Air Kerma: 5 mGy.    COMPLICATIONS: No immediate complications.    STERILE BARRIER TECHNIQUE: Maximum sterile barrier technique was used. Cutaneous antisepsis was performed at the operative site with application of 2% chlorhexidine and large sterile drape. Prior to the procedure, the  and assistant performed   hand hygiene and wore hat, mask, sterile gown, and sterile gloves during the entire procedure.    PROCEDURE:     Using real-time ultrasound guidance, the right upper arm brachial vein was punctured. A wire was manipulated centrally into the right atrium.     Over wire exchange was then made for a 5F, 34 cm double lumen peripherally inserted central catheter which was advanced under fluoroscopic guidance until the tip was at the cavoatrial junction. A fluoroscopic image was obtained.    FINDINGS:  Ultrasound shows an anechoic and compressible right upper arm brachial vein. A permanent image was stored.      The completion fluoroscopic images show the catheter tip to lie near the cavoatrial junction.      Impression    IMPRESSION:    1.  Peripherally inserted central catheter (PICC) placement, as discussed above.  2.  The  catheter position was verified fluoroscopically and this is ready for use.      ____________________________________________________________________    CPT codes for physician reference only:  34324           IR Visceral Embolization    [facility]  LOCATION: [facility]  DATE: 9/18/2022    PROCEDURE: EMERGENT DIAGNOSTIC HEPATIC/MESENTERIC ARTERIOGRAPHY INCLUDING SUPERSELECTIVE HEPATIC ARTERIOGRAPHY WITH PARTICLE EMBOLIZATION OF  1.  Ultrasound guidance for vascular access. A permanent image was stored.  2.  Digital subtraction celiac arteriography (first order branch).  3.  Digital subtraction common hepatic arteriography (second order branch).  4.  Digital subtraction proper hepatic arteriography (third order branch).  5.  Digital subtraction superior mesenteric arteriography (first order branch).  6.  Digital subtraction gastroduodenal arteriography (third order branch).  7.  Digital subtraction repeat proper hepatic arteriography (third order branch, additional angiogram after basic).  8.  Digital subtraction right hepatic arteriography (greater than third order branch).  9.  Digital subtraction right hepatic branch arteriography to segments 7 and 8 (greater than third order branch).  10.  Particle embolization of the hepatic arterial branch supplying the neoplasm in segment 8 in the setting of hemorrhage.  11.  Arterial closure device use.    INTERVENTIONAL RADIOLOGIST: August Torres MD.    INDICATION: 62-year-old male with acute blood loss anemia in the setting of a known right hepatic mass. The patient previously underwent particle embolization of this mass on 8/26/2022 with cessation of bleeding.    CONSENT: The risks, benefits and alternatives of the stated procedure were discussed with the patient  in detail. All questions were answered. Informed consent was given to proceed with the procedure.    MODERATE SEDATION: Versed 2.5 mg IV; Fentanyl 100 mcg IV.  Under  physician supervision, Versed and fentanyl were administered for moderate sedation. Pulse oximetry, heart rate and blood pressure were continuously monitored by an independent trained   observer. The physician spent 70 minutes of face-to-face sedation time with the patient.    CONTRAST: 60 mL Omnipaque 350.  ANTIBIOTICS: None.  ADDITIONAL MEDICATIONS: None.    FLUOROSCOPIC TIME: 29.5 minutes.  RADIATION DOSE: Air Kerma: 2222 mGy.    COMPLICATIONS: No immediate complications.    STERILE BARRIER TECHNIQUE: Maximum sterile barrier technique was used. Cutaneous antisepsis was performed at the operative site with application of 2% chlorhexidine and large sterile drape. Prior to the procedure, the  and assistant performed   hand hygiene and wore hat, mask, sterile gown, and sterile gloves during the entire procedure.    PROCEDURE:    Using real-time ultrasound guidance, access was achieved into the right common femoral artery and conversion was made for a long 5 Danish vascular sheath which was attached to continuous heparinized saline infusion.    A Cobra catheter was manipulated into the upper abdominal aorta and digital abdominal aortogram was obtained confirming catheter placement within the true lumen of the known aortic dissection.    The Cobra catheter was utilized to selectively engage the celiac artery (first order branch) and digital subtraction arteriography was obtained.    Through the microcatheter a Progreat microcatheter was advanced into the common hepatic artery (second order) and digital subtraction arteriography was obtained.    The microcatheter was advanced into the proper hepatic artery (third order branch) and digital subtraction arteriography was obtained.    Attempts were made to manipulate the microcatheter more distally into the right hepatic arterial vasculature which were unsuccessful given overall poor purchase at the celiac artery origin secondary to the patient's anatomy.    The  microcatheter was removed and the Cobra catheter was positioned at the aortoiliac bifurcation. A Glidewire was advanced into the contralateral iliac system over which a Lloyd 1 catheter was advanced and formed at the aortoiliac bifurcation.    The Lloyd 1 catheter was utilized to selectively engage the celiac artery (first order branch) and digital subtraction arteriography was obtained. The microcatheter was attempted to be advanced into the hepatic arterial vasculature however could only   be advanced into the splenic artery given abrupt angulation of the celiac bifurcation.    The Lloyd 1 catheter was utilized to selectively engage the superior mesenteric artery (first order branch) and digital subtraction arteriography was obtained. Through this the microcatheter was utilized to selectively engage the gastroduodenal artery   (third order branch) and digital subtraction arteriography was obtained.    The microcatheter was advanced into the proper hepatic artery (third order branch) and repeat digital subtraction arteriography was obtained. The catheter was advanced just distal to a large hepatic arterial branch supplying segments 5/6 (greater than   third order) and digital subtraction arteriography was obtained. Particle embolization was performed from this location with one vial of 500-700 um embospheres followed by control arteriography.    The catheter and sheath were removed with hemostasis achieved via a Perclose suture device.    FINDINGS:  Ultrasound demonstrates a patent and pulsatile right common femoral artery. A permanent image was stored.    The digital subtraction abdominal aortography confirms catheter positioning within the true lumen of the aortic dissection.    The digital subtraction celiac arteriography shows no identified site of extravasation.    The digital subtraction common hepatic arteriography shows no identified site of extravasation.    The digital subtraction proper hepatic  arteriography shows no identified site of extravasation. Abnormal areas of tumor vascularity are seen arising within the superior right hepatic lobe.    The digital subtraction superior mesenteric arteriography shows no identified site of extravasation. There is a hypertrophied gastroduodenal artery.    The digital subtraction gastroduodenal arteriography from this superior mesenteric distribution show successful catheterization with filling of the hepatic arterial vasculature.    The digital subtraction selective right hepatic arteriography shows no identified site of extravasation. Tumor blush seen within segment 8 in the region of the known large neoplasm. Collateral vascularity is identified.    The digital subtraction superselective right hepatic arteriography the segment 7 and 8 show no identified site of extravasation. Tumor blush is seen within segment 8. Following article embolization of this branch the control arteriography shows no   residual flow to the neoplastic segment with preservation of flow to the inferior right hepatic lobe (segments 5 and 6) and left hepatic artery.      Impression    IMPRESSION:    1.  Extensive selective and superselective hepatic arteriography ultimately with particle embolization of the right hepatic arterial branch supplying the segment 8 neoplasm as detailed above.  2.  The arterial anatomy is quite challenging anatomically and retrograde access through the superior mesenteric artery and gastroduodenal artery was necessary to provide adequate purchase for microcatheter manipulation in the right hepatic arterial   distribution. Please see above for specific details.           Discharge Medications   Current Discharge Medication List      CONTINUE these medications which have NOT CHANGED    Details   acetaminophen (TYLENOL) 500 MG tablet Take 500 mg by mouth every 6 hours as needed for mild pain      benzonatate (TESSALON) 100 MG capsule Take 1 capsule (100 mg) by mouth 3  times daily as needed for cough  Qty: 30 capsule, Refills: 0    Associated Diagnoses: Cough      calcium acetate (PHOSLO) 667 MG CAPS capsule Take 1 capsule by mouth 2 times daily (with meals)      carvedilol (COREG) 12.5 MG tablet Take 1 tablet (12.5 mg) by mouth 2 times daily (with meals)  Qty: 180 tablet, Refills: 3    Associated Diagnoses: Essential hypertension      cephALEXin (KEFLEX) 250 MG capsule Take 1 capsule (250 mg) by mouth 2 times daily  Qty: 30 capsule, Refills: 1    Associated Diagnoses: Empyema lung (H)      diltiazem ER (DILT-XR) 180 MG 24 hr capsule Take 1 capsule (180 mg) by mouth daily  Qty: 90 capsule, Refills: 3    Associated Diagnoses: Essential hypertension      entecavir (BARACLUDE) 0.5 MG tablet Take 1 tablet (0.5 mg) by mouth every 7 days  Qty: 52 tablet, Refills: 1    Associated Diagnoses: Chronic viral hepatitis B without delta agent and without coma (H)      guaiFENesin-codeine (GUAIFENESIN AC) 100-10 MG/5ML syrup Take 5 mLs by mouth every 4 hours as needed for cough  Qty: 236 mL, Refills: 1    Associated Diagnoses: Cough      hydrOXYzine (ATARAX) 25 MG tablet Take 1 tablet (25 mg) by mouth 3 times daily as needed for itching  Qty: 90 tablet, Refills: 3    Associated Diagnoses: Pruritic disorder      lactulose (CHRONULAC) 10 GM/15ML solution Take 30 mLs (20 g) by mouth 3 times daily  Qty: 946 mL, Refills: 11    Associated Diagnoses: Hepatic cirrhosis, unspecified hepatic cirrhosis type, unspecified whether ascites present (H)      oxyCODONE (ROXICODONE) 5 MG tablet Take 1 tablet (5 mg) by mouth daily as needed for severe pain  Qty: 30 tablet, Refills: 0    Associated Diagnoses: Empyema lung (H); Chest wall mass; Draining cutaneous sinus tract      pantoprazole (PROTONIX) 40 MG EC tablet Take 1 tablet (40 mg) by mouth daily  Qty: 30 tablet, Refills: 11    Associated Diagnoses: Cirrhosis of liver with ascites, unspecified hepatic cirrhosis type (H)      senna-docusate  (SENOKOT-S/PERICOLACE) 8.6-50 MG tablet Take 1 tablet by mouth daily as needed for constipation  Qty: 30 tablet, Refills: 11    Associated Diagnoses: Constipation, unspecified constipation type      zolpidem (AMBIEN) 5 MG tablet Take 1 tablet (5 mg) by mouth nightly as needed for sleep  Qty: 10 tablet, Refills: 5    Associated Diagnoses: Insomnia due to medical condition      order for DME Equipment being ordered: Other: blood pressure monitor  Treatment Diagnosis: hypertension  Qty: 1 each, Refills: 0    Associated Diagnoses: Descending thoracic aortic dissection; Essential hypertension           Allergies   Allergies   Allergen Reactions     Nka [No Known Allergies]

## 2022-09-21 NOTE — ACP (ADVANCE CARE PLANNING)
9/21/22    Patient wishes to continue with dialysis and as needed blood transfusions at this time.  May be starting to understand that there is no surgical procedure to fix his lung or his liver and that bleeding will be an ongoing problem until his death.  If he comes into the emergency department and is found to be severely anemic (baseline is 7-8), hypotensive, or otherwise very seriously ill, he and his wife prefer to initiate comfort cares at that time.  He does not want ICU cares.    Christy Virk MD  [facility], PGY-2  Pager #: [phone] or contact via [employer].

## 2022-09-21 NOTE — PROGRESS NOTES
Clinic Care Coordination Contact  [facility]: Post-Discharge Note  SITUATION                                                      Admission:    Admission Date: 09/16/22   Reason for Admission: Hemoptysis  Discharge:   Discharge Date: 09/21/22  Discharge Diagnosis: Hemoptysis    BACKGROUND                                                      Per hospital discharge summary and inpatient provider notes.      ASSESSMENT           Discharge Assessment  How are you doing now that you are home?: Pt is doing better  How are your symptoms? (Red Flag symptoms escalate to triage hotline per guidelines): Improved  Do you feel your condition is stable enough to be safe at home until your provider visit?: Yes  Does the patient have their discharge instructions? : Yes  Does the patient have questions regarding their discharge instructions? : No  Were you started on any new medications or were there changes to any of your previous medications? : Yes  Does the patient have all of their medications?: Yes  Do you have questions regarding any of your medications? : No  Do you have all of your needed medical supplies or equipment (DME)?  (i.e. oxygen tank, CPAP, cane, etc.): No - What equipment or supplies are needed?  Discharge follow-up appointment scheduled within 14 calendar days? : Yes  Discharge Follow Up Appointment Date: 09/23/22  Discharge Follow Up Appointment Scheduled with?: Primary Care Provider                  PLAN                                                      Outpatient Plan:      Future Appointments   Date Time Provider Department Center   9/23/2022 11:00 AM Laura Hodges MD [facility]   10/6/2022  4:00 PM Pilo Zurita MD [facility]   10/17/2022  9:00 AM Woody Shaikh MD [facility]         For any urgent concerns, please contact our 24 hour clinic line:   [facility]: [phone]       MARA Vance

## 2022-09-21 NOTE — PLAN OF CARE
Problem: Hematologic Alteration (Chronic Kidney Disease)  Goal: Absence of Anemia Signs and Symptoms  Outcome: Ongoing, Progressing      Goal Outcome Evaluation:        Alert and oriented. Denies pain and any other complain.   PICC line with some bloody discharge at insertion, pressure applied, new dressing applied. No more bleeding after. Hematoma at site.     Serial Hgb, this morning 7.3. Provider ordered 1 Unit RBC, given. Hgb 8.1 after blood.     Denies sob. Still coughing some blood per patient report. O2 SATs wdl in RA.  Ostomy bag in pleurocutaneous fistula. Ostomy changed in the morning. No output noted today.     1200 ml Fluid Restriction.   Renal diet.   Plan to discharge tomorrow after dialysis.

## 2022-09-21 NOTE — PROGRESS NOTES
HEMODIALYSIS TREATMENT NOTE      Assessment: Pt. Is alert and oriented x 3. Denies c/o SOB, CP, N/V, diarrhea or constipation. Lungs are clear and there is no appreciable edema.Access is patent with +Bruit and +thrill.    Pre Wt:  53.6 kg    Post Wt: 52.5 kg    Ultrafiltration - Post Run Net Total Removed (mL): 1100    Access: Patent, 10 minutes to achieve hemostasis, dressing applied.    Dialyzer Rinse: Excellent rinse back.    Total Blood Volume Processed: 40 liters    Total Dialysis (Treatment) Time: 2 hours    Interventions: Continuous monitoring for patient response to therapy.    Plan:  Per Nephrology      Tico Arango RN  [facility]

## 2022-09-21 NOTE — PROGRESS NOTES
Confirmed with [facility] that patient is a client of theirs and attends dialysis on a [facility] schedule at baseline.    Viviane Noel RN

## 2022-09-22 NOTE — CONFIDENTIAL NOTE
Home care referral placed for maintenance of PICC line. PICC will stay in place due to need for frequent blood draws and infusions.

## 2022-09-22 NOTE — PROGRESS NOTES
9:31 AM  FRANCISCO received a phone call from Mary with [employer] requesting an update on Pt's discharge date. Upon review, Pt had discharged yesterday evening. Mary requested the discharge home care orders. Mary reports that home care services will be new for Pt and needs orders to schedule services. Orders sent.    11:14 AM  FRANCISOC received a phone call from Mary stating that orders included the need for skilled nursing. Mary reported that the soonest an RN could come out would be Sunday 9/25. FRANCISCO confirmed with MD that discharged Pt that Pt would be able to start at that time and not need it sooner. FRANCISCO updated [employer] that Sunday would be appropriate for Pt.     2:31 PM  FRANCISCO received another phone call from Mary requesting the specific order for the PICC flushes. FRANCISCO notified MD. Orders sent.    OLAF Lowery

## 2022-09-22 NOTE — PROGRESS NOTES
Pain: right side pain onset during dialysis. PRN oxycodone given with minimal effect. IV Dilaudid given prior to discharge.   Neuro: alert and oriented  Resp: Frequent productive bloody cough  Cardio: blood pressures variable. Taken on lower leg due to restricted extremities   GI/: Abdomen distended. Voiding good amounts  Skin: Jaundice  Activity: SBA      1 unit PRBC administered prior to discharge. Patient concerned regarding home supply of oxycodone, believing he likely only has 1-2 tablets at home. MD notified and states will order new script in the morning. Additionally, patient has follow up with his primary on Friday. Patient/family requested to keep PICC line due to high probability of future blood transfusions and left arm fistula. MD notified and stated via messaging that they would order home care to manage. PICC line management/flush teaching completed with patient and significant other.

## 2022-09-23 NOTE — PROGRESS NOTES
Hospitalization Follow-up Visit         HPI       Hospital Follow-up Visit:    Hospital:  [facility]   Date of Admission: 9/16/22  Date of Discharge: 9/23/22  Reason(s) for Admission: Hemoptysis, Anemia, Lung Empyema, Bleeding Liver Mass            Problems taking medications regularly:  None       Post Discharge Medication Reconciliation: discharge medications reconciled, continue medications without change.       Problems adhering to non-medication therapy:  None       Medications reviewed by: by myself. Findings include patient needs refill of oxycodone     Summary of hospitalization:  [employer] discharge summary reviewed  Diagnostic Tests/Treatments reviewed.  Follow up needed: Repeat CBC  Other Healthcare Providers Involved in Patient s Care:         Thoracic surgery, has an appointment on October 6th, GI on the 17th   Update since discharge: Improved, still coughing but more normal cough  Plan of care communicated with patient and family              [facility]  Discharge Summary - Medicine & Pediatrics       Date of Admission:  9/16/2022  Date of Discharge:  9/21/2022  Discharging Provider: Christy Virk MD  Discharge Service: Hospitalist Service        Discharge Diagnoses     The primary encounter diagnosis was Anemia in end-stage renal disease (H). A diagnosis of Hemoptysis was also pertinent to this visit.           Follow-ups Needed After Discharge     Follow-up Appointments     Follow-up and recommended labs and tests       Follow up with primary care provider, Jaswant Flores, on Friday 9/23 at   11:00 AM, for hospital follow- up. The following labs/tests are   recommended: CBC.                 Discharge Disposition      Discharged to home  Condition at discharge: Stable           Hospital Course     Elle Blanton was admitted on 9/16/2022 for hemoptysis.  He is well-known to me.  The following problems were addressed during his hospitalization:     Hemoptysis    Acute blood loss anemia  R hydropneumothorax with volume loss of R lung  Chronic trapped lung, empyema   HCC on imaging with Hx of ruptured hepatic mass  Chronic hep B cirrhosis  Patient has complex history of right lung empyema with relatively new pleurocutaneous fistula. Patient went home with ostomy bag attached to chest wall wound and keflex (MSSA). Patient presented from clinic with hemoptysis starting 9/14 and hemoglobin drop from 11 --> 8.6. Pulmonology saw him 9/16 and recommended against bronchoscopy or other intervention at this time. US Paracentesis 9/17 removed 1.5L of bloody ascites, indicating likely continued oozing from liver mass. IR embolized liver mass on 9/18.  He was given 4 total units of packed red blood cells this admission.      ESRD on HD   He dialyzes on a Monday Wednesday Friday schedule this hospitalization.     Goals of care  Marvel and his wife decided to make him DNR/DNI on last admission when he was in the ICU and very critically ill.  He remains DNR/DNI.  Hospice has been discussed and recommended to him by several specialists at this point.  Elle and Maico do not feel that they are at the point of starting hospice yet.  He is still very functional and able to do the activities that he wants to do at home.  Coming back to the hospital often does not bother them at this point.  They want to continue getting blood transfusions and dialysis in order to continue his current quality of life.  He did reiterate that if he has a heart attack or something needing CPR or intubation would not be within his goals of care.     More in-depth goals of care conversation was had on the day of discharge.  He and his wife have had a hard time accepting that there are no further procedures that can be done to stop the bleeding in his lung or his liver.  Appeared that this had maybe started to sink in today.  We recapped his recent hospitalizations, especially the last one in which he was in the ICU  and most of the doctors thought that he would not survive. Elle recalls blacking out during that hospitalization, likely due to his hypotension and severe anemia, and he states that was very comfortable and not scary to him.  He appears to be more accepting of the fact that he will likely not live longer than another couple of months.  He asked about physician aid in dying, and I informed him that this is not legal in the state of [place].  He can talk more with Dr. Flores about this if it is something he is serious about (e.g. which states allow it, what the process is - likely it is not a good fit for him as he would not survive long enough to move to a different state and start the process).  We discussed options for his care including continuing to do dialysis and blood transfusions as needed.  We also discussed the hospice program. Elle indicated that he would like to die in the hospital.  He and his wife have had a few family members die in the hospital and this was an okay experience for them, the they do not want him to die at home where they would feel relatively unsupported and nervous.  They are not ready to do hospice yet because they do not want to have him sit around in the hospital for weeks to months waiting for his death (if they stop dialysis and transfusions).  The solution we agreed upon was to discharge him home today with follow-up with Mark in 2 days.  They will continue dialysis and blood transfusions as needed.  They are aware that at any time they can decide to stop these treatments and we will get them enrolled with hospice which will help promote comfort and other symptom control.  If he declines significantly at home, likely his wife will bring him into the hospital.  I did add a advance care planning note to his chart that indicates per our discussion, if he were to come into the ED and be significantly ill with hypotension, altered mental status, massive bleeding etc. we would make  him comfort cares and allow him to pass comfortably in the hospital, surrounded by his family. Elle also repeatedly asked for his family to be able to stay with him overnight.  He had 1 night during this hospital stay where he did become hypotensive and he felt very uneasy being alone in his hospital room, as it sometimes takes the nurses a long time to respond to his calls.  He is worried about feeling ill and then pushing the nurse call button and then dying before someone has a chance to get to him.  During future hospitalizations, I do believe it would be appropriate for his wife to stay at the bedside even overnight due to his tenuous clinical status.             Review of Systems:   CONSTITUTIONAL: no fatigue, no unexpected change in weight  SKIN: no worrisome rashes, no worrisome moles, no worrisome lesions  EYES: no acute vision problems or changes  ENT: no ear problems, no mouth problems, no throat problems  RESP:Still with cough   CV: no chest pain, no palpitations, no new or worsening peripheral edema  GI: no nausea, no vomiting, no constipation, no diarrhea            Physical Exam:     Vitals:    09/23/22 1052   BP: 131/89   Pulse: 95   Resp: 18   Temp: 97.6  F (36.4  C)   TempSrc: Oral   SpO2: 98%   Weight: 55.3 kg (122 lb)     Body mass index is 20.3 kg/m .    GENERAL: healthy, alert, well nourished, well hydrated, no distress  HENT: ear canals- normal; Nose- normal; Mouth- no ulcers, no lesions  NECK: no tenderness, no adenopathy, no asymmetry, no masses, no stiffness; thyroid- normal to palpation  RESP: lungs clear to auscultation - no rales, no rhonchi, no wheezes, ostomy bag on chest wall without drainage  CV: regular rates and rhythm, normal S1 S2, no S3 or S4 and no murmur, no click or rub -  ABDOMEN: soft, no tenderness, no  hepatosplenomegaly, no masses, normal bowel sounds         Results:   Results from the last 24 hours   Results for orders placed or performed in visit on 09/23/22 (from the  past 24 hour(s))   Hemoglobin   Result Value Ref Range    Hemoglobin 16.8 13.3 - 17.7 g/dL       Assessment and Plan      Elle was seen today for refill request and blood draw.    Diagnoses and all orders for this visit:    Anemia due to blood loss, acute  -     Cancel: CBC with platelets; Future  -     CBC with platelets  -     Hemoglobin; Future  -     Hemoglobin      Patient has upcoming visits with GI and thoracic surgery. End of life was again discussed with patient and he is still wanting some restorative cares.     E&M code to be billed if TCM cannot be: 24767    Type of decision making: Moderate complexity (4740522)      Options for treatment and follow-up care were reviewed with the patient  Elle Blanton   engaged in the decision making process and verbalized understanding of the options discussed and agreed with the final plan.      Laura Hodges MD

## 2022-09-23 NOTE — TELEPHONE ENCOUNTER
[facility] phone call message - order or referral request for patient:     Order or referral being requested: homecare      Additional Comments: Silvia from [employer] called for delay start of care for nursing until 9/25/2022 per client request.     OK to leave a message on voice mail? Yes    Primary language: English      needed? No    Call taken on September 23, 2022 at 9:10 AM by eJf Golden

## 2022-09-23 NOTE — PROGRESS NOTES
6:23 PM    Notified of outpatient lab finding of plt count 42. Hgb today wnl at 16.8.    Previous plts 52.    Was seen in clinic today without complaints or findings of active bleeding. Overall feeling improved since recent hospitalization.      Vitally normal.    A/P:  Thrombocytopenia  Slight decrease in plt count, suspect underlying cirrhosis as etiology. With normal hgb and vitals, likely chronic thrombocytopenia.    Discussed with faculty, Dr. Solomon, plan for follow up as scheduled and repeat CBC next week.    6:58 PM  Again called about thrombocytopenia. Hgb on CBC draw is 11.9, still up from recent admission.    Notified that correct specimen was not sent and this is not a confirmatory result. Plan to have clinic lab follow up on Monday.    No change to plan as above.    Eli León MD

## 2022-09-23 NOTE — TELEPHONE ENCOUNTER
New order placed stating it is ok to start on 9/25. Thanks!    Jaswant Flores MD  September 23, 2022  11:53 AM

## 2022-09-23 NOTE — PROGRESS NOTES
[facility]    Background: Transitional Care Management program auto-identified and prompting a chart review by [facility] team.    Assessment: Upon chart review, [facility] Team member will cancel/close this episode of Transitional Care Management program due to reason below:    Patient has a follow up appointment with an appropriate provider today for hospital discharge. TCM encounter was already completed on 9/21/22.    Plan: Transitional Care Management episode closed per reason above.      MAEGAN Mcwilliams  [facility]    *Connected Care Resource Team does NOT follow patient ongoing. Referrals are identified based on internal discharge reports and the outreach is to ensure patient has an understanding of their discharge instructions.

## 2022-09-24 NOTE — PROGRESS NOTES
I have personally reviewed the history and examination as documented by Dr. Hodges.  I was present during key portions of the visit and agree with the assessment and plan as documented for 62 yr old male here for hospital follow-up. Hgb stable. Plan to see his thoracic surgeon and GI specialist for more information before he decides on goals of care. Precautions given. Anticipatory guidance given.     Jaswant Flores MD  September 24, 2022  6:57 PM

## 2022-09-26 NOTE — TELEPHONE ENCOUNTER
[facility] phone call message- general phone call:    Reason for call: Nereyda called wanting to let  know she was able to reach patient to do a follow unit(s)[ from his hospital stay on 9/16-9/21, Nereyda informs she will continue to follow up with patient for support and plan of care.    Return call needed: No    OK to leave a message on voice mail? Yes    Primary language: English      needed? No    Call taken on September 26, 2022 at 10:53 AM by Ana María Live

## 2022-09-27 NOTE — TELEPHONE ENCOUNTER
Patient called back stating someone told him he will get his results in 15 minutes and it has been hours. He is concerned about his hemoglobin and would like his results, I did inform him results are in process and waiting for it to come back and be viewed by provider first. Please advise once results are available. Thank you

## 2022-09-27 NOTE — TELEPHONE ENCOUNTER
[facility] phone call message- patient requesting results:    Test: Lab    Date of test: 9/27/2022     Additional Comments: Patient wife called wanting to know if results for hemoglobin is back as it's been a few hours, inform wife results has not yet been review and will need to wait for a doctor to go over once completed will call. No verbal auth on file to speak with wife and inform will call patient to give results, patient wife verbalized understanding,     OK to leave a message on voice mail? Yes    Primary language: English      needed? No    Call taken on September 27, 2022 at 2:20 PM by Ana María Live

## 2022-09-27 NOTE — TELEPHONE ENCOUNTER
Will need to put in an order for this request, do not have supply at clinic to provide for patient. Routing to PCP to order saline flushes for patient, can then route to colored team to fax/further assist. Romain MELENDEZ

## 2022-09-27 NOTE — TELEPHONE ENCOUNTER
Result not available yet, will route to colored team for FYI to inform once available and mail letter. Romain MELENDEZ

## 2022-09-27 NOTE — TELEPHONE ENCOUNTER
[facility] phone call message- general phone call:    Reason for call:     Caller advised went to home of patient and look like they do not have any supplies ex: saline to flush down the line. Would like to know if we are suppose to supply the items or if it is the patient who is suppose to buy the products themself. Advised the line is very dirty and look like they have not flush the line for a couple of days.     Please call back and advised. Thank you.     Return call needed: Yes    OK to leave a message on voice mail? Yes    Primary language: English      needed? No    Call taken on September 27, 2022 at 3:47 PM by Ana Segura

## 2022-09-27 NOTE — PROGRESS NOTES
"  Assessment & Plan     Hemoptysis  Notes about 1 month of hemoptysis, but hasn't had any since his hospital stay. Started up again over the past few days. No chest pain, no abdominal pain, overall feels to be his usual state of health. Vitally stable. Given medical complexity of this patient and poor prognosis, hospice has been discussed, however at this time pt would like to continue with regular hemoglobin checks and transfusions as needed, as well as dialysis. His desires are detailed well in hospital discharge summary dated 9/16/22.   - CBC with platelets; Future  - CBC with platelets  - If significant drop from baseline would recommend presentation to ED for evaluation of re-bleeding of liver mass. Pt and wife agreeable that they would like to avoid the hospital if possible.                  No follow-ups on file.    Steven Watkins DO [facility]    Precepted with Dr. Rosio Morales MD    Bobby Almeida is a 62 year old with history of ESRD on dialisys, chronic hepatitis B with cirrhosis, Hepatocellular carcinoma, chronic empyema, thoracic aortic dissection, chronic blood loss anemia accompanied by his spouse, presenting for the following health issues:  LAB REQUEST (HGB check per pt. ) and Medication Reconciliation (Attention , refill per pt)      HPI     Patient reports hemoptysis for the last month. Coughing fits are inconsistent with some episodes lasting 5 minutes and some episodes lasting the entire day. Denies past episodes of hemoptysis. Denies pain with cough, difficulty breathing. Patient reports that the amount of blood he coughs up has stayed consistent since the onset of symptoms. Would like his hemoglobin taken today.    Review of Systems         Objective    /73   Pulse 89   Resp 18   Ht 1.651 m (5' 5\")   Wt 54.9 kg (121 lb)   SpO2 96%   BMI 20.14 kg/m    Body mass index is 20.14 kg/m .  Physical Exam   GENERAL: chronically ill appearing, alert and no " distress  RESP: lungs clear to auscultation - no rales, rhonchi or wheezes  CV: regular rate and rhythm, normal S1 S2, no S3 or S4, no murmur, click or rub, no peripheral edema and peripheral pulses strong  ABDOMEN: soft, nontender, no hepatosplenomegaly, no masses and bowel sounds normal  MS: no gross musculoskeletal defects noted, no edema  SKIN: no suspicious lesions or rashes, jaundiced  PSYCH: mentation appears normal, affect normal/bright        ----- Service Performed and Documented by Resident or Fellow ------

## 2022-09-28 NOTE — TELEPHONE ENCOUNTER
DME (Durable Medical Equipment) Orders and Documentation  Orders Placed This Encounter   Procedures     Miscellaneous DME Order      The patient was assessed and it was determined the patient is in need of the following listed DME Supplies/Equipment. Please complete supporting documentation below to demonstrate medical necessity.

## 2022-09-28 NOTE — TELEPHONE ENCOUNTER
[facility] phone call message- general phone call:    Reason for call: Pt wife called in because pt hemo is low and is wondering what are the next steps to do.    Spouse has no authorize to speak on file.    Return call needed: Yes    OK to leave a message on voice mail? Yes    Primary language: English      needed? No    Call taken on September 28, 2022 at 1:01 PM by Alaina Blanton

## 2022-09-28 NOTE — TELEPHONE ENCOUNTER
The Home Care/Assisted Living/Nursing Facility is calling regarding an established patient.  Has the patient seen Home Care in the past or is currently residing in Assisted Living or Nursing Facility? Yes.     Sepideh calling from [facility] requesting the following orders that are within the Home Care, Assisted Living or Nursing Home Eval and Treatment standing order and can be signed as standing order signature required by RN.    Preferred Call Back Number:     Home Care Visits Continuation    Any additional Orders:  Are there any orders requested, not stated above, that are outside of the standing order and must be routed to a licensed practitioner for approval?    No    Writer has verified Requestor will send fax to have orders signed.  Romain MELENDEZ

## 2022-09-28 NOTE — TELEPHONE ENCOUNTER
Spoke to wife. Pt would like to come tomorrow for a stat hemoglobin to determine if he needs another transfusion.     Team - please contact Elle in am to schedule stat hemoglobin draw. Thank you!    Jaswant Flores MD  September 28, 2022  5:04 PM

## 2022-09-28 NOTE — TELEPHONE ENCOUNTER
Team - please see DME order for saline flushes and send to [facility] medical. Thank you!    Jaswant Flores MD  September 28, 2022  4:49 PM

## 2022-09-28 NOTE — TELEPHONE ENCOUNTER
[facility] phone call message - order or referral request for patient:     Order or referral being requested: Verbal orders for skilled nursing 2 weeks 1 and 1 week 3  3 PRN skilled nursing visit      Additional Comments: Sepideh also states they have some insurance questions and will also be doing medical social eval.     OK to leave a message on voice mail? Yes    Primary language: English      needed? No    Call taken on September 28, 2022 at 2:39 PM by Ana María Live

## 2022-09-29 NOTE — TELEPHONE ENCOUNTER
[facility] phone call message - order or referral request for patient:     Order or referral being requested: US paracentesis       Additional Comments: Spoke to wife and patient while they were in clinic. Patient would like to do another US. Also states that he wants to get a second opinion down at [place] not at the [facility]. Please call and advise.     OK to leave a message on voice mail? Yes    Primary language: English      needed? No    Call taken on September 29, 2022 at 8:37 AM by Moraima Ahmadi

## 2022-09-30 NOTE — PROGRESS NOTES
Assessment & Plan     Anemia due to blood loss, acute  Patient well-known to me with ongoing hemoptysis and likely hemoperitoneum due to bleeding liver mass.  Hemoglobin has been drifting downward, was 7.8 yesterday and now is 7.1 in clinic today.  Discussed options with patient and wife as they are wanting a second opinion from [facility] to make sure there is nothing else to do for his many chronic medical conditions.  An urgent referral is in place and we should hear back about this on October 3 or 4.  However, his hemoglobin has been drifting down and he is in need of a red blood cell transfusion as well as a paracentesis to relieve the discomfort in his abdomen.  He has historically been to hemodynamically unstable to have these performed as an outpatient at [facility].  Discussed options and he would like to go to [facility] to have these done and be admitted likely overnight for vital sign monitoring.  His wife hopes to drive down to the [facility] on Sunday night and be admitted there so that when they finished reviewing his case on Monday, they are already down at [place] and can be seen as soon as possible.  She feels that they are running out of time to find a miracle cure for him.    I called [facility] emergency department and gave handoff to Dr. Duron.  I also discussed with Dr. Gonzalez who is the senior resident on call tonight and will assist with admitting him to the [facility] service.    Christy Virk MD PGY-2  [facility]    Precepted with: Dr. Flores over the phone      Bobby Almeida is a 62 year old accompanied by his spouse, presenting for the following health issues:  RECHECK (HGB)    C coming in for follow-up on hemoglobin.  Had lab only visit yesterday with hemoglobin of 7.8.  Patient is well-known to me.  Discussed case with PCP, Dr. Flores, yesterday.  Was informed that he is wanting a second opinion on his lung and liver issues at [facility].  I  "discussed the status of this by his PCP and with Moraima who helps us with referrals.  Urgent referral was sent and [facility] will likely get back to us on Monday about his case.    Elle  is feeling about the same today, he is carrying a small plastic bag for his tissues in which she coughs up blood.  He is hoping to have a paracentesis as his abdomen is feeling more full as well.  He states he just wants to make sure there is nothing that anyone else can do to help him. If [facility] is not able to help him, he states he will come home and go on hospice care.      Objective    /76   Pulse 102   Temp 98.4  F (36.9  C)   Resp 22   Ht 1.651 m (5' 5\")   Wt 54.9 kg (121 lb)   SpO2 95%   BMI 20.14 kg/m    Body mass index is 20.14 kg/m .  GEN: Patient sitting comfortably in no acute distress.  HEEN: Head is atraumatic, normocephalic, mucous membranes moist.  CV: Extremities well perfused.  PULM: Breathing comfortably on room air.  Intermittent hemoptysis.  ABD: Appears mildly distended.  NEURO: Alert and oriented x3.  No focal motor abnormalities.  Face symmetric.  PSYCH: Appropriate affect.    "

## 2022-09-30 NOTE — ED NOTES
Expected Patient Referral to ED  1:21 PM    Referring Clinic/Provider:  [facility]    Reason for referral/Clinical facts:  Low hemoglobin, hematemesis    Recommendations provided:  See and treat    Discussed that if direct admit is sought and any hurdles are encountered, this ED would be happy to see the patient and evaluate.    Informed caller that recommendations provided are recommendations based only on the facts provided and that they responsible to accept or reject the advice, or to seek a formal in person consultation as needed and that this ED will see/treat patient should they arrive.      Glen Duron MD  [facility] EMERGENCY DEPARTMENT  [address] [place] [id number]  [phone]       Glen Duron MD  12/01/22 0611

## 2022-09-30 NOTE — TELEPHONE ENCOUNTER
DIAGNOSIS: Liver mass, right lobe    Appt Date: 10.16.2022    NOTES STATUS DETAILS   OFFICE NOTE from referring provider Internal 09.09.2022 Jaswant Flores MD   OFFICE NOTES from other specialists     DISCHARGE SUMMARY from hospital     MEDICATION LIST Internal    LIVER BIOSPY (IF APPLICABLE)      PATHOLOGY REPORTS      IMAGING     ENDOSCOPY (IF AVAILABLE)     COLONOSCOPY (IF AVAILABLE)     ULTRASOUND LIVER Internal 09.17.2022, 08.31.2022, 04.29.2022 US PARACENTESIS   CT OF ABDOMEN     MRI OF LIVER     FIBROSCAN, US ELASTOGRAPHY, FIBROSIS SCAN, MR ELASTOGRAPHY     LABS     HEPATIC PANEL (LIVER PANEL) Internal 01.08.2022   BASIC METABOLIC PANEL Internal 09.20.2022   COMPLETE METABOLIC PANEL Internal 09.30.2022   COMPLETE BLOOD COUNT (CBC) Internal 09.30.2022   INTERNATIONAL NORMALIZED RATIO (INR) Internal 09.16.2022   HEPATITIS C ANTIBODY     HEPATITIS C VIRAL LOAD/PCR     HEPATITIS C GENOTYPE     HEPATITIS B SURFACE ANTIGEN Internal 10.15.2021   HEPATITIS B SURFACE ANTIBODY Internal 04.19.2021   HEPATITIS B DNA QUANT LEVEL     HEPATITIS B CORE ANTIBODY In[facility] 04.19.2021

## 2022-10-01 NOTE — CONSULTS
"Care Management Initial Consult    General Information  Assessment completed with: Patient, Elle  Type of CM/SW Visit: Initial Assessment    Primary Care Provider verified and updated as needed: Yes   Readmission within the last 30 days: previous discharge plan unsuccessful; Progression of disease     Reason for Consult: discharge planning  Advance Care Planning: Advance Care Planning Reviewed: present on chart          Communication Assessment  Patient's communication style: spoken language (English or Bilingual) (\"Hmong and English\")             Cognitive  Cognitive/Neuro/Behavioral:                        Living Environment:   People in home: spouse, child(eulalio), adult     Current living Arrangements: house      Able to return to prior arrangements: yes       Family/Social Support:  Care provided by: self, spouse/significant other, child(eulalio) (\"I mostly care for myself. I don't have a PCA. My family can help me\")  Provides care for: no one, unable/limited ability to care for self  Marital Status:   Wife, Children  Samena       Description of Support System: Involved, Supportive    Support Assessment: Adequate family and caregiver support, Adequate social supports, Patient communicates needs well met    Current Resources:   Patient receiving home care services: No     Community Resources: OP Dialysis ([facility] every MWF)  Equipment currently used at home: walker, standard, cane, straight, shower chair, grab bar, tub/shower  Supplies currently used at home: None    Employment/Financial:  Employment Status:          Financial Concerns:     Referral to Financial Worker: No       Lifestyle & Psychosocial Needs:  Social Determinants of Health     Tobacco Use: Low Risk      Smoking Tobacco Use: Never Smoker     Smokeless Tobacco Use: Never Used   Alcohol Use: Not on file   Financial Resource Strain: Not on file   Food Insecurity: Not on file   Transportation Needs: Not on file   Physical Activity: " "Not on file   Stress: Not on file   Social Connections: Not on file   Intimate Partner Violence: Not on file   Depression: Not at risk     PHQ-2 Score: 0   Housing Stability: Not on file       Functional Status:  Prior to admission patient needed assistance:   Dependent ADLs:: Ambulation-cane, Ambulation-walker, Independent  Dependent IADLs:: Cleaning, Cooking, Laundry, Shopping, Meal Preparation (\"family helps\")  Assesssment of Functional Status: At functional baseline    Mental Health Status:          Chemical Dependency Status:                Values/Beliefs:  Spiritual, Cultural Beliefs, Catholic Practices, Values that affect care:                 Additional Information:  Elle lives in a house with his wife and adult kids. He is independent with ADLs at baseline, but family can help PRN. He does not have PCA services.    He uses a walker or cane for mobility.    He and his wife want to go to [facility] tomorrow or Monday for a second opinion. Their primary doctor send the information to [facility] on 9/30/22 for a consult. They plan to get the [facility] second opinion and if they cannot help then he and his wife want him to go on hospice. They spoke about hospice his last hospitalization where he was critically ill. I think more education will need to be done on hospice services and eligibility. He \"wants to die in the hospital and not at home. He wants to continue to get blood transfusions. He is also on dialysis.\"    [facility] on Mon, Wed, Fri.    Likely no discharge needs at this time. And the current plan is for discharge today to home.    Family to transport at discharge.    Dayanna Stover RN     Care Management Discharge Note    Discharge Date: 10/01/2022       Discharge Disposition:  home    Discharge Services:  Home with plan to follow up with a second opinion at [facility].    Discharge DME:  Nothing new    Discharge Transportation:  Family transporting    Private pay costs discussed: Not " applicable    PAS Confirmation Code:    Patient/family educated on Medicare website which has current facility and service quality ratings:      Education Provided on the Discharge Plan:    Persons Notified of Discharge Plans:Elle  Patient/Family in Agreement with the Plan:      Handoff Referral Completed: No    Additional Information:  See notes above.    Dayanna Stover RN

## 2022-10-01 NOTE — ED NOTES
Pt refused medication. Pt denies pain, denies N/V, dizzyness, no discomfort at this time. Pt using cane when ambulating, family picking up pt.

## 2022-10-01 NOTE — PHARMACY-ADMISSION MEDICATION HISTORY
"Pharmacy Note - Admission Medication History    Pertinent Provider Information:   -Patient is only taking blood pressure medications if BP is not \"normal\" but did not give me a range      ______________________________________________________________________    Prior To Admission (PTA) med list completed and updated in EMR.       PTA Med List   Medication Sig Note Last Dose     acetaminophen (TYLENOL) 500 MG tablet Take 500 mg by mouth every 6 hours as needed for mild pain  Past Month at prn     benzonatate (TESSALON) 100 MG capsule Take 1 capsule (100 mg) by mouth 3 times daily as needed for cough  Past Month at prn     calcium acetate (PHOSLO) 667 MG CAPS capsule Take 1 capsule by mouth 2 times daily (with meals)  9/30/2022 at am     carvedilol (COREG) 12.5 MG tablet Take 1 tablet (12.5 mg) by mouth 2 times daily (with meals) (Patient taking differently: Take 12.5 mg by mouth 2 times daily as needed) 9/30/2022: States he only takes if BP is not normal but did not know the range of BP  Past Month at prn     cephALEXin (KEFLEX) 250 MG capsule Take 1 capsule (250 mg) by mouth 2 times daily  9/30/2022 at am     diltiazem ER (DILT-XR) 180 MG 24 hr capsule Take 1 capsule (180 mg) by mouth daily (Patient taking differently: Take 180 mg by mouth daily as needed) 9/30/2022: States he only takes if BP is not normal but did not know what his cut off is  Past Month at prn     entecavir (BARACLUDE) 0.5 MG tablet Take 1 tablet (0.5 mg) by mouth every 7 days (Patient taking differently: Take 0.5 mg by mouth every 7 days Sunday)  9/25/2022 at Unknown time     guaiFENesin-codeine (GUAIFENESIN AC) 100-10 MG/5ML syrup Take 5 mLs by mouth every 4 hours as needed for cough  Past Month at prn     hydrOXYzine (ATARAX) 25 MG tablet Take 1 tablet (25 mg) by mouth 3 times daily as needed for itching  Past Month at prn     lactulose (CHRONULAC) 10 GM/15ML solution Take 30 mLs (20 g) by mouth 3 times daily (Patient taking differently: Take " 20 g by mouth Takes 2 to 3 times daily, holds for loose stool)  Past Week at prn     oxyCODONE (ROXICODONE) 5 MG tablet Take 1 tablet (5 mg) by mouth 2 times daily as needed for severe pain (7-10)  Past Week at prn     pantoprazole (PROTONIX) 40 MG EC tablet Take 1 tablet (40 mg) by mouth daily  Unknown at Unknown time     senna-docusate (SENOKOT-S/PERICOLACE) 8.6-50 MG tablet Take 1 tablet by mouth daily as needed for constipation  Unknown at prn     zolpidem (AMBIEN) 5 MG tablet Take 1 tablet (5 mg) by mouth nightly as needed for sleep  Past Week at prn       Information source(s): Patient, Family member, Hospital records and [facility]  Method of interview communication: in-person    Summary of Changes to PTA Med List  New: -  Discontinued: -  Changed: diltiazem, carvedilol, lactulose     Patient was asked about OTC/herbal products specifically.  PTA med list reflects this.    In the past week, patient estimated taking medication this percent of the time:  50-90% due to adverse effect.    Allergies were reviewed, assessed, and updated with the patient.      Patient does not use any multi-dose medications prior to admission.    The information provided in this note is only as accurate as the sources available at the time of the update(s).    Thank you for the opportunity to participate in the care of this patient.    ELOINA THORNTON RPH  9/30/2022 9:46 PM

## 2022-10-01 NOTE — ED PROVIDER NOTES
EMERGENCY DEPARTMENT ENCOUNTER     NAME: Elle Blanton   AGE: 62 year old male   YOB: 1960   MRN: [id number]   EVALUATION DATE & TIME: 9/30/2022  8:02 PM   PCP: Jaswant Flores     Chief Complaint   Patient presents with     Anemia   :    FINAL IMPRESSION       1. Anemia due to blood loss, acute    2. Hematemesis, presence of nausea not specified           ED COURSE & MEDICAL DECISION MAKING      8:30 PM I met with patient for initial interview and encounter. PPE worn includes surgical mask.     Pertinent Labs & Imaging studies reviewed. (See chart for details)   62 year old male  presents to the Emergency Department for evaluation of ongoing hematemesis, with a known GI mass.  Patient was seen in clinic today and he has had a hemoglobin that is been trending down from the high sevens down to 7.1 earlier today.  He is actually establishing down at [facility] on Monday for this, but he was sent into the ED to get transfused and stay overnight for vital sign monitoring. Initial Vitals Reviewed. Initial exam notable for chronically ill-appearing male who does have abdominal distention with a fluid wave but is afebrile with no tenderness.  He did have some visible hematemesis during his stay here but nothing massive.  His hemoglobin has now dropped to 6.9 when earlier today it was 7.1.  This is not a quick drop, but clearly he does continue to trend down.  I have started 2 units of packed red blood cells, and I discussed the case with [place] clinic resident for admission.  I believe the plan is also for paracentesis, but as this is not emergent tonight I will defer this to inpatient team or likely be done tomorrow.           At the conclusion of the encounter I discussed the results of all of the tests and the disposition. The questions were answered. The patient or family acknowledged understanding and was agreeable with the care plan.     33 minutes critical care time, see procedure note below for details if  relevant    MEDICATIONS GIVEN IN THE EMERGENCY:   Medications - No data to display   NEW PRESCRIPTIONS STARTED AT TODAY'S ER VISIT   New Prescriptions    No medications on file     ================================================================   HISTORY OF PRESENT ILLNESS       Patient information was obtained from: Patient   Use of Intrepreter: N/A   Elle Blanton is a 62 year old male with history of ESRD, CKD stage 4, NAHUM, PONV, gastrointestinal bleed, and hypertension who presents to ED via walk in for anemia.     Chart Review: Patient was seen a the [facility] [place] on 9/30/2022 with ongoing hemoptysis and likely hemoperitoneum due to bleeding liver mass. Hemoglobin was 7.8 yesterday and 7.1 in clinic. Patient and wife [facility] make sure there is nothing else to do for his many chronic medical conditions. An urgent referral is in place and we should hear back about this on October 3 or 4. Patients hemoglobin decreasing and bleeds a red blood cell transfusion as well as a paracentesis to relieve the discomfort in his abdomen. Patient was referred to [facility] ED for red blood cell transfusion and hopefully be monitor overnight for further evaluation.     Patient presents with anemia and blood count going down since earlier today. Patient denies abdomen pain but has abdominal tightness. Patient reports spitting out blood due to bleeding liver mass. Patient denies any other complaints or concerns.     ================================================================    REVIEW OF SYSTEMS       Review of Systems   Constitutional: Negative for fever.   HENT: Negative for congestion.    Eyes: Negative for redness.   Respiratory: Negative for shortness of breath.    Cardiovascular: Negative for chest pain.   Gastrointestinal: Positive for abdominal distention and vomiting. Negative for abdominal pain.   Genitourinary: Negative for difficulty urinating.    Musculoskeletal: Negative for arthralgias and neck stiffness.   Skin: Negative for color change.   Neurological: Negative for headaches.   Psychiatric/Behavioral: Negative for confusion.   All other systems reviewed and are negative.      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 pylori infection 7/5/2017    UGI bleed implied by Hgb 9.0 6/22/17 and melena. Admitted and hgb decreased to 7.6, CT abdomen showed all bladder wall thickening. + Hep B surface antigen noted. Melena resolved and hgb stabalized without transfusion, epigastric pain resolved with PPI. Recommend referral for gastroscopy.     Heart attack (H)      Hepatitis B      Normocytic anemia      Other pancytopenia (H) 7/5/2017 6/24/17 Peripheral smear at [facility].Pancytopenia of uncertain cause. Ruled out acute leukemia. Hematologist suggests hemolytic anemia should be considered possible cause and LDH and haptoglobin should be assessed in future.      PONV (postoperative nausea and vomiting)      Thrombocytopenia (H)       PAST SURGICAL HISTORY:   Past Surgical History:   Procedure Laterality Date     ABCESS DRAINAGE      finger     BURSECTOMY ELBOW Left 10/15/2021    Procedure: LEFT OLECRANON BURSECTOMY;  Surgeon: Tico Myers MD;  Location: [facility] OR     BURSECTOMY ELBOW Left 1/18/2022    Procedure: LEFT ELBOW OLECRANON BURSECTOMY;  Surgeon: Tico Myers MD;  Location: [facility] OR     CREATE FISTULA ARTERIOVENOUS UPPER EXTREMITY Left 4/20/2021    Procedure: left  arm dialysis graft placement;  Surgeon: Roxana Cosby MD;  Location: [facility] Main OR;  Service: General     FOREIGN BODY REMOVAL      finger     INCISION AND DRAINAGE FINGER, COMBINED Left 10/20/2016    Procedure: COMBINED INCISION AND DRAINAGE FINGER;  Surgeon: Mireya Pizano MD;  Location: [place] OR     INCISION AND DRAINAGE UPPER EXTREMITY, COMBINED Left 1/18/2022    Procedure: AND ELBOW INCISION AND DRAINAGE;  Surgeon: Tico Myers MD;  Location: [facility] Main OR     INSERT PICC LINE Right 8/25/2022    Procedure: INSERTION, PICC;  Surgeon: Osmin Villa MD;  Location: [facility] OR     IR CHEST TUBE PLACEMENT NON-TUNNELED RIGHT  4/19/2021     IR CHEST TUBE PLACEMENT NON-TUNNELED RIGHT  10/19/2021     IR CHEST TUBE PLACEMENT NON-TUNNELED RIGHT  11/10/2021     IR CHEST TUBE PLACEMENT NON-TUNNELED RIGHT  3/31/2022     IR DIALYSIS FISTULOGRAM LEFT  4/2/2022     IR DIALYSIS FISTULOGRAM LEFT  4/4/2022     IR PICC PLACEMENT > 5 YRS OF AGE  8/25/2022     IR PICC PLACEMENT > 5 YRS OF AGE  9/18/2022     IR PLEURAL DRAINAGE WITH CATHETER INSERTION  4/19/2021     IR VISCERAL ANGIOGRAM  8/26/2022     IR VISCERAL EMBOLIZATION  9/18/2022     MIDLINE INSERTION - DOUBLE LUMEN  4/23/2021          LA ESOPHAGOGASTRODUODENOSCOPY TRANSORAL DIAGNOSTIC N/A 8/18/2020    Procedure: ESOPHAGOGASTRODUODENOSCOPY (EGD) with biopsy;  Surgeon: Nilson Gonzales MD;  Location: [facility] GI;  Service: Gastroenterology     US PARACENTESIS  8/14/2020     US PARACENTESIS  8/19/2020     US THORACENTESIS  4/18/2021      CURRENT MEDICATIONS:   acetaminophen (TYLENOL) 500 MG tablet  benzonatate (TESSALON) 100 MG capsule  calcium acetate (PHOSLO) 667 MG CAPS capsule  carvedilol (COREG) 12.5 MG tablet  cephALEXin (KEFLEX) 250 MG capsule  diltiazem ER (DILT-XR) 180 MG 24 hr capsule  entecavir (BARACLUDE) 0.5 MG tablet  guaiFENesin-codeine (GUAIFENESIN AC) 100-10 MG/5ML syrup  hydrOXYzine (ATARAX) 25 MG tablet  lactulose (CHRONULAC) 10 GM/15ML  "solution  order for DME  oxyCODONE (ROXICODONE) 5 MG tablet  pantoprazole (PROTONIX) 40 MG EC tablet  senna-docusate (SENOKOT-S/PERICOLACE) 8.6-50 MG tablet  sodium chloride, PF, (SODIUM CHLORIDE FLUSH) 0.9% PF flush  zolpidem (AMBIEN) 5 MG tablet      ALLERGIES:   Allergies   Allergen Reactions     Nka [No Known Allergies]       FAMILY HISTORY:   Family History   Problem Relation Age of Onset     Ulcers Father      Hypertension No family hx of      Asthma No family hx of      Cancer No family hx of      Coronary Artery Disease No family hx of      Liver Cancer No family hx of       SOCIAL HISTORY:   Social History     Socioeconomic History     Marital status:    Tobacco Use     Smoking status: Never Smoker     Smokeless tobacco: Never Used   Vaping Use     Vaping Use: Never used   Substance and Sexual Activity     Alcohol use: No     Drug use: No     Sexual activity: Yes     Partners: Female        VITALS  Patient Vitals for the past 24 hrs:   BP Temp Temp src Pulse Resp SpO2 Height Weight   09/30/22 2015 122/74 -- -- 92 17 97 % -- --   09/30/22 1901 117/77 97.9  F (36.6  C) Temporal 104 16 96 % 1.651 m (5' 5\") 55.3 kg (122 lb)        ================================================================    PHYSICAL EXAM     VITAL SIGNS: /74   Pulse 92   Temp 97.9  F (36.6  C) (Temporal)   Resp 17   Ht 1.651 m (5' 5\")   Wt 55.3 kg (122 lb)   SpO2 97%   BMI 20.30 kg/m     Constitutional:  Awake, no acute distress   HENT:  Atraumatic, oropharynx without exudate or erythema, membranes moist. Bright red blood hematemesis.   Lymph:  No adenopathy  Eyes: EOM intact, PERRL, no injection  Neck: Supple  Respiratory:  Clear to auscultation bilaterally, no wheezes or crackles   Cardiovascular:  Regular rate and rhythm, single S1 and S2   GI: No rebound or guarding. Abdominal distended flu wave. No tenderness.   Musculoskeletal:  Moves all extremities, no lower extremity edema, no deformities    Skin:  Warm, " dry  Neurologic:  Alert and oriented x3, no focal deficits noted       ================================================================  LAB       All pertinent labs reviewed and interpreted.   Labs Ordered and Resulted from Time of ED Arrival to Time of ED Departure   INR - Abnormal       Result Value    INR 1.18 (*)    BASIC METABOLIC PANEL - Abnormal    Sodium 138      Potassium 4.0      Chloride 97 (*)     Carbon Dioxide (CO2) 31 (*)     Anion Gap 10      Urea Nitrogen 32.4 (*)     Creatinine 5.52 (*)     Calcium 7.4 (*)     Glucose 138 (*)     GFR Estimate 11 (*)    CBC WITH PLATELETS AND DIFFERENTIAL - Abnormal    WBC Count 4.3      RBC Count 2.22 (*)     Hemoglobin 6.9 (*)     Hematocrit 22.2 (*)           MCH 31.1      MCHC 31.1 (*)     RDW 20.5 (*)     Platelet Count 64 (*)     % Neutrophils 78      % Lymphocytes 13      % Monocytes 9      % Eosinophils 0      % Basophils 0      % Immature Granulocytes 0      NRBCs per 100 WBC 0      Absolute Neutrophils 3.3      Absolute Lymphocytes 0.6 (*)     Absolute Monocytes 0.4      Absolute Eosinophils 0.0      Absolute Basophils 0.0      Absolute Immature Granulocytes 0.0      Absolute NRBCs 0.0     PARTIAL THROMBOPLASTIN TIME - Normal    aPTT 38     COVID-19 VIRUS (CORONAVIRUS) BY PCR   TYPE AND SCREEN, ADULT    ABO/RH(D) A POS      Antibody Screen Negative      SPECIMEN EXPIRATION DATE [id number]     PREPARE RED BLOOD CELLS (UNIT)    Blood Component Type Red Blood Cells      Product Code W7133X71      Unit Status Transfused      Unit Number [id number]      CROSSMATCH Compatible      CODING SYSTEM [id number]      ISSUE DATE AND TIME [id number]      UNIT ABO/RH A+      UNIT TYPE ISBT 6200     PREPARE RED BLOOD CELLS (UNIT)    Blood Component Type Red Blood Cells      Product Code U6800C28      Unit Status Ready for issue      Unit Number [id number]      CROSSMATCH Compatible      CODING SYSTEM [id number]     PREPARE RED BLOOD CELLS (UNIT)   TRANSFUSE  RED BLOOD CELLS (UNIT)   TRANSFUSE RED BLOOD CELLS (UNIT)   ABO/RH TYPE AND SCREEN        ===============================================================  RADIOLOGY       Reviewed all pertinent imaging. Please see official radiology report.   No orders to display         ================================================================  EKG         I have independently reviewed and interpreted the EKG(s) documented above.     ================================================================  PROCEDURES     Critical Care  Performed by: Lynn Maria MD  Authorized by: Lynn Maria MD  Total critical care time: 33 minutes  Critical care time was exclusive of separately billable procedures and treating other patients.  Critical care was necessary to treat or prevent imminent or life-threatening deterioration of the following conditions: acute blood loss anemia  Critical care was time spent personally by me on the following activities: development of treatment plan with patient or surrogate, discussions with consultants, examination of patient, evaluation of patient's response to treatment, obtaining history from patient or surrogate, ordering and performing treatments and interventions, ordering and review of laboratory studies, ordering and review of radiographic studies and re-evaluation of patient's condition, this excludes any separately billable procedures.          I, Olga Grier, am serving as a scribe to document services personally performed by Dr. Maria based on my observation and the provider's statements to me. I, Lynn Maria MD attest that Olga Grier is acting in a scribe capacity, has observed my performance of the services and has documented them in accordance with my direction.     Lynn Maria M.D.   Emergency Medicine   [facility] EMERGENCY DEPARTMENT  [address] [place] [id number]  [phone]  Dept: [phone]         Candace  Lynn Murry MD  09/30/22 7923

## 2022-10-01 NOTE — ED TRIAGE NOTES
The pt was sent from the clinic for a low hgb of 7.2. The pt states he has been coughing up blood x 2 months. The pt has a PICC Right upper arm.

## 2022-10-01 NOTE — DISCHARGE SUMMARY
[facility]  Discharge Summary - Medicine & Pediatrics       Date of Admission:  9/30/2022  Date of Discharge:  10/1/2022  Discharging Provider: Steven Watkins DO/Naila Camejo MD  Discharge Service: Hospitalist Service    Discharge Diagnoses   1. Anemia due to blood loss, acute    2. Hematemesis, presence of nausea not specified    3. Empyema lung (H)          Follow-ups Needed After Discharge   Follow-up Appointments     Follow-up and recommended labs and tests       Follow up with primary care provider, Jaswant Flores, within 7 days for   hospital follow- up.  The following labs/tests are recommended: Hgb.    Keep appointment with [place] scheduled for 10/3/22          Ensure follow up with [place]    Unresulted Labs Ordered in the Past 30 Days of this Admission     No orders found for last 31 day(s).      These results will be followed up by n/a    Discharge Disposition   Discharged to home  Condition at discharge: Stable    Hospital Course   Elle Blanton was admitted on 09/30/22 for hemoptysis.  He is well-known to our service for frequent drops in hemoglobin. In short:      Hemoptysis   Acute blood loss anemia  R hydropneumothorax with volume loss of R lung  Chronic trapped lung, empyema   HCC on imaging with Hx of ruptured hepatic mass  Chronic hep B cirrhosis  Patient is well known to our service and in clinic. 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 Has ostomy bag attached to chest wall wound. Pulmonology saw him 9/16 and recommended against bronchoscopy or other intervention at this time. US Paracentesis 9/17 removed 1.5L of bloody ascites, indicating likely continued oozing from liver mass. IR embolized liver mass on 9/18.  At last admission, he received 4 units of PRBCs for continually dropping hemoglobin, likely multifactorial in nature given the complex nature of his condition.      He was seen in clinic 9/30/22 with another decrease in  hemoglobin from check the day prior. Decreased further by time patient presented to ED, down to 6.9. Received transfusion in ED. he was monitored overnight and hemoglobin remained stable at 9.1.patient received ultrasound paracentesis as well due to feeling tightness in abdomen.  Patient tolerated well, and was discharged home.  Patient has appointment in [place] for second opinion regarding prognosis of his HCC.  Patient has had many discussions regarding hospice and his poor prognosis.  At this time patient would like to continue with frequent hemoglobin checks and blood transfusions as needed as well as paracenteses for symptomatic relief.  Discussion was had a last hospitalization regarding goals for end-of-life, continuation of these discussions will be essential.              -Check hemoglobin as outpatient              - Continue on PTA Keflex course for wound around ostomy bag               - Patient is hoping to get second opinion from [place] on Monday      ESRD on HD   He dialyzes on a Monday Wednesday Friday schedule this hospitalization.              - Nephrology consult to resume dialysis on inpatient basis if needed       Goals of care  See prior notes for details. Patient is somewhat resistant to goals of care discussions.   Patient is hoping to see [place] for second opinion, appointment scheduled for Monday 10/3/22.      Hypertension, Chronic   BP slightly elevated on admission, with rise up to 190/120 while waiting in ED. Had not received PTA meds at that time.  Blood pressure improved to 130s over 80s on day of discharge with PTA Coreg and PTA diltiazem.           Consultations This Hospital Stay   NEPHROLOGY IP CONSULT  CARE MANAGEMENT / SOCIAL WORK IP CONSULT    Code Status   Prior       The patient was discussed with MD Steven Pacheco, DO [place] service [facility] EMERGENCY DEPARTMENT  [facility] [facility] [address]  Phone:  [phone]  ______________________________________________________________________    Physical Exam   Vital Signs: Temp: 97.8  F (36.6  C) Temp src: Oral BP: 131/87 Pulse: 90   Resp: 18 SpO2: 97 % O2 Device: None (Room air)    Weight: 122 lbs 0 oz  Constitutional:  Awake, no acute distress, chronically ill-appearing  HENT:  Atraumatic, oropharynx without exudate or erythema, membranes moist. Bright red blood hematemesis.   Lymph:  No adenopathy  Eyes: EOM intact, PERRL, no injection  Neck: Supple  Respiratory:  Clear to auscultation bilaterally, no wheezes or crackles   Cardiovascular:  Regular rate and rhythm, single S1 and S2   GI: No rebound or guarding. Abdominal distended, obvious fluid. No tenderness.   Musculoskeletal:  Moves all extremities, no lower extremity edema, no deformities    Skin:  Warm, dry, mildly jaundiced  Neurologic:  Alert and oriented x3, no focal deficits noted       Primary Care Physician   Jaswant Flores    Discharge Orders      Reason for your hospital stay    Acute blood loss anemia, secondary to HCC.     Activity    Your activity upon discharge: activity as tolerated     Follow-up and recommended labs and tests     Follow up with primary care provider, Jaswant Flores, within 7 days for hospital follow- up.  The following labs/tests are recommended: Hgb.    Keep appointment with [place] scheduled for 10/3/22     Diet    Follow this diet upon discharge: Regular diet       Significant Results and Procedures   Most Recent 3 CBC's:Recent Labs   Lab Test 10/01/22  0637 10/01/22  0208 09/30/22 1935 09/30/22  1132   WBC 4.2  --  4.3 4.5   HGB 9.1* 9.2* 6.9* 7.1*   MCV 95  --  100 100   PLT 52*  --  64* 61*     Most Recent 3 BMP's:Recent Labs   Lab Test 10/01/22  0637 09/30/22  1935 09/20/22  0609    138 136   POTASSIUM 4.3 4.0 4.0   CHLORIDE 99 97* 101   CO2 29 31* 26   BUN 40.7* 32.4* 33*   CR 5.97* 5.52* 6.75*   ANIONGAP 8 10 9   TANO 7.2* 7.4* 7.1*   GLC 80 138* 92   ,   Results for orders  placed or performed during the hospital encounter of 09/30/22   US Paracentesis without Albumin    Narrative    EXAM:  1. PARACENTESIS  2. ULTRASOUND GUIDANCE  LOCATION: [facility]  DATE/TIME: 10/1/2022 10:59 AM    INDICATION: Ascites.    PROCEDURE: Informed consent obtained. Time out performed. The abdomen was prepped and draped in a sterile fashion. 10 mL of 1% lidocaine was infused into local soft tissues. A 5 French catheter system was introduced into the abdominal ascites under   ultrasound guidance.    1.85 liters of dark red ascitic fluid were removed and sent to lab if requested.    Patient tolerated procedure well.    Ultrasound imaging was obtained and placed in the patient's permanent medical record.      Impression    IMPRESSION:  1.  Status post ultrasound-guided paracentesis.    Reference CPT Code: 61239       Discharge Medications   Discharge Medication List as of 10/1/2022 12:49 PM      CONTINUE these medications which have NOT CHANGED    Details   acetaminophen (TYLENOL) 500 MG tablet Take 500 mg by mouth every 6 hours as needed for mild pain, Historical      benzonatate (TESSALON) 100 MG capsule Take 1 capsule (100 mg) by mouth 3 times daily as needed for cough, Disp-30 capsule, R-0, E-Prescribe      calcium acetate (PHOSLO) 667 MG CAPS capsule Take 1 capsule by mouth 2 times daily (with meals), Historical      carvedilol (COREG) 12.5 MG tablet Take 1 tablet (12.5 mg) by mouth 2 times daily (with meals), Disp-180 tablet, R-3, E-Prescribe      cephALEXin (KEFLEX) 250 MG capsule Take 1 capsule (250 mg) by mouth 2 times daily, Disp-30 capsule, R-1, E-Prescribe      diltiazem ER (DILT-XR) 180 MG 24 hr capsule Take 1 capsule (180 mg) by mouth daily, Disp-90 capsule, R-3, E-Prescribe      entecavir (BARACLUDE) 0.5 MG tablet Take 1 tablet (0.5 mg) by mouth every 7 days, Disp-52 tablet, R-1, E-Prescribe      guaiFENesin-codeine (GUAIFENESIN AC) 100-10 MG/5ML syrup Take 5 mLs by  mouth every 4 hours as needed for cough, Disp-236 mL, R-1, E-Prescribe      hydrOXYzine (ATARAX) 25 MG tablet Take 1 tablet (25 mg) by mouth 3 times daily as needed for itching, Disp-90 tablet, R-3, E-Prescribe      lactulose (CHRONULAC) 10 GM/15ML solution Take 30 mLs (20 g) by mouth 3 times daily, Disp-946 mL, R-11, E-Prescribe      oxyCODONE (ROXICODONE) 5 MG tablet Take 1 tablet (5 mg) by mouth 2 times daily as needed for severe pain (7-10), Disp-60 tablet, R-0, E-Prescribe      pantoprazole (PROTONIX) 40 MG EC tablet Take 1 tablet (40 mg) by mouth daily, Disp-30 tablet, R-11, E-Prescribe      senna-docusate (SENOKOT-S/PERICOLACE) 8.6-50 MG tablet Take 1 tablet by mouth daily as needed for constipation, Disp-30 tablet, R-11, E-Prescribe      zolpidem (AMBIEN) 5 MG tablet Take 1 tablet (5 mg) by mouth nightly as needed for sleep, Disp-10 tablet, R-5, E-Prescribe      order for DME Equipment being ordered: Other: blood pressure monitor  Treatment Diagnosis: hypertensionDisp-1 each, R-0, Local Print      sodium chloride, PF, (SODIUM CHLORIDE FLUSH) 0.9% PF flush 10 mLs by Intracatheter route every 24 hours, Disp-200 mL, R-3, E-Prescribe           Allergies   No Known Allergies

## 2022-10-01 NOTE — H&P
[facility]    History and Physical - Hospitalist Service       Date of Admission:  9/30/2022    Assessment & Plan      Elel Blanton was admitted on 09/30/22 for hemoptysis.  He is well-known to our service for frequent drops in hemoglobin. In short:      Hemoptysis   Acute blood loss anemia  R hydropneumothorax with volume loss of R lung  Chronic trapped lung, empyema   HCC on imaging with Hx of ruptured hepatic mass  Chronic hep B cirrhosis  Patient is well known to our service in clinic. 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 Has ostomy bag attached to chest wall wound. Pulmonology saw him 9/16 and recommended against bronchoscopy or other intervention at this time. US Paracentesis 9/17 removed 1.5L of bloody ascites, indicating likely continued oozing from liver mass. IR embolized liver mass on 9/18.  At last admission, he received 4 units of PRBCs for continually dropping hemoglobin, likely multifactorial in nature given the complex nature of his condition.     He was een in clinic today (9/30/22) with another decrease in hemoglobin from check the day prior. Decreased further by time patient presented to ED, down to 6.9. Received transfusion in ED. Will continue to monitor and replace as needed. Vitally stable at this time. Obvious distension of abdomen with shifting fluid suggests paracentesis may indicated.    - q4h hemoglobin check, transfuse if below 7.0    - US paracentesis    - Continue on PTA Keflex course for wound around ostomy bag    - Patient is hoping to get second opinion from [facility] on Monday      ESRD on HD   He dialyzes on a Monday Wednesday Friday schedule this hospitalization.   - Nephrology consult to resume dialysis on inpatient basis if needed       Goals of care  See prior notes for details. Patient is somewhat resistant to goals of care discussions.   Patient is hoping to see [facility] for second opinion, appointment  scheduled for Monday 10/3/22.     Hypertension, Chronic   BP slightly elevated on admission, with rise up to 190/120 while waiting in ED. Had not received PTA meds at that time.    - restart PTA Coreg    - restart PTA Diltiazem      Diet: NPO per Anesthesia Guidelines for Procedure/Surgery Except for: Meds  DVT Prophylaxis: Heparin SQ  Beltre Catheter: Not present  Fluids: None at this time   Central Lines: PRESENT  PICC Double Lumen 09/18/22 Right Brachial vein medial-Site Assessment: WDL  Cardiac Monitoring: None  Code Status: No CPR- Do NOT Intubate    Clinically Significant Risk Factors Present on Admission          # Hypocalcemia: corrected calcium <8.5 on admission, will replace as needed    # Hypoalbuminemia: Albumin = 2.8 g/dL (Ref range: 3.5 - 5.2 g/dL) on admission, will monitor as appropriate   # Coagulation Defect: INR = 1.18 (Ref range: 0.85 - 1.15) and/or PTT = 38 Seconds (Ref range: 22 - 38 Seconds) on admission, will monitor for bleeding  # Thrombocytopenia: Plts = 64 10e3/uL (Ref range: 150 - 450 10e3/uL) on admission, will monitor for bleeding           Disposition Plan      Expected Discharge Date: 10/01/2022                The patient's care was discussed with the Attending Physician, Dr. Camejo .    Viviane Tan MD  Hospitalist Service  [facility]  Securely message with the Vocera Web Console (learn more here)  Text page via [email or web address] Paging/Directory   ____________________________________________________________________    Chief Complaint   Low Hemoglobin, detected in clinic     History of Present Illness     Chart Review: Patient was seen a the [facility] [place] on 9/30/2022 with ongoing hemoptysis and likely hemoperitoneum due to bleeding liver mass. Hemoglobin was 7.8 yesterday and 7.1 in clinic. Patient and wife wants second option from [facility] to make sure there is nothing else to do for his many chronic medical conditions. An urgent referral  is in place and we should hear back about this on October 3 or 4. Patients hemoglobin decreasing and bleeds a red blood cell transfusion as well as a paracentesis to relieve the discomfort in his abdomen. Patient was referred to [place] ED for red blood cell transfusion and hopefully be monitor overnight for further evaluation.      Patient presents with anemia and blood count going down since earlier today. Patient denies abdomen pain but has abdominal tightness. Patient denies any other complaints or concerns.     Review of Systems    12 point ROS completed and negative aside from those noted above.     Past Medical History    I have reviewed this patient's medical history and updated it with pertinent information if needed.   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 pylori infection 7/5/2017    UGI bleed implied by Hgb 9.0 6/22/17 and melena. Admitted and hgb decreased to 7.6, CT abdomen showed all bladder wall thickening. + Hep B surface antigen noted. Melena resolved and hgb stabalized without transfusion, epigastric pain resolved with PPI. Recommend referral for gastroscopy.     Heart attack (H)      Hepatitis B      Normocytic anemia      Other pancytopenia (H) 7/5/2017 6/24/17 Peripheral smear at [facility].Pancytopenia of uncertain cause. Ruled out acute leukemia. Hematologist suggests hemolytic anemia should be considered possible cause and LDH and haptoglobin should be assessed in future.      PONV (postoperative nausea and vomiting)      Thrombocytopenia (H)         Past Surgical History    I have reviewed this patient's surgical history and updated it with pertinent information if needed.  Past Surgical History:   Procedure Laterality Date     ABCESS DRAINAGE      finger     BURSECTOMY ELBOW Left 10/15/2021    Procedure: LEFT OLECRANON BURSECTOMY;  Surgeon: Tico Myers MD;  Location: [facility] Main OR     BURSECTOMY ELBOW Left 1/18/2022    Procedure: LEFT ELBOW OLECRANON BURSECTOMY;  Surgeon: Tico Myers MD;  Location: [facility] Main OR     CREATE FISTULA ARTERIOVENOUS UPPER EXTREMITY Left 4/20/2021    Procedure: left arm dialysis graft placement;  Surgeon: Roxana Cosby MD;  Location: [facility] Main OR;  Service: General     FOREIGN BODY REMOVAL      finger     INCISION AND DRAINAGE FINGER, COMBINED Left 10/20/2016    Procedure: COMBINED INCISION AND DRAINAGE FINGER;  Surgeon: Mireya Pizano MD;  Location: [facility] OR     INCISION AND DRAINAGE UPPER EXTREMITY, COMBINED Left 1/18/2022    Procedure: AND ELBOW INCISION AND DRAINAGE;  Surgeon: Tico Myers MD;  Location: [facility] OR     INSERT PICC LINE Right 8/25/2022    Procedure: INSERTION, PICC;  Surgeon: Osmin Villa MD;  Location: [facility] OR     IR CHEST TUBE PLACEMENT NON-TUNNELED RIGHT  4/19/2021     IR CHEST TUBE PLACEMENT NON-TUNNELED RIGHT  10/19/2021     IR CHEST TUBE PLACEMENT NON-TUNNELED RIGHT  11/10/2021     IR CHEST TUBE PLACEMENT NON-TUNNELED RIGHT  3/31/2022     IR DIALYSIS FISTULOGRAM LEFT  4/2/2022     IR DIALYSIS FISTULOGRAM LEFT  4/4/2022     IR PICC PLACEMENT > 5 YRS OF AGE  8/25/2022     IR PICC PLACEMENT > 5 YRS OF AGE  9/18/2022     IR PLEURAL DRAINAGE WITH CATHETER INSERTION  4/19/2021     IR VISCERAL ANGIOGRAM  8/26/2022     IR VISCERAL EMBOLIZATION  9/18/2022     MIDLINE INSERTION - DOUBLE LUMEN  4/23/2021          WI ESOPHAGOGASTRODUODENOSCOPY TRANSORAL DIAGNOSTIC N/A 8/18/2020    Procedure: ESOPHAGOGASTRODUODENOSCOPY (EGD) with biopsy;  Surgeon: Nilson Gonzales MD;  Location: [facility];  Service: Gastroenterology      PARACENTESIS  8/14/2020      PARACENTESIS  8/19/2020     US THORACENTESIS  4/18/2021        Social History   I have reviewed this patient's social history and updated it with pertinent information if needed. Elle Blanton  reports that he has never smoked. He has never used smokeless tobacco. He reports that he does not drink alcohol and does not use drugs.    Family History   I have reviewed this patient's family history and updated it with pertinent information if needed.  Family History   Problem Relation Age of Onset     Ulcers Father      Hypertension No family hx of      Asthma No family hx of      Cancer No family hx of      Coronary Artery Disease No family hx of      Liver Cancer No family hx of        Prior to Admission Medications   Prior to Admission Medications   Prescriptions Last Dose Informant Patient Reported? Taking?   acetaminophen (TYLENOL) 500 MG tablet Past Month at prn  Yes Yes   Sig: Take 500 mg by mouth every 6 hours as needed for mild pain   benzonatate (TESSALON) 100 MG capsule Past Month at prn  No Yes   Sig: Take 1 capsule (100 mg) by mouth 3 times daily as needed for cough   calcium acetate (PHOSLO) 667 MG CAPS capsule 9/30/2022 at am  Yes Yes   Sig: Take 1 capsule by mouth 2 times daily (with meals)   carvedilol (COREG) 12.5 MG tablet Past Month at prn  No Yes   Sig: Take 1 tablet (12.5 mg) by mouth 2 times daily (with meals)   Patient taking differently: Take 12.5 mg by mouth 2 times daily as needed   cephALEXin (KEFLEX) 250 MG capsule 9/30/2022 at am  No Yes   Sig: Take 1 capsule (250 mg) by mouth 2 times daily   diltiazem ER (DILT-XR) 180 MG 24 hr capsule Past Month at prn  No Yes   Sig: Take 1 capsule (180 mg) by mouth daily   Patient taking differently: Take 180 mg by mouth daily as needed   entecavir (BARACLUDE) 0.5 MG tablet 9/25/2022 at Unknown time  No Yes   Sig: Take 1 tablet (0.5 mg) by mouth every 7 days   Patient taking  differently: Take 0.5 mg by mouth every 7 days Sunday   guaiFENesin-codeine (GUAIFENESIN AC) 100-10 MG/5ML syrup Past Month at prn  No Yes   Sig: Take 5 mLs by mouth every 4 hours as needed for cough   hydrOXYzine (ATARAX) 25 MG tablet Past Month at prn  No Yes   Sig: Take 1 tablet (25 mg) by mouth 3 times daily as needed for itching   lactulose (CHRONULAC) 10 GM/15ML solution Past Week at prn  No Yes   Sig: Take 30 mLs (20 g) by mouth 3 times daily   Patient taking differently: Take 20 g by mouth Takes 2 to 3 times daily, holds for loose stool   order for DME   No No   Sig: Equipment being ordered: Other: blood pressure monitor  Treatment Diagnosis: hypertension   oxyCODONE (ROXICODONE) 5 MG tablet Past Week at prn  No Yes   Sig: Take 1 tablet (5 mg) by mouth 2 times daily as needed for severe pain (7-10)   pantoprazole (PROTONIX) 40 MG EC tablet Unknown at Unknown time  No Yes   Sig: Take 1 tablet (40 mg) by mouth daily   senna-docusate (SENOKOT-S/PERICOLACE) 8.6-50 MG tablet Unknown at prn  No Yes   Sig: Take 1 tablet by mouth daily as needed for constipation   sodium chloride, PF, (SODIUM CHLORIDE FLUSH) 0.9% PF flush   No No   Sig: 10 mLs by Intracatheter route every 24 hours   zolpidem (AMBIEN) 5 MG tablet Past Week at prn  No Yes   Sig: Take 1 tablet (5 mg) by mouth nightly as needed for sleep      Facility-Administered Medications: None     Allergies   No Known Allergies    Physical Exam   Vital Signs: Temp: 97.7  F (36.5  C) Temp src: Oral BP: (!) 166/102 Pulse: 87   Resp: 18 SpO2: 97 % O2 Device: None (Room air)    Weight: 122 lbs 0 oz   Constitutional:  Awake, no acute distress   HENT:  Atraumatic, oropharynx without exudate or erythema, membranes moist. Bright red blood hematemesis.   Lymph:  No adenopathy  Eyes: EOM intact, PERRL, no injection  Neck: Supple  Respiratory:  Clear to auscultation bilaterally, no wheezes or crackles   Cardiovascular:  Regular rate and rhythm, single S1 and S2   GI: No  rebound or guarding. Abdominal distended, obvious fluid. No tenderness.   Musculoskeletal:  Moves all extremities, no lower extremity edema, no deformities    Skin:  Warm, dry  Neurologic:  Alert and oriented x3, no focal deficits noted     Data   Recent Labs   Lab 09/30/22  1935 09/30/22  1132 09/29/22  0823 09/27/22  1207   WBC 4.3 4.5  --  4.2   HGB 6.9* 7.1* 7.8* 8.2*    100  --  100   PLT 64* 61*  --  45*   INR 1.18*  --   --   --      --   --   --    POTASSIUM 4.0  --   --   --    CHLORIDE 97*  --   --   --    CO2 31*  --   --   --    BUN 32.4*  --   --   --    CR 5.52*  --   --   --    ANIONGAP 10  --   --   --    TANO 7.4*  --   --   --    *  --   --   --    ALBUMIN 2.8*  --   --   --      4.3    \    6.9 (LL)    /    64 (L)   N 78    L N/A    138    97 (L)    32.4 (H) /   ------------------------------------ 138 (H)   ALT N/A   AST N/A   AP N/A   ALB 2.8 (L)   Ca 7.4 (L)  4.0    31 (H)    5.52 (H) \    % RETIC N/A    LDH N/A  Troponin N/A    BNP N/A    CK N/A  INR 1.18 (H)   PTT 38    D-dimer N/A    Fibrinogen N/A    Antithrombin N/A  Ferritin N/A  CRP N/A    IL-6 N/A  No results found for this or any previous visit (from the past 24 hour(s)).

## 2022-10-03 NOTE — PROGRESS NOTES
Clinic Care Coordination Contact  [facility]: Post-Discharge Note  SITUATION                                                      Admission:    Admission Date: 09/30/22   Reason for Admission: 1. Anemia due to blood loss, acute   2. Hematemesis, presence of nausea not specified  Discharge:   Discharge Date: 10/01/22  Discharge Diagnosis: 1. Anemia due to blood loss, acute   2. Hematemesis, presence of nausea not specified    BACKGROUND                                                      Per hospital discharge summary and inpatient provider notes.      ASSESSMENT           Discharge Assessment  How are you doing now that you are home?: Pt is doing ok  How are your symptoms? (Red Flag symptoms escalate to triage hotline per guidelines): Improved  Do you feel your condition is stable enough to be safe at home until your provider visit?: Yes  Does the patient have their discharge instructions? : Yes  Does the patient have questions regarding their discharge instructions? : No  Were you started on any new medications or were there changes to any of your previous medications? : No  Does the patient have all of their medications?: Yes  Do you have questions regarding any of your medications? : No  Do you have all of your needed medical supplies or equipment (DME)?  (i.e. oxygen tank, CPAP, cane, etc.): No - What equipment or supplies are needed?  Discharge follow-up appointment scheduled within 14 calendar days? : Yes  Discharge Follow Up Appointment Date: 10/12/22  Discharge Follow Up Appointment Scheduled with?: Primary Care Provider                  PLAN                                                      Outpatient Plan:      Future Appointments   Date Time Provider Department Center   10/6/2022  4:00 PM Pilo Zurita MD [facility]   10/12/2022  1:00 PM Jaswant Flores MD [facility] [place]   10/17/2022  9:00 AM Woody Shaikh MD [facility]         For any urgent concerns, please  contact our 24 hour clinic line:   [place]: [phone]       MARA Vance

## 2022-10-03 NOTE — PROGRESS NOTES
[facility]    Background: Transitional Care Management program identified per system criteria and reviewed by [facility] team for possible outreach.    Assessment:  Upon chart review, patient is in communication with a clinic team member, nurse or provider within [facility] for reason of discussing hospital follow up plan of care and answering questions patient may have related to discharge instructions. [facility] Team member will update episode status of Transitional Care Management program to enrolled per system workflows.     Plan: [facility] will make no additional outreach attempts to minimize duplicative efforts and reduce potential confusion for patient.      Winter Alejo MA  [facility] Resource [place], [facility]    *Connected Care Resource Team does NOT follow patient ongoing. Referrals are identified based on internal discharge reports and the outreach is to ensure patient has an understanding of their discharge instructions.

## 2022-10-03 NOTE — PROGRESS NOTES
Clinic Care Coordination Contact    Follow Up Progress Note      Assessment: The pt was recently in the ED, I called to check up on the pt and help the pt setup a ED follow up. The pt was at [facility] for anemia. I called the pt wife, but got her vm, so I left a vm for the pt to give me a call back.     Care Gaps:    Health Maintenance Due   Topic Date Due     PREVENTIVE CARE VISIT  Never done     LIPID  Never done     PARATHYROID  Never done     COLORECTAL CANCER SCREENING  Never done     ZOSTER IMMUNIZATION (1 of 2) Never done     DTAP/TDAP/TD IMMUNIZATION (1 - Tdap) Never done     MICROALBUMIN  07/17/2021     Pneumococcal Vaccine: Pediatrics (0 to 5 Years) and At-Risk Patients (6 to 64 Years) (2 - PPSV23 or PCV20) 05/05/2022     COVID-19 Vaccine (4 - Booster for Moderna series) 06/22/2022     INFLUENZA VACCINE (1) 09/01/2022           Care Plans      Intervention/Education provided during outreach:               Plan:     Care Coordinator will follow up in

## 2022-10-04 NOTE — TELEPHONE ENCOUNTER
Orders signed. Thanks [facility]!    Jaswant Flores MD  October 4, 2022  1:44 PM

## 2022-10-04 NOTE — TELEPHONE ENCOUNTER
[facility] phone call message- general phone call:    Reason for call: Viviane called stating there was a order placed for nurse to go out and do dressing for the infusion line, but when she got there, no sterile dressing supplies at the home and she's unsure why this is happening. She called and spoke to wife of patient and still no supplies there. Please call and advise.     Return call needed: Yes    OK to leave a message on voice mail? Yes    Primary language: English      needed? No    Call taken on October 4, 2022 at 9:24 AM by Jef Marroquin

## 2022-10-04 NOTE — TELEPHONE ENCOUNTER
Called Viviane to clarify. Patient needs Sterile Dressing Kit for PICC Line. Patient also needs saline flushes for picc flush.     Recommend 6 sterile kits, orders to go out for four weeks.     Recommend one box of saline flushes    Viviane advised to send to [facility] pharmacy.     Romain MELENDEZ

## 2022-10-04 NOTE — PROGRESS NOTES
Response to coding query    Cause of hemoptysis is the complex pleural effusion in the right lung - combination of empyema, trapped lung, pleurocutaneous fistula with right chest wall. Likely some erosion of blood vessels by this complex fluid collection causing ongoing bleeding into the small airways in the lungs. Alternatively there could be metastases from likely hepatocellular carcinoma though this has not yet been worked up.    Hepatocellular carcinoma diagnosis was addressed in that he had another paracentesis removing bloody fluid from the abdomen, which was likely coming from the liver mass.    Christy Virk MD  [facility] Medicine Residency Program, PGY-2  Pager #: [phone] or contact via [employer].

## 2022-10-05 NOTE — PROGRESS NOTES
I have personally reviewed the history and examination as documented by Dr. Janet Virk.  I agree with the assessment and plan as documented for 62 yr old male well known to me here for recurrent hemoptysis and anemia. Lengthy discussion regarding acute mgmt of symptoms and pts desire to be seen at [facility] for 2nd opinion. Recommended going to ED for likely transfusion and paracentesis. Once medically stable, can proceed w/ 2nd opinion visit to [facility]. Precautions given. Anticipatory guidance given.     Jaswant Flores MD  October 5, 2022  11:12 AM

## 2022-10-05 NOTE — TELEPHONE ENCOUNTER
Spoke to patient's son. Letter is written and at  for pickup.    Jaswant Flores MD  October 5, 2022  1:07 PM

## 2022-10-05 NOTE — LETTER
MEDICAL LETTER OF NECESSITY   2022     Last Appointment: 2022    Patient:  Elle Blanton  :   1960  MRN:     [id number]  Physician: JASWANT FLORES    This letter is to verify that Elle Blanton is under my care. He has multiple significant medical conditions including end-stage renal disease on dialysis, a lung empyema with draining sinus tract requiring wound care, chronic hepatitis with cirrhosis and ascites requiring intermittent paracentesis, new liver mass concerning for cancer, as well as recurrent bleeding from both lung and liver causing severe anemia and requiring blood transfusions.     Elle is unable to care for himself and manage these medical conditions. His mobility is limited due to weakness from the above conditions. He requires a full-time PCA.    Please contact me with any questions or concerns.        Jaswant Flores MD  2022  12:59 PM

## 2022-10-05 NOTE — TELEPHONE ENCOUNTER
Patient son Hudson came into office today wanting to speak with Dr. Rosado in regards to witting a supporting letter for PCA (patient Care Assistant). Patient son would like a call back in regards to this. Stated it was urgent. Please advise, thank you.

## 2022-10-06 NOTE — PROGRESS NOTES
THORACIC SURGERY - ESTABLISHED PATIENT OFFICE VISIT       Dear Dr. Flores,     I saw Elle Blanton in follow-up for the evaluation and treatment of a trapped RIGHT lung and draining chest wall sinus.      HPI  Elle Blanton is a 62 year old male with a trapped lung and he does not complain of dyspnea, but is very sedentary.  We had planned to perform an incision and drainage of his chest wound in an attempt to optimize control of his infection and for palliation, but he was admitted to the hospital the same day as surgery was planned. Since the end of August he has been admitted three times for peritoneal hemorrhage from a liver mass.    Currently, he has an ostomy bag over the spontaneously draining skin site.     Previsit Tests   Chest imaging over time  CT abdomen 8/19/2020:  Last normal imaging of lower chest    CXR 4/17/2021    US thoracentesis 4/18/2021:  Drained 2000 ml, clear serous fluid, transudate by lab tests     CT chest 4/22/2021: After placement of a chest tube on 4/19/2022; RIGHT lung not fully expanded    CXR 4/17/2021: Fully expanded RIGHT lung       No further imaging after 4/27/2021 until September 2021.  CT chest 9/26/2022:Small-to-moderate RIGHT pleural effusion in the setting of acute LEFT retroperitoneal hemorrhage       CXR 10/15/2021: Recurrent large RIGHT pleural effusion    US thoracentesis 10/15/2021: 1.5 l of clear fluid  US thoracentesis 10/16/2021: 350 ml cloudy yellow fluid; lab: turbid brown fluid, cultures negative.  CT chest after chest tube (10/21/2021): Complex hydropneumothorax with new pleural thickening compared to 9/26/2022; suggestive of empyema.       CT 11/9/2021: After chest tube removal; space is mostly filled with fluid with air-pockets       CT 11/14/2021: Drained fluid, trapped lung.       Thoracentesis 4/7/2022: S. Aureus 4+  CT 5/3/2022: New soft tissue mass at chest tube site suggestive of a hematoma       CT 7/7/2022: Unchanged, less air in the pleural fluid collection,  there does not appear to be a connection between the chest wall abscess and the intrapleural fluid.      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 pylori infection 7/5/2017     UGI bleed implied by Hgb 9.0 6/22/17 and melena. Admitted and hgb decreased to 7.6, CT abdomen showed all bladder wall thickening. + Hep B surface antigen noted. Melena resolved and hgb stabalized without transfusion, epigastric pain resolved with PPI. Recommend referral for gastroscopy.     Heart attack (H)       Hepatitis B       Normocytic anemia       Other pancytopenia (H) 7/5/2017 6/24/17 Peripheral smear at [facility].Pancytopenia of uncertain cause. Ruled out acute leukemia. Hematologist suggests hemolytic anemia should be considered possible cause and LDH and haptoglobin should be assessed in future.      PONV (postoperative nausea and vomiting)       Thrombocytopenia (H)           PSH  Reviewed, as below           Past Surgical History:   Procedure Laterality Date     ABCESS DRAINAGE         finger     BURSECTOMY ELBOW Left 10/15/2021     Procedure: LEFT OLECRANON BURSECTOMY;  Surgeon: Tico Myers MD;  Location: [facility] OR     BURSECTOMY ELBOW Left 1/18/2022     Procedure: LEFT ELBOW OLECRANON BURSECTOMY;  Surgeon: Tico Myers MD;  Location: [facility]     CREATE FISTULA ARTERIOVENOUS UPPER EXTREMITY Left 4/20/2021     Procedure: left arm dialysis graft placement;  Surgeon: Roxana Cosby MD;  Location: [facility] OR;  Service: General     FOREIGN BODY REMOVAL          finger     INCISION AND DRAINAGE FINGER, COMBINED Left 10/20/2016     Procedure: COMBINED INCISION AND DRAINAGE FINGER;  Surgeon: Mireya Pizano MD;  Location: [place] OR     INCISION AND DRAINAGE UPPER EXTREMITY, COMBINED Left 1/18/2022     Procedure: AND ELBOW INCISION AND DRAINAGE;  Surgeon: Tico Myers MD;  Location: [facility] OR     IR CHEST TUBE PLACEMENT NON-TUNNELED RIGHT   4/19/2021     IR CHEST TUBE PLACEMENT NON-TUNNELED RIGHT   10/19/2021     IR CHEST TUBE PLACEMENT NON-TUNNELED RIGHT   11/10/2021     IR CHEST TUBE PLACEMENT NON-TUNNELED RIGHT   3/31/2022     IR DIALYSIS FISTULOGRAM LEFT   4/2/2022     IR DIALYSIS FISTULOGRAM LEFT   4/4/2022     IR PLEURAL DRAINAGE WITH CATHETER INSERTION   4/19/2021     MIDLINE INSERTION - DOUBLE LUMEN   4/23/2021           CT ESOPHAGOGASTRODUODENOSCOPY TRANSORAL DIAGNOSTIC N/A 8/18/2020     Procedure: ESOPHAGOGASTRODUODENOSCOPY (EGD) with biopsy;  Surgeon: Nilson Gonzales MD;  Location: [facility] GI;  Service: Gastroenterology     US PARACENTESIS   8/14/2020     US PARACENTESIS   8/19/2020     US THORACENTESIS   4/18/2021         ETOH negative  TOB negative     Physical examination  /70 (BP Location: Right arm)   Pulse 64   SpO2 99%         He is cachectic, pale, and very debilitated. His right chest has a draining sinus with purulent fluid. He is in a wheelchair.     From a personal perspective, he is here with his wife, Maico. They are ong from [facility].     IMPRESSION   (J98.19) Trapped lung  (primary encounter diagnosis)  (R22.2) Chest wall mass  (N18.6,  Z99.2) ESRD (end stage renal disease) on dialysis (H)  (K74.60,  R18.8) Cirrhosis of liver with ascites, unspecified hepatic cirrhosis type (H)  (E43) Severe protein-calorie malnutrition (H)        This person is a 62 year old male with trapped RIGHT lung and draining chest wall sinus.   ESRD  Cirrhosis  Severe malnutrition      PLAN  I spent 40 min on the date of the encounter in chart  review, patient visit, review of tests, documentation and/or discussion with other providers about the issues documented above. I reviewed the plan as follows:     Today Mr. Blanton looks even weaker then before, and we had a long conversation about just continuing with the wound drainage with an ostomy bag. The opening closes intermittently, but then reopens again. I explained that even a minor intervention in him would be very risky, and I am particularly concerned that he would have bleeding complications even after a minor incision and drainage, that could lead to overall decompensation. Mr. Mansfield appeared quite realistic today about his clinical situation.    He wants to think about this and at his request I'll see him back in clinic again.           I appreciate the opportunity to participate in the care of your patient and will keep you updated.  Sincerely,

## 2022-10-06 NOTE — LETTER
10/6/2022      RE: Elle Blanton  [address]  [facility] [address]       THORACIC SURGERY - ESTABLISHED PATIENT OFFICE VISIT       Dear Dr. Flores,     I saw Elle Blanton in follow-up for the evaluation and treatment of a trapped RIGHT lung and draining chest wall sinus.      HPI  Elle Blanton is a 62 year old male with a trapped lung and he does not complain of dyspnea, but is very sedentary.  We had planned to perform an incision and drainage of his chest wound in an attempt to optimize control of his infection and for palliation, but he was admitted to the hospital the same day as surgery was planned. Since the end of August he has been admitted three times for peritoneal hemorrhage from a liver mass.    Currently, he has an ostomy bag over the spontaneously draining skin site.     Previsit Tests   Chest imaging over time  CT abdomen 8/19/2020:  Last normal imaging of lower chest    CXR 4/17/2021    US thoracentesis 4/18/2021:  Drained 2000 ml, clear serous fluid, transudate by lab tests     CT chest 4/22/2021: After placement of a chest tube on 4/19/2022; RIGHT lung not fully expanded    CXR 4/17/2021: Fully expanded RIGHT lung       No further imaging after 4/27/2021 until September 2021.  CT chest 9/26/2022:Small-to-moderate RIGHT pleural effusion in the setting of acute LEFT retroperitoneal hemorrhage       CXR 10/15/2021: Recurrent large RIGHT pleural effusion    US thoracentesis 10/15/2021: 1.5 l of clear fluid  US thoracentesis 10/16/2021: 350 ml cloudy yellow fluid; lab: turbid brown fluid, cultures negative.  CT chest after chest tube (10/21/2021): Complex hydropneumothorax with new pleural thickening compared to 9/26/2022; suggestive of empyema.       CT 11/9/2021: After chest tube removal; space is mostly filled with fluid with air-pockets       CT 11/14/2021: Drained fluid, trapped lung.       Thoracentesis 4/7/2022: S. Aureus 4+  CT 5/3/2022: New soft tissue mass at chest tube site suggestive of a  hematoma       CT 7/7/2022: Unchanged, less air in the pleural fluid collection, there does not appear to be a connection between the chest wall abscess and the intrapleural fluid.      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 pylori infection 7/5/2017     UGI bleed implied by Hgb 9.0 6/22/17 and melena. Admitted and hgb decreased to 7.6, CT abdomen showed all bladder wall thickening. + Hep B surface antigen noted. Melena resolved and hgb stabalized without transfusion, epigastric pain resolved with PPI. Recommend referral for gastroscopy.     Heart attack (H)       Hepatitis B       Normocytic anemia       Other pancytopenia (H) 7/5/2017 6/24/17 Peripheral smear at [facility].Pancytopenia of uncertain cause. Ruled out acute leukemia. Hematologist suggests hemolytic anemia should be considered possible cause and LDH and haptoglobin should be assessed in future.      PONV (postoperative nausea and vomiting)       Thrombocytopenia (H)           PSH  Reviewed, as below           Past Surgical History:   Procedure Laterality Date     ABCESS DRAINAGE         finger     BURSECTOMY ELBOW Left 10/15/2021     Procedure: LEFT OLECRANON BURSECTOMY;  Surgeon: Tico Myers MD;  Location: [facility] OR     BURSECTOMY ELBOW Left 1/18/2022     Procedure: LEFT ELBOW OLECRANON BURSECTOMY;  Surgeon: Tico Myers MD;  Location: [facility] OR     CREATE FISTULA ARTERIOVENOUS UPPER EXTREMITY Left 4/20/2021     Procedure: left arm dialysis graft placement;  Surgeon: Roxana Cosby MD;   Location: [facility] OR;  Service: General     FOREIGN BODY REMOVAL         finger     INCISION AND DRAINAGE FINGER, COMBINED Left 10/20/2016     Procedure: COMBINED INCISION AND DRAINAGE FINGER;  Surgeon: Mireya Pizano MD;  Location: [place] OR     INCISION AND DRAINAGE UPPER EXTREMITY, COMBINED Left 1/18/2022     Procedure: AND ELBOW INCISION AND DRAINAGE;  Surgeon: Tico Myers MD;  Location: [facility] OR     IR CHEST TUBE PLACEMENT NON-TUNNELED RIGHT   4/19/2021     IR CHEST TUBE PLACEMENT NON-TUNNELED RIGHT   10/19/2021     IR CHEST TUBE PLACEMENT NON-TUNNELED RIGHT   11/10/2021     IR CHEST TUBE PLACEMENT NON-TUNNELED RIGHT   3/31/2022     IR DIALYSIS FISTULOGRAM LEFT   4/2/2022     IR DIALYSIS FISTULOGRAM LEFT   4/4/2022     IR PLEURAL DRAINAGE WITH CATHETER INSERTION   4/19/2021     MIDLINE INSERTION - DOUBLE LUMEN   4/23/2021           [place] ESOPHAGOGASTRODUODENOSCOPY TRANSORAL DIAGNOSTIC N/A 8/18/2020     Procedure: ESOPHAGOGASTRODUODENOSCOPY (EGD) with biopsy;  Surgeon: Nilson Gonzales MD;  Location: [facility] GI;  Service: Gastroenterology     US PARACENTESIS   8/14/2020     US PARACENTESIS   8/19/2020     US THORACENTESIS   4/18/2021         ETOH negative  TOB negative     Physical examination  /70 (BP Location: Right arm)   Pulse 64   SpO2 99%         He is cachectic, pale, and very debilitated. His right chest has a draining sinus with purulent fluid. He is in a wheelchair.     From a personal perspective, he is here with his wife, Maico. They are ong from [facility].     IMPRESSION   (J98.19) Trapped lung  (primary encounter diagnosis)  (R22.2) Chest wall mass  (N18.6,  Z99.2) ESRD (end stage renal disease) on dialysis (H)  (K74.60,  R18.8) Cirrhosis of liver with ascites, unspecified hepatic cirrhosis type (H)  (E43) Severe protein-calorie malnutrition (H)        This person is a 62 year old male with trapped RIGHT lung and draining chest wall sinus.    ESRD  Cirrhosis  Severe malnutrition      PLAN  I spent 40 min on the date of the encounter in chart review, patient visit, review of tests, documentation and/or discussion with other providers about the issues documented above. I reviewed the plan as follows:     Today Mr. Blanton looks even weaker then before, and we had a long conversation about just continuing with the wound drainage with an ostomy bag. The opening closes intermittently, but then reopens again. I explained that even a minor intervention in him would be very risky, and I am particularly concerned that he would have bleeding complications even after a minor incision and drainage, that could lead to overall decompensation. Mr. Mansfield appeared quite realistic today about his clinical situation.    He wants to think about this and at his request I'll see him back in clinic again.           I appreciate the opportunity to participate in the care of your patient and will keep you updated.  Sincerely,           Pilo Zurita MD

## 2022-10-08 NOTE — PROGRESS NOTES
Hospitalization Follow-up Visit         [facility]       Hospital Follow-up Visit:    Hospital:  [facility]   Date of Admission: 9/30/22  Date of Discharge: 10/1/22  Reason(s) for Admission: Hemoptysis and Anemia            Problems taking medications regularly:  None       Post Discharge Medication Reconciliation: discharge medications reconciled, continue medications without change.       Problems adhering to non-medication therapy:  None       Medications reviewed by: by myself. Findings include compliance.    Summary of hospitalization:  [facility] discharge summary reviewed  Diagnostic Tests/Treatments reviewed.  Follow up needed: Hgb  Other Healthcare Providers Involved in Patient s Care:         None  Update since discharge: stable.   Plan of care communicated with patient and family              [facility]  Discharge Summary - Medicine & Pediatrics       Date of Admission:  9/30/2022  Date of Discharge:  10/1/2022  Discharging Provider: Steven Watkins DO/Naila Camejo MD  Discharge Service: Hospitalist Service      Discharge Diagnoses     1. Anemia due to blood loss, acute    2. Hematemesis, presence of nausea not specified    3. Empyema lung (H)           Follow-ups Needed After Discharge     Follow-up Appointments     Follow-up and recommended labs and tests       Follow up with primary care provider, Jaswant Flores, within 7 days for   hospital follow- up.  The following labs/tests are recommended: Hgb.     Keep appointment with [place] scheduled for 10/3/22          Ensure follow up with [place]    Per 10/6/22 note from Dr Zurita:  This person is a 62 year old male with trapped RIGHT lung and draining chest wall sinus.   ESRD  Cirrhosis  Severe malnutrition      PLAN  I spent 40 min on the date of the encounter in chart review, patient visit, review of tests, documentation and/or discussion with other providers about the issues documented above. I reviewed the plan as  "follows:     Today Mr. Blanton looks even weaker then before, and we had a long conversation about just continuing with the wound drainage with an ostomy bag. The opening closes intermittently, but then reopens again. I explained that even a minor intervention in him would be very risky, and I am particularly concerned that he would have bleeding complications even after a minor incision and drainage, that could lead to overall decompensation. Mr. Mansfield appeared quite realistic today about his clinical situation.     He wants to think about this and at his request I'll see him back in clinic again.            Review of Systems:   CONSTITUTIONAL: maybe inc fatigue   SKIN: no worrisome rashes, no worrisome moles, no worrisome lesions  EYES: no acute vision problems or changes  ENT: no ear problems, no mouth problems, no throat problems  RESP: no significant cough, +baseline shortness of breath  CV: no chest pain, no palpitations, no new or worsening peripheral edema  GI: no nausea, no vomiting, no constipation, no diarrhea - see HPI            Physical Exam:     Vitals:    10/12/22 1317   BP: 132/88   BP Location: Right arm   Cuff Size: Adult Regular   Pulse: 96   Resp: 16   Temp: 96.8  F (36  C)   TempSrc: Oral   SpO2: 98%   Weight: 54.9 kg (121 lb)   Height: 1.651 m (5' 5\")     Body mass index is 20.14 kg/m .    GENERAL: cachectic, jaundiced, no acute distress   EYES: Eyes icteric, extraocular movements - intact  NECK: no tenderness, no adenopathy, no asymmetry, no masses, no stiffness; thyroid- normal to palpation  RESP:R lung field w/ diminished sounds - at baseline; + bloody drainage from bag overlying R lung wound  CV: regular rates and rhythm, normal S1 S2, no S3 or S4 and no murmur, no click or rub -  ABDOMEN: soft, no tenderness, + distention         Results:   Results from the last 24 hours:    Lab Results   Component Value Date    HGB 8.9 10/12/2022    HGB 8.9 04/29/2021     Lab Results   Component Value Date "    PLT 52 10/12/2022    PLT 53 08/10/2019         Assessment and Plan      Elle was seen today for anemia.    Diagnoses and all orders for this visit:    Anemia due to blood loss, acute -> stable Hgb though he continues w/ episodes of hemoptysis; will defer ED at this time; he has a follow-up w/ Dr Zurita tomorrow to discuss whether a procedure on his lung would be reasonable or futile; he understands that we are running out of options and getting closer to decision to proceed w/ hospice  -     Hemoglobin; Future  -     Hemoglobin    Anemia in end-stage renal disease (H) - dialysis later today    Draining cutaneous sinus tract - see above    Empyema lung (H) - see above    Hepatic cirrhosis, unspecified hepatic cirrhosis type, unspecified whether ascites present (H) - fairly distended today; may need therapeutic paracentesis pending upcoming GI consult next week      E&M code to be billed if TCM cannot be: 89794    Type of decision making: Moderate complexity ([id number])      Options for treatment and follow-up care were reviewed with the patient  Elle Blanton   engaged in the decision making process and verbalized understanding of the options discussed and agreed with the final plan.    Jaswant Flores MD  October 13, 2022  9:05 PM

## 2022-10-12 NOTE — ED NOTES
Bed: JNED-17  Expected date: 10/12/22  Expected time: 4:01 PM  Means of arrival: Ambulance  Comments:  62M coughing up blood

## 2022-10-12 NOTE — ED PROVIDER NOTES
Emergency Department Encounter     Evaluation Date & Time:   10/12/2022  3:59 PM    CHIEF COMPLAINT:  Hemoptysis      Triage Note:Pt presents to [facility] alone via ems from dialysis for hemoptysis. Pt was 15 mins into his dialysis run when he started coughing up blood. Dialysis was stopped and pt was sent here. Pt is not on thinners. Reports he has been coughing up blood for about a month.                ED COURSE & MEDICAL DECISION MAKING:     ED Course as of 10/12/22 2138   Wed Oct 12, 2022   1817 Hgb stable at 8.9.  K 4.5.  Pt stable for discharge back home. Awaiting dialysis nurse to dress access point on fistula.  Pt and family have follow up tomorrow.  Given that pt's right lung condition is known and he has a stable Hgb with no hypoxia or tachycardia or acute new symptoms, will not get repeat CXR imaging.   1819 Dialysis nurse came down to see pt, removed clamps and assessed fistula as well, applied dressing.  No recurrent bleeding. Pt has follow up tomorrow and will continue with this.  His K is 4.5 and no hypoxia or signs of fluid overload to warrant emergent dialysis tonight.  Pt appropriate for discharge.     Pt is a complicated pt with known chronic hydropneumothorax on the right with associated draining sinus tract wiht ostomy bag overlying this.  Pt has surgery again planned for htis site on Thursday.  He is ESRD on HD MWF and started run today for 15 minutes, but had it stopped as he coughed up blood. This is not a new thing as it's ongoing related to right lung complications and unchanged.  Pt is not anticoagulated. Denies cp/sob or other acute changes.  Pt arrives with fistula clamps in place as dialysis just completed.  Nursing took one of them down and bleeding started. Clamp applied once more and will monitor. Checking labs and CXR, although I suspect this is all baseline.  Pt has surgery planned tomorrow and would ideally be discharged tonight so that he can proceed with this.    4:07 PM  I introduced myself to the patient, obtained patient history, performed a physical exam. Patient was bleeding out of his left arm from dialysis.    4:36 PM I rechecked the patient and discussed plan for ED workup including potential diagnostic laboratory/imaging studies and interventions.    5:15 PM I rechecked the patient.  Remains well here.      At the conclusion of the encounter I discussed the results of all the tests and the disposition. The questions were answered. The patient or family acknowledged understanding and was agreeable with the care plan.      MEDICATIONS GIVEN IN THE EMERGENCY DEPARTMENT:  Medications - No data to display    NEW PRESCRIPTIONS STARTED AT TODAY'S ED VISIT:  Discharge Medication List as of 10/12/2022  7:11 PM          HPI   KAISER Blanton is a 62 year old male with a pertinent history of hypertension, anemia, end stage renal disease and empyema lung who presents to this ED via EMS for evaluation of hemoptysis.    The patient was 15 minutes into his dialysis run when he started coughing up blood. Dialysis was stopped and the patient and was sent here to the ED. The patient started bleeding on the left side of his arm in the ED once the bandages for his dialysis earlier came off. Bleeding is currently controlled with clamps. The patient reports that he had been coughing up blood for 2 months now and has a surgery scheduled tomorrow to address his lung condition. He says he currently has an ostomy bag on his right lateral chest wall that was inserted 3 months ago.     Patient denies any chest pain or abdominal pain. Patient says he is not on any blood thinners.     REVIEW OF SYSTEMS:  Review of Systems   Constitutional: Negative for chills and fever.   HENT: Negative for sore throat.    Eyes: Negative for visual disturbance.   Respiratory: Positive for cough (Hemoptysis). Negative for shortness of breath.    Cardiovascular: Negative for chest pain.   Gastrointestinal: Negative for  abdominal pain, diarrhea, nausea and vomiting.   Endocrine: Negative for polyuria.   Genitourinary: Negative for dysuria and hematuria.        - urinary changes     Musculoskeletal: Negative for joint swelling.        Positive for bleeding on left arm     Skin: Negative for rash.   Neurological: Negative for dizziness.   Psychiatric/Behavioral: Negative for confusion.   All other systems reviewed and are negative.          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 pylori infection 7/5/2017     Heart attack (H)      Hepatitis B      Normocytic anemia      Other pancytopenia (H) 7/5/2017     PONV (postoperative nausea and vomiting)      Thrombocytopenia (H)        Past Surgical History:   Procedure Laterality Date     ABCESS DRAINAGE      finger     BURSECTOMY ELBOW Left 10/15/2021    Procedure: LEFT OLECRANON BURSECTOMY;  Surgeon: Tico Myers MD;  Location: [facility] OR     BURSECTOMY ELBOW Left 1/18/2022    Procedure: LEFT ELBOW OLECRANON BURSECTOMY;  Surgeon: Tico Myers MD;  Location: [facility]     CREATE FISTULA ARTERIOVENOUS UPPER EXTREMITY Left 4/20/2021    Procedure: left arm dialysis graft placement;  Surgeon: Roxana Cosby MD;  Location: [facility];  Service: General     FOREIGN BODY REMOVAL      finger     INCISION AND DRAINAGE FINGER, COMBINED Left 10/20/2016    Procedure: COMBINED INCISION AND DRAINAGE FINGER;  Surgeon: Mireya Pizano MD;  Location: [facility] OR     INCISION AND DRAINAGE UPPER EXTREMITY, COMBINED Left 1/18/2022     Procedure: AND ELBOW INCISION AND DRAINAGE;  Surgeon: Tico Myers MD;  Location: [facility] Main OR     INSERT PICC LINE Right 8/25/2022    Procedure: INSERTION, PICC;  Surgeon: Osmin Villa MD;  Location: [facility] OR     IR CHEST TUBE PLACEMENT NON-TUNNELED RIGHT  4/19/2021     IR CHEST TUBE PLACEMENT NON-TUNNELED RIGHT  10/19/2021     IR CHEST TUBE PLACEMENT NON-TUNNELED RIGHT  11/10/2021     IR CHEST TUBE PLACEMENT NON-TUNNELED RIGHT  3/31/2022     IR DIALYSIS FISTULOGRAM LEFT  4/2/2022     IR DIALYSIS FISTULOGRAM LEFT  4/4/2022     IR PICC PLACEMENT > 5 YRS OF AGE  8/25/2022     IR PICC PLACEMENT > 5 YRS OF AGE  9/18/2022     IR PLEURAL DRAINAGE WITH CATHETER INSERTION  4/19/2021     IR VISCERAL ANGIOGRAM  8/26/2022     IR VISCERAL EMBOLIZATION  9/18/2022     MIDLINE INSERTION - DOUBLE LUMEN  4/23/2021          MN ESOPHAGOGASTRODUODENOSCOPY TRANSORAL DIAGNOSTIC N/A 8/18/2020    Procedure: ESOPHAGOGASTRODUODENOSCOPY (EGD) with biopsy;  Surgeon: Nilson Gonzales MD;  Location: [facility];  Service: Gastroenterology      PARACENTESIS  8/14/2020     US PARACENTESIS  8/19/2020     US THORACENTESIS  4/18/2021       Family History   Problem Relation Age of Onset     Ulcers Father      Hypertension No family hx of      Asthma No family hx of      Cancer No family hx of      Coronary Artery Disease No family hx of      Liver Cancer No family hx of        Social History     Tobacco Use     Smoking status: Never     Smokeless tobacco: Never   Vaping Use     Vaping Use: Never used   Substance Use Topics     Alcohol use: No     Drug use: No       acetaminophen (TYLENOL) 500 MG tablet  benzonatate (TESSALON) 100 MG capsule  calcium acetate (PHOSLO) 667 MG CAPS capsule  carvedilol (COREG) 12.5 MG tablet  cephALEXin (KEFLEX) 250 MG capsule  diltiazem ER (DILT-XR) 180 MG 24 hr capsule  entecavir (BARACLUDE) 0.5 MG tablet  guaiFENesin-codeine (GUAIFENESIN AC) 100-10 MG/5ML syrup  hydrOXYzine (ATARAX) 25 MG  tablet  lactulose (CHRONULAC) 10 GM/15ML solution  order for DME  oxyCODONE (ROXICODONE) 5 MG tablet  pantoprazole (PROTONIX) 40 MG EC tablet  senna-docusate (SENOKOT-S/PERICOLACE) 8.6-50 MG tablet  sodium chloride, PF, (SODIUM CHLORIDE FLUSH) 0.9% PF flush  sodium chloride, PF, (SODIUM CHLORIDE) 0.9% PF flush  zolpidem (AMBIEN) 5 MG tablet        Physical Exam     Vitals:  BP (!) 163/110   Pulse 89   Temp 97.4  F (36.3  C) (Oral)   Resp 15   SpO2 98%     PHYSICAL EXAM:   Physical Exam  Vitals and nursing note reviewed.   Constitutional:       General: He is not in acute distress.     Comments: Chronically ill appearing.       HENT:      Head: Normocephalic and atraumatic.      Comments: Has some blood around mouth and tongue.     Nose: Nose normal.      Mouth/Throat:      Mouth: Mucous membranes are moist.   Eyes:      Pupils: Pupils are equal, round, and reactive to light.   Cardiovascular:      Rate and Rhythm: Normal rate and regular rhythm.      Pulses: Normal pulses.           Radial pulses are 2+ on the right side and 2+ on the left side.        Dorsalis pedis pulses are 2+ on the right side and 2+ on the left side.   Pulmonary:      Effort: Pulmonary effort is normal. No respiratory distress.      Comments: Diminished breath sounds on right side.   Chest:      Comments: Ostomy bag on right lateral chest wall with dark bloody appearing fluid.   Abdominal:      Palpations: Abdomen is soft.      Tenderness: There is no abdominal tenderness.   Musculoskeletal:      Cervical back: Full passive range of motion without pain and neck supple.      Comments: No calf tenderness or swelling b/l  Left brachial fistula with clamps in place, bleeding is currently controlled with clamps.    Skin:     General: Skin is warm.      Findings: No rash.   Neurological:      General: No focal deficit present.      Mental Status: He is alert. Mental status is at baseline.      Comments: Fluent speech, no acute lateralizing  deficits   Psychiatric:         Mood and Affect: Mood normal.         Behavior: Behavior normal.         Results     LAB:  All pertinent labs reviewed and interpreted  Labs Ordered and Resulted from Time of ED Arrival to Time of ED Departure   BASIC METABOLIC PANEL - Abnormal       Result Value    Sodium 141      Potassium 4.5      Chloride 98      Carbon Dioxide (CO2) 29      Anion Gap 14      Urea Nitrogen 56.2 (*)     Creatinine 8.03 (*)     Calcium 7.7 (*)     Glucose 74      GFR Estimate 7 (*)    INR - Abnormal    INR 1.29 (*)    CBC WITH PLATELETS AND DIFFERENTIAL - Abnormal    WBC Count 5.6      RBC Count 2.95 (*)     Hemoglobin 8.9 (*)     Hematocrit 29.2 (*)     MCV 99      MCH 30.2      MCHC 30.5 (*)     RDW 19.2 (*)     Platelet Count 52 (*)     % Neutrophils 79      % Lymphocytes 12      % Monocytes 7      % Eosinophils 2      % Basophils 0      % Immature Granulocytes 0      NRBCs per 100 WBC 0      Absolute Neutrophils 4.4      Absolute Lymphocytes 0.7 (*)     Absolute Monocytes 0.4      Absolute Eosinophils 0.1      Absolute Basophils 0.0      Absolute Immature Granulocytes 0.0      Absolute NRBCs 0.0     MAGNESIUM - Normal    Magnesium 1.7     PARTIAL THROMBOPLASTIN TIME - Normal    aPTT 37     TYPE AND SCREEN, ADULT    ABO/RH(D) A POS      Antibody Screen Negative      SPECIMEN EXPIRATION DATE [id number]     ABO/RH TYPE AND SCREEN       RADIOLOGY:  No orders to display                ECG:  NSR, rate 91, no acute ischemia    I have independently reviewed and interpreted the EKG(s) documented above     PROCEDURES:  Procedures:  none      FINAL IMPRESSION:    ICD-10-CM    1. Hemoptysis  R04.2       2. ESRD on hemodialysis (H)  N18.6     Z99.2           0 minutes of critical care time      IYaya, am serving as a scribe to document services personally performed by Dr. Denzel Bergeron, based on my observations and the provider's statements to me. I, Denzel Bergeron, DO attest that  Yaya Guzman is acting in a scribe capacity, has observed my performance of the services and has documented them in accordance with my direction.      Denzel Bergeron DO  Emergency Medicine  [facility] EMERGENCY DEPARTMENT  10/12/2022  4:16 PM        Denzel Bergeron MD  10/12/22 1561

## 2022-10-12 NOTE — ED TRIAGE NOTES
Pt presents to [facility] alone via ems from dialysis for hemoptysis. Pt was 15 mins into his dialysis run when he started coughing up blood. Dialysis was stopped and pt was sent here. Pt is not on thinners. Reports he has been coughing up blood for about a month.

## 2022-10-13 NOTE — PROGRESS NOTES
Clinic Care Coordination Contact    Follow Up Progress Note      Assessment:  The pt was recently in the ED, I called to check up on the pt and help the pt setup a ED follow up. The pt was at [facility] for hemoptysis. I called and talked to the pt wife. She stated that the pt is doing fine. She is not sure what happened, but when she dropped off the pt to dialysis, they stated that he was coughing too much and was transported to the ED. They did not find anything wrong, so they came home. Pt is doing fine, he does not need a follow up.  Care Gaps:    Health Maintenance Due   Topic Date Due     YEARLY PREVENTIVE VISIT  Never done     LIPID  Never done     PARATHYROID  Never done     COLORECTAL CANCER SCREENING  Never done     ZOSTER IMMUNIZATION (1 of 2) Never done     DTAP/TDAP/TD IMMUNIZATION (1 - Tdap) Never done     MICROALBUMIN  07/17/2021     Pneumococcal Vaccine: Pediatrics (0 to 5 Years) and At-Risk Patients (6 to 64 Years) (2 - PPSV23 if available, else PCV20) 05/05/2022     COVID-19 Vaccine (4 - Booster for Moderna series) 06/22/2022     INFLUENZA VACCINE (1) 09/01/2022           Care Plans      Intervention/Education provided during outreach:               Plan:     Care Coordinator will follow up in

## 2022-10-13 NOTE — DISCHARGE INSTRUCTIONS
Follow up tomorrow as scheduled. Return for new/worsening concerns.  Continue outpatient management as scheduled for known lung and abdominal problems.

## 2022-10-13 NOTE — PROGRESS NOTES
THORACIC SURGERY - ESTABLISHED PATIENT OFFICE VISIT       Dear Dr. Flores,     I saw Elle Blanton in follow-up for the evaluation and treatment of a trapped RIGHT lung and draining chest wall sinus.      HPI  Elle Blanton is a 62 year old male with a trapped lung and he does not complain of dyspnea, but is very sedentary.  We had planned to perform an incision and drainage of his chest wound in an attempt to optimize control of his infection and for palliation, but he was admitted to the hospital the same day as surgery was planned. Since the end of August he has been admitted three times for peritoneal hemorrhage from a liver mass. Yesterday he was in the ER again for hemoptysis during dialysis, his dialysis was interrupted, but he was not admitted.     Currently, he has an ostomy bag over the spontaneously draining skin site.     Previsit Tests   Chest imaging over time  CT abdomen 8/19/2020:  Last normal imaging of lower chest  CXR 4/17/2021  US thoracentesis 4/18/2021:  Drained 2000 ml, clear serous fluid, transudate by lab tests     CT chest 4/22/2021: After placement of a chest tube on 4/19/2022; RIGHT lung not fully expanded  CXR 4/17/2021: Fully expanded RIGHT lung     No further imaging after 4/27/2021 until September 2021.  CT chest 9/26/2022:Small-to-moderate RIGHT pleural effusion in the setting of acute LEFT retroperitoneal hemorrhage     CXR 10/15/2021: Recurrent large RIGHT pleural effusion  US thoracentesis 10/15/2021: 1.5 l of clear fluid  US thoracentesis 10/16/2021: 350 ml cloudy yellow fluid; lab: turbid brown fluid, cultures negative.  CT chest after chest tube (10/21/2021): Complex hydropneumothorax with new pleural thickening compared to 9/26/2022; suggestive of empyema.     CT 11/9/2021: After chest tube removal; space is mostly filled with fluid with air-pockets     CT 11/14/2021: Drained fluid, trapped lung.     Thoracentesis 4/7/2022: S. Aureus 4+  CT 5/3/2022: New soft tissue mass at chest tube  site suggestive of a hematoma     CT 7/7/2022: Unchanged, less air in the pleural fluid collection, there does not appear to be a connection between the chest wall abscess and the intrapleural fluid.     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 pylori infection 7/5/2017     UGI bleed implied by Hgb 9.0 6/22/17 and melena. Admitted and hgb decreased to 7.6, CT abdomen showed all bladder wall thickening. + Hep B surface antigen noted. Melena resolved and hgb stabalized without transfusion, epigastric pain resolved with PPI. Recommend referral for gastroscopy.     Heart attack (H)       Hepatitis B       Normocytic anemia       Other pancytopenia (H) 7/5/2017 6/24/17 Peripheral smear at [facility].Pancytopenia of uncertain cause. Ruled out acute leukemia. Hematologist suggests hemolytic anemia should be considered possible cause and LDH and haptoglobin should be assessed in future.      PONV (postoperative nausea and vomiting)       Thrombocytopenia (H)           PSH  Reviewed, as below               Past Surgical History:   Procedure Laterality Date     ABCESS DRAINAGE         finger     BURSECTOMY ELBOW Left 10/15/2021     Procedure: LEFT OLECRANON BURSECTOMY;  Surgeon: Tico Myers MD;  Location: [facility] OR     BURSECTOMY ELBOW Left 1/18/2022     Procedure: LEFT ELBOW OLECRANON BURSECTOMY;  Surgeon: Tico Myers MD;  Location: [facility] OR     CREATE FISTULA ARTERIOVENOUS UPPER EXTREMITY Left 4/20/2021     Procedure: left arm dialysis graft placement;  Surgeon:  Roxana Cosby MD;  Location: [facility] OR;  Service: General     FOREIGN BODY REMOVAL         finger     INCISION AND DRAINAGE FINGER, COMBINED Left 10/20/2016     Procedure: COMBINED INCISION AND DRAINAGE FINGER;  Surgeon: Mireya Pizano MD;  Location: [facility] OR     INCISION AND DRAINAGE UPPER EXTREMITY, COMBINED Left 1/18/2022     Procedure: AND ELBOW INCISION AND DRAINAGE;  Surgeon: Tico Myers MD;  Location: [facility] OR     IR CHEST TUBE PLACEMENT NON-TUNNELED RIGHT   4/19/2021     IR CHEST TUBE PLACEMENT NON-TUNNELED RIGHT   10/19/2021     IR CHEST TUBE PLACEMENT NON-TUNNELED RIGHT   11/10/2021     IR CHEST TUBE PLACEMENT NON-TUNNELED RIGHT   3/31/2022     IR DIALYSIS FISTULOGRAM LEFT   4/2/2022     IR DIALYSIS FISTULOGRAM LEFT   4/4/2022     IR PLEURAL DRAINAGE WITH CATHETER INSERTION   4/19/2021     MIDLINE INSERTION - DOUBLE LUMEN   4/23/2021           ID ESOPHAGOGASTRODUODENOSCOPY TRANSORAL DIAGNOSTIC N/A 8/18/2020     Procedure: ESOPHAGOGASTRODUODENOSCOPY (EGD) with biopsy;  Surgeon: Nilson Gonzales MD;  Location: [facility] GI;  Service: Gastroenterology     US PARACENTESIS   8/14/2020     US PARACENTESIS   8/19/2020     US THORACENTESIS   4/18/2021         ETOH negative  TOB negative     Physical examination  /70 (BP Location: Right arm)   Pulse 64   SpO2 99%         He is cachectic, pale, and very debilitated. His right chest has a draining sinus with purulent fluid. He is in a wheelchair.     From a personal perspective, he is here with his wife, Maico. They are ong from [facility].     IMPRESSION   (J98.19) Trapped lung  (primary encounter diagnosis)  (R22.2) Chest wall mass  (N18.6,  Z99.2) ESRD (end stage renal disease) on dialysis (H)  (K74.60,  R18.8) Cirrhosis of liver with ascites, unspecified hepatic cirrhosis type (H)  (E43) Severe protein-calorie malnutrition (H)        This person is a 62 year old male with trapped RIGHT lung and draining chest wall sinus.    ESRD  Cirrhosis  Severe malnutrition      PLAN  I spent 40 min on the date of the encounter in chart review, patient visit, review of tests, documentation and/or discussion with other providers about the issues documented above. I reviewed the plan as follows:     Today Mr. Blanton looks even weaker then before, and we had a long conversation about just continuing with the wound drainage with an ostomy bag. The opening closes intermittently, but then reopens again. I explained that even a minor intervention in him would be very risky, and I am particularly concerned that he would have bleeding complications even after a minor incision and drainage, that could lead to overall decompensation. Mr. Mansfield appeared quite realistic today about his clinical situation.     He wants to think about this and at his request I'll see him back in clinic again.           I appreciate the opportunity to participate in the care of your patient and will keep you updated.  Sincerely,

## 2022-10-14 NOTE — TELEPHONE ENCOUNTER
Called Viviane to discuss. No answer and phone went to . Left  providing verbal orders for those requested. Writer requested call back to discuss sleep and prognosis. Will await return call, patient does have a follow up with PCP on 10/26 to further discuss care plan/prognosis. Romain MELENDEZ

## 2022-10-14 NOTE — TELEPHONE ENCOUNTER
Writer received return call from Elle's wife, Maico. Maico would like to inform Dr Flores, Zolpidem 5 mg and Hydroxyzine 25mg has not helped Elle sleep at night. Since last visit a couple of days ago, has tried taking Hydroxyzine for past two nights and has not been effect to help him sleep. Ze MELENDEZ

## 2022-10-14 NOTE — TELEPHONE ENCOUNTER
[facility] phone call message- general phone call:    Reason for call: Caller Viviane left a voicemail on clinic line and would like a call back from nurse before the end of day, Viviane states she would like to discuss patient not sleeping and might need adjustment with his sleep medication, also to discuss prognosis care plan , extended nursing for 1 x a week for 3 weeks now that she was able to make it to patients home. Please call and advise.     Return call needed: Yes    OK to leave a message on voice mail? Yes    Primary language: English      needed? Yes    Call taken on October 14, 2022 at 12:54 PM by Ana María Live

## 2022-10-14 NOTE — TELEPHONE ENCOUNTER
Viviane returned call.  Patient would like increase in ambien or alternative, only sleeping 2-3hours at a time and difficulties falling asleep. Patient told Viviane he would feel much better if he could sleep better.     Viviane also asked about patient's prognosis. Advised of current hospice conversations, role of dialysis, and patient's baseline. Viviane stated she had a long conversation with family explaining different hospice options. Patient's son willing to look into options and do some research. Viviane stated patient and family endorses concerns that they start hospice in the hospital and the patient will die right away after, this belief is due to a previous personal experience.     Routing to  and verbal hand-off regarding changing or increasing sleep angella Hoyos RN

## 2022-10-14 NOTE — TELEPHONE ENCOUNTER
Spoke to wife. Will send inc dose of Ambien to 10 mg.    Jaswant Flores MD  October 14, 2022  3:32 PM

## 2022-10-14 NOTE — TELEPHONE ENCOUNTER
Verbal hand-off to .  stated he spoke with Elle about trying hydroxyzine before bedtime to help with sleeping. Writer called patient's wife but no answer. Left VM advising patient to try hydroxyzine at HS, if not effective to call and let us know so we can prescribe higher dose or alternate medication. Romain MELENDEZ

## 2022-10-14 NOTE — TELEPHONE ENCOUNTER
[facility] phone call message - order or referral request for patient:     Order or referral being requested: Orders for ultrasound Para      Additional Comments: Patient son would like patient to have this done again if possible but needing orders placed to schedule. Patient son also states patient would like a call back if possible. Please call and advise.     OK to leave a message on voice mail? Yes    Primary language: English      needed? Yes    Call taken on October 14, 2022 at 2:18 PM by Ana María Live

## 2022-10-14 NOTE — TELEPHONE ENCOUNTER
Paracentesis order placed. Please inform son he can proceed with scheduling. Thanks!    Jaswant Flores MD  October 14, 2022  3:27 PM

## 2022-10-20 NOTE — TELEPHONE ENCOUNTER
[facility] phone call message- general phone call:    Reason for call: Viviane calling to let  know that the increase of the zolpidem was effective and much improved mood and life today.      Return call needed: No    OK to leave a message on voice mail? Yes    Primary language: English      needed? Yes    Call taken on October 20, 2022 at 9:54 AM by Ana María Live

## 2022-10-20 NOTE — TELEPHONE ENCOUNTER
Per staff message, naloxone sent:    Terri Vyas LPN Kapur, Rahul, MD Dr Kapur     [facility] sending  refill request that patient is requesting for;  and we have no prescription for..Naloxone HCL 4 MG spray. Please put in new prescription if you agree.   Thanks   Terri MORALES

## 2022-10-20 NOTE — TELEPHONE ENCOUNTER
MTM referral from: Transitions of Care (recent hospital discharge or ED visit)    MTM referral outreach attempt on October 20, 2022 at 9:08 AM      Outcome: Patient is not interested at this time, will route to MTM Pharmacist/Provider as an FYI. Thank you for the referral.     Levar Marroquin [facility]

## 2022-10-26 NOTE — ED NOTES
ED Triage Provider Note  [facility] EMERGENCY DEPARTMENT  Encounter Date: Oct 26, 2022    History:  Chief Complaint   Patient presents with     Fatigue     Elle Blanton is a 62 year old male who presents to the ED reports of weakness and shortness of breath.  Patient typically dialyzed Monday Wednesday Fridays.  At dialysis told hemoglobin was 5.9.  Patient with recent hemoptysis and chronic pneumohemothorax.  Draining fistula with ostomy bag covering it.   Paracentesis on Friday    Review of Systems: Negative fevers, chills.  Positive cough.  Positive exertional dyspnea. remainder review of systems negative      Exam:  There were no vitals taken for this visit.  General: No acute distress. Appears stated age.   Cardio: Rapid rate, extremities well perfused  Resp: Normal work of breathing, dry cough, decreased breath sounds on right   Abdo: Distended abdomen.  Mild diffuse tenderness.    Neuro: Alert. Facial movement grossly symmetric. Grossly intact strength.   {Medical Decision Making:  Patient arriving to the ED with problem as above. A medical screening exam was performed.  Patient with marked anemia.  Likely anemia of chronic disease with and related to recent episodes hemoptysis.  Diminished breath sounds on the right suggestive of recurrent pleural effusion.  Markedly distended abdomen suggesting ascites.  Baseline blood work sent.  Patient immediately triaged to the emergency room here.  Baseline laboratory evaluation ordered        Glen Duron MD  10/26/2022 at 2:30 PM     Glen Duron MD  10/26/22 0643

## 2022-10-26 NOTE — PROGRESS NOTES
ASSESSMENT/PLAN:    (D62) Anemia due to blood loss, acute  (primary encounter diagnosis)  Comment: unfortunately hemoglobin today is 5.9; will send to ED for transfusion; I explained that they may keep him if he requires dialysis today as this is his usual day; we discussed goals of care and the fact that he continues to require transfusions, paracentesis, dialysis and has a draining ostomy; they wish to continue current level of cares and defer transition to home hospice at this time   Plan: Hemoglobin            Jaswant Flores MD  October 26, 2022  2:06 PM        Pt is a 62 year old male last seen on 10/12/22 here today for:     1) ED Follow-up - after our visit on 10/12 he went to dialysis and they unfortunately sent him to the ED for hemoptysis despite his stable hgb. He was discharged that evening   Since last visit he notes more fatigue x 1 week  No hemoptysis  Ostomy bad has continued blood drainage -> maybe more  In follow-up w/ Dr Zurita, he said that he would not operate      Per ED note on 10/12/22:  Pt is a complicated pt with known chronic hydropneumothorax on the right with associated draining sinus tract wiht ostomy bag overlying this.  Pt has surgery again planned for htis site on Thursday.  He is ESRD on HD MWF and started run today for 15 minutes, but had it stopped as he coughed up blood. This is not a new thing as it's ongoing related to right lung complications and unchanged.  Pt is not anticoagulated. Denies cp/sob or other acute changes.  Pt arrives with fistula clamps in place as dialysis just completed.  Nursing took one of them down and bleeding started. Clamp applied once more and will monitor. Checking labs and CXR, although I suspect this is all baseline.  Pt has surgery planned tomorrow and would ideally be discharged tonight so that he can proceed with this.    2)  Trapped lung -   Per Dr Zurita's note on 10/13/22:  KAISER Blanton is a 62 year old male with a trapped lung and he does  not complain of dyspnea, but is very sedentary.  We had planned to perform an incision and drainage of his chest wound in an attempt to optimize control of his infection and for palliation, but he was admitted to the hospital the same day as surgery was planned. Since the end of August he has been admitted three times for peritoneal hemorrhage from a liver mass. Yesterday he was in the ER again for hemoptysis during dialysis, his dialysis was interrupted, but he was not admitted.     Currently, he has an ostomy bag over the spontaneously draining skin site.    This person is a 62 year old male with trapped RIGHT lung and draining chest wall sinus.   ESRD  Cirrhosis  Severe malnutrition      PLAN    Today Mr. Blanton looks even weaker then before, and we had a long conversation about just continuing with the wound drainage with an ostomy bag. The opening closes intermittently, but then reopens again. I explained that even a minor intervention in him would be very risky, and I am particularly concerned that he would have bleeding complications even after a minor incision and drainage, that could lead to overall decompensation. Mr. Manfsield appeared quite realistic today about his clinical situation.     He wants to think about this and at his request I'll see him back in clinic again.    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 pylori infection 7/5/2017    UGI bleed implied by Hgb 9.0 6/22/17 and melena. Admitted and hgb decreased to 7.6, CT abdomen showed all bladder wall thickening. + Hep B surface antigen noted. Melena resolved and hgb stabalized without transfusion, epigastric pain resolved with PPI. Recommend referral for gastroscopy.     Heart attack (H)      Hepatitis B      Normocytic anemia      Other pancytopenia (H) 7/5/2017 6/24/17 Peripheral smear at [facility].Pancytopenia of uncertain cause. Ruled out acute leukemia. Hematologist suggests hemolytic anemia should be considered possible cause and LDH and haptoglobin should be assessed in future.      PONV (postoperative nausea and vomiting)      Thrombocytopenia (H)       Past Surgical History:   Procedure Laterality Date     ABCESS DRAINAGE      finger     BURSECTOMY ELBOW Left 10/15/2021    Procedure: LEFT OLECRANON BURSECTOMY;  Surgeon: Tico Myers MD;  [facility] [facility] [place]     BURSECTOMY ELBOW Left 1/18/2022    Procedure: LEFT ELBOW OLECRANON BURSECTOMY;  Surgeon: Tico Myers MD;  Location: [facility] OR     CREATE FISTULA ARTERIOVENOUS UPPER EXTREMITY Left 4/20/2021    Procedure: left arm dialysis graft placement;  Surgeon: Roxana Cosby MD;  Location: [facility] OR;  Service: General     FOREIGN BODY REMOVAL      finger     INCISION AND DRAINAGE FINGER, COMBINED Left 10/20/2016    Procedure: COMBINED INCISION AND DRAINAGE FINGER;  Surgeon: Mireya Pizano MD;  Location: [place] OR     INCISION AND DRAINAGE UPPER EXTREMITY, COMBINED Left 1/18/2022    Procedure: AND ELBOW INCISION AND DRAINAGE;  Surgeon: Tico Myers MD;  Location: [facility] OR     INSERT PICC LINE Right 8/25/2022    Procedure: INSERTION, PICC;  Surgeon: Osmin Villa MD;  Location: [facility] OR     IR CHEST TUBE PLACEMENT NON-TUNNELED RIGHT  4/19/2021     IR CHEST TUBE PLACEMENT NON-TUNNELED RIGHT  10/19/2021     IR CHEST TUBE PLACEMENT  "NON-TUNNELED RIGHT  11/10/2021     IR CHEST TUBE PLACEMENT NON-TUNNELED RIGHT  3/31/2022     IR DIALYSIS FISTULOGRAM LEFT  4/2/2022     IR DIALYSIS FISTULOGRAM LEFT  4/4/2022     IR PICC PLACEMENT > 5 YRS OF AGE  8/25/2022     IR PICC PLACEMENT > 5 YRS OF AGE  9/18/2022     IR PLEURAL DRAINAGE WITH CATHETER INSERTION  4/19/2021     IR VISCERAL ANGIOGRAM  8/26/2022     IR VISCERAL EMBOLIZATION  9/18/2022     MIDLINE INSERTION - DOUBLE LUMEN  4/23/2021          CT ESOPHAGOGASTRODUODENOSCOPY TRANSORAL DIAGNOSTIC N/A 8/18/2020    Procedure: ESOPHAGOGASTRODUODENOSCOPY (EGD) with biopsy;  Surgeon: Nilson Gonzales MD;  Location: [facility];  Service: Gastroenterology      PARACENTESIS  8/14/2020      PARACENTESIS  8/19/2020      THORACENTESIS  4/18/2021        No Known Allergies     ROS:   Gen- no weight change, no fevers/chills   Cardiac - no palpitations, no chest pain   Respiratory - + shortness of breath - see HPI, no wheezing   Remainder of ROS negative.     Exam:   /72 (BP Location: Right arm, Cuff Size: Adult Regular)   Pulse 107   Temp 98  F (36.7  C) (Oral)   Resp 16   Ht 1.651 m (5' 5\")   Wt 56.5 kg (124 lb 8 oz)   SpO2 97%   BMI 20.72 kg/m     Alert and oriented x 3; No acute distress       "

## 2022-10-26 NOTE — ED TRIAGE NOTES
Pt with c/o low hgb after being seen at the clinic.  Pt's hgb 5.9.  Pt with abdominal distention.  Pt c/o feeling fatigue.  Denies sob.

## 2022-10-26 NOTE — DISCHARGE INSTRUCTIONS
You received a blood transfusion today.  Follow up with your primary care doctor and return to the ER for any worsening symptoms or other concerns.

## 2022-10-26 NOTE — ED PROVIDER NOTES
EMERGENCY DEPARTMENT ENCOUNTER      NAME: Elle Blanton  AGE: 62 year old male  YOB: 1960  MRN: [id number]  EVALUATION DATE & TIME: 10/26/2022  2:33 PM    PCP: Jaswant Flores    ED PROVIDER: Nevin Gale MD    Chief Complaint   Patient presents with     Fatigue     ED COURSE & MEDICAL DECISION MAKING:    Pertinent Labs & Imaging studies reviewed. (See chart for details)    62 year old male presents to the Emergency Department for evaluation of low hemoglobin found to be 5.9 at dialysis, here for blood transfusion. He has a history of recurrent anemia secondary to blood loss; no new symptoms today aside from worsening of fatigue, currently denying hemoptysis, black or bloody stools. Hemoglobin here in clinic is 6.7; one unit of blood has been ordered.     At time of sign out, one unit of blood is being administered.     =================================================================    HPI      Elle Blanton is a 62 year old male with a pertinent history of ESRD, anemia in end-stage renal disease and blood loss, pleural effusion, and liver mass who presents to this ED for evaluation of low hemoglobin detected at dialysis, found to be 5.9. He notes that he has been feeling more fatigued over the past week, but denies hemoptysis, hematemesis, or black or bloody stools. Patient has a history of blood loss via his chest tube that is continuously draining his pleural effusion and blood loss via his liver mass. He has been eating and drinking with normal activities at home. Denies fever, chest pain, shortness of breath, or new abdominal pain.     REVIEW OF SYSTEMS   Review of Systems     Patient denies fever, fatigue, chest pain, shortness of breath, cough, abdominal pain, nausea/vomiting, diarrhea, black or bloody stools, dysuria, increased urinary urgency/frequency, or rash.     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 pylori infection 7/5/2017    UGI bleed implied by Hgb 9.0 6/22/17 and melena. Admitted and hgb decreased to 7.6, CT abdomen showed all bladder wall thickening. + Hep B surface antigen noted. Melena resolved and hgb stabalized without transfusion, epigastric pain resolved with PPI. Recommend referral for gastroscopy.     Heart attack (H)      Hepatitis B      Normocytic anemia      Other pancytopenia (H) 7/5/2017 6/24/17 Peripheral smear at [facility].Pancytopenia of uncertain cause. Ruled out acute leukemia. Hematologist suggests hemolytic anemia should be considered possible cause and LDH and haptoglobin should be assessed in future.      PONV (postoperative nausea and vomiting)      Thrombocytopenia (H)        PAST SURGICAL HISTORY:  Past Surgical History:   Procedure Laterality Date     ABCESS DRAINAGE      finger     BURSECTOMY ELBOW Left 10/15/2021    Procedure: LEFT OLECRANON BURSECTOMY;  Surgeon: Tico Myers MD;  Location: [facility] [place]     BURSECTOMY ELBOW Left 1/18/2022    Procedure: LEFT ELBOW OLECRANON BURSECTOMY;  Surgeon: Tico Myers MD;  Location: [facility]     CREATE FISTULA ARTERIOVENOUS UPPER EXTREMITY Left 4/20/2021    Procedure: left arm dialysis graft placement;  Surgeon: Roxana Cosby MD;  Location: [facility];  Service: General     FOREIGN BODY REMOVAL      finger     INCISION AND DRAINAGE FINGER, COMBINED Left 10/20/2016    Procedure: COMBINED INCISION AND DRAINAGE FINGER;  Surgeon: Mireya Pizano MD;  Location: [facility]     INCISION AND DRAINAGE UPPER EXTREMITY, COMBINED Left 1/18/2022     Procedure: AND ELBOW INCISION AND DRAINAGE;  Surgeon: Tico Myers MD;  Location: [facility] OR     INSERT PICC LINE Right 8/25/2022    Procedure: INSERTION, PICC;  Surgeon: Osmin Villa MD;  Location: [facility] OR     IR CHEST TUBE PLACEMENT NON-TUNNELED RIGHT  4/19/2021     IR CHEST TUBE PLACEMENT NON-TUNNELED RIGHT  10/19/2021     IR CHEST TUBE PLACEMENT NON-TUNNELED RIGHT  11/10/2021     IR CHEST TUBE PLACEMENT NON-TUNNELED RIGHT  3/31/2022     IR DIALYSIS FISTULOGRAM LEFT  4/2/2022     IR DIALYSIS FISTULOGRAM LEFT  4/4/2022     IR PICC PLACEMENT > 5 YRS OF AGE  8/25/2022     IR PICC PLACEMENT > 5 YRS OF AGE  9/18/2022     IR PLEURAL DRAINAGE WITH CATHETER INSERTION  4/19/2021     IR VISCERAL ANGIOGRAM  8/26/2022     IR VISCERAL EMBOLIZATION  9/18/2022     MIDLINE INSERTION - DOUBLE LUMEN  4/23/2021          DE ESOPHAGOGASTRODUODENOSCOPY TRANSORAL DIAGNOSTIC N/A 8/18/2020    Procedure: ESOPHAGOGASTRODUODENOSCOPY (EGD) with biopsy;  Surgeon: Nilson Gonzales MD;  Location: [facility];  Service: Gastroenterology     US PARACENTESIS  8/14/2020     US PARACENTESIS  8/19/2020     US THORACENTESIS  4/18/2021     CURRENT MEDICATIONS:    acetaminophen (TYLENOL) 500 MG tablet  benzonatate (TESSALON) 100 MG capsule  calcium acetate (PHOSLO) 667 MG CAPS capsule  carvedilol (COREG) 12.5 MG tablet  cephALEXin (KEFLEX) 250 MG capsule  diltiazem ER (DILT-XR) 180 MG 24 hr capsule  entecavir (BARACLUDE) 0.5 MG tablet  guaiFENesin-codeine (GUAIFENESIN AC) 100-10 MG/5ML syrup  hydrOXYzine (ATARAX) 25 MG tablet  lactulose (CHRONULAC) 10 GM/15ML solution  naloxone (NARCAN) 4 MG/0.1ML nasal spray  order for DME  oxyCODONE (ROXICODONE) 5 MG tablet  pantoprazole (PROTONIX) 40 MG EC tablet  senna-docusate (SENOKOT-S/PERICOLACE) 8.6-50 MG tablet  sodium chloride, PF, (SODIUM CHLORIDE FLUSH) 0.9% PF flush  sodium chloride, PF, (SODIUM CHLORIDE) 0.9% PF flush  zolpidem (AMBIEN) 10 MG tablet      ALLERGIES:  No Known  Allergies    FAMILY HISTORY:  Family History   Problem Relation Age of Onset     Ulcers Father      Hypertension No family hx of      Asthma No family hx of      Cancer No family hx of      Coronary Artery Disease No family hx of      Liver Cancer No family hx of        SOCIAL HISTORY:   Social History     Socioeconomic History     Marital status:    Tobacco Use     Smoking status: Never     Smokeless tobacco: Never   Vaping Use     Vaping Use: Never used   Substance and Sexual Activity     Alcohol use: No     Drug use: No     Sexual activity: Yes     Partners: Female       VITALS:  BP (!) 138/90   Pulse 103   Temp 98.5  F (36.9  C)   Resp 20   Wt 56.2 kg (124 lb)   SpO2 97%   BMI 20.63 kg/m      PHYSICAL EXAM    Constitutional: Fatigued, thin  HENT: Normocephalic, Atraumatic, Bilateral external ears normal, Oropharynx normal, mucous membranes moist, Nose normal. Neck-  Normal range of motion, No tenderness, Supple, No stridor.    Eyes: PERRL, EOMI, Conjunctiva normal, No discharge.   Respiratory: Decreased breath sounds on the right side, normal on the left without crackles or wheeze. Normal work of breathing.   Cardiovascular: Normal heart rate, Regular rhythm,  No murmurs, No rubs, No gallops. Chest wall nontender.    GI: Bowel sounds normal, Soft, No tenderness, No masses, No flank tenderness. Belly is distended  Musculoskeletal: No edema. No cyanosis, No clubbing. Good range of motion in all major joints. No tenderness to palpation or major deformities noted.  Integument: Warm, Dry, No erythema, No rash.   Neurologic: Alert & oriented x 3, Normal motor function, Normal sensory function, No focal deficits noted.    Psychiatric: Affect normal, Judgment normal, Mood normal. Cooperative.     LAB:  All pertinent labs reviewed and interpreted.  Results for orders placed or performed during the hospital encounter of 10/26/22   Chest XR,  PA & LAT    Impression    IMPRESSION: No new findings.    Large  right-sided hydropneumothorax with an air-fluid level is unchanged. There is lobulated soft tissue along the superior aspect of the right chest wall, new since the August chest x-ray. This may reflect reactive pleural thickening.    Chronic atelectasis of most of the right lung. Left lung is clear.    Ascending aortic aneurysm is better characterized on the recent chest CTA. Left innominate vein stent.    Heart is of normal size.   Comprehensive metabolic panel   Result Value Ref Range    Sodium 135 (L) 136 - 145 mmol/L    Potassium 5.3 3.4 - 5.3 mmol/L    Chloride 97 (L) 98 - 107 mmol/L    Carbon Dioxide (CO2) 24 22 - 29 mmol/L    Anion Gap 14 7 - 15 mmol/L    Urea Nitrogen 71.7 (H) 8.0 - 23.0 mg/dL    Creatinine 8.99 (H) 0.67 - 1.17 mg/dL    Calcium 7.4 (L) 8.8 - 10.2 mg/dL    Glucose 71 70 - 99 mg/dL    Alkaline Phosphatase 188 (H) 40 - 129 U/L    AST 25 10 - 50 U/L    ALT 22 10 - 50 U/L    Protein Total 8.3 6.4 - 8.3 g/dL    Albumin 2.4 (L) 3.5 - 5.2 g/dL    Bilirubin Total 0.4 <=1.2 mg/dL    GFR Estimate 6 (L) >60 mL/min/1.73m2   Result Value Ref Range    INR 1.30 (H) 0.85 - 1.15   CBC with platelets and differential   Result Value Ref Range    WBC Count 4.6 4.0 - 11.0 10e3/uL    RBC Count 2.16 (L) 4.40 - 5.90 10e6/uL    Hemoglobin 6.7 (LL) 13.3 - 17.7 g/dL    Hematocrit 22.2 (L) 40.0 - 53.0 %     (H) 78 - 100 fL    MCH 31.0 26.5 - 33.0 pg    MCHC 30.2 (L) 31.5 - 36.5 g/dL    RDW 18.8 (H) 10.0 - 15.0 %    Platelet Count 60 (L) 150 - 450 10e3/uL    % Neutrophils 75 %    % Lymphocytes 18 %    % Monocytes 6 %    % Eosinophils 1 %    % Basophils 0 %    % Immature Granulocytes 0 %    NRBCs per 100 WBC 0 <1 /100    Absolute Neutrophils 3.5 1.6 - 8.3 10e3/uL    Absolute Lymphocytes 0.8 0.8 - 5.3 10e3/uL    Absolute Monocytes 0.3 0.0 - 1.3 10e3/uL    Absolute Eosinophils 0.0 0.0 - 0.7 10e3/uL    Absolute Basophils 0.0 0.0 - 0.2 10e3/uL    Absolute Immature Granulocytes 0.0 <=0.4 10e3/uL    Absolute NRBCs 0.0  10e3/uL   Adult Type and Screen   Result Value Ref Range    ABO/RH(D) A POS     Antibody Screen Negative Negative    SPECIMEN EXPIRATION DATE [id number]    Prepare red blood cells (unit)   Result Value Ref Range    Blood Component Type Red Blood Cells     Product Code [id number]     Unit Status Transfused     Unit Number [id number]     CROSSMATCH Compatible     CODING SYSTEM [id number]     ISSUE DATE AND TIME [id number]     UNIT ABO/RH A+     UNIT TYPE ISBT [id number]        RADIOLOGY:  Reviewed all pertinent imaging. Please see official radiology report.  Chest XR,  PA & LAT   Final Result   IMPRESSION: No new findings.      Large right-sided hydropneumothorax with an air-fluid level is unchanged. There is lobulated soft tissue along the superior aspect of the right chest wall, new since the August chest x-ray. This may reflect reactive pleural thickening.      Chronic atelectasis of most of the right lung. Left lung is clear.      Ascending aortic aneurysm is better characterized on the recent chest CTA. Left innominate vein stent.      Heart is of normal size.        Nevin Gale MD  [facility], PGY-2     Nevin Gale MD  Resident  10/26/22 [id number]

## 2022-10-27 NOTE — TELEPHONE ENCOUNTER
[facility] phone call message- general phone call:    Reason for call: Viviane called just wanting to inform  patient missed his visit today, no answer from the patient or response back from messages left today.     Return call needed: No    OK to leave a message on voice mail? Yes    Primary language: English      needed? No    Call taken on October 27, 2022 at 3:07 PM by Ana María Live

## 2022-10-27 NOTE — PROGRESS NOTES
Clinic Care Coordination Contact    Follow Up Progress Note      Assessment: The pt was recently at the ED, I called to check up on the pt and help the pt setup a ED follow up. The pt was at [facility] for fatigue. I called the pt, but got his vm, so I left a vm for the pt to give me a call back.     Care Gaps:    Health Maintenance Due   Topic Date Due     YEARLY PREVENTIVE VISIT  Never done     LIPID  Never done     PARATHYROID  Never done     COLORECTAL CANCER SCREENING  Never done     ZOSTER IMMUNIZATION (1 of 2) Never done     DTAP/TDAP/TD IMMUNIZATION (1 - Tdap) Never done     MICROALBUMIN  07/17/2021     Pneumococcal Vaccine: Pediatrics (0 to 5 Years) and At-Risk Patients (6 to 64 Years) (2 - PPSV23 if available, else PCV20) 05/05/2022     COVID-19 Vaccine (4 - Booster for Moderna series) 06/22/2022     INFLUENZA VACCINE (1) 09/01/2022           Care Plans      Intervention/Education provided during outreach:               Plan:     Care Coordinator will follow up in

## 2022-10-28 NOTE — TELEPHONE ENCOUNTER
[facility] phone call message - order or referral request for patient:     Order or referral being requested: Lab orders to recheck hemoglobin.       Additional Comments: Patient wife callesd stating Patient was informed to go to [facility] to check his hemoglobin but they were only about to get a little of his blood and was informed to go back to primary clinic in 2 days to recheck his hemoglobin, wife wanting to know if lab orders can just be placed to have that recheck or if an appointment is recommended. Call and advise if needed.     OK to leave a message on voice mail? Yes       Primary language: English      needed? No    Call taken on October 28, 2022 at 12:49 PM by Ana María Live

## 2022-10-28 NOTE — TELEPHONE ENCOUNTER
Noted. Likely due to recent ED visit for severe anemia.     Jaswant Flores MD  October 28, 2022  11:10 AM

## 2022-10-31 NOTE — TELEPHONE ENCOUNTER
Hemoglobin lab order placed. Team - please help schedule a lab visit for Elle this week. I will be in clinic Tues and Wed PM so it would be ideal if he had it done then as I would be able to manage right away. Thanks!    Jaswant Flores MD  October 31, 2022  12:47 PM

## 2022-10-31 NOTE — PROGRESS NOTES
Clinic Care Coordination Contact    Follow Up Progress Note      Assessment: The pt was recently in the ED, I called to check up on the pt, and help the pt setup a ED follow up. The pt was at [facility] for fatigue. I called and talked to the pt wife, she stated that the pt is doing ok. She stated that she called and made a follow up for 11/08/2022 at 3:20pm.    Care Gaps:    Health Maintenance Due   Topic Date Due     YEARLY PREVENTIVE VISIT  Never done     LIPID  Never done     PARATHYROID  Never done     COLORECTAL CANCER SCREENING  Never done     ZOSTER IMMUNIZATION (1 of 2) Never done     DTAP/TDAP/TD IMMUNIZATION (1 - Tdap) Never done     MICROALBUMIN  07/17/2021     Pneumococcal Vaccine: Pediatrics (0 to 5 Years) and At-Risk Patients (6 to 64 Years) (2 - PPSV23 if available, else PCV20) 05/05/2022     COVID-19 Vaccine (4 - Booster for Moderna series) 06/22/2022     INFLUENZA VACCINE (1) 09/01/2022           Care Plans      Intervention/Education provided during outreach:               Plan:     Care Coordinator will follow up in

## 2022-10-31 NOTE — TELEPHONE ENCOUNTER
Patient wife called this morning to follow up on message, informed still waiting for a response from , wife wanting to know if  will be ok to place orders just for his hemoglobin or if it's appropriate for patient to see  as well please advise. Patient wife did schedule for 11/8 at 3:20 pm if appointment was needed to recheck.

## 2022-11-08 PROBLEM — R18.8 OTHER ASCITES: Status: ACTIVE | Noted: 2022-01-01

## 2022-11-08 PROBLEM — R40.0 SOMNOLENCE: Status: ACTIVE | Noted: 2022-01-01

## 2022-11-08 PROBLEM — E87.70 HYPERVOLEMIA, UNSPECIFIED HYPERVOLEMIA TYPE: Status: ACTIVE | Noted: 2022-01-01

## 2022-11-08 NOTE — ED NOTES
"ED Triage Provider Note  [facility] EMERGENCY DEPARTMENT  Encounter Date: Nov 8, 2022    History:  AMS  Sob  abd distension    Elle Blanton is a 62 year old male who presents to the ED with weakness and AMS.  ESRD, liver CA, recurrent asites.  In clinic, ams, weakness.  Patient reports abd distension, feels he needs drainage.  Labs from clinic pending.  ESRD MWFR dialysis.  Missed dialysis yesterday.    Review of Systems:  Sob abd distension    Exam:  BP (!) 141/97   Pulse 105   Temp 97.5  F (36.4  C) (Oral)   Resp 18   Ht 1.651 m (5' 5\")   Wt 58.1 kg (128 lb)   SpO2 98%   BMI 21.30 kg/m    General:appears fatigued, appears older than stated age  Cardio: normal rate, extremities well perfused  Resp: Normal work of breathing, grossly normal respiratory rate  Neuro: Alert. Facial movement grossly symmetric. Grossly intact strength.     Medical Decision Making:  Patient arriving to the ED with problem as above. A medical screening exam was performed. Initial differential diagnosis includes but not limited to fluid overload, ascites, infection.    Orders Placed This Encounter   Procedures     Chest XR,  PA & LAT     INR     Comprehensive metabolic panel     Lipase     Lactic acid whole blood     Troponin T, High Sensitivity     Ammonia     Magnesium     Ethyl Alcohol Level     UA with Microscopic reflex to Culture     ECG 12-LEAD WITH MUSE (LHE)     Peripheral IV catheter     CBC with platelets differential     ABO/Rh type and screen    orders initiated from Triage. The patient is most appropriate to be immediately roomed.       Parker Mukherjee MD  11/8/2022 at 11:38 AM     Parker Mukherjee MD  11/08/22 1141    "

## 2022-11-08 NOTE — ED NOTES
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 pylori infection 7/5/2017    UGI bleed implied by Hgb 9.0 6/22/17 and melena. Admitted and hgb decreased to 7.6, CT abdomen showed all bladder wall thickening. + Hep B surface antigen noted. Melena resolved and hgb stabalized without transfusion, epigastric pain resolved with PPI. Recommend referral for gastroscopy.    Heart attack (H)     Hepatitis B     Normocytic anemia     Other pancytopenia (H) 7/5/2017 6/24/17 Peripheral smear at [facility].Pancytopenia of uncertain cause. Ruled out acute leukemia. Hematologist suggests hemolytic anemia should be considered possible cause and LDH and haptoglobin should be assessed in future.     PONV (postoperative nausea and vomiting)     Somnolence 11/8/2022    Thrombocytopenia (H)         Code Status:  No CPR- Do NOT Intubate     Falls Risk: Yes Band:     Current Living Situation/Residence: lives with their son or daughter     Elimination Status: Continent: Yes     Activity Level: Total assist/lift    Patients Preferred Language:  English     Needed: No    Vital Signs:  /61   Pulse 104   Temp 97.5  F (36.4  C) (Oral)   Resp 27   Ht 1.651 m (5' 5\")   Wt 58.1 kg (128 lb)   SpO2 98%   BMI " 21.30 kg/m       Cardiac Rhythm:     Pain Score: 0/10    Is the Patient Confused:  No    Last Food or Drink: 11/7/22 at     Focused Assessment:  Pt arrived with abdominal pain, SOB. Hx liver cx, liver failure. Paracentesis 2 weeks ago, drained 2.5L. Paracentesis today, drained 6.9L. Relief of SOB after paracentesis. R lung hydropneumothorax, drain to R posterior chest, draining bloody drainage. BP soft, HR normal to tachy after paracentesis. Needs dialysis, has not been since Friday.    Tests Performed: Done: Labs and Imaging    Treatments Provided:  Paracentesis    Family Dynamics/Concerns: No    Family Updated On Visitor Policy: Yes    Plan of Care Communicated to Family: Yes    Who Was Updated about Plan of Care: wife, son    Belongings Checklist Done and Signed by Patient: No    Medications sent with patient: carvedilol, lactulose    Covid: no order    Additional Information:     RN: Ken Redding RN   11/8/2022 5:39 PM

## 2022-11-08 NOTE — ED NOTES
1430: patient returned from xray, trying to sit on edge of bed, states difficulty breathing, heart rate at 130's, RR 30-40. Dr Torres in room. To ultrasound via cart. Will return to room 2, will need dialysis

## 2022-11-08 NOTE — CONSULTS
RENAL CONSULT NOTE    REQUESTING PHYSICIAN: Dr. Torres    REASON FOR CONSULT: ESRD on HD    ASSESSMENT/PLAN:    ESRD on HD: Dialyze at [facility], MWF schedule. Dr. Hill is his primary nephrologist. Last run on 9/14/22.  mins there. TW 56 kg. Plan for MWF schedule with hospital admission.    Acid-Base: Bicarb 20, monitor with HD     CKD-BMD: Ca+ 7.8 (corrected Ca+ 9.1). continue PTA phoslo 667 mg tid wm.      Anemia: Hg 8.0. PTA on Epogen 33126 U 3 Q W with HD, resume. Transfuse per primary team for hg <7.0    Access: L AV AVG, +thrill and bruit on exam.     Liver CA/Recurrent Ascites: SOB in ED with distended abdomin. S/p  Paracentesis 11/8 breathing improved. HD in AM.       HPI:Franny is a 62 year old M with PMH significant for ESRD on HD MWF at [facility], HTN, hepatitis B, recurrent Hydrea hemothorax on the right side not a candidate for surgical decortication status post multiple chest tubes, chronic type B aortic dissection, Liver Ca/reccurent ascites.  11/8 presented to ED with weakness,a abdominal distention, missed HD yesterday 11/7.     Renal consulted for ESRD on HD.   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 pylori  infection 7/5/2017    UGI bleed implied by Hgb 9.0 6/22/17 and melena. Admitted and hgb decreased to 7.6, CT abdomen showed all bladder wall thickening. + Hep B surface antigen noted. Melena resolved and hgb stabalized without transfusion, epigastric pain resolved with PPI. Recommend referral for gastroscopy.     Heart attack (H)      Hepatitis B      Normocytic anemia      Other pancytopenia (H) 7/5/2017 6/24/17 Peripheral smear at [facility].Pancytopenia of uncertain cause. Ruled out acute leukemia. Hematologist suggests hemolytic anemia should be considered possible cause and LDH and haptoglobin should be assessed in future.      PONV (postoperative nausea and vomiting)      Somnolence 11/8/2022     Thrombocytopenia (H)        No current facility-administered medications on file prior to encounter.  acetaminophen (TYLENOL) 500 MG tablet, Take 500 mg by mouth every 6 hours as needed for mild pain  calcium acetate (PHOSLO) 667 MG CAPS capsule, Take 1 capsule by mouth 2 times daily (with meals)  carvedilol (COREG) 12.5 MG tablet, Take 1 tablet (12.5 mg) by mouth 2 times daily (with meals) (Patient taking differently: Take 12.5 mg by mouth 2 times daily as needed)  cephALEXin (KEFLEX) 250 MG capsule, Take 1 capsule (250 mg) by mouth 2 times daily  diltiazem ER (DILT-XR) 180 MG 24 hr capsule, Take 1 capsule (180 mg) by mouth daily (Patient taking differently: Take 180 mg by mouth daily as needed)  entecavir (BARACLUDE) 0.5 MG tablet, Take 1 tablet (0.5 mg) by mouth every 7 days (Patient taking differently: Take 0.5 mg by mouth every 7 days Sunday)  guaiFENesin-codeine (GUAIFENESIN AC) 100-10 MG/5ML syrup, Take 5 mLs by mouth every 4 hours as needed for cough  hydrOXYzine (ATARAX) 25 MG tablet, Take 1 tablet (25 mg) by mouth 3 times daily as needed for itching  lactulose (CHRONULAC) 10 GM/15ML solution, Take 30 mLs (20 g) by mouth 3 times daily Takes 2 to 3 times daily, holds for loose stool (Patient taking  differently: Take 20 g by mouth 2 times daily as needed Takes 2 to 3 times daily, holds for loose stool)  naloxone (NARCAN) 4 MG/0.1ML nasal spray, Spray 1 spray (4 mg) into one nostril alternating nostrils as needed for opioid reversal every 2-3 minutes until assistance arrives  oxyCODONE (ROXICODONE) 5 MG tablet, Take 1 tablet (5 mg) by mouth 2 times daily as needed for severe pain (7-10)  pantoprazole (PROTONIX) 40 MG EC tablet, Take 1 tablet (40 mg) by mouth daily  senna-docusate (SENOKOT-S/PERICOLACE) 8.6-50 MG tablet, Take 1 tablet by mouth daily as needed for constipation  zolpidem (AMBIEN) 10 MG tablet, Take 1 tablet (10 mg) by mouth nightly as needed for sleep  order for DME, Equipment being ordered: Other: blood pressure monitor  Treatment Diagnosis: hypertension  sodium chloride, PF, (SODIUM CHLORIDE FLUSH) 0.9% PF flush, 10 mLs by Intracatheter route every 24 hours (Patient not taking: Reported on 11/8/2022)        acetaminophen (TYLENOL) 500 MG tablet  calcium acetate (PHOSLO) 667 MG CAPS capsule  carvedilol (COREG) 12.5 MG tablet  cephALEXin (KEFLEX) 250 MG capsule  diltiazem ER (DILT-XR) 180 MG 24 hr capsule  entecavir (BARACLUDE) 0.5 MG tablet  guaiFENesin-codeine (GUAIFENESIN AC) 100-10 MG/5ML syrup  hydrOXYzine (ATARAX) 25 MG tablet  lactulose (CHRONULAC) 10 GM/15ML solution  naloxone (NARCAN) 4 MG/0.1ML nasal spray  oxyCODONE (ROXICODONE) 5 MG tablet  pantoprazole (PROTONIX) 40 MG EC tablet  senna-docusate (SENOKOT-S/PERICOLACE) 8.6-50 MG tablet  zolpidem (AMBIEN) 10 MG tablet  order for DME  sodium chloride, PF, (SODIUM CHLORIDE FLUSH) 0.9% PF flush        ALLERGIES/SENSITIVITIES:  No Known Allergies  Social History     Tobacco Use     Smoking status: Never     Smokeless tobacco: Never   Vaping Use     Vaping Use: Never used   Substance Use Topics     Alcohol use: No     Drug use: No     I have reviewed this patient's family history and updated it with pertinent information if needed.  Family  History   Problem Relation Age of Onset     Ulcers Father      Hypertension No family hx of      Asthma No family hx of      Cancer No family hx of      Coronary Artery Disease No family hx of      Liver Cancer No family hx of          PHYSICAL EXAM:  Physical Exam   Temp: 97.5  F (36.4  C) Temp src: Oral BP: (!) 138/93 Pulse: (!) 132   Resp: 27 SpO2: 97 % O2 Device: None (Room air)    Vitals:    11/08/22 1138   Weight: 58.1 kg (128 lb)     Vital Signs with Ranges  Temp:  [97.5  F (36.4  C)] 97.5  F (36.4  C)  Pulse:  [103-132] 132  Resp:  [18-60] 27  BP: (119-178)/() 138/93  SpO2:  [95 %-99 %] 97 %  No intake/output data recorded.      Patient Vitals for the past 72 hrs:   Weight   11/08/22 1138 58.1 kg (128 lb)     GEN:NAD, frail, Wife at bedside  CV: mildly tachycardic   Lung: clear and equal; no extra sounds  Ab: soft and NT; mild distention; normal bs  Ext: ++LLE bilateral, well perfused  Skin: no rash    Laboratory:     Recent Labs   Lab 11/08/22  1233 11/08/22  1015   WBC 4.6  --    RBC 2.64*  --    HGB 8.0* 7.8*   HCT 27.6*  --    PLT 54*  --        Basic Metabolic Panel:  Recent Labs   Lab 11/08/22  1233      POTASSIUM 5.6*   CHLORIDE 104   CO2 20*   BUN 64.7*   CR 10.49*   GLC 82   TANO 7.8*       INR  Recent Labs   Lab 11/08/22  1233   INR 1.34*       Recent Labs   Lab Test 11/08/22  1233 10/26/22  1445   POTASSIUM 5.6* 5.3   CHLORIDE 104 97*   BUN 64.7* 71.7*      Recent Labs   Lab Test 11/08/22  1233 10/26/22  1445 12/30/20  0830 10/21/20  1032 08/20/20  0728 08/19/20  1701 08/07/19  0344 08/06/19  2349   ALBUMIN 2.4* 2.4*   < > 3.0*   < >  --    < >  --    BILITOTAL 0.4 0.4   < > 0.6   < >  --    < >  --    BILIDIRECT  --   --   --  0.2  --   --   --   --    ALT 16 22   < >  --    < >  --    < >  --    AST 24 25   < >  --    < >  --    < >  --    PROTEIN  --   --   --   --   --  Negative  --  100*    < > = values in this interval not displayed.   EXAM: XR CHEST 1 VIEW  LOCATION: [facility]  DATE/TIME: 11/8/2022 1:48 PM     INDICATION: missed dialysis  COMPARISON: 10/26/2022 . 09/16/2022.                                                                      IMPRESSION: Loculated right hydropneumothorax is again seen.  There is very little aerated right lung. The heart is not well assessed. A metallic stent overlies the upper mediastinum. A metallic object over the right upper quadrant could be related to   cholecystectomy.    Personally reviewed today's laboratory studies      Thank you for involving us in the care of this patient. We will continue to follow along with you.      Dayanna GRAHAM-BC  [facility]  [phone]

## 2022-11-08 NOTE — ED TRIAGE NOTES
Patient arrives from clinic and advised to come to ED because of abdominal tightness, and low hgb. MWF dialysis patient, missed yesterday.  SOB with activity, fine at rest. Denies chest pain at this time. Falling asleep in triage.     Triage Assessment     Row Name 11/08/22 1139       Triage Assessment (Adult)    Airway WDL WDL

## 2022-11-08 NOTE — ED NOTES
Patient to room CO 4 via wheelchair from triage. Here with wife. Patient is alert, weak. Needed to be assisted to bed.  History of dialysis, last run Friday (Nov 4) too weak to go yesterday.   Hx of liver disease, cancer  with ascites. Had paracentesis two weeks ago for 2.5 liters of fluid removed. Abdomin large, distended, denies nausea. States poor appetite.  Hx of  hydropneumothorax  in chest, has a drain at right lateral chest draining bloody drainage. Wife states he has had drain in for one year.  Resp rate at 30-40, shallow in nature, lungs diminished. IV placed lab sent

## 2022-11-08 NOTE — ED PROVIDER NOTES
EMERGENCY DEPARTMENT ENCOUNTER      NAME: Elle Blanton  AGE: 62 year old male  YOB: 1960  MRN: [id number]  EVALUATION DATE & TIME: 2022 11:57 AM    PCP: Jaswant Flores    ED PROVIDER: Fabrice Torres M.D.      Chief Complaint   Patient presents with     Abdominal Pain     Abnormal Labs         FINAL IMPRESSION:  1. Hypervolemia, unspecified hypervolemia type    2. Somnolence    3. Other ascites    4. Dialysis patient (H)          ED COURSE & MEDICAL DECISION MAKIN year old male presents to the Emergency Department for evaluation of multiple concerns including somnolence and dyspnea.  He has a history of hepatocellular carcinoma, end-stage renal disease, liver failure, ascites.  He presents to the emergency department as a referral from clinic.  He is dyspneic on arrival to the emergency department and also tachycardic.  He has abdominal distention but no significant tenderness.  History of recurrent ascites.  Expect fluid overload is likely contributing to a large component of his dyspnea.  Ultrasound paracentesis was ordered and will be completed soon as possible through radiology.  He also has a history of end stage renal disease, missed dialysis yesterday.  He has mild hyperkalemia, chest x-ray redemonstrates large right-sided hydropneumothorax which is chronic, otherwise no significant changes.  He has a history of chronic anemia, hemoglobin today is stable at 8.0.  Patient will be expedited to ultrasound paracentesis which hopefully will alleviate his work of breathing and dyspnea.  Also discussed case with nephrology who will evaluate the patient here, see how he is doing after paracentesis, and consider dialysis either today or tomorrow morning depending on breathing at that time.  Regarding his altered mental status, no falls, no head trauma, may have some component of hepatic encephalopathy, ammonia is elevated to 48, also may be related to increased work of breathing secondary to  above.  He will be admitted to the hospital in a monitored bed.  Reviewed case with admitting resident service.    12:17 PM I met the patient and performed my initial interview and exam.   12:39 PM Per nursing, patient's respiratory rate is 58. Nurse called US and they say earliest they can see the patient is 4:00 PM.  1:25 PM Notified by lab that patient has a troponin of 120.  2:26 PM I spoke SYLVAIN Alan with Nephrology.      Medical Decision Making    Supplemental history from: Family Member    External Record(s) Reviewed: Outpatient Record    Differential Diagnosis: See MDM charting for differential considered.     I performed an independent interpretation of the: EKG: See my documentation.    Discussed with radiology regarding test interpretation: N/A    Discussion of management with another provider: See ED Course    The following testing was considered but ultimately not selected: None    I considered prescription management with: N/A    The patient's care impacted: Cancer/Chemotherapy, Chronic Kidney Disease and Heart Disease    Consideration of Admission/Observation: N/A - Patient admitted    Care significantly affected by Social Determinants of Health including: N/A      MEDICATIONS GIVEN IN THE EMERGENCY:  Medications   epoetin kelton-epbx (RETACRIT) injection 12,000 Units (has no administration in time range)   calcium acetate (PHOSLO) capsule 667 mg (667 mg Oral Given 11/8/22 1838)   0.9% sodium chloride BOLUS (250 mLs Intravenous Not Given 11/8/22 1936)   0.9% sodium chloride BOLUS (300 mLs Hemodialysis Machine Not Given 11/8/22 1935)   0.9% sodium chloride BOLUS (has no administration in time range)   No heparin via hemodialysis machine (has no administration in time range)   lidocaine 1 % 0.5 mL (has no administration in time range)   lidocaine 1 % 0.5 mL (has no administration in time range)   Stop Heparin 60 minutes before end of treatment (has no administration in time range)   calcium acetate (PHOSLO)  capsule 667 mg (667 mg Oral Not Given 11/8/22 1936)   carvedilol (COREG) tablet 12.5 mg (12.5 mg Oral Not Given 11/8/22 1829)   diltiazem ER COATED BEADS (CARDIZEM CD/CARTIA XT) 24 hr capsule 180 mg (has no administration in time range)   entecavir (BARACLUDE) tablet 0.5 mg (has no administration in time range)   guaiFENesin-codeine (ROBITUSSIN AC) 100-10 MG/5ML solution 5 mL (5 mLs Oral Given 11/8/22 1857)   hydrOXYzine (ATARAX) tablet 25 mg (has no administration in time range)   lactulose (CHRONULAC) solution 20 g (20 g Oral Given 11/8/22 1926)   oxyCODONE (ROXICODONE) tablet 5 mg (has no administration in time range)   pantoprazole (PROTONIX) EC tablet 40 mg (40 mg Oral Given 11/8/22 1838)   senna-docusate (SENOKOT-S/PERICOLACE) 8.6-50 MG per tablet 1 tablet (has no administration in time range)   zolpidem (AMBIEN) tablet 10 mg (has no administration in time range)   lidocaine 1 % 0.1-1 mL (has no administration in time range)   lidocaine (LMX4) cream (has no administration in time range)   sodium chloride (PF) 0.9% PF flush 3 mL (3 mLs Intracatheter Given 11/8/22 1842)   sodium chloride (PF) 0.9% PF flush 3 mL (has no administration in time range)   melatonin tablet 1 mg (has no administration in time range)   ondansetron (ZOFRAN ODT) ODT tab 4 mg (has no administration in time range)     Or   ondansetron (ZOFRAN) injection 4 mg (has no administration in time range)   prochlorperazine (COMPAZINE) injection 10 mg (has no administration in time range)     Or   prochlorperazine (COMPAZINE) tablet 10 mg (has no administration in time range)     Or   prochlorperazine (COMPAZINE) suppository 25 mg (has no administration in time range)       NEW PRESCRIPTIONS STARTED AT TODAY'S ER VISIT  Current Discharge Medication List             =================================================================    HPI    Patient information was obtained from: patient and his family member    Use of : N/MARYLOU Blanton  "is a 62 year old male with a pertinent history of end-stage renal disease, hepatocellular carcinoma, recurrent ascites, and chronic hydropneumothorax with trapped lung who presents to this ED by private car for evaluation of fatigue and abdominal tightness.    Per Expected Referral note, the patient presented to clinic today and appears quite sleepy, is also dealing with worsening of abdominal distention.  Hemoglobin 7.8 (compared with 6.7 on 10/26/22)  In the past has had ED visits where he gets a paracentesis in the setting of transfusion but today seemed quite sleepy, so sent here for workup and possible admission.    The patient's family member states the patient has been very tired today. She also notes his abdomen has been distended. She states he missed his dialysis yesterday because he had some diarrhea. She denies the patient has had any abdominal pain or pain elsewhere. The patient states he is tired and cold. He states his stomach feels \"tight\". He says he has some shortness of breath with exertion. He denies any chest pain. He denies any recent falls or injuries. He notes he has a chest drain in place with steady, bloody output. Patient denies additional medical concerns or complaints at this time.        REVIEW OF SYSTEMS   All systems reviewed and negative except as noted in HPI.    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 pylori infection 7/5/2017    UGI bleed implied by Hgb 9.0 6/22/17 and melena. Admitted and hgb decreased to 7.6, CT abdomen showed all bladder wall " thickening. + Hep B surface antigen noted. Melena resolved and hgb stabalized without transfusion, epigastric pain resolved with PPI. Recommend referral for gastroscopy.     Heart attack (H)      Hepatitis B      Normocytic anemia      Other pancytopenia (H) 7/5/2017 6/24/17 Peripheral smear at [facility].Pancytopenia of uncertain cause. Ruled out acute leukemia. Hematologist suggests hemolytic anemia should be considered possible cause and LDH and haptoglobin should be assessed in future.      PONV (postoperative nausea and vomiting)      Somnolence 11/8/2022     Thrombocytopenia (H)        PAST SURGICAL HISTORY:  Past Surgical History:   Procedure Laterality Date     ABCESS DRAINAGE      finger     BURSECTOMY ELBOW Left 10/15/2021    Procedure: LEFT OLECRANON BURSECTOMY;  Surgeon: Tico Myers MD;  Location: [facility] OR     BURSECTOMY ELBOW Left 1/18/2022    Procedure: LEFT ELBOW OLECRANON BURSECTOMY;  Surgeon: Tico Myers MD;  Location: [facility] OR     CREATE FISTULA ARTERIOVENOUS UPPER EXTREMITY Left 4/20/2021    Procedure: left arm dialysis graft placement;  Surgeon: Roxana Cosby MD;  Location: [facility] OR;  Service: General     FOREIGN BODY REMOVAL      finger     INCISION AND DRAINAGE FINGER, COMBINED Left 10/20/2016    Procedure: COMBINED INCISION AND DRAINAGE FINGER;  Surgeon: Mireya Pizano MD;  Location: [facility] OR     INCISION AND DRAINAGE UPPER EXTREMITY, COMBINED Left 1/18/2022    Procedure: AND ELBOW INCISION AND DRAINAGE;  Surgeon: Tico Myers MD;  Location: [facility] OR     INSERT PICC LINE Right 8/25/2022    Procedure: INSERTION, PICC;  Surgeon: Osmin Villa MD;  Location[place] [facility] [place]     IR CHEST TUBE PLACEMENT NON-TUNNELED RIGHT  4/19/2021     IR CHEST TUBE PLACEMENT NON-TUNNELED RIGHT  10/19/2021     IR CHEST TUBE PLACEMENT NON-TUNNELED RIGHT  11/10/2021     IR CHEST TUBE PLACEMENT NON-TUNNELED RIGHT  3/31/2022     IR DIALYSIS FISTULOGRAM  LEFT  4/2/2022     IR DIALYSIS FISTULOGRAM LEFT  4/4/2022     IR PICC PLACEMENT > 5 YRS OF AGE  8/25/2022     IR PICC PLACEMENT > 5 YRS OF AGE  9/18/2022     IR PLEURAL DRAINAGE WITH CATHETER INSERTION  4/19/2021     IR VISCERAL ANGIOGRAM  8/26/2022     IR VISCERAL EMBOLIZATION  9/18/2022     MIDLINE INSERTION - DOUBLE LUMEN  4/23/2021          GA ESOPHAGOGASTRODUODENOSCOPY TRANSORAL DIAGNOSTIC N/A 8/18/2020    Procedure: ESOPHAGOGASTRODUODENOSCOPY (EGD) with biopsy;  Surgeon: Nilson Gonzales MD;  Location: [facility];  Service: Gastroenterology      PARACENTESIS  8/14/2020     US PARACENTESIS  8/19/2020     US THORACENTESIS  4/18/2021           CURRENT MEDICATIONS:    Current Facility-Administered Medications   Medication     0.9% sodium chloride BOLUS     0.9% sodium chloride BOLUS     0.9% sodium chloride BOLUS     calcium acetate (PHOSLO) capsule 667 mg     calcium acetate (PHOSLO) capsule 667 mg     carvedilol (COREG) tablet 12.5 mg     [START ON 11/9/2022] diltiazem ER COATED BEADS (CARDIZEM CD/CARTIA XT) 24 hr capsule 180 mg     [START ON 11/13/2022] entecavir (BARACLUDE) tablet 0.5 mg     [START ON 11/9/2022] epoetin kelton-epbx (RETACRIT) injection 12,000 Units     guaiFENesin-codeine (ROBITUSSIN AC) 100-10 MG/5ML solution 5 mL     hydrOXYzine (ATARAX) tablet 25 mg     lactulose (CHRONULAC) solution 20 g     lidocaine (LMX4) cream     lidocaine 1 % 0.1-1 mL     lidocaine 1 % 0.5 mL     lidocaine 1 % 0.5 mL     melatonin tablet 1 mg     No heparin via hemodialysis machine     ondansetron (ZOFRAN ODT) ODT tab 4 mg    Or     ondansetron (ZOFRAN) injection 4 mg     oxyCODONE (ROXICODONE) tablet 5 mg     pantoprazole (PROTONIX) EC tablet 40 mg     prochlorperazine (COMPAZINE) injection 10 mg    Or     prochlorperazine (COMPAZINE) tablet 10 mg    Or     prochlorperazine (COMPAZINE) suppository 25 mg     senna-docusate (SENOKOT-S/PERICOLACE) 8.6-50 MG per tablet 1 tablet     sodium chloride (PF) 0.9% PF flush 3  "mL     sodium chloride (PF) 0.9% PF flush 3 mL     Stop Heparin 60 minutes before end of treatment     zolpidem (AMBIEN) tablet 10 mg         ALLERGIES:  No Known Allergies    FAMILY HISTORY:  Family History   Problem Relation Age of Onset     Ulcers Father      Hypertension No family hx of      Asthma No family hx of      Cancer No family hx of      Coronary Artery Disease No family hx of      Liver Cancer No family hx of        SOCIAL HISTORY:   Social History     Socioeconomic History     Marital status:    Tobacco Use     Smoking status: Never     Smokeless tobacco: Never   Vaping Use     Vaping Use: Never used   Substance and Sexual Activity     Alcohol use: No     Drug use: No     Sexual activity: Yes     Partners: Female       VITALS:  /68 (BP Location: Right arm, Patient Position: Supine, Cuff Size: Adult Small)   Pulse 100   Temp 98.8  F (37.1  C) (Oral)   Resp 16   Ht 1.651 m (5' 5\")   Wt 52.8 kg (116 lb 4.8 oz)   SpO2 98%   BMI 19.35 kg/m      PHYSICAL EXAM    Constitutional: Chronically ill-appearing middle-age male patient, laying in bed, somnolent but arouses to voice, mild respiratory distress  HENT: Normocephalic, Atraumatic. Neck Supple.  Eyes: EOMI, Conjunctiva normal.  Respiratory: Tachypneic, diminished lung sounds on the right, able to speak full sentences  Cardiovascular: Normal heart rate, Regular rhythm. No peripheral edema.  Abdomen: Markedly distended with fluid wave, nontender.  Musculoskeletal: Good range of motion in all major joints. No major deformities noted.  Integument: Warm, Dry.  Neurologic: Somnolent but arouses to voice, oriented to self and place.  Face is symmetric.  Speech is normal.  Strength is symmetric throughout bilateral upper and lower extremities.  Psychiatric: Cooperative. Affect appropriate.     LAB:  All pertinent labs reviewed and interpreted.  Labs Ordered and Resulted from Time of ED Arrival to Time of ED Departure   INR - Abnormal       " Result Value    INR 1.34 (*)    COMPREHENSIVE METABOLIC PANEL - Abnormal    Sodium 141      Potassium 5.6 (*)     Chloride 104      Carbon Dioxide (CO2) 20 (*)     Anion Gap 17 (*)     Urea Nitrogen 64.7 (*)     Creatinine 10.49 (*)     Calcium 7.8 (*)     Glucose 82      Alkaline Phosphatase 247 (*)     AST 24      ALT 16      Protein Total 8.8 (*)     Albumin 2.4 (*)     Bilirubin Total 0.4      GFR Estimate 5 (*)    LACTIC ACID WHOLE BLOOD - Abnormal    Lactic Acid 5.4 (*)    TROPONIN T, HIGH SENSITIVITY - Abnormal    Troponin T, High Sensitivity 120 (*)    ETHYL ALCOHOL LEVEL - Abnormal    Alcohol ethyl <0.01 (*)    CBC WITH PLATELETS AND DIFFERENTIAL - Abnormal    WBC Count 4.6      RBC Count 2.64 (*)     Hemoglobin 8.0 (*)     Hematocrit 27.6 (*)      (*)     MCH 30.3      MCHC 29.0 (*)     RDW 16.6 (*)     Platelet Count 54 (*)    LACTIC ACID WHOLE BLOOD - Abnormal    Lactic Acid 5.9 (*)    DIFFERENTIAL - Abnormal    % Neutrophils 83      % Lymphocytes 11      % Monocytes 6      % Eosinophils 0      % Basophils 0      Absolute Neutrophils 3.8      Absolute Lymphocytes 0.5 (*)     Absolute Monocytes 0.3      Absolute Eosinophils 0.0      Absolute Basophils 0.0      RBC Morphology Confirmed RBC Indices      Platelet Assessment        Value: Automated Count Confirmed. Platelet morphology is normal.   ALBUMIN LEVEL - Abnormal    Albumin 2.4 (*)    LIPASE - Normal    Lipase 13     AMMONIA - Normal    Ammonia 48     MAGNESIUM - Normal    Magnesium 2.0     ROUTINE UA WITH MICROSCOPIC REFLEX TO CULTURE   ALBUMIN FLUID   PROTEIN FLUID   TYPE AND SCREEN, ADULT    ABO/RH(D) A POS      Antibody Screen Negative      SPECIMEN EXPIRATION DATE [id number]     ABO/RH TYPE AND SCREEN   CELL COUNT WITH DIFFERENTIAL FLUID       RADIOLOGY:  Reviewed all pertinent imaging. Please see official radiology report.  XR Chest 1 View   Final Result   IMPRESSION: Loculated right hydropneumothorax is again seen.  There is very  little aerated right lung. The heart is not well assessed. A metallic stent overlies the upper mediastinum. A metallic object over the right upper quadrant could be related to    cholecystectomy.      US Paracentesis without Albumin    (Results Pending)       EKG:    Performed at: 1244    Impression: Sinus tachycardia, otherwise normal ECG    Rate: 120  Rhythm: Sinus  Axis: Normal  MI Interval: 126  QRS Interval: 84  QTc Interval: 463  ST Changes: None significant  Comparison: Compared to 10/12/2022, no significant change    I have independently reviewed and interpreted the EKG(s) documented above.        I, Jyoti Del Toro, am serving as a scribe to document services personally performed by Dr. Fabrice Torres, based on my observation and the provider's statements to me. I, Fabrice Torres MD attest that Jyoti Del Toro is acting in a scribe capacity, has observed my performance of the services and has documented them in accordance with my direction.    Fabrice Torres M.D.  Emergency Medicine  [facility] EMERGENCY DEPARTMENT  [address] [place] [id number]  [phone]  Dept: [phone]     Fabrice Torres MD  11/08/22 2051

## 2022-11-08 NOTE — ED NOTES
Expected Patient Referral to ED  10:53 AM    Referring Clinic/Provider:  [facility]    Reason for referral/Clinical facts:  Patient with end-stage renal disease, hepatocellular carcinoma, recurrent ascites, chronic hydropneumothorax with trapped lung, chronically ill and borderline hospice but gets relatively frequent paracentesis and blood transfusions.  Presented to clinic today and appears quite sleepy, is also dealing with worsening of abdominal distention.  Provider thinks he is probably going to be anemic, hemoglobin is in process.  In the past has had ED visits where he gets a paracentesis in the setting of transfusion but today seemed quite sleepy, they were wondering about uremia and probably needs labs, paracentesis, +/- blood transfusion, possibly admission depending on results and eval here.    Recommendations provided:  Send to ED for further evaluation        Fabrice Torres MD  [facility] EMERGENCY DEPARTMENT  [address] [place] [id number]  [phone]       Fabrice Torres MD  11/08/22 9270

## 2022-11-08 NOTE — PHARMACY-ADMISSION MEDICATION HISTORY
Pharmacy Note - Admission Medication History    Pertinent Provider Information: medication list verified by the patient's daughter. Unsure if still taking the antibiotics. Zolpidem recently increased to 10mg     ______________________________________________________________________    Prior To Admission (PTA) med list completed and updated in EMR.       PTA Med List   Medication Sig Note Last Dose     acetaminophen (TYLENOL) 500 MG tablet Take 500 mg by mouth every 6 hours as needed for mild pain  Unknown     calcium acetate (PHOSLO) 667 MG CAPS capsule Take 1 capsule by mouth 2 times daily (with meals)  11/7/2022     carvedilol (COREG) 12.5 MG tablet Take 1 tablet (12.5 mg) by mouth 2 times daily (with meals) (Patient taking differently: Take 12.5 mg by mouth 2 times daily as needed)  Unknown     cephALEXin (KEFLEX) 250 MG capsule Take 1 capsule (250 mg) by mouth 2 times daily 11/8/2022: Prescribed 9/1 x 15 day supply with 1 refill. Family think he might have a couple left  Unknown     diltiazem ER (DILT-XR) 180 MG 24 hr capsule Take 1 capsule (180 mg) by mouth daily (Patient taking differently: Take 180 mg by mouth daily as needed)  Unknown     entecavir (BARACLUDE) 0.5 MG tablet Take 1 tablet (0.5 mg) by mouth every 7 days (Patient taking differently: Take 0.5 mg by mouth every 7 days Sunday)  11/6/2022     guaiFENesin-codeine (GUAIFENESIN AC) 100-10 MG/5ML syrup Take 5 mLs by mouth every 4 hours as needed for cough  Past Week     hydrOXYzine (ATARAX) 25 MG tablet Take 1 tablet (25 mg) by mouth 3 times daily as needed for itching  Unknown     lactulose (CHRONULAC) 10 GM/15ML solution Take 30 mLs (20 g) by mouth 3 times daily Takes 2 to 3 times daily, holds for loose stool (Patient taking differently: Take 20 g by mouth 2 times daily as needed Takes 2 to 3 times daily, holds for loose stool)  Unknown     naloxone (NARCAN) 4 MG/0.1ML nasal spray Spray 1 spray (4 mg) into one nostril alternating nostrils as needed  for opioid reversal every 2-3 minutes until assistance arrives  Unknown     oxyCODONE (ROXICODONE) 5 MG tablet Take 1 tablet (5 mg) by mouth 2 times daily as needed for severe pain (7-10)  Unknown     pantoprazole (PROTONIX) 40 MG EC tablet Take 1 tablet (40 mg) by mouth daily  11/7/2022     senna-docusate (SENOKOT-S/PERICOLACE) 8.6-50 MG tablet Take 1 tablet by mouth daily as needed for constipation  Unknown     zolpidem (AMBIEN) 10 MG tablet Take 1 tablet (10 mg) by mouth nightly as needed for sleep  Unknown       Information source(s): Family member and CareEverywhere/SureScripts  Method of interview communication: in-person    Summary of Changes to PTA Med List  New: none  Discontinued: Tessalon perles  Changed: Lactulose    Patient was asked about OTC/herbal products specifically.  PTA med list reflects this.      Allergies were reviewed, assessed, and updated with the patient.      Patient does not use any multi-dose medications prior to admission.    The information provided in this note is only as accurate as the sources available at the time of the update(s).    Thank you for the opportunity to participate in the care of this patient.    Mounika Holland GURJIT  11/8/2022 2:41 PM

## 2022-11-08 NOTE — PROGRESS NOTES
Preceptor Attestation:   Patient seen, evaluated and discussed with the resident Dr. Virk. I have verified the content of the note, which accurately reflects my assessment of the patient and the plan of care.  Supervising Physician:Benjamin Rosenstein, MD, MA  [facility]

## 2022-11-08 NOTE — H&P
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 He presents now w/ 1 day of altered mental status.     Altered mental status  Per pt's wife and son he has been more confused the last 24 hours or so. Of note he is on lactulose chronically for hepatic encephalopathy. Also missed dialysis yesterday. Differential for new AMS is broad at this point and includes hepatic encephalopathy vs uremia at this point. Less likely infectious given that he has been afebrile and does not endorse any infectious symptoms at this time. On exam he is alert and oriented x3 and seemingly near his baseline mentation, although quite drowsy.   - PTA lactulose dosing  - HD per neph, see below  - Cell count and dif from ascites, see below    ESRD on dialysis  Hx of acute on chronic anemia   Acute blood loss anemia  R hydropneumothorax with volume loss of R lung  Chronic trapped lung, empyema   HCC on imaging with Hx of ruptured hepatic mass  Chronic hep B cirrhosis  Elevated lactic acid  Pt w/ hx of acute blood loss anemia requiring frequent infusion due to bloody drainage from empyema of the R lung. Also w/ ESRD likely contributing as well. Has had many recent admissions for transfusions in the recent months. 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 Does have hx of oozing from his liver mass and frequent paracentesis of bloody ascites. Wife and son are denying any recent hemoptysis, hematemesis, black or bloody stools.   Pt typically receives outpatient HD MWF, notably  missed this appointment yesterday. Nephrology was consulted in the ED and is following. Creat on admission 10.40. Na WNL, K 5.6. Mg 2.0.  - US guided paracentesis performed in the ED  - Cell count w/ dif of ascitic fluid ordered  -  Renal consulted, appreciate their recs   > Plan for HD run tonight or tomorrow AM after paracentesis  - Lactulose 4x daily   - PTA hydroxyzine for itching  - PTA oxycodone for pain PRN  - Renal diet ordered  - Magnesium repletion protocol per nursing    Chronic conditions:  HTN- PTA Carvedilol, diltiazem, careful hypotension parameters  Sleep difficulty- PTA ambien       Diet: Renal Diet (dialysis)Renal diet  DVT Prophylaxis: Pneumatic Compression Devices  Beltre Catheter: Not present  Central Lines: None  Cardiac Monitoring: None  Code Status: No CPR- Do NOT Intubate    Clinically Significant Risk Factors Present on Admission        # Hyperkalemia: Highest K = 5.6 mmol/L (Ref range: 3.4-5.3) in last 2 days, will monitor as appropriate       # Hypoalbuminemia: Lowest albumin = 2.4 g/dL (Ref range: 3.5-5.2) at 11/8/2022 12:33 PM, will monitor as appropriate  # Coagulation Defect: INR = 1.34 (Ref range: 0.85 - 1.15) and/or PTT = 37 Seconds (Ref range: 22 - 38 Seconds), will monitor for bleeding  # Thrombocytopenia: Lowest platelets = 54 (Ref range: 150-450) in last 2 days, will monitor for bleeding               Disposition Plan      Expected Discharge Date: 11/10/2022                The patient's care was discussed with the Attending Physician, Dr. Solomon.    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 Pt's son and daughter report that he has been at his baseline the last several days until awaking this morning and seeming more confused than normal. They do note that he missed a dialysis appointment yesterday, however state he typically does not get this confused after missing only 1 appointment. They deny any recent fever, cough, or other symptoms that they can pin point. He is quite distended however and would like paracentesis for this. He last had this done 4-5 months ago and typically goes a variable length of time before requiring further procedures for this. On my evaluation pt is drowsy but answering questions appropriately. He denies any symptoms or complaints at this time.     Review of Systems    The 10 point Review of Systems is negative other than noted in the HPI or here.     Past Medical History    I have reviewed this patient's medical history and updated it with pertinent information if needed.   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 pylori infection 7/5/2017    UGI bleed implied by Hgb 9.0 6/22/17 and melena. Admitted and hgb decreased to 7.6, CT abdomen showed all bladder wall thickening. + Hep B surface antigen noted. Melena resolved and hgb stabalized without transfusion, epigastric pain resolved with PPI. Recommend referral for gastroscopy.     Heart attack (H)      Hepatitis B       Normocytic anemia      Other pancytopenia (H) 7/5/2017 6/24/17 Peripheral smear at [facility].Pancytopenia of uncertain cause. Ruled out acute leukemia. Hematologist suggests hemolytic anemia should be considered possible cause and LDH and haptoglobin should be assessed in future.      PONV (postoperative nausea and vomiting)      Somnolence 11/8/2022     Thrombocytopenia (H)      Past Surgical History   I have reviewed this patient's surgical history and updated it with pertinent information if needed.  Past Surgical History:   Procedure Laterality Date     ABCESS DRAINAGE      finger     BURSECTOMY ELBOW Left 10/15/2021    Procedure: LEFT OLECRANON BURSECTOMY;  Surgeon: Tico Myers MD;  [place] [facility] [place]     BURSECTOMY ELBOW Left 1/18/2022    Procedure: LEFT ELBOW OLECRANON BURSECTOMY;  Surgeon: Tico Myers MD;  Location: [facility] OR     CREATE FISTULA ARTERIOVENOUS UPPER EXTREMITY Left 4/20/2021    Procedure: left arm dialysis graft placement;  Surgeon: Roxana Cosby MD;  Location: [facility] OR;  Service: General     FOREIGN BODY REMOVAL      finger     INCISION AND DRAINAGE FINGER, COMBINED Left 10/20/2016    Procedure: COMBINED INCISION AND DRAINAGE FINGER;  Surgeon: Mireya Pizano MD;  Location: [facility] OR     INCISION AND DRAINAGE UPPER EXTREMITY, COMBINED Left 1/18/2022    Procedure: AND ELBOW INCISION AND DRAINAGE;  Surgeon: Tico Myers MD;  Location: [facility] OR     INSERT PICC LINE Right 8/25/2022    Procedure: INSERTION, PICC;  Surgeon: Osmin Villa MD;  Location: [facility] OR     IR CHEST TUBE PLACEMENT NON-TUNNELED RIGHT  4/19/2021     IR CHEST TUBE PLACEMENT NON-TUNNELED RIGHT  10/19/2021     IR CHEST TUBE PLACEMENT NON-TUNNELED RIGHT  11/10/2021     IR CHEST TUBE PLACEMENT NON-TUNNELED RIGHT  3/31/2022     IR DIALYSIS FISTULOGRAM LEFT  4/2/2022     IR DIALYSIS FISTULOGRAM LEFT  4/4/2022     IR PICC PLACEMENT > 5 YRS OF AGE  8/25/2022      IR PICC PLACEMENT > 5 YRS OF AGE  9/18/2022     IR PLEURAL DRAINAGE WITH CATHETER INSERTION  4/19/2021     IR VISCERAL ANGIOGRAM  8/26/2022     IR VISCERAL EMBOLIZATION  9/18/2022     MIDLINE INSERTION - DOUBLE LUMEN  4/23/2021          AL ESOPHAGOGASTRODUODENOSCOPY TRANSORAL DIAGNOSTIC N/A 8/18/2020    Procedure: ESOPHAGOGASTRODUODENOSCOPY (EGD) with biopsy;  Surgeon: Nilson Gonzales MD;  Location: [facility];  Service: Gastroenterology      PARACENTESIS  8/14/2020      PARACENTESIS  8/19/2020      THORACENTESIS  4/18/2021     Social History   I have reviewed this patient's social history and updated it with pertinent information if needed.  Social History     Tobacco Use     Smoking status: Never     Smokeless tobacco: Never   Vaping Use     Vaping Use: Never used   Substance Use Topics     Alcohol use: No     Drug use: No     Family History   I have reviewed this patient's family history and updated it with pertinent information if needed.  Family History   Problem Relation Age of Onset     Ulcers Father      Hypertension No family hx of      Asthma No family hx of      Cancer No family hx of      Coronary Artery Disease No family hx of      Liver Cancer No family hx of      Prior to Admission Medications   Prior to Admission Medications   Prescriptions Last Dose Informant Patient Reported? Taking?   acetaminophen (TYLENOL) 500 MG tablet Unknown  Yes Yes   Sig: Take 500 mg by mouth every 6 hours as needed for mild pain   calcium acetate (PHOSLO) 667 MG CAPS capsule 11/7/2022  Yes Yes   Sig: Take 1 capsule by mouth 2 times daily (with meals)   carvedilol (COREG) 12.5 MG tablet Unknown  No Yes   Sig: Take 1 tablet (12.5 mg) by mouth 2 times daily (with meals)   Patient taking differently: Take 12.5 mg by mouth 2 times daily as needed   cephALEXin (KEFLEX) 250 MG capsule Unknown  No Yes   Sig: Take 1 capsule (250 mg) by mouth 2 times daily   diltiazem ER (DILT-XR) 180 MG 24 hr capsule Unknown  No Yes    Sig: Take 1 capsule (180 mg) by mouth daily   Patient taking differently: Take 180 mg by mouth daily as needed   entecavir (BARACLUDE) 0.5 MG tablet 11/6/2022  No Yes   Sig: Take 1 tablet (0.5 mg) by mouth every 7 days   Patient taking differently: Take 0.5 mg by mouth every 7 days Sunday   guaiFENesin-codeine (GUAIFENESIN AC) 100-10 MG/5ML syrup Past Week  No Yes   Sig: Take 5 mLs by mouth every 4 hours as needed for cough   hydrOXYzine (ATARAX) 25 MG tablet Unknown  No Yes   Sig: Take 1 tablet (25 mg) by mouth 3 times daily as needed for itching   lactulose (CHRONULAC) 10 GM/15ML solution Unknown  No Yes   Sig: Take 30 mLs (20 g) by mouth 3 times daily Takes 2 to 3 times daily, holds for loose stool   Patient taking differently: Take 20 g by mouth 2 times daily as needed Takes 2 to 3 times daily, holds for loose stool   naloxone (NARCAN) 4 MG/0.1ML nasal spray Unknown  No Yes   Sig: Spray 1 spray (4 mg) into one nostril alternating nostrils as needed for opioid reversal every 2-3 minutes until assistance arrives   order for DME   No No   Sig: Equipment being ordered: Other: blood pressure monitor  Treatment Diagnosis: hypertension   oxyCODONE (ROXICODONE) 5 MG tablet Unknown  No Yes   Sig: Take 1 tablet (5 mg) by mouth 2 times daily as needed for severe pain (7-10)   pantoprazole (PROTONIX) 40 MG EC tablet 11/7/2022  No Yes   Sig: Take 1 tablet (40 mg) by mouth daily   senna-docusate (SENOKOT-S/PERICOLACE) 8.6-50 MG tablet Unknown  No Yes   Sig: Take 1 tablet by mouth daily as needed for constipation   sodium chloride, PF, (SODIUM CHLORIDE FLUSH) 0.9% PF flush Not Taking  No No   Sig: 10 mLs by Intracatheter route every 24 hours   Patient not taking: Reported on 11/8/2022   zolpidem (AMBIEN) 10 MG tablet Unknown  No Yes   Sig: Take 1 tablet (10 mg) by mouth nightly as needed for sleep      Facility-Administered Medications: None     Allergies   No Known Allergies    Physical Exam   Vital Signs: Temp: 97.5  F  (36.4  C) Temp src: Oral BP: 102/61 Pulse: 104   Resp: 27 SpO2: 98 % O2 Device: None (Room air)    Weight: 128 lbs 0 oz    Constitutional: drowsy, resting w/ eyes closed in bed, cachectic and chronically ill appearing.   Eyes: Lids and lashes normal, pupils equal, round and reactive to light, conjunctival pallor noted, mild scleral icterus  ENT: Normocephalic, without obvious abnormality, atraumatic,   Respiratory: No increased work of breathing, absent breath sounds on the L consistent w/ known lung processes, no crackles or wheezing  Cardiovascular: Normal apical impulse, tachycardic w/ regular rhythm, normal S1 and S2, no S3 or S4, and no murmur noted. Mild JVD noted.  GI: Normal bowel sounds, soft, mild to moderately distended, non-tender, no masses palpated  Skin: Small spots of bruising over the upper extremities, chest wall, and abdomen in various stages of healing. No rashes or lesions.  Musculoskeletal: There is no redness, warmth, or swelling of the joints. 1+ DP pulses bilaterally, no pitting edema.  Neurologic: Alert to person and place.    Neuropsychiatric: Unable to assess at this time    Data   Data reviewed today: I reviewed all medications, new labs and imaging results over the last 24 hours. I personally reviewed the chest x-ray image(s) showing loculated R sided hydropneumothorax.    Recent Labs   Lab 11/08/22  1233 11/08/22  1015   WBC 4.6  --    HGB 8.0* 7.8*   *  --    PLT 54*  --    INR 1.34*  --      --    POTASSIUM 5.6*  --    CHLORIDE 104  --    CO2 20*  --    BUN 64.7*  --    CR 10.49*  --    ANIONGAP 17*  --    TANO 7.8*  --    GLC 82  --    ALBUMIN 2.4*  2.4*  --    PROTTOTAL 8.8*  --    BILITOTAL 0.4  --    ALKPHOS 247*  --    ALT 16  --    AST 24  --    LIPASE 13  --      Recent Results (from the past 24 hour(s))   XR Chest 1 View    Narrative    EXAM: XR CHEST 1 VIEW  LOCATION: [facility]  DATE/TIME: 11/8/2022 1:48 PM    INDICATION: missed  dialysis  COMPARISON: 10/26/2022 . 09/16/2022.      Impression    IMPRESSION: Loculated right hydropneumothorax is again seen.  There is very little aerated right lung. The heart is not well assessed. A metallic stent overlies the upper mediastinum. A metallic object over the right upper quadrant could be related to   cholecystectomy.

## 2022-11-08 NOTE — PROGRESS NOTES
Assessment & Plan     Anemia due to blood loss, acute  Hemoglobin ordered in clinic today and came back at 7.8.  This resulted after I had already sent him to the ED for evaluation of his somnolence, as well as paracentesis for his tense ascites.  Signout was given to Dr. Torres at [facility] emergency department.  - Hemoglobin    Hepatic cirrhosis, unspecified hepatic cirrhosis type, unspecified whether ascites present (H)  - lactulose (CHRONULAC) 10 GM/15ML solution  Dispense: 946 mL; Refill: 3    Christy Virk MD PGY-2  [facility]    Precepted with: Dr. Rosenstein  PCP Dr. Flores updated via text as well.      Bobby Almeida is a 62 year old accompanied by his spouse, presenting for the following health issues:  RECHECK (HGB) and Medication Reconciliation (Attention, refill per pt)    Last was in ED 2 weeks ago for RBC transfusion and paracentesis - 2L removed. Has been doing well at home per wife. Yesterday though did miss dialysis due to some diarrhea.  Was acting his normal self yesterday.  This morning has been very tired and sleepy.  Wife is concerned he needs another blood transfusion.  He also noticed increasing abdominal distention and wants another paracentesis.  He did take his Ambien before bed last night, but not an extra dose and this was something he has been doing for quite a while.      Objective    /85   Pulse 103   Resp 18   Wt 58.1 kg (128 lb)   SpO2 99%   BMI 21.30 kg/m    Body mass index is 21.3 kg/m .  GEN: Patient sitting comfortably in no acute distress, though falling asleep in chair. Wakes easily to voice.  HEEN: Head is atraumatic, normocephalic, mucous membranes moist.  CV: Extremities well-perfused. No obvious lower extremity edema.  PULM: Breathing comfortably on room air. Speaking in short sentences.  ABD: Tense, taught abdomen with palpable ascites. Nontender to palpation.  NEURO: No focal motor abnormalities.  Face symmetric.  PSYCH:  Appears tired.

## 2022-11-09 NOTE — PROGRESS NOTES
RENAL low bp on HD, no UF.  S/p LVP yesterday.   K high.  Give up to 4 units 25% alb for bp.   Encourage pt to complete full run due to acidosis and high K.   Prn hold orders on bp meds   Repeat K in am.   Smita Raygoza MD  [facility]  [phone]

## 2022-11-09 NOTE — PROVIDER NOTIFICATION
Call put out to house officer to update on critical platelets 41.  Awaiting return call.     0635-Spoke with Dr. Virk and updated on critical lab.  No new orders at this time.

## 2022-11-09 NOTE — PROGRESS NOTES
RENAL NOTE-new to me,chart reviewed.     REQUESTING PHYSICIAN: Dr. Torres    REASON FOR CONSULT: ESRD on HD    ASSESSMENT/PLAN:    ESRD on HD: [facility] at [facility], MWF schedule. Dr. Hill is his primary nephrologist.   mins there, rarely runs entire run. TW 56 kg. Plan for MWF schedule with hospital admission.HD later today. AVF patent    K up / worse acidosis, gave bicarb earlier and will correct on HD today.      CKD-BMD: Ca+ 7.8 (corrected Ca+ 9.1). continue PTA phoslo 667 mg tid wm.      Anemia: Hg 8.0. PTA on Epogen 17210 U 3 Q W with HD, resume. Transfuse per primary team for hg <7.0    Access: L AV AVG, +thrill and bruit on exam.     Liver CA/Recurrent Ascites: SOB in ED with distended abdomin. S/p  Paracentesis 11/8 breathing improved. HD in AM.    BP on dilt and coreg    Hep B on entacavir.    Will follow.     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  11/8 presented to ED with weakness,a abdominal distention, missed HD yesterday 11/7.     Renal consulted for ESRD on HD.   Sitting up at bedside.  Waiting for HD run.   Breathing better.   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 pylori infection 7/5/2017    UGI bleed implied by Hgb 9.0 6/22/17 and melena. Admitted and hgb decreased to 7.6, CT abdomen showed all bladder wall thickening. + Hep B surface antigen noted. Melena resolved and hgb stabalized without transfusion, epigastric pain resolved with PPI. Recommend referral for gastroscopy.     Heart attack (H)      Hepatitis B      Normocytic anemia      Other pancytopenia (H) 7/5/2017 6/24/17 Peripheral smear at [facility].Pancytopenia of uncertain cause. Ruled out acute leukemia. Hematologist suggests hemolytic anemia should be considered possible cause and LDH and haptoglobin should be assessed in future.      PONV (postoperative nausea and vomiting)      Somnolence 11/8/2022     Thrombocytopenia (H)        No current facility-administered medications on file prior to encounter.  acetaminophen (TYLENOL) 500 MG tablet, Take 500 mg by mouth every 6 hours as needed for mild pain  calcium acetate (PHOSLO) 667 MG CAPS capsule, Take 1 capsule by mouth 2 times daily (with meals)  carvedilol (COREG) 12.5 MG tablet, Take 1 tablet (12.5 mg) by mouth 2 times daily (with meals) (Patient taking differently: Take 12.5 mg by mouth 2 times daily as needed)  cephALEXin (KEFLEX) 250 MG capsule, Take 1 capsule (250 mg) by mouth 2 times daily  diltiazem ER (DILT-XR) 180 MG 24 hr capsule, Take 1 capsule (180 mg) by mouth daily (Patient taking differently: Take 180 mg by mouth daily as needed)  entecavir (BARACLUDE) 0.5 MG tablet, Take 1 tablet (0.5 mg) by mouth every 7 days (Patient taking differently: Take 0.5 mg by mouth every 7 days Sunday)  guaiFENesin-codeine (GUAIFENESIN AC) 100-10 MG/5ML syrup, Take 5 mLs by mouth every 4 hours as needed for cough  hydrOXYzine (ATARAX) 25 MG tablet, Take 1 tablet (25 mg) by mouth 3 times daily as needed for itching  lactulose (CHRONULAC) 10 GM/15ML solution, Take 30 mLs (20 g) by mouth 3 times  daily Takes 2 to 3 times daily, holds for loose stool (Patient taking differently: Take 20 g by mouth 2 times daily as needed Takes 2 to 3 times daily, holds for loose stool)  naloxone (NARCAN) 4 MG/0.1ML nasal spray, Spray 1 spray (4 mg) into one nostril alternating nostrils as needed for opioid reversal every 2-3 minutes until assistance arrives  oxyCODONE (ROXICODONE) 5 MG tablet, Take 1 tablet (5 mg) by mouth 2 times daily as needed for severe pain (7-10)  pantoprazole (PROTONIX) 40 MG EC tablet, Take 1 tablet (40 mg) by mouth daily  senna-docusate (SENOKOT-S/PERICOLACE) 8.6-50 MG tablet, Take 1 tablet by mouth daily as needed for constipation  zolpidem (AMBIEN) 10 MG tablet, Take 1 tablet (10 mg) by mouth nightly as needed for sleep  order for DME, Equipment being ordered: Other: blood pressure monitor  Treatment Diagnosis: hypertension  sodium chloride, PF, (SODIUM CHLORIDE FLUSH) 0.9% PF flush, 10 mLs by Intracatheter route every 24 hours (Patient not taking: Reported on 11/8/2022)        No current outpatient medications on file.      ALLERGIES/SENSITIVITIES:  No Known Allergies  Social History     Tobacco Use     Smoking status: Never     Smokeless tobacco: Never   Vaping Use     Vaping Use: Never used   Substance Use Topics     Alcohol use: No     Drug use: No     I have reviewed this patient's family history and updated it with pertinent information if needed.  Family History   Problem Relation Age of Onset     Ulcers Father      Hypertension No family hx of      Asthma No family hx of      Cancer No family hx of      Coronary Artery Disease No family hx of      Liver Cancer No family hx of          PHYSICAL EXAM:  Physical Exam   Temp: 97.4  F (36.3  C) Temp src: Oral BP: 94/59 Pulse: 102   Resp: 24 SpO2: 91 % O2 Device: None (Room air)    Vitals:    11/08/22 1138 11/08/22 1807 11/09/22 0622   Weight: 58.1 kg (128 lb) 52.8 kg (116 lb 4.8 oz) 52.9 kg (116 lb 11.2 oz)     Vital Signs with Ranges  Temp:   [97.4  F (36.3  C)-98.8  F (37.1  C)] 97.4  F (36.3  C)  Pulse:  [100-113] 102  Resp:  [16-24] 24  BP: ()/(55-81) 94/59  SpO2:  [91 %-99 %] 91 %  I/O last 3 completed shifts:  In: -   Out: 60 [Drains:60]      Patient Vitals for the past 72 hrs:   Weight   11/09/22 0622 52.9 kg (116 lb 11.2 oz)   11/08/22 1807 52.8 kg (116 lb 4.8 oz)   11/08/22 1138 58.1 kg (128 lb)     GEN:NAD, frail cachectic  HEENT ATNC  No jvd  CV: RRR  Lung: clear and equal; no extra sounds  Ab: soft and NT; mild distention; normal bs  Ext:  Minimal LLE bilateral, well perfused  Skin: no rash  L up arm AVF patent no infx.     Laboratory:     Recent Labs   Lab 11/09/22  0523 11/08/22  1233 11/08/22  1015   WBC 4.8 4.6  --    RBC 2.57* 2.64*  --    HGB 7.8* 8.0* 7.8*   HCT 26.2* 27.6*  --    PLT 41* 54*  --        Basic Metabolic Panel:  Recent Labs   Lab 11/09/22  0523 11/08/22  1233    141   POTASSIUM 6.0* 5.6*   CHLORIDE 104 104   CO2 17* 20*   BUN 70.2* 64.7*   CR 11.10* 10.49*   GLC 65* 82   TANO 7.5* 7.8*       INR  Recent Labs   Lab 11/08/22  1233   INR 1.34*       Recent Labs   Lab Test 11/08/22  1233 10/26/22  1445   POTASSIUM 5.6* 5.3   CHLORIDE 104 97*   BUN 64.7* 71.7*      Recent Labs   Lab Test 11/08/22  1233 10/26/22  1445 12/30/20  0830 10/21/20  1032 08/20/20  0728 08/19/20  1701 08/07/19  0344 08/06/19  2349   ALBUMIN 2.4* 2.4*   < > 3.0*   < >  --    < >  --    BILITOTAL 0.4 0.4   < > 0.6   < >  --    < >  --    BILIDIRECT  --   --   --  0.2  --   --   --   --    ALT 16 22   < >  --    < >  --    < >  --    AST 24 25   < >  --    < >  --    < >  --    PROTEIN  --   --   --   --   --  Negative  --  100*    < > = values in this interval not displayed.   EXAM: XR CHEST 1 VIEW  LOCATION: [facility]  DATE/TIME: 11/8/2022 1:48 PM     INDICATION: missed dialysis  COMPARISON: 10/26/2022 . 09/16/2022.                                                                      IMPRESSION: Loculated right  hydropneumothorax is again seen.  There is very little aerated right lung. The heart is not well assessed. A metallic stent overlies the upper mediastinum. A metallic object over the right upper quadrant could be related to   cholecystectomy.    Personally reviewed today's laboratory studies      Thank you for involving us in the care of this patient. We will continue to follow along with you.      Smita Raygoza MD  [facility]  [phone]

## 2022-11-09 NOTE — PROGRESS NOTES
Brief progress note    Spoke with Dr. Solomon about laboratory abnormalities - lactate of 5.9, high sensitivity troponin of 120. Both difficult to interpret in setting of liver disease and kidney disease, respectfully. Will repeat labs now but no indication for empiric antibiotics at this time, still awaiting cell count from ascites fluid.     Also, 6.9L ascites fluid removed from abdomen, no albumin given at that time to help keep fluid from shifting back into the abdomen. Blood pressure remains normal, 106/68. No indication to give albumin currently but would consider if he becomes hypotensive.    Christy Virk MD    ADDENDUM 9:37 PM  Repeat lactate stable at 5.6, repeat troponin stable at 116. No indication to change plan of care.

## 2022-11-09 NOTE — UTILIZATION REVIEW
Admission Status; Secondary Review Determination   Under the authority of the Utilization Management Committee, the utilization review process indicated a secondary review on Elle Blanton. The review outcome is based on review of the medical records, discussions with staff, and applying clinical experience noted on the date of the review.   (x) Inpatient Status Appropriate - This patient's medical care is consistent with medical management for inpatient care and reasonable inpatient medical practice.     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 found to have lactic acidosis and elevated trop, nephrology consult , K is elevated today to 6, plan for dialysis and paracentesis     At the time of admission with the information available to the attending physician more than 2 nights Hospital complex care was anticipated, based on patient risk of adverse outcome if treated as outpatient and complex care required. Inpatient admission is appropriate based on the Medicare guidelines.   The information on this document is developed by the utilization review team in order for the business office to ensure compliance. This only denotes the appropriateness of proper admission status and does not reflect the quality of care rendered.   The definitions of Inpatient Status and Observation Status used in making the determination above are those provided in the CMS Coverage Manual, Chapter 1 and Chapter 6, section 70.4.   Sincerely,   Chapin Collins MD  Utilization Review  Physician Advisor  [facility]

## 2022-11-09 NOTE — PLAN OF CARE
Problem: Plan of Care - These are the overarching goals to be used throughout the patient stay.    Goal: Plan of Care Review  Description: The Plan of Care Review/Shift note should be completed every shift.  The Outcome Evaluation is a brief statement about your assessment that the patient is improving, declining, or no change.  This information will be displayed automatically on your shift note.  Outcome: Progressing   Goal Outcome Evaluation:       Patient denies pain, drain in place.Patient ate 25% of breakfast, needs to be encouraged. Dialysis to be run today.

## 2022-11-09 NOTE — PROGRESS NOTES
[facility]    Medicine Progress Note - Hospitalist Service    Date of Admission:  11/8/2022    Assessment & Plan      Elle Blanton is a 62 year old male w/ PMH of  ESRD on HD MWF, HTN, hepatitis B, R lung empyema w/ recurrent hemothorax s/p multiple chest tubes, and reccurent ascites admitted on 11/8/2022. He presents now w/ 1 day of altered mental status.     Altered mental status  Per pt's wife and son he has been more confused the last 24 hours or so. Of note he is on lactulose chronically for hepatic encephalopathy. Also missed dialysis yesterday. Differential for new AMS is broad at this point and includes hepatic encephalopathy vs uremia at this point. Less likely infectious given that he has been afebrile and does not endorse any infectious symptoms at this time. On exam he is alert and oriented x3 and seemingly near his baseline mentation, although quite drowsy.   - PTA lactulose dosing  - HD per neph, see below  - Cell count and dif from ascites, see below    ESRD on dialysis  Hx of acute on chronic anemia   Acute blood loss anemia  R hydropneumothorax with volume loss of R lung  Chronic trapped lung, empyema   HCC on imaging with Hx of ruptured hepatic mass  Chronic hep B cirrhosis  Elevated lactic acid  Pt w/ hx of acute blood loss anemia requiring frequent infusion due to bloody drainage from empyema of the R lung. Also w/ ESRD likely contributing as well. Has had many recent admissions for transfusions in the recent months. 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 Does have hx of oozing from his liver mass and frequent paracentesis of bloody ascites. Wife and son are denying any recent hemoptysis, hematemesis, black or bloody stools. Did receive US guided paracentesis in the ED 11/8 w/ 6.9 L removed.  Pt typically receives outpatient  HD MWF, notably missed this appointment yesterday. Nephrology was consulted in the ED and is following. Creat on admission 10.40. Na WNL, K 5.6. Mg 2.0.  - Cell count w/ dif of ascitic fluid ordered  -  Renal consulted, appreciate their recs   > Plan for HD run tonight or tomorrow AM after paracentesis  - PTA lactulose  - PTA hydroxyzine for itching  - PTA oxycodone for pain PRN  - Renal diet ordered  - Magnesium repletion protocol per nursing    Chronic conditions:  HTN- PTA Carvedilol, diltiazem, careful hypotension parameters  Sleep difficulty- PTA ambien       Diet: Renal Diet (dialysis)    DVT Prophylaxis: Pneumatic Compression Devices  Beltre Catheter: Not present  Central Lines: None  Cardiac Monitoring: None  Code Status: No CPR- Do NOT Intubate      Disposition Plan      Expected Discharge Date: 11/10/2022      Destination: home          The patient's care was discussed with the Attending Physician, Dr. Solomon.    Osito Villarreal MD  Hospitalist Service  [facility]  Securely message with the Vocera Web Console (learn more here)  Text page via [employer] Paging/Directory     Clinically Significant Risk Factors Present on Admission     # Coagulation Defect: INR = 1.34 (Ref range: 0.85 - 1.15) and/or PTT = 37 Seconds (Ref range: 22 - 38 Seconds), will monitor for bleeding  # Thrombocytopenia: Lowest platelets = 41 (Ref range: 150-450) in last 2 days, will monitor for bleeding               ______________________________________________________________________    Interval History   Pt quite drowsy this AM, but denying and pain or other concerns. HD team has not been by yet.    Data reviewed today: I reviewed all medications, new labs and imaging results over the last 24 hours. I personally reviewed no images or EKG's today.    Physical Exam   Vital Signs: Temp: 97.4  F (36.3  C) Temp src: Oral BP: 94/59 Pulse: 102   Resp: 24 SpO2: 91 % O2 Device: None (Room air)    Weight: 116 lbs 11.2  oz    Constitutional: drowsy, cooperative, no apparent distress  Eyes: Lids and lashes normal, pupils equal, round and reactive to light  ENT: Normocephalic, without obvious abnormality, atraumatic, oral pharynx with moist mucous membranes, tonsils without erythema or exudates, gums normal and good dentition.  Respiratory: No increased work of breathing, good air exchange, clear to auscultation bilaterally, no crackles or wheezing  Cardiovascular: Normal apical impulse, regular rate and rhythm, normal S1 and S2, no S3 or S4, and no murmur noted  GI: No scars, normal bowel sounds, soft, mildly distended, non-tender, no masses palpated, no hepatosplenomegally  Musculoskeletal: There is no redness, warmth, or swelling of the joints.    Neurologic: Awake, oriented to name, place.    Neuropsychiatric: difficult to assess    Data   Recent Labs   Lab 11/09/22  0523 11/08/22  1233 11/08/22  1015   WBC 4.8 4.6  --    HGB 7.8* 8.0* 7.8*   * 105*  --    PLT 41* 54*  --    INR  --  1.34*  --     141  --    POTASSIUM 6.0* 5.6*  --    CHLORIDE 104 104  --    CO2 17* 20*  --    BUN 70.2* 64.7*  --    CR 11.10* 10.49*  --    ANIONGAP 18* 17*  --    TANO 7.5* 7.8*  --    GLC 65* 82  --    ALBUMIN  --  2.4*  2.4*  --    PROTTOTAL  --  8.8*  --    BILITOTAL  --  0.4  --    ALKPHOS  --  247*  --    ALT  --  16  --    AST  --  24  --    LIPASE  --  13  --      Recent Results (from the past 24 hour(s))   XR Chest 1 View    Narrative    EXAM: XR CHEST 1 VIEW  LOCATION: [facility]  DATE/TIME: 11/8/2022 1:48 PM    INDICATION: missed dialysis  COMPARISON: 10/26/2022 . 09/16/2022.      Impression    IMPRESSION: Loculated right hydropneumothorax is again seen.  There is very little aerated right lung. The heart is not well assessed. A metallic stent overlies the upper mediastinum. A metallic object over the right upper quadrant could be related to   cholecystectomy.

## 2022-11-09 NOTE — PLAN OF CARE
Problem: Plan of Care - These are the overarching goals to be used throughout the patient stay.    Goal: Plan of Care Review  Description: The Plan of Care Review/Shift note should be completed every shift.  The Outcome Evaluation is a brief statement about your assessment that the patient is improving, declining, or no change.  This information will be displayed automatically on your shift note.  11/9/2022 1628 by Denzel Frederick RN  Outcome: Progressing   Goal Outcome Evaluation:       No verbal or nonverbal expressions of pain, dialysis running, drain pouch minimal drainage, mostly gas.Dialysis ran 240 min instead of 3 hrs, did not remove fluid. Patient resting comfortably

## 2022-11-09 NOTE — PLAN OF CARE
Problem: Mobility Impairment  Goal: Optimal Mobility  Outcome: Progressing     Problem: Pain Chronic (Persistent)  Goal: Optimal Pain Control and Function  Intervention: Manage Persistent Pain  Recent Flowsheet Documentation  Taken 11/8/2022 1850 by Mathew Dawson RN  Medication Review/Management: medications reviewed   Goal Outcome Evaluation:         Patient A and O times 4 with come forgetfulness and some confusion. Patient Assist of 1 for transfers and cares. Patient Speaks primarily Hmong but communicates well in english. Carvedilol held this evening for low BP. Provider notified. Lactic 5.6 and troponin 116. Provider notified in both instance. HD scheduled for tomorrow.   Oxy for pain and Ambien for sleeplessness given at HS.     Patient has drainage bag on right flank, out put 60 ml. Bloody thick drainage.   Bed alarms on for safety.

## 2022-11-09 NOTE — PLAN OF CARE
Problem: Plan of Care - These are the overarching goals to be used throughout the patient stay.    Goal: Optimal Comfort and Wellbeing  Outcome: Progressing     Problem: Mobility Impairment  Goal: Optimal Mobility  Intervention: Optimize Mobility  Recent Flowsheet Documentation  Taken 11/9/2022 0622 by Khushi Kunz RN  Activity Management: sitting, edge of bed  Taken 11/9/2022 0300 by Khushi Kunz RN  Activity Management: bedrest  Taken 11/9/2022 0145 by Khushi Kunz RN  Activity Management: sitting, edge of bed   Goal Outcome Evaluation:  Pt VSS this shift.  Sitting on the edge of bed for part of the shift.  Helped back to bed and pt began coughing.  Lung sounds congested/coarse.  Nonproductive cough.  Denied pain.  Covid swab sent- negative results.  Lower extremity edema bilaterally.  Pt responds to questions sporadically.  Bed alarm on for safety.  Pt to have dialysis this am.

## 2022-11-10 NOTE — CODE/RAPID RESPONSE
RRT called due to an increase in oxygen needs and WOB. Nursing placed patient on an oxymask and was tritrating oxygen accordingly prior to RT arriving. Patient was saturating in the mid 80s on 15lpm oxymask. He continued to remove the mask and needed redirecting.     RRT called. Breath sounds where coarse crackles at the bases and diminished bilaterally. Patient was placed on Bipap 14/8, Fi02 of 100%, rate of 12 and an I-time of 0.9. A liquacell barrier was placed on the bridge of his nose and a small full face mask was used. Alarms where set approprietly.     Fi02 was titrated to 70% with patient saturating 93-94%. His respiratory rate is 39, vte 481ml with a 8% mask leak. Nursing educated family on NPO status while on Bipap and to call if the patient removes the mask.     Khushi Strange, RT

## 2022-11-10 NOTE — SIGNIFICANT EVENT
Significant Event Note    Time of event: 3:07 PM November 10, 2022    Description of event:  House paged about patient becoming agitated with the BiPAP machine on.  Briefly patient had a rapid response earlier in the afternoon due to acute hypoxic respiratory failure despite being on 15 L oxygen mask.  He was placed on BiPAP.  He was found to have new pneumonia in the left lung in the setting of chronic stable hemothorax on the right lung.     Initially patient was given 5 mg of Zyprexa to remain calm to keep BiPAP on given hypoxia without it.  He was quite agitated and pulling off the mask.  In long discussion with family they would like BiPAP removed and patient to remain comfortable.  Discussed the risk of patient tiring out and continuing to be hypoxic.  Discussed likely patient would pass away without BiPAP.  Family agrees Elle would not want this intervention especially as he was showing us by pulling at the mask.  He has told the son numerous times he would want to just remain comfortable.  With that we will initiate comfort care order set in place for GIP hospice consult.  Discussed this with family and all parties agree that this is what Elle would want.      Patient was placed on oxyimask and continued to sat at 77% with this time.  He was given 1 mg of morphine and appeared much more comfortable with less air hunger.  Family present in the room at this time happy to see patient more comfortable.    Plan:  -comfort care order set  -GIP hospice consult     Discussed with: patient's family/emergency contact, bedside nurse and supervising physician, Dr. Corona Hernandez MD

## 2022-11-10 NOTE — PROGRESS NOTES
Pt is dying. Three family members in room. Pt's wife is Yarsani. Her sister is English speaking. Spouse and sister talked. Spouse requested prayer.  Writer offered prayer and pastoral support.    DANICA Dick.

## 2022-11-10 NOTE — PLAN OF CARE
Problem: Gas Exchange Impaired  Goal: Optimal Gas Exchange  Outcome: Not Progressing  Intervention: Optimize Oxygenation and Ventilation  Recent Flowsheet Documentation  Taken 11/10/2022 0000 by Mary Rodriguez RN  Head of Bed (HOB) Positioning: HOB at 30 degrees   Goal Outcome Evaluation:  His sats were low at midnight but he had his oxygen off; placed 2 then 3liter nasal cannula to get sats to 92%.  He is a mouth breather so switched to oxymask.  He was getting more congested & restless & I needed to put him on 4lt oxymask.  I texted the resident to come assess.  He did settle down & sleep a little, but still pulls his oxygen off & then desats.

## 2022-11-10 NOTE — PROGRESS NOTES
Bipap discontinued per family and patient choice. Bipap pulled from room and patient placed on oxymask.    Khushi Strange, RT     Impression: Mild CME, OD. Status: Symptomatic. JXT, OD
S/P PPV/MP ILM/PRP/IVK x 04/17/09 
s/p IVK last 11/20/09
s/p PPV/MP ILM/ICG/PRP/IVK  09/27/10
s/p Focal laser 03/11/11
s/p Avastin x 3 last 05/20/11 Plan: Exam and OCT reveal mild CME OD (335 microns). An IVFA from 09/08/2021 demonstrated mild staining and telangiectasia OD. Fundus Photos from 09/08/2021 demonstrated no IRH. Observe. Thanks, [employer] Return in 6 months for Follow-Up Visit, OCT OU, IVFA OD 1st

## 2022-11-10 NOTE — CONSULTS
"Care Management Initial Consult    General Information  Assessment completed with: Family, Family in waiting room, multiple family  Type of CM/SW Visit: Initial Assessment    Primary Care Provider verified and updated as needed: Yes   Readmission within the last 30 days:        Reason for Consult: discharge planning  Advance Care Planning: Advance Care Planning Reviewed: present on chart  Wife Maico primary HCA, son Hudson philippe alternate  General Information Comments: Introduce role of care mangement    Communication Assessment  Patient's communication style: spoken language (English or Bilingual)    Hearing Difficulty or Deaf: no   Wear Glasses or Blind: no    Cognitive  Cognitive/Neuro/Behavioral: .WDL except  Level of Consciousness: lethargic  Arousal Level: arouses to voice  Orientation: other (see comments) (TRU)  Mood/Behavior: calm, cooperative  Best Language: 0 - No aphasia  Speech: hesitant    Living Environment:   People in home: spouse, child(eulalio), adult     Current living Arrangements: house (split level)      Able to return to prior arrangements:       Family/Social Support:  Care provided by: spouse/significant other, child(eulalio)  Provides care for: no one, unable/limited ability to care for self  Marital Status:   Wife  Maico       Description of Support System: Supportive       Current Resources:   Patient receiving home care services: No (Recently open to \"[facility]\" but now closed)     Community Resources: Dialysis Services  Equipment currently used at home: cane, straight  Supplies currently used at home:      Employment/Financial:  Employment Status: disabled        Financial Concerns: No concerns identified   Referral to Financial Worker: No     Lifestyle & Psychosocial Needs:  Social Determinants of Health     Tobacco Use: Low Risk      Smoking Tobacco Use: Never     Smokeless Tobacco Use: Never     Passive Exposure: Not on file   Alcohol Use: Not on file   Financial Resource Strain: " Not on file   Food Insecurity: Not on file   Transportation Needs: Not on file   Physical Activity: Not on file   Stress: Not on file   Social Connections: Not on file   Intimate Partner Violence: Not on file   Depression: Not at risk     PHQ-2 Score: 0   Housing Stability: Not on file     Functional Status:  Prior to admission patient needed assistance:   Dependent ADLs:: Bathing  Dependent IADLs:: Cleaning, Cooking, Laundry, Shopping, Meal Preparation, Transportation     Mental Health Status:        Chemical Dependency Status:       Values/Beliefs:  Spiritual, Cultural Beliefs, Rastafarian Practices, Values that affect care: no             Additional Information:  Patient on BiPap post rapid response; writer met with family in the waiting room to introduce role of care management services. Patient lives with his wife (who is his primary healthcare agent) and family who provide assist with activities of daily living. He attends dialysis at [facility] on a Lpgaoa-Bnctryjct-Jcfmhb schedule.   3:12 PM:  Note an order for GIP consult on chart.     Viviane Noel RN

## 2022-11-10 NOTE — PHARMACY-VANCOMYCIN DOSING SERVICE
"Pharmacy Vancomycin Initial Note  Date of Service November 10, 2022  Patient's  1960  62 year old, male    Indication: Healthcare-Associated Pneumonia    Current estimated CrCl = Estimated Creatinine Clearance: 9 mL/min (A) (based on SCr of 6.39 mg/dL (H)).    Creatinine for last 3 days  2022: 12:33 PM Creatinine 10.49 mg/dL  2022:  5:23 AM Creatinine 11.10 mg/dL  11/10/2022:  5:56 AM Creatinine 6.39 mg/dL    Recent Vancomycin Level(s) for last 3 days  No results found for requested labs within last 72 hours.      Vancomycin IV Administrations (past 72 hours)      No vancomycin orders with administrations in past 72 hours.                Nephrotoxins and other renal medications (From now, onward)    Start     Dose/Rate Route Frequency Ordered Stop    22 0000  piperacillin-tazobactam (ZOSYN) 3.375 g vial to attach to  mL bag        Note to Pharmacy: For [facility], [facility] and [facility]: For Zosyn-naive patients, use the \"Zosyn initial dose + extended infusion\" order panel.    3.375 g  over 240 Minutes Intravenous EVERY 12 HOURS 11/10/22 1142      11/10/22 1300  vancomycin (VANCOCIN) 1000 mg in dextrose 5% 200 mL PREMIX         1,000 mg  200 mL/hr over 1 Hours Intravenous ONCE 11/10/22 1247            Contrast Orders - past 72 hours (72h ago, onward)    None            Plan:  1. Start vancomycin  1000 mg IV one time.   2. Vancomycin monitoring method: iHD - will monitor levels prior to iHD  3. Vancomycin therapeutic monitoring goal: 15-20 mg/L  4. Pharmacy will check vancomycin levels as appropriate in 1-3 Days.    5. Serum creatinine levels will be ordered daily for the first week of therapy and at least twice weekly for subsequent weeks.      Asaf Cr, [facility]    "

## 2022-11-10 NOTE — PROGRESS NOTES
HEMODIALYSIS TREATMENT NOTE    Date: 11/9/2022  Time: 7:30 PM    Data:  Pre Wt:52.9kg     Desired Wt: 56.0  kg   Post Wt: Unable   Ultrafiltration - Post Run Net Total Removed (mL): 0 mL  Vascular Access Status: patent  Dialyzer Rinse: Streaked, Light  Total Blood Volume Processed: 62 L   Total Dialysis (Treatment) Time: 2 hours 45 minutes     Lab:   No    Interventions:  Pt had unstable 2 hours & 45 minutes of HD via LAVG. Thrill & bruit present. 2 15g needle cannulated.  with good flow.  At the beginning of treatment HR in 90s during treatment HR increasing to 120's. SBP < 90's, UF off, primary Nurse informed & MD paged.  MD called back with order to give 25% Albumin, NO UF  & treatment time as tolerate.  Monitoring Every 15 minutes.  SBP rebound to 120's without Albumin but HR remains at 120's.  Last hour of treatment pt became restless, treatment terminated 15 minutes early with fair effect. Primary Nurse informed & handoff report given     Assessment:  Pt alert , confused, lethargic & very difficulty to keep same position for few minutes.      Plan:    Per Renal Team.

## 2022-11-10 NOTE — PROGRESS NOTES
Referral Received - [facility]     ______________________________________________________________________ Providers: Please contact [facility] with changes in orders or clinical plan of care   Nursing: Please contact [facility] with significant changes in patient condition  ______________________________________________________________________    [facility] would like to thank you for the [facility] referral.    Referral received.and initial insurance information sent to  Hospice intake.    We are reviewing your patient's eligibility with intake team and medical director at this time.    Our plan is to visit your patient as soon as possible. We will update the Charge Nurse on the unit with [facility] eligibility.        TIFFANIE Gordon on 11/10/2022 at 3:28 PM  [phone]

## 2022-11-10 NOTE — PROGRESS NOTES
[facility]    Medicine Progress Note - Hospitalist Service    Date of Admission:  11/8/2022    Assessment & Plan      Elle Blanton is a 62 year old male w/ PMH of  ESRD on HD MWF, HTN, hepatitis B, R lung empyema w/ recurrent hemothorax s/p multiple chest tubes, and reccurent ascites admitted on 11/8/2022. He presents now w/ 1 day of altered mental status.     Acute hypercarbic respiratory failure  Productive cough  Increasing O2 needs  CXR w/ new left mid to lower lung consolidation, concerning for HAP  Pt w/ increasing oxygen needs overnight 11/9. O2 sats initially upper 90s on admission, now sitting around 91-92% despite 6L supplemental O2. Complaining of SOB and productive cough, reaching out at oxymask trying to take this off. Given pt drowsiness, would be concerned about starting BiPAP if O2 needs continue rising. Of note he is DNR/DNI. Portable CXR showing Stable large right-sided hydropneumothorax and stable right-sided volume loss. Increased right upper lobar consolidation and new left mid to lower lung consolidation and surrounding airspace opacities suspicious   for pneumonia. Rapid response called this AM for acute respiratory distress, VBG w/ pH 7.27, CO2, O2 32, bicarb 25. started on BiPAP, O2 sats immediately shelia to upper 90s.  - Portable CXR ordered  - Repeat CBC  - BiPAP started, see RT's note  - Repeat ABG 1 hour after start of BiPAP  - Will repeat Lactic acid as well now w/ new infectious process  - Starting prophylactic Vancomycin, Zosyn, Levofloxacin for new HAP.    Altered mental status  Per pt's wife and son he has been more confused the last 24 hours or so. Of note he is on lactulose chronically for hepatic encephalopathy. Also missed dialysis yesterday. Differential for new AMS is broad at this point and includes hepatic encephalopathy vs uremia at this point. Less likely infectious given that he has been afebrile and does not endorse any infectious symptoms at this  time. On exam he is alert and oriented x3 and seemingly near his baseline mentation, although quite drowsy.   - PTA lactulose dosing  - HD per neph, see below  - Cell count and dif from ascites, see below    ESRD on dialysis  Hx of acute on chronic anemia   Acute blood loss anemia  R hydropneumothorax with volume loss of R lung  Chronic trapped lung, empyema   HCC on imaging with Hx of ruptured hepatic mass  Chronic hep B cirrhosis  Elevated lactic acid  Pt w/ hx of acute blood loss anemia requiring frequent infusion due to bloody drainage from empyema of the R lung. Also w/ ESRD likely contributing as well. Has had many recent admissions for transfusions in the recent months. 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 Does have hx of oozing from his liver mass and frequent paracentesis of bloody ascites. Wife and son are denying any recent hemoptysis, hematemesis, black or bloody stools. Did receive US guided paracentesis in the ED 11/8 w/ 6.9 L removed.  Pt typically receives outpatient HD MWF, notably missed this appointment yesterday. Nephrology was consulted in the ED and is following. Creat on admission 10.40. Na WNL, K 5.6. Mg 2.0.  - Cell count w/ dif of ascitic fluid ordered  -  Renal consulted, appreciate their recs   > Plan for HD run tonight or tomorrow AM after paracentesis  - PTA lactulose  - PTA hydroxyzine for itching  - PTA oxycodone for pain PRN  - Renal diet ordered  - Magnesium repletion protocol per nursing    Chronic conditions:  HTN- PTA Carvedilol, diltiazem, careful hypotension parameters  Sleep difficulty- PTA ambien       Diet: Renal Diet (dialysis)    DVT Prophylaxis: Pneumatic Compression Devices  Beltre Catheter: Not present  Central Lines: None  Cardiac Monitoring: None  Code Status: No CPR- Do NOT Intubate      Disposition Plan      Expected  Discharge Date: 11/11/2022      Destination: home  Discharge Comments: worsening lung issues        The patient's care was discussed with the Attending Physician, Dr. Solomon.    Osito Villarreal MD  Hospitalist Service  [facility]  Securely message with the Vocera Web Console (learn more here)  Text page via [employer] Paging/Directory     Clinically Significant Risk Factors      # Thrombocytopenia: Lowest platelets = 41 (Ref range: 150-450) in last 2 days, will monitor for bleeding                 ______________________________________________________________________    Interval History   Pt quite drowsy this AM, but denying and pain or other concerns. HD team has not been by yet.    Data reviewed today: I reviewed all medications, new labs and imaging results over the last 24 hours. I personally reviewed no images or EKG's today.    Physical Exam   Vital Signs: Temp: 98.2  F (36.8  C) Temp src: Oral BP: 109/81 Pulse: 110   Resp: 20 SpO2: 91 % O2 Device: Oxymask Oxygen Delivery: 5 LPM  Weight: 116 lbs 11.2 oz    Constitutional: drowsy, cooperative, no apparent distress  Eyes: Lids and lashes normal, pupils equal, round and reactive to light  ENT: Normocephalic, without obvious abnormality, atraumatic, oral pharynx with moist mucous membranes, tonsils without erythema or exudates, gums normal and good dentition.  Respiratory: No increased work of breathing, good air exchange, clear to auscultation bilaterally, no crackles or wheezing  Cardiovascular: Normal apical impulse, regular rate and rhythm, normal S1 and S2, no S3 or S4, and no murmur noted  GI: No scars, normal bowel sounds, soft, mildly distended, non-tender, no masses palpated, no hepatosplenomegally  Musculoskeletal: There is no redness, warmth, or swelling of the joints.    Neurologic: Awake, oriented to name, place.    Neuropsychiatric: difficult to assess    Data   Recent Labs   Lab 11/10/22  0556 11/09/22  0523 11/08/22  1233  11/08/22  1015   WBC  --  4.8 4.6  --    HGB  --  7.8* 8.0* 7.8*   MCV  --  102* 105*  --    PLT  --  41* 54*  --    INR  --   --  1.34*  --    * 139 141  --    POTASSIUM 4.8 6.0* 5.6*  --    CHLORIDE 97* 104 104  --    CO2 23 17* 20*  --    BUN 34.7* 70.2* 64.7*  --    CR 6.39* 11.10* 10.49*  --    ANIONGAP 15 18* 17*  --    TANO 7.7* 7.5* 7.8*  --    GLC 93 65* 82  --    ALBUMIN  --   --  2.4*  2.4*  --    PROTTOTAL  --   --  8.8*  --    BILITOTAL  --   --  0.4  --    ALKPHOS  --   --  247*  --    ALT  --   --  16  --    AST  --   --  24  --    LIPASE  --   --  13  --      No results found for this or any previous visit (from the past 24 hour(s)).

## 2022-11-10 NOTE — PROGRESS NOTES
Responded to rapid.  Assisted RT with placing BiPAP 14/8 RR12 80%.  Patient toll well.   Jorje Mccarthy, RT

## 2022-11-10 NOTE — PROGRESS NOTES
Bipap pressures titrated to 12/6 and 100% to achieve Sp02 of 94%. Pressure titrated due to presence of a large right sided hydropneumothorax.    Khushi Strange, RT

## 2022-11-10 NOTE — PLAN OF CARE
Problem: Plan of Care - These are the overarching goals to be used throughout the patient stay.    Goal: Optimal Comfort and Wellbeing  Outcome: Not Progressing     Problem: Gas Exchange Impaired  Goal: Optimal Gas Exchange  Outcome: Not Progressing  Intervention: Optimize Oxygenation and Ventilation  Recent Flowsheet Documentation  Taken 11/10/2022 1000 by Redd wSeet RN  Head of Bed (HOB) Positioning: HOB at 30-45 degrees   Goal Outcome Evaluation:       Coarse,crackles noted in lungs. Coughing noted after family feed Pt chicken noodle soup. Provider notified. Provider ordered continuous O2 monitoring. Pt's O2 was 78% on 5 Lpm. Supplemental O2 titrated up to 10 Lpm. RT called. Forehead O2 probe checked. Unable to get a result. Returned to finger probe. Code Rapid Response called. Pt switched over to BiPap. Chest Xray ordered, ABGs ordered. Pt tolerated Bipap for about 3 hours until Pt became increasingly more anxious. IV zosyn infused. IV levofloaxacin frequently interrupted by Pt reaching for his BiPap mask. Pt's family requested for RN and RT to remove Bipap from Pt's face to speak with the Pt. RN, Provider and RT explained to family the potential consequences of removing the BiPap. Pt's family agreed and BiPap was removed. With primary provider's consent Oxymask reapplied. Pt's breath sounds are audibly coarse. Breathing is increasingly labored. O2 sats in the 70's - 80's. PRN IM Zyprexa administered to managed anxiety. PRN morphine administered to lessen cardiac O2 demand. Family is tearful at bedside.Pt remains DNR/DNI.   Redd Sweet RN  11/10/2022  2:55 PM

## 2022-11-10 NOTE — PROGRESS NOTES
RENAL NOTE     REQUESTING PHYSICIAN: Dr. Torres    REASON FOR CONSULT: ESRD on HD    ASSESSMENT/PLAN:    ESRD on HD: [facility] at [facility], MWF schedule, typically minimal UF 0.5-1 kg range lately. Dr. Hill is his primary nephrologist.   mins ordered, rarely runs more than 2 hours. TW 56 kg.     K down after run yesterday. No uf due to low bp. Doubt fluid overload is cause of resp compensation today and do not rec extra HD now.    Resp failure, hypoxia, acute on chronic lung changes. Suspect PNA and agree with abx.      Hyper K and acidosis better controlled after HD yesterday     CKD-BMD: Ca+ 7.8 (corrected Ca+ 9.1). continue PTA phoslo 667 mg tid wm.      Anemia: Hg ~7-8 range PTA on Epogen 78009 U 3 Q W with HD, resume. Transfuse per primary team for hg <7.0    Access: L AV AVG, +thrill and bruit on exam.     Liver CA/Recurrent Ascites: SOB in ED with distended abdomin. S/p 6 liter Paracentesis 11/8 breathing initially much improved.      BP on dilt and coreg- bp now running lower since tap and meds reduced with prn hold orders and would plan for midodrine prior to next run if we cont HD.     Hep B on entacavir.    Family considering comfort care. He is DNR.   Will update FP team.     Will follow.     Smita Raygoza MD  [facility]  [phone]         HPI:Franny is a 62 year old M with PMH significant for ESRD on HD MWF at [facility], HTN, hepatitis B, recurrent Hydrea hemothorax on the right side not a candidate for surgical decortication status post multiple chest tubes, chronic type B aortic dissection, Liver Ca/reccurent ascites.  11/8 presented to ED with weakness,a abdominal distention, missed HD yesterday 11/7.     Difficult HD run last noc, 2:45 min, low bp, restless. No UF possible.   Earlier rapid response called due to low sats/ inc sob.   Family here, on bipap and struggling, trying to pull at mask and lines.   Reviewed CXR with new L infiltrates  apparent PNA, ?asp.   Chronic R changes.   They are considering comfort care / concerned he's struggling.     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 pylori infection 7/5/2017    UGI bleed implied by Hgb 9.0 6/22/17 and melena. Admitted and hgb decreased to 7.6, CT abdomen showed all bladder wall thickening. + Hep B surface antigen noted. Melena resolved and hgb stabalized without transfusion, epigastric pain resolved with PPI. Recommend referral for gastroscopy.     Heart attack (H)      Hepatitis B      Normocytic anemia      Other pancytopenia (H) 7/5/2017 6/24/17 Peripheral smear at [facility].Pancytopenia of uncertain cause. Ruled out acute leukemia. Hematologist suggests hemolytic anemia should be considered possible cause and LDH and haptoglobin should be assessed in future.      PONV (postoperative nausea and vomiting)      Somnolence 11/8/2022     Thrombocytopenia (H)        No current facility-administered medications on file prior to encounter.  acetaminophen (TYLENOL) 500 MG tablet, Take 500 mg by mouth every 6 hours as needed for mild pain  calcium acetate (PHOSLO) 667 MG CAPS capsule, Take 1 capsule by mouth 2 times daily (with meals)  carvedilol (COREG) 12.5 MG tablet, Take 1 tablet (12.5 mg) by mouth 2 times daily (with meals) (Patient taking differently: Take 12.5 mg by mouth 2 times daily as needed)  cephALEXin (KEFLEX) 250 MG capsule, Take 1 capsule (250 mg) by mouth 2 times  daily  diltiazem ER (DILT-XR) 180 MG 24 hr capsule, Take 1 capsule (180 mg) by mouth daily (Patient taking differently: Take 180 mg by mouth daily as needed)  entecavir (BARACLUDE) 0.5 MG tablet, Take 1 tablet (0.5 mg) by mouth every 7 days (Patient taking differently: Take 0.5 mg by mouth every 7 days Sunday)  guaiFENesin-codeine (GUAIFENESIN AC) 100-10 MG/5ML syrup, Take 5 mLs by mouth every 4 hours as needed for cough  hydrOXYzine (ATARAX) 25 MG tablet, Take 1 tablet (25 mg) by mouth 3 times daily as needed for itching  lactulose (CHRONULAC) 10 GM/15ML solution, Take 30 mLs (20 g) by mouth 3 times daily Takes 2 to 3 times daily, holds for loose stool (Patient taking differently: Take 20 g by mouth 2 times daily as needed Takes 2 to 3 times daily, holds for loose stool)  naloxone (NARCAN) 4 MG/0.1ML nasal spray, Spray 1 spray (4 mg) into one nostril alternating nostrils as needed for opioid reversal every 2-3 minutes until assistance arrives  oxyCODONE (ROXICODONE) 5 MG tablet, Take 1 tablet (5 mg) by mouth 2 times daily as needed for severe pain (7-10)  pantoprazole (PROTONIX) 40 MG EC tablet, Take 1 tablet (40 mg) by mouth daily  senna-docusate (SENOKOT-S/PERICOLACE) 8.6-50 MG tablet, Take 1 tablet by mouth daily as needed for constipation  zolpidem (AMBIEN) 10 MG tablet, Take 1 tablet (10 mg) by mouth nightly as needed for sleep  order for DME, Equipment being ordered: Other: blood pressure monitor  Treatment Diagnosis: hypertension  sodium chloride, PF, (SODIUM CHLORIDE FLUSH) 0.9% PF flush, 10 mLs by Intracatheter route every 24 hours (Patient not taking: Reported on 11/8/2022)        No current outpatient medications on file.      ALLERGIES/SENSITIVITIES:  No Known Allergies  Social History     Tobacco Use     Smoking status: Never     Smokeless tobacco: Never   Vaping Use     Vaping Use: Never used   Substance Use Topics     Alcohol use: No     Drug use: No     I have reviewed this patient's family history  and updated it with pertinent information if needed.  Family History   Problem Relation Age of Onset     Ulcers Father      Hypertension No family hx of      Asthma No family hx of      Cancer No family hx of      Coronary Artery Disease No family hx of      Liver Cancer No family hx of          PHYSICAL EXAM:  Physical Exam   Temp: 98.4  F (36.9  C) Temp src: Oral BP: 91/63 Pulse: 107   Resp: (!) 31 (on Bipap) SpO2: 94 % O2 Device: BiPAP/CPAP Oxygen Delivery: 15 LPM  Vitals:    11/08/22 1138 11/08/22 1807 11/09/22 0622   Weight: 58.1 kg (128 lb) 52.8 kg (116 lb 4.8 oz) 52.9 kg (116 lb 11.2 oz)     Vital Signs with Ranges  Temp:  [97.5  F (36.4  C)-98.4  F (36.9  C)] 98.4  F (36.9  C)  Pulse:  [] 107  Resp:  [20-31] 31  BP: ()/(55-97) 91/63  FiO2 (%):  [70 %-100 %] 100 %  SpO2:  [78 %-96 %] 94 %  I/O last 3 completed shifts:  In: 390 [P.O.:240; I.V.:100; Other:50]  Out: 435 [Urine:350; Drains:85]      Patient Vitals for the past 72 hrs:   Weight   11/09/22 0622 52.9 kg (116 lb 11.2 oz)   11/08/22 1807 52.8 kg (116 lb 4.8 oz)   11/08/22 1138 58.1 kg (128 lb)     GEN:NAD, frail cachectic restless  HEENT Bipap mask. Tachypnea   No jvd  CV: tachy  120s  Lung: severe dec bs on R, L with diffuse rhonchi  Ab: soft and NT   Ext:  No  LLE bilateral, well perfused  Skin: no rash  L up arm AVF patent no infx.     Laboratory:     Recent Labs   Lab 11/09/22  0523 11/08/22  1233 11/08/22  1015   WBC 4.8 4.6  --    RBC 2.57* 2.64*  --    HGB 7.8* 8.0* 7.8*   HCT 26.2* 27.6*  --    PLT 41* 54*  --        Basic Metabolic Panel:  Recent Labs   Lab 11/10/22  0556 11/09/22  0523 11/08/22  1233   * 139 141   POTASSIUM 4.8 6.0* 5.6*   CHLORIDE 97* 104 104   CO2 23 17* 20*   BUN 34.7* 70.2* 64.7*   CR 6.39* 11.10* 10.49*   GLC 93 65* 82   TANO 7.7* 7.5* 7.8*       INR  Recent Labs   Lab 11/08/22  1233   INR 1.34*       Recent Labs   Lab Test 11/08/22  1233 10/26/22  1445   POTASSIUM 5.6* 5.3   CHLORIDE 104 97*   BUN 64.7*  71.7*      Recent Labs   Lab Test 11/08/22  1233 10/26/22  1445 12/30/20  0830 10/21/20  1032 08/20/20  0728 08/19/20  1701 08/07/19  0344 08/06/19  2349   ALBUMIN 2.4* 2.4*   < > 3.0*   < >  --    < >  --    BILITOTAL 0.4 0.4   < > 0.6   < >  --    < >  --    BILIDIRECT  --   --   --  0.2  --   --   --   --    ALT 16 22   < >  --    < >  --    < >  --    AST 24 25   < >  --    < >  --    < >  --    PROTEIN  --   --   --   --   --  Negative  --  100*    < > = values in this interval not displayed.   EXAM: XR CHEST 1 VIEW  LOCATION: [facility]  DATE/TIME: 11/8/2022 1:48 PM     INDICATION: missed dialysis  COMPARISON: 10/26/2022 . 09/16/2022.                                                                      IMPRESSION: Loculated right hydropneumothorax is again seen.  There is very little aerated right lung. The heart is not well assessed. A metallic stent overlies the upper mediastinum. A metallic object over the right upper quadrant could be related to   Cholecystectomy. New L infiltrates     Personally reviewed today's laboratory studies      Thank you for involving us in the care of this patient. We will continue to follow along with you.      Smita Raygoza MD  [facility]  [phone]

## 2022-11-11 NOTE — DISCHARGE SUMMARY
[facility]  Hospitalist Discharge Summary      Date of Admission:  11/10/2022  Date of Discharge:  2022  3:16 AM  Discharging Provider: Osito Villarreal MD  Discharge Service: Hospitalist Service    Discharge Diagnoses   Acute hypercarbic respiratory failure  HAP  ESRD on dialysis  Hx of acute on chronic anemia   Acute blood loss anemia  R hydropneumothorax with volume loss of R lung  Chronic trapped lung, empyema   HCC on imaging with Hx of ruptured hepatic mass  Chronic hep B cirrhosis    Discharge Disposition    Patient  during this admission  Condition at discharge:     Hospital Course   Acute hypercarbic respiratory failure  Hospital acquired pneumonia  Pt w/ increasing oxygen needs overnight . O2 sats initially upper 90s on admission, satting down to the 70s on 6-10L via oxymask 11/10 AM. Rapid response was eventually called for acute respiratory distress, w/ intial VBG showing pH 7.27, CO2, O2 32, bicarb 25. Portable CXR showing Stable large right-sided hydropneumothorax and stable right-sided volume loss. Increased right upper lobar consolidation and new left mid to lower lung consolidation and surrounding airspace opacities suspicious   for pneumonia. Initiated abx therapy for HAP and started on BiPAP, O2 sats immediately shelia to upper 90s. Pt however did not tolerate this well and continued to reach out and attempt to remove BiPAP mask due to significant anxiety. At this point, with careful discussion w/ pt and his family at bedside, the decision was made to enter inpatient hospice and initiate comfort cares. Pt ultimately found to be unresponsive and pronounced dead at 1:05 AM 22.    Consultations This Hospital Stay   PHARMACY IP CONSULT    Code Status   Prior    Osito Villarreal MD  [address] [address]  Phone: [phone]  Fax:  [phone]  ______________________________________________________________________    Physical Exam   Vital Signs:                    Weight: 0 lbs 0 oz  Patient unresponsive to verbal and tactile stimuli.  No heart sounds heard, no pulse felt. No spontaneous respirations.  Pupils fixed and dilated    Primary Care Physician   Jaswant Flores    Discharge Orders   No discharge procedures on file.    Significant Results and Procedures   Results for orders placed or performed during the hospital encounter of 11/08/22   XR Chest 1 View    Narrative    EXAM: XR CHEST 1 VIEW  LOCATION: [facility]  DATE/TIME: 11/8/2022 1:48 PM    INDICATION: missed dialysis  COMPARISON: 10/26/2022 . 09/16/2022.      Impression    IMPRESSION: Loculated right hydropneumothorax is again seen.  There is very little aerated right lung. The heart is not well assessed. A metallic stent overlies the upper mediastinum. A metallic object over the right upper quadrant could be related to   cholecystectomy.   US Paracentesis without Albumin    Narrative    EXAM:  1. PARACENTESIS  2. ULTRASOUND GUIDANCE  LOCATION: [facility]  DATE/TIME: 11/8/2022 4:16 PM    INDICATION: Ascites.    PROCEDURE: Informed consent obtained. Time out performed. The abdomen was prepped and draped in a sterile fashion. 10 mL of 1% lidocaine was infused into local soft tissues. A 5 Lithuanian catheter system was introduced into the abdominal ascites under   ultrasound guidance.    6 liters of serous fluid were removed and sent to lab if requested.    Patient tolerated procedure well.    Ultrasound imaging was obtained and placed in the patient's permanent medical record.      Impression    IMPRESSION:  1.  Status post ultrasound-guided paracentesis.    Reference CPT Code: 37396   XR Chest Port 1 View    Narrative    EXAM: XR CHEST PORT 1 VIEW  LOCATION: [facility]  DATE/TIME: 11/10/2022 10:44 AM    INDICATION:  Increasing O2 demands, productive cough  COMPARISON: Chest x-ray 11/08/2022, 10/26/2022 and CT chest 09/16/2022      Impression    IMPRESSION: Stable left subclavian stent. Stable large right-sided hydropneumothorax and stable right-sided volume loss. Increased right upper lobar consolidation and new left mid to lower lung consolidation and surrounding airspace opacities suspicious   for pneumonia. The cardiac silhouette is mostly obscured.     Discharge Medications   Discharge Medication List as of 11/11/2022  3:18 AM      CONTINUE these medications which have NOT CHANGED    Details   order for DME Equipment being ordered: Other: blood pressure monitor  Treatment Diagnosis: hypertensionDisp-1 each, R-0, Local Print      zolpidem (AMBIEN) 10 MG tablet Take 1 tablet (10 mg) by mouth nightly as needed for sleep, Disp-30 tablet, R-1, E-Prescribe           Allergies   No Known Allergies

## 2022-11-11 NOTE — DEATH PRONOUNCEMENT
MD DEATH PRONOUNCEMENT    House officer called to pronounce Elle Blanton dead. Patient unresponsive to verbal and tactile stimuli.  No heart sounds heard, no pulse felt. No spontaneous respirations.  Pupils fixed and dilated. Patient pronounced dead at 1:05 AM on 11/11/2022. Nursing staff to notify family and primary doctor.      Jyoti Gonzalez, PGY2  [facility] Medicine Residency

## 2022-11-11 NOTE — PROGRESS NOTES
Per family.   Patients family request [facility] will [place]. [facility]   [phone]  [facility]

## 2022-11-11 NOTE — PHARMACY-ADMISSION MEDICATION HISTORY
Admission medication history completed at [facility]. Please see Pharmacy - Admission Medication History note from 11//8/2022.    Thank you,  Judy Guajardo [facility] on 11/10/2022 at 9:04 PM

## 2022-11-11 NOTE — SIGNIFICANT EVENT
Body transported via cart by staff to holding area here at 0315. Family to be making decision on choice of  home during business hours. Hospice SW will be calling unit with information once obtained from family.

## 2022-11-11 NOTE — CONSULTS
[facility]    Consult Note - [facility]    ______________________________________________________________________    [facility] 24/7 Contact Number: [phone]    - Providers: Please contact [facility] with changes in orders or clinical plan of care   - Nursing: Please contact [facility] with significant changes in patient condition    Hospice will notify the care team (including the hospitalist) to confirm date of inpatient hospice (GIP) admission.    New Epic encounter will not be created until hospice completes admission.   ______________________________________________________________________        Hospice Diagnosis: acute hypoxic respi failure    Indication for Inpatient Hospice: resp distress      Summary of Visit (includes assessment, medications and any new orders):   Consents signed. Patient imminent. Please schedule   --Dilaudid 1mg IV q4h  --Dilaudid 0.5-1mg q1h PRN  Per Dr. Vernon Mandel, RN  [phone]

## 2022-11-11 NOTE — PLAN OF CARE
Problem: Plan of Care - These are the overarching goals to be used throughout the patient stay.    Goal: Optimal Comfort and Wellbeing  Outcome: Unable to Meet   Goal Outcome Evaluation:      Family present in room. Pt repositioned throughout shift for comfort. PRN medication administered.   Pt changed into outfit from home as per family request.   Oxymask remains on pt, discussion with family regarding oxygen mask. Family requesting pulse oximeter monitor remain on at this time.

## 2022-11-17 ENCOUNTER — TELEPHONE (OUTPATIENT)
Dept: FAMILY MEDICINE | Facility: CLINIC | Age: 62
End: 2022-11-17

## 2022-11-23 ENCOUNTER — MEDICAL CORRESPONDENCE (OUTPATIENT)
Dept: HEALTH INFORMATION MANAGEMENT | Facility: CLINIC | Age: 62
End: 2022-11-23

## 2022-12-11 NOTE — TELEPHONE ENCOUNTER
Called wife, set up US for patient for next Friday. She will call me back if it doesn't work for patient.    No/Not applicable

## 2023-02-01 NOTE — PLAN OF CARE
"Pt A/O and cooperative with cares. Thrill felt with fistula on left arm. Sutures removed, dressing is C/D/I. Chest tube dressing is C/D/I. Pt denies SOB. Pain managed with PRN Oxy, effective per pt. Pt reports generalized itchiness, given PRN atarax. Also given PRN senna.  Voided 500 mL. Ate food brought from home and wife was at bedside this evening. Code sepsis, notified Dr. Hernandez house pager. Temp 101.6 and 102.1 F. Per Dr. Hernandez, pt to stay overnight and continue vanco. Blood cultures pending. Pt refused Tylenol. . /93. O2 sats 96% on room air. Pt refused Elemental, clonidine, and phoslo. States, \"I've been taking so much meds that my breath smells like meds only.\"    Problem: Gas Exchange Impaired  Goal: Optimal Gas Exchange  Intervention: Optimize Oxygenation and Ventilation  Recent Flowsheet Documentation  Taken 4/11/2022 1620 by Jigar Blanton, [place]  Head of Bed (HOB) Positioning: HOB at 30 degrees     Problem: Pain (Chronic Kidney Disease)  Goal: Acceptable Pain Control  Outcome: Ongoing, Progressing  Intervention: Prevent or Manage Pain  Recent Flowsheet Documentation  Taken 4/11/2022 2045 by Jigar Blanton RN  Pain Management Interventions:    emotional support    distraction  Taken 4/11/2022 2014 by Jigar Blanton, RN  Pain Management Interventions: medication (see MAR)     Jigar Blanton RN on 4/11/2022 at 10:29 PM                " Thalidomide Counseling: I discussed with the patient the risks of thalidomide including but not limited to birth defects, anxiety, weakness, chest pain, dizziness, cough and severe allergy.

## 2023-07-12 NOTE — PROGRESS NOTES
"  Interventional Radiology - Chart Review  Inpatient - [facility]: Interventional Radiology   [phone]  04/06/2022     S:  High 90 02 saturations on room air. X-ray completed yesterday. TPA administered 4/4 into chest tube to assist with drainage.      O:  BP (!) 152/87 (BP Location: Right arm)   Pulse 79   Temp 98.2  F (36.8  C) (Oral)   Resp 16   Ht 1.651 m (5' 5\")   Wt 60.2 kg (132 lb 11.5 oz)   SpO2 96%   BMI 22.09 kg/m        IMAGING:  EXAM: XR CHEST PORT 1 VIEW  LOCATION: [facility]  DATE/TIME: 4/5/2022 4:59 AM     INDICATION: R empyema s p chest tube  COMPARISON: 04/01/2022. CT 03/30/2022.                                                                      IMPRESSION: Right chest tube in place with continued mild decrease in the right pleural effusion with mild complex effusion remaining. Some mild hydropneumothorax in the right lung base likely due to trapped lung right lower lobe.     New vascular stent across the left brachiocephalic vein place for 04/02/2022.    LABS:  Recent Labs   Lab 04/06/22  0638 04/05/22  0540 04/04/22  0528 04/03/22  0731 03/31/22  0710 03/30/22  1557   WBC 5.0 4.3 3.9* 3.9*   < > 5.5   HGB 7.8* 7.7* 8.1* 6.8*   < > 6.2*   PLT 49* 50* 62* 48*   < > 71*   INR  --   --   --   --   --  1.25*   CR 5.68* 4.13* 6.29* 5.13*   < > 8.04*    < > = values in this interval not displayed.     Chest tube outputs (in mL):  DATE OP   4/3 300   4/4 140   4/5 380   4/6 100       A:  61 year old yo male with recurrent right side pleural effusion; s/p multiple chest tube placement and thoracentesis (please see IR consult note from 03/31). R hemothorax/emphyema on CTA from 03/30. S/p R chest tube placement 03/31 found to be hemothorax. TPA administration 4/4 into chest tube.    P:    - Continue present chest tube cares. Chest tube to -20mm wall suction.  - Agree with medicine team to continue wall suction until output <250mL in 24hr period; water seal " when OP decreased/  - Consider ealier imaging if there are changes in chest tube's function or in patient's clinical course.  - IR will continue to follow. Please contact IR with chest tube related questions or concerns.      Total time spent on the date of the encounter is 10 minutes, including time spent counseling the patient, performing a medically appropriate evaluation, reviewing prior medical history, ordering medications and tests, documenting clinical information in the medical record, and communication of results.    Zakia Pelaez, APRN CNP  Interventional Radiology  [phone]    E/M codes for reference only:  58779     SICU

## 2023-08-10 NOTE — TELEPHONE ENCOUNTER
Called pharmacy and they said pt stills have refills at the pharmacy but will need more refills for future.   
[facility] phone call message- patient requesting a refill:    Full Medication Name: zolpidem (AMBIEN) 5 MG tablet        Pharmacy confirmed as   [id number] - [place], [place] - [address] [place] [address]  Phone: [phone] Fax: [phone]  : Yes    Additional Comments:      OK to leave a message on voice mail? Yes    Primary language: English      needed? No    Call taken on March 7, 2022 at 8:18 AM by Jef Golden  
Patient called back and informed per last visit with , he was told that he could call the  staff if he needed medication sooner they will send message to a different doctor to send and approve medication, inform patient I am unable to send to a different doctor to approve and will have to send to  to see if that was mentioned and if ok for a different doctor to send medication.   
Patient notify of refill. Ashlee Swartz, CMA    
Team - please inform Elle that the refill for his Ambien has been sent in. Thanks!    Jaswant Flores MD  March 8, 2022  8:14 AM    
Resident

## 2023-08-30 NOTE — DISCHARGE INSTRUCTIONS
Augmentin once daily for the next 7 days.  Follow-up with your clinic later this week.  These swollen olecranon bursitis usually will resolve on their own given time.  They sometimes eventually will need to be drained.   68 y/o morbidly obese female with h/o asthma on home o2, HFp EF, spinal stenosis, dm, ckd, , dvt lt. leg in 2004, treated and resolved came in for increasing sob for past 1 week. pt. stated that she has baseline chronic sob but last week was more than her baseline. As per pt. she gets oob and uses walker. no cp. no abd. pain . no n/v/d. no fever. In the ER  first trop 0.09 , d dimer 407, due to pt's weight she exceeds the wt. limit for ct chest so ER physician started pt. on iv heparin. Lower ext. doppler has been ordered by the ER physician as well.

## 2023-11-12 NOTE — ED NOTES
Expected Patient Referral to ED  3:48 PM    Referring Clinic/Provider:   clinic - [facility]    Reason for referral/Clinical facts:  62 yo M w/ septic olecranon bursitis   Seen in clinic yesterday - drained of small amt of purulent fluid   team called r.e. +Staph culture today  Hx ESRD on HD (M/W/F) on that side - concern for poss hematogenous spread such that he may need BCx, ortho consult for poss bursectomy?  If admitted would be admitted to resident service (attendign Dr. Muniz is aware)  Will come to ED when wife gets off work at 7PM.    Recommendations provided:  Caller was informed that this institution does  possess the capabilities and/or resources to provide for patient.    After hanging up some consideration that pt could be candidate for direct admit - attempted to call [facility] but unable to reach resident.  Case d/w [facility] inpt service resident at 1624 who will look into this option.       Sepideh Van MD  Emergency Medicine  [facility] EMERGENCY DEPARTMENT  [facility] [facility] [id number] [phone]       Sepideh Van MD  10/14/21 4809     Name band;

## 2024-03-27 NOTE — NURSING NOTE
Due to patient being non-English speaking/uses sign language, an  was used for this visit. Only for face-to-face interpretation by an external agency, date and length of interpretation can be found on the scanned worksheet.     name: Kendy Live  Agency: [employer]  Language: Hmong   Telephone number: [phone]  Type of interpretation: Face-to-face, spoken     TRENTON Freed        
English

## 2024-04-17 NOTE — ED NOTES
"[facility] ED Handoff Report    ED Chief Complaint: Low Hgb    ED Diagnosis:  (D64.9) Anemia, unspecified type  Comment: .  Plan: 1 unit PRBC given    (J86.9) Empyema (H)  Comment: .  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  Plan: IR consult    (I71.01) Thoracic aortic dissection (H)  Comment: .  Plan: CV consult    (N18.6,  Z99.2) ESRD (end stage renal disease) on dialysis (H)  Comment: Dialysis M, F  Plan: Nephrology consult       PMH:    Past Medical History:   Diagnosis Date     NAHUM (acute kidney injury) (H)      GI (gastrointestinal bleed)      Gout      H. pylori infection 7/5/2017    UGI bleed implied by Hgb 9.0 6/22/17 and melena. Admitted and hgb decreased to 7.6, CT abdomen showed all bladder wall thickening. + Hep B surface antigen noted. Melena resolved and hgb stabalized without transfusion, epigastric pain resolved with PPI. Recommend referral for gastroscopy.     Heart attack (H)      Hepatitis B      Normocytic anemia      PONV (postoperative nausea and vomiting)      Thrombocytopenia (H)         Code Status:  Prior     Falls Risk: Yes Band: Applied    Current Living Situation/Residence: lives with a significant other     Elimination Status: Continent: Yes     Activity Level: SBA w/ walker    Patients Preferred Language:  English     Needed: No    Vital Signs:  BP (!) 147/93   Pulse 82   Temp 99.1  F (37.3  C) (Oral)   Resp 20   Ht 1.651 m (5' 5\")   Wt 60.8 kg (134 lb)   SpO2 99%   BMI 22.30 kg/m       Cardiac Rhythm: NSR    Pain Score: 0/10    Is the Patient Confused:  No    Last Food or Drink: 03/30/22    Focused Assessment:  AAOx4, RA, ordered bedrest, NSR, NPO, 2 x PIV right AC, AV fistula left arm    Tests Performed: Done: Labs and Imaging    Treatments Provided:  Blood given    Family Dynamics/Concerns: No    Family Updated On Visitor Policy: Yes    Plan of Care Communicated to Family: Yes    Who Was Updated about Plan of " Care: wife    Belongings Checklist Done and Signed by Patient: Yes    Medications sent with patient: none    Covid: asymptomatic , negative    Additional Information: .    RN: Key Munoz  . 3/31/2022 2:10 AM      No

## 2024-08-17 NOTE — PROGRESS NOTES
Paged by [facility] for urgent clinic task regarding this patient. Per notes, patient has ESRD and R pleural effusion. Had thora 3/2/22 and they were not able to drain the full volume at that time. Was told by both IR and Dr. Flores to come back for repeat thora if/when dyspnea returns. Called patient - patient states 3 days of dyspnea worst with lying flat. No fevers or chills. No cough. Feels the same as previous. Does not have sat probe at home. Given lack of infectious signs, no hx of heart failure, feel his dyspnea is indeed d/t this pleural effusion.    Order placed for repeat US thora. Will route to Mer to aid patient in scheduling procedure at [facility].    Loc Tam MD - PGY2  [facility] Family Medicine Residency  P: [phone]    
no
